# Patient Record
Sex: FEMALE | URBAN - METROPOLITAN AREA
[De-identification: names, ages, dates, MRNs, and addresses within clinical notes are randomized per-mention and may not be internally consistent; named-entity substitution may affect disease eponyms.]

---

## 2017-01-09 ENCOUNTER — HOSPITAL ENCOUNTER (OUTPATIENT)
Dept: LAB | Age: 57
Discharge: HOME | End: 2017-01-09
Payer: COMMERCIAL

## 2017-01-09 ENCOUNTER — OFFICE VISIT (OUTPATIENT)
Dept: ONCOLOGY | Age: 57
End: 2017-01-09

## 2017-01-09 VITALS
HEART RATE: 84 BPM | WEIGHT: 275 LBS | TEMPERATURE: 97.1 F | OXYGEN SATURATION: 92 % | SYSTOLIC BLOOD PRESSURE: 153 MMHG | RESPIRATION RATE: 18 BRPM | DIASTOLIC BLOOD PRESSURE: 91 MMHG | BODY MASS INDEX: 44.39 KG/M2

## 2017-01-09 DIAGNOSIS — Z85.42 PERSONAL HISTORY OF MALIGNANT NEOPLASM OF OTHER PARTS OF UTERUS: ICD-10-CM

## 2017-01-09 DIAGNOSIS — Z90.710 VAGINAL PAP SMEAR FOLLOWING HYSTERECTOMY FOR MALIGNANCY: ICD-10-CM

## 2017-01-09 DIAGNOSIS — Z90.710 ACQUIRED ABSENCE OF BOTH CERVIX AND UTERUS: ICD-10-CM

## 2017-01-09 DIAGNOSIS — C54.1 ENDOMETRIAL CANCER (HCC): ICD-10-CM

## 2017-01-09 DIAGNOSIS — F51.01 PRIMARY INSOMNIA: ICD-10-CM

## 2017-01-09 DIAGNOSIS — Z12.72 SPECIAL SCREENING FOR MALIGNANT NEOPLASMS, VAGINA: Primary | ICD-10-CM

## 2017-01-09 DIAGNOSIS — Z08 VAGINAL PAP SMEAR FOLLOWING HYSTERECTOMY FOR MALIGNANCY: ICD-10-CM

## 2017-01-09 PROCEDURE — 88175 CYTOPATH C/V AUTO FLUID REDO: CPT | Performed by: OBSTETRICS & GYNECOLOGY

## 2017-01-09 RX ORDER — ZOLPIDEM TARTRATE 10 MG/1
5 TABLET ORAL
Qty: 30 TAB | Refills: 0 | Status: SHIPPED | OUTPATIENT
Start: 2017-01-09

## 2017-01-09 NOTE — MR AVS SNAPSHOT
Visit Information Date & Time Provider Department Dept. Phone Encounter #  
 1/9/2017 11:45 AM [address], MD [facility] [phone] [id number] Your Appointments 1/10/2018  1:15 PM  
ANNUAL with [address], [place]  
[facility]) Appt Note: yearly One [address] Upcoming Health Maintenance Date Due Hepatitis C Screening 1960 Pneumococcal 19-64 Highest Risk (1 of 3 - PCV13) 12/21/1981 DTaP/Tdap/Td series (1 - Tdap) 12/21/1983 FOBT Q 1 YEAR AGE 50-75 12/21/2012 BREAST CANCER SCRN MAMMOGRAM 1/14/2015 INFLUENZA AGE 9 TO ADULT 8/1/2016 PAP AKA CERVICAL CYTOLOGY 7/6/2019 Allergies as of 1/9/2017  Review Complete On: 1/9/2017 By: [address], [place]  
  
 Severity Noted Reaction Type Reactions Augmentin [Amoxicillin-pot Clavulanate]  01/09/2017    Rash, Itching Current Immunizations  Never Reviewed No immunizations on file. Not reviewed this visit You Were Diagnosed With   
  
 Codes Comments Special screening for malignant neoplasms, vagina    -  Primary ICD-10-CM: Z12.72 
ICD-9-CM: V76.47 Vaginal Pap smear following hysterectomy for malignancy     ICD-10-CM: Z08, Z90.710 ICD-9-CM: V67.01 Personal history of malignant neoplasm of other parts of uterus     ICD-10-CM: Z85.42 
ICD-9-CM: V10.42 Endometrial cancer ([facility] [address]     ICD-10-CM: C54.1 ICD-9-CM: 182.0 Acquired absence of both cervix and uterus     ICD-10-CM: Z90.710 ICD-9-CM: V88.01 Primary insomnia     ICD-10-CM: F51.01 
ICD-9-CM: 307.42 Vitals BP Pulse Temp Resp Weight(growth percentile) SpO2  
 (!) 153/91 84 97.1 °F (36.2 °C) (Oral) 18 275 lb (124.7 kg) 92% BMI OB Status Smoking Status 44.39 kg/m2 Hysterectomy Never Smoker  Vitals History BMI and BSA Data Body Mass Index Body Surface Area 44.39 kg/m 2 2.41 m 2 Preferred Pharmacy Pharmacy Name Phone [id number], [address] [place] [address] [phone] Your Updated Medication List  
  
   
This list is accurate as of: 1/9/17 12:54 PM.  Always use your most recent med list.  
  
  
  
  
 fluticasone 50 mcg/actuation nasal spray Commonly known as:  Doug Peel Mist 1-2 spray(s) into each nostril once daily. raNITIdine 150 mg tablet Commonly known as:  ZANTAC  
150 mg.  
  
 zolpidem 10 mg tablet Commonly known as:  AMBIEN Take 0.5 Tabs by mouth nightly as needed for Sleep. Max Daily Amount: 5 mg. Prescriptions Printed Refills  
 zolpidem (AMBIEN) 10 mg tablet 0 Sig: Take 0.5 Tabs by mouth nightly as needed for Sleep. Max Daily Amount: 5 mg. Class: Print Route: Oral  
  
Introducing [facility] SERVICES! Dear Emilia Mcdermott: Thank you for requesting a [employer] account. Our records indicate that you already have an active [employer] account. You can access your account anytime at [email or web address] Did you know that you can access your hospital and ER discharge instructions at any time in [employer]? You can also review all of your test results from your hospital stay or ER visit. Additional Information If you have questions, please visit the Frequently Asked Questions section of the [employer] website at [email or web address] Remember, [employer] is NOT to be used for urgent needs. For medical emergencies, dial 911. Now available from your iPhone and Android! Please provide this summary of care documentation to your next provider. Your primary care clinician is listed as Joel Vargas. [place]. If you have any questions after today's visit, please call [phone].

## 2017-01-09 NOTE — PROGRESS NOTES
[facility]  [address] [place] [address], [address], [address] [place] [id number] [phone]  Jorgito Grajeda MD      Patient ID:  Name: Lorrie Fowler  MRN: [id number]  : 1960/56 y.o. Visit date: 2017    INTERVAL HISTORY: Lorrie Fowler is a 49 y.o. G1, P3  female with Stage IA, Grade 1 Endometrial Cancer status post TLH/BSO/ PLND 11. Last PAP 16 was satis and neg. She is being seen today for a 6 month followup. Today patient has no gyn complaints. Patient specifically denies vaginal bleeding, vaginal discharge, pelvic pain, abdominal pain, abdominal bloating, urinary symptoms, or changes in bowel movements. Pt does report her sister was recently diagnoses with stage 2 invasive poorly differentiated ductal carcinoma, genetic testing was neg. Patient complains of insomnia. She is tearful about this and says Ambien worked great for her in the past and doesn't know why she cannot continue on it. Last CT : none recently   Mammogram: 2013    COMPREHENSIVE ROS:  Constitutional: Sweats  ALL/IMM: seasonal allergies  Endocrine: hot flashes  Psych: Anxious, Insomnia    All other systems have been reviewed and are neg. PMH:  Past Medical History   Diagnosis Date    Basal cell carcinoma     Kidney stones     Polyp of ureter      PSH:  Past Surgical History   Procedure Laterality Date    Hysteroscopy diagnostic       D&C/POLYP REMOVAL    Pr endometrial ablation, thermal      Hx  section       SOC:  Social History     Social History    Marital status:      Spouse name: N/A    Number of children: N/A    Years of education: N/A     Occupational History    Not on file.      Social History Main Topics    Smoking status: Never Smoker    Smokeless tobacco: Never Used    Alcohol use Not on file    Drug use: Not on file    Sexual activity: Not on file     Other Topics Concern    Not on file     Social History Narrative     Family History  Family History   Problem Relation Age of Onset    Prostate Cancer Father      PROSTATE    Breast Cancer Sister 46     invasive poorly differentiated ductal carcinoma, Neg genetic testing.  Cancer Sister 62     melanoma stage 1     Medications:  Current Outpatient Prescriptions on File Prior to Visit   Medication Sig Dispense Refill    ranitidine (ZANTAC) 150 mg tablet 150 mg.      fluticasone (FLONASE) 50 mcg/actuation nasal spray Mist 1-2 spray(s) into each nostril once daily. No current facility-administered medications on file prior to visit. Allergies   Allergen Reactions    Augmentin [Amoxicillin-Pot Clavulanate] Rash and Itching         OBJECTIVE:  PHYSICAL EXAM  VITAL SIGNS: Visit Vitals    BP (!) 153/91    Pulse 84    Temp 97.1 °F (36.2 °C) (Oral)    Resp 18    Wt 124.7 kg (275 lb)    SpO2 92%    BMI 44.39 kg/m2      GENERAL KJ: in no apparent distress and well developed and well nourished   HEENT: NCAT,EOMI,PERRL   RESPIRATORY: lungs clear to auscultation, no wheezing, rhonchi, or crackles   CARDIOVASC: Regular rate and rhythm or without murmur or extra heart sounds   GASTROINT: soft, non-tender, non-distended, without masses or organomegaly, normal active bowel sounds   MUSCULOSKEL: no joint tenderness, deformity or swelling   INTEGUMENT:  warm and dry, no rashes or lesions   EXTREMITIES: extremities normal, atraumatic, no cyanosis or edema   PELVIC: External genitalia: normal general appearance  Urinary system: urethral meatus normal  Vaginal: normal mucosa without prolapse or lesions and normal rugae  Cervix: removed surgically  Adnexa: removed surgically  Uterus: removed surgically   RECTAL: deferred   LYLY SURVEY: Cervical, supraclavicular, axillary and inguinal nodes normal.   NEURO: Grossly normal       IMPRESSION AND PLAN:  Stage IA, G1 Endometrial Cancer, JANESSA   Menopausal symptoms.  Previously declined trial of Effexor or Paxil for treatment of hot flashes. Discussed option of low dose ERT and risks versus benefits of therapy. Patient declines. Insomnia. Patient reports excellent results with prior use of Ambien. I will give patient Ambien 5 mg 30 pills no refill today. If she has good results again I suggest she follow up with Dr. Yulia Casillas to continue therapy. Cytology and pelvic performed today. Follow up in 6 months for endometrial cancer surveillance. Advised patient to call with any questions or concerns prior to next appointment. All questions answered. Patient agrees with plan of care.       Abdirizak Claudio MD   Gynecologic Oncology  3/3/72279:24 PM

## 2018-01-16 ENCOUNTER — OFFICE VISIT (OUTPATIENT)
Dept: GYNECOLOGY | Age: 58
End: 2018-01-16

## 2018-01-16 ENCOUNTER — HOSPITAL ENCOUNTER (OUTPATIENT)
Dept: LAB | Age: 58
Discharge: HOME | End: 2018-01-16
Payer: COMMERCIAL

## 2018-01-16 VITALS
HEART RATE: 89 BPM | WEIGHT: 279 LBS | BODY MASS INDEX: 44.84 KG/M2 | HEIGHT: 66 IN | DIASTOLIC BLOOD PRESSURE: 110 MMHG | SYSTOLIC BLOOD PRESSURE: 170 MMHG

## 2018-01-16 DIAGNOSIS — C54.1 ENDOMETRIAL CANCER (HCC): Primary | ICD-10-CM

## 2018-01-16 PROBLEM — E66.01 OBESITY, MORBID (HCC): Status: ACTIVE | Noted: 2018-01-16

## 2018-01-16 PROCEDURE — 88175 CYTOPATH C/V AUTO FLUID REDO: CPT | Performed by: OBSTETRICS & GYNECOLOGY

## 2018-01-16 RX ORDER — HYDROGEN PEROXIDE 3 %
20 SOLUTION, NON-ORAL MISCELLANEOUS DAILY
COMMUNITY
End: 2020-05-22

## 2018-01-16 NOTE — PROGRESS NOTES
New patient referred for follow up care for endometrial cancer. Physician no longer accepts insurance. Last follow up was 1/2017. Pt tearful, her father passed away 2 days ago. Patient states no abnormal spotting or bleeding. Patient states no questions or concerns for today's visit.

## 2018-01-16 NOTE — PROGRESS NOTES
[address] [place] [address] [address], [address] [facility] [phone]  F [phone]    Office Note  Patient ID:  Name:  Michael Doshi  MRN:  [id number]  :  1960/57 y.o. Date:  2018      HISTORY OF PRESENT ILLNESS:  Michael Doshi is a 56 y.o.  postmenopausal female who is being seen for a history of stage IA, grade 1, endometrial carcinoma. She underwent TLH, BSO, PLND in 2011. Based upon her pathology she was not recommended any adjuvant therapy. Her surgery was performed by Dr. Trista Cole in [place]. She has transferred care to our office since Dr. Adelaide Madrid no longer accepts her insurance. She presents today for annual surveillance. She denies any vaginal bleeding or discharge, any pelvic or abdominal pain, or any changes in her bowel or bladder habits. She is up to date on her colonoscopy and mammogram.     Her father passed away a couple of days ago. ROS:   and GI review:  Negative  Cardiopulmonary review:  Negative   Musculoskeletal:  Negative    A comprehensive review of systems was negative except for that written in the History of Present Illness.  , 10 point ROS      Problem List:  Patient Active Problem List    Diagnosis Date Noted    Obesity, morbid ([address] 2018    Special screening for malignant neoplasms, vagina 2016    Vaginal Pap smear following hysterectomy for malignancy 2016    Personal history of malignant neoplasm of other parts of uterus 2016    Endometrial cancer ([facility] [address] 2013     PMH:  Past Medical History:   Diagnosis Date    Basal cell carcinoma     GERD (gastroesophageal reflux disease)     Kidney stones     Polyp of ureter       PSH:  Past Surgical History:   Procedure Laterality Date    ENDOMETRIAL ABLATION, THERMAL      HX  SECTION      HX COLONOSCOPY      eber     HYSTEROSCOPY DIAGNOSTIC  2011    D&C/POLYP REMOVAL      Social History:  Social History   Substance Use Topics    Smoking status: Never Smoker    Smokeless tobacco: Never Used    Alcohol use Not on file      Family History:  Family History   Problem Relation Age of Onset    Prostate Cancer Father      PROSTATE    Breast Cancer Sister 46     invasive poorly differentiated ductal carcinoma, Neg genetic testing.  Cancer Sister 62     melanoma stage 1      Medications: (reviewed)  Current Outpatient Prescriptions   Medication Sig    esomeprazole (NEXIUM) 20 mg capsule Take 20 mg by mouth daily. Indications: BID    ranitidine (ZANTAC) 150 mg tablet 150 mg.    zolpidem (AMBIEN) 10 mg tablet Take 0.5 Tabs by mouth nightly as needed for Sleep. Max Daily Amount: 5 mg.  fluticasone (FLONASE) 50 mcg/actuation nasal spray Mist 1-2 spray(s) into each nostril once daily. No current facility-administered medications for this visit. Allergies: (reviewed)  Allergies   Allergen Reactions    Augmentin [Amoxicillin-Pot Clavulanate] Rash and Itching          OBJECTIVE:    Physical Exam:  VITAL SIGNS: Vitals:    01/16/18 1442   BP: (!) 170/110   Pulse: 89   Weight: 279 lb (126.6 kg)   Height: 5' 5.98\" (1.676 m)     Body mass index is 45.05 kg/(m^2). GENERAL KJ: Conversant, alert, oriented. No acute distress. HEENT: HEENT. No thyroid enlargement. No JVD. Neck: Supple without restrictions. RESPIRATORY: Clear to auscultation and percussion to the bases. No CVAT. CARDIOVASC: RRR without murmur/rub.    GASTROINT: soft, non-tender, without masses or organomegaly   MUSCULOSKEL: no joint tenderness, deformity or swelling   EXTREMITIES: extremities normal, atraumatic, no cyanosis or edema   PELVIC: External genitalia: normal general appearance  Urinary system: urethral meatus normal  Vaginal: normal without tenderness, induration or masses  Cervix: absent  Adnexa: removed surgically  Uterus: absent   RECTAL: Deferred   LYLY SURVEY: No suspicious lymphadenopathy or edema noted.   NEURO: Grossly intact. No acute deficit. IMPRESSION/PLAN:  Sofi Miner is a 56 y.o. female with a diagnosis of stage IA, grade 1, endometrial cancer. She has no evidence of disease on today's exam.  We will see her back in one year for routine surveillance of disease.           Signed By: Valerie Reed MD     1/16/2018/2:56 PM

## 2019-01-22 ENCOUNTER — OFFICE VISIT (OUTPATIENT)
Dept: GYNECOLOGY | Age: 59
End: 2019-01-22

## 2019-01-22 VITALS
HEART RATE: 103 BPM | SYSTOLIC BLOOD PRESSURE: 198 MMHG | BODY MASS INDEX: 44.68 KG/M2 | HEIGHT: 66 IN | WEIGHT: 278 LBS | DIASTOLIC BLOOD PRESSURE: 105 MMHG

## 2019-01-22 DIAGNOSIS — C54.1 ENDOMETRIAL CANCER (HCC): Primary | ICD-10-CM

## 2019-01-22 RX ORDER — PANTOPRAZOLE SODIUM 40 MG/1
TABLET, DELAYED RELEASE ORAL
Refills: 4 | COMMUNITY
Start: 2018-11-09 | End: 2020-10-27

## 2019-01-22 NOTE — PROGRESS NOTES
[facility] [address], [address] [facility]  P [phone]  F [phone]    Office Note  Patient ID:  Name:  Arslan Romeo  MRN:  [id number]  :  1960/58 y.o. Date:  2019      HISTORY OF PRESENT ILLNESS:  Arslan Romeo is a 66 y.o.  postmenopausal female who is being seen for a history of stage IA, grade 1, endometrial carcinoma. She underwent TLH, BSO, PLND in 2011. Based upon her pathology she was not recommended any adjuvant therapy. Her surgery was performed by Dr. Nadege Staton in [place]. She has transferred care to our office since Dr. Milton Meza no longer accepts her insurance. She presents today for annual surveillance. She denies any vaginal bleeding or discharge, any pelvic or abdominal pain, or any changes in her bowel or bladder habits. She is up to date on her colonoscopy and mammogram.             ROS:   and GI review:  Negative  Cardiopulmonary review:  Negative   Musculoskeletal:  Negative    A comprehensive review of systems was negative except for that written in the History of Present Illness.  , 10 point ROS      Problem List:  Patient Active Problem List    Diagnosis Date Noted    Obesity, morbid ([address] 2018    Special screening for malignant neoplasms, vagina 2016    Vaginal Pap smear following hysterectomy for malignancy 2016    Personal history of malignant neoplasm of other parts of uterus 2016    Endometrial cancer ([facility] [address] 2013     PMH:  Past Medical History:   Diagnosis Date    Basal cell carcinoma     GERD (gastroesophageal reflux disease)     Kidney stones     Polyp of ureter       PSH:  Past Surgical History:   Procedure Laterality Date    ENDOMETRIAL ABLATION, THERMAL      HX  SECTION      HX COLONOSCOPY      Novemeber     HYSTEROSCOPY DIAGNOSTIC      D&C/POLYP REMOVAL      Social History:  Social History     Tobacco Use    Smoking status: Never Smoker    Smokeless tobacco: Never Used   Substance Use Topics    Alcohol use: Not on file      Family History:  Family History   Problem Relation Age of Onset    Prostate Cancer Father         PROSTATE    Breast Cancer Sister 46        invasive poorly differentiated ductal carcinoma, Neg genetic testing.  Cancer Sister 62        melanoma stage 1      Medications: (reviewed)  Current Outpatient Medications   Medication Sig    ranitidine (ZANTAC) 150 mg tablet 150 mg.  pantoprazole (PROTONIX) 40 mg tablet TAKE 1 TABLET BY MOUTH TWICE A DAY    esomeprazole (NEXIUM) 20 mg capsule Take 20 mg by mouth daily. Indications: BID    zolpidem (AMBIEN) 10 mg tablet Take 0.5 Tabs by mouth nightly as needed for Sleep. Max Daily Amount: 5 mg.  fluticasone (FLONASE) 50 mcg/actuation nasal spray Mist 1-2 spray(s) into each nostril once daily. No current facility-administered medications for this visit. Allergies: (reviewed)  Allergies   Allergen Reactions    Augmentin [Amoxicillin-Pot Clavulanate] Rash and Itching          OBJECTIVE:    Physical Exam:  VITAL SIGNS: Vitals:    01/22/19 1526   BP: (!) 198/105   Pulse: (!) 103   Weight: 278 lb (126.1 kg)   Height: 5' 5.98\" (1.676 m)     Body mass index is 44.89 kg/m². GENERAL KJ: Conversant, alert, oriented. No acute distress. HEENT: HEENT. No thyroid enlargement. No JVD. Neck: Supple without restrictions. RESPIRATORY: Clear to auscultation and percussion to the bases. No CVAT. CARDIOVASC: RRR without murmur/rub.    GASTROINT: soft, non-tender, without masses or organomegaly   MUSCULOSKEL: no joint tenderness, deformity or swelling   EXTREMITIES: extremities normal, atraumatic, no cyanosis or edema   PELVIC: External genitalia: normal general appearance  Urinary system: urethral meatus normal  Vaginal: normal without tenderness, induration or masses  Cervix: absent  Adnexa: removed surgically  Uterus: absent   RECTAL: Deferred   LYLY SURVEY: No suspicious lymphadenopathy or edema noted. NEURO: Grossly intact. No acute deficit. IMPRESSION/PLAN:  Fco Do is a 66 y.o. female with a diagnosis of stage IA, grade 1, endometrial cancer. She has no evidence of disease on today's exam.  We will see her back in one year for routine surveillance of disease.           Signed By: Dutch Escobar MD     1/22/2019/2:56 PM

## 2020-01-23 ENCOUNTER — OFFICE VISIT (OUTPATIENT)
Dept: GYNECOLOGY | Age: 60
End: 2020-01-23

## 2020-01-23 VITALS
HEART RATE: 83 BPM | WEIGHT: 270.8 LBS | HEIGHT: 66 IN | SYSTOLIC BLOOD PRESSURE: 164 MMHG | DIASTOLIC BLOOD PRESSURE: 103 MMHG | BODY MASS INDEX: 43.52 KG/M2

## 2020-01-23 DIAGNOSIS — C54.1 ENDOMETRIAL CANCER (HCC): Primary | ICD-10-CM

## 2020-01-23 NOTE — PROGRESS NOTES
Check up for history of endometrial cancer. Pt states no abnormal spotting, bleeding or pain. 1. Have you been to the ER, urgent care clinic since your last visit? Hospitalized since your last visit?no    2. Have you seen or consulted any other health care providers outside of the [address] [place] [address] since your last visit? Include any pap smears or colon screening.  no

## 2020-01-23 NOTE — PROGRESS NOTES
[address] [place] [address] [address], [address]  P [phone]  F [phone]    Office Note  Patient ID:  Name:  Kya Hanson  MRN:  [id number]  :  1960/59 y.o. Date:  2020      HISTORY OF PRESENT ILLNESS:  Kya Hanson is a 64 y.o.  postmenopausal female who is being seen for a history of stage IA, grade 1, endometrial carcinoma. She underwent TLH, BSO, PLND in 2011. Based upon her pathology she was not recommended any adjuvant therapy. Her surgery was performed by Dr. Jimbo Zarate in [facility]. She has transferred care to our office. She presents today for annual surveillance. She denies any vaginal bleeding or discharge, any pelvic or abdominal pain, or any changes in her bowel or bladder habits. She is up to date on her colonoscopy and mammogram.             ROS:   and GI review:  Negative  Cardiopulmonary review:  Negative   Musculoskeletal:  Negative    A comprehensive review of systems was negative except for that written in the History of Present Illness.  , 10 point ROS      Problem List:  Patient Active Problem List    Diagnosis Date Noted    Obesity, morbid ([facility] [address] 2018    Vaginal Pap smear following hysterectomy for malignancy 2016    Personal history of malignant neoplasm of other parts of uterus 2016    Endometrial cancer ([facility] [address] 2013     PMH:  Past Medical History:   Diagnosis Date    Basal cell carcinoma     GERD (gastroesophageal reflux disease)     Kidney stones     Polyp of ureter       PSH:  Past Surgical History:   Procedure Laterality Date    ENDOMETRIAL ABLATION, THERMAL      HX  SECTION      HX COLONOSCOPY      [id number]    HYSTEROSCOPY DIAGNOSTIC      D&C/POLYP REMOVAL      Social History:  Social History     Tobacco Use    Smoking status: Never Smoker    Smokeless tobacco: Never Used   Substance Use Topics    Alcohol use: Not on file      Family History:  Family History   Problem Relation Age of Onset    Prostate Cancer Father         PROSTATE    Breast Cancer Sister 46        invasive poorly differentiated ductal carcinoma, Neg genetic testing.  Cancer Sister 62        melanoma stage 1      Medications: (reviewed)  Current Outpatient Medications   Medication Sig    esomeprazole (NEXIUM) 20 mg capsule Take 20 mg by mouth daily. Indications: BID    ranitidine (ZANTAC) 150 mg tablet 150 mg.  pantoprazole (PROTONIX) 40 mg tablet TAKE 1 TABLET BY MOUTH TWICE A DAY    zolpidem (AMBIEN) 10 mg tablet Take 0.5 Tabs by mouth nightly as needed for Sleep. Max Daily Amount: 5 mg.  fluticasone (FLONASE) 50 mcg/actuation nasal spray Mist 1-2 spray(s) into each nostril once daily. No current facility-administered medications for this visit. Allergies: (reviewed)  Allergies   Allergen Reactions    Augmentin [Amoxicillin-Pot Clavulanate] Rash and Itching          OBJECTIVE:    Physical Exam:  VITAL SIGNS: Vitals:    01/23/20 1422 01/23/20 1437   BP: (!) 181/103 (!) 164/103   Pulse: 82 83   Weight: 270 lb 12.8 oz (122.8 kg)    Height: 5' 5.98\" (1.676 m)      Body mass index is 43.73 kg/m². GENERAL KJ: Conversant, alert, oriented. No acute distress. HEENT: HEENT. No thyroid enlargement. No JVD. Neck: Supple without restrictions. RESPIRATORY: Clear to auscultation and percussion to the bases. No CVAT. CARDIOVASC: RRR without murmur/rub. GASTROINT: soft, non-tender, without masses or organomegaly   MUSCULOSKEL: no joint tenderness, deformity or swelling   EXTREMITIES: extremities normal, atraumatic, no cyanosis or edema   PELVIC: External genitalia: normal general appearance  Urinary system: urethral meatus normal  Vaginal: normal without tenderness, induration or masses  Cervix: absent  Adnexa: removed surgically  Uterus: absent   RECTAL: Deferred   LYLY SURVEY: No suspicious lymphadenopathy or edema noted. NEURO: Grossly intact.  No acute deficit. IMPRESSION/PLAN:  Luci García is a 64 y.o. female with a diagnosis of stage IA, grade 1, endometrial cancer. She has no evidence of disease on today's exam.  We will see her back in one year for routine surveillance of disease.           Signed By: Marietta Silverio MD     1/23/2020/2:56 PM

## 2020-03-31 ENCOUNTER — APPOINTMENT (OUTPATIENT)
Dept: GENERAL RADIOLOGY | Age: 60
DRG: 870 | End: 2020-03-31
Attending: INTERNAL MEDICINE
Payer: COMMERCIAL

## 2020-03-31 ENCOUNTER — HOSPITAL ENCOUNTER (INPATIENT)
Age: 60
LOS: 52 days | Discharge: NURSING, REHAB OR LONG-TERM CARE | DRG: 870 | End: 2020-05-22
Attending: SURGERY | Admitting: SURGERY
Payer: COMMERCIAL

## 2020-03-31 ENCOUNTER — APPOINTMENT (OUTPATIENT)
Dept: NON INVASIVE DIAGNOSTICS | Age: 60
DRG: 870 | End: 2020-03-31
Attending: INTERNAL MEDICINE
Payer: COMMERCIAL

## 2020-03-31 DIAGNOSIS — R53.81 DEBILITY: ICD-10-CM

## 2020-03-31 DIAGNOSIS — Z71.89 GOALS OF CARE, COUNSELING/DISCUSSION: ICD-10-CM

## 2020-03-31 DIAGNOSIS — R06.02 SHORTNESS OF BREATH: ICD-10-CM

## 2020-03-31 DIAGNOSIS — N18.6 ESRD (END STAGE RENAL DISEASE) (HCC): ICD-10-CM

## 2020-03-31 PROBLEM — U07.1 COVID-19 VIRUS INFECTION: Status: ACTIVE | Noted: 2020-03-31

## 2020-03-31 LAB
ABO + RH BLD: NORMAL
ALBUMIN SERPL-MCNC: 2.5 G/DL (ref 3.5–5)
ALBUMIN SERPL-MCNC: 2.6 G/DL (ref 3.5–5)
ANION GAP SERPL CALC-SCNC: 12 MMOL/L (ref 5–15)
ANION GAP SERPL CALC-SCNC: 12 MMOL/L (ref 5–15)
ANION GAP SERPL CALC-SCNC: 7 MMOL/L (ref 5–15)
ARTERIAL PATENCY WRIST A: ABNORMAL
BASE DEFICIT BLD-SCNC: 16 MMOL/L
BDY SITE: ABNORMAL
BLOOD GROUP ANTIBODIES SERPL: NORMAL
BNP SERPL-MCNC: ABNORMAL PG/ML
BUN SERPL-MCNC: 14 MG/DL (ref 6–20)
BUN SERPL-MCNC: 15 MG/DL (ref 6–20)
BUN SERPL-MCNC: 16 MG/DL (ref 6–20)
BUN/CREAT SERPL: 3 (ref 12–20)
BUN/CREAT SERPL: 4 (ref 12–20)
BUN/CREAT SERPL: 4 (ref 12–20)
CA-I BLD-SCNC: 1.1 MMOL/L (ref 1.12–1.32)
CALCIUM SERPL-MCNC: 7.7 MG/DL (ref 8.5–10.1)
CALCIUM SERPL-MCNC: 7.8 MG/DL (ref 8.5–10.1)
CALCIUM SERPL-MCNC: 7.9 MG/DL (ref 8.5–10.1)
CHLORIDE SERPL-SCNC: 105 MMOL/L (ref 97–108)
CHLORIDE SERPL-SCNC: 106 MMOL/L (ref 97–108)
CHLORIDE SERPL-SCNC: 108 MMOL/L (ref 97–108)
CK SERPL-CCNC: 145 U/L (ref 26–192)
CO2 SERPL-SCNC: 16 MMOL/L (ref 21–32)
CO2 SERPL-SCNC: 20 MMOL/L (ref 21–32)
CO2 SERPL-SCNC: 23 MMOL/L (ref 21–32)
CREAT SERPL-MCNC: 3.78 MG/DL (ref 0.55–1.02)
CREAT SERPL-MCNC: 4.04 MG/DL (ref 0.55–1.02)
CREAT SERPL-MCNC: 4.26 MG/DL (ref 0.55–1.02)
CRP SERPL-MCNC: 28.3 MG/DL (ref 0–0.6)
D DIMER PPP FEU-MCNC: 4.37 MG/L FEU (ref 0–0.65)
ERYTHROCYTE [DISTWIDTH] IN BLOOD BY AUTOMATED COUNT: 13.5 % (ref 11.5–14.5)
ERYTHROCYTE [DISTWIDTH] IN BLOOD BY AUTOMATED COUNT: 13.6 % (ref 11.5–14.5)
EST. AVERAGE GLUCOSE BLD GHB EST-MCNC: 252 MG/DL
FERRITIN SERPL-MCNC: 1985 NG/ML (ref 26–388)
GAS FLOW.O2 O2 DELIVERY SYS: ABNORMAL L/MIN
GAS FLOW.O2 SETTING OXYMISER: 20 BPM
GLUCOSE BLD STRIP.AUTO-MCNC: 135 MG/DL (ref 65–100)
GLUCOSE BLD STRIP.AUTO-MCNC: 207 MG/DL (ref 65–100)
GLUCOSE SERPL-MCNC: 168 MG/DL (ref 65–100)
GLUCOSE SERPL-MCNC: 180 MG/DL (ref 65–100)
GLUCOSE SERPL-MCNC: 212 MG/DL (ref 65–100)
HBA1C MFR BLD: 10.4 % (ref 4–5.6)
HCO3 BLD-SCNC: 14.1 MMOL/L (ref 22–26)
HCT VFR BLD AUTO: 32.8 % (ref 35–47)
HCT VFR BLD AUTO: 33.7 % (ref 35–47)
HGB BLD-MCNC: 10.2 G/DL (ref 11.5–16)
HGB BLD-MCNC: 10.5 G/DL (ref 11.5–16)
INR PPP: 1.2 (ref 0.9–1.1)
LDH SERPL L TO P-CCNC: 458 U/L (ref 81–246)
MAGNESIUM SERPL-MCNC: 2.1 MG/DL (ref 1.6–2.4)
MAGNESIUM SERPL-MCNC: 2.1 MG/DL (ref 1.6–2.4)
MCH RBC QN AUTO: 27.3 PG (ref 26–34)
MCH RBC QN AUTO: 27.6 PG (ref 26–34)
MCHC RBC AUTO-ENTMCNC: 31.1 G/DL (ref 30–36.5)
MCHC RBC AUTO-ENTMCNC: 31.2 G/DL (ref 30–36.5)
MCV RBC AUTO: 87.9 FL (ref 80–99)
MCV RBC AUTO: 88.5 FL (ref 80–99)
NRBC # BLD: 0 K/UL (ref 0–0.01)
NRBC # BLD: 0 K/UL (ref 0–0.01)
NRBC BLD-RTO: 0 PER 100 WBC
NRBC BLD-RTO: 0 PER 100 WBC
O2/TOTAL GAS SETTING VFR VENT: 1 %
PCO2 BLD: 47.9 MMHG (ref 35–45)
PEEP RESPIRATORY: 20 CMH2O
PH BLD: 7.08 [PH] (ref 7.35–7.45)
PHOSPHATE SERPL-MCNC: 5 MG/DL (ref 2.6–4.7)
PHOSPHATE SERPL-MCNC: 5.5 MG/DL (ref 2.6–4.7)
PIP ISTAT,IPIP: 14
PLATELET # BLD AUTO: 334 K/UL (ref 150–400)
PLATELET # BLD AUTO: 369 K/UL (ref 150–400)
PMV BLD AUTO: 10.2 FL (ref 8.9–12.9)
PMV BLD AUTO: 10.2 FL (ref 8.9–12.9)
PO2 BLD: 186 MMHG (ref 80–100)
POTASSIUM SERPL-SCNC: 5.2 MMOL/L (ref 3.5–5.1)
POTASSIUM SERPL-SCNC: 5.2 MMOL/L (ref 3.5–5.1)
POTASSIUM SERPL-SCNC: 6 MMOL/L (ref 3.5–5.1)
PROCALCITONIN SERPL-MCNC: 13.44 NG/ML
PROTHROMBIN TIME: 12.3 SEC (ref 9–11.1)
RBC # BLD AUTO: 3.73 M/UL (ref 3.8–5.2)
RBC # BLD AUTO: 3.81 M/UL (ref 3.8–5.2)
SAO2 % BLD: 99 % (ref 92–97)
SERVICE CMNT-IMP: ABNORMAL
SERVICE CMNT-IMP: ABNORMAL
SODIUM SERPL-SCNC: 136 MMOL/L (ref 136–145)
SODIUM SERPL-SCNC: 136 MMOL/L (ref 136–145)
SODIUM SERPL-SCNC: 137 MMOL/L (ref 136–145)
SPECIMEN EXP DATE BLD: NORMAL
SPECIMEN TYPE: ABNORMAL
TROPONIN I SERPL-MCNC: 0.36 NG/ML
TROPONIN I SERPL-MCNC: 0.48 NG/ML
VANCOMYCIN SERPL-MCNC: 22.9 UG/ML
VENTILATION MODE VENT: ABNORMAL
WBC # BLD AUTO: 20.3 K/UL (ref 3.6–11)
WBC # BLD AUTO: 27.4 K/UL (ref 3.6–11)

## 2020-03-31 PROCEDURE — 85027 COMPLETE CBC AUTOMATED: CPT

## 2020-03-31 PROCEDURE — 65610000006 HC RM INTENSIVE CARE

## 2020-03-31 PROCEDURE — 02HV33Z INSERTION OF INFUSION DEVICE INTO SUPERIOR VENA CAVA, PERCUTANEOUS APPROACH: ICD-10-PCS | Performed by: INTERNAL MEDICINE

## 2020-03-31 PROCEDURE — 74011250636 HC RX REV CODE- 250/636: Performed by: INTERNAL MEDICINE

## 2020-03-31 PROCEDURE — 83735 ASSAY OF MAGNESIUM: CPT

## 2020-03-31 PROCEDURE — 74011636637 HC RX REV CODE- 636/637: Performed by: INTERNAL MEDICINE

## 2020-03-31 PROCEDURE — 71045 X-RAY EXAM CHEST 1 VIEW: CPT

## 2020-03-31 PROCEDURE — 74011000250 HC RX REV CODE- 250: Performed by: INTERNAL MEDICINE

## 2020-03-31 PROCEDURE — 82550 ASSAY OF CK (CPK): CPT

## 2020-03-31 PROCEDURE — 83880 ASSAY OF NATRIURETIC PEPTIDE: CPT

## 2020-03-31 PROCEDURE — 77030018798 HC PMP KT ENTRL FED COVD -A

## 2020-03-31 PROCEDURE — 74011250637 HC RX REV CODE- 250/637: Performed by: INTERNAL MEDICINE

## 2020-03-31 PROCEDURE — 5A1955Z RESPIRATORY VENTILATION, GREATER THAN 96 CONSECUTIVE HOURS: ICD-10-PCS | Performed by: HOSPITALIST

## 2020-03-31 PROCEDURE — 36592 COLLECT BLOOD FROM PICC: CPT

## 2020-03-31 PROCEDURE — 83615 LACTATE (LD) (LDH) ENZYME: CPT

## 2020-03-31 PROCEDURE — 86900 BLOOD TYPING SEROLOGIC ABO: CPT

## 2020-03-31 PROCEDURE — 87070 CULTURE OTHR SPECIMN AEROBIC: CPT

## 2020-03-31 PROCEDURE — 83036 HEMOGLOBIN GLYCOSYLATED A1C: CPT

## 2020-03-31 PROCEDURE — 87186 SC STD MICRODIL/AGAR DIL: CPT

## 2020-03-31 PROCEDURE — 84145 PROCALCITONIN (PCT): CPT

## 2020-03-31 PROCEDURE — 94002 VENT MGMT INPAT INIT DAY: CPT

## 2020-03-31 PROCEDURE — 85379 FIBRIN DEGRADATION QUANT: CPT

## 2020-03-31 PROCEDURE — 82803 BLOOD GASES ANY COMBINATION: CPT

## 2020-03-31 PROCEDURE — 82728 ASSAY OF FERRITIN: CPT

## 2020-03-31 PROCEDURE — 80048 BASIC METABOLIC PNL TOTAL CA: CPT

## 2020-03-31 PROCEDURE — 74011250637 HC RX REV CODE- 250/637: Performed by: NURSE PRACTITIONER

## 2020-03-31 PROCEDURE — 74011000258 HC RX REV CODE- 258: Performed by: INTERNAL MEDICINE

## 2020-03-31 PROCEDURE — 87077 CULTURE AEROBIC IDENTIFY: CPT

## 2020-03-31 PROCEDURE — 85610 PROTHROMBIN TIME: CPT

## 2020-03-31 PROCEDURE — 80202 ASSAY OF VANCOMYCIN: CPT

## 2020-03-31 PROCEDURE — 84484 ASSAY OF TROPONIN QUANT: CPT

## 2020-03-31 PROCEDURE — 90945 DIALYSIS ONE EVALUATION: CPT

## 2020-03-31 PROCEDURE — 74018 RADEX ABDOMEN 1 VIEW: CPT

## 2020-03-31 PROCEDURE — 82962 GLUCOSE BLOOD TEST: CPT

## 2020-03-31 PROCEDURE — 77030005513 HC CATH URETH FOL11 MDII -B

## 2020-03-31 PROCEDURE — 74011250636 HC RX REV CODE- 250/636: Performed by: NURSE PRACTITIONER

## 2020-03-31 PROCEDURE — C1751 CATH, INF, PER/CENT/MIDLINE: HCPCS

## 2020-03-31 PROCEDURE — 36600 WITHDRAWAL OF ARTERIAL BLOOD: CPT

## 2020-03-31 PROCEDURE — 87040 BLOOD CULTURE FOR BACTERIA: CPT

## 2020-03-31 PROCEDURE — 36415 COLL VENOUS BLD VENIPUNCTURE: CPT

## 2020-03-31 PROCEDURE — C1752 CATH,HEMODIALYSIS,SHORT-TERM: HCPCS

## 2020-03-31 PROCEDURE — 86140 C-REACTIVE PROTEIN: CPT

## 2020-03-31 PROCEDURE — 83520 IMMUNOASSAY QUANT NOS NONAB: CPT

## 2020-03-31 PROCEDURE — 36556 INSERT NON-TUNNEL CV CATH: CPT

## 2020-03-31 PROCEDURE — 80069 RENAL FUNCTION PANEL: CPT

## 2020-03-31 RX ORDER — SODIUM CHLORIDE 0.9 % (FLUSH) 0.9 %
5-40 SYRINGE (ML) INJECTION AS NEEDED
Status: DISCONTINUED | OUTPATIENT
Start: 2020-03-31 | End: 2020-05-22 | Stop reason: HOSPADM

## 2020-03-31 RX ORDER — ACETAMINOPHEN 325 MG/1
650 TABLET ORAL
Status: DISCONTINUED | OUTPATIENT
Start: 2020-03-31 | End: 2020-05-22 | Stop reason: HOSPADM

## 2020-03-31 RX ORDER — ACETAMINOPHEN 325 MG/1
650 TABLET ORAL
Status: DISCONTINUED | OUTPATIENT
Start: 2020-03-31 | End: 2020-03-31

## 2020-03-31 RX ORDER — SODIUM BICARBONATE 1 MEQ/ML
100 SYRINGE (ML) INTRAVENOUS ONCE
Status: DISCONTINUED | OUTPATIENT
Start: 2020-03-31 | End: 2020-03-31

## 2020-03-31 RX ORDER — DEXTROSE 50 % IN WATER (D50W) INTRAVENOUS SYRINGE
25 ONCE
Status: COMPLETED | OUTPATIENT
Start: 2020-03-31 | End: 2020-03-31

## 2020-03-31 RX ORDER — PROPOFOL 10 MG/ML
0-50 VIAL (ML) INTRAVENOUS
Status: DISCONTINUED | OUTPATIENT
Start: 2020-03-31 | End: 2020-04-06

## 2020-03-31 RX ORDER — HYDROCODONE BITARTRATE AND ACETAMINOPHEN 5; 325 MG/1; MG/1
1 TABLET ORAL
Status: DISCONTINUED | OUTPATIENT
Start: 2020-03-31 | End: 2020-04-30

## 2020-03-31 RX ORDER — MAGNESIUM SULFATE 100 %
4 CRYSTALS MISCELLANEOUS AS NEEDED
Status: DISCONTINUED | OUTPATIENT
Start: 2020-03-31 | End: 2020-05-22 | Stop reason: HOSPADM

## 2020-03-31 RX ORDER — ONDANSETRON 2 MG/ML
4 INJECTION INTRAMUSCULAR; INTRAVENOUS
Status: DISCONTINUED | OUTPATIENT
Start: 2020-03-31 | End: 2020-05-22 | Stop reason: HOSPADM

## 2020-03-31 RX ORDER — DOCUSATE SODIUM 100 MG/1
100 CAPSULE, LIQUID FILLED ORAL 2 TIMES DAILY
Status: DISCONTINUED | OUTPATIENT
Start: 2020-03-31 | End: 2020-03-31

## 2020-03-31 RX ORDER — CHLORHEXIDINE GLUCONATE 0.12 MG/ML
15 RINSE ORAL EVERY 12 HOURS
Status: DISCONTINUED | OUTPATIENT
Start: 2020-03-31 | End: 2020-04-14

## 2020-03-31 RX ORDER — INSULIN LISPRO 100 [IU]/ML
INJECTION, SOLUTION INTRAVENOUS; SUBCUTANEOUS EVERY 6 HOURS
Status: DISCONTINUED | OUTPATIENT
Start: 2020-03-31 | End: 2020-04-15

## 2020-03-31 RX ORDER — ACETAMINOPHEN 650 MG/1
650 SUPPOSITORY RECTAL
Status: DISCONTINUED | OUTPATIENT
Start: 2020-03-31 | End: 2020-05-22 | Stop reason: HOSPADM

## 2020-03-31 RX ORDER — BUMETANIDE 0.25 MG/ML
4 INJECTION INTRAMUSCULAR; INTRAVENOUS EVERY 12 HOURS
Status: DISCONTINUED | OUTPATIENT
Start: 2020-03-31 | End: 2020-04-01

## 2020-03-31 RX ORDER — DOCUSATE SODIUM 50 MG/5ML
100 LIQUID ORAL 2 TIMES DAILY
Status: DISCONTINUED | OUTPATIENT
Start: 2020-03-31 | End: 2020-04-10

## 2020-03-31 RX ORDER — HEPARIN SODIUM 10000 [USP'U]/100ML
1200 INJECTION, SOLUTION INTRAVENOUS CONTINUOUS
Status: DISCONTINUED | OUTPATIENT
Start: 2020-03-31 | End: 2020-04-03

## 2020-03-31 RX ORDER — SODIUM BICARBONATE 84 MG/ML
100 INJECTION, SOLUTION INTRAVENOUS
Status: COMPLETED | OUTPATIENT
Start: 2020-03-31 | End: 2020-03-31

## 2020-03-31 RX ORDER — INSULIN GLARGINE 100 [IU]/ML
30 INJECTION, SOLUTION SUBCUTANEOUS DAILY
Status: DISCONTINUED | OUTPATIENT
Start: 2020-04-01 | End: 2020-04-01

## 2020-03-31 RX ORDER — SODIUM CHLORIDE 0.9 % (FLUSH) 0.9 %
5-40 SYRINGE (ML) INJECTION EVERY 8 HOURS
Status: DISCONTINUED | OUTPATIENT
Start: 2020-03-31 | End: 2020-05-22 | Stop reason: HOSPADM

## 2020-03-31 RX ORDER — HYDROMORPHONE HYDROCHLORIDE 1 MG/ML
0.5 INJECTION, SOLUTION INTRAMUSCULAR; INTRAVENOUS; SUBCUTANEOUS
Status: DISCONTINUED | OUTPATIENT
Start: 2020-03-31 | End: 2020-04-06 | Stop reason: SDUPTHER

## 2020-03-31 RX ORDER — DEXTROSE MONOHYDRATE 100 MG/ML
0-250 INJECTION, SOLUTION INTRAVENOUS AS NEEDED
Status: DISCONTINUED | OUTPATIENT
Start: 2020-03-31 | End: 2020-05-22 | Stop reason: HOSPADM

## 2020-03-31 RX ORDER — CHLORHEXIDINE GLUCONATE 1.2 MG/ML
15 RINSE ORAL EVERY 12 HOURS
Status: DISCONTINUED | OUTPATIENT
Start: 2020-03-31 | End: 2020-03-31

## 2020-03-31 RX ORDER — SODIUM BICARBONATE 84 MG/ML
INJECTION, SOLUTION INTRAVENOUS
Status: DISPENSED
Start: 2020-03-31 | End: 2020-03-31

## 2020-03-31 RX ADMIN — FAMOTIDINE 20 MG: 10 INJECTION, SOLUTION INTRAVENOUS at 21:26

## 2020-03-31 RX ADMIN — SODIUM CHLORIDE 30 ML: 9 INJECTION INTRAMUSCULAR; INTRAVENOUS; SUBCUTANEOUS at 13:22

## 2020-03-31 RX ADMIN — VASOPRESSIN 0.04 UNITS/MIN: 20 INJECTION INTRAVENOUS at 10:04

## 2020-03-31 RX ADMIN — CHLOROTHIAZIDE SODIUM 500 MG: 500 INJECTION, POWDER, LYOPHILIZED, FOR SOLUTION INTRAVENOUS at 23:44

## 2020-03-31 RX ADMIN — SODIUM BICARBONATE 100 MEQ: 84 INJECTION, SOLUTION INTRAVENOUS at 11:45

## 2020-03-31 RX ADMIN — CHLOROTHIAZIDE SODIUM 500 MG: 500 INJECTION, POWDER, LYOPHILIZED, FOR SOLUTION INTRAVENOUS at 13:17

## 2020-03-31 RX ADMIN — AZITHROMYCIN MONOHYDRATE 500 MG: 500 INJECTION, POWDER, LYOPHILIZED, FOR SOLUTION INTRAVENOUS at 14:51

## 2020-03-31 RX ADMIN — SODIUM CHLORIDE 40 ML: 9 INJECTION INTRAMUSCULAR; INTRAVENOUS; SUBCUTANEOUS at 22:00

## 2020-03-31 RX ADMIN — HUMAN INSULIN 10 UNITS: 100 INJECTION, SOLUTION SUBCUTANEOUS at 13:10

## 2020-03-31 RX ADMIN — CALCIUM CHLORIDE, MAGNESIUM CHLORIDE, DEXTROSE MONOHYDRATE, LACTIC ACID, SODIUM CHLORIDE, SODIUM BICARBONATE AND POTASSIUM CHLORIDE 3500 ML/HR: 3.68; 3.05; 22; 5.4; 6.46; 3.09; .314 INJECTION INTRAVENOUS at 19:33

## 2020-03-31 RX ADMIN — PROPOFOL 40 MCG/KG/MIN: 10 INJECTION, EMULSION INTRAVENOUS at 15:57

## 2020-03-31 RX ADMIN — HEPARIN SODIUM AND DEXTROSE 1000 UNITS/HR: 10000; 5 INJECTION INTRAVENOUS at 14:01

## 2020-03-31 RX ADMIN — CHLORHEXIDINE GLUCONATE 15 ML: 0.12 RINSE ORAL at 20:40

## 2020-03-31 RX ADMIN — NOREPINEPHRINE BITARTRATE 14 MCG/MIN: 1 INJECTION, SOLUTION, CONCENTRATE INTRAVENOUS at 10:00

## 2020-03-31 RX ADMIN — SODIUM CHLORIDE 10 ML: 9 INJECTION INTRAMUSCULAR; INTRAVENOUS; SUBCUTANEOUS at 11:45

## 2020-03-31 RX ADMIN — SODIUM BICARBONATE: 84 INJECTION, SOLUTION INTRAVENOUS at 13:21

## 2020-03-31 RX ADMIN — CALCIUM CHLORIDE, MAGNESIUM CHLORIDE, DEXTROSE MONOHYDRATE, LACTIC ACID, SODIUM CHLORIDE, SODIUM BICARBONATE AND POTASSIUM CHLORIDE 3500 ML/HR: 3.68; 3.05; 22; 5.4; 6.46; 3.09; .314 INJECTION INTRAVENOUS at 19:31

## 2020-03-31 RX ADMIN — DEXTROSE MONOHYDRATE 25 G: 25 INJECTION, SOLUTION INTRAVENOUS at 13:10

## 2020-03-31 RX ADMIN — PROPOFOL 50 MCG/KG/MIN: 10 INJECTION, EMULSION INTRAVENOUS at 09:41

## 2020-03-31 RX ADMIN — CALCIUM CHLORIDE, MAGNESIUM CHLORIDE, DEXTROSE MONOHYDRATE, LACTIC ACID, SODIUM CHLORIDE, SODIUM BICARBONATE AND POTASSIUM CHLORIDE 3500 ML/HR: 3.68; 3.05; 22; 5.4; 6.46; 3.09; .314 INJECTION INTRAVENOUS at 13:57

## 2020-03-31 RX ADMIN — DOCUSATE SODIUM 100 MG: 50 LIQUID ORAL at 13:54

## 2020-03-31 RX ADMIN — INSULIN LISPRO 2 UNITS: 100 INJECTION, SOLUTION INTRAVENOUS; SUBCUTANEOUS at 13:52

## 2020-03-31 RX ADMIN — PROPOFOL 45 MCG/KG/MIN: 10 INJECTION, EMULSION INTRAVENOUS at 12:12

## 2020-03-31 RX ADMIN — HYDROXYCHLOROQUINE SULFATE 400 MG: 200 TABLET, FILM COATED ORAL at 13:54

## 2020-03-31 RX ADMIN — CALCIUM CHLORIDE, MAGNESIUM CHLORIDE, DEXTROSE MONOHYDRATE, LACTIC ACID, SODIUM CHLORIDE, SODIUM BICARBONATE AND POTASSIUM CHLORIDE 3500 ML/HR: 3.68; 3.05; 22; 5.4; 6.46; 3.09; .314 INJECTION INTRAVENOUS at 13:55

## 2020-03-31 RX ADMIN — CALCIUM CHLORIDE, MAGNESIUM CHLORIDE, DEXTROSE MONOHYDRATE, LACTIC ACID, SODIUM CHLORIDE, SODIUM BICARBONATE AND POTASSIUM CHLORIDE 3500 ML/HR: 3.68; 3.05; 22; 5.4; 6.46; 3.09; .314 INJECTION INTRAVENOUS at 13:56

## 2020-03-31 RX ADMIN — BUMETANIDE 4 MG: 0.25 INJECTION INTRAMUSCULAR; INTRAVENOUS at 12:02

## 2020-03-31 RX ADMIN — BUMETANIDE 4 MG: 0.25 INJECTION INTRAMUSCULAR; INTRAVENOUS at 20:34

## 2020-03-31 RX ADMIN — PROPOFOL 35 MCG/KG/MIN: 10 INJECTION, EMULSION INTRAVENOUS at 19:39

## 2020-03-31 RX ADMIN — NOREPINEPHRINE BITARTRATE 15 MCG/MIN: 1 INJECTION, SOLUTION, CONCENTRATE INTRAVENOUS at 18:03

## 2020-03-31 RX ADMIN — VASOPRESSIN 0.04 UNITS/MIN: 20 INJECTION INTRAVENOUS at 17:52

## 2020-03-31 RX ADMIN — SODIUM BICARBONATE: 84 INJECTION, SOLUTION INTRAVENOUS at 21:57

## 2020-03-31 RX ADMIN — CALCIUM CHLORIDE, MAGNESIUM CHLORIDE, DEXTROSE MONOHYDRATE, LACTIC ACID, SODIUM CHLORIDE, SODIUM BICARBONATE AND POTASSIUM CHLORIDE 3500 ML/HR: 3.68; 3.05; 22; 5.4; 6.46; 3.09; .314 INJECTION INTRAVENOUS at 17:35

## 2020-03-31 RX ADMIN — Medication 300 MCG/HR: at 09:33

## 2020-03-31 RX ADMIN — Medication 300 MCG/HR: at 14:08

## 2020-03-31 RX ADMIN — Medication 200 MCG/HR: at 22:00

## 2020-03-31 RX ADMIN — DOCUSATE SODIUM 100 MG: 50 LIQUID ORAL at 17:57

## 2020-03-31 NOTE — PROGRESS NOTES
NUTRITION COMPLETE ASSESSMENT    RECOMMENDATIONS:   Adjust tube feeding once requiring less Diprivan     Interventions/Plan:   Food/Nutrient Delivery:  Initiate enteral nutrition      Nepro @ 10 ml/hr with 2 packets Prosource tid and 50 ml water flush q 6 hr    Assessment:   Reason for Assessment: Provider Consult-Tube Feeding management    Diet: NPO  Nutritionally Significant Medications: [x] Reviewed & Includes: Bumex, Colace, Lantus, correction scale insulin, Fentanyl, sodium bicarb @ 125 ml/hr, Diuril, Diprivan, Levophed    Subjective:   Unable to contact . Objective:  Ms Tejal Brown transferred from [facility] d/t worsening renal function requiring CRRT. Noted: JUANCHO d/t ATN with hyperkalemia and metabolic acidosis; acute hypoxic respiratory failure d/t COVID-19, intubated 3/28. PMHx: DM 2, morbid obesity, Endometrial Cancer, Basal cell carcinoma, GERD. Per review of EHR pt weighed 270# 1/23 at MD office visit. Today's weight reflects 18# weight gain-suspect d/t difference in scales but also edema and renal failure. Potassium elevated-CRRT running now. Poor BG control PTA; A1c @ OSH was 11.0% 3/28. Diprivan @ current rate of 28.1 ml/hr will provide 742 lipid calories per day. Suggested tube feeding: Nepro @ 10 ml/hr with 2 packets Prosource tid and 50 ml water flush q 6 hr. This will provide 240 ml, 790 calories (1532 including Diprivan), 109 gm protein, 7.9 mEq potassium and 730 ml free water per day. Adjust tube feeding as Diprivan requirements lessen. Agree with adding Colace-unclear when pt last had a BM. Estimated Nutrition Needs:   Kcals/day: 1350 Kcals/day(1051-9966 (25-28 kcal/kg IBW)  Protein: 108 g(2g/kg IBW)  Fluid: (1 ml/kcal)  Based On: Kcal/kg - specify (Comment)  Weight Used: IBW(54 kg)    Pt expected to meet estimated nutrient needs:  []   Yes     []  No  [x] Unable to predict at this time  Nutrition Diagnosis:   1.  Inadequate oral intake related to acute respiratory failure d/t COVID-19 as evidenced by NPO d/t intubation. Goals: Tolerate tube feeding at goal in next 1-2 days. Monitoring & Evaluation:    - Enteral/parenteral nutrition intake   - Weight/weight change, Electrolyte and renal profile, CV-pulmonary, GI, Glucose profile    Previous Nutrition Goals Met: N/A  Previous Recommendations: N/A    Education & Discharge Needs:   [x] None Identified   [] Identified and addressed    [x] Participated in care plan, discharge planning, and/or interdisciplinary rounds        Cultural, Druze and ethnic food preferences identified:  None    Skin Integrity: [x]Intact  []Other  Edema: Present per RN  Last BM: Unclear  Food Allergies: [x]None []Other    Anthropometrics:    Weight Loss Metrics 3/31/2020 1/23/2020 1/22/2019 1/16/2018 1/9/2017 7/6/2016 12/14/2015   Today's Wt 288 lb 3.2 oz 270 lb 12.8 oz 278 lb 279 lb 275 lb 234 lb 213 lb 6.4 oz   BMI 49.47 kg/m2 43.73 kg/m2 44.89 kg/m2 45.05 kg/m2 44.39 kg/m2 37.79 kg/m2 34.46 kg/m2      Last 3 Recorded Weights in this Encounter    03/31/20 0915 03/31/20 1325 03/31/20 1349   Weight: 117 kg (257 lb 15 oz) 130.7 kg (288 lb 3.2 oz) 130.7 kg (288 lb 3.2 oz)      Weight Source: Bed  Height: 5' 4\" (162.6 cm)(per OSH record),    Body mass index is 49.47 kg/m².      IBW : 54.4 kg (120 lb), % IBW (Calculated): 240.17 %   ,      Labs:    Lab Results   Component Value Date/Time    Sodium 136 03/31/2020 09:47 AM    Potassium 6.0 (H) 03/31/2020 09:47 AM    Chloride 108 03/31/2020 09:47 AM    CO2 16 (L) 03/31/2020 09:47 AM    Glucose 212 (H) 03/31/2020 09:47 AM    BUN 15 03/31/2020 09:47 AM    Creatinine 4.26 (H) 03/31/2020 09:47 AM    Calcium 7.9 (L) 03/31/2020 09:47 AM     No results found for: HBA1C, HGBE8, CRL4ZQFW, JFW8CUVB  Lab Results   Component Value Date/Time    Glucose (POC) 207 (H) 03/31/2020 01:48 PM      No results found for: GPT, ALT, SGOT, GGT, GGTP, AP, APIT, APX, CBIL, TBIL, TBILI     Miladys Ace, RD CNSC

## 2020-03-31 NOTE — PROCEDURES
[facility]      Procedure Note - Central Venous Access:   Performed by Vianney Cook MD    Obtained emergent Consent. Immediately prior to the procedure, the patient was reevaluated and found suitable for the planned procedure and any planned medications. Immediately prior to the procedure a time out was called to verify the correct patient, procedure, equipment, staff, and marking as appropriate. The site was prepped with ChloraPrep. Using Seldinger technique a Beckie La was placed in the Right, Internal Jugular Vein via direct cannulation with 1 number of attempts for Dialysis. Ultrasound Guidance was utilized. There was good blood return. The following complications were encountered: None. A follow-up chest x-ray was ordered post procedure. The procedure was tolerated well.

## 2020-03-31 NOTE — DIALYSIS
[facility]       [phone]    Orders   Mode: CVVH   Factor: 0   UFR: 3500ML/HR   Blood Flow Rate: 200ML/MIN     Metrics   BP / HR: 141/63; 89   Blood Flow Rate: 180ML/MIN HARRIET ASCENCIO AWARE   AP:                         -169   RP: 144   TMP: 103   PD: 29   FP: 207   UFR: 3500ML/HR     Comments / Plan:      Pt orders, notes, labs, code status reviewed. Consents obtained by md.   CVVH initiated as per md order. Post new line placement and confirmation, [id number] filter primed and tested. [facility] CVC CDI with transparent dressing. Upon initial assessment of line, unable to pull back on red port/md at bedside to assess. Ultimately started, lines had to be reversed with continued high access and return pressures. Pt currently running; however, staff and Dr. Tara Fuller aware that line is not optimal. Will continue to monitor closely. Lines visible and secure with blood warmer attached to return line and set to 37*C.

## 2020-03-31 NOTE — PROGRESS NOTES
9287: TRANSFER - IN REPORT:    Verbal report received from Ahsan Espinoza (name) on Dotty Litter  being received from [place] (unit) for urgent transfer      Report consisted of patients Situation, Background, Assessment and   Recommendations(SBAR). Information from the following report(s) SBAR, Kardex, Intake/Output, MAR, Recent Results and Cardiac Rhythm NSR was reviewed with the receiving nurse. Opportunity for questions and clarification was provided. Assessment completed upon patients arrival to unit and care assumed. 7197: Admission assessment. Primary Nurse Laura Rivera RN and Alvin Rodriguez , RN performed a dual skin assessment on this patient No impairment noted  Aniket score is 11    1254: R Blanca IJ placed by Dr. Cristin Ortiz: Spoke with Dr. Mat Lee regarding most recent lab results, no new orders received. 1845: Bear hugger placed on pt for temp 94.5. Dr. Fabrizio Moraes made aware. Shift Summary:   Pt remains unresponsive on propofol at 35 mcg/kg/min and fentanyl 200mcg/hr. No eye opening to any stimulus, no movement of any extremities. Weak cough with suction, no facial droop. Pupils are equal and reactive 2-3mm, sluggish. NSR 70s-80s throughout shift, MAP goal >65, titrating to L radial art line BPs. Currently on 15mcg/min levophed and 0.04 vasopressin with a MAP of 82, BP 92/70. Pt has a 7.5 ETT 23 at lip now on 70% O2, A/c 26, peep 18, lung sounds coarse bilaterally. OGT marked at the lip w/ sharpie on tube feeds 10mL nepro per hour with Q6 50mL flushes and 2 packets of prosource TID. Bowden intact and patent, pt is anuric. Was on CRRT with a factor of 0 via R IJ blanca for 5 hrs, rinsed back for extremely negative access alarms despite manipulation/flushing and troubleshooting. Bowel sounds hypoactive, abdomen semi soft. +1 non pitting edema uppers, +2 pitting lowers. Bedside shift change report given to [address] [place] [address] (oncoming nurse) by Sánchez Grissom and Alvin Rodriguez RN (offgoing nurse). Report included the following information SBAR, Kardex, ED Summary, Intake/Output, MAR, Cardiac Rhythm NSR and Alarm Parameters .

## 2020-03-31 NOTE — H&P
[facility]    ICU Team Admission Note    Name: Ramana Gomez   : 1960   MRN: [id number]   Date: 3/31/2020      Subjective:   Progress Note: 3/31/2020      Reason for ICU Admission: [address] with renal failure     63 yo female with obesity and diabetes who presented to [facility] with worsening hypoxic respiratory failure in lieu of close exposure to COVID-19. She presented to the hospital 3/26 and intubated 3/28. She was started on broad spectrum antibiotics and plaquenil. Developed worsening renal failure and was transferred to us for CRRT. On arrival patient was sedated and on vasopressors. POD:* No surgery found *    S/P:     Active Problem List:     Problem List  Date Reviewed: 2020          Codes Class    COVID-19 virus infection ICD-10-CM: J22, B97.29         Obesity, morbid ([facility] [address] ICD-10-CM: E66.01  ICD-9-CM: 278.01         Vaginal Pap smear following hysterectomy for malignancy ICD-10-CM: Z08, Z90.710  ICD-9-CM: V67.01         Personal history of malignant neoplasm of other parts of uterus ICD-10-CM: Z85.42  ICD-9-CM: V10.42         Endometrial cancer ([facility] ICD-10-CM: C54.1  ICD-9-CM: 182.0               Past Medical History:      has a past medical history of Basal cell carcinoma, GERD (gastroesophageal reflux disease), Kidney stones, and Polyp of ureter. Past Surgical History:      has a past surgical history that includes hysteroscopy diagnostic (); pr endometrial ablation, thermal; hx  section (); and hx colonoscopy. Home Medications:     Prior to Admission medications    Medication Sig Start Date End Date Taking? Authorizing Provider   pantoprazole (PROTONIX) 40 mg tablet TAKE 1 TABLET BY MOUTH TWICE A DAY 18   Provider, Historical   esomeprazole (NEXIUM) 20 mg capsule Take 20 mg by mouth daily. Indications: BID    Provider, Historical   zolpidem (AMBIEN) 10 mg tablet Take 0.5 Tabs by mouth nightly as needed for Sleep.  Max Daily Amount: 5 mg. 17   Fidelia Mandel MD   fluticasone (FLONASE) 50 mcg/actuation nasal spray Mist 1-2 spray(s) into each nostril once daily. 4/3/15   Provider, Historical   ranitidine (ZANTAC) 150 mg tablet 150 mg.    Provider, Historical       Allergies/Social/Family History: Allergies   Allergen Reactions    Augmentin [Amoxicillin-Pot Clavulanate] Rash and Itching      Social History     Tobacco Use    Smoking status: Never Smoker    Smokeless tobacco: Never Used   Substance Use Topics    Alcohol use: Not on file      Family History   Problem Relation Age of Onset    Prostate Cancer Father         PROSTATE    Breast Cancer Sister 46        invasive poorly differentiated ductal carcinoma, Neg genetic testing.  Cancer Sister 62        melanoma stage 1       Review of Systems:     A comprehensive review of systems was negative except for that written in the HPI. Objective:   Vital Signs:  Visit Vitals  /69   Pulse 94   Temp 98.2 °F (36.8 °C)   Resp 26   Wt 117 kg (257 lb 15 oz)   SpO2 98%   BMI 41.65 kg/m²      O2 Device: Ventilator Temp (24hrs), Av.2 °F (36.8 °C), Min:98.2 °F (36.8 °C), Max:98.2 °F (36.8 °C)           Intake/Output:     Intake/Output Summary (Last 24 hours) at 3/31/2020 1240  Last data filed at 3/31/2020 1000  Gross per 24 hour   Intake --   Output 75 ml   Net -75 ml       Physical Exam:    General: Ill appearing  HENT: Atraumatic  Cardio: RRR  Respiratory: distant breath sounds BL  GI: Soft not tender abdomen  Extremities: +ve trace edema  Neuro: sedated      LABS AND  DATA: Personally reviewed  Recent Labs     20  0947   WBC 20.3*   HGB 10.5*   HCT 33.7*        Recent Labs     20  0947      K 6.0*      CO2 16*   BUN 15   CREA 4.26*   *   CA 7.9*     No results for input(s): SGOT, GPT, AP, TBIL, TP, ALB, GLOB, AML, LPSE in the last 72 hours.     No lab exists for component: AMYP  Recent Labs     20  0947   INR 1.2*   PTP 12.3*      Recent Labs     03/31/20  1052   PHI 7.079*   PCO2I 47.9*   PO2I 186*   FIO2I 1.0     Recent Labs     03/31/20  0947      TROIQ 0.48*       Hemodynamics:   PAP:   CO:     Wedge:   CI:     CVP:    SVR:       PVR:       Ventilator Settings:  Mode Rate Tidal Volume Pressure FiO2 PEEP   Assist control        100 % 20 cm H20     Peak airway pressure: 34 cm H2O    Minute ventilation: 10.2 l/min        MEDS: Reviewed    Chest X-Ray: personally reviewed and report checked      Assessment/Plan:     1. Acute Hypoxic Respiratory failure - Secondary to COVID-19 - no clear evidence of superimposed bacterial infection. Will send resp and blood cultures. Continue lung protective ventilation strategies. Wean as able. Elevated Ferritin, CRP, and troponin. Will get echo and potentially give anti IL6. Will consult ID  2. Shock - Likely worsened or precipitated by sedation. On vasopressin and norepinephrine. Previously on norepinephrine alone and stress dose steroids. Will hold off the later and monitor. 3. Diabetes - Will start ISS and Lantus (was on 50 units - will start 30 as I am stopping steroids)  4. Renal Failure - Likely ATN from sepsis and hypotension. Nephrology following.  Will start CRRT    Multidisciplinary Rounds Completed:  No    ABCDEF Bundle/Checklist  Pain Medications: Fentanyl  Target RASS: -2 - Light Sedation - Briefly awakens to voice (eyes open & contact <10 sec)  Sedation Medications: Propofol  CAM-ICU:  Negative  Mobility: Bedrest  PT/OT: TO be consulted when stable   Restraints: Soft wrist restraints  Discussed Plan of Care (goals of care): No  Addressed Code Status: Full Code    CARDIOVASCULAR  Cardiac Gtts: Norepinephrine and Vasopressin  SBP Goal of: > 90 mmHg  MAP Goal of: > 65 mmHg  Transfusion Trigger (Hgb): <7 g/dL    RESPIRATORY  Vent Goals:   Chlorhexidine   Optimize PEEP/Ventilation/Oxygenation  Goal Tidal Volume 6 cc/kg based on IBW  Aim for lung protective ventilation  Head of bed > 30 degrees  DVT Prophylaxis (if no, list reason): SCD's or Sequential Compression Device and Heparin   SPO2 Goal: > 92%  Pulmonary toilet: Duo-Nebs     GI/  Bowden Catheter Present: Yes  GI Prophylaxis: Pepcid (famotidine)   Nutrition: Pending   Bowel Movement: No  Bowel Regimen: Lactulose  Insulin: ISS and Lantus    ANTIBIOTICS  Antibiotics:  Plaquenil    T/L/D  Tubes: ETT and Orogastric Tube  Lines: Peripheral IV, Arterial Line, PICC Line and Ramon  Drains: Bowden Catheter    SPECIAL EQUIPMENT  CRRT    DISPOSITION  Stay in ICU    CRITICAL CARE CONSULTANT NOTE  I had a face to face encounter with the patient, reviewed and interpreted patient data including clinical events, labs, images, vital signs, I/O's, and examined patient. I have discussed the case and the plan and management of the patient's care with the consulting services, the bedside nurses and the respiratory therapist.      NOTE OF PERSONAL INVOLVEMENT IN CARE   This patient has a high probability of imminent, clinically significant deterioration, which requires the highest level of preparedness to intervene urgently. I participated in the decision-making and personally managed or directed the management of the following life and organ supporting interventions that required my frequent assessment to treat or prevent imminent deterioration. I personally spent 70 minutes of critical care time. This is time spent at this critically ill patient's bedside actively involved in patient care as well as the coordination of care and discussions with the patient's family. This does not include any procedural time which has been billed separately.     Maxime Berger MD  Staff [address] [facility]  3/31/2020

## 2020-03-31 NOTE — PROGRESS NOTES
Pharmacist Note - Vancomycin Dosing    Consult provided for this 64 y.o. female for indication of possible PNA, COVID-19 positive. Antibiotic regimen(s): Azithro + Vancomycin  Patient on vancomycin PTA? YES     Recent Labs     20  0947   WBC 20.3*   CREA 4.26*   BUN 15     Frequency of BMP: Every 6 hours  Height: 162.6 cm  Weight: 130.7 kg  Est CrCl: CVVH (starting today)  Temp (24hrs), Av.2 °F (36.8 °C), Min:98.2 °F (36.8 °C), Max:98.2 °F (36.8 °C)    Cultures:  COVID-19 (at OSH): positive  3/31 Sputum: pending (GPCs in pairs on gram stain)  3/31 Blood: pending    Goal trough = 15 - 20 mcg/mL    The patient received a loading dose of 2500 mg on 3/30 @ 2118 at [facility].  The patient will be transitioned to CVVH this afternoon and I have ordered a 24-hr level for 2100 this evening prior to re-dosing. Pharmacy to follow patient daily and order levels / make dose adjustments as appropriate.

## 2020-03-31 NOTE — CONSULTS
Infectious Disease Consult Note    Reason for Consult: COVID 19 /respiratory failure and renal failure  Date of Consultation: 2020  Date of Admission: 3/31/2020  Referring Physician: Dr Yanelis Cheung       HPI:    History obtained from chart review and discussion with the ICU team    Ms. Elfida Mcardle is a 68-year-old lady with a history of nephrolithiasis, GERD, basal cell carcinoma who was admitted from [facility] with respiratory symptoms concerning for COVID 19 and with exposure to her mother with COVID 19. Per notes she was started on Plaquenil and a gentamicin on admission and intubated on 3/28/2020. She is transferred from [facility] to [facility].    She has been seen by the nephrology service with plans for [facility] and RRT. She has had a line placed, R IJ on 3/31/20. She is currently on hydroxychloroquine. From chart review she is on norepinephrine and vasopressin. Labs show a leukocytosis of 20.3  BUN/creatinine are 4 and 4.26 respectively  proBNP is 18, 778  Ferritin is 1985  CRP is 28.30  LDH is 458  Procalcitonin is 13.44        Sputum cultures positive for gram-positive cocci in pairs  Blood culture pending          Past Medical History:  Past Medical History:   Diagnosis Date    Basal cell carcinoma     GERD (gastroesophageal reflux disease)     Kidney stones     Polyp of ureter          Surgical History:  Past Surgical History:   Procedure Laterality Date    ENDOMETRIAL ABLATION, THERMAL      HX  SECTION      HX COLONOSCOPY      2017    HYSTEROSCOPY DIAGNOSTIC      D&C/POLYP REMOVAL         Family History:   Family History   Problem Relation Age of Onset    Prostate Cancer Father         PROSTATE    Breast Cancer Sister 46        invasive poorly differentiated ductal carcinoma, Neg genetic testing.  Cancer Sister 62        melanoma stage 1         Social History:     Unable to obtain from the patient is critically ill at this time    Allergies:   Allergies Allergen Reactions    Augmentin [Amoxicillin-Pot Clavulanate] Rash and Itching         Review of Systems:    Unable to obtain from patient is critically ill at this time    Medications:  No current facility-administered medications on file prior to encounter. Current Outpatient Medications on File Prior to Encounter   Medication Sig Dispense Refill    pantoprazole (PROTONIX) 40 mg tablet TAKE 1 TABLET BY MOUTH TWICE A DAY  4    esomeprazole (NEXIUM) 20 mg capsule Take 20 mg by mouth daily. Indications: BID      zolpidem (AMBIEN) 10 mg tablet Take 0.5 Tabs by mouth nightly as needed for Sleep. Max Daily Amount: 5 mg. 30 Tab 0    fluticasone (FLONASE) 50 mcg/actuation nasal spray Mist 1-2 spray(s) into each nostril once daily.  ranitidine (ZANTAC) 150 mg tablet 150 mg.            Current Facility-Administered Medications:     sodium chloride (NS) flush 5-40 mL, 5-40 mL, IntraVENous, Q8H, Bill Madison MD, 30 mL at 03/31/20 1322    sodium chloride (NS) flush 5-40 mL, 5-40 mL, IntraVENous, PRN, Bill Madison MD    HYDROcodone-acetaminophen (NORCO) 5-325 mg per tablet 1 Tab, 1 Tab, Oral, Q4H PRN, Bill Madison MD    HYDROmorphone (PF) (DILAUDID) injection 0.5 mg, 0.5 mg, IntraVENous, Q4H PRN, Bill Madison MD    ondansetron [facility]) injection 4 mg, 4 mg, IntraVENous, Q4H PRN, Bill Madison MD    NOREPINephrine (LEVOPHED) 8,000 mcg in dextrose 5% 250 mL infusion, 2-30 mcg/min, IntraVENous, TITRATE, Bill Madison MD, Last Rate: 26.3 mL/hr at 03/31/20 1341, 14 mcg/min at 03/31/20 1341    vasopressin (VASOSTRICT) 20 Units in 0.9% sodium chloride 100 mL infusion, 0.04 Units/min, IntraVENous, CONTINUOUS, Bill Madison MD, Last Rate: 12 mL/hr at 03/31/20 1004, 0.04 Units/min at 03/31/20 1004    fentaNYL (PF) 1,500 mcg/30 mL (50 mcg/mL) infusion,  mcg/hr, IntraVENous, TITRATE, Bill Madison MD, Last Rate: 6 mL/hr at 03/31/20 1408, 300 mcg/hr at 03/31/20 1408    propofol (DIPRIVAN) 10 mg/mL infusion, 0-50 mcg/kg/min, IntraVENous, TITRATE, Ham Madison MD, Last Rate: 28.1 mL/hr at 03/31/20 1411, 40 mcg/kg/min at 03/31/20 1411    famotidine (PF) (PEPCID) 20 mg in 0.9% sodium chloride 10 mL injection, 20 mg, IntraVENous, QHS, Ham Madison MD    dexmedeTOMidine (PRECEDEX) 400 mcg in 0.9% sodium chloride 100 mL infusion, 0.2-1.4 mcg/kg/hr, IntraVENous, TITRATE, Ham Madison MD, Stopped at 03/31/20 1000    chlorhexidine (ORAL CARE KIT) 0.12 % mouthwash 15 mL, 15 mL, Oral, Q12H, Iliana Rosales NP    docusate (COLACE) 50 mg/5 mL oral liquid 100 mg, 100 mg, Per NG tube, BID, Ham Madison MD, 100 mg at 03/31/20 1354    acetaminophen (TYLENOL) tablet 650 mg, 650 mg, Oral, Q6H PRN **OR** acetaminophen (TYLENOL) suppository 650 mg, 650 mg, Rectal, Q6H PRN, Iliana Rosales NP    hydroxychloroquine (PLAQUENIL) 25 mg/ml oral suspension 400 mg, 400 mg, Per NG tube, Q12H, 400 mg at 03/31/20 1354 **FOLLOWED BY** [DISCONTINUED] hydroxychloroquine (PLAQUENIL) 25 mg/ml oral suspension 200 mg, 200 mg, Per NG tube, Q12H, Iliana Rosales, NP    bumetanide (BUMEX) injection 4 mg, 4 mg, IntraVENous, Q12H, Ham Madison MD, 4 mg at 03/31/20 1202    chlorothiazide (DIURIL) 500 mg in sterile water (preservative free) 18 mL injection, 500 mg, IntraVENous, Q12H, Ham Madison MD, 500 mg at 03/31/20 1317    sodium bicarbonate (8.4%) 150 mEq in sterile water 1,000 mL infusion, , IntraVENous, CONTINUOUS, Kit Hargrove MD, Last Rate: 125 mL/hr at 03/31/20 1321    heparin 25,000 units in D5W 250 ml infusion, 1,000 Units/hr, IntraVENous, CONTINUOUS, Kit Hargrove MD, Last Rate: 10 mL/hr at 03/31/20 1401, 1,000 Units/hr at 03/31/20 1401    bicarbonate dialysis (PRISMASOL) BG K 4/Ca 2.5 5000 ml solution, , Extracorporeal, DIALYSIS CONTINUOUS, Delvin, Justina Griffin MD, Last Rate: 3,500 mL/hr at 03/31/20 1357, 3,500 mL/hr at 03/31/20 1357    sodium bicarbonate (8.4%) 1 mEq/mL (8.4 %) injection, , , ,     glucose chewable tablet 16 g, 4 Tab, Oral, PRN, Jose Madison MD    glucagon (GLUCAGEN) injection 1 mg, 1 mg, IntraMUSCular, PRN, Jose Quintero MD    dextrose 10% infusion 0-250 mL, 0-250 mL, IntraVENous, PRN, Jose Quintero MD    [START ON 4/1/2020] insulin glargine (LANTUS) injection 30 Units, 30 Units, SubCUTAneous, DAILY, Jose Madison MD    insulin lispro (HUMALOG) injection, , SubCUTAneous, Q6H, Gen Araiza MD, 2 Units at 03/31/20 1352      Physical Exam:    Vitals:   Patient Vitals for the past 24 hrs:   Temp Pulse Resp BP SpO2   03/31/20 1210 -- 94 26 -- 98 %   03/31/20 1200 98.2 °F (36.8 °C) 99 23 -- 97 %   03/31/20 1100 -- 91 20 149/69 98 %   03/31/20 1000 -- 88 20 104/68 98 %   03/31/20 0930 -- 96 21 107/63 96 %   03/31/20 0926 98.2 °F (36.8 °C) 95 20 105/59 97 %   03/31/20 0922 -- -- 20 -- --   ·     In regards to PPE shortage, discussed her physical exam with the primary team.  Please refer to 's physical exam     Labs:   Recent Results (from the past 24 hour(s))   METABOLIC PANEL, BASIC    Collection Time: 03/31/20  9:47 AM   Result Value Ref Range    Sodium 136 136 - 145 mmol/L    Potassium 6.0 (H) 3.5 - 5.1 mmol/L    Chloride 108 97 - 108 mmol/L    CO2 16 (L) 21 - 32 mmol/L    Anion gap 12 5 - 15 mmol/L    Glucose 212 (H) 65 - 100 mg/dL    BUN 15 6 - 20 MG/DL    Creatinine 4.26 (H) 0.55 - 1.02 MG/DL    BUN/Creatinine ratio 4 (L) 12 - 20      GFR est AA 13 (L) >60 ml/min/1.73m2    GFR est non-AA 11 (L) >60 ml/min/1.73m2    Calcium 7.9 (L) 8.5 - 10.1 MG/DL   CBC W/O DIFF    Collection Time: 03/31/20  9:47 AM   Result Value Ref Range    WBC 20.3 (H) 3.6 - 11.0 K/uL    RBC 3.81 3.80 - 5.20 M/uL    HGB 10.5 (L) 11.5 - 16.0 g/dL    HCT 33.7 (L) 35.0 - 47.0 %    MCV 88.5 80.0 - 99.0 FL MCH 27.6 26.0 - 34.0 PG    MCHC 31.2 30.0 - 36.5 g/dL    RDW 13.5 11.5 - 14.5 %    PLATELET 049 120 - 100 K/uL    MPV 10.2 8.9 - 12.9 FL    NRBC 0.0 0  WBC    ABSOLUTE NRBC 0.00 0.00 - 0.01 K/uL   PROTHROMBIN TIME + INR    Collection Time: 03/31/20  9:47 AM   Result Value Ref Range    INR 1.2 (H) 0.9 - 1.1      Prothrombin time 12.3 (H) 9.0 - 11.1 sec   TYPE & SCREEN    Collection Time: 03/31/20  9:47 AM   Result Value Ref Range    Crossmatch Expiration 04/03/2020     ABO/Rh(D) A POSITIVE     Antibody screen NEG    CK    Collection Time: 03/31/20  9:47 AM   Result Value Ref Range     26 - 192 U/L   TROPONIN I    Collection Time: 03/31/20  9:47 AM   Result Value Ref Range    Troponin-I, Qt. 0.48 (H) <0.05 ng/mL   LD    Collection Time: 03/31/20  9:47 AM   Result Value Ref Range     (H) 81 - 246 U/L   D DIMER    Collection Time: 03/31/20  9:47 AM   Result Value Ref Range    D-dimer 4.37 (H) 0.00 - 0.65 mg/L FEU   NT-PRO BNP    Collection Time: 03/31/20  9:47 AM   Result Value Ref Range    NT pro-BNP 18,778 (H) <125 PG/ML   PROCALCITONIN    Collection Time: 03/31/20  9:47 AM   Result Value Ref Range    Procalcitonin 13.44 ng/mL   C REACTIVE PROTEIN, QT    Collection Time: 03/31/20  9:47 AM   Result Value Ref Range    C-Reactive protein 28.30 (H) 0.00 - 0.60 mg/dL   CULTURE, RESPIRATORY/SPUTUM/BRONCH W GRAM STAIN    Collection Time: 03/31/20 10:34 AM   Result Value Ref Range    Special Requests: NO SPECIAL REQUESTS      GRAM STAIN FEW WBCS SEEN      GRAM STAIN 2+ GRAM POSITIVE COCCI IN PAIRS      Culture result: PENDING    FERRITIN    Collection Time: 03/31/20 10:34 AM   Result Value Ref Range    Ferritin 1,985 (H) 26 - 388 NG/ML   POC EG7    Collection Time: 03/31/20 10:52 AM   Result Value Ref Range    Calcium, ionized (POC) 1.10 (L) 1.12 - 1.32 mmol/L    FIO2 (POC) 1.0 %    pH (POC) 7.079 (LL) 7.35 - 7.45      pCO2 (POC) 47.9 (H) 35.0 - 45.0 MMHG    pO2 (POC) 186 (H) 80 - 100 MMHG    HCO3 (POC) 14.1 (L) 22 - 26 MMOL/L    Base deficit (POC) 16 mmol/L    sO2 (POC) 99 (H) 92 - 97 %    Site DRAWN FROM ARTERIAL LINE      Device: VENT      Mode ASSIST CONTROL      Set Rate 20 bpm    PEEP/CPAP (POC) 20 cmH2O    PIP (POC) 14      Allens test (POC) N/A      Specimen type (POC) ARTERIAL     GLUCOSE, POC    Collection Time: 03/31/20  1:48 PM   Result Value Ref Range    Glucose (POC) 207 (H) 65 - 100 mg/dL    Performed by [facility]        Microbiology Data:       Blood: pending       sputum 3/31/20  Specimen Information: Sputum        Component Value Ref Range & Units Status   Special Requests: NO SPECIAL REQUESTS    Preliminary   GRAM STAIN FEW WBCS SEEN    Preliminary   GRAM STAIN    Preliminary   2+ GRAM POSITIVE COCCI IN PAIRS    Culture result: PENDING          Sputum 3/28/20  Component Name Value Ref Range   Gram Stain Result Few (1-5/OIF or 1-10/LPF) White Blood Cells  No epithelial cells  No organisms seen.     Preliminary Culture Report No growth  Culture in progress     Final Culture Report No growth     Specimen Collected on   Sputum - Sputum Exp 3/28/2020 7:10 PM         Imaging:    CXR  3/31/20  FINDINGS: Single AP portable view of the chest obtained at 12:03 PM demonstrates  no change in position of the endotracheal tube or nasogastric tube. New right  internal jugular temporary dialysis catheter has its tip at the level of the mid  SVC. The cardiomediastinal silhouette is stable. Diffuse bilateral interstitial  and alveolar opacities are not significantly changed. Sarahy Dye No pneumothorax is seen. There is no evidence of pleural effusion.     IMPRESSION  IMPRESSION: Right internal jugular temporary dialysis catheter tip overlies the  mid SVC. Unchanged bilateral interstitial and alveolar opacities. ABD xray 3/31/20     IMPRESSION  IMPRESSION: Orogastric tube appears to be in satisfactory position. Nonspecific  intestinal gas pattern.     3/31/20 X ray   FINDINGS: A portable AP radiograph of the chest was obtained at 0939 hours. The  endotracheal tube tip is at the thoracic inlet. The central line tip is in the  region of the superior vena cava. The nasogastric tube continues beyond the  film. The patient is on a cardiac monitor. There is airspace opacification  throughout the lungs bilaterally. The cardiac and mediastinal contours and  pulmonary vascularity are normal.  The bones and soft tissues are grossly within  normal limits.      IMPRESSION  IMPRESSION: Diffuse bilateral airspace opacification. Endotracheal tube,  nasogastric tube, and central line in place. Procedures:   RIJ line placed 3/31/20    Assessment / Plan:       Ms. Roz Grace is a 17-year-old lady with a history of nephrolithiasis, GERD, basal cell carcinoma who was admitted from [facility] with respiratory symptoms concerning for COVID 19 and with exposure to her mother with COVID 23. Per notes she was started on Plaquenil and a gentamicin on admission and intubated on 3/28/2020.   She is transferred from [facility] to [facility].        1) Sars-CoV2 pneumonia, respiratory failure, renal failure   Reviewed records from care everywhere  Sars-CoV-2 detected 3/26/20, other viral respiratory panel negative   Urine legionella and S pneumo ag negative 3/27/20  Procalcitonin 3/27 0.47, 3/28 0.44, 3/29 5.86, 3/31 13.44  , Ferritin 1985  IL 6 pending     Plan  Continue Plaquenil as well as closely monitoring for QT prolongation   Check G6PD if not done already   Notes indicate that she was on azithromycin at the other hospital.  Will need to confirm to see if she received a 5-day course already  She has elevated inflammatory markers including CRP,  Ferritin, LDH   Will trend to see given cytokine storm-like response seen in COVID infection   At risk for ARDS and deterioration  Gram-positive cocci in pairs and sputum and penicillin allergy, started on renally dose vancomycin as she is already on RRT  Pending cultures will adjust antibiotics  May need to consider IL-6 inhibitors based on levels and if no other contraindications. 2) ARF:   Plans per nephrology, RRT  R IJ    3) history of nephrolithiasis    4) history of GERD    5) basal cell carcinoma per chart    D/W with ICU team . I attempted to call the next of kin or emergency contact but none listed in the chart at this time. Thank for the opportunity to participate in the care of this patient. Please contact with questions or concerns.          Delilah Linn DO  2:34 PM

## 2020-03-31 NOTE — DIABETES MGMT
[facility]  CLINICAL NURSE SPECIALIST CONSULT  [facility]    Presentation   Veronica Grant is a 64 y.o. female admitted from OSH with SARS-COV2 with renal failure. She is currently sedated and has been intubated since 3/28/20. Current clinical course has been complicated by steroid induced hyperglycemia. Steroids are discontinued at this time. She requires CRRT for acute renal failure r/t her sepsis and hypotension. PHM: GERD, obesity, and anxiety. New diabetes diagnosis with A1C 11.0% (3/28/2020)    Consulted by Provider for advanced diabetes nursing assessment and care, specifically related to   [] Transitioning off Darolyn Riser   [x] Inpatient management strategy  [] Home management assessment  [] Survival skill education    Diabetes-related medical history  Acute complications  hyperglycemia  Neurological complications  NONE  Microvascular disease  NONE  Macrovascular disease  NONE  Other associated conditions     NONE    Diabetes medication history: NONE    Subjective   Per chart review, Ms. Lucia Gabriel is a very ill female who is on isolation forSARS-COV2 in ICU. I am unable to do a physical assessment of the patient at this time. Patient reports the following home diabetes self-care practices: deferred    Objective     Vital Signs   Visit Vitals  /69   Pulse 94   Temp 98.2 °F (36.8 °C)   Resp 26   Ht 5' 4\" (1.626 m)   Wt 130.7 kg (288 lb 3.2 oz)   SpO2 98%   BMI 49.47 kg/m²   .    Laboratory  No results found for: HBA1C, HGBE8, PNF4EEJC, ZWS5KUAB  No results found for: LDL, LDLC, DLDLP  Lab Results   Component Value Date/Time    Creatinine 4.26 (H) 03/31/2020 09:47 AM     Lab Results   Component Value Date/Time    Sodium 136 03/31/2020 09:47 AM    Potassium 6.0 (H) 03/31/2020 09:47 AM    Chloride 108 03/31/2020 09:47 AM    CO2 16 (L) 03/31/2020 09:47 AM    Anion gap 12 03/31/2020 09:47 AM    Glucose 212 (H) 03/31/2020 09:47 AM    BUN 15 03/31/2020 09:47 AM    Creatinine 4.26 (H) 03/31/2020 09:47 AM    BUN/Creatinine ratio 4 (L) 03/31/2020 09:47 AM    GFR est AA 13 (L) 03/31/2020 09:47 AM    GFR est non-AA 11 (L) 03/31/2020 09:47 AM    Calcium 7.9 (L) 03/31/2020 09:47 AM     No results found for: GPT, ALT    Evaluation   Ms Aman Rodriguez, with new onset Type 2 diabetes,with A1C 11%. Per chart review from OSH before she was transferred she had BG >200. She will require subcutaneous insulin to adequately manage her BG. Inpatient blood glucose management has been impacted by  [x] Kidney dysfunction  [] Erratic meal consumption  [] Glucocorticoid use  [x]  sepsis  Initiating the insulin subcutaneous orderset will be appropriate with the following recommendations.  Concur with hospitalist note in H&P to start with 30units of Lantus daily  Recommendations   Recommend:  Basal insulin   0.3 units/kg/D=approx 30units daily      Corrective insulin  [x] Insulin-resistant sensitivity (BMI >27)      Assessment and Plan   Nursing Diagnosis Risk for unstable blood glucose pattern   Nursing Intervention Domain 8464 Decision-making Support   Nursing Interventions Examined current inpatient diabetes control   Explored factors facilitating and impeding inpatient management  Identified self-management practices impeding diabetes control  Explored corrective strategies with patient and responsible inpatient provider   Informed patient of rational for insulin strategy while hospitalized     Referral   [] Behavioral health services  [] Inpatient nutrition services  [] Pharmacy services for medication management  [] Diabetes Self-Management Training through [facility] (Phone [phone] to schedule appointment)    Billing Code(s)     [x] 06181 IP subsequent hospital care - 45 minutes      FABI Cespedes  Access via [address] [address] [phone]

## 2020-03-31 NOTE — PROGRESS NOTES
Orders received, chart reviewed and patient is currently under investigation for COVID-19. In attempts to have only essential personnel enter the room and conserve PPE, we will confer with nursing and/or the referring provider to determine the most appropriate timing of our therapy intervention. Until this time, we will follow the patient peripherally to support nursing staff on an appropriate care plan. Thank you for your assistance. Per ABCDE protocol, will work with patient when PEEP is 10.0 or less, FIO2 60% or less, and patient is following basic commands.

## 2020-04-01 LAB
ALBUMIN SERPL-MCNC: 2 G/DL (ref 3.5–5)
ALBUMIN SERPL-MCNC: 2.1 G/DL (ref 3.5–5)
ALBUMIN SERPL-MCNC: 2.2 G/DL (ref 3.5–5)
ALBUMIN/GLOB SERPL: 0.6 {RATIO} (ref 1.1–2.2)
ALP SERPL-CCNC: 189 U/L (ref 45–117)
ALT SERPL-CCNC: 45 U/L (ref 12–78)
ANION GAP SERPL CALC-SCNC: 11 MMOL/L (ref 5–15)
ANION GAP SERPL CALC-SCNC: 7 MMOL/L (ref 5–15)
ANION GAP SERPL CALC-SCNC: 9 MMOL/L (ref 5–15)
APTT PPP: 34.7 SEC (ref 22.1–32)
ARTERIAL PATENCY WRIST A: NO
ARTERIAL PATENCY WRIST A: YES
AST SERPL-CCNC: 69 U/L (ref 15–37)
BASE DEFICIT BLD-SCNC: 1 MMOL/L
BASE DEFICIT BLD-SCNC: 3 MMOL/L
BASOPHILS # BLD: 0 K/UL (ref 0–0.1)
BASOPHILS NFR BLD: 0 % (ref 0–1)
BDY SITE: ABNORMAL
BDY SITE: ABNORMAL
BILIRUB DIRECT SERPL-MCNC: 1.4 MG/DL (ref 0–0.2)
BILIRUB SERPL-MCNC: 1.7 MG/DL (ref 0.2–1)
BUN SERPL-MCNC: 16 MG/DL (ref 6–20)
BUN SERPL-MCNC: 21 MG/DL (ref 6–20)
BUN SERPL-MCNC: 22 MG/DL (ref 6–20)
BUN/CREAT SERPL: 5 (ref 12–20)
BUN/CREAT SERPL: 7 (ref 12–20)
BUN/CREAT SERPL: 7 (ref 12–20)
CA-I BLD-SCNC: 0.98 MMOL/L (ref 1.12–1.32)
CA-I BLD-SCNC: 0.98 MMOL/L (ref 1.12–1.32)
CALCIUM SERPL-MCNC: 7.7 MG/DL (ref 8.5–10.1)
CALCIUM SERPL-MCNC: 7.8 MG/DL (ref 8.5–10.1)
CALCIUM SERPL-MCNC: 7.9 MG/DL (ref 8.5–10.1)
CHLORIDE SERPL-SCNC: 103 MMOL/L (ref 97–108)
CO2 SERPL-SCNC: 23 MMOL/L (ref 21–32)
CO2 SERPL-SCNC: 26 MMOL/L (ref 21–32)
CO2 SERPL-SCNC: 27 MMOL/L (ref 21–32)
CREAT SERPL-MCNC: 2.81 MG/DL (ref 0.55–1.02)
CREAT SERPL-MCNC: 3.2 MG/DL (ref 0.55–1.02)
CREAT SERPL-MCNC: 3.3 MG/DL (ref 0.55–1.02)
DIFFERENTIAL METHOD BLD: ABNORMAL
EOSINOPHIL # BLD: 0.2 K/UL (ref 0–0.4)
EOSINOPHIL NFR BLD: 1 % (ref 0–7)
ERYTHROCYTE [DISTWIDTH] IN BLOOD BY AUTOMATED COUNT: 13.8 % (ref 11.5–14.5)
ERYTHROCYTE [DISTWIDTH] IN BLOOD BY AUTOMATED COUNT: 13.9 % (ref 11.5–14.5)
GAS FLOW.O2 O2 DELIVERY SYS: ABNORMAL L/MIN
GAS FLOW.O2 O2 DELIVERY SYS: ABNORMAL L/MIN
GAS FLOW.O2 SETTING OXYMISER: 22 BPM
GAS FLOW.O2 SETTING OXYMISER: 26 BPM
GLOBULIN SER CALC-MCNC: 3.5 G/DL (ref 2–4)
GLUCOSE BLD STRIP.AUTO-MCNC: 133 MG/DL (ref 65–100)
GLUCOSE BLD STRIP.AUTO-MCNC: 145 MG/DL (ref 65–100)
GLUCOSE BLD STRIP.AUTO-MCNC: 161 MG/DL (ref 65–100)
GLUCOSE BLD STRIP.AUTO-MCNC: 200 MG/DL (ref 65–100)
GLUCOSE SERPL-MCNC: 143 MG/DL (ref 65–100)
GLUCOSE SERPL-MCNC: 143 MG/DL (ref 65–100)
GLUCOSE SERPL-MCNC: 158 MG/DL (ref 65–100)
HBV SURFACE AB SER QL: NONREACTIVE
HBV SURFACE AB SER-ACNC: <3.1 MIU/ML
HBV SURFACE AG SER QL: <0.1 INDEX
HBV SURFACE AG SER QL: NEGATIVE
HCO3 BLD-SCNC: 22.7 MMOL/L (ref 22–26)
HCO3 BLD-SCNC: 25.8 MMOL/L (ref 22–26)
HCT VFR BLD AUTO: 29 % (ref 35–47)
HCT VFR BLD AUTO: 29.8 % (ref 35–47)
HGB BLD-MCNC: 9.1 G/DL (ref 11.5–16)
HGB BLD-MCNC: 9.4 G/DL (ref 11.5–16)
IMM GRANULOCYTES # BLD AUTO: 0 K/UL
IMM GRANULOCYTES NFR BLD AUTO: 0 %
INR PPP: 1.2 (ref 0.9–1.1)
INSPIRATION.DURATION SETTING TIME VENT: 1.35 SEC
INSPIRATION.DURATION SETTING TIME VENT: 1.8 SEC
LYMPHOCYTES # BLD: 0.5 K/UL (ref 0.8–3.5)
LYMPHOCYTES NFR BLD: 3 % (ref 12–49)
MAGNESIUM SERPL-MCNC: 2.2 MG/DL (ref 1.6–2.4)
MAGNESIUM SERPL-MCNC: 2.2 MG/DL (ref 1.6–2.4)
MAGNESIUM SERPL-MCNC: 2.4 MG/DL (ref 1.6–2.4)
MCH RBC QN AUTO: 26.9 PG (ref 26–34)
MCH RBC QN AUTO: 27.1 PG (ref 26–34)
MCHC RBC AUTO-ENTMCNC: 31.4 G/DL (ref 30–36.5)
MCHC RBC AUTO-ENTMCNC: 31.5 G/DL (ref 30–36.5)
MCV RBC AUTO: 85.8 FL (ref 80–99)
MCV RBC AUTO: 85.9 FL (ref 80–99)
MONOCYTES # BLD: 0.9 K/UL (ref 0–1)
MONOCYTES NFR BLD: 6 % (ref 5–13)
NEUTS SEG # BLD: 13.8 K/UL (ref 1.8–8)
NEUTS SEG NFR BLD: 90 % (ref 32–75)
NRBC # BLD: 0 K/UL (ref 0–0.01)
NRBC # BLD: 0 K/UL (ref 0–0.01)
NRBC BLD-RTO: 0 PER 100 WBC
NRBC BLD-RTO: 0 PER 100 WBC
O2/TOTAL GAS SETTING VFR VENT: 50 %
O2/TOTAL GAS SETTING VFR VENT: 60 %
PCO2 BLD: 40.6 MMHG (ref 35–45)
PCO2 BLD: 57.3 MMHG (ref 35–45)
PEEP RESPIRATORY: 14 CMH2O
PEEP RESPIRATORY: 20 CMH2O
PH BLD: 7.26 [PH] (ref 7.35–7.45)
PH BLD: 7.36 [PH] (ref 7.35–7.45)
PHOSPHATE SERPL-MCNC: 3.4 MG/DL (ref 2.6–4.7)
PHOSPHATE SERPL-MCNC: 3.8 MG/DL (ref 2.6–4.7)
PHOSPHATE SERPL-MCNC: 4 MG/DL (ref 2.6–4.7)
PHOSPHATE SERPL-MCNC: 4.1 MG/DL (ref 2.6–4.7)
PIP ISTAT,IPIP: 14
PIP ISTAT,IPIP: 18
PLATELET # BLD AUTO: 245 K/UL (ref 150–400)
PLATELET # BLD AUTO: 252 K/UL (ref 150–400)
PMV BLD AUTO: 10.1 FL (ref 8.9–12.9)
PMV BLD AUTO: 9.8 FL (ref 8.9–12.9)
PO2 BLD: 167 MMHG (ref 80–100)
PO2 BLD: 86 MMHG (ref 80–100)
POTASSIUM SERPL-SCNC: 3.6 MMOL/L (ref 3.5–5.1)
POTASSIUM SERPL-SCNC: 3.7 MMOL/L (ref 3.5–5.1)
POTASSIUM SERPL-SCNC: 4.2 MMOL/L (ref 3.5–5.1)
PROT SERPL-MCNC: 5.7 G/DL (ref 6.4–8.2)
PROTHROMBIN TIME: 12.2 SEC (ref 9–11.1)
RBC # BLD AUTO: 3.38 M/UL (ref 3.8–5.2)
RBC # BLD AUTO: 3.47 M/UL (ref 3.8–5.2)
RBC MORPH BLD: ABNORMAL
SAO2 % BLD: 96 % (ref 92–97)
SAO2 % BLD: 99 % (ref 92–97)
SERVICE CMNT-IMP: ABNORMAL
SODIUM SERPL-SCNC: 137 MMOL/L (ref 136–145)
SODIUM SERPL-SCNC: 137 MMOL/L (ref 136–145)
SODIUM SERPL-SCNC: 138 MMOL/L (ref 136–145)
SPECIMEN TYPE: ABNORMAL
SPECIMEN TYPE: ABNORMAL
THERAPEUTIC RANGE,PTTT: ABNORMAL SECS (ref 58–77)
TRIGL SERPL-MCNC: 190 MG/DL (ref ?–150)
VANCOMYCIN SERPL-MCNC: 18.8 UG/ML
VENTILATION MODE VENT: ABNORMAL
VENTILATION MODE VENT: ABNORMAL
WBC # BLD AUTO: 13.6 K/UL (ref 3.6–11)
WBC # BLD AUTO: 15.4 K/UL (ref 3.6–11)

## 2020-04-01 PROCEDURE — 74011000250 HC RX REV CODE- 250: Performed by: INTERNAL MEDICINE

## 2020-04-01 PROCEDURE — 85025 COMPLETE CBC W/AUTO DIFF WBC: CPT

## 2020-04-01 PROCEDURE — 74011250636 HC RX REV CODE- 250/636: Performed by: INTERNAL MEDICINE

## 2020-04-01 PROCEDURE — 87340 HEPATITIS B SURFACE AG IA: CPT

## 2020-04-01 PROCEDURE — 74011250637 HC RX REV CODE- 250/637: Performed by: NURSE PRACTITIONER

## 2020-04-01 PROCEDURE — 84100 ASSAY OF PHOSPHORUS: CPT

## 2020-04-01 PROCEDURE — 85027 COMPLETE CBC AUTOMATED: CPT

## 2020-04-01 PROCEDURE — 83735 ASSAY OF MAGNESIUM: CPT

## 2020-04-01 PROCEDURE — 86706 HEP B SURFACE ANTIBODY: CPT

## 2020-04-01 PROCEDURE — 74011250637 HC RX REV CODE- 250/637: Performed by: INTERNAL MEDICINE

## 2020-04-01 PROCEDURE — 82962 GLUCOSE BLOOD TEST: CPT

## 2020-04-01 PROCEDURE — 94003 VENT MGMT INPAT SUBQ DAY: CPT

## 2020-04-01 PROCEDURE — 80048 BASIC METABOLIC PNL TOTAL CA: CPT

## 2020-04-01 PROCEDURE — 85610 PROTHROMBIN TIME: CPT

## 2020-04-01 PROCEDURE — 36415 COLL VENOUS BLD VENIPUNCTURE: CPT

## 2020-04-01 PROCEDURE — 80076 HEPATIC FUNCTION PANEL: CPT

## 2020-04-01 PROCEDURE — 85730 THROMBOPLASTIN TIME PARTIAL: CPT

## 2020-04-01 PROCEDURE — 90945 DIALYSIS ONE EVALUATION: CPT

## 2020-04-01 PROCEDURE — 02HV33Z INSERTION OF INFUSION DEVICE INTO SUPERIOR VENA CAVA, PERCUTANEOUS APPROACH: ICD-10-PCS | Performed by: HOSPITALIST

## 2020-04-01 PROCEDURE — 65610000006 HC RM INTENSIVE CARE

## 2020-04-01 PROCEDURE — 80202 ASSAY OF VANCOMYCIN: CPT

## 2020-04-01 PROCEDURE — 74011000250 HC RX REV CODE- 250: Performed by: NURSE PRACTITIONER

## 2020-04-01 PROCEDURE — 80069 RENAL FUNCTION PANEL: CPT

## 2020-04-01 PROCEDURE — 36600 WITHDRAWAL OF ARTERIAL BLOOD: CPT

## 2020-04-01 PROCEDURE — 82803 BLOOD GASES ANY COMBINATION: CPT

## 2020-04-01 PROCEDURE — 84478 ASSAY OF TRIGLYCERIDES: CPT

## 2020-04-01 PROCEDURE — 74011000258 HC RX REV CODE- 258: Performed by: INTERNAL MEDICINE

## 2020-04-01 PROCEDURE — 74011636637 HC RX REV CODE- 636/637: Performed by: INTERNAL MEDICINE

## 2020-04-01 RX ORDER — INSULIN GLARGINE 100 [IU]/ML
5 INJECTION, SOLUTION SUBCUTANEOUS ONCE
Status: COMPLETED | OUTPATIENT
Start: 2020-04-01 | End: 2020-04-01

## 2020-04-01 RX ORDER — BALSAM PERU/CASTOR OIL
OINTMENT (GRAM) TOPICAL 2 TIMES DAILY
Status: DISCONTINUED | OUTPATIENT
Start: 2020-04-01 | End: 2020-05-01

## 2020-04-01 RX ORDER — INSULIN GLARGINE 100 [IU]/ML
35 INJECTION, SOLUTION SUBCUTANEOUS DAILY
Status: DISCONTINUED | OUTPATIENT
Start: 2020-04-02 | End: 2020-04-10

## 2020-04-01 RX ORDER — VANCOMYCIN/0.9 % SOD CHLORIDE 1.5G/250ML
1500 PLASTIC BAG, INJECTION (ML) INTRAVENOUS ONCE
Status: COMPLETED | OUTPATIENT
Start: 2020-04-01 | End: 2020-04-02

## 2020-04-01 RX ORDER — BACITRACIN 500 UNIT/G
1 PACKET (EA) TOPICAL AS NEEDED
Status: DISCONTINUED | OUTPATIENT
Start: 2020-04-01 | End: 2020-05-22 | Stop reason: HOSPADM

## 2020-04-01 RX ADMIN — CALCIUM CHLORIDE, MAGNESIUM CHLORIDE, DEXTROSE MONOHYDRATE, LACTIC ACID, SODIUM CHLORIDE, SODIUM BICARBONATE AND POTASSIUM CHLORIDE 3500 ML/HR: 3.68; 3.05; 22; 5.4; 6.46; 3.09; .314 INJECTION INTRAVENOUS at 06:00

## 2020-04-01 RX ADMIN — Medication 150 MCG/HR: at 16:03

## 2020-04-01 RX ADMIN — CALCIUM CHLORIDE, MAGNESIUM CHLORIDE, DEXTROSE MONOHYDRATE, LACTIC ACID, SODIUM CHLORIDE, SODIUM BICARBONATE AND POTASSIUM CHLORIDE 3500 ML/HR: 3.68; 3.05; 22; 5.4; 6.46; 3.09; .314 INJECTION INTRAVENOUS at 04:11

## 2020-04-01 RX ADMIN — CALCIUM CHLORIDE, MAGNESIUM CHLORIDE, DEXTROSE MONOHYDRATE, LACTIC ACID, SODIUM CHLORIDE, SODIUM BICARBONATE AND POTASSIUM CHLORIDE 3500 ML/HR: 3.68; 3.05; 22; 5.4; 6.46; 3.09; .314 INJECTION INTRAVENOUS at 17:32

## 2020-04-01 RX ADMIN — Medication 150 MCG/HR: at 06:51

## 2020-04-01 RX ADMIN — INSULIN GLARGINE 30 UNITS: 100 INJECTION, SOLUTION SUBCUTANEOUS at 08:40

## 2020-04-01 RX ADMIN — SODIUM CHLORIDE 40 ML: 9 INJECTION INTRAMUSCULAR; INTRAVENOUS; SUBCUTANEOUS at 06:00

## 2020-04-01 RX ADMIN — CALCIUM CHLORIDE, MAGNESIUM CHLORIDE, DEXTROSE MONOHYDRATE, LACTIC ACID, SODIUM CHLORIDE, SODIUM BICARBONATE AND POTASSIUM CHLORIDE 3500 ML/HR: 3.68; 3.05; 22; 5.4; 6.46; 3.09; .314 INJECTION INTRAVENOUS at 04:13

## 2020-04-01 RX ADMIN — CALCIUM CHLORIDE, MAGNESIUM CHLORIDE, DEXTROSE MONOHYDRATE, LACTIC ACID, SODIUM CHLORIDE, SODIUM BICARBONATE AND POTASSIUM CHLORIDE 3500 ML/HR: 3.68; 3.05; 22; 5.4; 6.46; 3.09; .314 INJECTION INTRAVENOUS at 15:00

## 2020-04-01 RX ADMIN — CALCIUM CHLORIDE, MAGNESIUM CHLORIDE, DEXTROSE MONOHYDRATE, LACTIC ACID, SODIUM CHLORIDE, SODIUM BICARBONATE AND POTASSIUM CHLORIDE 3500 ML/HR: 3.68; 3.05; 22; 5.4; 6.46; 3.09; .314 INJECTION INTRAVENOUS at 18:00

## 2020-04-01 RX ADMIN — SODIUM BICARBONATE: 84 INJECTION, SOLUTION INTRAVENOUS at 22:58

## 2020-04-01 RX ADMIN — CALCIUM CHLORIDE, MAGNESIUM CHLORIDE, DEXTROSE MONOHYDRATE, LACTIC ACID, SODIUM CHLORIDE, SODIUM BICARBONATE AND POTASSIUM CHLORIDE 3500 ML/HR: 3.68; 3.05; 22; 5.4; 6.46; 3.09; .314 INJECTION INTRAVENOUS at 03:01

## 2020-04-01 RX ADMIN — INSULIN LISPRO 2 UNITS: 100 INJECTION, SOLUTION INTRAVENOUS; SUBCUTANEOUS at 11:33

## 2020-04-01 RX ADMIN — VASOPRESSIN 0.04 UNITS/MIN: 20 INJECTION INTRAVENOUS at 10:56

## 2020-04-01 RX ADMIN — NOREPINEPHRINE BITARTRATE 5 MCG/MIN: 1 INJECTION, SOLUTION, CONCENTRATE INTRAVENOUS at 06:40

## 2020-04-01 RX ADMIN — BACITRACIN 1 PACKET: 500 OINTMENT TOPICAL at 01:16

## 2020-04-01 RX ADMIN — SODIUM CHLORIDE 40 ML: 9 INJECTION INTRAMUSCULAR; INTRAVENOUS; SUBCUTANEOUS at 22:00

## 2020-04-01 RX ADMIN — VASOPRESSIN 0.04 UNITS/MIN: 20 INJECTION INTRAVENOUS at 02:13

## 2020-04-01 RX ADMIN — VASOPRESSIN 0.04 UNITS/MIN: 20 INJECTION INTRAVENOUS at 19:11

## 2020-04-01 RX ADMIN — CALCIUM CHLORIDE, MAGNESIUM CHLORIDE, DEXTROSE MONOHYDRATE, LACTIC ACID, SODIUM CHLORIDE, SODIUM BICARBONATE AND POTASSIUM CHLORIDE 3500 ML/HR: 3.68; 3.05; 22; 5.4; 6.46; 3.09; .314 INJECTION INTRAVENOUS at 00:04

## 2020-04-01 RX ADMIN — DOCUSATE SODIUM 100 MG: 50 LIQUID ORAL at 18:28

## 2020-04-01 RX ADMIN — FAMOTIDINE 20 MG: 10 INJECTION, SOLUTION INTRAVENOUS at 21:07

## 2020-04-01 RX ADMIN — CALCIUM CHLORIDE, MAGNESIUM CHLORIDE, DEXTROSE MONOHYDRATE, LACTIC ACID, SODIUM CHLORIDE, SODIUM BICARBONATE AND POTASSIUM CHLORIDE 3500 ML/HR: 3.68; 3.05; 22; 5.4; 6.46; 3.09; .314 INJECTION INTRAVENOUS at 22:12

## 2020-04-01 RX ADMIN — HYDROXYCHLOROQUINE SULFATE 400 MG: 200 TABLET, FILM COATED ORAL at 02:12

## 2020-04-01 RX ADMIN — INSULIN GLARGINE 5 UNITS: 100 INJECTION, SOLUTION SUBCUTANEOUS at 14:27

## 2020-04-01 RX ADMIN — CASTOR OIL AND BALSAM, PERU: 788; 87 OINTMENT TOPICAL at 18:24

## 2020-04-01 RX ADMIN — SODIUM BICARBONATE: 84 INJECTION, SOLUTION INTRAVENOUS at 06:39

## 2020-04-01 RX ADMIN — SODIUM BICARBONATE: 84 INJECTION, SOLUTION INTRAVENOUS at 14:28

## 2020-04-01 RX ADMIN — VANCOMYCIN HYDROCHLORIDE 1500 MG: 10 INJECTION, POWDER, LYOPHILIZED, FOR SOLUTION INTRAVENOUS at 21:05

## 2020-04-01 RX ADMIN — CHLORHEXIDINE GLUCONATE 15 ML: 0.12 RINSE ORAL at 09:02

## 2020-04-01 RX ADMIN — CALCIUM CHLORIDE, MAGNESIUM CHLORIDE, DEXTROSE MONOHYDRATE, LACTIC ACID, SODIUM CHLORIDE, SODIUM BICARBONATE AND POTASSIUM CHLORIDE 3500 ML/HR: 3.68; 3.05; 22; 5.4; 6.46; 3.09; .314 INJECTION INTRAVENOUS at 19:08

## 2020-04-01 RX ADMIN — CHLORHEXIDINE GLUCONATE 15 ML: 0.12 RINSE ORAL at 21:06

## 2020-04-01 RX ADMIN — SODIUM CHLORIDE 10 ML: 9 INJECTION INTRAMUSCULAR; INTRAVENOUS; SUBCUTANEOUS at 14:24

## 2020-04-01 RX ADMIN — NOREPINEPHRINE BITARTRATE 8 MCG/MIN: 1 INJECTION, SOLUTION, CONCENTRATE INTRAVENOUS at 22:59

## 2020-04-01 NOTE — PROGRESS NOTES
[facility]    ICU Team Note    Name: Ramana Gomez   : 1960   MRN: [id number]   Date: 2020      Subjective:   Progress Note: 2020      Reason for ICU Admission: SARS-COV2 with renal failure     65 yo female with obesity and diabetes who presented to [facility] with worsening hypoxic respiratory failure in lieu of close exposure to COVID-19. She presented to the hospital 3/26 and intubated 3/28. She was started on broad spectrum antibiotics and plaquenil. Developed worsening renal failure and was transferred to us for CRRT. Patient was started on CRRT but had issues with line. Overnight Events:     Patient's oxygen requirements are markedly improved. Note positive resp culture. Had new HD line inserted. POD:* No surgery found *    S/P:     Active Problem List:     Problem List  Date Reviewed: 2020          Codes Class    COVID-19 virus infection ICD-10-CM: U07.1         Obesity, morbid ([facility].) ICD-10-CM: E66.01  ICD-9-CM: 278.01         Vaginal Pap smear following hysterectomy for malignancy ICD-10-CM: Z08, Z90.710  ICD-9-CM: V67.01         Personal history of malignant neoplasm of other parts of uterus ICD-10-CM: Z85.42  ICD-9-CM: V10.42         Endometrial cancer ([facility].) ICD-10-CM: C54.1  ICD-9-CM: 182.0               Past Medical History:      has a past medical history of Basal cell carcinoma, GERD (gastroesophageal reflux disease), Kidney stones, and Polyp of ureter. Past Surgical History:      has a past surgical history that includes hysteroscopy diagnostic (); pr endometrial ablation, thermal; hx  section (); and hx colonoscopy. Home Medications:     Prior to Admission medications    Medication Sig Start Date End Date Taking? Authorizing Provider   pantoprazole (PROTONIX) 40 mg tablet TAKE 1 TABLET BY MOUTH TWICE A DAY 18   Provider, Historical   esomeprazole (NEXIUM) 20 mg capsule Take 20 mg by mouth daily.  Indications: BID Provider, Historical   zolpidem (AMBIEN) 10 mg tablet Take 0.5 Tabs by mouth nightly as needed for Sleep. Max Daily Amount: 5 mg. 17   Franklin Mandel MD   fluticasone (FLONASE) 50 mcg/actuation nasal spray Mist 1-2 spray(s) into each nostril once daily. 4/3/15   Provider, Historical   ranitidine (ZANTAC) 150 mg tablet 150 mg.    Provider, Historical       Allergies/Social/Family History: Allergies   Allergen Reactions    Augmentin [Amoxicillin-Pot Clavulanate] Rash and Itching      Social History     Tobacco Use    Smoking status: Never Smoker    Smokeless tobacco: Never Used   Substance Use Topics    Alcohol use: Not on file      Family History   Problem Relation Age of Onset    Prostate Cancer Father         PROSTATE    Breast Cancer Sister 46        invasive poorly differentiated ductal carcinoma, Neg genetic testing.  Cancer Sister 62        melanoma stage 1       Review of Systems:     A comprehensive review of systems was negative except for that written in the HPI.     Objective:   Vital Signs:  Visit Vitals  /64   Pulse 95   Temp 98.9 °F (37.2 °C)   Resp 22   Ht 5' 4\" (1.626 m) Comment: per OSH record   Wt 129.2 kg (284 lb 12.8 oz)   SpO2 95%   BMI 48.89 kg/m²      O2 Device: Endotracheal tube, Ventilator Temp (24hrs), Av.9 °F (35.5 °C), Min:93.7 °F (34.3 °C), Max:98.9 °F (37.2 °C)           Intake/Output:     Intake/Output Summary (Last 24 hours) at 2020 1337  Last data filed at 2020 1300  Gross per 24 hour   Intake 5302.08 ml   Output 2849 ml   Net 2453.08 ml       Physical Exam:    General: Ill appearing  HENT: Atraumatic  Cardio: RRR  Respiratory: distant breath sounds BL  GI: Soft not tender abdomen  Extremities: +ve trace edema  Neuro: sedated      LABS AND  DATA: Personally reviewed  Recent Labs     20  04120  1542   WBC 15.4* 27.4*   HGB 9.4* 10.2*   HCT 29.8* 32.8*    334     Recent Labs     20  0730 205 20  2130 NA  --  137 136   K  --  4.2 5.2*   CL  --  103 106   CO2  --  23 23   BUN  --  16 16   CREA  --  3.20* 3.78*   GLU  --  158* 180*   CA  --  7.9* 7.7*   MG  --  2.2 2.1   PHOS 3.8  --  5.5*     Recent Labs     04/01/20  0415 03/31/20  2130   SGOT 69*  --    *  --    TP 5.7*  --    ALB 2.2* 2.5*   GLOB 3.5  --      Recent Labs     04/01/20  0855 04/01/20  0415 03/31/20  0947   INR  --  1.2* 1.2*   PTP  --  12.2* 12.3*   APTT 34.7*  --   --       Recent Labs     04/01/20  1110 04/01/20  1033   PHI 7.355 7.261*   PCO2I 40.6 57.3*   PO2I 86 167*   FIO2I 60 50     Recent Labs     03/31/20  1542 03/31/20  0947   CPK  --  145   TROIQ 0.36* 0.48*       Hemodynamics:   PAP:   CO:     Wedge:   CI:     CVP:    SVR:       PVR:       Ventilator Settings:  Mode Rate Tidal Volume Pressure FiO2 PEEP   Assist control        60 % 14 cm H20     Peak airway pressure: 28 cm H2O    Minute ventilation: 11 l/min        MEDS: Reviewed    Chest X-Ray: personally reviewed and report checked      Assessment/Plan:     1. Acute Hypoxic Respiratory failure - Secondary to COVID-19 - Note sputum with GPC. Blood NTD. Continue lung protective ventilation strategies. Continue weaning settings as tolerated. Continue abx therapy for possible superimposed bacterial PNA. Completed Azithro and plaquenil course. ID following. Pending echo and potentially give anti IL6 pending levels. 2. Shock - Likely worsened or precipitated by sedation. On vasopressin and norepinephrine. Stable. 3. Diabetes - Continue ISS and Lantus - will increase to 35 units given uptrend in POC glucose. 4. Renal Failure - Likely ATN from sepsis and hypotension. Nephrology following.  Continue CRRT    Multidisciplinary Rounds Completed:  No    ABCDEF Bundle/Checklist  Pain Medications: Fentanyl  Target RASS: -2 - Light Sedation - Briefly awakens to voice (eyes open & contact <10 sec)  Sedation Medications: None  CAM-ICU:  Negative  Mobility: Bedrest  PT/OT: TO be consulted when stable   Restraints: Soft wrist restraints  Discussed Plan of Care (goals of care): No  Addressed Code Status: Full Code    CARDIOVASCULAR  Cardiac Gtts: Norepinephrine and Vasopressin  SBP Goal of: > 90 mmHg  MAP Goal of: > 65 mmHg  Transfusion Trigger (Hgb): <7 g/dL    RESPIRATORY  Vent Goals:   Chlorhexidine   Optimize PEEP/Ventilation/Oxygenation  Goal Tidal Volume 6 cc/kg based on IBW  Aim for lung protective ventilation  Head of bed > 30 degrees  DVT Prophylaxis (if no, list reason): SCD's or Sequential Compression Device and Heparin   SPO2 Goal: > 92%  Pulmonary toilet: Duo-Nebs     GI/  Bowden Catheter Present: Yes  GI Prophylaxis: Pepcid (famotidine)   Nutrition: Pending   Bowel Movement: No  Bowel Regimen: Lactulose  Insulin: ISS and Lantus    ANTIBIOTICS  Antibiotics:  Plaquenil    T/L/D  Tubes: ETT and Orogastric Tube  Lines: Peripheral IV, Arterial Line, PICC Line and Ramon  Drains: Bowden Catheter    SPECIAL EQUIPMENT  CRRT    DISPOSITION  Stay in ICU    CRITICAL CARE CONSULTANT NOTE  I had a face to face encounter with the patient, reviewed and interpreted patient data including clinical events, labs, images, vital signs, I/O's, and examined patient. I have discussed the case and the plan and management of the patient's care with the consulting services, the bedside nurses and the respiratory therapist.      NOTE OF PERSONAL INVOLVEMENT IN CARE   This patient has a high probability of imminent, clinically significant deterioration, which requires the highest level of preparedness to intervene urgently. I participated in the decision-making and personally managed or directed the management of the following life and organ supporting interventions that required my frequent assessment to treat or prevent imminent deterioration. I personally spent 35 minutes of critical care time.   This is time spent at this critically ill patient's bedside actively involved in patient care as well as the coordination of care and discussions with the patient's family. This does not include any procedural time which has been billed separately.     Chante Jensen MD  Staff [address]  4/1/2020

## 2020-04-01 NOTE — CDMP QUERY
Query 2 of 2 Patient admitted with respiratory failure and COVID-19 +. Noted documentation of shock in H&P. Please further specify type of Shock in the medical record: 
 
=> Septic Shock 
=> Shock, ruled out 
=> Hypotension 
=> Other Shock, Please specify 
=> Clinically unable to determine The medical record reflects the following: 
 
  Risk Factors: COVID-19+ Clinical Indicators:  
 
Ryan Isidro RN Note 3/31- \"Currently on 15mcg/min levophed and 0.04 vasopressin with a MAP of 82, BP 92/70\" H&P Gricelda 3/31- \"Shock- likely worsened or precipitated by sedation\"; Treatment: Levophed titrated gtt; Vasopressin titrated gtt; Thank you, Afshin Clifford, RN, BSN, [facility] Clinical  
[facility] 
[phone]

## 2020-04-01 NOTE — CDMP QUERY
Query 1 of 2 Patient admitted with respiratory failure and COVID-19 +. Noted documentation of sepsis in H&P. Please document in progress notes and discharge summary if you are evaluating or treating any of the following:  Sepsis, POA due to COVID-19  Sepsis not due to COVID-19  Sepsis, ruled out  Sepsis, POA due to other (please specify)  Other, please specify  Unable to determine The medical record reflects the following: 
 
  Risk Factors: COVID-19+ Clinical Indicators: WBC 20.3-> 27.4; Procal 13.44; CRP 28.30; HR > 90; RR > 20; T 94.9; sputum cx- mod possible staph aureus; 
 
H&P Gricelda 3/31- \"Renal Failure - Likely ATN from sepsis and hypotension\"; Treatment: Zithromax 500mg IV; Thank you, Carin Foy, RN, BSN, [facility] Clinical  
[facility] 
[phone]

## 2020-04-01 NOTE — PROGRESS NOTES
Transitions of care  TBD pending medical progress  Patient transferred from [facility] for CRRT  Patient is COVID-19 positive    Reason for Admission:   Transferred for [facility] for CRRT, patient admitted with COVID-19 respiratory failure with ARF                    RUR Score: 17% low                    Plan for utilizing home health: NA         PCP: First and Last name:     Name of Practice:    Are you a current patient: Yes/No:    Approximate date of last visit:                     Current Advanced Directive/Advance Care Plan: Not on file                          Per EMR notes, patient was transferred from [facility], patient is COVID + and was intubated prior to transfer on broad spectrum abx, vasopressin,propofol, fentanyl, and Levophed. She is unresponsive with a weak cough. Care management will follow.  Dave Cvoarrubias RN,CRM  Care Management Interventions  PCP Verified by CM: Yes(Dr Deyanira Barron)  MyChart Signup: No  Discharge Durable Medical Equipment: No  Physical Therapy Consult: Yes  Occupational Therapy Consult: No  Speech Therapy Consult: No  Current Support Network: Lives with Spouse( Floyd Riding)

## 2020-04-01 NOTE — PROGRESS NOTES
Pharmacist Note - Vancomycin Dosing  Therapy day #3  Indication: Possible PNA, COVID-19 +  Current regimen: 2.5 gm loading dose on 3/30 @ OSH    A Random Level resulted at 18.8 mcg/mL which was obtained ~36 hrs post-dose. Goal trough: 15 - 20 mcg/mL     Plan: Will continue to hold the vancomycin for now and schedule a one-time dose at the 48-hr sin - 1500 mg on 4/1 @ 2100. Pharmacy will continue to monitor this patient daily for changes in clinical status and renal function.

## 2020-04-01 NOTE — PROGRESS NOTES
0730: Bedside shift change report given to Salazar Rosas RN (oncoming nurse) by Shemar Dang RN (offgoing nurse). Report included the following information SBAR, Kardex, ED Summary, Procedure Summary, Intake/Output, MAR, Accordion and Recent Results. 0900: Dr. Mara Claudio at bedside. 4505: [facility] called and notified of patient GCS 3.   1030: Spoke with [facility], patient does not qualify  Due to Covid 19.  1345:CRRT restarted. 1500: Patients  updated over phone. 1700: Patient desating with turns, FiO2 increased to maintain sats.

## 2020-04-01 NOTE — PROGRESS NOTES
Bedside shift change report received from [facility], RN/JUDY Burrows. Report included the following information SBAR, Kardex, Procedure Summary, Intake/Output, MAR and Recent Results. 2000: CRRT stopped, blood rinsed back by nils KIM. Will speak to MD regarding negative access pressure alarms. 2115: NP to place new femoral melissa at bedside. 2240: Aquiles Tanner 1154 at bedside. 2300: CRRT restarted. 2335: CRRT lines switched.  0600: CRRT filter clotted, blood rinsed back. 3078Agorge Okeefe paged. 7097Delmy Okeefe RN informed of clotted filter/rinse back. Shift Summary: Hypothermic. Levophed, Vaso, Fentanyl, bicarb gtt. New femoral HD catheter. CRRT restarted.

## 2020-04-01 NOTE — PROGRESS NOTES
ID follow up note    Sputum with S aureus sensitivity pending   On vancomycin IV, WBC trending down . Await sensitivity to adjust antibiotics .  Has allergy listed to Augmentin   Completed Plaquenl and Azithromycin   IL 6 , repeat LDH, ferritin pending    Allison Padgett, DO   1:32 PM

## 2020-04-01 NOTE — DIALYSIS
[address] [facility]       [phone]    Orders   Mode: CVVH   Factor: 0   UFR: 3500ml/hr   Blood Flow Rate: 200ml/min     Metrics   BP / HR: 111/49; 92   Blood Flow Rate: 200ml/min   AP:                         -63   RP: 97   TMP: 80   PD: 35   FP: 160   UFR: 3,500ml/hr     Comments / Plan:      Pt orders, notes, labs, code status and consent reviewed. CVVH restarted as per md order. New [id number] filter primed and tested. Heparin infusing via crrt filter at 1,000units/hr. R Femoral CVC, CDI with transparent dressing, no s/sx infection. Limbs/hubs disinfected per hospital policy, +aspiration/flush x2. Lines visible and secure with blood warmer attached to return line and set to 37*C. Education pre/post with primary RN. Pt is covid+, all appropriate ppe utilized.    Restart time: 1728

## 2020-04-01 NOTE — PROGRESS NOTES
Pharmacist Note - Vancomycin Dosing  Therapy day 2  Indication: possible PNA, COVID-19 positive  Current regimen: s/p 2500 mg 3/30 @ OSH    A Random Level resulted at 22.9 mcg/mL which was obtained 24 hrs post-dose. Goal trough: 15 - 20 mcg/mL     Plan: Repeat random level in 12 hours. Pharmacy will continue to monitor this patient daily for changes in clinical status and renal function.

## 2020-04-01 NOTE — DIALYSIS
[address] [facility]          [phone]         Orders   Mode: CVVH Restarted:    Factor: 0   UFR: 3500ML/HR   Blood Flow Rate: 200ML/MIN          Metrics   BP / HR: 97/57   86   Blood Flow Rate: 180ML/MIN New femoral line   AP:                         -65   RP: 75   TMP: 88   PD: 43   FP: 135   UFR: 3500ML/HR      Comments / Plan:   Filter changed secondary to being rinsed back for access not working well. All possible blood (165 ml) returned by primary RN. Consents, patient, code status, labs and orders verified. Old set discarded in red bio-hazard bag. New right femoral temporary CVC placed at bedside dressing CDI with bio-patch dated. New [id number] filter set-up, primed, tested and running well at this time. No signs of redness, drainage, or infection visualized. Each catheter limb disinfected for 60 seconds per limb with alcohol swabs. Caps removed, dialysis CVC hub scrubbed with Prevantics for 5 seconds, followed by a 5 second dry time per Hospital P&P. +asp/+flush x 2 ports. Lines visible and connections secure with blood warmer to return line at 37*C. Education, pre and post to RN.

## 2020-04-01 NOTE — DIABETES MGMT
[facility]  CLINICAL NURSE SPECIALIST CONSULT  PROGRAM FOR DIABETES HEALTH    Presentation   Veronica Grant is a 64 y.o. female admitted from OSH to [facility] ICU with SARS-COV2 needing CRRT. She is currently sedated and has been intubated since 3/28/20. Current clinical course has been complicated by steroid induced hyperglycemia. Steroids are discontinued at this time. She requires CRRT for acute renal failure r/t her sepsis and hypotension. PHM: GERD, obesity, and anxiety. New diabetes diagnosis with A1C 11.0% (3/28/2020); updated A1C 3/31/20-10.4%    Consulted by Provider for advanced diabetes nursing assessment and care, specifically related to   [] Transitioning off Darolyn Riser   [x] Inpatient management strategy  [] Home management assessment  [] Survival skill education    Diabetes-related medical history  Acute complications  hyperglycemia  Neurological complications  NONE  Microvascular disease  NONE  Macrovascular disease  NONE  Other associated conditions     NONE    Diabetes medication history: NONE    Subjective   Per chart review, Ms. Lucia Gabriel remains very ill  and is on isolation forSARS-COV2 in ICU. I am unable to do a physical assessment of the patient at this time. Patient reports the following home diabetes self-care practices: deferred    Objective     Vital Signs   Visit Vitals  /64   Pulse 90   Temp 98.9 °F (37.2 °C)   Resp 22   Ht 5' 4\" (1.626 m)   Wt 129.2 kg (284 lb 12.8 oz)   SpO2 95%   BMI 48.89 kg/m²   .    Laboratory  Lab Results   Component Value Date/Time    Hemoglobin A1c 10.4 (H) 03/31/2020 03:42 PM     No results found for: LDL, LDLC, DLDLP  Lab Results   Component Value Date/Time    Creatinine 3.20 (H) 04/01/2020 04:15 AM     Lab Results   Component Value Date/Time    Sodium 137 04/01/2020 04:15 AM    Potassium 4.2 04/01/2020 04:15 AM    Chloride 103 04/01/2020 04:15 AM    CO2 23 04/01/2020 04:15 AM    Anion gap 11 04/01/2020 04:15 AM    Glucose 158 (H) 04/01/2020 04:15 AM    BUN 16 04/01/2020 04:15 AM    Creatinine 3.20 (H) 04/01/2020 04:15 AM    BUN/Creatinine ratio 5 (L) 04/01/2020 04:15 AM    GFR est AA 18 (L) 04/01/2020 04:15 AM    GFR est non-AA 15 (L) 04/01/2020 04:15 AM    Calcium 7.9 (L) 04/01/2020 04:15 AM    Bilirubin, total 1.7 (H) 04/01/2020 04:15 AM    AST (SGOT) 69 (H) 04/01/2020 04:15 AM    Alk. phosphatase 189 (H) 04/01/2020 04:15 AM    Protein, total 5.7 (L) 04/01/2020 04:15 AM    Albumin 2.2 (L) 04/01/2020 04:15 AM    Globulin 3.5 04/01/2020 04:15 AM    A-G Ratio 0.6 (L) 04/01/2020 04:15 AM    ALT (SGPT) 45 04/01/2020 04:15 AM     Lab Results   Component Value Date/Time    ALT (SGPT) 45 04/01/2020 04:15 AM           Evaluation   Ms Beba Hanson, with new onset Type 2 diabetes,with A1C 10.4%. Per chart review from OSH before she was transferred she had BG >200. She will require subcutaneous insulin to adequately manage her BG. BG ranges last 24hours 135-207mg/dl. BG remains elevated with 30units lantus. TF (Nepro) at 10cc/hr. She will need additional insulin to cover tube feedings. Increasing basal insulin is appropriate. The 35units of Lantus will cover her for TF and basal metabolic needs. Critical Care Intensivist adjusted her basal up to 35units daily starting tomorrow, 4/2/20. Inpatient blood glucose management has been impacted by  [x] Kidney dysfunction  [x] Erratic meal consumption/Tube feeds  [] Glucocorticoid use  [x]  sepsis   Recommendations   1. CONTINUE basal and correction insulin. 2. Will continue to follow.     Assessment and Plan   Nursing Diagnosis Risk for unstable blood glucose pattern   Nursing Intervention Domain 1874 Decision-making Support   Nursing Interventions Examined current inpatient diabetes control   Explored factors facilitating and impeding inpatient management  Identified self-management practices impeding diabetes control  Explored corrective strategies with patient and responsible inpatient provider   Informed patient of rational for insulin strategy while hospitalized     Referral   [] Behavioral health services  [] Inpatient nutrition services  [] Pharmacy services for medication management  [x] Diabetes Self-Management Training through [facility] (Phone [phone] to schedule appointment)    Billing Code(s)     [x] [address] [facility] hospital care - [address] [place] [address]  Access via  Joel Roca [phone]

## 2020-04-01 NOTE — CONSULTS
[facility]      Procedure Note - Central Venous Access:   Performed by Rusty Montgomery NP    Obtained informed Consent. Immediately prior to the procedure, the patient was reevaluated and found suitable for the planned procedure and any planned medications. Immediately prior to the procedure a time out was called to verify the correct patient, procedure, equipment, staff, and marking as appropriate. The site was prepped with ChloraPrep. Using Seldinger technique a Hemodialysis Catheter was placed in the Femoral Vein via direct cannulation with 1 number of attempts for Dialysis. Ultrasound Guidance was utilized. There was good blood return. The following complications were encountered: None. A follow-up chest x-ray was ordered post procedure. The procedure was tolerated well.

## 2020-04-02 PROBLEM — I95.9 HYPOTENSION: Status: ACTIVE | Noted: 2020-04-02

## 2020-04-02 PROBLEM — A41.01 SEPSIS DUE TO METHICILLIN SUSCEPTIBLE STAPHYLOCOCCUS AUREUS (MSSA) WITHOUT ACUTE ORGAN DYSFUNCTION (HCC): Status: ACTIVE | Noted: 2020-04-02

## 2020-04-02 LAB
ALBUMIN SERPL-MCNC: 1.8 G/DL (ref 3.5–5)
ALBUMIN SERPL-MCNC: 1.9 G/DL (ref 3.5–5)
ALBUMIN SERPL-MCNC: 2.1 G/DL (ref 3.5–5)
ANION GAP SERPL CALC-SCNC: 5 MMOL/L (ref 5–15)
ANION GAP SERPL CALC-SCNC: 7 MMOL/L (ref 5–15)
ANION GAP SERPL CALC-SCNC: 8 MMOL/L (ref 5–15)
APTT PPP: 29.9 SEC (ref 22.1–32)
APTT PPP: 36.1 SEC (ref 22.1–32)
ARTERIAL PATENCY WRIST A: NO
BACTERIA SPEC CULT: ABNORMAL
BACTERIA SPEC CULT: ABNORMAL
BASE EXCESS BLD CALC-SCNC: 4 MMOL/L
BASOPHILS # BLD: 0 K/UL (ref 0–0.1)
BASOPHILS # BLD: 0.1 K/UL (ref 0–0.1)
BASOPHILS NFR BLD: 0 % (ref 0–1)
BASOPHILS NFR BLD: 1 % (ref 0–1)
BDY SITE: ABNORMAL
BUN SERPL-MCNC: 23 MG/DL (ref 6–20)
BUN SERPL-MCNC: 27 MG/DL (ref 6–20)
BUN SERPL-MCNC: 29 MG/DL (ref 6–20)
BUN/CREAT SERPL: 10 (ref 12–20)
BUN/CREAT SERPL: 8 (ref 12–20)
BUN/CREAT SERPL: 9 (ref 12–20)
CA-I BLD-SCNC: 1.01 MMOL/L (ref 1.12–1.32)
CALCIUM SERPL-MCNC: 7.7 MG/DL (ref 8.5–10.1)
CALCIUM SERPL-MCNC: 8 MG/DL (ref 8.5–10.1)
CALCIUM SERPL-MCNC: 8.1 MG/DL (ref 8.5–10.1)
CHLORIDE SERPL-SCNC: 100 MMOL/L (ref 97–108)
CHLORIDE SERPL-SCNC: 102 MMOL/L (ref 97–108)
CHLORIDE SERPL-SCNC: 104 MMOL/L (ref 97–108)
CO2 SERPL-SCNC: 27 MMOL/L (ref 21–32)
CO2 SERPL-SCNC: 28 MMOL/L (ref 21–32)
CO2 SERPL-SCNC: 28 MMOL/L (ref 21–32)
CREAT SERPL-MCNC: 2.6 MG/DL (ref 0.55–1.02)
CREAT SERPL-MCNC: 2.91 MG/DL (ref 0.55–1.02)
CREAT SERPL-MCNC: 3.19 MG/DL (ref 0.55–1.02)
DIFFERENTIAL METHOD BLD: ABNORMAL
DIFFERENTIAL METHOD BLD: ABNORMAL
EOSINOPHIL # BLD: 0.2 K/UL (ref 0–0.4)
EOSINOPHIL # BLD: 0.2 K/UL (ref 0–0.4)
EOSINOPHIL NFR BLD: 2 % (ref 0–7)
EOSINOPHIL NFR BLD: 2 % (ref 0–7)
ERYTHROCYTE [DISTWIDTH] IN BLOOD BY AUTOMATED COUNT: 13.9 % (ref 11.5–14.5)
ERYTHROCYTE [DISTWIDTH] IN BLOOD BY AUTOMATED COUNT: 14.3 % (ref 11.5–14.5)
GAS FLOW.O2 O2 DELIVERY SYS: ABNORMAL L/MIN
GAS FLOW.O2 SETTING OXYMISER: 22 BPM
GLUCOSE BLD STRIP.AUTO-MCNC: 120 MG/DL (ref 65–100)
GLUCOSE BLD STRIP.AUTO-MCNC: 126 MG/DL (ref 65–100)
GLUCOSE BLD STRIP.AUTO-MCNC: 137 MG/DL (ref 65–100)
GLUCOSE BLD STRIP.AUTO-MCNC: 149 MG/DL (ref 65–100)
GLUCOSE BLD STRIP.AUTO-MCNC: 150 MG/DL (ref 65–100)
GLUCOSE SERPL-MCNC: 119 MG/DL (ref 65–100)
GLUCOSE SERPL-MCNC: 148 MG/DL (ref 65–100)
GLUCOSE SERPL-MCNC: 165 MG/DL (ref 65–100)
GRAM STN SPEC: ABNORMAL
GRAM STN SPEC: ABNORMAL
HCO3 BLD-SCNC: 29.5 MMOL/L (ref 22–26)
HCT VFR BLD AUTO: 28.5 % (ref 35–47)
HCT VFR BLD AUTO: 29.2 % (ref 35–47)
HGB BLD-MCNC: 9 G/DL (ref 11.5–16)
HGB BLD-MCNC: 9.3 G/DL (ref 11.5–16)
IL6 SERPL-MCNC: 402 PG/ML (ref 0–15.5)
IMM GRANULOCYTES # BLD AUTO: 0 K/UL
IMM GRANULOCYTES # BLD AUTO: 0.6 K/UL (ref 0–0.04)
IMM GRANULOCYTES NFR BLD AUTO: 0 %
IMM GRANULOCYTES NFR BLD AUTO: 6 % (ref 0–0.5)
INR PPP: 1.1 (ref 0.9–1.1)
INSPIRATION.DURATION SETTING TIME VENT: 1.35 SEC
LYMPHOCYTES # BLD: 0.7 K/UL (ref 0.8–3.5)
LYMPHOCYTES # BLD: 0.8 K/UL (ref 0.8–3.5)
LYMPHOCYTES NFR BLD: 7 % (ref 12–49)
LYMPHOCYTES NFR BLD: 8 % (ref 12–49)
MAGNESIUM SERPL-MCNC: 2.5 MG/DL (ref 1.6–2.4)
MAGNESIUM SERPL-MCNC: 2.6 MG/DL (ref 1.6–2.4)
MAGNESIUM SERPL-MCNC: 2.6 MG/DL (ref 1.6–2.4)
MCH RBC QN AUTO: 27 PG (ref 26–34)
MCH RBC QN AUTO: 27.4 PG (ref 26–34)
MCHC RBC AUTO-ENTMCNC: 31.6 G/DL (ref 30–36.5)
MCHC RBC AUTO-ENTMCNC: 31.8 G/DL (ref 30–36.5)
MCV RBC AUTO: 84.6 FL (ref 80–99)
MCV RBC AUTO: 86.6 FL (ref 80–99)
MONOCYTES # BLD: 0.4 K/UL (ref 0–1)
MONOCYTES # BLD: 0.6 K/UL (ref 0–1)
MONOCYTES NFR BLD: 4 % (ref 5–13)
MONOCYTES NFR BLD: 6 % (ref 5–13)
NEUTS BAND NFR BLD MANUAL: 1 % (ref 0–6)
NEUTS SEG # BLD: 7.8 K/UL (ref 1.8–8)
NEUTS SEG # BLD: 9.2 K/UL (ref 1.8–8)
NEUTS SEG NFR BLD: 77 % (ref 32–75)
NEUTS SEG NFR BLD: 86 % (ref 32–75)
NRBC # BLD: 0 K/UL (ref 0–0.01)
NRBC # BLD: 0 K/UL (ref 0–0.01)
NRBC BLD-RTO: 0 PER 100 WBC
NRBC BLD-RTO: 0 PER 100 WBC
O2/TOTAL GAS SETTING VFR VENT: 80 %
PCO2 BLD: 56.5 MMHG (ref 35–45)
PEEP RESPIRATORY: 14 CMH2O
PH BLD: 7.33 [PH] (ref 7.35–7.45)
PHOSPHATE SERPL-MCNC: 3.8 MG/DL (ref 2.6–4.7)
PHOSPHATE SERPL-MCNC: 3.8 MG/DL (ref 2.6–4.7)
PHOSPHATE SERPL-MCNC: 3.9 MG/DL (ref 2.6–4.7)
PHOSPHATE SERPL-MCNC: 3.9 MG/DL (ref 2.6–4.7)
PIP ISTAT,IPIP: 14
PLATELET # BLD AUTO: 193 K/UL (ref 150–400)
PLATELET # BLD AUTO: 242 K/UL (ref 150–400)
PMV BLD AUTO: 9.7 FL (ref 8.9–12.9)
PMV BLD AUTO: 9.8 FL (ref 8.9–12.9)
PO2 BLD: 78 MMHG (ref 80–100)
POTASSIUM SERPL-SCNC: 3.4 MMOL/L (ref 3.5–5.1)
POTASSIUM SERPL-SCNC: 3.7 MMOL/L (ref 3.5–5.1)
POTASSIUM SERPL-SCNC: 4 MMOL/L (ref 3.5–5.1)
PROTHROMBIN TIME: 11.7 SEC (ref 9–11.1)
RBC # BLD AUTO: 3.29 M/UL (ref 3.8–5.2)
RBC # BLD AUTO: 3.45 M/UL (ref 3.8–5.2)
RBC MORPH BLD: ABNORMAL
RBC MORPH BLD: ABNORMAL
SAO2 % BLD: 94 % (ref 92–97)
SERVICE CMNT-IMP: ABNORMAL
SODIUM SERPL-SCNC: 136 MMOL/L (ref 136–145)
SODIUM SERPL-SCNC: 136 MMOL/L (ref 136–145)
SODIUM SERPL-SCNC: 137 MMOL/L (ref 136–145)
SPECIMEN TYPE: ABNORMAL
THERAPEUTIC RANGE,PTTT: ABNORMAL SECS (ref 58–77)
THERAPEUTIC RANGE,PTTT: NORMAL SECS (ref 58–77)
VANCOMYCIN SERPL-MCNC: 21.5 UG/ML
VENTILATION MODE VENT: ABNORMAL
WBC # BLD AUTO: 10.1 K/UL (ref 3.6–11)
WBC # BLD AUTO: 10.5 K/UL (ref 3.6–11)

## 2020-04-02 PROCEDURE — 85025 COMPLETE CBC W/AUTO DIFF WBC: CPT

## 2020-04-02 PROCEDURE — 83735 ASSAY OF MAGNESIUM: CPT

## 2020-04-02 PROCEDURE — 80202 ASSAY OF VANCOMYCIN: CPT

## 2020-04-02 PROCEDURE — 85730 THROMBOPLASTIN TIME PARTIAL: CPT

## 2020-04-02 PROCEDURE — 94003 VENT MGMT INPAT SUBQ DAY: CPT

## 2020-04-02 PROCEDURE — 84100 ASSAY OF PHOSPHORUS: CPT

## 2020-04-02 PROCEDURE — 36600 WITHDRAWAL OF ARTERIAL BLOOD: CPT

## 2020-04-02 PROCEDURE — 80069 RENAL FUNCTION PANEL: CPT

## 2020-04-02 PROCEDURE — 82962 GLUCOSE BLOOD TEST: CPT

## 2020-04-02 PROCEDURE — 74011000250 HC RX REV CODE- 250: Performed by: INTERNAL MEDICINE

## 2020-04-02 PROCEDURE — 65610000006 HC RM INTENSIVE CARE

## 2020-04-02 PROCEDURE — 74011250637 HC RX REV CODE- 250/637: Performed by: INTERNAL MEDICINE

## 2020-04-02 PROCEDURE — 85610 PROTHROMBIN TIME: CPT

## 2020-04-02 PROCEDURE — 74011250636 HC RX REV CODE- 250/636: Performed by: NURSE PRACTITIONER

## 2020-04-02 PROCEDURE — 90945 DIALYSIS ONE EVALUATION: CPT

## 2020-04-02 PROCEDURE — 74011000258 HC RX REV CODE- 258: Performed by: INTERNAL MEDICINE

## 2020-04-02 PROCEDURE — 74011250636 HC RX REV CODE- 250/636: Performed by: INTERNAL MEDICINE

## 2020-04-02 PROCEDURE — 36415 COLL VENOUS BLD VENIPUNCTURE: CPT

## 2020-04-02 PROCEDURE — 77030018798 HC PMP KT ENTRL FED COVD -A

## 2020-04-02 PROCEDURE — 82803 BLOOD GASES ANY COMBINATION: CPT

## 2020-04-02 PROCEDURE — 74011636637 HC RX REV CODE- 636/637: Performed by: INTERNAL MEDICINE

## 2020-04-02 RX ORDER — MIDAZOLAM HYDROCHLORIDE 1 MG/ML
1-2 INJECTION, SOLUTION INTRAMUSCULAR; INTRAVENOUS
Status: DISCONTINUED | OUTPATIENT
Start: 2020-04-02 | End: 2020-04-22

## 2020-04-02 RX ADMIN — Medication 175 MCG/HR: at 10:34

## 2020-04-02 RX ADMIN — CALCIUM CHLORIDE, MAGNESIUM CHLORIDE, DEXTROSE MONOHYDRATE, LACTIC ACID, SODIUM CHLORIDE, SODIUM BICARBONATE AND POTASSIUM CHLORIDE 3500 ML/HR: 3.68; 3.05; 22; 5.4; 6.46; 3.09; .314 INJECTION INTRAVENOUS at 12:40

## 2020-04-02 RX ADMIN — SODIUM CHLORIDE 10 ML: 9 INJECTION INTRAMUSCULAR; INTRAVENOUS; SUBCUTANEOUS at 21:58

## 2020-04-02 RX ADMIN — CALCIUM CHLORIDE, MAGNESIUM CHLORIDE, DEXTROSE MONOHYDRATE, LACTIC ACID, SODIUM CHLORIDE, SODIUM BICARBONATE AND POTASSIUM CHLORIDE 3500 ML/HR: 3.68; 3.05; 22; 5.4; 6.46; 3.09; .314 INJECTION INTRAVENOUS at 18:58

## 2020-04-02 RX ADMIN — INSULIN GLARGINE 35 UNITS: 100 INJECTION, SOLUTION SUBCUTANEOUS at 08:18

## 2020-04-02 RX ADMIN — SODIUM CHLORIDE 40 ML: 9 INJECTION INTRAMUSCULAR; INTRAVENOUS; SUBCUTANEOUS at 06:00

## 2020-04-02 RX ADMIN — VASOPRESSIN 0.04 UNITS/MIN: 20 INJECTION INTRAVENOUS at 20:38

## 2020-04-02 RX ADMIN — CALCIUM CHLORIDE, MAGNESIUM CHLORIDE, DEXTROSE MONOHYDRATE, LACTIC ACID, SODIUM CHLORIDE, SODIUM BICARBONATE AND POTASSIUM CHLORIDE 3500 ML/HR: 3.68; 3.05; 22; 5.4; 6.46; 3.09; .314 INJECTION INTRAVENOUS at 14:08

## 2020-04-02 RX ADMIN — Medication 150 MCG/HR: at 01:00

## 2020-04-02 RX ADMIN — DOCUSATE SODIUM 100 MG: 50 LIQUID ORAL at 17:34

## 2020-04-02 RX ADMIN — CASTOR OIL AND BALSAM, PERU: 788; 87 OINTMENT TOPICAL at 08:19

## 2020-04-02 RX ADMIN — FAMOTIDINE 20 MG: 10 INJECTION, SOLUTION INTRAVENOUS at 22:51

## 2020-04-02 RX ADMIN — CALCIUM CHLORIDE, MAGNESIUM CHLORIDE, DEXTROSE MONOHYDRATE, LACTIC ACID, SODIUM CHLORIDE, SODIUM BICARBONATE AND POTASSIUM CHLORIDE 3500 ML/HR: 3.68; 3.05; 22; 5.4; 6.46; 3.09; .314 INJECTION INTRAVENOUS at 03:31

## 2020-04-02 RX ADMIN — Medication 175 MCG/HR: at 19:19

## 2020-04-02 RX ADMIN — CALCIUM CHLORIDE, MAGNESIUM CHLORIDE, DEXTROSE MONOHYDRATE, LACTIC ACID, SODIUM CHLORIDE, SODIUM BICARBONATE AND POTASSIUM CHLORIDE 3500 ML/HR: 3.68; 3.05; 22; 5.4; 6.46; 3.09; .314 INJECTION INTRAVENOUS at 23:45

## 2020-04-02 RX ADMIN — MIDAZOLAM 1 MG: 1 INJECTION INTRAMUSCULAR; INTRAVENOUS at 01:26

## 2020-04-02 RX ADMIN — CALCIUM CHLORIDE, MAGNESIUM CHLORIDE, DEXTROSE MONOHYDRATE, LACTIC ACID, SODIUM CHLORIDE, SODIUM BICARBONATE AND POTASSIUM CHLORIDE 3500 ML/HR: 3.68; 3.05; 22; 5.4; 6.46; 3.09; .314 INJECTION INTRAVENOUS at 11:25

## 2020-04-02 RX ADMIN — CASTOR OIL AND BALSAM, PERU: 788; 87 OINTMENT TOPICAL at 17:33

## 2020-04-02 RX ADMIN — MIDAZOLAM 2 MG: 1 INJECTION INTRAMUSCULAR; INTRAVENOUS at 04:43

## 2020-04-02 RX ADMIN — DOCUSATE SODIUM 100 MG: 50 LIQUID ORAL at 08:18

## 2020-04-02 RX ADMIN — VASOPRESSIN 0.04 UNITS/MIN: 20 INJECTION INTRAVENOUS at 12:25

## 2020-04-02 RX ADMIN — CALCIUM CHLORIDE, MAGNESIUM CHLORIDE, DEXTROSE MONOHYDRATE, LACTIC ACID, SODIUM CHLORIDE, SODIUM BICARBONATE AND POTASSIUM CHLORIDE 3500 ML/HR: 3.68; 3.05; 22; 5.4; 6.46; 3.09; .314 INJECTION INTRAVENOUS at 03:30

## 2020-04-02 RX ADMIN — VASOPRESSIN 0.04 UNITS/MIN: 20 INJECTION INTRAVENOUS at 03:58

## 2020-04-02 RX ADMIN — CHLORHEXIDINE GLUCONATE 15 ML: 0.12 RINSE ORAL at 20:29

## 2020-04-02 RX ADMIN — SODIUM CHLORIDE 10 ML: 9 INJECTION INTRAMUSCULAR; INTRAVENOUS; SUBCUTANEOUS at 14:09

## 2020-04-02 RX ADMIN — SODIUM BICARBONATE: 84 INJECTION, SOLUTION INTRAVENOUS at 07:29

## 2020-04-02 RX ADMIN — CALCIUM CHLORIDE, MAGNESIUM CHLORIDE, DEXTROSE MONOHYDRATE, LACTIC ACID, SODIUM CHLORIDE, SODIUM BICARBONATE AND POTASSIUM CHLORIDE 3500 ML/HR: 3.68; 3.05; 22; 5.4; 6.46; 3.09; .314 INJECTION INTRAVENOUS at 17:15

## 2020-04-02 RX ADMIN — CHLORHEXIDINE GLUCONATE 15 ML: 0.12 RINSE ORAL at 08:18

## 2020-04-02 RX ADMIN — CALCIUM CHLORIDE, MAGNESIUM CHLORIDE, DEXTROSE MONOHYDRATE, LACTIC ACID, SODIUM CHLORIDE, SODIUM BICARBONATE AND POTASSIUM CHLORIDE 3500 ML/HR: 3.68; 3.05; 22; 5.4; 6.46; 3.09; .314 INJECTION INTRAVENOUS at 15:33

## 2020-04-02 RX ADMIN — NOREPINEPHRINE BITARTRATE 6.5 MCG/MIN: 1 INJECTION, SOLUTION, CONCENTRATE INTRAVENOUS at 18:55

## 2020-04-02 NOTE — PROGRESS NOTES
[facility]    ICU Team Note    Name: Jh Tilley   : 1960   MRN: [id number]   Date: 2020      Subjective:   Progress Note: 2020      Reason for ICU Admission: SARS-COV2 with renal failure     65 yo female with obesity and diabetes who presented to [facility] with worsening hypoxic respiratory failure in lieu of close exposure to COVID-19. She presented to the hospital 3/26 and intubated 3/28. She was started on broad spectrum antibiotics and plaquenil. Developed worsening renal failure and was transferred to us for CRRT. Patient was started on CRRT but had issues with line. Overnight Events:     No significant events overnight. POD:* No surgery found *    S/P:     Active Problem List:     Problem List  Date Reviewed: 2020          Codes Class    Hypotension ICD-10-CM: I95.9  ICD-9-CM: 458.9 Acute        Sepsis due to methicillin susceptible Staphylococcus aureus (MSSA) without acute organ dysfunction (HCC) ICD-10-CM: A41.01  ICD-9-CM: 038.11, 995.91 Acute        COVID-19 virus infection ICD-10-CM: U07.1         Obesity, morbid ([facility] [address] ICD-10-CM: E66.01  ICD-9-CM: 278.01         Vaginal Pap smear following hysterectomy for malignancy ICD-10-CM: Z08, Z90.710  ICD-9-CM: V67.01         Personal history of malignant neoplasm of other parts of uterus ICD-10-CM: Z85.42  ICD-9-CM: V10.42         Endometrial cancer ([facility] ICD-10-CM: C54.1  ICD-9-CM: 182.0               Past Medical History:      has a past medical history of Basal cell carcinoma, GERD (gastroesophageal reflux disease), Kidney stones, and Polyp of ureter. Past Surgical History:      has a past surgical history that includes hysteroscopy diagnostic (); pr endometrial ablation, thermal; hx  section (); and hx colonoscopy. Home Medications:     Prior to Admission medications    Medication Sig Start Date End Date Taking?  Authorizing Provider   pantoprazole (PROTONIX) 40 mg tablet TAKE 1 TABLET BY MOUTH TWICE A DAY 18   Provider, Historical   esomeprazole (NEXIUM) 20 mg capsule Take 20 mg by mouth daily. Indications: BID    Provider, Historical   zolpidem (AMBIEN) 10 mg tablet Take 0.5 Tabs by mouth nightly as needed for Sleep. Max Daily Amount: 5 mg. 17   Claudio Mandel MD   fluticasone (FLONASE) 50 mcg/actuation nasal spray Mist 1-2 spray(s) into each nostril once daily. 4/3/15   Provider, Historical   ranitidine (ZANTAC) 150 mg tablet 150 mg.    Provider, Historical       Allergies/Social/Family History: Allergies   Allergen Reactions    Augmentin [Amoxicillin-Pot Clavulanate] Rash and Itching      Social History     Tobacco Use    Smoking status: Never Smoker    Smokeless tobacco: Never Used   Substance Use Topics    Alcohol use: Not on file      Family History   Problem Relation Age of Onset    Prostate Cancer Father         PROSTATE    Breast Cancer Sister 46        invasive poorly differentiated ductal carcinoma, Neg genetic testing.  Cancer Sister 62        melanoma stage 1       Review of Systems:     A comprehensive review of systems was negative except for that written in the HPI.     Objective:   Vital Signs:  Visit Vitals  /75   Pulse 87   Temp 97.2 °F (36.2 °C)   Resp 26   Ht 5' 4\" (1.626 m) Comment: per OSH record   Wt 136.1 kg (300 lb)   SpO2 92%   BMI 51.49 kg/m²      O2 Device: Endotracheal tube, Ventilator Temp (24hrs), Av.9 °F (36.6 °C), Min:97.2 °F (36.2 °C), Max:99 °F (37.2 °C)           Intake/Output:     Intake/Output Summary (Last 24 hours) at 2020 1238  Last data filed at 2020 1200  Gross per 24 hour   Intake 4360.78 ml   Output 2468 ml   Net 1892.78 ml       Physical Exam:    General: Ill appearing  HENT: Atraumatic  Cardio: RRR  Respiratory: distant breath sounds BL  GI: Soft not tender abdomen  Extremities: +ve edema  Neuro: sedated      LABS AND  DATA: Personally reviewed  Recent Labs     20  0410 20  1558   WBC 10.5 13.6*   HGB 9.3* 9.1*   HCT 29.2* 29.0*    245     Recent Labs     04/02/20  1034 04/02/20  0414 04/02/20  0410    137  --    K 3.4* 3.7  --     102  --    CO2 28 28  --    BUN 29* 23*  --    CREA 3.19* 2.91*  --    * 148*  --    CA 8.1* 7.7*  --    MG 2.5*  --  2.6*   PHOS 3.8 3.8  --      Recent Labs     04/02/20  1034 04/02/20  0414  04/01/20  0415   SGOT  --   --   --  69*   AP  --   --   --  189*   TP  --   --   --  5.7*   ALB 1.8* 2.1*   < > 2.2*   GLOB  --   --   --  3.5    < > = values in this interval not displayed. Recent Labs     04/02/20  0849 04/02/20  0410 04/01/20  0855 04/01/20  0415   INR  --  1.1  --  1.2*   PTP  --  11.7*  --  12.2*   APTT 29.9  --  34.7*  --       Recent Labs     04/02/20  0739 04/01/20  1110   PHI 7.326* 7.355   PCO2I 56.5* 40.6   PO2I 78* 86   FIO2I 80 60     Recent Labs     03/31/20  1542 03/31/20  0947   CPK  --  145   TROIQ 0.36* 0.48*       Hemodynamics:   PAP:   CO:     Wedge:   CI:     CVP:    SVR:       PVR:       Ventilator Settings:  Mode Rate Tidal Volume Pressure FiO2 PEEP   Assist control        75 % 14 cm H20     Peak airway pressure: 29 cm H2O    Minute ventilation: 10.5 l/min        MEDS: Reviewed    Chest X-Ray: personally reviewed and report checked      Assessment/Plan:     1. Acute Hypoxic Respiratory failure - Secondary to COVID-19 - Note sputum with staph aureus. Blood NTD. Continue lung protective ventilation strategies. Continue weaning settings as tolerated. Continue abx therapy for superimposed bacterial PNA. Completed Azithro and plaquenil course. ID following. Pending echo and potentially give anti IL6 pending levels. Continue diuresis with CRRT  2. Shock - Likely worsened or precipitated by sedation. On vasopressin and norepinephrine. Stable. 3. Diabetes - Continue ISS and Lantus - will increased to 35 units given uptrend in POC glucose - improved. 4. Renal Failure - Likely ATN from sepsis and hypotension. Nephrology following. Continue CRRT    Multidisciplinary Rounds Completed:  No    ABCDEF Bundle/Checklist  Pain Medications: Fentanyl  Target RASS: -2 - Light Sedation - Briefly awakens to voice (eyes open & contact <10 sec)  Sedation Medications: None  CAM-ICU:  Negative  Mobility: Bedrest  PT/OT: TO be consulted when stable   Restraints: Soft wrist restraints  Discussed Plan of Care (goals of care): No  Addressed Code Status: Full Code    CARDIOVASCULAR  Cardiac Gtts: Norepinephrine and Vasopressin  SBP Goal of: > 90 mmHg  MAP Goal of: > 65 mmHg  Transfusion Trigger (Hgb): <7 g/dL    RESPIRATORY  Vent Goals:   Chlorhexidine   Optimize PEEP/Ventilation/Oxygenation  Goal Tidal Volume 6 cc/kg based on IBW  Aim for lung protective ventilation  Head of bed > 30 degrees  DVT Prophylaxis (if no, list reason): SCD's or Sequential Compression Device and Heparin   SPO2 Goal: > 92%  Pulmonary toilet: Duo-Nebs     GI/  Bowden Catheter Present: Yes  GI Prophylaxis: Pepcid (famotidine)   Nutrition: Pending   Bowel Movement: No  Bowel Regimen: Lactulose  Insulin: ISS and Lantus    ANTIBIOTICS  Antibiotics:  Plaquenil    T/L/D  Tubes: ETT and Orogastric Tube  Lines: Peripheral IV, Arterial Line, PICC Line and Ramon  Drains: Bowden Catheter    SPECIAL EQUIPMENT  CRRT    DISPOSITION  Stay in ICU    CRITICAL CARE CONSULTANT NOTE  I had a face to face encounter with the patient, reviewed and interpreted patient data including clinical events, labs, images, vital signs, I/O's, and examined patient. I have discussed the case and the plan and management of the patient's care with the consulting services, the bedside nurses and the respiratory therapist.      NOTE OF PERSONAL INVOLVEMENT IN CARE   This patient has a high probability of imminent, clinically significant deterioration, which requires the highest level of preparedness to intervene urgently.  I participated in the decision-making and personally managed or directed the management of the following life and organ supporting interventions that required my frequent assessment to treat or prevent imminent deterioration. I personally spent 30 minutes of critical care time. This is time spent at this critically ill patient's bedside actively involved in patient care as well as the coordination of care and discussions with the patient's family. This does not include any procedural time which has been billed separately.     Román Hargrove MD  Staff [facility]  4/2/2020

## 2020-04-02 NOTE — PROGRESS NOTES
Bedside shift change report received from [facility]. Report included the following information SBAR, Kardex, Procedure Summary, Intake/Output, MAR and Recent Results. 2215: Filter clotting, blood rinsed back. 2235: Jermaine Youssef paged. 2245: Call back received. 0100: Fentanyl on hold for SAT. Attempted to reposition/turn. Positive cough, desat 69-70%. Recovered 3 minutes 100% FiO2. Fentanyl restarted, 1mg Versed 2nd agitation. 0215: CRRT running w/ fentanyl gtt.  0400: Filter clotting, blood rinsed back. Cassandra MANZANO informed. AM labwork sent. 0505: FiO2 increased 80%. 0530: Jermaine Youssef paged. Shift Summary: Approximately 5 hours CRRT this shift, filters clot. Desaturations w/ position changes.

## 2020-04-02 NOTE — DIALYSIS
Ester    [facility]       [phone]    Orders   Mode: CVVHD   Factor: 50   UFR: 3500ml/hr   Blood Flow Rate: 200ml/min     Metrics   BP / HR: 113/59     92   Blood Flow Rate: 200ml/min   AP:                         -65   RP: 76   TMP: 28   PD: 23   FP: 133   UFR: 3500ml/hr     Comments / Plan:      Pt orders, notes labs code status and consent reviewed. CVVHD started as per MD order. New [id number] set up, primed and tested. Heparin infusing via CRRT filter at 1200units/hr. Rt femoral CVC. Drsg CDI w/ transparent drsg. No s/sx infection. Limbs/ hubs disinfected per hosp policy. Aspir/ flushes x 2. Lines and connections visible and secure w/ bloor warmer on return line at 37*C. Education pre/post w/ primary RN. Pt is COVID +. All approp PPE utilized. Restart time is 1045.

## 2020-04-02 NOTE — PROGRESS NOTES
[facility]   [address] [address], [facility] [address] [facility], [facility]  Phone: [phone]   [phone]       Nephrology Progress Note  Jael Price     1960     [id number]  Date of Admission : 3/31/2020  04/02/20    CC: Follow up for JUANCHO      Assessment and Plan   JUANCHO :  - suspected ATN. oligoanuric requiring CRRT  - femoral Ramon catheter   - resume CVVHD w/ heparin through filter   - check PTT  - Increase factor per d/w Intensivist       Acid Base/ Lytes :  - improved      COVID-19 +ve   Acute Hypoxic Resp Failure   - On Vent   - completed Plaquenil      Type II DM   - Insulin per primary team      Hypothermia   Morbid Obesity      Care Plan discussed with:  ICU team      Interval History:  Seen and examined '  CRRT clotted and restarted   CRRT lasting 6-8 hrs on avg  PTT pending   Hemodynamic stable     PT NOT EXAMINED in line with ASN and RPA GUIDELINES ON MANAGING COVID-19 PTS WITH RENAL ISSUES. Examination findings discussed personally with the examining Physician team member      Review of Systems: Review of systems not obtained due to patient factors.     Current Medications:   Current Facility-Administered Medications   Medication Dose Route Frequency    midazolam (VERSED) injection 1-2 mg  1-2 mg IntraVENous Q2H PRN    bacitracin 500 unit/gram packet 1 Packet  1 Packet Topical PRN    insulin glargine (LANTUS) injection 35 Units  35 Units SubCUTAneous DAILY    balsam peru-castor oiL (VENELEX) ointment   Topical BID    sodium chloride (NS) flush 5-40 mL  5-40 mL IntraVENous Q8H    sodium chloride (NS) flush 5-40 mL  5-40 mL IntraVENous PRN    HYDROcodone-acetaminophen (NORCO) 5-325 mg per tablet 1 Tab  1 Tab Oral Q4H PRN    HYDROmorphone (PF) (DILAUDID) injection 0.5 mg  0.5 mg IntraVENous Q4H PRN    ondansetron (ZOFRAN) injection 4 mg  4 mg IntraVENous Q4H PRN    NOREPINephrine (LEVOPHED) 8,000 mcg in dextrose 5% 250 mL infusion  2-30 mcg/min IntraVENous TITRATE    vasopressin (VASOSTRICT) 20 Units in 0.9% sodium chloride 100 mL infusion  0.04 Units/min IntraVENous CONTINUOUS    fentaNYL (PF) 1,500 mcg/30 mL (50 mcg/mL) infusion   mcg/hr IntraVENous TITRATE    propofol (DIPRIVAN) 10 mg/mL infusion  0-50 mcg/kg/min IntraVENous TITRATE    famotidine (PF) (PEPCID) 20 mg in 0.9% sodium chloride 10 mL injection  20 mg IntraVENous QHS    chlorhexidine (ORAL CARE KIT) 0.12 % mouthwash 15 mL  15 mL Oral Q12H    docusate (COLACE) 50 mg/5 mL oral liquid 100 mg  100 mg Per NG tube BID    acetaminophen (TYLENOL) tablet 650 mg  650 mg Oral Q6H PRN    Or    acetaminophen (TYLENOL) suppository 650 mg  650 mg Rectal Q6H PRN    heparin 25,000 units in D5W 250 ml infusion  1,200 Units/hr IntraVENous CONTINUOUS    bicarbonate dialysis (PRISMASOL) BG K 4/Ca 2.5 5000 ml solution   Extracorporeal DIALYSIS CONTINUOUS    glucose chewable tablet 16 g  4 Tab Oral PRN    glucagon (GLUCAGEN) injection 1 mg  1 mg IntraMUSCular PRN    dextrose 10% infusion 0-250 mL  0-250 mL IntraVENous PRN    insulin lispro (HUMALOG) injection   SubCUTAneous Q6H    Vancomycin - Pharmacy to Dose   Other Rx Dosing/Monitoring      Allergies   Allergen Reactions    Augmentin [Amoxicillin-Pot Clavulanate] Rash and Itching       Objective:  Vitals:    Vitals:    04/02/20 1000 04/02/20 1020 04/02/20 1039 04/02/20 1100   BP: 149/69  113/59    Pulse: 93 91 92 88   Resp: 18 24  26   Temp:       SpO2: 90% 91%  93%   Weight:       Height:         Intake and Output:  04/02 0701 - 04/02 1900  In: 724.9 [I.V.:334.9]  Out: 110 [Urine:10; Drains:100]  03/31 1901 - 04/02 0700  In: 7028.8 [I.V.:6218.8]  Out: 6009 [Drains:1000]    Physical Examination: inspection only   Pt intubated    Yes  General: Sedated on vent, obese   Neck:  Lines   Resp:  On vent   CV:  RRR  GI:  Obese   Neurologic:  Sedated   Psych:              Unable to assess  Ext                   R femoral melissa     []    High complexity decision making was performed  []    Patient is at high-risk of decompensation with multiple organ involvement    Lab Data Personally Reviewed: I have reviewed all the pertinent labs, microbiology data and radiology studies during assessment. Recent Labs     04/02/20 0414 04/02/20 0410 04/01/20 2221 04/01/20 1558 04/01/20  0730 04/01/20 0415 03/31/20  2130  03/31/20  0947     --  137 138  --  137 136   < > 136   K 3.7  --  3.7 3.6  --  4.2 5.2*   < > 6.0*     --  103 103  --  103 106   < > 108   CO2 28  --  27 26  --  23 23   < > 16*   *  --  143* 143*  --  158* 180*   < > 212*   BUN 23*  --  21* 22*  --  16 16   < > 15   CREA 2.91*  --  2.81* 3.30*  --  3.20* 3.78*   < > 4.26*   CA 7.7*  --  7.8* 7.7*  --  7.9* 7.7*   < > 7.9*   MG  --  2.6* 2.4 2.2  --  2.2 2.1   < >  --    PHOS 3.8  --  3.4 4.1  4.0 3.8  --  5.5*   < >  --    ALB 2.1*  --  2.0* 2.1*  --  2.2* 2.5*   < >  --    SGOT  --   --   --   --   --  69*  --   --   --    ALT  --   --   --   --   --  45  --   --   --    INR  --  1.1  --   --   --  1.2*  --   --  1.2*    < > = values in this interval not displayed.      Recent Labs     04/02/20 0410 04/01/20 1558 04/01/20 0415 03/31/20  1542 03/31/20  0947   WBC 10.5 13.6* 15.4* 27.4* 20.3*   HGB 9.3* 9.1* 9.4* 10.2* 10.5*   HCT 29.2* 29.0* 29.8* 32.8* 33.7*    245 252 334 369     No results found for: SDES  Lab Results   Component Value Date/Time    Culture result: NO GROWTH 2 DAYS 03/31/2020 11:15 AM    Culture result: MODERATE STAPHYLOCOCCUS AUREUS (A) 03/31/2020 10:34 AM    Culture result: LIGHT NORMAL RESPIRATORY SOFIE 03/31/2020 10:34 AM     Recent Results (from the past 24 hour(s))   POC EG7    Collection Time: 04/01/20 11:10 AM   Result Value Ref Range    Calcium, ionized (POC) 0.98 (L) 1.12 - 1.32 mmol/L    FIO2 (POC) 60 %    pH (POC) 7.355 7.35 - 7.45      pCO2 (POC) 40.6 35.0 - 45.0 MMHG    pO2 (POC) 86 80 - 100 MMHG    HCO3 (POC) 22.7 22 - 26 MMOL/L    Base deficit (POC) 3 mmol/L    sO2 (POC) 96 92 - 97 %    Site DRAWN FROM ARTERIAL LINE      Device: VENT      Mode ASSIST CONTROL      Set Rate 22 bpm    PEEP/CPAP (POC) 14 cmH2O    PIP (POC) 14      Allens test (POC) YES      Inspiratory Time 1.35 sec    Specimen type (POC) ARTERIAL     GLUCOSE, POC    Collection Time: 04/01/20 11:27 AM   Result Value Ref Range    Glucose (POC) 200 (H) 65 - 100 mg/dL    Performed by Destiny Del Rosario    TRIGLYCERIDE    Collection Time: 04/01/20  3:58 PM   Result Value Ref Range    Triglyceride 190 (H) <150 MG/DL   CBC W/O DIFF    Collection Time: 04/01/20  3:58 PM   Result Value Ref Range    WBC 13.6 (H) 3.6 - 11.0 K/uL    RBC 3.38 (L) 3.80 - 5.20 M/uL    HGB 9.1 (L) 11.5 - 16.0 g/dL    HCT 29.0 (L) 35.0 - 47.0 %    MCV 85.8 80.0 - 99.0 FL    MCH 26.9 26.0 - 34.0 PG    MCHC 31.4 30.0 - 36.5 g/dL    RDW 13.9 11.5 - 14.5 %    PLATELET 442 257 - 916 K/uL    MPV 9.8 8.9 - 12.9 FL    NRBC 0.0 0  WBC    ABSOLUTE NRBC 0.00 0.00 - 0.01 K/uL   RENAL FUNCTION PANEL    Collection Time: 04/01/20  3:58 PM   Result Value Ref Range    Sodium 138 136 - 145 mmol/L    Potassium 3.6 3.5 - 5.1 mmol/L    Chloride 103 97 - 108 mmol/L    CO2 26 21 - 32 mmol/L    Anion gap 9 5 - 15 mmol/L    Glucose 143 (H) 65 - 100 mg/dL    BUN 22 (H) 6 - 20 MG/DL    Creatinine 3.30 (H) 0.55 - 1.02 MG/DL    BUN/Creatinine ratio 7 (L) 12 - 20      GFR est AA 17 (L) >60 ml/min/1.73m2    GFR est non-AA 14 (L) >60 ml/min/1.73m2    Calcium 7.7 (L) 8.5 - 10.1 MG/DL    Phosphorus 4.0 2.6 - 4.7 MG/DL    Albumin 2.1 (L) 3.5 - 5.0 g/dL   PHOSPHORUS    Collection Time: 04/01/20  3:58 PM   Result Value Ref Range    Phosphorus 4.1 2.6 - 4.7 MG/DL   MAGNESIUM    Collection Time: 04/01/20  3:58 PM   Result Value Ref Range    Magnesium 2.2 1.6 - 2.4 mg/dL   GLUCOSE, POC    Collection Time: 04/01/20  6:27 PM   Result Value Ref Range    Glucose (POC) 133 (H) 65 - 100 mg/dL    Performed by Destiny Del Rosario    RENAL FUNCTION PANEL    Collection Time: 04/01/20 10:21 PM   Result Value Ref Range    Sodium 137 136 - 145 mmol/L    Potassium 3.7 3.5 - 5.1 mmol/L    Chloride 103 97 - 108 mmol/L    CO2 27 21 - 32 mmol/L    Anion gap 7 5 - 15 mmol/L    Glucose 143 (H) 65 - 100 mg/dL    BUN 21 (H) 6 - 20 MG/DL    Creatinine 2.81 (H) 0.55 - 1.02 MG/DL    BUN/Creatinine ratio 7 (L) 12 - 20      GFR est AA 21 (L) >60 ml/min/1.73m2    GFR est non-AA 17 (L) >60 ml/min/1.73m2    Calcium 7.8 (L) 8.5 - 10.1 MG/DL    Phosphorus 3.4 2.6 - 4.7 MG/DL    Albumin 2.0 (L) 3.5 - 5.0 g/dL   MAGNESIUM    Collection Time: 04/01/20 10:21 PM   Result Value Ref Range    Magnesium 2.4 1.6 - 2.4 mg/dL   GLUCOSE, POC    Collection Time: 04/02/20 12:56 AM   Result Value Ref Range    Glucose (POC) 137 (H) 65 - 100 mg/dL    Performed by Jay Hernandez    MAGNESIUM    Collection Time: 04/02/20  4:10 AM   Result Value Ref Range    Magnesium 2.6 (H) 1.6 - 2.4 mg/dL   CBC WITH AUTOMATED DIFF    Collection Time: 04/02/20  4:10 AM   Result Value Ref Range    WBC 10.5 3.6 - 11.0 K/uL    RBC 3.45 (L) 3.80 - 5.20 M/uL    HGB 9.3 (L) 11.5 - 16.0 g/dL    HCT 29.2 (L) 35.0 - 47.0 %    MCV 84.6 80.0 - 99.0 FL    MCH 27.0 26.0 - 34.0 PG    MCHC 31.8 30.0 - 36.5 g/dL    RDW 13.9 11.5 - 14.5 %    PLATELET 330 847 - 073 K/uL    MPV 9.8 8.9 - 12.9 FL    NRBC 0.0 0  WBC    ABSOLUTE NRBC 0.00 0.00 - 0.01 K/uL    NEUTROPHILS 86 (H) 32 - 75 %    BAND NEUTROPHILS 1 0 - 6 %    LYMPHOCYTES 7 (L) 12 - 49 %    MONOCYTES 4 (L) 5 - 13 %    EOSINOPHILS 2 0 - 7 %    BASOPHILS 0 0 - 1 %    IMMATURE GRANULOCYTES 0 %    ABS. NEUTROPHILS 9.2 (H) 1.8 - 8.0 K/UL    ABS. LYMPHOCYTES 0.7 (L) 0.8 - 3.5 K/UL    ABS. MONOCYTES 0.4 0.0 - 1.0 K/UL    ABS. EOSINOPHILS 0.2 0.0 - 0.4 K/UL    ABS. BASOPHILS 0.0 0.0 - 0.1 K/UL    ABS. IMM.  GRANS. 0.0 K/UL    DF MANUAL      RBC COMMENTS OVALOCYTES  PRESENT       PROTHROMBIN TIME + INR    Collection Time: 04/02/20  4:10 AM   Result Value Ref Range    INR 1.1 0.9 - 1.1      Prothrombin time 11.7 (H) 9.0 - 11.1 sec   RENAL FUNCTION PANEL    Collection Time: 04/02/20  4:14 AM   Result Value Ref Range    Sodium 137 136 - 145 mmol/L    Potassium 3.7 3.5 - 5.1 mmol/L    Chloride 102 97 - 108 mmol/L    CO2 28 21 - 32 mmol/L    Anion gap 7 5 - 15 mmol/L    Glucose 148 (H) 65 - 100 mg/dL    BUN 23 (H) 6 - 20 MG/DL    Creatinine 2.91 (H) 0.55 - 1.02 MG/DL    BUN/Creatinine ratio 8 (L) 12 - 20      GFR est AA 20 (L) >60 ml/min/1.73m2    GFR est non-AA 17 (L) >60 ml/min/1.73m2    Calcium 7.7 (L) 8.5 - 10.1 MG/DL    Phosphorus 3.8 2.6 - 4.7 MG/DL    Albumin 2.1 (L) 3.5 - 5.0 g/dL   GLUCOSE, POC    Collection Time: 04/02/20  6:26 AM   Result Value Ref Range    Glucose (POC) 149 (H) 65 - 100 mg/dL    Performed by Alfred Monroy    POC EG7    Collection Time: 04/02/20  7:39 AM   Result Value Ref Range    Calcium, ionized (POC) 1.01 (L) 1.12 - 1.32 mmol/L    FIO2 (POC) 80 %    pH (POC) 7.326 (L) 7.35 - 7.45      pCO2 (POC) 56.5 (H) 35.0 - 45.0 MMHG    pO2 (POC) 78 (L) 80 - 100 MMHG    HCO3 (POC) 29.5 (H) 22 - 26 MMOL/L    Base excess (POC) 4 mmol/L    sO2 (POC) 94 92 - 97 %    Site DRAWN FROM ARTERIAL LINE      Device: VENT      Mode ASSIST CONTROL      Set Rate 22 bpm    PEEP/CPAP (POC) 14 cmH2O    PIP (POC) 14      Allens test (POC) NO      Inspiratory Time 1.35 sec    Specimen type (POC) ARTERIAL     PTT    Collection Time: 04/02/20  8:49 AM   Result Value Ref Range    aPTT 29.9 22.1 - 32.0 sec    aPTT, therapeutic range     58.0 - 77.0 SECS           Total time spent with patient:  xxx   min. Care Plan discussed with:  Patient     Family      RN      Consulting Physician [address] [place] [address],         I have reviewed the flowsheets. Chart and Pertinent Notes have been reviewed. No change in PMH ,family and social history from Consult note.       Parker Ventura MD

## 2020-04-02 NOTE — DIABETES MGMT
MARTA ROMERO  CLINICAL NURSE SPECIALIST CONSULT  [facility]    Presentation   Yanira Patel is a 64 y.o. female admitted from OSH to [facility] ICU with SARS-COV2 needing CRRT. She is currently sedated and has been intubated since 3/28/20. Current clinical course has been complicated by steroid induced hyperglycemia. Steroids are discontinued at this time. She requires CRRT for acute renal failure r/t her sepsis and hypotension. PHM: GERD, obesity, and anxiety. New diabetes diagnosis with A1C 11.0% (3/28/2020); updated A1C 3/31/20-10.4%    Consulted by Provider for advanced diabetes nursing assessment and care, specifically related to   [] Transitioning off Mark Easter   [x] Inpatient management strategy  [] Home management assessment  [] Survival skill education    Diabetes-related medical history  Acute complications  hyperglycemia  Neurological complications  NONE  Microvascular disease  NONE  Macrovascular disease  NONE  Other associated conditions     NONE    Diabetes medication history: NONE    Subjective   Per chart review, Ms. Aurora Eastman remains very ill  and is on isolation forSARS-COV2 in ICU. I am unable to do a physical assessment of the patient at this time. Patient reports the following home diabetes self-care practices: deferred    Objective     Vital Signs   Visit Vitals  /69   Pulse 93   Temp 98.2 °F (36.8 °C)   Resp 18   Ht 5' 4\" (1.626 m)   Wt 136.1 kg (300 lb)   SpO2 90%   BMI 51.49 kg/m²   .    Laboratory  Lab Results   Component Value Date/Time    Hemoglobin A1c 10.4 (H) 03/31/2020 03:42 PM     No results found for: LDL, LDLC, DLDLP  Lab Results   Component Value Date/Time    Creatinine 2.91 (H) 04/02/2020 04:14 AM     Lab Results   Component Value Date/Time    Sodium 137 04/02/2020 04:14 AM    Potassium 3.7 04/02/2020 04:14 AM    Chloride 102 04/02/2020 04:14 AM    CO2 28 04/02/2020 04:14 AM    Anion gap 7 04/02/2020 04:14 AM    Glucose 148 (H) 04/02/2020 04:14 AM BUN 23 (H) 04/02/2020 04:14 AM    Creatinine 2.91 (H) 04/02/2020 04:14 AM    BUN/Creatinine ratio 8 (L) 04/02/2020 04:14 AM    GFR est AA 20 (L) 04/02/2020 04:14 AM    GFR est non-AA 17 (L) 04/02/2020 04:14 AM    Calcium 7.7 (L) 04/02/2020 04:14 AM    Bilirubin, total 1.7 (H) 04/01/2020 04:15 AM    AST (SGOT) 69 (H) 04/01/2020 04:15 AM    Alk. phosphatase 189 (H) 04/01/2020 04:15 AM    Protein, total 5.7 (L) 04/01/2020 04:15 AM    Albumin 2.1 (L) 04/02/2020 04:14 AM    Globulin 3.5 04/01/2020 04:15 AM    A-G Ratio 0.6 (L) 04/01/2020 04:15 AM    ALT (SGPT) 45 04/01/2020 04:15 AM     Lab Results   Component Value Date/Time    ALT (SGPT) 45 04/01/2020 04:15 AM           Evaluation   Ms Camryn Linn, with new onset Type 2 diabetes,with A1C 10.4%. Per chart review from OSH before she was transferred she had BG >200. She will require subcutaneous insulin to adequately manage her BG. BG ranges last 24hours 140-150mg/dl. BG is stable with no fluctuations after increase in basal insulin to 35units daily. TF (Nepro) at 10cc/hr. Inpatient blood glucose management has been impacted by    Recommendations   1. CONTINUE basal and correction insulin. 2. Will continue to follow.     Assessment and Plan   Nursing Diagnosis Risk for unstable blood glucose pattern   Nursing Intervention Domain 2843 Decision-making Support   Nursing Interventions Examined current inpatient diabetes control   Explored factors facilitating and impeding inpatient management  Identified self-management practices impeding diabetes control  Explored corrective strategies with patient and responsible inpatient provider   Informed patient of rational for insulin strategy while hospitalized     Referral   [] Behavioral health services  [] Inpatient nutrition services  [] Pharmacy services for medication management  [x] Diabetes Self-Management Training through [facility] (Phone [phone] to schedule appointment)    Billing Code(s) [x] [id number] IP subsequent hospital care - [address], [place]  Access via R Joel Roca [phone]

## 2020-04-02 NOTE — ROUTINE PROCESS
Bedside and Verbal shift change report given to Danae GUERRERO (oncoming nurse) by Ameya Mcintosh (offgoing nurse). Report included the following information SBAR, Kardex, ED Summary, Intake/Output, MAR and Recent Results. Bedside and Verbal shift change report given to Molly CODY (oncoming nurse) by Ora Hutton (offgoing nurse). Report included the following information SBAR, Kardex, ED Summary, Intake/Output, MAR, Recent Results and Med Rec Status.

## 2020-04-02 NOTE — PROGRESS NOTES
ID follow up note    D/W ICU RN Ms Saint Mechanic today and chart reviewed    Patient clotted off crrt and resuming today per RN, on 70% FIO2    Completed Azithromcyin and Hydroxychloroquine    IL 6 pending    Added LDH and Ferritin to labs    On vancomycin for S aureus in sputum , awaiting sensitivities    Please call with questions.     Allison Padgett,   9:25 AM

## 2020-04-02 NOTE — PROGRESS NOTES
0800 Bedside and Verbal shift change report given to Harlan Jean-Baptiste RN (oncoming nurse) by Shakila Gaming RN (offgoing nurse). Report included the following information SBAR, Kardex, ED Summary, Procedure Summary, Intake/Output, MAR, Accordion, Recent Results, Med Rec Status, Cardiac Rhythm NSR and Alarm Parameters . Assumed care of pt. Assessment complete. Primary Nurse Rush Favre, RN and Dorota Goss RN performed a dual skin assessment on this patient No impairment noted  Aniket score is 12  1100 This RN calling Dr. James Sigala to verify Factor setting for CVVHD. Dr. James Sigala wants the Factor set to 50, if pt's VSS in a few hours to change pt's Factor to 100.   1600 Bedside and Verbal shift change report given to JUDY Fink (oncoming nurse) by Sangeeta Cui RN (offgoing nurse). Report included the following information SBAR, Kardex, ED Summary, Procedure Summary, Intake/Output, MAR, Accordion, Recent Results, Med Rec Status, Cardiac Rhythm NSR and Alarm Parameters      Shift summery: Vent settings adjusted throughout shift, pt desats and takes a long time to recover with turns/repositioning. Pt currently on 100% and PEEP 18. CVVHD restarted this morning, heparin 1200 units running. Pt on 175mcg Fentanyl; 7mcg of Levophed and 0.04 units/min of Vasopressin. Pt anuric/minimal urine output. Flexi has put out 300 mL of green/brown loose/watery stool. Pt has frequent labs; Q6 PTT, renal and mg; and Q12 CBC. Pt's  called earlier in shift and was giving update by this RN.

## 2020-04-02 NOTE — DIALYSIS
[address] [facility]          [phone]           Orders   Mode: CVVH Restarted: 0210   Factor: 0   UFR: 3500ML/HR   Blood Flow Rate: 200ML/MIN            Metrics   BP / HR: 121/54   90   Blood Flow Rate: 200ML/MIN Right femoral line   AP:                         -65             RP: 73   TMP: 76   PD: 44   FP: 137   UFR: 3500ML/HR      Comments / Plan:   Filter changed secondary to being rinsed back for access not working well. All possible blood (165 ml) returned by primary RN. Consents, patient, code status, labs and orders verified. Old set discarded in red bio-hazard bag. Right femoral temporary, dressing CDI with bio-patch dated. New [id number] filter set-up, primed, tested and running well at this time. No signs of redness, drainage, or infection visualized. Each catheter limb disinfected for 60 seconds per limb with alcohol swabs. Caps removed, dialysis CVC hub scrubbed with Prevantics for 5 seconds, followed by a 5 second dry time per Hospital P&P. +asp/+flush x 2 ports. Lines visible and connections secure with blood warmer to return line at 37*C. HEPARIN TO FILTER AT 1000 UNITS/HR. Education, pre and post to RN.

## 2020-04-02 NOTE — PROGRESS NOTES
[facility]   [address] [facility], [address] [place] [address], [facility]  Phone: [phone]   [phone]      DELAYED NOTE. PT SEEN ON 4/1/20   Nephrology Progress Note  Cody Dean     1960     [id number]  Date of Admission : 3/31/2020  04/02/20    CC: Follow up for JUANCHO      Assessment and Plan   JUANCHO :  - suspected ATN. oligoanuric requiring CRRT  - femoral Ramon catheter    - CRRT with frequent clotting despite heparin   - resume CVVHD w/ heparin through filter   - labs Q12 hr        COVID-19 +ve   Acute Hypoxic Resp Failure   - On Vent   - completed Plaquenil      Type II DM   - Insulin per primary team      Hypothermia   Morbid Obesity      Care Plan discussed with:  ICU team      Interval History:  Seen and examined '  Frequent CRRT clotting   Femoral line placed last night       PT NOT EXAMINED in line with ASN and RPA GUIDELINES ON MANAGING COVID-19 PTS WITH RENAL ISSUES. Examination findings discussed personally with the examining Physician team member      Review of Systems: Review of systems not obtained due to patient factors.     Current Medications:   Current Facility-Administered Medications   Medication Dose Route Frequency    midazolam (VERSED) injection 1-2 mg  1-2 mg IntraVENous Q2H PRN    bacitracin 500 unit/gram packet 1 Packet  1 Packet Topical PRN    insulin glargine (LANTUS) injection 35 Units  35 Units SubCUTAneous DAILY    balsam peru-castor oiL (VENELEX) ointment   Topical BID    sodium chloride (NS) flush 5-40 mL  5-40 mL IntraVENous Q8H    sodium chloride (NS) flush 5-40 mL  5-40 mL IntraVENous PRN    HYDROcodone-acetaminophen (NORCO) 5-325 mg per tablet 1 Tab  1 Tab Oral Q4H PRN    HYDROmorphone (PF) (DILAUDID) injection 0.5 mg  0.5 mg IntraVENous Q4H PRN    ondansetron (ZOFRAN) injection 4 mg  4 mg IntraVENous Q4H PRN    NOREPINephrine (LEVOPHED) 8,000 mcg in dextrose 5% 250 mL infusion  2-30 mcg/min IntraVENous TITRATE    vasopressin (VASOSTRICT) 20 Units in 0.9% sodium chloride 100 mL infusion  0.04 Units/min IntraVENous CONTINUOUS    fentaNYL (PF) 1,500 mcg/30 mL (50 mcg/mL) infusion   mcg/hr IntraVENous TITRATE    propofol (DIPRIVAN) 10 mg/mL infusion  0-50 mcg/kg/min IntraVENous TITRATE    famotidine (PF) (PEPCID) 20 mg in 0.9% sodium chloride 10 mL injection  20 mg IntraVENous QHS    chlorhexidine (ORAL CARE KIT) 0.12 % mouthwash 15 mL  15 mL Oral Q12H    docusate (COLACE) 50 mg/5 mL oral liquid 100 mg  100 mg Per NG tube BID    acetaminophen (TYLENOL) tablet 650 mg  650 mg Oral Q6H PRN    Or    acetaminophen (TYLENOL) suppository 650 mg  650 mg Rectal Q6H PRN    heparin 25,000 units in D5W 250 ml infusion  1,200 Units/hr IntraVENous CONTINUOUS    bicarbonate dialysis (PRISMASOL) BG K 4/Ca 2.5 5000 ml solution   Extracorporeal DIALYSIS CONTINUOUS    glucose chewable tablet 16 g  4 Tab Oral PRN    glucagon (GLUCAGEN) injection 1 mg  1 mg IntraMUSCular PRN    dextrose 10% infusion 0-250 mL  0-250 mL IntraVENous PRN    insulin lispro (HUMALOG) injection   SubCUTAneous Q6H    Vancomycin - Pharmacy to Dose   Other Rx Dosing/Monitoring      Allergies   Allergen Reactions    Augmentin [Amoxicillin-Pot Clavulanate] Rash and Itching       Objective:  Vitals:    Vitals:    04/02/20 1000 04/02/20 1020 04/02/20 1039 04/02/20 1100   BP: 149/69  113/59    Pulse: 93 91 92 88   Resp: 18 24  26   Temp:       SpO2: 90% 91%  93%   Weight:       Height:         Intake and Output:  04/02 0701 - 04/02 1900  In: 724.9 [I.V.:334.9]  Out: 154 [Urine:10; Drains:100]  03/31 1901 - 04/02 0700  In: 7028.8 [I.V.:6218.8]  Out: 7593 [Drains:1000]    Physical Examination: inspection only   Pt intubated    Yes  General: Sedated on vent, obese   Neck:  Lines   Resp:  On vent   CV:  RRR  GI:  Obese   Neurologic:  Sedated   Psych:              Unable to assess  Ext                   R femoral melissa     []    High complexity decision making was performed  []    Patient is at high-risk of decompensation with multiple organ involvement    Lab Data Personally Reviewed: I have reviewed all the pertinent labs, microbiology data and radiology studies during assessment. Recent Labs     04/02/20 0414 04/02/20 0410 04/01/20 2221 04/01/20 1558 04/01/20  0730 04/01/20 0415 03/31/20  2130  03/31/20  0947     --  137 138  --  137 136   < > 136   K 3.7  --  3.7 3.6  --  4.2 5.2*   < > 6.0*     --  103 103  --  103 106   < > 108   CO2 28  --  27 26  --  23 23   < > 16*   *  --  143* 143*  --  158* 180*   < > 212*   BUN 23*  --  21* 22*  --  16 16   < > 15   CREA 2.91*  --  2.81* 3.30*  --  3.20* 3.78*   < > 4.26*   CA 7.7*  --  7.8* 7.7*  --  7.9* 7.7*   < > 7.9*   MG  --  2.6* 2.4 2.2  --  2.2 2.1   < >  --    PHOS 3.8  --  3.4 4.1  4.0 3.8  --  5.5*   < >  --    ALB 2.1*  --  2.0* 2.1*  --  2.2* 2.5*   < >  --    SGOT  --   --   --   --   --  69*  --   --   --    ALT  --   --   --   --   --  45  --   --   --    INR  --  1.1  --   --   --  1.2*  --   --  1.2*    < > = values in this interval not displayed.      Recent Labs     04/02/20 0410 04/01/20 1558 04/01/20 0415 03/31/20  1542 03/31/20  0947   WBC 10.5 13.6* 15.4* 27.4* 20.3*   HGB 9.3* 9.1* 9.4* 10.2* 10.5*   HCT 29.2* 29.0* 29.8* 32.8* 33.7*    245 252 334 369     No results found for: SDES  Lab Results   Component Value Date/Time    Culture result: NO GROWTH 2 DAYS 03/31/2020 11:15 AM    Culture result: MODERATE STAPHYLOCOCCUS AUREUS (A) 03/31/2020 10:34 AM    Culture result: LIGHT NORMAL RESPIRATORY SOFIE 03/31/2020 10:34 AM     Recent Results (from the past 24 hour(s))   POC EG7    Collection Time: 04/01/20 11:10 AM   Result Value Ref Range    Calcium, ionized (POC) 0.98 (L) 1.12 - 1.32 mmol/L    FIO2 (POC) 60 %    pH (POC) 7.355 7.35 - 7.45      pCO2 (POC) 40.6 35.0 - 45.0 MMHG    pO2 (POC) 86 80 - 100 MMHG    HCO3 (POC) 22.7 22 - 26 MMOL/L    Base deficit (POC) 3 mmol/L    sO2 (POC) 96 92 - 97 % Site DRAWN FROM ARTERIAL LINE      Device: VENT      Mode ASSIST CONTROL      Set Rate 22 bpm    PEEP/CPAP (POC) 14 cmH2O    PIP (POC) 14      Allens test (POC) YES      Inspiratory Time 1.35 sec    Specimen type (POC) ARTERIAL     GLUCOSE, POC    Collection Time: 04/01/20 11:27 AM   Result Value Ref Range    Glucose (POC) 200 (H) 65 - 100 mg/dL    Performed by Jacklyn Cha    TRIGLYCERIDE    Collection Time: 04/01/20  3:58 PM   Result Value Ref Range    Triglyceride 190 (H) <150 MG/DL   CBC W/O DIFF    Collection Time: 04/01/20  3:58 PM   Result Value Ref Range    WBC 13.6 (H) 3.6 - 11.0 K/uL    RBC 3.38 (L) 3.80 - 5.20 M/uL    HGB 9.1 (L) 11.5 - 16.0 g/dL    HCT 29.0 (L) 35.0 - 47.0 %    MCV 85.8 80.0 - 99.0 FL    MCH 26.9 26.0 - 34.0 PG    MCHC 31.4 30.0 - 36.5 g/dL    RDW 13.9 11.5 - 14.5 %    PLATELET 221 751 - 245 K/uL    MPV 9.8 8.9 - 12.9 FL    NRBC 0.0 0  WBC    ABSOLUTE NRBC 0.00 0.00 - 0.01 K/uL   RENAL FUNCTION PANEL    Collection Time: 04/01/20  3:58 PM   Result Value Ref Range    Sodium 138 136 - 145 mmol/L    Potassium 3.6 3.5 - 5.1 mmol/L    Chloride 103 97 - 108 mmol/L    CO2 26 21 - 32 mmol/L    Anion gap 9 5 - 15 mmol/L    Glucose 143 (H) 65 - 100 mg/dL    BUN 22 (H) 6 - 20 MG/DL    Creatinine 3.30 (H) 0.55 - 1.02 MG/DL    BUN/Creatinine ratio 7 (L) 12 - 20      GFR est AA 17 (L) >60 ml/min/1.73m2    GFR est non-AA 14 (L) >60 ml/min/1.73m2    Calcium 7.7 (L) 8.5 - 10.1 MG/DL    Phosphorus 4.0 2.6 - 4.7 MG/DL    Albumin 2.1 (L) 3.5 - 5.0 g/dL   PHOSPHORUS    Collection Time: 04/01/20  3:58 PM   Result Value Ref Range    Phosphorus 4.1 2.6 - 4.7 MG/DL   MAGNESIUM    Collection Time: 04/01/20  3:58 PM   Result Value Ref Range    Magnesium 2.2 1.6 - 2.4 mg/dL   GLUCOSE, POC    Collection Time: 04/01/20  6:27 PM   Result Value Ref Range    Glucose (POC) 133 (H) 65 - 100 mg/dL    Performed by Jacklyn Cha    RENAL FUNCTION PANEL    Collection Time: 04/01/20 10:21 PM   Result Value Ref Range    Sodium 137 136 - 145 mmol/L    Potassium 3.7 3.5 - 5.1 mmol/L    Chloride 103 97 - 108 mmol/L    CO2 27 21 - 32 mmol/L    Anion gap 7 5 - 15 mmol/L    Glucose 143 (H) 65 - 100 mg/dL    BUN 21 (H) 6 - 20 MG/DL    Creatinine 2.81 (H) 0.55 - 1.02 MG/DL    BUN/Creatinine ratio 7 (L) 12 - 20      GFR est AA 21 (L) >60 ml/min/1.73m2    GFR est non-AA 17 (L) >60 ml/min/1.73m2    Calcium 7.8 (L) 8.5 - 10.1 MG/DL    Phosphorus 3.4 2.6 - 4.7 MG/DL    Albumin 2.0 (L) 3.5 - 5.0 g/dL   MAGNESIUM    Collection Time: 04/01/20 10:21 PM   Result Value Ref Range    Magnesium 2.4 1.6 - 2.4 mg/dL   GLUCOSE, POC    Collection Time: 04/02/20 12:56 AM   Result Value Ref Range    Glucose (POC) 137 (H) 65 - 100 mg/dL    Performed by iPng Adams    MAGNESIUM    Collection Time: 04/02/20  4:10 AM   Result Value Ref Range    Magnesium 2.6 (H) 1.6 - 2.4 mg/dL   CBC WITH AUTOMATED DIFF    Collection Time: 04/02/20  4:10 AM   Result Value Ref Range    WBC 10.5 3.6 - 11.0 K/uL    RBC 3.45 (L) 3.80 - 5.20 M/uL    HGB 9.3 (L) 11.5 - 16.0 g/dL    HCT 29.2 (L) 35.0 - 47.0 %    MCV 84.6 80.0 - 99.0 FL    MCH 27.0 26.0 - 34.0 PG    MCHC 31.8 30.0 - 36.5 g/dL    RDW 13.9 11.5 - 14.5 %    PLATELET 005 708 - 929 K/uL    MPV 9.8 8.9 - 12.9 FL    NRBC 0.0 0  WBC    ABSOLUTE NRBC 0.00 0.00 - 0.01 K/uL    NEUTROPHILS 86 (H) 32 - 75 %    BAND NEUTROPHILS 1 0 - 6 %    LYMPHOCYTES 7 (L) 12 - 49 %    MONOCYTES 4 (L) 5 - 13 %    EOSINOPHILS 2 0 - 7 %    BASOPHILS 0 0 - 1 %    IMMATURE GRANULOCYTES 0 %    ABS. NEUTROPHILS 9.2 (H) 1.8 - 8.0 K/UL    ABS. LYMPHOCYTES 0.7 (L) 0.8 - 3.5 K/UL    ABS. MONOCYTES 0.4 0.0 - 1.0 K/UL    ABS. EOSINOPHILS 0.2 0.0 - 0.4 K/UL    ABS. BASOPHILS 0.0 0.0 - 0.1 K/UL    ABS. IMM.  GRANS. 0.0 K/UL    DF MANUAL      RBC COMMENTS OVALOCYTES  PRESENT       PROTHROMBIN TIME + INR    Collection Time: 04/02/20  4:10 AM   Result Value Ref Range    INR 1.1 0.9 - 1.1      Prothrombin time 11.7 (H) 9.0 - 11.1 sec   RENAL FUNCTION PANEL    Collection Time: 04/02/20  4:14 AM   Result Value Ref Range    Sodium 137 136 - 145 mmol/L    Potassium 3.7 3.5 - 5.1 mmol/L    Chloride 102 97 - 108 mmol/L    CO2 28 21 - 32 mmol/L    Anion gap 7 5 - 15 mmol/L    Glucose 148 (H) 65 - 100 mg/dL    BUN 23 (H) 6 - 20 MG/DL    Creatinine 2.91 (H) 0.55 - 1.02 MG/DL    BUN/Creatinine ratio 8 (L) 12 - 20      GFR est AA 20 (L) >60 ml/min/1.73m2    GFR est non-AA 17 (L) >60 ml/min/1.73m2    Calcium 7.7 (L) 8.5 - 10.1 MG/DL    Phosphorus 3.8 2.6 - 4.7 MG/DL    Albumin 2.1 (L) 3.5 - 5.0 g/dL   GLUCOSE, POC    Collection Time: 04/02/20  6:26 AM   Result Value Ref Range    Glucose (POC) 149 (H) 65 - 100 mg/dL    Performed by Elizabeth Colin    POC EG7    Collection Time: 04/02/20  7:39 AM   Result Value Ref Range    Calcium, ionized (POC) 1.01 (L) 1.12 - 1.32 mmol/L    FIO2 (POC) 80 %    pH (POC) 7.326 (L) 7.35 - 7.45      pCO2 (POC) 56.5 (H) 35.0 - 45.0 MMHG    pO2 (POC) 78 (L) 80 - 100 MMHG    HCO3 (POC) 29.5 (H) 22 - 26 MMOL/L    Base excess (POC) 4 mmol/L    sO2 (POC) 94 92 - 97 %    Site DRAWN FROM ARTERIAL LINE      Device: VENT      Mode ASSIST CONTROL      Set Rate 22 bpm    PEEP/CPAP (POC) 14 cmH2O    PIP (POC) 14      Allens test (POC) NO      Inspiratory Time 1.35 sec    Specimen type (POC) ARTERIAL     PTT    Collection Time: 04/02/20  8:49 AM   Result Value Ref Range    aPTT 29.9 22.1 - 32.0 sec    aPTT, therapeutic range     58.0 - 77.0 SECS           Total time spent with patient:  xxx   min. Care Plan discussed with:  Patient     Family      RN      Consulting Physician [facility],         I have reviewed the flowsheets. Chart and Pertinent Notes have been reviewed. No change in PMH ,family and social history from Consult note.       Robinson Sher MD

## 2020-04-02 NOTE — PROGRESS NOTES
1630:  While in assessing pt, Pt's vent started alarming and sats started dropping quickly to 70's. Sx pt via inline suction and obtained a large amount of thick, tan secretions. After sx x2, pt's sats came back up fairly quickly to 98%. [facility] labs due at 6p per orders    [phone]:  Called labs to Dr. Catalina Kingston (Nephrologist on call)-changed orders for labs to be done q2hrs (Mg/Phos/Renal panel)-no changes to current dialysis soluntion. 1920:  Dialysis machine alarming positive pressure. Heparin gtt also alarming occlusion. Disconnect pt from machine. Flushed lines. 2015: After hand off report to Molly, called [facility] to let them know pt needed to be put back on CVVHD. Return called received.

## 2020-04-03 ENCOUNTER — APPOINTMENT (OUTPATIENT)
Dept: GENERAL RADIOLOGY | Age: 60
DRG: 870 | End: 2020-04-03
Attending: SURGERY
Payer: COMMERCIAL

## 2020-04-03 LAB
ALBUMIN SERPL-MCNC: 1.8 G/DL (ref 3.5–5)
ALBUMIN SERPL-MCNC: 1.8 G/DL (ref 3.5–5)
ANION GAP SERPL CALC-SCNC: 7 MMOL/L (ref 5–15)
ANION GAP SERPL CALC-SCNC: 8 MMOL/L (ref 5–15)
APTT PPP: 32.7 SEC (ref 22.1–32)
APTT PPP: 33.6 SEC (ref 22.1–32)
APTT PPP: 34.7 SEC (ref 22.1–32)
APTT PPP: 37.3 SEC (ref 22.1–32)
ARTERIAL PATENCY WRIST A: ABNORMAL
BASE EXCESS BLD CALC-SCNC: 0 MMOL/L
BASOPHILS # BLD: 0.1 K/UL (ref 0–0.1)
BASOPHILS # BLD: 0.1 K/UL (ref 0–0.1)
BASOPHILS NFR BLD: 1 % (ref 0–1)
BASOPHILS NFR BLD: 1 % (ref 0–1)
BDY SITE: ABNORMAL
BUN SERPL-MCNC: 31 MG/DL (ref 6–20)
BUN SERPL-MCNC: 35 MG/DL (ref 6–20)
BUN/CREAT SERPL: 11 (ref 12–20)
BUN/CREAT SERPL: 13 (ref 12–20)
CA-I BLD-SCNC: 1.12 MMOL/L (ref 1.12–1.32)
CALCIUM SERPL-MCNC: 8.6 MG/DL (ref 8.5–10.1)
CALCIUM SERPL-MCNC: 8.8 MG/DL (ref 8.5–10.1)
CHLORIDE SERPL-SCNC: 102 MMOL/L (ref 97–108)
CHLORIDE SERPL-SCNC: 102 MMOL/L (ref 97–108)
CK SERPL-CCNC: 42 U/L (ref 26–192)
CO2 SERPL-SCNC: 24 MMOL/L (ref 21–32)
CO2 SERPL-SCNC: 26 MMOL/L (ref 21–32)
CREAT SERPL-MCNC: 2.6 MG/DL (ref 0.55–1.02)
CREAT SERPL-MCNC: 2.78 MG/DL (ref 0.55–1.02)
D DIMER PPP FEU-MCNC: >35.2 MG/L FEU (ref 0–0.65)
DIFFERENTIAL METHOD BLD: ABNORMAL
DIFFERENTIAL METHOD BLD: ABNORMAL
EOSINOPHIL # BLD: 0 K/UL (ref 0–0.4)
EOSINOPHIL # BLD: 0.2 K/UL (ref 0–0.4)
EOSINOPHIL NFR BLD: 0 % (ref 0–7)
EOSINOPHIL NFR BLD: 2 % (ref 0–7)
ERYTHROCYTE [DISTWIDTH] IN BLOOD BY AUTOMATED COUNT: 14.2 % (ref 11.5–14.5)
ERYTHROCYTE [DISTWIDTH] IN BLOOD BY AUTOMATED COUNT: 14.3 % (ref 11.5–14.5)
FIBRINOGEN PPP-MCNC: 640 MG/DL (ref 200–475)
GAS FLOW.O2 O2 DELIVERY SYS: ABNORMAL L/MIN
GAS FLOW.O2 SETTING OXYMISER: 22 BPM
GLUCOSE BLD STRIP.AUTO-MCNC: 114 MG/DL (ref 65–100)
GLUCOSE BLD STRIP.AUTO-MCNC: 136 MG/DL (ref 65–100)
GLUCOSE BLD STRIP.AUTO-MCNC: 136 MG/DL (ref 65–100)
GLUCOSE SERPL-MCNC: 125 MG/DL (ref 65–100)
GLUCOSE SERPL-MCNC: 147 MG/DL (ref 65–100)
HCO3 BLD-SCNC: 26.2 MMOL/L (ref 22–26)
HCT VFR BLD AUTO: 28.2 % (ref 35–47)
HCT VFR BLD AUTO: 28.9 % (ref 35–47)
HGB BLD-MCNC: 8.8 G/DL (ref 11.5–16)
HGB BLD-MCNC: 9 G/DL (ref 11.5–16)
IMM GRANULOCYTES # BLD AUTO: 0 K/UL
IMM GRANULOCYTES # BLD AUTO: 0 K/UL
IMM GRANULOCYTES NFR BLD AUTO: 0 %
IMM GRANULOCYTES NFR BLD AUTO: 0 %
INR PPP: 1.1 (ref 0.9–1.1)
LDH SERPL L TO P-CCNC: 415 U/L (ref 81–246)
LYMPHOCYTES # BLD: 0.8 K/UL (ref 0.8–3.5)
LYMPHOCYTES # BLD: 0.8 K/UL (ref 0.8–3.5)
LYMPHOCYTES NFR BLD: 9 % (ref 12–49)
LYMPHOCYTES NFR BLD: 9 % (ref 12–49)
MAGNESIUM SERPL-MCNC: 2.8 MG/DL (ref 1.6–2.4)
MAGNESIUM SERPL-MCNC: 2.8 MG/DL (ref 1.6–2.4)
MCH RBC QN AUTO: 27.1 PG (ref 26–34)
MCH RBC QN AUTO: 27.2 PG (ref 26–34)
MCHC RBC AUTO-ENTMCNC: 31.1 G/DL (ref 30–36.5)
MCHC RBC AUTO-ENTMCNC: 31.2 G/DL (ref 30–36.5)
MCV RBC AUTO: 87 FL (ref 80–99)
MCV RBC AUTO: 87 FL (ref 80–99)
METAMYELOCYTES NFR BLD MANUAL: 1 %
MONOCYTES # BLD: 0.1 K/UL (ref 0–1)
MONOCYTES # BLD: 0.6 K/UL (ref 0–1)
MONOCYTES NFR BLD: 1 % (ref 5–13)
MONOCYTES NFR BLD: 6 % (ref 5–13)
MYELOCYTES NFR BLD MANUAL: 1 %
NEUTS BAND NFR BLD MANUAL: 2 % (ref 0–6)
NEUTS BAND NFR BLD MANUAL: 5 % (ref 0–6)
NEUTS SEG # BLD: 7.5 K/UL (ref 1.8–8)
NEUTS SEG # BLD: 8 K/UL (ref 1.8–8)
NEUTS SEG NFR BLD: 76 % (ref 32–75)
NEUTS SEG NFR BLD: 86 % (ref 32–75)
NRBC # BLD: 0 K/UL (ref 0–0.01)
NRBC # BLD: 0.02 K/UL (ref 0–0.01)
NRBC BLD-RTO: 0 PER 100 WBC
NRBC BLD-RTO: 0.2 PER 100 WBC
O2/TOTAL GAS SETTING VFR VENT: 100 %
PCO2 BLD: 48.8 MMHG (ref 35–45)
PEEP RESPIRATORY: 18 CMH2O
PH BLD: 7.34 [PH] (ref 7.35–7.45)
PHOSPHATE SERPL-MCNC: 3.4 MG/DL (ref 2.6–4.7)
PHOSPHATE SERPL-MCNC: 3.8 MG/DL (ref 2.6–4.7)
PIP ISTAT,IPIP: 14
PLATELET # BLD AUTO: 136 K/UL (ref 150–400)
PLATELET # BLD AUTO: 161 K/UL (ref 150–400)
PMV BLD AUTO: 10 FL (ref 8.9–12.9)
PMV BLD AUTO: 10.1 FL (ref 8.9–12.9)
PO2 BLD: 212 MMHG (ref 80–100)
POTASSIUM SERPL-SCNC: 3.8 MMOL/L (ref 3.5–5.1)
POTASSIUM SERPL-SCNC: 3.9 MMOL/L (ref 3.5–5.1)
PRESSURE CONTROL, IPC: YES
PROTHROMBIN TIME: 11.1 SEC (ref 9–11.1)
RBC # BLD AUTO: 3.24 M/UL (ref 3.8–5.2)
RBC # BLD AUTO: 3.32 M/UL (ref 3.8–5.2)
RBC MORPH BLD: ABNORMAL
SAO2 % BLD: 100 % (ref 92–97)
SERVICE CMNT-IMP: ABNORMAL
SODIUM SERPL-SCNC: 134 MMOL/L (ref 136–145)
SODIUM SERPL-SCNC: 135 MMOL/L (ref 136–145)
SPECIMEN TYPE: ABNORMAL
THERAPEUTIC RANGE,PTTT: ABNORMAL SECS (ref 58–77)
VENTILATION MODE VENT: ABNORMAL
WBC # BLD AUTO: 9.1 K/UL (ref 3.6–11)
WBC # BLD AUTO: 9.2 K/UL (ref 3.6–11)

## 2020-04-03 PROCEDURE — 80069 RENAL FUNCTION PANEL: CPT

## 2020-04-03 PROCEDURE — 83735 ASSAY OF MAGNESIUM: CPT

## 2020-04-03 PROCEDURE — 82550 ASSAY OF CK (CPK): CPT

## 2020-04-03 PROCEDURE — 85610 PROTHROMBIN TIME: CPT

## 2020-04-03 PROCEDURE — 74011000258 HC RX REV CODE- 258: Performed by: INTERNAL MEDICINE

## 2020-04-03 PROCEDURE — 36415 COLL VENOUS BLD VENIPUNCTURE: CPT

## 2020-04-03 PROCEDURE — 77030018798 HC PMP KT ENTRL FED COVD -A

## 2020-04-03 PROCEDURE — 85384 FIBRINOGEN ACTIVITY: CPT

## 2020-04-03 PROCEDURE — 74011000250 HC RX REV CODE- 250: Performed by: INTERNAL MEDICINE

## 2020-04-03 PROCEDURE — 90945 DIALYSIS ONE EVALUATION: CPT

## 2020-04-03 PROCEDURE — 71045 X-RAY EXAM CHEST 1 VIEW: CPT

## 2020-04-03 PROCEDURE — 85730 THROMBOPLASTIN TIME PARTIAL: CPT

## 2020-04-03 PROCEDURE — 82962 GLUCOSE BLOOD TEST: CPT

## 2020-04-03 PROCEDURE — 74011250636 HC RX REV CODE- 250/636: Performed by: INTERNAL MEDICINE

## 2020-04-03 PROCEDURE — 94003 VENT MGMT INPAT SUBQ DAY: CPT

## 2020-04-03 PROCEDURE — 85025 COMPLETE CBC W/AUTO DIFF WBC: CPT

## 2020-04-03 PROCEDURE — 82803 BLOOD GASES ANY COMBINATION: CPT

## 2020-04-03 PROCEDURE — 85379 FIBRIN DEGRADATION QUANT: CPT

## 2020-04-03 PROCEDURE — 65610000006 HC RM INTENSIVE CARE

## 2020-04-03 PROCEDURE — 74011636637 HC RX REV CODE- 636/637: Performed by: INTERNAL MEDICINE

## 2020-04-03 PROCEDURE — 83615 LACTATE (LD) (LDH) ENZYME: CPT

## 2020-04-03 PROCEDURE — 36593 DECLOT VASCULAR DEVICE: CPT

## 2020-04-03 PROCEDURE — 74011250637 HC RX REV CODE- 250/637: Performed by: INTERNAL MEDICINE

## 2020-04-03 PROCEDURE — 74011250637 HC RX REV CODE- 250/637: Performed by: NURSE PRACTITIONER

## 2020-04-03 PROCEDURE — 85362 FIBRIN DEGRADATION PRODUCTS: CPT

## 2020-04-03 PROCEDURE — 36592 COLLECT BLOOD FROM PICC: CPT

## 2020-04-03 RX ORDER — VANCOMYCIN/0.9 % SOD CHLORIDE 1.5G/250ML
1500 PLASTIC BAG, INJECTION (ML) INTRAVENOUS ONCE
Status: COMPLETED | OUTPATIENT
Start: 2020-04-03 | End: 2020-04-04

## 2020-04-03 RX ORDER — GUAIFENESIN 100 MG/5ML
100 SOLUTION ORAL EVERY 6 HOURS
Status: DISCONTINUED | OUTPATIENT
Start: 2020-04-03 | End: 2020-04-18

## 2020-04-03 RX ORDER — HEPARIN SODIUM 5000 [USP'U]/ML
4000 INJECTION, SOLUTION INTRAVENOUS; SUBCUTANEOUS ONCE
Status: COMPLETED | OUTPATIENT
Start: 2020-04-03 | End: 2020-04-03

## 2020-04-03 RX ORDER — ZINC SULFATE 50(220)MG
1 CAPSULE ORAL
Status: COMPLETED | OUTPATIENT
Start: 2020-04-03 | End: 2020-04-07

## 2020-04-03 RX ORDER — HEPARIN SODIUM 10000 [USP'U]/100ML
7-25 INJECTION, SOLUTION INTRAVENOUS
Status: DISCONTINUED | OUTPATIENT
Start: 2020-04-03 | End: 2020-04-10

## 2020-04-03 RX ORDER — ASCORBIC ACID 500 MG
500 TABLET ORAL EVERY 12 HOURS
Status: COMPLETED | OUTPATIENT
Start: 2020-04-03 | End: 2020-04-08

## 2020-04-03 RX ORDER — HEPARIN 100 UNIT/ML
300 SYRINGE INTRAVENOUS AS NEEDED
Status: DISCONTINUED | OUTPATIENT
Start: 2020-04-03 | End: 2020-04-30

## 2020-04-03 RX ORDER — ZINC SULFATE 50(220)MG
1 CAPSULE ORAL EVERY 12 HOURS
Status: DISCONTINUED | OUTPATIENT
Start: 2020-04-03 | End: 2020-04-03

## 2020-04-03 RX ADMIN — SODIUM CHLORIDE 10 ML: 9 INJECTION INTRAMUSCULAR; INTRAVENOUS; SUBCUTANEOUS at 13:49

## 2020-04-03 RX ADMIN — GUAIFENESIN 100 MG: 100 SOLUTION ORAL at 23:02

## 2020-04-03 RX ADMIN — VASOPRESSIN 0.04 UNITS/MIN: 20 INJECTION INTRAVENOUS at 13:31

## 2020-04-03 RX ADMIN — Medication 175 MCG/HR: at 13:30

## 2020-04-03 RX ADMIN — GUAIFENESIN 100 MG: 100 SOLUTION ORAL at 05:05

## 2020-04-03 RX ADMIN — HEPARIN SODIUM 4000 UNITS: 5000 INJECTION INTRAVENOUS; SUBCUTANEOUS at 15:50

## 2020-04-03 RX ADMIN — WATER 2 MG: 1 INJECTION INTRAMUSCULAR; INTRAVENOUS; SUBCUTANEOUS at 06:17

## 2020-04-03 RX ADMIN — CHLORHEXIDINE GLUCONATE 15 ML: 0.12 RINSE ORAL at 20:04

## 2020-04-03 RX ADMIN — CASTOR OIL AND BALSAM, PERU: 788; 87 OINTMENT TOPICAL at 18:13

## 2020-04-03 RX ADMIN — CASTOR OIL AND BALSAM, PERU: 788; 87 OINTMENT TOPICAL at 08:30

## 2020-04-03 RX ADMIN — Medication 175 MCG/HR: at 22:48

## 2020-04-03 RX ADMIN — HEPARIN SODIUM 4000 UNITS: 5000 INJECTION INTRAVENOUS; SUBCUTANEOUS at 21:39

## 2020-04-03 RX ADMIN — INSULIN GLARGINE 35 UNITS: 100 INJECTION, SOLUTION SUBCUTANEOUS at 08:30

## 2020-04-03 RX ADMIN — GUAIFENESIN 100 MG: 100 SOLUTION ORAL at 18:13

## 2020-04-03 RX ADMIN — CALCIUM CHLORIDE, MAGNESIUM CHLORIDE, DEXTROSE MONOHYDRATE, LACTIC ACID, SODIUM CHLORIDE, SODIUM BICARBONATE AND POTASSIUM CHLORIDE 3500 ML/HR: 3.68; 3.05; 22; 5.4; 6.46; 3.09; .314 INJECTION INTRAVENOUS at 11:01

## 2020-04-03 RX ADMIN — VASOPRESSIN 0.04 UNITS/MIN: 20 INJECTION INTRAVENOUS at 05:16

## 2020-04-03 RX ADMIN — WATER 2 MG: 1 INJECTION INTRAMUSCULAR; INTRAVENOUS; SUBCUTANEOUS at 06:14

## 2020-04-03 RX ADMIN — ZINC SULFATE 220 MG (50 MG) CAPSULE 1 CAPSULE: CAPSULE at 20:04

## 2020-04-03 RX ADMIN — GUAIFENESIN 100 MG: 100 SOLUTION ORAL at 12:16

## 2020-04-03 RX ADMIN — CALCIUM CHLORIDE, MAGNESIUM CHLORIDE, DEXTROSE MONOHYDRATE, LACTIC ACID, SODIUM CHLORIDE, SODIUM BICARBONATE AND POTASSIUM CHLORIDE 3500 ML/HR: 3.68; 3.05; 22; 5.4; 6.46; 3.09; .314 INJECTION INTRAVENOUS at 15:27

## 2020-04-03 RX ADMIN — DOCUSATE SODIUM 100 MG: 50 LIQUID ORAL at 18:13

## 2020-04-03 RX ADMIN — SODIUM CHLORIDE 10 ML: 9 INJECTION INTRAMUSCULAR; INTRAVENOUS; SUBCUTANEOUS at 21:39

## 2020-04-03 RX ADMIN — CHLORHEXIDINE GLUCONATE 15 ML: 0.12 RINSE ORAL at 08:30

## 2020-04-03 RX ADMIN — CALCIUM CHLORIDE, MAGNESIUM CHLORIDE, DEXTROSE MONOHYDRATE, LACTIC ACID, SODIUM CHLORIDE, SODIUM BICARBONATE AND POTASSIUM CHLORIDE 3500 ML/HR: 3.68; 3.05; 22; 5.4; 6.46; 3.09; .314 INJECTION INTRAVENOUS at 12:27

## 2020-04-03 RX ADMIN — VASOPRESSIN 0.04 UNITS/MIN: 20 INJECTION INTRAVENOUS at 21:44

## 2020-04-03 RX ADMIN — HEPARIN SODIUM 7 UNITS/KG/HR: 10000 INJECTION, SOLUTION INTRAVENOUS at 15:00

## 2020-04-03 RX ADMIN — Medication 175 MCG/HR: at 04:30

## 2020-04-03 RX ADMIN — DOCUSATE SODIUM 100 MG: 50 LIQUID ORAL at 08:30

## 2020-04-03 RX ADMIN — CALCIUM CHLORIDE, MAGNESIUM CHLORIDE, DEXTROSE MONOHYDRATE, LACTIC ACID, SODIUM CHLORIDE, SODIUM BICARBONATE AND POTASSIUM CHLORIDE 3500 ML/HR: 3.68; 3.05; 22; 5.4; 6.46; 3.09; .314 INJECTION INTRAVENOUS at 16:45

## 2020-04-03 RX ADMIN — TOCILIZUMAB 400 MG: 20 INJECTION, SOLUTION, CONCENTRATE INTRAVENOUS at 15:54

## 2020-04-03 RX ADMIN — CALCIUM CHLORIDE, MAGNESIUM CHLORIDE, DEXTROSE MONOHYDRATE, LACTIC ACID, SODIUM CHLORIDE, SODIUM BICARBONATE AND POTASSIUM CHLORIDE 3500 ML/HR: 3.68; 3.05; 22; 5.4; 6.46; 3.09; .314 INJECTION INTRAVENOUS at 21:29

## 2020-04-03 RX ADMIN — OXYCODONE HYDROCHLORIDE AND ACETAMINOPHEN 500 MG: 500 TABLET ORAL at 18:13

## 2020-04-03 RX ADMIN — CALCIUM CHLORIDE, MAGNESIUM CHLORIDE, DEXTROSE MONOHYDRATE, LACTIC ACID, SODIUM CHLORIDE, SODIUM BICARBONATE AND POTASSIUM CHLORIDE 3500 ML/HR: 3.68; 3.05; 22; 5.4; 6.46; 3.09; .314 INJECTION INTRAVENOUS at 20:04

## 2020-04-03 RX ADMIN — VANCOMYCIN HYDROCHLORIDE 1500 MG: 10 INJECTION, POWDER, LYOPHILIZED, FOR SOLUTION INTRAVENOUS at 18:18

## 2020-04-03 RX ADMIN — SODIUM CHLORIDE 10 ML: 9 INJECTION INTRAMUSCULAR; INTRAVENOUS; SUBCUTANEOUS at 05:05

## 2020-04-03 RX ADMIN — CALCIUM CHLORIDE, MAGNESIUM CHLORIDE, DEXTROSE MONOHYDRATE, LACTIC ACID, SODIUM CHLORIDE, SODIUM BICARBONATE AND POTASSIUM CHLORIDE 3500 ML/HR: 3.68; 3.05; 22; 5.4; 6.46; 3.09; .314 INJECTION INTRAVENOUS at 14:01

## 2020-04-03 RX ADMIN — CALCIUM CHLORIDE, MAGNESIUM CHLORIDE, DEXTROSE MONOHYDRATE, LACTIC ACID, SODIUM CHLORIDE, SODIUM BICARBONATE AND POTASSIUM CHLORIDE 3500 ML/HR: 3.68; 3.05; 22; 5.4; 6.46; 3.09; .314 INJECTION INTRAVENOUS at 22:46

## 2020-04-03 RX ADMIN — FAMOTIDINE 20 MG: 10 INJECTION, SOLUTION INTRAVENOUS at 21:31

## 2020-04-03 RX ADMIN — CALCIUM CHLORIDE, MAGNESIUM CHLORIDE, DEXTROSE MONOHYDRATE, LACTIC ACID, SODIUM CHLORIDE, SODIUM BICARBONATE AND POTASSIUM CHLORIDE 3500 ML/HR: 3.68; 3.05; 22; 5.4; 6.46; 3.09; .314 INJECTION INTRAVENOUS at 01:09

## 2020-04-03 RX ADMIN — CALCIUM CHLORIDE, MAGNESIUM CHLORIDE, DEXTROSE MONOHYDRATE, LACTIC ACID, SODIUM CHLORIDE, SODIUM BICARBONATE AND POTASSIUM CHLORIDE 3500 ML/HR: 3.68; 3.05; 22; 5.4; 6.46; 3.09; .314 INJECTION INTRAVENOUS at 02:38

## 2020-04-03 NOTE — PROGRESS NOTES
[facility]    ICU Team Note    Name: Lisa Briggs   : 1960   MRN: [id number]   Date: 4/3/2020      Subjective:   Progress Note: 4/3/2020      Reason for ICU Admission: SARS-COV2 with renal failure     65 yo female with obesity and diabetes who presented to [facility] with worsening hypoxic respiratory failure in lieu of close exposure to COVID-19. She presented to the hospital 3/26 and intubated 3/28. She was started on broad spectrum antibiotics and plaquenil. Developed worsening renal failure and was transferred to us for CRRT. Overnight Events:     No significant events overnight. Improved oxygenation. POD:* No surgery found *    S/P:     Active Problem List:     Problem List  Date Reviewed: 2020          Codes Class    Hypotension ICD-10-CM: I95.9  ICD-9-CM: 458.9 Acute        Sepsis due to methicillin susceptible Staphylococcus aureus (MSSA) without acute organ dysfunction (HCC) ICD-10-CM: A41.01  ICD-9-CM: 038.11, 995.91 Acute        COVID-19 virus infection ICD-10-CM: U07.1         Obesity, morbid ([facility] ICD-10-CM: E66.01  ICD-9-CM: 278.01         Vaginal Pap smear following hysterectomy for malignancy ICD-10-CM: Z08, Z90.710  ICD-9-CM: V67.01         Personal history of malignant neoplasm of other parts of uterus ICD-10-CM: Z85.42  ICD-9-CM: V10.42         Endometrial cancer ([facility].) ICD-10-CM: C54.1  ICD-9-CM: 182.0               Past Medical History:      has a past medical history of Basal cell carcinoma, GERD (gastroesophageal reflux disease), Kidney stones, and Polyp of ureter. Past Surgical History:      has a past surgical history that includes hysteroscopy diagnostic (); pr endometrial ablation, thermal; hx  section (); and hx colonoscopy. Home Medications:     Prior to Admission medications    Medication Sig Start Date End Date Taking?  Authorizing Provider   pantoprazole (PROTONIX) 40 mg tablet TAKE 1 TABLET BY MOUTH TWICE A DAY 18 Provider, Historical   esomeprazole (NEXIUM) 20 mg capsule Take 20 mg by mouth daily. Indications: BID    Provider, Historical   zolpidem (AMBIEN) 10 mg tablet Take 0.5 Tabs by mouth nightly as needed for Sleep. Max Daily Amount: 5 mg. 17   Dedmond, Julienne Dancer, MD   fluticasone (FLONASE) 50 mcg/actuation nasal spray Mist 1-2 spray(s) into each nostril once daily. 4/3/15   Provider, Historical   ranitidine (ZANTAC) 150 mg tablet 150 mg.    Provider, Historical       Allergies/Social/Family History: Allergies   Allergen Reactions    Augmentin [Amoxicillin-Pot Clavulanate] Rash and Itching      Social History     Tobacco Use    Smoking status: Never Smoker    Smokeless tobacco: Never Used   Substance Use Topics    Alcohol use: Not on file      Family History   Problem Relation Age of Onset    Prostate Cancer Father         PROSTATE    Breast Cancer Sister 46        invasive poorly differentiated ductal carcinoma, Neg genetic testing.  Cancer Sister 62        melanoma stage 1       Review of Systems:     A comprehensive review of systems was negative except for that written in the HPI.     Objective:   Vital Signs:  Visit Vitals  /74   Pulse 82   Temp 98.7 °F (37.1 °C)   Resp 22   Ht 5' 4\" (1.626 m) Comment: per OSH record   Wt 133.3 kg (293 lb 14.4 oz)   SpO2 97%   BMI 50.45 kg/m²      O2 Device: Endotracheal tube, Ventilator Temp (24hrs), Av.4 °F (36.9 °C), Min:97.2 °F (36.2 °C), Max:99.2 °F (37.3 °C)           Intake/Output:     Intake/Output Summary (Last 24 hours) at 4/3/2020 1111  Last data filed at 4/3/2020 1100  Gross per 24 hour   Intake 1578.84 ml   Output 1837 ml   Net -258.16 ml       Physical Exam:    General: Ill appearing  HENT: Atraumatic  Cardio: RRR  Respiratory: distant breath sounds BL  GI: Soft not tender abdomen  Extremities: +ve edema  Neuro: sedated      LABS AND  DATA: Personally reviewed  Recent Labs     20  0316 20  1639   WBC 9.2 10.1   HGB 9.0* 9.0* HCT 28.9* 28.5*    193     Recent Labs     04/03/20  0316 04/02/20  1639   * 136   K 3.8 4.0    104   CO2 26 27   BUN 31* 27*   CREA 2.78* 2.60*   * 119*   CA 8.6 8.0*   MG 2.8* 2.6*   PHOS 3.4 3.9     Recent Labs     04/03/20  0316 04/02/20  1639  04/01/20  0415   SGOT  --   --   --  69*   AP  --   --   --  189*   TP  --   --   --  5.7*   ALB 1.8* 1.9*   < > 2.2*   GLOB  --   --   --  3.5    < > = values in this interval not displayed. Recent Labs     04/03/20  0853 04/03/20  0316  04/02/20  0410   INR  --  1.1  --  1.1   PTP  --  11.1  --  11.7*   APTT 32.7* 33.6*   < >  --     < > = values in this interval not displayed. Recent Labs     04/03/20  0623 04/02/20  0739   PHI 7.338* 7.326*   PCO2I 48.8* 56.5*   PO2I 212* 78*   FIO2I 100 80     Recent Labs     03/31/20  1542   TROIQ 0.36*       Hemodynamics:   PAP:   CO:     Wedge:   CI:     CVP:    SVR:       PVR:       Ventilator Settings:  Mode Rate Tidal Volume Pressure FiO2 PEEP   Assist control, Pressure control        60 %(found on 60%; Dr. Timmons Crutch adjusted when in pt room) 18 cm H20     Peak airway pressure: 32 cm H2O    Minute ventilation: 8.67 l/min        MEDS: Reviewed    Chest X-Ray: personally reviewed and report checked      Assessment/Plan:     1. Acute Hypoxic Respiratory failure - Secondary to COVID-19 - Note sputum with staph aureus - susceptible to vancomycin. Blood NTD. Continue lung protective ventilation strategies. Continue weaning settings as tolerated. Continue abx therapy for superimposed bacterial PNA. Completed Azithro and plaquenil course. ID following. Pending echo. Elevated IL-6 - might benefit from actemra will check availability and other severity markers. Continue diuresis with CRRT  2. Shock - Likely worsened or precipitated by sedation. On vasopressin and norepinephrine - weaning doses. Stable. 3. Diabetes - Continue ISS and Lantus 35 units  4.  Renal Failure - Likely ATN from sepsis and hypotension. Nephrology following. Continue CRRT  5. Sepsis secondary to COVID-19 - POA. Management as detailed above     Multidisciplinary Rounds Completed:  No    ABCDEF Bundle/Checklist  Pain Medications: Fentanyl  Target RASS: -2 - Light Sedation - Briefly awakens to voice (eyes open & contact <10 sec)  Sedation Medications: None  CAM-ICU:  Negative  Mobility: Bedrest  PT/OT: TO be consulted when stable   Restraints: Soft wrist restraints  Discussed Plan of Care (goals of care): No  Addressed Code Status: Full Code    CARDIOVASCULAR  Cardiac Gtts: Norepinephrine and Vasopressin  SBP Goal of: > 90 mmHg  MAP Goal of: > 65 mmHg  Transfusion Trigger (Hgb): <7 g/dL    RESPIRATORY  Vent Goals:   Chlorhexidine   Optimize PEEP/Ventilation/Oxygenation  Goal Tidal Volume 6 cc/kg based on IBW  Aim for lung protective ventilation  Head of bed > 30 degrees  DVT Prophylaxis (if no, list reason): SCD's or Sequential Compression Device and Heparin   SPO2 Goal: > 92%  Pulmonary toilet: Duo-Nebs     GI/  Bowden Catheter Present: Yes  GI Prophylaxis: Pepcid (famotidine)   Nutrition: Yes   Bowel Movement: No  Bowel Regimen: Lactulose  Insulin: ISS and Lantus    ANTIBIOTICS  Antibiotics:  Plaquenil    T/L/D  Tubes: ETT and Orogastric Tube  Lines: Peripheral IV, Arterial Line, PICC Line and Ramon  Drains: Bowden Catheter    SPECIAL EQUIPMENT  CRRT    DISPOSITION  Stay in ICU    CRITICAL CARE CONSULTANT NOTE  I had a face to face encounter with the patient, reviewed and interpreted patient data including clinical events, labs, images, vital signs, I/O's, and examined patient. I have discussed the case and the plan and management of the patient's care with the consulting services, the bedside nurses and the respiratory therapist.      NOTE OF PERSONAL INVOLVEMENT IN CARE   This patient has a high probability of imminent, clinically significant deterioration, which requires the highest level of preparedness to intervene urgently.  I participated in the decision-making and personally managed or directed the management of the following life and organ supporting interventions that required my frequent assessment to treat or prevent imminent deterioration. I personally spent 45 minutes of critical care time. This is time spent at this critically ill patient's bedside actively involved in patient care as well as the coordination of care and discussions with the patient's family. This does not include any procedural time which has been billed separately.     Jeanmarie Duran MD  Staff [facility]  4/3/2020

## 2020-04-03 NOTE — PROGRESS NOTES
Transitions of care  TBD  Respiratory failure r/t COVID -19  Remains vented on CRRT  Patient is a full code  Alma Romero RN,CRM

## 2020-04-03 NOTE — PROGRESS NOTES
[facility]   [address] [address], [address], [facility] [place]  Phone: [phone]   [phone]       Nephrology Progress Note  Jh Tilley     1960     [id number]  Date of Admission : 3/31/2020  04/03/20    CC: Follow up for JUANCHO      Assessment and Plan   JUANCHO :  - 2/2 ATN  - femoral Ramon catheter   - resume CVVHD w/ heparin through filter   - factor as tolerated  - daily labs for now     Acid Base/ Lytes :  - improved      COVID-19 +ve   Acute Hypoxic Resp Failure   - On Vent   - completed Plaquenil      Type II DM   - Insulin per primary team      Hypothermia   Morbid Obesity      Care Plan discussed with:  ICU team      Interval History:  Case discussed with RN and intensivist.  CRRT filter clotted overnight, waiting to resume CVVHD. On pressors, anuric. PT NOT EXAMINED in line with ASN and RPA GUIDELINES ON MANAGING COVID-19 PTS WITH RENAL ISSUES. Examination findings discussed personally with the examining Physician team member      Review of Systems: Review of systems not obtained due to patient factors.     Current Medications:   Current Facility-Administered Medications   Medication Dose Route Frequency    heparin (porcine) pf 300 Units  300 Units InterCATHeter PRN    guaiFENesin (ROBITUSSIN) 100 mg/5 mL oral liquid 100 mg  100 mg Oral Q6H    alteplase (CATHFLO) 2 mg in sterile water (preservative free) 2 mL injection  2 mg InterCATHeter PRN    alteplase (CATHFLO) 2 mg in sterile water (preservative free) 2 mL injection  2 mg InterCATHeter PRN    midazolam (VERSED) injection 1-2 mg  1-2 mg IntraVENous Q2H PRN    bacitracin 500 unit/gram packet 1 Packet  1 Packet Topical PRN    insulin glargine (LANTUS) injection 35 Units  35 Units SubCUTAneous DAILY    balsam peru-castor oiL (VENELEX) ointment   Topical BID    sodium chloride (NS) flush 5-40 mL  5-40 mL IntraVENous Q8H    sodium chloride (NS) flush 5-40 mL  5-40 mL IntraVENous PRN    HYDROcodone-acetaminophen (NORCO) 5-325 mg per tablet 1 Tab  1 Tab Oral Q4H PRN    HYDROmorphone (PF) (DILAUDID) injection 0.5 mg  0.5 mg IntraVENous Q4H PRN    ondansetron (ZOFRAN) injection 4 mg  4 mg IntraVENous Q4H PRN    NOREPINephrine (LEVOPHED) 8,000 mcg in dextrose 5% 250 mL infusion  2-30 mcg/min IntraVENous TITRATE    vasopressin (VASOSTRICT) 20 Units in 0.9% sodium chloride 100 mL infusion  0.04 Units/min IntraVENous CONTINUOUS    fentaNYL (PF) 1,500 mcg/30 mL (50 mcg/mL) infusion   mcg/hr IntraVENous TITRATE    propofol (DIPRIVAN) 10 mg/mL infusion  0-50 mcg/kg/min IntraVENous TITRATE    famotidine (PF) (PEPCID) 20 mg in 0.9% sodium chloride 10 mL injection  20 mg IntraVENous QHS    chlorhexidine (ORAL CARE KIT) 0.12 % mouthwash 15 mL  15 mL Oral Q12H    docusate (COLACE) 50 mg/5 mL oral liquid 100 mg  100 mg Per NG tube BID    acetaminophen (TYLENOL) tablet 650 mg  650 mg Oral Q6H PRN    Or    acetaminophen (TYLENOL) suppository 650 mg  650 mg Rectal Q6H PRN    heparin 25,000 units in D5W 250 ml infusion  1,200 Units/hr IntraVENous CONTINUOUS    bicarbonate dialysis (PRISMASOL) BG K 4/Ca 2.5 5000 ml solution   Extracorporeal DIALYSIS CONTINUOUS    glucose chewable tablet 16 g  4 Tab Oral PRN    glucagon (GLUCAGEN) injection 1 mg  1 mg IntraMUSCular PRN    dextrose 10% infusion 0-250 mL  0-250 mL IntraVENous PRN    insulin lispro (HUMALOG) injection   SubCUTAneous Q6H    Vancomycin - Pharmacy to Dose   Other Rx Dosing/Monitoring      Allergies   Allergen Reactions    Augmentin [Amoxicillin-Pot Clavulanate] Rash and Itching       Objective:  Vitals:    Vitals:    04/03/20 0600 04/03/20 0700 04/03/20 0742 04/03/20 0800   BP: 117/70 132/79  134/74   Pulse: 85 82 83 85   Resp: 18 16 22 22   Temp:    98.7 °F (37.1 °C)   SpO2: 100% 98% 98% 94%   Weight:       Height:         Intake and Output:  04/03 0701 - 04/03 1900  In: 350   Out: 2 [Urine:2]  04/01 1901 - 04/03 0700  In: 4305.5 [I.V.:3130.5]  Out: 7944 [Urine:17; Drains:820]    Physical Examination: inspection only   Pt intubated    Yes  General: Sedated on vent, obese   Neck:  Lines   Resp:  On vent   CV:  RRR  GI:  Obese   Neurologic:  Sedated   Psych:              Unable to assess  Ext                   R femoral melissa     []    High complexity decision making was performed  []    Patient is at high-risk of decompensation with multiple organ involvement    Lab Data Personally Reviewed: I have reviewed all the pertinent labs, microbiology data and radiology studies during assessment. Recent Labs     04/03/20  0316 04/02/20  1639 04/02/20  1617 04/02/20  1034 04/02/20  0414 04/02/20  0410 04/01/20  2221  04/01/20  0415  03/31/20  0947   * 136  --  136 137  --  137   < > 137   < > 136   K 3.8 4.0  --  3.4* 3.7  --  3.7   < > 4.2   < > 6.0*    104  --  100 102  --  103   < > 103   < > 108   CO2 26 27  --  28 28  --  27   < > 23   < > 16*   * 119*  --  165* 148*  --  143*   < > 158*   < > 212*   BUN 31* 27*  --  29* 23*  --  21*   < > 16   < > 15   CREA 2.78* 2.60*  --  3.19* 2.91*  --  2.81*   < > 3.20*   < > 4.26*   CA 8.6 8.0*  --  8.1* 7.7*  --  7.8*   < > 7.9*   < > 7.9*   MG 2.8* 2.6*  --  2.5*  --  2.6* 2.4   < > 2.2   < >  --    PHOS 3.4 3.9 3.9 3.8 3.8  --  3.4   < >  --    < >  --    ALB 1.8* 1.9*  --  1.8* 2.1*  --  2.0*   < > 2.2*   < >  --    SGOT  --   --   --   --   --   --   --   --  69*  --   --    ALT  --   --   --   --   --   --   --   --  45  --   --    INR 1.1  --   --   --   --  1.1  --   --  1.2*  --  1.2*    < > = values in this interval not displayed.      Recent Labs     04/03/20  0316 04/02/20  1639 04/02/20  0410 04/01/20  1558 04/01/20  0415   WBC 9.2 10.1 10.5 13.6* 15.4*   HGB 9.0* 9.0* 9.3* 9.1* 9.4*   HCT 28.9* 28.5* 29.2* 29.0* 29.8*    193 242 245 252     No results found for: SDES  Lab Results   Component Value Date/Time    Culture result: NO GROWTH 3 DAYS 03/31/2020 11:15 AM    Culture result: MODERATE STAPHYLOCOCCUS AUREUS (A) 03/31/2020 10:34 AM    Culture result: LIGHT NORMAL RESPIRATORY SOFIE 03/31/2020 10:34 AM     Recent Results (from the past 24 hour(s))   PTT    Collection Time: 04/02/20  8:49 AM   Result Value Ref Range    aPTT 29.9 22.1 - 32.0 sec    aPTT, therapeutic range     58.0 - 77.0 SECS   RENAL FUNCTION PANEL    Collection Time: 04/02/20 10:34 AM   Result Value Ref Range    Sodium 136 136 - 145 mmol/L    Potassium 3.4 (L) 3.5 - 5.1 mmol/L    Chloride 100 97 - 108 mmol/L    CO2 28 21 - 32 mmol/L    Anion gap 8 5 - 15 mmol/L    Glucose 165 (H) 65 - 100 mg/dL    BUN 29 (H) 6 - 20 MG/DL    Creatinine 3.19 (H) 0.55 - 1.02 MG/DL    BUN/Creatinine ratio 9 (L) 12 - 20      GFR est AA 18 (L) >60 ml/min/1.73m2    GFR est non-AA 15 (L) >60 ml/min/1.73m2    Calcium 8.1 (L) 8.5 - 10.1 MG/DL    Phosphorus 3.8 2.6 - 4.7 MG/DL    Albumin 1.8 (L) 3.5 - 5.0 g/dL   MAGNESIUM    Collection Time: 04/02/20 10:34 AM   Result Value Ref Range    Magnesium 2.5 (H) 1.6 - 2.4 mg/dL   GLUCOSE, POC    Collection Time: 04/02/20 12:44 PM   Result Value Ref Range    Glucose (POC) 150 (H) 65 - 100 mg/dL    Performed by Inocencio Rubinstein    PTT    Collection Time: 04/02/20  2:07 PM   Result Value Ref Range    aPTT 36.1 (H) 22.1 - 32.0 sec    aPTT, therapeutic range     58.0 - 77.0 SECS   PHOSPHORUS    Collection Time: 04/02/20  4:17 PM   Result Value Ref Range    Phosphorus 3.9 2.6 - 4.7 MG/DL   RENAL FUNCTION PANEL    Collection Time: 04/02/20  4:39 PM   Result Value Ref Range    Sodium 136 136 - 145 mmol/L    Potassium 4.0 3.5 - 5.1 mmol/L    Chloride 104 97 - 108 mmol/L    CO2 27 21 - 32 mmol/L    Anion gap 5 5 - 15 mmol/L    Glucose 119 (H) 65 - 100 mg/dL    BUN 27 (H) 6 - 20 MG/DL    Creatinine 2.60 (H) 0.55 - 1.02 MG/DL    BUN/Creatinine ratio 10 (L) 12 - 20      GFR est AA 23 (L) >60 ml/min/1.73m2    GFR est non-AA 19 (L) >60 ml/min/1.73m2    Calcium 8.0 (L) 8.5 - 10.1 MG/DL    Phosphorus 3.9 2.6 - 4.7 MG/DL Albumin 1.9 (L) 3.5 - 5.0 g/dL   MAGNESIUM    Collection Time: 04/02/20  4:39 PM   Result Value Ref Range    Magnesium 2.6 (H) 1.6 - 2.4 mg/dL   CBC WITH AUTOMATED DIFF    Collection Time: 04/02/20  4:39 PM   Result Value Ref Range    WBC 10.1 3.6 - 11.0 K/uL    RBC 3.29 (L) 3.80 - 5.20 M/uL    HGB 9.0 (L) 11.5 - 16.0 g/dL    HCT 28.5 (L) 35.0 - 47.0 %    MCV 86.6 80.0 - 99.0 FL    MCH 27.4 26.0 - 34.0 PG    MCHC 31.6 30.0 - 36.5 g/dL    RDW 14.3 11.5 - 14.5 %    PLATELET 475 769 - 879 K/uL    MPV 9.7 8.9 - 12.9 FL    NRBC 0.0 0  WBC    ABSOLUTE NRBC 0.00 0.00 - 0.01 K/uL    NEUTROPHILS 77 (H) 32 - 75 %    LYMPHOCYTES 8 (L) 12 - 49 %    MONOCYTES 6 5 - 13 %    EOSINOPHILS 2 0 - 7 %    BASOPHILS 1 0 - 1 %    IMMATURE GRANULOCYTES 6 (H) 0.0 - 0.5 %    ABS. NEUTROPHILS 7.8 1.8 - 8.0 K/UL    ABS. LYMPHOCYTES 0.8 0.8 - 3.5 K/UL    ABS. MONOCYTES 0.6 0.0 - 1.0 K/UL    ABS. EOSINOPHILS 0.2 0.0 - 0.4 K/UL    ABS. BASOPHILS 0.1 0.0 - 0.1 K/UL    ABS. IMM.  GRANS. 0.6 (H) 0.00 - 0.04 K/UL    DF AUTOMATED      RBC COMMENTS NORMOCYTIC, NORMOCHROMIC     GLUCOSE, POC    Collection Time: 04/02/20  5:31 PM   Result Value Ref Range    Glucose (POC) 126 (H) 65 - 100 mg/dL    Performed by Martell Calloway, RANDOM    Collection Time: 04/02/20  8:36 PM   Result Value Ref Range    Vancomycin, random 21.5 UG/ML   GLUCOSE, POC    Collection Time: 04/02/20 10:55 PM   Result Value Ref Range    Glucose (POC) 120 (H) 65 - 100 mg/dL    Performed by Sue Jacinto    CBC WITH AUTOMATED DIFF    Collection Time: 04/03/20  3:16 AM   Result Value Ref Range    WBC 9.2 3.6 - 11.0 K/uL    RBC 3.32 (L) 3.80 - 5.20 M/uL    HGB 9.0 (L) 11.5 - 16.0 g/dL    HCT 28.9 (L) 35.0 - 47.0 %    MCV 87.0 80.0 - 99.0 FL    MCH 27.1 26.0 - 34.0 PG    MCHC 31.1 30.0 - 36.5 g/dL    RDW 14.2 11.5 - 14.5 %    PLATELET 560 075 - 151 K/uL    MPV 10.1 8.9 - 12.9 FL    NRBC 0.2 (H) 0  WBC    ABSOLUTE NRBC 0.02 (H) 0.00 - 0.01 K/uL    NEUTROPHILS 76 (H) 32 - 75 %    BAND NEUTROPHILS 5 0 - 6 %    LYMPHOCYTES 9 (L) 12 - 49 %    MONOCYTES 6 5 - 13 %    EOSINOPHILS 2 0 - 7 %    BASOPHILS 1 0 - 1 %    METAMYELOCYTES 1 (H) 0 %    IMMATURE GRANULOCYTES 0 %    ABS. NEUTROPHILS 7.5 1.8 - 8.0 K/UL    ABS. LYMPHOCYTES 0.8 0.8 - 3.5 K/UL    ABS. MONOCYTES 0.6 0.0 - 1.0 K/UL    ABS. EOSINOPHILS 0.2 0.0 - 0.4 K/UL    ABS. BASOPHILS 0.1 0.0 - 0.1 K/UL    ABS. IMM.  GRANS. 0.0 K/UL    DF MANUAL      RBC COMMENTS MICROCYTOSIS  1+        RBC COMMENTS OVALOCYTES  1+       PROTHROMBIN TIME + INR    Collection Time: 04/03/20  3:16 AM   Result Value Ref Range    INR 1.1 0.9 - 1.1      Prothrombin time 11.1 9.0 - 11.1 sec   MAGNESIUM    Collection Time: 04/03/20  3:16 AM   Result Value Ref Range    Magnesium 2.8 (H) 1.6 - 2.4 mg/dL   RENAL FUNCTION PANEL    Collection Time: 04/03/20  3:16 AM   Result Value Ref Range    Sodium 135 (L) 136 - 145 mmol/L    Potassium 3.8 3.5 - 5.1 mmol/L    Chloride 102 97 - 108 mmol/L    CO2 26 21 - 32 mmol/L    Anion gap 7 5 - 15 mmol/L    Glucose 125 (H) 65 - 100 mg/dL    BUN 31 (H) 6 - 20 MG/DL    Creatinine 2.78 (H) 0.55 - 1.02 MG/DL    BUN/Creatinine ratio 11 (L) 12 - 20      GFR est AA 21 (L) >60 ml/min/1.73m2    GFR est non-AA 18 (L) >60 ml/min/1.73m2    Calcium 8.6 8.5 - 10.1 MG/DL    Phosphorus 3.4 2.6 - 4.7 MG/DL    Albumin 1.8 (L) 3.5 - 5.0 g/dL   PTT    Collection Time: 04/03/20  3:16 AM   Result Value Ref Range    aPTT 33.6 (H) 22.1 - 32.0 sec    aPTT, therapeutic range     58.0 - 77.0 SECS   GLUCOSE, POC    Collection Time: 04/03/20  5:07 AM   Result Value Ref Range    Glucose (POC) 114 (H) 65 - 100 mg/dL    Performed by Ryan Florez    POC EG7    Collection Time: 04/03/20  6:23 AM   Result Value Ref Range    Calcium, ionized (POC) 1.12 1.12 - 1.32 mmol/L    FIO2 (POC) 100 %    pH (POC) 7.338 (L) 7.35 - 7.45      pCO2 (POC) 48.8 (H) 35.0 - 45.0 MMHG    pO2 (POC) 212 (H) 80 - 100 MMHG    HCO3 (POC) 26.2 (H) 22 - 26 MMOL/L    Base excess (POC) 0 mmol/L    sO2 (POC) 100 (H) 92 - 97 %    Site DRAWN FROM ARTERIAL LINE      Device: VENT      Mode ASSIST CONTROL      Set Rate 22 bpm    PEEP/CPAP (POC) 18 cmH2O    PIP (POC) 14      Allens test (POC) N/A      Specimen type (POC) ARTERIAL      Pressure control YES             Total time spent with patient:  xxx   min. Care Plan discussed with:  Patient     Family      RN      Consulting Physician [facility]        I have reviewed the flowsheets. Chart and Pertinent Notes have been reviewed. No change in PMH ,family and social history from Consult note.       Solo Sims MD

## 2020-04-03 NOTE — DIABETES MGMT
MARTA ROMERO  CLINICAL NURSE SPECIALIST CONSULT  [facility]    Presentation   Rachel Gutierrez is a 64 y.o. female admitted from OSH to [facility] ICU with SARS-COV2 needing CRRT. She is currently sedated and has been intubated since 3/28/20. Current clinical course has been complicated by steroid induced hyperglycemia. Steroids are discontinued at this time. She requires CRRT for acute renal failure r/t her sepsis and hypotension. PHM: GERD, obesity, and anxiety. New diabetes diagnosis with A1C 11.0% (3/28/2020); updated A1C 3/31/20-10.4%    Consulted by Provider for advanced diabetes nursing assessment and care, specifically related to   [] Transitioning off Claria Pechanga   [x] Inpatient management strategy  [] Home management assessment  [] Survival skill education    Diabetes-related medical history  Acute complications  hyperglycemia  Neurological complications  NONE  Microvascular disease  NONE  Macrovascular disease  NONE  Other associated conditions     NONE    Diabetes medication history: NONE    Subjective   Per chart review, Ms. Norma Phelan remains very ill  and is on isolation forSARS-COV2 in ICU. I am unable to do a physical assessment of the patient at this time. Patient reports the following home diabetes self-care practices: deferred    Objective     Vital Signs   Visit Vitals  /70   Pulse 82   Temp 98.7 °F (37.1 °C)   Resp 22   Ht 5' 4\" (1.626 m)   Wt 133.3 kg (293 lb 14.4 oz)   SpO2 97%   BMI 50.45 kg/m²   .    Laboratory  Lab Results   Component Value Date/Time    Hemoglobin A1c 10.4 (H) 03/31/2020 03:42 PM     No results found for: LDL, LDLC, DLDLP  Lab Results   Component Value Date/Time    Creatinine 2.78 (H) 04/03/2020 03:16 AM     Lab Results   Component Value Date/Time    Sodium 135 (L) 04/03/2020 03:16 AM    Potassium 3.8 04/03/2020 03:16 AM    Chloride 102 04/03/2020 03:16 AM    CO2 26 04/03/2020 03:16 AM    Anion gap 7 04/03/2020 03:16 AM    Glucose 125 (H) 04/03/2020 03:16 AM    BUN 31 (H) 04/03/2020 03:16 AM    Creatinine 2.78 (H) 04/03/2020 03:16 AM    BUN/Creatinine ratio 11 (L) 04/03/2020 03:16 AM    GFR est AA 21 (L) 04/03/2020 03:16 AM    GFR est non-AA 18 (L) 04/03/2020 03:16 AM    Calcium 8.6 04/03/2020 03:16 AM    Bilirubin, total 1.7 (H) 04/01/2020 04:15 AM    AST (SGOT) 69 (H) 04/01/2020 04:15 AM    Alk. phosphatase 189 (H) 04/01/2020 04:15 AM    Protein, total 5.7 (L) 04/01/2020 04:15 AM    Albumin 1.8 (L) 04/03/2020 03:16 AM    Globulin 3.5 04/01/2020 04:15 AM    A-G Ratio 0.6 (L) 04/01/2020 04:15 AM    ALT (SGPT) 45 04/01/2020 04:15 AM     Lab Results   Component Value Date/Time    ALT (SGPT) 45 04/01/2020 04:15 AM           Evaluation   Ms Leandro Pike, with new onset Type 2 diabetes,with A1C 10.4%. Per chart review from OSH before she was transferred she had BG >200. She will require subcutaneous insulin to adequately manage her BG. BG ranges last 24hours 120-140mg/dl. BG is stable with no fluctuations after increase in basal insulin to 35units daily. TF (Nepro) at 10cc/hr. Recommendations   1. CONTINUE basal and correction insulin. 2. Will continue to follow.     Assessment and Plan   Nursing Diagnosis Risk for unstable blood glucose pattern   Nursing Intervention Domain 5519 Decision-making Support   Nursing Interventions Examined current inpatient diabetes control   Explored factors facilitating and impeding inpatient management  Identified self-management practices impeding diabetes control  Explored corrective strategies with patient and responsible inpatient provider   Informed patient of rational for insulin strategy while hospitalized     Referral   [] Behavioral health services  [] Inpatient nutrition services  [] Pharmacy services for medication management  [x] Diabetes Self-Management Training through [facility] (Phone [phone] to schedule appointment)    Billing Code(s)     [x] 55859 IP subsequent hospital care - Brian Cox  Access via KESHIA Roca [phone]

## 2020-04-03 NOTE — PROGRESS NOTES
ID Follow note     Oxygenation needs was lower and then starts to go up per Dr Gonzales Cough .      D/W wt ICU RN and ICU team     Labs noted IL 6 elevated 402  LDH 3/31/20 was 458  Ferritin 1985 3/31/20    Will repeat LDH, ferritin, ESR CRP     ICU planning on starting IL 6 inhibitor as well      Please call with questions       Allison Padgett, DO  2:15 PM

## 2020-04-03 NOTE — PROGRESS NOTES
Day #4 of Vancomycin  Indication:  Possible PNA, COVID-19+  Current regimen:  Based on levels, last dose: 1.5 gm on  @ 2105  Abx regimen:  Monotherapy  ID Following ?: YES  Concomitant nephrotoxic drugs (requires more frequent monitoring): Vasopressors  Frequency of BMP?: Every 6 hours    Recent Labs     20  0316 20  1639 20  1034  20  0410   WBC 9.2 10.1  --   --  10.5   CREA 2.78* 2.60* 3.19*   < >  --    BUN 31* 27* 29*   < >  --     < > = values in this interval not displayed. Est CrCl: CVVHD  Temp (24hrs), Av.4 °F (36.9 °C), Min:97.3 °F (36.3 °C), Max:99.2 °F (37.3 °C)    Cultures:   COVID-19 + (from OSH)  3/31 Sputum: moderate MSSA, final  3/31 Blood: NGTD    Goal trough = 15 - 20 mcg/mL    Recent trough history (date/time/level/dose/action taken):  3/31 @ 2054 = 22.9 mcg/ml (drawn ~24 hrs post-dose), continue to hold   @ 0850 = 18.8 mcg/ml (drawn ~36 hrs post-dose), 1500 mg given at the 48-hr sin   @ 2036= 21.5 mcg/ml (~23.5h post-dose), no doses given    Plan: Will dose with vanc 1.5g @ ~48h since last dose. Based on drug clearance from 3/31-, ke = 0.016 and calculated drug clearance at 46h = 15 mcg/ml. Below estimated drug clearance on CVVHD. Will continue to monitor.      Fredy Arriaga, PHARMD

## 2020-04-03 NOTE — DIALYSIS
[address] [facility]       [phone]    Orders   Mode: CVVHD   Factor: 100 ml/hr   Dose: 3500 ml/hr   Blood Flow Rate: 200 ml/min     Metrics   BP / HR: 112/60  //  84   Blood Flow Rate: 200 ml/min   AP:                         -74   RP: 74   TMP: 30   PD: 25   FP: 125   DFR: 3500 ml/hr (PBP: 0 ml/hr)     Comments / Plan:      Filter changed secondary to filter clotting. All possible blood (165 ml) returned by primary RN. Consents, patient, code status, labs and orders verified. Time out done. COVID-19 positive - all policies and procedures followed with appropriate PPE; N95 mask, face shield, gown, shoe covers and hair bonnet worn. Old set discarded in red bio-hazard bag. New [id number] filter set-up, primed, tested and running well at this time. Right femoral temporary non-tunneled CVC, dressing CDI with bio-patch dated 4/1/20. No signs of redness, drainage, or infection visualized. Each catheter limb disinfected for 60 seconds per limb with alcohol swabs. Caps removed, dialysis CVC hub scrubbed with Prevantics for 5 seconds, followed by a 5 second dry time per Hospital P&P. +asp/+flush x 2 ports. Lines are extremely positional. Patient leg propped to improve access and return pressures. Lines visible and connections secure with blood warmer to return line at 37*C. Education, pre and post to primary RN. Heparin infusing through CRRT circuit at 1200 units/hr. Primary RN aware that it access and return alarms continue that line will need to be replaced as cathflo has been attempted.

## 2020-04-03 NOTE — DIALYSIS
[address] [facility]          [phone]         Orders   Mode: CVVHD restarted @ 2320   Factor: 50 ml/hr   DFR: 3,500 ml/hr   Blood Flow Rate: 200 ml/min          Metrics   BP / HR: 111/66 / 89   Blood Flow Rate: 200 ml/min   AP:                         -94   RP: 80   TMP: 29   PD: 26   FP: 132   DFR: 3,500 ml/hr  (PBP: 0 ml/hr)      Comments / Plan:     Consents, orders, code status, labs, notes & isolation precautions verified/reviewed; Time out done. COVID-19 Positive- all policies & procedures followed with PPE; N95 mask/face shield/gown/gloves worn. In at bedside to change [id number] filter due to filter clotting; primary RN unable to return blood  mls. Run time 10 hrs. R femoral temp. CVC: tegaderm dressing with bio-patch CDI & dated 4/1/20. No bleeding or s/sx of infection visualized. Prepped CVC per hospital P&P: each catheter limb disinfected for 60 seconds with alcohol swabs; hubs scrubbed with prevantics for 5 seconds followed by a 5 second dry time- +asp/+ flush x 2 ports with some resistance. Old set discarded in bio-hazard bag. New [id number] filter set-up, primed 1L NS, tested and running well at this time. Lines running reversed. All connections visible and secured with blood warmer on return line set at 37*C. Heparin infusing in filter @ 1,200 units/hr. Education & pre/post report with Issac Severin, primary RN.

## 2020-04-03 NOTE — PROGRESS NOTES
Pharmacist Note - Vancomycin Dosing  Therapy day 4  Indication: Possible PNA, COVID-19+  Current regimen: Based on levels, last dose: 1.5 gm on 4/1 @ 2105    A Random Level resulted at 21.5 mcg/mL which was obtained 23.5 hrs post-dose. Goal trough: 15 - 20 mcg/mL     Plan: Continue to hold. Pharmacy will continue to monitor this patient daily for changes in clinical status and renal function.

## 2020-04-03 NOTE — PROGRESS NOTES
1930: Report received from Gio Hicks, [facility]. CVVHD clotted off, Savanna Dow RN notified. 2330Phil Ochoa RN at bedside, CVVHD restarted. 0300: CVVH alarming, pressures extremely negative unresponsive to RN troubleshooting, blood rinsed back and dorian RN paged. 0515: Xray at bedside. Order received from NP to heparinize melissa lines. CHG bath given, linen changed. 1: Dorian KIM received orders from Nephrologist to cath flow melissa line instead. 9003: Cath flow instilled in Osito Osorio RN informed. 4730: Bedside shift change report given to Janette Colin (oncoming nurse) by Nuris Wallace (offgoing nurse). Report included the following information SBAR, Kardex, Intake/Output, MAR and Recent Results.

## 2020-04-03 NOTE — PROGRESS NOTES
NUTRITION       Chart reviewed. Patient admitted respiratory failure d/t COVID-19; remains intubated and on CRRT for JUANCHO. Ms Luis Carlos Pena has tolerated trophic tube feeding well. Diprivan weaned off therefore will slowly advance tube feeding to goal.     New goal: Nepro @ 30 ml/hr with 3 packets Prosource daily and 50 ml water flush q 6 hr. This will provide 720 ml, 1480 calories, 103 gm protein and 900 ml free water (tube feeding/flush) per day meeting estimated needs. RD to follow.     Estimated Nutrition Needs:   Kcals/day: 1350 Kcals/day(0598-8970 (25-28 kcal/kg IBW)  Protein: 108 g(2g/kg IBW)  Fluid: (1 ml/kcal)  Based On: Kcal/kg - specify (Comment)  Weight Used: IBW(54 kg)    Mana Leija RD [facility]

## 2020-04-03 NOTE — PROGRESS NOTES
0800 Bedside and Verbal shift change report given to Eloisa Jordan, RN (oncoming nurse) by Harshal Rosario, RN (offgoing nurse). Report included the following information SBAR, Kardex, ED Summary, Procedure Summary, Intake/Output, MAR, Accordion, Recent Results, Med Rec Status, Cardiac Rhythm NSR and Alarm Parameters . Assumed care of pt. Assessment completed. Dr. Marilee Pollock at bedside, adjusting pt's vent settings down to 60%. Dr. Marilee Pollock okay with pt's SPO2 at above 90%. Gönül.[address] Dr. Wilson Ply discussing pt with this RN. This RN informing that pt's [facility] catheter clotted off twice on night shift, once at 1900 and once at 0300. Night shift RN, Kristian Ruvalcaba, dwelled cath flow in [facility]. This RN followed cath flow procedures for removing cath flow and flushing line. Bernarda Brandon RN notified and here to restart CVVHD. 1100 Pt's CVVHD reading high negative pressure. Lines switched per Ester KIM. 1537 Heparin through CVVHD stopped per order. 1550 Heparin started through PICC line at 7 units/kg/hr, per order set. 4000 unit bolus given per order. 1600 FiO2 down to 50% per Dr. Marilee Pollock. 2000 Bedside and Verbal shift change report given to Harshal Rosario RN (oncoming nurse) by Eloisa Jordan RN (offgoing nurse). Report included the following information SBAR, Kardex, ED Summary, Procedure Summary, Intake/Output, MAR, Accordion, Recent Results, Med Rec Status, Cardiac Rhythm NSR and Alarm Parameters . Shift summery: Levophed titrated off at 1209. Pt restarted on Levophed at 1558. Factor increased to 175, pt tolerating well. Pt started on systemic heparin and heparin through CVVHD stopped.

## 2020-04-04 LAB
ALBUMIN SERPL-MCNC: 1.8 G/DL (ref 3.5–5)
ALBUMIN SERPL-MCNC: 1.8 G/DL (ref 3.5–5)
ALBUMIN/GLOB SERPL: 0.4 {RATIO} (ref 1.1–2.2)
ALP SERPL-CCNC: 281 U/L (ref 45–117)
ALT SERPL-CCNC: 25 U/L (ref 12–78)
ANION GAP SERPL CALC-SCNC: 8 MMOL/L (ref 5–15)
ANION GAP SERPL CALC-SCNC: 8 MMOL/L (ref 5–15)
APTT PPP: 33.9 SEC (ref 22.1–32)
APTT PPP: 42.5 SEC (ref 22.1–32)
APTT PPP: 53.6 SEC (ref 22.1–32)
APTT PPP: 56.1 SEC (ref 22.1–32)
ARTERIAL PATENCY WRIST A: ABNORMAL
AST SERPL-CCNC: 28 U/L (ref 15–37)
BASE DEFICIT BLD-SCNC: 2 MMOL/L
BASOPHILS # BLD: 0.1 K/UL (ref 0–0.1)
BASOPHILS NFR BLD: 1 % (ref 0–1)
BDY SITE: ABNORMAL
BILIRUB DIRECT SERPL-MCNC: 0.6 MG/DL (ref 0–0.2)
BILIRUB SERPL-MCNC: 0.8 MG/DL (ref 0.2–1)
BUN SERPL-MCNC: 29 MG/DL (ref 6–20)
BUN SERPL-MCNC: 33 MG/DL (ref 6–20)
BUN/CREAT SERPL: 15 (ref 12–20)
BUN/CREAT SERPL: 16 (ref 12–20)
CA-I BLD-SCNC: 1.2 MMOL/L (ref 1.12–1.32)
CALCIUM SERPL-MCNC: 8.7 MG/DL (ref 8.5–10.1)
CALCIUM SERPL-MCNC: 8.9 MG/DL (ref 8.5–10.1)
CHLORIDE SERPL-SCNC: 103 MMOL/L (ref 97–108)
CHLORIDE SERPL-SCNC: 103 MMOL/L (ref 97–108)
CO2 SERPL-SCNC: 23 MMOL/L (ref 21–32)
CO2 SERPL-SCNC: 24 MMOL/L (ref 21–32)
CREAT SERPL-MCNC: 1.87 MG/DL (ref 0.55–1.02)
CREAT SERPL-MCNC: 2.18 MG/DL (ref 0.55–1.02)
DIFFERENTIAL METHOD BLD: ABNORMAL
EOSINOPHIL # BLD: 0.2 K/UL (ref 0–0.4)
EOSINOPHIL NFR BLD: 2 % (ref 0–7)
ERYTHROCYTE [DISTWIDTH] IN BLOOD BY AUTOMATED COUNT: 14.3 % (ref 11.5–14.5)
GAS FLOW.O2 O2 DELIVERY SYS: ABNORMAL L/MIN
GAS FLOW.O2 SETTING OXYMISER: 22 BPM
GLOBULIN SER CALC-MCNC: 4.6 G/DL (ref 2–4)
GLUCOSE BLD STRIP.AUTO-MCNC: 103 MG/DL (ref 65–100)
GLUCOSE BLD STRIP.AUTO-MCNC: 110 MG/DL (ref 65–100)
GLUCOSE BLD STRIP.AUTO-MCNC: 121 MG/DL (ref 65–100)
GLUCOSE BLD STRIP.AUTO-MCNC: 139 MG/DL (ref 65–100)
GLUCOSE BLD STRIP.AUTO-MCNC: 139 MG/DL (ref 65–100)
GLUCOSE SERPL-MCNC: 127 MG/DL (ref 65–100)
GLUCOSE SERPL-MCNC: 144 MG/DL (ref 65–100)
HCO3 BLD-SCNC: 23.6 MMOL/L (ref 22–26)
HCT VFR BLD AUTO: 28 % (ref 35–47)
HGB BLD-MCNC: 8.7 G/DL (ref 11.5–16)
IMM GRANULOCYTES # BLD AUTO: 0.7 K/UL (ref 0–0.04)
IMM GRANULOCYTES NFR BLD AUTO: 9 % (ref 0–0.5)
INR PPP: 1 (ref 0.9–1.1)
INSPIRATION.DURATION SETTING TIME VENT: 1.64 SEC
LYMPHOCYTES # BLD: 1.2 K/UL (ref 0.8–3.5)
LYMPHOCYTES NFR BLD: 16 % (ref 12–49)
MAGNESIUM SERPL-MCNC: 2.6 MG/DL (ref 1.6–2.4)
MAGNESIUM SERPL-MCNC: 2.7 MG/DL (ref 1.6–2.4)
MCH RBC QN AUTO: 26.9 PG (ref 26–34)
MCHC RBC AUTO-ENTMCNC: 31.1 G/DL (ref 30–36.5)
MCV RBC AUTO: 86.7 FL (ref 80–99)
MONOCYTES # BLD: 0.4 K/UL (ref 0–1)
MONOCYTES NFR BLD: 5 % (ref 5–13)
NEUTS SEG # BLD: 5.1 K/UL (ref 1.8–8)
NEUTS SEG NFR BLD: 67 % (ref 32–75)
NRBC # BLD: 0 K/UL (ref 0–0.01)
NRBC BLD-RTO: 0 PER 100 WBC
O2/TOTAL GAS SETTING VFR VENT: 50 %
PCO2 BLD: 43.4 MMHG (ref 35–45)
PEEP RESPIRATORY: 18 CMH2O
PH BLD: 7.34 [PH] (ref 7.35–7.45)
PHOSPHATE SERPL-MCNC: 1.9 MG/DL (ref 2.6–4.7)
PHOSPHATE SERPL-MCNC: 2 MG/DL (ref 2.6–4.7)
PHOSPHATE SERPL-MCNC: 3 MG/DL (ref 2.6–4.7)
PIP ISTAT,IPIP: 14
PLATELET # BLD AUTO: 156 K/UL (ref 150–400)
PMV BLD AUTO: 9.9 FL (ref 8.9–12.9)
PO2 BLD: 106 MMHG (ref 80–100)
POTASSIUM SERPL-SCNC: 3.6 MMOL/L (ref 3.5–5.1)
POTASSIUM SERPL-SCNC: 3.6 MMOL/L (ref 3.5–5.1)
PROT SERPL-MCNC: 6.4 G/DL (ref 6.4–8.2)
PROTHROMBIN TIME: 10.8 SEC (ref 9–11.1)
RBC # BLD AUTO: 3.23 M/UL (ref 3.8–5.2)
RBC MORPH BLD: ABNORMAL
SAO2 % BLD: 98 % (ref 92–97)
SERVICE CMNT-IMP: ABNORMAL
SODIUM SERPL-SCNC: 134 MMOL/L (ref 136–145)
SODIUM SERPL-SCNC: 135 MMOL/L (ref 136–145)
SPECIMEN TYPE: ABNORMAL
THERAPEUTIC RANGE,PTTT: ABNORMAL SECS (ref 58–77)
TOTAL RESP. RATE, ITRR: 22
VANCOMYCIN SERPL-MCNC: 20.6 UG/ML
VENTILATION MODE VENT: ABNORMAL
WBC # BLD AUTO: 7.7 K/UL (ref 3.6–11)

## 2020-04-04 PROCEDURE — 65610000006 HC RM INTENSIVE CARE

## 2020-04-04 PROCEDURE — 80076 HEPATIC FUNCTION PANEL: CPT

## 2020-04-04 PROCEDURE — 90945 DIALYSIS ONE EVALUATION: CPT

## 2020-04-04 PROCEDURE — 84100 ASSAY OF PHOSPHORUS: CPT

## 2020-04-04 PROCEDURE — 74011250637 HC RX REV CODE- 250/637: Performed by: INTERNAL MEDICINE

## 2020-04-04 PROCEDURE — 80202 ASSAY OF VANCOMYCIN: CPT

## 2020-04-04 PROCEDURE — 36415 COLL VENOUS BLD VENIPUNCTURE: CPT

## 2020-04-04 PROCEDURE — 74011250637 HC RX REV CODE- 250/637: Performed by: NURSE PRACTITIONER

## 2020-04-04 PROCEDURE — 74011636637 HC RX REV CODE- 636/637: Performed by: INTERNAL MEDICINE

## 2020-04-04 PROCEDURE — 94003 VENT MGMT INPAT SUBQ DAY: CPT

## 2020-04-04 PROCEDURE — 77030040392 HC DRSG OPTIFOAM MDII -A

## 2020-04-04 PROCEDURE — 83735 ASSAY OF MAGNESIUM: CPT

## 2020-04-04 PROCEDURE — 82803 BLOOD GASES ANY COMBINATION: CPT

## 2020-04-04 PROCEDURE — 80069 RENAL FUNCTION PANEL: CPT

## 2020-04-04 PROCEDURE — 74011250636 HC RX REV CODE- 250/636: Performed by: INTERNAL MEDICINE

## 2020-04-04 PROCEDURE — 85025 COMPLETE CBC W/AUTO DIFF WBC: CPT

## 2020-04-04 PROCEDURE — 36600 WITHDRAWAL OF ARTERIAL BLOOD: CPT

## 2020-04-04 PROCEDURE — 74011000250 HC RX REV CODE- 250: Performed by: INTERNAL MEDICINE

## 2020-04-04 PROCEDURE — 85610 PROTHROMBIN TIME: CPT

## 2020-04-04 PROCEDURE — 85730 THROMBOPLASTIN TIME PARTIAL: CPT

## 2020-04-04 PROCEDURE — 82962 GLUCOSE BLOOD TEST: CPT

## 2020-04-04 PROCEDURE — 74011000258 HC RX REV CODE- 258: Performed by: INTERNAL MEDICINE

## 2020-04-04 PROCEDURE — 80048 BASIC METABOLIC PNL TOTAL CA: CPT

## 2020-04-04 RX ORDER — SODIUM,POTASSIUM PHOSPHATES 280-250MG
2 POWDER IN PACKET (EA) ORAL ONCE
Status: COMPLETED | OUTPATIENT
Start: 2020-04-04 | End: 2020-04-04

## 2020-04-04 RX ORDER — HEPARIN SODIUM 1000 [USP'U]/ML
2000 INJECTION, SOLUTION INTRAVENOUS; SUBCUTANEOUS ONCE
Status: COMPLETED | OUTPATIENT
Start: 2020-04-04 | End: 2020-04-04

## 2020-04-04 RX ORDER — HEPARIN SODIUM 1000 [USP'U]/ML
4000 INJECTION, SOLUTION INTRAVENOUS; SUBCUTANEOUS ONCE
Status: COMPLETED | OUTPATIENT
Start: 2020-04-04 | End: 2020-04-04

## 2020-04-04 RX ORDER — VANCOMYCIN/0.9 % SOD CHLORIDE 1.5G/250ML
1500 PLASTIC BAG, INJECTION (ML) INTRAVENOUS ONCE
Status: COMPLETED | OUTPATIENT
Start: 2020-04-05 | End: 2020-04-05

## 2020-04-04 RX ADMIN — Medication 175 MCG/HR: at 08:49

## 2020-04-04 RX ADMIN — CASTOR OIL AND BALSAM, PERU: 788; 87 OINTMENT TOPICAL at 21:46

## 2020-04-04 RX ADMIN — GUAIFENESIN 100 MG: 100 SOLUTION ORAL at 11:58

## 2020-04-04 RX ADMIN — OXYCODONE HYDROCHLORIDE AND ACETAMINOPHEN 500 MG: 500 TABLET ORAL at 20:00

## 2020-04-04 RX ADMIN — DOCUSATE SODIUM 100 MG: 50 LIQUID ORAL at 17:16

## 2020-04-04 RX ADMIN — CHLORHEXIDINE GLUCONATE 15 ML: 0.12 RINSE ORAL at 20:00

## 2020-04-04 RX ADMIN — CASTOR OIL AND BALSAM, PERU: 788; 87 OINTMENT TOPICAL at 08:13

## 2020-04-04 RX ADMIN — SODIUM CHLORIDE 10 ML: 9 INJECTION INTRAMUSCULAR; INTRAVENOUS; SUBCUTANEOUS at 05:12

## 2020-04-04 RX ADMIN — CALCIUM CHLORIDE, MAGNESIUM CHLORIDE, DEXTROSE MONOHYDRATE, LACTIC ACID, SODIUM CHLORIDE, SODIUM BICARBONATE AND POTASSIUM CHLORIDE 3500 ML/HR: 3.68; 3.05; 22; 5.4; 6.46; 3.09; .314 INJECTION INTRAVENOUS at 20:37

## 2020-04-04 RX ADMIN — CASTOR OIL AND BALSAM, PERU: 788; 87 OINTMENT TOPICAL at 17:23

## 2020-04-04 RX ADMIN — HEPARIN SODIUM 2000 UNITS: 1000 INJECTION INTRAVENOUS; SUBCUTANEOUS at 18:58

## 2020-04-04 RX ADMIN — CALCIUM CHLORIDE, MAGNESIUM CHLORIDE, DEXTROSE MONOHYDRATE, LACTIC ACID, SODIUM CHLORIDE, SODIUM BICARBONATE AND POTASSIUM CHLORIDE 3500 ML/HR: 3.68; 3.05; 22; 5.4; 6.46; 3.09; .314 INJECTION INTRAVENOUS at 23:38

## 2020-04-04 RX ADMIN — CALCIUM CHLORIDE, MAGNESIUM CHLORIDE, DEXTROSE MONOHYDRATE, LACTIC ACID, SODIUM CHLORIDE, SODIUM BICARBONATE AND POTASSIUM CHLORIDE 3500 ML/HR: 3.68; 3.05; 22; 5.4; 6.46; 3.09; .314 INJECTION INTRAVENOUS at 15:02

## 2020-04-04 RX ADMIN — CALCIUM CHLORIDE, MAGNESIUM CHLORIDE, DEXTROSE MONOHYDRATE, LACTIC ACID, SODIUM CHLORIDE, SODIUM BICARBONATE AND POTASSIUM CHLORIDE 3500 ML/HR: 3.68; 3.05; 22; 5.4; 6.46; 3.09; .314 INJECTION INTRAVENOUS at 06:38

## 2020-04-04 RX ADMIN — CALCIUM CHLORIDE, MAGNESIUM CHLORIDE, DEXTROSE MONOHYDRATE, LACTIC ACID, SODIUM CHLORIDE, SODIUM BICARBONATE AND POTASSIUM CHLORIDE 3500 ML/HR: 3.68; 3.05; 22; 5.4; 6.46; 3.09; .314 INJECTION INTRAVENOUS at 12:25

## 2020-04-04 RX ADMIN — CHLORHEXIDINE GLUCONATE 15 ML: 0.12 RINSE ORAL at 08:13

## 2020-04-04 RX ADMIN — CALCIUM CHLORIDE, MAGNESIUM CHLORIDE, DEXTROSE MONOHYDRATE, LACTIC ACID, SODIUM CHLORIDE, SODIUM BICARBONATE AND POTASSIUM CHLORIDE 3500 ML/HR: 3.68; 3.05; 22; 5.4; 6.46; 3.09; .314 INJECTION INTRAVENOUS at 11:00

## 2020-04-04 RX ADMIN — Medication 10 ML: at 21:46

## 2020-04-04 RX ADMIN — CALCIUM CHLORIDE, MAGNESIUM CHLORIDE, DEXTROSE MONOHYDRATE, LACTIC ACID, SODIUM CHLORIDE, SODIUM BICARBONATE AND POTASSIUM CHLORIDE 3500 ML/HR: 3.68; 3.05; 22; 5.4; 6.46; 3.09; .314 INJECTION INTRAVENOUS at 19:09

## 2020-04-04 RX ADMIN — HEPARIN SODIUM 4000 UNITS: 1000 INJECTION INTRAVENOUS; SUBCUTANEOUS at 04:47

## 2020-04-04 RX ADMIN — CALCIUM CHLORIDE, MAGNESIUM CHLORIDE, DEXTROSE MONOHYDRATE, LACTIC ACID, SODIUM CHLORIDE, SODIUM BICARBONATE AND POTASSIUM CHLORIDE 3500 ML/HR: 3.68; 3.05; 22; 5.4; 6.46; 3.09; .314 INJECTION INTRAVENOUS at 00:15

## 2020-04-04 RX ADMIN — GUAIFENESIN 100 MG: 100 SOLUTION ORAL at 17:16

## 2020-04-04 RX ADMIN — FAMOTIDINE 20 MG: 10 INJECTION, SOLUTION INTRAVENOUS at 22:05

## 2020-04-04 RX ADMIN — HEPARIN SODIUM 15 UNITS/KG/HR: 10000 INJECTION, SOLUTION INTRAVENOUS at 08:49

## 2020-04-04 RX ADMIN — HEPARIN SODIUM 18 UNITS/KG/HR: 10000 INJECTION, SOLUTION INTRAVENOUS at 20:00

## 2020-04-04 RX ADMIN — Medication 175 MCG/HR: at 17:33

## 2020-04-04 RX ADMIN — VASOPRESSIN 0.04 UNITS/MIN: 20 INJECTION INTRAVENOUS at 06:38

## 2020-04-04 RX ADMIN — GUAIFENESIN 100 MG: 100 SOLUTION ORAL at 05:12

## 2020-04-04 RX ADMIN — CALCIUM CHLORIDE, MAGNESIUM CHLORIDE, DEXTROSE MONOHYDRATE, LACTIC ACID, SODIUM CHLORIDE, SODIUM BICARBONATE AND POTASSIUM CHLORIDE 3500 ML/HR: 3.68; 3.05; 22; 5.4; 6.46; 3.09; .314 INJECTION INTRAVENOUS at 21:00

## 2020-04-04 RX ADMIN — POTASSIUM BICARBONATE 20 MEQ: 782 TABLET, EFFERVESCENT ORAL at 22:51

## 2020-04-04 RX ADMIN — SODIUM CHLORIDE 10 ML: 9 INJECTION INTRAMUSCULAR; INTRAVENOUS; SUBCUTANEOUS at 21:46

## 2020-04-04 RX ADMIN — INSULIN GLARGINE 35 UNITS: 100 INJECTION, SOLUTION SUBCUTANEOUS at 08:11

## 2020-04-04 RX ADMIN — CALCIUM CHLORIDE, MAGNESIUM CHLORIDE, DEXTROSE MONOHYDRATE, LACTIC ACID, SODIUM CHLORIDE, SODIUM BICARBONATE AND POTASSIUM CHLORIDE 3500 ML/HR: 3.68; 3.05; 22; 5.4; 6.46; 3.09; .314 INJECTION INTRAVENOUS at 13:25

## 2020-04-04 RX ADMIN — POTASSIUM & SODIUM PHOSPHATES POWDER PACK 280-160-250 MG 2 PACKET: 280-160-250 PACK at 22:51

## 2020-04-04 RX ADMIN — CALCIUM CHLORIDE, MAGNESIUM CHLORIDE, DEXTROSE MONOHYDRATE, LACTIC ACID, SODIUM CHLORIDE, SODIUM BICARBONATE AND POTASSIUM CHLORIDE 3500 ML/HR: 3.68; 3.05; 22; 5.4; 6.46; 3.09; .314 INJECTION INTRAVENOUS at 09:00

## 2020-04-04 RX ADMIN — SODIUM CHLORIDE 10 ML: 9 INJECTION INTRAMUSCULAR; INTRAVENOUS; SUBCUTANEOUS at 13:17

## 2020-04-04 RX ADMIN — OXYCODONE HYDROCHLORIDE AND ACETAMINOPHEN 500 MG: 500 TABLET ORAL at 08:12

## 2020-04-04 RX ADMIN — CALCIUM CHLORIDE, MAGNESIUM CHLORIDE, DEXTROSE MONOHYDRATE, LACTIC ACID, SODIUM CHLORIDE, SODIUM BICARBONATE AND POTASSIUM CHLORIDE 3500 ML/HR: 3.68; 3.05; 22; 5.4; 6.46; 3.09; .314 INJECTION INTRAVENOUS at 05:13

## 2020-04-04 RX ADMIN — ZINC SULFATE 220 MG (50 MG) CAPSULE 1 CAPSULE: CAPSULE at 20:00

## 2020-04-04 RX ADMIN — GUAIFENESIN 100 MG: 100 SOLUTION ORAL at 22:06

## 2020-04-04 RX ADMIN — CALCIUM CHLORIDE, MAGNESIUM CHLORIDE, DEXTROSE MONOHYDRATE, LACTIC ACID, SODIUM CHLORIDE, SODIUM BICARBONATE AND POTASSIUM CHLORIDE 3500 ML/HR: 3.68; 3.05; 22; 5.4; 6.46; 3.09; .314 INJECTION INTRAVENOUS at 01:41

## 2020-04-04 NOTE — DIALYSIS
[facility]       [phone]        Orders  Mode: CVVHD   Prismasol Bath: 4K/2.5Ca   Blood Flow Rate: 200ml/min   Prismasol Dose: 3500ml/hr   Factor: 100ml/hr     Metrics  BP/HR: 107/58 83   Access Pressure: -57   Filter Pressure: 124   Return Pressure: 80   TMP: 23   PD: 25   Dialysate Flow Rate: 3500ml/hr     Comments / Plan:   Patient, orders, line and consent verified. Labs, notes and code status reviewed. [id number] filter changed due to return pressure alarming/alarm unable to be resolved. New [id number] filter primed and tested. R Femoral CVC CDI with transparent dressing and biopatch. Hubs and limbs of CVC cleansed per policy. +aspiration/+flushx2.  Lines visible/secure with blood warmer attached to the return line and set to 37*C

## 2020-04-04 NOTE — PROGRESS NOTES
1930: Report received from [address]  0200: CVVHD clotted off, blood returned successfully. Shane Vance paged. 0330: Q12 labs drawn and sent. 200: Dorian RN at bedside to restart CVVHD  0730: Bedside shift change report given to     (oncoming nurse) by Kevin Phillips (offgoing nurse). Report included the following information SBAR, Kardex, Intake/Output and MAR.

## 2020-04-04 NOTE — DIALYSIS
[address]       [phone]    Orders   Mode: CVVHD   Factor: 100 ml/hr   Dose 3500 ml/hr   Blood Flow Rate: 200 ml/min     Metrics   BP / HR: 111/59 ( 77)  80   Blood Flow Rate: 200 ml/min   AP:                         -65   RP: 80   TMP: 28   PD: 25   FP: 138   Dose: 3500 ml/hr     Comments / Plan: At bedside to restart crrt due to clotted filer, all possible blood returned to pt per primary RN. Orders, consent, pt and code status confirmed. Right Femoral temporary CVC +aspiration/+flush x2, lines positional. Bio-patch C/D/I. New HF 1000 primed, tested and running  Well. Lines visible, secure, connections fastened well and blood warmer on return line set to 37°C.  Education and pre/post report given to Ledy Miller RN.

## 2020-04-04 NOTE — PROGRESS NOTES
[facility]    ICU Team Note    Name: Brenda Pierson   : 1960   MRN: [id number]   Date: 2020      Subjective:   Progress Note: 2020      Reason for ICU Admission: SARS-COV2 with renal failure     65 yo female with obesity and diabetes who presented to [facility] with worsening hypoxic respiratory failure in the setting of close exposure to COVID-19. She presented to the hospital 3/26 and was intubated 3/28. She was started on broad spectrum antibiotics and plaquenil. She developed worsening renal failure and was transferred to [facility] for CRRT. She has gram + cocci in her sputum and Infectious Disease is following. She completed her course of Azithromycin and Plaquenil. Overnight Events:  No significant events overnight. Active Problem List:     Problem List  Date Reviewed: 2020          Codes Class    Hypotension ICD-10-CM: I95.9  ICD-9-CM: 458.9 Acute        Sepsis due to methicillin susceptible Staphylococcus aureus (MSSA) without acute organ dysfunction (HCC) ICD-10-CM: A41.01  ICD-9-CM: 038.11, 995.91 Acute        COVID-19 virus infection ICD-10-CM: U07.1         Obesity, morbid ([facility] [address] ICD-10-CM: E66.01  ICD-9-CM: 278.01         Vaginal Pap smear following hysterectomy for malignancy ICD-10-CM: Z08, Z90.710  ICD-9-CM: V67.01         Personal history of malignant neoplasm of other parts of uterus ICD-10-CM: Z85.42  ICD-9-CM: V10.42         [facility]ometrial cancer ([facility] ICD-10-CM: C54.1  ICD-9-CM: 182.0               Past Medical History:      has a past medical history of Basal cell carcinoma, GERD (gastroesophageal reflux disease), Kidney stones, and Polyp of ureter. Past Surgical History:      has a past surgical history that includes hysteroscopy diagnostic (); pr endometrial ablation, thermal; hx  section (); and hx colonoscopy. Home Medications:     Prior to Admission medications    Medication Sig Start Date End Date Taking? Authorizing Provider   pantoprazole (PROTONIX) 40 mg tablet TAKE 1 TABLET BY MOUTH TWICE A DAY 18   Provider, Historical   esomeprazole (NEXIUM) 20 mg capsule Take 20 mg by mouth daily. Indications: BID    Provider, Historical   zolpidem (AMBIEN) 10 mg tablet Take 0.5 Tabs by mouth nightly as needed for Sleep. Max Daily Amount: 5 mg. 17   Nilda Mandel MD   fluticasone (FLONASE) 50 mcg/actuation nasal spray Mist 1-2 spray(s) into each nostril once daily. 4/3/15   Provider, Historical   ranitidine (ZANTAC) 150 mg tablet 150 mg.    Provider, Historical       Allergies/Social/Family History: Allergies   Allergen Reactions    Augmentin [Amoxicillin-Pot Clavulanate] Rash and Itching      Social History     Tobacco Use    Smoking status: Never Smoker    Smokeless tobacco: Never Used   Substance Use Topics    Alcohol use: Not on file      Family History   Problem Relation Age of Onset    Prostate Cancer Father         PROSTATE    Breast Cancer Sister 46        invasive poorly differentiated ductal carcinoma, Neg genetic testing.  Cancer Sister 62        melanoma stage 1       Review of Systems:     Unable to assess given critical illness. Objective:   Vital Signs:  Visit Vitals  /64   Pulse 81   Temp 96.4 °F (35.8 °C) Comment: bearhugger back on   Resp 22   Ht 5' 4\" (1.626 m) Comment: per OSH record   Wt 129.6 kg (285 lb 11.2 oz)   SpO2 98%   BMI 49.04 kg/m²      O2 Device: Endotracheal tube, Ventilator Temp (24hrs), Av.3 °F (36.3 °C), Min:96.4 °F (35.8 °C), Max:98.1 °F (36.7 °C)           Intake/Output:     Intake/Output Summary (Last 24 hours) at 2020 1047  Last data filed at 2020 1000  Gross per 24 hour   Intake 2393.34 ml   Output 5320 ml   Net -2926.66 ml     Physical Exam:    General: Ill appearing female appearing her stated age. E  HEENT: Atraumatic and normocephalic.   Cardio: RRR without ectopy  Respiratory: Distant breath sounds bilaterally  GI:   Soft abdomen with + bowel sounds  Extremities: +lower extremity edema  Neuro:  Sedated secondary to critical illness. LABS AND  DATA: Personally reviewed  Recent Labs     04/04/20  0333 04/03/20  1524   WBC 7.7 9.1   HGB 8.7* 8.8*   HCT 28.0* 28.2*    136*     Recent Labs     04/04/20  0333 04/03/20  1524   * 134*   K 3.6 3.9    102   CO2 23 24   BUN 33* 35*   CREA 2.18* 2.60*   * 147*   CA 8.9 8.8   MG 2.7* 2.8*   PHOS 3.0 3.8     Recent Labs     04/04/20  0333 04/03/20  1524   SGOT 28  --    *  --    TP 6.4  --    ALB 1.8* 1.8*   GLOB 4.6*  --      Recent Labs     04/04/20  0333 04/03/20  2027  04/03/20  0316   INR 1.0  --   --  1.1   PTP 10.8  --   --  11.1   APTT 33.9* 34.7*   < > 33.6*    < > = values in this interval not displayed. Recent Labs     04/04/20  0452 04/03/20  0623   PHI 7.344* 7.338*   PCO2I 43.4 48.8*   PO2I 106* 212*   FIO2I 50 100     Recent Labs     04/03/20  1239   CPK 42     Ventilator Settings:  Mode Rate Tidal Volume Pressure FiO2 PEEP   Assist control, Pressure control        50 % 16 cm H20     Peak airway pressure: 33 cm H2O    Minute ventilation: 10.4 l/min      MEDS: Reviewed    Chest X-Ray 4/3/20:  Extensive bilateral airspace disease. Assessment/Plan:     1. Acute Hypoxic Respiratory failure - Secondary to COVID-19 - Sputum with gram + cocci (staph aureus). a. Continue vancomycin (completed Azithromycin and Plaquenil). b. Continue lung protective ventilation strategies. c. SBTs as she improves, would not tolerate currently. d. Recd Tocilizumab yesterday. 2. Shock - Likely worsened or precipitated by sedation. a. White count normal.  b. Continue vasopressin and levophed for BP support. c. Blood cultures 3/31 without growth. d. CRP 28 on 3/31. 3. Diabetes  a. Glucoses well controlled with Lantus and Humalog. b. Home control poor with A1c of 10.4. Would benefit from diabetes education after she improves.       4. Renal Failure  a. Nephrology following.  b. Continue CRRT. Anemia  --HH 8.7 / 28, stable  --Check iron profile. Protein malnutrition  --Albumin 1.8.      Echo:  Pending. ABCDEF Bundle/Checklist  Pain Medications: Fentanyl  Target RASS: -2 - Light Sedation - Briefly awakens to voice (eyes open & contact <10 sec)  Sedation Medications: None  CAM-ICU:  Negative  Mobility: Bedrest  PT/OT: TO be consulted when stable   Restraints: Soft wrist restraints  Discussed Plan of Care (goals of care): No  Addressed Code Status: Full Code    CARDIOVASCULAR  Cardiac Gtts: Norepinephrine and Vasopressin  SBP Goal of: > 90 mmHg  MAP Goal of: > 65 mmHg  Transfusion Trigger (Hgb): <7 g/dL    RESPIRATORY  Vent Goals:   Chlorhexidine   Optimize PEEP/Ventilation/Oxygenation  Goal Tidal Volume 6 cc/kg based on IBW  Aim for lung protective ventilation  Head of bed > 30 degrees  DVT Prophylaxis (if no, list reason): SCD's or Sequential Compression Device and Heparin   SPO2 Goal: > 92%  Pulmonary toilet: Duo-Nebs     GI/  Bowden Catheter Present: Yes  GI Prophylaxis: Pepcid (famotidine)   Nutrition: Yes   Bowel Movement: Yes, FMS in use  Bowel Regimen:  None  Insulin: ISS and Lantus    ANTIBIOTICS  Antibiotics:  Vancomycin    T/L/D  Tubes: ETT and Orogastric Tube  Lines: Peripheral IV, Arterial Line, PICC Line and Ramon  Drains: Bowden Catheter and FMS    SPECIAL EQUIPMENT  CRRT    DISPOSITION  Stay in ICU    CRITICAL CARE CONSULTANT NOTE  I had a face to face encounter with the patient, reviewed and interpreted patient data including clinical events, labs, images, vital signs, I/O's, and examined patient.   I have discussed the case and the plan and management of the patient's care with the consulting services, the bedside nurses and the respiratory therapist.      NOTE OF PERSONAL INVOLVEMENT IN CARE   This patient has a high probability of imminent, clinically significant deterioration, which requires the highest level of preparedness to intervene urgently. I participated in the decision-making and personally managed or directed the management of the following life and organ supporting interventions that required my frequent assessment to treat or prevent imminent deterioration. I personally spent 40 minutes of critical care time. This is time spent at this critically ill patient's bedside actively involved in patient care as well as the coordination of care and discussions with the patient's family. This does not include any procedural time which has been billed separately.     STEWART Jc-BC, MSN  Staff Intensivist  [facility] Critical Care  4/4/2020

## 2020-04-04 NOTE — PROGRESS NOTES
ID follow up    S/P Actemra one dose 4/3  Will add on Ferritin, LDH, CRP to am labs to trend  Afebrile  Completed azithromycin and Plaquenil   MSSA on sputum, pharmacy dosing vancomycin based on levels due to Penicillin allergy     Allison Padgett,   8:42 AM

## 2020-04-04 NOTE — PROGRESS NOTES
Pharmacist Note - Vancomycin Dosing  Therapy day 5  Indication:  Possible PNA, COVID-19+  Current regimen: Based on levels, last dose: 1.5 gm on 4/3 @ 1818    A Random Level resulted at 20.6 mcg/mL which was obtained ~23 hrs post-dose. Goal trough: 15 - 20 mcg/mL     Plan: No dose needed now. Will order next 1.5 gm for tomorrow morning, ~48hrs since last dose. Pharmacy will continue to monitor this patient daily for changes in clinical status and renal function.

## 2020-04-04 NOTE — PROGRESS NOTES
[facility]   [address] [address], [facility] [address], [facility]  Phone: [phone]   [phone]       Nephrology Progress Note  Maria A Christianson     1960     [id number]  Date of Admission : 3/31/2020  04/04/20    CC: Follow up for JUANCHO      Assessment and Plan   JUANCHO :  - 2/2 ATN  - femoral Ramon catheter a bit dysfunctional on 4/3/20.  - cranking up factor to Rx volume component. - continue CVVHD w/ heparin through filter   - factor as tolerated  - daily labs for now  - KCl, KPO4 prn.     Acid Base/ Lytes :  - improved      COVID-19 +ve   Acute Hypoxic Resp Failure   - On Vent   - completed Plaquenil      Type II DM   - Insulin per primary team      Hypothermia   Morbid Obesity      Care Plan discussed with:  ICU team      Interval History:  Case discussed with intensivist.  CRRT filter clotted again overnight, back on CVVHD. Remaains anuric. PT NOT EXAMINED in line with ASN and RPA GUIDELINES ON MANAGING COVID-19 PTS WITH RENAL ISSUES. Examination findings discussed personally with the examining Physician team member      Review of Systems: Review of systems not obtained due to patient factors. Current Medications:   Current Facility-Administered Medications   Medication Dose Route Frequency    Vancomycin Random Level - Please draw level on 4/4 @ 1800 (~24 hour level), thanks!    Other ONCE    heparin (porcine) pf 300 Units  300 Units InterCATHeter PRN    guaiFENesin (ROBITUSSIN) 100 mg/5 mL oral liquid 100 mg  100 mg Oral Q6H    alteplase (CATHFLO) 2 mg in sterile water (preservative free) 2 mL injection  2 mg InterCATHeter PRN    alteplase (CATHFLO) 2 mg in sterile water (preservative free) 2 mL injection  2 mg InterCATHeter PRN    ascorbic acid (vitamin C) (VITAMIN C) tablet 500 mg  500 mg Per NG tube Q12H    heparin 25,000 units in D5W 250 ml infusion  7-25 Units/kg/hr IntraVENous TITRATE    zinc sulfate (ZINCATE) capsule 1 Cap  1 Cap Per NG tube QHS    midazolam (VERSED) injection 1-2 mg  1-2 mg IntraVENous Q2H PRN    bacitracin 500 unit/gram packet 1 Packet  1 Packet Topical PRN    insulin glargine (LANTUS) injection 35 Units  35 Units SubCUTAneous DAILY    balsam peru-castor oiL (VENELEX) ointment   Topical BID    sodium chloride (NS) flush 5-40 mL  5-40 mL IntraVENous Q8H    sodium chloride (NS) flush 5-40 mL  5-40 mL IntraVENous PRN    HYDROcodone-acetaminophen (NORCO) 5-325 mg per tablet 1 Tab  1 Tab Oral Q4H PRN    HYDROmorphone (PF) (DILAUDID) injection 0.5 mg  0.5 mg IntraVENous Q4H PRN    ondansetron (ZOFRAN) injection 4 mg  4 mg IntraVENous Q4H PRN    NOREPINephrine (LEVOPHED) 8,000 mcg in dextrose 5% 250 mL infusion  2-30 mcg/min IntraVENous TITRATE    vasopressin (VASOSTRICT) 20 Units in 0.9% sodium chloride 100 mL infusion  0.04 Units/min IntraVENous CONTINUOUS    fentaNYL (PF) 1,500 mcg/30 mL (50 mcg/mL) infusion   mcg/hr IntraVENous TITRATE    propofol (DIPRIVAN) 10 mg/mL infusion  0-50 mcg/kg/min IntraVENous TITRATE    famotidine (PF) (PEPCID) 20 mg in 0.9% sodium chloride 10 mL injection  20 mg IntraVENous QHS    chlorhexidine (ORAL CARE KIT) 0.12 % mouthwash 15 mL  15 mL Oral Q12H    docusate (COLACE) 50 mg/5 mL oral liquid 100 mg  100 mg Per NG tube BID    acetaminophen (TYLENOL) tablet 650 mg  650 mg Oral Q6H PRN    Or    acetaminophen (TYLENOL) suppository 650 mg  650 mg Rectal Q6H PRN    bicarbonate dialysis (PRISMASOL) BG K 4/Ca 2.5 5000 ml solution   Extracorporeal DIALYSIS CONTINUOUS    glucose chewable tablet 16 g  4 Tab Oral PRN    glucagon (GLUCAGEN) injection 1 mg  1 mg IntraMUSCular PRN    dextrose 10% infusion 0-250 mL  0-250 mL IntraVENous PRN    insulin lispro (HUMALOG) injection   SubCUTAneous Q6H    Vancomycin - Pharmacy to Dose   Other Rx Dosing/Monitoring      Allergies   Allergen Reactions    Augmentin [Amoxicillin-Pot Clavulanate] Rash and Itching       Objective:  Vitals:    Vitals:    04/04/20 0900 04/04/20 0926 04/04/20 1000 04/04/20 1100   BP: 117/65  112/64 128/69   Pulse: 78  81 86   Resp: 9 20 22 23   Temp:       SpO2: 100% 98% 98% 100%   Weight:       Height:         Intake and Output:  04/04 0701 - 04/04 1900  In: 171.8 [I.V.:101.8]  Out: 072   04/02 1901 - 04/04 0700  In: 3344.8 [I.V.:1664.8]  Out: 8216 [Urine:4; Drains:900]    Physical Examination: inspection only   Pt intubated    Yes  General: Sedated on vent, obese   Neck:  Lines   Resp:  On vent   CV:  RRR  GI:  Obese   Neurologic:  Sedated   Psych:              Unable to assess  Ext                   R femoral melissa     []    High complexity decision making was performed  []    Patient is at high-risk of decompensation with multiple organ involvement    Lab Data Personally Reviewed: I have reviewed all the pertinent labs, microbiology data and radiology studies during assessment. Recent Labs     04/04/20  0333 04/03/20  1524 04/03/20  0316 04/02/20  1639 04/02/20  1617 04/02/20  1034  04/02/20  0410   * 134* 135* 136  --  136   < >  --    K 3.6 3.9 3.8 4.0  --  3.4*   < >  --     102 102 104  --  100   < >  --    CO2 23 24 26 27  --  28   < >  --    * 147* 125* 119*  --  165*   < >  --    BUN 33* 35* 31* 27*  --  29*   < >  --    CREA 2.18* 2.60* 2.78* 2.60*  --  3.19*   < >  --    CA 8.9 8.8 8.6 8.0*  --  8.1*   < >  --    MG 2.7* 2.8* 2.8* 2.6*  --  2.5*  --  2.6*   PHOS 3.0 3.8 3.4 3.9 3.9 3.8   < >  --    ALB 1.8* 1.8* 1.8* 1.9*  --  1.8*   < >  --    SGOT 28  --   --   --   --   --   --   --    ALT 25  --   --   --   --   --   --   --    INR 1.0  --  1.1  --   --   --   --  1.1    < > = values in this interval not displayed.      Recent Labs     04/04/20  0333 04/03/20  1524 04/03/20  0316 04/02/20  1639 04/02/20  0410   WBC 7.7 9.1 9.2 10.1 10.5   HGB 8.7* 8.8* 9.0* 9.0* 9.3*   HCT 28.0* 28.2* 28.9* 28.5* 29.2*    136* 161 193 242     No results found for: SDES  Lab Results   Component Value Date/Time Culture result: NO GROWTH 4 DAYS 03/31/2020 11:15 AM    Culture result: MODERATE STAPHYLOCOCCUS AUREUS (A) 03/31/2020 10:34 AM    Culture result: LIGHT NORMAL RESPIRATORY SOFIE 03/31/2020 10:34 AM     Recent Results (from the past 24 hour(s))   GLUCOSE, POC    Collection Time: 04/03/20 12:15 PM   Result Value Ref Range    Glucose (POC) 136 (H) 65 - 100 mg/dL    Performed by Varghese Meza    Collection Time: 04/03/20 12:39 PM   Result Value Ref Range    Fibrinogen 640 (H) 200 - 475 mg/dL   D DIMER    Collection Time: 04/03/20 12:39 PM   Result Value Ref Range    D-dimer >35.20 (H) 0.00 - 0.65 mg/L FEU   CK    Collection Time: 04/03/20 12:39 PM   Result Value Ref Range    CK 42 26 - 192 U/L   LD    Collection Time: 04/03/20 12:39 PM   Result Value Ref Range     (H) 81 - 246 U/L   RENAL FUNCTION PANEL    Collection Time: 04/03/20  3:24 PM   Result Value Ref Range    Sodium 134 (L) 136 - 145 mmol/L    Potassium 3.9 3.5 - 5.1 mmol/L    Chloride 102 97 - 108 mmol/L    CO2 24 21 - 32 mmol/L    Anion gap 8 5 - 15 mmol/L    Glucose 147 (H) 65 - 100 mg/dL    BUN 35 (H) 6 - 20 MG/DL    Creatinine 2.60 (H) 0.55 - 1.02 MG/DL    BUN/Creatinine ratio 13 12 - 20      GFR est AA 23 (L) >60 ml/min/1.73m2    GFR est non-AA 19 (L) >60 ml/min/1.73m2    Calcium 8.8 8.5 - 10.1 MG/DL    Phosphorus 3.8 2.6 - 4.7 MG/DL    Albumin 1.8 (L) 3.5 - 5.0 g/dL   MAGNESIUM    Collection Time: 04/03/20  3:24 PM   Result Value Ref Range    Magnesium 2.8 (H) 1.6 - 2.4 mg/dL   PTT    Collection Time: 04/03/20  3:24 PM   Result Value Ref Range    aPTT 37.3 (H) 22.1 - 32.0 sec    aPTT, therapeutic range     58.0 - 77.0 SECS   CBC WITH AUTOMATED DIFF    Collection Time: 04/03/20  3:24 PM   Result Value Ref Range    WBC 9.1 3.6 - 11.0 K/uL    RBC 3.24 (L) 3.80 - 5.20 M/uL    HGB 8.8 (L) 11.5 - 16.0 g/dL    HCT 28.2 (L) 35.0 - 47.0 %    MCV 87.0 80.0 - 99.0 FL    MCH 27.2 26.0 - 34.0 PG    MCHC 31.2 30.0 - 36.5 g/dL    RDW 14.3 11.5 - 14.5 %    PLATELET 779 (L) 488 - 400 K/uL    MPV 10.0 8.9 - 12.9 FL    NRBC 0.0 0  WBC    ABSOLUTE NRBC 0.00 0.00 - 0.01 K/uL    NEUTROPHILS 86 (H) 32 - 75 %    BAND NEUTROPHILS 2 0 - 6 %    LYMPHOCYTES 9 (L) 12 - 49 %    MONOCYTES 1 (L) 5 - 13 %    EOSINOPHILS 0 0 - 7 %    BASOPHILS 1 0 - 1 %    MYELOCYTES 1 (H) 0 %    IMMATURE GRANULOCYTES 0 %    ABS. NEUTROPHILS 8.0 1.8 - 8.0 K/UL    ABS. LYMPHOCYTES 0.8 0.8 - 3.5 K/UL    ABS. MONOCYTES 0.1 0.0 - 1.0 K/UL    ABS. EOSINOPHILS 0.0 0.0 - 0.4 K/UL    ABS. BASOPHILS 0.1 0.0 - 0.1 K/UL    ABS. IMM. GRANS. 0.0 K/UL    DF MANUAL      RBC COMMENTS NORMOCYTIC, NORMOCHROMIC     GLUCOSE, POC    Collection Time: 04/03/20  6:12 PM   Result Value Ref Range    Glucose (POC) 136 (H) 65 - 100 mg/dL    Performed by Cole Eduardo    PTT    Collection Time: 04/03/20  8:27 PM   Result Value Ref Range    aPTT 34.7 (H) 22.1 - 32.0 sec    aPTT, therapeutic range     58.0 - 77.0 SECS   GLUCOSE, POC    Collection Time: 04/04/20 12:28 AM   Result Value Ref Range    Glucose (POC) 139 (H) 65 - 100 mg/dL    Performed by Gurmeet Linares    METABOLIC PANEL, BASIC    Collection Time: 04/04/20  3:33 AM   Result Value Ref Range    Sodium 134 (L) 136 - 145 mmol/L    Potassium 3.6 3.5 - 5.1 mmol/L    Chloride 103 97 - 108 mmol/L    CO2 23 21 - 32 mmol/L    Anion gap 8 5 - 15 mmol/L    Glucose 144 (H) 65 - 100 mg/dL    BUN 33 (H) 6 - 20 MG/DL    Creatinine 2.18 (H) 0.55 - 1.02 MG/DL    BUN/Creatinine ratio 15 12 - 20      GFR est AA 28 (L) >60 ml/min/1.73m2    GFR est non-AA 23 (L) >60 ml/min/1.73m2    Calcium 8.9 8.5 - 10.1 MG/DL   HEPATIC FUNCTION PANEL    Collection Time: 04/04/20  3:33 AM   Result Value Ref Range    Protein, total 6.4 6.4 - 8.2 g/dL    Albumin 1.8 (L) 3.5 - 5.0 g/dL    Globulin 4.6 (H) 2.0 - 4.0 g/dL    A-G Ratio 0.4 (L) 1.1 - 2.2      Bilirubin, total 0.8 0.2 - 1.0 MG/DL    Bilirubin, direct 0.6 (H) 0.0 - 0.2 MG/DL    Alk.  phosphatase 281 (H) 45 - 117 U/L AST (SGOT) 28 15 - 37 U/L    ALT (SGPT) 25 12 - 78 U/L   CBC WITH AUTOMATED DIFF    Collection Time: 04/04/20  3:33 AM   Result Value Ref Range    WBC 7.7 3.6 - 11.0 K/uL    RBC 3.23 (L) 3.80 - 5.20 M/uL    HGB 8.7 (L) 11.5 - 16.0 g/dL    HCT 28.0 (L) 35.0 - 47.0 %    MCV 86.7 80.0 - 99.0 FL    MCH 26.9 26.0 - 34.0 PG    MCHC 31.1 30.0 - 36.5 g/dL    RDW 14.3 11.5 - 14.5 %    PLATELET 493 158 - 625 K/uL    MPV 9.9 8.9 - 12.9 FL    NRBC 0.0 0  WBC    ABSOLUTE NRBC 0.00 0.00 - 0.01 K/uL    NEUTROPHILS 67 32 - 75 %    LYMPHOCYTES 16 12 - 49 %    MONOCYTES 5 5 - 13 %    EOSINOPHILS 2 0 - 7 %    BASOPHILS 1 0 - 1 %    IMMATURE GRANULOCYTES 9 (H) 0.0 - 0.5 %    ABS. NEUTROPHILS 5.1 1.8 - 8.0 K/UL    ABS. LYMPHOCYTES 1.2 0.8 - 3.5 K/UL    ABS. MONOCYTES 0.4 0.0 - 1.0 K/UL    ABS. EOSINOPHILS 0.2 0.0 - 0.4 K/UL    ABS. BASOPHILS 0.1 0.0 - 0.1 K/UL    ABS. IMM. GRANS. 0.7 (H) 0.00 - 0.04 K/UL    DF SMEAR SCANNED      RBC COMMENTS NORMOCYTIC, NORMOCHROMIC     PROTHROMBIN TIME + INR    Collection Time: 04/04/20  3:33 AM   Result Value Ref Range    INR 1.0 0.9 - 1.1      Prothrombin time 10.8 9.0 - 11.1 sec   PTT    Collection Time: 04/04/20  3:33 AM   Result Value Ref Range    aPTT 33.9 (H) 22.1 - 32.0 sec    aPTT, therapeutic range     58.0 - 77.0 SECS   MAGNESIUM    Collection Time: 04/04/20  3:33 AM   Result Value Ref Range    Magnesium 2.7 (H) 1.6 - 2.4 mg/dL   PHOSPHORUS    Collection Time: 04/04/20  3:33 AM   Result Value Ref Range    Phosphorus 3.0 2.6 - 4.7 MG/DL   POC EG7    Collection Time: 04/04/20  4:52 AM   Result Value Ref Range    Calcium, ionized (POC) 1.20 1. 12 - 1.32 mmol/L    FIO2 (POC) 50 %    pH (POC) 7.344 (L) 7.35 - 7.45      pCO2 (POC) 43.4 35.0 - 45.0 MMHG    pO2 (POC) 106 (H) 80 - 100 MMHG    HCO3 (POC) 23.6 22 - 26 MMOL/L    Base deficit (POC) 2 mmol/L    sO2 (POC) 98 (H) 92 - 97 %    Site DRAWN FROM ARTERIAL LINE      Device: VENT      Mode ASSIST CONTROL      Set Rate 22 bpm    PEEP/CPAP (POC) 18 cmH2O    PIP (POC) 14      Allens test (POC) N/A      Inspiratory Time 1.64 sec    Specimen type (POC) ARTERIAL      Total resp. rate 22     GLUCOSE, POC    Collection Time: 04/04/20  4:57 AM   Result Value Ref Range    Glucose (POC) 139 (H) 65 - 100 mg/dL    Performed by Ariel Felix    PTT    Collection Time: 04/04/20 10:52 AM   Result Value Ref Range    aPTT 53.6 (H) 22.1 - 32.0 sec    aPTT, therapeutic range     58.0 - 77.0 SECS           Total time spent with patient:  xxx   min. Care Plan discussed with:  Patient     Family      RN      Consulting Physician [facility],         I have reviewed the flowsheets. Chart and Pertinent Notes have been reviewed. No change in PMH ,family and social history from Consult note.       Lucas Martin MD

## 2020-04-04 NOTE — PROGRESS NOTES
Day #5 of Vancomycin  Indication:  Possible PNA, COVID-19+  Current regimen:  Based on levels, last dose: 1.5 gm on 4/3 @ 1818  Abx regimen:  Monotherapy  ID Following ?: YES  Concomitant nephrotoxic drugs (requires more frequent monitoring): Vasopressors  Frequency of BMP?: Every 12 hours    Recent Labs     20  0333 20  1524 20  0316   WBC 7.7 9.1 9.2   CREA 2.18* 2.60* 2.78*   BUN 33* 35* 31*     Est CrCl: CVVHD  Temp (24hrs), Av.3 °F (36.3 °C), Min:96.4 °F (35.8 °C), Max:98.1 °F (36.7 °C)    Cultures:   COVID-19 + (from OSH)  3/31 Sputum: moderate MSSA, final  3/31 Blood: NGTD    Goal trough = 15 - 20 mcg/mL    Recent trough history (date/time/level/dose/action taken):  3/31 @ 2054 = 22.9 mcg/ml (drawn ~24 hrs post-dose), continue to hold   @ 0850 = 18.8 mcg/ml (drawn ~36 hrs post-dose), 1500 mg given at the 48-hr sin   @ 2036= 21.5 mcg/ml (~23.5h post-dose), no doses given    Plan: Last dosed on 4/3 @ ~1800. Started at OSH on 3/30 and since dosed ~ every 48 hours. Will draw random level this evening @ 1800 (~24 hours post-dose) to ensure does not need dose prior to 48 hours. Please reference previous notes for additional information.      Jazzmine Meraz, PHARMD

## 2020-04-04 NOTE — PROGRESS NOTES
0730: Bedside and Verbal shift change report given to [facility] [place] (oncoming nurse) by Winter Mireles RN (offgoing nurse). Report included the following information SBAR, Kardex, ED Summary, Procedure Summary, Intake/Output, MAR, Accordion, Recent Results and Med Rec Status. 0800: Dr. Beatrix Halsted at bedside. 0845: CRRT ringing out \"extremely negative\" attempted to fix problem. Unable to. Savanna Dow paged and patient rinsed back. Dr. Beatrix Halsted in room to assess line. 1000: CRRT restarted. 1040: Spoke with patients , Silvia Vo, updated. Told Dr Keyon Siddiqui that we was requesting a update from a MD.     1930: Bedside shift change report given to DWAYNE Orozco (oncoming nurse) by Shante Brandt (offgoing nurse). Report included the following information SBAR, Kardex, ED Summary, Procedure Summary, Intake/Output, MAR, Accordion, Recent Results and Med Rec Status.

## 2020-04-05 ENCOUNTER — APPOINTMENT (OUTPATIENT)
Dept: GENERAL RADIOLOGY | Age: 60
DRG: 870 | End: 2020-04-05
Attending: SURGERY
Payer: COMMERCIAL

## 2020-04-05 LAB
ALBUMIN SERPL-MCNC: 1.9 G/DL (ref 3.5–5)
ALBUMIN SERPL-MCNC: 2 G/DL (ref 3.5–5)
ANION GAP SERPL CALC-SCNC: 4 MMOL/L (ref 5–15)
ANION GAP SERPL CALC-SCNC: 5 MMOL/L (ref 5–15)
APTT PPP: 52.9 SEC (ref 22.1–32)
APTT PPP: 56.6 SEC (ref 22.1–32)
APTT PPP: 60.4 SEC (ref 22.1–32)
ARTERIAL PATENCY WRIST A: ABNORMAL
BACTERIA SPEC CULT: NORMAL
BASE EXCESS BLD CALC-SCNC: 0 MMOL/L
BASOPHILS # BLD: 0 K/UL (ref 0–0.1)
BASOPHILS NFR BLD: 0 % (ref 0–1)
BDY SITE: ABNORMAL
BUN SERPL-MCNC: 24 MG/DL (ref 6–20)
BUN SERPL-MCNC: 28 MG/DL (ref 6–20)
BUN/CREAT SERPL: 17 (ref 12–20)
BUN/CREAT SERPL: 18 (ref 12–20)
CA-I BLD-SCNC: 1.19 MMOL/L (ref 1.12–1.32)
CALCIUM SERPL-MCNC: 8.2 MG/DL (ref 8.5–10.1)
CALCIUM SERPL-MCNC: 8.5 MG/DL (ref 8.5–10.1)
CHLORIDE SERPL-SCNC: 104 MMOL/L (ref 97–108)
CHLORIDE SERPL-SCNC: 104 MMOL/L (ref 97–108)
CO2 SERPL-SCNC: 26 MMOL/L (ref 21–32)
CO2 SERPL-SCNC: 27 MMOL/L (ref 21–32)
CREAT SERPL-MCNC: 1.42 MG/DL (ref 0.55–1.02)
CREAT SERPL-MCNC: 1.52 MG/DL (ref 0.55–1.02)
CRP SERPL-MCNC: 9.65 MG/DL (ref 0–0.6)
D DIMER PPP FEU-MCNC: 14.66 MG/L FEU (ref 0–0.65)
DIFFERENTIAL METHOD BLD: ABNORMAL
EOSINOPHIL # BLD: 0.2 K/UL (ref 0–0.4)
EOSINOPHIL NFR BLD: 2 % (ref 0–7)
ERYTHROCYTE [DISTWIDTH] IN BLOOD BY AUTOMATED COUNT: 14.4 % (ref 11.5–14.5)
FERRITIN SERPL-MCNC: 992 NG/ML (ref 8–252)
FIBRINOGEN PPP-MCNC: 575 MG/DL (ref 200–475)
FSP PPP LA-ACNC: 80 UG/ML
GAS FLOW.O2 O2 DELIVERY SYS: ABNORMAL L/MIN
GAS FLOW.O2 SETTING OXYMISER: 22 BPM
GLUCOSE BLD STRIP.AUTO-MCNC: 106 MG/DL (ref 65–100)
GLUCOSE BLD STRIP.AUTO-MCNC: 114 MG/DL (ref 65–100)
GLUCOSE SERPL-MCNC: 110 MG/DL (ref 65–100)
GLUCOSE SERPL-MCNC: 111 MG/DL (ref 65–100)
HCO3 BLD-SCNC: 24.9 MMOL/L (ref 22–26)
HCT VFR BLD AUTO: 30.9 % (ref 35–47)
HGB BLD-MCNC: 9.6 G/DL (ref 11.5–16)
IMM GRANULOCYTES # BLD AUTO: 0 K/UL
IMM GRANULOCYTES NFR BLD AUTO: 0 %
INSPIRATION.DURATION SETTING TIME VENT: 1.64 SEC
LDH SERPL L TO P-CCNC: 436 U/L (ref 81–246)
LYMPHOCYTES # BLD: 1.5 K/UL (ref 0.8–3.5)
LYMPHOCYTES NFR BLD: 13 % (ref 12–49)
MCH RBC QN AUTO: 26.7 PG (ref 26–34)
MCHC RBC AUTO-ENTMCNC: 31.1 G/DL (ref 30–36.5)
MCV RBC AUTO: 86.1 FL (ref 80–99)
METAMYELOCYTES NFR BLD MANUAL: 2 %
MONOCYTES # BLD: 0.8 K/UL (ref 0–1)
MONOCYTES NFR BLD: 7 % (ref 5–13)
MYELOCYTES NFR BLD MANUAL: 1 %
NEUTS BAND NFR BLD MANUAL: 2 % (ref 0–6)
NEUTS SEG # BLD: 8.8 K/UL (ref 1.8–8)
NEUTS SEG NFR BLD: 73 % (ref 32–75)
NRBC # BLD: 0 K/UL (ref 0–0.01)
NRBC BLD-RTO: 0 PER 100 WBC
O2/TOTAL GAS SETTING VFR VENT: 0.45 %
PCO2 BLD: 39 MMHG (ref 35–45)
PEEP RESPIRATORY: 8 CMH2O
PH BLD: 7.41 [PH] (ref 7.35–7.45)
PHOSPHATE SERPL-MCNC: 2.4 MG/DL (ref 2.6–4.7)
PHOSPHATE SERPL-MCNC: 2.6 MG/DL (ref 2.6–4.7)
PIP ISTAT,IPIP: 14
PLATELET # BLD AUTO: 162 K/UL (ref 150–400)
PLATELET COMMENTS,PCOM: ABNORMAL
PMV BLD AUTO: 10.3 FL (ref 8.9–12.9)
PO2 BLD: 67 MMHG (ref 80–100)
POTASSIUM SERPL-SCNC: 3.8 MMOL/L (ref 3.5–5.1)
POTASSIUM SERPL-SCNC: 4 MMOL/L (ref 3.5–5.1)
PRESSURE CONTROL, IPC: YES
RBC # BLD AUTO: 3.59 M/UL (ref 3.8–5.2)
RBC MORPH BLD: ABNORMAL
RBC MORPH BLD: ABNORMAL
SAO2 % BLD: 93 % (ref 92–97)
SERVICE CMNT-IMP: ABNORMAL
SERVICE CMNT-IMP: ABNORMAL
SERVICE CMNT-IMP: NORMAL
SODIUM SERPL-SCNC: 135 MMOL/L (ref 136–145)
SODIUM SERPL-SCNC: 135 MMOL/L (ref 136–145)
SPECIMEN TYPE: ABNORMAL
THERAPEUTIC RANGE,PTTT: ABNORMAL SECS (ref 58–77)
TOTAL RESP. RATE, ITRR: 22
VENTILATION MODE VENT: ABNORMAL
WBC # BLD AUTO: 11.7 K/UL (ref 3.6–11)

## 2020-04-05 PROCEDURE — 74011250636 HC RX REV CODE- 250/636: Performed by: INTERNAL MEDICINE

## 2020-04-05 PROCEDURE — 74011250637 HC RX REV CODE- 250/637: Performed by: NURSE PRACTITIONER

## 2020-04-05 PROCEDURE — 85730 THROMBOPLASTIN TIME PARTIAL: CPT

## 2020-04-05 PROCEDURE — 82728 ASSAY OF FERRITIN: CPT

## 2020-04-05 PROCEDURE — 65610000006 HC RM INTENSIVE CARE

## 2020-04-05 PROCEDURE — 85379 FIBRIN DEGRADATION QUANT: CPT

## 2020-04-05 PROCEDURE — 36600 WITHDRAWAL OF ARTERIAL BLOOD: CPT

## 2020-04-05 PROCEDURE — 82962 GLUCOSE BLOOD TEST: CPT

## 2020-04-05 PROCEDURE — 80069 RENAL FUNCTION PANEL: CPT

## 2020-04-05 PROCEDURE — 74011000250 HC RX REV CODE- 250: Performed by: INTERNAL MEDICINE

## 2020-04-05 PROCEDURE — 94003 VENT MGMT INPAT SUBQ DAY: CPT

## 2020-04-05 PROCEDURE — 83615 LACTATE (LD) (LDH) ENZYME: CPT

## 2020-04-05 PROCEDURE — 71045 X-RAY EXAM CHEST 1 VIEW: CPT

## 2020-04-05 PROCEDURE — 82803 BLOOD GASES ANY COMBINATION: CPT

## 2020-04-05 PROCEDURE — 90945 DIALYSIS ONE EVALUATION: CPT

## 2020-04-05 PROCEDURE — 74011636637 HC RX REV CODE- 636/637: Performed by: INTERNAL MEDICINE

## 2020-04-05 PROCEDURE — 85384 FIBRINOGEN ACTIVITY: CPT

## 2020-04-05 PROCEDURE — 86140 C-REACTIVE PROTEIN: CPT

## 2020-04-05 PROCEDURE — 74011250637 HC RX REV CODE- 250/637: Performed by: INTERNAL MEDICINE

## 2020-04-05 PROCEDURE — 85025 COMPLETE CBC W/AUTO DIFF WBC: CPT

## 2020-04-05 PROCEDURE — 36415 COLL VENOUS BLD VENIPUNCTURE: CPT

## 2020-04-05 RX ADMIN — CALCIUM CHLORIDE, MAGNESIUM CHLORIDE, DEXTROSE MONOHYDRATE, LACTIC ACID, SODIUM CHLORIDE, SODIUM BICARBONATE AND POTASSIUM CHLORIDE 3500 ML/HR: 3.68; 3.05; 22; 5.4; 6.46; 3.09; .314 INJECTION INTRAVENOUS at 11:18

## 2020-04-05 RX ADMIN — CALCIUM CHLORIDE, MAGNESIUM CHLORIDE, DEXTROSE MONOHYDRATE, LACTIC ACID, SODIUM CHLORIDE, SODIUM BICARBONATE AND POTASSIUM CHLORIDE 3500 ML/HR: 3.68; 3.05; 22; 5.4; 6.46; 3.09; .314 INJECTION INTRAVENOUS at 18:24

## 2020-04-05 RX ADMIN — CALCIUM CHLORIDE, MAGNESIUM CHLORIDE, DEXTROSE MONOHYDRATE, LACTIC ACID, SODIUM CHLORIDE, SODIUM BICARBONATE AND POTASSIUM CHLORIDE 3500 ML/HR: 3.68; 3.05; 22; 5.4; 6.46; 3.09; .314 INJECTION INTRAVENOUS at 13:56

## 2020-04-05 RX ADMIN — CHLORHEXIDINE GLUCONATE 15 ML: 0.12 RINSE ORAL at 08:17

## 2020-04-05 RX ADMIN — CHLORHEXIDINE GLUCONATE 15 ML: 0.12 RINSE ORAL at 20:00

## 2020-04-05 RX ADMIN — HEPARIN SODIUM 19 UNITS/KG/HR: 10000 INJECTION, SOLUTION INTRAVENOUS at 05:35

## 2020-04-05 RX ADMIN — GUAIFENESIN 100 MG: 100 SOLUTION ORAL at 17:52

## 2020-04-05 RX ADMIN — ZINC SULFATE 220 MG (50 MG) CAPSULE 1 CAPSULE: CAPSULE at 20:00

## 2020-04-05 RX ADMIN — SODIUM CHLORIDE 10 ML: 9 INJECTION INTRAMUSCULAR; INTRAVENOUS; SUBCUTANEOUS at 21:53

## 2020-04-05 RX ADMIN — Medication 175 MCG/HR: at 02:26

## 2020-04-05 RX ADMIN — CASTOR OIL AND BALSAM, PERU: 788; 87 OINTMENT TOPICAL at 21:52

## 2020-04-05 RX ADMIN — SODIUM CHLORIDE 10 ML: 9 INJECTION INTRAMUSCULAR; INTRAVENOUS; SUBCUTANEOUS at 05:37

## 2020-04-05 RX ADMIN — CALCIUM CHLORIDE, MAGNESIUM CHLORIDE, DEXTROSE MONOHYDRATE, LACTIC ACID, SODIUM CHLORIDE, SODIUM BICARBONATE AND POTASSIUM CHLORIDE 3500 ML/HR: 3.68; 3.05; 22; 5.4; 6.46; 3.09; .314 INJECTION INTRAVENOUS at 21:19

## 2020-04-05 RX ADMIN — CALCIUM CHLORIDE, MAGNESIUM CHLORIDE, DEXTROSE MONOHYDRATE, LACTIC ACID, SODIUM CHLORIDE, SODIUM BICARBONATE AND POTASSIUM CHLORIDE 3500 ML/HR: 3.68; 3.05; 22; 5.4; 6.46; 3.09; .314 INJECTION INTRAVENOUS at 01:08

## 2020-04-05 RX ADMIN — OXYCODONE HYDROCHLORIDE AND ACETAMINOPHEN 500 MG: 500 TABLET ORAL at 08:10

## 2020-04-05 RX ADMIN — CALCIUM CHLORIDE, MAGNESIUM CHLORIDE, DEXTROSE MONOHYDRATE, LACTIC ACID, SODIUM CHLORIDE, SODIUM BICARBONATE AND POTASSIUM CHLORIDE 3500 ML/HR: 3.68; 3.05; 22; 5.4; 6.46; 3.09; .314 INJECTION INTRAVENOUS at 05:34

## 2020-04-05 RX ADMIN — Medication 50 MCG/HR: at 13:14

## 2020-04-05 RX ADMIN — CALCIUM CHLORIDE, MAGNESIUM CHLORIDE, DEXTROSE MONOHYDRATE, LACTIC ACID, SODIUM CHLORIDE, SODIUM BICARBONATE AND POTASSIUM CHLORIDE 3500 ML/HR: 3.68; 3.05; 22; 5.4; 6.46; 3.09; .314 INJECTION INTRAVENOUS at 04:01

## 2020-04-05 RX ADMIN — SODIUM CHLORIDE 10 ML: 9 INJECTION INTRAMUSCULAR; INTRAVENOUS; SUBCUTANEOUS at 15:57

## 2020-04-05 RX ADMIN — OXYCODONE HYDROCHLORIDE AND ACETAMINOPHEN 500 MG: 500 TABLET ORAL at 20:00

## 2020-04-05 RX ADMIN — CALCIUM CHLORIDE, MAGNESIUM CHLORIDE, DEXTROSE MONOHYDRATE, LACTIC ACID, SODIUM CHLORIDE, SODIUM BICARBONATE AND POTASSIUM CHLORIDE 3500 ML/HR: 3.68; 3.05; 22; 5.4; 6.46; 3.09; .314 INJECTION INTRAVENOUS at 15:28

## 2020-04-05 RX ADMIN — VANCOMYCIN HYDROCHLORIDE 1500 MG: 10 INJECTION, POWDER, LYOPHILIZED, FOR SOLUTION INTRAVENOUS at 05:00

## 2020-04-05 RX ADMIN — CALCIUM CHLORIDE, MAGNESIUM CHLORIDE, DEXTROSE MONOHYDRATE, LACTIC ACID, SODIUM CHLORIDE, SODIUM BICARBONATE AND POTASSIUM CHLORIDE 3500 ML/HR: 3.68; 3.05; 22; 5.4; 6.46; 3.09; .314 INJECTION INTRAVENOUS at 10:00

## 2020-04-05 RX ADMIN — CASTOR OIL AND BALSAM, PERU: 788; 87 OINTMENT TOPICAL at 17:52

## 2020-04-05 RX ADMIN — CALCIUM CHLORIDE, MAGNESIUM CHLORIDE, DEXTROSE MONOHYDRATE, LACTIC ACID, SODIUM CHLORIDE, SODIUM BICARBONATE AND POTASSIUM CHLORIDE 3500 ML/HR: 3.68; 3.05; 22; 5.4; 6.46; 3.09; .314 INJECTION INTRAVENOUS at 02:36

## 2020-04-05 RX ADMIN — CALCIUM CHLORIDE, MAGNESIUM CHLORIDE, DEXTROSE MONOHYDRATE, LACTIC ACID, SODIUM CHLORIDE, SODIUM BICARBONATE AND POTASSIUM CHLORIDE 3500 ML/HR: 3.68; 3.05; 22; 5.4; 6.46; 3.09; .314 INJECTION INTRAVENOUS at 22:51

## 2020-04-05 RX ADMIN — CASTOR OIL AND BALSAM, PERU: 788; 87 OINTMENT TOPICAL at 08:22

## 2020-04-05 RX ADMIN — FAMOTIDINE 20 MG: 10 INJECTION, SOLUTION INTRAVENOUS at 21:52

## 2020-04-05 RX ADMIN — DOCUSATE SODIUM 100 MG: 50 LIQUID ORAL at 08:10

## 2020-04-05 RX ADMIN — INSULIN GLARGINE 35 UNITS: 100 INJECTION, SOLUTION SUBCUTANEOUS at 08:09

## 2020-04-05 RX ADMIN — CALCIUM CHLORIDE, MAGNESIUM CHLORIDE, DEXTROSE MONOHYDRATE, LACTIC ACID, SODIUM CHLORIDE, SODIUM BICARBONATE AND POTASSIUM CHLORIDE 3500 ML/HR: 3.68; 3.05; 22; 5.4; 6.46; 3.09; .314 INJECTION INTRAVENOUS at 08:24

## 2020-04-05 RX ADMIN — CALCIUM CHLORIDE, MAGNESIUM CHLORIDE, DEXTROSE MONOHYDRATE, LACTIC ACID, SODIUM CHLORIDE, SODIUM BICARBONATE AND POTASSIUM CHLORIDE 3500 ML/HR: 3.68; 3.05; 22; 5.4; 6.46; 3.09; .314 INJECTION INTRAVENOUS at 07:05

## 2020-04-05 RX ADMIN — CALCIUM CHLORIDE, MAGNESIUM CHLORIDE, DEXTROSE MONOHYDRATE, LACTIC ACID, SODIUM CHLORIDE, SODIUM BICARBONATE AND POTASSIUM CHLORIDE 3500 ML/HR: 3.68; 3.05; 22; 5.4; 6.46; 3.09; .314 INJECTION INTRAVENOUS at 19:53

## 2020-04-05 RX ADMIN — HEPARIN SODIUM 20 UNITS/KG/HR: 10000 INJECTION, SOLUTION INTRAVENOUS at 16:04

## 2020-04-05 RX ADMIN — SODIUM CHLORIDE: 900 INJECTION, SOLUTION INTRAVENOUS at 09:47

## 2020-04-05 RX ADMIN — CALCIUM CHLORIDE, MAGNESIUM CHLORIDE, DEXTROSE MONOHYDRATE, LACTIC ACID, SODIUM CHLORIDE, SODIUM BICARBONATE AND POTASSIUM CHLORIDE 3500 ML/HR: 3.68; 3.05; 22; 5.4; 6.46; 3.09; .314 INJECTION INTRAVENOUS at 12:21

## 2020-04-05 RX ADMIN — DOCUSATE SODIUM 100 MG: 50 LIQUID ORAL at 17:52

## 2020-04-05 RX ADMIN — GUAIFENESIN 100 MG: 100 SOLUTION ORAL at 05:00

## 2020-04-05 RX ADMIN — GUAIFENESIN 100 MG: 100 SOLUTION ORAL at 12:18

## 2020-04-05 NOTE — PROGRESS NOTES
0730: Bedside shift change report given to Micky Claire (oncoming nurse) by Shamir Giron (offgoing nurse). Report included the following information SBAR, Kardex, ED Summary, Procedure Summary, Intake/Output, MAR, Accordion, Recent Results and Med Rec Status. 0800: Dr. Alysia Rodríguez at bedside. Will attempt to wear Fentanyl gtt and vent today. VSS. Kasey FRAGA at bedside too. Shift Summary: Weaned fentanyl gtt to 25mcg, Neuro status remains the same. CRRT running well with factor 200. VSS, off pressors for whole shift. No UOP. 1930: Bedside shift change report given to DWAYNE Dobson RN (oncoming nurse) by Gracy Brandt RN (offgoing nurse). Report included the following information SBAR, Kardex, ED Summary, Procedure Summary, Intake/Output, MAR, Accordion, Recent Results and Med Rec Status. 4/10/2020 At 1915, I made a change to patients Hep gtt with another RN. Duel verification was incomplete. I am unable to go back to complete the documentation. The change was based on the PTT.

## 2020-04-05 NOTE — PROGRESS NOTES
[facility]    ICU Team Note    Name: Lisa Briggs   : 1960   MRN: [id number]   Date: 2020      Subjective:   Progress Note: 2020      Reason for ICU Admission: [address] with renal failure     65 yo female with obesity and diabetes who presented to [facility] with worsening hypoxic respiratory failure in lieu of close exposure to COVID-19. She presented to the hospital 3/26 and intubated 3/28. She was started on broad spectrum antibiotics and plaquenil. Developed worsening renal failure and was transferred to us for CRRT. Overnight Events:     No significant events overnight. Further improved oxygenation. POD:* No surgery found *    S/P:     Active Problem List:     Problem List  Date Reviewed: 2020          Codes Class    Hypotension ICD-10-CM: I95.9  ICD-9-CM: 458.9 Acute        Sepsis due to methicillin susceptible Staphylococcus aureus (MSSA) without acute organ dysfunction (HCC) ICD-10-CM: A41.01  ICD-9-CM: 038.11, 995.91 Acute        COVID-19 virus infection ICD-10-CM: U07.1         Obesity, morbid ([facility] [address] ICD-10-CM: E66.01  ICD-9-CM: 278.01         Vaginal Pap smear following hysterectomy for malignancy ICD-10-CM: Z08, Z90.710  ICD-9-CM: V67.01         Personal history of malignant neoplasm of other parts of uterus ICD-10-CM: Z85.42  ICD-9-CM: V10.42         Endometrial cancer ([facility] ICD-10-CM: C54.1  ICD-9-CM: 182.0               Past Medical History:      has a past medical history of Basal cell carcinoma, GERD (gastroesophageal reflux disease), Kidney stones, and Polyp of ureter. Past Surgical History:      has a past surgical history that includes hysteroscopy diagnostic (); pr endometrial ablation, thermal; hx  section (); and hx colonoscopy. Home Medications:     Prior to Admission medications    Medication Sig Start Date End Date Taking?  Authorizing Provider   pantoprazole (PROTONIX) 40 mg tablet TAKE 1 TABLET BY MOUTH TWICE A DAY 18   Provider, Historical   esomeprazole (NEXIUM) 20 mg capsule Take 20 mg by mouth daily. Indications: BID    Provider, Historical   zolpidem (AMBIEN) 10 mg tablet Take 0.5 Tabs by mouth nightly as needed for Sleep. Max Daily Amount: 5 mg. 17   Franklin Mandel MD   fluticasone (FLONASE) 50 mcg/actuation nasal spray Mist 1-2 spray(s) into each nostril once daily. 4/3/15   Provider, Historical   ranitidine (ZANTAC) 150 mg tablet 150 mg.    Provider, Historical       Allergies/Social/Family History: Allergies   Allergen Reactions    Augmentin [Amoxicillin-Pot Clavulanate] Rash and Itching      Social History     Tobacco Use    Smoking status: Never Smoker    Smokeless tobacco: Never Used   Substance Use Topics    Alcohol use: Not on file      Family History   Problem Relation Age of Onset    Prostate Cancer Father         PROSTATE    Breast Cancer Sister 46        invasive poorly differentiated ductal carcinoma, Neg genetic testing.  Cancer Sister 62        melanoma stage 1       Review of Systems:     A comprehensive review of systems was negative except for that written in the HPI.     Objective:   Vital Signs:  Visit Vitals  /65   Pulse 100   Temp 96.9 °F (36.1 °C)   Resp 20   Ht 5' 4\" (1.626 m) Comment: per OSH record   Wt 131 kg (288 lb 12.8 oz)   SpO2 100%   BMI 49.57 kg/m²      O2 Device: Endotracheal tube, Ventilator Temp (24hrs), Av.7 °F (36.5 °C), Min:96.9 °F (36.1 °C), Max:98.4 °F (36.9 °C)           Intake/Output:     Intake/Output Summary (Last 24 hours) at 2020 1036  Last data filed at 2020 1000  Gross per 24 hour   Intake 1892.33 ml   Output 7880 ml   Net -5987.67 ml       Physical Exam:    General: Ill appearing  HENT: Atraumatic  Cardio: RRR  Respiratory: distant breath sounds BL  GI: Soft not tender abdomen  Extremities: +ve edema  Neuro: sedated, grimaces to pain      LABS AND  DATA: Personally reviewed  Recent Labs     20  0311 20  0333   WBC 11.7* 7.7   HGB 9.6* 8.7*   HCT 30.9* 28.0*    156     Recent Labs     04/05/20 0311 04/04/20 1457 04/04/20  0333   * 135* 134*   K 3.8 3.6 3.6    103 103   CO2 27 24 23   BUN 28* 29* 33*   CREA 1.52* 1.87* 2.18*   * 127* 144*   CA 8.5 8.7 8.9   MG  --  2.6* 2.7*   PHOS 2.4* 1.9*  2.0* 3.0     Recent Labs     04/05/20 0311 04/04/20 1457 04/04/20 0333   SGOT  --   --  28   AP  --   --  281*   TP  --   --  6.4   ALB 2.0* 1.8* 1.8*   GLOB  --   --  4.6*     Recent Labs     04/05/20  0532 04/04/20  2310  04/04/20  0333  04/03/20  0316   INR  --   --   --  1.0  --  1.1   PTP  --   --   --  10.8  --  11.1   APTT 60.4* 56.1*   < > 33.9*   < > 33.6*    < > = values in this interval not displayed. Recent Labs     04/05/20 0334 04/04/20  0452   PHI 7.413 7.344*   PCO2I 39.0 43.4   PO2I 67* 106*   FIO2I 0.45 50     Recent Labs     04/03/20  1239   CPK 42       Hemodynamics:   PAP:   CO:     Wedge:   CI:     CVP:    SVR:       PVR:       Ventilator Settings:  Mode Rate Tidal Volume Pressure FiO2 PEEP   Assist control, Pressure control        45 % 8 cm H20     Peak airway pressure: 24 cm H2O    Minute ventilation: 14 l/min        MEDS: Reviewed    Chest X-Ray: personally reviewed and report checked      Assessment/Plan:     1. Acute Hypoxic Respiratory failure - Secondary to COVID-19 - Note sputum with staph aureus - susceptible to vancomycin. Blood NTD. Continue lung protective ventilation strategies. Continue weaning settings as tolerated - currently on FiO2 45% PEEP 8. Continue abx therapy for superimposed bacterial PNA. Completed Azithro and plaquenil course. ID following. Pending echo. Elevated IL-6 - received one dose of 400mg of actemra. Continue diuresis with CRRT  2. Shock - Likely worsened or precipitated by sedation. On vasopressin only. 3. Diabetes - Continue ISS and Lantus 35 units  4. Renal Failure - Likely ATN from sepsis and hypotension. Nephrology following.  Continue CRRT  5. Sepsis secondary to COVID-19 - POA. Management as detailed above     Multidisciplinary Rounds Completed:  No    ABCDEF Bundle/Checklist  Pain Medications: Fentanyl  Target RASS: -2 - Light Sedation - Briefly awakens to voice (eyes open & contact <10 sec)  Sedation Medications: None  CAM-ICU:  Negative  Mobility: Bedrest  PT/OT: TO be consulted when stable   Restraints: Soft wrist restraints  Discussed Plan of Care (goals of care): No  Addressed Code Status: Full Code    CARDIOVASCULAR  Cardiac Gtts: Norepinephrine and Vasopressin  SBP Goal of: > 90 mmHg  MAP Goal of: > 65 mmHg  Transfusion Trigger (Hgb): <7 g/dL    RESPIRATORY  Vent Goals:   Chlorhexidine   Optimize PEEP/Ventilation/Oxygenation  Goal Tidal Volume 6 cc/kg based on IBW  Aim for lung protective ventilation  Head of bed > 30 degrees  DVT Prophylaxis (if no, list reason): SCD's or Sequential Compression Device and Heparin   SPO2 Goal: > 92%  Pulmonary toilet: Duo-Nebs     GI/  Bowden Catheter Present: Yes  GI Prophylaxis: Pepcid (famotidine)   Nutrition: Yes   Bowel Movement: No  Bowel Regimen: Lactulose  Insulin: ISS and Lantus    ANTIBIOTICS  Antibiotics:  Plaquenil    T/L/D  Tubes: ETT and Orogastric Tube  Lines: Peripheral IV, Arterial Line, PICC Line and Ramon  Drains: Bowden Catheter    SPECIAL EQUIPMENT  CRRT    DISPOSITION  Stay in ICU    CRITICAL CARE CONSULTANT NOTE  I had a face to face encounter with the patient, reviewed and interpreted patient data including clinical events, labs, images, vital signs, I/O's, and examined patient. I have discussed the case and the plan and management of the patient's care with the consulting services, the bedside nurses and the respiratory therapist.      NOTE OF PERSONAL INVOLVEMENT IN CARE   This patient has a high probability of imminent, clinically significant deterioration, which requires the highest level of preparedness to intervene urgently.  I participated in the decision-making and personally managed or directed the management of the following life and organ supporting interventions that required my frequent assessment to treat or prevent imminent deterioration. I personally spent 30 minutes of critical care time. This is time spent at this critically ill patient's bedside actively involved in patient care as well as the coordination of care and discussions with the patient's family. This does not include any procedural time which has been billed separately.     Narendra Mendes MD  Staff [facility]  4/5/2020

## 2020-04-05 NOTE — DIALYSIS
[address] [facility]       [phone]    Orders   Mode: CVVHD   Factor: 200 ml/hr   DFR: 3500 ml/hr   Blood Flow Rate: 200 ml/min     Metrics   BP / HR: 105/50 // 94   Blood Flow Rate: 200 ml/min   AP:                         -74   RP: 73   TMP: 40   PD: 49   FP: 152   DFR: 3500     Comments / Plan:      [id number] filter running well with no indication for change at this time. Consents, patient, code status, labs and orders verified. Patient COVID-19 Positive; all policies and procedures followed. All appropriate PPE - N95 mask/face shield/gown/gloves/shoe and hair coverings - worn. Right femoral non-tunneled temporary CVC, dressing CDI with bio-patch dated. No signs of redness, drainage, or infection visualized. Lines reversed, visible and connections secure with blood warmer to return line at 37*C. Education, pre and post to primary RN.

## 2020-04-05 NOTE — PROGRESS NOTES
Day #6 of Vancomycin  Indication:  Possible PNA, COVID-19+  Current regimen:  Based on levels, last dose: 1.5 gm on  @ 0500  Abx regimen:  Monotherapy  ID Following ?: YES  Concomitant nephrotoxic drugs (requires more frequent monitoring): Vasopressors  Frequency of BMP?: Every 12 hours (through 4/10)    Recent Labs     20  0311 20  1457 20  0333 20  1524   WBC 11.7*  --  7.7 9.1   CREA 1.52* 1.87* 2.18* 2.60*   BUN 28* 29* 33* 35*     Est CrCl: CVVHD (per nephrology note on )  Temp (24hrs), Av.7 °F (36.5 °C), Min:96.9 °F (36.1 °C), Max:98.4 °F (36.9 °C)    Cultures:   COVID-19 + (from OSH)  3/31 Sputum: moderate MSSA, final  3/31 Blood: NG, final    Goal trough = 15 - 20 mcg/mL    Recent trough history (date/time/level/dose/action taken):  3/31 @ 2054 = 22.9 mcg/ml (drawn ~24 hrs post-dose), continue to hold   @ 0850 = 18.8 mcg/ml (drawn ~36 hrs post-dose), 1500 mg given at the 48-hr sin   @ 2036 = 21.5 mcg/ml (~23.5h post-dose), 1500mg given ~45 hr post dose   @ 1727 = 20.6 mcg/mL (~23 post-dose), 1500mg given ~35 hr post dose    Plan: Last dosed on  @ 0500 (~36 hr post dose). Will order follow-up level for tomorrow AM (~24 hr level).     Elvira Cantu, MORALESD

## 2020-04-05 NOTE — ROUTINE PROCESS
1930: Bedside shift change report given to DWAYNE Mendiola (oncoming nurse) by Ashley Brandt (offgoing nurse). Report included the following information SBAR, Kardex, ED Summary, Procedure Summary, Intake/Output, MAR, Accordion, Recent Results and Med Rec Status. 2036-vasopressin titrated to effect MAP 78 gtt dec to 0.03units/min  2147-vassopressin stopped/ melissa w/increased access pressures with any movement/melissa lines flipped.  DTI to lower back/sacrum within skin folds noted treatment in place and wound care consulted  2211-bladder temp 98.1 juan carlos hugger removed  2355-bladder temp 97.5-juan carlos hugger placed set on low setting 32 deg corazon  0000-ptt 52.1 heparin gtt increased 1unit/kg/hr per protocol new rate 25.3(19units/kg/hr)  0532-PTT 60.2  1st therapeutic  No change per protocol    0730-bedside shift report given to RN using sbar format

## 2020-04-05 NOTE — PROGRESS NOTES
[facility]   [address] [facility] [address], [address], [facility]  Phone: [phone]   [phone]       Nephrology Progress Note  Jael Price     1960     [id number]  Date of Admission : 3/31/2020  04/05/20    CC: Follow up for JUANCHO      Assessment and Plan   JUANCHO :  - 2/2 ATN  - femoral Ramon catheter doing better, not great. - minerva higher factor. Currently pulling 200 cc/h. - continue CVVHD w/ heparin through filter   - daily labs for now  - KPO4 ordered. - more stable hemodynamics.     COVID-19 +ve   Acute Hypoxic Resp Failure   - On Vent   - completed Plaquenil      Type II DM   - Insulin per primary team      Hypothermia   Morbid Obesity      Care Plan discussed with:  ICU team      Interval History:  Case discussed with intensivist; nurse in room. She is back on CVVHD. Remains anuric. PT NOT EXAMINED in line with ASN and RPA GUIDELINES ON MANAGING COVID-19 PTS WITH RENAL ISSUES. Examination findings discussed personally with the examining Physician team member      Review of Systems: Review of systems not obtained due to patient factors.     Current Medications:   Current Facility-Administered Medications   Medication Dose Route Frequency    heparin (porcine) pf 300 Units  300 Units InterCATHeter PRN    guaiFENesin (ROBITUSSIN) 100 mg/5 mL oral liquid 100 mg  100 mg Oral Q6H    alteplase (CATHFLO) 2 mg in sterile water (preservative free) 2 mL injection  2 mg InterCATHeter PRN    alteplase (CATHFLO) 2 mg in sterile water (preservative free) 2 mL injection  2 mg InterCATHeter PRN    ascorbic acid (vitamin C) (VITAMIN C) tablet 500 mg  500 mg Per NG tube Q12H    heparin 25,000 units in D5W 250 ml infusion  7-25 Units/kg/hr IntraVENous TITRATE    zinc sulfate (ZINCATE) capsule 1 Cap  1 Cap Per NG tube QHS    midazolam (VERSED) injection 1-2 mg  1-2 mg IntraVENous Q2H PRN    bacitracin 500 unit/gram packet 1 Packet  1 Packet Topical PRN    insulin glargine (LANTUS) injection 35 Units  35 Units SubCUTAneous DAILY    balsam peru-castor oiL (VENELEX) ointment   Topical BID    sodium chloride (NS) flush 5-40 mL  5-40 mL IntraVENous Q8H    sodium chloride (NS) flush 5-40 mL  5-40 mL IntraVENous PRN    HYDROcodone-acetaminophen (NORCO) 5-325 mg per tablet 1 Tab  1 Tab Oral Q4H PRN    HYDROmorphone (PF) (DILAUDID) injection 0.5 mg  0.5 mg IntraVENous Q4H PRN    ondansetron (ZOFRAN) injection 4 mg  4 mg IntraVENous Q4H PRN    NOREPINephrine (LEVOPHED) 8,000 mcg in dextrose 5% 250 mL infusion  2-30 mcg/min IntraVENous TITRATE    vasopressin (VASOSTRICT) 20 Units in 0.9% sodium chloride 100 mL infusion  0.04 Units/min IntraVENous CONTINUOUS    fentaNYL (PF) 1,500 mcg/30 mL (50 mcg/mL) infusion   mcg/hr IntraVENous TITRATE    propofol (DIPRIVAN) 10 mg/mL infusion  0-50 mcg/kg/min IntraVENous TITRATE    famotidine (PF) (PEPCID) 20 mg in 0.9% sodium chloride 10 mL injection  20 mg IntraVENous QHS    chlorhexidine (ORAL CARE KIT) 0.12 % mouthwash 15 mL  15 mL Oral Q12H    docusate (COLACE) 50 mg/5 mL oral liquid 100 mg  100 mg Per NG tube BID    acetaminophen (TYLENOL) tablet 650 mg  650 mg Oral Q6H PRN    Or    acetaminophen (TYLENOL) suppository 650 mg  650 mg Rectal Q6H PRN    bicarbonate dialysis (PRISMASOL) BG K 4/Ca 2.5 5000 ml solution   Extracorporeal DIALYSIS CONTINUOUS    glucose chewable tablet 16 g  4 Tab Oral PRN    glucagon (GLUCAGEN) injection 1 mg  1 mg IntraMUSCular PRN    dextrose 10% infusion 0-250 mL  0-250 mL IntraVENous PRN    insulin lispro (HUMALOG) injection   SubCUTAneous Q6H    Vancomycin - Pharmacy to Dose   Other Rx Dosing/Monitoring      Allergies   Allergen Reactions    Augmentin [Amoxicillin-Pot Clavulanate] Rash and Itching       Objective:  Vitals:    Vitals:    04/05/20 0630 04/05/20 0645 04/05/20 0700 04/05/20 0754   BP: 118/71  114/69    Pulse: 96 94 94 95   Resp: 23 19 13 17   Temp:       SpO2:  100%  100%   Weight: Height:         Intake and Output:  No intake/output data recorded. 04/03 1901 - 04/05 0700  In: 2702.4 [I.V.:1582.4]  Out: 7073 [Drains:350]    Physical Examination: inspection only   Pt intubated    Yes  General: Sedated on vent, obese   Neck:  Lines   Resp:  On vent   CV:  RRR  GI:  Obese   Neurologic:  Sedated   Psych:              Unable to assess  Ext                   R femoral melissa     []    High complexity decision making was performed  []    Patient is at high-risk of decompensation with multiple organ involvement    Lab Data Personally Reviewed: I have reviewed all the pertinent labs, microbiology data and radiology studies during assessment.     Recent Labs     04/05/20 0311 04/04/20 1457 04/04/20 0333 04/03/20 1524 04/03/20 0316 04/02/20  1639   * 135* 134* 134* 135* 136   K 3.8 3.6 3.6 3.9 3.8 4.0    103 103 102 102 104   CO2 27 24 23 24 26 27   * 127* 144* 147* 125* 119*   BUN 28* 29* 33* 35* 31* 27*   CREA 1.52* 1.87* 2.18* 2.60* 2.78* 2.60*   CA 8.5 8.7 8.9 8.8 8.6 8.0*   MG  --  2.6* 2.7* 2.8* 2.8* 2.6*   PHOS 2.4* 1.9*  2.0* 3.0 3.8 3.4 3.9   ALB 2.0* 1.8* 1.8* 1.8* 1.8* 1.9*   SGOT  --   --  28  --   --   --    ALT  --   --  25  --   --   --    INR  --   --  1.0  --  1.1  --      Recent Labs     04/05/20 0311 04/04/20 0333 04/03/20  1524 04/03/20 0316 04/02/20  1639   WBC 11.7* 7.7 9.1 9.2 10.1   HGB 9.6* 8.7* 8.8* 9.0* 9.0*   HCT 30.9* 28.0* 28.2* 28.9* 28.5*    156 136* 161 193     No results found for: SDES  Lab Results   Component Value Date/Time    Culture result: NO GROWTH 5 DAYS 03/31/2020 11:15 AM    Culture result: MODERATE STAPHYLOCOCCUS AUREUS (A) 03/31/2020 10:34 AM    Culture result: LIGHT NORMAL RESPIRATORY SOFIE 03/31/2020 10:34 AM     Recent Results (from the past 24 hour(s))   PTT    Collection Time: 04/04/20 10:52 AM   Result Value Ref Range    aPTT 53.6 (H) 22.1 - 32.0 sec    aPTT, therapeutic range     58.0 - 77.0 SECS   GLUCOSE, POC Collection Time: 04/04/20 11:54 AM   Result Value Ref Range    Glucose (POC) 110 (H) 65 - 100 mg/dL    Performed by Gurmeet Pate    MAGNESIUM    Collection Time: 04/04/20  2:57 PM   Result Value Ref Range    Magnesium 2.6 (H) 1.6 - 2.4 mg/dL   RENAL FUNCTION PANEL    Collection Time: 04/04/20  2:57 PM   Result Value Ref Range    Sodium 135 (L) 136 - 145 mmol/L    Potassium 3.6 3.5 - 5.1 mmol/L    Chloride 103 97 - 108 mmol/L    CO2 24 21 - 32 mmol/L    Anion gap 8 5 - 15 mmol/L    Glucose 127 (H) 65 - 100 mg/dL    BUN 29 (H) 6 - 20 MG/DL    Creatinine 1.87 (H) 0.55 - 1.02 MG/DL    BUN/Creatinine ratio 16 12 - 20      GFR est AA 34 (L) >60 ml/min/1.73m2    GFR est non-AA 28 (L) >60 ml/min/1.73m2    Calcium 8.7 8.5 - 10.1 MG/DL    Phosphorus 2.0 (L) 2.6 - 4.7 MG/DL    Albumin 1.8 (L) 3.5 - 5.0 g/dL   PHOSPHORUS    Collection Time: 04/04/20  2:57 PM   Result Value Ref Range    Phosphorus 1.9 (L) 2.6 - 4.7 MG/DL   GLUCOSE, POC    Collection Time: 04/04/20  5:25 PM   Result Value Ref Range    Glucose (POC) 103 (H) 65 - 100 mg/dL    Performed by Mirela Sin RANDOM    Collection Time: 04/04/20  5:27 PM   Result Value Ref Range    Vancomycin, random 20.6 UG/ML   PTT    Collection Time: 04/04/20  5:27 PM   Result Value Ref Range    aPTT 42.5 (H) 22.1 - 32.0 sec    aPTT, therapeutic range     58.0 - 77.0 SECS   PTT    Collection Time: 04/04/20 11:10 PM   Result Value Ref Range    aPTT 56.1 (H) 22.1 - 32.0 sec    aPTT, therapeutic range     58.0 - 77.0 SECS   GLUCOSE, POC    Collection Time: 04/04/20 11:13 PM   Result Value Ref Range    Glucose (POC) 121 (H) 65 - 100 mg/dL    Performed by [employer]    CBC WITH AUTOMATED DIFF    Collection Time: 04/05/20  3:11 AM   Result Value Ref Range    WBC 11.7 (H) 3.6 - 11.0 K/uL    RBC 3.59 (L) 3.80 - 5.20 M/uL    HGB 9.6 (L) 11.5 - 16.0 g/dL    HCT 30.9 (L) 35.0 - 47.0 %    MCV 86.1 80.0 - 99.0 FL    MCH 26.7 26.0 - 34.0 PG    MCHC 31.1 30.0 - 36.5 g/dL RDW 14.4 11.5 - 14.5 %    PLATELET 537 535 - 321 K/uL    MPV 10.3 8.9 - 12.9 FL    NRBC 0.0 0  WBC    ABSOLUTE NRBC 0.00 0.00 - 0.01 K/uL    NEUTROPHILS 73 32 - 75 %    BAND NEUTROPHILS 2 0 - 6 %    LYMPHOCYTES 13 12 - 49 %    MONOCYTES 7 5 - 13 %    EOSINOPHILS 2 0 - 7 %    BASOPHILS 0 0 - 1 %    METAMYELOCYTES 2 (H) 0 %    MYELOCYTES 1 (H) 0 %    IMMATURE GRANULOCYTES 0 %    ABS. NEUTROPHILS 8.8 (H) 1.8 - 8.0 K/UL    ABS. LYMPHOCYTES 1.5 0.8 - 3.5 K/UL    ABS. MONOCYTES 0.8 0.0 - 1.0 K/UL    ABS. EOSINOPHILS 0.2 0.0 - 0.4 K/UL    ABS. BASOPHILS 0.0 0.0 - 0.1 K/UL    ABS. IMM.  GRANS. 0.0 K/UL    DF MANUAL      PLATELET COMMENTS Large Platelets      RBC COMMENTS ANISOCYTOSIS  1+        RBC COMMENTS POLYCHROMASIA  1+       RENAL FUNCTION PANEL    Collection Time: 04/05/20  3:11 AM   Result Value Ref Range    Sodium 135 (L) 136 - 145 mmol/L    Potassium 3.8 3.5 - 5.1 mmol/L    Chloride 104 97 - 108 mmol/L    CO2 27 21 - 32 mmol/L    Anion gap 4 (L) 5 - 15 mmol/L    Glucose 110 (H) 65 - 100 mg/dL    BUN 28 (H) 6 - 20 MG/DL    Creatinine 1.52 (H) 0.55 - 1.02 MG/DL    BUN/Creatinine ratio 18 12 - 20      GFR est AA 43 (L) >60 ml/min/1.73m2    GFR est non-AA 35 (L) >60 ml/min/1.73m2    Calcium 8.5 8.5 - 10.1 MG/DL    Phosphorus 2.4 (L) 2.6 - 4.7 MG/DL    Albumin 2.0 (L) 3.5 - 5.0 g/dL   FERRITIN    Collection Time: 04/05/20  3:11 AM   Result Value Ref Range    Ferritin 992 (H) 8 - 252 NG/ML   C REACTIVE PROTEIN, QT    Collection Time: 04/05/20  3:11 AM   Result Value Ref Range    C-Reactive protein 9.65 (H) 0.00 - 0.60 mg/dL   LD    Collection Time: 04/05/20  3:11 AM   Result Value Ref Range     (H) 81 - 246 U/L   D DIMER    Collection Time: 04/05/20  3:17 AM   Result Value Ref Range    D-dimer 14.66 (H) 0.00 - 0.65 mg/L FEU   FIBRINOGEN    Collection Time: 04/05/20  3:17 AM   Result Value Ref Range    Fibrinogen 575 (H) 200 - 475 mg/dL   POC EG7    Collection Time: 04/05/20  3:34 AM   Result Value Ref Range    Calcium, ionized (POC) 1.19 1.12 - 1.32 mmol/L    FIO2 (POC) 0.45 %    pH (POC) 7.413 7.35 - 7.45      pCO2 (POC) 39.0 35.0 - 45.0 MMHG    pO2 (POC) 67 (L) 80 - 100 MMHG    HCO3 (POC) 24.9 22 - 26 MMOL/L    Base excess (POC) 0 mmol/L    sO2 (POC) 93 92 - 97 %    Site DRAWN FROM ARTERIAL LINE      Device: VENT      Mode ASSIST CONTROL      Set Rate 22 bpm    PEEP/CPAP (POC) 8 cmH2O    PIP (POC) 14      Allens test (POC) N/A      Inspiratory Time 1.64 sec    Specimen type (POC) ARTERIAL      Total resp. rate 22      Pressure control YES     PTT    Collection Time: 04/05/20  5:32 AM   Result Value Ref Range    aPTT 60.4 (H) 22.1 - 32.0 sec    aPTT, therapeutic range     58.0 - 77.0 SECS           Total time spent with patient:  xxx   min. Care Plan discussed with:  Patient     Family      RN      Consulting Physician [address] [place] [address]        I have reviewed the flowsheets. Chart and Pertinent Notes have been reviewed. No change in PMH ,family and social history from Consult note.       Maynor Stewart MD

## 2020-04-06 LAB
ALBUMIN SERPL-MCNC: 2.2 G/DL (ref 3.5–5)
ALBUMIN SERPL-MCNC: 2.3 G/DL (ref 3.5–5)
ALBUMIN/GLOB SERPL: 0.5 {RATIO} (ref 1.1–2.2)
ALP SERPL-CCNC: 298 U/L (ref 45–117)
ALT SERPL-CCNC: 33 U/L (ref 12–78)
ANION GAP SERPL CALC-SCNC: 8 MMOL/L (ref 5–15)
APTT PPP: 63.8 SEC (ref 22.1–32)
APTT PPP: 70 SEC (ref 22.1–32)
AST SERPL-CCNC: 49 U/L (ref 15–37)
BASOPHILS # BLD: 0.1 K/UL (ref 0–0.1)
BASOPHILS NFR BLD: 1 % (ref 0–1)
BILIRUB DIRECT SERPL-MCNC: 0.5 MG/DL (ref 0–0.2)
BILIRUB SERPL-MCNC: 0.8 MG/DL (ref 0.2–1)
BUN SERPL-MCNC: 25 MG/DL (ref 6–20)
BUN/CREAT SERPL: 18 (ref 12–20)
CALCIUM SERPL-MCNC: 8.6 MG/DL (ref 8.5–10.1)
CHLORIDE SERPL-SCNC: 102 MMOL/L (ref 97–108)
CO2 SERPL-SCNC: 26 MMOL/L (ref 21–32)
CREAT SERPL-MCNC: 1.37 MG/DL (ref 0.55–1.02)
DIFFERENTIAL METHOD BLD: ABNORMAL
EOSINOPHIL # BLD: 0.3 K/UL (ref 0–0.4)
EOSINOPHIL NFR BLD: 2 % (ref 0–7)
ERYTHROCYTE [DISTWIDTH] IN BLOOD BY AUTOMATED COUNT: 14.4 % (ref 11.5–14.5)
GLOBULIN SER CALC-MCNC: 4.5 G/DL (ref 2–4)
GLUCOSE BLD STRIP.AUTO-MCNC: 125 MG/DL (ref 65–100)
GLUCOSE BLD STRIP.AUTO-MCNC: 146 MG/DL (ref 65–100)
GLUCOSE BLD STRIP.AUTO-MCNC: 150 MG/DL (ref 65–100)
GLUCOSE SERPL-MCNC: 134 MG/DL (ref 65–100)
HCT VFR BLD AUTO: 32.9 % (ref 35–47)
HGB BLD-MCNC: 10.3 G/DL (ref 11.5–16)
IMM GRANULOCYTES # BLD AUTO: 0 K/UL
IMM GRANULOCYTES NFR BLD AUTO: 0 %
INR PPP: 1.1 (ref 0.9–1.1)
LYMPHOCYTES # BLD: 2.2 K/UL (ref 0.8–3.5)
LYMPHOCYTES NFR BLD: 17 % (ref 12–49)
MCH RBC QN AUTO: 26.8 PG (ref 26–34)
MCHC RBC AUTO-ENTMCNC: 31.3 G/DL (ref 30–36.5)
MCV RBC AUTO: 85.5 FL (ref 80–99)
METAMYELOCYTES NFR BLD MANUAL: 1 %
MONOCYTES # BLD: 0.8 K/UL (ref 0–1)
MONOCYTES NFR BLD: 6 % (ref 5–13)
MYELOCYTES NFR BLD MANUAL: 3 %
NEUTS BAND NFR BLD MANUAL: 8 % (ref 0–6)
NEUTS SEG # BLD: 9.2 K/UL (ref 1.8–8)
NEUTS SEG NFR BLD: 62 % (ref 32–75)
NRBC # BLD: 0.03 K/UL (ref 0–0.01)
NRBC BLD-RTO: 0.2 PER 100 WBC
PHOSPHATE SERPL-MCNC: 3 MG/DL (ref 2.6–4.7)
PLATELET # BLD AUTO: 183 K/UL (ref 150–400)
PLATELET COMMENTS,PCOM: ABNORMAL
PMV BLD AUTO: 9.9 FL (ref 8.9–12.9)
POTASSIUM SERPL-SCNC: 4.1 MMOL/L (ref 3.5–5.1)
PROT SERPL-MCNC: 6.8 G/DL (ref 6.4–8.2)
PROTHROMBIN TIME: 11.1 SEC (ref 9–11.1)
RBC # BLD AUTO: 3.85 M/UL (ref 3.8–5.2)
RBC MORPH BLD: ABNORMAL
RBC MORPH BLD: ABNORMAL
SERVICE CMNT-IMP: ABNORMAL
SODIUM SERPL-SCNC: 136 MMOL/L (ref 136–145)
THERAPEUTIC RANGE,PTTT: ABNORMAL SECS (ref 58–77)
THERAPEUTIC RANGE,PTTT: ABNORMAL SECS (ref 58–77)
VANCOMYCIN SERPL-MCNC: 15.9 UG/ML
WBC # BLD AUTO: 13.2 K/UL (ref 3.6–11)

## 2020-04-06 PROCEDURE — 80076 HEPATIC FUNCTION PANEL: CPT

## 2020-04-06 PROCEDURE — 74011000250 HC RX REV CODE- 250: Performed by: INTERNAL MEDICINE

## 2020-04-06 PROCEDURE — 85730 THROMBOPLASTIN TIME PARTIAL: CPT

## 2020-04-06 PROCEDURE — 74011000258 HC RX REV CODE- 258: Performed by: INTERNAL MEDICINE

## 2020-04-06 PROCEDURE — 85610 PROTHROMBIN TIME: CPT

## 2020-04-06 PROCEDURE — 74011250636 HC RX REV CODE- 250/636: Performed by: INTERNAL MEDICINE

## 2020-04-06 PROCEDURE — 36415 COLL VENOUS BLD VENIPUNCTURE: CPT

## 2020-04-06 PROCEDURE — 74011250636 HC RX REV CODE- 250/636: Performed by: NURSE PRACTITIONER

## 2020-04-06 PROCEDURE — 65610000006 HC RM INTENSIVE CARE

## 2020-04-06 PROCEDURE — 80202 ASSAY OF VANCOMYCIN: CPT

## 2020-04-06 PROCEDURE — 90945 DIALYSIS ONE EVALUATION: CPT

## 2020-04-06 PROCEDURE — 94003 VENT MGMT INPAT SUBQ DAY: CPT

## 2020-04-06 PROCEDURE — 85025 COMPLETE CBC W/AUTO DIFF WBC: CPT

## 2020-04-06 PROCEDURE — 74011636637 HC RX REV CODE- 636/637: Performed by: INTERNAL MEDICINE

## 2020-04-06 PROCEDURE — 77030018798 HC PMP KT ENTRL FED COVD -A

## 2020-04-06 PROCEDURE — 80069 RENAL FUNCTION PANEL: CPT

## 2020-04-06 PROCEDURE — 82962 GLUCOSE BLOOD TEST: CPT

## 2020-04-06 PROCEDURE — 74011250637 HC RX REV CODE- 250/637: Performed by: NURSE PRACTITIONER

## 2020-04-06 PROCEDURE — 74011250637 HC RX REV CODE- 250/637: Performed by: INTERNAL MEDICINE

## 2020-04-06 PROCEDURE — 36600 WITHDRAWAL OF ARTERIAL BLOOD: CPT

## 2020-04-06 RX ORDER — FAMOTIDINE 40 MG/5ML
20 POWDER, FOR SUSPENSION ORAL
Status: DISCONTINUED | OUTPATIENT
Start: 2020-04-06 | End: 2020-04-09

## 2020-04-06 RX ORDER — LABETALOL 100 MG/1
100 TABLET, FILM COATED ORAL EVERY 8 HOURS
Status: DISCONTINUED | OUTPATIENT
Start: 2020-04-06 | End: 2020-04-07

## 2020-04-06 RX ORDER — LABETALOL HYDROCHLORIDE 5 MG/ML
10 INJECTION, SOLUTION INTRAVENOUS
Status: DISCONTINUED | OUTPATIENT
Start: 2020-04-06 | End: 2020-04-21

## 2020-04-06 RX ORDER — FENTANYL CITRATE 50 UG/ML
50-100 INJECTION, SOLUTION INTRAMUSCULAR; INTRAVENOUS
Status: DISCONTINUED | OUTPATIENT
Start: 2020-04-06 | End: 2020-04-22

## 2020-04-06 RX ORDER — VANCOMYCIN/0.9 % SOD CHLORIDE 1.5G/250ML
1500 PLASTIC BAG, INJECTION (ML) INTRAVENOUS ONCE
Status: DISCONTINUED | OUTPATIENT
Start: 2020-04-06 | End: 2020-04-06

## 2020-04-06 RX ADMIN — CALCIUM CHLORIDE, MAGNESIUM CHLORIDE, DEXTROSE MONOHYDRATE, LACTIC ACID, SODIUM CHLORIDE, SODIUM BICARBONATE AND POTASSIUM CHLORIDE 3000 ML/HR: 3.68; 3.05; 22; 5.4; 6.46; 3.09; .314 INJECTION INTRAVENOUS at 22:41

## 2020-04-06 RX ADMIN — CASTOR OIL AND BALSAM, PERU: 788; 87 OINTMENT TOPICAL at 17:05

## 2020-04-06 RX ADMIN — CALCIUM CHLORIDE, MAGNESIUM CHLORIDE, DEXTROSE MONOHYDRATE, LACTIC ACID, SODIUM CHLORIDE, SODIUM BICARBONATE AND POTASSIUM CHLORIDE 3000 ML/HR: 3.68; 3.05; 22; 5.4; 6.46; 3.09; .314 INJECTION INTRAVENOUS at 08:36

## 2020-04-06 RX ADMIN — CALCIUM CHLORIDE, MAGNESIUM CHLORIDE, DEXTROSE MONOHYDRATE, LACTIC ACID, SODIUM CHLORIDE, SODIUM BICARBONATE AND POTASSIUM CHLORIDE 3500 ML/HR: 3.68; 3.05; 22; 5.4; 6.46; 3.09; .314 INJECTION INTRAVENOUS at 04:25

## 2020-04-06 RX ADMIN — CALCIUM CHLORIDE, MAGNESIUM CHLORIDE, DEXTROSE MONOHYDRATE, LACTIC ACID, SODIUM CHLORIDE, SODIUM BICARBONATE AND POTASSIUM CHLORIDE 3000 ML/HR: 3.68; 3.05; 22; 5.4; 6.46; 3.09; .314 INJECTION INTRAVENOUS at 10:22

## 2020-04-06 RX ADMIN — GUAIFENESIN 100 MG: 100 SOLUTION ORAL at 11:18

## 2020-04-06 RX ADMIN — CASTOR OIL AND BALSAM, PERU: 788; 87 OINTMENT TOPICAL at 08:55

## 2020-04-06 RX ADMIN — GUAIFENESIN 100 MG: 100 SOLUTION ORAL at 05:14

## 2020-04-06 RX ADMIN — MIDAZOLAM 2 MG: 1 INJECTION INTRAMUSCULAR; INTRAVENOUS at 05:00

## 2020-04-06 RX ADMIN — CALCIUM CHLORIDE, MAGNESIUM CHLORIDE, DEXTROSE MONOHYDRATE, LACTIC ACID, SODIUM CHLORIDE, SODIUM BICARBONATE AND POTASSIUM CHLORIDE 3500 ML/HR: 3.68; 3.05; 22; 5.4; 6.46; 3.09; .314 INJECTION INTRAVENOUS at 01:42

## 2020-04-06 RX ADMIN — CALCIUM CHLORIDE, MAGNESIUM CHLORIDE, DEXTROSE MONOHYDRATE, LACTIC ACID, SODIUM CHLORIDE, SODIUM BICARBONATE AND POTASSIUM CHLORIDE 3500 ML/HR: 3.68; 3.05; 22; 5.4; 6.46; 3.09; .314 INJECTION INTRAVENOUS at 05:51

## 2020-04-06 RX ADMIN — HEPARIN SODIUM 21 UNITS/KG/HR: 10000 INJECTION, SOLUTION INTRAVENOUS at 00:36

## 2020-04-06 RX ADMIN — ZINC SULFATE 220 MG (50 MG) CAPSULE 1 CAPSULE: CAPSULE at 20:20

## 2020-04-06 RX ADMIN — INSULIN GLARGINE 35 UNITS: 100 INJECTION, SOLUTION SUBCUTANEOUS at 08:45

## 2020-04-06 RX ADMIN — CALCIUM CHLORIDE, MAGNESIUM CHLORIDE, DEXTROSE MONOHYDRATE, LACTIC ACID, SODIUM CHLORIDE, SODIUM BICARBONATE AND POTASSIUM CHLORIDE 3500 ML/HR: 3.68; 3.05; 22; 5.4; 6.46; 3.09; .314 INJECTION INTRAVENOUS at 03:12

## 2020-04-06 RX ADMIN — CALCIUM CHLORIDE, MAGNESIUM CHLORIDE, DEXTROSE MONOHYDRATE, LACTIC ACID, SODIUM CHLORIDE, SODIUM BICARBONATE AND POTASSIUM CHLORIDE 3500 ML/HR: 3.68; 3.05; 22; 5.4; 6.46; 3.09; .314 INJECTION INTRAVENOUS at 07:02

## 2020-04-06 RX ADMIN — CALCIUM CHLORIDE, MAGNESIUM CHLORIDE, DEXTROSE MONOHYDRATE, LACTIC ACID, SODIUM CHLORIDE, SODIUM BICARBONATE AND POTASSIUM CHLORIDE 3000 ML/HR: 3.68; 3.05; 22; 5.4; 6.46; 3.09; .314 INJECTION INTRAVENOUS at 15:47

## 2020-04-06 RX ADMIN — SODIUM CHLORIDE 20 ML: 9 INJECTION INTRAMUSCULAR; INTRAVENOUS; SUBCUTANEOUS at 22:07

## 2020-04-06 RX ADMIN — CHLORHEXIDINE GLUCONATE 15 ML: 0.12 RINSE ORAL at 20:20

## 2020-04-06 RX ADMIN — Medication 50 MCG/HR: at 12:28

## 2020-04-06 RX ADMIN — CALCIUM CHLORIDE, MAGNESIUM CHLORIDE, DEXTROSE MONOHYDRATE, LACTIC ACID, SODIUM CHLORIDE, SODIUM BICARBONATE AND POTASSIUM CHLORIDE 3000 ML/HR: 3.68; 3.05; 22; 5.4; 6.46; 3.09; .314 INJECTION INTRAVENOUS at 20:59

## 2020-04-06 RX ADMIN — OXYCODONE HYDROCHLORIDE AND ACETAMINOPHEN 500 MG: 500 TABLET ORAL at 08:46

## 2020-04-06 RX ADMIN — GUAIFENESIN 100 MG: 100 SOLUTION ORAL at 00:06

## 2020-04-06 RX ADMIN — CALCIUM CHLORIDE, MAGNESIUM CHLORIDE, DEXTROSE MONOHYDRATE, LACTIC ACID, SODIUM CHLORIDE, SODIUM BICARBONATE AND POTASSIUM CHLORIDE 3500 ML/HR: 3.68; 3.05; 22; 5.4; 6.46; 3.09; .314 INJECTION INTRAVENOUS at 00:17

## 2020-04-06 RX ADMIN — SODIUM CHLORIDE 10 ML: 9 INJECTION INTRAMUSCULAR; INTRAVENOUS; SUBCUTANEOUS at 15:01

## 2020-04-06 RX ADMIN — CALCIUM CHLORIDE, MAGNESIUM CHLORIDE, DEXTROSE MONOHYDRATE, LACTIC ACID, SODIUM CHLORIDE, SODIUM BICARBONATE AND POTASSIUM CHLORIDE 3000 ML/HR: 3.68; 3.05; 22; 5.4; 6.46; 3.09; .314 INJECTION INTRAVENOUS at 12:15

## 2020-04-06 RX ADMIN — CALCIUM CHLORIDE, MAGNESIUM CHLORIDE, DEXTROSE MONOHYDRATE, LACTIC ACID, SODIUM CHLORIDE, SODIUM BICARBONATE AND POTASSIUM CHLORIDE 3000 ML/HR: 3.68; 3.05; 22; 5.4; 6.46; 3.09; .314 INJECTION INTRAVENOUS at 19:11

## 2020-04-06 RX ADMIN — MIDAZOLAM 2 MG: 1 INJECTION INTRAMUSCULAR; INTRAVENOUS at 19:40

## 2020-04-06 RX ADMIN — FAMOTIDINE 20 MG: 40 POWDER, FOR SUSPENSION ORAL at 20:20

## 2020-04-06 RX ADMIN — OXYCODONE HYDROCHLORIDE AND ACETAMINOPHEN 500 MG: 500 TABLET ORAL at 20:20

## 2020-04-06 RX ADMIN — MINERAL OIL AND WHITE PETROLATUM: 150; 830 OINTMENT OPHTHALMIC at 22:41

## 2020-04-06 RX ADMIN — HEPARIN SODIUM 21 UNITS/KG/HR: 10000 INJECTION, SOLUTION INTRAVENOUS at 08:42

## 2020-04-06 RX ADMIN — LABETALOL HYDROCHLORIDE 100 MG: 100 TABLET, FILM COATED ORAL at 08:46

## 2020-04-06 RX ADMIN — SODIUM CHLORIDE 10 ML: 9 INJECTION INTRAMUSCULAR; INTRAVENOUS; SUBCUTANEOUS at 05:14

## 2020-04-06 RX ADMIN — HEPARIN SODIUM 21 UNITS/KG/HR: 10000 INJECTION, SOLUTION INTRAVENOUS at 18:30

## 2020-04-06 RX ADMIN — CHLORHEXIDINE GLUCONATE 15 ML: 0.12 RINSE ORAL at 09:41

## 2020-04-06 RX ADMIN — LABETALOL HYDROCHLORIDE 100 MG: 100 TABLET, FILM COATED ORAL at 16:54

## 2020-04-06 RX ADMIN — CALCIUM CHLORIDE, MAGNESIUM CHLORIDE, DEXTROSE MONOHYDRATE, LACTIC ACID, SODIUM CHLORIDE, SODIUM BICARBONATE AND POTASSIUM CHLORIDE 3000 ML/HR: 3.68; 3.05; 22; 5.4; 6.46; 3.09; .314 INJECTION INTRAVENOUS at 14:02

## 2020-04-06 RX ADMIN — GUAIFENESIN 100 MG: 100 SOLUTION ORAL at 17:00

## 2020-04-06 RX ADMIN — DEXMEDETOMIDINE HYDROCHLORIDE 0.4 MCG/KG/HR: 100 INJECTION, SOLUTION, CONCENTRATE INTRAVENOUS at 18:16

## 2020-04-06 RX ADMIN — CALCIUM CHLORIDE, MAGNESIUM CHLORIDE, DEXTROSE MONOHYDRATE, LACTIC ACID, SODIUM CHLORIDE, SODIUM BICARBONATE AND POTASSIUM CHLORIDE 3000 ML/HR: 3.68; 3.05; 22; 5.4; 6.46; 3.09; .314 INJECTION INTRAVENOUS at 17:33

## 2020-04-06 RX ADMIN — MIDAZOLAM 2 MG: 1 INJECTION INTRAMUSCULAR; INTRAVENOUS at 01:35

## 2020-04-06 NOTE — PROGRESS NOTES
Transitions of care  TBD  Patient is a transfer from [facility]  Respiratory failure r/t COVID -19  Remains vented on a Fentanyl gtt and  CRRT  Patient is a full code  Trupti Arana RN,CRM

## 2020-04-06 NOTE — DIALYSIS
[address] [facility] Acutes       [phone]    Orders   Mode: CVVHD   Factor: 200 ml/hr   DFR: 3000 ml/hr   Blood Flow Rate: 200 ml/min     Metrics   BP / HR: 132/61  93   Blood Flow Rate: 200ml/min   AP:                         -29   RP: 75   TMP: 39   PD: 54   FP: 158   DFR: 3000 ml/hr     Comments / Plan:      Pt orders, notes, labs, code status and consents reviewed. [id number] filter running well with no indication for change at this time. Right femoral CVC, dressing CDI with transparent dressing. Lines visible and secured with blood warmer attached to return line and set at 37*C. Education pre/post with primary RN. COVID+.

## 2020-04-06 NOTE — PROGRESS NOTES
[facility]    ICU Team Note    Name: Terence Schaefer   : 1960   MRN: [id number]   Date: 2020      Subjective:   Progress Note: 2020      Reason for ICU Admission: SARS-COV2 with renal failure     63 yo female with obesity and diabetes who presented to [facility] with worsening hypoxic respiratory failure in lieu of close exposure to COVID-19. She presented to the hospital 3/26 and intubated 3/28. She was started on broad spectrum antibiotics and plaquenil. Developed worsening renal failure and was transferred to us for CRRT. Overnight Events:     No significant events overnight. Still sedated, on minimal vents settings. Did well on PS this AM when I saw her. POD:* No surgery found *    S/P:     Active Problem List:     Problem List  Date Reviewed: 2020          Codes Class    Hypotension ICD-10-CM: I95.9  ICD-9-CM: 458.9 Acute        Sepsis due to methicillin susceptible Staphylococcus aureus (MSSA) without acute organ dysfunction (HCC) ICD-10-CM: A41.01  ICD-9-CM: 038.11, 995.91 Acute        COVID-19 virus infection ICD-10-CM: U07.1         Obesity, morbid ([facility] ICD-10-CM: E66.01  ICD-9-CM: 278.01         Vaginal Pap smear following hysterectomy for malignancy ICD-10-CM: Z08, Z90.710  ICD-9-CM: V67.01         Personal history of malignant neoplasm of other parts of uterus ICD-10-CM: Z85.42  ICD-9-CM: V10.42         Endometrial cancer ([facility] ICD-10-CM: C54.1  ICD-9-CM: 182.0               Past Medical History:      has a past medical history of Basal cell carcinoma, GERD (gastroesophageal reflux disease), Kidney stones, and Polyp of ureter. Past Surgical History:      has a past surgical history that includes hysteroscopy diagnostic (); pr endometrial ablation, thermal; hx  section (); and hx colonoscopy. Home Medications:     Prior to Admission medications    Medication Sig Start Date End Date Taking?  Authorizing Provider   pantoprazole (PROTONIX) 40 mg tablet TAKE 1 TABLET BY MOUTH TWICE A DAY 18   Provider, Historical   esomeprazole (NEXIUM) 20 mg capsule Take 20 mg by mouth daily. Indications: BID    Provider, Historical   zolpidem (AMBIEN) 10 mg tablet Take 0.5 Tabs by mouth nightly as needed for Sleep. Max Daily Amount: 5 mg. 17   Frida Mandel MD   fluticasone (FLONASE) 50 mcg/actuation nasal spray Mist 1-2 spray(s) into each nostril once daily. 4/3/15   Provider, Historical   ranitidine (ZANTAC) 150 mg tablet 150 mg.    Provider, Historical       Allergies/Social/Family History: Allergies   Allergen Reactions    Augmentin [Amoxicillin-Pot Clavulanate] Rash and Itching      Social History     Tobacco Use    Smoking status: Never Smoker    Smokeless tobacco: Never Used   Substance Use Topics    Alcohol use: Not on file      Family History   Problem Relation Age of Onset    Prostate Cancer Father         PROSTATE    Breast Cancer Sister 46        invasive poorly differentiated ductal carcinoma, Neg genetic testing.  Cancer Sister 62        melanoma stage 1       Review of Systems:     A comprehensive review of systems was negative except for that written in the HPI.     Objective:   Vital Signs:  Visit Vitals  /70   Pulse 88   Temp 98 °F (36.7 °C)   Resp 15   Ht 5' 4\" (1.626 m) Comment: per OSH record   Wt 125.1 kg (275 lb 12.7 oz)   SpO2 99%   BMI 47.34 kg/m²      O2 Device: Endotracheal tube Temp (24hrs), Av.2 °F (36.8 °C), Min:96.8 °F (36 °C), Max:98.8 °F (37.1 °C)           Intake/Output:     Intake/Output Summary (Last 24 hours) at 2020 1140  Last data filed at 2020 1100  Gross per 24 hour   Intake 1587.15 ml   Output 5992 ml   Net -4404.85 ml       Physical Exam:    General: Ill appearing, NAD  HENT: Atraumatic  Cardio: RRR  Respiratory: distant breath sounds BL  GI: Soft not tender abdomen  Extremities: +ve edema  Neuro: sedated, grimaces to pain, opens eyes spontaneously      LABS AND  DATA: Personally reviewed  Recent Labs     04/06/20  0311 04/05/20  0311   WBC 13.2* 11.7*   HGB 10.3* 9.6*   HCT 32.9* 30.9*    162     Recent Labs     04/06/20  0306 04/05/20  1213  04/04/20  1457 04/04/20  0333    135*   < > 135* 134*   K 4.1 4.0   < > 3.6 3.6    104   < > 103 103   CO2 26 26   < > 24 23   BUN 25* 24*   < > 29* 33*   CREA 1.37* 1.42*   < > 1.87* 2.18*   * 111*   < > 127* 144*   CA 8.6 8.2*   < > 8.7 8.9   MG  --   --   --  2.6* 2.7*   PHOS 3.0 2.6   < > 1.9*  2.0* 3.0    < > = values in this interval not displayed. Recent Labs     04/06/20  0306 04/06/20  0300  04/04/20  0333   SGOT  --  49*  --  28   AP  --  298*  --  281*   TP  --  6.8  --  6.4   ALB 2.2* 2.3*   < > 1.8*   GLOB  --  4.5*  --  4.6*    < > = values in this interval not displayed. Recent Labs     04/06/20  0648 04/06/20  0009  04/04/20  0333   INR 1.1  --   --  1.0   PTP 11.1  --   --  10.8   APTT 70.0* 63.8*   < > 33.9*    < > = values in this interval not displayed. Recent Labs     04/05/20  0334 04/04/20  0452   PHI 7.413 7.344*   PCO2I 39.0 43.4   PO2I 67* 106*   FIO2I 0.45 50     Recent Labs     04/03/20  1239   CPK 42       Hemodynamics:   PAP:   CO:     Wedge:   CI:     CVP:    SVR:       PVR:       Ventilator Settings:  Mode Rate Tidal Volume Pressure FiO2 PEEP   Assist control, Pressure control        45 % 8 cm H20     Peak airway pressure: 25 cm H2O    Minute ventilation: 12.7 l/min        MEDS: Reviewed    Chest X-Ray: personally reviewed and report checked      Assessment/Plan:     1. Acute Hypoxic Respiratory failure - Secondary to COVID-19 - Note sputum with staph aureus - susceptible to vancomycin. Blood NTD. Continue lung protective ventilation strategies. Will sedation to allow for SBT and extubation. Continue abx therapy for superimposed bacterial PNA. Completed Azithro and plaquenil course. ID following. Pending echo. Elevated IL-6 - received one dose of 400mg of actemra. Continue diuresis with CRRT  2. Shock - Likely worsened or precipitated by sedation. Off pressors. 3. Diabetes - Continue ISS and Lantus 35 units  4. HTN - Now hypertensive coming off sedation. Start labetalol 100mg TID and PRN. 5. Renal Failure - Likely ATN from sepsis and hypotension. Nephrology following. Continue CRRT  6. Sepsis secondary to COVID-19 - POA. Management as detailed above     Multidisciplinary Rounds Completed:  No    ABCDEF Bundle/Checklist  Pain Medications: Fentanyl  Target RASS: -2 - Light Sedation - Briefly awakens to voice (eyes open & contact <10 sec)  Sedation Medications: Precedex  CAM-ICU:  Negative  Mobility: Bedrest  PT/OT: To be consulted when stable   Restraints: Soft wrist restraints  Discussed Plan of Care (goals of care): No  Addressed Code Status: Full Code    CARDIOVASCULAR  Cardiac Gtts: None  SBP Goal of: > 90 mmHg  MAP Goal of: > 65 mmHg  Transfusion Trigger (Hgb): <7 g/dL    RESPIRATORY  Vent Goals:   Chlorhexidine   Optimize PEEP/Ventilation/Oxygenation  Goal Tidal Volume 6 cc/kg based on IBW  Aim for lung protective ventilation  Head of bed > 30 degrees  DVT Prophylaxis (if no, list reason): SCD's or Sequential Compression Device and Heparin   SPO2 Goal: > 92%  Pulmonary toilet: Duo-Nebs     GI/  Bowden Catheter Present: Yes  GI Prophylaxis: Pepcid (famotidine)   Nutrition: Yes   Bowel Movement: No  Bowel Regimen: Lactulose  Insulin: ISS and Lantus    ANTIBIOTICS  Antibiotics:  Vancomycin    T/L/D  Tubes: ETT and Orogastric Tube  Lines: Peripheral IV, Arterial Line, PICC Line and Ramon  Drains: Bowden Catheter    SPECIAL EQUIPMENT  CRRT    DISPOSITION  Stay in ICU    CRITICAL CARE CONSULTANT NOTE  I had a face to face encounter with the patient, reviewed and interpreted patient data including clinical events, labs, images, vital signs, I/O's, and examined patient.   I have discussed the case and the plan and management of the patient's care with the consulting services, the bedside nurses and the respiratory therapist.      NOTE OF PERSONAL INVOLVEMENT IN CARE   This patient has a high probability of imminent, clinically significant deterioration, which requires the highest level of preparedness to intervene urgently. I participated in the decision-making and personally managed or directed the management of the following life and organ supporting interventions that required my frequent assessment to treat or prevent imminent deterioration. I personally spent 35 minutes of critical care time. This is time spent at this critically ill patient's bedside actively involved in patient care as well as the coordination of care and discussions with the patient's family. This does not include any procedural time which has been billed separately.     Sarah Santillan MD  Staff CAMMY/ [place] 64  4/6/2020

## 2020-04-06 NOTE — PROGRESS NOTES
Verbal shift change report given to [id number] [address] RN's (oncoming nurse) by [address] (offgoing nurse). Report included the following information SBAR, Kardex, Procedure Summary, Intake/Output, MAR and Cardiac Rhythm nsr to tach. 0800 - bedside assessment complete, pt ETT tube at 45%. On CRRT factor of 200. Pt non interactive and minimal withdrawal.    0900 - plan to slowly wean down on fentanyl for extubation preparation, precedex ordered for sedation if needed. Will reduce in increments of 25mcg every few hours as tolerated    1300 - wound care consult for 2 sacral suspected DTI's. Entered in doc flow sheet. 1500 - fentanyl stopped.  Will monitor pt for toleration and will start precedex as needed

## 2020-04-06 NOTE — PROGRESS NOTES
Clinical Pharmacy Note: IV to PO Automatic Conversion  Please note: Shameka Delgado medication(s) (famotidine) has/have been changed from IV to PO (to be given via NG tube) based on the following critiera:    - Patient is taking scheduled oral medications  - Patient is tolerating tube feeds at goal rate or a full liquid, soft or regular diet    This IV to PO conversion is based on the P&T approved automatic conversion policy for eligible patients. Please call with questions.

## 2020-04-06 NOTE — PROGRESS NOTES
[facility]   [address] [facility], [address] [address], [facility]  Phone: [phone]   [phone]       Nephrology Progress Note  Axel Whitaker     1960     [id number]  Date of Admission : 3/31/2020  04/06/20    CC: Follow up for JUANCHO      Assessment and Plan   JUANCHO :  - 2/2 ATN  - continue CVVHD. Reduced RFR to 3000 ml/hr   - Factor of 100-200 today : d/w Intensivist   - labs daily from renal stand point   - Systemic heparin for now and hopefully can be stopped as significant improvement in inflammatory markers after administering IL-6 inhibitor (Actemra)     COVID-19 +ve   Acute Hypoxic Resp Failure   - On Vent   - completed Plaquenil. On Vit C, Zinc   - s/p Tocilizumab      Type II DM   - Insulin per primary team      Hypothermia   Morbid Obesity      Care Plan discussed with:  ICU team      Interval History:  Case discussed with intensivist; nurse in room. CRRT better w/ systemic heparin  Remains anuric. PT NOT EXAMINED in line with ASN and RPA GUIDELINES ON MANAGING COVID-19 PTS WITH RENAL ISSUES. Examination findings discussed personally with the examining Physician team member      Review of Systems: Review of systems not obtained due to patient factors.     Current Medications:   Current Facility-Administered Medications   Medication Dose Route Frequency    heparin (porcine) pf 300 Units  300 Units InterCATHeter PRN    guaiFENesin (ROBITUSSIN) 100 mg/5 mL oral liquid 100 mg  100 mg Oral Q6H    alteplase (CATHFLO) 2 mg in sterile water (preservative free) 2 mL injection  2 mg InterCATHeter PRN    alteplase (CATHFLO) 2 mg in sterile water (preservative free) 2 mL injection  2 mg InterCATHeter PRN    ascorbic acid (vitamin C) (VITAMIN C) tablet 500 mg  500 mg Per NG tube Q12H    heparin 25,000 units in D5W 250 ml infusion  7-25 Units/kg/hr IntraVENous TITRATE    zinc sulfate (ZINCATE) capsule 1 Cap  1 Cap Per NG tube QHS    midazolam (VERSED) injection 1-2 mg  1-2 mg IntraVENous Q2H PRN    bacitracin 500 unit/gram packet 1 Packet  1 Packet Topical PRN    insulin glargine (LANTUS) injection 35 Units  35 Units SubCUTAneous DAILY    balsam peru-castor oiL (VENELEX) ointment   Topical BID    sodium chloride (NS) flush 5-40 mL  5-40 mL IntraVENous Q8H    sodium chloride (NS) flush 5-40 mL  5-40 mL IntraVENous PRN    HYDROcodone-acetaminophen (NORCO) 5-325 mg per tablet 1 Tab  1 Tab Oral Q4H PRN    HYDROmorphone (PF) (DILAUDID) injection 0.5 mg  0.5 mg IntraVENous Q4H PRN    ondansetron (ZOFRAN) injection 4 mg  4 mg IntraVENous Q4H PRN    NOREPINephrine (LEVOPHED) 8,000 mcg in dextrose 5% 250 mL infusion  2-30 mcg/min IntraVENous TITRATE    vasopressin (VASOSTRICT) 20 Units in 0.9% sodium chloride 100 mL infusion  0.04 Units/min IntraVENous CONTINUOUS    fentaNYL (PF) 1,500 mcg/30 mL (50 mcg/mL) infusion   mcg/hr IntraVENous TITRATE    propofol (DIPRIVAN) 10 mg/mL infusion  0-50 mcg/kg/min IntraVENous TITRATE    famotidine (PF) (PEPCID) 20 mg in 0.9% sodium chloride 10 mL injection  20 mg IntraVENous QHS    chlorhexidine (ORAL CARE KIT) 0.12 % mouthwash 15 mL  15 mL Oral Q12H    docusate (COLACE) 50 mg/5 mL oral liquid 100 mg  100 mg Per NG tube BID    acetaminophen (TYLENOL) tablet 650 mg  650 mg Oral Q6H PRN    Or    acetaminophen (TYLENOL) suppository 650 mg  650 mg Rectal Q6H PRN    bicarbonate dialysis (PRISMASOL) BG K 4/Ca 2.5 5000 ml solution   Extracorporeal DIALYSIS CONTINUOUS    glucose chewable tablet 16 g  4 Tab Oral PRN    glucagon (GLUCAGEN) injection 1 mg  1 mg IntraMUSCular PRN    dextrose 10% infusion 0-250 mL  0-250 mL IntraVENous PRN    insulin lispro (HUMALOG) injection   SubCUTAneous Q6H    Vancomycin - Pharmacy to Dose   Other Rx Dosing/Monitoring      Allergies   Allergen Reactions    Augmentin [Amoxicillin-Pot Clavulanate] Rash and Itching       Objective:  Vitals:    Vitals:    04/06/20 8952 04/06/20 5953 04/06/20 0656 04/06/20 0700   BP:    130/78   Pulse: (!) 104 (!) 104 (!) 104 (!) 101   Resp: 20 22 22 18   Temp:       SpO2: 100% 99% 100%    Weight:       Height:         Intake and Output:  No intake/output data recorded. 04/04 1901 - 04/06 0700  In: 3007 [I.V.:1927]  Out: 18641 [Drains:860]    Physical Examination: inspection only   Pt intubated    Yes  General: Sedated on vent, obese   Neck:  Lines   Resp:  On vent   CV:  RRR  GI:  Obese   Neurologic:  Sedated   Psych:              Unable to assess  Ext                   R femoral melissa     []    High complexity decision making was performed  []    Patient is at high-risk of decompensation with multiple organ involvement    Lab Data Personally Reviewed: I have reviewed all the pertinent labs, microbiology data and radiology studies during assessment.     Recent Labs     04/06/20  0648 04/06/20  0306 04/06/20  0300 04/05/20  1213 04/05/20  0311 04/04/20  1457 04/04/20  0333 04/03/20  1524   NA  --  136  --  135* 135* 135* 134* 134*   K  --  4.1  --  4.0 3.8 3.6 3.6 3.9   CL  --  102  --  104 104 103 103 102   CO2  --  26  --  26 27 24 23 24   GLU  --  134*  --  111* 110* 127* 144* 147*   BUN  --  25*  --  24* 28* 29* 33* 35*   CREA  --  1.37*  --  1.42* 1.52* 1.87* 2.18* 2.60*   CA  --  8.6  --  8.2* 8.5 8.7 8.9 8.8   MG  --   --   --   --   --  2.6* 2.7* 2.8*   PHOS  --  3.0  --  2.6 2.4* 1.9*  2.0* 3.0 3.8   ALB  --  2.2* 2.3* 1.9* 2.0* 1.8* 1.8* 1.8*   SGOT  --   --  49*  --   --   --  28  --    ALT  --   --  33  --   --   --  25  --    INR 1.1  --   --   --   --   --  1.0  --      Recent Labs     04/06/20 0311 04/05/20 0311 04/04/20  0333 04/03/20  1524   WBC 13.2* 11.7* 7.7 9.1   HGB 10.3* 9.6* 8.7* 8.8*   HCT 32.9* 30.9* 28.0* 28.2*    162 156 136*     No results found for: SDES  Lab Results   Component Value Date/Time    Culture result: NO GROWTH 5 DAYS 03/31/2020 11:15 AM    Culture result: MODERATE STAPHYLOCOCCUS AUREUS (A) 03/31/2020 10:34 AM    Culture result: LIGHT NORMAL RESPIRATORY SOFIE 03/31/2020 10:34 AM     Recent Results (from the past 24 hour(s))   PTT    Collection Time: 04/05/20 12:13 PM   Result Value Ref Range    aPTT 56.6 (H) 22.1 - 32.0 sec    aPTT, therapeutic range     58.0 - 77.0 SECS   RENAL FUNCTION PANEL    Collection Time: 04/05/20 12:13 PM   Result Value Ref Range    Sodium 135 (L) 136 - 145 mmol/L    Potassium 4.0 3.5 - 5.1 mmol/L    Chloride 104 97 - 108 mmol/L    CO2 26 21 - 32 mmol/L    Anion gap 5 5 - 15 mmol/L    Glucose 111 (H) 65 - 100 mg/dL    BUN 24 (H) 6 - 20 MG/DL    Creatinine 1.42 (H) 0.55 - 1.02 MG/DL    BUN/Creatinine ratio 17 12 - 20      GFR est AA 46 (L) >60 ml/min/1.73m2    GFR est non-AA 38 (L) >60 ml/min/1.73m2    Calcium 8.2 (L) 8.5 - 10.1 MG/DL    Phosphorus 2.6 2.6 - 4.7 MG/DL    Albumin 1.9 (L) 3.5 - 5.0 g/dL   GLUCOSE, POC    Collection Time: 04/05/20 12:17 PM   Result Value Ref Range    Glucose (POC) 106 (H) 65 - 100 mg/dL    Performed by [address] [place] [address], POC    Collection Time: 04/05/20  5:54 PM   Result Value Ref Range    Glucose (POC) 114 (H) 65 - 100 mg/dL    Performed by Sonia Harris    PTT    Collection Time: 04/05/20  6:23 PM   Result Value Ref Range    aPTT 52.9 (H) 22.1 - 32.0 sec    aPTT, therapeutic range     58.0 - 77.0 SECS   PTT    Collection Time: 04/06/20 12:09 AM   Result Value Ref Range    aPTT 63.8 (H) 22.1 - 32.0 sec    aPTT, therapeutic range     58.0 - 77.0 SECS   GLUCOSE, POC    Collection Time: 04/06/20 12:13 AM   Result Value Ref Range    Glucose (POC) 146 (H) 65 - 100 mg/dL    Performed by [address] [place] [address]    HEPATIC FUNCTION PANEL    Collection Time: 04/06/20  3:00 AM   Result Value Ref Range    Protein, total 6.8 6.4 - 8.2 g/dL    Albumin 2.3 (L) 3.5 - 5.0 g/dL    Globulin 4.5 (H) 2.0 - 4.0 g/dL    A-G Ratio 0.5 (L) 1.1 - 2.2      Bilirubin, total 0.8 0.2 - 1.0 MG/DL    Bilirubin, direct 0.5 (H) 0.0 - 0.2 MG/DL    Alk.  phosphatase 298 (H) 45 - 117 U/L    AST (SGOT) 49 (H) 15 - 37 U/L    ALT (SGPT) 33 12 - 78 U/L   RENAL FUNCTION PANEL    Collection Time: 04/06/20  3:06 AM   Result Value Ref Range    Sodium 136 136 - 145 mmol/L    Potassium 4.1 3.5 - 5.1 mmol/L    Chloride 102 97 - 108 mmol/L    CO2 26 21 - 32 mmol/L    Anion gap 8 5 - 15 mmol/L    Glucose 134 (H) 65 - 100 mg/dL    BUN 25 (H) 6 - 20 MG/DL    Creatinine 1.37 (H) 0.55 - 1.02 MG/DL    BUN/Creatinine ratio 18 12 - 20      GFR est AA 48 (L) >60 ml/min/1.73m2    GFR est non-AA 40 (L) >60 ml/min/1.73m2    Calcium 8.6 8.5 - 10.1 MG/DL    Phosphorus 3.0 2.6 - 4.7 MG/DL    Albumin 2.2 (L) 3.5 - 5.0 g/dL   VANCOMYCIN, RANDOM    Collection Time: 04/06/20  3:06 AM   Result Value Ref Range    Vancomycin, random 15.9 UG/ML   CBC WITH AUTOMATED DIFF    Collection Time: 04/06/20  3:11 AM   Result Value Ref Range    WBC 13.2 (H) 3.6 - 11.0 K/uL    RBC 3.85 3.80 - 5.20 M/uL    HGB 10.3 (L) 11.5 - 16.0 g/dL    HCT 32.9 (L) 35.0 - 47.0 %    MCV 85.5 80.0 - 99.0 FL    MCH 26.8 26.0 - 34.0 PG    MCHC 31.3 30.0 - 36.5 g/dL    RDW 14.4 11.5 - 14.5 %    PLATELET 051 404 - 170 K/uL    MPV 9.9 8.9 - 12.9 FL    NRBC 0.2 (H) 0  WBC    ABSOLUTE NRBC 0.03 (H) 0.00 - 0.01 K/uL    NEUTROPHILS 62 32 - 75 %    BAND NEUTROPHILS 8 (H) 0 - 6 %    LYMPHOCYTES 17 12 - 49 %    MONOCYTES 6 5 - 13 %    EOSINOPHILS 2 0 - 7 %    BASOPHILS 1 0 - 1 %    METAMYELOCYTES 1 (H) 0 %    MYELOCYTES 3 (H) 0 %    IMMATURE GRANULOCYTES 0 %    ABS. NEUTROPHILS 9.2 (H) 1.8 - 8.0 K/UL    ABS. LYMPHOCYTES 2.2 0.8 - 3.5 K/UL    ABS. MONOCYTES 0.8 0.0 - 1.0 K/UL    ABS. EOSINOPHILS 0.3 0.0 - 0.4 K/UL    ABS. BASOPHILS 0.1 0.0 - 0.1 K/UL    ABS. IMM.  GRANS. 0.0 K/UL    DF MANUAL      PLATELET COMMENTS Large Platelets      RBC COMMENTS MICROCYTOSIS  1+        RBC COMMENTS POLYCHROMASIA  1+       PROTHROMBIN TIME + INR    Collection Time: 04/06/20  6:48 AM   Result Value Ref Range    INR 1.1 0.9 - 1.1      Prothrombin time 11.1 9.0 - 11.1 sec   PTT    Collection Time: 04/06/20 6:48 AM   Result Value Ref Range    aPTT 70.0 (H) 22.1 - 32.0 sec    aPTT, therapeutic range     58.0 - 77.0 SECS           Total time spent with patient:  xxx   min. Care Plan discussed with:  Patient     Family      RN      Consulting Physician [facility]        I have reviewed the flowsheets. Chart and Pertinent Notes have been reviewed. No change in PMH ,family and social history from Consult note.       Lino Hardin MD

## 2020-04-06 NOTE — PROGRESS NOTES
1930: Bedside shift change report given to DWAYNE Mccracken RN (oncoming nurse) by Chi Brandt RN (offgoing nurse). Report included the following information SBAR, Kardex, ED Summary, Procedure Summary, Intake/Output, MAR, Accordion, Recent Results and Med Rec Status.    2323-inc WOB/abd breathing/elevated bp and hr on 25mcg joss-MD Ayers updated and oder received to inc sedation-joss inc to 100mcg  0120-PTT 63.8 1st therapeutic value no change in rate per protocol/  0135-still with episodes of abd breathing with sbp 140-170  Versed given per order  0200-sedated  0500- eyes opening slightly when turned/inc WOB/abd breathing/elevated bp and hr versed given per order  0543-sedated      0730-bedside report given to RN using sbar format

## 2020-04-06 NOTE — PROGRESS NOTES
Infectious Disease Progress Note       Subjective:   Ms Aurora Eastman seen from outside the room  D/W with ICU nursing team and Intensivist  Reviewed chart   S/P Actemra on 4/3/20 one dose         Objective:    Vitals:   Patient Vitals for the past 24 hrs:   Temp Pulse Resp BP SpO2   04/06/20 1000 -- 94 14 121/74 97 %   04/06/20 0900 -- (!) 107 16 151/87 --   04/06/20 0840 -- (!) 109 15 -- 100 %   04/06/20 0800 98 °F (36.7 °C) (!) 109 19 (!) 149/92 100 %   04/06/20 0700 -- (!) 101 18 130/78 --   04/06/20 0656 -- (!) 104 22 -- 100 %   04/06/20 0655 -- (!) 104 22 -- 99 %   04/06/20 0654 -- (!) 104 20 -- 100 %   04/06/20 0653 -- (!) 106 15 -- 99 %   04/06/20 0652 -- (!) 105 17 -- 99 %   04/06/20 0651 -- (!) 108 21 -- 98 %   04/06/20 0650 -- (!) 109 26 -- 99 %   04/06/20 0649 -- (!) 109 18 -- 98 %   04/06/20 0648 -- (!) 110 26 -- 99 %   04/06/20 0647 -- (!) 110 19 -- 99 %   04/06/20 0646 -- (!) 109 25 -- 100 %   04/06/20 0645 -- (!) 108 23 -- 100 %   04/06/20 0644 -- (!) 103 25 -- 100 %   04/06/20 0643 -- (!) 102 19 -- 100 %   04/06/20 0642 -- (!) 103 17 -- 99 %   04/06/20 0641 -- (!) 104 19 -- 99 %   04/06/20 0640 -- (!) 106 20 -- 100 %   04/06/20 0639 -- (!) 106 24 -- 100 %   04/06/20 0638 -- (!) 105 20 -- 100 %   04/06/20 0637 -- (!) 108 (!) 34 -- 99 %   04/06/20 0636 -- (!) 108 24 -- 99 %   04/06/20 0635 -- (!) 110 18 -- 100 %   04/06/20 0634 -- (!) 110 15 -- 100 %   04/06/20 0633 -- (!) 108 23 -- 100 %   04/06/20 0632 -- (!) 108 18 -- 100 %   04/06/20 0631 -- (!) 108 19 -- 100 %   04/06/20 0630 -- (!) 106 22 145/85 --   04/06/20 0629 -- (!) 103 22 -- 100 %   04/06/20 0628 -- 100 14 -- 100 %   04/06/20 0627 -- (!) 101 23 -- 100 %   04/06/20 0626 -- (!) 101 22 -- 100 %   04/06/20 0625 -- (!) 102 21 -- 100 %   04/06/20 0624 -- (!) 102 17 -- 100 %   04/06/20 0623 -- (!) 103 20 -- 100 %   04/06/20 0622 -- (!) 104 23 -- 100 %   04/06/20 0621 -- (!) 108 24 -- 100 %   04/06/20 0620 -- (!) 109 19 -- 100 %   04/06/20 0619 -- (!) 110 26 -- 100 %   04/06/20 0618 -- (!) 111 21 -- 100 %   04/06/20 0617 -- (!) 110 28 -- 100 %   04/06/20 0616 -- (!) 109 19 -- 100 %   04/06/20 0615 -- (!) 109 20 -- 100 %   04/06/20 0614 -- (!) 105 22 -- 100 %   04/06/20 0613 -- (!) 103 20 -- 100 %   04/06/20 0612 -- 99 21 -- 100 %   04/06/20 0611 -- 97 15 -- 100 %   04/06/20 0610 -- 97 14 -- 100 %   04/06/20 0609 -- 97 15 -- 100 %   04/06/20 0608 -- 97 17 -- 100 %   04/06/20 0607 -- 98 16 -- 100 %   04/06/20 0606 -- 97 17 -- 100 %   04/06/20 0605 -- 98 20 -- 100 %   04/06/20 0604 -- 98 18 -- 100 %   04/06/20 0603 -- 98 20 -- 100 %   04/06/20 0602 -- 98 15 -- 100 %   04/06/20 0601 -- 98 16 -- 100 %   04/06/20 0600 -- 99 16 128/75 --   04/06/20 0559 -- 99 14 -- 100 %   04/06/20 0558 -- 100 18 -- 100 %   04/06/20 0557 -- 100 19 -- 100 %   04/06/20 0556 -- 98 17 -- 100 %   04/06/20 0555 -- (!) 101 18 -- 100 %   04/06/20 0554 -- 100 16 -- 100 %   04/06/20 0553 -- 100 15 -- 100 %   04/06/20 0552 -- (!) 101 17 -- 100 %   04/06/20 0551 -- (!) 101 19 -- 100 %   04/06/20 0550 -- (!) 101 18 -- 100 %   04/06/20 0549 -- 100 20 -- 100 %   04/06/20 0548 -- (!) 101 19 -- 100 %   04/06/20 0547 -- (!) 103 20 -- 100 %   04/06/20 0546 -- (!) 101 19 -- 100 %   04/06/20 0545 -- (!) 102 19 -- 100 %   04/06/20 0544 -- (!) 102 16 -- 100 %   04/06/20 0543 -- (!) 102 21 -- 100 %   04/06/20 0542 -- (!) 103 20 -- 100 %   04/06/20 0541 -- (!) 106 29 -- 100 %   04/06/20 0540 -- (!) 104 22 -- 100 %   04/06/20 0539 -- (!) 103 18 -- 100 %   04/06/20 0538 -- (!) 103 23 -- 100 %   04/06/20 0537 -- (!) 103 24 -- 100 %   04/06/20 0536 -- (!) 103 21 -- 100 %   04/06/20 0535 -- (!) 102 19 -- 100 %   04/06/20 0534 -- (!) 104 16 -- 100 %   04/06/20 0533 -- (!) 104 15 -- 100 %   04/06/20 0532 -- (!) 105 17 -- 100 %   04/06/20 0531 -- (!) 106 20 -- 100 %   04/06/20 0530 -- (!) 104 22 126/81 --   04/06/20 0529 -- (!) 104 30 -- 100 %   04/06/20 0528 -- (!) 106 21 -- 100 %   04/06/20 0527 -- (!) 105 20 -- 100 %   04/06/20 0526 -- (!) 107 26 -- 100 %   04/06/20 0525 -- (!) 105 21 -- 100 %   04/06/20 0524 -- (!) 106 21 -- 100 %   04/06/20 0523 -- (!) 105 19 -- 100 %   04/06/20 0522 -- (!) 107 17 -- 100 %   04/06/20 0521 -- (!) 107 20 -- 100 %   04/06/20 0520 -- (!) 107 24 -- 100 %   04/06/20 0519 -- (!) 107 23 -- 100 %   04/06/20 0518 -- (!) 106 17 -- 100 %   04/06/20 0517 -- (!) 107 18 -- 100 %   04/06/20 0516 -- (!) 108 20 -- 100 %   04/06/20 0515 -- (!) 110 28 -- 100 %   04/06/20 0514 -- (!) 106 21 -- 99 %   04/06/20 0513 -- (!) 108 20 -- 100 %   04/06/20 0512 -- (!) 110 18 -- 99 %   04/06/20 0511 -- (!) 108 12 -- 100 %   04/06/20 0510 -- (!) 108 11 -- 99 %   04/06/20 0509 -- (!) 109 12 -- 99 %   04/06/20 0508 -- (!) 110 14 -- 99 %   04/06/20 0507 -- (!) 112 15 -- 99 %   04/06/20 0506 -- (!) 114 16 -- 98 %   04/06/20 0505 -- (!) 117 25 -- 98 %   04/06/20 0504 -- (!) 116 20 -- 99 %   04/06/20 0503 -- (!) 117 22 -- 97 %   04/06/20 0502 -- (!) 119 23 -- 96 %   04/06/20 0501 -- (!) 124 25 -- 100 %   04/06/20 0500 -- (!) 116 21 (!) 152/117 99 %   04/06/20 0445 -- (!) 106 22 -- 100 %   04/06/20 0430 -- (!) 108 28 139/90 --   04/06/20 0415 -- (!) 111 29 -- 100 %   04/06/20 0400 98.4 °F (36.9 °C) (!) 105 22 120/75 --   04/06/20 0330 -- (!) 102 20 129/79 100 %   04/06/20 0315 -- (!) 105 20 -- 98 %   04/06/20 0300 -- (!) 112 23 165/89 97 %   04/06/20 0245 -- (!) 107 20 -- 99 %   04/06/20 0230 -- (!) 103 14 131/68 --   04/06/20 0215 -- 96 18 -- 100 %   04/06/20 0200 -- 100 16 132/72 --   04/06/20 0145 -- (!) 104 17 -- 100 %   04/06/20 0130 -- (!) 110 26 (!) 177/139 100 %   04/06/20 0117 -- (!) 105 14 140/76 99 %   04/06/20 0115 -- (!) 108 19 -- 99 %   04/06/20 0100 -- (!) 113 26 (!) 167/108 --   04/06/20 0045 -- (!) 113 22 -- 99 %   04/06/20 0030 -- 99 16 113/70 --   04/06/20 0015 -- (!) 102 14 -- 100 %   04/06/20 0011 98.4 °F (36.9 °C) -- -- -- --   04/06/20 0010 -- (!) 107 18 -- 100 %   04/05/20 2345 -- (!) 102 14 -- 100 % 04/05/20 2330 -- (!) 103 14 125/72 --   04/05/20 2315 -- (!) 111 22 -- 99 %   04/05/20 2300 -- (!) 103 12 129/73 --   04/05/20 2245 -- (!) 103 17 -- 100 %   04/05/20 2230 -- (!) 110 19 137/90 --   04/05/20 2215 -- (!) 101 15 -- 100 %   04/05/20 2200 -- (!) 105 15 131/74 99 %   04/05/20 2145 -- (!) 111 18 -- 97 %   04/05/20 2130 -- (!) 110 25 (!) 155/132 --   04/05/20 2115 -- (!) 105 18 -- 100 %   04/05/20 2100 -- (!) 108 17 151/86 --   04/05/20 2054 -- (!) 109 21 -- 99 %   04/05/20 2045 -- (!) 108 19 -- 99 %   04/05/20 2030 -- (!) 107 20 140/86 --   04/05/20 2015 -- (!) 105 28 -- 99 %   04/05/20 2000 98.8 °F (37.1 °C) (!) 104 19 127/81 --   04/05/20 1945 -- (!) 106 17 -- 97 %   04/05/20 1930 -- (!) 104 24 120/73 96 %   04/05/20 1915 -- (!) 105 24 -- 97 %   04/05/20 1900 -- (!) 105 21 121/77 96 %   04/05/20 1800 -- (!) 101 16 129/76 98 %   04/05/20 1700 -- 100 15 104/58 97 %   04/05/20 1634 -- -- 15 -- 97 %   04/05/20 1600 98.7 °F (37.1 °C) (!) 101 15 100/58 97 %   04/05/20 1500 -- 100 12 97/55 96 %   04/05/20 1400 -- (!) 105 15 115/64 96 %   04/05/20 1300 -- (!) 102 19 103/69 96 %   04/05/20 1212 -- -- 18 -- 98 %   04/05/20 1200 96.8 °F (36 °C) 100 18 109/76 97 %       Physical Exam:  Please see ICU teams physical exam     Medications:    Current Facility-Administered Medications:     dexmedeTOMidine (PRECEDEX) 400 mcg in 0.9% sodium chloride 100 mL infusion, 0.2-1.4 mcg/kg/hr, IntraVENous, TITRATE, Michell Madison MD    labetaloL (NORMODYNE) tablet 100 mg, 100 mg, Oral, Q8H, Michell Madison MD, 100 mg at 04/06/20 0846    labetaloL (NORMODYNE;TRANDATE) injection 10 mg, 10 mg, IntraVENous, Q10MIN PRN, Michell Madison MD    heparin (porcine) pf 300 Units, 300 Units, InterCATHeter, PRN, Emlira SCHMITT NP-C    guaiFENesin (ROBITUSSIN) 100 mg/5 mL oral liquid 100 mg, 100 mg, Oral, Q6H, Cassandra Cox, NP-C, 100 mg at 04/06/20 0514    alteplase (CATHFLO) 2 mg in sterile water (preservative free) 2 mL injection, 2 mg, InterCATHeter, PRN, Belkys PÉREZ MD    alteplase (CATHFLO) 2 mg in sterile water (preservative free) 2 mL injection, 2 mg, InterCATHeter, PRN, Belkys PÉREZ MD, 2 mg at 04/03/20 0617    ascorbic acid (vitamin C) (VITAMIN C) tablet 500 mg, 500 mg, Per NG tube, Q12H, Marilu Madison MD, 500 mg at 04/06/20 0846    heparin 25,000 units in D5W 250 ml infusion, 7-25 Units/kg/hr, IntraVENous, TITRATE, Marilu Madison MD, Last Rate: 28 mL/hr at 04/06/20 0842, 21 Units/kg/hr at 04/06/20 0842    zinc sulfate (ZINCATE) capsule 1 Cap, 1 Cap, Per NG tube, QHS, Marilu Madison MD, 1 Cap at 04/05/20 2000    midazolam (VERSED) injection 1-2 mg, 1-2 mg, IntraVENous, Q2H PRN, Azeem SCHMITT NP-CAMMY, 2 mg at 04/06/20 0500    bacitracin 500 unit/gram packet 1 Packet, 1 Packet, Topical, PRN, Scott Velez NP, 1 Packet at 04/01/20 0116    insulin glargine (LANTUS) injection 35 Units, 35 Units, SubCUTAneous, DAILY, Marilu Madison MD, 35 Units at 04/06/20 0845    balsam peru-castor oiL (VENELEX) ointment, , Topical, BID, Marilu Madison MD    sodium chloride (NS) flush 5-40 mL, 5-40 mL, IntraVENous, Q8H, Marilu Madison MD, 10 mL at 04/06/20 0514    sodium chloride (NS) flush 5-40 mL, 5-40 mL, IntraVENous, PRN, Treva Wasserman MD, 10 mL at 04/04/20 2146    HYDROcodone-acetaminophen (NORCO) 5-325 mg per tablet 1 Tab, 1 Tab, Oral, Q4H PRN, Marilu Madison MD    HYDROmorphone (PF) (DILAUDID) injection 0.5 mg, 0.5 mg, IntraVENous, Q4H PRN, Marilu Madison MD    [facility]ansetron [facility]) injection 4 mg, 4 mg, IntraVENous, Q4H PRN, Marilu Madison MD    NOREPINephrine (LEVOPHED) 8,000 mcg in dextrose 5% 250 mL infusion, 2-30 mcg/min, IntraVENous, TITRATE, Marilu Madison MD, Stopped at 04/04/20 0007    fentaNYL (PF) 1,500 mcg/30 mL (50 mcg/mL) infusion,  mcg/hr, IntraVENous, TITRATE, Kylah Madison MD, Last Rate: 1.5 mL/hr at 04/06/20 0840, 75 mcg/hr at 04/06/20 0840    famotidine (PF) (PEPCID) 20 mg in 0.9% sodium chloride 10 mL injection, 20 mg, IntraVENous, QHS, Kylah Madison MD, 20 mg at 04/05/20 2152    chlorhexidine (ORAL CARE KIT) 0.12 % mouthwash 15 mL, 15 mL, Oral, Q12H, Iliana Rosales, NP, 15 mL at 04/06/20 0941    docusate (COLACE) 50 mg/5 mL oral liquid 100 mg, 100 mg, Per NG tube, BID, Kylah Roman MD, Stopped at 04/06/20 0900    acetaminophen (TYLENOL) tablet 650 mg, 650 mg, Oral, Q6H PRN **OR** acetaminophen (TYLENOL) suppository 650 mg, 650 mg, Rectal, Q6H PRN, Iliana Rosales, NP    bicarbonate dialysis (PRISMASOL) BG K 4/Ca 2.5 5000 ml solution, , Extracorporeal, DIALYSIS CONTINUOUS, Kit Hargrove MD, Last Rate: 3,000 mL/hr at 04/06/20 1022, 3,000 mL/hr at 04/06/20 1022    glucose chewable tablet 16 g, 4 Tab, Oral, PRN, Kylah Roman MD    glucagon (GLUCAGEN) injection 1 mg, 1 mg, IntraMUSCular, PRN, Kylah Roman MD    dextrose 10% infusion 0-250 mL, 0-250 mL, IntraVENous, PRN, Kylah Roman MD    insulin lispro (HUMALOG) injection, , SubCUTAneous, Q6H, Kylah Madison MD, Stopped at 04/01/20 1800    Vancomycin - Pharmacy to Dose, , Other, Rx Dosing/Monitoring, Allison Padgett DO      Labs:  Recent Results (from the past 24 hour(s))   PTT    Collection Time: 04/05/20 12:13 PM   Result Value Ref Range    aPTT 56.6 (H) 22.1 - 32.0 sec    aPTT, therapeutic range     58.0 - 77.0 SECS   RENAL FUNCTION PANEL    Collection Time: 04/05/20 12:13 PM   Result Value Ref Range    Sodium 135 (L) 136 - 145 mmol/L    Potassium 4.0 3.5 - 5.1 mmol/L    Chloride 104 97 - 108 mmol/L    CO2 26 21 - 32 mmol/L    Anion gap 5 5 - 15 mmol/L    Glucose 111 (H) 65 - 100 mg/dL    BUN 24 (H) 6 - 20 MG/DL    Creatinine 1.42 (H) 0.55 - 1.02 MG/DL    BUN/Creatinine ratio 17 12 - 20 GFR est AA 46 (L) >60 ml/min/1.73m2    GFR est non-AA 38 (L) >60 ml/min/1.73m2    Calcium 8.2 (L) 8.5 - 10.1 MG/DL    Phosphorus 2.6 2.6 - 4.7 MG/DL    Albumin 1.9 (L) 3.5 - 5.0 g/dL   GLUCOSE, POC    Collection Time: 04/05/20 12:17 PM   Result Value Ref Range    Glucose (POC) 106 (H) 65 - 100 mg/dL    Performed by [address] [place] [address], POC    Collection Time: 04/05/20  5:54 PM   Result Value Ref Range    Glucose (POC) 114 (H) 65 - 100 mg/dL    Performed by Johnny Huang    PTT    Collection Time: 04/05/20  6:23 PM   Result Value Ref Range    aPTT 52.9 (H) 22.1 - 32.0 sec    aPTT, therapeutic range     58.0 - 77.0 SECS   PTT    Collection Time: 04/06/20 12:09 AM   Result Value Ref Range    aPTT 63.8 (H) 22.1 - 32.0 sec    aPTT, therapeutic range     58.0 - 77.0 SECS   GLUCOSE, POC    Collection Time: 04/06/20 12:13 AM   Result Value Ref Range    Glucose (POC) 146 (H) 65 - 100 mg/dL    Performed by [address] [place] [address] FUNCTION PANEL    Collection Time: 04/06/20  3:00 AM   Result Value Ref Range    Protein, total 6.8 6.4 - 8.2 g/dL    Albumin 2.3 (L) 3.5 - 5.0 g/dL    Globulin 4.5 (H) 2.0 - 4.0 g/dL    A-G Ratio 0.5 (L) 1.1 - 2.2      Bilirubin, total 0.8 0.2 - 1.0 MG/DL    Bilirubin, direct 0.5 (H) 0.0 - 0.2 MG/DL    Alk.  phosphatase 298 (H) 45 - 117 U/L    AST (SGOT) 49 (H) 15 - 37 U/L    ALT (SGPT) 33 12 - 78 U/L   RENAL FUNCTION PANEL    Collection Time: 04/06/20  3:06 AM   Result Value Ref Range    Sodium 136 136 - 145 mmol/L    Potassium 4.1 3.5 - 5.1 mmol/L    Chloride 102 97 - 108 mmol/L    CO2 26 21 - 32 mmol/L    Anion gap 8 5 - 15 mmol/L    Glucose 134 (H) 65 - 100 mg/dL    BUN 25 (H) 6 - 20 MG/DL    Creatinine 1.37 (H) 0.55 - 1.02 MG/DL    BUN/Creatinine ratio 18 12 - 20      GFR est AA 48 (L) >60 ml/min/1.73m2    GFR est non-AA 40 (L) >60 ml/min/1.73m2    Calcium 8.6 8.5 - 10.1 MG/DL    Phosphorus 3.0 2.6 - 4.7 MG/DL    Albumin 2.2 (L) 3.5 - 5.0 g/dL   VANCOMYCIN, RANDOM    Collection Time: 04/06/20  3:06 AM   Result Value Ref Range    Vancomycin, random 15.9 UG/ML   CBC WITH AUTOMATED DIFF    Collection Time: 04/06/20  3:11 AM   Result Value Ref Range    WBC 13.2 (H) 3.6 - 11.0 K/uL    RBC 3.85 3.80 - 5.20 M/uL    HGB 10.3 (L) 11.5 - 16.0 g/dL    HCT 32.9 (L) 35.0 - 47.0 %    MCV 85.5 80.0 - 99.0 FL    MCH 26.8 26.0 - 34.0 PG    MCHC 31.3 30.0 - 36.5 g/dL    RDW 14.4 11.5 - 14.5 %    PLATELET 668 236 - 099 K/uL    MPV 9.9 8.9 - 12.9 FL    NRBC 0.2 (H) 0  WBC    ABSOLUTE NRBC 0.03 (H) 0.00 - 0.01 K/uL    NEUTROPHILS 62 32 - 75 %    BAND NEUTROPHILS 8 (H) 0 - 6 %    LYMPHOCYTES 17 12 - 49 %    MONOCYTES 6 5 - 13 %    EOSINOPHILS 2 0 - 7 %    BASOPHILS 1 0 - 1 %    METAMYELOCYTES 1 (H) 0 %    MYELOCYTES 3 (H) 0 %    IMMATURE GRANULOCYTES 0 %    ABS. NEUTROPHILS 9.2 (H) 1.8 - 8.0 K/UL    ABS. LYMPHOCYTES 2.2 0.8 - 3.5 K/UL    ABS. MONOCYTES 0.8 0.0 - 1.0 K/UL    ABS. EOSINOPHILS 0.3 0.0 - 0.4 K/UL    ABS. BASOPHILS 0.1 0.0 - 0.1 K/UL    ABS. IMM.  GRANS. 0.0 K/UL    DF MANUAL      PLATELET COMMENTS Large Platelets      RBC COMMENTS MICROCYTOSIS  1+        RBC COMMENTS POLYCHROMASIA  1+       PROTHROMBIN TIME + INR    Collection Time: 04/06/20  6:48 AM   Result Value Ref Range    INR 1.1 0.9 - 1.1      Prothrombin time 11.1 9.0 - 11.1 sec   PTT    Collection Time: 04/06/20  6:48 AM   Result Value Ref Range    aPTT 70.0 (H) 22.1 - 32.0 sec    aPTT, therapeutic range     58.0 - 77.0 SECS           Micro:     Blood: 3/31/20  Specimen Information: Blood        Component Value Ref Range & Units Status   Special Requests: NO SPECIAL REQUESTS    Final   Culture result: NO GROWTH 5 DAYS    Final   Result History              Sputum 3/31/20   Sputum        Component Value Ref Range & Units Status   Special Requests: NO SPECIAL REQUESTS    Final   GRAM STAIN FEW WBCS SEEN    Final   GRAM STAIN    Final   2+ GRAM POSITIVE COCCI IN PAIRS    Culture result: Abnormal     Final   MODERATE STAPHYLOCOCCUS AUREUS    Culture result:    Final   LIGHT NORMAL RESPIRATORY SOFIE    Susceptibility      Staphylococcus aureus     LU    Ciprofloxacin ($) <=0.5 ug/mL S    Clindamycin ($)  R    Doxycycline ($$) <=0.5 ug/mL S    Erythromycin ($$$$) >=8 ug/mL R    Gentamicin ($) <=0.5 ug/mL S    Levofloxacin ($) <=0.12 ug/mL S    Linezolid ($$$$$) 2 ug/mL S    Moxifloxacin ($$$$) <=0.25 ug/mL S    Oxacillin 0.5 ug/mL S    Rifampin ($$$$) <=0.5 ug/mL S1    Tetracycline <=1 ug/mL S    Trimeth/Sulfa <=10 ug/mL S    Vancomycin ($) 1 ug/mL S                         Imaging:  CXR 4/5/20  FINDINGS: AP radiograph of the chest was obtained.     There is been interval advancement of the endotracheal tube which now terminates  1.6 cm above the bhavik. Right upper extremity PICC and gastric decompression  tubes are again noted. There is no significant change in diffuse bilateral  heterogeneous opacities. Likely trace left pleural effusion. No pneumothorax. Stable cardiomediastinal silhouette.     IMPRESSION  IMPRESSION:   1. Interval advancement of endotracheal tube which now terminates 1.6 cm above  the bhavik. Consider slight retraction. 2. Unchanged diffuse bilateral heterogeneous opacities, consistent with  multifocal pneumonia. Likely trace left pleural effusion.           Assessment / Plan    Ms. Ginger Mayfield is a 82-year-old lady with a history of nephrolithiasis, GERD, basal cell carcinoma who was admitted from [facility] with respiratory symptoms concerning for COVID 19 and tested + on 3/26/20. Per team,  exposure to her mother with COVID 23. Per notes, intubated on 3/28/2020.   Transferred to [facility] for CRRT    1) Sars-CoV2 pneumonia, respiratory failure, renal failure   Reviewed records from care everywhere  Sars-CoV-2 detected 3/26/20, other viral respiratory panel negative   Urine legionella and S pneumo ag negative 3/27/20  Procalcitonin 3/27 0.47, 3/28 0.44, 3/29 5.86, 3/31 13.44  , Ferritin 1985 3/31/20    4/3/20   LDH 436, ferritin 992, CRP 9.65 4/5/20   IL 6  402 3/31/20  Completed azithromycin and Plaquenil   S/P Actemra one dose 4/3.  Currently on 40% FIO2    2)  MSSA on sputum, pharmacy dosing vancomycin based on levels due to Penicillin allergy    Completed a 7 day course   4/6/20 vanc level random is 15.9      3) ARF:   Plans per nephrology, CRRT  R IJ    3) history of nephrolithiasis    4) history of GERD    5) basal cell carcinoma per chart        D/W Dr Asa Siddiqui today     Please contact with questions     Allison Padgett DO  11:25 AM

## 2020-04-06 NOTE — DIABETES MGMT
MARTA [facility]ERO  CLINICAL NURSE SPECIALIST CONSULT  [facility]    Presentation   Tramaine Daugherty is a 64 y.o. female admitted from OSH to [facility] ICU with SARS-COV2 needing CRRT. She is currently sedated and has been intubated since 3/28/20. Current clinical course has been complicated by steroid induced hyperglycemia. Steroids are discontinued at this time. She requires CRRT for acute renal failure r/t her sepsis and hypotension. PHM: GERD, obesity, and anxiety. New diabetes diagnosis with A1C 11.0% (3/28/2020); updated A1C 3/31/20-10.4%     Recent events:   Remains on ventilator; no vasopressors, on precedex. Per MD note today patient opens eyes spontaneously. Consulted by Provider for advanced diabetes nursing assessment and care, specifically related to   [] Transitioning off Idania Dawson   [x] Inpatient management strategy  [] Home management assessment  [] Survival skill education    Diabetes-related medical history  Acute complications  hyperglycemia  Neurological complications  NONE  Microvascular disease  NONE  Macrovascular disease  NONE  Other associated conditions     NONE    Diabetes medication history: NONE    Subjective   Per chart review, Ms. Nupur Alva remains very ill  and is on isolation forSARS-COV2 in ICU. I am unable to do a physical assessment of the patient at this time. Patient reports the following home diabetes self-care practices: deferred    Objective     Vital Signs   Visit Vitals  /80 (BP 1 Location: Right arm, BP Patient Position: At rest;Lying right side)   Pulse 97   Temp 98.1 °F (36.7 °C)   Resp 18   Ht 5' 4\" (1.626 m)   Wt 125.1 kg (275 lb 12.7 oz)   SpO2 99%   BMI 47.34 kg/m²   .    Laboratory  Lab Results   Component Value Date/Time    Hemoglobin A1c 10.4 (H) 03/31/2020 03:42 PM     No results found for: LDL, LDLC, DLDLP  Lab Results   Component Value Date/Time    Creatinine 1.37 (H) 04/06/2020 03:06 AM     Lab Results   Component Value Date/Time Sodium 136 04/06/2020 03:06 AM    Potassium 4.1 04/06/2020 03:06 AM    Chloride 102 04/06/2020 03:06 AM    CO2 26 04/06/2020 03:06 AM    Anion gap 8 04/06/2020 03:06 AM    Glucose 134 (H) 04/06/2020 03:06 AM    BUN 25 (H) 04/06/2020 03:06 AM    Creatinine 1.37 (H) 04/06/2020 03:06 AM    BUN/Creatinine ratio 18 04/06/2020 03:06 AM    GFR est AA 48 (L) 04/06/2020 03:06 AM    GFR est non-AA 40 (L) 04/06/2020 03:06 AM    Calcium 8.6 04/06/2020 03:06 AM    Bilirubin, total 0.8 04/06/2020 03:00 AM    AST (SGOT) 49 (H) 04/06/2020 03:00 AM    Alk. phosphatase 298 (H) 04/06/2020 03:00 AM    Protein, total 6.8 04/06/2020 03:00 AM    Albumin 2.2 (L) 04/06/2020 03:06 AM    Globulin 4.5 (H) 04/06/2020 03:00 AM    A-G Ratio 0.5 (L) 04/06/2020 03:00 AM    ALT (SGPT) 33 04/06/2020 03:00 AM     Lab Results   Component Value Date/Time    ALT (SGPT) 33 04/06/2020 03:00 AM           Evaluation   Ms Vincent Butler, with new onset Type 2 diabetes,with A1C 10.4%. Per chart review from OSH before she was transferred she had BG >200. BG is stable (120s-140s) with no fluctuations after increase in basal insulin to 35units daily. TF (Nepro) at 10cc/hr. Recommendations   1. CONTINUE basal and correction insulin. 2. Will continue to follow.     Assessment and Plan   Nursing Diagnosis Risk for unstable blood glucose pattern   Nursing Intervention Domain 9417 Decision-making Support   Nursing Interventions Examined current inpatient diabetes control   Explored factors facilitating and impeding inpatient management  Identified self-management practices impeding diabetes control  Explored corrective strategies with patient and responsible inpatient provider   Informed patient of rational for insulin strategy while hospitalized     Referral   [] Behavioral health services  [] Inpatient nutrition services  [] Pharmacy services for medication management  [x] Diabetes Self-Management Training through [facility] (Phone [phone] to schedule appointment)    Billing Code(s)     [x] 29177 IP subsequent hospital care - [address], CNS  [phone]

## 2020-04-06 NOTE — PROGRESS NOTES
Verbal shift change report given to [id number] [place] [address] RN's (oncoming nurse) by Con Hicks (offgoing nurse). Report included the following information SBAR, Kardex, Procedure Summary, Intake/Output, MAR and Cardiac Rhythm nsr to tach. 0800 - bedside assessment complete, pt ETT tube at 45%. On CRRT factor of 200. Pt non interactive and minimal withdrawal.    0900 - plan to slowly wean down on fentanyl for extubation preparation, precedex ordered for sedation if needed. Will reduce in increments of 25mcg every few hours as tolerated    1300 - wound care consult for 2 sacral suspected DTI's. Entered in doc flow sheet. 1500 - fentanyl stopped. Will monitor pt for toleration and will start precedex as needed    1630 - will increase tube feed at 1700 according to orders (goal of 30ml/hr). 1800 - tolerating increase in tube feed well. 1830 - started precedex drip at 0.4mcg d/t pt biting and coughing on vent    1900 - pt biting at vent, yawning, and spitting up while waking up. Not following commands. Held tube feed and performed mouth care. Decreased precedex to 0.2 SBP low 100's    1930 - TRANSFER - IN REPORT:    Verbal report received from [address] [place] [address] RN(name) on Terence Pae  being received from Arpita Dozier RN(unit) for routine progression of care      Report consisted of patients Situation, Background, Assessment and   Recommendations(SBAR). Information from the following report(s) SBAR, Kardex, Procedure Summary, Intake/Output, MAR, Recent Results and Cardiac Rhythm nsr was reviewed with the receiving nurse. Opportunity for questions and clarification was provided. Assessment completed upon patients arrival to unit and care assumed.

## 2020-04-06 NOTE — WOUND CARE
Wound Consult:  New Patient Visit. Chart reviewed. Consulted for DTI to sacrum/lumbar area. Spoke with patients nurses and decision made to have [address] take photo to upload into chart of area versus wound nurse going into [address] room at this time. Patient is resting on a bariatric plus air support bed. She is intubated on vent; history of endometrial Ca, obesity, and new onset diabetes (A1C 11); CRRT for ARF. Assessment:  Top of sacrum - small ~ 1 cm area of non-blanching purple intact skin, no surrounding redness, DTI. From left of sacrum in skin fold - ~ 5 x 1 cm area of non-blanching purple to pale isadora discoloration of intact skin with possible blister formation per Ev ELIZABETH. Treatment:  Sacral foam dressing in use with Venelex. Wound Recommendations:  Continue Venelex and sacral foam dressing. Skin Care Recommendations:  1. Minimize friction/shear: minimize layers of linen/pads under patient. On air support system from bariatric patient. 2. Off load pressure/reposition: continue to turn and reposition approximately every 2 - 4  hours; float heels. 3. Manage Moisture - keep skin folds dry; incontinence skin care as needed; FMS in place per rectum, ma in place, on CVVHD. 4. Continue to monitor nutrition, pain, and skin risk scale, and skin assessment. Plan: We will continue to reassess routinely and as needed.   Pamela Morgan, [address] [facility] Office [phone]  Pager [phone]

## 2020-04-07 LAB
ALBUMIN SERPL-MCNC: 2.5 G/DL (ref 3.5–5)
ALBUMIN SERPL-MCNC: 2.6 G/DL (ref 3.5–5)
ALBUMIN/GLOB SERPL: 0.5 {RATIO} (ref 1.1–2.2)
ALP SERPL-CCNC: 287 U/L (ref 45–117)
ALT SERPL-CCNC: 41 U/L (ref 12–78)
ANION GAP SERPL CALC-SCNC: 9 MMOL/L (ref 5–15)
APTT PPP: 100.5 SEC (ref 22.1–32)
APTT PPP: 101.8 SEC (ref 22.1–32)
APTT PPP: 40.5 SEC (ref 22.1–32)
APTT PPP: 41.5 SEC (ref 22.1–32)
AST SERPL-CCNC: 45 U/L (ref 15–37)
BILIRUB DIRECT SERPL-MCNC: 0.3 MG/DL (ref 0–0.2)
BILIRUB SERPL-MCNC: 0.7 MG/DL (ref 0.2–1)
BUN SERPL-MCNC: 31 MG/DL (ref 6–20)
BUN/CREAT SERPL: 21 (ref 12–20)
CALCIUM SERPL-MCNC: 9.6 MG/DL (ref 8.5–10.1)
CHLORIDE SERPL-SCNC: 102 MMOL/L (ref 97–108)
CO2 SERPL-SCNC: 23 MMOL/L (ref 21–32)
COMMENT, HOLDF: NORMAL
CREAT SERPL-MCNC: 1.51 MG/DL (ref 0.55–1.02)
D DIMER PPP FEU-MCNC: 19.4 MG/L FEU (ref 0–0.65)
FIBRINOGEN PPP-MCNC: 432 MG/DL (ref 200–475)
GLOBULIN SER CALC-MCNC: 4.8 G/DL (ref 2–4)
GLUCOSE BLD STRIP.AUTO-MCNC: 144 MG/DL (ref 65–100)
GLUCOSE BLD STRIP.AUTO-MCNC: 150 MG/DL (ref 65–100)
GLUCOSE BLD STRIP.AUTO-MCNC: 174 MG/DL (ref 65–100)
GLUCOSE BLD STRIP.AUTO-MCNC: 180 MG/DL (ref 65–100)
GLUCOSE SERPL-MCNC: 187 MG/DL (ref 65–100)
INR PPP: 1.1 (ref 0.9–1.1)
MAGNESIUM SERPL-MCNC: 2.9 MG/DL (ref 1.6–2.4)
PHOSPHATE SERPL-MCNC: 3.5 MG/DL (ref 2.6–4.7)
PHOSPHATE SERPL-MCNC: 3.6 MG/DL (ref 2.6–4.7)
POTASSIUM SERPL-SCNC: 4.4 MMOL/L (ref 3.5–5.1)
PROT SERPL-MCNC: 7.3 G/DL (ref 6.4–8.2)
PROTHROMBIN TIME: 11.3 SEC (ref 9–11.1)
SAMPLES BEING HELD,HOLD: NORMAL
SERVICE CMNT-IMP: ABNORMAL
SODIUM SERPL-SCNC: 134 MMOL/L (ref 136–145)
THERAPEUTIC RANGE,PTTT: ABNORMAL SECS (ref 58–77)

## 2020-04-07 PROCEDURE — 85384 FIBRINOGEN ACTIVITY: CPT

## 2020-04-07 PROCEDURE — 82962 GLUCOSE BLOOD TEST: CPT

## 2020-04-07 PROCEDURE — 80069 RENAL FUNCTION PANEL: CPT

## 2020-04-07 PROCEDURE — 74011250636 HC RX REV CODE- 250/636

## 2020-04-07 PROCEDURE — 65610000006 HC RM INTENSIVE CARE

## 2020-04-07 PROCEDURE — 85610 PROTHROMBIN TIME: CPT

## 2020-04-07 PROCEDURE — 85730 THROMBOPLASTIN TIME PARTIAL: CPT

## 2020-04-07 PROCEDURE — 84100 ASSAY OF PHOSPHORUS: CPT

## 2020-04-07 PROCEDURE — 74011636637 HC RX REV CODE- 636/637: Performed by: INTERNAL MEDICINE

## 2020-04-07 PROCEDURE — 83735 ASSAY OF MAGNESIUM: CPT

## 2020-04-07 PROCEDURE — 74011000258 HC RX REV CODE- 258: Performed by: INTERNAL MEDICINE

## 2020-04-07 PROCEDURE — 74011250636 HC RX REV CODE- 250/636: Performed by: INTERNAL MEDICINE

## 2020-04-07 PROCEDURE — 94003 VENT MGMT INPAT SUBQ DAY: CPT

## 2020-04-07 PROCEDURE — P9045 ALBUMIN (HUMAN), 5%, 250 ML: HCPCS

## 2020-04-07 PROCEDURE — 80076 HEPATIC FUNCTION PANEL: CPT

## 2020-04-07 PROCEDURE — 74011250636 HC RX REV CODE- 250/636: Performed by: NURSE PRACTITIONER

## 2020-04-07 PROCEDURE — 77030013797 HC KT TRNSDUC PRSSR EDWD -A

## 2020-04-07 PROCEDURE — 36415 COLL VENOUS BLD VENIPUNCTURE: CPT

## 2020-04-07 PROCEDURE — 36600 WITHDRAWAL OF ARTERIAL BLOOD: CPT

## 2020-04-07 PROCEDURE — 85379 FIBRIN DEGRADATION QUANT: CPT

## 2020-04-07 PROCEDURE — 74011250637 HC RX REV CODE- 250/637: Performed by: NURSE PRACTITIONER

## 2020-04-07 PROCEDURE — 74011000250 HC RX REV CODE- 250: Performed by: INTERNAL MEDICINE

## 2020-04-07 PROCEDURE — 90945 DIALYSIS ONE EVALUATION: CPT

## 2020-04-07 PROCEDURE — 74011250637 HC RX REV CODE- 250/637: Performed by: INTERNAL MEDICINE

## 2020-04-07 RX ORDER — ALBUMIN HUMAN 50 G/1000ML
25 SOLUTION INTRAVENOUS ONCE
Status: COMPLETED | OUTPATIENT
Start: 2020-04-07 | End: 2020-04-07

## 2020-04-07 RX ORDER — HEPARIN SODIUM 5000 [USP'U]/ML
4000 INJECTION, SOLUTION INTRAVENOUS; SUBCUTANEOUS ONCE
Status: COMPLETED | OUTPATIENT
Start: 2020-04-07 | End: 2020-04-07

## 2020-04-07 RX ORDER — SODIUM CHLORIDE 9 MG/ML
5 INJECTION, SOLUTION INTRAVENOUS CONTINUOUS
Status: DISCONTINUED | OUTPATIENT
Start: 2020-04-07 | End: 2020-04-30

## 2020-04-07 RX ORDER — ALBUMIN HUMAN 50 G/1000ML
SOLUTION INTRAVENOUS
Status: COMPLETED
Start: 2020-04-07 | End: 2020-04-07

## 2020-04-07 RX ORDER — SODIUM CHLORIDE 9 MG/ML
3 INJECTION, SOLUTION INTRAVENOUS CONTINUOUS
Status: DISCONTINUED | OUTPATIENT
Start: 2020-04-07 | End: 2020-04-30

## 2020-04-07 RX ORDER — LABETALOL 100 MG/1
100 TABLET, FILM COATED ORAL EVERY 12 HOURS
Status: DISCONTINUED | OUTPATIENT
Start: 2020-04-07 | End: 2020-04-26

## 2020-04-07 RX ADMIN — DEXMEDETOMIDINE HYDROCHLORIDE 0.4 MCG/KG/HR: 100 INJECTION, SOLUTION, CONCENTRATE INTRAVENOUS at 00:20

## 2020-04-07 RX ADMIN — CALCIUM CHLORIDE, MAGNESIUM CHLORIDE, DEXTROSE MONOHYDRATE, LACTIC ACID, SODIUM CHLORIDE, SODIUM BICARBONATE AND POTASSIUM CHLORIDE 3000 ML/HR: 3.68; 3.05; 22; 5.4; 6.46; 3.09; .314 INJECTION INTRAVENOUS at 16:18

## 2020-04-07 RX ADMIN — CALCIUM CHLORIDE, MAGNESIUM CHLORIDE, DEXTROSE MONOHYDRATE, LACTIC ACID, SODIUM CHLORIDE, SODIUM BICARBONATE AND POTASSIUM CHLORIDE 3000 ML/HR: 3.68; 3.05; 22; 5.4; 6.46; 3.09; .314 INJECTION INTRAVENOUS at 00:24

## 2020-04-07 RX ADMIN — GUAIFENESIN 100 MG: 100 SOLUTION ORAL at 12:01

## 2020-04-07 RX ADMIN — SODIUM CHLORIDE, SODIUM LACTATE, POTASSIUM CHLORIDE, AND CALCIUM CHLORIDE 500 ML: 600; 310; 30; 20 INJECTION, SOLUTION INTRAVENOUS at 17:55

## 2020-04-07 RX ADMIN — ALBUMIN (HUMAN) 25 G: 12.5 INJECTION, SOLUTION INTRAVENOUS at 07:27

## 2020-04-07 RX ADMIN — CHLORHEXIDINE GLUCONATE 15 ML: 0.12 RINSE ORAL at 08:38

## 2020-04-07 RX ADMIN — SODIUM CHLORIDE 5 ML/HR: 900 INJECTION, SOLUTION INTRAVENOUS at 21:07

## 2020-04-07 RX ADMIN — GUAIFENESIN 100 MG: 100 SOLUTION ORAL at 00:00

## 2020-04-07 RX ADMIN — ALBUMIN HUMAN 25 G: 50 SOLUTION INTRAVENOUS at 07:27

## 2020-04-07 RX ADMIN — CALCIUM CHLORIDE, MAGNESIUM CHLORIDE, DEXTROSE MONOHYDRATE, LACTIC ACID, SODIUM CHLORIDE, SODIUM BICARBONATE AND POTASSIUM CHLORIDE 3000 ML/HR: 3.68; 3.05; 22; 5.4; 6.46; 3.09; .314 INJECTION INTRAVENOUS at 09:10

## 2020-04-07 RX ADMIN — CALCIUM CHLORIDE, MAGNESIUM CHLORIDE, DEXTROSE MONOHYDRATE, LACTIC ACID, SODIUM CHLORIDE, SODIUM BICARBONATE AND POTASSIUM CHLORIDE 3000 ML/HR: 3.68; 3.05; 22; 5.4; 6.46; 3.09; .314 INJECTION INTRAVENOUS at 10:54

## 2020-04-07 RX ADMIN — OXYCODONE HYDROCHLORIDE AND ACETAMINOPHEN 500 MG: 500 TABLET ORAL at 09:00

## 2020-04-07 RX ADMIN — CALCIUM CHLORIDE, MAGNESIUM CHLORIDE, DEXTROSE MONOHYDRATE, LACTIC ACID, SODIUM CHLORIDE, SODIUM BICARBONATE AND POTASSIUM CHLORIDE 3000 ML/HR: 3.68; 3.05; 22; 5.4; 6.46; 3.09; .314 INJECTION INTRAVENOUS at 05:10

## 2020-04-07 RX ADMIN — CALCIUM CHLORIDE, MAGNESIUM CHLORIDE, DEXTROSE MONOHYDRATE, LACTIC ACID, SODIUM CHLORIDE, SODIUM BICARBONATE AND POTASSIUM CHLORIDE 3000 ML/HR: 3.68; 3.05; 22; 5.4; 6.46; 3.09; .314 INJECTION INTRAVENOUS at 12:39

## 2020-04-07 RX ADMIN — HEPARIN SODIUM 22 UNITS/KG/HR: 10000 INJECTION, SOLUTION INTRAVENOUS at 16:00

## 2020-04-07 RX ADMIN — MINERAL OIL AND WHITE PETROLATUM: 150; 830 OINTMENT OPHTHALMIC at 20:26

## 2020-04-07 RX ADMIN — HEPARIN SODIUM 21 UNITS/KG/HR: 10000 INJECTION, SOLUTION INTRAVENOUS at 04:39

## 2020-04-07 RX ADMIN — MINERAL OIL AND WHITE PETROLATUM: 150; 830 OINTMENT OPHTHALMIC at 09:13

## 2020-04-07 RX ADMIN — FAMOTIDINE 20 MG: 40 POWDER, FOR SUSPENSION ORAL at 21:05

## 2020-04-07 RX ADMIN — SODIUM CHLORIDE 3 ML/HR: 900 INJECTION, SOLUTION INTRAVENOUS at 21:16

## 2020-04-07 RX ADMIN — CALCIUM CHLORIDE, MAGNESIUM CHLORIDE, DEXTROSE MONOHYDRATE, LACTIC ACID, SODIUM CHLORIDE, SODIUM BICARBONATE AND POTASSIUM CHLORIDE 3000 ML/HR: 3.68; 3.05; 22; 5.4; 6.46; 3.09; .314 INJECTION INTRAVENOUS at 14:35

## 2020-04-07 RX ADMIN — HEPARIN SODIUM 4000 UNITS: 5000 INJECTION INTRAVENOUS; SUBCUTANEOUS at 12:00

## 2020-04-07 RX ADMIN — CALCIUM CHLORIDE, MAGNESIUM CHLORIDE, DEXTROSE MONOHYDRATE, LACTIC ACID, SODIUM CHLORIDE, SODIUM BICARBONATE AND POTASSIUM CHLORIDE 3000 ML/HR: 3.68; 3.05; 22; 5.4; 6.46; 3.09; .314 INJECTION INTRAVENOUS at 23:18

## 2020-04-07 RX ADMIN — CALCIUM CHLORIDE, MAGNESIUM CHLORIDE, DEXTROSE MONOHYDRATE, LACTIC ACID, SODIUM CHLORIDE, SODIUM BICARBONATE AND POTASSIUM CHLORIDE 3000 ML/HR: 3.68; 3.05; 22; 5.4; 6.46; 3.09; .314 INJECTION INTRAVENOUS at 02:03

## 2020-04-07 RX ADMIN — FENTANYL CITRATE 100 MCG: 50 INJECTION INTRAMUSCULAR; INTRAVENOUS at 01:49

## 2020-04-07 RX ADMIN — CALCIUM CHLORIDE, MAGNESIUM CHLORIDE, DEXTROSE MONOHYDRATE, LACTIC ACID, SODIUM CHLORIDE, SODIUM BICARBONATE AND POTASSIUM CHLORIDE 3000 ML/HR: 3.68; 3.05; 22; 5.4; 6.46; 3.09; .314 INJECTION INTRAVENOUS at 03:45

## 2020-04-07 RX ADMIN — CALCIUM CHLORIDE, MAGNESIUM CHLORIDE, DEXTROSE MONOHYDRATE, LACTIC ACID, SODIUM CHLORIDE, SODIUM BICARBONATE AND POTASSIUM CHLORIDE 3000 ML/HR: 3.68; 3.05; 22; 5.4; 6.46; 3.09; .314 INJECTION INTRAVENOUS at 21:24

## 2020-04-07 RX ADMIN — DEXMEDETOMIDINE HYDROCHLORIDE 0.3 MCG/KG/HR: 100 INJECTION, SOLUTION, CONCENTRATE INTRAVENOUS at 19:52

## 2020-04-07 RX ADMIN — SODIUM CHLORIDE 10 ML: 9 INJECTION INTRAMUSCULAR; INTRAVENOUS; SUBCUTANEOUS at 21:06

## 2020-04-07 RX ADMIN — CASTOR OIL AND BALSAM, PERU: 788; 87 OINTMENT TOPICAL at 08:38

## 2020-04-07 RX ADMIN — ZINC SULFATE 220 MG (50 MG) CAPSULE 1 CAPSULE: CAPSULE at 21:05

## 2020-04-07 RX ADMIN — INSULIN GLARGINE 35 UNITS: 100 INJECTION, SOLUTION SUBCUTANEOUS at 08:40

## 2020-04-07 RX ADMIN — CALCIUM CHLORIDE, MAGNESIUM CHLORIDE, DEXTROSE MONOHYDRATE, LACTIC ACID, SODIUM CHLORIDE, SODIUM BICARBONATE AND POTASSIUM CHLORIDE 3000 ML/HR: 3.68; 3.05; 22; 5.4; 6.46; 3.09; .314 INJECTION INTRAVENOUS at 18:04

## 2020-04-07 RX ADMIN — CHLORHEXIDINE GLUCONATE 15 ML: 0.12 RINSE ORAL at 20:15

## 2020-04-07 RX ADMIN — CALCIUM CHLORIDE, MAGNESIUM CHLORIDE, DEXTROSE MONOHYDRATE, LACTIC ACID, SODIUM CHLORIDE, SODIUM BICARBONATE AND POTASSIUM CHLORIDE 3000 ML/HR: 3.68; 3.05; 22; 5.4; 6.46; 3.09; .314 INJECTION INTRAVENOUS at 06:52

## 2020-04-07 RX ADMIN — SODIUM CHLORIDE 20 ML: 9 INJECTION INTRAMUSCULAR; INTRAVENOUS; SUBCUTANEOUS at 05:18

## 2020-04-07 RX ADMIN — OXYCODONE HYDROCHLORIDE AND ACETAMINOPHEN 500 MG: 500 TABLET ORAL at 21:06

## 2020-04-07 RX ADMIN — CALCIUM CHLORIDE, MAGNESIUM CHLORIDE, DEXTROSE MONOHYDRATE, LACTIC ACID, SODIUM CHLORIDE, SODIUM BICARBONATE AND POTASSIUM CHLORIDE 3000 ML/HR: 3.68; 3.05; 22; 5.4; 6.46; 3.09; .314 INJECTION INTRAVENOUS at 19:50

## 2020-04-07 RX ADMIN — SODIUM CHLORIDE 10 ML: 9 INJECTION INTRAMUSCULAR; INTRAVENOUS; SUBCUTANEOUS at 13:00

## 2020-04-07 RX ADMIN — GUAIFENESIN 100 MG: 100 SOLUTION ORAL at 23:57

## 2020-04-07 RX ADMIN — GUAIFENESIN 100 MG: 100 SOLUTION ORAL at 05:53

## 2020-04-07 RX ADMIN — GUAIFENESIN 100 MG: 100 SOLUTION ORAL at 17:47

## 2020-04-07 RX ADMIN — CASTOR OIL AND BALSAM, PERU: 788; 87 OINTMENT TOPICAL at 17:18

## 2020-04-07 RX ADMIN — DEXMEDETOMIDINE HYDROCHLORIDE 0.4 MCG/KG/HR: 100 INJECTION, SOLUTION, CONCENTRATE INTRAVENOUS at 09:12

## 2020-04-07 NOTE — PROGRESS NOTES
Infectious Disease Progress Note       Subjective:     D/W with ICU team  Reviewed chart   S/P Actemra on 4/3/20 one dose         Objective:    Vitals:   Patient Vitals for the past 24 hrs:   Temp Pulse Resp BP SpO2   04/07/20 1110 -- 88 15 -- 99 %   04/07/20 1100 -- 91 14 128/74 98 %   04/07/20 1000 -- 87 13 98/55 99 %   04/07/20 0900 -- 89 15 124/61 99 %   04/07/20 0803 -- 87 17 -- 100 %   04/07/20 0800 97.8 °F (36.6 °C) 85 14 90/51 96 %   04/07/20 0740 -- 83 -- 109/60 --   04/07/20 0715 -- 82 14 -- 99 %   04/07/20 0700 -- 89 16 105/66 99 %   04/07/20 0655 -- 84 13 -- 99 %   04/07/20 0650 -- 87 15 -- 98 %   04/07/20 0645 -- 84 13 -- 99 %   04/07/20 0630 -- 87 14 90/58 98 %   04/07/20 0615 -- 90 16 -- 97 %   04/07/20 0600 -- 88 15 105/62 99 %   04/07/20 0545 -- 83 14 -- 99 %   04/07/20 0530 -- 85 13 101/61 98 %   04/07/20 0515 -- 93 21 -- 97 %   04/07/20 0500 -- 82 13 (!) 86/59 97 %   04/07/20 0445 -- 86 17 -- 99 %   04/07/20 0430 -- 87 13 99/62 96 %   04/07/20 0415 -- 92 16 -- 98 %   04/07/20 0406 98.1 °F (36.7 °C) 93 27 -- 100 %   04/07/20 0400 -- 98 19 123/71 96 %   04/07/20 0345 -- 100 19 -- 100 %   04/07/20 0330 -- 90 14 96/69 --   04/07/20 0315 -- 96 17 -- 100 %   04/07/20 0300 -- 91 14 100/69 100 %   04/07/20 0245 -- 89 13 -- 100 %   04/07/20 0230 -- 87 11 97/66 99 %   04/07/20 0215 -- 87 13 -- 100 %   04/07/20 0200 -- 91 13 (!) 85/65 --   04/07/20 0154 97.9 °F (36.6 °C) -- -- -- --   04/07/20 0145 -- 96 18 -- 99 %   04/07/20 0130 -- 92 14 110/80 100 %   04/07/20 0115 -- 91 14 -- 100 %   04/07/20 0100 -- 87 13 93/57 100 %   04/07/20 0045 -- 87 13 -- 100 %   04/07/20 0030 -- 89 16 98/65 --   04/07/20 0015 -- 91 15 -- 100 %   04/06/20 2356 97.8 °F (36.6 °C) -- -- -- --   04/06/20 2345 -- 92 15 -- 99 %   04/06/20 2344 -- 93 19 -- 99 %   04/06/20 2330 -- 91 16 118/69 100 %   04/06/20 2315 -- 90 23 -- 99 %   04/06/20 2300 -- 87 14 99/65 99 %   04/06/20 2245 -- 86 13 -- 99 %   04/06/20 2230 -- 90 20 111/74 99 %   04/06/20 2215 -- 90 14 -- 99 %   04/06/20 2200 -- 87 15 98/68 99 %   04/06/20 2145 -- 88 15 -- 100 %   04/06/20 2130 -- 92 17 110/68 99 %   04/06/20 2115 -- 91 15 -- 99 %   04/06/20 2100 -- 91 21 122/78 99 %   04/06/20 2050 -- 88 16 -- 100 %   04/06/20 2045 -- 90 14 -- 100 %   04/06/20 2030 -- 89 14 99/67 98 %   04/06/20 2015 -- 93 22 107/78 98 %   04/06/20 2000 97.7 °F (36.5 °C) 95 14 121/69 --   04/06/20 1945 -- 100 19 -- 96 %   04/06/20 1930 -- 94 14 111/80 99 %   04/06/20 1915 -- 92 13 -- 97 %   04/06/20 1900 -- 94 15 108/72 98 %   04/06/20 1800 -- 96 21 145/83 96 %   04/06/20 1700 -- (!) 105 21 (!) 149/97 97 %   04/06/20 1606 -- 99 16 -- 100 %   04/06/20 1600 98 °F (36.7 °C) 99 16 125/86 98 %   04/06/20 1500 -- 94 17 122/73 99 %   04/06/20 1400 -- 95 14 121/62 98 %   04/06/20 1300 -- 100 16 131/74 98 %   04/06/20 1200 98.1 °F (36.7 °C) 97 18 124/80 99 %   04/06/20 1153 -- 99 19 -- 99 %       Physical Exam:  Please see ICU teams physical exam     Medications:    Current Facility-Administered Medications:     dexmedeTOMidine (PRECEDEX) 400 mcg in 0.9% sodium chloride 100 mL infusion, 0.2-1.4 mcg/kg/hr, IntraVENous, TITRATE, Ham Madison MD, Last Rate: 12.5 mL/hr at 04/07/20 0912, 0.4 mcg/kg/hr at 04/07/20 0912    labetaloL (NORMODYNE) tablet 100 mg, 100 mg, Oral, Q8H, Ham Madison MD, Stopped at 04/07/20 0100    labetaloL (NORMODYNE;TRANDATE) injection 10 mg, 10 mg, IntraVENous, Q10MIN PRN, Ham Madison MD    famotidine (PEPCID) 40 mg/5 mL (8 mg/mL) oral suspension 20 mg, 20 mg, Oral, QHS, Ham Madison MD, 20 mg at 04/06/20 2020    fentaNYL citrate (PF) injection  mcg,  mcg, IntraVENous, Q1H PRN, Cole Damon MD, 100 mcg at 04/07/20 0149    white petrolatum-mineral oiL (AKWA TEARS) 83-15 % ophthalmic ointment, , Both Eyes, Q12H, Ham Madison MD    heparin (porcine) pf 300 Units, 300 Units, InterCATHeter, PRN, CELESTINA Maier    guaiFENesin (ROBITUSSIN) 100 mg/5 mL oral liquid 100 mg, 100 mg, Oral, Q6H, Cassandra Cox NP-C, 100 mg at 04/07/20 0553    alteplase (CATHFLO) 2 mg in sterile water (preservative free) 2 mL injection, 2 mg, InterCATHeter, PRN, Martina De Jesus MD    alteplase (CATHFLO) 2 mg in sterile water (preservative free) 2 mL injection, 2 mg, InterCATHeter, PRN, Autumn PÉREZ MD, 2 mg at 04/03/20 0617    ascorbic acid (vitamin C) (VITAMIN C) tablet 500 mg, 500 mg, Per NG tube, Q12H, Cally Madison MD, 500 mg at 04/07/20 0900    heparin 25,000 units in D5W 250 ml infusion, 7-25 Units/kg/hr, IntraVENous, TITRATE, Mc Caraballo MD, Last Rate: 24 mL/hr at 04/07/20 0925, 18 Units/kg/hr at 04/07/20 0925    zinc sulfate (ZINCATE) capsule 1 Cap, 1 Cap, Per NG tube, QHS, Cally Madison MD, 1 Cap at 04/06/20 2020    midazolam (VERSED) injection 1-2 mg, 1-2 mg, IntraVENous, Q2H PRN, CELESTINA Meyer, 2 mg at 04/06/20 1940    bacitracin 500 unit/gram packet 1 Packet, 1 Packet, Topical, PRN, Sandoval Ramos, NP, 1 Packet at 04/01/20 0116    insulin glargine (LANTUS) injection 35 Units, 35 Units, SubCUTAneous, DAILY, Cally Madison MD, 35 Units at 04/07/20 0840    balsam peru-castor oiL (VENELEX) ointment, , Topical, BID, Cally Madison MD    sodium chloride (NS) flush 5-40 mL, 5-40 mL, IntraVENous, Q8H, Cally Madison MD, 20 mL at 04/07/20 0518    sodium chloride (NS) flush 5-40 mL, 5-40 mL, IntraVENous, PRN, Mc Caraballo MD, 10 mL at 04/04/20 2147    HYDROcodone-acetaminophen (NORCO) 5-325 mg per tablet 1 Tab, 1 Tab, Oral, Q4H PRN, Cally Madison MD    ondansetron [facility]) injection 4 mg, 4 mg, IntraVENous, Q4H PRN, Cally Madison MD    NOREPINephrine (LEVOPHED) 8,000 mcg in dextrose 5% 250 mL infusion, 2-30 mcg/min, IntraVENous, TITRATE, Cally Madison MD, Stopped at 04/04/20 0007    chlorhexidine (ORAL CARE KIT) 0.12 % mouthwash 15 mL, 15 mL, Oral, Q12H, Iliana Rosales NP, 15 mL at 04/07/20 0838    docusate (COLACE) 50 mg/5 mL oral liquid 100 mg, 100 mg, Per NG tube, BID, Suzette Madison MD, Stopped at 04/06/20 0900    acetaminophen (TYLENOL) tablet 650 mg, 650 mg, Oral, Q6H PRN **OR** acetaminophen (TYLENOL) suppository 650 mg, 650 mg, Rectal, Q6H PRN, Iliana Rosales, NP    bicarbonate dialysis (PRISMASOL) BG K 4/Ca 2.5 5000 ml solution, , Extracorporeal, DIALYSIS CONTINUOUS, Kit Hargrove MD, Last Rate: 3,000 mL/hr at 04/07/20 1054, 3,000 mL/hr at 04/07/20 1054    glucose chewable tablet 16 g, 4 Tab, Oral, PRN, Suzette Puentes MD    glucagon (GLUCAGEN) injection 1 mg, 1 mg, IntraMUSCular, PRN, Suzette Puentes MD    dextrose 10% infusion 0-250 mL, 0-250 mL, IntraVENous, PRN, Suzette Puentes MD    insulin lispro (HUMALOG) injection, , SubCUTAneous, Q6H, Suzette Madison MD, Stopped at 04/01/20 1800      Labs:  Recent Results (from the past 24 hour(s))   GLUCOSE, POC    Collection Time: 04/06/20  5:07 PM   Result Value Ref Range    Glucose (POC) 125 (H) 65 - 100 mg/dL    Performed by [facility]    PTT    Collection Time: 04/07/20  3:39 AM   Result Value Ref Range    aPTT 101.8 (HH) 22.1 - 32.0 sec    aPTT, therapeutic range     58.0 - 77.0 SECS   RENAL FUNCTION PANEL    Collection Time: 04/07/20  3:39 AM   Result Value Ref Range    Sodium 134 (L) 136 - 145 mmol/L    Potassium 4.4 3.5 - 5.1 mmol/L    Chloride 102 97 - 108 mmol/L    CO2 23 21 - 32 mmol/L    Anion gap 9 5 - 15 mmol/L    Glucose 187 (H) 65 - 100 mg/dL    BUN 31 (H) 6 - 20 MG/DL    Creatinine 1.51 (H) 0.55 - 1.02 MG/DL    BUN/Creatinine ratio 21 (H) 12 - 20      GFR est AA 43 (L) >60 ml/min/1.73m2    GFR est non-AA 36 (L) >60 ml/min/1.73m2    Calcium 9.6 8.5 - 10.1 MG/DL    Phosphorus 3.6 2.6 - 4.7 MG/DL    Albumin 2.6 (L) 3.5 - 5.0 g/dL   SAMPLES BEING HELD    Collection Time: 04/07/20  3:39 AM   Result Value Ref Range    SAMPLES BEING HELD 1LAV     COMMENT        Add-on orders for these samples will be processed based on acceptable specimen integrity and analyte stability, which may vary by analyte. D DIMER    Collection Time: 04/07/20  3:39 AM   Result Value Ref Range    D-dimer 19.40 (H) 0.00 - 0.65 mg/L FEU   FIBRINOGEN    Collection Time: 04/07/20  3:39 AM   Result Value Ref Range    Fibrinogen 432 200 - 475 mg/dL   HEPATIC FUNCTION PANEL    Collection Time: 04/07/20  3:39 AM   Result Value Ref Range    Protein, total 7.3 6.4 - 8.2 g/dL    Albumin 2.5 (L) 3.5 - 5.0 g/dL    Globulin 4.8 (H) 2.0 - 4.0 g/dL    A-G Ratio 0.5 (L) 1.1 - 2.2      Bilirubin, total 0.7 0.2 - 1.0 MG/DL    Bilirubin, direct 0.3 (H) 0.0 - 0.2 MG/DL    Alk.  phosphatase 287 (H) 45 - 117 U/L    AST (SGOT) 45 (H) 15 - 37 U/L    ALT (SGPT) 41 12 - 78 U/L   PROTHROMBIN TIME + INR    Collection Time: 04/07/20  3:39 AM   Result Value Ref Range    INR 1.1 0.9 - 1.1      Prothrombin time 11.3 (H) 9.0 - 11.1 sec   MAGNESIUM    Collection Time: 04/07/20  3:39 AM   Result Value Ref Range    Magnesium 2.9 (H) 1.6 - 2.4 mg/dL   PHOSPHORUS    Collection Time: 04/07/20  3:39 AM   Result Value Ref Range    Phosphorus 3.5 2.6 - 4.7 MG/DL   GLUCOSE, POC    Collection Time: 04/07/20  5:52 AM   Result Value Ref Range    Glucose (POC) 144 (H) 65 - 100 mg/dL    Performed by [facility]    PTT    Collection Time: 04/07/20  6:45 AM   Result Value Ref Range    aPTT 41.5 (H) 22.1 - 32.0 sec    aPTT, therapeutic range     58.0 - 77.0 SECS           Micro:     Blood: 3/31/20  Specimen Information: Blood        Component Value Ref Range & Units Status   Special Requests: NO SPECIAL REQUESTS    Final   Culture result: NO GROWTH 5 DAYS    Final   Result History              Sputum 3/31/20   Sputum        Component Value Ref Range & Units Status   Special Requests: NO SPECIAL REQUESTS    Final   GRAM STAIN FEW WBCS SEEN    Final   GRAM STAIN    Final   2+ GRAM POSITIVE COCCI IN PAIRS    Culture result: Abnormal     Final   MODERATE STAPHYLOCOCCUS AUREUS    Culture result:    Final   LIGHT NORMAL RESPIRATORY SOFIE    Susceptibility      Staphylococcus aureus     LU    Ciprofloxacin ($) <=0.5 ug/mL S    Clindamycin ($)  R    Doxycycline ($$) <=0.5 ug/mL S    Erythromycin ($$$$) >=8 ug/mL R    Gentamicin ($) <=0.5 ug/mL S    Levofloxacin ($) <=0.12 ug/mL S    Linezolid ($$$$$) 2 ug/mL S    Moxifloxacin ($$$$) <=0.25 ug/mL S    Oxacillin 0.5 ug/mL S    Rifampin ($$$$) <=0.5 ug/mL S1    Tetracycline <=1 ug/mL S    Trimeth/Sulfa <=10 ug/mL S    Vancomycin ($) 1 ug/mL S                         Imaging:  CXR 4/5/20  FINDINGS: AP radiograph of the chest was obtained.     There is been interval advancement of the endotracheal tube which now terminates  1.6 cm above the bhavik. Right upper extremity PICC and gastric decompression  tubes are again noted. There is no significant change in diffuse bilateral  heterogeneous opacities. Likely trace left pleural effusion. No pneumothorax. Stable cardiomediastinal silhouette.     IMPRESSION  IMPRESSION:   1. Interval advancement of endotracheal tube which now terminates 1.6 cm above  the bhavik. Consider slight retraction. 2. Unchanged diffuse bilateral heterogeneous opacities, consistent with  multifocal pneumonia. Likely trace left pleural effusion.           Assessment / Plan    Ms. Luis Carlos Pena is a 68-year-old lady with a history of nephrolithiasis, GERD, basal cell carcinoma who was admitted from [facility] with respiratory symptoms concerning for COVID 19 and tested + on 3/26/20. Per team,  exposure to her mother with COVID 23. Per notes, intubated on 3/28/2020.   Transferred to [facility] for CRRT    1) Sars-CoV2 pneumonia, respiratory failure, renal failure   Reviewed records from care everywhere  Sars-CoV-2 detected 3/26/20, other viral respiratory panel negative   Urine legionella and S pneumo ag negative 3/27/20  Procalcitonin 3/27 0.47, 3/28 0.44, 3/29 5.86, 3/31 13.44  , Ferritin 1985 3/31/20    4/3/20   , ferritin 992, CRP 9.65 4/5/20   IL 6  402 3/31/20  Completed azithromycin and Plaquenil   S/P Actemra one dose 4/3.    On Heparin gtt     2)  MSSA on sputum, pharmacy dosing vancomycin based on levels due to Penicillin allergy    Completed a 7 day course   4/6/20 vanc level random is 15.9      3) ARF:   Plans per nephrology, CRRT  R IJ    3) history of nephrolithiasis    4) history of GERD    5) basal cell carcinoma per chart        D/W ICU team in person today     Please contact with questions     Allison Padgett DO  11:38 AM

## 2020-04-07 NOTE — PROGRESS NOTES
TRANSFER - IN REPORT:    Verbal report received from Tiffany KIM(name) on Rachel Gutierrez  being     Report consisted of patients Situation, Background, Assessment and   Recommendations(SBAR). Information from the following report(s) SBAR, Procedure Summary, Intake/Output, MAR and Cardiac Rhythm nsr was reviewed with the receiving nurse. Opportunity for questions and clarification was provided. Assessment completed upon patients arrival to unit and care assumed. 0800 - bedside assessment performed. Morning meds given (held colace). Factor remains removed from CRRT. Pt at 45% on pressure controled ventilator. Heparin restarted at 18units based on recent results. Pt non-interactive, w/d on RUE    1100 - respiratory at bedside for SBT test.  Plan to leave off for 2hrs, monitoring VS throughout. Called and updated     1200 - CRRT factor remains at 0, plan to increase slowly, after SBT, if SBP remain stable    1500 - incontinence care and wound care (zinc cream and venelex applied)    1600 -  Pt remained on SBT for 4 hours and maintained O2 saturation. Plan to re-try SBT tomorrow and assess for extubation. Called and updated     [phone] - pt remains on 0.3precedex. Pt periodically wakes up and bites and coughs. She can be calmed and redirected. She follows commands and will weakly move all extremities. 1700 - PTT drawn    1730 - spoke with NP regarding SBP and MAP remaining low. LR Bolus ordered and PRN levophed ordered.

## 2020-04-07 NOTE — PROGRESS NOTES
[facility]   [address] [facility], [facility] [place] [facility], [facility]  Phone: [phone]   [phone]       Nephrology Progress Note  Ovidio Valentin     1960     [id number]  Date of Admission : 3/31/2020  04/07/20    CC: Follow up for JUANCHO      Assessment and Plan   JUANCHO :  - 2/2 ATN  - continue CVVHD. Reduced RFR to 3000 ml/hr   - Factor as tolerated: d/w Intensivist   - labs daily   - Serial PTTs with systemic Heparin     COVID-19 +ve   Acute Hypoxic Resp Failure   - On Vent   - completed Plaquenil. On Vit C, Zinc   - s/p Tocilizumab      Type II DM   - Insulin per primary team      Hypothermia   Morbid Obesity      Care Plan discussed with:  ICU team      Interval History:  Case discussed with intensivist; nurse in room. CRRT better w/ systemic heparin. PTT high and heparin on hold this am.  Remains anuric. PT NOT EXAMINED in line with ASN and RPA GUIDELINES ON MANAGING COVID-19 PTS WITH RENAL ISSUES. Examination findings discussed personally with the examining Physician team member      Review of Systems: Review of systems not obtained due to patient factors.     Current Medications:   Current Facility-Administered Medications   Medication Dose Route Frequency    dexmedeTOMidine (PRECEDEX) 400 mcg in 0.9% sodium chloride 100 mL infusion  0.2-1.4 mcg/kg/hr IntraVENous TITRATE    labetaloL (NORMODYNE) tablet 100 mg  100 mg Oral Q8H    labetaloL (NORMODYNE;TRANDATE) injection 10 mg  10 mg IntraVENous Q10MIN PRN    famotidine (PEPCID) 40 mg/5 mL (8 mg/mL) oral suspension 20 mg  20 mg Oral QHS    fentaNYL citrate (PF) injection  mcg   mcg IntraVENous Q1H PRN    white petrolatum-mineral oiL (AKWA TEARS) 83-15 % ophthalmic ointment   Both Eyes Q12H    heparin (porcine) pf 300 Units  300 Units InterCATHeter PRN    guaiFENesin (ROBITUSSIN) 100 mg/5 mL oral liquid 100 mg  100 mg Oral Q6H    alteplase (CATHFLO) 2 mg in sterile water (preservative free) 2 mL injection  2 mg InterCATHeter PRN    alteplase (CATHFLO) 2 mg in sterile water (preservative free) 2 mL injection  2 mg InterCATHeter PRN    ascorbic acid (vitamin C) (VITAMIN C) tablet 500 mg  500 mg Per NG tube Q12H    heparin 25,000 units in D5W 250 ml infusion  7-25 Units/kg/hr IntraVENous TITRATE    zinc sulfate (ZINCATE) capsule 1 Cap  1 Cap Per NG tube QHS    midazolam (VERSED) injection 1-2 mg  1-2 mg IntraVENous Q2H PRN    bacitracin 500 unit/gram packet 1 Packet  1 Packet Topical PRN    insulin glargine (LANTUS) injection 35 Units  35 Units SubCUTAneous DAILY    balsam peru-castor oiL (VENELEX) ointment   Topical BID    sodium chloride (NS) flush 5-40 mL  5-40 mL IntraVENous Q8H    sodium chloride (NS) flush 5-40 mL  5-40 mL IntraVENous PRN    HYDROcodone-acetaminophen (NORCO) 5-325 mg per tablet 1 Tab  1 Tab Oral Q4H PRN    ondansetron (ZOFRAN) injection 4 mg  4 mg IntraVENous Q4H PRN    NOREPINephrine (LEVOPHED) 8,000 mcg in dextrose 5% 250 mL infusion  2-30 mcg/min IntraVENous TITRATE    chlorhexidine (ORAL CARE KIT) 0.12 % mouthwash 15 mL  15 mL Oral Q12H    docusate (COLACE) 50 mg/5 mL oral liquid 100 mg  100 mg Per NG tube BID    acetaminophen (TYLENOL) tablet 650 mg  650 mg Oral Q6H PRN    Or    acetaminophen (TYLENOL) suppository 650 mg  650 mg Rectal Q6H PRN    bicarbonate dialysis (PRISMASOL) BG K 4/Ca 2.5 5000 ml solution   Extracorporeal DIALYSIS CONTINUOUS    glucose chewable tablet 16 g  4 Tab Oral PRN    glucagon (GLUCAGEN) injection 1 mg  1 mg IntraMUSCular PRN    dextrose 10% infusion 0-250 mL  0-250 mL IntraVENous PRN    insulin lispro (HUMALOG) injection   SubCUTAneous Q6H      Allergies   Allergen Reactions    Augmentin [Amoxicillin-Pot Clavulanate] Rash and Itching       Objective:  Vitals:    Vitals:    04/07/20 0740 04/07/20 0800 04/07/20 0803 04/07/20 0900   BP: 109/60 90/51  124/61   Pulse: 83 85 87 89   Resp:  14 17 15   Temp:  97.8 °F (36.6 °C)     SpO2:  96% 100% 99% Weight:       Height:         Intake and Output:  04/07 0701 - 04/07 1900  In: 974 [I.V.:535]  Out: 116   04/05 1901 - 04/07 0700  In: 2408.8 [I.V.:1298.8]  Out: 9906 [Urine:5; Drains:800]    Physical Examination: inspection only   Pt intubated    Yes  General: Sedated on vent, obese   Neck:  Lines   Resp:  On vent   CV:  RRR  GI:  Obese   Neurologic:  Sedated   Psych:              Unable to assess  Ext                   R femoral melissa     []    High complexity decision making was performed  []    Patient is at high-risk of decompensation with multiple organ involvement    Lab Data Personally Reviewed: I have reviewed all the pertinent labs, microbiology data and radiology studies during assessment.     Recent Labs     04/07/20  0339 04/06/20  0648 04/06/20  0306 04/06/20  0300 04/05/20  1213 04/05/20  0311 04/04/20  1457   *  --  136  --  135* 135* 135*   K 4.4  --  4.1  --  4.0 3.8 3.6     --  102  --  104 104 103   CO2 23  --  26  --  26 27 24   *  --  134*  --  111* 110* 127*   BUN 31*  --  25*  --  24* 28* 29*   CREA 1.51*  --  1.37*  --  1.42* 1.52* 1.87*   CA 9.6  --  8.6  --  8.2* 8.5 8.7   MG 2.9*  --   --   --   --   --  2.6*   PHOS 3.5  3.6  --  3.0  --  2.6 2.4* 1.9*  2.0*   ALB 2.5*  2.6*  --  2.2* 2.3* 1.9* 2.0* 1.8*   SGOT 45*  --   --  49*  --   --   --    ALT 41  --   --  33  --   --   --    INR 1.1 1.1  --   --   --   --   --      Recent Labs     04/06/20 0311 04/05/20 0311   WBC 13.2* 11.7*   HGB 10.3* 9.6*   HCT 32.9* 30.9*    162     No results found for: SDES  Lab Results   Component Value Date/Time    Culture result: NO GROWTH 5 DAYS 03/31/2020 11:15 AM    Culture result: MODERATE STAPHYLOCOCCUS AUREUS (A) 03/31/2020 10:34 AM    Culture result: LIGHT NORMAL RESPIRATORY SOFIE 03/31/2020 10:34 AM     Recent Results (from the past 24 hour(s))   GLUCOSE, POC    Collection Time: 04/06/20 11:11 AM   Result Value Ref Range    Glucose (POC) 150 (H) 65 - 100 mg/dL Performed by Ambika Flores    GLUCOSE, POC    Collection Time: 04/06/20  5:07 PM   Result Value Ref Range    Glucose (POC) 125 (H) 65 - 100 mg/dL    Performed by Ambika Flores    PTT    Collection Time: 04/07/20  3:39 AM   Result Value Ref Range    aPTT 101.8 (HH) 22.1 - 32.0 sec    aPTT, therapeutic range     58.0 - 77.0 SECS   RENAL FUNCTION PANEL    Collection Time: 04/07/20  3:39 AM   Result Value Ref Range    Sodium 134 (L) 136 - 145 mmol/L    Potassium 4.4 3.5 - 5.1 mmol/L    Chloride 102 97 - 108 mmol/L    CO2 23 21 - 32 mmol/L    Anion gap 9 5 - 15 mmol/L    Glucose 187 (H) 65 - 100 mg/dL    BUN 31 (H) 6 - 20 MG/DL    Creatinine 1.51 (H) 0.55 - 1.02 MG/DL    BUN/Creatinine ratio 21 (H) 12 - 20      GFR est AA 43 (L) >60 ml/min/1.73m2    GFR est non-AA 36 (L) >60 ml/min/1.73m2    Calcium 9.6 8.5 - 10.1 MG/DL    Phosphorus 3.6 2.6 - 4.7 MG/DL    Albumin 2.6 (L) 3.5 - 5.0 g/dL   SAMPLES BEING HELD    Collection Time: 04/07/20  3:39 AM   Result Value Ref Range    SAMPLES BEING HELD 1LAV     COMMENT        Add-on orders for these samples will be processed based on acceptable specimen integrity and analyte stability, which may vary by analyte. D DIMER    Collection Time: 04/07/20  3:39 AM   Result Value Ref Range    D-dimer 19.40 (H) 0.00 - 0.65 mg/L FEU   FIBRINOGEN    Collection Time: 04/07/20  3:39 AM   Result Value Ref Range    Fibrinogen 432 200 - 475 mg/dL   HEPATIC FUNCTION PANEL    Collection Time: 04/07/20  3:39 AM   Result Value Ref Range    Protein, total 7.3 6.4 - 8.2 g/dL    Albumin 2.5 (L) 3.5 - 5.0 g/dL    Globulin 4.8 (H) 2.0 - 4.0 g/dL    A-G Ratio 0.5 (L) 1.1 - 2.2      Bilirubin, total 0.7 0.2 - 1.0 MG/DL    Bilirubin, direct 0.3 (H) 0.0 - 0.2 MG/DL    Alk.  phosphatase 287 (H) 45 - 117 U/L    AST (SGOT) 45 (H) 15 - 37 U/L    ALT (SGPT) 41 12 - 78 U/L   PROTHROMBIN TIME + INR    Collection Time: 04/07/20  3:39 AM   Result Value Ref Range    INR 1.1 0.9 - 1.1      Prothrombin time 11.3 (H) 9.0 - 11.1 sec   MAGNESIUM    Collection Time: 04/07/20  3:39 AM   Result Value Ref Range    Magnesium 2.9 (H) 1.6 - 2.4 mg/dL   PHOSPHORUS    Collection Time: 04/07/20  3:39 AM   Result Value Ref Range    Phosphorus 3.5 2.6 - 4.7 MG/DL   GLUCOSE, POC    Collection Time: 04/07/20  5:52 AM   Result Value Ref Range    Glucose (POC) 144 (H) 65 - 100 mg/dL    Performed by [employer]    PTT    Collection Time: 04/07/20  6:45 AM   Result Value Ref Range    aPTT 41.5 (H) 22.1 - 32.0 sec    aPTT, therapeutic range     58.0 - 77.0 SECS           Total time spent with patient:  xxx   min. Care Plan discussed with:  Patient     Family      RN      Consulting Physician [facility],         I have reviewed the flowsheets. Chart and Pertinent Notes have been reviewed. No change in PMH ,family and social history from Consult note.       Delano Kearns MD

## 2020-04-07 NOTE — DIALYSIS
[address] [facility]       [phone]    Orders   Mode: CVVHD   Factor: 0   DFR: 3000ml/hr   Blood Flow Rate: 200ml/min     Metrics   BP / HR: 109/60  83   Blood Flow Rate: 200ml/min   AP:                         -94   RP: 67   TMP: 18   PD: 36   FP: 147   DFR: 3000ml/hr     Comments / Plan:      Pt orders, notes, labs, code status, and consents reviewed. [id number] filter reaches max life today--circuit changed. HF 1000 filter set-up, primed and tested. Running well into Rt groin, which aspirates and flushes well. Drsg and biopatch changed after cleaning insertion site w/ alcohol and CHG. All lines and connections secure and visible with warmer on return line at 37*C. Pt is +COVID, all hosp P/P followed. Pre-post report and education w/ S.  Sherrie Serna

## 2020-04-07 NOTE — DIABETES MGMT
MARTA ROMERO  CLINICAL NURSE SPECIALIST CONSULT  [facility]    Presentation   Dotty Andrade is a 64 y.o. female admitted from OSH to [facility] ICU with SARS-COV2 needing CRRT. She is currently sedated and has been intubated since 3/28/20. Current clinical course has been complicated by steroid induced hyperglycemia. Steroids are discontinued at this time. She requires CRRT for acute renal failure r/t her sepsis and hypotension. PHM: GERD, obesity, and anxiety. New diabetes diagnosis with A1C 11.0% (3/28/2020); updated A1C 3/31/20-10.4%     Recent events:   Remains on ventilator; no vasopressors, on precedex. Per MD note today patient opens eyes spontaneously. Consulted by Provider for advanced diabetes nursing assessment and care, specifically related to   [] Transitioning off Patrecia Muck   [x] Inpatient management strategy  [] Home management assessment  [] Survival skill education    Diabetes-related medical history  Acute complications  hyperglycemia  Neurological complications  NONE  Microvascular disease  NONE  Macrovascular disease  NONE  Other associated conditions     NONE    Diabetes medication history: NONE    Subjective   Per chart review, Ms. Nimco Cabral remains very ill  and is on isolation forSARS-COV2 in ICU. I am unable to do a physical assessment of the patient at this time. Patient reports the following home diabetes self-care practices: deferred    Objective     Vital Signs   Visit Vitals  /62   Pulse 88   Temp 97.6 °F (36.4 °C)   Resp 13   Ht 5' 4\" (1.626 m)   Wt 115.4 kg (254 lb 6.6 oz)   SpO2 99%   BMI 43.67 kg/m²   .    Laboratory  Lab Results   Component Value Date/Time    Hemoglobin A1c 10.4 (H) 03/31/2020 03:42 PM     No results found for: LDL, LDLC, DLDLP  Lab Results   Component Value Date/Time    Creatinine 1.51 (H) 04/07/2020 03:39 AM     Lab Results   Component Value Date/Time    Sodium 134 (L) 04/07/2020 03:39 AM    Potassium 4.4 04/07/2020 03:39 AM Chloride 102 04/07/2020 03:39 AM    CO2 23 04/07/2020 03:39 AM    Anion gap 9 04/07/2020 03:39 AM    Glucose 187 (H) 04/07/2020 03:39 AM    BUN 31 (H) 04/07/2020 03:39 AM    Creatinine 1.51 (H) 04/07/2020 03:39 AM    BUN/Creatinine ratio 21 (H) 04/07/2020 03:39 AM    GFR est AA 43 (L) 04/07/2020 03:39 AM    GFR est non-AA 36 (L) 04/07/2020 03:39 AM    Calcium 9.6 04/07/2020 03:39 AM    Bilirubin, total 0.7 04/07/2020 03:39 AM    AST (SGOT) 45 (H) 04/07/2020 03:39 AM    Alk. phosphatase 287 (H) 04/07/2020 03:39 AM    Protein, total 7.3 04/07/2020 03:39 AM    Albumin 2.6 (L) 04/07/2020 03:39 AM    Albumin 2.5 (L) 04/07/2020 03:39 AM    Globulin 4.8 (H) 04/07/2020 03:39 AM    A-G Ratio 0.5 (L) 04/07/2020 03:39 AM    ALT (SGPT) 41 04/07/2020 03:39 AM     Lab Results   Component Value Date/Time    ALT (SGPT) 41 04/07/2020 03:39 AM           Evaluation   Ms Holley Bueno, with new onset Type 2 diabetes,with A1C 10.4%. Per chart review from OSH before she was transferred she had BG >200. BG is stable (120s-180s) with no fluctuations after increase in basal insulin to 35units daily. TF (Nepro) at 30cc/hr. Since BG of 180 is highest since she's been here, TF may be the cause of her increase. Recommendations   1. CONTINUE basal and correction insulin. IF BG continues to trend higher, >200mg/dl consider increasing Lantus. 2. Will continue to follow.     Assessment and Plan   Nursing Diagnosis Risk for unstable blood glucose pattern   Nursing Intervention Domain 3384 Decision-making Support   Nursing Interventions Examined current inpatient diabetes control   Explored factors facilitating and impeding inpatient management  Identified self-management practices impeding diabetes control  Explored corrective strategies with patient and responsible inpatient provider   Informed patient of rational for insulin strategy while hospitalized     Referral   [] Behavioral health services  [] Inpatient nutrition services  [] Pharmacy services for medication management  [x] Diabetes Self-Management Training through [facility] (Phone [phone] to schedule appointment)    Billing Code(s)     [x] 71368  subsequent hospital care - [address] [facility] [address], CNS  Access via KESHIA Roca 4 [phone]

## 2020-04-07 NOTE — PROGRESS NOTES
NUTRITION COMPLETE ASSESSMENT    RECOMMENDATIONS:   Continue current tube feeding     Interventions/Plan:   Food/Nutrient Delivery:  Enteral Nutrition support     Assessment:   Reason for Assessment: Reassessment    Tube Feeding: Nepro @ 30 ml/hr with 3 packets Prosource daily and 50 ml water flush q 6 hr  Diet: NPO  Nutritionally Significant Medications: [x] Reviewed & Includes: Precedex, Vit C, Colace, Lantus, correction scale insulin, zinc sulfate    Subjective:   Staff Interviewed    Objective:  Ms Luz Elena Paz transferred from [facility] d/t worsening renal function requiring CRRT. Noted: JUANCHO d/t ATN-CVVHD; acute hypoxic respiratory failure d/t COVID-19 and bacterial PNA, intubated 3/28, on minimal vent settings. .    Diprivan weaned off therefore tube feeding advanced to above goal. Patient tolerating well thus far. Tube feeding as ordered provides 720 ml, 1480 calories, 103 gm protein and 900 ml free water (tube feeding/flush) per day to meet estimated needs (hypocaloric d/t obesity/high protein). Flexiseal in place for loose stools. Patient remains edematous despite 15 kg weight loss in the past week d/t volume removal. Vit C and zinc supplementation ordered d/t COVID 19. Significant improvement in CRP and Ferritin. BG well controlled. Estimated Nutrition Needs:   Kcals/day: 1350 Kcals/day(7505-9883 (25-28 kcal/kg IBW)  Protein: 108 g(2g/kg IBW)  Fluid: (1 ml/kcal)  Based On: Kcal/kg - specify (Comment)  Weight Used: IBW(54 kg)    Pt expected to meet estimated nutrient needs:  []   Yes     []  No  [] Unable to predict at this time  Nutrition Diagnosis:   1. Inadequate oral intake related to acute respiratory failure d/t COVID-19 as evidenced by NPO d/t intubation. Goals:     Tube feeding to meet at least 90% estimated protein needs x 5-7 days.      Monitoring & Evaluation:    - Enteral/parenteral nutrition intake   - Electrolyte and renal profile, Glucose profile, GI, CV-pulmonary, Weight/weight change     Previous Nutrition Goals Met: Yes  Previous Recommendations: Yes    Education & Discharge Needs:   [x] None Identified   [] Identified and addressed    [x] Participated in care plan, discharge planning, and/or interdisciplinary rounds        Cultural, Judaism and ethnic food preferences identified:  None    Skin Integrity: [x]Intact  []Other  Edema: []None [x] 2+ pitting generalized, B/L upper and lower extremities, 2+ genital  Last BM: 4/7  Food Allergies: [x]None []Other    Anthropometrics:    Weight Loss Metrics 4/7/2020 1/23/2020 1/22/2019 1/16/2018 1/9/2017 7/6/2016 12/14/2015   Today's Wt 254 lb 6.6 oz 270 lb 12.8 oz 278 lb 279 lb 275 lb 234 lb 213 lb 6.4 oz   BMI 43.67 kg/m2 43.73 kg/m2 44.89 kg/m2 45.05 kg/m2 44.39 kg/m2 37.79 kg/m2 34.46 kg/m2      Last 3 Recorded Weights in this Encounter    04/06/20 0507 04/07/20 0000 04/07/20 1334   Weight: 125.1 kg (275 lb 12.7 oz) 115.4 kg (254 lb 6.6 oz) 115.4 kg (254 lb 6.6 oz)      Weight Source: Bed  Height: 5' 4\" (162.6 cm),    Body mass index is 43.67 kg/m².      IBW : 54.4 kg (120 lb), % IBW (Calculated): 212.01 %   ,      Labs:    Lab Results   Component Value Date/Time    Sodium 134 (L) 04/07/2020 03:39 AM    Potassium 4.4 04/07/2020 03:39 AM    Chloride 102 04/07/2020 03:39 AM    CO2 23 04/07/2020 03:39 AM    Glucose 187 (H) 04/07/2020 03:39 AM    BUN 31 (H) 04/07/2020 03:39 AM    Creatinine 1.51 (H) 04/07/2020 03:39 AM    Calcium 9.6 04/07/2020 03:39 AM    Magnesium 2.9 (H) 04/07/2020 03:39 AM    Phosphorus 3.6 04/07/2020 03:39 AM    Phosphorus 3.5 04/07/2020 03:39 AM    Albumin 2.6 (L) 04/07/2020 03:39 AM    Albumin 2.5 (L) 04/07/2020 03:39 AM     Lab Results   Component Value Date/Time    Hemoglobin A1c 10.4 (H) 03/31/2020 03:42 PM     Lab Results   Component Value Date/Time    Glucose (POC) 180 (H) 04/07/2020 12:01 PM      Lab Results   Component Value Date/Time    ALT (SGPT) 41 04/07/2020 03:39 AM    AST (SGOT) 45 (H) 04/07/2020 03:39 AM Alk. phosphatase 287 (H) 04/07/2020 03:39 AM    Bilirubin, direct 0.3 (H) 04/07/2020 03:39 AM    Bilirubin, total 0.7 04/07/2020 03:39 AM        Miladys Valle RD CNSC

## 2020-04-07 NOTE — PROGRESS NOTES
04/07/20 1548   Weaning Parameters   Spontaneous Breathing Trial Complete Yes   Resp Rate Observed 13   Ve 11.2      RSBI 14   Returned to previous vent settings at this time per NP

## 2020-04-07 NOTE — PROGRESS NOTES
Primary Nurse Dima Pollock and Madelin Cheatham RN performed a dual skin assessment on this patient Impairment noted- see wound doc flow sheet  Aniket score is 9

## 2020-04-07 NOTE — PROGRESS NOTES
[facility]    ICU Team Note    Name: Trev York   : 1960   MRN: [id number]   Date: 2020      Subjective:   Progress Note: 2020      Reason for ICU Admission: SARS-COV2 with renal failure     63 yo female with obesity and diabetes who presented to [facility] with worsening hypoxic respiratory failure in lieu of close exposure to COVID-19. She presented to the hospital 3/26 and intubated 3/28. She was started on broad spectrum antibiotics and plaquenil. Developed worsening renal failure and was transferred to us for CRRT. Overnight Events:   - No significant events overnight  - Episode of hypotension with morning- stopped pulling fluid in CRRT, gave albumin  - Patient passed SBT last night  - Plan for SBT this morning  - Patient off sedation but with poor mental status     POD:* No surgery found *    S/P:     Active Problem List:     Problem List  Date Reviewed: 2020          Codes Class    Hypotension ICD-10-CM: I95.9  ICD-9-CM: 458.9 Acute        Sepsis due to methicillin susceptible Staphylococcus aureus (MSSA) without acute organ dysfunction ([facility] ICD-10-CM: A41.01  ICD-9-CM: 038.11, 995.91 Acute        COVID-19 virus infection ICD-10-CM: U07.1         Obesity, morbid ([facility] ICD-10-CM: E66.01  ICD-9-CM: 278.01         Vaginal Pap smear following hysterectomy for malignancy ICD-10-CM: Z08, Z90.710  ICD-9-CM: V67.01         Personal history of malignant neoplasm of other parts of uterus ICD-10-CM: Z85.42  ICD-9-CM: V10.42         Endometrial cancer ([facility] ICD-10-CM: C54.1  ICD-9-CM: 182.0               Past Medical History:      has a past medical history of Basal cell carcinoma, GERD (gastroesophageal reflux disease), Kidney stones, and Polyp of ureter. Past Surgical History:      has a past surgical history that includes hysteroscopy diagnostic (); pr endometrial ablation, thermal; hx  section (); and hx colonoscopy.     Home Medications:     Prior to Admission medications    Medication Sig Start Date End Date Taking? Authorizing Provider   pantoprazole (PROTONIX) 40 mg tablet TAKE 1 TABLET BY MOUTH TWICE A DAY 18   Provider, Historical   esomeprazole (NEXIUM) 20 mg capsule Take 20 mg by mouth daily. Indications: BID    Provider, Historical   zolpidem (AMBIEN) 10 mg tablet Take 0.5 Tabs by mouth nightly as needed for Sleep. Max Daily Amount: 5 mg. 17   Annalee Mandel MD   fluticasone (FLONASE) 50 mcg/actuation nasal spray Mist 1-2 spray(s) into each nostril once daily. 4/3/15   Provider, Historical   ranitidine (ZANTAC) 150 mg tablet 150 mg.    Provider, Historical       Allergies/Social/Family History: Allergies   Allergen Reactions    Augmentin [Amoxicillin-Pot Clavulanate] Rash and Itching      Social History     Tobacco Use    Smoking status: Never Smoker    Smokeless tobacco: Never Used   Substance Use Topics    Alcohol use: Not on file      Family History   Problem Relation Age of Onset    Prostate Cancer Father         PROSTATE    Breast Cancer Sister 46        invasive poorly differentiated ductal carcinoma, Neg genetic testing.  Cancer Sister 62        melanoma stage 1       Review of Systems:     A comprehensive review of systems was negative.     Objective:   Vital Signs:  Visit Vitals  BP 99/60   Pulse 83   Temp 97.6 °F (36.4 °C)   Resp 12   Ht 5' 4\" (1.626 m)   Wt 115.4 kg (254 lb 6.6 oz)   SpO2 99%   BMI 43.67 kg/m²      O2 Device: Endotracheal tube, Ventilator(during SBT) Temp (24hrs), Av.8 °F (36.6 °C), Min:97.6 °F (36.4 °C), Max:98.1 °F (36.7 °C)           Intake/Output:     Intake/Output Summary (Last 24 hours) at 2020 1459  Last data filed at 2020 1400  Gross per 24 hour   Intake 2278.89 ml   Output 5223 ml   Net -2944.11 ml       Physical Exam:    General: Patient intubated and sedated, NAD, opens eyes spontaneously   Eyes: PERRL, scleral edema  HENT: Atraumatic, MMM, ETT in place, OG in place  Cardio: RRR, normal S1S2  Respiratory: Distant but clear breath sounds bilaterally  GI: Abdomen, soft, non tender, non distended  Extremities: 2+ edema  Neuro: Largely unresponsive, withdraws to noxious stimuli in LUE- otherwise no movement, opens eyes spontaneously. LABS AND  DATA: Personally reviewed  Recent Labs     04/06/20  0311 04/05/20  0311   WBC 13.2* 11.7*   HGB 10.3* 9.6*   HCT 32.9* 30.9*    162     Recent Labs     04/07/20  0339 04/06/20  0306   * 136   K 4.4 4.1    102   CO2 23 26   BUN 31* 25*   CREA 1.51* 1.37*   * 134*   CA 9.6 8.6   MG 2.9*  --    PHOS 3.5  3.6 3.0     Recent Labs     04/07/20  0339 04/06/20  0306 04/06/20  0300   SGOT 45*  --  49*   *  --  298*   TP 7.3  --  6.8   ALB 2.5*  2.6* 2.2* 2.3*   GLOB 4.8*  --  4.5*     Recent Labs     04/07/20  1100 04/07/20  0645 04/07/20  0339 04/06/20  0648   INR  --   --  1.1 1.1   PTP  --   --  11.3* 11.1   APTT 40.5* 41.5* 101.8* 70.0*      Recent Labs     04/05/20  0334   PHI 7.413   PCO2I 39.0   PO2I 67*   FIO2I 0.45     No results for input(s): CPK, CKMB, TROIQ, BNPP in the last 72 hours. Hemodynamics:   PAP:   CO:     Wedge:   CI:     CVP:    SVR:       PVR:       Ventilator Settings:  Mode Rate Tidal Volume Pressure FiO2 PEEP   Pressure support(per NP)      8 cm H2O 45 % 8 cm H20     Peak airway pressure: 24 cm H2O    Minute ventilation: 11.4 l/min        MEDS: Reviewed    Chest X-Ray: personally reviewed and report checked      Assessment/Plan:     1. Acute Hypoxic Respiratory failure - Secondary to COVID-19 -   a. Note sputum with staph aureus - susceptible to vancomycin. b. Continue lung protective ventilation strategies. c. Patient now off sedation. Passed SBT today. d. Continue abx therapy for superimposed bacterial PNA. e. Completed Azithro and plaquenil course. f. ID following.   g. Elevated IL-6 - received one dose of 400mg of actemra.   h. Continue diuresis with CRRT  i.  Will check CRP, ESR, ferritin, and LDH  2. Hypercoagulable state- Patient previously clotting off CRRT frequently, continue Heparin gtt  3. Shock - now resolved off vasopressors   4. Diabetes - Continue ISS and Lantus 35 units  5. HTN - Now hypertensive coming off sedation. Start labetalol 100mg TID and PRN. 6. Renal Failure - Likely ATN from sepsis and hypotension. Nephrology following. Continue CRRT  7. Sepsis secondary to COVID-19 - POA. Management as detailed above     Multidisciplinary Rounds Completed:  No    ABCDEF Bundle/Checklist  Pain Medications: None  Target RASS: -2 - Light Sedation - Briefly awakens to voice (eyes open & contact <10 sec)  Sedation Medications: Precedex  CAM-ICU:  Negative  Mobility: Bedrest  PT/OT: To be consulted when stable   Restraints: Soft wrist restraints  Discussed Plan of Care (goals of care): No  Addressed Code Status: Full Code    CARDIOVASCULAR  Cardiac Gtts: None  SBP Goal of: > 90 mmHg  MAP Goal of: > 65 mmHg  Transfusion Trigger (Hgb): <7 g/dL    RESPIRATORY  Vent Goals:   Chlorhexidine   Optimize PEEP/Ventilation/Oxygenation  Goal Tidal Volume 6 cc/kg based on IBW  Aim for lung protective ventilation  Head of bed > 30 degrees  DVT Prophylaxis (if no, list reason): SCD's or Sequential Compression Device and Heparin   SPO2 Goal: > 92%  Pulmonary toilet: Duo-Nebs     GI/  Bowden Catheter Present: Yes  GI Prophylaxis: Pepcid (famotidine)   Nutrition: Yes   Bowel Movement: No  Bowel Regimen: None needed at this time  Insulin: ISS and Lantus    ANTIBIOTICS  Antibiotics:  Vancomycin    T/L/D  Tubes: ETT and Orogastric Tube  Lines: Peripheral IV, Arterial Line, PICC Line and Ramon  Drains: Bowden Catheter    SPECIAL EQUIPMENT  CRRT    DISPOSITION  Stay in ICU    CRITICAL CARE CONSULTANT NOTE  I had a face to face encounter with the patient, reviewed and interpreted patient data including clinical events, labs, images, vital signs, I/O's, and examined patient.   I have discussed the case and the plan and management of the patient's care with the consulting services, the bedside nurses and the respiratory therapist.      NOTE OF PERSONAL INVOLVEMENT IN CARE   This patient has a high probability of imminent, clinically significant deterioration, which requires the highest level of preparedness to intervene urgently. I participated in the decision-making and personally managed or directed the management of the following life and organ supporting interventions that required my frequent assessment to treat or prevent imminent deterioration. I personally spent 45 minutes of critical care time. This is time spent at this critically ill patient's bedside actively involved in patient care as well as the coordination of care and discussions with the patient's family. This does not include any procedural time which has been billed separately.     DOMI Lewis  [facility]  4/7/2020

## 2020-04-07 NOTE — PROGRESS NOTES
1930- bedside shift report received from [facility] using sbar format  1089- pt biting ETT Sophia Helton with vent /HR elevated/abd breathing  precedex restarted and 2mg versed given to sedate  2000-precedex at . 4mcg  Pt now compliant with vent/opens eyes 1/2 way,grimaces and weakly w/d to pain in all ext  No commands/no eye contact/blank stare  0149-   fent given per order to keep comfortable  0502-.8 hep gtt stopped per protocol  0645-bp trending down  Factor removed from CVVHD calculations/PTT sent heparin gtt remains off  0700-Dorian RN at bedside to change CVVHD filter and change melissa dressing  80702-Aw updated/pt discussed /MD to place albumin orders/adjust factor for CVVHD  0727-albumin started per order      0730-bedside shift report given to RN using sbar format

## 2020-04-08 LAB
ALBUMIN SERPL-MCNC: 3 G/DL (ref 3.5–5)
ALBUMIN SERPL-MCNC: 3.2 G/DL (ref 3.5–5)
ALBUMIN SERPL-MCNC: 3.3 G/DL (ref 3.5–5)
ALBUMIN/GLOB SERPL: 0.9 {RATIO} (ref 1.1–2.2)
ALP SERPL-CCNC: 224 U/L (ref 45–117)
ALT SERPL-CCNC: 44 U/L (ref 12–78)
ANION GAP SERPL CALC-SCNC: 5 MMOL/L (ref 5–15)
ANION GAP SERPL CALC-SCNC: 7 MMOL/L (ref 5–15)
APTT PPP: 56.8 SEC (ref 22.1–32)
APTT PPP: 57.2 SEC (ref 22.1–32)
APTT PPP: 81.2 SEC (ref 22.1–32)
APTT PPP: 81.4 SEC (ref 22.1–32)
AST SERPL-CCNC: 40 U/L (ref 15–37)
BASOPHILS # BLD: 0 K/UL (ref 0–0.1)
BASOPHILS NFR BLD: 0 % (ref 0–1)
BILIRUB DIRECT SERPL-MCNC: 0.3 MG/DL (ref 0–0.2)
BILIRUB SERPL-MCNC: 0.7 MG/DL (ref 0.2–1)
BUN SERPL-MCNC: 24 MG/DL (ref 6–20)
BUN SERPL-MCNC: 29 MG/DL (ref 6–20)
BUN/CREAT SERPL: 17 (ref 12–20)
BUN/CREAT SERPL: 19 (ref 12–20)
CALCIUM SERPL-MCNC: 9.1 MG/DL (ref 8.5–10.1)
CALCIUM SERPL-MCNC: 9.4 MG/DL (ref 8.5–10.1)
CHLORIDE SERPL-SCNC: 102 MMOL/L (ref 97–108)
CHLORIDE SERPL-SCNC: 104 MMOL/L (ref 97–108)
CO2 SERPL-SCNC: 25 MMOL/L (ref 21–32)
CO2 SERPL-SCNC: 25 MMOL/L (ref 21–32)
CREAT SERPL-MCNC: 1.41 MG/DL (ref 0.55–1.02)
CREAT SERPL-MCNC: 1.49 MG/DL (ref 0.55–1.02)
CRP SERPL-MCNC: 0.84 MG/DL (ref 0–0.6)
DIFFERENTIAL METHOD BLD: ABNORMAL
EOSINOPHIL # BLD: 0.2 K/UL (ref 0–0.4)
EOSINOPHIL NFR BLD: 1 % (ref 0–7)
ERYTHROCYTE [DISTWIDTH] IN BLOOD BY AUTOMATED COUNT: 14 % (ref 11.5–14.5)
ERYTHROCYTE [SEDIMENTATION RATE] IN BLOOD: 60 MM/HR (ref 0–30)
FERRITIN SERPL-MCNC: 960 NG/ML (ref 8–252)
GLOBULIN SER CALC-MCNC: 3.4 G/DL (ref 2–4)
GLUCOSE BLD STRIP.AUTO-MCNC: 158 MG/DL (ref 65–100)
GLUCOSE BLD STRIP.AUTO-MCNC: 160 MG/DL (ref 65–100)
GLUCOSE BLD STRIP.AUTO-MCNC: 179 MG/DL (ref 65–100)
GLUCOSE BLD STRIP.AUTO-MCNC: 189 MG/DL (ref 65–100)
GLUCOSE SERPL-MCNC: 153 MG/DL (ref 65–100)
GLUCOSE SERPL-MCNC: 157 MG/DL (ref 65–100)
HCT VFR BLD AUTO: 31.7 % (ref 35–47)
HGB BLD-MCNC: 10.1 G/DL (ref 11.5–16)
IMM GRANULOCYTES # BLD AUTO: 0 K/UL
IMM GRANULOCYTES NFR BLD AUTO: 0 %
INR PPP: 1.1 (ref 0.9–1.1)
LDH SERPL L TO P-CCNC: 368 U/L (ref 81–246)
LYMPHOCYTES # BLD: 2.9 K/UL (ref 0.8–3.5)
LYMPHOCYTES NFR BLD: 17 % (ref 12–49)
MAGNESIUM SERPL-MCNC: 2.7 MG/DL (ref 1.6–2.4)
MCH RBC QN AUTO: 27.1 PG (ref 26–34)
MCHC RBC AUTO-ENTMCNC: 31.9 G/DL (ref 30–36.5)
MCV RBC AUTO: 85 FL (ref 80–99)
MONOCYTES # BLD: 0.8 K/UL (ref 0–1)
MONOCYTES NFR BLD: 5 % (ref 5–13)
NEUTS SEG # BLD: 12.9 K/UL (ref 1.8–8)
NEUTS SEG NFR BLD: 77 % (ref 32–75)
NRBC # BLD: 0 K/UL (ref 0–0.01)
NRBC BLD-RTO: 0 PER 100 WBC
PHOSPHATE SERPL-MCNC: 2 MG/DL (ref 2.6–4.7)
PHOSPHATE SERPL-MCNC: 2.8 MG/DL (ref 2.6–4.7)
PHOSPHATE SERPL-MCNC: 2.8 MG/DL (ref 2.6–4.7)
PLATELET # BLD AUTO: 233 K/UL (ref 150–400)
PLATELET COMMENTS,PCOM: ABNORMAL
PMV BLD AUTO: 10.8 FL (ref 8.9–12.9)
POTASSIUM SERPL-SCNC: 3.6 MMOL/L (ref 3.5–5.1)
POTASSIUM SERPL-SCNC: 4 MMOL/L (ref 3.5–5.1)
PROT SERPL-MCNC: 6.6 G/DL (ref 6.4–8.2)
PROTHROMBIN TIME: 11.5 SEC (ref 9–11.1)
RBC # BLD AUTO: 3.73 M/UL (ref 3.8–5.2)
RBC MORPH BLD: ABNORMAL
SERVICE CMNT-IMP: ABNORMAL
SODIUM SERPL-SCNC: 134 MMOL/L (ref 136–145)
SODIUM SERPL-SCNC: 134 MMOL/L (ref 136–145)
THERAPEUTIC RANGE,PTTT: ABNORMAL SECS (ref 58–77)
WBC # BLD AUTO: 16.8 K/UL (ref 3.6–11)

## 2020-04-08 PROCEDURE — 74011000258 HC RX REV CODE- 258: Performed by: INTERNAL MEDICINE

## 2020-04-08 PROCEDURE — 74011250637 HC RX REV CODE- 250/637: Performed by: NURSE PRACTITIONER

## 2020-04-08 PROCEDURE — 83615 LACTATE (LD) (LDH) ENZYME: CPT

## 2020-04-08 PROCEDURE — 94003 VENT MGMT INPAT SUBQ DAY: CPT

## 2020-04-08 PROCEDURE — 74011250637 HC RX REV CODE- 250/637: Performed by: INTERNAL MEDICINE

## 2020-04-08 PROCEDURE — 85652 RBC SED RATE AUTOMATED: CPT

## 2020-04-08 PROCEDURE — P9045 ALBUMIN (HUMAN), 5%, 250 ML: HCPCS | Performed by: NURSE PRACTITIONER

## 2020-04-08 PROCEDURE — 85610 PROTHROMBIN TIME: CPT

## 2020-04-08 PROCEDURE — 83735 ASSAY OF MAGNESIUM: CPT

## 2020-04-08 PROCEDURE — 90945 DIALYSIS ONE EVALUATION: CPT

## 2020-04-08 PROCEDURE — 85025 COMPLETE CBC W/AUTO DIFF WBC: CPT

## 2020-04-08 PROCEDURE — 74011636637 HC RX REV CODE- 636/637: Performed by: INTERNAL MEDICINE

## 2020-04-08 PROCEDURE — 74011000250 HC RX REV CODE- 250: Performed by: INTERNAL MEDICINE

## 2020-04-08 PROCEDURE — 74011250636 HC RX REV CODE- 250/636: Performed by: NURSE PRACTITIONER

## 2020-04-08 PROCEDURE — 74011000258 HC RX REV CODE- 258: Performed by: NURSE PRACTITIONER

## 2020-04-08 PROCEDURE — 74011000250 HC RX REV CODE- 250: Performed by: NURSE PRACTITIONER

## 2020-04-08 PROCEDURE — 80069 RENAL FUNCTION PANEL: CPT

## 2020-04-08 PROCEDURE — 85730 THROMBOPLASTIN TIME PARTIAL: CPT

## 2020-04-08 PROCEDURE — 36415 COLL VENOUS BLD VENIPUNCTURE: CPT

## 2020-04-08 PROCEDURE — 87040 BLOOD CULTURE FOR BACTERIA: CPT

## 2020-04-08 PROCEDURE — 77030018798 HC PMP KT ENTRL FED COVD -A

## 2020-04-08 PROCEDURE — 65610000006 HC RM INTENSIVE CARE

## 2020-04-08 PROCEDURE — 82728 ASSAY OF FERRITIN: CPT

## 2020-04-08 PROCEDURE — 74011250636 HC RX REV CODE- 250/636: Performed by: INTERNAL MEDICINE

## 2020-04-08 PROCEDURE — 82962 GLUCOSE BLOOD TEST: CPT

## 2020-04-08 PROCEDURE — 86140 C-REACTIVE PROTEIN: CPT

## 2020-04-08 PROCEDURE — 84100 ASSAY OF PHOSPHORUS: CPT

## 2020-04-08 PROCEDURE — 80076 HEPATIC FUNCTION PANEL: CPT

## 2020-04-08 RX ORDER — ALBUMIN HUMAN 50 G/1000ML
25 SOLUTION INTRAVENOUS ONCE
Status: COMPLETED | OUTPATIENT
Start: 2020-04-08 | End: 2020-04-09

## 2020-04-08 RX ORDER — ALBUMIN HUMAN 50 G/1000ML
25 SOLUTION INTRAVENOUS ONCE
Status: COMPLETED | OUTPATIENT
Start: 2020-04-08 | End: 2020-04-08

## 2020-04-08 RX ADMIN — HYDROCODONE BITARTRATE AND ACETAMINOPHEN 1 TABLET: 5; 325 TABLET ORAL at 05:28

## 2020-04-08 RX ADMIN — CALCIUM CHLORIDE, MAGNESIUM CHLORIDE, DEXTROSE MONOHYDRATE, LACTIC ACID, SODIUM CHLORIDE, SODIUM BICARBONATE AND POTASSIUM CHLORIDE 3000 ML/HR: 3.68; 3.05; 22; 5.4; 6.46; 3.09; .314 INJECTION INTRAVENOUS at 02:57

## 2020-04-08 RX ADMIN — GUAIFENESIN 100 MG: 100 SOLUTION ORAL at 05:28

## 2020-04-08 RX ADMIN — CALCIUM CHLORIDE, MAGNESIUM CHLORIDE, DEXTROSE MONOHYDRATE, LACTIC ACID, SODIUM CHLORIDE, SODIUM BICARBONATE AND POTASSIUM CHLORIDE 3000 ML/HR: 3.68; 3.05; 22; 5.4; 6.46; 3.09; .314 INJECTION INTRAVENOUS at 18:06

## 2020-04-08 RX ADMIN — GUAIFENESIN 100 MG: 100 SOLUTION ORAL at 17:57

## 2020-04-08 RX ADMIN — CALCIUM CHLORIDE, MAGNESIUM CHLORIDE, DEXTROSE MONOHYDRATE, LACTIC ACID, SODIUM CHLORIDE, SODIUM BICARBONATE AND POTASSIUM CHLORIDE 3000 ML/HR: 3.68; 3.05; 22; 5.4; 6.46; 3.09; .314 INJECTION INTRAVENOUS at 16:25

## 2020-04-08 RX ADMIN — HEPARIN SODIUM 15 UNITS/KG/HR: 10000 INJECTION, SOLUTION INTRAVENOUS at 12:00

## 2020-04-08 RX ADMIN — CALCIUM CHLORIDE, MAGNESIUM CHLORIDE, DEXTROSE MONOHYDRATE, LACTIC ACID, SODIUM CHLORIDE, SODIUM BICARBONATE AND POTASSIUM CHLORIDE 3000 ML/HR: 3.68; 3.05; 22; 5.4; 6.46; 3.09; .314 INJECTION INTRAVENOUS at 23:12

## 2020-04-08 RX ADMIN — INSULIN GLARGINE 35 UNITS: 100 INJECTION, SOLUTION SUBCUTANEOUS at 09:06

## 2020-04-08 RX ADMIN — ALBUMIN (HUMAN) 25 G: 12.5 INJECTION, SOLUTION INTRAVENOUS at 17:58

## 2020-04-08 RX ADMIN — SODIUM CHLORIDE 10 ML: 9 INJECTION INTRAMUSCULAR; INTRAVENOUS; SUBCUTANEOUS at 13:03

## 2020-04-08 RX ADMIN — CALCIUM CHLORIDE, MAGNESIUM CHLORIDE, DEXTROSE MONOHYDRATE, LACTIC ACID, SODIUM CHLORIDE, SODIUM BICARBONATE AND POTASSIUM CHLORIDE 3000 ML/HR: 3.68; 3.05; 22; 5.4; 6.46; 3.09; .314 INJECTION INTRAVENOUS at 21:41

## 2020-04-08 RX ADMIN — CASTOR OIL AND BALSAM, PERU: 788; 87 OINTMENT TOPICAL at 09:06

## 2020-04-08 RX ADMIN — DOCUSATE SODIUM 100 MG: 50 LIQUID ORAL at 17:57

## 2020-04-08 RX ADMIN — CALCIUM CHLORIDE, MAGNESIUM CHLORIDE, DEXTROSE MONOHYDRATE, LACTIC ACID, SODIUM CHLORIDE, SODIUM BICARBONATE AND POTASSIUM CHLORIDE 3000 ML/HR: 3.68; 3.05; 22; 5.4; 6.46; 3.09; .314 INJECTION INTRAVENOUS at 01:06

## 2020-04-08 RX ADMIN — CASTOR OIL AND BALSAM, PERU: 788; 87 OINTMENT TOPICAL at 17:58

## 2020-04-08 RX ADMIN — CALCIUM CHLORIDE, MAGNESIUM CHLORIDE, DEXTROSE MONOHYDRATE, LACTIC ACID, SODIUM CHLORIDE, SODIUM BICARBONATE AND POTASSIUM CHLORIDE 3000 ML/HR: 3.68; 3.05; 22; 5.4; 6.46; 3.09; .314 INJECTION INTRAVENOUS at 06:28

## 2020-04-08 RX ADMIN — HEPARIN SODIUM 17 UNITS/KG/HR: 10000 INJECTION, SOLUTION INTRAVENOUS at 02:27

## 2020-04-08 RX ADMIN — CALCIUM CHLORIDE, MAGNESIUM CHLORIDE, DEXTROSE MONOHYDRATE, LACTIC ACID, SODIUM CHLORIDE, SODIUM BICARBONATE AND POTASSIUM CHLORIDE 3000 ML/HR: 3.68; 3.05; 22; 5.4; 6.46; 3.09; .314 INJECTION INTRAVENOUS at 13:05

## 2020-04-08 RX ADMIN — MINERAL OIL AND WHITE PETROLATUM: 150; 830 OINTMENT OPHTHALMIC at 09:07

## 2020-04-08 RX ADMIN — CALCIUM CHLORIDE, MAGNESIUM CHLORIDE, DEXTROSE MONOHYDRATE, LACTIC ACID, SODIUM CHLORIDE, SODIUM BICARBONATE AND POTASSIUM CHLORIDE 3000 ML/HR: 3.68; 3.05; 22; 5.4; 6.46; 3.09; .314 INJECTION INTRAVENOUS at 14:43

## 2020-04-08 RX ADMIN — GUAIFENESIN 100 MG: 100 SOLUTION ORAL at 11:50

## 2020-04-08 RX ADMIN — SODIUM CHLORIDE 10 ML: 9 INJECTION INTRAMUSCULAR; INTRAVENOUS; SUBCUTANEOUS at 05:03

## 2020-04-08 RX ADMIN — CHLORHEXIDINE GLUCONATE 15 ML: 0.12 RINSE ORAL at 20:57

## 2020-04-08 RX ADMIN — CHLORHEXIDINE GLUCONATE 15 ML: 0.12 RINSE ORAL at 09:06

## 2020-04-08 RX ADMIN — SODIUM CHLORIDE 3 ML/HR: 900 INJECTION, SOLUTION INTRAVENOUS at 02:46

## 2020-04-08 RX ADMIN — CALCIUM CHLORIDE, MAGNESIUM CHLORIDE, DEXTROSE MONOHYDRATE, LACTIC ACID, SODIUM CHLORIDE, SODIUM BICARBONATE AND POTASSIUM CHLORIDE 3000 ML/HR: 3.68; 3.05; 22; 5.4; 6.46; 3.09; .314 INJECTION INTRAVENOUS at 08:15

## 2020-04-08 RX ADMIN — SODIUM CHLORIDE 10 ML: 9 INJECTION INTRAMUSCULAR; INTRAVENOUS; SUBCUTANEOUS at 22:00

## 2020-04-08 RX ADMIN — DEXMEDETOMIDINE HYDROCHLORIDE 0.3 MCG/KG/HR: 100 INJECTION, SOLUTION, CONCENTRATE INTRAVENOUS at 04:12

## 2020-04-08 RX ADMIN — MIDAZOLAM 2 MG: 1 INJECTION INTRAMUSCULAR; INTRAVENOUS at 22:58

## 2020-04-08 RX ADMIN — ALBUMIN (HUMAN) 25 G: 12.5 INJECTION, SOLUTION INTRAVENOUS at 01:55

## 2020-04-08 RX ADMIN — CALCIUM CHLORIDE, MAGNESIUM CHLORIDE, DEXTROSE MONOHYDRATE, LACTIC ACID, SODIUM CHLORIDE, SODIUM BICARBONATE AND POTASSIUM CHLORIDE 3000 ML/HR: 3.68; 3.05; 22; 5.4; 6.46; 3.09; .314 INJECTION INTRAVENOUS at 11:44

## 2020-04-08 RX ADMIN — DEXMEDETOMIDINE HYDROCHLORIDE 0.4 MCG/KG/HR: 100 INJECTION, SOLUTION, CONCENTRATE INTRAVENOUS at 19:00

## 2020-04-08 RX ADMIN — CALCIUM CHLORIDE, MAGNESIUM CHLORIDE, DEXTROSE MONOHYDRATE, LACTIC ACID, SODIUM CHLORIDE, SODIUM BICARBONATE AND POTASSIUM CHLORIDE 3000 ML/HR: 3.68; 3.05; 22; 5.4; 6.46; 3.09; .314 INJECTION INTRAVENOUS at 19:55

## 2020-04-08 RX ADMIN — OXYCODONE HYDROCHLORIDE AND ACETAMINOPHEN 500 MG: 500 TABLET ORAL at 09:06

## 2020-04-08 RX ADMIN — FAMOTIDINE 20 MG: 40 POWDER, FOR SUSPENSION ORAL at 20:57

## 2020-04-08 RX ADMIN — DEXMEDETOMIDINE HYDROCHLORIDE 0.4 MCG/KG/HR: 100 INJECTION, SOLUTION, CONCENTRATE INTRAVENOUS at 11:05

## 2020-04-08 RX ADMIN — CALCIUM CHLORIDE, MAGNESIUM CHLORIDE, DEXTROSE MONOHYDRATE, LACTIC ACID, SODIUM CHLORIDE, SODIUM BICARBONATE AND POTASSIUM CHLORIDE 3000 ML/HR: 3.68; 3.05; 22; 5.4; 6.46; 3.09; .314 INJECTION INTRAVENOUS at 09:56

## 2020-04-08 RX ADMIN — CALCIUM CHLORIDE, MAGNESIUM CHLORIDE, DEXTROSE MONOHYDRATE, LACTIC ACID, SODIUM CHLORIDE, SODIUM BICARBONATE AND POTASSIUM CHLORIDE 3000 ML/HR: 3.68; 3.05; 22; 5.4; 6.46; 3.09; .314 INJECTION INTRAVENOUS at 04:43

## 2020-04-08 RX ADMIN — HEPARIN SODIUM 16 UNITS/KG/HR: 10000 INJECTION, SOLUTION INTRAVENOUS at 22:27

## 2020-04-08 RX ADMIN — DEXMEDETOMIDINE HYDROCHLORIDE 0.8 MCG/KG/HR: 100 INJECTION, SOLUTION, CONCENTRATE INTRAVENOUS at 23:25

## 2020-04-08 RX ADMIN — MINERAL OIL AND WHITE PETROLATUM: 150; 830 OINTMENT OPHTHALMIC at 21:04

## 2020-04-08 RX ADMIN — MIDAZOLAM 2 MG: 1 INJECTION INTRAMUSCULAR; INTRAVENOUS at 03:09

## 2020-04-08 RX ADMIN — DOCUSATE SODIUM 100 MG: 50 LIQUID ORAL at 09:06

## 2020-04-08 RX ADMIN — NOREPINEPHRINE BITARTRATE 2 MCG/MIN: 1 INJECTION, SOLUTION, CONCENTRATE INTRAVENOUS at 05:39

## 2020-04-08 NOTE — DIALYSIS
[address] [facility]       [phone]    Orders   Mode: CVVHD   Factor: 0   DFR: 3000ml/hr   Blood Flow Rate: 200ml/min     Metrics   BP / HR: 116/57  77   Blood Flow Rate: 200ml/min   AP:                         -80   RP: 70   TMP: 27   PD: 41   FP: 142   DFR: 3000ml/hr     Comments / Plan:      Rounding Note  CRRT  [phone]    Pt orders, notes, labs, code status and consents reviewed. No indication for circuit change at this time. Circuit running well into rt groin. CHG drsg CDI, dated 4/7/20. Pt is COVID +, all hosp PPE followed. All lines and connections secure and visible. Warmer temp verified at 37*C.   Pre/post report and education w/ Zaire Kaplan RN

## 2020-04-08 NOTE — PROGRESS NOTES
0730 Bedside and Verbal shift change report given to JUDY Yost (oncoming nurse) by Aayush Ha (offgoing nurse). Report included the following information SBAR, Kardex, Intake/Output, MAR and Cardiac Rhythm NSR. Drips verified, heparin, levo, precedex. 0815 PTT drawn and sent to lab   0845 Patient temperature 95.8, juan carlos hugger applied. Will monitor   9404 Dr. Rikki Hansen at bedside. Orders received for blood cultures to be taken from HD catheter. Will also run factor of 0 today due to BP issues yesterday. 1035 single pair blood cultures obtained by Leslie Felipe RN( [facility] [address] 1155) from ports of HD catheter. Unable to print labels, blood cultures sent with printed order, labeled patient stickers. 1300 Patient placed on SBT  1530 Renal panel and PTT drawn and sent to lab  1715 Patient temp 98.3 juan carlos hugger turned off. Will monitor temp  1745 Iliana Rosales NP at bedside to assess patient  1930 Bedside and Verbal shift change report given to RN (oncoming nurse) by Willian Eaton (offgoing nurse).  Report included the following information SBAR, Kardex, Intake/Output, MAR and Cardiac Rhythm SR.

## 2020-04-08 NOTE — PROGRESS NOTES
Problem: Non-Violent Restraints  Goal: *Removal from restraints as soon as assessed to be safe  Outcome: Progressing Towards Goal  Goal: *No harm/injury to patient while restraints in use  Outcome: Progressing Towards Goal  Goal: *Patient's dignity will be maintained  Outcome: Progressing Towards Goal  Goal: *Patient Specific Goal (EDIT GOAL, INSERT TEXT)  Outcome: Progressing Towards Goal  Goal: Non-violent Restaints:Standard Interventions  Outcome: Progressing Towards Goal  Goal: Non-violent Restraints:Patient Interventions  Outcome: Progressing Towards Goal  Goal: Patient/Family Education  Outcome: Progressing Towards Goal     Problem: Ventilator Management  Goal: *Adequate oxygenation and ventilation  Outcome: Progressing Towards Goal  Goal: *Patient maintains clear airway/free of aspiration  Outcome: Progressing Towards Goal  Goal: *Absence of infection signs and symptoms  Outcome: Progressing Towards Goal  Goal: *Normal spontaneous ventilation  Outcome: Progressing Towards Goal     Problem: Nutrition Deficit  Goal: *Optimize nutritional status  Outcome: Progressing Towards Goal     Problem: Falls - Risk of  Goal: *Absence of Falls  Description: Document Stacey Fall Risk and appropriate interventions in the flowsheet. Outcome: Progressing Towards Goal  Note: Fall Risk Interventions:  Mobility Interventions: Communicate number of staff needed for ambulation/transfer, Strengthening exercises (ROM-active/passive)  Mentation Interventions: Adequate sleep, hydration, pain control, Door open when patient unattended, Evaluate medications/consider consulting pharmacy, More frequent rounding, Room close to nurse's station, Reorient patient, Toileting rounds, Update white board  Medication Interventions: Evaluate medications/consider consulting pharmacy  Elimination Interventions:  Toileting schedule/hourly rounds     Problem: Pressure Injury - Risk of  Goal: *Prevention of pressure injury  Description: Document Aniket Scale and appropriate interventions in the flowsheet. Outcome: Progressing Towards Goal  Note: Pressure Injury Interventions:  Sensory Interventions: Assess changes in LOC, Avoid rigorous massage over bony prominences, Check visual cues for pain, Float heels, Keep linens dry and wrinkle-free, Maintain/enhance activity level, Monitor skin under medical devices, Pressure redistribution bed/mattress (bed type), Turn and reposition approx. every two hours (pillows and wedges if needed)  Moisture Interventions: Absorbent underpads, Apply protective barrier, creams and emollients, Check for incontinence Q2 hours and as needed, Internal/External fecal devices, Internal/External urinary devices, Maintain skin hydration (lotion/cream), Minimize layers  Activity Interventions: Pressure redistribution bed/mattress(bed type)  Mobility Interventions: Float heels, HOB 30 degrees or less, Pressure redistribution bed/mattress (bed type), PT/OT evaluation, Turn and reposition approx. every two hours(pillow and wedges)  Nutrition Interventions: Document food/fluid/supplement intake, Offer support with meals,snacks and hydration  Friction and Shear Interventions: Lift sheet, Minimize layers       Problem: Diabetes Self-Management  Goal: *Disease process and treatment process  Description: Define diabetes and identify own type of diabetes; list 3 options for treating diabetes. Outcome: Progressing Towards Goal  Goal: *Incorporating nutritional management into lifestyle  Description: Describe effect of type, amount and timing of food on blood glucose; list 3 methods for planning meals. Outcome: Progressing Towards Goal  Goal: *Using medications safely  Description: State effect of diabetes medications on diabetes; name diabetes medication taking, action and side effects.   Outcome: Progressing Towards Goal  Goal: *Monitoring blood glucose, interpreting and using results  Description: Identify recommended blood glucose targets  and personal targets. Outcome: Progressing Towards Goal  Goal: *Prevention, detection, treatment of acute complications  Description: List symptoms of hyper- and hypoglycemia; describe how to treat low blood sugar and actions for lowering  high blood glucose level.   Outcome: Progressing Towards Goal

## 2020-04-08 NOTE — WOUND CARE
Follow-up visit for gluteal cleft and skin fold on back. She is in isolation for Covid-19 and I did not enter the room. Discussed with RN who reports no change in previous assessment to this area. Not any darker in color and not open. Venelex in use and she remains on a bariatric bed with an air mattress. NG tube secured to ET tube. Heels are offloaded with pillows. Wound care will continue to follow while admitted.    JAUN Clark

## 2020-04-08 NOTE — DIABETES MGMT
MARTA ROMERO  CLINICAL NURSE SPECIALIST CONSULT  [facility]    Presentation   Stefan Baker is a 64 y.o. female admitted from OSH to [facility] ICU with SARS-COV2 needing CRRT. She is currently sedated and has been intubated since 3/28/20. Current clinical course has been complicated by steroid induced hyperglycemia. Steroids are discontinued at this time. She requires CRRT for acute renal failure r/t her sepsis and hypotension. PHM: GERD, obesity, and anxiety. New diabetes diagnosis with A1C 11.0% (3/28/2020); updated A1C 3/31/20-10.4%     Recent events:   Remains on ventilator; back on levophed, continues on  precedex. Does follow some commands when prompted. Consulted by Provider for advanced diabetes nursing assessment and care, specifically related to   [] Transitioning off Deyanira [place]ia   [x] Inpatient management strategy  [] Home management assessment  [] Survival skill education    Diabetes-related medical history  Acute complications  hyperglycemia  Neurological complications  NONE  Microvascular disease  NONE  Macrovascular disease  NONE  Other associated conditions     NONE    Diabetes medication history: NONE    Subjective   Per chart review, Ms. Susan Barrett remains very ill  and is on isolation forSARS-COV2 in ICU. I am unable to do a physical assessment of the patient at this time. Patient reports the following home diabetes self-care practices: deferred    Objective     Vital Signs   Visit Vitals  /57   Pulse 82   Temp 95.8 °F (35.4 °C)   Resp 10   Ht 5' 4\" (1.626 m)   Wt 109.9 kg (242 lb 3.2 oz)   SpO2 100%   BMI 41.57 kg/m²   .    Laboratory  Lab Results   Component Value Date/Time    Hemoglobin A1c 10.4 (H) 03/31/2020 03:42 PM     No results found for: LDL, LDLC, DLDLP  Lab Results   Component Value Date/Time    Creatinine 1.49 (H) 04/08/2020 03:36 AM     Lab Results   Component Value Date/Time    Sodium 134 (L) 04/08/2020 03:36 AM    Potassium 4.0 04/08/2020 03:36 AM Chloride 104 04/08/2020 03:36 AM    CO2 25 04/08/2020 03:36 AM    Anion gap 5 04/08/2020 03:36 AM    Glucose 153 (H) 04/08/2020 03:36 AM    BUN 29 (H) 04/08/2020 03:36 AM    Creatinine 1.49 (H) 04/08/2020 03:36 AM    BUN/Creatinine ratio 19 04/08/2020 03:36 AM    GFR est AA 44 (L) 04/08/2020 03:36 AM    GFR est non-AA 36 (L) 04/08/2020 03:36 AM    Calcium 9.1 04/08/2020 03:36 AM    Bilirubin, total 0.7 04/08/2020 03:00 AM    AST (SGOT) 40 (H) 04/08/2020 03:00 AM    Alk. phosphatase 224 (H) 04/08/2020 03:00 AM    Protein, total 6.6 04/08/2020 03:00 AM    Albumin 3.0 (L) 04/08/2020 03:36 AM    Globulin 3.4 04/08/2020 03:00 AM    A-G Ratio 0.9 (L) 04/08/2020 03:00 AM    ALT (SGPT) 44 04/08/2020 03:00 AM     Lab Results   Component Value Date/Time    ALT (SGPT) 44 04/08/2020 03:00 AM             Evaluation   Ms Clement Estevez, with new onset Type 2 diabetes,with A1C 10.4%. Per chart review from OSH before she was transferred she had BG >200. BG is stable (150s-180s) with no fluctuations after increase in basal insulin to 35units daily. TF (Nepro) at 30cc/hr. Recommendations   1. CONTINUE basal and correction insulin. IF BG continues to trend higher, >200mg/dl consider increasing Lantus. 2. Will continue to follow.     Assessment and Plan   Nursing Diagnosis Risk for unstable blood glucose pattern   Nursing Intervention Domain 2501 Decision-making Support   Nursing Interventions Examined current inpatient diabetes control   Explored factors facilitating and impeding inpatient management  Identified self-management practices impeding diabetes control  Explored corrective strategies with patient and responsible inpatient provider   Informed patient of rational for insulin strategy while hospitalized     Referral   [] Behavioral health services  [] Inpatient nutrition services  [] Pharmacy services for medication management  [x] Diabetes Self-Management Training through [facility] (Phone [phone] to schedule appointment)    Billing Code(s)     [x] 42011 IP subsequent hospital care - [address], CNS   [facility]  Access via  Joel [address] [phone]

## 2020-04-08 NOTE — PROGRESS NOTES
[facility]    ICU Team Note    Name: Ramana Gomez   : 1960   MRN: [id number]   Date: 2020      Subjective:   Progress Note: 2020      Reason for ICU Admission: [address] with renal failure     65 yo female with obesity and diabetes who presented to [facility] with worsening hypoxic respiratory failure in lieu of close exposure to COVID-19. She presented to the hospital 3/26 and intubated 3/28. She was started on broad spectrum antibiotics and plaquenil. Developed worsening renal failure and was transferred to us for CRRT. Overnight Events:   - No significant events overnight  - Pt with episodic hypotension requiring volume resuscitation  - Patient with worsening leukocytosis today  - Past SBT and tolerated PSV yesterday for 4 hours  - Patient with improved mental status- now following commands    POD:* No surgery found *    S/P:     Active Problem List:     Problem List  Date Reviewed: 2020          Codes Class    Hypotension ICD-10-CM: I95.9  ICD-9-CM: 458.9 Acute        Sepsis due to methicillin susceptible Staphylococcus aureus (MSSA) without acute organ dysfunction (HCC) ICD-10-CM: A41.01  ICD-9-CM: 038.11, 995.91 Acute        COVID-19 virus infection ICD-10-CM: U07.1         Obesity, morbid ([facility] [address] ICD-10-CM: E66.01  ICD-9-CM: 278.01         Vaginal Pap smear following hysterectomy for malignancy ICD-10-CM: Z08, Z90.710  ICD-9-CM: V67.01         Personal history of malignant neoplasm of other parts of uterus ICD-10-CM: Z85.42  ICD-9-CM: V10.42         Endometrial cancer ([facility] ICD-10-CM: C54.1  ICD-9-CM: 182.0               Past Medical History:      has a past medical history of Basal cell carcinoma, GERD (gastroesophageal reflux disease), Kidney stones, and Polyp of ureter.     Past Surgical History:      has a past surgical history that includes hysteroscopy diagnostic (); pr endometrial ablation, thermal; hx  section (); and hx colonoscopy. Home Medications:     Prior to Admission medications    Medication Sig Start Date End Date Taking? Authorizing Provider   pantoprazole (PROTONIX) 40 mg tablet TAKE 1 TABLET BY MOUTH TWICE A DAY 18   Provider, Historical   esomeprazole (NEXIUM) 20 mg capsule Take 20 mg by mouth daily. Indications: BID    Provider, Historical   zolpidem (AMBIEN) 10 mg tablet Take 0.5 Tabs by mouth nightly as needed for Sleep. Max Daily Amount: 5 mg. 17   Alda Mandel MD   fluticasone (FLONASE) 50 mcg/actuation nasal spray Mist 1-2 spray(s) into each nostril once daily. 4/3/15   Provider, Historical   ranitidine (ZANTAC) 150 mg tablet 150 mg.    Provider, Historical       Allergies/Social/Family History: Allergies   Allergen Reactions    Augmentin [Amoxicillin-Pot Clavulanate] Rash and Itching      Social History     Tobacco Use    Smoking status: Never Smoker    Smokeless tobacco: Never Used   Substance Use Topics    Alcohol use: Not on file      Family History   Problem Relation Age of Onset    Prostate Cancer Father         PROSTATE    Breast Cancer Sister 46        invasive poorly differentiated ductal carcinoma, Neg genetic testing.  Cancer Sister 62        melanoma stage 1       Review of Systems:     A comprehensive review of systems was negative.     Objective:   Vital Signs:  Visit Vitals  /57   Pulse 84   Temp 98.2 °F (36.8 °C)   Resp 13   Ht 5' 4\" (1.626 m)   Wt 109.9 kg (242 lb 3.2 oz)   SpO2 100%   BMI 41.57 kg/m²      O2 Device: Endotracheal tube Temp (24hrs), Av.4 °F (36.3 °C), Min:95.8 °F (35.4 °C), Max:98.2 °F (36.8 °C)           Intake/Output:     Intake/Output Summary (Last 24 hours) at 2020 1719  Last data filed at 2020 1700  Gross per 24 hour   Intake 3346.08 ml   Output 2701 ml   Net 645.08 ml       Physical Exam:    General: Patient intubated, NAD, opens eyes spontaneously   Eyes: PERRL, scleral edema  HENT: Atraumatic, MMM, ETT in place, OG in place  Cardio: RRR, normal S1S2  Respiratory: Distant but clear breath sounds bilaterally  GI: Abdomen, soft, non tender, non distended  Extremities: 2+ edema  Neuro: Patient opens eyes, follow commands in all extremities weakly, nods and shakes head appropriately, no focal dedicits      LABS AND  DATA: Personally reviewed  Recent Labs     04/08/20 0336 04/06/20  0311   WBC 16.8* 13.2*   HGB 10.1* 10.3*   HCT 31.7* 32.9*    183     Recent Labs     04/08/20  1524 04/08/20  0337 04/08/20 0336 04/07/20 0339   *  --  134* 134*   K 3.6  --  4.0 4.4     --  104 102   CO2 25  --  25 23   BUN 24*  --  29* 31*   CREA 1.41*  --  1.49* 1.51*   *  --  153* 187*   CA 9.4  --  9.1 9.6   MG  --  2.7*  --  2.9*   PHOS 2.0* 2.8 2.8 3.5  3.6     Recent Labs     04/08/20  1524 04/08/20 0336 04/08/20  0300 04/07/20 0339   SGOT  --   --  40* 45*   AP  --   --  224* 287*   TP  --   --  6.6 7.3   ALB 3.3* 3.0* 3.2* 2.5*  2.6*   GLOB  --   --  3.4 4.8*     Recent Labs     04/08/20  1524 04/08/20 0815 04/08/20 0336 04/07/20 0339   INR  --   --  1.1  --  1.1   PTP  --   --  11.5*  --  11.3*   APTT 57.2* 81.2*  --    < > 101.8*    < > = values in this interval not displayed. No results for input(s): PHI, PCO2I, PO2I, FIO2I in the last 72 hours. No results for input(s): CPK, CKMB, TROIQ, BNPP in the last 72 hours. Hemodynamics:   PAP:   CO:     Wedge:   CI:     CVP:    SVR:       PVR:       Ventilator Settings:  Mode Rate Tidal Volume Pressure FiO2 PEEP   CPAP   14 ml  8 cm H2O 40 % 8 cm H20     Peak airway pressure: 16 cm H2O    Minute ventilation: 8.09 l/min        MEDS: Reviewed    Chest X-Ray: personally reviewed and report checked      Assessment/Plan:     1. Acute Hypoxic Respiratory failure - Secondary to COVID-19 -   a. Note sputum with staph aureus - patient completed course of vanomycin . b. Continue lung protective ventilation strategies. c. Patient now off sedation.  Passed SBT today. Remains on PSV currently  d. Completed Azithro and plaquenil course.   e. ID following.   f. Elevated IL-6 - received one dose of 400mg of actemra. g. Continue CRRT  h. Will check CRP, ESR, ferritin, and LDH  i. ABG and CXR in am  2. Hypercoagulable state- Patient previously clotting off CRRT frequently, continue Heparin gtt  3. Septic shock- blood cultures sent from HD fem line today. Patient on norepinephrine. Albumin 5% 25mg ordered  4. Diabetes - Continue ISS and Lantus 35 units  5. HTN - Holding labetalol  6. Renal Failure - Likely ATN from sepsis and hypotension. Nephrology following.  Continue CRRT    Multidisciplinary Rounds Completed:  No    ABCDEF Bundle/Checklist  Pain Medications: None  Target RASS: -2 - Light Sedation - Briefly awakens to voice (eyes open & contact <10 sec)  Sedation Medications: Precedex  CAM-ICU:  Negative  Mobility: Bedrest  PT/OT: To be consulted when stable   Restraints: Soft wrist restraints  Discussed Plan of Care (goals of care): No  Addressed Code Status: Full Code    CARDIOVASCULAR  Cardiac Gtts: None  SBP Goal of: > 90 mmHg  MAP Goal of: > 65 mmHg  Transfusion Trigger (Hgb): <7 g/dL    RESPIRATORY  Vent Goals:   Chlorhexidine   Optimize PEEP/Ventilation/Oxygenation  Goal Tidal Volume 6 cc/kg based on IBW  Aim for lung protective ventilation  Head of bed > 30 degrees  DVT Prophylaxis (if no, list reason): SCD's or Sequential Compression Device and Heparin   SPO2 Goal: > 92%  Pulmonary toilet: Duo-Nebs     GI/  Bowden Catheter Present: Yes  GI Prophylaxis: Pepcid (famotidine)   Nutrition: Yes   Bowel Movement: No  Bowel Regimen: None needed at this time  Insulin: ISS and Lantus    ANTIBIOTICS  Antibiotics:  None    T/L/D  Tubes: ETT and Orogastric Tube  Lines: Peripheral IV, Arterial Line, PICC Line and Ramon  Drains: Bowden Catheter    SPECIAL EQUIPMENT  CRRT    DISPOSITION  Stay in ICU    CRITICAL CARE CONSULTANT NOTE  I had a face to face encounter with the patient, reviewed and interpreted patient data including clinical events, labs, images, vital signs, I/O's, and examined patient. I have discussed the case and the plan and management of the patient's care with the consulting services, the bedside nurses and the respiratory therapist.      NOTE OF PERSONAL INVOLVEMENT IN CARE   This patient has a high probability of imminent, clinically significant deterioration, which requires the highest level of preparedness to intervene urgently. I participated in the decision-making and personally managed or directed the management of the following life and organ supporting interventions that required my frequent assessment to treat or prevent imminent deterioration. I personally spent 30 minutes of critical care time. This is time spent at this critically ill patient's bedside actively involved in patient care as well as the coordination of care and discussions with the patient's family. This does not include any procedural time which has been billed separately.     DOMI Chavez  [facility]  4/8/2020

## 2020-04-08 NOTE — PROGRESS NOTES
1945: Report received from [facility]. Dex and heparin drips verified. Pt in bed, intubated and sedated, arouses to stimulation, intermittent command following - wiggles toes and bilat hand grasp, nothing else otherwise, and on vent settings: A/C 15, P/C 10, PEEP 8, FiO2 40%, and peak P noted to be 19-21. Pt sating % and breathing over vent mid teens to low 20s. Plan to draw PTT at 0100.    2000: Ett suctioned and turned pt - pt becomes hypertensive and CRRT alarms extremely negative access pressure. Right fem Ramon catheter extremely positional.    2100: Pulling factor 50 on CRRT, MAP >70.    2200: Factor 0, MAP 68.    2113: Irrigated FMS, 50 mL. 2245: Pt noted to be lifting arms and moving legs, hand noted on Ett. Will inform MD, increased dex drip.    2300: MAP >80, pulling factor 100.    0013: CRRT alarming extremely negative return pressure. Flushed and reversed lines. Alarm resolved. 0100: Order received for 24h nonviolent restraints, bilat wrists applied. 0110: MAP 59-64, decreased factor to 0.    0112: MAP 59-61, decreased pulling volume to 0.    0145: Dr. Alvin Yu on unit, updated on pt's persistent hypotension. Plan to give 500 mL albumin, if no change in BP will start levo. 0227: PTT supratherapeutic, 81.4 - decreased heparin drip (17u/kg/hr). 9260-6613: Pt agitated on vent, RR 30s & peak P low mid 20s. Ett and orally suctioned. PRN versed given. 8040-9951: MAP 58, decreased pt fluid removal. Pulling 0 at this time. Decreased dex drip. Increased albumin drip rate. 0400: Pt more alert and following commands. Bilat hand grasp, bilat foot mvmt, and nods yes/no to questions. Mainly only asked if pt could hear me, pt nodded yes. 9213-3425: Pt coughing on vent, peak pressures and Vt demand alarming. Asked if pt in pain, pt nodded head yes. Increased dex gtt. Ett and orally suctioned. 0455: MAP <65, decreased dex drip.    0507:  MAP 61, decreased volume being pulled to 0 on CRRT.    0528: PRN norco given. 0539: MAP 50s to low 60s - started levo drip.    0551: MAP improved, weaning levo drip. 5572-2302:  post oral/ett suctioning. Levo drip paused for approx 2 mins then restarted when SBP hit 120.    0645: Daniel Gonzáles RN on unit - updated on pt's filter status, current drips, and factor. 0700: Pulling factor 50, MAP 70s. Levo drip infusing. 0745: Bedside and Verbal shift change report given to JUDY Vizcarra. Report included the following information SBAR, Intake/Output, MAR, Recent Results, Cardiac Rhythm NSR and Alarm Parameters .

## 2020-04-08 NOTE — PROGRESS NOTES
SBT     04/08/20 1300   Ventilator Measurements   Vt Exhaled (Machine Breath) (ml) 783 ml   Ve Observed (l/min) 11.4 l/min   PIP Observed (cm H2O) 17 cm H2O   MAP (cm H2O) 11   I:E Ratio Actual 1:1.4

## 2020-04-08 NOTE — PROGRESS NOTES
[facility]   [address] [facility], [facility] [address], [facility] [place]  Phone: [phone]   [phone]       Nephrology Progress Note  Titus Maria     1960     [id number]  Date of Admission : 3/31/2020  04/08/20    CC: Follow up for JUANCHO      Assessment and Plan   JUANCHO :  - 2/2 ATN  - continue CVVHD. Reduced RFR to 3000 ml/hr   - No Factor due to hypotension : d/w Intensivist   - Serial PTTs with systemic Heparin   - Blood Cx from femoral melissa due to rising WBC, Hypotension in last 24 hrs. Do not replace line yet. COVID-19 +ve   Acute Hypoxic Resp Failure   - On Vent   - completed Plaquenil. On Vit C, Zinc   - s/p Tocilizumab      Type II DM   - Insulin per primary team      Hypothermia   Morbid Obesity      Care Plan discussed with:  ICU team      Interval History:  Wbc up. Needed fluid boluses overnight   CRRT running well   PTT stable   No new events     PT NOT EXAMINED in line with [facility] and RPA GUIDELINES ON MANAGING COVID-19 PTS WITH RENAL ISSUES. Examination findings discussed personally with the examining Physician team member      Review of Systems: Review of systems not obtained due to patient factors.     Current Medications:   Current Facility-Administered Medications   Medication Dose Route Frequency    [Held by provider] labetaloL (NORMODYNE) tablet 100 mg  100 mg Oral Q12H    NOREPINephrine (LEVOPHED) 8,000 mcg in dextrose 5% 250 mL infusion  2-30 mcg/min IntraVENous TITRATE    0.9% sodium chloride infusion  3 mL/hr IntraVENous CONTINUOUS    0.9% sodium chloride infusion  5 mL/hr IntraVENous CONTINUOUS    dexmedeTOMidine (PRECEDEX) 400 mcg in 0.9% sodium chloride 100 mL infusion  0.2-1.4 mcg/kg/hr IntraVENous TITRATE    labetaloL (NORMODYNE;TRANDATE) injection 10 mg  10 mg IntraVENous Q10MIN PRN    famotidine (PEPCID) 40 mg/5 mL (8 mg/mL) oral suspension 20 mg  20 mg Oral QHS    fentaNYL citrate (PF) injection  mcg   mcg IntraVENous Q1H PRN    white petrolatum-mineral oiL (AKWA TEARS) 83-15 % ophthalmic ointment   Both Eyes Q12H    heparin (porcine) pf 300 Units  300 Units InterCATHeter PRN    guaiFENesin (ROBITUSSIN) 100 mg/5 mL oral liquid 100 mg  100 mg Oral Q6H    alteplase (CATHFLO) 2 mg in sterile water (preservative free) 2 mL injection  2 mg InterCATHeter PRN    alteplase (CATHFLO) 2 mg in sterile water (preservative free) 2 mL injection  2 mg InterCATHeter PRN    heparin 25,000 units in D5W 250 ml infusion  7-25 Units/kg/hr IntraVENous TITRATE    midazolam (VERSED) injection 1-2 mg  1-2 mg IntraVENous Q2H PRN    bacitracin 500 unit/gram packet 1 Packet  1 Packet Topical PRN    insulin glargine (LANTUS) injection 35 Units  35 Units SubCUTAneous DAILY    balsam peru-castor oiL (VENELEX) ointment   Topical BID    sodium chloride (NS) flush 5-40 mL  5-40 mL IntraVENous Q8H    sodium chloride (NS) flush 5-40 mL  5-40 mL IntraVENous PRN    HYDROcodone-acetaminophen (NORCO) 5-325 mg per tablet 1 Tab  1 Tab Oral Q4H PRN    ondansetron (ZOFRAN) injection 4 mg  4 mg IntraVENous Q4H PRN    chlorhexidine (ORAL CARE KIT) 0.12 % mouthwash 15 mL  15 mL Oral Q12H    docusate (COLACE) 50 mg/5 mL oral liquid 100 mg  100 mg Per NG tube BID    acetaminophen (TYLENOL) tablet 650 mg  650 mg Oral Q6H PRN    Or    acetaminophen (TYLENOL) suppository 650 mg  650 mg Rectal Q6H PRN    bicarbonate dialysis (PRISMASOL) BG K 4/Ca 2.5 5000 ml solution   Extracorporeal DIALYSIS CONTINUOUS    glucose chewable tablet 16 g  4 Tab Oral PRN    glucagon (GLUCAGEN) injection 1 mg  1 mg IntraMUSCular PRN    dextrose 10% infusion 0-250 mL  0-250 mL IntraVENous PRN    insulin lispro (HUMALOG) injection   SubCUTAneous Q6H      Allergies   Allergen Reactions    Augmentin [Amoxicillin-Pot Clavulanate] Rash and Itching       Objective:  Vitals:    Vitals:    04/08/20 0800 04/08/20 0900 04/08/20 1000 04/08/20 1100   BP:       Pulse: 75 75 84 82   Resp: 10 12 13 10   Temp: 95.8 °F (35.4 °C)      SpO2: 100% 100% 100% 100%   Weight:       Height:         Intake and Output:  04/08 0701 - 04/08 1900  In: 458.4 [I.V.:198.4]  Out: 206 [Urine:1]  04/06 1901 - 04/08 0700  In: 4432.3 [I.V.:2947.3]  Out: 1262 [Urine:36; Drains:550]    Physical Examination: inspection only   Pt intubated    Yes  General: Sedated on vent, obese   Neck:  Lines   Resp:  On vent   CV:  RRR  GI:  Obese   Neurologic:  Sedated   Psych:              Unable to assess  Ext                   R femoral melissa     []    High complexity decision making was performed  []    Patient is at high-risk of decompensation with multiple organ involvement    Lab Data Personally Reviewed: I have reviewed all the pertinent labs, microbiology data and radiology studies during assessment.     Recent Labs     04/08/20  0337 04/08/20  0336 04/08/20  0300 04/07/20  0339 04/06/20  0648 04/06/20  0306 04/06/20  0300 04/05/20  1213   NA  --  134*  --  134*  --  136  --  135*   K  --  4.0  --  4.4  --  4.1  --  4.0   CL  --  104  --  102  --  102  --  104   CO2  --  25  --  23  --  26  --  26   GLU  --  153*  --  187*  --  134*  --  111*   BUN  --  29*  --  31*  --  25*  --  24*   CREA  --  1.49*  --  1.51*  --  1.37*  --  1.42*   CA  --  9.1  --  9.6  --  8.6  --  8.2*   MG 2.7*  --   --  2.9*  --   --   --   --    PHOS 2.8 2.8  --  3.5  3.6  --  3.0  --  2.6   ALB  --  3.0* 3.2* 2.5*  2.6*  --  2.2* 2.3* 1.9*   SGOT  --   --  40* 45*  --   --  49*  --    ALT  --   --  44 41  --   --  33  --    INR  --  1.1  --  1.1 1.1  --   --   --      Recent Labs     04/08/20  0336 04/06/20  0311   WBC 16.8* 13.2*   HGB 10.1* 10.3*   HCT 31.7* 32.9*    183     No results found for: SDES  Lab Results   Component Value Date/Time    Culture result: NO GROWTH 5 DAYS 03/31/2020 11:15 AM    Culture result: MODERATE STAPHYLOCOCCUS AUREUS (A) 03/31/2020 10:34 AM    Culture result: LIGHT NORMAL RESPIRATORY SOFIE 03/31/2020 10:34 AM     Recent Results (from the past 24 hour(s))   PTT    Collection Time: 04/07/20  5:14 PM   Result Value Ref Range    aPTT 100.5 (HH) 22.1 - 32.0 sec    aPTT, therapeutic range     58.0 - 77.0 SECS   GLUCOSE, POC    Collection Time: 04/07/20  5:49 PM   Result Value Ref Range    Glucose (POC) 174 (H) 65 - 100 mg/dL    Performed by Aleksandr Hull    GLUCOSE, POC    Collection Time: 04/07/20 11:16 PM   Result Value Ref Range    Glucose (POC) 150 (H) 65 - 100 mg/dL    Performed by Geneva Lee    PTT    Collection Time: 04/08/20  1:10 AM   Result Value Ref Range    aPTT 81.4 (H) 22.1 - 32.0 sec    aPTT, therapeutic range     58.0 - 77.0 SECS   HEPATIC FUNCTION PANEL    Collection Time: 04/08/20  3:00 AM   Result Value Ref Range    Protein, total 6.6 6.4 - 8.2 g/dL    Albumin 3.2 (L) 3.5 - 5.0 g/dL    Globulin 3.4 2.0 - 4.0 g/dL    A-G Ratio 0.9 (L) 1.1 - 2.2      Bilirubin, total 0.7 0.2 - 1.0 MG/DL    Bilirubin, direct 0.3 (H) 0.0 - 0.2 MG/DL    Alk.  phosphatase 224 (H) 45 - 117 U/L    AST (SGOT) 40 (H) 15 - 37 U/L    ALT (SGPT) 44 12 - 78 U/L   LD    Collection Time: 04/08/20  3:36 AM   Result Value Ref Range     (H) 81 - 246 U/L   FERRITIN    Collection Time: 04/08/20  3:36 AM   Result Value Ref Range    Ferritin 960 (H) 8 - 252 NG/ML   C REACTIVE PROTEIN, QT    Collection Time: 04/08/20  3:36 AM   Result Value Ref Range    C-Reactive protein 0.84 (H) 0.00 - 0.60 mg/dL   SED RATE (ESR)    Collection Time: 04/08/20  3:36 AM   Result Value Ref Range    Sed rate, automated 60 (H) 0 - 30 mm/hr   PROTHROMBIN TIME + INR    Collection Time: 04/08/20  3:36 AM   Result Value Ref Range    INR 1.1 0.9 - 1.1      Prothrombin time 11.5 (H) 9.0 - 11.1 sec   RENAL FUNCTION PANEL    Collection Time: 04/08/20  3:36 AM   Result Value Ref Range    Sodium 134 (L) 136 - 145 mmol/L    Potassium 4.0 3.5 - 5.1 mmol/L    Chloride 104 97 - 108 mmol/L    CO2 25 21 - 32 mmol/L    Anion gap 5 5 - 15 mmol/L    Glucose 153 (H) 65 - 100 mg/dL    BUN 29 (H) 6 - 20 MG/DL    Creatinine 1.49 (H) 0.55 - 1.02 MG/DL    BUN/Creatinine ratio 19 12 - 20      GFR est AA 44 (L) >60 ml/min/1.73m2    GFR est non-AA 36 (L) >60 ml/min/1.73m2    Calcium 9.1 8.5 - 10.1 MG/DL    Phosphorus 2.8 2.6 - 4.7 MG/DL    Albumin 3.0 (L) 3.5 - 5.0 g/dL   CBC WITH AUTOMATED DIFF    Collection Time: 04/08/20  3:36 AM   Result Value Ref Range    WBC 16.8 (H) 3.6 - 11.0 K/uL    RBC 3.73 (L) 3.80 - 5.20 M/uL    HGB 10.1 (L) 11.5 - 16.0 g/dL    HCT 31.7 (L) 35.0 - 47.0 %    MCV 85.0 80.0 - 99.0 FL    MCH 27.1 26.0 - 34.0 PG    MCHC 31.9 30.0 - 36.5 g/dL    RDW 14.0 11.5 - 14.5 %    PLATELET 412 184 - 187 K/uL    MPV 10.8 8.9 - 12.9 FL    NRBC 0.0 0  WBC    ABSOLUTE NRBC 0.00 0.00 - 0.01 K/uL    NEUTROPHILS 77 (H) 32 - 75 %    LYMPHOCYTES 17 12 - 49 %    MONOCYTES 5 5 - 13 %    EOSINOPHILS 1 0 - 7 %    BASOPHILS 0 0 - 1 %    IMMATURE GRANULOCYTES 0 %    ABS. NEUTROPHILS 12.9 (H) 1.8 - 8.0 K/UL    ABS. LYMPHOCYTES 2.9 0.8 - 3.5 K/UL    ABS. MONOCYTES 0.8 0.0 - 1.0 K/UL    ABS. EOSINOPHILS 0.2 0.0 - 0.4 K/UL    ABS. BASOPHILS 0.0 0.0 - 0.1 K/UL    ABS. IMM. GRANS. 0.0 K/UL    DF MANUAL      PLATELET COMMENTS Large Platelets      RBC COMMENTS ANISOCYTOSIS  1+       MAGNESIUM    Collection Time: 04/08/20  3:37 AM   Result Value Ref Range    Magnesium 2.7 (H) 1.6 - 2.4 mg/dL   PHOSPHORUS    Collection Time: 04/08/20  3:37 AM   Result Value Ref Range    Phosphorus 2.8 2.6 - 4.7 MG/DL   GLUCOSE, POC    Collection Time: 04/08/20  5:44 AM   Result Value Ref Range    Glucose (POC) 158 (H) 65 - 100 mg/dL    Performed by Irineo Lane    PTT    Collection Time: 04/08/20  8:15 AM   Result Value Ref Range    aPTT 81.2 (H) 22.1 - 32.0 sec    aPTT, therapeutic range     58.0 - 77.0 SECS           Total time spent with patient:  xxx   min. Care Plan discussed with:  Patient     Family      RN      Consulting Physician [address] [facility],         I have reviewed the flowsheets.   Chart and Pertinent Notes have been reviewed. No change in PMH ,family and social history from Consult note.       Malachi Rivera MD

## 2020-04-08 NOTE — PROGRESS NOTES
IFTIKHAR:    -CM continuing to follow for transitions of care. -Patient is covid-19 positive.  -She is on CVVHD, passed her SBT yesterday and is off sedation.  -Patient is being followed by ID and will continue with ABX.  -Discharge plan unknown at this time.     Nelly SIEGELW, ACM

## 2020-04-08 NOTE — DIALYSIS
As per Dr. Erasmo Viveros order, single pair blood cultures obtained. Nathan Rubio with Infection Control notified of rationale, okay to proceed. All proper isolation precautions utilized as well as Hospital policy followed for blood culture draw from HD R Femoral Catheter. One bottle drawn from red and one from blue per MD instructions.

## 2020-04-08 NOTE — PROGRESS NOTES
Infectious Disease Progress Note       Subjective:     D/W with ICU team  Reviewed chart   S/P Actemra on 4/3/20 one dose  FIO2 decreasing          Objective:    Vitals:   Patient Vitals for the past 24 hrs:   Temp Pulse Resp BP SpO2   04/08/20 1300 -- 95 15 -- 100 %   04/08/20 1200 97 °F (36.1 °C) 95 15 -- 99 %   04/08/20 1158 -- -- 17 -- --   04/08/20 1100 -- 82 10 -- 100 %   04/08/20 1000 -- 84 13 -- 100 %   04/08/20 0900 -- 75 12 -- 100 %   04/08/20 0800 95.8 °F (35.4 °C) 75 10 -- 100 %   04/08/20 0730 -- -- 17 -- --   04/08/20 0700 96.5 °F (35.8 °C) 85 10 -- 100 %   04/08/20 0640 -- 77 -- 116/57 --   04/08/20 0600 97.3 °F (36.3 °C) 72 12 -- 100 %   04/08/20 0500 97.5 °F (36.4 °C) 100 14 -- 100 %   04/08/20 0447 -- 98 17 -- 100 %   04/08/20 0400 97.4 °F (36.3 °C) 97 16 -- 100 %   04/08/20 0300 97.5 °F (36.4 °C) (!) 101 27 -- 100 %   04/08/20 0200 97.6 °F (36.4 °C) 96 15 -- 100 %   04/08/20 0127 -- 93 14 -- 100 %   04/08/20 0100 97.6 °F (36.4 °C) 93 14 -- 99 %   04/08/20 0000 97.6 °F (36.4 °C) 96 18 -- 99 %   04/07/20 2300 97.8 °F (36.6 °C) (!) 105 16 -- 98 %   04/07/20 2200 97.8 °F (36.6 °C) 91 11 98/66 98 %   04/07/20 2139 -- 92 18 -- 99 %   04/07/20 2100 97.7 °F (36.5 °C) 93 17 105/69 98 %   04/07/20 2005 97.8 °F (36.6 °C) 98 16 -- 98 %   04/07/20 1900 97.4 °F (36.3 °C) 90 12 106/55 100 %   04/07/20 1700 -- 88 16 90/61 100 %   04/07/20 1600 97.2 °F (36.2 °C) 93 20 108/65 100 %   04/07/20 1548 -- 93 13 -- 98 %   04/07/20 1500 -- 90 17 112/61 99 %   04/07/20 1400 -- 83 12 99/60 99 %       Physical Exam:  Please see ICU teams physical exam     Medications:    Current Facility-Administered Medications:     [Held by provider] labetaloL (NORMODYNE) tablet 100 mg, 100 mg, Oral, Q12H, Iliana Rosales, NP    NOREPINephrine (LEVOPHED) 8,000 mcg in dextrose 5% 250 mL infusion, 2-30 mcg/min, IntraVENous, TITRATE, Iliana Rosales, NP, Last Rate: 5.6 mL/hr at 04/08/20 1257, 3 mcg/min at 04/08/20 1257    0.9% sodium chloride infusion, 3 mL/hr, IntraVENous, CONTINUOUS, Ousmane Madison MD, Last Rate: 3 mL/hr at 04/08/20 0246, 3 mL/hr at 04/08/20 0246    0.9% sodium chloride infusion, 5 mL/hr, IntraVENous, CONTINUOUS, Ousmane Madison MD, Last Rate: 5 mL/hr at 04/07/20 2107, 5 mL/hr at 04/07/20 2107    dexmedeTOMidine (PRECEDEX) 400 mcg in 0.9% sodium chloride 100 mL infusion, 0.2-1.4 mcg/kg/hr, IntraVENous, TITRATE, Ousmane Madison MD, Last Rate: 12.5 mL/hr at 04/08/20 1105, 0.4 mcg/kg/hr at 04/08/20 1105    labetaloL (NORMODYNE;TRANDATE) injection 10 mg, 10 mg, IntraVENous, Q10MIN PRN, Ousmane Cantu MD    famotidine (PEPCID) 40 mg/5 mL (8 mg/mL) oral suspension 20 mg, 20 mg, Oral, QHS, Ousmane Madison MD, 20 mg at 04/07/20 2105    fentaNYL citrate (PF) injection  mcg,  mcg, IntraVENous, Q1H PRN, Lilia Mendes MD, 100 mcg at 04/07/20 0149    white petrolatum-mineral oiL (AKWA TEARS) 83-15 % ophthalmic ointment, , Both Eyes, Q12H, Ousmane Madison MD    heparin (porcine) pf 300 Units, 300 Units, InterCATHeter, PRN, Stefanie Tienrey NP-CAMMY    guaiFENesin (ROBITUSSIN) 100 mg/5 mL oral liquid 100 mg, 100 mg, Oral, Q6H, Cassandra Cox NP-C, 100 mg at 04/08/20 1150    alteplase (CATHFLO) 2 mg in sterile water (preservative free) 2 mL injection, 2 mg, InterCATHeter, PRN, Arti PÉREZ MD    alteplase (CATHFLO) 2 mg in sterile water (preservative free) 2 mL injection, 2 mg, InterCATHeter, PRN, Arti PÉREZ MD, 2 mg at 04/03/20 0617    heparin 25,000 units in D5W 250 ml infusion, 7-25 Units/kg/hr, IntraVENous, TITRATE, Ousmane Madison MD, Last Rate: 20 mL/hr at 04/08/20 1200, 15 Units/kg/hr at 04/08/20 1200    midazolam (VERSED) injection 1-2 mg, 1-2 mg, IntraVENous, Q2H PRN, Racquel SCHMITT NP-C, 2 mg at 04/08/20 0309    bacitracin 500 unit/gram packet 1 Packet, 1 Packet, Topical, PRN, Leonie Gilman NP, 1 Packet at 04/01/20 0116    insulin glargine (LANTUS) injection 35 Units, 35 Units, SubCUTAneous, DAILY, Isacc Madison MD, 35 Units at 04/08/20 0906    balsam peru-castor oiL (VENELEX) ointment, , Topical, BID, Isacc Madison MD    sodium chloride (NS) flush 5-40 mL, 5-40 mL, IntraVENous, Q8H, Isacc Madison MD, 10 mL at 04/08/20 1303    sodium chloride (NS) flush 5-40 mL, 5-40 mL, IntraVENous, PRN, Isacc Manuel MD, 10 mL at 04/04/20 2146    HYDROcodone-acetaminophen (NORCO) 5-325 mg per tablet 1 Tab, 1 Tab, Oral, Q4H PRN, Michael Juarez MD, 1 Tab at 04/08/20 0528    ondansetron (ZOFRAN) injection 4 mg, 4 mg, IntraVENous, Q4H PRN, Isacc Madison MD    chlorhexidine (ORAL CARE KIT) 0.12 % mouthwash 15 mL, 15 mL, Oral, Q12H, Iliana Rosales NP, 15 mL at 04/08/20 0906    docusate (COLACE) 50 mg/5 mL oral liquid 100 mg, 100 mg, Per NG tube, BID, Isacc Madison MD, 100 mg at 04/08/20 0906    acetaminophen (TYLENOL) tablet 650 mg, 650 mg, Oral, Q6H PRN **OR** acetaminophen (TYLENOL) suppository 650 mg, 650 mg, Rectal, Q6H PRN, Iliana Rosales, NP    bicarbonate dialysis (PRISMASOL) BG K 4/Ca 2.5 5000 ml solution, , Extracorporeal, DIALYSIS CONTINUOUS, Kit Hargrove MD, Last Rate: 3,000 mL/hr at 04/08/20 1305, 3,000 mL/hr at 04/08/20 1305    glucose chewable tablet 16 g, 4 Tab, Oral, PRN, Isacc Madison MD    glucagon (GLUCAGEN) injection 1 mg, 1 mg, IntraMUSCular, PRN, Isacc Madison MD    dextrose 10% infusion 0-250 mL, 0-250 mL, IntraVENous, PRN, Isacc Madison MD    insulin lispro (HUMALOG) injection, , SubCUTAneous, Q6H, Isacc Madison MD, Stopped at 04/01/20 1800      Labs:  Recent Results (from the past 24 hour(s))   PTT    Collection Time: 04/07/20  5:14 PM   Result Value Ref Range    aPTT 100.5 (HH) 22.1 - 32.0 sec    aPTT, therapeutic range     58.0 - 77.0 SECS   GLUCOSE, POC Collection Time: 04/07/20  5:49 PM   Result Value Ref Range    Glucose (POC) 174 (H) 65 - 100 mg/dL    Performed by Matilde Epley    GLUCOSE, POC    Collection Time: 04/07/20 11:16 PM   Result Value Ref Range    Glucose (POC) 150 (H) 65 - 100 mg/dL    Performed by Blair Reddy    PTT    Collection Time: 04/08/20  1:10 AM   Result Value Ref Range    aPTT 81.4 (H) 22.1 - 32.0 sec    aPTT, therapeutic range     58.0 - 77.0 SECS   HEPATIC FUNCTION PANEL    Collection Time: 04/08/20  3:00 AM   Result Value Ref Range    Protein, total 6.6 6.4 - 8.2 g/dL    Albumin 3.2 (L) 3.5 - 5.0 g/dL    Globulin 3.4 2.0 - 4.0 g/dL    A-G Ratio 0.9 (L) 1.1 - 2.2      Bilirubin, total 0.7 0.2 - 1.0 MG/DL    Bilirubin, direct 0.3 (H) 0.0 - 0.2 MG/DL    Alk.  phosphatase 224 (H) 45 - 117 U/L    AST (SGOT) 40 (H) 15 - 37 U/L    ALT (SGPT) 44 12 - 78 U/L   LD    Collection Time: 04/08/20  3:36 AM   Result Value Ref Range     (H) 81 - 246 U/L   FERRITIN    Collection Time: 04/08/20  3:36 AM   Result Value Ref Range    Ferritin 960 (H) 8 - 252 NG/ML   C REACTIVE PROTEIN, QT    Collection Time: 04/08/20  3:36 AM   Result Value Ref Range    C-Reactive protein 0.84 (H) 0.00 - 0.60 mg/dL   SED RATE (ESR)    Collection Time: 04/08/20  3:36 AM   Result Value Ref Range    Sed rate, automated 60 (H) 0 - 30 mm/hr   PROTHROMBIN TIME + INR    Collection Time: 04/08/20  3:36 AM   Result Value Ref Range    INR 1.1 0.9 - 1.1      Prothrombin time 11.5 (H) 9.0 - 11.1 sec   RENAL FUNCTION PANEL    Collection Time: 04/08/20  3:36 AM   Result Value Ref Range    Sodium 134 (L) 136 - 145 mmol/L    Potassium 4.0 3.5 - 5.1 mmol/L    Chloride 104 97 - 108 mmol/L    CO2 25 21 - 32 mmol/L    Anion gap 5 5 - 15 mmol/L    Glucose 153 (H) 65 - 100 mg/dL    BUN 29 (H) 6 - 20 MG/DL    Creatinine 1.49 (H) 0.55 - 1.02 MG/DL    BUN/Creatinine ratio 19 12 - 20      GFR est AA 44 (L) >60 ml/min/1.73m2    GFR est non-AA 36 (L) >60 ml/min/1.73m2    Calcium 9.1 8.5 - 10.1 MG/DL    Phosphorus 2.8 2.6 - 4.7 MG/DL    Albumin 3.0 (L) 3.5 - 5.0 g/dL   CBC WITH AUTOMATED DIFF    Collection Time: 04/08/20  3:36 AM   Result Value Ref Range    WBC 16.8 (H) 3.6 - 11.0 K/uL    RBC 3.73 (L) 3.80 - 5.20 M/uL    HGB 10.1 (L) 11.5 - 16.0 g/dL    HCT 31.7 (L) 35.0 - 47.0 %    MCV 85.0 80.0 - 99.0 FL    MCH 27.1 26.0 - 34.0 PG    MCHC 31.9 30.0 - 36.5 g/dL    RDW 14.0 11.5 - 14.5 %    PLATELET 629 556 - 257 K/uL    MPV 10.8 8.9 - 12.9 FL    NRBC 0.0 0  WBC    ABSOLUTE NRBC 0.00 0.00 - 0.01 K/uL    NEUTROPHILS 77 (H) 32 - 75 %    LYMPHOCYTES 17 12 - 49 %    MONOCYTES 5 5 - 13 %    EOSINOPHILS 1 0 - 7 %    BASOPHILS 0 0 - 1 %    IMMATURE GRANULOCYTES 0 %    ABS. NEUTROPHILS 12.9 (H) 1.8 - 8.0 K/UL    ABS. LYMPHOCYTES 2.9 0.8 - 3.5 K/UL    ABS. MONOCYTES 0.8 0.0 - 1.0 K/UL    ABS. EOSINOPHILS 0.2 0.0 - 0.4 K/UL    ABS. BASOPHILS 0.0 0.0 - 0.1 K/UL    ABS. IMM.  GRANS. 0.0 K/UL    DF MANUAL      PLATELET COMMENTS Large Platelets      RBC COMMENTS ANISOCYTOSIS  1+       MAGNESIUM    Collection Time: 04/08/20  3:37 AM   Result Value Ref Range    Magnesium 2.7 (H) 1.6 - 2.4 mg/dL   PHOSPHORUS    Collection Time: 04/08/20  3:37 AM   Result Value Ref Range    Phosphorus 2.8 2.6 - 4.7 MG/DL   GLUCOSE, POC    Collection Time: 04/08/20  5:44 AM   Result Value Ref Range    Glucose (POC) 158 (H) 65 - 100 mg/dL    Performed by Claudia Short    PTT    Collection Time: 04/08/20  8:15 AM   Result Value Ref Range    aPTT 81.2 (H) 22.1 - 32.0 sec    aPTT, therapeutic range     58.0 - 77.0 SECS   GLUCOSE, POC    Collection Time: 04/08/20 11:58 AM   Result Value Ref Range    Glucose (POC) 160 (H) 65 - 100 mg/dL    Performed by Hernando Jacinto            Micro:     Blood: 3/31/20  Specimen Information: Blood        Component Value Ref Range & Units Status   Special Requests: NO SPECIAL REQUESTS    Final   Culture result: NO GROWTH 5 DAYS    Final   Result History              Sputum 3/31/20   Sputum     Component Value Ref Range & Units Status   Special Requests: NO SPECIAL REQUESTS    Final   GRAM STAIN FEW WBCS SEEN    Final   GRAM STAIN    Final   2+ GRAM POSITIVE COCCI IN PAIRS    Culture result: Abnormal     Final   MODERATE STAPHYLOCOCCUS AUREUS    Culture result:    Final   LIGHT NORMAL RESPIRATORY SOFIE    Susceptibility      Staphylococcus aureus     LU    Ciprofloxacin ($) <=0.5 ug/mL S    Clindamycin ($)  R    Doxycycline ($$) <=0.5 ug/mL S    Erythromycin ($$$$) >=8 ug/mL R    Gentamicin ($) <=0.5 ug/mL S    Levofloxacin ($) <=0.12 ug/mL S    Linezolid ($$$$$) 2 ug/mL S    Moxifloxacin ($$$$) <=0.25 ug/mL S    Oxacillin 0.5 ug/mL S    Rifampin ($$$$) <=0.5 ug/mL S1    Tetracycline <=1 ug/mL S    Trimeth/Sulfa <=10 ug/mL S    Vancomycin ($) 1 ug/mL S                         Imaging:  CXR 4/5/20  FINDINGS: AP radiograph of the chest was obtained.     There is been interval advancement of the endotracheal tube which now terminates  1.6 cm above the bhavik. Right upper extremity PICC and gastric decompression  tubes are again noted. There is no significant change in diffuse bilateral  heterogeneous opacities. Likely trace left pleural effusion. No pneumothorax. Stable cardiomediastinal silhouette.     IMPRESSION  IMPRESSION:   1. Interval advancement of endotracheal tube which now terminates 1.6 cm above  the bhavik. Consider slight retraction. 2. Unchanged diffuse bilateral heterogeneous opacities, consistent with  multifocal pneumonia. Likely trace left pleural effusion.           Assessment / Plan    Ms. Nupur Alva is a 82-year-old lady with a history of nephrolithiasis, GERD, basal cell carcinoma who was admitted from [facility] with respiratory symptoms concerning for COVID 19 and tested + on 3/26/20. Per team,  exposure to her mother with COVID 23. Per notes, intubated on 3/28/2020.   Transferred to [facility] for CRRT    1) Sars-CoV2 pneumonia, respiratory failure, renal failure   Sars-CoV-2 detected 3/26/20, other viral respiratory panel negative  (records from [facility] )   Urine legionella and S pneumo ag negative 3/27/20  Procalcitonin 3/27 0.47, 3/28 0.44, 3/29 5.86, 3/31 13.44  , Ferritin 1985 3/31/20    4/3/20   , ferritin 992, CRP 9.65 4/5/20   IL 6  402 3/31/20  Completed azithromycin and Plaquenil   S/P Actemra one dose 4/3.    On Heparin gtt     2)  MSSA on sputum, pharmacy dosing vancomycin based on levels due to Penicillin allergy    Completed a 7 day course   4/6/20 vanc level random is 15.9      3) ARF:   Plans per nephrology, CRRT  R IJ    3) history of nephrolithiasis    4) history of GERD    5) basal cell carcinoma per chart        D/W with Dr Tanesha Parks today     Please contact with questions     [address] [facility],   1:58 PM

## 2020-04-09 ENCOUNTER — APPOINTMENT (OUTPATIENT)
Dept: GENERAL RADIOLOGY | Age: 60
DRG: 870 | End: 2020-04-09
Attending: SURGERY
Payer: COMMERCIAL

## 2020-04-09 ENCOUNTER — APPOINTMENT (OUTPATIENT)
Dept: GENERAL RADIOLOGY | Age: 60
DRG: 870 | End: 2020-04-09
Attending: NURSE PRACTITIONER
Payer: COMMERCIAL

## 2020-04-09 LAB
ALBUMIN SERPL-MCNC: 3.2 G/DL (ref 3.5–5)
ALBUMIN SERPL-MCNC: 3.3 G/DL (ref 3.5–5)
ALBUMIN SERPL-MCNC: 3.5 G/DL (ref 3.5–5)
ALBUMIN/GLOB SERPL: 1.2 {RATIO} (ref 1.1–2.2)
ALP SERPL-CCNC: 161 U/L (ref 45–117)
ALT SERPL-CCNC: 42 U/L (ref 12–78)
ANION GAP SERPL CALC-SCNC: 11 MMOL/L (ref 5–15)
ANION GAP SERPL CALC-SCNC: 19 MMOL/L (ref 5–15)
ANION GAP SERPL CALC-SCNC: 7 MMOL/L (ref 5–15)
APTT PPP: 71.7 SEC (ref 22.1–32)
ARTERIAL PATENCY WRIST A: ABNORMAL
ARTERIAL PATENCY WRIST A: ABNORMAL
ARTERIAL PATENCY WRIST A: NO
AST SERPL-CCNC: 30 U/L (ref 15–37)
BASE DEFICIT BLD-SCNC: 1 MMOL/L
BASE DEFICIT BLD-SCNC: 10 MMOL/L
BASE DEFICIT BLD-SCNC: 11 MMOL/L
BASE DEFICIT BLD-SCNC: 15 MMOL/L
BASE DEFICIT BLD-SCNC: 7 MMOL/L
BASE EXCESS BLD CALC-SCNC: 1 MMOL/L
BASOPHILS # BLD: 0 K/UL (ref 0–0.1)
BASOPHILS # BLD: 0.2 K/UL (ref 0–0.1)
BASOPHILS # BLD: 0.6 K/UL (ref 0–0.1)
BASOPHILS NFR BLD: 0 % (ref 0–1)
BASOPHILS NFR BLD: 1 % (ref 0–1)
BASOPHILS NFR BLD: 1 % (ref 0–1)
BDY SITE: ABNORMAL
BILIRUB DIRECT SERPL-MCNC: 0.3 MG/DL (ref 0–0.2)
BILIRUB SERPL-MCNC: 0.7 MG/DL (ref 0.2–1)
BUN SERPL-MCNC: 25 MG/DL (ref 6–20)
BUN SERPL-MCNC: 30 MG/DL (ref 6–20)
BUN SERPL-MCNC: 36 MG/DL (ref 6–20)
BUN/CREAT SERPL: 14 (ref 12–20)
BUN/CREAT SERPL: 16 (ref 12–20)
BUN/CREAT SERPL: 18 (ref 12–20)
CA-I BLD-SCNC: 0.98 MMOL/L (ref 1.12–1.32)
CA-I BLD-SCNC: 0.98 MMOL/L (ref 1.12–1.32)
CA-I BLD-SCNC: 1 MMOL/L (ref 1.12–1.32)
CA-I BLD-SCNC: 1.01 MMOL/L (ref 1.12–1.32)
CA-I BLD-SCNC: 1.01 MMOL/L (ref 1.12–1.32)
CA-I BLD-SCNC: 1.27 MMOL/L (ref 1.12–1.32)
CALCIUM SERPL-MCNC: 7.7 MG/DL (ref 8.5–10.1)
CALCIUM SERPL-MCNC: 7.9 MG/DL (ref 8.5–10.1)
CALCIUM SERPL-MCNC: 8.8 MG/DL (ref 8.5–10.1)
CHLORIDE SERPL-SCNC: 102 MMOL/L (ref 97–108)
CHLORIDE SERPL-SCNC: 103 MMOL/L (ref 97–108)
CHLORIDE SERPL-SCNC: 97 MMOL/L (ref 97–108)
CO2 SERPL-SCNC: 21 MMOL/L (ref 21–32)
CO2 SERPL-SCNC: 24 MMOL/L (ref 21–32)
CO2 SERPL-SCNC: 24 MMOL/L (ref 21–32)
CREAT SERPL-MCNC: 1.52 MG/DL (ref 0.55–1.02)
CREAT SERPL-MCNC: 2.04 MG/DL (ref 0.55–1.02)
CREAT SERPL-MCNC: 2.12 MG/DL (ref 0.55–1.02)
CRP SERPL HS-MCNC: 5.2 MG/L
CRP SERPL-MCNC: 0.64 MG/DL (ref 0–0.6)
D DIMER PPP FEU-MCNC: 13.37 MG/L FEU (ref 0–0.65)
D DIMER PPP FEU-MCNC: 21.45 MG/L FEU (ref 0–0.65)
DIFFERENTIAL METHOD BLD: ABNORMAL
EOSINOPHIL # BLD: 0 K/UL (ref 0–0.4)
EOSINOPHIL # BLD: 0 K/UL (ref 0–0.4)
EOSINOPHIL # BLD: 0.2 K/UL (ref 0–0.4)
EOSINOPHIL NFR BLD: 0 % (ref 0–7)
EOSINOPHIL NFR BLD: 0 % (ref 0–7)
EOSINOPHIL NFR BLD: 1 % (ref 0–7)
ERYTHROCYTE [DISTWIDTH] IN BLOOD BY AUTOMATED COUNT: 14.4 % (ref 11.5–14.5)
ERYTHROCYTE [DISTWIDTH] IN BLOOD BY AUTOMATED COUNT: 14.5 % (ref 11.5–14.5)
ERYTHROCYTE [DISTWIDTH] IN BLOOD BY AUTOMATED COUNT: 14.5 % (ref 11.5–14.5)
ERYTHROCYTE [DISTWIDTH] IN BLOOD BY AUTOMATED COUNT: 14.7 % (ref 11.5–14.5)
ERYTHROCYTE [DISTWIDTH] IN BLOOD BY AUTOMATED COUNT: 16.5 % (ref 11.5–14.5)
ERYTHROCYTE [SEDIMENTATION RATE] IN BLOOD: 52 MM/HR (ref 0–30)
FERRITIN SERPL-MCNC: 795 NG/ML (ref 8–252)
FERRITIN SERPL-MCNC: ABNORMAL NG/ML (ref 8–252)
FIBRINOGEN PPP-MCNC: 283 MG/DL (ref 200–475)
GAS FLOW.O2 O2 DELIVERY SYS: ABNORMAL L/MIN
GAS FLOW.O2 SETTING OXYMISER: 10 BPM
GASTROCULT GAST QL: POSITIVE
GLOBULIN SER CALC-MCNC: 2.8 G/DL (ref 2–4)
GLUCOSE BLD STRIP.AUTO-MCNC: 276 MG/DL (ref 65–100)
GLUCOSE BLD STRIP.AUTO-MCNC: 278 MG/DL (ref 65–100)
GLUCOSE BLD STRIP.AUTO-MCNC: 370 MG/DL (ref 65–100)
GLUCOSE SERPL-MCNC: 239 MG/DL (ref 65–100)
GLUCOSE SERPL-MCNC: 293 MG/DL (ref 65–100)
GLUCOSE SERPL-MCNC: 372 MG/DL (ref 65–100)
HCO3 BLD-SCNC: 14.3 MMOL/L (ref 22–26)
HCO3 BLD-SCNC: 15.1 MMOL/L (ref 22–26)
HCO3 BLD-SCNC: 16.3 MMOL/L (ref 22–26)
HCO3 BLD-SCNC: 17.1 MMOL/L (ref 22–26)
HCO3 BLD-SCNC: 23.7 MMOL/L (ref 22–26)
HCO3 BLD-SCNC: 25.2 MMOL/L (ref 22–26)
HCT VFR BLD AUTO: 18.8 % (ref 35–47)
HCT VFR BLD AUTO: 19.1 % (ref 35–47)
HCT VFR BLD AUTO: 23.4 % (ref 35–47)
HCT VFR BLD AUTO: 26 % (ref 35–47)
HCT VFR BLD AUTO: 26.2 % (ref 35–47)
HGB BLD-MCNC: 5.8 G/DL (ref 11.5–16)
HGB BLD-MCNC: 6.1 G/DL (ref 11.5–16)
HGB BLD-MCNC: 7.8 G/DL (ref 11.5–16)
HGB BLD-MCNC: 8 G/DL (ref 11.5–16)
HGB BLD-MCNC: 8.2 G/DL (ref 11.5–16)
IMM GRANULOCYTES # BLD AUTO: 0 K/UL
IMM GRANULOCYTES # BLD AUTO: 0 K/UL
IMM GRANULOCYTES # BLD AUTO: 0.7 K/UL (ref 0–0.04)
IMM GRANULOCYTES NFR BLD AUTO: 0 %
IMM GRANULOCYTES NFR BLD AUTO: 0 %
IMM GRANULOCYTES NFR BLD AUTO: 3 % (ref 0–0.5)
INR PPP: 1.2 (ref 0.9–1.1)
INR PPP: 1.8 (ref 0.9–1.1)
INSPIRATION.DURATION SETTING TIME VENT: 0.12 SEC
INSPIRATION.DURATION SETTING TIME VENT: 0.66 SEC
INSPIRATION.DURATION SETTING TIME VENT: 1.26 SEC
INSPIRATION.DURATION SETTING TIME VENT: 1.26 SEC
INSPIRATION.DURATION SETTING TIME VENT: 2.4 SEC
LDH SERPL L TO P-CCNC: 3298 U/L (ref 81–246)
LYMPHOCYTES # BLD: 3.7 K/UL (ref 0.8–3.5)
LYMPHOCYTES # BLD: 3.8 K/UL (ref 0.8–3.5)
LYMPHOCYTES # BLD: 5.3 K/UL (ref 0.8–3.5)
LYMPHOCYTES NFR BLD: 16 % (ref 12–49)
LYMPHOCYTES NFR BLD: 6 % (ref 12–49)
LYMPHOCYTES NFR BLD: 8 % (ref 12–49)
MAGNESIUM SERPL-MCNC: 2.8 MG/DL (ref 1.6–2.4)
MAGNESIUM SERPL-MCNC: 2.8 MG/DL (ref 1.6–2.4)
MCH RBC QN AUTO: 27.2 PG (ref 26–34)
MCH RBC QN AUTO: 27.6 PG (ref 26–34)
MCH RBC QN AUTO: 27.6 PG (ref 26–34)
MCH RBC QN AUTO: 28.4 PG (ref 26–34)
MCH RBC QN AUTO: 28.9 PG (ref 26–34)
MCHC RBC AUTO-ENTMCNC: 30.4 G/DL (ref 30–36.5)
MCHC RBC AUTO-ENTMCNC: 30.8 G/DL (ref 30–36.5)
MCHC RBC AUTO-ENTMCNC: 31.3 G/DL (ref 30–36.5)
MCHC RBC AUTO-ENTMCNC: 32.4 G/DL (ref 30–36.5)
MCHC RBC AUTO-ENTMCNC: 33.3 G/DL (ref 30–36.5)
MCV RBC AUTO: 86.7 FL (ref 80–99)
MCV RBC AUTO: 86.8 FL (ref 80–99)
MCV RBC AUTO: 87.4 FL (ref 80–99)
MCV RBC AUTO: 89.7 FL (ref 80–99)
MCV RBC AUTO: 91 FL (ref 80–99)
METAMYELOCYTES NFR BLD MANUAL: 1 %
METAMYELOCYTES NFR BLD MANUAL: 1 %
MONOCYTES # BLD: 1.4 K/UL (ref 0–1)
MONOCYTES # BLD: 1.9 K/UL (ref 0–1)
MONOCYTES # BLD: 3.3 K/UL (ref 0–1)
MONOCYTES NFR BLD: 3 % (ref 5–13)
MONOCYTES NFR BLD: 5 % (ref 5–13)
MONOCYTES NFR BLD: 6 % (ref 5–13)
NEUTS BAND NFR BLD MANUAL: 2 %
NEUTS BAND NFR BLD MANUAL: 3 % (ref 0–6)
NEUTS SEG # BLD: 16.5 K/UL (ref 1.8–8)
NEUTS SEG # BLD: 56.1 K/UL (ref 1.8–8)
NEUTS SEG # BLD: 57.2 K/UL (ref 1.8–8)
NEUTS SEG NFR BLD: 70 % (ref 32–75)
NEUTS SEG NFR BLD: 86 % (ref 32–75)
NEUTS SEG NFR BLD: 87 % (ref 32–75)
NRBC # BLD: 0 K/UL (ref 0–0.01)
NRBC # BLD: 0.02 K/UL (ref 0–0.01)
NRBC # BLD: 0.02 K/UL (ref 0–0.01)
NRBC # BLD: 0.03 K/UL (ref 0–0.01)
NRBC # BLD: 0.03 K/UL (ref 0–0.01)
NRBC BLD-RTO: 0 PER 100 WBC
O2/TOTAL GAS SETTING VFR VENT: 100 %
O2/TOTAL GAS SETTING VFR VENT: 30 %
O2/TOTAL GAS SETTING VFR VENT: 80 %
O2/TOTAL GAS SETTING VFR VENT: 90 %
PATH REV BLD -IMP: ABNORMAL
PCO2 BLD: 26 MMHG (ref 35–45)
PCO2 BLD: 29.5 MMHG (ref 35–45)
PCO2 BLD: 32.4 MMHG (ref 35–45)
PCO2 BLD: 36.4 MMHG (ref 35–45)
PCO2 BLD: 37.9 MMHG (ref 35–45)
PCO2 BLD: 41.7 MMHG (ref 35–45)
PEEP RESPIRATORY: 10 CMH2O
PEEP RESPIRATORY: 8 CMH2O
PH BLD: 7.14 [PH] (ref 7.35–7.45)
PH BLD: 7.31 [PH] (ref 7.35–7.45)
PH BLD: 7.32 [PH] (ref 7.35–7.45)
PH BLD: 7.42 [PH] (ref 7.35–7.45)
PH BLD: 7.43 [PH] (ref 7.35–7.45)
PH BLD: 7.43 [PH] (ref 7.35–7.45)
PH GAST: ABNORMAL [PH] (ref 1.5–3.5)
PHOSPHATE SERPL-MCNC: 2.4 MG/DL (ref 2.6–4.7)
PHOSPHATE SERPL-MCNC: 4 MG/DL (ref 2.6–4.7)
PHOSPHATE SERPL-MCNC: 8.2 MG/DL (ref 2.6–4.7)
PIP ISTAT,IPIP: 10
PIP ISTAT,IPIP: 12
PIP ISTAT,IPIP: 15
PIP ISTAT,IPIP: 21
PLATELET # BLD AUTO: 198 K/UL (ref 150–400)
PLATELET # BLD AUTO: 279 K/UL (ref 150–400)
PLATELET # BLD AUTO: 318 K/UL (ref 150–400)
PLATELET # BLD AUTO: 326 K/UL (ref 150–400)
PLATELET # BLD AUTO: 395 K/UL (ref 150–400)
PLATELET COMMENTS,PCOM: ABNORMAL
PMV BLD AUTO: 11.1 FL (ref 8.9–12.9)
PMV BLD AUTO: 11.2 FL (ref 8.9–12.9)
PMV BLD AUTO: 11.3 FL (ref 8.9–12.9)
PMV BLD AUTO: 11.3 FL (ref 8.9–12.9)
PMV BLD AUTO: 11.8 FL (ref 8.9–12.9)
PO2 BLD: 112 MMHG (ref 80–100)
PO2 BLD: 220 MMHG (ref 80–100)
PO2 BLD: 228 MMHG (ref 80–100)
PO2 BLD: 24 MMHG (ref 80–100)
PO2 BLD: 314 MMHG (ref 80–100)
PO2 BLD: 381 MMHG (ref 80–100)
POTASSIUM SERPL-SCNC: 4.3 MMOL/L (ref 3.5–5.1)
POTASSIUM SERPL-SCNC: 4.5 MMOL/L (ref 3.5–5.1)
POTASSIUM SERPL-SCNC: 4.5 MMOL/L (ref 3.5–5.1)
PRESSURE CONTROL, IPC: YES
PROT SERPL-MCNC: 6.1 G/DL (ref 6.4–8.2)
PROTHROMBIN TIME: 11.9 SEC (ref 9–11.1)
PROTHROMBIN TIME: 18.1 SEC (ref 9–11.1)
RBC # BLD AUTO: 2.1 M/UL (ref 3.8–5.2)
RBC # BLD AUTO: 2.15 M/UL (ref 3.8–5.2)
RBC # BLD AUTO: 2.7 M/UL (ref 3.8–5.2)
RBC # BLD AUTO: 2.9 M/UL (ref 3.8–5.2)
RBC # BLD AUTO: 3.02 M/UL (ref 3.8–5.2)
RBC MORPH BLD: ABNORMAL
SAO2 % BLD: 100 % (ref 92–97)
SAO2 % BLD: 29 % (ref 92–97)
SAO2 % BLD: 99 % (ref 92–97)
SERVICE CMNT-IMP: ABNORMAL
SODIUM SERPL-SCNC: 134 MMOL/L (ref 136–145)
SODIUM SERPL-SCNC: 137 MMOL/L (ref 136–145)
SODIUM SERPL-SCNC: 137 MMOL/L (ref 136–145)
SPECIMEN TYPE: ABNORMAL
THERAPEUTIC RANGE,PTTT: ABNORMAL SECS (ref 58–77)
TOTAL RESP. RATE, ITRR: 16
TROPONIN I SERPL-MCNC: 0.26 NG/ML
TROPONIN I SERPL-MCNC: 0.77 NG/ML
VENTILATION MODE VENT: ABNORMAL
WBC # BLD AUTO: 22.9 K/UL (ref 3.6–11)
WBC # BLD AUTO: 41.2 K/UL (ref 3.6–11)
WBC # BLD AUTO: 63 K/UL (ref 3.6–11)
WBC # BLD AUTO: 63.9 K/UL (ref 3.6–11)
WBC # BLD AUTO: 65.8 K/UL (ref 3.6–11)
WBC MORPH BLD: ABNORMAL

## 2020-04-09 PROCEDURE — 74011000258 HC RX REV CODE- 258: Performed by: NURSE PRACTITIONER

## 2020-04-09 PROCEDURE — 74011250636 HC RX REV CODE- 250/636: Performed by: NURSE PRACTITIONER

## 2020-04-09 PROCEDURE — 80069 RENAL FUNCTION PANEL: CPT

## 2020-04-09 PROCEDURE — 77030019896 HC KT ARTERIAL LN TELE -B

## 2020-04-09 PROCEDURE — 84484 ASSAY OF TROPONIN QUANT: CPT

## 2020-04-09 PROCEDURE — 82728 ASSAY OF FERRITIN: CPT

## 2020-04-09 PROCEDURE — 85652 RBC SED RATE AUTOMATED: CPT

## 2020-04-09 PROCEDURE — 74011250636 HC RX REV CODE- 250/636

## 2020-04-09 PROCEDURE — 74011250636 HC RX REV CODE- 250/636: Performed by: INTERNAL MEDICINE

## 2020-04-09 PROCEDURE — 85027 COMPLETE CBC AUTOMATED: CPT

## 2020-04-09 PROCEDURE — C1752 CATH,HEMODIALYSIS,SHORT-TERM: HCPCS

## 2020-04-09 PROCEDURE — 74011000250 HC RX REV CODE- 250: Performed by: INTERNAL MEDICINE

## 2020-04-09 PROCEDURE — C1751 CATH, INF, PER/CENT/MIDLINE: HCPCS

## 2020-04-09 PROCEDURE — 83735 ASSAY OF MAGNESIUM: CPT

## 2020-04-09 PROCEDURE — 36556 INSERT NON-TUNNEL CV CATH: CPT

## 2020-04-09 PROCEDURE — 71045 X-RAY EXAM CHEST 1 VIEW: CPT

## 2020-04-09 PROCEDURE — 74011636637 HC RX REV CODE- 636/637: Performed by: INTERNAL MEDICINE

## 2020-04-09 PROCEDURE — P9045 ALBUMIN (HUMAN), 5%, 250 ML: HCPCS | Performed by: NURSE PRACTITIONER

## 2020-04-09 PROCEDURE — P9045 ALBUMIN (HUMAN), 5%, 250 ML: HCPCS

## 2020-04-09 PROCEDURE — 74011250637 HC RX REV CODE- 250/637: Performed by: INTERNAL MEDICINE

## 2020-04-09 PROCEDURE — 74011000258 HC RX REV CODE- 258: Performed by: INTERNAL MEDICINE

## 2020-04-09 PROCEDURE — 85379 FIBRIN DEGRADATION QUANT: CPT

## 2020-04-09 PROCEDURE — 82271 OCCULT BLOOD OTHER SOURCES: CPT

## 2020-04-09 PROCEDURE — 36620 INSERTION CATHETER ARTERY: CPT

## 2020-04-09 PROCEDURE — 74011000250 HC RX REV CODE- 250: Performed by: NURSE PRACTITIONER

## 2020-04-09 PROCEDURE — 82962 GLUCOSE BLOOD TEST: CPT

## 2020-04-09 PROCEDURE — 65610000006 HC RM INTENSIVE CARE

## 2020-04-09 PROCEDURE — 86140 C-REACTIVE PROTEIN: CPT

## 2020-04-09 PROCEDURE — 90945 DIALYSIS ONE EVALUATION: CPT

## 2020-04-09 PROCEDURE — 74011636637 HC RX REV CODE- 636/637: Performed by: NURSE PRACTITIONER

## 2020-04-09 PROCEDURE — 86900 BLOOD TYPING SEROLOGIC ABO: CPT

## 2020-04-09 PROCEDURE — 85025 COMPLETE CBC W/AUTO DIFF WBC: CPT

## 2020-04-09 PROCEDURE — 84100 ASSAY OF PHOSPHORUS: CPT

## 2020-04-09 PROCEDURE — 02HV33Z INSERTION OF INFUSION DEVICE INTO SUPERIOR VENA CAVA, PERCUTANEOUS APPROACH: ICD-10-PCS | Performed by: HOSPITALIST

## 2020-04-09 PROCEDURE — 86923 COMPATIBILITY TEST ELECTRIC: CPT

## 2020-04-09 PROCEDURE — 85730 THROMBOPLASTIN TIME PARTIAL: CPT

## 2020-04-09 PROCEDURE — 80048 BASIC METABOLIC PNL TOTAL CA: CPT

## 2020-04-09 PROCEDURE — 80076 HEPATIC FUNCTION PANEL: CPT

## 2020-04-09 PROCEDURE — C9113 INJ PANTOPRAZOLE SODIUM, VIA: HCPCS | Performed by: NURSE PRACTITIONER

## 2020-04-09 PROCEDURE — 94003 VENT MGMT INPAT SUBQ DAY: CPT

## 2020-04-09 PROCEDURE — 83615 LACTATE (LD) (LDH) ENZYME: CPT

## 2020-04-09 PROCEDURE — 36415 COLL VENOUS BLD VENIPUNCTURE: CPT

## 2020-04-09 PROCEDURE — 87040 BLOOD CULTURE FOR BACTERIA: CPT

## 2020-04-09 PROCEDURE — 86141 C-REACTIVE PROTEIN HS: CPT

## 2020-04-09 PROCEDURE — P9016 RBC LEUKOCYTES REDUCED: HCPCS

## 2020-04-09 PROCEDURE — P9045 ALBUMIN (HUMAN), 5%, 250 ML: HCPCS | Performed by: INTERNAL MEDICINE

## 2020-04-09 PROCEDURE — 85610 PROTHROMBIN TIME: CPT

## 2020-04-09 PROCEDURE — 85384 FIBRINOGEN ACTIVITY: CPT

## 2020-04-09 PROCEDURE — 82803 BLOOD GASES ANY COMBINATION: CPT

## 2020-04-09 PROCEDURE — 36430 TRANSFUSION BLD/BLD COMPNT: CPT

## 2020-04-09 PROCEDURE — 74011250637 HC RX REV CODE- 250/637: Performed by: NURSE PRACTITIONER

## 2020-04-09 RX ORDER — INSULIN LISPRO 100 [IU]/ML
10 INJECTION, SOLUTION INTRAVENOUS; SUBCUTANEOUS ONCE
Status: COMPLETED | OUTPATIENT
Start: 2020-04-09 | End: 2020-04-09

## 2020-04-09 RX ORDER — SODIUM BICARBONATE IN D5W 150/1000ML
PLASTIC BAG, INJECTION (ML) INTRAVENOUS CONTINUOUS
Status: DISCONTINUED | OUTPATIENT
Start: 2020-04-09 | End: 2020-04-10

## 2020-04-09 RX ORDER — ALBUMIN HUMAN 50 G/1000ML
12.5 SOLUTION INTRAVENOUS ONCE
Status: COMPLETED | OUTPATIENT
Start: 2020-04-09 | End: 2020-04-09

## 2020-04-09 RX ORDER — ALBUMIN HUMAN 50 G/1000ML
25 SOLUTION INTRAVENOUS ONCE
Status: COMPLETED | OUTPATIENT
Start: 2020-04-09 | End: 2020-04-09

## 2020-04-09 RX ORDER — VANCOMYCIN 2 GRAM/500 ML IN 0.9 % SODIUM CHLORIDE INTRAVENOUS
2000 ONCE
Status: COMPLETED | OUTPATIENT
Start: 2020-04-09 | End: 2020-04-09

## 2020-04-09 RX ORDER — ALBUMIN HUMAN 50 G/1000ML
SOLUTION INTRAVENOUS
Status: DISPENSED
Start: 2020-04-09 | End: 2020-04-10

## 2020-04-09 RX ORDER — SODIUM BICARBONATE 1 MEQ/ML
SYRINGE (ML) INTRAVENOUS
Status: COMPLETED | OUTPATIENT
Start: 2020-04-09 | End: 2020-04-09

## 2020-04-09 RX ORDER — SODIUM BICARBONATE 84 MG/ML
50 INJECTION, SOLUTION INTRAVENOUS
Status: COMPLETED | OUTPATIENT
Start: 2020-04-09 | End: 2020-04-09

## 2020-04-09 RX ORDER — SODIUM CHLORIDE 9 MG/ML
250 INJECTION, SOLUTION INTRAVENOUS AS NEEDED
Status: DISCONTINUED | OUTPATIENT
Start: 2020-04-09 | End: 2020-04-11

## 2020-04-09 RX ORDER — EPINEPHRINE 0.1 MG/ML
INJECTION INTRACARDIAC; INTRAVENOUS
Status: DISPENSED
Start: 2020-04-09 | End: 2020-04-09

## 2020-04-09 RX ORDER — EPINEPHRINE 0.1 MG/ML
INJECTION INTRACARDIAC; INTRAVENOUS
Status: COMPLETED | OUTPATIENT
Start: 2020-04-09 | End: 2020-04-09

## 2020-04-09 RX ORDER — ALBUMIN HUMAN 50 G/1000ML
25 SOLUTION INTRAVENOUS ONCE
Status: DISCONTINUED | OUTPATIENT
Start: 2020-04-09 | End: 2020-04-09

## 2020-04-09 RX ORDER — MIDAZOLAM IN 0.9 % SOD.CHLORID 1 MG/ML
0-10 PLASTIC BAG, INJECTION (ML) INTRAVENOUS
Status: DISCONTINUED | OUTPATIENT
Start: 2020-04-09 | End: 2020-04-09

## 2020-04-09 RX ORDER — SODIUM BICARBONATE 84 MG/ML
INJECTION, SOLUTION INTRAVENOUS
Status: DISPENSED
Start: 2020-04-09 | End: 2020-04-10

## 2020-04-09 RX ORDER — ALBUMIN HUMAN 50 G/1000ML
SOLUTION INTRAVENOUS
Status: COMPLETED
Start: 2020-04-09 | End: 2020-04-09

## 2020-04-09 RX ORDER — PROPOFOL 10 MG/ML
0-50 VIAL (ML) INTRAVENOUS
Status: DISCONTINUED | OUTPATIENT
Start: 2020-04-09 | End: 2020-04-09

## 2020-04-09 RX ORDER — PROPOFOL 10 MG/ML
INJECTION, EMULSION INTRAVENOUS
Status: DISPENSED
Start: 2020-04-09 | End: 2020-04-09

## 2020-04-09 RX ADMIN — FENTANYL CITRATE 50 MCG: 50 INJECTION INTRAMUSCULAR; INTRAVENOUS at 09:29

## 2020-04-09 RX ADMIN — KETAMINE HYDROCHLORIDE 0.05 MG/KG/HR: 50 INJECTION, SOLUTION INTRAMUSCULAR; INTRAVENOUS at 13:23

## 2020-04-09 RX ADMIN — CASTOR OIL AND BALSAM, PERU: 788; 87 OINTMENT TOPICAL at 17:38

## 2020-04-09 RX ADMIN — FENTANYL CITRATE 50 MCG: 50 INJECTION INTRAMUSCULAR; INTRAVENOUS at 09:21

## 2020-04-09 RX ADMIN — EPINEPHRINE 1 MG: 0.1 INJECTION INTRACARDIAC; INTRAVENOUS at 10:08

## 2020-04-09 RX ADMIN — MINERAL OIL AND WHITE PETROLATUM: 150; 830 OINTMENT OPHTHALMIC at 22:11

## 2020-04-09 RX ADMIN — PROPOFOL 10 MCG/KG/MIN: 10 INJECTION, EMULSION INTRAVENOUS at 12:00

## 2020-04-09 RX ADMIN — ALBUMIN (HUMAN) 12.5 G: 12.5 INJECTION, SOLUTION INTRAVENOUS at 17:30

## 2020-04-09 RX ADMIN — CALCIUM CHLORIDE, MAGNESIUM CHLORIDE, DEXTROSE MONOHYDRATE, LACTIC ACID, SODIUM CHLORIDE, SODIUM BICARBONATE AND POTASSIUM CHLORIDE 3000 ML/HR: 3.68; 3.05; 22; 5.4; 6.46; 3.09; .314 INJECTION INTRAVENOUS at 08:15

## 2020-04-09 RX ADMIN — NOREPINEPHRINE BITARTRATE 30 MCG/MIN: 1 INJECTION, SOLUTION, CONCENTRATE INTRAVENOUS at 12:31

## 2020-04-09 RX ADMIN — CALCIUM CHLORIDE, MAGNESIUM CHLORIDE, DEXTROSE MONOHYDRATE, LACTIC ACID, SODIUM CHLORIDE, SODIUM BICARBONATE AND POTASSIUM CHLORIDE 3000 ML/HR: 3.68; 3.05; 22; 5.4; 6.46; 3.09; .314 INJECTION INTRAVENOUS at 06:34

## 2020-04-09 RX ADMIN — GUAIFENESIN 100 MG: 100 SOLUTION ORAL at 06:37

## 2020-04-09 RX ADMIN — SODIUM BICARBONATE 150 MEQ/1,000 ML IN DEXTROSE 5 % INTRAVENOUS: SOLUTION at 19:32

## 2020-04-09 RX ADMIN — GUAIFENESIN 100 MG: 100 SOLUTION ORAL at 23:48

## 2020-04-09 RX ADMIN — ALBUMIN (HUMAN) 25 G: 12.5 INJECTION, SOLUTION INTRAVENOUS at 13:50

## 2020-04-09 RX ADMIN — MIDAZOLAM 1 MG: 1 INJECTION INTRAMUSCULAR; INTRAVENOUS at 12:14

## 2020-04-09 RX ADMIN — SODIUM CHLORIDE 10 ML: 9 INJECTION INTRAMUSCULAR; INTRAVENOUS; SUBCUTANEOUS at 22:11

## 2020-04-09 RX ADMIN — CALCIUM CHLORIDE, MAGNESIUM CHLORIDE, DEXTROSE MONOHYDRATE, LACTIC ACID, SODIUM CHLORIDE, SODIUM BICARBONATE AND POTASSIUM CHLORIDE 3000 ML/HR: 3.68; 3.05; 22; 5.4; 6.46; 3.09; .314 INJECTION INTRAVENOUS at 22:02

## 2020-04-09 RX ADMIN — DOCUSATE SODIUM 100 MG: 50 LIQUID ORAL at 08:23

## 2020-04-09 RX ADMIN — MIDAZOLAM 1 MG: 1 INJECTION INTRAMUSCULAR; INTRAVENOUS at 09:30

## 2020-04-09 RX ADMIN — SODIUM CHLORIDE 10 ML: 9 INJECTION INTRAMUSCULAR; INTRAVENOUS; SUBCUTANEOUS at 14:00

## 2020-04-09 RX ADMIN — SODIUM CHLORIDE 40 MG: 9 INJECTION INTRAMUSCULAR; INTRAVENOUS; SUBCUTANEOUS at 22:34

## 2020-04-09 RX ADMIN — ALBUMIN (HUMAN): 12.5 INJECTION, SOLUTION INTRAVENOUS at 10:00

## 2020-04-09 RX ADMIN — INSULIN LISPRO 10 UNITS: 100 INJECTION, SOLUTION INTRAVENOUS; SUBCUTANEOUS at 12:19

## 2020-04-09 RX ADMIN — CASTOR OIL AND BALSAM, PERU: 788; 87 OINTMENT TOPICAL at 08:25

## 2020-04-09 RX ADMIN — CALCIUM CHLORIDE, MAGNESIUM CHLORIDE, DEXTROSE MONOHYDRATE, LACTIC ACID, SODIUM CHLORIDE, SODIUM BICARBONATE AND POTASSIUM CHLORIDE 3000 ML/HR: 3.68; 3.05; 22; 5.4; 6.46; 3.09; .314 INJECTION INTRAVENOUS at 02:57

## 2020-04-09 RX ADMIN — DEXMEDETOMIDINE HYDROCHLORIDE 0.9 MCG/KG/HR: 100 INJECTION, SOLUTION, CONCENTRATE INTRAVENOUS at 08:28

## 2020-04-09 RX ADMIN — VASOPRESSIN 0.04 UNITS/MIN: 20 INJECTION INTRAVENOUS at 16:56

## 2020-04-09 RX ADMIN — SODIUM BICARBONATE 50 MEQ: 84 INJECTION, SOLUTION INTRAVENOUS at 15:43

## 2020-04-09 RX ADMIN — CALCIUM GLUCONATE 2 G: 98 INJECTION, SOLUTION INTRAVENOUS at 14:15

## 2020-04-09 RX ADMIN — PHENYLEPHRINE HYDROCHLORIDE 250 MCG/MIN: 10 INJECTION INTRAVENOUS at 22:53

## 2020-04-09 RX ADMIN — PHENYLEPHRINE HYDROCHLORIDE 280 MCG/MIN: 10 INJECTION INTRAVENOUS at 13:27

## 2020-04-09 RX ADMIN — NOREPINEPHRINE BITARTRATE 50 MCG/MIN: 1 INJECTION, SOLUTION, CONCENTRATE INTRAVENOUS at 09:50

## 2020-04-09 RX ADMIN — SODIUM CHLORIDE 10 ML: 9 INJECTION INTRAMUSCULAR; INTRAVENOUS; SUBCUTANEOUS at 05:32

## 2020-04-09 RX ADMIN — NOREPINEPHRINE BITARTRATE 10 MCG/MIN: 1 INJECTION, SOLUTION, CONCENTRATE INTRAVENOUS at 06:37

## 2020-04-09 RX ADMIN — CALCIUM CHLORIDE, MAGNESIUM CHLORIDE, DEXTROSE MONOHYDRATE, LACTIC ACID, SODIUM CHLORIDE, SODIUM BICARBONATE AND POTASSIUM CHLORIDE 3000 ML/HR: 3.68; 3.05; 22; 5.4; 6.46; 3.09; .314 INJECTION INTRAVENOUS at 20:17

## 2020-04-09 RX ADMIN — CALCIUM CHLORIDE, MAGNESIUM CHLORIDE, DEXTROSE MONOHYDRATE, LACTIC ACID, SODIUM CHLORIDE, SODIUM BICARBONATE AND POTASSIUM CHLORIDE 3000 ML/HR: 3.68; 3.05; 22; 5.4; 6.46; 3.09; .314 INJECTION INTRAVENOUS at 01:09

## 2020-04-09 RX ADMIN — SODIUM BICARBONATE 50 MEQ: 84 INJECTION, SOLUTION INTRAVENOUS at 10:12

## 2020-04-09 RX ADMIN — CALCIUM CHLORIDE, MAGNESIUM CHLORIDE, DEXTROSE MONOHYDRATE, LACTIC ACID, SODIUM CHLORIDE, SODIUM BICARBONATE AND POTASSIUM CHLORIDE 3000 ML/HR: 3.68; 3.05; 22; 5.4; 6.46; 3.09; .314 INJECTION INTRAVENOUS at 23:47

## 2020-04-09 RX ADMIN — PHENYLEPHRINE HYDROCHLORIDE 250 MCG/MIN: 10 INJECTION INTRAVENOUS at 14:44

## 2020-04-09 RX ADMIN — SODIUM BICARBONATE 50 MEQ: 84 INJECTION, SOLUTION INTRAVENOUS at 10:18

## 2020-04-09 RX ADMIN — PHENYLEPHRINE HYDROCHLORIDE 300 MCG/MIN: 10 INJECTION INTRAVENOUS at 12:50

## 2020-04-09 RX ADMIN — CHLORHEXIDINE GLUCONATE 15 ML: 0.12 RINSE ORAL at 08:25

## 2020-04-09 RX ADMIN — ALBUMIN (HUMAN) 25 G: 12.5 INJECTION, SOLUTION INTRAVENOUS at 22:34

## 2020-04-09 RX ADMIN — INSULIN LISPRO 3 UNITS: 100 INJECTION, SOLUTION INTRAVENOUS; SUBCUTANEOUS at 06:48

## 2020-04-09 RX ADMIN — INSULIN LISPRO 3 UNITS: 100 INJECTION, SOLUTION INTRAVENOUS; SUBCUTANEOUS at 17:56

## 2020-04-09 RX ADMIN — DEXMEDETOMIDINE HYDROCHLORIDE 0.8 MCG/KG/HR: 100 INJECTION, SOLUTION, CONCENTRATE INTRAVENOUS at 04:07

## 2020-04-09 RX ADMIN — MIDAZOLAM 1 MG: 1 INJECTION INTRAMUSCULAR; INTRAVENOUS at 09:46

## 2020-04-09 RX ADMIN — PHENYLEPHRINE HYDROCHLORIDE 100 MCG/MIN: 10 INJECTION INTRAVENOUS at 10:10

## 2020-04-09 RX ADMIN — CHLORHEXIDINE GLUCONATE 15 ML: 0.12 RINSE ORAL at 21:00

## 2020-04-09 RX ADMIN — VASOPRESSIN 0.04 UNITS/MIN: 20 INJECTION INTRAVENOUS at 10:08

## 2020-04-09 RX ADMIN — SODIUM BICARBONATE 150 MEQ/1,000 ML IN DEXTROSE 5 % INTRAVENOUS: SOLUTION at 11:00

## 2020-04-09 RX ADMIN — SODIUM BICARBONATE 50 MEQ: 84 INJECTION, SOLUTION INTRAVENOUS at 11:32

## 2020-04-09 RX ADMIN — Medication 50 MCG/HR: at 12:09

## 2020-04-09 RX ADMIN — CALCIUM CHLORIDE, MAGNESIUM CHLORIDE, DEXTROSE MONOHYDRATE, LACTIC ACID, SODIUM CHLORIDE, SODIUM BICARBONATE AND POTASSIUM CHLORIDE 3000 ML/HR: 3.68; 3.05; 22; 5.4; 6.46; 3.09; .314 INJECTION INTRAVENOUS at 18:15

## 2020-04-09 RX ADMIN — MINERAL OIL AND WHITE PETROLATUM: 150; 830 OINTMENT OPHTHALMIC at 08:26

## 2020-04-09 RX ADMIN — CALCIUM CHLORIDE, MAGNESIUM CHLORIDE, DEXTROSE MONOHYDRATE, LACTIC ACID, SODIUM CHLORIDE, SODIUM BICARBONATE AND POTASSIUM CHLORIDE 3000 ML/HR: 3.68; 3.05; 22; 5.4; 6.46; 3.09; .314 INJECTION INTRAVENOUS at 16:50

## 2020-04-09 RX ADMIN — ALBUMIN (HUMAN) 12.5 G: 12.5 INJECTION, SOLUTION INTRAVENOUS at 17:05

## 2020-04-09 RX ADMIN — GUAIFENESIN 100 MG: 100 SOLUTION ORAL at 01:00

## 2020-04-09 RX ADMIN — PHENYLEPHRINE HYDROCHLORIDE 250 MCG/MIN: 10 INJECTION INTRAVENOUS at 17:06

## 2020-04-09 RX ADMIN — EPINEPHRINE 3 MCG/MIN: 1 INJECTION INTRAMUSCULAR; INTRAVENOUS; SUBCUTANEOUS at 10:12

## 2020-04-09 RX ADMIN — NOREPINEPHRINE BITARTRATE 30 MCG/MIN: 1 INJECTION INTRAVENOUS at 17:03

## 2020-04-09 RX ADMIN — MIDAZOLAM 1 MG: 1 INJECTION INTRAMUSCULAR; INTRAVENOUS at 13:05

## 2020-04-09 RX ADMIN — INSULIN GLARGINE 35 UNITS: 100 INJECTION, SOLUTION SUBCUTANEOUS at 08:23

## 2020-04-09 RX ADMIN — VANCOMYCIN HYDROCHLORIDE 2000 MG: 10 INJECTION, POWDER, LYOPHILIZED, FOR SOLUTION INTRAVENOUS at 14:15

## 2020-04-09 RX ADMIN — EPINEPHRINE 1 MG: 0.1 INJECTION INTRACARDIAC; INTRAVENOUS at 10:12

## 2020-04-09 NOTE — PROGRESS NOTES
[facility]    ICU Team Note    Name: Terence Schaefer   : 1960   MRN: [id number]   Date: 2020      Subjective:   Progress Note: 2020      Reason for ICU Admission: SARS-COV2 with renal failure     65 yo female with obesity and diabetes who presented to [facility] with worsening hypoxic respiratory failure in lieu of close exposure to COVID-19. She presented to the hospital 3/26 and intubated 3/28. She was started on broad spectrum antibiotics and plaquenil. Developed worsening renal failure and was transferred to us for CRRT. Overnight Events: No acute events. 20  - Patient was placed on SBT this am, developed tachypnea on SBT high 40s and Tachycardic in the 140s -placed back on rate  - Patient very agitated and still with tachypnea in the 40s after placed back on rate. Was given fentanyl and versed to resedate  - Patient became severely hypotensive. Levophed increased and vaso-pressing and lj-synephrine ordered. - Hbg drop from 10 to 8.2 from yesterday to this am labs at 0400. Recheck CBC came back 5.8. Blood ordered. - Due to severe Hypotension CRRT blood was returned. - Patient was Hypotension and then became PEA arrest despite increase in vasopressors.  - Code to ROSC - 7 minutes. - Started on Bicarb gtt, changed sedation from propofol and Precedex to fentanyl and ketamine gtt. - Continue on Epi gtt, Vasopressin gtt, Lj-synephrine gtt and Norepinephrine gtt. - Patient given 2 PRBC's, CRRT restarted in the afternoon with drop in pressure, started 3rd unit of PRBC's and will recheck this afternoon.   - Lactate and ABG q 4 hrs. - Leukocytosis up to 63.9 - may be due to code, but restarted on Vancomycin - discussed with ID  - Resent Blood cultures. - Send gastric fluid for occult blood. Nursing stated that they suctioned coffee ground like fluid from oral cavitiy, started on Protonix BID 40 mg and stopped Pepcid.     - Arterial line placed in right radial due to previous A-line stop working/correlating and CVL placed in left femoral line for additional access. - Initially was non-responsive, but now will open eyes and follow simple commands. - Updated spouse and daughter on patients critical status. POD:* No surgery found *    S/P: N/A    Active Problem List:     Problem List  Date Reviewed: 2020          Codes Class    Hypotension ICD-10-CM: I95.9  ICD-9-CM: 458.9 Acute        Sepsis due to methicillin susceptible Staphylococcus aureus (MSSA) without acute organ dysfunction (HCC) ICD-10-CM: A41.01  ICD-9-CM: 038.11, 995.91 Acute        COVID-19 virus infection ICD-10-CM: U07.1         Obesity, morbid ([facility] ICD-10-CM: E66.01  ICD-9-CM: 278.01         Vaginal Pap smear following hysterectomy for malignancy ICD-10-CM: Z08, Z90.710  ICD-9-CM: V67.01         Personal history of malignant neoplasm of other parts of uterus ICD-10-CM: Z85.42  ICD-9-CM: V10.42         Endometrial cancer ([facility] ICD-10-CM: C54.1  ICD-9-CM: 182.0               Past Medical History:      has a past medical history of Basal cell carcinoma, GERD (gastroesophageal reflux disease), Kidney stones, and Polyp of ureter. Past Surgical History:      has a past surgical history that includes hysteroscopy diagnostic (); pr endometrial ablation, thermal; hx  section (); and hx colonoscopy. Home Medications:     Prior to Admission medications    Medication Sig Start Date End Date Taking? Authorizing Provider   pantoprazole (PROTONIX) 40 mg tablet TAKE 1 TABLET BY MOUTH TWICE A DAY 18   Provider, Historical   esomeprazole (NEXIUM) 20 mg capsule Take 20 mg by mouth daily. Indications: BID    Provider, Historical   zolpidem (AMBIEN) 10 mg tablet Take 0.5 Tabs by mouth nightly as needed for Sleep. Max Daily Amount: 5 mg. 17   Dale Mandel MD   fluticasone (FLONASE) 50 mcg/actuation nasal spray Mist 1-2 spray(s) into each nostril once daily.  4/3/15   Provider, Historical   ranitidine (ZANTAC) 150 mg tablet 150 mg.    Provider, Historical       Allergies/Social/Family History: Allergies   Allergen Reactions    Augmentin [Amoxicillin-Pot Clavulanate] Rash and Itching      Social History     Tobacco Use    Smoking status: Never Smoker    Smokeless tobacco: Never Used   Substance Use Topics    Alcohol use: Not on file      Family History   Problem Relation Age of Onset    Prostate Cancer Father         PROSTATE    Breast Cancer Sister 46        invasive poorly differentiated ductal carcinoma, Neg genetic testing.  Cancer Sister 62        melanoma stage 1       Review of Systems:     A comprehensive review of systems was negative. Objective:   Vital Signs:  Visit Vitals  /68   Pulse (!) 138   Temp 98.3 °F (36.8 °C)   Resp (!) 36   Ht 5' 4\" (1.626 m)   Wt 116.6 kg (257 lb 1.6 oz)   SpO2 99%   BMI 44.13 kg/m²      O2 Device: Endotracheal tube, Oxymizer, Ventilator Temp (24hrs), Av.8 °F (37.1 °C), Min:97.5 °F (36.4 °C), Max:99.9 °F (37.7 °C)           Intake/Output:     Intake/Output Summary (Last 24 hours) at 2020 192  Last data filed at 2020 1900  Gross per 24 hour   Intake 6944 ml   Output 1431 ml   Net 5513 ml     Physical Exam:  General: Patient intubated, NAD, opens eyes spontaneously   Eyes: PERRL, scleral edema  HENT: Atraumatic, MMM, ETT in place, OG in place  Cardio: RRR, normal, S1/S2  Respiratory: Distant but clear breath sounds bilaterally  GI: Abdomen, soft, non tender, non distended  Extremities: 2+ edema  Neuro: Patient opens eyes, follow commands in all extremities weakly, nods and shakes head appropriately, no focal deficits.     LABS AND  DATA: Personally reviewed  Recent Labs     20  1246 20  1142   WBC 63.9* 63.0*   HGB 6.1* 5.8*   HCT 18.8* 19.1*    279     Recent Labs     20  1142 20  0539    134*   K 4.5 4.5   CL 97 103   CO2 21 24   BUN 30* 25*   CREA 2.12* 1.52*   * 239*   CA 7. 9* 8.8   MG 2.8* 2.8*   PHOS 8.2* 2.4*     Recent Labs     04/09/20  0539 04/09/20  0400  04/08/20  0300   SGOT  --  30  --  40*   AP  --  161*  --  224*   TP  --  6.1*  --  6.6   ALB 3.2* 3.3*   < > 3.2*   GLOB  --  2.8  --  3.4    < > = values in this interval not displayed. Recent Labs     04/09/20  0539 04/09/20  0400 04/08/20  2239  04/08/20  0336   INR  --  1.2*  --   --  1.1   PTP  --  11.9*  --   --  11.5*   APTT 71.7*  --  56.8*   < >  --     < > = values in this interval not displayed. Recent Labs     04/09/20  1717 04/09/20  1428   PHI 7.311* 7.142*   PCO2I 32.4* 41.7   PO2I 381* 24*   FIO2I 100 100     Recent Labs     04/09/20  1142   TROIQ 0.26*       Hemodynamics:   PAP:   CO:     Wedge:   CI:     CVP:    SVR:       PVR:       Ventilator Settings:  Mode Rate Tidal Volume Pressure FiO2 PEEP   Pressure control   14 ml  8 cm H2O 100 % 8 cm H20     Peak airway pressure: 24 cm H2O    Minute ventilation: 16.8 l/min        MEDS: Reviewed    Chest X-Ray: personally reviewed and report checked  CXR Results  (Last 48 hours)               04/09/20 1531  XR CHEST PORT Final result    Impression:  IMPRESSION:       No change since earlier today. Narrative:  EXAM: XR CHEST PORT       INDICATION: CHF, fluid overload, respiratory distress, intubation, viral   pneumonia. COMPARISON: Portable chest earlier today at 4:06 AM.       TECHNIQUE: Semiupright portable chest AP view       FINDINGS: Endotracheal tube is unchanged and terminates proximal to the bhavik. Enteric tube extends into the abdomen but out of the field-of-view. Right PICC   line is unchanged and in good position. Cardiac monitoring wires overlie the   thorax. The cardiomediastinal and hilar contours are within normal limits. The   pulmonary vasculature is within normal limits. Patchy bilateral airspace opacities are not significantly changed given   difference in technique. No pneumothorax.  The visualized bones and upper abdomen   are age-appropriate. 04/09/20 0456  XR CHEST PORT Final result    Impression:  IMPRESSION: ET tube is 1 cm above the bhavik and can be retracted slightly. .   There is improved aeration bilateral pulmonary opacities. Narrative:  EXAM:  XR CHEST PORT       INDICATION:  ongoing hypoxia       COMPARISON:  4/5/2020       FINDINGS: A portable AP radiograph of the chest was obtained at 4011 hours. ET   tube is 1 cm above the bhavik and can be retracted. NG tube overlies the   stomach. Right IJ catheter is unchanged. .  There is improved aeration in   bilateral diffuse pulmonary opacities. Benedetta Ou Heart size is normal..  Bony structures   are unchanged. Assessment/Plan:     1. Acute Hypoxic Respiratory failure - Secondary to COVID-19 -   a. Continue lung protective ventilation strategies. b. Completed Azithro and plaquenil course.   c. ID following.   d. Elevated IL-6 - received one dose of 400mg of actemra.   e. Continue CRRT  f. Restarted on vancomycin for worsened leukocytosis  g. Will check CRP, ferritin, and LDH  h. ABG q 4 hrs and CXR in am  2. Hypercoagulable state - Patient previously clotting off CRRT frequently. Hbg now trending down - held heparin gtt. 3. 3 units PRBC's given for severe anemia. 4. Septic shock- blood cultures sent from HD fem line today. Patient on norepinephrine. Albumin 5% 25mg ordered  5. Diabetes - Continue ISS and Lantus 35 units  6. Renal Failure - Likely ATN from sepsis and hypotension. Nephrology following. Continue CRRT as tolerated. No UFR - increase by 10cc and hr as patient tolerates.     Multidisciplinary Rounds Completed:  No    ABCDEF Bundle/Checklist  Pain Medications: None  Target RASS: -1 - Drowsy - Not fully alert, but has sustained awakening to voice  Sedation Medications: Ketamine and Fentanyl  CAM-ICU:  Negative  Mobility: Bedrest  PT/OT: To be consulted when stable   Restraints: Soft wrist restraints  Discussed Plan of Care (goals of care): No  Addressed Code Status: Full Code    CARDIOVASCULAR  Cardiac Gtts: None  SBP Goal of: > 90 mmHg  MAP Goal of: > 65 mmHg  Transfusion Trigger (Hgb): <7 g/dL    RESPIRATORY  Vent Goals:   Chlorhexidine   Optimize PEEP/Ventilation/Oxygenation  Goal Tidal Volume 6 cc/kg based on IBW  Aim for lung protective ventilation  Head of bed > 30 degrees  DVT Prophylaxis (if no, list reason): SCD's or Sequential Compression Device and Heparin   SPO2 Goal: > 92%  Pulmonary toilet: Duo-Nebs     GI/  Bowden Catheter Present: Yes  GI Prophylaxis: Protonix (pantoprazole)   Nutrition: Yes   Bowel Movement: No  Bowel Regimen: None needed at this time  Insulin: ISS and Lantus    ANTIBIOTICS  Antibiotics:  None    T/L/D  Tubes: ETT and Orogastric Tube  Lines: Peripheral IV, Arterial Line, PICC Line and Ramon  Drains: Bowden Catheter    SPECIAL EQUIPMENT  CRRT    DISPOSITION  Stay in ICU    CRITICAL CARE CONSULTANT NOTE  I had a face to face encounter with the patient, reviewed and interpreted patient data including clinical events, labs, images, vital signs, I/O's, and examined patient. I have discussed the case and the plan and management of the patient's care with the consulting services, the bedside nurses and the respiratory therapist.      NOTE OF PERSONAL INVOLVEMENT IN CARE   This patient has a high probability of imminent, clinically significant deterioration, which requires the highest level of preparedness to intervene urgently. I participated in the decision-making and personally managed or directed the management of the following life and organ supporting interventions that required my frequent assessment to treat or prevent imminent deterioration. I personally spent 110 minutes of critical care time. This is time spent at this critically ill patient's bedside actively involved in patient care as well as the coordination of care and discussions with the patient's family.   This does not include any procedural time which has been billed separately.     Ephraim Boss [facility]     [address] [facility]

## 2020-04-09 NOTE — PROGRESS NOTES
0730: Bedside shift change report given to [address] [place] [address] and Maria Victoria Adams RN (oncoming nurse) by Taina Ivory (offgoing nurse). Report included the following information SBAR, Kardex, Procedure Summary, Intake/Output, MAR, Recent Results, Cardiac Rhythm NSR/ST and Alarm Parameters . 0800: Pt following commands, nods appropriately. On SBT. 0830: Pt becoming increasingly anxious, titrating precedex and levophed gtts. 0920: Patient still agitated and tachypneic. Nigel Yang NP at bedside and stopped SBT, placed back on rate. Pt given prn sedation for agitation. 0945: Patient is diaphoretic and increasingly tachypneic, tachycardic and agitated. O2 sats decreasing. [id number] Barker NP and RT at bedside. SBP dropping quickly, levophed increased to 50mcg/min per Haider Linn NP.     1000: Returned blood from CRRT due to low blood pressure. 1007: No pulse, code blue called. Compressions started. 1010: Compressions held, pulse palpable. 1011: Compressions resumed, no pulse. 1022: ROSC. Pt moving extremities spontaneously and following commands. Pt now on bicarb, vasopressin and lj gtts in addition to levophed. Heparin, precedex and tube feeds stopped during code. 1200: Pt started on propofol and fentanyl gtts. Awake and agitated. 1240: Heparin gtt stopped due to hgb 5.8. Redrew hgb and sent type and cross. Orders received for PRBCs. 1400: Propofol gtt stopped due to low BPs, on ketamine gtt for sedation. Titrating pressors. 1405: First of 2 units PRBCs transfusing. 1430: Left femoral quad central line placed by Nigel Yang NP.      1520: 2nd of 2 units PRBCs transfusing. 1700: CRRT restarted with a factor of zero and fluid removal of zero. BPs dropping and increased tachypnea. Orders received for albumin and 1 unit PRBCs. Titrating pressors as needed. 1800: Increased CRRT pt fluid removal to 10. Will increase fluid removal by 10 per hour as tolerated. /65, MAP 82.  Per Haider Linn NP, increase fluid removal factor until we reach net zero instead of weaning from lj gtt. 1930: Bedside shift change report given to CAMMY/ Noemi Begum (oncoming nurse) by Sánchez Grissom and Alvin Rodriguez RN (offgoing nurse). Report included the following information SBAR, Kardex, Intake/Output, MAR, Cardiac Rhythm Sinus tach, Alarm Parameters  and Dual Neuro Assessment.

## 2020-04-09 NOTE — PROGRESS NOTES
Infectious Disease Progress Note       Subjective:     D/W with ICU team  Ms Norma Phelan coded this am   Resuscitated , had a line   Reviewed chart and discussed with NP Ms Irene Morocho  Was on 30% FIO2 in am   Got acidotic and hypotensive as well  CXR wt improved aeration before code             Objective:    Vitals:   Patient Vitals for the past 24 hrs:   Temp Pulse Resp BP SpO2   04/09/20 1405 99.1 °F (37.3 °C) (!) 140 24 -- 91 %   04/09/20 1400 -- (!) 141 23 (!) 72/51 (!) 79 %   04/09/20 1300 -- (!) 129 29 90/64 99 %   04/09/20 1245 -- (!) 129 28 -- 97 %   04/09/20 1230 -- (!) 131 28 -- 100 %   04/09/20 1215 -- (!) 145 27 -- (!) 82 %   04/09/20 1202 -- -- 23 -- --   04/09/20 1200 99.1 °F (37.3 °C) (!) 122 28 (!) 82/59 100 %   04/09/20 1145 -- (!) 115 23 -- 96 %   04/09/20 1130 -- (!) 121 (!) 37 -- 97 %   04/09/20 1115 -- (!) 133 22 -- --   04/09/20 1100 -- (!) 118 21 -- 98 %   04/09/20 1045 -- (!) 111 16 -- 98 %   04/09/20 1030 -- (!) 108 19 -- 98 %   04/09/20 1000 -- (!) 127 29 (!) 72/35 (!) 87 %   04/09/20 0800 97.8 °F (36.6 °C) 85 23 92/74 100 %   04/09/20 0711 -- 88 19 (!) 89/66 --   04/09/20 0700 -- 84 14 -- 100 %   04/09/20 0630 -- 85 18 -- 100 %   04/09/20 0600 -- 82 18 114/72 100 %   04/09/20 0535 -- 88 17 -- 100 %   04/09/20 0530 -- 87 17 -- 100 %   04/09/20 0500 -- 82 16 102/82 100 %   04/09/20 0430 -- 85 16 -- 99 %   04/09/20 0400 97.5 °F (36.4 °C) 78 11 (!) 89/56 100 %   04/09/20 0330 -- 76 11 -- 100 %   04/09/20 0300 -- 86 15 117/72 100 %   04/09/20 0230 -- 82 13 -- 99 %   04/09/20 0200 -- 88 14 131/87 100 %   04/09/20 0136 -- 74 12 -- 100 %   04/09/20 0130 -- 82 14 -- 100 %   04/09/20 0100 -- 73 12 109/75 100 %   04/09/20 0030 -- 82 13 -- 100 %   04/09/20 0000 97.6 °F (36.4 °C) 77 12 118/82 100 %   04/08/20 2330 -- 80 13 -- 100 %   04/08/20 2300 -- (!) 103 23 143/86 99 %   04/08/20 2232 -- 97 19 -- 99 %   04/08/20 2230 -- 80 13 -- 100 %   04/08/20 2200 -- 95 17 146/83 100 %   04/08/20 2130 -- 92 17 -- 100 %   04/08/20 2100 -- 97 13 127/78 99 %   04/08/20 2030 -- 98 17 -- 99 %   04/08/20 2003 -- (!) 102 18 -- 100 %   04/08/20 2000 98.2 °F (36.8 °C) (!) 104 18 -- 99 %   04/08/20 1945 -- 85 13 -- 100 %   04/08/20 1900 -- 93 15 -- 100 %   04/08/20 1800 -- (!) 102 17 -- 99 %   04/08/20 1700 -- 84 13 -- 100 %   04/08/20 1618 -- -- 12 -- --   04/08/20 1600 98.2 °F (36.8 °C) 85 12 -- 99 %   04/08/20 1500 -- 100 17 -- 99 %       Physical Exam:  Please see ICU teams physical exam     Medications:    Current Facility-Administered Medications:     vasopressin (VASOSTRICT) 20 Units in 0.9% sodium chloride 100 mL infusion, 0-0.04 Units/min, IntraVENous, TITRATE, Reba Golden MD, Last Rate: 12 mL/hr at 04/09/20 1008, 0.04 Units/min at 04/09/20 1008    PHENYLephrine (MILLY-SYNEPHRINE) 30 mg in 0.9% sodium chloride 250 mL infusion,  mcg/min, IntraVENous, TITRATE, Reba Golden MD, Last Rate: 120 mL/hr at 04/09/20 1456, 240 mcg/min at 04/09/20 1456    EPINEPHrine (ADRENALIN) 5 mg in 0.9% sodium chloride 250 mL infusion, 0-10 mcg/min, IntraVENous, TITRATE, Reba Golden MD, Last Rate: 9 mL/hr at 04/09/20 1336, 3 mcg/min at 04/09/20 1336    EPINEPHrine (ADRENALIN) 0.1 mg/mL 0.1 mg/mL syringe, , , ,     sodium bicarbonate 150 mEq/1000 mL D5W (premix), , IntraVENous, CONTINUOUS, CELESTINA Chaney, Last Rate: 125 mL/hr at 04/09/20 1319    propofoL (DIPRIVAN) 10 mg/mL injection, , , ,     fentaNYL (PF) 1,500 mcg/30 mL (50 mcg/mL) infusion, 0-200 mcg/hr, IntraVENous, TITRATE, Reba Golden MD, Last Rate: 1 mL/hr at 04/09/20 1209, 50 mcg/hr at 04/09/20 1209    ketamine (KETALAR) 500 mg in 0.9% sodium chloride 500 mL infusion, 0.05-0.4 mg/kg/hr, IntraVENous, TITRATE, Reba Golden MD, Last Rate: 11.7 mL/hr at 04/09/20 1348, 0.1 mg/kg/hr at 04/09/20 1348    0.9% sodium chloride infusion 250 mL, 250 mL, IntraVENous, PRN, Racquel SCHMITT NP-C    vancomycin (VANCOCIN) 2000 mg in  ml infusion, 2,000 mg, IntraVENous, ONCE, Gilbert SCHMITT NP-C, Last Rate: 250 mL/hr at 04/09/20 1415, 2,000 mg at 04/09/20 1415    calcium gluconate 2 g in 0.9% sodium chloride 100 mL IVPB, 2 g, IntraVENous, ONCE, Gilbert SCHMITT NP-C, Last Rate: 120 mL/hr at 04/09/20 1415, 2 g at 04/09/20 1415    Vancomycin Pharmacy Dosing, , Other, PRN, Hilary Barrow MD    [START ON 4/10/2020] Vancomycin Random Level @ 1300 4/10 , , Other, ONCE, Hilary Barrow MD    albumin human 5% (BUMINATE) 5 % solution, , , ,     [Held by provider] labetaloL (NORMODYNE) tablet 100 mg, 100 mg, Oral, Q12H, Iliana Rosales NP    NOREPINephrine (LEVOPHED) 8,000 mcg in dextrose 5% 250 mL infusion, 2-30 mcg/min, IntraVENous, TITRATE, lIiana Rosales NP, Last Rate: 56.3 mL/hr at 04/09/20 1231, 30 mcg/min at 04/09/20 1231    0.9% sodium chloride infusion, 3 mL/hr, IntraVENous, CONTINUOUS, Chapincito Madison MD, Last Rate: 3 mL/hr at 04/08/20 1835, 3 mL/hr at 04/08/20 1835    0.9% sodium chloride infusion, 5 mL/hr, IntraVENous, CONTINUOUS, Chapincito Madison MD, Stopped at 04/08/20 1836    labetaloL (NORMODYNE;TRANDATE) injection 10 mg, 10 mg, IntraVENous, Q10MIN PRN, Chapincito Skinner MD    famotidine (PEPCID) 40 mg/5 mL (8 mg/mL) oral suspension 20 mg, 20 mg, Oral, QHS, Chapincito Madison MD, 20 mg at 04/08/20 2057    fentaNYL citrate (PF) injection  mcg,  mcg, IntraVENous, Q1H PRN, Teresita Heredia MD, 50 mcg at 04/09/20 0929    white petrolatum-mineral oiL (AKWA TEARS) 83-15 % ophthalmic ointment, , Both Eyes, Q12H, Chapincito Madison MD    heparin (porcine) pf 300 Units, 300 Units, InterCATHeter, PRN, Gilbert SCHMITT, NP-C    guaiFENesin (ROBITUSSIN) 100 mg/5 mL oral liquid 100 mg, 100 mg, Oral, Q6H, Cassandra Cox, NP-C, Stopped at 04/09/20 1200    alteplase (CATHFLO) 2 mg in sterile water (preservative free) 2 mL injection, 2 mg, InterCATHeter, PRN, Shaw Guerrero, Mariola Isidro MD    alteplase (CATHFLO) 2 mg in sterile water (preservative free) 2 mL injection, 2 mg, InterCATHeter, PRN, Leonard PÉREZ MD, 2 mg at 04/03/20 0617    [Held by provider] heparin 25,000 units in D5W 250 ml infusion, 7-25 Units/kg/hr, IntraVENous, TITRATE, Jose Madison MD, Stopped at 04/09/20 1240    midazolam (VERSED) injection 1-2 mg, 1-2 mg, IntraVENous, Q2H PRN, CELESTINA Patrick, 1 mg at 04/09/20 1305    bacitracin 500 unit/gram packet 1 Packet, 1 Packet, Topical, PRN, Don Pickens NP, 1 Packet at 04/01/20 0116    insulin glargine (LANTUS) injection 35 Units, 35 Units, SubCUTAneous, DAILY, Jose Madison MD, 35 Units at 04/09/20 0823    balsam peru-castor oiL (VENELEX) ointment, , Topical, BID, Jose Madison MD    sodium chloride (NS) flush 5-40 mL, 5-40 mL, IntraVENous, Q8H, Jose Madison MD, 10 mL at 04/09/20 1400    sodium chloride (NS) flush 5-40 mL, 5-40 mL, IntraVENous, PRN, Gen Araiza MD, 10 mL at 04/04/20 2146    HYDROcodone-acetaminophen (NORCO) 5-325 mg per tablet 1 Tab, 1 Tab, Oral, Q4H PRN, Gen Araiza MD, 1 Tab at 04/08/20 0528    ondansetron (ZOFRAN) injection 4 mg, 4 mg, IntraVENous, Q4H PRN, Jose Madison MD    chlorhexidine (ORAL CARE KIT) 0.12 % mouthwash 15 mL, 15 mL, Oral, Q12H, Iliana Rosales NP, 15 mL at 04/09/20 0825    docusate (COLACE) 50 mg/5 mL oral liquid 100 mg, 100 mg, Per NG tube, BID, Jose Madison MD, 100 mg at 04/09/20 3391    acetaminophen (TYLENOL) tablet 650 mg, 650 mg, Oral, Q6H PRN **OR** acetaminophen (TYLENOL) suppository 650 mg, 650 mg, Rectal, Q6H PRN, Iliana Rosales, NP    bicarbonate dialysis (PRISMASOL) BG K 4/Ca 2.5 5000 ml solution, , Extracorporeal, DIALYSIS CONTINUOUS, Kit Hargrove MD, Last Rate: 3,000 mL/hr at 04/09/20 0816, 3,000 mL/hr at 04/09/20 0816    glucose chewable tablet 16 g, 4 Tab, Oral, PRN, Daysi Calhoun MD    glucagon [facility]) injection 1 mg, 1 mg, IntraMUSCular, PRN, Melani Panchal, Mariella Olivas MD    dextrose 10% infusion 0-250 mL, 0-250 mL, IntraVENous, PRN, Mariella Burr MD    insulin lispro (HUMALOG) injection, , SubCUTAneous, Q6H, Mariella Madison MD, Stopped at 04/09/20 1200      Labs:  Recent Results (from the past 24 hour(s))   RENAL FUNCTION PANEL    Collection Time: 04/08/20  3:24 PM   Result Value Ref Range    Sodium 134 (L) 136 - 145 mmol/L    Potassium 3.6 3.5 - 5.1 mmol/L    Chloride 102 97 - 108 mmol/L    CO2 25 21 - 32 mmol/L    Anion gap 7 5 - 15 mmol/L    Glucose 157 (H) 65 - 100 mg/dL    BUN 24 (H) 6 - 20 MG/DL    Creatinine 1.41 (H) 0.55 - 1.02 MG/DL    BUN/Creatinine ratio 17 12 - 20      GFR est AA 47 (L) >60 ml/min/1.73m2    GFR est non-AA 38 (L) >60 ml/min/1.73m2    Calcium 9.4 8.5 - 10.1 MG/DL    Phosphorus 2.0 (L) 2.6 - 4.7 MG/DL    Albumin 3.3 (L) 3.5 - 5.0 g/dL   PTT    Collection Time: 04/08/20  3:24 PM   Result Value Ref Range    aPTT 57.2 (H) 22.1 - 32.0 sec    aPTT, therapeutic range     58.0 - 77.0 SECS   GLUCOSE, POC    Collection Time: 04/08/20  5:56 PM   Result Value Ref Range    Glucose (POC) 179 (H) 65 - 100 mg/dL    Performed by Yoselin Eaton    PTT    Collection Time: 04/08/20 10:39 PM   Result Value Ref Range    aPTT 56.8 (H) 22.1 - 32.0 sec    aPTT, therapeutic range     58.0 - 77.0 SECS   GLUCOSE, POC    Collection Time: 04/08/20 11:16 PM   Result Value Ref Range    Glucose (POC) 189 (H) 65 - 100 mg/dL    Performed by EULA BYRD    CBC WITH AUTOMATED DIFF    Collection Time: 04/09/20  4:00 AM   Result Value Ref Range    WBC 22.9 (H) 3.6 - 11.0 K/uL    RBC 3.02 (L) 3.80 - 5.20 M/uL    HGB 8.2 (L) 11.5 - 16.0 g/dL    HCT 26.2 (L) 35.0 - 47.0 %    MCV 86.8 80.0 - 99.0 FL    MCH 27.2 26.0 - 34.0 PG    MCHC 31.3 30.0 - 36.5 g/dL    RDW 14.5 11.5 - 14.5 %    PLATELET 450 107 - 416 K/uL    MPV 11.3 8.9 - 12.9 FL    NRBC 0.0 0 PER 100 WBC    ABSOLUTE NRBC 0.00 0.00 - 0.01 K/uL    NEUTROPHILS 70 32 - 75 %    BAND NEUTROPHILS 2 %    LYMPHOCYTES 16 12 - 49 %    MONOCYTES 6 5 - 13 %    EOSINOPHILS 1 0 - 7 %    BASOPHILS 1 0 - 1 %    METAMYELOCYTES 1 %    IMMATURE GRANULOCYTES 3 (H) 0.0 - 0.5 %    ABS. NEUTROPHILS 16.5 (H) 1.8 - 8.0 K/UL    ABS. LYMPHOCYTES 3.7 (H) 0.8 - 3.5 K/UL    ABS. MONOCYTES 1.4 (H) 0.0 - 1.0 K/UL    ABS. EOSINOPHILS 0.2 0.0 - 0.4 K/UL    ABS. BASOPHILS 0.2 (H) 0.0 - 0.1 K/UL    ABS. IMM. GRANS. 0.7 (H) 0.00 - 0.04 K/UL    DF MANUAL      PLATELET COMMENTS Large Platelets      RBC COMMENTS ANISOCYTOSIS  1+       D DIMER    Collection Time: 04/09/20  4:00 AM   Result Value Ref Range    D-dimer 13.37 (H) 0.00 - 0.65 mg/L FEU   FIBRINOGEN    Collection Time: 04/09/20  4:00 AM   Result Value Ref Range    Fibrinogen 283 200 - 475 mg/dL   HEPATIC FUNCTION PANEL    Collection Time: 04/09/20  4:00 AM   Result Value Ref Range    Protein, total 6.1 (L) 6.4 - 8.2 g/dL    Albumin 3.3 (L) 3.5 - 5.0 g/dL    Globulin 2.8 2.0 - 4.0 g/dL    A-G Ratio 1.2 1.1 - 2.2      Bilirubin, total 0.7 0.2 - 1.0 MG/DL    Bilirubin, direct 0.3 (H) 0.0 - 0.2 MG/DL    Alk.  phosphatase 161 (H) 45 - 117 U/L    AST (SGOT) 30 15 - 37 U/L    ALT (SGPT) 42 12 - 78 U/L   PROTHROMBIN TIME + INR    Collection Time: 04/09/20  4:00 AM   Result Value Ref Range    INR 1.2 (H) 0.9 - 1.1      Prothrombin time 11.9 (H) 9.0 - 11.1 sec   POC EG7    Collection Time: 04/09/20  4:19 AM   Result Value Ref Range    Calcium, ionized (POC) 1.27 1.12 - 1.32 mmol/L    FIO2 (POC) 30 %    pH (POC) 7.430 7.35 - 7.45      pCO2 (POC) 37.9 35.0 - 45.0 MMHG    pO2 (POC) 112 (H) 80 - 100 MMHG    HCO3 (POC) 25.2 22 - 26 MMOL/L    Base excess (POC) 1 mmol/L    sO2 (POC) 99 (H) 92 - 97 %    Site DRAWN FROM ARTERIAL LINE      Device: VENT      Mode ASSIST CONTROL      Set Rate 10 bpm    PEEP/CPAP (POC) 8 cmH2O    PIP (POC) 21      Allens test (POC) N/A      Inspiratory Time 2.4 sec    Specimen type (POC) ARTERIAL      Total resp.  rate 16     RENAL FUNCTION PANEL    Collection Time: 04/09/20  5:39 AM   Result Value Ref Range    Sodium 134 (L) 136 - 145 mmol/L    Potassium 4.5 3.5 - 5.1 mmol/L    Chloride 103 97 - 108 mmol/L    CO2 24 21 - 32 mmol/L    Anion gap 7 5 - 15 mmol/L    Glucose 239 (H) 65 - 100 mg/dL    BUN 25 (H) 6 - 20 MG/DL    Creatinine 1.52 (H) 0.55 - 1.02 MG/DL    BUN/Creatinine ratio 16 12 - 20      GFR est AA 43 (L) >60 ml/min/1.73m2    GFR est non-AA 35 (L) >60 ml/min/1.73m2    Calcium 8.8 8.5 - 10.1 MG/DL    Phosphorus 2.4 (L) 2.6 - 4.7 MG/DL    Albumin 3.2 (L) 3.5 - 5.0 g/dL   PTT    Collection Time: 04/09/20  5:39 AM   Result Value Ref Range    aPTT 71.7 (H) 22.1 - 32.0 sec    aPTT, therapeutic range     58.0 - 77.0 SECS   MAGNESIUM    Collection Time: 04/09/20  5:39 AM   Result Value Ref Range    Magnesium 2.8 (H) 1.6 - 2.4 mg/dL   C REACTIVE PROTEIN, QT    Collection Time: 04/09/20  5:39 AM   Result Value Ref Range    C-Reactive protein 0.64 (H) 0.00 - 0.60 mg/dL   SED RATE (ESR)    Collection Time: 04/09/20  5:39 AM   Result Value Ref Range    Sed rate, automated 52 (H) 0 - 30 mm/hr   FERRITIN    Collection Time: 04/09/20  5:39 AM   Result Value Ref Range    Ferritin 795 (H) 8 - 252 NG/ML   GLUCOSE, POC    Collection Time: 04/09/20  6:36 AM   Result Value Ref Range    Glucose (POC) 278 (H) 65 - 100 mg/dL    Performed by EULA BYRD    CBC W/O DIFF    Collection Time: 04/09/20  9:27 AM   Result Value Ref Range    WBC 41.2 (H) 3.6 - 11.0 K/uL    RBC 2.90 (L) 3.80 - 5.20 M/uL    HGB 8.0 (L) 11.5 - 16.0 g/dL    HCT 26.0 (L) 35.0 - 47.0 %    MCV 89.7 80.0 - 99.0 FL    MCH 27.6 26.0 - 34.0 PG    MCHC 30.8 30.0 - 36.5 g/dL    RDW 14.4 11.5 - 14.5 %    PLATELET 341 829 - 299 K/uL    MPV 11.2 8.9 - 12.9 FL    NRBC 0.0 0  WBC    ABSOLUTE NRBC 0.02 (H) 0.00 - 0.01 K/uL   GLUCOSE, POC    Collection Time: 04/09/20 11:20 AM   Result Value Ref Range    Glucose (POC) 370 (H) 65 - 100 mg/dL    Performed by Mikala Stockton    POC EG7    Collection Time: 04/09/20 11:21 AM   Result Value Ref Range    Calcium, ionized (POC) 1.00 (L) 1.12 - 1.32 mmol/L    FIO2 (POC) 100 %    pH (POC) 7.318 (L) 7.35 - 7.45      pCO2 (POC) 29.5 (L) 35.0 - 45.0 MMHG    pO2 (POC) 228 (H) 80 - 100 MMHG    HCO3 (POC) 15.1 (L) 22 - 26 MMOL/L    Base deficit (POC) 11 mmol/L    sO2 (POC) 100 (H) 92 - 97 %    Site DRAWN FROM ARTERIAL LINE      Device: VENT      Mode ASSIST CONTROL      Set Rate 10 bpm    PEEP/CPAP (POC) 10 cmH2O    PIP (POC) 12      Allens test (POC) NO      Inspiratory Time 0.12 sec    Specimen type (POC) ARTERIAL     METABOLIC PANEL, BASIC    Collection Time: 04/09/20 11:42 AM   Result Value Ref Range    Sodium 137 136 - 145 mmol/L    Potassium 4.5 3.5 - 5.1 mmol/L    Chloride 97 97 - 108 mmol/L    CO2 21 21 - 32 mmol/L    Anion gap 19 (H) 5 - 15 mmol/L    Glucose 372 (H) 65 - 100 mg/dL    BUN 30 (H) 6 - 20 MG/DL    Creatinine 2.12 (H) 0.55 - 1.02 MG/DL    BUN/Creatinine ratio 14 12 - 20      GFR est AA 29 (L) >60 ml/min/1.73m2    GFR est non-AA 24 (L) >60 ml/min/1.73m2    Calcium 7.9 (L) 8.5 - 10.1 MG/DL   MAGNESIUM    Collection Time: 04/09/20 11:42 AM   Result Value Ref Range    Magnesium 2.8 (H) 1.6 - 2.4 mg/dL   PHOSPHORUS    Collection Time: 04/09/20 11:42 AM   Result Value Ref Range    Phosphorus 8.2 (H) 2.6 - 4.7 MG/DL   CBC WITH AUTOMATED DIFF    Collection Time: 04/09/20 11:42 AM   Result Value Ref Range    WBC 63.0 (HH) 3.6 - 11.0 K/uL    RBC 2.10 (L) 3.80 - 5.20 M/uL    HGB 5.8 (LL) 11.5 - 16.0 g/dL    HCT 19.1 (L) 35.0 - 47.0 %    MCV 91.0 80.0 - 99.0 FL    MCH 27.6 26.0 - 34.0 PG    MCHC 30.4 30.0 - 36.5 g/dL    RDW 14.5 11.5 - 14.5 %    PLATELET 009 875 - 025 K/uL    MPV 11.1 8.9 - 12.9 FL    NRBC 0.0 0  WBC    ABSOLUTE NRBC 0.02 (H) 0.00 - 0.01 K/uL    NEUTROPHILS 86 (H) 32 - 75 %    BAND NEUTROPHILS 3 0 - 6 %    LYMPHOCYTES 6 (L) 12 - 49 %    MONOCYTES 3 (L) 5 - 13 %    EOSINOPHILS 0 0 - 7 %    BASOPHILS 1 0 - 1 %    METAMYELOCYTES 1 (H) 0 %    IMMATURE GRANULOCYTES 0 %    ABS. NEUTROPHILS 56.1 (H) 1.8 - 8.0 K/UL    ABS. LYMPHOCYTES 3.8 (H) 0.8 - 3.5 K/UL    ABS. MONOCYTES 1.9 (H) 0.0 - 1.0 K/UL    ABS. EOSINOPHILS 0.0 0.0 - 0.4 K/UL    ABS. BASOPHILS 0.6 (H) 0.0 - 0.1 K/UL    ABS. IMM.  GRANS. 0.0 K/UL    DF MANUAL      PLATELET COMMENTS Large Platelets      RBC COMMENTS NORMOCYTIC, NORMOCHROMIC      WBC COMMENTS Pathology Review Requested     TROPONIN I    Collection Time: 04/09/20 11:42 AM   Result Value Ref Range    Troponin-I, Qt. 0.26 (H) <0.05 ng/mL   TYPE + CROSSMATCH    Collection Time: 04/09/20 12:46 PM   Result Value Ref Range    Crossmatch Expiration 04/12/2020     ABO/Rh(D) A POSITIVE     Antibody screen NEG     Unit number [id number]     Blood component type RC LR     Unit division 00     Status of unit ISSUED     Crossmatch result Compatible    CBC W/O DIFF    Collection Time: 04/09/20 12:46 PM   Result Value Ref Range    WBC 63.9 (HH) 3.6 - 11.0 K/uL    RBC 2.15 (L) 3.80 - 5.20 M/uL    HGB 6.1 (L) 11.5 - 16.0 g/dL    HCT 18.8 (L) 35.0 - 47.0 %    MCV 87.4 80.0 - 99.0 FL    MCH 28.4 26.0 - 34.0 PG    MCHC 32.4 30.0 - 36.5 g/dL    RDW 14.7 (H) 11.5 - 14.5 %    PLATELET 778 611 - 408 K/uL    MPV 11.3 8.9 - 12.9 FL    NRBC 0.0 0  WBC    ABSOLUTE NRBC 0.03 (H) 0.00 - 0.01 K/uL   POC EG7    Collection Time: 04/09/20  1:21 PM   Result Value Ref Range    Calcium, ionized (POC) 0.98 (L) 1.12 - 1.32 mmol/L    FIO2 (POC) 90 %    pH (POC) 7.427 7.35 - 7.45      pCO2 (POC) 26.0 (L) 35.0 - 45.0 MMHG    pO2 (POC) 314 (H) 80 - 100 MMHG    HCO3 (POC) 17.1 (L) 22 - 26 MMOL/L    Base deficit (POC) 7 mmol/L    sO2 (POC) 100 (H) 92 - 97 %    Site DRAWN FROM ARTERIAL LINE      Device: VENT      Mode ASSIST CONTROL      Set Rate 10 bpm    PEEP/CPAP (POC) 8 cmH2O    PIP (POC) 15      Allens test (POC) NO      Inspiratory Time 0.66 sec    Specimen type (POC) ARTERIAL             Micro:     Blood: 3/31/20  Specimen Information: Blood        Component Value Ref Range & Units Status   Special Requests: NO SPECIAL REQUESTS    Final   Culture result: NO GROWTH 5 DAYS    Final   Result History              Sputum 3/31/20   Sputum        Component Value Ref Range & Units Status   Special Requests: NO SPECIAL REQUESTS    Final   GRAM STAIN FEW WBCS SEEN    Final   GRAM STAIN    Final   2+ GRAM POSITIVE COCCI IN PAIRS    Culture result: Abnormal     Final   MODERATE STAPHYLOCOCCUS AUREUS    Culture result:    Final   LIGHT NORMAL RESPIRATORY SOFIE    Susceptibility      Staphylococcus aureus     LU    Ciprofloxacin ($) <=0.5 ug/mL S    Clindamycin ($)  R    Doxycycline ($$) <=0.5 ug/mL S    Erythromycin ($$$$) >=8 ug/mL R    Gentamicin ($) <=0.5 ug/mL S    Levofloxacin ($) <=0.12 ug/mL S    Linezolid ($$$$$) 2 ug/mL S    Moxifloxacin ($$$$) <=0.25 ug/mL S    Oxacillin 0.5 ug/mL S    Rifampin ($$$$) <=0.5 ug/mL S1    Tetracycline <=1 ug/mL S    Trimeth/Sulfa <=10 ug/mL S    Vancomycin ($) 1 ug/mL S                         Imaging:  CXR 4/5/20  FINDINGS: AP radiograph of the chest was obtained.     There is been interval advancement of the endotracheal tube which now terminates  1.6 cm above the bhavik. Right upper extremity PICC and gastric decompression  tubes are again noted. There is no significant change in diffuse bilateral  heterogeneous opacities. Likely trace left pleural effusion. No pneumothorax. Stable cardiomediastinal silhouette.     IMPRESSION  IMPRESSION:   1. Interval advancement of endotracheal tube which now terminates 1.6 cm above  the bhavik. Consider slight retraction. 2. Unchanged diffuse bilateral heterogeneous opacities, consistent with  multifocal pneumonia.  Likely trace left pleural effusion.           Assessment / Plan    Ms. Roz Grace is a 68-year-old lady with a history of nephrolithiasis, GERD, basal cell carcinoma who was admitted from [facility] with respiratory symptoms concerning for COVID 19 and tested + on 3/26/20. Per team,  exposure to her mother with COVID 23. Per notes, intubated on 3/28/2020. Transferred to [facility] for CRRT    Had code blue 4/9/20   Resuscitated      Labs wt marked leukocytosis post code     Restarted on Vancomycin by primary team 4/9     Palliative team consulted       1) Sars-CoV2 pneumonia, respiratory failure, renal failure   Sars-CoV-2 detected 3/26/20, other viral respiratory panel negative  (records from [facility] everywhere )   Urine legionella and S pneumo ag negative 3/27/20  Procalcitonin 3/27 0.47, 3/28 0.44, 3/29 5.86, 3/31 13.44  , Ferritin 1985 3/31/20    4/3/20   , ferritin 992, CRP 9.65 4/5/20   IL 6  402 3/31/20  Completed azithromycin and Plaquenil   S/P Actemra one dose 4/3.    On Heparin gtt     2)  MSSA on sputum, pharmacy dosing vancomycin based on levels due to Penicillin allergy    Completed a 7 day course   4/6/20 vanc level random is 15.9  Restarted on Vancomycin 4/9      3) ARF:   Plans per nephrology, CRRT  R IJ    3) history of nephrolithiasis    4) history of GERD    5) basal cell carcinoma per chart        D/W with Dr Danish Don and Ms Carito Salazar today in person     Please contact with questions     Allison Padgett DO  3:02 PM

## 2020-04-09 NOTE — CONSULTS
Palliative Medicine    Consult received, CORNELIO Panda spoke with primary team. S/p code blue today, Dr Sakina Casiano has spoken to  Neo Berumen and John Miller to f/u. Given mult conversations today, will call  tmrw along w/ Cynthia Arzate to update and assess anticipatory grief status. Thank you for the consult for Ms Cyn Chan.

## 2020-04-09 NOTE — PROGRESS NOTES
Palliative Medicine  [facility] [place]: [phone]  [facility]: [phone]        Code Status: Full Code    Advance Care Planning: Not on file   LNOK:   Primary Decision Maker: Merry Parekh - [phone]     Rajan Kay is a 63 yo female with PMH of obesity, new onset DM2, basal cell carcinoma, endometrial cancer, GERD, Kidney stones, polyp of ureter, anxiety. Past surgical hx:  pr endometrial ablation, thermal;  section (). She initially presented to [facility] on 3/26 with worsening hypoxic respiratory failure w/ close exposure to COVID-19 (mother covid+). She has since tested Covid+. She was intubated 3/28 and started on broad spectrum antibiotics and plaquenil. She developed worsening renal failure and was transferred to [facility] on 3/31 for CRRT. Pt has had episodic hypotension requiring volume resuscitation  : Pt passed SBT and tolerated PSV for 4 hour  : improved mental status following commands   : code blue /resucitated / post code was awake and agitated - now sedated on pressors    Palliative medicine consult for care decisions. Social: Lives with Damion Cuevas in [place], son and daughter      Patient / Family Encounter Documentation    Participants (names): Spouse Katy Bergman [facility]     Narrative:     Place supportive phone call to Fay Roper. Introduced palliative service as extra support for patient and him. Fay Roper talked a little about being home alone and emotional strain of not being able to visit [facility]. His family is spread out and isolate (daughter lives with  in [facility] [place] [facility], brother and sister in law closer to [facility]). He is very in touch with ICU and is very on top of patient care. He was anxious to get off phone so he could call ICU for update. I let him know we would be following along and will touch base on Monday. He requested I text our contact info.       Goals of Care / Plan:    Touch base with patient  Monday            Thank you for the opportunity to be involved in the care of Ms. Nay Arguelles and her family.     Anderson Pugh, CORNELIO, Supervisee in Social Work  Palliative Medicine   [phone]

## 2020-04-09 NOTE — PROCEDURES
[facility]      Procedure Note - Arterial Access:   Performed by CELESTINA Claire . Immediately prior to the procedure, the patient was reevaluated and found suitable for the planned procedure and any planned medications. Immediately prior to the procedure a time out was called to verify the correct patient, procedure, equipment, staff, and marking as appropriate. Insertion Date: 04/09/20 Time: 1100  Procedure Location:  ICU. Condition: Emergency. Consent: NO. Placed emergently. Method: Seldinger technique. No U/S. Site Prep: ChloraPrep. Procedure: Arterial Catheter Insertion in Right, Radial Artery   Catheter inserted into a new site. Number of Attempts:  1 Indication: Monitoring. Complication None. Performed By:performed the above procedure myself. The procedure was tolerated well.     Anusha Amador [facility]-BC     [address] [facility]

## 2020-04-09 NOTE — PROGRESS NOTES
Pharmacist Note - Vancomycin Dosing    Consult provided for this 64 y.o. female for indication of sepsis. Antibiotic regimen(s): vancomycin  Patient on vancomycin PTA? NO     Recent Labs     20  1142 20  0927 20  0539 20  0400 20  1524   WBC 63.0* 41.2*  --  22.9*  --    CREA 2.12*  --  1.52*  --  1.41*   BUN 30*  --  25*  --  24*     Frequency of BMP: daily  Height: 162 cm  Weight: 116 kg  Est CrCl: :CVVH  Temp (24hrs), Av.9 °F (36.6 °C), Min:97.5 °F (36.4 °C), Max:98.2 °F (36.8 °C)    Cultures:   blood NGTD    Goal trough = 15 - 20 mcg/mL    Therapy will be initiated with a loading dose of 2000 mg IV x 1   Will order level prior to maintenance dose    Pharmacy to follow patient daily and order levels / make dose adjustments as appropriate.

## 2020-04-09 NOTE — PROGRESS NOTES
04/08/20 2232   Vent Settings   FIO2 (%) 40 %  (weaned to 30% per protocol)   CMV Rate Set 10  (adjusted d/t pt asynchronys with vent)   Back-Up Rate 10   PC Set 12  (decresaed d/t high volumes >700mls)   PEEP/VENT (cm H2O) 8 cm H20   I:E Ratio 1:1.5   Insp Time (sec) 2.4 sec     Pt placed back on vent to rest overnight. Pt tolerating PSV well. AC settings adjusted d/t asynchrony when pt was placed back on rate. PC weaned to +12mH2O decreased due to high volumes > 700mls. Pt tolerated weaning well. RN aware of changes.

## 2020-04-09 NOTE — DIABETES MGMT
MARTA ROMERO  CLINICAL NURSE SPECIALIST CONSULT  [facility]    Presentation   Melanie Mixon is a 64 y.o. female admitted from OSH to [facility] ICU with SARS-COV2 needing CRRT. She is currently sedated and has been intubated since 3/28/20. Current clinical course has been complicated by steroid induced hyperglycemia. Steroids are discontinued at this time. She requires CRRT for acute renal failure r/t her sepsis and hypotension. PHM: GERD, obesity, and anxiety. New diabetes diagnosis with A1C 11.0% (3/28/2020); updated A1C 3/31/20-10.4%     Recent events:   Patient became hypotensive/tachypneic and agitated when on SBT this morning. O2 saturations dropped and no pulse noted. Code called and ROSC achieved minutes later. Pressors started again along with sedation. Patient's hgb low today 5.8, PRBCs ordered for transfusion. New femoral central line placed, left. Consulted by Provider for advanced diabetes nursing assessment and care, specifically related to   [] Transitioning off Claudiamilagro Sorianoy   [x] Inpatient management strategy  [] Home management assessment  [] Survival skill education    Diabetes-related medical history  Acute complications  hyperglycemia  Neurological complications  NONE  Microvascular disease  NONE  Macrovascular disease  NONE  Other associated conditions     NONE    Diabetes medication history: NONE    Subjective   Per chart review, Ms. John Moran remains very ill  and is on isolation forSARS-COV2 in ICU. I am unable to do a physical assessment of the patient at this time. Patient reports the following home diabetes self-care practices: deferred    Objective     Vital Signs   Visit Vitals  /87   Pulse (!) 145   Temp 99 °F (37.2 °C)   Resp 23   Ht 5' 4\" (1.626 m)   Wt 116.6 kg (257 lb 1.6 oz)   SpO2 (!) 80%   BMI 44.13 kg/m²   .    Laboratory  Lab Results   Component Value Date/Time    Hemoglobin A1c 10.4 (H) 03/31/2020 03:42 PM     No results found for: LDL, LDLC, DLDLP  Lab Results   Component Value Date/Time    Creatinine 2.12 (H) 04/09/2020 11:42 AM     Lab Results   Component Value Date/Time    Sodium 137 04/09/2020 11:42 AM    Potassium 4.5 04/09/2020 11:42 AM    Chloride 97 04/09/2020 11:42 AM    CO2 21 04/09/2020 11:42 AM    Anion gap 19 (H) 04/09/2020 11:42 AM    Glucose 372 (H) 04/09/2020 11:42 AM    BUN 30 (H) 04/09/2020 11:42 AM    Creatinine 2.12 (H) 04/09/2020 11:42 AM    BUN/Creatinine ratio 14 04/09/2020 11:42 AM    GFR est AA 29 (L) 04/09/2020 11:42 AM    GFR est non-AA 24 (L) 04/09/2020 11:42 AM    Calcium 7.9 (L) 04/09/2020 11:42 AM    Bilirubin, total 0.7 04/09/2020 04:00 AM    AST (SGOT) 30 04/09/2020 04:00 AM    Alk. phosphatase 161 (H) 04/09/2020 04:00 AM    Protein, total 6.1 (L) 04/09/2020 04:00 AM    Albumin 3.2 (L) 04/09/2020 05:39 AM    Globulin 2.8 04/09/2020 04:00 AM    A-G Ratio 1.2 04/09/2020 04:00 AM    ALT (SGPT) 42 04/09/2020 04:00 AM     Lab Results   Component Value Date/Time    ALT (SGPT) 42 04/09/2020 04:00 AM           Evaluation   Ms Rebecca Kearney, with new onset Type 2 diabetes,with A1C 10.4%. BG has trended up today after code to 372mg/dl. But BG has been trending higher since Monday. Recent spike in BG likely due to stress response related to SBT this morning and stress of code.  TF (Nepro) at 30cc/hr. Correctional coverage given. Anticipate BG to trend back down into previous range with correctional insulin. However, she may still require an increase in her basal insulin coverage. It is imperative that we maintain her BG within target range 100-180mg/dl. Recommendations   1. CONTINUE basal and correction insulin. IF BG continues to trend higher, >200mg/dl consider increasing Lantus. 2. Will continue to follow.     Assessment and Plan   Nursing Diagnosis Risk for unstable blood glucose pattern   Nursing Intervention Domain 8171 Decision-making Support   Nursing Interventions Examined current inpatient diabetes control Explored factors facilitating and impeding inpatient management  Identified self-management practices impeding diabetes control  Explored corrective strategies with patient and responsible inpatient provider   Informed patient of rational for insulin strategy while hospitalized     Referral   [] Behavioral health services  [] Inpatient nutrition services  [] Pharmacy services for medication management  [x] Diabetes Self-Management Training through [facility] (Phone [phone] to schedule appointment)    Billing Code(s)     [x] [address] [facility] care - [address] [facility] [address], CNS   [facility]  Access via  Joel Chinwilliam  [phone]

## 2020-04-09 NOTE — PROGRESS NOTES
Critical Care Update - Advanced Care planning    Updated family, Spouse and Daughter on patients critical status and worsening state. Discussed all events and treatments done today. Answered all questions to their satisfaction. Discussed Code status. Family ask to continue full code at this time. 40 minutes total time over phone with spouse and daughter throughout day, see prior update and discussion notes.      Atiya Bernal [facility]-BC     [address] [facility]

## 2020-04-09 NOTE — PROGRESS NOTES
SBT FAILED PT RR TO 36 PUT BACK ON REGULAR SETTING     04/09/20 0711   Ventilator Measurements   Resp Rate Observed 19   Vt Exhaled (Machine Breath) (ml) 721 ml   Ve Observed (l/min) 11.2 l/min   PIP Observed (cm H2O) 17 cm H2O   MAP (cm H2O) 11   I:E Ratio Actual 1:2

## 2020-04-09 NOTE — PROGRESS NOTES
[facility]   [address] [facility], [facility] [address], [facility]  Phone: [phone]   [phone]       Nephrology Progress Note  Veronica Grant     1960     [id number]  Date of Admission : 3/31/2020  04/09/20    CC: Follow up for JUANCHO      Assessment and Plan   JUANCHO :  - 2/2 ATN  - continue CVVHD. Reduced RFR to 3000 ml/hr   - No Factor due to hypotension : d/w RN  - Serial PTTs with systemic Heparin   - Blood Cx from femoral melissa pending. WBC from today pending. If worse, need to remove line or add IV vanc back     COVID-19 +ve   Acute Hypoxic Resp Failure   - On Vent   - completed Plaquenil. On Vit C, Zinc   - s/p Tocilizumab      Type II DM   - Insulin per primary team      Hypothermia   Morbid Obesity      Care Plan discussed with:  ICU team      Interval History:  Remains on levophed at 12 mcg   No factor   CRRT going well   CBC pending      PT NOT EXAMINED in line with ASN and RPA GUIDELINES ON MANAGING COVID-19 PTS WITH RENAL ISSUES. Examination findings discussed personally with the examining Physician team member      Review of Systems: Review of systems not obtained due to patient factors.     Current Medications:   Current Facility-Administered Medications   Medication Dose Route Frequency    [Held by provider] labetaloL (NORMODYNE) tablet 100 mg  100 mg Oral Q12H    NOREPINephrine (LEVOPHED) 8,000 mcg in dextrose 5% 250 mL infusion  2-30 mcg/min IntraVENous TITRATE    0.9% sodium chloride infusion  3 mL/hr IntraVENous CONTINUOUS    0.9% sodium chloride infusion  5 mL/hr IntraVENous CONTINUOUS    dexmedeTOMidine (PRECEDEX) 400 mcg in 0.9% sodium chloride 100 mL infusion  0.2-1.4 mcg/kg/hr IntraVENous TITRATE    labetaloL (NORMODYNE;TRANDATE) injection 10 mg  10 mg IntraVENous Q10MIN PRN    famotidine (PEPCID) 40 mg/5 mL (8 mg/mL) oral suspension 20 mg  20 mg Oral QHS    fentaNYL citrate (PF) injection  mcg   mcg IntraVENous Q1H PRN    white petrolatum-mineral oiL (AKWA TEARS) 83-15 % ophthalmic ointment   Both Eyes Q12H    heparin (porcine) pf 300 Units  300 Units InterCATHeter PRN    guaiFENesin (ROBITUSSIN) 100 mg/5 mL oral liquid 100 mg  100 mg Oral Q6H    alteplase (CATHFLO) 2 mg in sterile water (preservative free) 2 mL injection  2 mg InterCATHeter PRN    alteplase (CATHFLO) 2 mg in sterile water (preservative free) 2 mL injection  2 mg InterCATHeter PRN    heparin 25,000 units in D5W 250 ml infusion  7-25 Units/kg/hr IntraVENous TITRATE    midazolam (VERSED) injection 1-2 mg  1-2 mg IntraVENous Q2H PRN    bacitracin 500 unit/gram packet 1 Packet  1 Packet Topical PRN    insulin glargine (LANTUS) injection 35 Units  35 Units SubCUTAneous DAILY    balsam peru-castor oiL (VENELEX) ointment   Topical BID    sodium chloride (NS) flush 5-40 mL  5-40 mL IntraVENous Q8H    sodium chloride (NS) flush 5-40 mL  5-40 mL IntraVENous PRN    HYDROcodone-acetaminophen (NORCO) 5-325 mg per tablet 1 Tab  1 Tab Oral Q4H PRN    ondansetron (ZOFRAN) injection 4 mg  4 mg IntraVENous Q4H PRN    chlorhexidine (ORAL CARE KIT) 0.12 % mouthwash 15 mL  15 mL Oral Q12H    docusate (COLACE) 50 mg/5 mL oral liquid 100 mg  100 mg Per NG tube BID    acetaminophen (TYLENOL) tablet 650 mg  650 mg Oral Q6H PRN    Or    acetaminophen (TYLENOL) suppository 650 mg  650 mg Rectal Q6H PRN    bicarbonate dialysis (PRISMASOL) BG K 4/Ca 2.5 5000 ml solution   Extracorporeal DIALYSIS CONTINUOUS    glucose chewable tablet 16 g  4 Tab Oral PRN    glucagon (GLUCAGEN) injection 1 mg  1 mg IntraMUSCular PRN    dextrose 10% infusion 0-250 mL  0-250 mL IntraVENous PRN    insulin lispro (HUMALOG) injection   SubCUTAneous Q6H      Allergies   Allergen Reactions    Augmentin [Amoxicillin-Pot Clavulanate] Rash and Itching       Objective:  Vitals:    Vitals:    04/09/20 0630 04/09/20 0700 04/09/20 0711 04/09/20 0800   BP:   (!) 89/66 92/74   Pulse: 85 84 88 85   Resp: 18 14 23 Temp:    97.8 °F (36.6 °C)   SpO2: 100% 100%  100%   Weight:       Height:         Intake and Output:  04/09 0701 - 04/09 1900  In: -   Out: 70   04/07 1901 - 04/09 0700  In: 3591.3 [I.V.:2001.3]  Out: 9018 [Urine:32; Drains:110]    Physical Examination: inspection only   Pt intubated    Yes  General: Sedated on vent, obese   Neck:  Lines   Resp:  On vent   CV:  RRR  GI:  Obese   Neurologic:  Sedated   Psych:              Unable to assess  Ext                   R femoral melissa     []    High complexity decision making was performed  []    Patient is at high-risk of decompensation with multiple organ involvement    Lab Data Personally Reviewed: I have reviewed all the pertinent labs, microbiology data and radiology studies during assessment.     Recent Labs     04/09/20  0539 04/09/20  0400 04/08/20  1524 04/08/20  0337 04/08/20  0336 04/08/20  0300 04/07/20  0339   *  --  134*  --  134*  --  134*   K 4.5  --  3.6  --  4.0  --  4.4     --  102  --  104  --  102   CO2 24  --  25  --  25  --  23   *  --  157*  --  153*  --  187*   BUN 25*  --  24*  --  29*  --  31*   CREA 1.52*  --  1.41*  --  1.49*  --  1.51*   CA 8.8  --  9.4  --  9.1  --  9.6   MG 2.8*  --   --  2.7*  --   --  2.9*   PHOS 2.4*  --  2.0* 2.8 2.8  --  3.5  3.6   ALB 3.2* 3.3* 3.3*  --  3.0* 3.2* 2.5*  2.6*   SGOT  --  30  --   --   --  40* 45*   ALT  --  42  --   --   --  44 41   INR  --   --   --   --  1.1  --  1.1     Recent Labs     04/08/20  0336   WBC 16.8*   HGB 10.1*   HCT 31.7*        No results found for: SDES  Lab Results   Component Value Date/Time    Culture result: NO GROWTH AFTER 17 HOURS 04/08/2020 10:36 AM    Culture result: NO GROWTH 5 DAYS 03/31/2020 11:15 AM    Culture result: MODERATE STAPHYLOCOCCUS AUREUS (A) 03/31/2020 10:34 AM    Culture result: LIGHT NORMAL RESPIRATORY SOFIE 03/31/2020 10:34 AM     Recent Results (from the past 24 hour(s))   CULTURE, BLOOD    Collection Time: 04/08/20 10:36 AM Result Value Ref Range    Special Requests: NO SPECIAL REQUESTS      Culture result: NO GROWTH AFTER 17 HOURS     GLUCOSE, POC    Collection Time: 04/08/20 11:58 AM   Result Value Ref Range    Glucose (POC) 160 (H) 65 - 100 mg/dL    Performed by Robb Hernández    RENAL FUNCTION PANEL    Collection Time: 04/08/20  3:24 PM   Result Value Ref Range    Sodium 134 (L) 136 - 145 mmol/L    Potassium 3.6 3.5 - 5.1 mmol/L    Chloride 102 97 - 108 mmol/L    CO2 25 21 - 32 mmol/L    Anion gap 7 5 - 15 mmol/L    Glucose 157 (H) 65 - 100 mg/dL    BUN 24 (H) 6 - 20 MG/DL    Creatinine 1.41 (H) 0.55 - 1.02 MG/DL    BUN/Creatinine ratio 17 12 - 20      GFR est AA 47 (L) >60 ml/min/1.73m2    GFR est non-AA 38 (L) >60 ml/min/1.73m2    Calcium 9.4 8.5 - 10.1 MG/DL    Phosphorus 2.0 (L) 2.6 - 4.7 MG/DL    Albumin 3.3 (L) 3.5 - 5.0 g/dL   PTT    Collection Time: 04/08/20  3:24 PM   Result Value Ref Range    aPTT 57.2 (H) 22.1 - 32.0 sec    aPTT, therapeutic range     58.0 - 77.0 SECS   GLUCOSE, POC    Collection Time: 04/08/20  5:56 PM   Result Value Ref Range    Glucose (POC) 179 (H) 65 - 100 mg/dL    Performed by Maria Teresa Portillo    PTT    Collection Time: 04/08/20 10:39 PM   Result Value Ref Range    aPTT 56.8 (H) 22.1 - 32.0 sec    aPTT, therapeutic range     58.0 - 77.0 SECS   GLUCOSE, POC    Collection Time: 04/08/20 11:16 PM   Result Value Ref Range    Glucose (POC) 189 (H) 65 - 100 mg/dL    Performed by EULA BYRD    HEPATIC FUNCTION PANEL    Collection Time: 04/09/20  4:00 AM   Result Value Ref Range    Protein, total 6.1 (L) 6.4 - 8.2 g/dL    Albumin 3.3 (L) 3.5 - 5.0 g/dL    Globulin 2.8 2.0 - 4.0 g/dL    A-G Ratio 1.2 1.1 - 2.2      Bilirubin, total 0.7 0.2 - 1.0 MG/DL    Bilirubin, direct 0.3 (H) 0.0 - 0.2 MG/DL    Alk.  phosphatase 161 (H) 45 - 117 U/L    AST (SGOT) 30 15 - 37 U/L    ALT (SGPT) 42 12 - 78 U/L   POC EG7    Collection Time: 04/09/20  4:19 AM   Result Value Ref Range    Calcium, ionized (POC) 1.27 1.12 - 1.32 mmol/L    FIO2 (POC) 30 %    pH (POC) 7.430 7.35 - 7.45      pCO2 (POC) 37.9 35.0 - 45.0 MMHG    pO2 (POC) 112 (H) 80 - 100 MMHG    HCO3 (POC) 25.2 22 - 26 MMOL/L    Base excess (POC) 1 mmol/L    sO2 (POC) 99 (H) 92 - 97 %    Site DRAWN FROM ARTERIAL LINE      Device: VENT      Mode ASSIST CONTROL      Set Rate 10 bpm    PEEP/CPAP (POC) 8 cmH2O    PIP (POC) 21      Allens test (POC) N/A      Inspiratory Time 2.4 sec    Specimen type (POC) ARTERIAL      Total resp. rate 16     RENAL FUNCTION PANEL    Collection Time: 04/09/20  5:39 AM   Result Value Ref Range    Sodium 134 (L) 136 - 145 mmol/L    Potassium 4.5 3.5 - 5.1 mmol/L    Chloride 103 97 - 108 mmol/L    CO2 24 21 - 32 mmol/L    Anion gap 7 5 - 15 mmol/L    Glucose 239 (H) 65 - 100 mg/dL    BUN 25 (H) 6 - 20 MG/DL    Creatinine 1.52 (H) 0.55 - 1.02 MG/DL    BUN/Creatinine ratio 16 12 - 20      GFR est AA 43 (L) >60 ml/min/1.73m2    GFR est non-AA 35 (L) >60 ml/min/1.73m2    Calcium 8.8 8.5 - 10.1 MG/DL    Phosphorus 2.4 (L) 2.6 - 4.7 MG/DL    Albumin 3.2 (L) 3.5 - 5.0 g/dL   PTT    Collection Time: 04/09/20  5:39 AM   Result Value Ref Range    aPTT 71.7 (H) 22.1 - 32.0 sec    aPTT, therapeutic range     58.0 - 77.0 SECS   MAGNESIUM    Collection Time: 04/09/20  5:39 AM   Result Value Ref Range    Magnesium 2.8 (H) 1.6 - 2.4 mg/dL   C REACTIVE PROTEIN, QT    Collection Time: 04/09/20  5:39 AM   Result Value Ref Range    C-Reactive protein 0.64 (H) 0.00 - 0.60 mg/dL   SED RATE (ESR)    Collection Time: 04/09/20  5:39 AM   Result Value Ref Range    Sed rate, automated 52 (H) 0 - 30 mm/hr   FERRITIN    Collection Time: 04/09/20  5:39 AM   Result Value Ref Range    Ferritin 795 (H) 8 - 252 NG/ML   GLUCOSE, POC    Collection Time: 04/09/20  6:36 AM   Result Value Ref Range    Glucose (POC) 278 (H) 65 - 100 mg/dL    Performed by EULA BYRD            Total time spent with patient:  xxx   min.                                Care Plan discussed with:  Patient     Family      RN      Consulting Physician [address] [facility]        I have reviewed the flowsheets. Chart and Pertinent Notes have been reviewed. No change in PMH ,family and social history from Consult note.       Nichol Donahue MD

## 2020-04-09 NOTE — DIALYSIS
[address]       [phone]    Orders   Mode: CVVHD   Factor: 0   DIALYSATE: 3000mL/HR   Blood Flow Rate: 200ML/MIN     Metrics   BP / HR: BP:89/66 HR:88   Blood Flow Rate: 200ML/MIN   AP:                         -73   RP: 65   TMP: 34   PD: 39   FP: 139   DIALYSATE: 3000ML/HR     Comments / Plan:      CVVHD REVIEWED THROUGH [facility] WINDOW OUTSIDE ROOM. CVVHD RUNNING WELL WITH NO INDICATION FOR FILTER CHANGE AT THIS TIME. PATIENT IS ON SYSTEMIC HEPARIN AND WILL REACH 72 HOUR FILTER MAXIMUM TOMORROW MORNING AT APPROXIMATELY 0800. ORDERS AND CONSENT VERIFIED. PRE/POST REPORT TO PRIMARY RN CARSON FORDE. LINE SECURITY AND ACCESS ASSESSMENT PERFORMED BY RN AT BEDSIDE.

## 2020-04-09 NOTE — PROGRESS NOTES
Spiritual Care Assessment/Progress Note  [facility]      NAME: Ramón Aguilar      MRN: [id number]  AGE: 64 y.o. SEX: female  [employer] Affiliation: Unknown   Language: English     4/9/2020     Total Time (in minutes): 20     Spiritual Assessment begun in [address] through conversation with:         []Patient        [x] Family    [] Friend(s)        Reason for Consult: Code Blue/Emelia     Spiritual beliefs: (Please include comment if needed)     [] Identifies with a enma tradition:         [] Supported by a enma community:            [x] Claims no spiritual orientation:           [] Seeking spiritual identity:                [] Adheres to an individual form of spirituality:           [] Not able to assess:                           Identified resources for coping:      [] Prayer                               [] Music                  [] Guided Imagery     [x] Family/friends                 [] Pet visits     [] Devotional reading                         [] Unknown     [] Other:                                           Interventions offered during this visit: (See comments for more details)          Family/Friend(s): Affirmation of emotions/emotional suffering, Affirmation of enma, Normalization of emotional/spiritual concerns     Plan of Care:     [] Support spiritual and/or cultural needs    [] Support AMD and/or advance care planning process      [] Support grieving process   [] Coordinate Rites and/or Rituals    [] Coordination with community clergy   [] No spiritual needs identified at this time   [] Detailed Plan of Care below (See Comments)  [] Make referral to Music Therapy  [] Make referral to Pet Therapy     [] Make referral to Addiction services  [] Make referral to [facility]  [] Make referral to Spiritual Care Partner  [] No future visits requested        [x] Follow up visits as needed     Responded to Code Blue for pt on ICU.  Consulted Doctor Dorinda Patricia who requested  reach out to pt's , Libby Jessica. Spoke with Libby Jessica over the phone and assured him of ongoing care for pt. Libbyleyla Jessica was clearly tearful as he continues to hope for the pt to recover. Chaplains will continue to offer support as needed.    Chaplain Haseeb, MDiv, MS, [facility]  [phone]

## 2020-04-09 NOTE — PROGRESS NOTES
Code Blue Note    Leader: Mayra Calhoun, NP-BC    Patient became severely hypotensive, CRRT blood returned prior to Code. Code Blue called started - 1759 2116 - PEA arrest  1008 - Epi 1 mg/ Started Vasopressin gtt  1010 - Huy gtt increased  1010 - Pulse check, positive. Sinus Tachycardia  1011 - CPR resumed, PEA arrest  1012 - Epi 1 mg/ Started on Epi gtt   1012 - Bicarb 1 amp  1013 - Pulse check   1014 - ROSC, Sinus Tachycardia. End Code    Down time 7 minutes.       Carlo Clemons [facility]-BC     [address] [facility]

## 2020-04-09 NOTE — PROCEDURES
[facility]      Procedure Note - Central Venous Access:   Performed by CELESTINA Jennings    Line placed emergently. No consent. Family was notified post procedure. Immediately prior to the procedure, the patient was reevaluated and found suitable for the planned procedure and any planned medications. Immediately prior to the procedure a time out was called to verify the correct patient, procedure, equipment, staff, and marking as appropriate. The site was prepped with ChloraPrep. Using Seldinger technique a Quad Lumen CVC was placed in the Left, Femoral Vein via direct cannulation with 1 number of attempts for Blood Drawing and IV Access. Ultrasound Guidance was utilized. There was good non pulsatile dark blood return. VBG done on blood from site for confirmation. The following complications were encountered: None. The procedure was tolerated well. Ok to use line for meds.        Carson Arce AGACNP-BC     [address] [facility]

## 2020-04-09 NOTE — CONSULTS
Palliative Medicine Consult  [place]: [phone] ([phone])    Patient Name: Melanie Mixon  YOB: 1960    Date of Initial Consult: 4/9/20  Reason for Consult: Care decisions   Requesting Provider: Zaynab Manuel   Primary Care Physician: eDstiny Villalobos MD     SUMMARY:   Melanie Mixon is a 64 y.o. with a past history of DM (new dx, A1C 3/28/20 11), endometrial cancer s/p hysterectomy and BSO, obesity  who was admitted on 3/31/2020 from  [facility] with respiratory failure where she was admitted on 3/26/20. On Plaquenil and Azithromycin, intubated on 3/28/20. Was also on CRRT upon admission. S/p 1 dose Actemra. With agitation on the vent. PEA arrest 4/9/20, 7 min ROSC.     4/10/20- More alert, 2 vasopressors. Remains critically ill. Sig anemia. Current medical issues leading to Palliative Medicine involvement include: care decisions. Social: Pt  to Guillermo Montiel. PALLIATIVE DIAGNOSES:   1. Shortness of breath,COVID19+  2. Multisystem organ dysfunction  3. Goals of care       PLAN:   1. Pt critically ill but decr vasopressor support today. Plans for CT A/P to assess cause for anemia. 2.  Note calls to family yesterday, decision for full code and full efforts. 3. As goals clear and pt making small gains, going to have MSW Gerda Alonso reach out to  for anticipatory grief assessment. Can also offer video chat. 4. Initial consult note routed to primary continuity provider and/or primary health care team members  5.  Communicated plan of care with: Palliative IDT, [facility] Health Care Team incl [address] [place] [address] / TREATMENT PREFERENCES:     GOALS OF CARE:  Patient/Health Care Proxy Stated Goals: Prolong life    TREATMENT PREFERENCES:   Code Status: Full Code    Advance Care Planning:  [x] [facility] Med Interdisciplinary Team has updated the ACP Navigator with Health Care Decision Maker and Patient Capacity      Primary Decision Maker: Abhishek Akers - Spouse - [phone]    Advance Care Planning 4/2/2020   Confirm Advance Directive Yes, on file       Medical Interventions: Full interventions       Other:    As far as possible, the palliative care team has discussed with patient / health care proxy about goals of care / treatment preferences for patient. HISTORY:     History obtained from: Chart, staff, family     CHIEF COMPLAINT: Cannot obtain due to patient factors    HPI/SUBJECTIVE:    The patient is:   [] Verbal and participatory  [x] Non-participatory due to: medical condition    On vent, eyes open. Clinical Pain Assessment (nonverbal scale for severity on nonverbal patients):   Clinical Pain Assessment  Severity: 0     Activity (Movement): Lying quietly, normal position    Duration: for how long has pt been experiencing pain (e.g., 2 days, 1 month, years)  Frequency: how often pain is an issue (e.g., several times per day, once every few days, constant)     FUNCTIONAL ASSESSMENT:     Palliative Performance Scale (PPS):  PPS: 20       PSYCHOSOCIAL/SPIRITUAL SCREENING:     Palliative IDT has assessed this patient for cultural preferences / practices and a referral made as appropriate to needs (Cultural Services, Patient Advocacy, Ethics, etc.)    Any spiritual / Anabaptist concerns:  [] Yes /  [x] No    Caregiver Burnout:  [] Yes /  [x] No /  [] No Caregiver Present      Anticipatory grief assessment:   [x] Normal  / [] Maladaptive       ESAS Anxiety:      ESAS Depression:     Cannot obtain due to patient factors       REVIEW OF SYSTEMS:     Positive and pertinent negative findings in ROS are noted above in HPI. The following systems were [x] reviewed / [] unable to be reviewed as noted in HPI  Other findings are noted below. Systems: constitutional, ears/nose/mouth/throat, respiratory, gastrointestinal, genitourinary, musculoskeletal, integumentary, neurologic, psychiatric, endocrine. Positive findings noted below.   Modified ESAS Completed by: provider Fatigue: 10 Drowsiness: 8     Pain: 0           Dyspnea: 0           Stool Occurrence(s): 1        PHYSICAL EXAM:     From RN flowsheet:  Wt Readings from Last 3 Encounters:   20 257 lb 1.6 oz (116.6 kg)   20 270 lb 12.8 oz (122.8 kg)   19 278 lb (126.1 kg)     Blood pressure 149/88, pulse (!) 125, temperature 96.7 °F (35.9 °C), resp. rate 29, height 5' 4\" (1.626 m), weight 257 lb 1.6 oz (116.6 kg), SpO2 100 %. Pain Scale 1: Adult Nonverbal Pain Scale  Pain Intensity 1: 0              Pain Intervention(s) 1: Medication (see MAR)  Last bowel movement, if known:     Constitutional: eyes open, sedated on vent   ENMT: no nasal discharge, ET tube  Neurologic:eyes open        HISTORY:     Active Problems:    Hypotension (2020)      Sepsis due to methicillin susceptible Staphylococcus aureus (MSSA) without acute organ dysfunction ([facility] [address] (2020)      COVID-19 virus infection (3/31/2020)      Past Medical History:   Diagnosis Date    Basal cell carcinoma     GERD (gastroesophageal reflux disease)     Kidney stones     Polyp of ureter       Past Surgical History:   Procedure Laterality Date    ENDOMETRIAL ABLATION, THERMAL      HX  SECTION      HX COLONOSCOPY      2017    HYSTEROSCOPY DIAGNOSTIC  2011    D&C/POLYP REMOVAL      Family History   Problem Relation Age of Onset    Prostate Cancer Father         PROSTATE    Breast Cancer Sister 46        invasive poorly differentiated ductal carcinoma, Neg genetic testing.  Cancer Sister 62        melanoma stage 1      History reviewed, no pertinent family history.   Social History     Tobacco Use    Smoking status: Never Smoker    Smokeless tobacco: Never Used   Substance Use Topics    Alcohol use: Not on file     Allergies   Allergen Reactions    Augmentin [Amoxicillin-Pot Clavulanate] Rash and Itching      Current Facility-Administered Medications   Medication Dose Route Frequency    0.9% sodium chloride infusion 250 mL  250 mL IntraVENous PRN    cefepime (MAXIPIME) 2 g in 0.9% sodium chloride (MBP/ADV) 100 mL  2 g IntraVENous Q12H    metroNIDAZOLE (FLAGYL) IVPB premix 500 mg  500 mg IntraVENous Q8H    sodium chloride 0.9 % bolus infusion 100 mL  100 mL IntraVENous RAD ONCE    iopamidoL (ISOVUE-370) 76 % injection 100 mL  100 mL IntraVENous RAD ONCE    sodium chloride (NS) flush 10 mL  10 mL IntraVENous RAD ONCE    vancomycin (FIRVANQ) 50 mg/mL oral solution 500 mg  500 mg Oral Q6H    0.9% sodium chloride infusion 250 mL  250 mL IntraVENous PRN    vasopressin (VASOSTRICT) 20 Units in 0.9% sodium chloride 100 mL infusion  0-0.04 Units/min IntraVENous TITRATE    EPINEPHrine (ADRENALIN) 5 mg in 0.9% sodium chloride 250 mL infusion  0-10 mcg/min IntraVENous TITRATE    sodium bicarbonate 150 mEq/1000 mL D5W (premix)   IntraVENous CONTINUOUS    fentaNYL (PF) 1,500 mcg/30 mL (50 mcg/mL) infusion  0-200 mcg/hr IntraVENous TITRATE    ketamine (KETALAR) 500 mg in 0.9% sodium chloride 500 mL infusion  0.05-0.4 mg/kg/hr IntraVENous TITRATE    0.9% sodium chloride infusion 250 mL  250 mL IntraVENous PRN    Vancomycin Pharmacy Dosing   Other PRN    Vancomycin Random Level @ 1300 4/10    Other ONCE    NOREPINephrine (LEVOPHED) 32,000 mcg in dextrose 5% 250 mL (128 mcg/mL) infusion  0-50 mcg/min IntraVENous TITRATE    PHENYLephrine (MILLY-SYNEPHRINE) 100 mg in 0.9% sodium chloride 250 mL infusion  0-300 mcg/min IntraVENous TITRATE    0.9% sodium chloride infusion 250 mL  250 mL IntraVENous PRN    pantoprazole (PROTONIX) 40 mg in 0.9% sodium chloride 10 mL injection  40 mg IntraVENous Q12H    0.9% sodium chloride infusion 250 mL  250 mL IntraVENous PRN    [Held by provider] labetaloL (NORMODYNE) tablet 100 mg  100 mg Oral Q12H    0.9% sodium chloride infusion  3 mL/hr IntraVENous CONTINUOUS    0.9% sodium chloride infusion  5 mL/hr IntraVENous CONTINUOUS    labetaloL (NORMODYNE;TRANDATE) injection 10 mg  10 mg IntraVENous Q10MIN PRN    fentaNYL citrate (PF) injection  mcg   mcg IntraVENous Q1H PRN    white petrolatum-mineral oiL (AKWA TEARS) 83-15 % ophthalmic ointment   Both Eyes Q12H    heparin (porcine) pf 300 Units  300 Units InterCATHeter PRN    guaiFENesin (ROBITUSSIN) 100 mg/5 mL oral liquid 100 mg  100 mg Oral Q6H    alteplase (CATHFLO) 2 mg in sterile water (preservative free) 2 mL injection  2 mg InterCATHeter PRN    alteplase (CATHFLO) 2 mg in sterile water (preservative free) 2 mL injection  2 mg InterCATHeter PRN    [Held by provider] heparin 25,000 units in D5W 250 ml infusion  7-25 Units/kg/hr IntraVENous TITRATE    midazolam (VERSED) injection 1-2 mg  1-2 mg IntraVENous Q2H PRN    bacitracin 500 unit/gram packet 1 Packet  1 Packet Topical PRN    insulin glargine (LANTUS) injection 35 Units  35 Units SubCUTAneous DAILY    balsam peru-castor oiL (VENELEX) ointment   Topical BID    sodium chloride (NS) flush 5-40 mL  5-40 mL IntraVENous Q8H    sodium chloride (NS) flush 5-40 mL  5-40 mL IntraVENous PRN    HYDROcodone-acetaminophen (NORCO) 5-325 mg per tablet 1 Tab  1 Tab Oral Q4H PRN    ondansetron (ZOFRAN) injection 4 mg  4 mg IntraVENous Q4H PRN    chlorhexidine (ORAL CARE KIT) 0.12 % mouthwash 15 mL  15 mL Oral Q12H    docusate (COLACE) 50 mg/5 mL oral liquid 100 mg  100 mg Per NG tube BID    acetaminophen (TYLENOL) tablet 650 mg  650 mg Oral Q6H PRN    Or    acetaminophen (TYLENOL) suppository 650 mg  650 mg Rectal Q6H PRN    bicarbonate dialysis (PRISMASOL) BG K 4/Ca 2.5 5000 ml solution   Extracorporeal DIALYSIS CONTINUOUS    glucose chewable tablet 16 g  4 Tab Oral PRN    glucagon (GLUCAGEN) injection 1 mg  1 mg IntraMUSCular PRN    dextrose 10% infusion 0-250 mL  0-250 mL IntraVENous PRN    insulin lispro (HUMALOG) injection   SubCUTAneous Q6H          LAB AND IMAGING FINDINGS:     Lab Results   Component Value Date/Time    WBC 38.8 (H) 04/10/2020 09:20 AM    HGB 6.2 (L) 04/10/2020 09:20 AM    PLATELET 041 (L) 21/10/7174 09:20 AM     Lab Results   Component Value Date/Time    Sodium 138 04/10/2020 09:20 AM    Potassium 5.0 04/10/2020 09:20 AM    Chloride 103 04/10/2020 09:20 AM    CO2 26 04/10/2020 09:20 AM    BUN 34 (H) 04/10/2020 09:20 AM    Creatinine 1.70 (H) 04/10/2020 09:20 AM    Calcium 7.9 (L) 04/10/2020 09:20 AM    Magnesium 2.8 (H) 04/09/2020 11:42 AM    Phosphorus 4.0 04/09/2020 08:47 PM      Lab Results   Component Value Date/Time    AST (SGOT) 30 04/09/2020 04:00 AM    Alk. phosphatase 161 (H) 04/09/2020 04:00 AM    Protein, total 6.1 (L) 04/09/2020 04:00 AM    Albumin 3.5 04/09/2020 08:47 PM    Globulin 2.8 04/09/2020 04:00 AM     Lab Results   Component Value Date/Time    INR 1.8 (H) 04/09/2020 08:47 PM    Prothrombin time 18.1 (H) 04/09/2020 08:47 PM    aPTT 31.9 04/10/2020 09:20 AM      Lab Results   Component Value Date/Time    Ferritin 19,977 (H) 04/09/2020 08:47 PM      No results found for: PH, PCO2, PO2  No components found for: [place]   Lab Results   Component Value Date/Time    CK 42 04/03/2020 12:39 PM                Total time: 50 min   Counseling / coordination time, spent as noted above: 35 min   > 50% counseling / coordination?: yes    Prolonged service was provided for  []30 min   []75 min in face to face time in the presence of the patient, spent as noted above. Time Start:   Time End:   Note: this can only be billed with 03582 (initial) or 23868 (follow up). If multiple start / stop times, list each separately.

## 2020-04-09 NOTE — DIALYSIS
CRRT Note Carla Pop [phone]    Paged to restart CRRT; Primary RN returned all of patients blood (165 ml) secondary to decompensation. Orders, consents, labs, notes and code status reviewed. Upon arrival, staff in room. Unable to enter room at this time. Per staff it will be awhile before patient is ready to be connected. Staff unable to give time frame due to patient condition and bedside procedures taking place. Primary RN to page Claudeen Jack CRRT RN again when patient is ready and stable enough to be connected. Education and pre/post report to primary RN.

## 2020-04-09 NOTE — DIALYSIS
[address]    Orders   Mode: CVVHD restarted @ 1644   Factor: 0 ml/hr   DFR: 3,000 ml/hr   Blood Flow Rate: 200 ml/min     Metrics   BP / HR: 98/58  //  145   Blood Flow Rate: 200 ml/min   AP:                         -52   RP: 76   TMP: 25   PD: 23   FP: 126   DFR: 3,000 ml/hr     Comments / Plan:      CRRT restarted sp patient condition improving. Consents, patient, code status, labs and orders verified. Time out completed. Patient COVID-19 Positive. All policies and procedures followed. All appropriate PPE - N95 mask, surgical mask, face shield, gown, gloves, shoe and hair coverings - worn. Old set discarded in red bio-hazard bag. New [id number] filter set-up, primed c 1L NS, tested and running well at this time. Right femoral non-tunneled temporary CVC, dressing CDI with bio-patch dated 04/07/20. No signs of redness, drainage, or infection visualized. Each catheter limb disinfected for 60 seconds per limb with alcohol swabs. Caps removed, dialysis CVC hub scrubbed with Prevantics for 5 seconds, followed by a 5 second dry time per Hospital P&P. +asp/+flush x 2 ports. Lines reversed, visible and connections secure with blood warmer to return line at 37*C. Education, pre and post to Primary RN. Patient and VSS at time of departure. 1700: Patient BP dropping. Not tolerating CRRT despite fluid removal being set to 0 ml/hr. Primary RN to notify CRRT nurse if BP does not improve after ordered medical interventions. Elana Barnes RN aware and will pass on in report to night shift.

## 2020-04-10 ENCOUNTER — APPOINTMENT (OUTPATIENT)
Dept: CT IMAGING | Age: 60
DRG: 870 | End: 2020-04-10
Attending: NURSE PRACTITIONER
Payer: COMMERCIAL

## 2020-04-10 LAB
ALBUMIN SERPL-MCNC: 3.7 G/DL (ref 3.5–5)
ALBUMIN/GLOB SERPL: 2.5 {RATIO} (ref 1.1–2.2)
ALP SERPL-CCNC: 128 U/L (ref 45–117)
ALT SERPL-CCNC: 1606 U/L (ref 12–78)
ANION GAP SERPL CALC-SCNC: 8 MMOL/L (ref 5–15)
ANION GAP SERPL CALC-SCNC: 9 MMOL/L (ref 5–15)
APTT PPP: 31.9 SEC (ref 22.1–32)
APTT PPP: 34.4 SEC (ref 22.1–32)
ARTERIAL PATENCY WRIST A: ABNORMAL
ARTERIAL PATENCY WRIST A: ABNORMAL
AST SERPL-CCNC: >2000 U/L (ref 15–37)
ATRIAL RATE: 124 BPM
BASE DEFICIT BLD-SCNC: 4 MMOL/L
BASE EXCESS BLD CALC-SCNC: 0 MMOL/L
BASOPHILS # BLD: 0 K/UL (ref 0–0.1)
BASOPHILS NFR BLD: 0 % (ref 0–1)
BDY SITE: ABNORMAL
BDY SITE: ABNORMAL
BILIRUB DIRECT SERPL-MCNC: 0.7 MG/DL (ref 0–0.2)
BILIRUB SERPL-MCNC: 1.6 MG/DL (ref 0.2–1)
BUN SERPL-MCNC: 33 MG/DL (ref 6–20)
BUN SERPL-MCNC: 34 MG/DL (ref 6–20)
BUN/CREAT SERPL: 20 (ref 12–20)
BUN/CREAT SERPL: 21 (ref 12–20)
CA-I BLD-SCNC: 1.05 MMOL/L (ref 1.12–1.32)
CA-I BLD-SCNC: 1.05 MMOL/L (ref 1.12–1.32)
CALCIUM SERPL-MCNC: 7.9 MG/DL (ref 8.5–10.1)
CALCIUM SERPL-MCNC: 8.1 MG/DL (ref 8.5–10.1)
CALCULATED P AXIS, ECG09: 57 DEGREES
CALCULATED R AXIS, ECG10: 44 DEGREES
CALCULATED T AXIS, ECG11: 4 DEGREES
CHLORIDE SERPL-SCNC: 102 MMOL/L (ref 97–108)
CHLORIDE SERPL-SCNC: 103 MMOL/L (ref 97–108)
CO2 SERPL-SCNC: 26 MMOL/L (ref 21–32)
CO2 SERPL-SCNC: 26 MMOL/L (ref 21–32)
CREAT SERPL-MCNC: 1.57 MG/DL (ref 0.55–1.02)
CREAT SERPL-MCNC: 1.7 MG/DL (ref 0.55–1.02)
CRP SERPL HS-MCNC: 8.7 MG/L
DIAGNOSIS, 93000: NORMAL
DIFFERENTIAL METHOD BLD: ABNORMAL
EOSINOPHIL # BLD: 0 K/UL (ref 0–0.4)
EOSINOPHIL NFR BLD: 0 % (ref 0–7)
ERYTHROCYTE [DISTWIDTH] IN BLOOD BY AUTOMATED COUNT: 15.6 % (ref 11.5–14.5)
ERYTHROCYTE [DISTWIDTH] IN BLOOD BY AUTOMATED COUNT: 15.6 % (ref 11.5–14.5)
ERYTHROCYTE [DISTWIDTH] IN BLOOD BY AUTOMATED COUNT: 16 % (ref 11.5–14.5)
ERYTHROCYTE [DISTWIDTH] IN BLOOD BY AUTOMATED COUNT: 16.3 % (ref 11.5–14.5)
FIBRINOGEN PPP-MCNC: 111 MG/DL (ref 200–475)
FIBRINOGEN PPP-MCNC: 85 MG/DL (ref 200–475)
GAS FLOW.O2 O2 DELIVERY SYS: ABNORMAL L/MIN
GAS FLOW.O2 O2 DELIVERY SYS: ABNORMAL L/MIN
GAS FLOW.O2 SETTING OXYMISER: 10 BPM
GAS FLOW.O2 SETTING OXYMISER: 10 BPM
GLOBULIN SER CALC-MCNC: 1.5 G/DL (ref 2–4)
GLUCOSE BLD STRIP.AUTO-MCNC: 236 MG/DL (ref 65–100)
GLUCOSE BLD STRIP.AUTO-MCNC: 262 MG/DL (ref 65–100)
GLUCOSE BLD STRIP.AUTO-MCNC: 274 MG/DL (ref 65–100)
GLUCOSE BLD STRIP.AUTO-MCNC: 288 MG/DL (ref 65–100)
GLUCOSE SERPL-MCNC: 265 MG/DL (ref 65–100)
GLUCOSE SERPL-MCNC: 287 MG/DL (ref 65–100)
HCO3 BLD-SCNC: 20.2 MMOL/L (ref 22–26)
HCO3 BLD-SCNC: 23.8 MMOL/L (ref 22–26)
HCT VFR BLD AUTO: 17.4 % (ref 35–47)
HCT VFR BLD AUTO: 18.3 % (ref 35–47)
HCT VFR BLD AUTO: 20.6 % (ref 35–47)
HCT VFR BLD AUTO: 25.5 % (ref 35–47)
HGB BLD-MCNC: 5.9 G/DL (ref 11.5–16)
HGB BLD-MCNC: 6.2 G/DL (ref 11.5–16)
HGB BLD-MCNC: 6.9 G/DL (ref 11.5–16)
HGB BLD-MCNC: 8.5 G/DL (ref 11.5–16)
IMM GRANULOCYTES # BLD AUTO: 0 K/UL
IMM GRANULOCYTES NFR BLD AUTO: 0 %
INR PPP: 2 (ref 0.9–1.1)
LACTATE SERPL-SCNC: 11.1 MMOL/L (ref 0.4–2)
LACTATE SERPL-SCNC: 2.2 MMOL/L (ref 0.4–2)
LACTATE SERPL-SCNC: 2.4 MMOL/L (ref 0.4–2)
LACTATE SERPL-SCNC: 2.5 MMOL/L (ref 0.4–2)
LACTATE SERPL-SCNC: 5.4 MMOL/L (ref 0.4–2)
LIPASE SERPL-CCNC: 1637 U/L (ref 73–393)
LYMPHOCYTES # BLD: 2.5 K/UL (ref 0.8–3.5)
LYMPHOCYTES NFR BLD: 4 % (ref 12–49)
MCH RBC QN AUTO: 29 PG (ref 26–34)
MCH RBC QN AUTO: 29.1 PG (ref 26–34)
MCH RBC QN AUTO: 29.2 PG (ref 26–34)
MCH RBC QN AUTO: 29.6 PG (ref 26–34)
MCHC RBC AUTO-ENTMCNC: 33.3 G/DL (ref 30–36.5)
MCHC RBC AUTO-ENTMCNC: 33.5 G/DL (ref 30–36.5)
MCHC RBC AUTO-ENTMCNC: 33.9 G/DL (ref 30–36.5)
MCHC RBC AUTO-ENTMCNC: 33.9 G/DL (ref 30–36.5)
MCV RBC AUTO: 86.3 FL (ref 80–99)
MCV RBC AUTO: 86.9 FL (ref 80–99)
MCV RBC AUTO: 87 FL (ref 80–99)
MCV RBC AUTO: 87.4 FL (ref 80–99)
MONOCYTES # BLD: 0.6 K/UL (ref 0–1)
MONOCYTES NFR BLD: 1 % (ref 5–13)
MYELOCYTES NFR BLD MANUAL: 1 %
NEUTS BAND NFR BLD MANUAL: 4 % (ref 0–6)
NEUTS SEG # BLD: 59.6 K/UL (ref 1.8–8)
NEUTS SEG NFR BLD: 90 % (ref 32–75)
NRBC # BLD: 0 K/UL (ref 0–0.01)
NRBC # BLD: 0 K/UL (ref 0–0.01)
NRBC # BLD: 0.02 K/UL (ref 0–0.01)
NRBC # BLD: 0.03 K/UL (ref 0–0.01)
NRBC BLD-RTO: 0 PER 100 WBC
NRBC BLD-RTO: 0.1 PER 100 WBC
O2/TOTAL GAS SETTING VFR VENT: 0.4 %
O2/TOTAL GAS SETTING VFR VENT: 40 %
P-R INTERVAL, ECG05: 118 MS
PCO2 BLD: 29.8 MMHG (ref 35–45)
PCO2 BLD: 34.2 MMHG (ref 35–45)
PEEP RESPIRATORY: 8 CMH2O
PEEP RESPIRATORY: 8 CMH2O
PH BLD: 7.44 [PH] (ref 7.35–7.45)
PH BLD: 7.45 [PH] (ref 7.35–7.45)
PIP ISTAT,IPIP: 12
PIP ISTAT,IPIP: 20
PLATELET # BLD AUTO: 121 K/UL (ref 150–400)
PLATELET # BLD AUTO: 122 K/UL (ref 150–400)
PLATELET # BLD AUTO: 143 K/UL (ref 150–400)
PLATELET # BLD AUTO: 154 K/UL (ref 150–400)
PLATELET COMMENTS,PCOM: ABNORMAL
PMV BLD AUTO: 11.6 FL (ref 8.9–12.9)
PMV BLD AUTO: 11.9 FL (ref 8.9–12.9)
PMV BLD AUTO: 11.9 FL (ref 8.9–12.9)
PMV BLD AUTO: 12.1 FL (ref 8.9–12.9)
PO2 BLD: 109 MMHG (ref 80–100)
PO2 BLD: 82 MMHG (ref 80–100)
POTASSIUM SERPL-SCNC: 5 MMOL/L (ref 3.5–5.1)
POTASSIUM SERPL-SCNC: 5.2 MMOL/L (ref 3.5–5.1)
PRESSURE CONTROL, IPC: YES
PROT SERPL-MCNC: 5.2 G/DL (ref 6.4–8.2)
PROTHROMBIN TIME: 19.8 SEC (ref 9–11.1)
Q-T INTERVAL, ECG07: 332 MS
QRS DURATION, ECG06: 88 MS
QTC CALCULATION (BEZET), ECG08: 476 MS
RBC # BLD AUTO: 1.99 M/UL (ref 3.8–5.2)
RBC # BLD AUTO: 2.12 M/UL (ref 3.8–5.2)
RBC # BLD AUTO: 2.37 M/UL (ref 3.8–5.2)
RBC # BLD AUTO: 2.93 M/UL (ref 3.8–5.2)
RBC MORPH BLD: ABNORMAL
RBC MORPH BLD: ABNORMAL
SAO2 % BLD: 97 % (ref 92–97)
SAO2 % BLD: 99 % (ref 92–97)
SERVICE CMNT-IMP: ABNORMAL
SODIUM SERPL-SCNC: 136 MMOL/L (ref 136–145)
SODIUM SERPL-SCNC: 138 MMOL/L (ref 136–145)
SPECIMEN TYPE: ABNORMAL
SPECIMEN TYPE: ABNORMAL
THERAPEUTIC RANGE,PTTT: ABNORMAL SECS (ref 58–77)
THERAPEUTIC RANGE,PTTT: NORMAL SECS (ref 58–77)
TOTAL RESP. RATE, ITRR: 17
TROPONIN I SERPL-MCNC: 0.98 NG/ML
VANCOMYCIN SERPL-MCNC: 16.5 UG/ML
VENTILATION MODE VENT: ABNORMAL
VENTILATION MODE VENT: ABNORMAL
VENTRICULAR RATE, ECG03: 124 BPM
WBC # BLD AUTO: 29.8 K/UL (ref 3.6–11)
WBC # BLD AUTO: 38.8 K/UL (ref 3.6–11)
WBC # BLD AUTO: 45.2 K/UL (ref 3.6–11)
WBC # BLD AUTO: 63.4 K/UL (ref 3.6–11)

## 2020-04-10 PROCEDURE — 74011250636 HC RX REV CODE- 250/636: Performed by: INTERNAL MEDICINE

## 2020-04-10 PROCEDURE — 85025 COMPLETE CBC W/AUTO DIFF WBC: CPT

## 2020-04-10 PROCEDURE — 65610000006 HC RM INTENSIVE CARE

## 2020-04-10 PROCEDURE — 74011636637 HC RX REV CODE- 636/637: Performed by: INTERNAL MEDICINE

## 2020-04-10 PROCEDURE — 84484 ASSAY OF TROPONIN QUANT: CPT

## 2020-04-10 PROCEDURE — 86141 C-REACTIVE PROTEIN HS: CPT

## 2020-04-10 PROCEDURE — 82803 BLOOD GASES ANY COMBINATION: CPT

## 2020-04-10 PROCEDURE — 94003 VENT MGMT INPAT SUBQ DAY: CPT

## 2020-04-10 PROCEDURE — 93005 ELECTROCARDIOGRAM TRACING: CPT

## 2020-04-10 PROCEDURE — 36415 COLL VENOUS BLD VENIPUNCTURE: CPT

## 2020-04-10 PROCEDURE — 74011000250 HC RX REV CODE- 250: Performed by: INTERNAL MEDICINE

## 2020-04-10 PROCEDURE — 83605 ASSAY OF LACTIC ACID: CPT

## 2020-04-10 PROCEDURE — P9012 CRYOPRECIPITATE EACH UNIT: HCPCS

## 2020-04-10 PROCEDURE — 36430 TRANSFUSION BLD/BLD COMPNT: CPT

## 2020-04-10 PROCEDURE — 80076 HEPATIC FUNCTION PANEL: CPT

## 2020-04-10 PROCEDURE — 85027 COMPLETE CBC AUTOMATED: CPT

## 2020-04-10 PROCEDURE — 80048 BASIC METABOLIC PNL TOTAL CA: CPT

## 2020-04-10 PROCEDURE — 85610 PROTHROMBIN TIME: CPT

## 2020-04-10 PROCEDURE — 74011636320 HC RX REV CODE- 636/320: Performed by: RADIOLOGY

## 2020-04-10 PROCEDURE — 74011250636 HC RX REV CODE- 250/636: Performed by: NURSE PRACTITIONER

## 2020-04-10 PROCEDURE — 71275 CT ANGIOGRAPHY CHEST: CPT

## 2020-04-10 PROCEDURE — 74011250636 HC RX REV CODE- 250/636

## 2020-04-10 PROCEDURE — 83690 ASSAY OF LIPASE: CPT

## 2020-04-10 PROCEDURE — 82962 GLUCOSE BLOOD TEST: CPT

## 2020-04-10 PROCEDURE — C9113 INJ PANTOPRAZOLE SODIUM, VIA: HCPCS | Performed by: NURSE PRACTITIONER

## 2020-04-10 PROCEDURE — 74011636637 HC RX REV CODE- 636/637: Performed by: NURSE PRACTITIONER

## 2020-04-10 PROCEDURE — 85384 FIBRINOGEN ACTIVITY: CPT

## 2020-04-10 PROCEDURE — 74011250637 HC RX REV CODE- 250/637: Performed by: NURSE PRACTITIONER

## 2020-04-10 PROCEDURE — 74011000258 HC RX REV CODE- 258: Performed by: NURSE PRACTITIONER

## 2020-04-10 PROCEDURE — 80202 ASSAY OF VANCOMYCIN: CPT

## 2020-04-10 PROCEDURE — P9035 PLATELET PHERES LEUKOREDUCED: HCPCS

## 2020-04-10 PROCEDURE — 74178 CT ABD&PLV WO CNTR FLWD CNTR: CPT

## 2020-04-10 PROCEDURE — 90945 DIALYSIS ONE EVALUATION: CPT

## 2020-04-10 PROCEDURE — P9016 RBC LEUKOCYTES REDUCED: HCPCS

## 2020-04-10 PROCEDURE — P9059 PLASMA, FRZ BETWEEN 8-24HOUR: HCPCS

## 2020-04-10 PROCEDURE — P9045 ALBUMIN (HUMAN), 5%, 250 ML: HCPCS | Performed by: NURSE PRACTITIONER

## 2020-04-10 PROCEDURE — 85730 THROMBOPLASTIN TIME PARTIAL: CPT

## 2020-04-10 PROCEDURE — 74011000250 HC RX REV CODE- 250: Performed by: NURSE PRACTITIONER

## 2020-04-10 PROCEDURE — 74011000258 HC RX REV CODE- 258: Performed by: RADIOLOGY

## 2020-04-10 PROCEDURE — 74011000258 HC RX REV CODE- 258: Performed by: INTERNAL MEDICINE

## 2020-04-10 RX ORDER — ALBUMIN HUMAN 50 G/1000ML
25 SOLUTION INTRAVENOUS ONCE
Status: COMPLETED | OUTPATIENT
Start: 2020-04-10 | End: 2020-04-10

## 2020-04-10 RX ORDER — SODIUM CHLORIDE 9 MG/ML
250 INJECTION, SOLUTION INTRAVENOUS AS NEEDED
Status: DISCONTINUED | OUTPATIENT
Start: 2020-04-10 | End: 2020-04-11

## 2020-04-10 RX ORDER — SODIUM CHLORIDE 0.9 % (FLUSH) 0.9 %
10 SYRINGE (ML) INJECTION
Status: COMPLETED | OUTPATIENT
Start: 2020-04-10 | End: 2020-04-10

## 2020-04-10 RX ORDER — VANCOMYCIN HYDROCHLORIDE 250 MG/5ML
500 POWDER, FOR SOLUTION ORAL EVERY 6 HOURS
Status: DISPENSED | OUTPATIENT
Start: 2020-04-10 | End: 2020-04-20

## 2020-04-10 RX ORDER — PROPOFOL 10 MG/ML
0-50 VIAL (ML) INTRAVENOUS
Status: DISCONTINUED | OUTPATIENT
Start: 2020-04-10 | End: 2020-04-11

## 2020-04-10 RX ORDER — INSULIN GLARGINE 100 [IU]/ML
10 INJECTION, SOLUTION SUBCUTANEOUS
Status: COMPLETED | OUTPATIENT
Start: 2020-04-10 | End: 2020-04-10

## 2020-04-10 RX ORDER — INSULIN GLARGINE 100 [IU]/ML
45 INJECTION, SOLUTION SUBCUTANEOUS DAILY
Status: DISCONTINUED | OUTPATIENT
Start: 2020-04-11 | End: 2020-04-16

## 2020-04-10 RX ORDER — VANCOMYCIN/0.9 % SOD CHLORIDE 1.5G/250ML
1500 PLASTIC BAG, INJECTION (ML) INTRAVENOUS EVERY 24 HOURS
Status: DISCONTINUED | OUTPATIENT
Start: 2020-04-10 | End: 2020-04-13

## 2020-04-10 RX ORDER — METRONIDAZOLE 500 MG/100ML
500 INJECTION, SOLUTION INTRAVENOUS EVERY 8 HOURS
Status: COMPLETED | OUTPATIENT
Start: 2020-04-10 | End: 2020-04-17

## 2020-04-10 RX ORDER — PROPOFOL 10 MG/ML
INJECTION, EMULSION INTRAVENOUS
Status: COMPLETED
Start: 2020-04-10 | End: 2020-04-10

## 2020-04-10 RX ADMIN — SODIUM CHLORIDE 40 MG: 9 INJECTION INTRAMUSCULAR; INTRAVENOUS; SUBCUTANEOUS at 20:37

## 2020-04-10 RX ADMIN — CALCIUM CHLORIDE, MAGNESIUM CHLORIDE, DEXTROSE MONOHYDRATE, LACTIC ACID, SODIUM CHLORIDE, SODIUM BICARBONATE AND POTASSIUM CHLORIDE 3000 ML/HR: 3.68; 3.05; 22; 5.4; 6.46; 3.09; .314 INJECTION INTRAVENOUS at 04:56

## 2020-04-10 RX ADMIN — VANCOMYCIN HYDROCHLORIDE 500 MG: KIT at 13:06

## 2020-04-10 RX ADMIN — VANCOMYCIN HYDROCHLORIDE 500 MG: KIT at 18:32

## 2020-04-10 RX ADMIN — VASOPRESSIN 0.03 UNITS/MIN: 20 INJECTION INTRAVENOUS at 02:05

## 2020-04-10 RX ADMIN — CALCIUM GLUCONATE 2 G: 98 INJECTION, SOLUTION INTRAVENOUS at 15:43

## 2020-04-10 RX ADMIN — VASOPRESSIN 0.04 UNITS/MIN: 20 INJECTION INTRAVENOUS at 23:49

## 2020-04-10 RX ADMIN — Medication 10 ML: at 11:40

## 2020-04-10 RX ADMIN — METRONIDAZOLE 500 MG: 500 INJECTION, SOLUTION INTRAVENOUS at 16:25

## 2020-04-10 RX ADMIN — METRONIDAZOLE 500 MG: 500 INJECTION, SOLUTION INTRAVENOUS at 08:44

## 2020-04-10 RX ADMIN — CALCIUM CHLORIDE, MAGNESIUM CHLORIDE, DEXTROSE MONOHYDRATE, LACTIC ACID, SODIUM CHLORIDE, SODIUM BICARBONATE AND POTASSIUM CHLORIDE 3000 ML/HR: 3.68; 3.05; 22; 5.4; 6.46; 3.09; .314 INJECTION INTRAVENOUS at 03:21

## 2020-04-10 RX ADMIN — CEFEPIME HYDROCHLORIDE 2 G: 2 INJECTION, POWDER, FOR SOLUTION INTRAVENOUS at 20:33

## 2020-04-10 RX ADMIN — INSULIN GLARGINE 35 UNITS: 100 INJECTION, SOLUTION SUBCUTANEOUS at 08:45

## 2020-04-10 RX ADMIN — CALCIUM CHLORIDE, MAGNESIUM CHLORIDE, DEXTROSE MONOHYDRATE, LACTIC ACID, SODIUM CHLORIDE, SODIUM BICARBONATE AND POTASSIUM CHLORIDE 3000 ML/HR: 3.68; 3.05; 22; 5.4; 6.46; 3.09; .314 INJECTION INTRAVENOUS at 14:27

## 2020-04-10 RX ADMIN — CALCIUM CHLORIDE, MAGNESIUM CHLORIDE, DEXTROSE MONOHYDRATE, LACTIC ACID, SODIUM CHLORIDE, SODIUM BICARBONATE AND POTASSIUM CHLORIDE 3000 ML/HR: 3.68; 3.05; 22; 5.4; 6.46; 3.09; .314 INJECTION INTRAVENOUS at 06:49

## 2020-04-10 RX ADMIN — CALCIUM CHLORIDE, MAGNESIUM CHLORIDE, DEXTROSE MONOHYDRATE, LACTIC ACID, SODIUM CHLORIDE, SODIUM BICARBONATE AND POTASSIUM CHLORIDE 3000 ML/HR: 3.68; 3.05; 22; 5.4; 6.46; 3.09; .314 INJECTION INTRAVENOUS at 15:49

## 2020-04-10 RX ADMIN — ALBUMIN (HUMAN) 25 G: 12.5 INJECTION, SOLUTION INTRAVENOUS at 05:52

## 2020-04-10 RX ADMIN — MINERAL OIL AND WHITE PETROLATUM: 150; 830 OINTMENT OPHTHALMIC at 08:50

## 2020-04-10 RX ADMIN — SODIUM CHLORIDE 40 MG: 9 INJECTION INTRAMUSCULAR; INTRAVENOUS; SUBCUTANEOUS at 08:45

## 2020-04-10 RX ADMIN — Medication 75 MCG/HR: at 12:05

## 2020-04-10 RX ADMIN — INSULIN LISPRO 2 UNITS: 100 INJECTION, SOLUTION INTRAVENOUS; SUBCUTANEOUS at 06:16

## 2020-04-10 RX ADMIN — INSULIN LISPRO 3 UNITS: 100 INJECTION, SOLUTION INTRAVENOUS; SUBCUTANEOUS at 18:41

## 2020-04-10 RX ADMIN — INSULIN GLARGINE 10 UNITS: 100 INJECTION, SOLUTION SUBCUTANEOUS at 15:43

## 2020-04-10 RX ADMIN — VASOPRESSIN 0.03 UNITS/MIN: 20 INJECTION INTRAVENOUS at 14:27

## 2020-04-10 RX ADMIN — CASTOR OIL AND BALSAM, PERU: 788; 87 OINTMENT TOPICAL at 08:48

## 2020-04-10 RX ADMIN — KETAMINE HYDROCHLORIDE 0.4 MG/KG/HR: 50 INJECTION, SOLUTION INTRAMUSCULAR; INTRAVENOUS at 06:07

## 2020-04-10 RX ADMIN — INSULIN LISPRO 3 UNITS: 100 INJECTION, SOLUTION INTRAVENOUS; SUBCUTANEOUS at 12:54

## 2020-04-10 RX ADMIN — NOREPINEPHRINE BITARTRATE 30 MCG/MIN: 1 INJECTION INTRAVENOUS at 08:56

## 2020-04-10 RX ADMIN — CALCIUM CHLORIDE, MAGNESIUM CHLORIDE, DEXTROSE MONOHYDRATE, LACTIC ACID, SODIUM CHLORIDE, SODIUM BICARBONATE AND POTASSIUM CHLORIDE 3000 ML/HR: 3.68; 3.05; 22; 5.4; 6.46; 3.09; .314 INJECTION INTRAVENOUS at 20:33

## 2020-04-10 RX ADMIN — CEFEPIME HYDROCHLORIDE 2 G: 2 INJECTION, POWDER, FOR SOLUTION INTRAVENOUS at 08:44

## 2020-04-10 RX ADMIN — GUAIFENESIN 100 MG: 100 SOLUTION ORAL at 12:33

## 2020-04-10 RX ADMIN — METRONIDAZOLE 500 MG: 500 INJECTION, SOLUTION INTRAVENOUS at 23:52

## 2020-04-10 RX ADMIN — IOPAMIDOL 100 ML: 755 INJECTION, SOLUTION INTRAVENOUS at 11:40

## 2020-04-10 RX ADMIN — CASTOR OIL AND BALSAM, PERU: 788; 87 OINTMENT TOPICAL at 18:32

## 2020-04-10 RX ADMIN — FENTANYL CITRATE 100 MCG: 50 INJECTION INTRAMUSCULAR; INTRAVENOUS at 12:33

## 2020-04-10 RX ADMIN — FENTANYL CITRATE 100 MCG: 50 INJECTION INTRAMUSCULAR; INTRAVENOUS at 11:55

## 2020-04-10 RX ADMIN — GUAIFENESIN 100 MG: 100 SOLUTION ORAL at 18:34

## 2020-04-10 RX ADMIN — INSULIN LISPRO 3 UNITS: 100 INJECTION, SOLUTION INTRAVENOUS; SUBCUTANEOUS at 00:53

## 2020-04-10 RX ADMIN — CALCIUM CHLORIDE, MAGNESIUM CHLORIDE, DEXTROSE MONOHYDRATE, LACTIC ACID, SODIUM CHLORIDE, SODIUM BICARBONATE AND POTASSIUM CHLORIDE 3000 ML/HR: 3.68; 3.05; 22; 5.4; 6.46; 3.09; .314 INJECTION INTRAVENOUS at 01:36

## 2020-04-10 RX ADMIN — PROPOFOL 10 MCG/KG/MIN: 10 INJECTION, EMULSION INTRAVENOUS at 12:55

## 2020-04-10 RX ADMIN — CHLORHEXIDINE GLUCONATE 15 ML: 0.12 RINSE ORAL at 08:49

## 2020-04-10 RX ADMIN — SODIUM CHLORIDE 10 ML: 9 INJECTION INTRAMUSCULAR; INTRAVENOUS; SUBCUTANEOUS at 06:00

## 2020-04-10 RX ADMIN — CALCIUM CHLORIDE, MAGNESIUM CHLORIDE, DEXTROSE MONOHYDRATE, LACTIC ACID, SODIUM CHLORIDE, SODIUM BICARBONATE AND POTASSIUM CHLORIDE 3000 ML/HR: 3.68; 3.05; 22; 5.4; 6.46; 3.09; .314 INJECTION INTRAVENOUS at 18:45

## 2020-04-10 RX ADMIN — MINERAL OIL AND WHITE PETROLATUM: 150; 830 OINTMENT OPHTHALMIC at 20:38

## 2020-04-10 RX ADMIN — PROPOFOL 10 MCG/KG/MIN: 10 INJECTION, EMULSION INTRAVENOUS at 22:33

## 2020-04-10 RX ADMIN — KETAMINE HYDROCHLORIDE 0.4 MG/KG/HR: 50 INJECTION, SOLUTION INTRAMUSCULAR; INTRAVENOUS at 16:08

## 2020-04-10 RX ADMIN — PHENYLEPHRINE HYDROCHLORIDE 30 MCG/MIN: 10 INJECTION INTRAVENOUS at 08:56

## 2020-04-10 RX ADMIN — CALCIUM CHLORIDE, MAGNESIUM CHLORIDE, DEXTROSE MONOHYDRATE, LACTIC ACID, SODIUM CHLORIDE, SODIUM BICARBONATE AND POTASSIUM CHLORIDE 3000 ML/HR: 3.68; 3.05; 22; 5.4; 6.46; 3.09; .314 INJECTION INTRAVENOUS at 22:16

## 2020-04-10 RX ADMIN — CALCIUM CHLORIDE, MAGNESIUM CHLORIDE, DEXTROSE MONOHYDRATE, LACTIC ACID, SODIUM CHLORIDE, SODIUM BICARBONATE AND POTASSIUM CHLORIDE 3000 ML/HR: 3.68; 3.05; 22; 5.4; 6.46; 3.09; .314 INJECTION INTRAVENOUS at 23:59

## 2020-04-10 RX ADMIN — CALCIUM GLUCONATE 2 G: 94 INJECTION, SOLUTION INTRAVENOUS at 04:42

## 2020-04-10 RX ADMIN — VANCOMYCIN HYDROCHLORIDE 1500 MG: 10 INJECTION, POWDER, LYOPHILIZED, FOR SOLUTION INTRAVENOUS at 14:41

## 2020-04-10 RX ADMIN — SODIUM BICARBONATE 150 MEQ/1,000 ML IN DEXTROSE 5 % INTRAVENOUS: SOLUTION at 12:37

## 2020-04-10 RX ADMIN — SODIUM CHLORIDE 100 ML: 9 INJECTION, SOLUTION INTRAVENOUS at 11:40

## 2020-04-10 RX ADMIN — GUAIFENESIN 100 MG: 100 SOLUTION ORAL at 23:52

## 2020-04-10 RX ADMIN — CALCIUM CHLORIDE, MAGNESIUM CHLORIDE, DEXTROSE MONOHYDRATE, LACTIC ACID, SODIUM CHLORIDE, SODIUM BICARBONATE AND POTASSIUM CHLORIDE 3000 ML/HR: 3.68; 3.05; 22; 5.4; 6.46; 3.09; .314 INJECTION INTRAVENOUS at 17:26

## 2020-04-10 RX ADMIN — CHLORHEXIDINE GLUCONATE 15 ML: 0.12 RINSE ORAL at 20:36

## 2020-04-10 RX ADMIN — SODIUM CHLORIDE 10 ML: 9 INJECTION INTRAMUSCULAR; INTRAVENOUS; SUBCUTANEOUS at 22:13

## 2020-04-10 RX ADMIN — SODIUM BICARBONATE 150 MEQ/1,000 ML IN DEXTROSE 5 % INTRAVENOUS: SOLUTION at 04:14

## 2020-04-10 NOTE — PROGRESS NOTES
[facility]    ICU Team Note    Name: Ramana Gomez   : 1960   MRN: [id number]   Date: 4/10/2020      Subjective:   Progress Note: 4/10/2020      Reason for ICU Admission: [address] with renal failure     63 yo female with obesity and diabetes who presented to [facility] with worsening hypoxic respiratory failure in lieu of close exposure to COVID-19. She presented to the hospital 3/26 and intubated 3/28. She was started on broad spectrum antibiotics and plaquenil. Developed worsening renal failure and was transferred to us for CRRT. Overnight Events: No acute events.     04/10/20   - Yesterday, patient failed SBT due to increasing tachypnea- patient appeared anxious  - Shortly after patient with worsening hypotension with subsequent PEA arrest  - ROSC after 7 minutes  - Possible GIB- occult gastric blood positive  - Lactate this morning 11.1, WBC 63 - now both improving  - Patient went for CTA C/A/P- large R side RP bleed  - Patient continues on levophed and vasopressin for hypotension  - Patient continues on CRRT  - Given dyssynchrony with ventilator- patient started on low dose propofol  - Pt with continued drops in Hgb- has received in total 7 units PRBCs, 1u platelets, and 1u FFP  - Discussed with  over several phone calls today    POD:* No surgery found *    S/P: N/A    Active Problem List:     Problem List  Date Reviewed: 2020          Codes Class    Hypotension ICD-10-CM: I95.9  ICD-9-CM: 458.9 Acute        Sepsis due to methicillin susceptible Staphylococcus aureus (MSSA) without acute organ dysfunction ([facility] [address] ICD-10-CM: A41.01  ICD-9-CM: 038.11, 995.91 Acute        COVID-19 virus infection ICD-10-CM: U07.1         Obesity, morbid ([facility] [address] ICD-10-CM: E66.01  ICD-9-CM: 278.01         Vaginal Pap smear following hysterectomy for malignancy ICD-10-CM: Z08, Z90.710  ICD-9-CM: V67.01         Personal history of malignant neoplasm of other parts of uterus ICD-10-CM: Z85.42  ICD-9-CM: V10.42         Endometrial cancer ([facility] [address] ICD-10-CM: C54.1  ICD-9-CM: 182.0               Past Medical History:      has a past medical history of Basal cell carcinoma, GERD (gastroesophageal reflux disease), Kidney stones, and Polyp of ureter. Past Surgical History:      has a past surgical history that includes hysteroscopy diagnostic (); pr endometrial ablation, thermal; hx  section (); and hx colonoscopy. Home Medications:     Prior to Admission medications    Medication Sig Start Date End Date Taking? Authorizing Provider   pantoprazole (PROTONIX) 40 mg tablet TAKE 1 TABLET BY MOUTH TWICE A DAY 18   Provider, Historical   esomeprazole (NEXIUM) 20 mg capsule Take 20 mg by mouth daily. Indications: BID    Provider, Historical   zolpidem (AMBIEN) 10 mg tablet Take 0.5 Tabs by mouth nightly as needed for Sleep. Max Daily Amount: 5 mg. 17   Margi Mandel MD   fluticasone (FLONASE) 50 mcg/actuation nasal spray Mist 1-2 spray(s) into each nostril once daily. 4/3/15   Provider, Historical   ranitidine (ZANTAC) 150 mg tablet 150 mg.    Provider, Historical       Allergies/Social/Family History: Allergies   Allergen Reactions    Augmentin [Amoxicillin-Pot Clavulanate] Rash and Itching      Social History     Tobacco Use    Smoking status: Never Smoker    Smokeless tobacco: Never Used   Substance Use Topics    Alcohol use: Not on file      Family History   Problem Relation Age of Onset    Prostate Cancer Father         PROSTATE    Breast Cancer Sister 46        invasive poorly differentiated ductal carcinoma, Neg genetic testing.  Cancer Sister 62        melanoma stage 1       Review of Systems:     A comprehensive review of systems was negative.     Objective:   Vital Signs:  Visit Vitals  /73   Pulse (!) 125   Temp 97.8 °F (36.6 °C)   Resp (!) 33   Ht 5' 4\" (1.626 m)   Wt 116.6 kg (257 lb 1.6 oz)   SpO2 99%   BMI 44.13 kg/m²      O2 Device: Endotracheal tube Temp (24hrs), Av.5 °F (36.9 °C), Min:97.6 °F (36.4 °C), Max:99.9 °F (37.7 °C)           Intake/Output:     Intake/Output Summary (Last 24 hours) at 4/10/2020 0743  Last data filed at 4/10/2020 0700  Gross per 24 hour   Intake 7670.81 ml   Output 436 ml   Net 7234.81 ml     Physical Exam:  General: Patient intubated, NAD, opens eyes spontaneously   Eyes: PERRL, scleral edema  HENT: Atraumatic, MMM, ETT in place, OG in place  Cardio: RRR, normal, S1/S2  Respiratory: Distant but clear breath sounds bilaterally  GI: Abdomen, soft, non tender, non distended  Extremities: 2+ edema  Neuro: Patient opens eyes, follow commands in all extremities weakly, nods and shakes head appropriately, no focal deficits. LABS AND  DATA: Personally reviewed  Recent Labs     04/10/20  0145 04/09/20  2047   WBC 63.4* 65.8*   HGB 6.9* 7.8*   HCT 20.6* 23.4*    198     Recent Labs     20  1142 20  0539    137 134*   K 4.3 4.5 4.5    97 103   CO2 24 21 24   BUN 36* 30* 25*   CREA 2.04* 2.12* 1.52*   * 372* 239*   CA 7.7* 7.9* 8.8   MG  --  2.8* 2.8*   PHOS 4.0 8.2* 2.4*     Recent Labs     20  0539 200  20  0300   SGOT  --   --  30  --  40*   AP  --   --  161*  --  224*   TP  --   --  6.1*  --  6.6   ALB 3.5 3.2* 3.3*   < > 3.2*   GLOB  --   --  2.8  --  3.4    < > = values in this interval not displayed.      Recent Labs     20  0539 20  0400 04/08/20  2239   INR 1.8*  --  1.2*  --    PTP 18.1*  --  11.9*  --    APTT  --  71.7*  --  56.8*      Recent Labs     04/10/20  0615 20  2200   PHI 7.439 7.422   PCO2I 29.8* 36.4   PO2I 109* 220*   FIO2I 40 80     Recent Labs     04/10/20  0145 20  2047   TROIQ 0.98* 0.77*       Hemodynamics:   PAP:   CO:     Wedge:   CI:     CVP:    SVR:       PVR:       Ventilator Settings:  Mode Rate Tidal Volume Pressure FiO2 PEEP   Pressure control   14 ml  8 cm H2O 40 % 8 cm H20     Peak airway pressure: 24 cm H2O    Minute ventilation: 13.3 l/min        MEDS: Reviewed    Chest X-Ray: personally reviewed and report checked  CXR Results  (Last 48 hours)               04/09/20 1531  XR CHEST PORT Final result    Impression:  IMPRESSION:       No change since earlier today. Narrative:  EXAM: XR CHEST PORT       INDICATION: CHF, fluid overload, respiratory distress, intubation, viral   pneumonia. COMPARISON: Portable chest earlier today at 4:06 AM.       TECHNIQUE: Semiupright portable chest AP view       FINDINGS: Endotracheal tube is unchanged and terminates proximal to the bhavik. Enteric tube extends into the abdomen but out of the field-of-view. Right PICC   line is unchanged and in good position. Cardiac monitoring wires overlie the   thorax. The cardiomediastinal and hilar contours are within normal limits. The   pulmonary vasculature is within normal limits. Patchy bilateral airspace opacities are not significantly changed given   difference in technique. No pneumothorax. The visualized bones and upper abdomen   are age-appropriate. 04/09/20 0456  XR CHEST PORT Final result    Impression:  IMPRESSION: ET tube is 1 cm above the bhavik and can be retracted slightly. .   There is improved aeration bilateral pulmonary opacities. Narrative:  EXAM:  XR CHEST PORT       INDICATION:  ongoing hypoxia       COMPARISON:  4/5/2020       FINDINGS: A portable AP radiograph of the chest was obtained at 4011 hours. ET   tube is 1 cm above the bhavik and can be retracted. NG tube overlies the   stomach. Right IJ catheter is unchanged. .  There is improved aeration in   bilateral diffuse pulmonary opacities. Pamalee Bucker Heart size is normal..  Bony structures   are unchanged. Assessment/Plan:     1. Hemorrhagic shock with acute blood loss anemia due to R sided retroperitoneal bleed  a. CTA A/P confirms RP bleed  b.  Per radiologist, no IR intervention at this time  c. Continue with aggressive volume repletion- today patient has received 4u PRBCs, 1 unit platelets, and 1 unit FFP  d. Trend CBC q6h  e. Trend lactic acid q4h until clearance  2. Acute Hypoxic Respiratory failure - Secondary to COVID-19 with non-occlusive pulmonary embolism  a. Continue lung protective ventilation strategies. b. Completed Azithro and plaquenil course.   c. ID following.   d. Elevated IL-6 - received one dose of 400mg of actemra.   e. Continue CRRT  f. Restarted on vancomycin for worsened leukocytosis  g. Will check CRP, ferritin, and LDH  3. Septic shock- unclear etiology- patient with tachycardia, hypotension, lactic acidosis, and leukocytosis to 63- now improving  a. Continue Vancomycin, cefepime, and flagyl  b. Continue PO vancomycin for possible C diff infection  c. Continue to follow cultures  d. Reculture if febrile  4. Hypercoagulable state - Patient previously clotting off CRRT frequently. Hbg now trending down - holding heparin gtt. Nonocclusive Pulmonary embolism on CT imaging  5. Diabetes - Continue ISS and Lantus 35 units  6. Renal Failure - Likely ATN from sepsis and hypotension. Nephrology following. Continue CRRT as tolerated. Currently running patient even.      Multidisciplinary Rounds Completed:  No    ABCDEF Bundle/Checklist  Pain Medications: Fentanyl  Target RASS: -1 - Drowsy - Not fully alert, but has sustained awakening to voice  Sedation Medications: Propofol, Ketamine and Fentanyl  CAM-ICU:  Negative  Mobility: Bedrest  PT/OT: To be consulted when stable   Restraints: Soft wrist restraints  Discussed Plan of Care (goals of care): No  Addressed Code Status: Full Code    CARDIOVASCULAR  Cardiac Gtts: Norepinephrine and Vasopressin  SBP Goal of: > 90 mmHg  MAP Goal of: > 65 mmHg  Transfusion Trigger (Hgb): <7 g/dL    RESPIRATORY  Vent Goals:   Chlorhexidine   Optimize PEEP/Ventilation/Oxygenation  Goal Tidal Volume 6 cc/kg based on IBW  Aim for lung protective ventilation  Head of bed > 30 degrees  DVT Prophylaxis (if no, list reason): SCD's or Sequential Compression Device and Heparin   SPO2 Goal: > 92%  Pulmonary toilet: Duo-Nebs     GI/  Bowden Catheter Present: Yes  GI Prophylaxis: Protonix (pantoprazole)   Nutrition: Yes   Bowel Movement: No  Bowel Regimen: None needed at this time  Insulin: ISS and Lantus    ANTIBIOTICS  Antibiotics:  Vancomycin  Cefepime  Flagyl  PO Vanc    T/L/D  Tubes: ETT and Orogastric Tube  Lines: Peripheral IV, Arterial Line, PICC Line and Ramon  Drains: Bowden Catheter    SPECIAL EQUIPMENT  CRRT    DISPOSITION  Stay in ICU    CRITICAL CARE CONSULTANT NOTE  I had a face to face encounter with the patient, reviewed and interpreted patient data including clinical events, labs, images, vital signs, I/O's, and examined patient. I have discussed the case and the plan and management of the patient's care with the consulting services, the bedside nurses and the respiratory therapist.      NOTE OF PERSONAL INVOLVEMENT IN CARE   This patient has a high probability of imminent, clinically significant deterioration, which requires the highest level of preparedness to intervene urgently. I participated in the decision-making and personally managed or directed the management of the following life and organ supporting interventions that required my frequent assessment to treat or prevent imminent deterioration. I personally spent 100 minutes of critical care time. This is time spent at this critically ill patient's bedside actively involved in patient care as well as the coordination of care and discussions with the patient's family. This does not include any procedural time which has been billed separately.     [address] [facility]

## 2020-04-10 NOTE — PROGRESS NOTES
[facility]   [address] [facility], [facility] [address], [facility]  Phone: [phone]   [phone]       Nephrology Progress Note  Richar Mustafa     1960     [id number]  Date of Admission : 3/31/2020  04/10/20    CC: Follow up for JUANCHO      Assessment and Plan   JUANCHO :  - 2/2 ATN  - continue CVVHD  - no factor  - Serial PTTs with systemic Heparin   - Blood Cx from femoral melissa neg so far  - serila labs    COVID-19 +ve   Acute Hypoxic Resp Failure   - On Vent   - completed Plaquenil. On Vit C, Zinc   - s/p Tocilizumab      Leukocytosis:  - cultures pending  - on cefepime    Severe Anemia:  - ? GI bleed  - receiving blood transfusions    Cardiac arrest 4/9:  - per ICU team    Type II DM   - Insulin per primary team      Hypothermia   Morbid Obesity      Care Plan discussed with:  ICU team and RN     Interval History:  Unstable on CRRT, no factor. Cardiac arrest overnight. On pressors and sedation. Multiple blood products overnight. hgb 6.9. Wbc remains > 60K. PT NOT EXAMINED in line with ASN and RPA GUIDELINES ON MANAGING COVID-19 PTS WITH RENAL ISSUES. Examination findings discussed personally with the examining Physician team member      Review of Systems: Review of systems not obtained due to patient factors.     Current Medications:   Current Facility-Administered Medications   Medication Dose Route Frequency    0.9% sodium chloride infusion 250 mL  250 mL IntraVENous PRN    cefepime (MAXIPIME) 2 g in 0.9% sodium chloride (MBP/ADV) 100 mL  2 g IntraVENous Q12H    metroNIDAZOLE (FLAGYL) IVPB premix 500 mg  500 mg IntraVENous Q8H    vasopressin (VASOSTRICT) 20 Units in 0.9% sodium chloride 100 mL infusion  0-0.04 Units/min IntraVENous TITRATE    EPINEPHrine (ADRENALIN) 5 mg in 0.9% sodium chloride 250 mL infusion  0-10 mcg/min IntraVENous TITRATE    sodium bicarbonate 150 mEq/1000 mL D5W (premix)   IntraVENous CONTINUOUS    fentaNYL (PF) 1,500 mcg/30 mL (50 mcg/mL) infusion  0-200 mcg/hr IntraVENous TITRATE    ketamine (KETALAR) 500 mg in 0.9% sodium chloride 500 mL infusion  0.05-0.4 mg/kg/hr IntraVENous TITRATE    0.9% sodium chloride infusion 250 mL  250 mL IntraVENous PRN    Vancomycin Pharmacy Dosing   Other PRN    Vancomycin Random Level @ 1300 4/10    Other ONCE    NOREPINephrine (LEVOPHED) 32,000 mcg in dextrose 5% 250 mL (128 mcg/mL) infusion  0-50 mcg/min IntraVENous TITRATE    PHENYLephrine (MILLY-SYNEPHRINE) 100 mg in 0.9% sodium chloride 250 mL infusion  0-300 mcg/min IntraVENous TITRATE    0.9% sodium chloride infusion 250 mL  250 mL IntraVENous PRN    pantoprazole (PROTONIX) 40 mg in 0.9% sodium chloride 10 mL injection  40 mg IntraVENous Q12H    0.9% sodium chloride infusion 250 mL  250 mL IntraVENous PRN    [Held by provider] labetaloL (NORMODYNE) tablet 100 mg  100 mg Oral Q12H    0.9% sodium chloride infusion  3 mL/hr IntraVENous CONTINUOUS    0.9% sodium chloride infusion  5 mL/hr IntraVENous CONTINUOUS    labetaloL (NORMODYNE;TRANDATE) injection 10 mg  10 mg IntraVENous Q10MIN PRN    fentaNYL citrate (PF) injection  mcg   mcg IntraVENous Q1H PRN    white petrolatum-mineral oiL (AKWA TEARS) 83-15 % ophthalmic ointment   Both Eyes Q12H    heparin (porcine) pf 300 Units  300 Units InterCATHeter PRN    guaiFENesin (ROBITUSSIN) 100 mg/5 mL oral liquid 100 mg  100 mg Oral Q6H    alteplase (CATHFLO) 2 mg in sterile water (preservative free) 2 mL injection  2 mg InterCATHeter PRN    alteplase (CATHFLO) 2 mg in sterile water (preservative free) 2 mL injection  2 mg InterCATHeter PRN    [Held by provider] heparin 25,000 units in D5W 250 ml infusion  7-25 Units/kg/hr IntraVENous TITRATE    midazolam (VERSED) injection 1-2 mg  1-2 mg IntraVENous Q2H PRN    bacitracin 500 unit/gram packet 1 Packet  1 Packet Topical PRN    insulin glargine (LANTUS) injection 35 Units  35 Units SubCUTAneous DAILY    balsam peru-castor oiL (VENELEX) ointment   Topical BID    sodium chloride (NS) flush 5-40 mL  5-40 mL IntraVENous Q8H    sodium chloride (NS) flush 5-40 mL  5-40 mL IntraVENous PRN    HYDROcodone-acetaminophen (NORCO) 5-325 mg per tablet 1 Tab  1 Tab Oral Q4H PRN    ondansetron (ZOFRAN) injection 4 mg  4 mg IntraVENous Q4H PRN    chlorhexidine (ORAL CARE KIT) 0.12 % mouthwash 15 mL  15 mL Oral Q12H    docusate (COLACE) 50 mg/5 mL oral liquid 100 mg  100 mg Per NG tube BID    acetaminophen (TYLENOL) tablet 650 mg  650 mg Oral Q6H PRN    Or    acetaminophen (TYLENOL) suppository 650 mg  650 mg Rectal Q6H PRN    bicarbonate dialysis (PRISMASOL) BG K 4/Ca 2.5 5000 ml solution   Extracorporeal DIALYSIS CONTINUOUS    glucose chewable tablet 16 g  4 Tab Oral PRN    glucagon (GLUCAGEN) injection 1 mg  1 mg IntraMUSCular PRN    dextrose 10% infusion 0-250 mL  0-250 mL IntraVENous PRN    insulin lispro (HUMALOG) injection   SubCUTAneous Q6H      Allergies   Allergen Reactions    Augmentin [Amoxicillin-Pot Clavulanate] Rash and Itching       Objective:  Vitals:    Vitals:    04/10/20 0630 04/10/20 0645 04/10/20 0700 04/10/20 0800   BP:   128/73 129/76   Pulse: (!) 126 (!) 126 (!) 125 (!) 126   Resp: (!) 33 (!) 34 (!) 33 (!) 37   Temp: 97.8 °F (36.6 °C)      SpO2:       Weight:       Height:         Intake and Output:  No intake/output data recorded.   04/08 1901 - 04/10 0700  In: 8682.7 [I.V.:6882.6]  Out: 4041 [Urine:2; Drains:110]    Physical Examination: inspection only   Pt intubated    Yes  General: Sedated on vent, obese   Neck:  Lines   Resp:  On vent   CV:  RRR  GI:  Obese   Neurologic:  Sedated   Psych:              Unable to assess  Ext                   R femoral melissa     []    High complexity decision making was performed  []    Patient is at high-risk of decompensation with multiple organ involvement    Lab Data Personally Reviewed: I have reviewed all the pertinent labs, microbiology data and radiology studies during assessment. Recent Labs     04/09/20 2047 04/09/20  1142 04/09/20  0539 04/09/20  0400 04/08/20  1524 04/08/20  0337 04/08/20  0336  04/08/20  0300    137 134*  --  134*  --  134*  --   --    K 4.3 4.5 4.5  --  3.6  --  4.0  --   --     97 103  --  102  --  104  --   --    CO2 24 21 24  --  25  --  25  --   --    * 372* 239*  --  157*  --  153*  --   --    BUN 36* 30* 25*  --  24*  --  29*  --   --    CREA 2.04* 2.12* 1.52*  --  1.41*  --  1.49*  --   --    CA 7.7* 7.9* 8.8  --  9.4  --  9.1  --   --    MG  --  2.8* 2.8*  --   --  2.7*  --   --   --    PHOS 4.0 8.2* 2.4*  --  2.0* 2.8 2.8   < >  --    ALB 3.5  --  3.2* 3.3* 3.3*  --  3.0*  --  3.2*   SGOT  --   --   --  30  --   --   --   --  40*   ALT  --   --   --  42  --   --   --   --  44   INR 1.8*  --   --  1.2*  --   --  1.1  --   --     < > = values in this interval not displayed.      Recent Labs     04/10/20  0145 04/09/20 2047 04/09/20  1246 04/09/20  1142 04/09/20  0927   WBC 63.4* 65.8* 63.9* 63.0* 41.2*   HGB 6.9* 7.8* 6.1* 5.8* 8.0*   HCT 20.6* 23.4* 18.8* 19.1* 26.0*    198 326 279 395     No results found for: SDES  Lab Results   Component Value Date/Time    Culture result: NO GROWTH AFTER 14 HOURS 04/09/2020 02:32 PM    Culture result: NO GROWTH 2 DAYS 04/08/2020 10:36 AM    Culture result: NO GROWTH 5 DAYS 03/31/2020 11:15 AM    Culture result: MODERATE STAPHYLOCOCCUS AUREUS (A) 03/31/2020 10:34 AM    Culture result: LIGHT NORMAL RESPIRATORY SOFIE 03/31/2020 10:34 AM     Recent Results (from the past 24 hour(s))   CBC W/O DIFF    Collection Time: 04/09/20  9:27 AM   Result Value Ref Range    WBC 41.2 (H) 3.6 - 11.0 K/uL    RBC 2.90 (L) 3.80 - 5.20 M/uL    HGB 8.0 (L) 11.5 - 16.0 g/dL    HCT 26.0 (L) 35.0 - 47.0 %    MCV 89.7 80.0 - 99.0 FL    MCH 27.6 26.0 - 34.0 PG    MCHC 30.8 30.0 - 36.5 g/dL    RDW 14.4 11.5 - 14.5 %    PLATELET 864 856 - 352 K/uL    MPV 11.2 8.9 - 12.9 FL    NRBC 0.0 0  WBC    ABSOLUTE NRBC 0.02 (H) 0.00 - 0.01 K/uL   GLUCOSE, POC    Collection Time: 04/09/20 11:20 AM   Result Value Ref Range    Glucose (POC) 370 (H) 65 - 100 mg/dL    Performed by Poncho Estrada    POC EG7    Collection Time: 04/09/20 11:21 AM   Result Value Ref Range    Calcium, ionized (POC) 1.00 (L) 1.12 - 1.32 mmol/L    FIO2 (POC) 100 %    pH (POC) 7.318 (L) 7.35 - 7.45      pCO2 (POC) 29.5 (L) 35.0 - 45.0 MMHG    pO2 (POC) 228 (H) 80 - 100 MMHG    HCO3 (POC) 15.1 (L) 22 - 26 MMOL/L    Base deficit (POC) 11 mmol/L    sO2 (POC) 100 (H) 92 - 97 %    Site DRAWN FROM ARTERIAL LINE      Device: VENT      Mode ASSIST CONTROL      Set Rate 10 bpm    PEEP/CPAP (POC) 10 cmH2O    PIP (POC) 12      Allens test (POC) NO      Inspiratory Time 0.12 sec    Specimen type (POC) ARTERIAL     METABOLIC PANEL, BASIC    Collection Time: 04/09/20 11:42 AM   Result Value Ref Range    Sodium 137 136 - 145 mmol/L    Potassium 4.5 3.5 - 5.1 mmol/L    Chloride 97 97 - 108 mmol/L    CO2 21 21 - 32 mmol/L    Anion gap 19 (H) 5 - 15 mmol/L    Glucose 372 (H) 65 - 100 mg/dL    BUN 30 (H) 6 - 20 MG/DL    Creatinine 2.12 (H) 0.55 - 1.02 MG/DL    BUN/Creatinine ratio 14 12 - 20      GFR est AA 29 (L) >60 ml/min/1.73m2    GFR est non-AA 24 (L) >60 ml/min/1.73m2    Calcium 7.9 (L) 8.5 - 10.1 MG/DL   MAGNESIUM    Collection Time: 04/09/20 11:42 AM   Result Value Ref Range    Magnesium 2.8 (H) 1.6 - 2.4 mg/dL   PHOSPHORUS    Collection Time: 04/09/20 11:42 AM   Result Value Ref Range    Phosphorus 8.2 (H) 2.6 - 4.7 MG/DL   CBC WITH AUTOMATED DIFF    Collection Time: 04/09/20 11:42 AM   Result Value Ref Range    WBC 63.0 (HH) 3.6 - 11.0 K/uL    RBC 2.10 (L) 3.80 - 5.20 M/uL    HGB 5.8 (LL) 11.5 - 16.0 g/dL    HCT 19.1 (L) 35.0 - 47.0 %    MCV 91.0 80.0 - 99.0 FL    MCH 27.6 26.0 - 34.0 PG    MCHC 30.4 30.0 - 36.5 g/dL    RDW 14.5 11.5 - 14.5 %    PLATELET 106 326 - 084 K/uL    MPV 11.1 8.9 - 12.9 FL    NRBC 0.0 0  WBC    ABSOLUTE NRBC 0.02 (H) 0.00 - 0.01 K/uL NEUTROPHILS 86 (H) 32 - 75 %    BAND NEUTROPHILS 3 0 - 6 %    LYMPHOCYTES 6 (L) 12 - 49 %    MONOCYTES 3 (L) 5 - 13 %    EOSINOPHILS 0 0 - 7 %    BASOPHILS 1 0 - 1 %    METAMYELOCYTES 1 (H) 0 %    IMMATURE GRANULOCYTES 0 %    ABS. NEUTROPHILS 56.1 (H) 1.8 - 8.0 K/UL    ABS. LYMPHOCYTES 3.8 (H) 0.8 - 3.5 K/UL    ABS. MONOCYTES 1.9 (H) 0.0 - 1.0 K/UL    ABS. EOSINOPHILS 0.0 0.0 - 0.4 K/UL    ABS. BASOPHILS 0.6 (H) 0.0 - 0.1 K/UL    ABS. IMM. GRANS. 0.0 K/UL    DF MANUAL      PLATELET COMMENTS Large Platelets      RBC COMMENTS NORMOCYTIC, NORMOCHROMIC      WBC COMMENTS Pathology Review Requested      Pathologist review        Pathologic examination results can be viewed in [facility] Chart Review under the Pathology tab.    TROPONIN I    Collection Time: 04/09/20 11:42 AM   Result Value Ref Range    Troponin-I, Qt. 0.26 (H) <0.05 ng/mL   TYPE + CROSSMATCH    Collection Time: 04/09/20 12:46 PM   Result Value Ref Range    Crossmatch Expiration 04/12/2020     ABO/Rh(D) A POSITIVE     Antibody screen NEG     Unit number [id number]     Blood component type  LR     Unit division 00     Status of unit TRANSFUSED     Crossmatch result Compatible     Unit number [id number]     Blood component type  LR,2     Unit division 00     Status of unit TRANSFUSED     Crossmatch result Compatible     Unit number [id number]     Blood component type  LR     Unit division 00     Status of unit TRANSFUSED     Crossmatch result Compatible     Unit number [id number]     Blood component type  LR     Unit division 00     Status of unit ISSUED     Crossmatch result Compatible    CBC W/O DIFF    Collection Time: 04/09/20 12:46 PM   Result Value Ref Range    WBC 63.9 (HH) 3.6 - 11.0 K/uL    RBC 2.15 (L) 3.80 - 5.20 M/uL    HGB 6.1 (L) 11.5 - 16.0 g/dL    HCT 18.8 (L) 35.0 - 47.0 %    MCV 87.4 80.0 - 99.0 FL    MCH 28.4 26.0 - 34.0 PG    MCHC 32.4 30.0 - 36.5 g/dL    RDW 14.7 (H) 11.5 - 14.5 %    PLATELET 793 605 - 807 K/uL MPV 11.3 8.9 - 12.9 FL    NRBC 0.0 0  WBC    ABSOLUTE NRBC 0.03 (H) 0.00 - 0.01 K/uL   POC EG7    Collection Time: 04/09/20  1:21 PM   Result Value Ref Range    Calcium, ionized (POC) 0.98 (L) 1.12 - 1.32 mmol/L    FIO2 (POC) 90 %    pH (POC) 7.427 7.35 - 7.45      pCO2 (POC) 26.0 (L) 35.0 - 45.0 MMHG    pO2 (POC) 314 (H) 80 - 100 MMHG    HCO3 (POC) 17.1 (L) 22 - 26 MMOL/L    Base deficit (POC) 7 mmol/L    sO2 (POC) 100 (H) 92 - 97 %    Site DRAWN FROM ARTERIAL LINE      Device: VENT      Mode ASSIST CONTROL      Set Rate 10 bpm    PEEP/CPAP (POC) 8 cmH2O    PIP (POC) 15      Allens test (POC) NO      Inspiratory Time 0.66 sec    Specimen type (POC) ARTERIAL     POC EG7    Collection Time: 04/09/20  2:28 PM   Result Value Ref Range    Calcium, ionized (POC) 1.01 (L) 1.12 - 1.32 mmol/L    FIO2 (POC) 100 %    pH (POC) 7.142 (LL) 7.35 - 7.45      pCO2 (POC) 41.7 35.0 - 45.0 MMHG    pO2 (POC) 24 (LL) 80 - 100 MMHG    HCO3 (POC) 14.3 (L) 22 - 26 MMOL/L    Base deficit (POC) 15 mmol/L    sO2 (POC) 29 (L) 92 - 97 %    Site OTHER      Device: VENT      Mode ASSIST CONTROL      Set Rate 10 bpm    PEEP/CPAP (POC) 8 cmH2O    PIP (POC) 12      Allens test (POC) NO      Inspiratory Time 1.26 sec    Specimen type (POC) VENOUS BLOOD     CULTURE, BLOOD, PAIRED    Collection Time: 04/09/20  2:32 PM   Result Value Ref Range    Special Requests: NO SPECIAL REQUESTS      Culture result: NO GROWTH AFTER 14 HOURS     POC EG7    Collection Time: 04/09/20  5:17 PM   Result Value Ref Range    Calcium, ionized (POC) 0.98 (L) 1.12 - 1.32 mmol/L    FIO2 (POC) 100 %    pH (POC) 7.311 (L) 7.35 - 7.45      pCO2 (POC) 32.4 (L) 35.0 - 45.0 MMHG    pO2 (POC) 381 (H) 80 - 100 MMHG    HCO3 (POC) 16.3 (L) 22 - 26 MMOL/L    Base deficit (POC) 10 mmol/L    sO2 (POC) 100 (H) 92 - 97 %    Site DRAWN FROM ARTERIAL LINE      Device: VENT      Mode ASSIST CONTROL      Set Rate 10 bpm    PEEP/CPAP (POC) 8 cmH2O    PIP (POC) 12      Allens test (POC) NO Inspiratory Time 1.26 sec    Specimen type (POC) ARTERIAL     GLUCOSE, POC    Collection Time: 04/09/20  5:42 PM   Result Value Ref Range    Glucose (POC) 276 (H) 65 - 100 mg/dL    Performed by Lesa Auguste BLOOD, GASTRIC    Collection Time: 04/09/20  6:49 PM   Result Value Ref Range    OCCULT BLOOD,GASTRIC Positive (A) NEG      pH,GASTRIC  1.5 - 3.5       Unable to determine pH.  pH was not read within 30 seconds of application of specimen. RENAL FUNCTION PANEL    Collection Time: 04/09/20  8:47 PM   Result Value Ref Range    Sodium 137 136 - 145 mmol/L    Potassium 4.3 3.5 - 5.1 mmol/L    Chloride 102 97 - 108 mmol/L    CO2 24 21 - 32 mmol/L    Anion gap 11 5 - 15 mmol/L    Glucose 293 (H) 65 - 100 mg/dL    BUN 36 (H) 6 - 20 MG/DL    Creatinine 2.04 (H) 0.55 - 1.02 MG/DL    BUN/Creatinine ratio 18 12 - 20      GFR est AA 30 (L) >60 ml/min/1.73m2    GFR est non-AA 25 (L) >60 ml/min/1.73m2    Calcium 7.7 (L) 8.5 - 10.1 MG/DL    Phosphorus 4.0 2.6 - 4.7 MG/DL    Albumin 3.5 3.5 - 5.0 g/dL   TROPONIN I    Collection Time: 04/09/20  8:47 PM   Result Value Ref Range    Troponin-I, Qt. 0.77 (H) <0.05 ng/mL   CBC WITH AUTOMATED DIFF    Collection Time: 04/09/20  8:47 PM   Result Value Ref Range    WBC 65.8 (HH) 3.6 - 11.0 K/uL    RBC 2.70 (L) 3.80 - 5.20 M/uL    HGB 7.8 (L) 11.5 - 16.0 g/dL    HCT 23.4 (L) 35.0 - 47.0 %    MCV 86.7 80.0 - 99.0 FL    MCH 28.9 26.0 - 34.0 PG    MCHC 33.3 30.0 - 36.5 g/dL    RDW 16.5 (H) 11.5 - 14.5 %    PLATELET 434 076 - 835 K/uL    MPV 11.8 8.9 - 12.9 FL    NRBC 0.0 0  WBC    ABSOLUTE NRBC 0.03 (H) 0.00 - 0.01 K/uL    NEUTROPHILS 87 (H) 32 - 75 %    LYMPHOCYTES 8 (L) 12 - 49 %    MONOCYTES 5 5 - 13 %    EOSINOPHILS 0 0 - 7 %    BASOPHILS 0 0 - 1 %    IMMATURE GRANULOCYTES 0 %    ABS. NEUTROPHILS 57.2 (H) 1.8 - 8.0 K/UL    ABS. LYMPHOCYTES 5.3 (H) 0.8 - 3.5 K/UL    ABS. MONOCYTES 3.3 (H) 0.0 - 1.0 K/UL    ABS. EOSINOPHILS 0.0 0.0 - 0.4 K/UL    ABS.  BASOPHILS 0.0 0.0 - 0.1 K/UL    ABS. IMM.  GRANS. 0.0 K/UL    DF MANUAL      PLATELET COMMENTS Large Platelets      RBC COMMENTS ANISOCYTOSIS  1+       LD    Collection Time: 04/09/20  8:47 PM   Result Value Ref Range    LD 3,298 (H) 81 - 246 U/L   PROTHROMBIN TIME + INR    Collection Time: 04/09/20  8:47 PM   Result Value Ref Range    INR 1.8 (H) 0.9 - 1.1      Prothrombin time 18.1 (H) 9.0 - 11.1 sec   D DIMER    Collection Time: 04/09/20  8:47 PM   Result Value Ref Range    D-dimer 21.45 (H) 0.00 - 0.65 mg/L FEU   FERRITIN    Collection Time: 04/09/20  8:47 PM   Result Value Ref Range    Ferritin 19,977 (H) 8 - 252 NG/ML   CRP, HIGH SENSITIVITY    Collection Time: 04/09/20  8:47 PM   Result Value Ref Range    CRP, High sensitivity 5.2 mg/L   POC EG7    Collection Time: 04/09/20 10:00 PM   Result Value Ref Range    Calcium, ionized (POC) 1.01 (L) 1.12 - 1.32 mmol/L    FIO2 (POC) 80 %    pH (POC) 7.422 7.35 - 7.45      pCO2 (POC) 36.4 35.0 - 45.0 MMHG    pO2 (POC) 220 (H) 80 - 100 MMHG    HCO3 (POC) 23.7 22 - 26 MMOL/L    Base deficit (POC) 1 mmol/L    sO2 (POC) 100 (H) 92 - 97 %    Site DRAWN FROM ARTERIAL LINE      Device: VENT      Mode ASSIST CONTROL      Set Rate 10 bpm    PEEP/CPAP (POC) 8 cmH2O    PIP (POC) 10      Allens test (POC) N/A      Specimen type (POC) ARTERIAL      Pressure control YES     GLUCOSE, POC    Collection Time: 04/10/20 12:43 AM   Result Value Ref Range    Glucose (POC) 262 (H) 65 - 100 mg/dL    Performed by EULA BYRD    CBC WITH AUTOMATED DIFF    Collection Time: 04/10/20  1:45 AM   Result Value Ref Range    WBC 63.4 (HH) 3.6 - 11.0 K/uL    RBC 2.37 (L) 3.80 - 5.20 M/uL    HGB 6.9 (L) 11.5 - 16.0 g/dL    HCT 20.6 (L) 35.0 - 47.0 %    MCV 86.9 80.0 - 99.0 FL    MCH 29.1 26.0 - 34.0 PG    MCHC 33.5 30.0 - 36.5 g/dL    RDW 16.3 (H) 11.5 - 14.5 %    PLATELET 525 483 - 672 K/uL    MPV 11.9 8.9 - 12.9 FL    NRBC 0.0 0  WBC    ABSOLUTE NRBC 0.03 (H) 0.00 - 0.01 K/uL    NEUTROPHILS 90 (H) 32 - 75 % BAND NEUTROPHILS 4 0 - 6 %    LYMPHOCYTES 4 (L) 12 - 49 %    MONOCYTES 1 (L) 5 - 13 %    EOSINOPHILS 0 0 - 7 %    BASOPHILS 0 0 - 1 %    MYELOCYTES 1 (H) 0 %    IMMATURE GRANULOCYTES 0 %    ABS. NEUTROPHILS 59.6 (H) 1.8 - 8.0 K/UL    ABS. LYMPHOCYTES 2.5 0.8 - 3.5 K/UL    ABS. MONOCYTES 0.6 0.0 - 1.0 K/UL    ABS. EOSINOPHILS 0.0 0.0 - 0.4 K/UL    ABS. BASOPHILS 0.0 0.0 - 0.1 K/UL    ABS. IMM.  GRANS. 0.0 K/UL    DF MANUAL      PLATELET COMMENTS Large Platelets      RBC COMMENTS ANISOCYTOSIS  1+        RBC COMMENTS HYPOCHROMIA  1+       TROPONIN I    Collection Time: 04/10/20  1:45 AM   Result Value Ref Range    Troponin-I, Qt. 0.98 (H) <0.05 ng/mL   LACTIC ACID    Collection Time: 04/10/20  1:45 AM   Result Value Ref Range    Lactic acid 11.1 (HH) 0.4 - 2.0 MMOL/L   EKG, 12 LEAD, INITIAL    Collection Time: 04/10/20  2:19 AM   Result Value Ref Range    Ventricular Rate 124 BPM    Atrial Rate 124 BPM    P-R Interval 118 ms    QRS Duration 88 ms    Q-T Interval 332 ms    QTC Calculation (Bezet) 476 ms    Calculated P Axis 57 degrees    Calculated R Axis 44 degrees    Calculated T Axis 4 degrees    Diagnosis Sinus tachycardia  No previous ECGs available      POC EG7    Collection Time: 04/10/20  6:15 AM   Result Value Ref Range    Calcium, ionized (POC) 1.05 (L) 1.12 - 1.32 mmol/L    FIO2 (POC) 40 %    pH (POC) 7.439 7.35 - 7.45      pCO2 (POC) 29.8 (L) 35.0 - 45.0 MMHG    pO2 (POC) 109 (H) 80 - 100 MMHG    HCO3 (POC) 20.2 (L) 22 - 26 MMOL/L    Base deficit (POC) 4 mmol/L    sO2 (POC) 99 (H) 92 - 97 %    Site DRAWN FROM ARTERIAL LINE      Device: VENT      Mode ASSIST CONTROL      Set Rate 10 bpm    PEEP/CPAP (POC) 8 cmH2O    PIP (POC) 12      Allens test (POC) N/A      Specimen type (POC) ARTERIAL      Pressure control YES     GLUCOSE, POC    Collection Time: 04/10/20  6:16 AM   Result Value Ref Range    Glucose (POC) 236 (H) 65 - 100 mg/dL    Performed by EULA BYRD            Total time spent with patient:  xxx min.                               Care Plan discussed with:  Patient     Family      RN      Consulting Physician [facility],         I have reviewed the flowsheets. Chart and Pertinent Notes have been reviewed. No change in PMH ,family and social history from Consult note.       Aaliyah Coley MD

## 2020-04-10 NOTE — PROGRESS NOTES
Transitions of Care  Remains Vented on CVVH  IV medications: Ketamine, Diprivan, Levophed, Vasopressin, Huy and Fentanyl. Per notes weak but follows some commands  Palliative following for goals of care, currently family wants full supportive measures. Care management is continuing to follow.   [facility] RN,CRM

## 2020-04-10 NOTE — DIALYSIS
[address]    Orders   Mode: CVVHD restarted @ 1235   Factor: 0 ml/hr   Prismasol Dose: 3,000 ml/hr   Blood Flow Rate: 200 ml/min     Metrics   BP / HR: 134/70  //  130   Blood Flow Rate: 200 ml/min   AP:                         -48   RP: 68   TMP: 25   PD: 16   FP: 108   DFR: 3,000 ml/hr     Comments / Plan:      Filter changed secondary to patient going to CT. All possible blood (165 ml) returned by primary RN. Consents, patient, code status, labs and orders verified. Time out completed. Patient COVID-19 Positive. All policies and procedures followed. All appropriate PPE - N95 mask, surgical mask, face shield, gown, double gloves, hair and shoe coverings - worn. Old set discarded in red bio-hazard bag. New [id number] filter set-up, primed c 1L NS, tested and running well at this time. Right femoral temporary CVC, dressing CDI with bio-patch dated 4/7/20. No signs of redness, drainage, or other s/sx of infection visualized. Each catheter limb disinfected for 60 seconds per limb with alcohol swabs. Caps removed, dialysis CVC hub scrubbed with Prevantics for 5 seconds, followed by a 5 second dry time per Hospital P&P. +asp/+flush x 2 ports. Lines visible and connections secure with blood warmer to return line at 37*C. Education, pre and post to primary RN.

## 2020-04-10 NOTE — PROGRESS NOTES
Pharmacist Note - Vancomycin Dosing  Therapy day 3   Indication: sepsis  Current regimen: 2000 mg on 4/9 @ 1415    A Random Level resulted at 16.5 mcg/mL which was obtained 22 hrs post-dose. Goal trough: 15 - 20 mcg/mL     Plan: Will start Vancomycin 1500 mg Q 24hr   Pharmacy will continue to monitor this patient daily for changes in clinical status and renal function.

## 2020-04-10 NOTE — PROGRESS NOTES
Infectious Disease Progress Note       Subjective:     D/W with ICU team  Had CT A/P/C today  Antibiotics broadened by icu team to vanc, flagyl, cefepime and po vanc               Objective:    Vitals:   Patient Vitals for the past 24 hrs:   Temp Pulse Resp BP SpO2   04/10/20 1315 (P) 97.9 °F (36.6 °C) (!) 120 21 -- 95 %   04/10/20 1300 -- (!) 121 15 123/84 96 %   04/10/20 1200 97 °F (36.1 °C) (!) 128 30 128/81 --   04/10/20 1035 96.8 °F (36 °C) (!) 124 26 148/74 100 %   04/10/20 1000 -- (!) 125 29 149/88 100 %   04/10/20 0900 -- (!) 120 21 142/83 100 %   04/10/20 0836 -- (!) 120 (!) 33 -- 100 %   04/10/20 0832 -- (!) 128 (!) 35 -- 100 %   04/10/20 0829 -- (!) 127 (!) 39 -- 100 %   04/10/20 0800 96.7 °F (35.9 °C) (!) 126 (!) 37 129/76 100 %   04/10/20 0700 -- (!) 125 (!) 33 128/73 --   04/10/20 0645 -- (!) 126 (!) 34 -- --   04/10/20 0630 97.8 °F (36.6 °C) (!) 126 (!) 33 -- --   04/10/20 0615 -- (!) 125 (!) 35 -- --   04/10/20 0600 97.6 °F (36.4 °C) (!) 125 (!) 32 -- --   04/10/20 0545 -- (!) 126 (!) 32 -- --   04/10/20 0530 97.7 °F (36.5 °C) (!) 126 (!) 35 -- --   04/10/20 0515 -- (!) 123 (!) 32 -- --   04/10/20 0500 97.7 °F (36.5 °C) (!) 123 (!) 31 (!) 202/80 --   04/10/20 0445 97.6 °F (36.4 °C) (!) 122 (!) 32 -- --   04/10/20 0430 97.8 °F (36.6 °C) (!) 121 (!) 31 -- --   04/10/20 0415 97.7 °F (36.5 °C) (!) 122 (!) 32 -- --   04/10/20 0400 97.7 °F (36.5 °C) (!) 122 (!) 32 (!) 193/129 --   04/10/20 0330 -- (!) 123 30 -- --   04/10/20 0300 -- (!) 119 30 (!) 213/132 99 %   04/10/20 0230 -- (!) 123 30 -- 99 %   04/10/20 0200 -- (!) 125 30 -- 99 %   04/10/20 0130 -- (!) 123 (!) 31 -- 99 %   04/10/20 0100 -- (!) 126 (!) 32 (!) 164/123 93 %   04/10/20 0040 -- (!) 127 (!) 32 -- 100 %   04/10/20 0030 -- (!) 128 (!) 31 -- --   04/10/20 0000 98 °F (36.7 °C) (!) 126 30 (!) 219/89 99 %   04/09/20 2345 -- (!) 128 30 -- --   04/09/20 2330 -- (!) 127 (!) 31 -- 100 %   04/09/20 2315 -- (!) 129 30 -- --   04/09/20 2300 -- (!) 130 29 (!) 174/135 99 %   04/09/20 2230 -- (!) 133 (!) 33 -- 99 %   04/09/20 2200 98 °F (36.7 °C) (!) 137 (!) 31 (!) 205/192 99 %   04/09/20 2130 -- (!) 139 30 -- 100 %   04/09/20 2100 -- (!) 140 (!) 33 -- 100 %   04/09/20 2035 -- (!) 139 (!) 32 -- 100 %   04/09/20 2030 -- (!) 143 (!) 32 -- 100 %   04/09/20 2000 98.1 °F (36.7 °C) (!) 142 (!) 33 (!) 117/92 99 %   04/09/20 1900 -- (!) 138 (!) 36 103/68 99 %   04/09/20 1845 98.3 °F (36.8 °C) (!) 140 (!) 35 -- 100 %   04/09/20 1830 98.5 °F (36.9 °C) (!) 138 (!) 35 -- 100 %   04/09/20 1815 98.7 °F (37.1 °C) (!) 131 (!) 35 -- --   04/09/20 1805 98.9 °F (37.2 °C) (!) 129 26 -- --   04/09/20 1800 -- (!) 129 26 99/81 100 %   04/09/20 1750 99.4 °F (37.4 °C) (!) 131 27 -- --   04/09/20 1740 -- (!) 136 27 -- (!) 73 %   04/09/20 1734 99.8 °F (37.7 °C) (!) 132 27 -- (!) 79 %   04/09/20 1730 -- (!) 131 28 -- (!) 78 %   04/09/20 1715 -- (!) 139 27 -- --   04/09/20 1700 -- (!) 134 29 107/88 --   04/09/20 1645 -- (!) 147 30 -- --   04/09/20 1630 -- (!) 149 (!) 31 -- --   04/09/20 1615 -- (!) 149 (!) 33 -- 100 %   04/09/20 1600 99.9 °F (37.7 °C) (!) 145 30 130/81 100 %   04/09/20 1545 99.8 °F (37.7 °C) (!) 144 28 -- (!) 83 %   04/09/20 1530 98.6 °F (37 °C) (!) 144 29 -- --   04/09/20 1523 -- -- 23 -- --   04/09/20 1520 99 °F (37.2 °C) (!) 145 28 -- (!) 80 %   04/09/20 1515 -- (!) 142 (!) 31 -- (!) 75 %   04/09/20 1500 -- (!) 133 29 111/87 (!) 83 %   04/09/20 1445 -- (!) 137 26 -- --   04/09/20 1440 -- (!) 138 25 -- --   04/09/20 1435 99 °F (37.2 °C) -- -- 93/45 --   04/09/20 1420 99.7 °F (37.6 °C) -- -- -- --   04/09/20 1419 -- (!) 138 24 -- (!) 79 %   04/09/20 1415 -- (!) 139 23 -- (!) 79 %   04/09/20 1405 99.1 °F (37.3 °C) (!) 140 24 -- 91 %   04/09/20 1400 -- (!) 141 23 (!) 72/51 (!) 79 %       Physical Exam:  Please see ICU teams physical exam     Medications:    Current Facility-Administered Medications:     0.9% sodium chloride infusion 250 mL, 250 mL, IntraVENous, PRN, Kirstie Soria, NP    cefepime (MAXIPIME) 2 g in 0.9% sodium chloride (MBP/ADV) 100 mL, 2 g, IntraVENous, Q12H, Iliana Rosales, NP, Last Rate: 200 mL/hr at 04/10/20 0844, 2 g at 04/10/20 0844    metroNIDAZOLE (FLAGYL) IVPB premix 500 mg, 500 mg, IntraVENous, Q8H, Iliana Rosales B, NP, Last Rate: 100 mL/hr at 04/10/20 0844, 500 mg at 04/10/20 0844    vancomycin (FIRVANQ) 50 mg/mL oral solution 500 mg, 500 mg, Oral, Q6H, Iliana Rosales, NP, 500 mg at 04/10/20 1306    0.9% sodium chloride infusion 250 mL, 250 mL, IntraVENous, PRN, Iliana Rosales, NP    propofol (DIPRIVAN) 10 mg/mL infusion, 0-50 mcg/kg/min, IntraVENous, TITRATE, Iliana Rosales, NP, Last Rate: 7 mL/hr at 04/10/20 1255, 10 mcg/kg/min at 04/10/20 1255    0.9% sodium chloride infusion 250 mL, 250 mL, IntraVENous, PRN, Iliana Rosales, NP    0.9% sodium chloride infusion 250 mL, 250 mL, IntraVENous, PRN, Iliana Rosales B, NP    vancomycin (VANCOCIN) 1500 mg in  ml infusion, 1,500 mg, IntraVENous, Q24H, Dorothy Kaminski MD    vasopressin (VASOSTRICT) 20 Units in 0.9% sodium chloride 100 mL infusion, 0-0.04 Units/min, IntraVENous, TITRATE, Dorothy Kaminski MD, Last Rate: 9 mL/hr at 04/10/20 0205, 0.03 Units/min at 04/10/20 0205    EPINEPHrine (ADRENALIN) 5 mg in 0.9% sodium chloride 250 mL infusion, 0-10 mcg/min, IntraVENous, TITRATE, Dorothy Kaminski MD, Stopped at 04/10/20 0034    sodium bicarbonate 150 mEq/1000 mL D5W (premix), , IntraVENous, CONTINUOUS, Valla Ramp R, NP-C, Last Rate: 125 mL/hr at 04/10/20 1237    fentaNYL (PF) 1,500 mcg/30 mL (50 mcg/mL) infusion, 0-200 mcg/hr, IntraVENous, TITRATE, Dorothy Kaminski MD, Last Rate: 1 mL/hr at 04/09/20 1209, 50 mcg/hr at 04/09/20 1209    ketamine (KETALAR) 500 mg in 0.9% sodium chloride 500 mL infusion, 0.05-0.4 mg/kg/hr, IntraVENous, TITRATE, Dorothy Kaminski MD, Last Rate: 46.6 mL/hr at 04/10/20 0607, 0.4 mg/kg/hr at 04/10/20 0607    0.9% sodium chloride infusion 250 mL, 250 mL, IntraVENous, PRN, Hermelindo Soria NP-C    Vancomycin Pharmacy Dosing, , Other, PRN, Talon Dubois MD    NOREPINephrine (LEVOPHED) 32,000 mcg in dextrose 5% 250 mL (128 mcg/mL) infusion, 0-50 mcg/min, IntraVENous, TITRATE, Margarita Kennedy R NP-C, Last Rate: 13.1 mL/hr at 04/10/20 1047, 28 mcg/min at 04/10/20 1047    PHENYLephrine (MILLY-SYNEPHRINE) 100 mg in 0.9% sodium chloride 250 mL infusion, 0-300 mcg/min, IntraVENous, TITRATE, Margarita Resides R, NP-C, Stopped at 04/10/20 1035    0.9% sodium chloride infusion 250 mL, 250 mL, IntraVENous, PRN, Margarita Resides R, NP-C    pantoprazole (PROTONIX) 40 mg in 0.9% sodium chloride 10 mL injection, 40 mg, IntraVENous, Q12H, Margarita Kennedy R, NP-C, 40 mg at 04/10/20 0845    0.9% sodium chloride infusion 250 mL, 250 mL, IntraVENous, PRN, Makenna Sotelo MD    [Held by provider] labetaloL (NORMODYNE) tablet 100 mg, 100 mg, Oral, Q12H, Iliana Rosales NP    0.9% sodium chloride infusion, 3 mL/hr, IntraVENous, CONTINUOUS, Lonny Madison MD, Last Rate: 3 mL/hr at 04/09/20 0800, 3 mL/hr at 04/09/20 0800    0.9% sodium chloride infusion, 5 mL/hr, IntraVENous, CONTINUOUS, Lonny Madison MD, Stopped at 04/08/20 1836    labetaloL (NORMODYNE;TRANDATE) injection 10 mg, 10 mg, IntraVENous, Q10MIN PRN, Maximus Aguilar MD    fentaNYL citrate (PF) injection  mcg,  mcg, IntraVENous, Q1H PRN, Maximus Aguilar MD, 100 mcg at 04/10/20 1233    white petrolatum-mineral oiL (AKWA TEARS) 83-15 % ophthalmic ointment, , Both Eyes, Q12H, Lonny Madison MD    heparin (porcine) pf 300 Units, 300 Units, InterCATHeter, PRN, CELESTINA Cr    guaiFENesin (ROBITUSSIN) 100 mg/5 mL oral liquid 100 mg, 100 mg, Oral, Q6H, Cassandra Cox, NPNoelleC, 100 mg at 04/10/20 1233    alteplase (CATHFLO) 2 mg in sterile water (preservative free) 2 mL injection, 2 mg, InterCATHeter, PRN, Rowland Rings, MD    alteplase (CATHFLO) 2 mg in sterile water (preservative free) 2 mL injection, 2 mg, InterCATHeter, PRN, Gautam PÉREZ MD, 2 mg at 04/03/20 0617    [Held by provider] heparin 25,000 units in D5W 250 ml infusion, 7-25 Units/kg/hr, IntraVENous, TITRATE, Giovanni Madison MD, Stopped at 04/09/20 1240    midazolam (VERSED) injection 1-2 mg, 1-2 mg, IntraVENous, Q2H PRN, Beverly SCHMITT NP-C, 1 mg at 04/09/20 1305    bacitracin 500 unit/gram packet 1 Packet, 1 Packet, Topical, PRN, Jeffrey Recinos NP, 1 Packet at 04/01/20 0116    insulin glargine (LANTUS) injection 35 Units, 35 Units, SubCUTAneous, DAILY, Giovanni Madison MD, 35 Units at 04/10/20 0845    balsam peru-castor oiL (VENELEX) ointment, , Topical, BID, Giovanni Madison MD    sodium chloride (NS) flush 5-40 mL, 5-40 mL, IntraVENous, Q8H, Giovanni Madison MD, 10 mL at 04/10/20 0600    sodium chloride (NS) flush 5-40 mL, 5-40 mL, IntraVENous, PRN, Sirena Chambers MD, 10 mL at 04/04/20 2146    HYDROcodone-acetaminophen (NORCO) 5-325 mg per tablet 1 Tab, 1 Tab, Oral, Q4H PRN, Sirena Chambers MD, 1 Tab at 04/08/20 0528    ondansetron (ZOFRAN) injection 4 mg, 4 mg, IntraVENous, Q4H PRN, Giovanni Madison MD    chlorhexidine (ORAL CARE KIT) 0.12 % mouthwash 15 mL, 15 mL, Oral, Q12H, Iliana Rosales NP, 15 mL at 04/10/20 0849    docusate (COLACE) 50 mg/5 mL oral liquid 100 mg, 100 mg, Per NG tube, BID, Giovanni Madison MD, Stopped at 04/09/20 1800    acetaminophen (TYLENOL) tablet 650 mg, 650 mg, Oral, Q6H PRN **OR** acetaminophen (TYLENOL) suppository 650 mg, 650 mg, Rectal, Q6H PRN, Iliana Rosales, NP    bicarbonate dialysis (PRISMASOL) BG K 4/Ca 2.5 5000 ml solution, , Extracorporeal, DIALYSIS CONTINUOUS, Kit Hargrove MD, Last Rate: 3,000 mL/hr at 04/10/20 0649, 3,000 mL/hr at 04/10/20 0649    glucose chewable tablet 16 g, 4 Tab, Oral, PRN, Gricelda, Komal Juarez MD    glucagon [facility]) injection 1 mg, 1 mg, IntraMUSCular, PRN, Elizabeth Manual, Komal Juarez MD    dextrose 10% infusion 0-250 mL, 0-250 mL, IntraVENous, PRN, Elizabeth Bennett, Komal Juarez MD    insulin lispro (HUMALOG) injection, , SubCUTAneous, Q6H, Komal Madison MD, 3 Units at 04/10/20 1254      Labs:  Recent Results (from the past 24 hour(s))   POC EG7    Collection Time: 04/09/20  2:28 PM   Result Value Ref Range    Calcium, ionized (POC) 1.01 (L) 1.12 - 1.32 mmol/L    FIO2 (POC) 100 %    pH (POC) 7.142 (LL) 7.35 - 7.45      pCO2 (POC) 41.7 35.0 - 45.0 MMHG    pO2 (POC) 24 (LL) 80 - 100 MMHG    HCO3 (POC) 14.3 (L) 22 - 26 MMOL/L    Base deficit (POC) 15 mmol/L    sO2 (POC) 29 (L) 92 - 97 %    Site OTHER      Device: VENT      Mode ASSIST CONTROL      Set Rate 10 bpm    PEEP/CPAP (POC) 8 cmH2O    PIP (POC) 12      Allens test (POC) NO      Inspiratory Time 1.26 sec    Specimen type (POC) VENOUS BLOOD     CULTURE, BLOOD, PAIRED    Collection Time: 04/09/20  2:32 PM   Result Value Ref Range    Special Requests: NO SPECIAL REQUESTS      Culture result: NO GROWTH AFTER 14 HOURS     POC EG7    Collection Time: 04/09/20  5:17 PM   Result Value Ref Range    Calcium, ionized (POC) 0.98 (L) 1.12 - 1.32 mmol/L    FIO2 (POC) 100 %    pH (POC) 7.311 (L) 7.35 - 7.45      pCO2 (POC) 32.4 (L) 35.0 - 45.0 MMHG    pO2 (POC) 381 (H) 80 - 100 MMHG    HCO3 (POC) 16.3 (L) 22 - 26 MMOL/L    Base deficit (POC) 10 mmol/L    sO2 (POC) 100 (H) 92 - 97 %    Site DRAWN FROM ARTERIAL LINE      Device: VENT      Mode ASSIST CONTROL      Set Rate 10 bpm    PEEP/CPAP (POC) 8 cmH2O    PIP (POC) 12      Allens test (POC) NO      Inspiratory Time 1.26 sec    Specimen type (POC) ARTERIAL     GLUCOSE, POC    Collection Time: 04/09/20  5:42 PM   Result Value Ref Range    Glucose (POC) 276 (H) 65 - 100 mg/dL    Performed by Taurus Pemberton BLOOD, GASTRIC    Collection Time: 04/09/20  6:49 PM   Result Value Ref Range OCCULT BLOOD,GASTRIC Positive (A) NEG      pH,GASTRIC  1.5 - 3.5       Unable to determine pH.  pH was not read within 30 seconds of application of specimen. RENAL FUNCTION PANEL    Collection Time: 04/09/20  8:47 PM   Result Value Ref Range    Sodium 137 136 - 145 mmol/L    Potassium 4.3 3.5 - 5.1 mmol/L    Chloride 102 97 - 108 mmol/L    CO2 24 21 - 32 mmol/L    Anion gap 11 5 - 15 mmol/L    Glucose 293 (H) 65 - 100 mg/dL    BUN 36 (H) 6 - 20 MG/DL    Creatinine 2.04 (H) 0.55 - 1.02 MG/DL    BUN/Creatinine ratio 18 12 - 20      GFR est AA 30 (L) >60 ml/min/1.73m2    GFR est non-AA 25 (L) >60 ml/min/1.73m2    Calcium 7.7 (L) 8.5 - 10.1 MG/DL    Phosphorus 4.0 2.6 - 4.7 MG/DL    Albumin 3.5 3.5 - 5.0 g/dL   TROPONIN I    Collection Time: 04/09/20  8:47 PM   Result Value Ref Range    Troponin-I, Qt. 0.77 (H) <0.05 ng/mL   CBC WITH AUTOMATED DIFF    Collection Time: 04/09/20  8:47 PM   Result Value Ref Range    WBC 65.8 (HH) 3.6 - 11.0 K/uL    RBC 2.70 (L) 3.80 - 5.20 M/uL    HGB 7.8 (L) 11.5 - 16.0 g/dL    HCT 23.4 (L) 35.0 - 47.0 %    MCV 86.7 80.0 - 99.0 FL    MCH 28.9 26.0 - 34.0 PG    MCHC 33.3 30.0 - 36.5 g/dL    RDW 16.5 (H) 11.5 - 14.5 %    PLATELET 549 605 - 543 K/uL    MPV 11.8 8.9 - 12.9 FL    NRBC 0.0 0  WBC    ABSOLUTE NRBC 0.03 (H) 0.00 - 0.01 K/uL    NEUTROPHILS 87 (H) 32 - 75 %    LYMPHOCYTES 8 (L) 12 - 49 %    MONOCYTES 5 5 - 13 %    EOSINOPHILS 0 0 - 7 %    BASOPHILS 0 0 - 1 %    IMMATURE GRANULOCYTES 0 %    ABS. NEUTROPHILS 57.2 (H) 1.8 - 8.0 K/UL    ABS. LYMPHOCYTES 5.3 (H) 0.8 - 3.5 K/UL    ABS. MONOCYTES 3.3 (H) 0.0 - 1.0 K/UL    ABS. EOSINOPHILS 0.0 0.0 - 0.4 K/UL    ABS. BASOPHILS 0.0 0.0 - 0.1 K/UL    ABS. IMM.  GRANS. 0.0 K/UL    DF MANUAL      PLATELET COMMENTS Large Platelets      RBC COMMENTS ANISOCYTOSIS  1+       LD    Collection Time: 04/09/20  8:47 PM   Result Value Ref Range    LD 3,298 (H) 81 - 246 U/L   PROTHROMBIN TIME + INR    Collection Time: 04/09/20  8:47 PM   Result Value Ref Range    INR 1.8 (H) 0.9 - 1.1      Prothrombin time 18.1 (H) 9.0 - 11.1 sec   D DIMER    Collection Time: 04/09/20  8:47 PM   Result Value Ref Range    D-dimer 21.45 (H) 0.00 - 0.65 mg/L FEU   FERRITIN    Collection Time: 04/09/20  8:47 PM   Result Value Ref Range    Ferritin 19,977 (H) 8 - 252 NG/ML   CRP, HIGH SENSITIVITY    Collection Time: 04/09/20  8:47 PM   Result Value Ref Range    CRP, High sensitivity 5.2 mg/L   POC EG7    Collection Time: 04/09/20 10:00 PM   Result Value Ref Range    Calcium, ionized (POC) 1.01 (L) 1.12 - 1.32 mmol/L    FIO2 (POC) 80 %    pH (POC) 7.422 7.35 - 7.45      pCO2 (POC) 36.4 35.0 - 45.0 MMHG    pO2 (POC) 220 (H) 80 - 100 MMHG    HCO3 (POC) 23.7 22 - 26 MMOL/L    Base deficit (POC) 1 mmol/L    sO2 (POC) 100 (H) 92 - 97 %    Site DRAWN FROM ARTERIAL LINE      Device: VENT      Mode ASSIST CONTROL      Set Rate 10 bpm    PEEP/CPAP (POC) 8 cmH2O    PIP (POC) 10      Allens test (POC) N/A      Specimen type (POC) ARTERIAL      Pressure control YES     GLUCOSE, POC    Collection Time: 04/10/20 12:43 AM   Result Value Ref Range    Glucose (POC) 262 (H) 65 - 100 mg/dL    Performed by EULA BYRD    CBC WITH AUTOMATED DIFF    Collection Time: 04/10/20  1:45 AM   Result Value Ref Range    WBC 63.4 (HH) 3.6 - 11.0 K/uL    RBC 2.37 (L) 3.80 - 5.20 M/uL    HGB 6.9 (L) 11.5 - 16.0 g/dL    HCT 20.6 (L) 35.0 - 47.0 %    MCV 86.9 80.0 - 99.0 FL    MCH 29.1 26.0 - 34.0 PG    MCHC 33.5 30.0 - 36.5 g/dL    RDW 16.3 (H) 11.5 - 14.5 %    PLATELET 246 012 - 083 K/uL    MPV 11.9 8.9 - 12.9 FL    NRBC 0.0 0  WBC    ABSOLUTE NRBC 0.03 (H) 0.00 - 0.01 K/uL    NEUTROPHILS 90 (H) 32 - 75 %    BAND NEUTROPHILS 4 0 - 6 %    LYMPHOCYTES 4 (L) 12 - 49 %    MONOCYTES 1 (L) 5 - 13 %    EOSINOPHILS 0 0 - 7 %    BASOPHILS 0 0 - 1 %    MYELOCYTES 1 (H) 0 %    IMMATURE GRANULOCYTES 0 %    ABS. NEUTROPHILS 59.6 (H) 1.8 - 8.0 K/UL    ABS. LYMPHOCYTES 2.5 0.8 - 3.5 K/UL    ABS.  MONOCYTES 0.6 0.0 - 1.0 K/UL    ABS. EOSINOPHILS 0.0 0.0 - 0.4 K/UL    ABS. BASOPHILS 0.0 0.0 - 0.1 K/UL    ABS. IMM. GRANS. 0.0 K/UL    DF MANUAL      PLATELET COMMENTS Large Platelets      RBC COMMENTS ANISOCYTOSIS  1+        RBC COMMENTS HYPOCHROMIA  1+       TROPONIN I    Collection Time: 04/10/20  1:45 AM   Result Value Ref Range    Troponin-I, Qt. 0.98 (H) <0.05 ng/mL   LACTIC ACID    Collection Time: 04/10/20  1:45 AM   Result Value Ref Range    Lactic acid 11.1 (HH) 0.4 - 2.0 MMOL/L   CRP, HIGH SENSITIVITY    Collection Time: 04/10/20  1:45 AM   Result Value Ref Range    CRP, High sensitivity 8.7 mg/L   HEPATIC FUNCTION PANEL    Collection Time: 04/10/20  1:45 AM   Result Value Ref Range    Protein, total 5.2 (L) 6.4 - 8.2 g/dL    Albumin 3.7 3.5 - 5.0 g/dL    Globulin 1.5 (L) 2.0 - 4.0 g/dL    A-G Ratio 2.5 (H) 1.1 - 2.2      Bilirubin, total 1.6 (H) 0.2 - 1.0 MG/DL    Bilirubin, direct 0.7 (H) 0.0 - 0.2 MG/DL    Alk.  phosphatase 128 (H) 45 - 117 U/L    AST (SGOT) >2,000 (H) 15 - 37 U/L    ALT (SGPT) 1,606 (H) 12 - 78 U/L   EKG, 12 LEAD, INITIAL    Collection Time: 04/10/20  2:19 AM   Result Value Ref Range    Ventricular Rate 124 BPM    Atrial Rate 124 BPM    P-R Interval 118 ms    QRS Duration 88 ms    Q-T Interval 332 ms    QTC Calculation (Bezet) 476 ms    Calculated P Axis 57 degrees    Calculated R Axis 44 degrees    Calculated T Axis 4 degrees    Diagnosis Sinus tachycardia  No previous ECGs available      POC EG7    Collection Time: 04/10/20  6:15 AM   Result Value Ref Range    Calcium, ionized (POC) 1.05 (L) 1.12 - 1.32 mmol/L    FIO2 (POC) 40 %    pH (POC) 7.439 7.35 - 7.45      pCO2 (POC) 29.8 (L) 35.0 - 45.0 MMHG    pO2 (POC) 109 (H) 80 - 100 MMHG    HCO3 (POC) 20.2 (L) 22 - 26 MMOL/L    Base deficit (POC) 4 mmol/L    sO2 (POC) 99 (H) 92 - 97 %    Site DRAWN FROM ARTERIAL LINE      Device: VENT      Mode ASSIST CONTROL      Set Rate 10 bpm    PEEP/CPAP (POC) 8 cmH2O    PIP (POC) 12      Allens test (POC) N/A Specimen type (POC) ARTERIAL      Pressure control YES     GLUCOSE, POC    Collection Time: 04/10/20  6:16 AM   Result Value Ref Range    Glucose (POC) 236 (H) 65 - 100 mg/dL    Performed by ELUA BYRD    PROTHROMBIN TIME + INR    Collection Time: 04/10/20  9:20 AM   Result Value Ref Range    INR 2.0 (H) 0.9 - 1.1      Prothrombin time 19.8 (H) 9.0 - 11.1 sec   LACTIC ACID    Collection Time: 04/10/20  9:20 AM   Result Value Ref Range    Lactic acid 2.5 (HH) 0.4 - 2.0 MMOL/L   PTT    Collection Time: 04/10/20  9:20 AM   Result Value Ref Range    aPTT 31.9 22.1 - 32.0 sec    aPTT, therapeutic range     58.0 - 77.0 SECS   CBC W/O DIFF    Collection Time: 04/10/20  9:20 AM   Result Value Ref Range    WBC 38.8 (H) 3.6 - 11.0 K/uL    RBC 2.12 (L) 3.80 - 5.20 M/uL    HGB 6.2 (L) 11.5 - 16.0 g/dL    HCT 18.3 (L) 35.0 - 47.0 %    MCV 86.3 80.0 - 99.0 FL    MCH 29.2 26.0 - 34.0 PG    MCHC 33.9 30.0 - 36.5 g/dL    RDW 16.0 (H) 11.5 - 14.5 %    PLATELET 390 (L) 878 - 400 K/uL    MPV 12.1 8.9 - 12.9 FL    NRBC 0.1 (H) 0  WBC    ABSOLUTE NRBC 0.02 (H) 0.00 - 7.13 K/uL   METABOLIC PANEL, BASIC    Collection Time: 04/10/20  9:20 AM   Result Value Ref Range    Sodium 138 136 - 145 mmol/L    Potassium 5.0 3.5 - 5.1 mmol/L    Chloride 103 97 - 108 mmol/L    CO2 26 21 - 32 mmol/L    Anion gap 9 5 - 15 mmol/L    Glucose 265 (H) 65 - 100 mg/dL    BUN 34 (H) 6 - 20 MG/DL    Creatinine 1.70 (H) 0.55 - 1.02 MG/DL    BUN/Creatinine ratio 20 12 - 20      GFR est AA 38 (L) >60 ml/min/1.73m2    GFR est non-AA 31 (L) >60 ml/min/1.73m2    Calcium 7.9 (L) 8.5 - 10.1 MG/DL   LACTIC ACID    Collection Time: 04/10/20 12:14 PM   Result Value Ref Range    Lactic acid 2.4 (HH) 0.4 - 2.0 MMOL/L   PTT    Collection Time: 04/10/20 12:14 PM   Result Value Ref Range    aPTT 34.4 (H) 22.1 - 32.0 sec    aPTT, therapeutic range     58.0 - 77.0 SECS   VANCOMYCIN, RANDOM    Collection Time: 04/10/20 12:14 PM   Result Value Ref Range    Vancomycin, random 16.5 UG/ML   CBC W/O DIFF    Collection Time: 04/10/20 12:14 PM   Result Value Ref Range    WBC 29.8 (H) 3.6 - 11.0 K/uL    RBC 1.99 (L) 3.80 - 5.20 M/uL    HGB 5.9 (LL) 11.5 - 16.0 g/dL    HCT 17.4 (LL) 35.0 - 47.0 %    MCV 87.4 80.0 - 99.0 FL    MCH 29.6 26.0 - 34.0 PG    MCHC 33.9 30.0 - 36.5 g/dL    RDW 15.6 (H) 11.5 - 14.5 %    PLATELET 289 (L) 814 - 400 K/uL    MPV 11.9 8.9 - 12.9 FL    NRBC 0.0 0  WBC    ABSOLUTE NRBC 0.00 0.00 - 0.01 K/uL   GLUCOSE, POC    Collection Time: 04/10/20 12:30 PM   Result Value Ref Range    Glucose (POC) 274 (H) 65 - 100 mg/dL    Performed by Jhonatan Dawn    PLATELETS, ALLOCATE    Collection Time: 04/10/20  1:00 PM   Result Value Ref Range    Unit number [id number]     Blood component type PLPH LR,PAS1     Unit division 00     Status of unit ALLOCATED    PLASMA, ALLOCATE    Collection Time: 04/10/20  1:00 PM   Result Value Ref Range    Unit number [id number]     Blood component type FP 24h,Thaw1     Unit division 00     Status of unit ALLOCATED            Micro:     Blood: 3/31/20  Specimen Information: Blood        Component Value Ref Range & Units Status   Special Requests: NO SPECIAL REQUESTS    Final   Culture result: NO GROWTH 5 DAYS    Final   Result History              Sputum 3/31/20   Sputum        Component Value Ref Range & Units Status   Special Requests: NO SPECIAL REQUESTS    Final   GRAM STAIN FEW WBCS SEEN    Final   GRAM STAIN    Final   2+ GRAM POSITIVE COCCI IN PAIRS    Culture result: Abnormal     Final   MODERATE STAPHYLOCOCCUS AUREUS    Culture result:    Final   LIGHT NORMAL RESPIRATORY SOFIE    Susceptibility      Staphylococcus aureus     LU    Ciprofloxacin ($) <=0.5 ug/mL S    Clindamycin ($)  R    Doxycycline ($$) <=0.5 ug/mL S    Erythromycin ($$$$) >=8 ug/mL R    Gentamicin ($) <=0.5 ug/mL S    Levofloxacin ($) <=0.12 ug/mL S    Linezolid ($$$$$) 2 ug/mL S    Moxifloxacin ($$$$) <=0.25 ug/mL S    Oxacillin 0.5 ug/mL S Rifampin ($$$$) <=0.5 ug/mL S1    Tetracycline <=1 ug/mL S    Trimeth/Sulfa <=10 ug/mL S    Vancomycin ($) 1 ug/mL S                         Imaging:  CXR 4/5/20  FINDINGS: AP radiograph of the chest was obtained.     There is been interval advancement of the endotracheal tube which now terminates  1.6 cm above the bhavik. Right upper extremity PICC and gastric decompression  tubes are again noted. There is no significant change in diffuse bilateral  heterogeneous opacities. Likely trace left pleural effusion. No pneumothorax. Stable cardiomediastinal silhouette.     IMPRESSION  IMPRESSION:   1. Interval advancement of endotracheal tube which now terminates 1.6 cm above  the bhavik. Consider slight retraction. 2. Unchanged diffuse bilateral heterogeneous opacities, consistent with  multifocal pneumonia. Likely trace left pleural effusion.           Assessment / Plan    Ms. Luis Carlos Pena is a 51-year-old lady with a history of nephrolithiasis, GERD, basal cell carcinoma who was admitted from [facility] with respiratory symptoms concerning for COVID 19 and tested + on 3/26/20. Per team,  exposure to her mother with COVID 23. Per notes, intubated on 3/28/2020. Transferred to [facility] for CRRT    Had code blue 4/9/20   Resuscitated      Labs wt marked leukocytosis post code that is Improving     Restarted on Vancomycin by primary team 4/9     Palliative team consulted       1) Sars-CoV2 pneumonia, respiratory failure, renal failure   Sars-CoV-2 detected 3/26/20, other viral respiratory panel negative  (records from [facility] )   Urine legionella and S pneumo ag negative 3/27/20  Procalcitonin 3/27 0.47, 3/28 0.44, 3/29 5.86, 3/31 13.44  , Ferritin 1985 3/31/20    4/3/20   , ferritin 992, CRP 9.65 4/5/20   IL 6  402 3/31/20  Completed azithromycin and Plaquenil   S/P Actemra one dose 4/3.    On Heparin gtt     2)  MSSA on sputum, pharmacy dosing vancomycin based on levels due to Penicillin allergy Completed a 7 day course   4/6/20 vanc level random is 15.9  Restarted on Vancomycin 4/9    3) marked leukocytosis  Suspect from coding event  Noted antibiotics broadened by primary team to Vanco IV, PO, flagyl and cefepime  Await all cultures and C diff if sent  WBC already improving   Taper antibiotics based on course and cultures        3) ARF:   Plans per nephrology, CRRT  R IJ    3) history of nephrolithiasis    4) history of GERD    5) basal cell carcinoma per chart        D/W with icu team     Please contact with questions     Allison Padgett DO  1:42 PM

## 2020-04-10 NOTE — PROGRESS NOTES
.  Bedside and Verbal shift change report given to Dwight RN (oncoming nurse) by Adelia Sher RN (offgoing nurse). Report included the following information SBAR.     0800: pt intubated on ventilator, following commands, POLANCO purposefully but very weak, CRRT running with no fluid being pulled off, levo/vaso/&lj gtt running to maintain map >65, plan for CT scan & close monitoring of labs. 1015: CRRT rinsed back in preporation for CT trip. 1035: 1 unit of PRBC hung    1045: PRBC rate change to 125    1115: Off floor to CT with RN, RT, NP    1200: Pt back in ICU 15    1205: Fentanyl new syringe- cleared 1193- rate change 75. Waste 2 ml    1225: Fentanyl rate change to 125 per Bobby due to pt belly breathing and desatting     1300: Order received to remove fluid from pt per Bobby & to 999ml/hr PRBCs.  updated by Bobby    1315: 2nd unit of PRBCs given at bedside    1430: 3rd unit of PRBCs given at bedside    1730: Repeat labs sent off     1836: BG 63 due to diluted blood draw- redrawed and - insulin given. Bedside and Verbal shift change report given to RN (oncoming nurse) by Anant Church RN (offgoing nurse). Report included the following information SBAR.

## 2020-04-10 NOTE — PROGRESS NOTES
Day #1 of Cefepime  Indication: intra-abdominal infection  Current regimen:  1 gram Q12hr  Abx regimen: cefepime+metronidazole+van  Recent Labs     04/10/20  0145 20  2047 20  1246 20  1142  20  0539   WBC 63.4* 65.8* 63.9* 63.0*   < >  --    CREA  --  2.04*  --  2.12*  --  1.52*   BUN  --  36*  --  30*  --  25*    < > = values in this interval not displayed.      Est CrCl:CVVH  Temp (24hrs), Av.5 °F (36.9 °C), Min:97.6 °F (36.4 °C), Max:99.9 °F (37.7 °C)    Cultures:  blood NGTD    Plan: Change to 2 grams Q12hr per renal dosing protocol

## 2020-04-10 NOTE — PROGRESS NOTES
Call by RN for tachycardia, continue to be on 4 pressors/inotropic agents, clear pulse-pressure variability on Arterial line tracing, will give additional 500cc Albumin bolus, suspect tachycardia and shock may be 2/2 hypovolemia, will volume resuscitate, trend H/H, then will consider transitioning sedation to precedex or propofol. Will avoid at this time due to shock state.      Critical care time 30 minutes

## 2020-04-10 NOTE — DIABETES MGMT
[facility]ARTA [facility]  CLINICAL NURSE SPECIALIST CONSULT  PROGRAM FOR DIABETES HEALTH  Follow up Note  Presentation   Margarita Coe is a 64 y.o. female admitted from OSH to [facility] ICU with SARS-COV2 needing CRRT. She is currently sedated and has been intubated since 3/28/20. Current clinical course has been complicated by steroid induced hyperglycemia. Steroids are discontinued at this time. She requires CRRT for acute renal failure r/t her sepsis and hypotension. PHM: GERD, obesity, and anxiety. New diabetes diagnosis with A1C 11.0% (3/28/2020); updated A1C 3/31/20-10.4%     Recent events:   Patient remains intubated and on ventilator, on CRRT. Continues on vasopressors  for pressure support. Per RN note, she follows commands, and POLANCO purposefully, but quite weak. PRBC transfused, going to CT scan. Consulted by Provider for advanced diabetes nursing assessment and care, specifically related to   [] Transitioning off Rayna Mitts   [x] Inpatient management strategy  [] Home management assessment  [] Survival skill education    Diabetes-related medical history  Acute complications  hyperglycemia  Neurological complications  NONE  Microvascular disease  NONE  Macrovascular disease  NONE  Other associated conditions     NONE    Diabetes medication history: NONE    Subjective   Per chart review, Ms. Nehemias Espinal remains very ill  and is on isolation forSARS-COV2 in ICU. I am unable to do a physical assessment of the patient at this time. Patient reports the following home diabetes self-care practices: deferred    Objective     Vital Signs   Visit Vitals  /74   Pulse (!) 124   Temp 96.8 °F (36 °C)   Resp 26   Ht 5' 4\" (1.626 m)   Wt 116.6 kg (257 lb 1.6 oz)   SpO2 100%   BMI 44.13 kg/m²   .    Laboratory  Lab Results   Component Value Date/Time    Hemoglobin A1c 10.4 (H) 03/31/2020 03:42 PM     No results found for: LDL, LDLC, DLDLP  Lab Results   Component Value Date/Time    Creatinine 1.70 (H) 04/10/2020 09:20 AM Lab Results   Component Value Date/Time    Sodium 138 04/10/2020 09:20 AM    Potassium 5.0 04/10/2020 09:20 AM    Chloride 103 04/10/2020 09:20 AM    CO2 26 04/10/2020 09:20 AM    Anion gap 9 04/10/2020 09:20 AM    Glucose 265 (H) 04/10/2020 09:20 AM    BUN 34 (H) 04/10/2020 09:20 AM    Creatinine 1.70 (H) 04/10/2020 09:20 AM    BUN/Creatinine ratio 20 04/10/2020 09:20 AM    GFR est AA 38 (L) 04/10/2020 09:20 AM    GFR est non-AA 31 (L) 04/10/2020 09:20 AM    Calcium 7.9 (L) 04/10/2020 09:20 AM    Bilirubin, total 0.7 04/09/2020 04:00 AM    AST (SGOT) 30 04/09/2020 04:00 AM    Alk. phosphatase 161 (H) 04/09/2020 04:00 AM    Protein, total 6.1 (L) 04/09/2020 04:00 AM    Albumin 3.5 04/09/2020 08:47 PM    Globulin 2.8 04/09/2020 04:00 AM    A-G Ratio 1.2 04/09/2020 04:00 AM    ALT (SGPT) 42 04/09/2020 04:00 AM     Lab Results   Component Value Date/Time    ALT (SGPT) 42 04/09/2020 04:00 AM         Evaluation   Ms Mery Barnett, with new onset Type 2 diabetes,with A1C 10.4%. BG has continued to be elevated >200mg/dl today, but has trended down since yesterday. It is imperative that we maintain her BG within target range 100-180mg/dl. Recommendations   1. CONTINUE basal and correction insulin. IF BG continues to trend higher, >200mg/dl consider increasing Lantus. 2. Will continue to follow.     Assessment and Plan   Nursing Diagnosis Risk for unstable blood glucose pattern   Nursing Intervention Domain 6439 Decision-making Support   Nursing Interventions Examined current inpatient diabetes control   Explored factors facilitating and impeding inpatient management  Identified self-management practices impeding diabetes control  Explored corrective strategies with patient and responsible inpatient provider   Informed patient of rational for insulin strategy while hospitalized         Billing Code(s)     [x] 35097  subsequent hospital care - [address], [place]   Program for Diabetes Health  Access via [employer] & [phone]

## 2020-04-11 LAB
ALBUMIN SERPL-MCNC: 3 G/DL (ref 3.5–5)
ALBUMIN SERPL-MCNC: 3.1 G/DL (ref 3.5–5)
ALBUMIN/GLOB SERPL: 1.6 {RATIO} (ref 1.1–2.2)
ALBUMIN/GLOB SERPL: 1.6 {RATIO} (ref 1.1–2.2)
ALP SERPL-CCNC: 105 U/L (ref 45–117)
ALP SERPL-CCNC: 106 U/L (ref 45–117)
ALT SERPL-CCNC: 1700 U/L (ref 12–78)
ALT SERPL-CCNC: 1701 U/L (ref 12–78)
ANION GAP SERPL CALC-SCNC: 14 MMOL/L (ref 5–15)
ANION GAP SERPL CALC-SCNC: 14 MMOL/L (ref 5–15)
ANION GAP SERPL CALC-SCNC: 6 MMOL/L (ref 5–15)
ANION GAP SERPL CALC-SCNC: 7 MMOL/L (ref 5–15)
APTT PPP: 35.4 SEC (ref 22.1–32)
APTT PPP: 36.5 SEC (ref 22.1–32)
ARTERIAL PATENCY WRIST A: ABNORMAL
AST SERPL-CCNC: 1692 U/L (ref 15–37)
AST SERPL-CCNC: 1733 U/L (ref 15–37)
BASE DEFICIT BLD-SCNC: 2 MMOL/L
BASE DEFICIT BLD-SCNC: 5 MMOL/L
BASE DEFICIT BLD-SCNC: 7 MMOL/L
BASE DEFICIT BLD-SCNC: 7 MMOL/L
BASOPHILS # BLD: 0.4 K/UL (ref 0–0.1)
BASOPHILS NFR BLD: 1 % (ref 0–1)
BDY SITE: ABNORMAL
BILIRUB DIRECT SERPL-MCNC: 1.1 MG/DL (ref 0–0.2)
BILIRUB SERPL-MCNC: 2.2 MG/DL (ref 0.2–1)
BILIRUB SERPL-MCNC: 2.3 MG/DL (ref 0.2–1)
BLD PROD TYP BPU: NORMAL
BPU ID: NORMAL
BUN SERPL-MCNC: 31 MG/DL (ref 6–20)
BUN SERPL-MCNC: 32 MG/DL (ref 6–20)
BUN SERPL-MCNC: 36 MG/DL (ref 6–20)
BUN SERPL-MCNC: 41 MG/DL (ref 6–20)
BUN/CREAT SERPL: 19 (ref 12–20)
BUN/CREAT SERPL: 20 (ref 12–20)
BUN/CREAT SERPL: 20 (ref 12–20)
BUN/CREAT SERPL: 21 (ref 12–20)
CA-I BLD-SCNC: 1.01 MMOL/L (ref 1.12–1.32)
CA-I BLD-SCNC: 1.02 MMOL/L (ref 1.12–1.32)
CA-I BLD-SCNC: 1.11 MMOL/L (ref 1.12–1.32)
CA-I BLD-SCNC: 1.12 MMOL/L (ref 1.12–1.32)
CALCIUM SERPL-MCNC: 7.1 MG/DL (ref 8.5–10.1)
CALCIUM SERPL-MCNC: 7.3 MG/DL (ref 8.5–10.1)
CALCIUM SERPL-MCNC: 7.6 MG/DL (ref 8.5–10.1)
CALCIUM SERPL-MCNC: 7.7 MG/DL (ref 8.5–10.1)
CHLORIDE SERPL-SCNC: 101 MMOL/L (ref 97–108)
CHLORIDE SERPL-SCNC: 102 MMOL/L (ref 97–108)
CHLORIDE SERPL-SCNC: 105 MMOL/L (ref 97–108)
CHLORIDE SERPL-SCNC: 106 MMOL/L (ref 97–108)
CO2 SERPL-SCNC: 24 MMOL/L (ref 21–32)
CO2 SERPL-SCNC: 26 MMOL/L (ref 21–32)
CREAT SERPL-MCNC: 1.6 MG/DL (ref 0.55–1.02)
CREAT SERPL-MCNC: 1.6 MG/DL (ref 0.55–1.02)
CREAT SERPL-MCNC: 1.77 MG/DL (ref 0.55–1.02)
CREAT SERPL-MCNC: 2 MG/DL (ref 0.55–1.02)
CRP SERPL HS-MCNC: >9.5 MG/L
D DIMER PPP FEU-MCNC: 11.16 MG/L FEU (ref 0–0.65)
DIFFERENTIAL METHOD BLD: ABNORMAL
EOSINOPHIL # BLD: 0.4 K/UL (ref 0–0.4)
EOSINOPHIL NFR BLD: 1 % (ref 0–7)
ERYTHROCYTE [DISTWIDTH] IN BLOOD BY AUTOMATED COUNT: 15.3 % (ref 11.5–14.5)
ERYTHROCYTE [DISTWIDTH] IN BLOOD BY AUTOMATED COUNT: 15.6 % (ref 11.5–14.5)
ERYTHROCYTE [DISTWIDTH] IN BLOOD BY AUTOMATED COUNT: 15.9 % (ref 11.5–14.5)
ERYTHROCYTE [DISTWIDTH] IN BLOOD BY AUTOMATED COUNT: 15.9 % (ref 11.5–14.5)
ERYTHROCYTE [DISTWIDTH] IN BLOOD BY AUTOMATED COUNT: 16.2 % (ref 11.5–14.5)
FIBRINOGEN PPP-MCNC: 157 MG/DL (ref 200–475)
FIBRINOGEN PPP-MCNC: 227 MG/DL (ref 200–475)
GAS FLOW.O2 O2 DELIVERY SYS: ABNORMAL L/MIN
GAS FLOW.O2 SETTING OXYMISER: 10 BPM
GAS FLOW.O2 SETTING OXYMISER: 12 BPM
GAS FLOW.O2 SETTING OXYMISER: 12 BPM
GAS FLOW.O2 SETTING OXYMISER: 22 BPM
GLOBULIN SER CALC-MCNC: 1.9 G/DL (ref 2–4)
GLOBULIN SER CALC-MCNC: 1.9 G/DL (ref 2–4)
GLUCOSE BLD STRIP.AUTO-MCNC: 205 MG/DL (ref 65–100)
GLUCOSE BLD STRIP.AUTO-MCNC: 210 MG/DL (ref 65–100)
GLUCOSE BLD STRIP.AUTO-MCNC: 212 MG/DL (ref 65–100)
GLUCOSE BLD STRIP.AUTO-MCNC: 220 MG/DL (ref 65–100)
GLUCOSE BLD STRIP.AUTO-MCNC: 304 MG/DL (ref 65–100)
GLUCOSE SERPL-MCNC: 205 MG/DL (ref 65–100)
GLUCOSE SERPL-MCNC: 219 MG/DL (ref 65–100)
GLUCOSE SERPL-MCNC: 234 MG/DL (ref 65–100)
GLUCOSE SERPL-MCNC: 321 MG/DL (ref 65–100)
HCO3 BLD-SCNC: 18.6 MMOL/L (ref 22–26)
HCO3 BLD-SCNC: 20.2 MMOL/L (ref 22–26)
HCO3 BLD-SCNC: 24.7 MMOL/L (ref 22–26)
HCO3 BLD-SCNC: 26 MMOL/L (ref 22–26)
HCT VFR BLD AUTO: 21.6 % (ref 35–47)
HCT VFR BLD AUTO: 21.8 % (ref 35–47)
HCT VFR BLD AUTO: 22.1 % (ref 35–47)
HCT VFR BLD AUTO: 22.1 % (ref 35–47)
HCT VFR BLD AUTO: 22.6 % (ref 35–47)
HGB BLD-MCNC: 7 G/DL (ref 11.5–16)
HGB BLD-MCNC: 7.2 G/DL (ref 11.5–16)
HGB BLD-MCNC: 7.4 G/DL (ref 11.5–16)
HGB BLD-MCNC: 7.6 G/DL (ref 11.5–16)
HGB BLD-MCNC: 8 G/DL (ref 11.5–16)
IMM GRANULOCYTES # BLD AUTO: 0 K/UL
IMM GRANULOCYTES NFR BLD AUTO: 0 %
INR PPP: 1.8 (ref 0.9–1.1)
INR PPP: 2.1 (ref 0.9–1.1)
INSPIRATION.DURATION SETTING TIME VENT: 1 SEC
INSPIRATION.DURATION SETTING TIME VENT: 1.72 SEC
INSPIRATION.DURATION SETTING TIME VENT: 2 SEC
INSPIRATION.DURATION SETTING TIME VENT: 2 SEC
LACTATE SERPL-SCNC: 1.7 MMOL/L (ref 0.4–2)
LACTATE SERPL-SCNC: 1.9 MMOL/L (ref 0.4–2)
LACTATE SERPL-SCNC: 2 MMOL/L (ref 0.4–2)
LACTATE SERPL-SCNC: 5.4 MMOL/L (ref 0.4–2)
LACTATE SERPL-SCNC: 7.8 MMOL/L (ref 0.4–2)
LYMPHOCYTES # BLD: 2.6 K/UL (ref 0.8–3.5)
LYMPHOCYTES NFR BLD: 7 % (ref 12–49)
MAGNESIUM SERPL-MCNC: 2.3 MG/DL (ref 1.6–2.4)
MCH RBC QN AUTO: 29.3 PG (ref 26–34)
MCH RBC QN AUTO: 29.4 PG (ref 26–34)
MCH RBC QN AUTO: 29.5 PG (ref 26–34)
MCH RBC QN AUTO: 29.8 PG (ref 26–34)
MCH RBC QN AUTO: 31.1 PG (ref 26–34)
MCHC RBC AUTO-ENTMCNC: 32.4 G/DL (ref 30–36.5)
MCHC RBC AUTO-ENTMCNC: 33 G/DL (ref 30–36.5)
MCHC RBC AUTO-ENTMCNC: 33.5 G/DL (ref 30–36.5)
MCHC RBC AUTO-ENTMCNC: 34.4 G/DL (ref 30–36.5)
MCHC RBC AUTO-ENTMCNC: 35.4 G/DL (ref 30–36.5)
MCV RBC AUTO: 85.7 FL (ref 80–99)
MCV RBC AUTO: 87.7 FL (ref 80–99)
MCV RBC AUTO: 87.9 FL (ref 80–99)
MCV RBC AUTO: 90.1 FL (ref 80–99)
MCV RBC AUTO: 90.4 FL (ref 80–99)
MONOCYTES # BLD: 2.6 K/UL (ref 0–1)
MONOCYTES NFR BLD: 7 % (ref 5–13)
NEUTS BAND NFR BLD MANUAL: 3 % (ref 0–6)
NEUTS SEG # BLD: 31 K/UL (ref 1.8–8)
NEUTS SEG NFR BLD: 81 % (ref 32–75)
NRBC # BLD: 0.02 K/UL (ref 0–0.01)
NRBC # BLD: 0.07 K/UL (ref 0–0.01)
NRBC BLD-RTO: 0 PER 100 WBC
NRBC BLD-RTO: 0.1 PER 100 WBC
NRBC BLD-RTO: 0.2 PER 100 WBC
O2/TOTAL GAS SETTING VFR VENT: 40 %
O2/TOTAL GAS SETTING VFR VENT: 40 %
O2/TOTAL GAS SETTING VFR VENT: 50 %
O2/TOTAL GAS SETTING VFR VENT: 50 %
PCO2 BLD: 34 MMHG (ref 35–45)
PCO2 BLD: 42.6 MMHG (ref 35–45)
PCO2 BLD: 63.4 MMHG (ref 35–45)
PCO2 BLD: 76.9 MMHG (ref 35–45)
PEEP RESPIRATORY: 8 CMH2O
PH BLD: 7.12 [PH] (ref 7.35–7.45)
PH BLD: 7.22 [PH] (ref 7.35–7.45)
PH BLD: 7.28 [PH] (ref 7.35–7.45)
PH BLD: 7.35 [PH] (ref 7.35–7.45)
PHOSPHATE SERPL-MCNC: 4.2 MG/DL (ref 2.6–4.7)
PIP ISTAT,IPIP: 15
PIP ISTAT,IPIP: 30
PIP ISTAT,IPIP: 36
PIP ISTAT,IPIP: 38
PLATELET # BLD AUTO: 132 K/UL (ref 150–400)
PLATELET # BLD AUTO: 138 K/UL (ref 150–400)
PLATELET # BLD AUTO: 141 K/UL (ref 150–400)
PLATELET # BLD AUTO: 141 K/UL (ref 150–400)
PLATELET # BLD AUTO: 167 K/UL (ref 150–400)
PMV BLD AUTO: 11 FL (ref 8.9–12.9)
PMV BLD AUTO: 11.1 FL (ref 8.9–12.9)
PMV BLD AUTO: 11.4 FL (ref 8.9–12.9)
PMV BLD AUTO: 11.5 FL (ref 8.9–12.9)
PMV BLD AUTO: 12.2 FL (ref 8.9–12.9)
PO2 BLD: 106 MMHG (ref 80–100)
PO2 BLD: 151 MMHG (ref 80–100)
PO2 BLD: 152 MMHG (ref 80–100)
PO2 BLD: 74 MMHG (ref 80–100)
POTASSIUM SERPL-SCNC: 5.3 MMOL/L (ref 3.5–5.1)
POTASSIUM SERPL-SCNC: 5.4 MMOL/L (ref 3.5–5.1)
POTASSIUM SERPL-SCNC: 5.4 MMOL/L (ref 3.5–5.1)
POTASSIUM SERPL-SCNC: 5.7 MMOL/L (ref 3.5–5.1)
PROT SERPL-MCNC: 4.9 G/DL (ref 6.4–8.2)
PROT SERPL-MCNC: 5 G/DL (ref 6.4–8.2)
PROTHROMBIN TIME: 18.2 SEC (ref 9–11.1)
PROTHROMBIN TIME: 20.8 SEC (ref 9–11.1)
RBC # BLD AUTO: 2.39 M/UL (ref 3.8–5.2)
RBC # BLD AUTO: 2.42 M/UL (ref 3.8–5.2)
RBC # BLD AUTO: 2.52 M/UL (ref 3.8–5.2)
RBC # BLD AUTO: 2.57 M/UL (ref 3.8–5.2)
RBC # BLD AUTO: 2.58 M/UL (ref 3.8–5.2)
RBC MORPH BLD: ABNORMAL
SAO2 % BLD: 93 % (ref 92–97)
SAO2 % BLD: 98 % (ref 92–97)
SAO2 % BLD: 98 % (ref 92–97)
SAO2 % BLD: 99 % (ref 92–97)
SERVICE CMNT-IMP: ABNORMAL
SODIUM SERPL-SCNC: 138 MMOL/L (ref 136–145)
SODIUM SERPL-SCNC: 138 MMOL/L (ref 136–145)
SODIUM SERPL-SCNC: 140 MMOL/L (ref 136–145)
SODIUM SERPL-SCNC: 141 MMOL/L (ref 136–145)
SPECIMEN TYPE: ABNORMAL
STATUS OF UNIT,%ST: NORMAL
THERAPEUTIC RANGE,PTTT: ABNORMAL SECS (ref 58–77)
THERAPEUTIC RANGE,PTTT: ABNORMAL SECS (ref 58–77)
TOTAL RESP. RATE, ITRR: 13
TOTAL RESP. RATE, ITRR: 22
TOTAL RESP. RATE, ITRR: 23
TOTAL RESP. RATE, ITRR: 30
UNIT DIVISION, %UDIV: 0
VENTILATION MODE VENT: ABNORMAL
WBC # BLD AUTO: 33.6 K/UL (ref 3.6–11)
WBC # BLD AUTO: 37 K/UL (ref 3.6–11)
WBC # BLD AUTO: 39 K/UL (ref 3.6–11)
WBC # BLD AUTO: 45.7 K/UL (ref 3.6–11)
WBC # BLD AUTO: 46 K/UL (ref 3.6–11)

## 2020-04-11 PROCEDURE — 85384 FIBRINOGEN ACTIVITY: CPT

## 2020-04-11 PROCEDURE — 65610000006 HC RM INTENSIVE CARE

## 2020-04-11 PROCEDURE — 83605 ASSAY OF LACTIC ACID: CPT

## 2020-04-11 PROCEDURE — 86141 C-REACTIVE PROTEIN HS: CPT

## 2020-04-11 PROCEDURE — 85379 FIBRIN DEGRADATION QUANT: CPT

## 2020-04-11 PROCEDURE — 80076 HEPATIC FUNCTION PANEL: CPT

## 2020-04-11 PROCEDURE — 74011636637 HC RX REV CODE- 636/637: Performed by: NURSE PRACTITIONER

## 2020-04-11 PROCEDURE — 36600 WITHDRAWAL OF ARTERIAL BLOOD: CPT

## 2020-04-11 PROCEDURE — 85025 COMPLETE CBC W/AUTO DIFF WBC: CPT

## 2020-04-11 PROCEDURE — 85730 THROMBOPLASTIN TIME PARTIAL: CPT

## 2020-04-11 PROCEDURE — 74011000258 HC RX REV CODE- 258: Performed by: NURSE PRACTITIONER

## 2020-04-11 PROCEDURE — 74011000250 HC RX REV CODE- 250

## 2020-04-11 PROCEDURE — 80053 COMPREHEN METABOLIC PANEL: CPT

## 2020-04-11 PROCEDURE — 74011000250 HC RX REV CODE- 250: Performed by: NURSE PRACTITIONER

## 2020-04-11 PROCEDURE — 85610 PROTHROMBIN TIME: CPT

## 2020-04-11 PROCEDURE — 74011250636 HC RX REV CODE- 250/636: Performed by: NURSE PRACTITIONER

## 2020-04-11 PROCEDURE — 74011250637 HC RX REV CODE- 250/637: Performed by: NURSE PRACTITIONER

## 2020-04-11 PROCEDURE — 83735 ASSAY OF MAGNESIUM: CPT

## 2020-04-11 PROCEDURE — 82803 BLOOD GASES ANY COMBINATION: CPT

## 2020-04-11 PROCEDURE — P9045 ALBUMIN (HUMAN), 5%, 250 ML: HCPCS | Performed by: INTERNAL MEDICINE

## 2020-04-11 PROCEDURE — 80048 BASIC METABOLIC PNL TOTAL CA: CPT

## 2020-04-11 PROCEDURE — 74011250636 HC RX REV CODE- 250/636: Performed by: INTERNAL MEDICINE

## 2020-04-11 PROCEDURE — 74011000250 HC RX REV CODE- 250: Performed by: INTERNAL MEDICINE

## 2020-04-11 PROCEDURE — C9113 INJ PANTOPRAZOLE SODIUM, VIA: HCPCS | Performed by: NURSE PRACTITIONER

## 2020-04-11 PROCEDURE — 36415 COLL VENOUS BLD VENIPUNCTURE: CPT

## 2020-04-11 PROCEDURE — P9016 RBC LEUKOCYTES REDUCED: HCPCS

## 2020-04-11 PROCEDURE — 90945 DIALYSIS ONE EVALUATION: CPT

## 2020-04-11 PROCEDURE — 74011636637 HC RX REV CODE- 636/637: Performed by: INTERNAL MEDICINE

## 2020-04-11 PROCEDURE — 74011000258 HC RX REV CODE- 258: Performed by: INTERNAL MEDICINE

## 2020-04-11 PROCEDURE — 82962 GLUCOSE BLOOD TEST: CPT

## 2020-04-11 PROCEDURE — 84100 ASSAY OF PHOSPHORUS: CPT

## 2020-04-11 PROCEDURE — 94003 VENT MGMT INPAT SUBQ DAY: CPT

## 2020-04-11 PROCEDURE — 85027 COMPLETE CBC AUTOMATED: CPT

## 2020-04-11 PROCEDURE — P9045 ALBUMIN (HUMAN), 5%, 250 ML: HCPCS | Performed by: NURSE PRACTITIONER

## 2020-04-11 PROCEDURE — 36430 TRANSFUSION BLD/BLD COMPNT: CPT

## 2020-04-11 RX ORDER — ALBUMIN HUMAN 50 G/1000ML
SOLUTION INTRAVENOUS
Status: DISPENSED
Start: 2020-04-11 | End: 2020-04-11

## 2020-04-11 RX ORDER — SODIUM CHLORIDE 9 MG/ML
250 INJECTION, SOLUTION INTRAVENOUS AS NEEDED
Status: DISCONTINUED | OUTPATIENT
Start: 2020-04-11 | End: 2020-04-11

## 2020-04-11 RX ORDER — SODIUM BICARBONATE 84 MG/ML
INJECTION, SOLUTION INTRAVENOUS
Status: COMPLETED
Start: 2020-04-11 | End: 2020-04-11

## 2020-04-11 RX ORDER — MIDAZOLAM HYDROCHLORIDE 1 MG/ML
2 INJECTION, SOLUTION INTRAMUSCULAR; INTRAVENOUS ONCE
Status: COMPLETED | OUTPATIENT
Start: 2020-04-11 | End: 2020-04-11

## 2020-04-11 RX ORDER — ROCURONIUM BROMIDE 10 MG/ML
0.6 INJECTION, SOLUTION INTRAVENOUS
Status: DISCONTINUED | OUTPATIENT
Start: 2020-04-11 | End: 2020-04-11

## 2020-04-11 RX ORDER — MAGNESIUM SULFATE HEPTAHYDRATE 40 MG/ML
2 INJECTION, SOLUTION INTRAVENOUS ONCE
Status: COMPLETED | OUTPATIENT
Start: 2020-04-11 | End: 2020-04-12

## 2020-04-11 RX ORDER — ROCURONIUM BROMIDE 10 MG/ML
50 INJECTION, SOLUTION INTRAVENOUS
Status: COMPLETED | OUTPATIENT
Start: 2020-04-11 | End: 2020-04-11

## 2020-04-11 RX ORDER — DEXTROSE 50 % IN WATER (D50W) INTRAVENOUS SYRINGE
25
Status: DISCONTINUED | OUTPATIENT
Start: 2020-04-11 | End: 2020-04-11 | Stop reason: CLARIF

## 2020-04-11 RX ORDER — SODIUM CHLORIDE 9 MG/ML
250 INJECTION, SOLUTION INTRAVENOUS AS NEEDED
Status: DISCONTINUED | OUTPATIENT
Start: 2020-04-11 | End: 2020-04-13

## 2020-04-11 RX ORDER — ALBUMIN HUMAN 50 G/1000ML
25 SOLUTION INTRAVENOUS ONCE
Status: COMPLETED | OUTPATIENT
Start: 2020-04-11 | End: 2020-04-11

## 2020-04-11 RX ORDER — SODIUM BICARBONATE IN D5W 150/1000ML
PLASTIC BAG, INJECTION (ML) INTRAVENOUS CONTINUOUS
Status: DISCONTINUED | OUTPATIENT
Start: 2020-04-11 | End: 2020-04-11

## 2020-04-11 RX ORDER — ROCURONIUM BROMIDE 10 MG/ML
100 INJECTION, SOLUTION INTRAVENOUS
Status: COMPLETED | OUTPATIENT
Start: 2020-04-11 | End: 2020-04-11

## 2020-04-11 RX ADMIN — INSULIN GLARGINE 45 UNITS: 100 INJECTION, SOLUTION SUBCUTANEOUS at 08:22

## 2020-04-11 RX ADMIN — ROCURONIUM BROMIDE 50 MG: 10 INJECTION INTRAVENOUS at 04:28

## 2020-04-11 RX ADMIN — VANCOMYCIN HYDROCHLORIDE 500 MG: KIT at 07:01

## 2020-04-11 RX ADMIN — ROCURONIUM BROMIDE 100 MG: 10 INJECTION INTRAVENOUS at 21:33

## 2020-04-11 RX ADMIN — KETAMINE HYDROCHLORIDE 0.4 MG/KG/HR: 50 INJECTION, SOLUTION INTRAMUSCULAR; INTRAVENOUS at 03:23

## 2020-04-11 RX ADMIN — CEFEPIME HYDROCHLORIDE 2 G: 2 INJECTION, POWDER, FOR SOLUTION INTRAVENOUS at 08:22

## 2020-04-11 RX ADMIN — GUAIFENESIN 100 MG: 100 SOLUTION ORAL at 17:02

## 2020-04-11 RX ADMIN — NOREPINEPHRINE BITARTRATE 44 MCG/MIN: 1 INJECTION INTRAVENOUS at 03:12

## 2020-04-11 RX ADMIN — SODIUM BICARBONATE 50 MEQ: 84 INJECTION, SOLUTION INTRAVENOUS at 01:00

## 2020-04-11 RX ADMIN — CALCIUM CHLORIDE, MAGNESIUM CHLORIDE, DEXTROSE MONOHYDRATE, LACTIC ACID, SODIUM CHLORIDE, SODIUM BICARBONATE AND POTASSIUM CHLORIDE 3000 ML/HR: 3.68; 3.05; 22; 5.4; 6.46; 3.09; .314 INJECTION INTRAVENOUS at 20:25

## 2020-04-11 RX ADMIN — VANCOMYCIN HYDROCHLORIDE 1500 MG: 10 INJECTION, POWDER, LYOPHILIZED, FOR SOLUTION INTRAVENOUS at 13:38

## 2020-04-11 RX ADMIN — SODIUM CHLORIDE, SODIUM LACTATE, POTASSIUM CHLORIDE, AND CALCIUM CHLORIDE 1000 ML: 600; 310; 30; 20 INJECTION, SOLUTION INTRAVENOUS at 05:00

## 2020-04-11 RX ADMIN — MIDAZOLAM 2 MG: 1 INJECTION INTRAMUSCULAR; INTRAVENOUS at 04:28

## 2020-04-11 RX ADMIN — DEXTROSE MONOHYDRATE 250 ML: 100 INJECTION, SOLUTION INTRAVENOUS at 03:25

## 2020-04-11 RX ADMIN — VANCOMYCIN HYDROCHLORIDE 500 MG: KIT at 17:02

## 2020-04-11 RX ADMIN — CHLORHEXIDINE GLUCONATE 15 ML: 0.12 RINSE ORAL at 20:26

## 2020-04-11 RX ADMIN — CALCIUM CHLORIDE, MAGNESIUM CHLORIDE, DEXTROSE MONOHYDRATE, LACTIC ACID, SODIUM CHLORIDE, SODIUM BICARBONATE AND POTASSIUM CHLORIDE 3000 ML/HR: 3.68; 3.05; 22; 5.4; 6.46; 3.09; .314 INJECTION INTRAVENOUS at 11:48

## 2020-04-11 RX ADMIN — METRONIDAZOLE 500 MG: 500 INJECTION, SOLUTION INTRAVENOUS at 08:22

## 2020-04-11 RX ADMIN — HUMAN INSULIN 10 UNITS: 100 INJECTION, SOLUTION SUBCUTANEOUS at 03:24

## 2020-04-11 RX ADMIN — SODIUM CHLORIDE 1000 ML: 900 INJECTION, SOLUTION INTRAVENOUS at 04:35

## 2020-04-11 RX ADMIN — INSULIN LISPRO 2 UNITS: 100 INJECTION, SOLUTION INTRAVENOUS; SUBCUTANEOUS at 23:09

## 2020-04-11 RX ADMIN — MAGNESIUM SULFATE 2 G: 2 INJECTION INTRAVENOUS at 11:56

## 2020-04-11 RX ADMIN — SODIUM CHLORIDE 3 ML/HR: 900 INJECTION, SOLUTION INTRAVENOUS at 20:00

## 2020-04-11 RX ADMIN — GUAIFENESIN 100 MG: 100 SOLUTION ORAL at 23:00

## 2020-04-11 RX ADMIN — VANCOMYCIN HYDROCHLORIDE 500 MG: KIT at 23:00

## 2020-04-11 RX ADMIN — SODIUM CHLORIDE 1000 ML: 900 INJECTION, SOLUTION INTRAVENOUS at 02:20

## 2020-04-11 RX ADMIN — EPINEPHRINE 2 MCG/MIN: 1 INJECTION PARENTERAL at 02:22

## 2020-04-11 RX ADMIN — CASTOR OIL AND BALSAM, PERU: 788; 87 OINTMENT TOPICAL at 17:02

## 2020-04-11 RX ADMIN — CASTOR OIL AND BALSAM, PERU: 788; 87 OINTMENT TOPICAL at 08:23

## 2020-04-11 RX ADMIN — KETAMINE HYDROCHLORIDE 0.4 MG/KG/HR: 50 INJECTION, SOLUTION INTRAMUSCULAR; INTRAVENOUS at 14:13

## 2020-04-11 RX ADMIN — CALCIUM CHLORIDE, MAGNESIUM CHLORIDE, DEXTROSE MONOHYDRATE, LACTIC ACID, SODIUM CHLORIDE, SODIUM BICARBONATE AND POTASSIUM CHLORIDE 3000 ML/HR: 3.68; 3.05; 22; 5.4; 6.46; 3.09; .314 INJECTION INTRAVENOUS at 16:59

## 2020-04-11 RX ADMIN — CALCIUM CHLORIDE, MAGNESIUM CHLORIDE, DEXTROSE MONOHYDRATE, LACTIC ACID, SODIUM CHLORIDE, SODIUM BICARBONATE AND POTASSIUM CHLORIDE 3000 ML/HR: 3.68; 3.05; 22; 5.4; 6.46; 3.09; .314 INJECTION INTRAVENOUS at 23:32

## 2020-04-11 RX ADMIN — ALBUMIN (HUMAN) 25 G: 12.5 INJECTION, SOLUTION INTRAVENOUS at 04:27

## 2020-04-11 RX ADMIN — MINERAL OIL AND WHITE PETROLATUM: 150; 830 OINTMENT OPHTHALMIC at 20:38

## 2020-04-11 RX ADMIN — SODIUM CHLORIDE 30 ML: 9 INJECTION INTRAMUSCULAR; INTRAVENOUS; SUBCUTANEOUS at 20:29

## 2020-04-11 RX ADMIN — CALCIUM CHLORIDE, MAGNESIUM CHLORIDE, DEXTROSE MONOHYDRATE, LACTIC ACID, SODIUM CHLORIDE, SODIUM BICARBONATE AND POTASSIUM CHLORIDE 3000 ML/HR: 3.68; 3.05; 22; 5.4; 6.46; 3.09; .314 INJECTION INTRAVENOUS at 09:59

## 2020-04-11 RX ADMIN — NOREPINEPHRINE BITARTRATE 37 MCG/MIN: 1 INJECTION INTRAVENOUS at 15:16

## 2020-04-11 RX ADMIN — CALCIUM CHLORIDE, MAGNESIUM CHLORIDE, DEXTROSE MONOHYDRATE, LACTIC ACID, SODIUM CHLORIDE, SODIUM BICARBONATE AND POTASSIUM CHLORIDE 3000 ML/HR: 3.68; 3.05; 22; 5.4; 6.46; 3.09; .314 INJECTION INTRAVENOUS at 18:41

## 2020-04-11 RX ADMIN — CALCIUM CHLORIDE, MAGNESIUM CHLORIDE, DEXTROSE MONOHYDRATE, LACTIC ACID, SODIUM CHLORIDE, SODIUM BICARBONATE AND POTASSIUM CHLORIDE 3000 ML/HR: 3.68; 3.05; 22; 5.4; 6.46; 3.09; .314 INJECTION INTRAVENOUS at 15:16

## 2020-04-11 RX ADMIN — INSULIN LISPRO 2 UNITS: 100 INJECTION, SOLUTION INTRAVENOUS; SUBCUTANEOUS at 11:58

## 2020-04-11 RX ADMIN — ALBUMIN (HUMAN) 25 G: 12.5 INJECTION, SOLUTION INTRAVENOUS at 00:50

## 2020-04-11 RX ADMIN — VANCOMYCIN HYDROCHLORIDE 500 MG: KIT at 11:57

## 2020-04-11 RX ADMIN — MIDAZOLAM 2 MG: 1 INJECTION INTRAMUSCULAR; INTRAVENOUS at 12:10

## 2020-04-11 RX ADMIN — Medication 10 MCG/HR: at 12:00

## 2020-04-11 RX ADMIN — INSULIN LISPRO 2 UNITS: 100 INJECTION, SOLUTION INTRAVENOUS; SUBCUTANEOUS at 17:32

## 2020-04-11 RX ADMIN — METRONIDAZOLE 500 MG: 500 INJECTION, SOLUTION INTRAVENOUS at 23:00

## 2020-04-11 RX ADMIN — VASOPRESSIN 0.04 UNITS/MIN: 20 INJECTION INTRAVENOUS at 16:01

## 2020-04-11 RX ADMIN — SODIUM CHLORIDE 40 MG: 9 INJECTION INTRAMUSCULAR; INTRAVENOUS; SUBCUTANEOUS at 20:27

## 2020-04-11 RX ADMIN — SODIUM BICARBONATE 150 MEQ/1,000 ML IN DEXTROSE 5 % INTRAVENOUS: SOLUTION at 02:28

## 2020-04-11 RX ADMIN — VASOPRESSIN 0.04 UNITS/MIN: 20 INJECTION INTRAVENOUS at 08:22

## 2020-04-11 RX ADMIN — VASOPRESSIN 0.04 UNITS/MIN: 20 INJECTION INTRAVENOUS at 23:31

## 2020-04-11 RX ADMIN — METRONIDAZOLE 500 MG: 500 INJECTION, SOLUTION INTRAVENOUS at 15:50

## 2020-04-11 RX ADMIN — MINERAL OIL AND WHITE PETROLATUM: 150; 830 OINTMENT OPHTHALMIC at 08:23

## 2020-04-11 RX ADMIN — VASOPRESSIN 0.04 UNITS/MIN: 20 INJECTION INTRAVENOUS at 20:17

## 2020-04-11 RX ADMIN — INSULIN LISPRO 4 UNITS: 100 INJECTION, SOLUTION INTRAVENOUS; SUBCUTANEOUS at 07:00

## 2020-04-11 RX ADMIN — CEFEPIME HYDROCHLORIDE 2 G: 2 INJECTION, POWDER, FOR SOLUTION INTRAVENOUS at 20:30

## 2020-04-11 RX ADMIN — CHLORHEXIDINE GLUCONATE 15 ML: 0.12 RINSE ORAL at 08:22

## 2020-04-11 RX ADMIN — CALCIUM CHLORIDE, MAGNESIUM CHLORIDE, DEXTROSE MONOHYDRATE, LACTIC ACID, SODIUM CHLORIDE, SODIUM BICARBONATE AND POTASSIUM CHLORIDE 3000 ML/HR: 3.68; 3.05; 22; 5.4; 6.46; 3.09; .314 INJECTION INTRAVENOUS at 13:29

## 2020-04-11 RX ADMIN — INSULIN LISPRO 2 UNITS: 100 INJECTION, SOLUTION INTRAVENOUS; SUBCUTANEOUS at 00:09

## 2020-04-11 RX ADMIN — SODIUM CHLORIDE 40 MG: 9 INJECTION INTRAMUSCULAR; INTRAVENOUS; SUBCUTANEOUS at 08:22

## 2020-04-11 RX ADMIN — FENTANYL CITRATE 100 MCG: 50 INJECTION INTRAMUSCULAR; INTRAVENOUS at 12:32

## 2020-04-11 RX ADMIN — GUAIFENESIN 100 MG: 100 SOLUTION ORAL at 11:57

## 2020-04-11 RX ADMIN — SODIUM CHLORIDE 40 ML: 9 INJECTION INTRAMUSCULAR; INTRAVENOUS; SUBCUTANEOUS at 20:28

## 2020-04-11 NOTE — PROGRESS NOTES
[facility]    ICU TEAM Progress Note    Name: Jarret Forrester   : 1960   MRN: [id number]   Date: 2020      Assessment/Plan:     ICU Problems:  2020    1. Acute hypoxic respiratory failure 2/2 COVID 19   1. Retroperitoneal Bleed  2. Hemorrhagic Shock 2/2 acute blood loss  a. CT Abd/ Pelvis done 04/10  b. Q6hr H/H   c. Keep Hgb > 7, transfuse as needed  d. Check coags PRN  e. Hold AC meds  3. Acute kidney Injury  a. Nephrology following  b. CRRT in progress  c. Goal NET EVEN  4. Non occlusive PE in LLL pulmonary arteries  [facility] 2/2 retroperitoneal hemorrhage  5. MRSA Pneumonia  a. Continue vancomycin  6. Acute anemia of critical illness, possible 2/2 renal dysfunction  a. Transfuse for Hgb < 7  7. Diabetes Mellitus, uncontrolled  a. SSI Q6hr coverage lispro  b. Lantus 45 units Daily  c. Minimize use of dextrose containing fluids  8. Hyperkalemia  a. Nephrology aware and managing  b. CRRT   9. Leukocytosis, possibly reactive 2/2 acute bleeding  a. Trend WBC, likely reactive  b. Continue cefepime, vanco and flagyl  c. Consider ID consult if continuing to worsen  10. Lactic acidosis resolving  a.  Trend lactate        Cardiac Gtts: Norepinephrine and Vasopressin  SBP Goal of: < 140 mmHg  MAP Goal of: > 65 mmHg  Transfusion Trigger (Hgb): <7 g/dL    Respiratory Goals: Chlorhexidine   Optimize PEEP/Ventilation/Oxygenation  Goal Tidal Volume 6 cc/kg based on IBW  Aim for lung protective ventilation  Aggressive bronchopulmonary hygiene  SPO2 Goal: > 92%  Pulmonary toilet: NA   DVT Prophylaxis (if no, list reason): SCD's or Sequential Compression Device     GI Prophylaxis: Protonix (pantoprazole)   Nutrition: No 2/2 instability  IVFs: NA  Bowel Movement: Yes  Bowel Regimen: None needed at this time    Bowden Catheter Present: Yes  Glycemic Control - Insulin: Yes  Antibiotics:Cefepime  Flagyl  Vancomycin    Pain Medications: Fentanyl  Target RASS: -3 - Moderate Sedation - Movement or eye opening to voice (no eye contact)  Sedation Medications: Propofol and Ketamine  CAM-ICU:  JUAREZ  Mobility: Poor and Bedrest  PT/OT: NA   Restraints: Soft wrist restraints  Discussed Plan of Care/Code Status: Full Code    T/L/D  Tubes: ETT and Orogastric Tube  Lines: Arterial Line and Central Line  Drains: Bowden Catheter    Subjective:   Progress Note: 4/11/2020      Reason for ICU Admission: 65 yo female with obesity and diabetes who presented to [facility] with worsening hypoxic respiratory failure in lieu of close exposure to COVID-19. She presented to the hospital 3/26 and intubated 3/28. She was started on broad spectrum antibiotics and plaquenil. Developed worsening renal failure and was transferred to us for CRRT.        Overnight Events:   -Acute hemodynamic instability requiring 4 vasopressors  -Acute drop in H/H requiring multiple blood products and albumin  -CRRT stopped and sodium bicarbonate gtt initiated during instability  -Remains on High FIo2 and High PEEP with poor oxygenation  -Patient's spouse notified of events, wants to keep wife FULL CODE  -Intermittent runs of SVT vs. Afib/ RVR 180s during day.  Magnesium given x1, resolved with Fentanyl gtt restarted    Active Problem List:     Problem List  Date Reviewed: 1/23/2020          Codes Class    Hypotension ICD-10-CM: I95.9  ICD-9-CM: 458.9 Acute        Sepsis due to methicillin susceptible Staphylococcus aureus (MSSA) without acute organ dysfunction (HCC) ICD-10-CM: A41.01  ICD-9-CM: 038.11, 995.91 Acute        COVID-19 virus infection ICD-10-CM: U07.1         Obesity, morbid ([facility] ICD-10-CM: E66.01  ICD-9-CM: 278.01         Vaginal Pap smear following hysterectomy for malignancy ICD-10-CM: Z08, Z90.710  ICD-9-CM: V67.01         Personal history of malignant neoplasm of other parts of uterus ICD-10-CM: Z85.42  ICD-9-CM: V10.42         Endometrial cancer ([facility] ICD-10-CM: C54.1  ICD-9-CM: 182.0               Past Medical History:      has a past medical history of Basal cell carcinoma, GERD (gastroesophageal reflux disease), Kidney stones, and Polyp of ureter. Past Surgical History:      has a past surgical history that includes hysteroscopy diagnostic (); pr endometrial ablation, thermal; hx  section (); and hx colonoscopy. Home Medications:     Prior to Admission medications    Medication Sig Start Date End Date Taking? Authorizing Provider   pantoprazole (PROTONIX) 40 mg tablet TAKE 1 TABLET BY MOUTH TWICE A DAY 18   Provider, Historical   esomeprazole (NEXIUM) 20 mg capsule Take 20 mg by mouth daily. Indications: BID    Provider, Historical   zolpidem (AMBIEN) 10 mg tablet Take 0.5 Tabs by mouth nightly as needed for Sleep. Max Daily Amount: 5 mg. 17   Letty Mandel MD   fluticasone (FLONASE) 50 mcg/actuation nasal spray Mist 1-2 spray(s) into each nostril once daily. 4/3/15   Provider, Historical   ranitidine (ZANTAC) 150 mg tablet 150 mg.    Provider, Historical       Allergies/Social/Family History: Allergies   Allergen Reactions    Augmentin [Amoxicillin-Pot Clavulanate] Rash and Itching      Social History     Tobacco Use    Smoking status: Never Smoker    Smokeless tobacco: Never Used   Substance Use Topics    Alcohol use: Not on file      Family History   Problem Relation Age of Onset    Prostate Cancer Father         PROSTATE    Breast Cancer Sister 46        invasive poorly differentiated ductal carcinoma, Neg genetic testing.     Cancer Sister 62        melanoma stage 1       Objective:   Vital Signs:  Visit Vitals  BP (!) 153/98   Pulse (!) 120   Temp 97.8 °F (36.6 °C)   Resp 25   Ht 5' 4\" (1.626 m)   Wt 116.6 kg (257 lb 1.6 oz)   SpO2 98%   BMI 44.13 kg/m²      O2 Device: Endotracheal tube, Ventilator Temp (24hrs), Av.9 °F (36.1 °C), Min:94.1 °F (34.5 °C), Max:100.2 °F (37.9 °C)           Intake/Output:     Intake/Output Summary (Last 24 hours) at 2020 6701  Last data filed at 4/11/2020 1700  Gross per 24 hour   Intake 3703.49 ml   Output 1321 ml   Net 2382.49 ml       Physical Exam:    General:  Sedated and on the ventilator. No acute distress. , morbidly obese   Eyes:  Sclera anicteric. Pupils equal, round, reactive to light. Eyes open, does not arouse   Mouth/Throat: Orotracheal tube in place. Neck: Supple. Lungs:   Clear/DIM to auscultation bilaterally, vent assisted respirations, no accessory muscle use observed. Cardiovascular:  Tachy, Regular rate and rhythm, no murmur, click, rub, or gallop, generalized edema + 3, pulses palp x 4 ext. .   Abdomen:   Soft, non-tender, bowel sounds hypoactive, distended. Extremities: No cyanosis or + edema   Skin: No acute rash or lesions. Lymph Nodes: Cervical and supraclavicular normal.   Musculoskeletal:  No swelling other than edema, no deformity. Lines/Devices:  Intact, no erythema, drainage, or tenderness. Psychiatric: Sedated and appears comfortable on ventilator. LABS AND  DATA: Personally reviewed  Recent Labs     04/11/20  1554 04/11/20  0841   WBC 39.0* 33.6*   HGB 7.4* 8.0*   HCT 22.1* 22.6*   * 167     Recent Labs     04/11/20  0841 04/11/20  0400  04/09/20 2047 04/09/20  1142    140   < > 137 137   K 5.3* 5.4*   < > 4.3 4.5    102   < > 102 97   CO2 26 24   < > 24 21   BUN 41* 32*   < > 36* 30*   CREA 2.00* 1.60*   < > 2.04* 2.12*   * 219*   < > 293* 372*   CA 7.1* 7.6*   < > 7.7* 7.9*   MG 2.3  --   --   --  2.8*   PHOS 4.2  --   --  4.0 8.2*    < > = values in this interval not displayed.      Recent Labs     04/11/20  0400 04/11/20  0041 04/10/20  0920   SGOT 1,733* 1,692*  --     105  --    TP 5.0* 4.9*  --    ALB 3.1* 3.0*  --    GLOB 1.9* 1.9*  --    LPSE  --   --  1,637*     Recent Labs     04/11/20  1554 04/11/20  0841 04/11/20  0449 04/11/20  0041   INR  --   --  2.1* 1.8*   PTP  --   --  20.8* 18.2*   APTT 36.5* 35.4*  --   --       Recent Labs     04/11/20  0206 04/11/20  0038   PHI 7.283* 7.346*   PCO2I 42.6 34.0*   PO2I 74* 106*   FIO2I 40 40     Recent Labs     04/10/20  0145 04/09/20 2047   TROIQ 0.98* 0.77*       Hemodynamics:   PAP:   CO:     Wedge:   CI:     CVP:    SVR:       PVR:       Ventilator Settings:  Mode Rate Tidal Volume Pressure FiO2 PEEP   Assist control, Pressure control   0 ml  0 cm H2O 40 % 8 cm H20     Peak airway pressure: 39 cm H2O    Minute ventilation: 13 l/min        MEDS: Reviewed    Chest X-Ray:  CXR Results  (Last 48 hours)    None        04/10/2020  CT chest w/ w/o:  CT Abd/ Pelvis:  IMPRESSION:  1.  Scattered pulmonary air space disease consistent with atypical viral  infection. 2.  Nonocclusive pulmonary emboli in the left lower lobe pulmonary arteries. 3.  Large right retroperitoneal hematoma with small foci of contrast within the  hematoma. 4.  Enteric tube and endotracheal tube in appropriate position. 5.  Bilateral femoral venous catheters in the common iliac veins. Multidisciplinary Rounds Completed:  No    ABCDEF Bundle/Checklist Completed:  Yes    SPECIAL EQUIPMENT  CRRT    DISPOSITION  Stay in ICU    CRITICAL CARE CONSULTANT NOTE  I had a face to face encounter with the patient, reviewed and interpreted patient data including clinical events, labs, images, vital signs, I/O's, and examined patient. I have discussed the case and the plan and management of the patient's care with the consulting services, the bedside nurses and the respiratory therapist.      NOTE OF PERSONAL INVOLVEMENT IN CARE   This patient has a high probability of imminent, clinically significant deterioration, which requires the highest level of preparedness to intervene urgently. I participated in the decision-making and personally managed or directed the management of the following life and organ supporting interventions that required my frequent assessment to treat or prevent imminent deterioration.     I have personally updated spouse Estephanie Daphne by phone to discuss changes and future POC. Questions answered as able to spouse's satisfaction. I personally spent 45 minutes of critical care time. This is time spent at this critically ill patient's bedside actively involved in patient care as well as the coordination of care and discussions with the patient's family. This does not include any procedural time which has been billed separately.     [facility]  4/11/2020

## 2020-04-11 NOTE — PROGRESS NOTES
Bedside and Verbal shift change report given to Dwight RN (oncoming nurse) by Torsten Jasso RN (offgoing nurse). Report included the following information SBAR.     0800: pt intubated and sedated on the ventilator, no movement to any stimulus, pupils round and sluggish, levo/epi/&vaso gtt to maintain map >65, ketamine gtt for sedation. 5236: CRRT restarted     1130: Dino Catherine made aware of pts HR in 180s intermittently- orders received    1200: Danny Kapoor made aware of pt appearing asynchronous with the vent- orders received. Pt not moving to any stimulus but eyes are open. 1250: Verbal orders received to restart fentanyl gtt at 10mcg/hr    1334: New fent syringe hung at 10mcg/hr    * updated on pt condition multiple times throughout shift     Bedside and Verbal shift change report given to Priti (oncoming nurse) by Catie Knott (offgoing nurse). Report included the following information SBAR.

## 2020-04-11 NOTE — DIALYSIS
[facility]       [phone]        Orders  Mode: CVVHD   Prismasol Bath: 4K/2.5Ca   Blood Flow Rate: 200ml/hr   Prismasol Dose: 3500ml/hr   Factor: 0     Metrics  BP/HR: 153/79  97   Access Pressure: -57   Filter Pressure: 104   Return Pressure: 64   TMP: 13   PD: 40   Dialysate Flow Rate: 3500ml/hr     Comments / Plan:   Patient, orders, line and consent verified. Labs, notes and code status reviewed. [id number] filter changed d/t increasing pressure drop/filter pressure. All possible in circuit returned to pt by primary RN (165ml). New set primed, tested. Right femoral CVC, dressing with biopatch clean/dry/intact, both lumens/ports cleaned with prevantics, +aspiration/NS flushes, both lines patent. All lines and connections secure and visible with warmer on return line at 37*C. Pre/post report and education w/ Georgi Whitten RN. Pt is COVID+, all appropriate PPE donned during filter change.

## 2020-04-11 NOTE — PROGRESS NOTES
Day # 3 of Vancomycin  Indication: sepsis, MSSA PNA with PCN allergy, leukocytosis of unknown source  Current regimen:  vanc 1.5g @ 1400  Abx regimen:  van+cefepime+metronidazole  ID Following ?: YES  Concomitant nephrotoxic drugs (requires more frequent monitoring): Vasopressors  Frequency of BMP?:daily    Recent Labs     20  0841 20  0449 20  0400 20  0041 20  0011   WBC 33.6* 37.0*  --   --  45.7*   CREA 2.00*  --  1.60* 1.60*  --    BUN 41*  --  32* 31*  --      Est CrCl: on CVVHD  Temp (24hrs), Av.9 °F (36.1 °C), Min:94.1 °F (34.5 °C), Max:100.2 °F (37.9 °C)    Cultures:    blood NG x3 days, prelim   blood NG x2 days, prelim    Goal trough = 15 - 20 mcg/mL    Recent trough history (date/time/level/dose/action taken):  4/10 =16.5 mcg/ml (~22h post-load)    Plan: Initiate vanc 1.5g q24h while on CVVHD. Will assess dosing vs clearance with a level prior to the 4th dose.     Maddie Tian, [address] [facility]

## 2020-04-11 NOTE — PROGRESS NOTES
[facility]   [address] [facility], [facility] [place] [facility], [facility]  Phone: [phone]   [phone]       Nephrology Progress Note  Terence Schaefer     1960     [id number]  Date of Admission : 3/31/2020  04/11/20    CC: Follow up for JUANCHO      Assessment and Plan   JUANCHO :  - 2/2 ATN  - continue CVVHD  - no factor  - off systemic heparin  - daily labs    COVID-19 +ve   Acute Hypoxic Resp Failure   - On Vent   - completed Plaquenil. On Vit C, Zinc   - s/p Tocilizumab      Leukocytosis:  - cultures pending  - on cefepime    RP hematoma:  - hgb stable  - transfuse PRN    Cardiac arrest 4/9:  - per ICU team    Type II DM   - Insulin per primary team      Hypothermia   Morbid Obesity      Care Plan discussed with:  ICU team and RN     Interval History:  Unstable. CRRT clotted, about to be restarted. Hypotension overnight, escalating doses of pressors. CT scan findings noted. PT NOT EXAMINED in line with ASN and RPA GUIDELINES ON MANAGING COVID-19 PTS WITH RENAL ISSUES. Examination findings discussed personally with the examining Physician team member      Review of Systems: Review of systems not obtained due to patient factors.     Current Medications:   Current Facility-Administered Medications   Medication Dose Route Frequency    0.9% sodium chloride infusion 250 mL  250 mL IntraVENous PRN    albumin human 5% (BUMINATE) 5 % solution        0.9% sodium chloride infusion 250 mL  250 mL IntraVENous PRN    EPINEPHrine (ADRENALIN) 5 mg in 0.9% sodium chloride 250 mL infusion  1-10 mcg/min IntraVENous TITRATE    sodium bicarbonate 150 mEq/1000 mL D5W (premix)   IntraVENous CONTINUOUS    0.9% sodium chloride infusion 250 mL  250 mL IntraVENous PRN    0.9% sodium chloride infusion 250 mL  250 mL IntraVENous PRN    cefepime (MAXIPIME) 2 g in 0.9% sodium chloride (MBP/ADV) 100 mL  2 g IntraVENous Q12H    metroNIDAZOLE (FLAGYL) IVPB premix 500 mg  500 mg IntraVENous Q8H    vancomycin [facility]) 50 mg/mL oral solution 500 mg  500 mg Oral Q6H    0.9% sodium chloride infusion 250 mL  250 mL IntraVENous PRN    0.9% sodium chloride infusion 250 mL  250 mL IntraVENous PRN    0.9% sodium chloride infusion 250 mL  250 mL IntraVENous PRN    vancomycin (VANCOCIN) 1500 mg in  ml infusion  1,500 mg IntraVENous Q24H    insulin glargine (LANTUS) injection 45 Units  45 Units SubCUTAneous DAILY    0.9% sodium chloride infusion 250 mL  250 mL IntraVENous PRN    vasopressin (VASOSTRICT) 20 Units in 0.9% sodium chloride 100 mL infusion  0-0.04 Units/min IntraVENous TITRATE    fentaNYL (PF) 1,500 mcg/30 mL (50 mcg/mL) infusion  0-200 mcg/hr IntraVENous TITRATE    ketamine (KETALAR) 500 mg in 0.9% sodium chloride 500 mL infusion  0.05-0.4 mg/kg/hr IntraVENous TITRATE    0.9% sodium chloride infusion 250 mL  250 mL IntraVENous PRN    Vancomycin Pharmacy Dosing   Other PRN    NOREPINephrine (LEVOPHED) 32,000 mcg in dextrose 5% 250 mL (128 mcg/mL) infusion  0-50 mcg/min IntraVENous TITRATE    0.9% sodium chloride infusion 250 mL  250 mL IntraVENous PRN    pantoprazole (PROTONIX) 40 mg in 0.9% sodium chloride 10 mL injection  40 mg IntraVENous Q12H    0.9% sodium chloride infusion 250 mL  250 mL IntraVENous PRN    [Held by provider] labetaloL (NORMODYNE) tablet 100 mg  100 mg Oral Q12H    0.9% sodium chloride infusion  3 mL/hr IntraVENous CONTINUOUS    0.9% sodium chloride infusion  5 mL/hr IntraVENous CONTINUOUS    labetaloL (NORMODYNE;TRANDATE) injection 10 mg  10 mg IntraVENous Q10MIN PRN    fentaNYL citrate (PF) injection  mcg   mcg IntraVENous Q1H PRN    white petrolatum-mineral oiL (AKWA TEARS) 83-15 % ophthalmic ointment   Both Eyes Q12H    heparin (porcine) pf 300 Units  300 Units InterCATHeter PRN    guaiFENesin (ROBITUSSIN) 100 mg/5 mL oral liquid 100 mg  100 mg Oral Q6H    alteplase (CATHFLO) 2 mg in sterile water (preservative free) 2 mL injection  2 mg InterCATHeter PRN    alteplase (CATHFLO) 2 mg in sterile water (preservative free) 2 mL injection  2 mg InterCATHeter PRN    midazolam (VERSED) injection 1-2 mg  1-2 mg IntraVENous Q2H PRN    bacitracin 500 unit/gram packet 1 Packet  1 Packet Topical PRN    balsam peru-castor oiL (VENELEX) ointment   Topical BID    sodium chloride (NS) flush 5-40 mL  5-40 mL IntraVENous Q8H    sodium chloride (NS) flush 5-40 mL  5-40 mL IntraVENous PRN    HYDROcodone-acetaminophen (NORCO) 5-325 mg per tablet 1 Tab  1 Tab Oral Q4H PRN    ondansetron (ZOFRAN) injection 4 mg  4 mg IntraVENous Q4H PRN    chlorhexidine (ORAL CARE KIT) 0.12 % mouthwash 15 mL  15 mL Oral Q12H    acetaminophen (TYLENOL) tablet 650 mg  650 mg Oral Q6H PRN    Or    acetaminophen (TYLENOL) suppository 650 mg  650 mg Rectal Q6H PRN    bicarbonate dialysis (PRISMASOL) BG K 4/Ca 2.5 5000 ml solution   Extracorporeal DIALYSIS CONTINUOUS    glucose chewable tablet 16 g  4 Tab Oral PRN    glucagon (GLUCAGEN) injection 1 mg  1 mg IntraMUSCular PRN    dextrose 10% infusion 0-250 mL  0-250 mL IntraVENous PRN    insulin lispro (HUMALOG) injection   SubCUTAneous Q6H      Allergies   Allergen Reactions    Augmentin [Amoxicillin-Pot Clavulanate] Rash and Itching       Objective:  Vitals:    Vitals:    04/11/20 0630 04/11/20 0645 04/11/20 0700 04/11/20 0800   BP:   110/70 137/82   Pulse: (!) 125 (!) 122 (!) 144 (!) 131   Resp: 16 18 16 13   Temp: 99.5 °F (37.5 °C)   100.2 °F (37.9 °C)   SpO2: 93% 92%  100%   Weight:       Height:         Intake and Output:  No intake/output data recorded.   04/09 1901 - 04/11 0700  In: 9476 [I.V.:7313.3]  Out: 2645     Physical Examination: inspection only   Pt intubated    Yes  General: Sedated on vent, obese   Neck:  Lines   Resp:  On vent   CV:  RRR  GI:  Obese   Neurologic:  Sedated   Psych:              Unable to assess  Ext                   R femoral melissa     []    High complexity decision making was performed  []    Patient is at high-risk of decompensation with multiple organ involvement    Lab Data Personally Reviewed: I have reviewed all the pertinent labs, microbiology data and radiology studies during assessment.     Recent Labs     04/11/20  0449 04/11/20  0041 04/10/20  1830 04/10/20  0920 04/10/20  0145 04/09/20  2047 04/09/20  1142 04/09/20  0539 04/09/20  0400 04/08/20  1524   NA  --  141 136 138  --  137 137 134*  --  134*   K  --  5.4* 5.2* 5.0  --  4.3 4.5 4.5  --  3.6   CL  --  101 102 103  --  102 97 103  --  102   CO2  --  26 26 26  --  24 21 24  --  25   GLU  --  234* 287* 265*  --  293* 372* 239*  --  157*   BUN  --  31* 33* 34*  --  36* 30* 25*  --  24*   CREA  --  1.60* 1.57* 1.70*  --  2.04* 2.12* 1.52*  --  1.41*   CA  --  7.7* 8.1* 7.9*  --  7.7* 7.9* 8.8  --  9.4   MG  --   --   --   --   --   --  2.8* 2.8*  --   --    PHOS  --   --   --   --   --  4.0 8.2* 2.4*  --  2.0*   ALB  --  3.0*  --   --  3.7 3.5  --  3.2* 3.3* 3.3*   SGOT  --  1,692*  --   --  >2,000*  --   --   --  30  --    ALT  --  1,701*  --   --  1,606*  --   --   --  42  --    INR 2.1* 1.8*  --  2.0*  --  1.8*  --   --  1.2*  --      Recent Labs     04/11/20 0449 04/11/20  0011 04/10/20  1830 04/10/20  1214 04/10/20  0920   WBC 37.0* 45.7* 45.2* 29.8* 38.8*   HGB 7.2* 7.6* 8.5* 5.9* 6.2*   HCT 21.8* 22.1* 25.5* 17.4* 18.3*   * 141* 143* 121* 122*     No results found for: SDES  Lab Results   Component Value Date/Time    Culture result: NO GROWTH 2 DAYS 04/09/2020 02:32 PM    Culture result: NO GROWTH 3 DAYS 04/08/2020 10:36 AM    Culture result: NO GROWTH 5 DAYS 03/31/2020 11:15 AM    Culture result: MODERATE STAPHYLOCOCCUS AUREUS (A) 03/31/2020 10:34 AM    Culture result: LIGHT NORMAL RESPIRATORY SOFIE 03/31/2020 10:34 AM     Recent Results (from the past 24 hour(s))   PROTHROMBIN TIME + INR    Collection Time: 04/10/20  9:20 AM   Result Value Ref Range    INR 2.0 (H) 0.9 - 1.1      Prothrombin time 19.8 (H) 9.0 - 11.1 sec   LACTIC ACID Collection Time: 04/10/20  9:20 AM   Result Value Ref Range    Lactic acid 2.5 (HH) 0.4 - 2.0 MMOL/L   PTT    Collection Time: 04/10/20  9:20 AM   Result Value Ref Range    aPTT 31.9 22.1 - 32.0 sec    aPTT, therapeutic range     58.0 - 77.0 SECS   LIPASE    Collection Time: 04/10/20  9:20 AM   Result Value Ref Range    Lipase 1,637 (H) 73 - 393 U/L   CBC W/O DIFF    Collection Time: 04/10/20  9:20 AM   Result Value Ref Range    WBC 38.8 (H) 3.6 - 11.0 K/uL    RBC 2.12 (L) 3.80 - 5.20 M/uL    HGB 6.2 (L) 11.5 - 16.0 g/dL    HCT 18.3 (L) 35.0 - 47.0 %    MCV 86.3 80.0 - 99.0 FL    MCH 29.2 26.0 - 34.0 PG    MCHC 33.9 30.0 - 36.5 g/dL    RDW 16.0 (H) 11.5 - 14.5 %    PLATELET 634 (L) 169 - 400 K/uL    MPV 12.1 8.9 - 12.9 FL    NRBC 0.1 (H) 0  WBC    ABSOLUTE NRBC 0.02 (H) 0.00 - 8.18 K/uL   METABOLIC PANEL, BASIC    Collection Time: 04/10/20  9:20 AM   Result Value Ref Range    Sodium 138 136 - 145 mmol/L    Potassium 5.0 3.5 - 5.1 mmol/L    Chloride 103 97 - 108 mmol/L    CO2 26 21 - 32 mmol/L    Anion gap 9 5 - 15 mmol/L    Glucose 265 (H) 65 - 100 mg/dL    BUN 34 (H) 6 - 20 MG/DL    Creatinine 1.70 (H) 0.55 - 1.02 MG/DL    BUN/Creatinine ratio 20 12 - 20      GFR est AA 38 (L) >60 ml/min/1.73m2    GFR est non-AA 31 (L) >60 ml/min/1.73m2    Calcium 7.9 (L) 8.5 - 10.1 MG/DL   LACTIC ACID    Collection Time: 04/10/20 12:14 PM   Result Value Ref Range    Lactic acid 2.4 (HH) 0.4 - 2.0 MMOL/L   PTT    Collection Time: 04/10/20 12:14 PM   Result Value Ref Range    aPTT 34.4 (H) 22.1 - 32.0 sec    aPTT, therapeutic range     58.0 - 77.0 SECS   VANCOMYCIN, RANDOM    Collection Time: 04/10/20 12:14 PM   Result Value Ref Range    Vancomycin, random 16.5 UG/ML   CBC W/O DIFF    Collection Time: 04/10/20 12:14 PM   Result Value Ref Range    WBC 29.8 (H) 3.6 - 11.0 K/uL    RBC 1.99 (L) 3.80 - 5.20 M/uL    HGB 5.9 (LL) 11.5 - 16.0 g/dL    HCT 17.4 (LL) 35.0 - 47.0 %    MCV 87.4 80.0 - 99.0 FL    MCH 29.6 26.0 - 34.0 PG MCHC 33.9 30.0 - 36.5 g/dL    RDW 15.6 (H) 11.5 - 14.5 %    PLATELET 268 (L) 345 - 400 K/uL    MPV 11.9 8.9 - 12.9 FL    NRBC 0.0 0  WBC    ABSOLUTE NRBC 0.00 0.00 - 0.01 K/uL   FIBRINOGEN    Collection Time: 04/10/20 12:14 PM   Result Value Ref Range    Fibrinogen 85 (L) 200 - 475 mg/dL   GLUCOSE, POC    Collection Time: 04/10/20 12:30 PM   Result Value Ref Range    Glucose (POC) 274 (H) 65 - 100 mg/dL    Performed by Travon Martines    PLATELETS, ALLOCATE    Collection Time: 04/10/20  1:00 PM   Result Value Ref Range    Unit number [id number]     Blood component type PLPH LR,PAS1     Unit division 00     Status of unit REL FROM [facility]     Unit number [id number]     Blood component type PLPH LR,PAS2     Unit division 00     Status of unit TRANSFUSED    PLASMA, ALLOCATE    Collection Time: 04/10/20  1:00 PM   Result Value Ref Range    Unit number [id number]     Blood component type FP 24h,Thaw1     Unit division 00     Status of unit TRANSFUSED    POC EG7    Collection Time: 04/10/20  4:41 PM   Result Value Ref Range    Calcium, ionized (POC) 1.05 (L) 1.12 - 1.32 mmol/L    FIO2 (POC) 0.40 %    pH (POC) 7.451 (H) 7.35 - 7.45      pCO2 (POC) 34.2 (L) 35.0 - 45.0 MMHG    pO2 (POC) 82 80 - 100 MMHG    HCO3 (POC) 23.8 22 - 26 MMOL/L    Base excess (POC) 0 mmol/L    sO2 (POC) 97 92 - 97 %    Site DRAWN FROM ARTERIAL LINE      Device: VENT      Mode ASSIST CONTROL      Set Rate 10 bpm    PEEP/CPAP (POC) 8 cmH2O    PIP (POC) 20      Allens test (POC) N/A      Specimen type (POC) ARTERIAL      Total resp.  rate 17     METABOLIC PANEL, BASIC    Collection Time: 04/10/20  6:30 PM   Result Value Ref Range    Sodium 136 136 - 145 mmol/L    Potassium 5.2 (H) 3.5 - 5.1 mmol/L    Chloride 102 97 - 108 mmol/L    CO2 26 21 - 32 mmol/L    Anion gap 8 5 - 15 mmol/L    Glucose 287 (H) 65 - 100 mg/dL    BUN 33 (H) 6 - 20 MG/DL    Creatinine 1.57 (H) 0.55 - 1.02 MG/DL    BUN/Creatinine ratio 21 (H) 12 - 20      GFR est AA 41 (L) >60 ml/min/1.73m2    GFR est non-AA 34 (L) >60 ml/min/1.73m2    Calcium 8.1 (L) 8.5 - 10.1 MG/DL   LACTIC ACID    Collection Time: 04/10/20  6:30 PM   Result Value Ref Range    Lactic acid 2.2 (HH) 0.4 - 2.0 MMOL/L   FIBRINOGEN    Collection Time: 04/10/20  6:30 PM   Result Value Ref Range    Fibrinogen 111 (L) 200 - 475 mg/dL   CBC W/O DIFF    Collection Time: 04/10/20  6:30 PM   Result Value Ref Range    WBC 45.2 (H) 3.6 - 11.0 K/uL    RBC 2.93 (L) 3.80 - 5.20 M/uL    HGB 8.5 (L) 11.5 - 16.0 g/dL    HCT 25.5 (L) 35.0 - 47.0 %    MCV 87.0 80.0 - 99.0 FL    MCH 29.0 26.0 - 34.0 PG    MCHC 33.3 30.0 - 36.5 g/dL    RDW 15.6 (H) 11.5 - 14.5 %    PLATELET 104 (L) 148 - 400 K/uL    MPV 11.6 8.9 - 12.9 FL    NRBC 0.0 0  WBC    ABSOLUTE NRBC 0.00 0.00 - 0.01 K/uL   GLUCOSE, POC    Collection Time: 04/10/20  6:36 PM   Result Value Ref Range    Glucose (POC) 63 (L) 65 - 100 mg/dL    Performed by [address] [place] [address] , POC    Collection Time: 04/10/20  6:37 PM   Result Value Ref Range    Glucose (POC) 288 (H) 65 - 100 mg/dL    Performed by Ora Araujo    CRYOPRECIPITATE, ALLOCATE    Collection Time: 04/10/20  8:00 PM   Result Value Ref Range    Unit number [id number]     Blood component type CRYO,5U Thaw     Unit division 00     Status of unit TRANSFUSED    LACTIC ACID    Collection Time: 04/10/20 10:48 PM   Result Value Ref Range    Lactic acid 5.4 (HH) 0.4 - 2.0 MMOL/L   GLUCOSE, POC    Collection Time: 04/11/20 12:08 AM   Result Value Ref Range    Glucose (POC) 205 (H) 65 - 100 mg/dL    Performed by EULA BYRD    CBC W/O DIFF    Collection Time: 04/11/20 12:11 AM   Result Value Ref Range    WBC 45.7 (H) 3.6 - 11.0 K/uL    RBC 2.58 (L) 3.80 - 5.20 M/uL    HGB 7.6 (L) 11.5 - 16.0 g/dL    HCT 22.1 (L) 35.0 - 47.0 %    MCV 85.7 80.0 - 99.0 FL    MCH 29.5 26.0 - 34.0 PG    MCHC 34.4 30.0 - 36.5 g/dL    RDW 15.9 (H) 11.5 - 14.5 %    PLATELET 567 (L) 548 - 400 K/uL    MPV 11.4 8.9 - 12.9 FL NRBC 0.0 0  WBC    ABSOLUTE NRBC 0.02 (H) 0.00 - 0.01 K/uL   POC EG7    Collection Time: 04/11/20 12:38 AM   Result Value Ref Range    Calcium, ionized (POC) 1.01 (L) 1.12 - 1.32 mmol/L    FIO2 (POC) 40 %    pH (POC) 7.346 (L) 7.35 - 7.45      pCO2 (POC) 34.0 (L) 35.0 - 45.0 MMHG    pO2 (POC) 106 (H) 80 - 100 MMHG    HCO3 (POC) 18.6 (L) 22 - 26 MMOL/L    Base deficit (POC) 7 mmol/L    sO2 (POC) 98 (H) 92 - 97 %    Site DRAWN FROM ARTERIAL LINE      Device: VENT      Mode ASSIST CONTROL      Set Rate 10 bpm    PEEP/CPAP (POC) 8 cmH2O    PIP (POC) 15      Allens test (POC) N/A      Inspiratory Time 1.72 sec    Specimen type (POC) ARTERIAL      Total resp. rate 13     LACTIC ACID    Collection Time: 04/11/20 12:41 AM   Result Value Ref Range    Lactic acid 7.8 (HH) 0.4 - 2.0 MMOL/L   METABOLIC PANEL, COMPREHENSIVE    Collection Time: 04/11/20 12:41 AM   Result Value Ref Range    Sodium 141 136 - 145 mmol/L    Potassium 5.4 (H) 3.5 - 5.1 mmol/L    Chloride 101 97 - 108 mmol/L    CO2 26 21 - 32 mmol/L    Anion gap 14 5 - 15 mmol/L    Glucose 234 (H) 65 - 100 mg/dL    BUN 31 (H) 6 - 20 MG/DL    Creatinine 1.60 (H) 0.55 - 1.02 MG/DL    BUN/Creatinine ratio 19 12 - 20      GFR est AA 40 (L) >60 ml/min/1.73m2    GFR est non-AA 33 (L) >60 ml/min/1.73m2    Calcium 7.7 (L) 8.5 - 10.1 MG/DL    Bilirubin, total 2.2 (H) 0.2 - 1.0 MG/DL    ALT (SGPT) 1,701 (H) 12 - 78 U/L    AST (SGOT) 1,692 (H) 15 - 37 U/L    Alk.  phosphatase 105 45 - 117 U/L    Protein, total 4.9 (L) 6.4 - 8.2 g/dL    Albumin 3.0 (L) 3.5 - 5.0 g/dL    Globulin 1.9 (L) 2.0 - 4.0 g/dL    A-G Ratio 1.6 1.1 - 2.2     PROTHROMBIN TIME + INR    Collection Time: 04/11/20 12:41 AM   Result Value Ref Range    INR 1.8 (H) 0.9 - 1.1      Prothrombin time 18.2 (H) 9.0 - 11.1 sec   FIBRINOGEN    Collection Time: 04/11/20 12:41 AM   Result Value Ref Range    Fibrinogen 157 (L) 200 - 475 mg/dL   POC EG7    Collection Time: 04/11/20  2:06 AM   Result Value Ref Range Calcium, ionized (POC) 1.02 (L) 1.12 - 1.32 mmol/L    FIO2 (POC) 40 %    pH (POC) 7.283 (L) 7.35 - 7.45      pCO2 (POC) 42.6 35.0 - 45.0 MMHG    pO2 (POC) 74 (L) 80 - 100 MMHG    HCO3 (POC) 20.2 (L) 22 - 26 MMOL/L    Base deficit (POC) 7 mmol/L    sO2 (POC) 93 92 - 97 %    Site DRAWN FROM ARTERIAL LINE      Device: VENT      Mode ASSIST CONTROL      Set Rate 12 bpm    PEEP/CPAP (POC) 8 cmH2O    PIP (POC) 30      Allens test (POC) N/A      Inspiratory Time 2.0 sec    Specimen type (POC) ARTERIAL      Total resp. rate 23     CBC WITH AUTOMATED DIFF    Collection Time: 04/11/20  4:49 AM   Result Value Ref Range    WBC 37.0 (H) 3.6 - 11.0 K/uL    RBC 2.42 (L) 3.80 - 5.20 M/uL    HGB 7.2 (L) 11.5 - 16.0 g/dL    HCT 21.8 (L) 35.0 - 47.0 %    MCV 90.1 80.0 - 99.0 FL    MCH 29.8 26.0 - 34.0 PG    MCHC 33.0 30.0 - 36.5 g/dL    RDW 16.2 (H) 11.5 - 14.5 %    PLATELET 453 (L) 655 - 400 K/uL    MPV 12.2 8.9 - 12.9 FL    NRBC 0.1 (H) 0  WBC    ABSOLUTE NRBC 0.02 (H) 0.00 - 0.01 K/uL    NEUTROPHILS 81 (H) 32 - 75 %    BAND NEUTROPHILS 3 0 - 6 %    LYMPHOCYTES 7 (L) 12 - 49 %    MONOCYTES 7 5 - 13 %    EOSINOPHILS 1 0 - 7 %    BASOPHILS 1 0 - 1 %    IMMATURE GRANULOCYTES 0 %    ABS. NEUTROPHILS 31.0 (H) 1.8 - 8.0 K/UL    ABS. LYMPHOCYTES 2.6 0.8 - 3.5 K/UL    ABS. MONOCYTES 2.6 (H) 0.0 - 1.0 K/UL    ABS. EOSINOPHILS 0.4 0.0 - 0.4 K/UL    ABS. BASOPHILS 0.4 (H) 0.0 - 0.1 K/UL    ABS. IMM.  GRANS. 0.0 K/UL    DF MANUAL      RBC COMMENTS ANISOCYTOSIS  1+        RBC COMMENTS MICROCYTOSIS  1+        RBC COMMENTS HYPOCHROMIA  1+        RBC COMMENTS OVALOCYTES  1+        RBC COMMENTS POLYCHROMASIA  1+       LACTIC ACID    Collection Time: 04/11/20  4:49 AM   Result Value Ref Range    Lactic acid 5.4 (HH) 0.4 - 2.0 MMOL/L   FIBRINOGEN    Collection Time: 04/11/20  4:49 AM   Result Value Ref Range    Fibrinogen 227 200 - 475 mg/dL   PROTHROMBIN TIME + INR    Collection Time: 04/11/20  4:49 AM   Result Value Ref Range    INR 2.1 (H) 0.9 - 1.1      Prothrombin time 20.8 (H) 9.0 - 11.1 sec   GLUCOSE, POC    Collection Time: 04/11/20  7:00 AM   Result Value Ref Range    Glucose (POC) 304 (H) 65 - 100 mg/dL    Performed by EULA BYRD            Total time spent with patient:  xxx   min. Care Plan discussed with:  Patient     Family      RN      Consulting Physician [facility],         I have reviewed the flowsheets. Chart and Pertinent Notes have been reviewed. No change in PMH ,family and social history from Consult note.       Susana Tong MD

## 2020-04-11 NOTE — PROGRESS NOTES
04/11/20 0040   Vent Settings   FIO2 (%) 40 %   CMV Rate Set 12   Back-Up Rate 12   PC Set 16   PEEP/VENT (cm H2O) 8 cm H20   I:E Ratio 1:1.5     RT called to room for desaturations. Pt has low bp & poor pleth, SpO2 not reliable. Vent changes made after ABG to match patients breathing pattern. NP & RN at bedside.

## 2020-04-12 LAB
ALBUMIN SERPL-MCNC: 3.6 G/DL (ref 3.5–5)
ALBUMIN/GLOB SERPL: 2 {RATIO} (ref 1.1–2.2)
ALP SERPL-CCNC: 139 U/L (ref 45–117)
ALT SERPL-CCNC: 1658 U/L (ref 12–78)
ANION GAP SERPL CALC-SCNC: 13 MMOL/L (ref 5–15)
ANION GAP SERPL CALC-SCNC: 8 MMOL/L (ref 5–15)
ANION GAP SERPL CALC-SCNC: 9 MMOL/L (ref 5–15)
ANION GAP SERPL CALC-SCNC: 9 MMOL/L (ref 5–15)
APTT PPP: 31.9 SEC (ref 22.1–32)
APTT PPP: 33.3 SEC (ref 22.1–32)
ARTERIAL PATENCY WRIST A: ABNORMAL
AST SERPL-CCNC: 1424 U/L (ref 15–37)
BASE DEFICIT BLD-SCNC: 4 MMOL/L
BASE DEFICIT BLD-SCNC: 5 MMOL/L
BDY SITE: ABNORMAL
BILIRUB DIRECT SERPL-MCNC: 1.3 MG/DL (ref 0–0.2)
BILIRUB SERPL-MCNC: 2.2 MG/DL (ref 0.2–1)
BUN SERPL-MCNC: 30 MG/DL (ref 6–20)
BUN SERPL-MCNC: 31 MG/DL (ref 6–20)
BUN SERPL-MCNC: 33 MG/DL (ref 6–20)
BUN SERPL-MCNC: 38 MG/DL (ref 6–20)
BUN/CREAT SERPL: 21 (ref 12–20)
BUN/CREAT SERPL: 22 (ref 12–20)
BUN/CREAT SERPL: 24 (ref 12–20)
BUN/CREAT SERPL: 24 (ref 12–20)
CA-I BLD-SCNC: 1.09 MMOL/L (ref 1.12–1.32)
CA-I BLD-SCNC: 1.15 MMOL/L (ref 1.12–1.32)
CA-I BLD-SCNC: 1.16 MMOL/L (ref 1.12–1.32)
CA-I BLD-SCNC: 1.22 MMOL/L (ref 1.12–1.32)
CA-I BLD-SCNC: 1.23 MMOL/L (ref 1.12–1.32)
CALCIUM SERPL-MCNC: 7.8 MG/DL (ref 8.5–10.1)
CALCIUM SERPL-MCNC: 7.8 MG/DL (ref 8.5–10.1)
CALCIUM SERPL-MCNC: 7.9 MG/DL (ref 8.5–10.1)
CALCIUM SERPL-MCNC: 8.1 MG/DL (ref 8.5–10.1)
CHLORIDE SERPL-SCNC: 105 MMOL/L (ref 97–108)
CHLORIDE SERPL-SCNC: 105 MMOL/L (ref 97–108)
CHLORIDE SERPL-SCNC: 107 MMOL/L (ref 97–108)
CHLORIDE SERPL-SCNC: 107 MMOL/L (ref 97–108)
CO2 SERPL-SCNC: 20 MMOL/L (ref 21–32)
CO2 SERPL-SCNC: 21 MMOL/L (ref 21–32)
CO2 SERPL-SCNC: 23 MMOL/L (ref 21–32)
CO2 SERPL-SCNC: 23 MMOL/L (ref 21–32)
CREAT SERPL-MCNC: 1.29 MG/DL (ref 0.55–1.02)
CREAT SERPL-MCNC: 1.37 MG/DL (ref 0.55–1.02)
CREAT SERPL-MCNC: 1.57 MG/DL (ref 0.55–1.02)
CREAT SERPL-MCNC: 1.58 MG/DL (ref 0.55–1.02)
ERYTHROCYTE [DISTWIDTH] IN BLOOD BY AUTOMATED COUNT: 16 % (ref 11.5–14.5)
ERYTHROCYTE [DISTWIDTH] IN BLOOD BY AUTOMATED COUNT: 16.2 % (ref 11.5–14.5)
ERYTHROCYTE [DISTWIDTH] IN BLOOD BY AUTOMATED COUNT: 16.6 % (ref 11.5–14.5)
ERYTHROCYTE [DISTWIDTH] IN BLOOD BY AUTOMATED COUNT: 16.8 % (ref 11.5–14.5)
FIBRINOGEN PPP-MCNC: 197 MG/DL (ref 200–475)
GAS FLOW.O2 O2 DELIVERY SYS: ABNORMAL L/MIN
GAS FLOW.O2 SETTING OXYMISER: 24 BPM
GAS FLOW.O2 SETTING OXYMISER: 28 BPM
GLOBULIN SER CALC-MCNC: 1.8 G/DL (ref 2–4)
GLUCOSE BLD STRIP.AUTO-MCNC: 189 MG/DL (ref 65–100)
GLUCOSE BLD STRIP.AUTO-MCNC: 193 MG/DL (ref 65–100)
GLUCOSE BLD STRIP.AUTO-MCNC: 216 MG/DL (ref 65–100)
GLUCOSE BLD STRIP.AUTO-MCNC: 216 MG/DL (ref 65–100)
GLUCOSE BLD STRIP.AUTO-MCNC: 65 MG/DL (ref 65–100)
GLUCOSE SERPL-MCNC: 194 MG/DL (ref 65–100)
GLUCOSE SERPL-MCNC: 209 MG/DL (ref 65–100)
GLUCOSE SERPL-MCNC: 216 MG/DL (ref 65–100)
GLUCOSE SERPL-MCNC: 220 MG/DL (ref 65–100)
HCO3 BLD-SCNC: 20.3 MMOL/L (ref 22–26)
HCO3 BLD-SCNC: 22.1 MMOL/L (ref 22–26)
HCO3 BLD-SCNC: 22.4 MMOL/L (ref 22–26)
HCO3 BLD-SCNC: 23.6 MMOL/L (ref 22–26)
HCT VFR BLD AUTO: 20.7 % (ref 35–47)
HCT VFR BLD AUTO: 21.2 % (ref 35–47)
HCT VFR BLD AUTO: 22.5 % (ref 35–47)
HCT VFR BLD AUTO: 24.9 % (ref 35–47)
HGB BLD-MCNC: 6.7 G/DL (ref 11.5–16)
HGB BLD-MCNC: 7.1 G/DL (ref 11.5–16)
HGB BLD-MCNC: 7.3 G/DL (ref 11.5–16)
HGB BLD-MCNC: 8.2 G/DL (ref 11.5–16)
INR PPP: 1.8 (ref 0.9–1.1)
INSPIRATION.DURATION SETTING TIME VENT: 1 SEC
LACTATE SERPL-SCNC: 1.2 MMOL/L (ref 0.4–2)
LACTATE SERPL-SCNC: 1.5 MMOL/L (ref 0.4–2)
LACTATE SERPL-SCNC: 1.6 MMOL/L (ref 0.4–2)
LACTATE SERPL-SCNC: 2.2 MMOL/L (ref 0.4–2)
MCH RBC QN AUTO: 29.5 PG (ref 26–34)
MCH RBC QN AUTO: 29.9 PG (ref 26–34)
MCH RBC QN AUTO: 29.9 PG (ref 26–34)
MCH RBC QN AUTO: 30.3 PG (ref 26–34)
MCHC RBC AUTO-ENTMCNC: 32.4 G/DL (ref 30–36.5)
MCHC RBC AUTO-ENTMCNC: 32.4 G/DL (ref 30–36.5)
MCHC RBC AUTO-ENTMCNC: 32.9 G/DL (ref 30–36.5)
MCHC RBC AUTO-ENTMCNC: 33.5 G/DL (ref 30–36.5)
MCV RBC AUTO: 90.6 FL (ref 80–99)
MCV RBC AUTO: 90.9 FL (ref 80–99)
MCV RBC AUTO: 91.2 FL (ref 80–99)
MCV RBC AUTO: 92.2 FL (ref 80–99)
NRBC # BLD: 0.2 K/UL (ref 0–0.01)
NRBC # BLD: 0.28 K/UL (ref 0–0.01)
NRBC # BLD: 0.36 K/UL (ref 0–0.01)
NRBC # BLD: 0.76 K/UL (ref 0–0.01)
NRBC BLD-RTO: 0.4 PER 100 WBC
NRBC BLD-RTO: 0.6 PER 100 WBC
NRBC BLD-RTO: 0.7 PER 100 WBC
NRBC BLD-RTO: 1.9 PER 100 WBC
O2/TOTAL GAS SETTING VFR VENT: 40 %
O2/TOTAL GAS SETTING VFR VENT: 50 %
PCO2 BLD: 36.7 MMHG (ref 35–45)
PCO2 BLD: 48.7 MMHG (ref 35–45)
PCO2 BLD: 48.9 MMHG (ref 35–45)
PCO2 BLD: 58.8 MMHG (ref 35–45)
PCO2 BLD: >90 MMHG (ref 35–45)
PEEP RESPIRATORY: 8 CMH2O
PH BLD: 7.06 [PH] (ref 7.35–7.45)
PH BLD: 7.21 [PH] (ref 7.35–7.45)
PH BLD: 7.26 [PH] (ref 7.35–7.45)
PH BLD: 7.27 [PH] (ref 7.35–7.45)
PH BLD: 7.35 [PH] (ref 7.35–7.45)
PIP ISTAT,IPIP: 30
PIP ISTAT,IPIP: 36
PIP ISTAT,IPIP: 48
PIP ISTAT,IPIP: 48
PLATELET # BLD AUTO: 121 K/UL (ref 150–400)
PLATELET # BLD AUTO: 128 K/UL (ref 150–400)
PLATELET # BLD AUTO: 142 K/UL (ref 150–400)
PLATELET # BLD AUTO: 149 K/UL (ref 150–400)
PMV BLD AUTO: 10.9 FL (ref 8.9–12.9)
PMV BLD AUTO: 11 FL (ref 8.9–12.9)
PMV BLD AUTO: 11.1 FL (ref 8.9–12.9)
PMV BLD AUTO: 11.2 FL (ref 8.9–12.9)
PO2 BLD: 126 MMHG (ref 80–100)
PO2 BLD: 129 MMHG (ref 80–100)
PO2 BLD: 132 MMHG (ref 80–100)
PO2 BLD: 141 MMHG (ref 80–100)
PO2 BLD: 98 MMHG (ref 80–100)
POTASSIUM SERPL-SCNC: 5 MMOL/L (ref 3.5–5.1)
POTASSIUM SERPL-SCNC: 5.2 MMOL/L (ref 3.5–5.1)
POTASSIUM SERPL-SCNC: 5.5 MMOL/L (ref 3.5–5.1)
POTASSIUM SERPL-SCNC: 5.8 MMOL/L (ref 3.5–5.1)
PROCALCITONIN SERPL-MCNC: 25.75 NG/ML
PROT SERPL-MCNC: 5.4 G/DL (ref 6.4–8.2)
PROTHROMBIN TIME: 17.5 SEC (ref 9–11.1)
RBC # BLD AUTO: 2.27 M/UL (ref 3.8–5.2)
RBC # BLD AUTO: 2.34 M/UL (ref 3.8–5.2)
RBC # BLD AUTO: 2.44 M/UL (ref 3.8–5.2)
RBC # BLD AUTO: 2.74 M/UL (ref 3.8–5.2)
SAO2 % BLD: 97 % (ref 92–97)
SAO2 % BLD: 98 % (ref 92–97)
SAO2 % BLD: 99 % (ref 92–97)
SAO2 % BLD: 99 % (ref 92–97)
SERVICE CMNT-IMP: ABNORMAL
SERVICE CMNT-IMP: NORMAL
SODIUM SERPL-SCNC: 137 MMOL/L (ref 136–145)
SODIUM SERPL-SCNC: 137 MMOL/L (ref 136–145)
SODIUM SERPL-SCNC: 138 MMOL/L (ref 136–145)
SODIUM SERPL-SCNC: 138 MMOL/L (ref 136–145)
SPECIMEN TYPE: ABNORMAL
THERAPEUTIC RANGE,PTTT: ABNORMAL SECS (ref 58–77)
THERAPEUTIC RANGE,PTTT: NORMAL SECS (ref 58–77)
TOTAL RESP. RATE, ITRR: 24
TOTAL RESP. RATE, ITRR: 28
TRIGL SERPL-MCNC: 159 MG/DL (ref ?–150)
VENTILATION MODE VENT: ABNORMAL
VOLUME CONTROL IVLC: YES
VT SETTING VENT: 386 ML
VT SETTING VENT: 400 ML
WBC # BLD AUTO: 39 K/UL (ref 3.6–11)
WBC # BLD AUTO: 49.2 K/UL (ref 3.6–11)
WBC # BLD AUTO: 50.1 K/UL (ref 3.6–11)
WBC # BLD AUTO: 50.3 K/UL (ref 3.6–11)

## 2020-04-12 PROCEDURE — 84145 PROCALCITONIN (PCT): CPT

## 2020-04-12 PROCEDURE — 80076 HEPATIC FUNCTION PANEL: CPT

## 2020-04-12 PROCEDURE — 74011000250 HC RX REV CODE- 250: Performed by: INTERNAL MEDICINE

## 2020-04-12 PROCEDURE — 85730 THROMBOPLASTIN TIME PARTIAL: CPT

## 2020-04-12 PROCEDURE — 86900 BLOOD TYPING SEROLOGIC ABO: CPT

## 2020-04-12 PROCEDURE — 74011000258 HC RX REV CODE- 258: Performed by: NURSE PRACTITIONER

## 2020-04-12 PROCEDURE — 85610 PROTHROMBIN TIME: CPT

## 2020-04-12 PROCEDURE — 84478 ASSAY OF TRIGLYCERIDES: CPT

## 2020-04-12 PROCEDURE — 74011000258 HC RX REV CODE- 258: Performed by: INTERNAL MEDICINE

## 2020-04-12 PROCEDURE — 74011250636 HC RX REV CODE- 250/636: Performed by: NURSE PRACTITIONER

## 2020-04-12 PROCEDURE — 74011250636 HC RX REV CODE- 250/636: Performed by: INTERNAL MEDICINE

## 2020-04-12 PROCEDURE — 90945 DIALYSIS ONE EVALUATION: CPT

## 2020-04-12 PROCEDURE — 80048 BASIC METABOLIC PNL TOTAL CA: CPT

## 2020-04-12 PROCEDURE — 74011000250 HC RX REV CODE- 250: Performed by: ANESTHESIOLOGY

## 2020-04-12 PROCEDURE — 36415 COLL VENOUS BLD VENIPUNCTURE: CPT

## 2020-04-12 PROCEDURE — 83605 ASSAY OF LACTIC ACID: CPT

## 2020-04-12 PROCEDURE — 74011250637 HC RX REV CODE- 250/637: Performed by: NURSE PRACTITIONER

## 2020-04-12 PROCEDURE — 86923 COMPATIBILITY TEST ELECTRIC: CPT

## 2020-04-12 PROCEDURE — 82962 GLUCOSE BLOOD TEST: CPT

## 2020-04-12 PROCEDURE — 36430 TRANSFUSION BLD/BLD COMPNT: CPT

## 2020-04-12 PROCEDURE — 74011636637 HC RX REV CODE- 636/637: Performed by: NURSE PRACTITIONER

## 2020-04-12 PROCEDURE — 77030020291 HC FLEXSEAL FMS BMS -C

## 2020-04-12 PROCEDURE — 77030013797 HC KT TRNSDUC PRSSR EDWD -A

## 2020-04-12 PROCEDURE — 65610000006 HC RM INTENSIVE CARE

## 2020-04-12 PROCEDURE — P9016 RBC LEUKOCYTES REDUCED: HCPCS

## 2020-04-12 PROCEDURE — 36600 WITHDRAWAL OF ARTERIAL BLOOD: CPT

## 2020-04-12 PROCEDURE — C9113 INJ PANTOPRAZOLE SODIUM, VIA: HCPCS | Performed by: NURSE PRACTITIONER

## 2020-04-12 PROCEDURE — 94003 VENT MGMT INPAT SUBQ DAY: CPT

## 2020-04-12 PROCEDURE — 74011000250 HC RX REV CODE- 250: Performed by: NURSE PRACTITIONER

## 2020-04-12 PROCEDURE — 85027 COMPLETE CBC AUTOMATED: CPT

## 2020-04-12 PROCEDURE — 74011636637 HC RX REV CODE- 636/637: Performed by: INTERNAL MEDICINE

## 2020-04-12 PROCEDURE — 82803 BLOOD GASES ANY COMBINATION: CPT

## 2020-04-12 PROCEDURE — 85384 FIBRINOGEN ACTIVITY: CPT

## 2020-04-12 PROCEDURE — 77030040392 HC DRSG OPTIFOAM MDII -A

## 2020-04-12 RX ORDER — INSULIN GLARGINE 100 [IU]/ML
10 INJECTION, SOLUTION SUBCUTANEOUS
Status: DISCONTINUED | OUTPATIENT
Start: 2020-04-12 | End: 2020-04-12

## 2020-04-12 RX ORDER — SODIUM CHLORIDE 9 MG/ML
250 INJECTION, SOLUTION INTRAVENOUS AS NEEDED
Status: DISCONTINUED | OUTPATIENT
Start: 2020-04-12 | End: 2020-04-13

## 2020-04-12 RX ORDER — INSULIN GLARGINE 100 [IU]/ML
7 INJECTION, SOLUTION SUBCUTANEOUS
Status: DISCONTINUED | OUTPATIENT
Start: 2020-04-12 | End: 2020-04-14

## 2020-04-12 RX ORDER — HYDROCORTISONE SODIUM SUCCINATE 100 MG/2ML
50 INJECTION, POWDER, FOR SOLUTION INTRAMUSCULAR; INTRAVENOUS EVERY 6 HOURS
Status: DISCONTINUED | OUTPATIENT
Start: 2020-04-13 | End: 2020-04-18

## 2020-04-12 RX ADMIN — GUAIFENESIN 100 MG: 100 SOLUTION ORAL at 05:28

## 2020-04-12 RX ADMIN — SODIUM CHLORIDE 40 MG: 9 INJECTION INTRAMUSCULAR; INTRAVENOUS; SUBCUTANEOUS at 20:24

## 2020-04-12 RX ADMIN — CHLORHEXIDINE GLUCONATE 15 ML: 0.12 RINSE ORAL at 08:15

## 2020-04-12 RX ADMIN — CALCIUM CHLORIDE, MAGNESIUM CHLORIDE, DEXTROSE MONOHYDRATE, LACTIC ACID, SODIUM CHLORIDE, SODIUM BICARBONATE AND POTASSIUM CHLORIDE 2000 ML/HR: 5.15; 2.03; 22; 5.4; 6.46; 3.09; .157 INJECTION INTRAVENOUS at 16:03

## 2020-04-12 RX ADMIN — CALCIUM GLUCONATE 1 G: 94 INJECTION, SOLUTION INTRAVENOUS at 04:10

## 2020-04-12 RX ADMIN — GUAIFENESIN 100 MG: 100 SOLUTION ORAL at 11:30

## 2020-04-12 RX ADMIN — CALCIUM CHLORIDE, MAGNESIUM CHLORIDE, DEXTROSE MONOHYDRATE, LACTIC ACID, SODIUM CHLORIDE, SODIUM BICARBONATE AND POTASSIUM CHLORIDE 3000 ML/HR: 3.68; 3.05; 22; 5.4; 6.46; 3.09; .314 INJECTION INTRAVENOUS at 01:16

## 2020-04-12 RX ADMIN — CASTOR OIL AND BALSAM, PERU: 788; 87 OINTMENT TOPICAL at 08:15

## 2020-04-12 RX ADMIN — CALCIUM CHLORIDE, MAGNESIUM CHLORIDE, DEXTROSE MONOHYDRATE, LACTIC ACID, SODIUM CHLORIDE, SODIUM BICARBONATE AND POTASSIUM CHLORIDE 3000 ML/HR: 3.68; 3.05; 22; 5.4; 6.46; 3.09; .314 INJECTION INTRAVENOUS at 09:49

## 2020-04-12 RX ADMIN — ALTEPLASE 1 MG: 2.2 INJECTION, POWDER, LYOPHILIZED, FOR SOLUTION INTRAVENOUS at 18:21

## 2020-04-12 RX ADMIN — INSULIN LISPRO 2 UNITS: 100 INJECTION, SOLUTION INTRAVENOUS; SUBCUTANEOUS at 18:50

## 2020-04-12 RX ADMIN — CALCIUM CHLORIDE, MAGNESIUM CHLORIDE, DEXTROSE MONOHYDRATE, LACTIC ACID, SODIUM CHLORIDE, SODIUM BICARBONATE AND POTASSIUM CHLORIDE 3000 ML/HR: 3.68; 3.05; 22; 5.4; 6.46; 3.09; .314 INJECTION INTRAVENOUS at 04:42

## 2020-04-12 RX ADMIN — CALCIUM CHLORIDE, MAGNESIUM CHLORIDE, DEXTROSE MONOHYDRATE, LACTIC ACID, SODIUM CHLORIDE, SODIUM BICARBONATE AND POTASSIUM CHLORIDE 2000 ML/HR: 5.15; 2.03; 22; 5.4; 6.46; 3.09; .157 INJECTION INTRAVENOUS at 14:26

## 2020-04-12 RX ADMIN — MINERAL OIL AND WHITE PETROLATUM: 150; 830 OINTMENT OPHTHALMIC at 20:25

## 2020-04-12 RX ADMIN — KETAMINE HYDROCHLORIDE 0.4 MG/KG/HR: 50 INJECTION, SOLUTION INTRAMUSCULAR; INTRAVENOUS at 01:51

## 2020-04-12 RX ADMIN — CISATRACURIUM BESYLATE 5 MCG/KG/MIN: 2 INJECTION INTRAVENOUS at 11:25

## 2020-04-12 RX ADMIN — CISATRACURIUM BESYLATE 0.5 MCG/KG/MIN: 2 INJECTION INTRAVENOUS at 00:46

## 2020-04-12 RX ADMIN — METRONIDAZOLE 500 MG: 500 INJECTION, SOLUTION INTRAVENOUS at 23:08

## 2020-04-12 RX ADMIN — VANCOMYCIN HYDROCHLORIDE 500 MG: KIT at 12:00

## 2020-04-12 RX ADMIN — NOREPINEPHRINE BITARTRATE 45 MCG/MIN: 1 INJECTION INTRAVENOUS at 14:26

## 2020-04-12 RX ADMIN — CISATRACURIUM BESYLATE 5 MCG/KG/MIN: 2 INJECTION INTRAVENOUS at 23:06

## 2020-04-12 RX ADMIN — VASOPRESSIN 0.04 UNITS/MIN: 20 INJECTION INTRAVENOUS at 08:29

## 2020-04-12 RX ADMIN — VASOPRESSIN 0.04 UNITS/MIN: 20 INJECTION INTRAVENOUS at 16:33

## 2020-04-12 RX ADMIN — INSULIN GLARGINE 45 UNITS: 100 INJECTION, SOLUTION SUBCUTANEOUS at 08:15

## 2020-04-12 RX ADMIN — METRONIDAZOLE 500 MG: 500 INJECTION, SOLUTION INTRAVENOUS at 08:15

## 2020-04-12 RX ADMIN — SODIUM CHLORIDE 40 MG: 9 INJECTION INTRAMUSCULAR; INTRAVENOUS; SUBCUTANEOUS at 08:14

## 2020-04-12 RX ADMIN — HYDROCORTISONE SODIUM SUCCINATE 50 MG: 100 INJECTION, POWDER, FOR SOLUTION INTRAMUSCULAR; INTRAVENOUS at 23:54

## 2020-04-12 RX ADMIN — INSULIN GLARGINE 7 UNITS: 100 INJECTION, SOLUTION SUBCUTANEOUS at 21:14

## 2020-04-12 RX ADMIN — GUAIFENESIN 100 MG: 100 SOLUTION ORAL at 18:20

## 2020-04-12 RX ADMIN — MINERAL OIL AND WHITE PETROLATUM: 150; 830 OINTMENT OPHTHALMIC at 08:15

## 2020-04-12 RX ADMIN — VANCOMYCIN HYDROCHLORIDE 500 MG: KIT at 05:29

## 2020-04-12 RX ADMIN — NOREPINEPHRINE BITARTRATE 50 MCG/MIN: 1 INJECTION INTRAVENOUS at 03:25

## 2020-04-12 RX ADMIN — CEFEPIME HYDROCHLORIDE 2 G: 2 INJECTION, POWDER, FOR SOLUTION INTRAVENOUS at 20:22

## 2020-04-12 RX ADMIN — CALCIUM CHLORIDE, MAGNESIUM CHLORIDE, DEXTROSE MONOHYDRATE, LACTIC ACID, SODIUM CHLORIDE, SODIUM BICARBONATE AND POTASSIUM CHLORIDE 3000 ML/HR: 3.68; 3.05; 22; 5.4; 6.46; 3.09; .314 INJECTION INTRAVENOUS at 06:23

## 2020-04-12 RX ADMIN — VANCOMYCIN HYDROCHLORIDE 1500 MG: 10 INJECTION, POWDER, LYOPHILIZED, FOR SOLUTION INTRAVENOUS at 13:15

## 2020-04-12 RX ADMIN — METRONIDAZOLE 500 MG: 500 INJECTION, SOLUTION INTRAVENOUS at 15:57

## 2020-04-12 RX ADMIN — CEFEPIME HYDROCHLORIDE 2 G: 2 INJECTION, POWDER, FOR SOLUTION INTRAVENOUS at 08:14

## 2020-04-12 RX ADMIN — VASOPRESSIN 0.04 UNITS/MIN: 20 INJECTION INTRAVENOUS at 23:12

## 2020-04-12 RX ADMIN — VANCOMYCIN HYDROCHLORIDE 500 MG: KIT at 18:20

## 2020-04-12 RX ADMIN — CALCIUM CHLORIDE, MAGNESIUM CHLORIDE, DEXTROSE MONOHYDRATE, LACTIC ACID, SODIUM CHLORIDE, SODIUM BICARBONATE AND POTASSIUM CHLORIDE 3000 ML/HR: 3.68; 3.05; 22; 5.4; 6.46; 3.09; .314 INJECTION INTRAVENOUS at 08:02

## 2020-04-12 RX ADMIN — CASTOR OIL AND BALSAM, PERU: 788; 87 OINTMENT TOPICAL at 21:15

## 2020-04-12 RX ADMIN — SODIUM CHLORIDE 30 ML: 9 INJECTION INTRAMUSCULAR; INTRAVENOUS; SUBCUTANEOUS at 05:34

## 2020-04-12 RX ADMIN — CALCIUM CHLORIDE, MAGNESIUM CHLORIDE, DEXTROSE MONOHYDRATE, LACTIC ACID, SODIUM CHLORIDE, SODIUM BICARBONATE AND POTASSIUM CHLORIDE 2000 ML/HR: 5.15; 2.03; 22; 5.4; 6.46; 3.09; .157 INJECTION INTRAVENOUS at 11:22

## 2020-04-12 RX ADMIN — CHLORHEXIDINE GLUCONATE 15 ML: 0.12 RINSE ORAL at 20:24

## 2020-04-12 RX ADMIN — CASTOR OIL AND BALSAM, PERU: 788; 87 OINTMENT TOPICAL at 18:20

## 2020-04-12 RX ADMIN — KETAMINE HYDROCHLORIDE 0.4 MG/KG/HR: 50 INJECTION, SOLUTION INTRAMUSCULAR; INTRAVENOUS at 11:22

## 2020-04-12 RX ADMIN — GUAIFENESIN 100 MG: 100 SOLUTION ORAL at 23:13

## 2020-04-12 RX ADMIN — VANCOMYCIN HYDROCHLORIDE 500 MG: KIT at 23:10

## 2020-04-12 RX ADMIN — KETAMINE HYDROCHLORIDE 0.4 MG/KG/HR: 50 INJECTION, SOLUTION INTRAMUSCULAR; INTRAVENOUS at 23:06

## 2020-04-12 RX ADMIN — Medication 10 ML: at 00:30

## 2020-04-12 RX ADMIN — CALCIUM CHLORIDE, MAGNESIUM CHLORIDE, DEXTROSE MONOHYDRATE, LACTIC ACID, SODIUM CHLORIDE, SODIUM BICARBONATE AND POTASSIUM CHLORIDE 3000 ML/HR: 3.68; 3.05; 22; 5.4; 6.46; 3.09; .314 INJECTION INTRAVENOUS at 03:02

## 2020-04-12 RX ADMIN — SODIUM CHLORIDE 40 ML: 9 INJECTION INTRAMUSCULAR; INTRAVENOUS; SUBCUTANEOUS at 21:22

## 2020-04-12 NOTE — PROGRESS NOTES
[facility]    ICU TEAM Progress Note    Name: Ramana Gomez   : 1960   MRN: [id number]   Date: 2020      Assessment/Plan:     ICU Problems:  2020  1. Acute hypoxic respiratory failure 2/2 COVID 19    A. Completed Plaquenil and Zinc coarse  1. Retroperitoneal Bleed  2. Hemorrhagic Shock 2/2 acute blood loss  a. CT Abd/ Pelvis done 04/10  b. Q6hr H/H   c. Keep Hgb > 7, transfuse as needed  d. Check coags PRN  e. Hold AC meds  3. Acute kidney Injury  a. Nephrology following  b. CRRT in progress  c. Goal NET EVEN  4. Non occlusive PE in L pulmonary arteries  a. Unable to [facility] 2/2 retroperitoneal hemorrhage  5. MRSA Pneumonia  a. Continue vancomycin  6. Acute anemia of critical illness, possible 2/2 renal dysfunction  a. Transfuse for Hgb < 7  7. Diabetes Mellitus, uncontrolled  a. SSI Q6hr coverage lispro  b. Lantus 45 units Daily  c. Lantus 7 units subcut. QHS added  for ongoing glucose 194-220  d. Minimize use of dextrose containing fluids  8. Hyperkalemia  a. Nephrology aware and managing  b. CRRT   9. Leukocytosis, possibly reactive 2/2 acute bleeding  a. Trend WBC, likely reactive  b. Continue cefepime, vanco and flagyl  c. Consider ID consult if continuing to worsen  10. Lactic acidosis resolving  a.  Trend lactate           Cardiac Gtts: Norepinephrine and Vasopressin  SBP Goal of: < 140 mmHg  MAP Goal of: > 65 mmHg  Transfusion Trigger (Hgb): <7 g/dL     Respiratory Goals: Chlorhexidine   Optimize PEEP/Ventilation/Oxygenation  Goal Tidal Volume 6 cc/kg based on IBW  Aim for lung protective ventilation  Aggressive bronchopulmonary hygiene  SPO2 Goal: > 92%  Pulmonary toilet: NA   DVT Prophylaxis (if no, list reason): SCD's or Sequential Compression Device      GI Prophylaxis: Protonix (pantoprazole)   Nutrition: No 2/2 instability  IVFs: NA  Bowel Movement: Yes  Bowel Regimen: None needed at this time     Bowden Catheter Present: Yes  Glycemic Control - Insulin: Yes  Antibiotics:Cefepime  Flagyl  Vancomycin     Pain Medications: Fentanyl  Target RASS: -3 - Moderate Sedation - Movement or eye opening to voice (no eye contact)  Sedation Medications: Propofol and Ketamine  CAM-ICU:  JUAREZ  Mobility: Poor and Bedrest  PT/OT: NA   Restraints: Soft wrist restraints  Discussed Plan of Care/Code Status: Full Code     T/L/D  Tubes: ETT and Orogastric Tube  Lines: Arterial Line and Central Line  Drains: Bowden Catheter    Subjective:   Progress Note: 4/12/2020      Reason for ICU Admission:  65 yo female with hx of obesity, endometrial cancer and diabetes who presented to [facility] with worsening hypoxic respiratory failure in lieu of close exposure to COVID-19 to family including mother ( passed away) sister and brother and tested resulting + here. She presented to the hospital 3/26 and intubated 3/28. She was started on broad spectrum antibiotics and plaquenil. Developed worsening renal failure and was transferred to us for CRRT.  Her hospital coarse has been further notable for development of a retroperitoneal bleed, hemorrhagic shock, Sepsis,  And ongoing acute hypoxic respiratory failure with increasing PEEP and FIo2 requirements.         Overnight Events:   -Hypoxic event requiring vent vhanges to AC/VC with Peep increase( now 8) and Fio2 increase now (45%)  -Started on Nimbex gtt for improved oxygenation with good result  -remains on Levophed @ 45 mcg/min and vasopressin 0.04 for BP support  -Remains on Fentanyl, Ketamine for sedation, analgesia  -Received 1 unit of PRBC for H/H trending down       Active Problem List:     Problem List  Date Reviewed: 1/23/2020          Codes Class    Hypotension ICD-10-CM: I95.9  ICD-9-CM: 458.9 Acute        Sepsis due to methicillin susceptible Staphylococcus aureus (MSSA) without acute organ dysfunction (HCC) ICD-10-CM: A41.01  ICD-9-CM: 038.11, 995.91 Acute        COVID-19 virus infection ICD-10-CM: U07.1         Obesity, morbid ([facility] [address] ICD-10-CM: E66.01  ICD-9-CM: 278.01         Vaginal Pap smear following hysterectomy for malignancy ICD-10-CM: Z08, Z90.710  ICD-9-CM: V67.01         Personal history of malignant neoplasm of other parts of uterus ICD-10-CM: Z85.42  ICD-9-CM: V10.42         Endometrial cancer ([facility] [address] ICD-10-CM: C54.1  ICD-9-CM: 182.0               Past Medical History:      has a past medical history of Basal cell carcinoma, GERD (gastroesophageal reflux disease), Kidney stones, and Polyp of ureter. Past Surgical History:      has a past surgical history that includes hysteroscopy diagnostic (); pr endometrial ablation, thermal; hx  section (); and hx colonoscopy. Home Medications:     Prior to Admission medications    Medication Sig Start Date End Date Taking? Authorizing Provider   pantoprazole (PROTONIX) 40 mg tablet TAKE 1 TABLET BY MOUTH TWICE A DAY 18   Provider, Historical   esomeprazole (NEXIUM) 20 mg capsule Take 20 mg by mouth daily. Indications: BID    Provider, Historical   zolpidem (AMBIEN) 10 mg tablet Take 0.5 Tabs by mouth nightly as needed for Sleep. Max Daily Amount: 5 mg. 17   Delisa Mandel MD   fluticasone (FLONASE) 50 mcg/actuation nasal spray Mist 1-2 spray(s) into each nostril once daily. 4/3/15   Provider, Historical   ranitidine (ZANTAC) 150 mg tablet 150 mg.    Provider, Historical       Allergies/Social/Family History: Allergies   Allergen Reactions    Augmentin [Amoxicillin-Pot Clavulanate] Rash and Itching      Social History     Tobacco Use    Smoking status: Never Smoker    Smokeless tobacco: Never Used   Substance Use Topics    Alcohol use: Not on file      Family History   Problem Relation Age of Onset    Prostate Cancer Father         PROSTATE    Breast Cancer Sister 46        invasive poorly differentiated ductal carcinoma, Neg genetic testing.     Cancer Sister 62        melanoma stage 1       Objective:   Vital Signs:  Visit Vitals  /82   Pulse (!) 106   Temp 97.9 °F (36.6 °C)   Resp 24   Ht 5' 4\" (1.626 m)   Wt 135.4 kg (298 lb 8.1 oz)   SpO2 100%   BMI 51.24 kg/m²      O2 Device: Endotracheal tube, Ventilator Temp (24hrs), Av.9 °F (36.6 °C), Min:97.6 °F (36.4 °C), Max:98.8 °F (37.1 °C)           Intake/Output:     Intake/Output Summary (Last 24 hours) at 2020 1003  Last data filed at 2020 1000  Gross per 24 hour   Intake 2944.78 ml   Output 1871 ml   Net 1073.78 ml       Physical Exam:    General:  Sedated and on the ventilator. No acute distress. , morbidly obese   Eyes:  Sclera anicteric. Pupils equal, round, reactive to light. Eyes open, does not arouse   Mouth/Throat: Orotracheal tube in place. Neck: Supple. Lungs:   Clear/DIM to auscultation bilaterally, vent assisted respirations, no accessory muscle use observed. Cardiovascular:  Tachy, Regular rate and rhythm, no murmur, click, rub, or gallop, generalized edema + 3, pulses palp x 4 ext. .   Abdomen:   Soft, non-tender, bowel sounds hypoactive, distended. Extremities: No cyanosis or + edema   Skin: No acute rash or lesions. Lymph Nodes: Cervical and supraclavicular normal.   Musculoskeletal:  No swelling other than edema, no deformity. Lines/Devices:  Intact, no erythema, drainage, or tenderness.    Psychiatric: Sedated/paralyzed and appears comfortable on ventilator.           LABS AND  DATA: Personally reviewed  Recent Labs     20  0820   WBC 50.3* 50.1*   HGB 7.3* 8.2*   HCT 22.5* 24.9*   * 149*     Recent Labs     20  0822 20  0320  20  0841  20  1142    137   < > 138   < > 137 137   K 5.5* 5.8*   < > 5.3*   < > 4.3 4.5    105   < > 105   < > 102 97   CO2 20* 23   < > 26   < > 24 21   BUN 30* 33*   < > 41*   < > 36* 30*   CREA 1.37* 1.58*   < > 2.00*   < > 2.04* 2.12*   * 220*   < > 321*   < > 293* 372*   CA 7.8* 7.8*   < > 7.1*   < > 7.7* 7.9*   MG  --   --   --  2.3  --   --  2.8* PHOS  --   --   --  4.2  --  4.0 8.2*    < > = values in this interval not displayed. Recent Labs     04/11/20  0400 04/11/20  0041 04/10/20  0920   SGOT 1,733* 1,692*  --     105  --    TP 5.0* 4.9*  --    ALB 3.1* 3.0*  --    GLOB 1.9* 1.9*  --    LPSE  --   --  1,637*     Recent Labs     04/12/20  0822 04/11/20  1554  04/11/20  0449 04/11/20  0041   INR  --   --   --  2.1* 1.8*   PTP  --   --   --  20.8* 18.2*   APTT 31.9 36.5*   < >  --   --     < > = values in this interval not displayed. Recent Labs     04/12/20  0641 04/12/20  0238   PHI 7.270* 7.212*   PCO2I 48.7* 58.8*   PO2I 132* 141*   FIO2I 50 50     Recent Labs     04/10/20  0145 04/09/20  2047   TROIQ 0.98* 0.77*     Ventilator Settings:  Mode Rate Tidal Volume Pressure FiO2 PEEP   Assist control, Volume control   400 ml  0 cm H2O 45 %(weaned) 8 cm H20     Peak airway pressure: 24 cm H2O    Minute ventilation: 9.78 l/min        MEDS: Reviewed    Chest X-Ray:  CXR Results  (Last 48 hours)    None        04/10/2020  CT chest w/ w/o:  CT Abd/ Pelvis:  IMPRESSION:  1.  Scattered pulmonary air space disease consistent with atypical viral  infection. 2.  Nonocclusive pulmonary emboli in the left lower lobe pulmonary arteries. 3.  Large right retroperitoneal hematoma with small foci of contrast within the  hematoma. 4.  Enteric tube and endotracheal tube in appropriate position. 5.  Bilateral femoral venous catheters in the common iliac veins.       Multidisciplinary Rounds Completed:  No    ABCDEF Bundle/Checklist Completed:  Yes    SPECIAL EQUIPMENT  CRRT    DISPOSITION  Stay in ICU    CRITICAL CARE CONSULTANT NOTE  I had a face to face encounter with the patient, reviewed and interpreted patient data including clinical events, labs, images, vital signs, I/O's, and examined patient.   I have discussed the case and the plan and management of the patient's care with the consulting services, the bedside nurses and the respiratory therapist. I updated the spouse Hebert Torres via phone and answered questions, giving updates and expected progression. Encouraged him to take it day by day that she will have setbacks along the way. He is very anxious and worried about his wife and verbalizes feeling greatful for the care we are providing but struggling with not being able to be here to see his wife and interact with medical staff. NOTE OF PERSONAL INVOLVEMENT IN CARE   This patient has a high probability of imminent, clinically significant deterioration, which requires the highest level of preparedness to intervene urgently. I participated in the decision-making and personally managed or directed the management of the following life and organ supporting interventions that required my frequent assessment to treat or prevent imminent deterioration. I personally spent 45 minutes of critical care time. This is time spent at this critically ill patient's bedside actively involved in patient care as well as the coordination of care and discussions with the patient's family. This does not include any procedural time which has been billed separately.     Spencer Farmer [facility]  4/12/2020

## 2020-04-12 NOTE — DIALYSIS
[address]       [phone]    Orders   Mode: CVVHD   Factor: 0 ml/hr   DFR: 3000 ml/hr   Blood Flow Rate: 200 ml/min     Metrics   BP / HR: 102/50  114   Blood Flow Rate: 200 ml/min   AP:                         -57   RP: 69   TMP: 26   PD: 18   FP: 115   DFR: 3000 ml/hr     Comments / Plan: At bedside to restart crrt due to extremely high access pressures, all possible blood returned to pt per primary RN. Orders, consent, pt and code status confirmed. Right Femoral temporary CVC +aspiration/+flush x2, lines positional. Bio-patch C/D/I. New HF 1000 primed, tested and running  Well. Lines visible, secure, connections fastened well and blood warmer on return line set to 37°C.  Education and pre/post report given to DWAYNE Spivey RN.

## 2020-04-12 NOTE — PROGRESS NOTES
Bedside and Verbal shift change report given to Dwight RN (oncoming nurse) by Lexie Lockhart (offgoing nurse). Report included the following information SBAR.      0800: pt intubated and sedated on the ventilator, no movement to any stimulus, pupils round and sluggish, levo&vaso gtt to maintain map >65, ketamine & fentanyl gtt for sedation, nimbex gtt for paralyzation with TOF goal 1-2.     2765: rate change nimbex to 5-- 4/4 TWITCHES AT BASELINE OF 60. GOAL 1-2    1000:  updated on pt condition     1125: New bag of nimbex hung     1200: Pt placed on bear hugger due to low temps 95.     1440: Yahir Guzman made aware of hgb 6.7- orders received     1550: 1 unit PRBCs given at bedside    1600: Pt tempts continue to be low at 95- bear hugger changed     1735: CRRT reading out extreme positive alarms, blood would not return & one lumen (blue) would not draw back blood, [facility] hotline was called and the [facility] nurse was paged- orders received to attempt to crank back the blood but this RN was insuccessful- orders received to [address] per js RN. 1800: Sales at bedside- pt BP high w/ systolics in the 604R, orders received ok to titrate by 2-5mcg. 8550: FTKNVAE initiated     [address]: Temp now 96.3    Bedside and Verbal shift change report given to Lexie Lockhart (oncoming nurse) by Renny Milton (offgoing nurse). Report included the following information SBAR.

## 2020-04-12 NOTE — DIALYSIS
[address]       [phone]    Orders   Mode: CVVHD   Factor: 0   DIALYSATE: 3000ML/HR   Blood Flow Rate: 200ML/MIN     Metrics   BP / HR: BP:131/75 HR:109   Blood Flow Rate: 200ML/MIN   AP:                         -70   RP: 61   TMP: 32   PD: 53   FP: [facility]: 3000ML/HR     Comments / Plan:      CVVHD running well at this time with no indication for filter change. Orders and consent verified. Pre/Post report to Stephy Saunders RN. Pressures reviewed outside of room with primary RN due to Covid-19 precautions. Access assessment and security performed by primary RN hourly.

## 2020-04-12 NOTE — PROGRESS NOTES
Day # 4 of Vancomycin  Indication: sepsis, MSSA PNA with PCN allergy, leukocytosis of unknown source  Current regimen:  vanc 1.5g q24  Abx regimen:  van+cefepime+metronidazole  ID Following ?: YES  Concomitant nephrotoxic drugs (requires more frequent monitoring): Vasopressors  Frequency of BMP?:daily    Recent Labs     20  0822 20  0320 20  2042   WBC 50.3* 50.1* 46.0*   CREA 1.37* 1.58* 1.77*   BUN 30* 33* 36*     Est CrCl: on CVVHD  Temp (24hrs), Av.8 °F (36.6 °C), Min:97.6 °F (36.4 °C), Max:98.7 °F (37.1 °C)    Cultures:   8 blood NG x4 days, prelim  49 blood NG x3 days, prelim    Goal trough = 15 - 20 mcg/mL    Recent trough history (date/time/level/dose/action taken):  4/10 =16.5 mcg/ml (~22h post-load)    Plan: Continue current regimen. Will assess dosing vs clearance with a level prior to the 4th dose or sooner if clinically indicated.     Kiera Whipple, [address] [facility]

## 2020-04-12 NOTE — ROUTINE PROCESS
1930- bedside report received from [facility] using sbar format  2030-ax temp 97.6 juan carlos hernandezer placed and set on med setting/inc WOB/asynch with vent with tidal volumes in the 200's  NP Tiffany Mosley updated ABG pending/RT attempted to adjust /change vent settings w/o success pt did not tolerate 2042-labs sent  2126-high access pressures/ramon lines flushed with 20cc NS easily/ both have bld return-lines flipped  And rt leg repositoned in an attempt to dec pressure-unable to dec access pressures-all bld rinsed back to pt/monikaita paged . Ramon ports capped/clamped off  2130-Davita returned page/pt discussed  Will come and change out filter set  asynch with vent RR 33/volumes low 100mg Miguelito given per order  2140-levo titrated to effect Hgb 7.0   More compliant with vent  2150-PRBC started  2246-Dorian RN at bedside to change filter set  2300-CVVHD resumed  2315-Dr Burciaga at bedside to adjust/chnage vent settings/pt remains supine d/t  Critical condition/labile bp and O2 sats/rt groin ramon  With high access pressures/occludes with min movement of rt leg-MD unable to \"tweek\" vent/ MD to place order for paralytic    0024-nibex started per order at . 5mcg/kg/min per ideal weight  Electrodes placed at left temple/forehead  0008-juan carlos vázquez placed on stand by  Ax temp 98.1  0030-PRBC completed  0100- 4/4 twitches noted  nibex inc to 1mcg  0115-4/4 twiches noted nimbex inc to 3mcg  0145-4/4 twitches nimbex inc to 3.5mcg  0159-4/4 twitches nimbex inc to 5mcg  0220-0/4 twitches nimbex dec to 4.5mcg now obtaining 390-401 volumes on vent  0400-0/4 twitches tidal volumes of 400-410  0600-levo dec to 45 mcg   MAP 90  0730-bedside shift report given to RN using sbar format

## 2020-04-12 NOTE — PROGRESS NOTES
[facility]   [address] [facility], [facility], [facility]  Phone: [phone]   [phone]       Nephrology Progress Note  Lizabeth Gary     1960     [id number]  Date of Admission : 3/31/2020  04/12/20    CC: Follow up for JUANCHO      Assessment and Plan   JUANCHO :  - 2/2 ATN  - continue CVVHD  - no factor  - off systemic heparin  - daily labs    Hyperkalemia:  - change to 2k dialysate    COVID-19 +ve   Acute Hypoxic Resp Failure   - On Vent   - completed Plaquenil. On Vit C, Zinc   - s/p Tocilizumab      Leukocytosis:  - cultures pending  - on cefepime    RP hematoma:  - hgb stable  - transfuse PRN    Cardiac arrest 4/9:  - per ICU team    Type II DM   - Insulin per primary team      Hypothermia   Morbid Obesity      Care Plan discussed with:  ICU team and RN     Interval History:  Unstable. CRRT remains on pressors. Received blood overnight.  hgb holding stable      PT NOT EXAMINED in line with ASN and RPA GUIDELINES ON MANAGING COVID-19 PTS WITH RENAL ISSUES. Examination findings discussed personally with the examining Physician team member      Review of Systems: Review of systems not obtained due to patient factors.     Current Medications:   Current Facility-Administered Medications   Medication Dose Route Frequency    cisatracurium (NIMBEX) 2 mg/mL IV infusion  0.5-10 mcg/kg/min (Ideal) IntraVENous TITRATE    0.9% sodium chloride infusion 250 mL  250 mL IntraVENous PRN    cefepime (MAXIPIME) 2 g in 0.9% sodium chloride (MBP/ADV) 100 mL  2 g IntraVENous Q12H    metroNIDAZOLE (FLAGYL) IVPB premix 500 mg  500 mg IntraVENous Q8H    vancomycin (FIRVANQ) 50 mg/mL oral solution 500 mg  500 mg Oral Q6H    vancomycin (VANCOCIN) 1500 mg in  ml infusion  1,500 mg IntraVENous Q24H    insulin glargine (LANTUS) injection 45 Units  45 Units SubCUTAneous DAILY    vasopressin (VASOSTRICT) 20 Units in 0.9% sodium chloride 100 mL infusion  0-0.04 Units/min IntraVENous TITRATE    fentaNYL (PF) 1,500 mcg/30 mL (50 mcg/mL) infusion  0-200 mcg/hr IntraVENous TITRATE    ketamine (KETALAR) 500 mg in 0.9% sodium chloride 500 mL infusion  0.05-0.4 mg/kg/hr IntraVENous TITRATE    Vancomycin Pharmacy Dosing   Other PRN    NOREPINephrine (LEVOPHED) 32,000 mcg in dextrose 5% 250 mL (128 mcg/mL) infusion  0-50 mcg/min IntraVENous TITRATE    pantoprazole (PROTONIX) 40 mg in 0.9% sodium chloride 10 mL injection  40 mg IntraVENous Q12H    [Held by provider] labetaloL (NORMODYNE) tablet 100 mg  100 mg Oral Q12H    0.9% sodium chloride infusion  3 mL/hr IntraVENous CONTINUOUS    0.9% sodium chloride infusion  5 mL/hr IntraVENous CONTINUOUS    labetaloL (NORMODYNE;TRANDATE) injection 10 mg  10 mg IntraVENous Q10MIN PRN    fentaNYL citrate (PF) injection  mcg   mcg IntraVENous Q1H PRN    white petrolatum-mineral oiL (AKWA TEARS) 83-15 % ophthalmic ointment   Both Eyes Q12H    heparin (porcine) pf 300 Units  300 Units InterCATHeter PRN    guaiFENesin (ROBITUSSIN) 100 mg/5 mL oral liquid 100 mg  100 mg Oral Q6H    alteplase (CATHFLO) 2 mg in sterile water (preservative free) 2 mL injection  2 mg InterCATHeter PRN    alteplase (CATHFLO) 2 mg in sterile water (preservative free) 2 mL injection  2 mg InterCATHeter PRN    midazolam (VERSED) injection 1-2 mg  1-2 mg IntraVENous Q2H PRN    bacitracin 500 unit/gram packet 1 Packet  1 Packet Topical PRN    balsam peru-castor oiL (VENELEX) ointment   Topical BID    sodium chloride (NS) flush 5-40 mL  5-40 mL IntraVENous Q8H    sodium chloride (NS) flush 5-40 mL  5-40 mL IntraVENous PRN    HYDROcodone-acetaminophen (NORCO) 5-325 mg per tablet 1 Tab  1 Tab Oral Q4H PRN    ondansetron (ZOFRAN) injection 4 mg  4 mg IntraVENous Q4H PRN    chlorhexidine (ORAL CARE KIT) 0.12 % mouthwash 15 mL  15 mL Oral Q12H    acetaminophen (TYLENOL) tablet 650 mg  650 mg Oral Q6H PRN    Or    acetaminophen (TYLENOL) suppository 650 mg  650 mg Rectal Q6H PRN    bicarbonate dialysis (PRISMASOL) BG K 4/Ca 2.5 5000 ml solution   Extracorporeal DIALYSIS CONTINUOUS    glucose chewable tablet 16 g  4 Tab Oral PRN    glucagon (GLUCAGEN) injection 1 mg  1 mg IntraMUSCular PRN    dextrose 10% infusion 0-250 mL  0-250 mL IntraVENous PRN    insulin lispro (HUMALOG) injection   SubCUTAneous Q6H      Allergies   Allergen Reactions    Augmentin [Amoxicillin-Pot Clavulanate] Rash and Itching       Objective:  Vitals:    Vitals:    04/12/20 0715 04/12/20 0800 04/12/20 0816 04/12/20 0900   BP:  120/40  102/86   Pulse: (!) 108 (!) 108 (!) 107 (!) 105   Resp: 24 24 24 24   Temp:  97.9 °F (36.6 °C)     SpO2: 100% 100% 98% 99%   Weight:       Height:         Intake and Output:  04/12 0701 - 04/12 1900  In: 174.2 [I.V.:174.2]  Out: 98   04/10 1901 - 04/12 0700  In: 5946.8 [I.V.:4626.8]  Out: 2514 [Drains:34]    Physical Examination: inspection only   Pt intubated    Yes  General: Sedated on vent, obese   Neck:  Lines   Resp:  On vent   CV:  RRR  GI:  Obese   Neurologic:  Sedated   Psych:              Unable to assess  Ext                   R femoral melissa     []    High complexity decision making was performed  []    Patient is at high-risk of decompensation with multiple organ involvement    Lab Data Personally Reviewed: I have reviewed all the pertinent labs, microbiology data and radiology studies during assessment.     Recent Labs     04/12/20  0822 04/12/20  0320 04/11/20  2042 04/11/20  0841 04/11/20  0449 04/11/20  0400 04/11/20  0041  04/10/20  0920 04/10/20  0145 04/09/20  2047 04/09/20  1142    137 138 138  --  140 141   < > 138  --  137 137   K 5.5* 5.8* 5.7* 5.3*  --  5.4* 5.4*   < > 5.0  --  4.3 4.5    105 106 105  --  102 101   < > 103  --  102 97   CO2 20* 23 26 26  --  24 26   < > 26  --  24 21   * 220* 205* 321*  --  219* 234*   < > 265*  --  293* 372*   BUN 30* 33* 36* 41*  --  32* 31*   < > 34*  --  36* 30*   CREA 1.37* 1.58* 1.77* 2.00*  -- 1.60* 1.60*   < > 1.70*  --  2.04* 2.12*   CA 7.8* 7.8* 7.3* 7.1*  --  7.6* 7.7*   < > 7.9*  --  7.7* 7.9*   MG  --   --   --  2.3  --   --   --   --   --   --   --  2.8*   PHOS  --   --   --  4.2  --   --   --   --   --   --  4.0 8.2*   ALB  --   --   --   --   --  3.1* 3.0*  --   --  3.7 3.5  --    SGOT  --   --   --   --   --  1,733* 1,692*  --   --  >2,000*  --   --    ALT  --   --   --   --   --  1,700* 1,701*  --   --  1,606*  --   --    INR  --   --   --   --  2.1*  --  1.8*  --  2.0*  --  1.8*  --     < > = values in this interval not displayed.      Recent Labs     04/12/20  0822 04/12/20  0320 04/11/20  2042 04/11/20  1554 04/11/20  0841   WBC 50.3* 50.1* 46.0* 39.0* 33.6*   HGB 7.3* 8.2* 7.0* 7.4* 8.0*   HCT 22.5* 24.9* 21.6* 22.1* 22.6*   * 149* 141* 132* 167     No results found for: SDES  Lab Results   Component Value Date/Time    Culture result: NO GROWTH 3 DAYS 04/09/2020 02:32 PM    Culture result: NO GROWTH 4 DAYS 04/08/2020 10:36 AM    Culture result: NO GROWTH 5 DAYS 03/31/2020 11:15 AM    Culture result: MODERATE STAPHYLOCOCCUS AUREUS (A) 03/31/2020 10:34 AM    Culture result: LIGHT NORMAL RESPIRATORY SOFIE 03/31/2020 10:34 AM     Recent Results (from the past 24 hour(s))   GLUCOSE, POC    Collection Time: 04/11/20 11:49 AM   Result Value Ref Range    Glucose (POC) 220 (H) 65 - 100 mg/dL    Performed by [employer]    PTT    Collection Time: 04/11/20  3:54 PM   Result Value Ref Range    aPTT 36.5 (H) 22.1 - 32.0 sec    aPTT, therapeutic range     58.0 - 77.0 SECS   CBC W/O DIFF    Collection Time: 04/11/20  3:54 PM   Result Value Ref Range    WBC 39.0 (H) 3.6 - 11.0 K/uL    RBC 2.52 (L) 3.80 - 5.20 M/uL    HGB 7.4 (L) 11.5 - 16.0 g/dL    HCT 22.1 (L) 35.0 - 47.0 %    MCV 87.7 80.0 - 99.0 FL    MCH 29.4 26.0 - 34.0 PG    MCHC 33.5 30.0 - 36.5 g/dL    RDW 15.6 (H) 11.5 - 14.5 %    PLATELET 224 (L) 889 - 400 K/uL    MPV 11.0 8.9 - 12.9 FL    NRBC 0.1 (H) 0  WBC    ABSOLUTE NRBC 0.02 (H) 0.00 - 0.01 K/uL   LACTIC ACID    Collection Time: 04/11/20  3:54 PM   Result Value Ref Range    Lactic acid 1.7 0.4 - 2.0 MMOL/L   GLUCOSE, POC    Collection Time: 04/11/20  5:05 PM   Result Value Ref Range    Glucose (POC) 210 (H) 65 - 100 mg/dL    Performed by Manuel Kemp    LACTIC ACID    Collection Time: 04/11/20  8:42 PM   Result Value Ref Range    Lactic acid 1.9 0.4 - 2.0 MMOL/L   CBC W/O DIFF    Collection Time: 04/11/20  8:42 PM   Result Value Ref Range    WBC 46.0 (H) 3.6 - 11.0 K/uL    RBC 2.39 (L) 3.80 - 5.20 M/uL    HGB 7.0 (L) 11.5 - 16.0 g/dL    HCT 21.6 (L) 35.0 - 47.0 %    MCV 90.4 80.0 - 99.0 FL    MCH 29.3 26.0 - 34.0 PG    MCHC 32.4 30.0 - 36.5 g/dL    RDW 15.9 (H) 11.5 - 14.5 %    PLATELET 188 (L) 429 - 400 K/uL    MPV 11.1 8.9 - 12.9 FL    NRBC 0.2 (H) 0  WBC    ABSOLUTE NRBC 0.07 (H) 0.00 - 0.73 K/uL   METABOLIC PANEL, BASIC    Collection Time: 04/11/20  8:42 PM   Result Value Ref Range    Sodium 138 136 - 145 mmol/L    Potassium 5.7 (H) 3.5 - 5.1 mmol/L    Chloride 106 97 - 108 mmol/L    CO2 26 21 - 32 mmol/L    Anion gap 6 5 - 15 mmol/L    Glucose 205 (H) 65 - 100 mg/dL    BUN 36 (H) 6 - 20 MG/DL    Creatinine 1.77 (H) 0.55 - 1.02 MG/DL    BUN/Creatinine ratio 20 12 - 20      GFR est AA 36 (L) >60 ml/min/1.73m2    GFR est non-AA 30 (L) >60 ml/min/1.73m2    Calcium 7.3 (L) 8.5 - 10.1 MG/DL   POC EG7    Collection Time: 04/11/20  8:53 PM   Result Value Ref Range    Calcium, ionized (POC) 1.11 (L) 1.12 - 1.32 mmol/L    FIO2 (POC) 50 %    pH (POC) 7.220 (LL) 7.35 - 7.45      pCO2 (POC) 63.4 (H) 35.0 - 45.0 MMHG    pO2 (POC) 152 (H) 80 - 100 MMHG    HCO3 (POC) 26.0 22 - 26 MMOL/L    Base deficit (POC) 2 mmol/L    sO2 (POC) 99 (H) 92 - 97 %    Site DRAWN FROM ARTERIAL LINE      Device: VENT      Mode ASSIST CONTROL      Set Rate 12 bpm    PEEP/CPAP (POC) 8 cmH2O    PIP (POC) 38      Allens test (POC) N/A      Inspiratory Time 1 sec    Specimen type (POC) ARTERIAL      Total resp. rate 30     POC EG7    Collection Time: 04/11/20 10:47 PM   Result Value Ref Range    Calcium, ionized (POC) 1.12 1.12 - 1.32 mmol/L    FIO2 (POC) 50 %    pH (POC) 7.115 (LL) 7.35 - 7.45      pCO2 (POC) 76.9 (H) 35.0 - 45.0 MMHG    pO2 (POC) 151 (H) 80 - 100 MMHG    HCO3 (POC) 24.7 22 - 26 MMOL/L    Base deficit (POC) 5 mmol/L    sO2 (POC) 98 (H) 92 - 97 %    Site DRAWN FROM ARTERIAL LINE      Device: VENT      Mode ASSIST CONTROL      Set Rate 22 bpm    PEEP/CPAP (POC) 8 cmH2O    PIP (POC) 36      Allens test (POC) N/A      Inspiratory Time 2.0 sec    Specimen type (POC) ARTERIAL      Total resp. rate 22     GLUCOSE, POC    Collection Time: 04/11/20 11:09 PM   Result Value Ref Range    Glucose (POC) 212 (H) 65 - 100 mg/dL    Performed by [employer]    POC EG7    Collection Time: 04/12/20  1:40 AM   Result Value Ref Range    Calcium, ionized (POC) 1.23 1.12 - 1.32 mmol/L    FIO2 (POC) 50 %    pH (POC) 7.058 (LL) 7.35 - 7.45      pCO2 (POC) >90.0 (H) 35.0 - 45.0 MMHG    pO2 (POC) 129 (H) 80 - 100 MMHG    Site DRAWN FROM ARTERIAL LINE      Device: VENT      Mode ASSIST CONTROL      Set Rate 24 bpm    PEEP/CPAP (POC) 8 cmH2O    PIP (POC) 30      Allens test (POC) N/A      Inspiratory Time 1.0 sec    Specimen type (POC) ARTERIAL      Total resp. rate 24     POC EG7    Collection Time: 04/12/20  2:38 AM   Result Value Ref Range    Calcium, ionized (POC) 1.09 (L) 1.12 - 1.32 mmol/L    FIO2 (POC) 50 %    pH (POC) 7.212 (LL) 7.35 - 7.45      pCO2 (POC) 58.8 (H) 35.0 - 45.0 MMHG    pO2 (POC) 141 (H) 80 - 100 MMHG    HCO3 (POC) 23.6 22 - 26 MMOL/L    Base deficit (POC) 4 mmol/L    sO2 (POC) 99 (H) 92 - 97 %    Site DRAWN FROM ARTERIAL LINE      Device: VENT      Mode ASSIST CONTROL      Tidal volume 400 ml    Set Rate 24 bpm    PEEP/CPAP (POC) 8 cmH2O    PIP (POC) 48      Allens test (POC) N/A      Specimen type (POC) ARTERIAL      Total resp.  rate 24     CBC W/O DIFF    Collection Time: 04/12/20  3:20 AM Result Value Ref Range    WBC 50.1 (HH) 3.6 - 11.0 K/uL    RBC 2.74 (L) 3.80 - 5.20 M/uL    HGB 8.2 (L) 11.5 - 16.0 g/dL    HCT 24.9 (L) 35.0 - 47.0 %    MCV 90.9 80.0 - 99.0 FL    MCH 29.9 26.0 - 34.0 PG    MCHC 32.9 30.0 - 36.5 g/dL    RDW 16.2 (H) 11.5 - 14.5 %    PLATELET 287 (L) 963 - 400 K/uL    MPV 10.9 8.9 - 12.9 FL    NRBC 0.4 (H) 0  WBC    ABSOLUTE NRBC 0.20 (H) 0.00 - 8.40 K/uL   METABOLIC PANEL, BASIC    Collection Time: 04/12/20  3:20 AM   Result Value Ref Range    Sodium 137 136 - 145 mmol/L    Potassium 5.8 (H) 3.5 - 5.1 mmol/L    Chloride 105 97 - 108 mmol/L    CO2 23 21 - 32 mmol/L    Anion gap 9 5 - 15 mmol/L    Glucose 220 (H) 65 - 100 mg/dL    BUN 33 (H) 6 - 20 MG/DL    Creatinine 1.58 (H) 0.55 - 1.02 MG/DL    BUN/Creatinine ratio 21 (H) 12 - 20      GFR est AA 41 (L) >60 ml/min/1.73m2    GFR est non-AA 34 (L) >60 ml/min/1.73m2    Calcium 7.8 (L) 8.5 - 10.1 MG/DL   LACTIC ACID    Collection Time: 04/12/20  3:35 AM   Result Value Ref Range    Lactic acid 1.5 0.4 - 2.0 MMOL/L   GLUCOSE, POC    Collection Time: 04/12/20  5:25 AM   Result Value Ref Range    Glucose (POC) 65 65 - 100 mg/dL    Performed by MENA GALLEGOS    GLUCOSE, POC    Collection Time: 04/12/20  5:27 AM   Result Value Ref Range    Glucose (POC) 189 (H) 65 - 100 mg/dL    Performed by [facility]    POC EG7    Collection Time: 04/12/20  6:41 AM   Result Value Ref Range    Calcium, ionized (POC) 1.15 1.12 - 1.32 mmol/L    FIO2 (POC) 50 %    pH (POC) 7.270 (L) 7.35 - 7.45      pCO2 (POC) 48.7 (H) 35.0 - 45.0 MMHG    pO2 (POC) 132 (H) 80 - 100 MMHG    HCO3 (POC) 22.4 22 - 26 MMOL/L    Base deficit (POC) 5 mmol/L    sO2 (POC) 99 (H) 92 - 97 %    Site DRAWN FROM ARTERIAL LINE      Device: VENT      Mode ASSIST CONTROL      Tidal volume 400 ml    Set Rate 24 bpm    PEEP/CPAP (POC) 8 cmH2O    PIP (POC) 48      Allens test (POC) N/A      Specimen type (POC) ARTERIAL      Total resp.  rate 24     LACTIC ACID    Collection Time: 04/12/20  8:22 AM   Result Value Ref Range    Lactic acid 1.6 0.4 - 2.0 MMOL/L   METABOLIC PANEL, BASIC    Collection Time: 04/12/20  8:22 AM   Result Value Ref Range    Sodium 138 136 - 145 mmol/L    Potassium 5.5 (H) 3.5 - 5.1 mmol/L    Chloride 105 97 - 108 mmol/L    CO2 20 (L) 21 - 32 mmol/L    Anion gap 13 5 - 15 mmol/L    Glucose 209 (H) 65 - 100 mg/dL    BUN 30 (H) 6 - 20 MG/DL    Creatinine 1.37 (H) 0.55 - 1.02 MG/DL    BUN/Creatinine ratio 22 (H) 12 - 20      GFR est AA 48 (L) >60 ml/min/1.73m2    GFR est non-AA 40 (L) >60 ml/min/1.73m2    Calcium 7.8 (L) 8.5 - 10.1 MG/DL   PTT    Collection Time: 04/12/20  8:22 AM   Result Value Ref Range    aPTT 31.9 22.1 - 32.0 sec    aPTT, therapeutic range     58.0 - 77.0 SECS   CBC W/O DIFF    Collection Time: 04/12/20  8:22 AM   Result Value Ref Range    WBC 50.3 (HH) 3.6 - 11.0 K/uL    RBC 2.44 (L) 3.80 - 5.20 M/uL    HGB 7.3 (L) 11.5 - 16.0 g/dL    HCT 22.5 (L) 35.0 - 47.0 %    MCV 92.2 80.0 - 99.0 FL    MCH 29.9 26.0 - 34.0 PG    MCHC 32.4 30.0 - 36.5 g/dL    RDW 16.6 (H) 11.5 - 14.5 %    PLATELET 363 (L) 553 - 400 K/uL    MPV 11.1 8.9 - 12.9 FL    NRBC 0.6 (H) 0  WBC    ABSOLUTE NRBC 0.28 (H) 0.00 - 0.01 K/uL           Total time spent with patient:  xxx   min. Care Plan discussed with:  Patient     Family      RN      Consulting Physician [facility],         I have reviewed the flowsheets. Chart and Pertinent Notes have been reviewed. No change in PMH ,family and social history from Consult note.       Leanna Whitmore MD

## 2020-04-13 LAB
ABO + RH BLD: NORMAL
ALBUMIN SERPL-MCNC: 2.9 G/DL (ref 3.5–5)
ALBUMIN SERPL-MCNC: 3 G/DL (ref 3.5–5)
ALBUMIN/GLOB SERPL: 1.4 {RATIO} (ref 1.1–2.2)
ALP SERPL-CCNC: 183 U/L (ref 45–117)
ALT SERPL-CCNC: 990 U/L (ref 12–78)
ANION GAP SERPL CALC-SCNC: 11 MMOL/L (ref 5–15)
ANION GAP SERPL CALC-SCNC: 11 MMOL/L (ref 5–15)
ANION GAP SERPL CALC-SCNC: 8 MMOL/L (ref 5–15)
ANION GAP SERPL CALC-SCNC: 8 MMOL/L (ref 5–15)
ARTERIAL PATENCY WRIST A: ABNORMAL
AST SERPL-CCNC: 516 U/L (ref 15–37)
BACTERIA SPEC CULT: NORMAL
BASE DEFICIT BLD-SCNC: 6 MMOL/L
BDY SITE: ABNORMAL
BILIRUB DIRECT SERPL-MCNC: 1.1 MG/DL (ref 0–0.2)
BILIRUB SERPL-MCNC: 1.8 MG/DL (ref 0.2–1)
BLD PROD TYP BPU: NORMAL
BLOOD GROUP ANTIBODIES SERPL: NORMAL
BPU ID: NORMAL
BUN SERPL-MCNC: 45 MG/DL (ref 6–20)
BUN SERPL-MCNC: 50 MG/DL (ref 6–20)
BUN SERPL-MCNC: 52 MG/DL (ref 6–20)
BUN SERPL-MCNC: 59 MG/DL (ref 6–20)
BUN/CREAT SERPL: 23 (ref 12–20)
BUN/CREAT SERPL: 23 (ref 12–20)
BUN/CREAT SERPL: 24 (ref 12–20)
BUN/CREAT SERPL: 25 (ref 12–20)
CA-I BLD-SCNC: 1.22 MMOL/L (ref 1.12–1.32)
CALCIUM SERPL-MCNC: 8.1 MG/DL (ref 8.5–10.1)
CALCIUM SERPL-MCNC: 8.1 MG/DL (ref 8.5–10.1)
CALCIUM SERPL-MCNC: 8.2 MG/DL (ref 8.5–10.1)
CALCIUM SERPL-MCNC: 8.8 MG/DL (ref 8.5–10.1)
CHLORIDE SERPL-SCNC: 104 MMOL/L (ref 97–108)
CHLORIDE SERPL-SCNC: 105 MMOL/L (ref 97–108)
CHLORIDE SERPL-SCNC: 107 MMOL/L (ref 97–108)
CHLORIDE SERPL-SCNC: 107 MMOL/L (ref 97–108)
CO2 SERPL-SCNC: 20 MMOL/L (ref 21–32)
CO2 SERPL-SCNC: 20 MMOL/L (ref 21–32)
CO2 SERPL-SCNC: 21 MMOL/L (ref 21–32)
CO2 SERPL-SCNC: 21 MMOL/L (ref 21–32)
CREAT SERPL-MCNC: 1.93 MG/DL (ref 0.55–1.02)
CREAT SERPL-MCNC: 2.01 MG/DL (ref 0.55–1.02)
CREAT SERPL-MCNC: 2.27 MG/DL (ref 0.55–1.02)
CREAT SERPL-MCNC: 2.5 MG/DL (ref 0.55–1.02)
CROSSMATCH RESULT,%XM: NORMAL
CRP SERPL HS-MCNC: >9.5 MG/L
DATE LAST DOSE: ABNORMAL
ERYTHROCYTE [DISTWIDTH] IN BLOOD BY AUTOMATED COUNT: 16.1 % (ref 11.5–14.5)
ERYTHROCYTE [DISTWIDTH] IN BLOOD BY AUTOMATED COUNT: 16.7 % (ref 11.5–14.5)
ERYTHROCYTE [DISTWIDTH] IN BLOOD BY AUTOMATED COUNT: 17.2 % (ref 11.5–14.5)
ERYTHROCYTE [DISTWIDTH] IN BLOOD BY AUTOMATED COUNT: 17.7 % (ref 11.5–14.5)
GAS FLOW.O2 O2 DELIVERY SYS: ABNORMAL L/MIN
GAS FLOW.O2 SETTING OXYMISER: 28 BPM
GLOBULIN SER CALC-MCNC: 2.1 G/DL (ref 2–4)
GLUCOSE BLD STRIP.AUTO-MCNC: 120 MG/DL (ref 65–100)
GLUCOSE BLD STRIP.AUTO-MCNC: 153 MG/DL (ref 65–100)
GLUCOSE BLD STRIP.AUTO-MCNC: 200 MG/DL (ref 65–100)
GLUCOSE BLD STRIP.AUTO-MCNC: 211 MG/DL (ref 65–100)
GLUCOSE BLD STRIP.AUTO-MCNC: 256 MG/DL (ref 65–100)
GLUCOSE BLD STRIP.AUTO-MCNC: NORMAL MG/DL (ref 65–100)
GLUCOSE SERPL-MCNC: 215 MG/DL (ref 65–100)
GLUCOSE SERPL-MCNC: 234 MG/DL (ref 65–100)
GLUCOSE SERPL-MCNC: 244 MG/DL (ref 65–100)
GLUCOSE SERPL-MCNC: 264 MG/DL (ref 65–100)
HCO3 BLD-SCNC: 20.4 MMOL/L (ref 22–26)
HCT VFR BLD AUTO: 22.6 % (ref 35–47)
HCT VFR BLD AUTO: 22.7 % (ref 35–47)
HCT VFR BLD AUTO: 23.3 % (ref 35–47)
HCT VFR BLD AUTO: 24.4 % (ref 35–47)
HGB BLD-MCNC: 7.4 G/DL (ref 11.5–16)
HGB BLD-MCNC: 7.5 G/DL (ref 11.5–16)
HGB BLD-MCNC: 7.9 G/DL (ref 11.5–16)
HGB BLD-MCNC: 8.1 G/DL (ref 11.5–16)
INR PPP: 1.6 (ref 0.9–1.1)
LACTATE SERPL-SCNC: 1.1 MMOL/L (ref 0.4–2)
MCH RBC QN AUTO: 29.7 PG (ref 26–34)
MCH RBC QN AUTO: 30.3 PG (ref 26–34)
MCH RBC QN AUTO: 30.5 PG (ref 26–34)
MCH RBC QN AUTO: 30.6 PG (ref 26–34)
MCHC RBC AUTO-ENTMCNC: 32.7 G/DL (ref 30–36.5)
MCHC RBC AUTO-ENTMCNC: 33 G/DL (ref 30–36.5)
MCHC RBC AUTO-ENTMCNC: 33.2 G/DL (ref 30–36.5)
MCHC RBC AUTO-ENTMCNC: 33.9 G/DL (ref 30–36.5)
MCV RBC AUTO: 89.4 FL (ref 80–99)
MCV RBC AUTO: 90.3 FL (ref 80–99)
MCV RBC AUTO: 92.3 FL (ref 80–99)
MCV RBC AUTO: 92.6 FL (ref 80–99)
NRBC # BLD: 0.94 K/UL (ref 0–0.01)
NRBC # BLD: 1.46 K/UL (ref 0–0.01)
NRBC # BLD: 1.65 K/UL (ref 0–0.01)
NRBC # BLD: 1.73 K/UL (ref 0–0.01)
NRBC BLD-RTO: 2 PER 100 WBC
NRBC BLD-RTO: 3.1 PER 100 WBC
NRBC BLD-RTO: 3.9 PER 100 WBC
NRBC BLD-RTO: 3.9 PER 100 WBC
O2/TOTAL GAS SETTING VFR VENT: 40 %
PCO2 BLD: 39.6 MMHG (ref 35–45)
PEEP RESPIRATORY: 8 CMH2O
PH BLD: 7.32 [PH] (ref 7.35–7.45)
PHOSPHATE SERPL-MCNC: 4 MG/DL (ref 2.6–4.7)
PLATELET # BLD AUTO: 113 K/UL (ref 150–400)
PLATELET # BLD AUTO: 129 K/UL (ref 150–400)
PLATELET # BLD AUTO: 157 K/UL (ref 150–400)
PLATELET # BLD AUTO: 162 K/UL (ref 150–400)
PMV BLD AUTO: 10.8 FL (ref 8.9–12.9)
PMV BLD AUTO: 11 FL (ref 8.9–12.9)
PO2 BLD: 68 MMHG (ref 80–100)
POTASSIUM SERPL-SCNC: 4.7 MMOL/L (ref 3.5–5.1)
POTASSIUM SERPL-SCNC: 5.3 MMOL/L (ref 3.5–5.1)
POTASSIUM SERPL-SCNC: 5.6 MMOL/L (ref 3.5–5.1)
POTASSIUM SERPL-SCNC: 5.9 MMOL/L (ref 3.5–5.1)
PROT SERPL-MCNC: 5.1 G/DL (ref 6.4–8.2)
PROTHROMBIN TIME: 15.9 SEC (ref 9–11.1)
RBC # BLD AUTO: 2.44 M/UL (ref 3.8–5.2)
RBC # BLD AUTO: 2.46 M/UL (ref 3.8–5.2)
RBC # BLD AUTO: 2.58 M/UL (ref 3.8–5.2)
RBC # BLD AUTO: 2.73 M/UL (ref 3.8–5.2)
REPORTED DOSE,DOSE: ABNORMAL UNITS
REPORTED DOSE/TIME,TMG: ABNORMAL
SAO2 % BLD: 92 % (ref 92–97)
SERVICE CMNT-IMP: ABNORMAL
SERVICE CMNT-IMP: NORMAL
SERVICE CMNT-IMP: NORMAL
SODIUM SERPL-SCNC: 135 MMOL/L (ref 136–145)
SODIUM SERPL-SCNC: 135 MMOL/L (ref 136–145)
SODIUM SERPL-SCNC: 136 MMOL/L (ref 136–145)
SODIUM SERPL-SCNC: 137 MMOL/L (ref 136–145)
SPECIMEN EXP DATE BLD: NORMAL
SPECIMEN TYPE: ABNORMAL
STATUS OF UNIT,%ST: NORMAL
TOTAL RESP. RATE, ITRR: 28
UNIT DIVISION, %UDIV: 0
VANCOMYCIN TROUGH SERPL-MCNC: 25.5 UG/ML (ref 5–10)
VENTILATION MODE VENT: ABNORMAL
VT SETTING VENT: 400 ML
WBC # BLD AUTO: 42 K/UL (ref 3.6–11)
WBC # BLD AUTO: 44 K/UL (ref 3.6–11)
WBC # BLD AUTO: 46.8 K/UL (ref 3.6–11)
WBC # BLD AUTO: 47.8 K/UL (ref 3.6–11)

## 2020-04-13 PROCEDURE — 90945 DIALYSIS ONE EVALUATION: CPT

## 2020-04-13 PROCEDURE — 74011250636 HC RX REV CODE- 250/636: Performed by: NURSE PRACTITIONER

## 2020-04-13 PROCEDURE — 85027 COMPLETE CBC AUTOMATED: CPT

## 2020-04-13 PROCEDURE — 74011636637 HC RX REV CODE- 636/637: Performed by: NURSE PRACTITIONER

## 2020-04-13 PROCEDURE — 74011250636 HC RX REV CODE- 250/636: Performed by: INTERNAL MEDICINE

## 2020-04-13 PROCEDURE — 74011000258 HC RX REV CODE- 258: Performed by: NURSE PRACTITIONER

## 2020-04-13 PROCEDURE — 86141 C-REACTIVE PROTEIN HS: CPT

## 2020-04-13 PROCEDURE — 74011250637 HC RX REV CODE- 250/637: Performed by: NURSE PRACTITIONER

## 2020-04-13 PROCEDURE — 65610000006 HC RM INTENSIVE CARE

## 2020-04-13 PROCEDURE — 80048 BASIC METABOLIC PNL TOTAL CA: CPT

## 2020-04-13 PROCEDURE — 74011000250 HC RX REV CODE- 250

## 2020-04-13 PROCEDURE — C1752 CATH,HEMODIALYSIS,SHORT-TERM: HCPCS

## 2020-04-13 PROCEDURE — 74011000250 HC RX REV CODE- 250: Performed by: INTERNAL MEDICINE

## 2020-04-13 PROCEDURE — 85610 PROTHROMBIN TIME: CPT

## 2020-04-13 PROCEDURE — 82962 GLUCOSE BLOOD TEST: CPT

## 2020-04-13 PROCEDURE — 74011000250 HC RX REV CODE- 250: Performed by: ANESTHESIOLOGY

## 2020-04-13 PROCEDURE — 80069 RENAL FUNCTION PANEL: CPT

## 2020-04-13 PROCEDURE — C9113 INJ PANTOPRAZOLE SODIUM, VIA: HCPCS | Performed by: NURSE PRACTITIONER

## 2020-04-13 PROCEDURE — 80202 ASSAY OF VANCOMYCIN: CPT

## 2020-04-13 PROCEDURE — 36556 INSERT NON-TUNNEL CV CATH: CPT

## 2020-04-13 PROCEDURE — 83605 ASSAY OF LACTIC ACID: CPT

## 2020-04-13 PROCEDURE — 80076 HEPATIC FUNCTION PANEL: CPT

## 2020-04-13 PROCEDURE — 36600 WITHDRAWAL OF ARTERIAL BLOOD: CPT

## 2020-04-13 PROCEDURE — 06HY33Z INSERTION OF INFUSION DEVICE INTO LOWER VEIN, PERCUTANEOUS APPROACH: ICD-10-PCS | Performed by: SURGERY

## 2020-04-13 PROCEDURE — 74011000250 HC RX REV CODE- 250: Performed by: NURSE PRACTITIONER

## 2020-04-13 PROCEDURE — 82803 BLOOD GASES ANY COMBINATION: CPT

## 2020-04-13 PROCEDURE — 36415 COLL VENOUS BLD VENIPUNCTURE: CPT

## 2020-04-13 PROCEDURE — 94003 VENT MGMT INPAT SUBQ DAY: CPT

## 2020-04-13 PROCEDURE — 74011000258 HC RX REV CODE- 258: Performed by: INTERNAL MEDICINE

## 2020-04-13 PROCEDURE — 74011636637 HC RX REV CODE- 636/637: Performed by: INTERNAL MEDICINE

## 2020-04-13 RX ADMIN — VASOPRESSIN 0.04 UNITS/MIN: 20 INJECTION INTRAVENOUS at 05:40

## 2020-04-13 RX ADMIN — METRONIDAZOLE 500 MG: 500 INJECTION, SOLUTION INTRAVENOUS at 23:00

## 2020-04-13 RX ADMIN — INSULIN GLARGINE 45 UNITS: 100 INJECTION, SOLUTION SUBCUTANEOUS at 08:02

## 2020-04-13 RX ADMIN — CALCIUM CHLORIDE, MAGNESIUM CHLORIDE, DEXTROSE MONOHYDRATE, LACTIC ACID, SODIUM CHLORIDE, SODIUM BICARBONATE AND POTASSIUM CHLORIDE 2000 ML/HR: 5.15; 2.03; 22; 5.4; 6.46; 3.09; .157 INJECTION INTRAVENOUS at 21:43

## 2020-04-13 RX ADMIN — CALCIUM CHLORIDE, MAGNESIUM CHLORIDE, DEXTROSE MONOHYDRATE, LACTIC ACID, SODIUM CHLORIDE, SODIUM BICARBONATE AND POTASSIUM CHLORIDE 2000 ML/HR: 5.15; 2.03; 22; 5.4; 6.46; 3.09; .157 INJECTION INTRAVENOUS at 18:51

## 2020-04-13 RX ADMIN — GUAIFENESIN 100 MG: 100 SOLUTION ORAL at 05:30

## 2020-04-13 RX ADMIN — VANCOMYCIN HYDROCHLORIDE 500 MG: KIT at 11:36

## 2020-04-13 RX ADMIN — CALCIUM CHLORIDE, MAGNESIUM CHLORIDE, DEXTROSE MONOHYDRATE, LACTIC ACID, SODIUM CHLORIDE, SODIUM BICARBONATE AND POTASSIUM CHLORIDE 2000 ML/HR: 5.15; 2.03; 22; 5.4; 6.46; 3.09; .157 INJECTION INTRAVENOUS at 15:11

## 2020-04-13 RX ADMIN — SODIUM CHLORIDE 30 ML: 9 INJECTION INTRAMUSCULAR; INTRAVENOUS; SUBCUTANEOUS at 05:34

## 2020-04-13 RX ADMIN — NOREPINEPHRINE BITARTRATE 30 MCG/MIN: 1 INJECTION INTRAVENOUS at 05:40

## 2020-04-13 RX ADMIN — VASOPRESSIN 0.04 UNITS/MIN: 20 INJECTION INTRAVENOUS at 21:12

## 2020-04-13 RX ADMIN — CEFEPIME HYDROCHLORIDE 2 G: 2 INJECTION, POWDER, FOR SOLUTION INTRAVENOUS at 08:00

## 2020-04-13 RX ADMIN — HYDROCORTISONE SODIUM SUCCINATE 50 MG: 100 INJECTION, POWDER, FOR SOLUTION INTRAMUSCULAR; INTRAVENOUS at 05:27

## 2020-04-13 RX ADMIN — CHLORHEXIDINE GLUCONATE 15 ML: 0.12 RINSE ORAL at 19:57

## 2020-04-13 RX ADMIN — Medication 200 MCG/HR: at 14:41

## 2020-04-13 RX ADMIN — HYDROCORTISONE SODIUM SUCCINATE 50 MG: 100 INJECTION, POWDER, FOR SOLUTION INTRAMUSCULAR; INTRAVENOUS at 23:17

## 2020-04-13 RX ADMIN — GUAIFENESIN 100 MG: 100 SOLUTION ORAL at 17:53

## 2020-04-13 RX ADMIN — SODIUM CHLORIDE 40 MG: 9 INJECTION INTRAMUSCULAR; INTRAVENOUS; SUBCUTANEOUS at 20:03

## 2020-04-13 RX ADMIN — INSULIN LISPRO 3 UNITS: 100 INJECTION, SOLUTION INTRAVENOUS; SUBCUTANEOUS at 11:33

## 2020-04-13 RX ADMIN — CEFEPIME HYDROCHLORIDE 2 G: 2 INJECTION, POWDER, FOR SOLUTION INTRAVENOUS at 20:00

## 2020-04-13 RX ADMIN — MINERAL OIL AND WHITE PETROLATUM: 150; 830 OINTMENT OPHTHALMIC at 08:16

## 2020-04-13 RX ADMIN — CHLORHEXIDINE GLUCONATE 15 ML: 0.12 RINSE ORAL at 08:00

## 2020-04-13 RX ADMIN — Medication 10 ML: at 01:41

## 2020-04-13 RX ADMIN — GUAIFENESIN 100 MG: 100 SOLUTION ORAL at 23:07

## 2020-04-13 RX ADMIN — INSULIN LISPRO 2 UNITS: 100 INJECTION, SOLUTION INTRAVENOUS; SUBCUTANEOUS at 00:00

## 2020-04-13 RX ADMIN — KETAMINE HYDROCHLORIDE 0.4 MG/KG/HR: 50 INJECTION, SOLUTION INTRAMUSCULAR; INTRAVENOUS at 11:00

## 2020-04-13 RX ADMIN — KETAMINE HYDROCHLORIDE 0.4 MG/KG/HR: 50 INJECTION, SOLUTION INTRAMUSCULAR; INTRAVENOUS at 22:19

## 2020-04-13 RX ADMIN — VASOPRESSIN 0.04 UNITS/MIN: 20 INJECTION INTRAVENOUS at 14:57

## 2020-04-13 RX ADMIN — CASTOR OIL AND BALSAM, PERU: 788; 87 OINTMENT TOPICAL at 18:00

## 2020-04-13 RX ADMIN — SODIUM CHLORIDE 40 MG: 9 INJECTION INTRAMUSCULAR; INTRAVENOUS; SUBCUTANEOUS at 08:02

## 2020-04-13 RX ADMIN — CALCIUM CHLORIDE, MAGNESIUM CHLORIDE, DEXTROSE MONOHYDRATE, LACTIC ACID, SODIUM CHLORIDE, SODIUM BICARBONATE AND POTASSIUM CHLORIDE 2000 ML/HR: 5.15; 2.03; 22; 5.4; 6.46; 3.09; .157 INJECTION INTRAVENOUS at 23:11

## 2020-04-13 RX ADMIN — MINERAL OIL AND WHITE PETROLATUM: 150; 830 OINTMENT OPHTHALMIC at 20:07

## 2020-04-13 RX ADMIN — GUAIFENESIN 100 MG: 100 SOLUTION ORAL at 11:33

## 2020-04-13 RX ADMIN — VANCOMYCIN HYDROCHLORIDE 500 MG: KIT at 23:07

## 2020-04-13 RX ADMIN — CALCIUM CHLORIDE, MAGNESIUM CHLORIDE, DEXTROSE MONOHYDRATE, LACTIC ACID, SODIUM CHLORIDE, SODIUM BICARBONATE AND POTASSIUM CHLORIDE 2000 ML/HR: 5.15; 2.03; 22; 5.4; 6.46; 3.09; .157 INJECTION INTRAVENOUS at 20:12

## 2020-04-13 RX ADMIN — METRONIDAZOLE 500 MG: 500 INJECTION, SOLUTION INTRAVENOUS at 08:17

## 2020-04-13 RX ADMIN — CISATRACURIUM BESYLATE 5.5 MCG/KG/MIN: 2 INJECTION INTRAVENOUS at 11:23

## 2020-04-13 RX ADMIN — CALCIUM CHLORIDE, MAGNESIUM CHLORIDE, DEXTROSE MONOHYDRATE, LACTIC ACID, SODIUM CHLORIDE, SODIUM BICARBONATE AND POTASSIUM CHLORIDE 2000 ML/HR: 5.15; 2.03; 22; 5.4; 6.46; 3.09; .157 INJECTION INTRAVENOUS at 17:02

## 2020-04-13 RX ADMIN — NOREPINEPHRINE BITARTRATE 27 MCG/MIN: 1 INJECTION INTRAVENOUS at 21:11

## 2020-04-13 RX ADMIN — HYDROCORTISONE SODIUM SUCCINATE 50 MG: 100 INJECTION, POWDER, FOR SOLUTION INTRAMUSCULAR; INTRAVENOUS at 11:32

## 2020-04-13 RX ADMIN — INSULIN LISPRO 2 UNITS: 100 INJECTION, SOLUTION INTRAVENOUS; SUBCUTANEOUS at 18:00

## 2020-04-13 RX ADMIN — Medication 30 ML: at 20:05

## 2020-04-13 RX ADMIN — VANCOMYCIN HYDROCHLORIDE 500 MG: KIT at 05:27

## 2020-04-13 RX ADMIN — SODIUM CHLORIDE 10 ML: 9 INJECTION INTRAMUSCULAR; INTRAVENOUS; SUBCUTANEOUS at 13:30

## 2020-04-13 RX ADMIN — SODIUM CHLORIDE 3 ML/HR: 900 INJECTION, SOLUTION INTRAVENOUS at 23:16

## 2020-04-13 RX ADMIN — HYDROCORTISONE SODIUM SUCCINATE 50 MG: 100 INJECTION, POWDER, FOR SOLUTION INTRAMUSCULAR; INTRAVENOUS at 17:58

## 2020-04-13 RX ADMIN — CASTOR OIL AND BALSAM, PERU: 788; 87 OINTMENT TOPICAL at 08:04

## 2020-04-13 RX ADMIN — INSULIN GLARGINE 7 UNITS: 100 INJECTION, SOLUTION SUBCUTANEOUS at 23:19

## 2020-04-13 RX ADMIN — CISATRACURIUM BESYLATE 5.5 MCG/KG/MIN: 2 INJECTION INTRAVENOUS at 21:00

## 2020-04-13 RX ADMIN — METRONIDAZOLE 500 MG: 500 INJECTION, SOLUTION INTRAVENOUS at 15:56

## 2020-04-13 RX ADMIN — VANCOMYCIN HYDROCHLORIDE 500 MG: KIT at 17:51

## 2020-04-13 RX ADMIN — Medication 200 MCG/HR: at 22:45

## 2020-04-13 NOTE — PROGRESS NOTES
NUTRITION       Chart reviewed for follow-up; discussed with NP and RN. Tube feeding has been on hold d/t hemodynamic instability since pt had PEA arrest 4/9. Plan to resume trophic tube feeding today and monitor tolerance. Suggest Nepro @ 10 ml/hr with 50 ml water flush q 6 hr. If patient does not tolerate trophic feeding and/or unable to increase rate in the next 24 hr suggest TPN to meet nutrient needs. RD to follow.     Estimated Nutrition Needs:   Kcals/day: 1350 Kcals/day(4300-8615 (25-28 kcal/kg IBW)  Protein: 108 g(2g/kg IBW)  Fluid: (1 ml/kcal)  Based On: Kcal/kg - specify (Comment)  Weight Used: IBW(54 kg)        Maisha Avitia RD [facility]

## 2020-04-13 NOTE — PROCEDURES
[facility]      Procedure Note - Central Venous Access:   Performed by Chris Lima MD   Assistant JEOVANY Connor    Unable to obtain consent as the patient's family was not available. Immediately prior to the procedure, the patient was reevaluated and found suitable for the planned procedure and any planned medications. Immediately prior to the procedure a time out was called to verify the correct patient, procedure, equipment, staff, and marking as appropriate. The site was prepped with chlorhexidine using Seldinger technique a Ramon catheter was placed in the right femoral vein via direct cannulation with 1 number of attempts for Ramon dialysis catheter insertion to continue the patient on dialysis for acute kidney failure. Ultrasound Guidance was utilized. The cannulation with 18-gauge needle was performed directly under ultrasound guidance. Then the guidewire was confirmed to be in the vein under ultrasound guidance. There was good blood return. The following complications were encountered: None  A follow-up chest x-ray was not needed . The procedure was tolerated well.

## 2020-04-13 NOTE — PROGRESS NOTES
Infectious Disease Progress Note       Subjective:     D/W with ICU team  Patient cannot given history, critically ill             Objective:    Vitals:   Patient Vitals for the past 24 hrs:   Temp Pulse Resp BP SpO2   04/13/20 1202 -- (!) 113 28 -- 98 %   04/13/20 1200 98.2 °F (36.8 °C) (!) 114 28 96/63 98 %   04/13/20 1100 -- (!) 113 28 (!) 88/60 98 %   04/13/20 1000 -- (!) 116 28 91/59 98 %   04/13/20 0900 -- (!) 113 28 96/63 97 %   04/13/20 0814 -- (!) 113 28 -- 98 %   04/13/20 0800 99.3 °F (37.4 °C) (!) 113 28 115/84 99 %   04/13/20 0700 -- (!) 115 28 -- 99 %   04/13/20 0645 -- (!) 117 28 -- 99 %   04/13/20 0630 -- (!) 117 28 -- 99 %   04/13/20 0615 -- (!) 116 28 -- 98 %   04/13/20 0600 -- (!) 116 28 (!) 87/66 99 %   04/13/20 0545 -- (!) 117 28 -- 99 %   04/13/20 0530 -- (!) 114 28 -- 99 %   04/13/20 0515 99.1 °F (37.3 °C) (!) 117 28 -- 99 %   04/13/20 0500 -- (!) 117 28 96/66 99 %   04/13/20 0445 -- (!) 118 28 -- 99 %   04/13/20 0430 -- (!) 118 28 -- 99 %   04/13/20 0415 -- (!) 119 28 -- 99 %   04/13/20 0400 -- (!) 119 28 98/65 99 %   04/13/20 0345 -- (!) 120 28 -- 99 %   04/13/20 0331 -- (!) 120 28 -- 94 %   04/13/20 0330 -- (!) 117 28 -- 94 %   04/13/20 0315 -- (!) 118 28 -- 93 %   04/13/20 0300 -- (!) 122 28 103/80 95 %   04/13/20 0245 -- (!) 119 (!) 0 -- 95 %   04/13/20 0230 -- (!) 119 (!) 0 -- 95 %   04/13/20 0215 -- (!) 120 (!) 0 -- 95 %   04/13/20 0200 -- (!) 119 (!) 0 (!) 89/63 95 %   04/13/20 0145 -- (!) 120 (!) 0 -- 95 %   04/13/20 0142 99.1 °F (37.3 °C) -- -- -- --   04/13/20 0130 -- (!) 122 (!) 0 -- 95 %   04/13/20 0115 -- (!) 122 (!) 0 -- 95 %   04/13/20 0106 99.2 °F (37.3 °C) -- -- -- --   04/13/20 0100 -- (!) 123 (!) 0 (!) 83/57 95 %   04/13/20 0045 -- (!) 123 (!) 0 -- 96 %   04/13/20 0030 99.2 °F (37.3 °C) (!) 124 (!) 0 -- 96 %   04/13/20 0015 99.1 °F (37.3 °C) (!) 124 (!) 0 -- 96 %   04/13/20 0000 99.1 °F (37.3 °C) -- -- (!) 87/62 --   04/12/20 2345 -- (!) 122 28 -- 96 %   04/12/20 2337 -- (!) 122 28 -- 95 %   04/12/20 2330 -- (!) 122 (!) 0 -- 95 %   04/12/20 2317 99.3 °F (37.4 °C) -- -- -- --   04/12/20 2315 -- (!) 122 -- -- 95 %   04/12/20 2300 -- (!) 121 28 (!) 81/57 95 %   04/12/20 2245 -- (!) 121 28 -- 95 %   04/12/20 2230 -- (!) 121 28 -- 96 %   04/12/20 2215 -- (!) 120 28 -- 95 %   04/12/20 2200 -- (!) 120 28 (!) 83/46 95 %   04/12/20 2145 -- (!) 120 28 -- 95 %   04/12/20 2130 -- (!) 119 28 -- 94 %   04/12/20 2115 -- (!) 117 25 -- (!) 70 %   04/12/20 2100 -- (!) 119 19 (!) 89/75 93 %   04/12/20 2045 -- (!) 118 28 -- 96 %   04/12/20 2031 -- (!) 116 28 -- 98 %   04/12/20 2030 -- -- 25 -- 98 %   04/12/20 2015 -- (!) 116 28 -- 97 %   04/12/20 2000 -- (!) 115 28 99/85 97 %   04/12/20 1956 97.1 °F (36.2 °C) -- -- -- --   04/12/20 1945 -- (!) 116 28 -- 96 %   04/12/20 1900 -- (!) 114 28 -- 97 %   04/12/20 1800 -- (!) 112 28 126/67 98 %   04/12/20 1700 -- (!) 105 28 -- 100 %   04/12/20 1600 95 °F (35 °C) (!) 107 28 128/73 98 %   04/12/20 1550 95 °F (35 °C) (!) 107 28 -- 98 %   04/12/20 1512 -- (!) 106 28 -- 99 %   04/12/20 1500 -- (!) 106 28 117/68 99 %       Physical Exam:  Please see ICU teams physical exam     Medications:    Current Facility-Administered Medications:     cisatracurium (NIMBEX) 2 mg/mL IV infusion, 0.5-10 mcg/kg/min (Ideal), IntraVENous, TITRATE, Long Mullins DO, Last Rate: 9 mL/hr at 04/13/20 1123, 5.5 mcg/kg/min at 04/13/20 1123    bicarbonate dialysis (PRISMASOL) BG K 2/Ca 3.5 5000 ml solution, , Extracorporeal, DIALYSIS CONTINUOUS, Jose C Hutton MD, Last Rate: 2,000 mL/hr at 04/12/20 1603, 2,000 mL/hr at 04/12/20 1603    insulin glargine (LANTUS) injection 7 Units, 7 Units, SubCUTAneous, QHS, Ella Trinidad, ACNP, 7 Units at 04/12/20 2114    hydrocortisone Sod Succ (PF) (SOLU-CORTEF) injection 50 mg, 50 mg, IntraVENous, Q6H, Cassandra Cox, NP-C, 50 mg at 04/13/20 1132    cefepime (MAXIPIME) 2 g in 0.9% sodium chloride (MBP/ADV) 100 mL, 2 g, IntraVENous, Q12H, Iliana Rosales, NP, Last Rate: 200 mL/hr at 04/13/20 0800, 2 g at 04/13/20 0800    metroNIDAZOLE (FLAGYL) IVPB premix 500 mg, 500 mg, IntraVENous, Q8H, Iliana Rosales, NP, Last Rate: 100 mL/hr at 04/13/20 0817, 500 mg at 04/13/20 0817    vancomycin (FIRVANQ) 50 mg/mL oral solution 500 mg, 500 mg, Oral, Q6H, Iliana Rosales, NP, 500 mg at 04/13/20 1136    insulin glargine (LANTUS) injection 45 Units, 45 Units, SubCUTAneous, DAILY, Iliana Rosales, NP, 45 Units at 04/13/20 0802    vasopressin (VASOSTRICT) 20 Units in 0.9% sodium chloride 100 mL infusion, 0-0.04 Units/min, IntraVENous, TITRATE, Gilbert Yeh MD, Last Rate: 12 mL/hr at 04/13/20 0540, 0.04 Units/min at 04/13/20 0540    fentaNYL (PF) 1,500 mcg/30 mL (50 mcg/mL) infusion, 0-200 mcg/hr, IntraVENous, TITRATE, Gilbert Yeh MD, Last Rate: 4 mL/hr at 04/13/20 0918, 200 mcg/hr at 04/13/20 0918    ketamine (KETALAR) 500 mg in 0.9% sodium chloride 500 mL infusion, 0.05-0.4 mg/kg/hr, IntraVENous, TITRATE, Gilbert Yeh MD, Last Rate: 46.6 mL/hr at 04/13/20 1100, 0.4 mg/kg/hr at 04/13/20 1100    Vancomycin Pharmacy Dosing, , Other, PRN, Gilbert Yeh MD    NOREPINephrine (LEVOPHED) 32,000 mcg in dextrose 5% 250 mL (128 mcg/mL) infusion, 0-50 mcg/min, IntraVENous, TITRATE, Danny Smoker R, NP-C, Last Rate: 14.1 mL/hr at 04/13/20 1350, 30 mcg/min at 04/13/20 1350    pantoprazole (PROTONIX) 40 mg in 0.9% sodium chloride 10 mL injection, 40 mg, IntraVENous, Q12H, Cassandra Cox NP-C, 40 mg at 04/13/20 0802    [Held by provider] labetaloL (NORMODYNE) tablet 100 mg, 100 mg, Oral, Q12H, Iliana Rosales NP    0.9% sodium chloride infusion, 3 mL/hr, IntraVENous, CONTINUOUS, Mary Kate Madison MD, Last Rate: 3 mL/hr at 04/12/20 2000, 3 mL/hr at 04/12/20 2000    0.9% sodium chloride infusion, 5 mL/hr, IntraVENous, CONTINUOUS, Mary Kate Madison MD, Last Rate: 5 mL/hr at 04/12/20 2000, 5 mL/hr at 04/12/20 2000    labetaloL (NORMODYNE;TRANDATE) injection 10 mg, 10 mg, IntraVENous, Q10MIN PRN, Lonny Mendez MD    fentaNYL citrate (PF) injection  mcg,  mcg, IntraVENous, Q1H PRN, Maximus Aguilar MD, 100 mcg at 04/11/20 1232    white petrolatum-mineral oiL (AKWA TEARS) 83-15 % ophthalmic ointment, , Both Eyes, Q12H, Lonny Madison MD    heparin (porcine) pf 300 Units, 300 Units, InterCATHeter, PRN, CELESTINA Cr    guaiFENesin (ROBITUSSIN) 100 mg/5 mL oral liquid 100 mg, 100 mg, Oral, Q6H, Cassandra Cox NP-C, 100 mg at 04/13/20 1133    alteplase (CATHFLO) 2 mg in sterile water (preservative free) 2 mL injection, 2 mg, InterCATHeter, PRN, Iman PÉREZ MD    alteplase (CATHFLO) 2 mg in sterile water (preservative free) 2 mL injection, 2 mg, InterCATHeter, PRN, Iman PÉREZ MD, 2 mg at 04/03/20 0617    midazolam (VERSED) injection 1-2 mg, 1-2 mg, IntraVENous, Q2H PRN, CELESTINA Villasenor, 1 mg at 04/09/20 1305    bacitracin 500 unit/gram packet 1 Packet, 1 Packet, Topical, PRN, Olen Skiff, NP, 1 Packet at 04/01/20 0116    balsam peru-castor oiL (VENELEX) ointment, , Topical, BID, Lonny aMdison MD    sodium chloride (NS) flush 5-40 mL, 5-40 mL, IntraVENous, Q8H, Lonny Madison MD, 10 mL at 04/13/20 1330    sodium chloride (NS) flush 5-40 mL, 5-40 mL, IntraVENous, PRN, Maximus Aguilar MD, 10 mL at 04/13/20 0141    HYDROcodone-acetaminophen (NORCO) 5-325 mg per tablet 1 Tab, 1 Tab, Oral, Q4H PRN, Maximus Aguilar MD, 1 Tab at 04/08/20 0528    ondansetron (ZOFRAN) injection 4 mg, 4 mg, IntraVENous, Q4H PRN, Lonny Madison MD    chlorhexidine (ORAL CARE KIT) 0.12 % mouthwash 15 mL, 15 mL, Oral, Q12H, Iliana Rosales, JOSE JUAN, 15 mL at 04/13/20 0800    acetaminophen (TYLENOL) tablet 650 mg, 650 mg, Oral, Q6H PRN **OR** acetaminophen (TYLENOL) suppository 650 mg, 650 mg, Rectal, Q6H PRN, Iliana Rosales, NP    glucose chewable tablet 16 g, 4 Tab, Oral, PRN, Kylah Madison MD    glucagon (GLUCAGEN) injection 1 mg, 1 mg, IntraMUSCular, PRN, Kylah Roman MD    dextrose 10% infusion 0-250 mL, 0-250 mL, IntraVENous, PRN, Kylah Roman MD    insulin lispro (HUMALOG) injection, , SubCUTAneous, Q6H, Kylah Madison MD, 3 Units at 04/13/20 1133      Labs:  Recent Results (from the past 24 hour(s))   PTT    Collection Time: 04/12/20  2:32 PM   Result Value Ref Range    aPTT 33.3 (H) 22.1 - 32.0 sec    aPTT, therapeutic range     58.0 - 77.0 SECS   CBC W/O DIFF    Collection Time: 04/12/20  2:32 PM   Result Value Ref Range    WBC 49.2 (H) 3.6 - 11.0 K/uL    RBC 2.27 (L) 3.80 - 5.20 M/uL    HGB 6.7 (L) 11.5 - 16.0 g/dL    HCT 20.7 (L) 35.0 - 47.0 %    MCV 91.2 80.0 - 99.0 FL    MCH 29.5 26.0 - 34.0 PG    MCHC 32.4 30.0 - 36.5 g/dL    RDW 16.8 (H) 11.5 - 14.5 %    PLATELET 960 (L) 883 - 400 K/uL    MPV 11.0 8.9 - 12.9 FL    NRBC 0.7 (H) 0  WBC    ABSOLUTE NRBC 0.36 (H) 0.00 - 0.01 K/uL   LACTIC ACID    Collection Time: 04/12/20  2:32 PM   Result Value Ref Range    Lactic acid 2.2 (HH) 0.4 - 2.0 MMOL/L   METABOLIC PANEL, BASIC    Collection Time: 04/12/20  2:32 PM   Result Value Ref Range    Sodium 138 136 - 145 mmol/L    Potassium 5.2 (H) 3.5 - 5.1 mmol/L    Chloride 107 97 - 108 mmol/L    CO2 23 21 - 32 mmol/L    Anion gap 8 5 - 15 mmol/L    Glucose 194 (H) 65 - 100 mg/dL    BUN 31 (H) 6 - 20 MG/DL    Creatinine 1.29 (H) 0.55 - 1.02 MG/DL    BUN/Creatinine ratio 24 (H) 12 - 20      GFR est AA 52 (L) >60 ml/min/1.73m2    GFR est non-AA 43 (L) >60 ml/min/1.73m2    Calcium 8.1 (L) 8.5 - 10.1 MG/DL   FIBRINOGEN    Collection Time: 04/12/20  3:30 PM   Result Value Ref Range    Fibrinogen 197 (L) 200 - 475 mg/dL   PROTHROMBIN TIME + INR    Collection Time: 04/12/20  3:30 PM   Result Value Ref Range    INR 1.8 (H) 0.9 - 1.1      Prothrombin time 17.5 (H) 9.0 - 11.1 sec   GLUCOSE, POC    Collection Time: 04/12/20  6:46 PM   Result Value Ref Range    Glucose (POC) 216 (H) 65 - 100 mg/dL    Performed by Monica Wagner    LACTIC ACID    Collection Time: 04/12/20  8:34 PM   Result Value Ref Range    Lactic acid 1.2 0.4 - 2.0 MMOL/L   METABOLIC PANEL, BASIC    Collection Time: 04/12/20  8:34 PM   Result Value Ref Range    Sodium 137 136 - 145 mmol/L    Potassium 5.0 3.5 - 5.1 mmol/L    Chloride 107 97 - 108 mmol/L    CO2 21 21 - 32 mmol/L    Anion gap 9 5 - 15 mmol/L    Glucose 216 (H) 65 - 100 mg/dL    BUN 38 (H) 6 - 20 MG/DL    Creatinine 1.57 (H) 0.55 - 1.02 MG/DL    BUN/Creatinine ratio 24 (H) 12 - 20      GFR est AA 41 (L) >60 ml/min/1.73m2    GFR est non-AA 34 (L) >60 ml/min/1.73m2    Calcium 7.9 (L) 8.5 - 10.1 MG/DL   PROCALCITONIN    Collection Time: 04/12/20  8:34 PM   Result Value Ref Range    Procalcitonin 25.75 ng/mL   TRIGLYCERIDE    Collection Time: 04/12/20  8:34 PM   Result Value Ref Range    Triglyceride 159 (H) <150 MG/DL   CBC W/O DIFF    Collection Time: 04/12/20  8:34 PM   Result Value Ref Range    WBC 39.0 (H) 3.6 - 11.0 K/uL    RBC 2.34 (L) 3.80 - 5.20 M/uL    HGB 7.1 (L) 11.5 - 16.0 g/dL    HCT 21.2 (L) 35.0 - 47.0 %    MCV 90.6 80.0 - 99.0 FL    MCH 30.3 26.0 - 34.0 PG    MCHC 33.5 30.0 - 36.5 g/dL    RDW 16.0 (H) 11.5 - 14.5 %    PLATELET 350 (L) 160 - 400 K/uL    MPV 11.2 8.9 - 12.9 FL    NRBC 1.9 (H) 0  WBC    ABSOLUTE NRBC 0.76 (H) 0.00 - 0.01 K/uL   TYPE & SCREEN    Collection Time: 04/12/20  8:44 PM   Result Value Ref Range    Crossmatch Expiration 04/15/2020     ABO/Rh(D) A POSITIVE     Antibody screen NEG     Unit number [id number]     Blood component type  LR     Unit division 00     Status of unit TRANSFUSED     Crossmatch result Compatible    POC EG7    Collection Time: 04/12/20  8:45 PM   Result Value Ref Range    Calcium, ionized (POC) 1.22 1.12 - 1.32 mmol/L    FIO2 (POC) 40 %    pH (POC) 7.351 7.35 - 7.45      pCO2 (POC) 36.7 35.0 - 45.0 MMHG    pO2 (POC) 98 80 - 100 MMHG    HCO3 (POC) 20.3 (L) 22 - 26 MMOL/L    Base deficit (POC) 5 mmol/L    sO2 (POC) 97 92 - 97 %    Site DRAWN FROM ARTERIAL LINE      Device: VENT      Mode ASSIST CONTROL      Tidal volume 400 ml    Set Rate 28 bpm    PEEP/CPAP (POC) 8 cmH2O    Allens test (POC) N/A      Specimen type (POC) ARTERIAL      Total resp.  rate 28     GLUCOSE, POC    Collection Time: 04/12/20  9:13 PM   Result Value Ref Range    Glucose (POC) 216 (H) 65 - 100 mg/dL    Performed by [address] [place] [address], POC    Collection Time: 04/12/20 11:56 PM   Result Value Ref Range    Glucose (POC) 200 (H) 65 - 100 mg/dL    [facility]    CBC W/O DIFF    Collection Time: 04/13/20  2:45 AM   Result Value Ref Range    WBC 42.0 (H) 3.6 - 11.0 K/uL    RBC 2.73 (L) 3.80 - 5.20 M/uL    HGB 8.1 (L) 11.5 - 16.0 g/dL    HCT 24.4 (L) 35.0 - 47.0 %    MCV 89.4 80.0 - 99.0 FL    MCH 29.7 26.0 - 34.0 PG    MCHC 33.2 30.0 - 36.5 g/dL    RDW 16.1 (H) 11.5 - 14.5 %    PLATELET 583 (L) 799 - 400 K/uL    MPV 11.0 8.9 - 12.9 FL    NRBC 3.9 (H) 0  WBC    ABSOLUTE NRBC 1.65 (H) 0.00 - 8.93 K/uL   METABOLIC PANEL, BASIC    Collection Time: 04/13/20  2:45 AM   Result Value Ref Range    Sodium 137 136 - 145 mmol/L    Potassium 5.3 (H) 3.5 - 5.1 mmol/L    Chloride 105 97 - 108 mmol/L    CO2 21 21 - 32 mmol/L    Anion gap 11 5 - 15 mmol/L    Glucose 215 (H) 65 - 100 mg/dL    BUN 45 (H) 6 - 20 MG/DL    Creatinine 1.93 (H) 0.55 - 1.02 MG/DL    BUN/Creatinine ratio 23 (H) 12 - 20      GFR est AA 32 (L) >60 ml/min/1.73m2    GFR est non-AA 27 (L) >60 ml/min/1.73m2    Calcium 8.1 (L) 8.5 - 10.1 MG/DL   CRP, HIGH SENSITIVITY    Collection Time: 04/13/20  2:45 AM   Result Value Ref Range    CRP, High sensitivity >9.5 mg/L   PROTHROMBIN TIME + INR    Collection Time: 04/13/20  2:54 AM   Result Value Ref Range    INR 1.6 (H) 0.9 - 1.1      Prothrombin time 15.9 (H) 9.0 - 11.1 sec   POC EG7 Collection Time: 04/13/20  3:33 AM   Result Value Ref Range    Calcium, ionized (POC) 1.22 1.12 - 1.32 mmol/L    FIO2 (POC) 40 %    pH (POC) 7.320 (L) 7.35 - 7.45      pCO2 (POC) 39.6 35.0 - 45.0 MMHG    pO2 (POC) 68 (L) 80 - 100 MMHG    HCO3 (POC) 20.4 (L) 22 - 26 MMOL/L    Base deficit (POC) 6 mmol/L    sO2 (POC) 92 92 - 97 %    Site DRAWN FROM ARTERIAL LINE      Device: VENT      Mode ASSIST CONTROL      Tidal volume 400 ml    Set Rate 28 bpm    PEEP/CPAP (POC) 8 cmH2O    Allens test (POC) N/A      Specimen type (POC) ARTERIAL      Total resp.  rate 28     GLUCOSE, POC    Collection Time: 04/13/20  5:25 AM   Result Value Ref Range    Glucose (POC) 120 (H) 65 - 100 mg/dL    Performed by [employer]    RENAL FUNCTION PANEL    Collection Time: 04/13/20  8:19 AM   Result Value Ref Range    Sodium 136 136 - 145 mmol/L    Potassium 5.6 (H) 3.5 - 5.1 mmol/L    Chloride 107 97 - 108 mmol/L    CO2 21 21 - 32 mmol/L    Anion gap 8 5 - 15 mmol/L    Glucose 234 (H) 65 - 100 mg/dL    BUN 52 (H) 6 - 20 MG/DL    Creatinine 2.27 (H) 0.55 - 1.02 MG/DL    BUN/Creatinine ratio 23 (H) 12 - 20      GFR est AA 27 (L) >60 ml/min/1.73m2    GFR est non-AA 22 (L) >60 ml/min/1.73m2    Calcium 8.2 (L) 8.5 - 10.1 MG/DL    Phosphorus 4.0 2.6 - 4.7 MG/DL    Albumin 2.9 (L) 3.5 - 5.0 g/dL   CBC W/O DIFF    Collection Time: 04/13/20  8:19 AM   Result Value Ref Range    WBC 44.0 (H) 3.6 - 11.0 K/uL    RBC 2.58 (L) 3.80 - 5.20 M/uL    HGB 7.9 (L) 11.5 - 16.0 g/dL    HCT 23.3 (L) 35.0 - 47.0 %    MCV 90.3 80.0 - 99.0 FL    MCH 30.6 26.0 - 34.0 PG    MCHC 33.9 30.0 - 36.5 g/dL    RDW 16.7 (H) 11.5 - 14.5 %    PLATELET 431 430 - 507 K/uL    MPV 10.8 8.9 - 12.9 FL    NRBC 3.9 (H) 0  WBC    ABSOLUTE NRBC 1.73 (H) 0.00 - 0.01 K/uL   GLUCOSE, POC    Collection Time: 04/13/20 11:10 AM   Result Value Ref Range    Glucose (POC) 256 (H) 65 - 100 mg/dL    Performed by Siddharth Vazquez    CBC W/O DIFF    Collection Time: 04/13/20  1:29 PM   Result Value Ref Range    WBC 46.8 (H) 3.6 - 11.0 K/uL    RBC 2.44 (L) 3.80 - 5.20 M/uL    HGB 7.4 (L) 11.5 - 16.0 g/dL    HCT 22.6 (L) 35.0 - 47.0 %    MCV 92.6 80.0 - 99.0 FL    MCH 30.3 26.0 - 34.0 PG    MCHC 32.7 30.0 - 36.5 g/dL    RDW 17.2 (H) 11.5 - 14.5 %    PLATELET 054 972 - 114 K/uL    MPV 10.8 8.9 - 12.9 FL    NRBC 3.1 (H) 0  WBC    ABSOLUTE NRBC 1.46 (H) 0.00 - 0.01 K/uL           Micro:   Blood 4/9/20       Component Value Ref Range & Units Status   Special Requests: NO SPECIAL REQUESTS    Preliminary   Culture result: NO GROWTH 4 DAYS    Preliminary   Result History       Blood 4/8/20  Component Value Ref Range & Units Status   Special Requests: NO SPECIAL REQUESTS    Final   Culture result: NO GROWTH 5 DAYS    Final   Result History               Blood: 3/31/20  Specimen Information: Blood        Component Value Ref Range & Units Status   Special Requests: NO SPECIAL REQUESTS    Final   Culture result: NO GROWTH 5 DAYS    Final   Result History              Sputum 3/31/20   Sputum        Component Value Ref Range & Units Status   Special Requests: NO SPECIAL REQUESTS    Final   GRAM STAIN FEW WBCS SEEN    Final   GRAM STAIN    Final   2+ GRAM POSITIVE COCCI IN PAIRS    Culture result: Abnormal     Final   MODERATE STAPHYLOCOCCUS AUREUS    Culture result:    Final   LIGHT NORMAL RESPIRATORY SOFIE    Susceptibility      Staphylococcus aureus     LU    Ciprofloxacin ($) <=0.5 ug/mL S    Clindamycin ($)  R    Doxycycline ($$) <=0.5 ug/mL S    Erythromycin ($$$$) >=8 ug/mL R    Gentamicin ($) <=0.5 ug/mL S    Levofloxacin ($) <=0.12 ug/mL S    Linezolid ($$$$$) 2 ug/mL S    Moxifloxacin ($$$$) <=0.25 ug/mL S    Oxacillin 0.5 ug/mL S    Rifampin ($$$$) <=0.5 ug/mL S1    Tetracycline <=1 ug/mL S    Trimeth/Sulfa <=10 ug/mL S    Vancomycin ($) 1 ug/mL S                         Imaging:  CXR 4/5/20  FINDINGS: AP radiograph of the chest was obtained.     There is been interval advancement of the endotracheal tube which now terminates  1.6 cm above the bhavik. Right upper extremity PICC and gastric decompression  tubes are again noted. There is no significant change in diffuse bilateral  heterogeneous opacities. Likely trace left pleural effusion. No pneumothorax. Stable cardiomediastinal silhouette.     IMPRESSION  IMPRESSION:   1. Interval advancement of endotracheal tube which now terminates 1.6 cm above  the bhavik. Consider slight retraction. 2. Unchanged diffuse bilateral heterogeneous opacities, consistent with  multifocal pneumonia. Likely trace left pleural effusion.           Assessment / Plan    Ms. Nupur Alva is a 68-year-old lady with a history of nephrolithiasis, GERD, basal cell carcinoma who was admitted from [facility] with respiratory symptoms concerning for COVID 19 and tested + on 3/26/20. Per team,  exposure to her mother with COVID 23. Per notes, intubated on 3/28/2020. Transferred to [facility] for CRRT    Had code blue 4/9/20   Resuscitated            1) Sars-CoV2 pneumonia, respiratory failure, renal failure   Sars-CoV-2 detected 3/26/20, other viral respiratory panel negative  (records from Care everywhere )   Urine legionella and S pneumo ag negative 3/27/20  Procalcitonin 3/27 0.47, 3/28 0.44, 3/29 5.86, 3/31 13.44  , Ferritin 1985 3/31/20    4/3/20   , ferritin 992, CRP 9.65 4/5/20   IL 6  402 3/31/20  Completed azithromycin and Plaquenil   S/P Actemra one dose 4/3.    Was on On Heparin gtt , now off per ICU team   On IV Steroids per ICU team     2)  MSSA on sputum, pharmacy dosing vancomycin based on levels due to Penicillin allergy    Completed a 7 day course       3) marked leukocytosis  WBC was 11-16 before code blue on 4/9/20   Suspect from coding event/reactory   Started by primary team PO Vancomycin for empiric CDI treatment  Started by primary team on Vancomycin IV Cefepime and flagyl empirically given marked WBC elevation   Blood cx negative   Will DC Vancomycin IV   CT A/P/C 4/10 with large hematoma R retroperitoneal   If febrile or worse, restart IV Vancomycin   Will taper antibiotics one at a time to avoid further confusion if worsening       4) LLE PE on CT 4/10/20  Currently off anticoagulation   Had retroperitoneal bleed      5) Retroperitoneal hematoma on imaging 4/10/20    5) ARF:   Plans per nephrology, CRRT  R IJ    6) history of nephrolithiasis    7) history of GERD    8) basal cell carcinoma per chart        D/W with icu team today     Please contact with questions     Allison Padgett DO  2:14 PM

## 2020-04-13 NOTE — PROGRESS NOTES
[facility]   [address] [facility], [facility] [address], [facility]  Phone: [phone]   [phone]       Nephrology Progress Note  Atlaf      1960     [id number]  Date of Admission : 3/31/2020  04/13/20    CC: Follow up for JUANCHO      Assessment and Plan   JUANCHO :  - 2/2 ATN  - resume CVVHD after line change  - factor as tolerated  - daily labs    Hyperkalemia:  - 2K dialysate    COVID-19 +ve   Acute Hypoxic Resp Failure   - On Vent   - completed Plaquenil. On Vit C, Zinc   - s/p Tocilizumab      Leukocytosis:  - cultures pending  - on cefepime    RP hematoma:  - hgb stable now  - serial H&H and transfuse PRN    Cardiac arrest 4/9:  - per ICU team    Type II DM   - Insulin per primary team      Hypothermia   Morbid Obesity      Care Plan discussed with:  ICU team and RN     Interval History:  Per RN, having issues with melissa clotting. No improvement w/ cathflo. For melissa change today. On 2 pressors, now on paralytics. PT NOT EXAMINED in line with ASN and RPA GUIDELINES ON MANAGING COVID-19 PTS WITH RENAL ISSUES. Examination findings discussed personally with the examining Physician team member      Review of Systems: Review of systems not obtained due to patient factors.     Current Medications:   Current Facility-Administered Medications   Medication Dose Route Frequency    cisatracurium (NIMBEX) 2 mg/mL IV infusion  0.5-10 mcg/kg/min (Ideal) IntraVENous TITRATE    bicarbonate dialysis (PRISMASOL) BG K 2/Ca 3.5 5000 ml solution   Extracorporeal DIALYSIS CONTINUOUS    insulin glargine (LANTUS) injection 7 Units  7 Units SubCUTAneous QHS    hydrocortisone Sod Succ (PF) (SOLU-CORTEF) injection 50 mg  50 mg IntraVENous Q6H    cefepime (MAXIPIME) 2 g in 0.9% sodium chloride (MBP/ADV) 100 mL  2 g IntraVENous Q12H    metroNIDAZOLE (FLAGYL) IVPB premix 500 mg  500 mg IntraVENous Q8H    vancomycin (FIRVANQ) 50 mg/mL oral solution 500 mg  500 mg Oral Q6H    vancomycin (VANCOCIN) 1500 mg in  ml infusion  1,500 mg IntraVENous Q24H    insulin glargine (LANTUS) injection 45 Units  45 Units SubCUTAneous DAILY    vasopressin (VASOSTRICT) 20 Units in 0.9% sodium chloride 100 mL infusion  0-0.04 Units/min IntraVENous TITRATE    fentaNYL (PF) 1,500 mcg/30 mL (50 mcg/mL) infusion  0-200 mcg/hr IntraVENous TITRATE    ketamine (KETALAR) 500 mg in 0.9% sodium chloride 500 mL infusion  0.05-0.4 mg/kg/hr IntraVENous TITRATE    Vancomycin Pharmacy Dosing   Other PRN    NOREPINephrine (LEVOPHED) 32,000 mcg in dextrose 5% 250 mL (128 mcg/mL) infusion  0-50 mcg/min IntraVENous TITRATE    pantoprazole (PROTONIX) 40 mg in 0.9% sodium chloride 10 mL injection  40 mg IntraVENous Q12H    [Held by provider] labetaloL (NORMODYNE) tablet 100 mg  100 mg Oral Q12H    0.9% sodium chloride infusion  3 mL/hr IntraVENous CONTINUOUS    0.9% sodium chloride infusion  5 mL/hr IntraVENous CONTINUOUS    labetaloL (NORMODYNE;TRANDATE) injection 10 mg  10 mg IntraVENous Q10MIN PRN    fentaNYL citrate (PF) injection  mcg   mcg IntraVENous Q1H PRN    white petrolatum-mineral oiL (AKWA TEARS) 83-15 % ophthalmic ointment   Both Eyes Q12H    heparin (porcine) pf 300 Units  300 Units InterCATHeter PRN    guaiFENesin (ROBITUSSIN) 100 mg/5 mL oral liquid 100 mg  100 mg Oral Q6H    alteplase (CATHFLO) 2 mg in sterile water (preservative free) 2 mL injection  2 mg InterCATHeter PRN    alteplase (CATHFLO) 2 mg in sterile water (preservative free) 2 mL injection  2 mg InterCATHeter PRN    midazolam (VERSED) injection 1-2 mg  1-2 mg IntraVENous Q2H PRN    bacitracin 500 unit/gram packet 1 Packet  1 Packet Topical PRN    balsam peru-castor oiL (VENELEX) ointment   Topical BID    sodium chloride (NS) flush 5-40 mL  5-40 mL IntraVENous Q8H    sodium chloride (NS) flush 5-40 mL  5-40 mL IntraVENous PRN    HYDROcodone-acetaminophen (NORCO) 5-325 mg per tablet 1 Tab  1 Tab Oral Q4H PRN    ondansetron (ZOFRAN) injection 4 mg  4 mg IntraVENous Q4H PRN    chlorhexidine (ORAL CARE KIT) 0.12 % mouthwash 15 mL  15 mL Oral Q12H    acetaminophen (TYLENOL) tablet 650 mg  650 mg Oral Q6H PRN    Or    acetaminophen (TYLENOL) suppository 650 mg  650 mg Rectal Q6H PRN    glucose chewable tablet 16 g  4 Tab Oral PRN    glucagon (GLUCAGEN) injection 1 mg  1 mg IntraMUSCular PRN    dextrose 10% infusion 0-250 mL  0-250 mL IntraVENous PRN    insulin lispro (HUMALOG) injection   SubCUTAneous Q6H      Allergies   Allergen Reactions    Augmentin [Amoxicillin-Pot Clavulanate] Rash and Itching       Objective:  Vitals:    Vitals:    04/13/20 0630 04/13/20 0645 04/13/20 0700 04/13/20 0800   BP:    115/84   Pulse: (!) 117 (!) 117 (!) 115 (!) 113   Resp: 28 28 28 28   Temp:    99.3 °F (37.4 °C)   SpO2: 99% 99% 99% 99%   Weight:       Height:         Intake and Output:  No intake/output data recorded. 04/11 1901 - 04/13 0700  In: 4697.7 [I.V.:3827.7]  Out: 2142 [Drains:225]    Physical Examination:   Pt intubated    Yes  General: Paralyzed on the vent  Resp:  On vent   CV:  RRR  GI:  Obese   Neurologic:  Sedated   Ext                   R femoral melissa     []    High complexity decision making was performed  []    Patient is at high-risk of decompensation with multiple organ involvement    Lab Data Personally Reviewed: I have reviewed all the pertinent labs, microbiology data and radiology studies during assessment.     Recent Labs     04/13/20  0254 04/13/20  0245 04/12/20  2034 04/12/20  1530 04/12/20  1432 04/12/20  0822 04/12/20  0320  04/11/20  0841 04/11/20  0449 04/11/20  0400 04/11/20  0041  04/10/20  0920   NA  --  137 137  --  138 138 137   < > 138  --  140 141   < > 138   K  --  5.3* 5.0  --  5.2* 5.5* 5.8*   < > 5.3*  --  5.4* 5.4*   < > 5.0   CL  --  105 107  --  107 105 105   < > 105  --  102 101   < > 103   CO2  --  21 21  --  23 20* 23   < > 26  --  24 26   < > 26   GLU  --  215* 216*  --  194* 209* 220*   < > 321*  --  219* 234*   < > 265*   BUN  --  45* 38*  --  31* 30* 33*   < > 41*  --  32* 31*   < > 34*   CREA  --  1.93* 1.57*  --  1.29* 1.37* 1.58*   < > 2.00*  --  1.60* 1.60*   < > 1.70*   CA  --  8.1* 7.9*  --  8.1* 7.8* 7.8*   < > 7.1*  --  7.6* 7.7*   < > 7.9*   MG  --   --   --   --   --   --   --   --  2.3  --   --   --   --   --    PHOS  --   --   --   --   --   --   --   --  4.2  --   --   --   --   --    ALB  --   --   --   --   --   --  3.6  --   --   --  3.1* 3.0*  --   --    SGOT  --   --   --   --   --   --  1,424*  --   --   --  1,733* 1,692*  --   --    ALT  --   --   --   --   --   --  1,658*  --   --   --  1,700* 1,701*  --   --    INR 1.6*  --   --  1.8*  --   --   --   --   --  2.1*  --  1.8*  --  2.0*    < > = values in this interval not displayed.      Recent Labs     04/13/20  0245 04/12/20  2034 04/12/20  1432 04/12/20  0822 04/12/20  0320   WBC 42.0* 39.0* 49.2* 50.3* 50.1*   HGB 8.1* 7.1* 6.7* 7.3* 8.2*   HCT 24.4* 21.2* 20.7* 22.5* 24.9*   * 121* 128* 142* 149*     No results found for: [facility]  Lab Results   Component Value Date/Time    Culture result: NO GROWTH 4 DAYS 04/09/2020 02:32 PM    Culture result: NO GROWTH 5 DAYS 04/08/2020 10:36 AM    Culture result: NO GROWTH 5 DAYS 03/31/2020 11:15 AM    Culture result: MODERATE STAPHYLOCOCCUS AUREUS (A) 03/31/2020 10:34 AM    Culture result: LIGHT NORMAL RESPIRATORY SOFIE 03/31/2020 10:34 AM     Recent Results (from the past 24 hour(s))   POC EG7    Collection Time: 04/12/20 11:20 AM   Result Value Ref Range    Calcium, ionized (POC) 1.16 1.12 - 1.32 mmol/L    pH (POC) 7.263 (L) 7.35 - 7.45      pCO2 (POC) 48.9 (H) 35.0 - 45.0 MMHG    pO2 (POC) 126 (H) 80 - 100 MMHG    HCO3 (POC) 22.1 22 - 26 MMOL/L    Base deficit (POC) 5 mmol/L    sO2 (POC) 98 (H) 92 - 97 %    Site DRAWN FROM ARTERIAL LINE      Device: VENT      Mode ASSIST CONTROL      Tidal volume 386 ml    Set Rate 24 bpm    PEEP/CPAP (POC) 8 cmH2O    PIP (POC) 36 Allens test (POC) N/A      Specimen type (POC) ARTERIAL      Total resp.  rate 24      Volume control YES     GLUCOSE, POC    Collection Time: 04/12/20 11:33 AM   Result Value Ref Range    Glucose (POC) 193 (H) 65 - 100 mg/dL    Performed by Kathe Arnett    PTT    Collection Time: 04/12/20  2:32 PM   Result Value Ref Range    aPTT 33.3 (H) 22.1 - 32.0 sec    aPTT, therapeutic range     58.0 - 77.0 SECS   CBC W/O DIFF    Collection Time: 04/12/20  2:32 PM   Result Value Ref Range    WBC 49.2 (H) 3.6 - 11.0 K/uL    RBC 2.27 (L) 3.80 - 5.20 M/uL    HGB 6.7 (L) 11.5 - 16.0 g/dL    HCT 20.7 (L) 35.0 - 47.0 %    MCV 91.2 80.0 - 99.0 FL    MCH 29.5 26.0 - 34.0 PG    MCHC 32.4 30.0 - 36.5 g/dL    RDW 16.8 (H) 11.5 - 14.5 %    PLATELET 238 (L) 391 - 400 K/uL    MPV 11.0 8.9 - 12.9 FL    NRBC 0.7 (H) 0  WBC    ABSOLUTE NRBC 0.36 (H) 0.00 - 0.01 K/uL   LACTIC ACID    Collection Time: 04/12/20  2:32 PM   Result Value Ref Range    Lactic acid 2.2 (HH) 0.4 - 2.0 MMOL/L   METABOLIC PANEL, BASIC    Collection Time: 04/12/20  2:32 PM   Result Value Ref Range    Sodium 138 136 - 145 mmol/L    Potassium 5.2 (H) 3.5 - 5.1 mmol/L    Chloride 107 97 - 108 mmol/L    CO2 23 21 - 32 mmol/L    Anion gap 8 5 - 15 mmol/L    Glucose 194 (H) 65 - 100 mg/dL    BUN 31 (H) 6 - 20 MG/DL    Creatinine 1.29 (H) 0.55 - 1.02 MG/DL    BUN/Creatinine ratio 24 (H) 12 - 20      GFR est AA 52 (L) >60 ml/min/1.73m2    GFR est non-AA 43 (L) >60 ml/min/1.73m2    Calcium 8.1 (L) 8.5 - 10.1 MG/DL   FIBRINOGEN    Collection Time: 04/12/20  3:30 PM   Result Value Ref Range    Fibrinogen 197 (L) 200 - 475 mg/dL   PROTHROMBIN TIME + INR    Collection Time: 04/12/20  3:30 PM   Result Value Ref Range    INR 1.8 (H) 0.9 - 1.1      Prothrombin time 17.5 (H) 9.0 - 11.1 sec   GLUCOSE, POC    Collection Time: 04/12/20  6:46 PM   Result Value Ref Range    Glucose (POC) 216 (H) 65 - 100 mg/dL    Performed by Kathe Peak    LACTIC ACID    Collection Time: 04/12/20  8:34 PM   Result Value Ref Range    Lactic acid 1.2 0.4 - 2.0 MMOL/L   METABOLIC PANEL, BASIC    Collection Time: 04/12/20  8:34 PM   Result Value Ref Range    Sodium 137 136 - 145 mmol/L    Potassium 5.0 3.5 - 5.1 mmol/L    Chloride 107 97 - 108 mmol/L    CO2 21 21 - 32 mmol/L    Anion gap 9 5 - 15 mmol/L    Glucose 216 (H) 65 - 100 mg/dL    BUN 38 (H) 6 - 20 MG/DL    Creatinine 1.57 (H) 0.55 - 1.02 MG/DL    BUN/Creatinine ratio 24 (H) 12 - 20      GFR est AA 41 (L) >60 ml/min/1.73m2    GFR est non-AA 34 (L) >60 ml/min/1.73m2    Calcium 7.9 (L) 8.5 - 10.1 MG/DL   PROCALCITONIN    Collection Time: 04/12/20  8:34 PM   Result Value Ref Range    Procalcitonin 25.75 ng/mL   TRIGLYCERIDE    Collection Time: 04/12/20  8:34 PM   Result Value Ref Range    Triglyceride 159 (H) <150 MG/DL   CBC W/O DIFF    Collection Time: 04/12/20  8:34 PM   Result Value Ref Range    WBC 39.0 (H) 3.6 - 11.0 K/uL    RBC 2.34 (L) 3.80 - 5.20 M/uL    HGB 7.1 (L) 11.5 - 16.0 g/dL    HCT 21.2 (L) 35.0 - 47.0 %    MCV 90.6 80.0 - 99.0 FL    MCH 30.3 26.0 - 34.0 PG    MCHC 33.5 30.0 - 36.5 g/dL    RDW 16.0 (H) 11.5 - 14.5 %    PLATELET 468 (L) 965 - 400 K/uL    MPV 11.2 8.9 - 12.9 FL    NRBC 1.9 (H) 0  WBC    ABSOLUTE NRBC 0.76 (H) 0.00 - 0.01 K/uL   TYPE & SCREEN    Collection Time: 04/12/20  8:44 PM   Result Value Ref Range    Crossmatch Expiration 04/15/2020     ABO/Rh(D) A POSITIVE     Antibody screen NEG     Unit number [id number]     Blood component type RC LR     Unit division 00     Status of unit TRANSFUSED     Crossmatch result Compatible    POC EG7    Collection Time: 04/12/20  8:45 PM   Result Value Ref Range    Calcium, ionized (POC) 1.22 1.12 - 1.32 mmol/L    FIO2 (POC) 40 %    pH (POC) 7.351 7.35 - 7.45      pCO2 (POC) 36.7 35.0 - 45.0 MMHG    pO2 (POC) 98 80 - 100 MMHG    HCO3 (POC) 20.3 (L) 22 - 26 MMOL/L    Base deficit (POC) 5 mmol/L    sO2 (POC) 97 92 - 97 %    Site DRAWN FROM ARTERIAL LINE      Device: VENT Mode ASSIST CONTROL      Tidal volume 400 ml    Set Rate 28 bpm    PEEP/CPAP (POC) 8 cmH2O    Allens test (POC) N/A      Specimen type (POC) ARTERIAL      Total resp.  rate 28     GLUCOSE, POC    Collection Time: 04/12/20  9:13 PM   Result Value Ref Range    Glucose (POC) 216 (H) 65 - 100 mg/dL    Performed by [facility], POC    Collection Time: 04/12/20 11:56 PM   Result Value Ref Range    Glucose (POC) 200 (H) 65 - 100 mg/dL    Performed by [facility]    CBC W/O DIFF    Collection Time: 04/13/20  2:45 AM   Result Value Ref Range    WBC 42.0 (H) 3.6 - 11.0 K/uL    RBC 2.73 (L) 3.80 - 5.20 M/uL    HGB 8.1 (L) 11.5 - 16.0 g/dL    HCT 24.4 (L) 35.0 - 47.0 %    MCV 89.4 80.0 - 99.0 FL    MCH 29.7 26.0 - 34.0 PG    MCHC 33.2 30.0 - 36.5 g/dL    RDW 16.1 (H) 11.5 - 14.5 %    PLATELET 949 (L) 897 - 400 K/uL    MPV 11.0 8.9 - 12.9 FL    NRBC 3.9 (H) 0  WBC    ABSOLUTE NRBC 1.65 (H) 0.00 - 0.77 K/uL   METABOLIC PANEL, BASIC    Collection Time: 04/13/20  2:45 AM   Result Value Ref Range    Sodium 137 136 - 145 mmol/L    Potassium 5.3 (H) 3.5 - 5.1 mmol/L    Chloride 105 97 - 108 mmol/L    CO2 21 21 - 32 mmol/L    Anion gap 11 5 - 15 mmol/L    Glucose 215 (H) 65 - 100 mg/dL    BUN 45 (H) 6 - 20 MG/DL    Creatinine 1.93 (H) 0.55 - 1.02 MG/DL    BUN/Creatinine ratio 23 (H) 12 - 20      GFR est AA 32 (L) >60 ml/min/1.73m2    GFR est non-AA 27 (L) >60 ml/min/1.73m2    Calcium 8.1 (L) 8.5 - 10.1 MG/DL   CRP, HIGH SENSITIVITY    Collection Time: 04/13/20  2:45 AM   Result Value Ref Range    CRP, High sensitivity >9.5 mg/L   PROTHROMBIN TIME + INR    Collection Time: 04/13/20  2:54 AM   Result Value Ref Range    INR 1.6 (H) 0.9 - 1.1      Prothrombin time 15.9 (H) 9.0 - 11.1 sec   POC EG7    Collection Time: 04/13/20  3:33 AM   Result Value Ref Range    Calcium, ionized (POC) 1.22 1.12 - 1.32 mmol/L    FIO2 (POC) 40 %    pH (POC) 7.320 (L) 7.35 - 7.45      pCO2 (POC) 39.6 35.0 - 45.0 MMHG    pO2 (POC) 68 (L) 80 - 100 MMHG    HCO3 (POC) 20.4 (L) 22 - 26 MMOL/L    Base deficit (POC) 6 mmol/L    sO2 (POC) 92 92 - 97 %    Site DRAWN FROM ARTERIAL LINE      Device: VENT      Mode ASSIST CONTROL      Tidal volume 400 ml    Set Rate 28 bpm    PEEP/CPAP (POC) 8 cmH2O    Allens test (POC) N/A      Specimen type (POC) ARTERIAL      Total resp. rate 28     GLUCOSE, POC    Collection Time: 04/13/20  5:25 AM   Result Value Ref Range    Glucose (POC) 120 (H) 65 - 100 mg/dL    Performed by [employer]            Total time spent with patient:  xxx   min. Care Plan discussed with:  Patient     Family      RN      Consulting Physician [facility],         I have reviewed the flowsheets. Chart and Pertinent Notes have been reviewed. No change in PMH ,family and social history from Consult note.       Leanna Whitmore MD

## 2020-04-13 NOTE — PROGRESS NOTES
[facility]    ICU TEAM Progress Note    Name: Juan Rodriguez   : 1960   MRN: [id number]   Date: 2020      Assessment/Plan:     ICU Problems:  2020  1. Acute hypoxic respiratory failure 2/2 COVID 19    A. Completed Plaquenil and Zinc coarse  1. Retroperitoneal Bleed  2. Hemorrhagic Shock 2/2 acute blood loss  a. CT Abd/ Pelvis done 04/10  b. Q6hr H/H   c. Keep Hgb > 7, transfuse as needed  d. Check coags PRN  e. Hold AC meds  3. Acute kidney Injury  a. Nephrology following  b. CRRT on hold, line clotted, will replace today and resume  c. Trend lytes, K is increasing with not CRRT  d. Goal NET EVEN  4. Non occlusive PE in LLL pulmonary arteries  a. Unable to [facility] 2/2 retroperitoneal hemorrhage  5. MRSA Pneumonia  a. Continue vancomycin  6. Acute anemia of critical illness, possible 2/2 renal dysfunction  a. Transfuse for Hgb < 7  7. Diabetes Mellitus, uncontrolled  a. SSI Q6hr coverage lispro  b. Lantus 45 units Daily  c. Lantus 7 units subcut. QHS added  for ongoing glucose 194-220  d. Minimize use of dextrose containing fluids  8. Hyperkalemia  a. Nephrology aware and managing  b. CRRT   9. Leukocytosis, possibly reactive 2/2 acute bleeding  a. Trend WBC, likely reactive  b. Continue cefepime, vanco and flagyl  c. Consider ID consult if continuing to worsen  10. Lactic acidosis resolving  a.  Trend lactate           Cardiac Gtts: Norepinephrine and Vasopressin  SBP Goal of: < 140 mmHg  MAP Goal of: > 65 mmHg  Transfusion Trigger (Hgb): <7 g/dL     Respiratory Goals: Chlorhexidine   Optimize PEEP/Ventilation/Oxygenation  Goal Tidal Volume 6 cc/kg based on IBW  Aim for lung protective ventilation  Aggressive bronchopulmonary hygiene  SPO2 Goal: > 92%  Pulmonary toilet: NA   DVT Prophylaxis (if no, list reason): SCD's or Sequential Compression Device      GI Prophylaxis: Protonix (pantoprazole)   Nutrition: No 2/2 instability  IVFs: NA  Bowel Movement: Yes  Bowel Regimen: None needed at this time     Bowden Catheter Present: Yes  Glycemic Control - Insulin: Yes  Antibiotics:Cefepime  Flagyl  Vancomycin     Pain Medications: Fentanyl  Target RASS: -3 - Moderate Sedation - Movement or eye opening to voice (no eye contact)  Sedation Medications: Propofol and Ketamine  CAM-ICU:  JUAREZ  Mobility: Poor and Bedrest  PT/OT: NA   Restraints: Soft wrist restraints  Discussed Plan of Care/Code Status: Full Code     T/L/D  Tubes: ETT and Orogastric Tube  Lines: Arterial Line and Central Line  Drains: Bowden Catheter    Subjective:   Progress Note: 4/13/2020      Reason for ICU Admission:  63 yo female with hx of obesity, endometrial cancer and diabetes who presented to [facility] with worsening hypoxic respiratory failure in lieu of close exposure to COVID-19 to family including mother ( passed away) sister and brother and tested resulting + here. She presented to the hospital 3/26 and intubated 3/28. She was started on broad spectrum antibiotics and plaquenil. Developed worsening renal failure and was transferred to us for CRRT. Her hospital coarse has been further notable for development of a retroperitoneal bleed, hemorrhagic shock, Sepsis,  And ongoing acute hypoxic respiratory failure with increasing PEEP and FIo2 requirements. Now  on Nimbex infusion.        Active Problem List:     Problem List  Date Reviewed: 1/23/2020          Codes Class    Hypotension ICD-10-CM: I95.9  ICD-9-CM: 458.9 Acute        Sepsis due to methicillin susceptible Staphylococcus aureus (MSSA) without acute organ dysfunction (HCC) ICD-10-CM: A41.01  ICD-9-CM: 038.11, 995.91 Acute        COVID-19 virus infection ICD-10-CM: U07.1         Obesity, morbid ([facility] [address] ICD-10-CM: E66.01  ICD-9-CM: 278.01         Vaginal Pap smear following hysterectomy for malignancy ICD-10-CM: Z08, Z90.710  ICD-9-CM: V67.01         Personal history of malignant neoplasm of other parts of uterus ICD-10-CM: Z85.42  ICD-9-CM: V10.42         [facility] ([facility] ICD-10-CM: C54.1  ICD-9-CM: 182.0               Past Medical History:      has a past medical history of Basal cell carcinoma, GERD (gastroesophageal reflux disease), Kidney stones, and Polyp of ureter. Past Surgical History:      has a past surgical history that includes hysteroscopy diagnostic (); pr endometrial ablation, thermal; hx  section (); and hx colonoscopy. Home Medications:     Prior to Admission medications    Medication Sig Start Date End Date Taking? Authorizing Provider   pantoprazole (PROTONIX) 40 mg tablet TAKE 1 TABLET BY MOUTH TWICE A DAY 18   Provider, Historical   esomeprazole (NEXIUM) 20 mg capsule Take 20 mg by mouth daily. Indications: BID    Provider, Historical   zolpidem (AMBIEN) 10 mg tablet Take 0.5 Tabs by mouth nightly as needed for Sleep. Max Daily Amount: 5 mg. 17   Adrienne Mandel MD   fluticasone (FLONASE) 50 mcg/actuation nasal spray Mist 1-2 spray(s) into each nostril once daily. 4/3/15   Provider, Historical   ranitidine (ZANTAC) 150 mg tablet 150 mg.    Provider, Historical       Allergies/Social/Family History: Allergies   Allergen Reactions    Augmentin [Amoxicillin-Pot Clavulanate] Rash and Itching      Social History     Tobacco Use    Smoking status: Never Smoker    Smokeless tobacco: Never Used   Substance Use Topics    Alcohol use: Not on file      Family History   Problem Relation Age of Onset    Prostate Cancer Father         PROSTATE    Breast Cancer Sister 46        invasive poorly differentiated ductal carcinoma, Neg genetic testing.     Cancer Sister 62        melanoma stage 1       Objective:   Vital Signs:  Visit Vitals  BP (!) 88/60   Pulse (!) 113   Temp 99.3 °F (37.4 °C)   Resp 28   Ht 5' 4\" (1.626 m)   Wt 136.8 kg (301 lb 9.4 oz)   SpO2 98%   BMI 51.77 kg/m²      O2 Device: Endotracheal tube, Ventilator Temp (24hrs), Av °F (36.7 °C), Min:95 °F (35 °C), Max:99.3 °F (37.4 °C) Intake/Output:     Intake/Output Summary (Last 24 hours) at 4/13/2020 1110  Last data filed at 4/13/2020 1100  Gross per 24 hour   Intake 2684.73 ml   Output 748 ml   Net 1936.73 ml       Physical Exam:    General:  Sedated, on paralytic agent and on the ventilator. No acute distress. , morbidly obese   Eyes: Sclera anicteric. Pupils equal, round, reactive to light. Eyes taped, does not arouse   Mouth/Throat: Orotracheal tube in place. Neck: Supple. Lungs:   Clear/DIM to auscultation bilaterally, vent assisted respirations, no accessory muscle use observed. Cardiovascular:  Tachy, Regular rate and rhythm, no murmur, click, rub, or gallop, generalized edema + 3, pulses palp x 4 ext. .   Abdomen:   Soft, non-tender, bowel sounds hypoactive, distended. Extremities: No cyanosis or + edema   Skin: No acute rash or lesions. Lymph Nodes: Cervical and supraclavicular normal.   Musculoskeletal:  No swelling other than edema, no deformity. Lines/Devices:  Intact, no erythema, drainage, or tenderness. Psychiatric: Sedated/paralyzed and appears comfortable on ventilator.           LABS AND  DATA: Personally reviewed  Recent Labs     04/13/20  0819 04/13/20  0245   WBC 44.0* 42.0*   HGB 7.9* 8.1*   HCT 23.3* 24.4*    129*     Recent Labs     04/13/20  0819 04/13/20  0245  04/11/20  0841    137   < > 138   K 5.6* 5.3*   < > 5.3*    105   < > 105   CO2 21 21   < > 26   BUN 52* 45*   < > 41*   CREA 2.27* 1.93*   < > 2.00*   * 215*   < > 321*   CA 8.2* 8.1*   < > 7.1*   MG  --   --   --  2.3   PHOS 4.0  --   --  4.2    < > = values in this interval not displayed.      Recent Labs     04/13/20  0819 04/12/20  0320 04/11/20  0400   SGOT  --  1,424* 1,733*   AP  --  139* 106   TP  --  5.4* 5.0*   ALB 2.9* 3.6 3.1*   GLOB  --  1.8* 1.9*     Recent Labs     04/13/20  0254 04/12/20  1530 04/12/20  1432 04/12/20  0822   INR 1.6* 1.8*  --   --    PTP 15.9* 17.5*  --   --    APTT  --   --  33.3* 31.9 Recent Labs     04/13/20  0333 04/12/20 2045   PHI 7.320* 7.351   PCO2I 39.6 36.7   PO2I 68* 98   FIO2I 40 40     No results for input(s): CPK, CKMB, TROIQ, BNPP in the last 72 hours. Ventilator Settings:  Mode Rate Tidal Volume Pressure FiO2 PEEP   Assist control   400 ml  0 cm H2O 60 % 8 cm H20     Peak airway pressure: 32 cm H2O    Minute ventilation: 11.5 l/min        MEDS: Reviewed    Chest X-Ray:  CXR Results  (Last 48 hours)    None        04/10/2020  CT chest w/ w/o:  CT Abd/ Pelvis:  IMPRESSION:  1.  Scattered pulmonary air space disease consistent with atypical viral  infection. 2.  Nonocclusive pulmonary emboli in the left lower lobe pulmonary arteries. 3.  Large right retroperitoneal hematoma with small foci of contrast within the  hematoma. 4.  Enteric tube and endotracheal tube in appropriate position. 5.  Bilateral femoral venous catheters in the common iliac veins.       Multidisciplinary Rounds Completed:  No    ABCDEF Bundle/Checklist Completed:  Yes    SPECIAL EQUIPMENT  CRRT    DISPOSITION  Stay in ICU    CRITICAL CARE CONSULTANT NOTE  I had a face to face encounter with the patient, reviewed and interpreted patient data including clinical events, labs, images, vital signs, I/O's, and examined patient. I have discussed the case and the plan and management of the patient's care with the consulting services, the bedside nurses and the respiratory therapist.        NOTE OF PERSONAL INVOLVEMENT IN CARE   This patient has a high probability of imminent, clinically significant deterioration, which requires the highest level of preparedness to intervene urgently. I participated in the decision-making and personally managed or directed the management of the following life and organ supporting interventions that required my frequent assessment to treat or prevent imminent deterioration. I personally spent 38 minutes of critical care time.   This is time spent at this critically ill patient's bedside actively involved in patient care as well as the coordination of care and discussions with the patient's family. This does not include any procedural time which has been billed separately.     Maria A Otto [facility]  4/13/2020

## 2020-04-13 NOTE — DIALYSIS
[address]       [phone]    Orders   Mode: CVVHD restarted @ 1430   Factor: 50 ml/hr   Dialysate: 3500 ml/hr   Blood Flow Rate: 200 ml/min     Metrics   BP / HR: 137/54, 112   Blood Flow Rate: 200 ml/min   AP:                         -89   RP: 80   TMP: 24   PD: 21   FP: 120   Dialysate: 3500 ml/hr     Comments / Plan:      Filter changed secondary to access issues. New R femoral CVC placed today. All possible blood (165 ml) returned by primary RN. Consents, patient, code status, labs and orders verified. +COVID-19: N95 mask, face shield, surgical cap, boot covers, gown & gloves worn. Pt sedated & intubated. Old set discarded in red bio-hazard bag. New [id number] filter set-up, primed, tested and running well at this time. R femoral CVC, dressing CDI with bio-patch dated 4/13/20. No signs of redness, drainage, or infection visualized. Each catheter limb disinfected for 60 seconds per limb with alcohol swabs. Caps removed, dialysis CVC hub scrubbed with Prevantics for 5 seconds, followed by a 5 second dry time per Hospital P&P. +asp/+flush x 2 ports with no resistance. Lines visible and connections secure with blood warmer to return line at 37*C. Education, pre and post to Janay Barajas, primary RN.

## 2020-04-13 NOTE — DIABETES MGMT
[facility]  CLINICAL NURSE SPECIALIST CONSULT  PROGRAM FOR DIABETES HEALTH  Follow up Note  Presentation   Larry Cruz is a 64 y.o. female admitted from OSH to [facility] ICU with SARS-COV2 needing CRRT. She is currently sedated and has been intubated since 3/28/20. Current clinical course has been complicated by steroid induced hyperglycemia. Steroids are discontinued at this time. She requires CRRT for acute renal failure r/t her sepsis and hypotension. PHM: GERD, obesity, and anxiety. New diabetes diagnosis with A1C 11.0% (3/28/2020); updated A1C 3/31/20-10.4%     Recent events:   Patient remains intubated and on ventilator, on CRRT. Has retroperitoneal bleed. Continues on vasopressors  for pressure support. TF on hold. Consulted by Provider for advanced diabetes nursing assessment and care, specifically related to   [] Transitioning off [place] Single   [x] Inpatient management strategy  [] Home management assessment  [] Survival skill education    Diabetes-related medical history  Acute complications  hyperglycemia  Neurological complications  NONE  Microvascular disease  NONE  Macrovascular disease  NONE  Other associated conditions     NONE    Diabetes medication history: NONE    Subjective   Per chart review, Ms. Tana Giraldo remains very ill  and is on isolation forSARS-COV2 in ICU. I am unable to do a physical assessment of the patient at this time. Patient reports the following home diabetes self-care practices: deferred    Objective     Vital Signs   Visit Vitals  /54   Pulse (!) 112   Temp 98.2 °F (36.8 °C)   Resp 28   Ht 5' 4\" (1.626 m)   Wt 136.8 kg (301 lb 9.4 oz)   SpO2 98%   BMI 51.77 kg/m²   .    Laboratory  Lab Results   Component Value Date/Time    Hemoglobin A1c 10.4 (H) 03/31/2020 03:42 PM     No results found for: LDL, LDLC, DLDLP  Lab Results   Component Value Date/Time    Creatinine 2.50 (H) 04/13/2020 01:29 PM     Lab Results   Component Value Date/Time    Sodium 135 (L) 04/13/2020 01:29 PM    Potassium 5.9 (H) 04/13/2020 01:29 PM    Chloride 107 04/13/2020 01:29 PM    CO2 20 (L) 04/13/2020 01:29 PM    Anion gap 8 04/13/2020 01:29 PM    Glucose 264 (H) 04/13/2020 01:29 PM    BUN 59 (H) 04/13/2020 01:29 PM    Creatinine 2.50 (H) 04/13/2020 01:29 PM    BUN/Creatinine ratio 24 (H) 04/13/2020 01:29 PM    GFR est AA 24 (L) 04/13/2020 01:29 PM    GFR est non-AA 20 (L) 04/13/2020 01:29 PM    Calcium 8.1 (L) 04/13/2020 01:29 PM    Bilirubin, total 2.2 (H) 04/12/2020 03:20 AM    AST (SGOT) 1,424 (H) 04/12/2020 03:20 AM    Alk. phosphatase 139 (H) 04/12/2020 03:20 AM    Protein, total 5.4 (L) 04/12/2020 03:20 AM    Albumin 2.9 (L) 04/13/2020 08:19 AM    Globulin 1.8 (L) 04/12/2020 03:20 AM    A-G Ratio 2.0 04/12/2020 03:20 AM    ALT (SGPT) 1,658 (H) 04/12/2020 03:20 AM     Lab Results   Component Value Date/Time    ALT (SGPT) 1,658 (H) 04/12/2020 03:20 AM         Evaluation   Ms Nimco Cabral, with new onset Type 2 diabetes,with A1C 10.4%. Started on solu-cortef  yesterday (150mg/day), which explains her now persistent hyperglycemia. BG has trended at 200s since increasing daily insulin to 45 units daily. Her hyperglycemia began after she coded last week. Low BG noted yesterday 65mg/dl. Her fasting BG today 120mg/dl. To maintain her basal metabolic needs she requires 27units daily. In addition she also needs 40 units of basal insulin to cover for her steroid. So total insulin needs are: 67units daily. To make it easier, split lantus into 2 doses of 30 units BID. It is imperative that we maintain her BG within target range 100-180mg/dl. Recommendations   1. INCREASE daily Lantus to cover for metabolic and steroid needs 30units twice daily (every 12 hours).     Follow the dosing schedule when tapering steroid:     20 mg Hydrocortisone: add 0.05 units/kg Lantus to total daily insulin dose  40 mg Hydrocortisone: add 0.1 units/kg Lantus to total daily insulin dose  50 mg Hydrocortisone: add 0.15 units/kg Lantus to total daily insulin dose  100 mg Hydrocortisone: add 0.3 units/kg Lantus to total daily insulin dose    2. Will continue to follow.     Assessment and Plan   Nursing Diagnosis Risk for unstable blood glucose pattern   Nursing Intervention Domain 8978 Decision-making Support   Nursing Interventions Examined current inpatient diabetes control   Explored factors facilitating and impeding inpatient management  Identified self-management practices impeding diabetes control  Explored corrective strategies with patient and responsible inpatient provider   Informed patient of rational for insulin strategy while hospitalized         Billing Code(s)     [x] [address] [facility] hospital care - [address] [place] [address]   Program for Diabetes Health  Access via KESHIA [address] [phone]

## 2020-04-13 NOTE — PROGRESS NOTES
Critical Care Update    Called and updated patient's  on patient status and plan for care tonight including continuing paralytic and respiratory support on ventilator, CRRT, blood transfusions, and vasoactive medications. Answered all questions to satisfaction at this time.     Atiya Bernal [facility]     [address] [facility]

## 2020-04-13 NOTE — PROGRESS NOTES
3:57 PM  Chart reviewed:     IFTIKHAR Plan:    1. COVID Positive    2. IFTIKHAR Plan TBD/subject to change pending recommendations   -not medically stable for discharge at this time   - patient intubated on vent    3. Palliative following for goals of care   -family wanting full measures at this time   - tentative phone meeting with patient's spouse Estephanie Serna and patient's daughter, Ivy Goncalves at 2:00 pm on Wednesday. CM will continue to follow and assist with IFTIKHAR needs as they arise.     FLORY Tariq/CRM  Care Management

## 2020-04-13 NOTE — PROGRESS NOTES
0800: Bedside shift change report given to Eduardo Gardner, RN by Kathya Miller, JUDY . Report included the following information SBAR, Kardex, Intake/Output, MAR, Accordion, Recent Results, Cardiac Rhythm Sinus Tach 110s and Alarm Parameters . 0900: NP aware K 5.6. Dr Almond Siemens aware of planned exchange for Ramon catheter. 3336: Fentanyl increased from 10mcg/hr to 200mcg/hr due to paralytic infusing. 1123: TOF 4/4 at baseline 60mA, nimbex gtt increased to 5.5mcg/kg/min. 1250: Dr Almond Siemens at bedside to [facility] catheter. 1505: CRRT ringing extremely negative pressure alarm. Attempted to reposition patient to ease CRRT access pressure without good effect. Ports switched, access pressure better.  Restarted on factor of 50, goal to slowly increase to 200 per pt tolerance according to d/w Dr Aaron Hudson this AM.

## 2020-04-14 LAB
ALBUMIN SERPL-MCNC: 2.8 G/DL (ref 3.5–5)
ALBUMIN/GLOB SERPL: 1.3 {RATIO} (ref 1.1–2.2)
ALP SERPL-CCNC: 176 U/L (ref 45–117)
ALT SERPL-CCNC: 814 U/L (ref 12–78)
ANION GAP SERPL CALC-SCNC: 10 MMOL/L (ref 5–15)
ANION GAP SERPL CALC-SCNC: 10 MMOL/L (ref 5–15)
APTT PPP: 27.2 SEC (ref 22.1–32)
APTT PPP: 27.6 SEC (ref 22.1–32)
ARTERIAL PATENCY WRIST A: ABNORMAL
AST SERPL-CCNC: 326 U/L (ref 15–37)
BACTERIA SPEC CULT: NORMAL
BASE DEFICIT BLD-SCNC: 6 MMOL/L
BDY SITE: ABNORMAL
BILIRUB DIRECT SERPL-MCNC: 1.1 MG/DL (ref 0–0.2)
BILIRUB SERPL-MCNC: 1.7 MG/DL (ref 0.2–1)
BUN SERPL-MCNC: 52 MG/DL (ref 6–20)
BUN SERPL-MCNC: 61 MG/DL (ref 6–20)
BUN/CREAT SERPL: 24 (ref 12–20)
BUN/CREAT SERPL: 26 (ref 12–20)
CA-I BLD-SCNC: 1.3 MMOL/L (ref 1.12–1.32)
CALCIUM SERPL-MCNC: 8.8 MG/DL (ref 8.5–10.1)
CALCIUM SERPL-MCNC: 9.2 MG/DL (ref 8.5–10.1)
CHLORIDE SERPL-SCNC: 104 MMOL/L (ref 97–108)
CHLORIDE SERPL-SCNC: 104 MMOL/L (ref 97–108)
CO2 SERPL-SCNC: 20 MMOL/L (ref 21–32)
CO2 SERPL-SCNC: 21 MMOL/L (ref 21–32)
CREAT SERPL-MCNC: 2.18 MG/DL (ref 0.55–1.02)
CREAT SERPL-MCNC: 2.32 MG/DL (ref 0.55–1.02)
CRP SERPL HS-MCNC: >9.5 MG/L
ERYTHROCYTE [DISTWIDTH] IN BLOOD BY AUTOMATED COUNT: 17.3 % (ref 11.5–14.5)
ERYTHROCYTE [DISTWIDTH] IN BLOOD BY AUTOMATED COUNT: 17.7 % (ref 11.5–14.5)
ERYTHROCYTE [DISTWIDTH] IN BLOOD BY AUTOMATED COUNT: 17.7 % (ref 11.5–14.5)
ERYTHROCYTE [DISTWIDTH] IN BLOOD BY AUTOMATED COUNT: 17.9 % (ref 11.5–14.5)
GAS FLOW.O2 O2 DELIVERY SYS: ABNORMAL L/MIN
GAS FLOW.O2 SETTING OXYMISER: 28 BPM
GLOBULIN SER CALC-MCNC: 2.2 G/DL (ref 2–4)
GLUCOSE BLD STRIP.AUTO-MCNC: 213 MG/DL (ref 65–100)
GLUCOSE BLD STRIP.AUTO-MCNC: 239 MG/DL (ref 65–100)
GLUCOSE BLD STRIP.AUTO-MCNC: 250 MG/DL (ref 65–100)
GLUCOSE BLD STRIP.AUTO-MCNC: 284 MG/DL (ref 65–100)
GLUCOSE SERPL-MCNC: 261 MG/DL (ref 65–100)
GLUCOSE SERPL-MCNC: 265 MG/DL (ref 65–100)
HCO3 BLD-SCNC: 21 MMOL/L (ref 22–26)
HCT VFR BLD AUTO: 21.6 % (ref 35–47)
HCT VFR BLD AUTO: 22.8 % (ref 35–47)
HCT VFR BLD AUTO: 23.7 % (ref 35–47)
HCT VFR BLD AUTO: 24.3 % (ref 35–47)
HGB BLD-MCNC: 6.9 G/DL (ref 11.5–16)
HGB BLD-MCNC: 7.3 G/DL (ref 11.5–16)
HGB BLD-MCNC: 7.7 G/DL (ref 11.5–16)
HGB BLD-MCNC: 7.9 G/DL (ref 11.5–16)
LACTATE SERPL-SCNC: 1.1 MMOL/L (ref 0.4–2)
MCH RBC QN AUTO: 29.4 PG (ref 26–34)
MCH RBC QN AUTO: 29.6 PG (ref 26–34)
MCH RBC QN AUTO: 29.8 PG (ref 26–34)
MCH RBC QN AUTO: 30.2 PG (ref 26–34)
MCHC RBC AUTO-ENTMCNC: 31.9 G/DL (ref 30–36.5)
MCHC RBC AUTO-ENTMCNC: 32 G/DL (ref 30–36.5)
MCHC RBC AUTO-ENTMCNC: 32.5 G/DL (ref 30–36.5)
MCHC RBC AUTO-ENTMCNC: 32.5 G/DL (ref 30–36.5)
MCV RBC AUTO: 91.9 FL (ref 80–99)
MCV RBC AUTO: 91.9 FL (ref 80–99)
MCV RBC AUTO: 92.7 FL (ref 80–99)
MCV RBC AUTO: 92.7 FL (ref 80–99)
NRBC # BLD: 0.25 K/UL (ref 0–0.01)
NRBC # BLD: 0.51 K/UL (ref 0–0.01)
NRBC # BLD: 0.68 K/UL (ref 0–0.01)
NRBC # BLD: 0.9 K/UL (ref 0–0.01)
NRBC BLD-RTO: 1.1 PER 100 WBC
NRBC BLD-RTO: 1.5 PER 100 WBC
NRBC BLD-RTO: 1.9 PER 100 WBC
NRBC BLD-RTO: 2.1 PER 100 WBC
O2/TOTAL GAS SETTING VFR VENT: 60 %
PCO2 BLD: 45 MMHG (ref 35–45)
PEEP RESPIRATORY: 8 CMH2O
PH BLD: 7.28 [PH] (ref 7.35–7.45)
PLATELET # BLD AUTO: 104 K/UL (ref 150–400)
PLATELET # BLD AUTO: 107 K/UL (ref 150–400)
PLATELET # BLD AUTO: 109 K/UL (ref 150–400)
PLATELET # BLD AUTO: 86 K/UL (ref 150–400)
PMV BLD AUTO: 10.1 FL (ref 8.9–12.9)
PMV BLD AUTO: 10.2 FL (ref 8.9–12.9)
PMV BLD AUTO: 10.5 FL (ref 8.9–12.9)
PMV BLD AUTO: 10.7 FL (ref 8.9–12.9)
PO2 BLD: 117 MMHG (ref 80–100)
POTASSIUM SERPL-SCNC: 4.6 MMOL/L (ref 3.5–5.1)
POTASSIUM SERPL-SCNC: 4.8 MMOL/L (ref 3.5–5.1)
PROT SERPL-MCNC: 5 G/DL (ref 6.4–8.2)
RBC # BLD AUTO: 2.33 M/UL (ref 3.8–5.2)
RBC # BLD AUTO: 2.48 M/UL (ref 3.8–5.2)
RBC # BLD AUTO: 2.58 M/UL (ref 3.8–5.2)
RBC # BLD AUTO: 2.62 M/UL (ref 3.8–5.2)
SAO2 % BLD: 98 % (ref 92–97)
SERVICE CMNT-IMP: ABNORMAL
SERVICE CMNT-IMP: NORMAL
SODIUM SERPL-SCNC: 134 MMOL/L (ref 136–145)
SODIUM SERPL-SCNC: 135 MMOL/L (ref 136–145)
SPECIMEN TYPE: ABNORMAL
THERAPEUTIC RANGE,PTTT: NORMAL SECS (ref 58–77)
THERAPEUTIC RANGE,PTTT: NORMAL SECS (ref 58–77)
TOTAL RESP. RATE, ITRR: 28
VENTILATION MODE VENT: ABNORMAL
VT SETTING VENT: 400 ML
WBC # BLD AUTO: 22.6 K/UL (ref 3.6–11)
WBC # BLD AUTO: 33.6 K/UL (ref 3.6–11)
WBC # BLD AUTO: 35.3 K/UL (ref 3.6–11)
WBC # BLD AUTO: 42.3 K/UL (ref 3.6–11)

## 2020-04-14 PROCEDURE — 65610000006 HC RM INTENSIVE CARE

## 2020-04-14 PROCEDURE — 36415 COLL VENOUS BLD VENIPUNCTURE: CPT

## 2020-04-14 PROCEDURE — 36430 TRANSFUSION BLD/BLD COMPNT: CPT

## 2020-04-14 PROCEDURE — 94003 VENT MGMT INPAT SUBQ DAY: CPT

## 2020-04-14 PROCEDURE — 74011250636 HC RX REV CODE- 250/636: Performed by: INTERNAL MEDICINE

## 2020-04-14 PROCEDURE — 85730 THROMBOPLASTIN TIME PARTIAL: CPT

## 2020-04-14 PROCEDURE — 80076 HEPATIC FUNCTION PANEL: CPT

## 2020-04-14 PROCEDURE — 74011000250 HC RX REV CODE- 250: Performed by: NURSE PRACTITIONER

## 2020-04-14 PROCEDURE — 74011000250 HC RX REV CODE- 250: Performed by: INTERNAL MEDICINE

## 2020-04-14 PROCEDURE — 80048 BASIC METABOLIC PNL TOTAL CA: CPT

## 2020-04-14 PROCEDURE — C1752 CATH,HEMODIALYSIS,SHORT-TERM: HCPCS

## 2020-04-14 PROCEDURE — 86141 C-REACTIVE PROTEIN HS: CPT

## 2020-04-14 PROCEDURE — P9016 RBC LEUKOCYTES REDUCED: HCPCS

## 2020-04-14 PROCEDURE — 77030014008 HC SPNG HEMSTAT J&J -C

## 2020-04-14 PROCEDURE — 02HV33Z INSERTION OF INFUSION DEVICE INTO SUPERIOR VENA CAVA, PERCUTANEOUS APPROACH: ICD-10-PCS | Performed by: SURGERY

## 2020-04-14 PROCEDURE — 74011250637 HC RX REV CODE- 250/637: Performed by: NURSE PRACTITIONER

## 2020-04-14 PROCEDURE — 74011636637 HC RX REV CODE- 636/637: Performed by: NURSE PRACTITIONER

## 2020-04-14 PROCEDURE — C9113 INJ PANTOPRAZOLE SODIUM, VIA: HCPCS | Performed by: NURSE PRACTITIONER

## 2020-04-14 PROCEDURE — 82962 GLUCOSE BLOOD TEST: CPT

## 2020-04-14 PROCEDURE — 74011000258 HC RX REV CODE- 258: Performed by: NURSE PRACTITIONER

## 2020-04-14 PROCEDURE — 74011000258 HC RX REV CODE- 258: Performed by: INTERNAL MEDICINE

## 2020-04-14 PROCEDURE — 74011636637 HC RX REV CODE- 636/637: Performed by: INTERNAL MEDICINE

## 2020-04-14 PROCEDURE — 90945 DIALYSIS ONE EVALUATION: CPT

## 2020-04-14 PROCEDURE — 74011250636 HC RX REV CODE- 250/636: Performed by: NURSE PRACTITIONER

## 2020-04-14 PROCEDURE — 74011000250 HC RX REV CODE- 250: Performed by: ANESTHESIOLOGY

## 2020-04-14 PROCEDURE — 83605 ASSAY OF LACTIC ACID: CPT

## 2020-04-14 PROCEDURE — 36600 WITHDRAWAL OF ARTERIAL BLOOD: CPT

## 2020-04-14 PROCEDURE — 82803 BLOOD GASES ANY COMBINATION: CPT

## 2020-04-14 PROCEDURE — 85027 COMPLETE CBC AUTOMATED: CPT

## 2020-04-14 PROCEDURE — 36593 DECLOT VASCULAR DEVICE: CPT

## 2020-04-14 RX ORDER — INSULIN GLARGINE 100 [IU]/ML
12 INJECTION, SOLUTION SUBCUTANEOUS
Status: DISCONTINUED | OUTPATIENT
Start: 2020-04-14 | End: 2020-04-15

## 2020-04-14 RX ORDER — CHLORHEXIDINE GLUCONATE 1.2 MG/ML
15 RINSE ORAL EVERY 12 HOURS
Status: DISCONTINUED | OUTPATIENT
Start: 2020-04-14 | End: 2020-04-30

## 2020-04-14 RX ORDER — SODIUM CHLORIDE 9 MG/ML
250 INJECTION, SOLUTION INTRAVENOUS AS NEEDED
Status: DISCONTINUED | OUTPATIENT
Start: 2020-04-14 | End: 2020-04-30

## 2020-04-14 RX ADMIN — INSULIN GLARGINE 12 UNITS: 100 INJECTION, SOLUTION SUBCUTANEOUS at 23:00

## 2020-04-14 RX ADMIN — INSULIN LISPRO 3 UNITS: 100 INJECTION, SOLUTION INTRAVENOUS; SUBCUTANEOUS at 18:11

## 2020-04-14 RX ADMIN — GUAIFENESIN 100 MG: 100 SOLUTION ORAL at 23:06

## 2020-04-14 RX ADMIN — CALCIUM CHLORIDE, MAGNESIUM CHLORIDE, DEXTROSE MONOHYDRATE, LACTIC ACID, SODIUM CHLORIDE, SODIUM BICARBONATE AND POTASSIUM CHLORIDE 2000 ML/HR: 5.15; 2.03; 22; 5.4; 6.46; 3.09; .157 INJECTION INTRAVENOUS at 19:13

## 2020-04-14 RX ADMIN — Medication 200 MCG/HR: at 20:11

## 2020-04-14 RX ADMIN — CASTOR OIL AND BALSAM, PERU: 788; 87 OINTMENT TOPICAL at 18:07

## 2020-04-14 RX ADMIN — CALCIUM CHLORIDE, MAGNESIUM CHLORIDE, DEXTROSE MONOHYDRATE, LACTIC ACID, SODIUM CHLORIDE, SODIUM BICARBONATE AND POTASSIUM CHLORIDE 2000 ML/HR: 5.15; 2.03; 22; 5.4; 6.46; 3.09; .157 INJECTION INTRAVENOUS at 23:00

## 2020-04-14 RX ADMIN — SODIUM CHLORIDE 20 ML: 9 INJECTION INTRAMUSCULAR; INTRAVENOUS; SUBCUTANEOUS at 05:19

## 2020-04-14 RX ADMIN — VANCOMYCIN HYDROCHLORIDE 500 MG: KIT at 05:15

## 2020-04-14 RX ADMIN — SODIUM CHLORIDE 10 ML: 9 INJECTION INTRAMUSCULAR; INTRAVENOUS; SUBCUTANEOUS at 13:53

## 2020-04-14 RX ADMIN — METRONIDAZOLE 500 MG: 500 INJECTION, SOLUTION INTRAVENOUS at 15:57

## 2020-04-14 RX ADMIN — VASOPRESSIN 0.02 UNITS/MIN: 20 INJECTION INTRAVENOUS at 12:29

## 2020-04-14 RX ADMIN — INSULIN LISPRO 2 UNITS: 100 INJECTION, SOLUTION INTRAVENOUS; SUBCUTANEOUS at 05:26

## 2020-04-14 RX ADMIN — CISATRACURIUM BESYLATE 3.5 MCG/KG/MIN: 2 INJECTION INTRAVENOUS at 22:14

## 2020-04-14 RX ADMIN — INSULIN LISPRO 3 UNITS: 100 INJECTION, SOLUTION INTRAVENOUS; SUBCUTANEOUS at 11:37

## 2020-04-14 RX ADMIN — Medication 200 MCG/HR: at 12:57

## 2020-04-14 RX ADMIN — GUAIFENESIN 100 MG: 100 SOLUTION ORAL at 18:06

## 2020-04-14 RX ADMIN — VASOPRESSIN 0.01 UNITS/MIN: 20 INJECTION INTRAVENOUS at 13:50

## 2020-04-14 RX ADMIN — GUAIFENESIN 100 MG: 100 SOLUTION ORAL at 05:10

## 2020-04-14 RX ADMIN — WATER 2 MG: 1 INJECTION INTRAMUSCULAR; INTRAVENOUS; SUBCUTANEOUS at 02:32

## 2020-04-14 RX ADMIN — CALCIUM CHLORIDE, MAGNESIUM CHLORIDE, DEXTROSE MONOHYDRATE, LACTIC ACID, SODIUM CHLORIDE, SODIUM BICARBONATE AND POTASSIUM CHLORIDE 2000 ML/HR: 5.15; 2.03; 22; 5.4; 6.46; 3.09; .157 INJECTION INTRAVENOUS at 13:49

## 2020-04-14 RX ADMIN — CALCIUM CHLORIDE, MAGNESIUM CHLORIDE, DEXTROSE MONOHYDRATE, LACTIC ACID, SODIUM CHLORIDE, SODIUM BICARBONATE AND POTASSIUM CHLORIDE 2000 ML/HR: 5.15; 2.03; 22; 5.4; 6.46; 3.09; .157 INJECTION INTRAVENOUS at 20:30

## 2020-04-14 RX ADMIN — VASOPRESSIN 0.04 UNITS/MIN: 20 INJECTION INTRAVENOUS at 04:24

## 2020-04-14 RX ADMIN — CALCIUM CHLORIDE, MAGNESIUM CHLORIDE, DEXTROSE MONOHYDRATE, LACTIC ACID, SODIUM CHLORIDE, SODIUM BICARBONATE AND POTASSIUM CHLORIDE 2000 ML/HR: 5.15; 2.03; 22; 5.4; 6.46; 3.09; .157 INJECTION INTRAVENOUS at 16:34

## 2020-04-14 RX ADMIN — SODIUM CHLORIDE 10 ML: 9 INJECTION INTRAMUSCULAR; INTRAVENOUS; SUBCUTANEOUS at 21:58

## 2020-04-14 RX ADMIN — VANCOMYCIN HYDROCHLORIDE 500 MG: KIT at 11:45

## 2020-04-14 RX ADMIN — CHLORHEXIDINE GLUCONATE 15 ML: 0.12 RINSE ORAL at 08:27

## 2020-04-14 RX ADMIN — SODIUM CHLORIDE 40 MG: 9 INJECTION INTRAMUSCULAR; INTRAVENOUS; SUBCUTANEOUS at 08:26

## 2020-04-14 RX ADMIN — METRONIDAZOLE 500 MG: 500 INJECTION, SOLUTION INTRAVENOUS at 08:26

## 2020-04-14 RX ADMIN — HYDROCORTISONE SODIUM SUCCINATE 50 MG: 100 INJECTION, POWDER, FOR SOLUTION INTRAMUSCULAR; INTRAVENOUS at 11:43

## 2020-04-14 RX ADMIN — MINERAL OIL AND WHITE PETROLATUM: 150; 830 OINTMENT OPHTHALMIC at 21:57

## 2020-04-14 RX ADMIN — VANCOMYCIN HYDROCHLORIDE 500 MG: KIT at 19:11

## 2020-04-14 RX ADMIN — CALCIUM CHLORIDE, MAGNESIUM CHLORIDE, DEXTROSE MONOHYDRATE, LACTIC ACID, SODIUM CHLORIDE, SODIUM BICARBONATE AND POTASSIUM CHLORIDE 2000 ML/HR: 5.15; 2.03; 22; 5.4; 6.46; 3.09; .157 INJECTION INTRAVENOUS at 12:32

## 2020-04-14 RX ADMIN — INSULIN GLARGINE 45 UNITS: 100 INJECTION, SOLUTION SUBCUTANEOUS at 08:27

## 2020-04-14 RX ADMIN — CHLORHEXIDINE GLUCONATE 15 ML: 1.2 RINSE ORAL at 21:58

## 2020-04-14 RX ADMIN — METRONIDAZOLE 500 MG: 500 INJECTION, SOLUTION INTRAVENOUS at 23:06

## 2020-04-14 RX ADMIN — CASTOR OIL AND BALSAM, PERU: 788; 87 OINTMENT TOPICAL at 08:26

## 2020-04-14 RX ADMIN — WATER 2 MG: 1 INJECTION INTRAMUSCULAR; INTRAVENOUS; SUBCUTANEOUS at 02:33

## 2020-04-14 RX ADMIN — MINERAL OIL AND WHITE PETROLATUM: 150; 830 OINTMENT OPHTHALMIC at 08:26

## 2020-04-14 RX ADMIN — HYDROCORTISONE SODIUM SUCCINATE 50 MG: 100 INJECTION, POWDER, FOR SOLUTION INTRAMUSCULAR; INTRAVENOUS at 23:15

## 2020-04-14 RX ADMIN — KETAMINE HYDROCHLORIDE 0.4 MG/KG/HR: 50 INJECTION, SOLUTION INTRAMUSCULAR; INTRAVENOUS at 10:54

## 2020-04-14 RX ADMIN — CEFEPIME HYDROCHLORIDE 2 G: 2 INJECTION, POWDER, FOR SOLUTION INTRAVENOUS at 20:13

## 2020-04-14 RX ADMIN — CALCIUM CHLORIDE, MAGNESIUM CHLORIDE, DEXTROSE MONOHYDRATE, LACTIC ACID, SODIUM CHLORIDE, SODIUM BICARBONATE AND POTASSIUM CHLORIDE 2000 ML/HR: 5.15; 2.03; 22; 5.4; 6.46; 3.09; .157 INJECTION INTRAVENOUS at 21:54

## 2020-04-14 RX ADMIN — SODIUM CHLORIDE 5 ML/HR: 900 INJECTION, SOLUTION INTRAVENOUS at 01:27

## 2020-04-14 RX ADMIN — CEFEPIME HYDROCHLORIDE 2 G: 2 INJECTION, POWDER, FOR SOLUTION INTRAVENOUS at 08:26

## 2020-04-14 RX ADMIN — CISATRACURIUM BESYLATE 5.5 MCG/KG/MIN: 2 INJECTION INTRAVENOUS at 08:43

## 2020-04-14 RX ADMIN — CALCIUM CHLORIDE, MAGNESIUM CHLORIDE, DEXTROSE MONOHYDRATE, LACTIC ACID, SODIUM CHLORIDE, SODIUM BICARBONATE AND POTASSIUM CHLORIDE 2000 ML/HR: 5.15; 2.03; 22; 5.4; 6.46; 3.09; .157 INJECTION INTRAVENOUS at 15:06

## 2020-04-14 RX ADMIN — GUAIFENESIN 100 MG: 100 SOLUTION ORAL at 11:43

## 2020-04-14 RX ADMIN — HYDROCORTISONE SODIUM SUCCINATE 50 MG: 100 INJECTION, POWDER, FOR SOLUTION INTRAMUSCULAR; INTRAVENOUS at 05:11

## 2020-04-14 RX ADMIN — HYDROCORTISONE SODIUM SUCCINATE 50 MG: 100 INJECTION, POWDER, FOR SOLUTION INTRAMUSCULAR; INTRAVENOUS at 18:06

## 2020-04-14 RX ADMIN — Medication 200 MCG/HR: at 05:29

## 2020-04-14 RX ADMIN — KETAMINE HYDROCHLORIDE 0.4 MG/KG/HR: 50 INJECTION, SOLUTION INTRAMUSCULAR; INTRAVENOUS at 22:18

## 2020-04-14 RX ADMIN — CALCIUM CHLORIDE, MAGNESIUM CHLORIDE, DEXTROSE MONOHYDRATE, LACTIC ACID, SODIUM CHLORIDE, SODIUM BICARBONATE AND POTASSIUM CHLORIDE 2000 ML/HR: 5.15; 2.03; 22; 5.4; 6.46; 3.09; .157 INJECTION INTRAVENOUS at 18:10

## 2020-04-14 NOTE — PROGRESS NOTES
[facility]    ICU TEAM Progress Note    Name: Axel Whitaker   : 1960   MRN: [id number]   Date: 2020      Assessment/Plan:     ICU Problems:  2020  1. Acute hypoxic respiratory failure 2/2 COVID 19    A. Completed Plaquenil and Zinc coarse  1. Retroperitoneal Bleed  2. Hemorrhagic Shock 2/2 acute blood loss  a. CT Abd/ Pelvis done 04/10  b. Q6hr H/H   c. Keep Hgb > 7, transfuse as needed  d. Check coags PRN  e. Hold AC meds  3. Acute kidney Injury  a. Nephrology following  b. CRRT on hold, line clotted, will replace today and resume  c. Trend lytes, K is increasing with not CRRT  d. Goal NET EVEN, 30cm femoral melissa placed over wire to right femoral vein 2020  4. Non occlusive PE in LLL pulmonary arteries  a. Unable to [facility] 2/2 retroperitoneal hemorrhage  5. MRSA Pneumonia  a. Continue vancomycin  6. Acute anemia of critical illness, possible 2/2 renal dysfunction  a. Transfuse for Hgb < 7  7. Diabetes Mellitus, uncontrolled  a. SSI Q6hr coverage lispro  b. Lantus 45 units Daily  c. Lantus 12 units subcut. QHS added  for ongoing glucose >200  d. Minimize use of dextrose containing fluids  8. Hyperkalemia  a. Nephrology aware and managing  b. CRRT   9. Leukocytosis, possibly reactive 2/2 acute bleeding  a. Trend WBC, likely reactive  b. Continue cefepime, vanco and flagyl  c. Consider ID consult if continuing to worsen  10. Lactic acidosis resolving  a.  Trend lactate           Cardiac Gtts: Norepinephrine and Vasopressin  SBP Goal of: < 140 mmHg  MAP Goal of: > 65 mmHg  Transfusion Trigger (Hgb): <7 g/dL     Respiratory Goals: Chlorhexidine   Optimize PEEP/Ventilation/Oxygenation  Goal Tidal Volume 6 cc/kg based on IBW  Aim for lung protective ventilation  Aggressive bronchopulmonary hygiene  SPO2 Goal: > 92%  Pulmonary toilet: NA   DVT Prophylaxis (if no, list reason): SCD's or Sequential Compression Device      GI Prophylaxis: Protonix (pantoprazole)   Nutrition: No 2/2 instability  IVFs: NA  Bowel Movement: Yes  Bowel Regimen: None needed at this time     Bowden Catheter Present: Yes  Glycemic Control - Insulin: Yes  Antibiotics:Cefepime  Flagyl  Vancomycin     Pain Medications: Fentanyl  Target RASS: -3 - Moderate Sedation - Movement or eye opening to voice (no eye contact)  Sedation Medications: Propofol and Ketamine  CAM-ICU:  JUAREZ  Mobility: Poor and Bedrest  PT/OT: NA   Restraints: Soft wrist restraints  Discussed Plan of Care/Code Status: Full Code     T/L/D  Tubes: ETT and Orogastric Tube  Lines: Arterial Line and Central Line  Drains: Bowden Catheter    Subjective:   Progress Note: 4/14/2020      Reason for ICU Admission:  63 yo female with hx of obesity, endometrial cancer and diabetes who presented to [facility] with worsening hypoxic respiratory failure in lieu of close exposure to COVID-19 to family including mother ( passed away) sister and brother and tested resulting + here. She presented to the hospital 3/26 and intubated 3/28. She was started on broad spectrum antibiotics and plaquenil. Developed worsening renal failure and was transferred to us for CRRT. Her hospital coarse has been further notable for development of a retroperitoneal bleed, hemorrhagic shock, Sepsis,  And ongoing acute hypoxic respiratory failure with increasing PEEP and FIo2 requirements. Now  on Nimbex infusion.        Active Problem List:     Problem List  Date Reviewed: 1/23/2020          Codes Class    Hypotension ICD-10-CM: I95.9  ICD-9-CM: 458.9 Acute        Sepsis due to methicillin susceptible Staphylococcus aureus (MSSA) without acute organ dysfunction (HCC) ICD-10-CM: A41.01  ICD-9-CM: 038.11, 995.91 Acute        COVID-19 virus infection ICD-10-CM: U07.1         Obesity, morbid ([facility] [address] [facility] ICD-10-CM: E66.01  ICD-9-CM: 278.01         Vaginal Pap smear following hysterectomy for malignancy ICD-10-CM: Z08, Z90.710  ICD-9-CM: V67.01         Personal history of malignant neoplasm of other parts of uterus ICD-10-CM: Z85.42  ICD-9-CM: V10.42         [facility] ([facility] ICD-10-CM: C54.1  ICD-9-CM: 182.0               Past Medical History:      has a past medical history of Basal cell carcinoma, GERD (gastroesophageal reflux disease), Kidney stones, and Polyp of ureter. Past Surgical History:      has a past surgical history that includes hysteroscopy diagnostic (); pr endometrial ablation, thermal; hx  section (); and hx colonoscopy. Home Medications:     Prior to Admission medications    Medication Sig Start Date End Date Taking? Authorizing Provider   pantoprazole (PROTONIX) 40 mg tablet TAKE 1 TABLET BY MOUTH TWICE A DAY 18   Provider, Historical   esomeprazole (NEXIUM) 20 mg capsule Take 20 mg by mouth daily. Indications: BID    Provider, Historical   zolpidem (AMBIEN) 10 mg tablet Take 0.5 Tabs by mouth nightly as needed for Sleep. Max Daily Amount: 5 mg. 17   Alda Mandel MD   fluticasone (FLONASE) 50 mcg/actuation nasal spray Mist 1-2 spray(s) into each nostril once daily. 4/3/15   Provider, Historical   ranitidine (ZANTAC) 150 mg tablet 150 mg.    Provider, Historical       Allergies/Social/Family History: Allergies   Allergen Reactions    Augmentin [Amoxicillin-Pot Clavulanate] Rash and Itching      Social History     Tobacco Use    Smoking status: Never Smoker    Smokeless tobacco: Never Used   Substance Use Topics    Alcohol use: Not on file      Family History   Problem Relation Age of Onset    Prostate Cancer Father         PROSTATE    Breast Cancer Sister 46        invasive poorly differentiated ductal carcinoma, Neg genetic testing.     Cancer Sister 62        melanoma stage 1       Objective:   Vital Signs:  Visit Vitals  /87   Pulse 76   Temp 98.5 °F (36.9 °C)   Resp 28   Ht 5' 4\" (1.626 m)   Wt 133.6 kg (294 lb 8.6 oz)   SpO2 98%   BMI 50.56 kg/m²      O2 Device: Endotracheal tube, Ventilator Temp (24hrs), Av.7 °F (36.5 °C), Min:95.5 °F (35.3 °C), Max:98.8 °F (37.1 °C)           Intake/Output:     Intake/Output Summary (Last 24 hours) at 4/14/2020 1559  Last data filed at 4/14/2020 1500  Gross per 24 hour   Intake 2792.72 ml   Output 2871 ml   Net -78.28 ml       Physical Exam:    General:  Sedated, on paralytic agent and on the ventilator. No acute distress. , morbidly obese   Eyes: Sclera anicteric. Pupils equal, round, reactive to light. Eyes taped, does not arouse   Mouth/Throat: Orotracheal tube in place. Neck: Supple. Lungs:   Clear/DIM to auscultation bilaterally, vent assisted respirations, no accessory muscle use observed. Cardiovascular:  Tachy, Regular rate and rhythm, no murmur, click, rub, or gallop, generalized edema + 3, pulses palp x 4 ext. .   Abdomen:   Soft, non-tender, bowel sounds hypoactive, distended. Extremities: No cyanosis or + edema   Skin: No acute rash or lesions. Lymph Nodes: Cervical and supraclavicular normal.   Musculoskeletal:  No swelling other than edema, no deformity. Lines/Devices:  Intact, no erythema, drainage, or tenderness. Psychiatric: Sedated/paralyzed and appears comfortable on ventilator.           LABS AND  DATA: Personally reviewed  Recent Labs     04/14/20  1236 04/14/20  0342   WBC 35.3* 42.3*   HGB 7.9* 6.9*   HCT 24.3* 21.6*   * 107*     Recent Labs     04/14/20  1236 04/14/20  0342  04/13/20  0819   * 134*   < > 136   K 4.6 4.8   < > 5.6*    104   < > 107   CO2 21 20*   < > 21   BUN 61* 52*   < > 52*   CREA 2.32* 2.18*   < > 2.27*   * 261*   < > 234*   CA 9.2 8.8   < > 8.2*   PHOS  --   --   --  4.0    < > = values in this interval not displayed.      Recent Labs     04/14/20  0400 04/13/20  0819   SGOT 326* 516*   * 183*   TP 5.0* 5.1*   ALB 2.8* 3.0*  2.9*   GLOB 2.2 2.1     Recent Labs     04/14/20  1236 04/13/20  0254 04/12/20  1530 04/12/20  1432   INR  --  1.6* 1.8*  --    PTP  --  15.9* 17.5*  --    APTT 27.2  --   --  33.3* Recent Labs     04/14/20  0348 04/13/20  0333   PHI 7.277* 7.320*   PCO2I 45.0 39.6   PO2I 117* 68*   FIO2I 60 40     No results for input(s): CPK, CKMB, TROIQ, BNPP in the last 72 hours. Ventilator Settings:  Mode Rate Tidal Volume Pressure FiO2 PEEP   Assist control   430 ml  0 cm H2O 60 % 8 cm H20     Peak airway pressure: 35 cm H2O    Minute ventilation: 12.4 l/min        MEDS: Reviewed    Chest X-Ray:  CXR Results  (Last 48 hours)    None        04/10/2020  CT chest w/ w/o:  CT Abd/ Pelvis:  IMPRESSION:  1.  Scattered pulmonary air space disease consistent with atypical viral  infection. 2.  Nonocclusive pulmonary emboli in the left lower lobe pulmonary arteries. 3.  Large right retroperitoneal hematoma with small foci of contrast within the  hematoma. 4.  Enteric tube and endotracheal tube in appropriate position. 5.  Bilateral femoral venous catheters in the common iliac veins.       Multidisciplinary Rounds Completed:  No    ABCDEF Bundle/Checklist Completed:  Yes    SPECIAL EQUIPMENT  CRRT    DISPOSITION  Stay in ICU    CRITICAL CARE CONSULTANT NOTE  I had a face to face encounter with the patient, reviewed and interpreted patient data including clinical events, labs, images, vital signs, I/O's, and examined patient. I have discussed the case and the plan and management of the patient's care with the consulting services, the bedside nurses and the respiratory therapist.        NOTE OF PERSONAL INVOLVEMENT IN CARE   This patient has a high probability of imminent, clinically significant deterioration, which requires the highest level of preparedness to intervene urgently. I participated in the decision-making and personally managed or directed the management of the following life and organ supporting interventions that required my frequent assessment to treat or prevent imminent deterioration. I personally spent 45 minutes of critical care time.   This is time spent at this critically ill patient's bedside actively involved in patient care as well as the coordination of care and discussions with the patient's family. This does not include any procedural time which has been billed separately.     [facility]  4/14/2020

## 2020-04-14 NOTE — PROGRESS NOTES
0730: Bedside shift change report given to Saira Sinclair RN (oncoming nurse) by Yo Underwood (offgoing nurse). Report included the following information SBAR, Kardex, Intake/Output, MAR, Accordion, Recent Results, Med Rec Status and Cardiac Rhythm SR. Drips: Nimbex 5.5mcg/kg/min, Fentanyl 200mcg/hr, Ketamine 0.4mg/kg/hr, Levo 25mcg/min, vasopressin 0.04units/min  Vent: , AC 28, Peep 8, FiO2 60%     0800: Assumed care of patient. CVVHD currently off due to access issues. MD and Giselle Galvez aware. 0915:  Dr. Avery Rose at bedside. MD updated on pt status. Dr. Shelton Lab to request new melissa placement by intensivist.    0930:  Casimer Hallmark placed in R femoral.    1130:  Giselle Galvez RN at bedside. CVVHD restarted. 1200:  R Art line does not draw back blood and is not correlating with the blood pressure cuff. Respiratory at bedside. John Lewis NP notified    [phone]:  John Lewis NP at bedside. New L art line placed. Per Np, no BP and blood draws on L arm due to long arterial line catheter. 1930: Bedside shift change report given to Maria A KIM (oncoming nurse) by Jayce Vides and Ad[facility] (offgoing nurse). Report included the following information SBAR, Kardex, Intake/Output, MAR, Accordion, Recent Results and Cardiac Rhythm SR. Shift summary:  Vasopressin and Levophed titrated off. Pt maintaining MAP goal >65. Nimbex titrated to 3.5mcg/kg/min, 1/4 twitch on 60mHz. Fentanyl at 200mcg/hr. New R femoral melissa and new L radial art line. Patient tolerating CVVHD, current factor 200. Skin assessment: Primary Nurse Joe Singh RN and Julia Leslie RN performed a dual skin assessment on this patient Impairment noted- see wound doc flow sheet, Aniket score is 10. Patient's  updated on patient status via telephone.

## 2020-04-14 NOTE — DIALYSIS
[address] [place]          [phone]         Orders   Mode: CVVHD restarted @ 0100   Factor: 50 ml/hr   Dialysate: 3500 ml/hr   Blood Flow Rate: 200 ml/min          Metrics   BP / HR: 103/68   94   Blood Flow Rate: 160 ml/min   AP:                         -125   RP: 71   TMP: 28   PD: 10   FP: 114   Dialysate: 3500 ml/hr      Comments / Plan:      Filter changed secondary to access issues. All possible blood (165 ml) returned by primary RN. Consents, patient, code status, labs and orders verified. +COVID-19: N95 mask, face shield, surgical cap, boot covers, gown & gloves worn. Pt sedated & intubated. Old set discarded in red bio-hazard bag. New [id number] filter set-up, primed, tested and running well at this time. R femoral CVC, dressing CDI with bio-patch dated 4/13/20. No signs of redness, drainage, or infection visualized. Each catheter limb disinfected for 60 seconds per limb with alcohol swabs. Caps removed, dialysis CVC hub scrubbed with Prevantics for 5 seconds, followed by a 5 second dry time per Hospital P&P. +asp/+flush x 2 ports with no resistance. Lines visible and connections secure with blood warmer to return line at 37*C. Education, pre and post to primary RN.

## 2020-04-14 NOTE — PROCEDURES
[facility]      Procedure Note - Central Venous Access:   Performed by MD Johanne Rivera      Immediately prior to the procedure, the patient was reevaluated and found suitable for the planned procedure and any planned medications. Immediately prior to the procedure a time out was called to verify the correct patient, procedure, equipment, staff, and marking as appropriate. The site was prepped with chlorhexidine along with the Ramon catheter which was in place. The sutures holding the catheter in place were removed using scalpel. The patient was prepped and draped in usual sterile manner. The Ramon catheter was backed out of while held with sterile gauze. Once the sterile portion of the catheter from underneath the skin was exposed, I changed my gloves. The external portion of the Ramon catheter was then cut using sterile scissors. Using Seldinger technique a Ramon catheter 14 [place] 30 cm long was placed in the right femoral vein. This was done via direct cannulation with single  attempt for acute kidney failure. Ultrasound Guidance was not utilized. There was good blood return. The following complications were encountered: None  A follow-up chest x-ray was not ordered post procedure. The procedure was tolerated continues to be critically ill in the intensive care unit.

## 2020-04-14 NOTE — PROGRESS NOTES
[facility]      Procedure Note - Arterial Access:   Performed by Gianna Veliz NP , Dr. Sofia Emerson    Immediately prior to the procedure, the patient was reevaluated and found suitable for the planned procedure and any planned medications. Immediately prior to the procedure a time out was called to verify the correct patient, procedure, equipment, staff, and marking as appropriate. Insertion Date: 04/14/20Time:1532  Procedure Location:  ICU. Condition: Emergency. Consent:  NO.     Method: Seldinger technique. Site Prep: ChloraPrep and Sterile draping. Procedure: Arterial Catheter Insertion in Left, Radial Artery   Catheter inserted into a new site. Number of Attempts:  1 Indication: Monitoring and Blood Drawing. Complication None. Performed By:was directly supervised by Dr. Sofia Emerson. The procedure was tolerated well.     Yeyo Youssef [facility]     Critical Care Medicine  [facility]

## 2020-04-14 NOTE — DIALYSIS
[address]       [phone]    Orders   Mode: CVVHD   Factor: AS TOLERATED. INITIATING AT 50ML/HR   DIALYSATE: 3500ML/HR   Blood Flow Rate: 200ML/MIN     Metrics   BP / HR: BP:122/69 HR:92   Blood Flow Rate: 200ML/MIN   AP:                         -104   RP: 78   TMP: 36   PD: 18   FP: 127   DIALYSATE: 3500ML/HR     Comments / Plan:      FILTER CHANGED AFTER NEW DIALYSIS CVC PLACEMENT. RIGHT FEMORAL CVC, DRESSING AND BIOPATCH CLEAN, DRY, AND INTACT DATED 04/14, +ASPIRATION +FLUSH X BOTH LIMBS, LINES SECURE, BLOOD WARMER TO RETURN LINE AT 37 DEGREES C. ORDERS AND CONSENT VERIFIED. PRE/POST REPORT TO TARI DODSON RN. NEW FILTER PRIMED, TESTED, AND RUNNING WELL.

## 2020-04-14 NOTE — PROGRESS NOTES
[facility]   [address] [facility], [facility] [address], [facility]  Phone: [phone]   [phone]       Nephrology Progress Note  Mery Dominguez     1960     [id number]  Date of Admission : 3/31/2020  04/14/20    CC: Follow up for JUANCHO      Assessment and Plan   JUANCHO :  - 2/2 ATN  - resume CVVHD after line change  - factor as tolerated  - daily labs    Hyperkalemia:  - 2K dialysate    COVID-19 +ve   Acute Hypoxic Resp Failure   - On Vent   - completed Plaquenil. On Vit C, Zinc   - s/p Tocilizumab      Leukocytosis:  - cultures pending  - on cefepime    RP hematoma:  - hgb 6.9  - getting PRBCs today    Cardiac arrest 4/9:  - per ICU team    Type II DM   - Insulin per primary team      Hypothermia   Morbid Obesity      Care Plan discussed with:  ICU team and RN     Interval History:  Elijah Vargas changed yesterday, R femoral site. Nonfunctional.  CC attending to change today to longer catheter. Remains on pressors. Getting PRBCs now. PT NOT EXAMINED in line with ASN and RPA GUIDELINES ON MANAGING COVID-19 PTS WITH RENAL ISSUES. Examination findings discussed personally with the examining Physician team member      Review of Systems: Review of systems not obtained due to patient factors.     Current Medications:   Current Facility-Administered Medications   Medication Dose Route Frequency    0.9% sodium chloride infusion 250 mL  250 mL IntraVENous PRN    cisatracurium (NIMBEX) 2 mg/mL IV infusion  0.5-10 mcg/kg/min (Ideal) IntraVENous TITRATE    bicarbonate dialysis (PRISMASOL) BG K 2/Ca 3.5 5000 ml solution   Extracorporeal DIALYSIS CONTINUOUS    insulin glargine (LANTUS) injection 7 Units  7 Units SubCUTAneous QHS    hydrocortisone Sod Succ (PF) (SOLU-CORTEF) injection 50 mg  50 mg IntraVENous Q6H    cefepime (MAXIPIME) 2 g in 0.9% sodium chloride (MBP/ADV) 100 mL  2 g IntraVENous Q12H    metroNIDAZOLE (FLAGYL) IVPB premix 500 mg  500 mg IntraVENous Q8H    vancomycin (FIRVANQ) 50 mg/mL oral solution 500 mg  500 mg Oral Q6H    insulin glargine (LANTUS) injection 45 Units  45 Units SubCUTAneous DAILY    vasopressin (VASOSTRICT) 20 Units in 0.9% sodium chloride 100 mL infusion  0-0.04 Units/min IntraVENous TITRATE    fentaNYL (PF) 1,500 mcg/30 mL (50 mcg/mL) infusion  0-200 mcg/hr IntraVENous TITRATE    ketamine (KETALAR) 500 mg in 0.9% sodium chloride 500 mL infusion  0.05-0.4 mg/kg/hr IntraVENous TITRATE    NOREPINephrine (LEVOPHED) 32,000 mcg in dextrose 5% 250 mL (128 mcg/mL) infusion  0-50 mcg/min IntraVENous TITRATE    pantoprazole (PROTONIX) 40 mg in 0.9% sodium chloride 10 mL injection  40 mg IntraVENous Q12H    [Held by provider] labetaloL (NORMODYNE) tablet 100 mg  100 mg Oral Q12H    0.9% sodium chloride infusion  3 mL/hr IntraVENous CONTINUOUS    0.9% sodium chloride infusion  5 mL/hr IntraVENous CONTINUOUS    labetaloL (NORMODYNE;TRANDATE) injection 10 mg  10 mg IntraVENous Q10MIN PRN    fentaNYL citrate (PF) injection  mcg   mcg IntraVENous Q1H PRN    white petrolatum-mineral oiL (AKWA TEARS) 83-15 % ophthalmic ointment   Both Eyes Q12H    heparin (porcine) pf 300 Units  300 Units InterCATHeter PRN    guaiFENesin (ROBITUSSIN) 100 mg/5 mL oral liquid 100 mg  100 mg Oral Q6H    alteplase (CATHFLO) 2 mg in sterile water (preservative free) 2 mL injection  2 mg InterCATHeter PRN    alteplase (CATHFLO) 2 mg in sterile water (preservative free) 2 mL injection  2 mg InterCATHeter PRN    midazolam (VERSED) injection 1-2 mg  1-2 mg IntraVENous Q2H PRN    bacitracin 500 unit/gram packet 1 Packet  1 Packet Topical PRN    balsam peru-castor oiL (VENELEX) ointment   Topical BID    sodium chloride (NS) flush 5-40 mL  5-40 mL IntraVENous Q8H    sodium chloride (NS) flush 5-40 mL  5-40 mL IntraVENous PRN    HYDROcodone-acetaminophen (NORCO) 5-325 mg per tablet 1 Tab  1 Tab Oral Q4H PRN    ondansetron (ZOFRAN) injection 4 mg  4 mg IntraVENous Q4H PRN    chlorhexidine (ORAL CARE KIT) 0.12 % mouthwash 15 mL  15 mL Oral Q12H    acetaminophen (TYLENOL) tablet 650 mg  650 mg Oral Q6H PRN    Or    acetaminophen (TYLENOL) suppository 650 mg  650 mg Rectal Q6H PRN    glucose chewable tablet 16 g  4 Tab Oral PRN    glucagon (GLUCAGEN) injection 1 mg  1 mg IntraMUSCular PRN    dextrose 10% infusion 0-250 mL  0-250 mL IntraVENous PRN    insulin lispro (HUMALOG) injection   SubCUTAneous Q6H      Allergies   Allergen Reactions    Augmentin [Amoxicillin-Pot Clavulanate] Rash and Itching       Objective:  Vitals:    Vitals:    04/14/20 0830 04/14/20 0845 04/14/20 0900 04/14/20 0915   BP:   113/79    Pulse: 95 98 96 98   Resp: 28 28 28 28   Temp:       SpO2: 99% 99% 99% 100%   Weight:       Height:         Intake and Output:  No intake/output data recorded. 04/12 1901 - 04/14 0700  In: 4256.2 [I.V.:3556.2]  Out: 2245 [Drains:200]    Physical Examination:   Pt intubated    Yes  General: Paralyzed on the vent  Resp:  On vent   CV:  RRR  GI:  Obese   Neurologic:  Sedated   Ext                   R femoral melissa     []    High complexity decision making was performed  []    Patient is at high-risk of decompensation with multiple organ involvement    Lab Data Personally Reviewed: I have reviewed all the pertinent labs, microbiology data and radiology studies during assessment.     Recent Labs     04/14/20  0400 04/14/20  0342 04/13/20  2207 04/13/20  1329 04/13/20  0819 04/13/20  0254 04/13/20  0245  04/12/20  1530  04/12/20  0320   NA  --  134* 135* 135* 136  --  137   < >  --    < > 137   K  --  4.8 4.7 5.9* 5.6*  --  5.3*   < >  --    < > 5.8*   CL  --  104 104 107 107  --  105   < >  --    < > 105   CO2  --  20* 20* 20* 21  --  21   < >  --    < > 23   GLU  --  261* 244* 264* 234*  --  215*   < >  --    < > 220*   BUN  --  52* 50* 59* 52*  --  45*   < >  --    < > 33*   CREA  --  2.18* 2.01* 2.50* 2.27*  --  1.93*   < >  --    < > 1.58*   CA  --  8.8 8.8 8.1* 8.2*  --  8.1* < >  --    < > 7.8*   PHOS  --   --   --   --  4.0  --   --   --   --   --   --    ALB 2.8*  --   --   --  3.0*  2.9*  --   --   --   --   --  3.6   SGOT 326*  --   --   --  516*  --   --   --   --   --  1,424*   *  --   --   --  990*  --   --   --   --   --  1,658*   INR  --   --   --   --   --  1.6*  --   --  1.8*  --   --     < > = values in this interval not displayed.      Recent Labs     04/14/20  0342 04/13/20  2207 04/13/20  1329 04/13/20  0819 04/13/20  0245   WBC 42.3* 47.8* 46.8* 44.0* 42.0*   HGB 6.9* 7.5* 7.4* 7.9* 8.1*   HCT 21.6* 22.7* 22.6* 23.3* 24.4*   * 113* 162 157 129*     No results found for: SDES  Lab Results   Component Value Date/Time    Culture result: NO GROWTH 5 DAYS 04/09/2020 02:32 PM    Culture result: NO GROWTH 5 DAYS 04/08/2020 10:36 AM    Culture result: NO GROWTH 5 DAYS 03/31/2020 11:15 AM    Culture result: MODERATE STAPHYLOCOCCUS AUREUS (A) 03/31/2020 10:34 AM    Culture result: LIGHT NORMAL RESPIRATORY SOFIE 03/31/2020 10:34 AM     Recent Results (from the past 24 hour(s))   GLUCOSE, POC    Collection Time: 04/13/20 11:10 AM   Result Value Ref Range    Glucose (POC) 256 (H) 65 - 100 mg/dL    Performed by Delilah Conde    CBC W/O DIFF    Collection Time: 04/13/20  1:29 PM   Result Value Ref Range    WBC 46.8 (H) 3.6 - 11.0 K/uL    RBC 2.44 (L) 3.80 - 5.20 M/uL    HGB 7.4 (L) 11.5 - 16.0 g/dL    HCT 22.6 (L) 35.0 - 47.0 %    MCV 92.6 80.0 - 99.0 FL    MCH 30.3 26.0 - 34.0 PG    MCHC 32.7 30.0 - 36.5 g/dL    RDW 17.2 (H) 11.5 - 14.5 %    PLATELET 732 227 - 344 K/uL    MPV 10.8 8.9 - 12.9 FL    NRBC 3.1 (H) 0  WBC    ABSOLUTE NRBC 1.46 (H) 0.00 - 1.07 K/uL   METABOLIC PANEL, BASIC    Collection Time: 04/13/20  1:29 PM   Result Value Ref Range    Sodium 135 (L) 136 - 145 mmol/L    Potassium 5.9 (H) 3.5 - 5.1 mmol/L    Chloride 107 97 - 108 mmol/L    CO2 20 (L) 21 - 32 mmol/L    Anion gap 8 5 - 15 mmol/L    Glucose 264 (H) 65 - 100 mg/dL    BUN 59 (H) 6 - 20 MG/DL    Creatinine 2.50 (H) 0.55 - 1.02 MG/DL    BUN/Creatinine ratio 24 (H) 12 - 20      GFR est AA 24 (L) >60 ml/min/1.73m2    GFR est non-AA 20 (L) >60 ml/min/1.73m2    Calcium 8.1 (L) 8.5 - 10.1 MG/DL   VANCOMYCIN, TROUGH    Collection Time: 04/13/20  1:29 PM   Result Value Ref Range    Vancomycin,trough 25.5 (HH) 5.0 - 10.0 ug/mL    Reported dose date: NOT PROVIDED      Reported dose time: NOT PROVIDED      Reported dose: NOT PROVIDED UNITS   GLUCOSE, POC    Collection Time: 04/13/20  5:57 PM   Result Value Ref Range    Glucose (POC) 211 (H) 65 - 100 mg/dL    Performed by Delilah Conde    METABOLIC PANEL, BASIC    Collection Time: 04/13/20 10:07 PM   Result Value Ref Range    Sodium 135 (L) 136 - 145 mmol/L    Potassium 4.7 3.5 - 5.1 mmol/L    Chloride 104 97 - 108 mmol/L    CO2 20 (L) 21 - 32 mmol/L    Anion gap 11 5 - 15 mmol/L    Glucose 244 (H) 65 - 100 mg/dL    BUN 50 (H) 6 - 20 MG/DL    Creatinine 2.01 (H) 0.55 - 1.02 MG/DL    BUN/Creatinine ratio 25 (H) 12 - 20      GFR est AA 31 (L) >60 ml/min/1.73m2    GFR est non-AA 26 (L) >60 ml/min/1.73m2    Calcium 8.8 8.5 - 10.1 MG/DL   CBC W/O DIFF    Collection Time: 04/13/20 10:07 PM   Result Value Ref Range    WBC 47.8 (H) 3.6 - 11.0 K/uL    RBC 2.46 (L) 3.80 - 5.20 M/uL    HGB 7.5 (L) 11.5 - 16.0 g/dL    HCT 22.7 (L) 35.0 - 47.0 %    MCV 92.3 80.0 - 99.0 FL    MCH 30.5 26.0 - 34.0 PG    MCHC 33.0 30.0 - 36.5 g/dL    RDW 17.7 (H) 11.5 - 14.5 %    PLATELET 593 (L) 344 - 400 K/uL    MPV 10.8 8.9 - 12.9 FL    NRBC 2.0 (H) 0  WBC    ABSOLUTE NRBC 0.94 (H) 0.00 - 0.01 K/uL   LACTIC ACID    Collection Time: 04/13/20 10:20 PM   Result Value Ref Range    Lactic acid 1.1 0.4 - 2.0 MMOL/L   GLUCOSE, POC    Collection Time: 04/13/20 11:18 PM   Result Value Ref Range    Glucose (POC) 153 (H) 65 - 100 mg/dL    Performed by Hayes GALLEGOS    CBC W/O DIFF    Collection Time: 04/14/20  3:42 AM   Result Value Ref Range    WBC 42.3 (H) 3.6 - 11.0 K/uL    RBC 2.33 (L) 3.80 - 5.20 M/uL    HGB 6.9 (L) 11.5 - 16.0 g/dL    HCT 21.6 (L) 35.0 - 47.0 %    MCV 92.7 80.0 - 99.0 FL    MCH 29.6 26.0 - 34.0 PG    MCHC 31.9 30.0 - 36.5 g/dL    RDW 17.7 (H) 11.5 - 14.5 %    PLATELET 157 (L) 876 - 400 K/uL    MPV 10.7 8.9 - 12.9 FL    NRBC 2.1 (H) 0  WBC    ABSOLUTE NRBC 0.90 (H) 0.00 - 8.74 K/uL   METABOLIC PANEL, BASIC    Collection Time: 04/14/20  3:42 AM   Result Value Ref Range    Sodium 134 (L) 136 - 145 mmol/L    Potassium 4.8 3.5 - 5.1 mmol/L    Chloride 104 97 - 108 mmol/L    CO2 20 (L) 21 - 32 mmol/L    Anion gap 10 5 - 15 mmol/L    Glucose 261 (H) 65 - 100 mg/dL    BUN 52 (H) 6 - 20 MG/DL    Creatinine 2.18 (H) 0.55 - 1.02 MG/DL    BUN/Creatinine ratio 24 (H) 12 - 20      GFR est AA 28 (L) >60 ml/min/1.73m2    GFR est non-AA 23 (L) >60 ml/min/1.73m2    Calcium 8.8 8.5 - 10.1 MG/DL   CRP, HIGH SENSITIVITY    Collection Time: 04/14/20  3:42 AM   Result Value Ref Range    CRP, High sensitivity >9.5 mg/L   POC EG7    Collection Time: 04/14/20  3:48 AM   Result Value Ref Range    Calcium, ionized (POC) 1.30 1.12 - 1.32 mmol/L    FIO2 (POC) 60 %    pH (POC) 7.277 (L) 7.35 - 7.45      pCO2 (POC) 45.0 35.0 - 45.0 MMHG    pO2 (POC) 117 (H) 80 - 100 MMHG    HCO3 (POC) 21.0 (L) 22 - 26 MMOL/L    Base deficit (POC) 6 mmol/L    sO2 (POC) 98 (H) 92 - 97 %    Site DRAWN FROM ARTERIAL LINE      Device: VENT      Mode ASSIST CONTROL      Tidal volume 400 ml    Set Rate 28 bpm    PEEP/CPAP (POC) 8 cmH2O    Allens test (POC) N/A      Specimen type (POC) ARTERIAL      Total resp. rate 28     HEPATIC FUNCTION PANEL    Collection Time: 04/14/20  4:00 AM   Result Value Ref Range    Protein, total 5.0 (L) 6.4 - 8.2 g/dL    Albumin 2.8 (L) 3.5 - 5.0 g/dL    Globulin 2.2 2.0 - 4.0 g/dL    A-G Ratio 1.3 1.1 - 2.2      Bilirubin, total 1.7 (H) 0.2 - 1.0 MG/DL    Bilirubin, direct 1.1 (H) 0.0 - 0.2 MG/DL    Alk.  phosphatase 176 (H) 45 - 117 U/L    AST (SGOT) 326 (H) 15 - 37 U/L    ALT (SGPT) 814 (H) 12 - 78 U/L   GLUCOSE, POC    Collection Time: 04/14/20  5:26 AM   Result Value Ref Range    Glucose (POC) 213 (H) 65 - 100 mg/dL    Performed by [employer]            Total time spent with patient:  xxx   min. Care Plan discussed with:  Patient     Family      RN      Consulting Physician [address] [facility],         I have reviewed the flowsheets. Chart and Pertinent Notes have been reviewed. No change in PMH ,family and social history from Consult note.       Solo Sims MD

## 2020-04-14 NOTE — PROGRESS NOTES
Palliative Medicine Social Work      Confirmed family meeting for Wednesday at 2 pm with patient , daughter/JOSÉ MANUEL. Thank you for the opportunity to be involved in the care of Ms. Cyn Chan and her family.     Liliya Panda LMSW, Supervisee in Social Work  Palliative Medicine   [phone]

## 2020-04-14 NOTE — PROGRESS NOTES
NUTRITION COMPLETE ASSESSMENT    RECOMMENDATIONS:   Add bowel regimen prn     Interventions/Plan:   Food/Nutrient Delivery:  Modify rate, concentration, composition, and schedule        Nepro @ 30 ml/hr with 3 packets Prosource daily and 50 ml water flush q 6 hr    Assessment:   Reason for Assessment: Reassessment    Tube Feeding: Nepro @ 10 ml/hr with 50 ml water flush q 6 hr  Diet: NPO  Nutritionally Significant Medications: [x] Reviewed & Includes: hydrocortisone, lantus, correction scale insulin, Protonix, Levophed, Vasopressin, ketamine, Fentanyl    Subjective: Staff Interviewed    Objective:  Ms Rebecca Kearney transferred from [facility] d/t worsening renal function requiring CRRT. Noted: JUANCHO d/t ATN-CVVHD; acute hypoxic respiratory failure d/t COVID-19 and bacterial PNA, intubated 3/28; PEA arrest 4/9, large retroperitoneal bleed with hemorrhagic shock-requiring less pressor support. Tube feeding discontinued following PEA arrest; discussed with NP past 2 days. Trophic feeding resumed yesterday and tolerated thus far. Will slowly increase tube feeding to goal-recommend Nepro @ 30 ml/hr with 3 packets Prosource daily and 50 ml water flush q 6 hr. This will provide 720 ml, 1455 calories, 102 gm protein and 900 ml free water (tube feeding/flush) per day. Significant pitting edema-has worsened over the past week. BG elevated >200 since starting on steroid. Lantus increased today. Patient has flexiseal with little output since 4/9. Monitor for need to add bowel regimen after feeds resumed. Estimated Nutrition Needs:   Kcals/day: 1350 Kcals/day(2747-6696 (25-28 kcal/kg IBW)  Protein: 108 g(2g/kg IBW)  Fluid: (1 ml/kcal)  Based On: Kcal/kg - specify (Comment)  Weight Used: IBW(54 kg)    Pt expected to meet estimated nutrient needs:  []   Yes     []  No  [x] Unable to predict at this time  Nutrition Diagnosis:   1.  Inadequate protein-energy intake related to hemodynamic instability as evidenced by temporary cessation/reduciton in enteral feeding. Goals: Tolerate tube feeding at goal in next 1-2 days. Monitoring & Evaluation:    - Enteral/parenteral nutrition intake   - Electrolyte and renal profile, CV-pulmonary, Weight/weight change, Glucose profile, GI     Previous Nutrition Goals Met: No(tube feeding held for several days)  Previous Recommendations: N/A    Education & Discharge Needs:   [x] None Identified   [] Identified and addressed    [x] Participated in care plan, discharge planning, and/or interdisciplinary rounds        Cultural, Sikh and ethnic food preferences identified:  None    Skin Integrity: [x]Intact  []Other  Edema: []None [x] 2-3+ pitting generalized and BLE's, 1+ pitting BUE's, 3+ NP genital   Last BM: 4/13  Food Allergies: [x]None []Other    Anthropometrics:    Weight Loss Metrics 4/14/2020 1/23/2020 1/22/2019 1/16/2018 1/9/2017 7/6/2016 12/14/2015   Today's Wt 294 lb 8.6 oz 270 lb 12.8 oz 278 lb 279 lb 275 lb 234 lb 213 lb 6.4 oz   BMI 50.56 kg/m2 43.73 kg/m2 44.89 kg/m2 45.05 kg/m2 44.39 kg/m2 37.79 kg/m2 34.46 kg/m2      Last 3 Recorded Weights in this Encounter    04/13/20 0254 04/14/20 0426 04/14/20 1140   Weight: 136.8 kg (301 lb 9.4 oz) 133.6 kg (294 lb 8.6 oz) 133.6 kg (294 lb 8.6 oz)      Weight Source: Bed  Height: 5' 4\" (162.6 cm),    Body mass index is 50.56 kg/m².      IBW : 54.4 kg (120 lb), % IBW (Calculated): 245.45 %   ,      Labs:    Lab Results   Component Value Date/Time    Sodium 134 (L) 04/14/2020 03:42 AM    Potassium 4.8 04/14/2020 03:42 AM    Chloride 104 04/14/2020 03:42 AM    CO2 20 (L) 04/14/2020 03:42 AM    Glucose 261 (H) 04/14/2020 03:42 AM    BUN 52 (H) 04/14/2020 03:42 AM    Creatinine 2.18 (H) 04/14/2020 03:42 AM    Calcium 8.8 04/14/2020 03:42 AM    Magnesium 2.3 04/11/2020 08:41 AM    Phosphorus 4.0 04/13/2020 08:19 AM    Albumin 2.8 (L) 04/14/2020 04:00 AM     Lab Results   Component Value Date/Time    Hemoglobin A1c 10.4 (H) 03/31/2020 03:42 PM     Lab Results   Component Value Date/Time    Glucose (POC) 284 (H) 04/14/2020 11:22 AM      Lab Results   Component Value Date/Time    ALT (SGPT) 814 (H) 04/14/2020 04:00 AM    AST (SGOT) 326 (H) 04/14/2020 04:00 AM    Alk.  phosphatase 176 (H) 04/14/2020 04:00 AM    Bilirubin, direct 1.1 (H) 04/14/2020 04:00 AM    Bilirubin, total 1.7 (H) 04/14/2020 04:00 AM   d

## 2020-04-14 NOTE — PROGRESS NOTES
Infectious Disease Progress Note       Subjective:     D/W with ICU team  Patient cannot given history, critically ill   Line clotted off and had another line placed   Palliative care seeing patient as well with plans for family mtg wed          Objective:    Vitals:   Patient Vitals for the past 24 hrs:   Temp Pulse Resp BP SpO2   04/14/20 1215 -- 84 28 -- 98 %   04/14/20 1200 98.5 °F (36.9 °C) 86 28 139/80 98 %   04/14/20 1145 -- 89 28 128/79 98 %   04/14/20 1134 -- 92 -- 122/69 --   04/14/20 1130 -- 94 28 -- 97 %   04/14/20 1115 -- 88 20 -- 95 %   04/14/20 1100 -- 95 28 122/69 98 %   04/14/20 1045 -- 96 28 -- 98 %   04/14/20 1030 -- 96 28 -- 98 %   04/14/20 1015 -- 96 28 -- 98 %   04/14/20 1000 -- 96 28 111/71 98 %   04/14/20 0945 -- 97 28 -- 98 %   04/14/20 0930 -- 98 28 -- 97 %   04/14/20 0915 -- 98 28 -- 100 %   04/14/20 0908 -- 97 17 -- 98 %   04/14/20 0900 -- 96 28 113/79 99 %   04/14/20 0845 -- 98 28 -- 99 %   04/14/20 0830 -- 95 28 -- 99 %   04/14/20 0815 -- 96 28 -- 99 %   04/14/20 0800 98.8 °F (37.1 °C) 97 28 116/70 99 %   04/14/20 0745 -- 97 28 -- 99 %   04/14/20 0730 -- 97 28 -- 99 %   04/14/20 0700 -- 98 28 117/69 99 %   04/14/20 0645 -- 97 28 -- 99 %   04/14/20 0630 -- 98 28 -- 99 %   04/14/20 0615 -- 98 28 -- 99 %   04/14/20 0600 -- 98 28 104/67 99 %   04/14/20 0545 -- 99 28 -- 99 %   04/14/20 0530 98 °F (36.7 °C) (!) 101 28 -- 99 %   04/14/20 0515 98 °F (36.7 °C) 100 28 -- 99 %   04/14/20 0500 98 °F (36.7 °C) 100 28 105/68 99 %   04/14/20 0445 98 °F (36.7 °C) (!) 102 28 -- 99 %   04/14/20 0430 -- (!) 103 28 -- 99 %   04/14/20 0415 -- (!) 105 28 -- 98 %   04/14/20 0400 -- (!) 106 28 90/65 99 %   04/14/20 0350 -- (!) 108 28 -- 100 %   04/14/20 0345 -- (!) 106 28 -- 99 %   04/14/20 0335 97.7 °F (36.5 °C) -- -- -- --   04/14/20 0330 -- (!) 105 28 -- 98 %   04/14/20 0315 -- (!) 103 28 -- 98 %   04/14/20 0300 -- (!) 101 28 95/68 98 %   04/14/20 0245 -- 100 28 -- 98 %   04/14/20 0230 -- 98 28 -- 99 % 04/14/20 0215 -- 96 28 -- 99 %   04/14/20 0200 -- 93 28 94/68 98 %   04/14/20 0142 -- 92 28 98/72 98 %   04/14/20 0136 95.5 °F (35.3 °C) -- -- -- --   04/14/20 0130 -- 93 28 -- 98 %   04/14/20 0115 -- 93 28 -- 99 %   04/14/20 0100 -- 93 28 107/70 98 %   04/14/20 0045 -- 95 28 -- 98 %   04/14/20 0030 -- 93 28 -- 98 %   04/14/20 0029 -- 93 28 -- 98 %   04/14/20 0028 96.7 °F (35.9 °C) 93 28 -- 98 %   04/14/20 0015 -- 88 28 -- 99 %   04/14/20 0000 -- -- -- 104/76 --   04/13/20 2345 -- 85 28 -- 99 %   04/13/20 2330 -- 87 28 -- 99 %   04/13/20 2300 -- 86 28 114/73 99 %   04/13/20 2245 -- 88 28 -- 99 %   04/13/20 2230 -- 88 28 -- 99 %   04/13/20 2215 -- 87 28 -- 99 %   04/13/20 2200 -- 91 28 110/71 99 %   04/13/20 2145 -- 91 28 -- 99 %   04/13/20 2130 -- 90 28 -- 99 %   04/13/20 2115 -- 90 28 -- 99 %   04/13/20 2100 -- 88 28 126/85 98 %   04/13/20 2028 -- 88 28 -- 99 %   04/13/20 2015 -- 89 28 -- 99 %   04/13/20 2000 -- 88 28 124/68 99 %   04/13/20 1945 -- 89 28 -- 100 %   04/13/20 1930 96.7 °F (35.9 °C) 89 28 -- 99 %   04/13/20 1900 -- 92 28 139/76 100 %   04/13/20 1800 -- 94 28 128/75 99 %   04/13/20 1700 -- 98 28 124/71 98 %   04/13/20 1626 -- 98 28 -- 99 %   04/13/20 1600 98.5 °F (36.9 °C) (!) 102 28 111/73 99 %   04/13/20 1500 -- 100 16 93/66 98 %   04/13/20 1419 -- (!) 112 -- 137/54 --   04/13/20 1400 -- (!) 111 28 101/69 98 %   04/13/20 1300 -- (!) 112 28 (!) 84/59 96 %       Physical Exam:  Please see ICU teams physical exam     Medications:    Current Facility-Administered Medications:     0.9% sodium chloride infusion 250 mL, 250 mL, IntraVENous, PRN, Cassandra Draper NP-C    insulin glargine (LANTUS) injection 12 Units, 12 Units, SubCUTAneous, QHS, Blanchie Orts, NP  [address] [place] [address]  [START ON 4/15/2020] famotidine (PF) (PEPCID) 20 mg in 0.9% sodium chloride 10 mL injection, 20 mg, IntraVENous, Q24H, William Orgabriela, NP    chlorhexidine (PERIDEX) 0.12 % mouthwash 15 mL, 15 mL, Oral, Q12H, Blanchie Orts, NP  [address] [place] [address] cisatracurium (NIMBEX) 2 mg/mL IV infusion, 0.5-10 mcg/kg/min (Ideal), IntraVENous, TITRATE, Long Mullins DO, Last Rate: 9 mL/hr at 04/14/20 0843, 5.5 mcg/kg/min at 04/14/20 0843    bicarbonate dialysis (PRISMASOL) BG K 2/Ca 3.5 5000 ml solution, , Extracorporeal, DIALYSIS CONTINUOUS, Apurva Pabon MD, Last Rate: 2,000 mL/hr at 04/13/20 2311, 2,000 mL/hr at 04/13/20 2311    hydrocortisone Sod Succ (PF) (SOLU-CORTEF) injection 50 mg, 50 mg, IntraVENous, Q6H, Cassandra Cox NP-C, 50 mg at 04/14/20 1143    cefepime (MAXIPIME) 2 g in 0.9% sodium chloride (MBP/ADV) 100 mL, 2 g, IntraVENous, Q12H, Iliana Rosales NP, Last Rate: 200 mL/hr at 04/14/20 0826, 2 g at 04/14/20 0826    metroNIDAZOLE (FLAGYL) IVPB premix 500 mg, 500 mg, IntraVENous, Q8H, Iliana Rosales NP, Last Rate: 100 mL/hr at 04/14/20 0826, 500 mg at 04/14/20 0826    vancomycin (FIRVANQ) 50 mg/mL oral solution 500 mg, 500 mg, Oral, Q6H, Iliana Rosales, NP, 500 mg at 04/14/20 1145    insulin glargine (LANTUS) injection 45 Units, 45 Units, SubCUTAneous, DAILY, Iliana Rosales, NP, 45 Units at 04/14/20 0827    vasopressin (VASOSTRICT) 20 Units in 0.9% sodium chloride 100 mL infusion, 0-0.04 Units/min, IntraVENous, TITRATE, Jackie Young MD, Last Rate: 9 mL/hr at 04/14/20 1204, 0.03 Units/min at 04/14/20 1204    fentaNYL (PF) 1,500 mcg/30 mL (50 mcg/mL) infusion, 0-200 mcg/hr, IntraVENous, TITRATE, Jackie Young MD, Last Rate: 4 mL/hr at 04/14/20 0529, 200 mcg/hr at 04/14/20 0529    ketamine (KETALAR) 500 mg in 0.9% sodium chloride 500 mL infusion, 0.05-0.4 mg/kg/hr, IntraVENous, TITRATE, Jackie Young MD, Last Rate: 46.6 mL/hr at 04/14/20 1054, 0.4 mg/kg/hr at 04/14/20 1054    NOREPINephrine (LEVOPHED) 32,000 mcg in dextrose 5% 250 mL (128 mcg/mL) infusion, 0-50 mcg/min, IntraVENous, TITRATE, Reece SCHMITT NPNoelleC, Last Rate: 11.7 mL/hr at 04/14/20 0842, 25 mcg/min at 04/14/20 0842    [Held by provider] labetaloL (NORMODYNE) tablet 100 mg, 100 mg, Oral, Q12H, Iliana Rosales NP    0.9% sodium chloride infusion, 3 mL/hr, IntraVENous, CONTINUOUS, Michell Madison MD, Last Rate: 3 mL/hr at 04/13/20 2316, 3 mL/hr at 04/13/20 2316    0.9% sodium chloride infusion, 5 mL/hr, IntraVENous, CONTINUOUS, Michell Madison MD, Last Rate: 5 mL/hr at 04/14/20 0127, 5 mL/hr at 04/14/20 0127    labetaloL (NORMODYNE;TRANDATE) injection 10 mg, 10 mg, IntraVENous, Q10MIN PRN, Michell Browning MD    fentaNYL citrate (PF) injection  mcg,  mcg, IntraVENous, Q1H PRN, Alex Sharpe MD, 100 mcg at 04/11/20 1232    white petrolatum-mineral oiL (AKWA TEARS) 83-15 % ophthalmic ointment, , Both Eyes, Q12H, Michell Madison MD    heparin (porcine) pf 300 Units, 300 Units, InterCATHeter, PRN, CELESTINA Shi    guaiFENesin (ROBITUSSIN) 100 mg/5 mL oral liquid 100 mg, 100 mg, Oral, Q6H, Cassandra Cox NP-C, 100 mg at 04/14/20 1143    alteplase (CATHFLO) 2 mg in sterile water (preservative free) 2 mL injection, 2 mg, InterCATHeter, PRN, Darius PÉREZ MD    alteplase (CATHFLO) 2 mg in sterile water (preservative free) 2 mL injection, 2 mg, InterCATHeter, PRN, Darius PRÉEZ MD, 2 mg at 04/14/20 2102    midazolam (VERSED) injection 1-2 mg, 1-2 mg, IntraVENous, Q2H PRN, CELESTINA Moreno, 1 mg at 04/09/20 1305    bacitracin 500 unit/gram packet 1 Packet, 1 Packet, Topical, PRN, Jake Florez NP, 1 Packet at 04/01/20 0116    balsam peru-castor oiL (VENELEX) ointment, , Topical, BID, Michell Madison MD    sodium chloride (NS) flush 5-40 mL, 5-40 mL, IntraVENous, Q8H, Michell Madison MD, 20 mL at 04/14/20 0519    sodium chloride (NS) flush 5-40 mL, 5-40 mL, IntraVENous, PRN, Alex Sharpe MD, 30 mL at 04/13/20 2005    HYDROcodone-acetaminophen (NORCO) 5-325 mg per tablet 1 Tab, 1 Tab, Oral, Q4H PRN, Gricelda, Ac Greer MD, 1 Tab at 04/08/20 0528    ondansetron [facility]) injection 4 mg, 4 mg, IntraVENous, Q4H PRN, Ac Brody MD    acetaminophen (TYLENOL) tablet 650 mg, 650 mg, Oral, Q6H PRN **OR** acetaminophen (TYLENOL) suppository 650 mg, 650 mg, Rectal, Q6H PRN, Iliana Rosales, NP    glucose chewable tablet 16 g, 4 Tab, Oral, PRN, Ac Brody MD    glucagon (GLUCAGEN) injection 1 mg, 1 mg, IntraMUSCular, PRN, Ac Brody MD    dextrose 10% infusion 0-250 mL, 0-250 mL, IntraVENous, PRN, Ac Brody MD    insulin lispro (HUMALOG) injection, , SubCUTAneous, Q6H, Ac Madison MD, 3 Units at 04/14/20 1137      Labs:  Recent Results (from the past 24 hour(s))   CBC W/O DIFF    Collection Time: 04/13/20  1:29 PM   Result Value Ref Range    WBC 46.8 (H) 3.6 - 11.0 K/uL    RBC 2.44 (L) 3.80 - 5.20 M/uL    HGB 7.4 (L) 11.5 - 16.0 g/dL    HCT 22.6 (L) 35.0 - 47.0 %    MCV 92.6 80.0 - 99.0 FL    MCH 30.3 26.0 - 34.0 PG    MCHC 32.7 30.0 - 36.5 g/dL    RDW 17.2 (H) 11.5 - 14.5 %    PLATELET 534 955 - 979 K/uL    MPV 10.8 8.9 - 12.9 FL    NRBC 3.1 (H) 0  WBC    ABSOLUTE NRBC 1.46 (H) 0.00 - 6.26 K/uL   METABOLIC PANEL, BASIC    Collection Time: 04/13/20  1:29 PM   Result Value Ref Range    Sodium 135 (L) 136 - 145 mmol/L    Potassium 5.9 (H) 3.5 - 5.1 mmol/L    Chloride 107 97 - 108 mmol/L    CO2 20 (L) 21 - 32 mmol/L    Anion gap 8 5 - 15 mmol/L    Glucose 264 (H) 65 - 100 mg/dL    BUN 59 (H) 6 - 20 MG/DL    Creatinine 2.50 (H) 0.55 - 1.02 MG/DL    BUN/Creatinine ratio 24 (H) 12 - 20      GFR est AA 24 (L) >60 ml/min/1.73m2    GFR est non-AA 20 (L) >60 ml/min/1.73m2    Calcium 8.1 (L) 8.5 - 10.1 MG/DL   VANCOMYCIN, TROUGH    Collection Time: 04/13/20  1:29 PM   Result Value Ref Range    Vancomycin,trough 25.5 (HH) 5.0 - 10.0 ug/mL    Reported dose date: NOT PROVIDED      Reported dose time: NOT PROVIDED      Reported dose: NOT PROVIDED UNITS   GLUCOSE, POC    Collection Time: 04/13/20  5:57 PM   Result Value Ref Range    Glucose (POC) 211 (H) 65 - 100 mg/dL    Performed by Emy Scott    METABOLIC PANEL, BASIC    Collection Time: 04/13/20 10:07 PM   Result Value Ref Range    Sodium 135 (L) 136 - 145 mmol/L    Potassium 4.7 3.5 - 5.1 mmol/L    Chloride 104 97 - 108 mmol/L    CO2 20 (L) 21 - 32 mmol/L    Anion gap 11 5 - 15 mmol/L    Glucose 244 (H) 65 - 100 mg/dL    BUN 50 (H) 6 - 20 MG/DL    Creatinine 2.01 (H) 0.55 - 1.02 MG/DL    BUN/Creatinine ratio 25 (H) 12 - 20      GFR est AA 31 (L) >60 ml/min/1.73m2    GFR est non-AA 26 (L) >60 ml/min/1.73m2    Calcium 8.8 8.5 - 10.1 MG/DL   CBC W/O DIFF    Collection Time: 04/13/20 10:07 PM   Result Value Ref Range    WBC 47.8 (H) 3.6 - 11.0 K/uL    RBC 2.46 (L) 3.80 - 5.20 M/uL    HGB 7.5 (L) 11.5 - 16.0 g/dL    HCT 22.7 (L) 35.0 - 47.0 %    MCV 92.3 80.0 - 99.0 FL    MCH 30.5 26.0 - 34.0 PG    MCHC 33.0 30.0 - 36.5 g/dL    RDW 17.7 (H) 11.5 - 14.5 %    PLATELET 993 (L) 100 - 400 K/uL    MPV 10.8 8.9 - 12.9 FL    NRBC 2.0 (H) 0  WBC    ABSOLUTE NRBC 0.94 (H) 0.00 - 0.01 K/uL   LACTIC ACID    Collection Time: 04/13/20 10:20 PM   Result Value Ref Range    Lactic acid 1.1 0.4 - 2.0 MMOL/L   GLUCOSE, POC    Collection Time: 04/13/20 11:18 PM   Result Value Ref Range    Glucose (POC) 153 (H) 65 - 100 mg/dL    Performed by Hayes GALLEGOS    CBC W/O DIFF    Collection Time: 04/14/20  3:42 AM   Result Value Ref Range    WBC 42.3 (H) 3.6 - 11.0 K/uL    RBC 2.33 (L) 3.80 - 5.20 M/uL    HGB 6.9 (L) 11.5 - 16.0 g/dL    HCT 21.6 (L) 35.0 - 47.0 %    MCV 92.7 80.0 - 99.0 FL    MCH 29.6 26.0 - 34.0 PG    MCHC 31.9 30.0 - 36.5 g/dL    RDW 17.7 (H) 11.5 - 14.5 %    PLATELET 305 (L) 903 - 400 K/uL    MPV 10.7 8.9 - 12.9 FL    NRBC 2.1 (H) 0  WBC    ABSOLUTE NRBC 0.90 (H) 0.00 - 7.78 K/uL   METABOLIC PANEL, BASIC    Collection Time: 04/14/20  3:42 AM   Result Value Ref Range    Sodium 134 (L) 136 - 145 mmol/L Potassium 4.8 3.5 - 5.1 mmol/L    Chloride 104 97 - 108 mmol/L    CO2 20 (L) 21 - 32 mmol/L    Anion gap 10 5 - 15 mmol/L    Glucose 261 (H) 65 - 100 mg/dL    BUN 52 (H) 6 - 20 MG/DL    Creatinine 2.18 (H) 0.55 - 1.02 MG/DL    BUN/Creatinine ratio 24 (H) 12 - 20      GFR est AA 28 (L) >60 ml/min/1.73m2    GFR est non-AA 23 (L) >60 ml/min/1.73m2    Calcium 8.8 8.5 - 10.1 MG/DL   CRP, HIGH SENSITIVITY    Collection Time: 04/14/20  3:42 AM   Result Value Ref Range    CRP, High sensitivity >9.5 mg/L   POC EG7    Collection Time: 04/14/20  3:48 AM   Result Value Ref Range    Calcium, ionized (POC) 1.30 1.12 - 1.32 mmol/L    FIO2 (POC) 60 %    pH (POC) 7.277 (L) 7.35 - 7.45      pCO2 (POC) 45.0 35.0 - 45.0 MMHG    pO2 (POC) 117 (H) 80 - 100 MMHG    HCO3 (POC) 21.0 (L) 22 - 26 MMOL/L    Base deficit (POC) 6 mmol/L    sO2 (POC) 98 (H) 92 - 97 %    Site DRAWN FROM ARTERIAL LINE      Device: VENT      Mode ASSIST CONTROL      Tidal volume 400 ml    Set Rate 28 bpm    PEEP/CPAP (POC) 8 cmH2O    Allens test (POC) N/A      Specimen type (POC) ARTERIAL      Total resp. rate 28     HEPATIC FUNCTION PANEL    Collection Time: 04/14/20  4:00 AM   Result Value Ref Range    Protein, total 5.0 (L) 6.4 - 8.2 g/dL    Albumin 2.8 (L) 3.5 - 5.0 g/dL    Globulin 2.2 2.0 - 4.0 g/dL    A-G Ratio 1.3 1.1 - 2.2      Bilirubin, total 1.7 (H) 0.2 - 1.0 MG/DL    Bilirubin, direct 1.1 (H) 0.0 - 0.2 MG/DL    Alk.  phosphatase 176 (H) 45 - 117 U/L    AST (SGOT) 326 (H) 15 - 37 U/L    ALT (SGPT) 814 (H) 12 - 78 U/L   GLUCOSE, POC    Collection Time: 04/14/20  5:26 AM   Result Value Ref Range    Glucose (POC) 213 (H) 65 - 100 mg/dL    Performed by [address] [place] [address], POC    Collection Time: 04/14/20 11:22 AM   Result Value Ref Range    Glucose (POC) 284 (H) 65 - 100 mg/dL    Performed by Shira Acuna            Micro:   Blood 4/9/20       Component Value Ref Range & Units Status   Special Requests: NO SPECIAL REQUESTS    Preliminary Culture result: NO GROWTH 4 DAYS    Preliminary   Result History       Blood 4/8/20  Component Value Ref Range & Units Status   Special Requests: NO SPECIAL REQUESTS    Final   Culture result: NO GROWTH 5 DAYS    Final   Result History               Blood: 3/31/20  Specimen Information: Blood        Component Value Ref Range & Units Status   Special Requests: NO SPECIAL REQUESTS    Final   Culture result: NO GROWTH 5 DAYS    Final   Result History              Sputum 3/31/20   Sputum        Component Value Ref Range & Units Status   Special Requests: NO SPECIAL REQUESTS    Final   GRAM STAIN FEW WBCS SEEN    Final   GRAM STAIN    Final   2+ GRAM POSITIVE COCCI IN PAIRS    Culture result: Abnormal     Final   MODERATE STAPHYLOCOCCUS AUREUS    Culture result:    Final   LIGHT NORMAL RESPIRATORY SOFIE    Susceptibility      Staphylococcus aureus     LU    Ciprofloxacin ($) <=0.5 ug/mL S    Clindamycin ($)  R    Doxycycline ($$) <=0.5 ug/mL S    Erythromycin ($$$$) >=8 ug/mL R    Gentamicin ($) <=0.5 ug/mL S    Levofloxacin ($) <=0.12 ug/mL S    Linezolid ($$$$$) 2 ug/mL S    Moxifloxacin ($$$$) <=0.25 ug/mL S    Oxacillin 0.5 ug/mL S    Rifampin ($$$$) <=0.5 ug/mL S1    Tetracycline <=1 ug/mL S    Trimeth/Sulfa <=10 ug/mL S    Vancomycin ($) 1 ug/mL S                         Imaging:  CXR 4/5/20  FINDINGS: AP radiograph of the chest was obtained.     There is been interval advancement of the endotracheal tube which now terminates  1.6 cm above the bhavik. Right upper extremity PICC and gastric decompression  tubes are again noted. There is no significant change in diffuse bilateral  heterogeneous opacities. Likely trace left pleural effusion. No pneumothorax. Stable cardiomediastinal silhouette.     IMPRESSION  IMPRESSION:   1. Interval advancement of endotracheal tube which now terminates 1.6 cm above  the bhavik. Consider slight retraction.   2. Unchanged diffuse bilateral heterogeneous opacities, consistent with  multifocal pneumonia. Likely trace left pleural effusion.           Assessment / Plan    Ms. Morris Hopkins is a 59-year-old lady with a history of nephrolithiasis, GERD, basal cell carcinoma who was admitted from [facility] with respiratory symptoms concerning for COVID 19 and tested + on 3/26/20. Per team,  exposure to her mother with COVID 23. Per notes, intubated on 3/28/2020. Transferred to [facility] for CR[facility]    Had code blue 4/9/20   Resuscitated            1) Sars-CoV2 pneumonia, respiratory failure, renal failure   Sars-CoV-2 detected 3/26/20, other viral respiratory panel negative  (records from Care everywhere )   Urine legionella and S pneumo ag negative 3/27/20  Procalcitonin 3/27 0.47, 3/28 0.44, 3/29 5.86, 3/31 13.44  , Ferritin 1985 3/31/20    4/3/20   , ferritin 992, CRP 9.65 4/5/20   IL 6  402 3/31/20  Completed azithromycin and Plaquenil   S/P Actemra one dose 4/3.    Was on On Heparin gtt , now off per ICU team   On IV Steroids per ICU team     2)  MSSA on sputum, pharmacy dosing vancomycin based on levels due to Penicillin allergy    Completed a 7 day course of antibiotics       3) marked leukocytosis  WBC was 11-16 before code blue on 4/9/20   Suspect from coding event/reactory   Started by primary team PO Vancomycin for empiric CDI treatment  Started by primary team on Vancomycin IV Cefepime and flagyl empirically given marked WBC elevation   Blood cx negative   Stopped Vancomycin IV on 4/13   CT A/P/C 4/10 with large hematoma R retroperitoneal   If febrile or worse, restart IV Vancomycin           4) LLE PE on CT 4/10/20  Currently off anticoagulation   Had retroperitoneal bleed      5) Retroperitoneal hematoma on imaging 4/10/20    5) ARF:   Plans per nephrology, CRRT  R IJ    6) history of nephrolithiasis    7) history of GERD    8) basal cell carcinoma per chart        D/W with icu team today     Please contact with questions     Mat Reyna DO  12:29 PM

## 2020-04-14 NOTE — ROUTINE PROCESS
1930-bedside report received from [facility] using sbar format  2000-ax temp 96.7 juan carlos hugger placed on pt and set on low setting /levo titrated to effect/art line positional/dampens easily  2130-TF stopped d/t diff Positioning pt / Pt placed flat in slight reversed trend d/t high access/return pressures-both melissa ports with blood return and flush easily/pt's pannus pulled up and taped to relieve pressure on access. Train of four electrodes rotated and now on pt's rt temple. JOSE JUAN Mendez updated  2207-labs sent  [phone]- access pressures remain an issue/filter clotting/all blood rinsed back to pt / each melissa port flushed with 10cc NS/clamped off and syringes left in place.  Aquiles Tanner [phone] paged  Sabina RNRebeca at bedside to change filter set/resume therapy  0100-CVVHD resumed  0136-ax temp 95.5  juan carlos hugger placed on high at 43 deg Michelle  0152-access pressures -211/ports flipped  With pressures -130 to-140  0200-high access pressure alarms again-all blood flushed back to pt melissa ports flushed with 10cc NS each port/clamped/capped off-Mireille KIM paged and updated Nephrologist on call with access issues-to hold CVVHD until Dr. Cruz Pay rounds in the am-to instill cath oli into each melissa port to dwell/ prevent clotting-  0222-hob elevated 30 degrees and trickle TF resumed  JOSE JUAN Mendez updated  0233-2mg cath oli instilled into each melissa port to dwell/ports capped off and clamped/lines labeled  0335-ax  Temp 97.8  juan carlos hugger placed on stand by  0500-PRBC started    0730-bedside shift report given to RN using sbar format

## 2020-04-14 NOTE — WOUND CARE
Follow-up visit for gluteal cleft and skin fold on back. She is in isolation for Covid-19 and I did not enter the room. Discussed with RN who reports no change in previous assessment to this area. Not any darker in color and not open. Venelex in use and she remains on a bariatric bed with an air mattress. Heels are offloaded with pillows. Wound care will continue to follow while admitted.    JAUN Richmond

## 2020-04-14 NOTE — PROGRESS NOTES
Problem: Non-Violent Restraints  Goal: *Patient's dignity will be maintained  Outcome: Progressing Towards Goal  Goal: Patient/Family Education  Outcome: Progressing Towards Goal     Problem: Ventilator Management  Goal: *Adequate oxygenation and ventilation  Outcome: Progressing Towards Goal  Goal: *Patient maintains clear airway/free of aspiration  Outcome: Progressing Towards Goal  Goal: *Absence of infection signs and symptoms  Outcome: Progressing Towards Goal     Problem: Nutrition Deficit  Goal: *Optimize nutritional status  Outcome: Progressing Towards Goal     Problem: Falls - Risk of  Goal: *Absence of Falls  Description: Document Stacey Fall Risk and appropriate interventions in the flowsheet. Outcome: Progressing Towards Goal  Note: Fall Risk Interventions:  Mobility Interventions: Communicate number of staff needed for ambulation/transfer    Mentation Interventions: Adequate sleep, hydration, pain control, Door open when patient unattended, Evaluate medications/consider consulting pharmacy, More frequent rounding, Reorient patient, Update white board    Medication Interventions: Evaluate medications/consider consulting pharmacy    Elimination Interventions: Toileting schedule/hourly rounds              Problem: Patient Education: Go to Patient Education Activity  Goal: Patient/Family Education  Outcome: Progressing Towards Goal     Problem: Pressure Injury - Risk of  Goal: *Prevention of pressure injury  Description: Document Aniket Scale and appropriate interventions in the flowsheet. Outcome: Progressing Towards Goal  Note: Pressure Injury Interventions:  Sensory Interventions: Assess need for specialty bed, Avoid rigorous massage over bony prominences, Check visual cues for pain, Float heels, Keep linens dry and wrinkle-free, Minimize linen layers, Monitor skin under medical devices, Turn and reposition approx.  every two hours (pillows and wedges if needed)    Moisture Interventions: Absorbent underpads, Apply protective barrier, creams and emollients, Internal/External fecal devices, Internal/External urinary devices, Minimize layers    Activity Interventions: Pressure redistribution bed/mattress(bed type)    Mobility Interventions: Pressure redistribution bed/mattress (bed type), Turn and reposition approx. every two hours(pillow and wedges)    Nutrition Interventions: Document food/fluid/supplement intake, Discuss nutritional consult with provider    Friction and Shear Interventions: Apply protective barrier, creams and emollients, Lift sheet, Minimize layers                Problem: Patient Education: Go to Patient Education Activity  Goal: Patient/Family Education  Outcome: Progressing Towards Goal     Problem: Diabetes Self-Management  Goal: *Disease process and treatment process  Description: Define diabetes and identify own type of diabetes; list 3 options for treating diabetes. Outcome: Progressing Towards Goal  Goal: *Using medications safely  Description: State effect of diabetes medications on diabetes; name diabetes medication taking, action and side effects. Outcome: Progressing Towards Goal  Goal: *Monitoring blood glucose, interpreting and using results  Description: Identify recommended blood glucose targets  and personal targets. Outcome: Progressing Towards Goal     Problem: Impaired Skin Integrity/Pressure Injury Treatment  Goal: *Improvement of Existing Pressure Injury  Outcome: Progressing Towards Goal  Goal: *Prevention of pressure injury  Description: Document Aniket Scale and appropriate interventions in the flowsheet. Outcome: Progressing Towards Goal  Note: Pressure Injury Interventions:  Sensory Interventions: Assess need for specialty bed, Avoid rigorous massage over bony prominences, Check visual cues for pain, Float heels, Keep linens dry and wrinkle-free, Minimize linen layers, Monitor skin under medical devices, Turn and reposition approx.  every two hours (pillows and wedges if needed)    Moisture Interventions: Absorbent underpads, Apply protective barrier, creams and emollients, Internal/External fecal devices, Internal/External urinary devices, Minimize layers    Activity Interventions: Pressure redistribution bed/mattress(bed type)    Mobility Interventions: Pressure redistribution bed/mattress (bed type), Turn and reposition approx. every two hours(pillow and wedges)    Nutrition Interventions: Document food/fluid/supplement intake, Discuss nutritional consult with provider    Friction and Shear Interventions: Apply protective barrier, creams and emollients, Lift sheet, Minimize layers                Problem: Risk for Spread of Infection  Goal: Prevent transmission of infectious organism to others  Description: Prevent the transmission of infectious organisms to other patients, staff members, and visitors.   Outcome: Progressing Towards Goal

## 2020-04-15 LAB
ABO + RH BLD: NORMAL
ALBUMIN SERPL-MCNC: 2.6 G/DL (ref 3.5–5)
ALBUMIN/GLOB SERPL: 1 {RATIO} (ref 1.1–2.2)
ALP SERPL-CCNC: 198 U/L (ref 45–117)
ALT SERPL-CCNC: 601 U/L (ref 12–78)
ANION GAP SERPL CALC-SCNC: 10 MMOL/L (ref 5–15)
ANION GAP SERPL CALC-SCNC: 5 MMOL/L (ref 5–15)
ANION GAP SERPL CALC-SCNC: 7 MMOL/L (ref 5–15)
ANION GAP SERPL CALC-SCNC: 7 MMOL/L (ref 5–15)
ANION GAP SERPL CALC-SCNC: 8 MMOL/L (ref 5–15)
APTT PPP: 26.5 SEC (ref 22.1–32)
APTT PPP: 27.6 SEC (ref 22.1–32)
APTT PPP: 27.8 SEC (ref 22.1–32)
AST SERPL-CCNC: 197 U/L (ref 15–37)
BASOPHILS # BLD: 0 K/UL (ref 0–0.1)
BASOPHILS NFR BLD: 0 % (ref 0–1)
BILIRUB DIRECT SERPL-MCNC: 1.1 MG/DL (ref 0–0.2)
BILIRUB SERPL-MCNC: 1.8 MG/DL (ref 0.2–1)
BLD PROD TYP BPU: NORMAL
BLD PROD TYP BPU: NORMAL
BLOOD GROUP ANTIBODIES SERPL: NORMAL
BPU ID: NORMAL
BPU ID: NORMAL
BUN SERPL-MCNC: 40 MG/DL (ref 6–20)
BUN SERPL-MCNC: 43 MG/DL (ref 6–20)
BUN SERPL-MCNC: 46 MG/DL (ref 6–20)
BUN SERPL-MCNC: 46 MG/DL (ref 6–20)
BUN SERPL-MCNC: 48 MG/DL (ref 6–20)
BUN/CREAT SERPL: 29 (ref 12–20)
BUN/CREAT SERPL: 29 (ref 12–20)
BUN/CREAT SERPL: 30 (ref 12–20)
BUN/CREAT SERPL: 32 (ref 12–20)
BUN/CREAT SERPL: 34 (ref 12–20)
CALCIUM SERPL-MCNC: 8 MG/DL (ref 8.5–10.1)
CALCIUM SERPL-MCNC: 8.6 MG/DL (ref 8.5–10.1)
CALCIUM SERPL-MCNC: 9 MG/DL (ref 8.5–10.1)
CALCIUM SERPL-MCNC: 9.1 MG/DL (ref 8.5–10.1)
CALCIUM SERPL-MCNC: 9.2 MG/DL (ref 8.5–10.1)
CHLORIDE SERPL-SCNC: 104 MMOL/L (ref 97–108)
CHLORIDE SERPL-SCNC: 104 MMOL/L (ref 97–108)
CHLORIDE SERPL-SCNC: 105 MMOL/L (ref 97–108)
CHLORIDE SERPL-SCNC: 106 MMOL/L (ref 97–108)
CHLORIDE SERPL-SCNC: 107 MMOL/L (ref 97–108)
CO2 SERPL-SCNC: 21 MMOL/L (ref 21–32)
CO2 SERPL-SCNC: 24 MMOL/L (ref 21–32)
CO2 SERPL-SCNC: 26 MMOL/L (ref 21–32)
CREAT SERPL-MCNC: 1.19 MG/DL (ref 0.55–1.02)
CREAT SERPL-MCNC: 1.34 MG/DL (ref 0.55–1.02)
CREAT SERPL-MCNC: 1.55 MG/DL (ref 0.55–1.02)
CREAT SERPL-MCNC: 1.56 MG/DL (ref 0.55–1.02)
CREAT SERPL-MCNC: 1.66 MG/DL (ref 0.55–1.02)
CROSSMATCH RESULT,%XM: NORMAL
CROSSMATCH RESULT,%XM: NORMAL
CRP SERPL HS-MCNC: >9.5 MG/L
D DIMER PPP FEU-MCNC: 9.19 MG/L FEU (ref 0–0.65)
DIFFERENTIAL METHOD BLD: ABNORMAL
EOSINOPHIL # BLD: 0 K/UL (ref 0–0.4)
EOSINOPHIL NFR BLD: 0 % (ref 0–7)
ERYTHROCYTE [DISTWIDTH] IN BLOOD BY AUTOMATED COUNT: 17.7 % (ref 11.5–14.5)
ERYTHROCYTE [DISTWIDTH] IN BLOOD BY AUTOMATED COUNT: 19.3 % (ref 11.5–14.5)
ERYTHROCYTE [DISTWIDTH] IN BLOOD BY AUTOMATED COUNT: 19.9 % (ref 11.5–14.5)
ERYTHROCYTE [DISTWIDTH] IN BLOOD BY AUTOMATED COUNT: 20.1 % (ref 11.5–14.5)
FIBRINOGEN PPP-MCNC: 265 MG/DL (ref 200–475)
GLOBULIN SER CALC-MCNC: 2.7 G/DL (ref 2–4)
GLUCOSE BLD STRIP.AUTO-MCNC: 157 MG/DL (ref 65–100)
GLUCOSE BLD STRIP.AUTO-MCNC: 197 MG/DL (ref 65–100)
GLUCOSE BLD STRIP.AUTO-MCNC: 240 MG/DL (ref 65–100)
GLUCOSE SERPL-MCNC: 182 MG/DL (ref 65–100)
GLUCOSE SERPL-MCNC: 186 MG/DL (ref 65–100)
GLUCOSE SERPL-MCNC: 194 MG/DL (ref 65–100)
GLUCOSE SERPL-MCNC: 222 MG/DL (ref 65–100)
GLUCOSE SERPL-MCNC: 222 MG/DL (ref 65–100)
HCT VFR BLD AUTO: 21.5 % (ref 35–47)
HCT VFR BLD AUTO: 22.3 % (ref 35–47)
HCT VFR BLD AUTO: 22.6 % (ref 35–47)
HCT VFR BLD AUTO: 22.9 % (ref 35–47)
HGB BLD-MCNC: 7.1 G/DL (ref 11.5–16)
HGB BLD-MCNC: 7.1 G/DL (ref 11.5–16)
HGB BLD-MCNC: 7.3 G/DL (ref 11.5–16)
HGB BLD-MCNC: 7.4 G/DL (ref 11.5–16)
IMM GRANULOCYTES # BLD AUTO: 0.5 K/UL (ref 0–0.04)
IMM GRANULOCYTES NFR BLD AUTO: 2 % (ref 0–0.5)
INR PPP: 1.3 (ref 0.9–1.1)
LACTATE SERPL-SCNC: 1.2 MMOL/L (ref 0.4–2)
LYMPHOCYTES # BLD: 2.1 K/UL (ref 0.8–3.5)
LYMPHOCYTES NFR BLD: 9 % (ref 12–49)
MAGNESIUM SERPL-MCNC: 2.1 MG/DL (ref 1.6–2.4)
MCH RBC QN AUTO: 30 PG (ref 26–34)
MCH RBC QN AUTO: 30.1 PG (ref 26–34)
MCH RBC QN AUTO: 30.3 PG (ref 26–34)
MCH RBC QN AUTO: 30.5 PG (ref 26–34)
MCHC RBC AUTO-ENTMCNC: 31.8 G/DL (ref 30–36.5)
MCHC RBC AUTO-ENTMCNC: 32.3 G/DL (ref 30–36.5)
MCHC RBC AUTO-ENTMCNC: 32.3 G/DL (ref 30–36.5)
MCHC RBC AUTO-ENTMCNC: 33 G/DL (ref 30–36.5)
MCV RBC AUTO: 91.1 FL (ref 80–99)
MCV RBC AUTO: 93.8 FL (ref 80–99)
MCV RBC AUTO: 94.1 FL (ref 80–99)
MCV RBC AUTO: 94.2 FL (ref 80–99)
MONOCYTES # BLD: 1.4 K/UL (ref 0–1)
MONOCYTES NFR BLD: 6 % (ref 5–13)
NEUTS SEG # BLD: 19.5 K/UL (ref 1.8–8)
NEUTS SEG NFR BLD: 83 % (ref 32–75)
NRBC # BLD: 0.25 K/UL (ref 0–0.01)
NRBC # BLD: 0.32 K/UL (ref 0–0.01)
NRBC # BLD: 0.34 K/UL (ref 0–0.01)
NRBC # BLD: 0.49 K/UL (ref 0–0.01)
NRBC BLD-RTO: 1.1 PER 100 WBC
NRBC BLD-RTO: 1.5 PER 100 WBC
NRBC BLD-RTO: 1.5 PER 100 WBC
NRBC BLD-RTO: 2.1 PER 100 WBC
PLATELET # BLD AUTO: 80 K/UL (ref 150–400)
PLATELET # BLD AUTO: 86 K/UL (ref 150–400)
PLATELET # BLD AUTO: 86 K/UL (ref 150–400)
PLATELET # BLD AUTO: 94 K/UL (ref 150–400)
PMV BLD AUTO: 10.3 FL (ref 8.9–12.9)
PMV BLD AUTO: 10.5 FL (ref 8.9–12.9)
PMV BLD AUTO: 10.7 FL (ref 8.9–12.9)
PMV BLD AUTO: 11 FL (ref 8.9–12.9)
POTASSIUM SERPL-SCNC: 3.4 MMOL/L (ref 3.5–5.1)
POTASSIUM SERPL-SCNC: 3.6 MMOL/L (ref 3.5–5.1)
POTASSIUM SERPL-SCNC: 3.6 MMOL/L (ref 3.5–5.1)
POTASSIUM SERPL-SCNC: 4 MMOL/L (ref 3.5–5.1)
POTASSIUM SERPL-SCNC: 4.1 MMOL/L (ref 3.5–5.1)
PROT SERPL-MCNC: 5.3 G/DL (ref 6.4–8.2)
PROTHROMBIN TIME: 13.3 SEC (ref 9–11.1)
RBC # BLD AUTO: 2.36 M/UL (ref 3.8–5.2)
RBC # BLD AUTO: 2.37 M/UL (ref 3.8–5.2)
RBC # BLD AUTO: 2.41 M/UL (ref 3.8–5.2)
RBC # BLD AUTO: 2.43 M/UL (ref 3.8–5.2)
RBC MORPH BLD: ABNORMAL
SERVICE CMNT-IMP: ABNORMAL
SODIUM SERPL-SCNC: 135 MMOL/L (ref 136–145)
SODIUM SERPL-SCNC: 136 MMOL/L (ref 136–145)
SODIUM SERPL-SCNC: 136 MMOL/L (ref 136–145)
SODIUM SERPL-SCNC: 137 MMOL/L (ref 136–145)
SODIUM SERPL-SCNC: 138 MMOL/L (ref 136–145)
SPECIMEN EXP DATE BLD: NORMAL
STATUS OF UNIT,%ST: NORMAL
STATUS OF UNIT,%ST: NORMAL
THERAPEUTIC RANGE,PTTT: NORMAL SECS (ref 58–77)
UNIT DIVISION, %UDIV: 0
UNIT DIVISION, %UDIV: 0
WBC # BLD AUTO: 21.9 K/UL (ref 3.6–11)
WBC # BLD AUTO: 22.7 K/UL (ref 3.6–11)
WBC # BLD AUTO: 22.9 K/UL (ref 3.6–11)
WBC # BLD AUTO: 23.5 K/UL (ref 3.6–11)

## 2020-04-15 PROCEDURE — 85730 THROMBOPLASTIN TIME PARTIAL: CPT

## 2020-04-15 PROCEDURE — 85025 COMPLETE CBC W/AUTO DIFF WBC: CPT

## 2020-04-15 PROCEDURE — 90945 DIALYSIS ONE EVALUATION: CPT

## 2020-04-15 PROCEDURE — 74011000258 HC RX REV CODE- 258: Performed by: NURSE PRACTITIONER

## 2020-04-15 PROCEDURE — 85379 FIBRIN DEGRADATION QUANT: CPT

## 2020-04-15 PROCEDURE — 82962 GLUCOSE BLOOD TEST: CPT

## 2020-04-15 PROCEDURE — 83605 ASSAY OF LACTIC ACID: CPT

## 2020-04-15 PROCEDURE — 74011636637 HC RX REV CODE- 636/637: Performed by: INTERNAL MEDICINE

## 2020-04-15 PROCEDURE — 74011250636 HC RX REV CODE- 250/636: Performed by: NURSE PRACTITIONER

## 2020-04-15 PROCEDURE — 94003 VENT MGMT INPAT SUBQ DAY: CPT

## 2020-04-15 PROCEDURE — 80076 HEPATIC FUNCTION PANEL: CPT

## 2020-04-15 PROCEDURE — 80048 BASIC METABOLIC PNL TOTAL CA: CPT

## 2020-04-15 PROCEDURE — 74011250637 HC RX REV CODE- 250/637: Performed by: NURSE PRACTITIONER

## 2020-04-15 PROCEDURE — 74011000250 HC RX REV CODE- 250: Performed by: NURSE PRACTITIONER

## 2020-04-15 PROCEDURE — 74011000250 HC RX REV CODE- 250: Performed by: INTERNAL MEDICINE

## 2020-04-15 PROCEDURE — 86141 C-REACTIVE PROTEIN HS: CPT

## 2020-04-15 PROCEDURE — 85610 PROTHROMBIN TIME: CPT

## 2020-04-15 PROCEDURE — 85027 COMPLETE CBC AUTOMATED: CPT

## 2020-04-15 PROCEDURE — 74011250636 HC RX REV CODE- 250/636: Performed by: INTERNAL MEDICINE

## 2020-04-15 PROCEDURE — 36415 COLL VENOUS BLD VENIPUNCTURE: CPT

## 2020-04-15 PROCEDURE — 74011250637 HC RX REV CODE- 250/637: Performed by: INTERNAL MEDICINE

## 2020-04-15 PROCEDURE — 74011636637 HC RX REV CODE- 636/637: Performed by: NURSE PRACTITIONER

## 2020-04-15 PROCEDURE — 83735 ASSAY OF MAGNESIUM: CPT

## 2020-04-15 PROCEDURE — 65610000006 HC RM INTENSIVE CARE

## 2020-04-15 PROCEDURE — 85384 FIBRINOGEN ACTIVITY: CPT

## 2020-04-15 RX ORDER — INSULIN GLARGINE 100 [IU]/ML
20 INJECTION, SOLUTION SUBCUTANEOUS
Status: COMPLETED | OUTPATIENT
Start: 2020-04-15 | End: 2020-04-17

## 2020-04-15 RX ORDER — INSULIN LISPRO 100 [IU]/ML
INJECTION, SOLUTION INTRAVENOUS; SUBCUTANEOUS EVERY 6 HOURS
Status: DISCONTINUED | OUTPATIENT
Start: 2020-04-15 | End: 2020-05-22 | Stop reason: HOSPADM

## 2020-04-15 RX ADMIN — INSULIN GLARGINE 20 UNITS: 100 INJECTION, SOLUTION SUBCUTANEOUS at 22:00

## 2020-04-15 RX ADMIN — CALCIUM CHLORIDE, MAGNESIUM CHLORIDE, DEXTROSE MONOHYDRATE, LACTIC ACID, SODIUM CHLORIDE, SODIUM BICARBONATE AND POTASSIUM CHLORIDE 2000 ML/HR: 5.15; 2.03; 22; 5.4; 6.46; 3.09; .157 INJECTION INTRAVENOUS at 03:29

## 2020-04-15 RX ADMIN — HYDROCORTISONE SODIUM SUCCINATE 50 MG: 100 INJECTION, POWDER, FOR SOLUTION INTRAMUSCULAR; INTRAVENOUS at 06:44

## 2020-04-15 RX ADMIN — CEFEPIME HYDROCHLORIDE 2 G: 2 INJECTION, POWDER, FOR SOLUTION INTRAVENOUS at 20:37

## 2020-04-15 RX ADMIN — MINERAL OIL AND WHITE PETROLATUM: 150; 830 OINTMENT OPHTHALMIC at 08:23

## 2020-04-15 RX ADMIN — Medication 200 MCG/HR: at 03:36

## 2020-04-15 RX ADMIN — SODIUM CHLORIDE 10 ML: 9 INJECTION INTRAMUSCULAR; INTRAVENOUS; SUBCUTANEOUS at 22:00

## 2020-04-15 RX ADMIN — FAMOTIDINE 20 MG: 10 INJECTION INTRAVENOUS at 08:23

## 2020-04-15 RX ADMIN — CALCIUM CHLORIDE, MAGNESIUM CHLORIDE, DEXTROSE MONOHYDRATE, LACTIC ACID, SODIUM CHLORIDE, SODIUM BICARBONATE AND POTASSIUM CHLORIDE 2000 ML/HR: 5.15; 2.03; 22; 5.4; 6.46; 3.09; .157 INJECTION INTRAVENOUS at 02:00

## 2020-04-15 RX ADMIN — CALCIUM CHLORIDE, MAGNESIUM CHLORIDE, DEXTROSE MONOHYDRATE, LACTIC ACID, SODIUM CHLORIDE, SODIUM BICARBONATE AND POTASSIUM CHLORIDE 2000 ML/HR: 3.68; 3.05; 22; 5.4; 6.46; 3.09; .314 INJECTION INTRAVENOUS at 16:20

## 2020-04-15 RX ADMIN — INSULIN LISPRO 2 UNITS: 100 INJECTION, SOLUTION INTRAVENOUS; SUBCUTANEOUS at 00:00

## 2020-04-15 RX ADMIN — KETAMINE HYDROCHLORIDE 0.4 MG/KG/HR: 50 INJECTION, SOLUTION INTRAMUSCULAR; INTRAVENOUS at 20:55

## 2020-04-15 RX ADMIN — HYDROCORTISONE SODIUM SUCCINATE 50 MG: 100 INJECTION, POWDER, FOR SOLUTION INTRAMUSCULAR; INTRAVENOUS at 17:57

## 2020-04-15 RX ADMIN — ALTEPLASE 2 MG: 2.2 INJECTION, POWDER, LYOPHILIZED, FOR SOLUTION INTRAVENOUS at 07:25

## 2020-04-15 RX ADMIN — Medication 200 MCG/HR: at 23:15

## 2020-04-15 RX ADMIN — METRONIDAZOLE 500 MG: 500 INJECTION, SOLUTION INTRAVENOUS at 16:00

## 2020-04-15 RX ADMIN — CEFEPIME HYDROCHLORIDE 2 G: 2 INJECTION, POWDER, FOR SOLUTION INTRAVENOUS at 08:11

## 2020-04-15 RX ADMIN — HYDROCORTISONE SODIUM SUCCINATE 50 MG: 100 INJECTION, POWDER, FOR SOLUTION INTRAMUSCULAR; INTRAVENOUS at 23:48

## 2020-04-15 RX ADMIN — CASTOR OIL AND BALSAM, PERU: 788; 87 OINTMENT TOPICAL at 18:35

## 2020-04-15 RX ADMIN — NOREPINEPHRINE BITARTRATE 8 MCG/MIN: 1 INJECTION INTRAVENOUS at 13:30

## 2020-04-15 RX ADMIN — CALCIUM CHLORIDE, MAGNESIUM CHLORIDE, DEXTROSE MONOHYDRATE, LACTIC ACID, SODIUM CHLORIDE, SODIUM BICARBONATE AND POTASSIUM CHLORIDE 2000 ML/HR: 3.68; 3.05; 22; 5.4; 6.46; 3.09; .314 INJECTION INTRAVENOUS at 15:06

## 2020-04-15 RX ADMIN — NOREPINEPHRINE BITARTRATE 14 MCG/MIN: 1 INJECTION INTRAVENOUS at 10:30

## 2020-04-15 RX ADMIN — CALCIUM CHLORIDE, MAGNESIUM CHLORIDE, DEXTROSE MONOHYDRATE, LACTIC ACID, SODIUM CHLORIDE, SODIUM BICARBONATE AND POTASSIUM CHLORIDE 2000 ML/HR: 3.68; 3.05; 22; 5.4; 6.46; 3.09; .314 INJECTION INTRAVENOUS at 10:34

## 2020-04-15 RX ADMIN — GUAIFENESIN 100 MG: 100 SOLUTION ORAL at 11:17

## 2020-04-15 RX ADMIN — CHLORHEXIDINE GLUCONATE 15 ML: 1.2 RINSE ORAL at 08:24

## 2020-04-15 RX ADMIN — SODIUM CHLORIDE 10 ML: 9 INJECTION INTRAMUSCULAR; INTRAVENOUS; SUBCUTANEOUS at 06:43

## 2020-04-15 RX ADMIN — CALCIUM CHLORIDE, MAGNESIUM CHLORIDE, DEXTROSE MONOHYDRATE, LACTIC ACID, SODIUM CHLORIDE, SODIUM BICARBONATE AND POTASSIUM CHLORIDE 2000 ML/HR: 3.68; 3.05; 22; 5.4; 6.46; 3.09; .314 INJECTION INTRAVENOUS at 20:58

## 2020-04-15 RX ADMIN — GUAIFENESIN 100 MG: 100 SOLUTION ORAL at 23:48

## 2020-04-15 RX ADMIN — VANCOMYCIN HYDROCHLORIDE 500 MG: KIT at 11:31

## 2020-04-15 RX ADMIN — CALCIUM CHLORIDE, MAGNESIUM CHLORIDE, DEXTROSE MONOHYDRATE, LACTIC ACID, SODIUM CHLORIDE, SODIUM BICARBONATE AND POTASSIUM CHLORIDE 2000 ML/HR: 3.68; 3.05; 22; 5.4; 6.46; 3.09; .314 INJECTION INTRAVENOUS at 13:27

## 2020-04-15 RX ADMIN — HYDROCORTISONE SODIUM SUCCINATE 50 MG: 100 INJECTION, POWDER, FOR SOLUTION INTRAMUSCULAR; INTRAVENOUS at 11:17

## 2020-04-15 RX ADMIN — CHLORHEXIDINE GLUCONATE 15 ML: 1.2 RINSE ORAL at 21:00

## 2020-04-15 RX ADMIN — INSULIN GLARGINE 45 UNITS: 100 INJECTION, SOLUTION SUBCUTANEOUS at 08:23

## 2020-04-15 RX ADMIN — GUAIFENESIN 100 MG: 100 SOLUTION ORAL at 17:57

## 2020-04-15 RX ADMIN — CALCIUM CHLORIDE, MAGNESIUM CHLORIDE, DEXTROSE MONOHYDRATE, LACTIC ACID, SODIUM CHLORIDE, SODIUM BICARBONATE AND POTASSIUM CHLORIDE 2000 ML/HR: 3.68; 3.05; 22; 5.4; 6.46; 3.09; .314 INJECTION INTRAVENOUS at 11:57

## 2020-04-15 RX ADMIN — METRONIDAZOLE 500 MG: 500 INJECTION, SOLUTION INTRAVENOUS at 08:12

## 2020-04-15 RX ADMIN — CALCIUM CHLORIDE, MAGNESIUM CHLORIDE, DEXTROSE MONOHYDRATE, LACTIC ACID, SODIUM CHLORIDE, SODIUM BICARBONATE AND POTASSIUM CHLORIDE 2000 ML/HR: 3.68; 3.05; 22; 5.4; 6.46; 3.09; .314 INJECTION INTRAVENOUS at 18:09

## 2020-04-15 RX ADMIN — Medication 200 MCG/HR: at 18:24

## 2020-04-15 RX ADMIN — KETAMINE HYDROCHLORIDE 0.4 MG/KG/HR: 50 INJECTION, SOLUTION INTRAMUSCULAR; INTRAVENOUS at 10:18

## 2020-04-15 RX ADMIN — VANCOMYCIN HYDROCHLORIDE 500 MG: KIT at 00:31

## 2020-04-15 RX ADMIN — CALCIUM CHLORIDE, MAGNESIUM CHLORIDE, DEXTROSE MONOHYDRATE, LACTIC ACID, SODIUM CHLORIDE, SODIUM BICARBONATE AND POTASSIUM CHLORIDE 2000 ML/HR: 3.68; 3.05; 22; 5.4; 6.46; 3.09; .314 INJECTION INTRAVENOUS at 22:29

## 2020-04-15 RX ADMIN — CALCIUM CHLORIDE, MAGNESIUM CHLORIDE, DEXTROSE MONOHYDRATE, LACTIC ACID, SODIUM CHLORIDE, SODIUM BICARBONATE AND POTASSIUM CHLORIDE 2000 ML/HR: 3.68; 3.05; 22; 5.4; 6.46; 3.09; .314 INJECTION INTRAVENOUS at 09:02

## 2020-04-15 RX ADMIN — CALCIUM CHLORIDE, MAGNESIUM CHLORIDE, DEXTROSE MONOHYDRATE, LACTIC ACID, SODIUM CHLORIDE, SODIUM BICARBONATE AND POTASSIUM CHLORIDE 2000 ML/HR: 3.68; 3.05; 22; 5.4; 6.46; 3.09; .314 INJECTION INTRAVENOUS at 19:27

## 2020-04-15 RX ADMIN — VANCOMYCIN HYDROCHLORIDE 500 MG: KIT at 17:57

## 2020-04-15 RX ADMIN — VANCOMYCIN HYDROCHLORIDE 500 MG: KIT at 06:10

## 2020-04-15 RX ADMIN — SODIUM CHLORIDE 10 ML: 9 INJECTION INTRAMUSCULAR; INTRAVENOUS; SUBCUTANEOUS at 13:21

## 2020-04-15 RX ADMIN — INSULIN LISPRO 2 UNITS: 100 INJECTION, SOLUTION INTRAVENOUS; SUBCUTANEOUS at 06:55

## 2020-04-15 RX ADMIN — MINERAL OIL AND WHITE PETROLATUM: 150; 830 OINTMENT OPHTHALMIC at 21:00

## 2020-04-15 RX ADMIN — CASTOR OIL AND BALSAM, PERU: 788; 87 OINTMENT TOPICAL at 08:22

## 2020-04-15 RX ADMIN — METRONIDAZOLE 500 MG: 500 INJECTION, SOLUTION INTRAVENOUS at 23:39

## 2020-04-15 RX ADMIN — ALTEPLASE 2 MG: 2.2 INJECTION, POWDER, LYOPHILIZED, FOR SOLUTION INTRAVENOUS at 07:56

## 2020-04-15 RX ADMIN — Medication 200 MCG/HR: at 10:59

## 2020-04-15 RX ADMIN — GUAIFENESIN 100 MG: 100 SOLUTION ORAL at 06:44

## 2020-04-15 RX ADMIN — CALCIUM CHLORIDE, MAGNESIUM CHLORIDE, DEXTROSE MONOHYDRATE, LACTIC ACID, SODIUM CHLORIDE, SODIUM BICARBONATE AND POTASSIUM CHLORIDE 2000 ML/HR: 5.15; 2.03; 22; 5.4; 6.46; 3.09; .157 INJECTION INTRAVENOUS at 00:30

## 2020-04-15 RX ADMIN — NOREPINEPHRINE BITARTRATE 10 MCG/MIN: 1 INJECTION INTRAVENOUS at 13:20

## 2020-04-15 RX ADMIN — INSULIN LISPRO 2 UNITS: 100 INJECTION, SOLUTION INTRAVENOUS; SUBCUTANEOUS at 18:10

## 2020-04-15 RX ADMIN — INSULIN LISPRO 2 UNITS: 100 INJECTION, SOLUTION INTRAVENOUS; SUBCUTANEOUS at 11:52

## 2020-04-15 NOTE — PROGRESS NOTES
Infectious Disease Progress Note       Subjective:     D/W with ICU team  Patient cannot given history, critically ill   Line clotted off and had another line placed   Palliative care seeing patient as well with plans for family mtg today          Objective:    Vitals:   Patient Vitals for the past 24 hrs:   Temp Pulse Resp BP SpO2   04/15/20 1152 98.5 °F (36.9 °C) -- -- -- --   04/15/20 1145 -- 89 29 -- 97 %   04/15/20 1130 -- 87 28 -- 97 %   04/15/20 1115 -- 86 28 -- 100 %   04/15/20 1100 -- 87 28 158/73 99 %   04/15/20 1045 -- 86 28 -- 99 %   04/15/20 1030 -- 85 28 -- 100 %   04/15/20 1015 -- 84 28 -- --   04/15/20 1000 -- -- -- -- 95 %   04/15/20 0945 -- 90 28 -- 97 %   04/15/20 0930 -- 92 29 -- 96 %   04/15/20 0921 -- 94 -- -- --   04/15/20 0915 -- 97 28 -- 98 %   04/15/20 0900 -- 96 28 -- 97 %   04/15/20 0845 -- 96 28 -- 97 %   04/15/20 0830 -- 95 28 -- 96 %   04/15/20 0815 -- 93 28 -- 96 %   04/15/20 0800 98.3 °F (36.8 °C) 91 28 132/65 95 %   04/15/20 0745 -- 90 28 -- 96 %   04/15/20 0738 -- 89 28 -- 95 %   04/15/20 0730 -- 88 28 -- 96 %   04/15/20 0715 -- 89 28 -- 97 %   04/15/20 0700 -- 89 28 121/54 97 %   04/15/20 0645 -- 84 28 -- 98 %   04/15/20 0630 -- 84 28 -- 100 %   04/15/20 0615 -- 84 28 -- --   04/15/20 0600 -- 83 28 110/58 --   04/15/20 0545 -- 82 28 -- 100 %   04/15/20 0530 -- 80 28 -- 100 %   04/15/20 0515 -- 81 28 -- 100 %   04/15/20 0500 -- 82 28 121/60 100 %   04/15/20 0445 -- 80 28 -- 100 %   04/15/20 0430 -- 80 28 -- 100 %   04/15/20 0416 -- 78 28 -- 100 %   04/15/20 0415 -- 81 28 -- 100 %   04/15/20 0400 94.9 °F (34.9 °C) 77 28 115/60 100 %   04/15/20 0345 -- 78 28 -- 100 %   04/15/20 0330 -- 77 28 -- 100 %   04/15/20 0315 -- 74 28 -- --   04/15/20 0300 -- 73 28 106/58 --   04/15/20 0245 -- 71 28 -- --   04/15/20 0230 -- 70 28 -- --   04/15/20 0215 -- 72 28 -- --   04/15/20 0200 -- 73 28 109/58 --   04/15/20 0145 -- 69 28 -- --   04/15/20 0130 -- 71 28 -- --   04/15/20 0115 -- 72 28 -- --   04/15/20 0100 -- 71 28 107/56 --   04/15/20 0051 -- 70 28 -- 99 %   04/15/20 0045 -- 71 28 -- --   04/15/20 0030 -- 71 28 -- --   04/15/20 0015 -- 70 28 -- --   04/15/20 0000 (!) 93.9 °F (34.4 °C) 69 28 107/57 --   04/14/20 2345 -- 69 28 -- --   04/14/20 2330 -- 70 28 -- 100 %   04/14/20 2315 -- 70 28 -- --   04/14/20 2300 -- 71 28 105/63 --   04/14/20 2245 -- 68 28 -- --   04/14/20 2230 -- 69 28 -- --   04/14/20 2215 -- 69 28 -- --   04/14/20 2200 -- 69 28 111/62 --   04/14/20 2145 -- 70 28 -- --   04/14/20 2130 -- 70 28 -- --   04/14/20 2115 -- 67 28 -- --   04/14/20 2100 -- 70 28 116/63 --   04/14/20 2045 -- 70 28 -- --   04/14/20 2030 -- 67 28 -- --   04/14/20 2021 -- 69 28 -- 100 %   04/14/20 2015 -- 71 28 -- 100 %   04/14/20 2000 (!) 93.3 °F (34.1 °C) 69 28 116/67 --   04/14/20 1945 -- 71 28 -- --   04/14/20 1930 -- 70 28 -- --   04/14/20 1915 -- 71 28 -- --   04/14/20 1900 -- 73 28 120/62 100 %   04/14/20 1845 -- 72 28 -- 100 %   04/14/20 1830 -- 78 28 -- 100 %   04/14/20 1815 -- 79 28 -- 100 %   04/14/20 1800 -- 74 28 182/83 98 %   04/14/20 1745 -- 75 28 -- 98 %   04/14/20 1730 -- 90 28 -- 100 %   04/14/20 1722 -- 83 28 -- 99 %   04/14/20 1715 -- 81 28 -- 99 %   04/14/20 1700 -- 74 28 177/82 98 %   04/14/20 1645 -- 74 28 -- 99 %   04/14/20 1630 -- 75 28 -- 99 %   04/14/20 1615 -- 76 28 -- 99 %   04/14/20 1600 97.6 °F (36.4 °C) 76 28 187/81 99 %   04/14/20 1545 -- 76 28 -- 98 %   04/14/20 1530 -- 74 28 -- 99 %   04/14/20 1515 -- 78 28 -- 99 %   04/14/20 1500 -- 74 28 -- 99 %   04/14/20 1445 -- 77 28 -- 98 %   04/14/20 1430 -- 77 28 -- 99 %   04/14/20 1415 -- 71 28 -- 98 %   04/14/20 1400 -- 82 28 152/87 99 %   04/14/20 1345 -- 73 28 -- 99 %   04/14/20 1330 -- 74 28 -- 99 %   04/14/20 1315 -- 76 28 -- 99 %       Physical Exam:  Please see ICU teams physical exam     Medications:    Current Facility-Administered Medications:     cisatracurium (NIMBEX) 2 mg/mL IV infusion, 0.5-10 mcg/kg/min (Ideal), IntraVENous, TITRATE, Clearance Kvng SCHMITT NP-C, Last Rate: 1.6 mL/hr at 04/15/20 1130, 1 mcg/kg/min at 04/15/20 1130    bicarbonate dialysis (PRISMASOL) BG K 4/Ca 2.5 5000 ml solution, , Extracorporeal, DIALYSIS CONTINUOUS, Lucía Abreu MD, Last Rate: 2,000 mL/hr at 04/15/20 1157, 2,000 mL/hr at 04/15/20 1157    alteplase (CATHFLO) 2 mg in sterile water (preservative free) 2 mL injection, 2 mg, InterCATHeter, DIALYSIS PRN, Colette Quesada MD    alteplase (CATHFLO) 2 mg in sterile water (preservative free) 2 mL injection, 2 mg, InterCATHeter, DIALYSIS PRN, Colette Quesada MD, 2 mg at 04/15/20 0756    insulin glargine (LANTUS) injection 20 Units, 20 Units, SubCUTAneous, QHS, Kirk Gordon NP    insulin lispro (HUMALOG) injection, , SubCUTAneous, Q6H, Kirk Gordon NP, 2 Units at 04/15/20 1152    0.9% sodium chloride infusion 250 mL, 250 mL, IntraVENous, PRN, Clearance Kvng SCHMITT NP-C    famotidine (PF) (PEPCID) 20 mg in 0.9% sodium chloride 10 mL injection, 20 mg, IntraVENous, Q24H, Kirk Gordon NP, 20 mg at 04/15/20 0823    chlorhexidine (PERIDEX) 0.12 % mouthwash 15 mL, 15 mL, Oral, Q12H, Kirk Gordon NP, 15 mL at 04/15/20 0824    hydrocortisone Sod Succ (PF) (SOLU-CORTEF) injection 50 mg, 50 mg, IntraVENous, Q6H, Cassandra Cox NP-C, 50 mg at 04/15/20 1117    cefepime (MAXIPIME) 2 g in 0.9% sodium chloride (MBP/ADV) 100 mL, 2 g, IntraVENous, Q12H, Iliana Rosales NP, Last Rate: 200 mL/hr at 04/15/20 0811, 2 g at 04/15/20 0811    metroNIDAZOLE (FLAGYL) IVPB premix 500 mg, 500 mg, IntraVENous, Q8H, [place], Iliana B, NP, Last Rate: 100 mL/hr at 04/15/20 0812, 500 mg at 04/15/20 0812    vancomycin (FIRVANQ) 50 mg/mL oral solution 500 mg, 500 mg, Oral, Q6H, [place], Iliana B, NP, 500 mg at 04/15/20 1131    insulin glargine (LANTUS) injection 45 Units, 45 Units, SubCUTAneous, DAILY, Bobby, Iliana B, NP, 45 Units at 04/15/20 0823    vasopressin (VASOSTRICT) 20 Units in 0.9% sodium chloride 100 mL infusion, 0-0.04 Units/min, IntraVENous, TITRATE, Nestor Cordoba MD, Stopped at 04/15/20 1129    fentaNYL (PF) 1,500 mcg/30 mL (50 mcg/mL) infusion, 0-200 mcg/hr, IntraVENous, TITRATE, Nestor Cordoba MD, Last Rate: 4 mL/hr at 04/15/20 1059, 200 mcg/hr at 04/15/20 1059    ketamine (KETALAR) 500 mg in 0.9% sodium chloride 500 mL infusion, 0.05-0.4 mg/kg/hr, IntraVENous, TITRATE, Nestor Codroba MD, Last Rate: 46.6 mL/hr at 04/15/20 1018, 0.4 mg/kg/hr at 04/15/20 1018    NOREPINephrine (LEVOPHED) 32,000 mcg in dextrose 5% 250 mL (128 mcg/mL) infusion, 0-50 mcg/min, IntraVENous, TITRATE, MANDA MorenoC, Last Rate: 6.6 mL/hr at 04/15/20 1030, 14 mcg/min at 04/15/20 1030    [Held by provider] labetaloL (NORMODYNE) tablet 100 mg, 100 mg, Oral, Q12H, Iliana Rosales NP    0.9% sodium chloride infusion, 3 mL/hr, IntraVENous, CONTINUOUS, Michell Madison MD, Last Rate: 3 mL/hr at 04/13/20 2316, 3 mL/hr at 04/13/20 2316    0.9% sodium chloride infusion, 5 mL/hr, IntraVENous, CONTINUOUS, Michell Madison MD, Last Rate: 5 mL/hr at 04/14/20 0127, 5 mL/hr at 04/14/20 0127    labetaloL (NORMODYNE;TRANDATE) injection 10 mg, 10 mg, IntraVENous, Q10MIN PRN, Alex Sharpe MD    fentaNYL citrate (PF) injection  mcg,  mcg, IntraVENous, Q1H PRN, Alex Sharpe MD, 100 mcg at 04/11/20 1232    white petrolatum-mineral oiL (AKWA TEARS) 83-15 % ophthalmic ointment, , Both Eyes, Q12H, Michell Madison MD    heparin (porcine) pf 300 Units, 300 Units, InterCATHeter, PRN, CELESTINA Moreno    guaiFENesin (ROBITUSSIN) 100 mg/5 mL oral liquid 100 mg, 100 mg, Oral, Q6H, Cassandra Cox NP-C, 100 mg at 04/15/20 1117    midazolam (VERSED) injection 1-2 mg, 1-2 mg, IntraVENous, Q2H PRN, CELESTINA Moreno, 1 mg at 04/09/20 1305    bacitracin 500 unit/gram packet 1 Packet, 1 Packet, Topical, PRN, Jake Florez NP, 1 Packet at 04/01/20 0116    balsam peru-castor oiL (VENELEX) ointment, , Topical, BID, Chapincito Madison MD    sodium chloride (NS) flush 5-40 mL, 5-40 mL, IntraVENous, Q8H, Chapincito Madison MD, 10 mL at 04/15/20 2270    sodium chloride (NS) flush 5-40 mL, 5-40 mL, IntraVENous, PRN, Chapincito Skinner MD, 30 mL at 04/13/20 2005    HYDROcodone-acetaminophen (NORCO) 5-325 mg per tablet 1 Tab, 1 Tab, Oral, Q4H PRN, Teresita Heredia MD, 1 Tab at 04/08/20 0528    ondansetron (ZOFRAN) injection 4 mg, 4 mg, IntraVENous, Q4H PRN, Chapincito Skinner MD    acetaminophen (TYLENOL) tablet 650 mg, 650 mg, Oral, Q6H PRN **OR** acetaminophen (TYLENOL) suppository 650 mg, 650 mg, Rectal, Q6H PRN, Iliana Rosales NP    glucose chewable tablet 16 g, 4 Tab, Oral, PRN, Chapincito Skinner MD    glucagon (GLUCAGEN) injection 1 mg, 1 mg, IntraMUSCular, PRN, Chapincito Skinner MD    dextrose 10% infusion 0-250 mL, 0-250 mL, IntraVENous, PRN, Chapincito Skinner MD      Labs:  Recent Results (from the past 24 hour(s))   CBC W/O DIFF    Collection Time: 04/14/20  4:37 PM   Result Value Ref Range    WBC 33.6 (H) 3.6 - 11.0 K/uL    RBC 2.58 (L) 3.80 - 5.20 M/uL    HGB 7.7 (L) 11.5 - 16.0 g/dL    HCT 23.7 (L) 35.0 - 47.0 %    MCV 91.9 80.0 - 99.0 FL    MCH 29.8 26.0 - 34.0 PG    MCHC 32.5 30.0 - 36.5 g/dL    RDW 17.9 (H) 11.5 - 14.5 %    PLATELET 350 (L) 026 - 400 K/uL    MPV 10.2 8.9 - 12.9 FL    NRBC 1.5 (H) 0  WBC    ABSOLUTE NRBC 0.51 (H) 0.00 - 0.01 K/uL   LACTIC ACID    Collection Time: 04/14/20  4:37 PM   Result Value Ref Range    Lactic acid 1.1 0.4 - 2.0 MMOL/L   GLUCOSE, POC    Collection Time: 04/14/20  6:05 PM   Result Value Ref Range    Glucose (POC) 250 (H) 65 - 100 mg/dL    Performed by [facility]    CBC W/O DIFF    Collection Time: 04/14/20 10:00 PM   Result Value Ref Range    WBC 22.6 (H) 3.6 - 11.0 K/uL    RBC 2.48 (L) 3.80 - 5.20 M/uL    HGB 7.3 (L) 11.5 - 16.0 g/dL    HCT 22.8 (L) 35.0 - 47.0 %    MCV 91.9 80.0 - 99.0 FL    MCH 29.4 26.0 - 34.0 PG    MCHC 32.0 30.0 - 36.5 g/dL    RDW 17.7 (H) 11.5 - 14.5 %    PLATELET 86 (L) 832 - 400 K/uL    MPV 10.5 8.9 - 12.9 FL    NRBC 1.1 (H) 0  WBC    ABSOLUTE NRBC 0.25 (H) 0.00 - 0.01 K/uL   PTT    Collection Time: 04/14/20 10:11 PM   Result Value Ref Range    aPTT 27.6 22.1 - 32.0 sec    aPTT, therapeutic range     58.0 - 59.6 SECS   METABOLIC PANEL, BASIC    Collection Time: 04/14/20 10:11 PM   Result Value Ref Range    Sodium 135 (L) 136 - 145 mmol/L    Potassium 3.6 3.5 - 5.1 mmol/L    Chloride 104 97 - 108 mmol/L    CO2 21 21 - 32 mmol/L    Anion gap 10 5 - 15 mmol/L    Glucose 222 (H) 65 - 100 mg/dL    BUN 48 (H) 6 - 20 MG/DL    Creatinine 1.66 (H) 0.55 - 1.02 MG/DL    BUN/Creatinine ratio 29 (H) 12 - 20      GFR est AA 39 (L) >60 ml/min/1.73m2    GFR est non-AA 32 (L) >60 ml/min/1.73m2    Calcium 9.1 8.5 - 10.1 MG/DL   GLUCOSE, POC    Collection Time: 04/14/20 11:04 PM   Result Value Ref Range    Glucose (POC) 239 (H) 65 - 100 mg/dL    Performed by [facility]    HEPATIC FUNCTION PANEL    Collection Time: 04/15/20  4:00 AM   Result Value Ref Range    Protein, total 5.3 (L) 6.4 - 8.2 g/dL    Albumin 2.6 (L) 3.5 - 5.0 g/dL    Globulin 2.7 2.0 - 4.0 g/dL    A-G Ratio 1.0 (L) 1.1 - 2.2      Bilirubin, total 1.8 (H) 0.2 - 1.0 MG/DL    Bilirubin, direct 1.1 (H) 0.0 - 0.2 MG/DL    Alk.  phosphatase 198 (H) 45 - 117 U/L    AST (SGOT) 197 (H) 15 - 37 U/L    ALT (SGPT) 601 (H) 12 - 78 U/L   METABOLIC PANEL, BASIC    Collection Time: 04/15/20  4:53 AM   Result Value Ref Range    Sodium 137 136 - 145 mmol/L    Potassium 3.4 (L) 3.5 - 5.1 mmol/L    Chloride 106 97 - 108 mmol/L    CO2 24 21 - 32 mmol/L    Anion gap 7 5 - 15 mmol/L    Glucose 222 (H) 65 - 100 mg/dL    BUN 46 (H) 6 - 20 MG/DL    Creatinine 1.56 (H) 0.55 - 1.02 MG/DL    BUN/Creatinine ratio 29 (H) 12 - 20      GFR est AA 41 (L) >60 ml/min/1.73m2    GFR est non-AA 34 (L) >60 ml/min/1.73m2    Calcium 9.2 8.5 - 10.1 MG/DL   MAGNESIUM    Collection Time: 04/15/20  4:53 AM   Result Value Ref Range    Magnesium 2.1 1.6 - 2.4 mg/dL   FIBRINOGEN    Collection Time: 04/15/20  4:54 AM   Result Value Ref Range    Fibrinogen 265 200 - 475 mg/dL   D DIMER    Collection Time: 04/15/20  4:54 AM   Result Value Ref Range    D-dimer 9.19 (H) 0.00 - 0.65 mg/L FEU   PTT    Collection Time: 04/15/20  4:54 AM   Result Value Ref Range    aPTT 27.8 22.1 - 32.0 sec    aPTT, therapeutic range     58.0 - 77.0 SECS   CBC W/O DIFF    Collection Time: 04/15/20  4:54 AM   Result Value Ref Range    WBC 21.9 (H) 3.6 - 11.0 K/uL    RBC 2.36 (L) 3.80 - 5.20 M/uL    HGB 7.1 (L) 11.5 - 16.0 g/dL    HCT 21.5 (L) 35.0 - 47.0 %    MCV 91.1 80.0 - 99.0 FL    MCH 30.1 26.0 - 34.0 PG    MCHC 33.0 30.0 - 36.5 g/dL    RDW 17.7 (H) 11.5 - 14.5 %    PLATELET 80 (L) 842 - 400 K/uL    MPV 10.3 8.9 - 12.9 FL    NRBC 1.5 (H) 0  WBC    ABSOLUTE NRBC 0.32 (H) 0.00 - 0.01 K/uL   CRP, HIGH SENSITIVITY    Collection Time: 04/15/20  4:54 AM   Result Value Ref Range    CRP, High sensitivity >9.5 mg/L   PROTHROMBIN TIME + INR    Collection Time: 04/15/20  4:54 AM   Result Value Ref Range    INR 1.3 (H) 0.9 - 1.1      Prothrombin time 13.3 (H) 9.0 - 11.1 sec   GLUCOSE, POC    Collection Time: 04/15/20  6:48 AM   Result Value Ref Range    Glucose (POC) 240 (H) 65 - 100 mg/dL    Performed by [facility]    CBC WITH AUTOMATED DIFF    Collection Time: 04/15/20 11:33 AM   Result Value Ref Range    WBC 23.5 (H) 3.6 - 11.0 K/uL    RBC 2.41 (L) 3.80 - 5.20 M/uL    HGB 7.3 (L) 11.5 - 16.0 g/dL    HCT 22.6 (L) 35.0 - 47.0 %    MCV 93.8 80.0 - 99.0 FL    MCH 30.3 26.0 - 34.0 PG    MCHC 32.3 30.0 - 36.5 g/dL    RDW 19.3 (H) 11.5 - 14.5 %    PLATELET 94 (L) 334 - 400 K/uL    MPV 10.5 8.9 - 12.9 FL    NRBC 2.1 (H) 0  WBC    ABSOLUTE NRBC 0.49 (H) 0.00 - 0.01 K/uL    NEUTROPHILS 83 (H) 32 - 75 %    LYMPHOCYTES 9 (L) 12 - 49 % MONOCYTES 6 5 - 13 %    EOSINOPHILS 0 0 - 7 %    BASOPHILS 0 0 - 1 %    IMMATURE GRANULOCYTES 2 (H) 0.0 - 0.5 %    ABS. NEUTROPHILS 19.5 (H) 1.8 - 8.0 K/UL    ABS. LYMPHOCYTES 2.1 0.8 - 3.5 K/UL    ABS. MONOCYTES 1.4 (H) 0.0 - 1.0 K/UL    ABS. EOSINOPHILS 0.0 0.0 - 0.4 K/UL    ABS. BASOPHILS 0.0 0.0 - 0.1 K/UL    ABS. IMM.  GRANS. 0.5 (H) 0.00 - 0.04 K/UL    DF SMEAR SCANNED      RBC COMMENTS ANISOCYTOSIS  3+        RBC COMMENTS POLYCHROMASIA  PRESENT        RBC COMMENTS HYPOCHROMIA  1+       METABOLIC PANEL, BASIC    Collection Time: 04/15/20 11:33 AM   Result Value Ref Range    Sodium 136 136 - 145 mmol/L    Potassium 3.6 3.5 - 5.1 mmol/L    Chloride 105 97 - 108 mmol/L    CO2 24 21 - 32 mmol/L    Anion gap 7 5 - 15 mmol/L    Glucose 194 (H) 65 - 100 mg/dL    BUN 46 (H) 6 - 20 MG/DL    Creatinine 1.55 (H) 0.55 - 1.02 MG/DL    BUN/Creatinine ratio 30 (H) 12 - 20      GFR est AA 42 (L) >60 ml/min/1.73m2    GFR est non-AA 34 (L) >60 ml/min/1.73m2    Calcium 9.0 8.5 - 10.1 MG/DL   PTT    Collection Time: 04/15/20 11:33 AM   Result Value Ref Range    aPTT 27.6 22.1 - 32.0 sec    aPTT, therapeutic range     58.0 - 77.0 SECS   GLUCOSE, POC    Collection Time: 04/15/20 11:39 AM   Result Value Ref Range    Glucose (POC) 197 (H) 65 - 100 mg/dL    Performed by Fadi Lopez    LACTIC ACID    Collection Time: 04/15/20 11:49 AM   Result Value Ref Range    Lactic acid 1.2 0.4 - 2.0 MMOL/L           Micro:   Blood 4/9/20       Component Value Ref Range & Units Status   Special Requests: NO SPECIAL REQUESTS    Preliminary   Culture result: NO GROWTH 4 DAYS    Preliminary   Result History       Blood 4/8/20  Component Value Ref Range & Units Status   Special Requests: NO SPECIAL REQUESTS    Final   Culture result: NO GROWTH 5 DAYS    Final   Result History               Blood: 3/31/20  Specimen Information: Blood        Component Value Ref Range & Units Status   Special Requests: NO SPECIAL REQUESTS    Final   Culture result: NO GROWTH 5 DAYS    Final   Result History              Sputum 3/31/20   Sputum        Component Value Ref Range & Units Status   Special Requests: NO SPECIAL REQUESTS    Final   GRAM STAIN FEW WBCS SEEN    Final   GRAM STAIN    Final   2+ GRAM POSITIVE COCCI IN PAIRS    Culture result: Abnormal     Final   MODERATE STAPHYLOCOCCUS AUREUS    Culture result:    Final   LIGHT NORMAL RESPIRATORY SOFIE    Susceptibility      Staphylococcus aureus     LU    Ciprofloxacin ($) <=0.5 ug/mL S    Clindamycin ($)  R    Doxycycline ($$) <=0.5 ug/mL S    Erythromycin ($$$$) >=8 ug/mL R    Gentamicin ($) <=0.5 ug/mL S    Levofloxacin ($) <=0.12 ug/mL S    Linezolid ($$$$$) 2 ug/mL S    Moxifloxacin ($$$$) <=0.25 ug/mL S    Oxacillin 0.5 ug/mL S    Rifampin ($$$$) <=0.5 ug/mL S1    Tetracycline <=1 ug/mL S    Trimeth/Sulfa <=10 ug/mL S    Vancomycin ($) 1 ug/mL S                         Imaging:  CXR 4/5/20  FINDINGS: AP radiograph of the chest was obtained.     There is been interval advancement of the endotracheal tube which now terminates  1.6 cm above the bhavik. Right upper extremity PICC and gastric decompression  tubes are again noted. There is no significant change in diffuse bilateral  heterogeneous opacities. Likely trace left pleural effusion. No pneumothorax. Stable cardiomediastinal silhouette.     IMPRESSION  IMPRESSION:   1. Interval advancement of endotracheal tube which now terminates 1.6 cm above  the bhavik. Consider slight retraction. 2. Unchanged diffuse bilateral heterogeneous opacities, consistent with  multifocal pneumonia. Likely trace left pleural effusion.           Assessment / Plan    Ms. Elfida Mcardle is a 57-year-old lady with a history of nephrolithiasis, GERD, basal cell carcinoma who was admitted from [facility] with respiratory symptoms concerning for COVID 19 and tested + on 3/26/20. Per team,  exposure to her mother with COVID 23. Per notes, intubated on 3/28/2020.   Transferred to [facility] for CRRT    Had code blue 4/9/20   Resuscitated            1) Sars-CoV2 pneumonia, respiratory failure, renal failure   Sars-CoV-2 detected 3/26/20, other viral respiratory panel negative  (records from Care everywhere )   Urine legionella and S pneumo ag negative 3/27/20  Procalcitonin 3/27 0.47, 3/28 0.44, 3/29 5.86, 3/31 13.44  , Ferritin 1985 3/31/20    4/3/20   , ferritin 992, CRP 9.65 4/5/20   IL 6  402 3/31/20  Completed azithromycin and Plaquenil   S/P Actemra one dose 4/3.    Was on On Heparin gtt , now off per ICU team   On IV Steroids per ICU team     2)  MSSA on sputum, pharmacy dosing vancomycin based on levels due to Penicillin allergy    Completed a 7 day course of antibiotics       3) marked leukocytosis  WBC was 11-16 before code blue on 4/9/20   Suspect from coding event/reactory   Started by primary team PO Vancomycin for empiric CDI treatment  Started by primary team on Vancomycin IV Cefepime and flagyl empirically given marked WBC elevation   Blood cx negative   Stopped Vancomycin IV on 4/13   CT A/P/C 4/10 with large hematoma R retroperitoneal   Plan to Stop  Cefepime and Flagyl on 4/17/20            4) LLE PE on CT 4/10/20  Currently off anticoagulation   Had retroperitoneal bleed      5) Retroperitoneal hematoma on imaging 4/10/20    5) ARF:   Plans per nephrology, CRRT  R IJ    6) history of nephrolithiasis    7) history of GERD    8) basal cell carcinoma per chart        D/W with icu team today     Please contact with questions     Allison Padgett DO  1:01 PM

## 2020-04-15 NOTE — PROGRESS NOTES
[facility]   [address] [address] [facility] [address], [facility] [address], [facility]  Phone: [phone]   [phone]       Nephrology Progress Note  Terence Schaefer     1960     [id number]  Date of Admission : 3/31/2020  04/15/20    CC: Follow up for JUANCHO      Assessment and Plan   JUANCHO :  - 2/2 ATN  - on CVVHD now  - factor at 200cc/hr  - daily labs    Hypokalemia:  - change to 4K bath today    COVID-19 +ve   Acute Hypoxic Resp Failure   - On Vent   - completed Plaquenil. On Vit C, Zinc   - s/p Tocilizumab      Leukocytosis:  - cultures pending  - on cefepime    RP hematoma:  - hgb stable  - transfuse PRN    Cardiac arrest 4/9:  - per ICU team    Type II DM   - Insulin per primary team      Hypothermia   Morbid Obesity      Care Plan discussed with:  ICU team and RN     Interval History:  Narciso Gillespie changed yesterday,  No issues overnight per RN. Remains on CVVHD, factor of 200/hr. On levophed this AM.      PT NOT EXAMINED in line with ASN and RPA GUIDELINES ON MANAGING COVID-19 PTS WITH RENAL ISSUES. Examination findings discussed personally with the examining Physician team member      Review of Systems: Review of systems not obtained due to patient factors.     Current Medications:   Current Facility-Administered Medications   Medication Dose Route Frequency    cisatracurium (NIMBEX) 2 mg/mL IV infusion  0.5-10 mcg/kg/min (Ideal) IntraVENous TITRATE    0.9% sodium chloride infusion 250 mL  250 mL IntraVENous PRN    insulin glargine (LANTUS) injection 12 Units  12 Units SubCUTAneous QHS    famotidine (PF) (PEPCID) 20 mg in 0.9% sodium chloride 10 mL injection  20 mg IntraVENous Q24H    chlorhexidine (PERIDEX) 0.12 % mouthwash 15 mL  15 mL Oral Q12H    bicarbonate dialysis (PRISMASOL) BG K 2/Ca 3.5 5000 ml solution   Extracorporeal DIALYSIS CONTINUOUS    hydrocortisone Sod Succ (PF) (SOLU-CORTEF) injection 50 mg  50 mg IntraVENous Q6H    cefepime (MAXIPIME) 2 g in 0.9% sodium chloride (MBP/ADV) 100 mL 2 g IntraVENous Q12H    metroNIDAZOLE (FLAGYL) IVPB premix 500 mg  500 mg IntraVENous Q8H    vancomycin (FIRVANQ) 50 mg/mL oral solution 500 mg  500 mg Oral Q6H    insulin glargine (LANTUS) injection 45 Units  45 Units SubCUTAneous DAILY    vasopressin (VASOSTRICT) 20 Units in 0.9% sodium chloride 100 mL infusion  0-0.04 Units/min IntraVENous TITRATE    fentaNYL (PF) 1,500 mcg/30 mL (50 mcg/mL) infusion  0-200 mcg/hr IntraVENous TITRATE    ketamine (KETALAR) 500 mg in 0.9% sodium chloride 500 mL infusion  0.05-0.4 mg/kg/hr IntraVENous TITRATE    NOREPINephrine (LEVOPHED) 32,000 mcg in dextrose 5% 250 mL (128 mcg/mL) infusion  0-50 mcg/min IntraVENous TITRATE    [Held by provider] labetaloL (NORMODYNE) tablet 100 mg  100 mg Oral Q12H    0.9% sodium chloride infusion  3 mL/hr IntraVENous CONTINUOUS    0.9% sodium chloride infusion  5 mL/hr IntraVENous CONTINUOUS    labetaloL (NORMODYNE;TRANDATE) injection 10 mg  10 mg IntraVENous Q10MIN PRN    fentaNYL citrate (PF) injection  mcg   mcg IntraVENous Q1H PRN    white petrolatum-mineral oiL (AKWA TEARS) 83-15 % ophthalmic ointment   Both Eyes Q12H    heparin (porcine) pf 300 Units  300 Units InterCATHeter PRN    guaiFENesin (ROBITUSSIN) 100 mg/5 mL oral liquid 100 mg  100 mg Oral Q6H    alteplase (CATHFLO) 2 mg in sterile water (preservative free) 2 mL injection  2 mg InterCATHeter PRN    alteplase (CATHFLO) 2 mg in sterile water (preservative free) 2 mL injection  2 mg InterCATHeter PRN    midazolam (VERSED) injection 1-2 mg  1-2 mg IntraVENous Q2H PRN    bacitracin 500 unit/gram packet 1 Packet  1 Packet Topical PRN    balsam peru-castor oiL (VENELEX) ointment   Topical BID    sodium chloride (NS) flush 5-40 mL  5-40 mL IntraVENous Q8H    sodium chloride (NS) flush 5-40 mL  5-40 mL IntraVENous PRN    HYDROcodone-acetaminophen (NORCO) 5-325 mg per tablet 1 Tab  1 Tab Oral Q4H PRN    ondansetron (ZOFRAN) injection 4 mg  4 mg IntraVENous Q4H PRN    acetaminophen (TYLENOL) tablet 650 mg  650 mg Oral Q6H PRN    Or    acetaminophen (TYLENOL) suppository 650 mg  650 mg Rectal Q6H PRN    glucose chewable tablet 16 g  4 Tab Oral PRN    glucagon (GLUCAGEN) injection 1 mg  1 mg IntraMUSCular PRN    dextrose 10% infusion 0-250 mL  0-250 mL IntraVENous PRN    insulin lispro (HUMALOG) injection   SubCUTAneous Q6H      Allergies   Allergen Reactions    Augmentin [Amoxicillin-Pot Clavulanate] Rash and Itching       Objective:  Vitals:    Vitals:    04/15/20 0430 04/15/20 0445 04/15/20 0500 04/15/20 0515   BP:   121/60    Pulse: 80 80 82 81   Resp: 28 28 28 28   Temp:       SpO2: 100% 100% 100% 100%   Weight:       Height:         Intake and Output:  04/14 1901 - 04/15 0700  In: 290.8 [I.V.:220.8]  Out: 3122   04/13 0701 - 04/14 1900  In: 3796.6 [I.V.:3201.6]  Out: 3889 [Drains:15]    Physical Examination:   Pt intubated    Yes  General: Paralyzed on the vent  Resp:  On vent   CV:  RRR  GI:  Obese   Neurologic:  Sedated   Access:           R femoral melissa     []    High complexity decision making was performed  []    Patient is at high-risk of decompensation with multiple organ involvement    Lab Data Personally Reviewed: I have reviewed all the pertinent labs, microbiology data and radiology studies during assessment.     Recent Labs     04/14/20  2211 04/14/20  1236 04/14/20  0400 04/14/20  0342 04/13/20  2207 04/13/20  1329 04/13/20  0819 04/13/20  0254  04/12/20  1530   * 135*  --  134* 135* 135* 136  --    < >  --    K 3.6 4.6  --  4.8 4.7 5.9* 5.6*  --    < >  --     104  --  104 104 107 107  --    < >  --    CO2 21 21  --  20* 20* 20* 21  --    < >  --    * 265*  --  261* 244* 264* 234*  --    < >  --    BUN 48* 61*  --  52* 50* 59* 52*  --    < >  --    CREA 1.66* 2.32*  --  2.18* 2.01* 2.50* 2.27*  --    < >  --    CA 9.1 9.2  --  8.8 8.8 8.1* 8.2*  --    < >  --    PHOS  --   --   --   --   --   --  4.0  --   --   --    ALB --   --  2.8*  --   --   --  3.0*  2.9*  --   --   --    SGOT  --   --  326*  --   --   --  516*  --   --   --    ALT  --   --  814*  --   --   --  990*  --   --   --    INR  --   --   --   --   --   --   --  1.6*  --  1.8*    < > = values in this interval not displayed.      Recent Labs     04/15/20  0454 04/14/20  2200 04/14/20  1637 04/14/20  1236 04/14/20  0342   WBC 21.9* 22.6* 33.6* 35.3* 42.3*   HGB 7.1* 7.3* 7.7* 7.9* 6.9*   HCT 21.5* 22.8* 23.7* 24.3* 21.6*   PLT 80* 86* 109* 104* 107*     No results found for: [facility]  Lab Results   Component Value Date/Time    Culture result: NO GROWTH 5 DAYS 04/09/2020 02:32 PM    Culture result: NO GROWTH 5 DAYS 04/08/2020 10:36 AM    Culture result: NO GROWTH 5 DAYS 03/31/2020 11:15 AM    Culture result: MODERATE STAPHYLOCOCCUS AUREUS (A) 03/31/2020 10:34 AM    Culture result: LIGHT NORMAL RESPIRATORY SOFIE 03/31/2020 10:34 AM     Recent Results (from the past 24 hour(s))   GLUCOSE, POC    Collection Time: 04/14/20 11:22 AM   Result Value Ref Range    Glucose (POC) 284 (H) 65 - 100 mg/dL    Performed by Sylvia Diez    METABOLIC PANEL, BASIC    Collection Time: 04/14/20 12:36 PM   Result Value Ref Range    Sodium 135 (L) 136 - 145 mmol/L    Potassium 4.6 3.5 - 5.1 mmol/L    Chloride 104 97 - 108 mmol/L    CO2 21 21 - 32 mmol/L    Anion gap 10 5 - 15 mmol/L    Glucose 265 (H) 65 - 100 mg/dL    BUN 61 (H) 6 - 20 MG/DL    Creatinine 2.32 (H) 0.55 - 1.02 MG/DL    BUN/Creatinine ratio 26 (H) 12 - 20      GFR est AA 26 (L) >60 ml/min/1.73m2    GFR est non-AA 22 (L) >60 ml/min/1.73m2    Calcium 9.2 8.5 - 10.1 MG/DL   PTT    Collection Time: 04/14/20 12:36 PM   Result Value Ref Range    aPTT 27.2 22.1 - 32.0 sec    aPTT, therapeutic range     58.0 - 77.0 SECS   CBC W/O DIFF    Collection Time: 04/14/20 12:36 PM   Result Value Ref Range    WBC 35.3 (H) 3.6 - 11.0 K/uL    RBC 2.62 (L) 3.80 - 5.20 M/uL    HGB 7.9 (L) 11.5 - 16.0 g/dL    HCT 24.3 (L) 35.0 - 47.0 %    MCV 92.7 80.0 - 99.0 FL    MCH 30.2 26.0 - 34.0 PG    MCHC 32.5 30.0 - 36.5 g/dL    RDW 17.3 (H) 11.5 - 14.5 %    PLATELET 217 (L) 299 - 400 K/uL    MPV 10.1 8.9 - 12.9 FL    NRBC 1.9 (H) 0  WBC    ABSOLUTE NRBC 0.68 (H) 0.00 - 0.01 K/uL   CBC W/O DIFF    Collection Time: 04/14/20  4:37 PM   Result Value Ref Range    WBC 33.6 (H) 3.6 - 11.0 K/uL    RBC 2.58 (L) 3.80 - 5.20 M/uL    HGB 7.7 (L) 11.5 - 16.0 g/dL    HCT 23.7 (L) 35.0 - 47.0 %    MCV 91.9 80.0 - 99.0 FL    MCH 29.8 26.0 - 34.0 PG    MCHC 32.5 30.0 - 36.5 g/dL    RDW 17.9 (H) 11.5 - 14.5 %    PLATELET 381 (L) 667 - 400 K/uL    MPV 10.2 8.9 - 12.9 FL    NRBC 1.5 (H) 0  WBC    ABSOLUTE NRBC 0.51 (H) 0.00 - 0.01 K/uL   LACTIC ACID    Collection Time: 04/14/20  4:37 PM   Result Value Ref Range    Lactic acid 1.1 0.4 - 2.0 MMOL/L   GLUCOSE, POC    Collection Time: 04/14/20  6:05 PM   Result Value Ref Range    Glucose (POC) 250 (H) 65 - 100 mg/dL    Performed by Alea Velez    CBC W/O DIFF    Collection Time: 04/14/20 10:00 PM   Result Value Ref Range    WBC 22.6 (H) 3.6 - 11.0 K/uL    RBC 2.48 (L) 3.80 - 5.20 M/uL    HGB 7.3 (L) 11.5 - 16.0 g/dL    HCT 22.8 (L) 35.0 - 47.0 %    MCV 91.9 80.0 - 99.0 FL    MCH 29.4 26.0 - 34.0 PG    MCHC 32.0 30.0 - 36.5 g/dL    RDW 17.7 (H) 11.5 - 14.5 %    PLATELET 86 (L) 027 - 400 K/uL    MPV 10.5 8.9 - 12.9 FL    NRBC 1.1 (H) 0  WBC    ABSOLUTE NRBC 0.25 (H) 0.00 - 0.01 K/uL   PTT    Collection Time: 04/14/20 10:11 PM   Result Value Ref Range    aPTT 27.6 22.1 - 32.0 sec    aPTT, therapeutic range     58.0 - 41.8 SECS   METABOLIC PANEL, BASIC    Collection Time: 04/14/20 10:11 PM   Result Value Ref Range    Sodium 135 (L) 136 - 145 mmol/L    Potassium 3.6 3.5 - 5.1 mmol/L    Chloride 104 97 - 108 mmol/L    CO2 21 21 - 32 mmol/L    Anion gap 10 5 - 15 mmol/L    Glucose 222 (H) 65 - 100 mg/dL    BUN 48 (H) 6 - 20 MG/DL    Creatinine 1.66 (H) 0.55 - 1.02 MG/DL    BUN/Creatinine ratio 29 (H) 12 - 20      GFR est AA 39 (L) >60 ml/min/1.73m2    GFR est non-AA 32 (L) >60 ml/min/1.73m2    Calcium 9.1 8.5 - 10.1 MG/DL   GLUCOSE, POC    Collection Time: 04/14/20 11:04 PM   Result Value Ref Range    Glucose (POC) 239 (H) 65 - 100 mg/dL    Performed by Vola Koyanagi    CBC W/O DIFF    Collection Time: 04/15/20  4:54 AM   Result Value Ref Range    WBC 21.9 (H) 3.6 - 11.0 K/uL    RBC 2.36 (L) 3.80 - 5.20 M/uL    HGB 7.1 (L) 11.5 - 16.0 g/dL    HCT 21.5 (L) 35.0 - 47.0 %    MCV 91.1 80.0 - 99.0 FL    MCH 30.1 26.0 - 34.0 PG    MCHC 33.0 30.0 - 36.5 g/dL    RDW 17.7 (H) 11.5 - 14.5 %    PLATELET 80 (L) 879 - 400 K/uL    MPV 10.3 8.9 - 12.9 FL    NRBC 1.5 (H) 0  WBC    ABSOLUTE NRBC 0.32 (H) 0.00 - 0.01 K/uL           Total time spent with patient:  xxx   min. Care Plan discussed with:  Patient     Family      RN      Consulting Physician [facility],         I have reviewed the flowsheets. Chart and Pertinent Notes have been reviewed. No change in PMH ,family and social history from Consult note.       Nav Hernández MD

## 2020-04-15 NOTE — PROGRESS NOTES
0730: Bedside shift change report given to [address]ump and Rosemary Kelly RN (oncoming nurse) by Anabel Olivares RN (offgoing nurse). Report included the following information SBAR, Kardex, Intake/Output, MAR, Accordion, Recent Results, Med Rec Status and Cardiac Rhythm SR/ST. Drips: Fentanyl 200mcgs/hr, Vasopressin 0.02units/min, Levo 14mcg/min, Ketamine 0.4mcg/kg/hr, Nimbex 2mcgs/kg/hr  Vent settings: , AC 28, FiO2 60%, Peep 8. Patient unresponsive and clotted off of CVVHD at 0615.      0800: Assumed care of patient. Cath flow given. 0900: Alexandra Hyde RN at bedside. CVVHD restarted. 1400: Family mtg call with Palliative. See Amol Lindsey palliative note. 1730:  called and spoke to for 30 mins. He was very upset about phone call with palliative this afternoon and expressed concerns. He was updated on pt condition during shift and overall status. 1930: Bedside shift change report given to C/ Noemi 29 (oncoming nurse) by [address] Crump and Rosemary Kelly RN (offgoing nurse). Report included the following information SBAR, Kardex, Intake/Output, MAR, Accordion, Recent Results, Med Rec Status and Cardiac Rhythm SR. Shift summary: Vasopressin titrated off and Levo titrated to 4mcg; pt maintainging MAP >65. Nimbex titrated to 1mcg/kg/hr; pt train of four with 4/4 twitches and synchronous with vent. Patient having some bile-like sputum; Shila Alarcon NP confirmed that it is reflux and says possible switch of GI prophylaxis might help.  and daughter on family mtg with palliative care today; He was very angry with the way the phone call went and it was decided that full restorative measures are to be taken. CVVHD was restarted during shift and has been running smoothly with no issues. Drips: Fentanyl 200mcgs, Levo 4mcg, Ketamine 0.4mcg, Nimbex 1.

## 2020-04-15 NOTE — PROGRESS NOTES
[facility]    ICU TEAM Progress Note    Name: Yanira Patel   : 1960   MRN: [id number]   Date: 4/15/2020      Assessment/Plan:     ICU Problems:  2020  1. Acute hypoxic respiratory failure 2/2 COVID 19    A. Completed Plaquenil and Zinc coarse  1. Retroperitoneal Bleed  2. Hemorrhagic Shock 2/2 acute blood loss  a. CT Abd/ Pelvis done 04/10  b. Q6hr H/H   c. Keep Hgb > 7, transfuse as needed  d. Check coags PRN  e. Hold AC meds  3. Acute kidney Injury  a. Nephrology following  b. CRRT: monitor line, clotting frequently, CathFlo used today with success  c. Trend lytes  d. Goal NET EVEN, 30cm femoral melissa placed over wire to right femoral vein 2020  4. Non occlusive PE in LLL pulmonary arteries  a. Unable to [facility] 2/2 retroperitoneal hemorrhage  5. MRSA Pneumonia  a. Continue vancomycin  6. Acute anemia of critical illness, possible 2/2 renal dysfunction  a. Transfuse for Hgb < 7  7. Diabetes Mellitus, uncontrolled  a. SSI Q6hr coverage lispro  b. Lantus 45 units Daily  c. Lantus 20 units subcut. QHS added 04/15 for ongoing glucose >200  d. Minimize use of dextrose containing fluids  8. Hyperkalemia  a. Nephrology aware and managing  b. CRRT   9. Leukocytosis, possibly reactive 2/2 acute bleeding  a. Trend WBC, likely reactive  b. Continue cefepime, vanco and flagyl  c. Consider ID consult if continuing to worsen  10. Lactic acidosis resolving  a.  Trend lactate-1.1 today            Cardiac Gtts: Norepinephrine and Vasopressin  SBP Goal of: < 140 mmHg  MAP Goal of: > 65 mmHg  Transfusion Trigger (Hgb): <7 g/dL     Respiratory Goals: Chlorhexidine   Optimize PEEP/Ventilation/Oxygenation  Goal Tidal Volume 6 cc/kg based on IBW  Aim for lung protective ventilation  Aggressive bronchopulmonary hygiene  SPO2 Goal: > 92%  Pulmonary toilet: NA   DVT Prophylaxis (if no, list reason): SCD's or Sequential Compression Device      GI Prophylaxis: Protonix (pantoprazole)   Nutrition: No 2/2 instability  IVFs: NA  Bowel Movement: Yes  Bowel Regimen: None needed at this time     Bowden Catheter Present: Yes  Glycemic Control - Insulin: Yes  Antibiotics:Cefepime  Flagyl  Vancomycin     Pain Medications: Fentanyl  Target RASS: -3 - Moderate Sedation - Movement or eye opening to voice (no eye contact)  Sedation Medications: Propofol and Ketamine  CAM-ICU:  JUAREZ  Mobility: Poor and Bedrest  PT/OT: NA   Restraints: Soft wrist restraints  Discussed Plan of Care/Code Status: Full Code     T/L/D  Tubes: ETT and Orogastric Tube  Lines: Arterial Line and Central Line  Drains: Bowden Catheter    Subjective:   Progress Note: 4/15/2020      Reason for ICU Admission:  65 yo female with hx of obesity, endometrial cancer and diabetes who presented to [facility] with worsening hypoxic respiratory failure in lieu of close exposure to COVID-19 to family including mother ( passed away) sister and brother and tested resulting + here. She presented to the hospital 3/26 and intubated 3/28. She was started on broad spectrum antibiotics and plaquenil. Developed worsening renal failure and was transferred to us for CRRT. Her hospital coarse has been further notable for development of a retroperitoneal bleed, hemorrhagic shock, Sepsis,  And ongoing acute hypoxic respiratory failure with increasing PEEP and FIo2 requirements. Goal is to wean down Nimbex today. Hopefully able to discontinue. Pastoral care to call  today.         Active Problem List:     Problem List  Date Reviewed: 1/23/2020          Codes Class    Hypotension ICD-10-CM: I95.9  ICD-9-CM: 458.9 Acute        Sepsis due to methicillin susceptible Staphylococcus aureus (MSSA) without acute organ dysfunction (HCC) ICD-10-CM: A41.01  ICD-9-CM: 038.11, 995.91 Acute        COVID-19 virus infection ICD-10-CM: U07.1         Obesity, morbid ([address] ICD-10-CM: E66.01  ICD-9-CM: 278.01         Vaginal Pap smear following hysterectomy for malignancy ICD-10-CM: Z08, Z90.710  ICD-9-CM: V67.01         Personal history of malignant neoplasm of other parts of uterus ICD-10-CM: Z85.42  ICD-9-CM: V10.42         Endometrial cancer ([facility] ICD-10-CM: C54.1  ICD-9-CM: 182.0               Past Medical History:      has a past medical history of Basal cell carcinoma, GERD (gastroesophageal reflux disease), Kidney stones, and Polyp of ureter. Past Surgical History:      has a past surgical history that includes hysteroscopy diagnostic (); pr endometrial ablation, thermal; hx  section (); and hx colonoscopy. Home Medications:     Prior to Admission medications    Medication Sig Start Date End Date Taking? Authorizing Provider   pantoprazole (PROTONIX) 40 mg tablet TAKE 1 TABLET BY MOUTH TWICE A DAY 18   Provider, Historical   esomeprazole (NEXIUM) 20 mg capsule Take 20 mg by mouth daily. Indications: BID    Provider, Historical   zolpidem (AMBIEN) 10 mg tablet Take 0.5 Tabs by mouth nightly as needed for Sleep. Max Daily Amount: 5 mg. 17   Antonia Mandel MD   fluticasone (FLONASE) 50 mcg/actuation nasal spray Mist 1-2 spray(s) into each nostril once daily. 4/3/15   Provider, Historical   ranitidine (ZANTAC) 150 mg tablet 150 mg.    Provider, Historical       Allergies/Social/Family History: Allergies   Allergen Reactions    Augmentin [Amoxicillin-Pot Clavulanate] Rash and Itching      Social History     Tobacco Use    Smoking status: Never Smoker    Smokeless tobacco: Never Used   Substance Use Topics    Alcohol use: Not on file      Family History   Problem Relation Age of Onset    Prostate Cancer Father         PROSTATE    Breast Cancer Sister 46        invasive poorly differentiated ductal carcinoma, Neg genetic testing.     Cancer Sister 62        melanoma stage 1       Objective:   Vital Signs:  Visit Vitals  /73   Pulse 89   Temp 98.5 °F (36.9 °C)   Resp 29   Ht 5' 4\" (1.626 m)   Wt 138.3 kg (304 lb 14.4 oz)   SpO2 97%   BMI 52.34 kg/m²      O2 Device: Ventilator, Endotracheal tube Temp (24hrs), Av.1 °F (35.6 °C), Min:93.3 °F (34.1 °C), Max:98.5 °F (36.9 °C)           Intake/Output:     Intake/Output Summary (Last 24 hours) at 4/15/2020 1223  Last data filed at 4/15/2020 1100  Gross per 24 hour   Intake 1774.24 ml   Output 5197 ml   Net -3422.76 ml       Physical Exam:    General:  Sedated, on paralytic agent and on the ventilator. No acute distress. , morbidly obese   Eyes: Sclera anicteric. Pupils equal, round, reactive to light. Eyes taped, does not arouse   Mouth/Throat: Orotracheal tube in place. Neck: Supple. Lungs:   Clear/DIM to auscultation bilaterally, vent assisted respirations, no accessory muscle use observed. Cardiovascular:  Tachy, Regular rate and rhythm, no murmur, click, rub, or gallop, generalized edema + 3, pulses palp x 4 ext. .   Abdomen:   Soft, non-tender, bowel sounds hypoactive, distended. Extremities: No cyanosis or + edema   Skin: No acute rash or lesions. Lymph Nodes: Cervical and supraclavicular normal.   Musculoskeletal:  No swelling other than edema, no deformity. Lines/Devices:  Intact, no erythema, drainage, or tenderness. Psychiatric: Sedated/paralyzed and appears comfortable on ventilator.           LABS AND  DATA: Personally reviewed  Recent Labs     04/15/20  1133 04/15/20  0454   WBC 23.5* 21.9*   HGB 7.3* 7.1*   HCT 22.6* 21.5*   PLT 94* 80*     Recent Labs     04/15/20  0453 20  2211  20  0819    135*   < > 136   K 3.4* 3.6   < > 5.6*    104   < > 107   CO2 24 21   < > 21   BUN 46* 48*   < > 52*   CREA 1.56* 1.66*   < > 2.27*   * 222*   < > 234*   CA 9.2 9.1   < > 8.2*   MG 2.1  --   --   --    PHOS  --   --   --  4.0    < > = values in this interval not displayed.      Recent Labs     04/15/20  0400 20  0400   SGOT 197* 326*   * 176*   TP 5.3* 5.0*   ALB 2.6* 2.8*   GLOB 2.7 2.2     Recent Labs     04/15/20  0454 20  2211  20  0254 INR 1.3*  --   --  1.6*   PTP 13.3*  --   --  15.9*   APTT 27.8 27.6   < >  --     < > = values in this interval not displayed. Recent Labs     04/14/20  0348 04/13/20  0333   PHI 7.277* 7.320*   PCO2I 45.0 39.6   PO2I 117* 68*   FIO2I 60 40     No results for input(s): CPK, CKMB, TROIQ, BNPP in the last 72 hours. Ventilator Settings:  Mode Rate Tidal Volume Pressure FiO2 PEEP   Assist control   430 ml  0 cm H2O 60 % 8 cm H20     Peak airway pressure: 28 cm H2O    Minute ventilation: 12.4 l/min        MEDS: Reviewed    Chest X-Ray:  CXR Results  (Last 48 hours)    None        04/10/2020  CT chest w/ w/o:  CT Abd/ Pelvis:  IMPRESSION:  1.  Scattered pulmonary air space disease consistent with atypical viral  infection. 2.  Nonocclusive pulmonary emboli in the left lower lobe pulmonary arteries. 3.  Large right retroperitoneal hematoma with small foci of contrast within the  hematoma. 4.  Enteric tube and endotracheal tube in appropriate position. 5.  Bilateral femoral venous catheters in the common iliac veins.       Multidisciplinary Rounds Completed:  No    ABCDEF Bundle/Checklist Completed:  Yes    SPECIAL EQUIPMENT  CRRT    DISPOSITION  Stay in ICU    CRITICAL CARE CONSULTANT NOTE  I had a face to face encounter with the patient, reviewed and interpreted patient data including clinical events, labs, images, vital signs, I/O's, and examined patient. I have discussed the case and the plan and management of the patient's care with the consulting services, the bedside nurses and the respiratory therapist.        NOTE OF PERSONAL INVOLVEMENT IN CARE   This patient has a high probability of imminent, clinically significant deterioration, which requires the highest level of preparedness to intervene urgently.  I participated in the decision-making and personally managed or directed the management of the following life and organ supporting interventions that required my frequent assessment to treat or prevent imminent deterioration. I personally spent 48 minutes of critical care time. This is time spent at this critically ill patient's bedside actively involved in patient care as well as the coordination of care and discussions with the patient's family. This does not include any procedural time which has been billed separately.     [facility] Critical Care  4/15/2020

## 2020-04-15 NOTE — DIABETES MGMT
MARTA ROMERO  CLINICAL NURSE SPECIALIST CONSULT  PROGRAM FOR DIABETES HEALTH  Follow up Note  Presentation   Ramón Aguilar is a 64 y.o. female admitted from OSH to [facility] ICU with SARS-COV2 needing CRRT. She is currently sedated and has been intubated since 3/28/20. Current clinical course has been complicated by steroid induced hyperglycemia. Steroids are discontinued at this time. She requires CRRT for acute renal failure r/t her sepsis and hypotension. PHM: GERD, obesity, and anxiety. New diabetes diagnosis with A1C 11.0% (3/28/2020); updated A1C 3/31/20-10.4%     Recent events:   Patient remains intubated and on ventilator, on CRRT. Has retroperitoneal bleed that required blood transfusions. Per Intensivist NP, her BP has been very labile recently. TF resumed. Per NP, palliative to discuss goals of care with family this afternoon. Consulted by Provider for advanced diabetes nursing assessment and care, specifically related to   [] Transitioning off Toño Jennifer   [x] Inpatient management strategy  [] Home management assessment  [] Survival skill education    Diabetes-related medical history  Acute complications  hyperglycemia  Neurological complications  NONE  Microvascular disease  NONE  Macrovascular disease  NONE  Other associated conditions     NONE    Diabetes medication history: NONE    Subjective   Per chart review, Ms. Roz Grace remains very ill  and is on isolation forSARS-COV2 in ICU. I am unable to do a physical assessment of the patient at this time. Patient reports the following home diabetes self-care practices: deferred    Objective     Vital Signs   Visit Vitals  /73   Pulse 86   Temp 98.3 °F (36.8 °C)   Resp 28   Ht 5' 4\" (1.626 m)   Wt 138.3 kg (304 lb 14.4 oz)   SpO2 100%   BMI 52.34 kg/m²   .    Laboratory  Lab Results   Component Value Date/Time    Hemoglobin A1c 10.4 (H) 03/31/2020 03:42 PM     No results found for: LDL, LDLC, DLDLP  Lab Results   Component Value Date/Time    Creatinine 1.56 (H) 04/15/2020 04:53 AM     Lab Results   Component Value Date/Time    Sodium 137 04/15/2020 04:53 AM    Potassium 3.4 (L) 04/15/2020 04:53 AM    Chloride 106 04/15/2020 04:53 AM    CO2 24 04/15/2020 04:53 AM    Anion gap 7 04/15/2020 04:53 AM    Glucose 222 (H) 04/15/2020 04:53 AM    BUN 46 (H) 04/15/2020 04:53 AM    Creatinine 1.56 (H) 04/15/2020 04:53 AM    BUN/Creatinine ratio 29 (H) 04/15/2020 04:53 AM    GFR est AA 41 (L) 04/15/2020 04:53 AM    GFR est non-AA 34 (L) 04/15/2020 04:53 AM    Calcium 9.2 04/15/2020 04:53 AM    Bilirubin, total 1.8 (H) 04/15/2020 04:00 AM    AST (SGOT) 197 (H) 04/15/2020 04:00 AM    Alk. phosphatase 198 (H) 04/15/2020 04:00 AM    Protein, total 5.3 (L) 04/15/2020 04:00 AM    Albumin 2.6 (L) 04/15/2020 04:00 AM    Globulin 2.7 04/15/2020 04:00 AM    A-G Ratio 1.0 (L) 04/15/2020 04:00 AM    ALT (SGPT) 601 (H) 04/15/2020 04:00 AM     Lab Results   Component Value Date/Time    ALT (SGPT) 601 (H) 04/15/2020 04:00 AM           Evaluation   Ms Nay Arguelles, with new onset Type 2 diabetes,with A1C 10.4%. Continues on 150mg daily hydrocortisone. Basal insulin increased to 57units daily. To maintain her basal metabolic needs she requires 27units daily. In addition she also needs 40 units of basal insulin to cover for her steroid AND 7units to cover for her TF (nepro @ 20cc/hr). So total insulin needs are: 74units daily. To make it easier, split lantus into 2 doses of 35 units BID. It is imperative that we maintain her BG within target range 100-180mg/dl. Recommendations   1. INCREASE daily Lantus to cover for metabolic, steroid and TF needs 35units twice daily (every 12 hours).     Follow the dosing schedule when tapering steroid:     20 mg Hydrocortisone: add 0.05 units/kg Lantus to total daily insulin dose  40 mg Hydrocortisone: add 0.1 units/kg Lantus to total daily insulin dose  50 mg Hydrocortisone: add 0.15 units/kg Lantus to total daily insulin dose  100 mg Hydrocortisone: add 0.3 units/kg Lantus to total daily insulin dose    2. Will continue to follow.     Assessment and Plan   Nursing Diagnosis Risk for unstable blood glucose pattern   Nursing Intervention Domain 3390 Decision-making Support   Nursing Interventions Examined current inpatient diabetes control   Explored factors facilitating and impeding inpatient management  Identified self-management practices impeding diabetes control  Explored corrective strategies with patient and responsible inpatient provider   Informed patient of rational for insulin strategy while hospitalized         Billing Code(s)     [x] [phone]  subsequent hospital care - [address] [address], [facility]   Program for Diabetes Health  [facility] via [facility] [phone]

## 2020-04-15 NOTE — PROGRESS NOTES
Palliative Medicine Consult  Phil: [phone] ([phone]    Patient Name: Hesham Escobar  YOB: 1960    Date of Initial Consult: 4/9/20  Reason for Consult: Care decisions   Requesting Provider: Mike Huitron   Primary Care Physician: Bony Rangel MD     SUMMARY:   Hesham Escobar is a 64 y.o. with a past history of DM (new dx, A1C 3/28/20 11), endometrial cancer s/p hysterectomy and BSO, obesity  who was admitted on 3/31/2020 from  [facility] with respiratory failure where she was admitted on 3/26/20. On Plaquenil and Azithromycin, intubated on 3/28/20. Was also on CRRT upon admission. S/p 1 dose Actemra. With agitation on the vent. PEA arrest 4/9/20, 7 min ROSC. 4/10/20 CT showing large R retroperitoneal hematoma and nonocclusive LLL PE.     4/10/20- More alert, 2 vasopressors. Remains critically ill. Sig anemia. 4/15/20- Ramon changed yest, clotted this morning - during that time had to go back on vasopressin and levophed . Current medical issues leading to Palliative Medicine involvement include: care decisions. Social: Pt  to Noah. Has adult dtr Betty Pringle. 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 Took care of her family- was always the one who dealt w/ crises. PALLIATIVE DIAGNOSES:   1. Shortness of breath,COVID19+, MSSA PNA  2. Multisystem organ dysfunction on CVVHD  3. Anemia w/ large RP hematoma s/p 13 units PRBC  4. Goals of care       PLAN:   1. While pt making some gains (eg WBC trending down and was off pressors until ramon clotted off again this morning) overall concern remains about overall ability to make good recovery. 2.  has received updates from ICU team and  Clifton Bryant today who notes that  very anxious about our phone meeting today. 3. Along w/ Leotis Poor SW speak to  Noah and their dtr Shazia Najera (and her ).  Learn that pt was very active before admission- working full time at [employer] for many years, took care of her family incl her elderly mother who  from [place]  25 when pt at [facility] (pt never learned that her mother ) and her siblings. 2 siblings are recovering from [address]. She loved to joke, and family always went to her in a crisis- which is why  and dtr having an extra hard time during this time. 4. They have been getting good regular updates from ICU staff but do feel that people share the information differently and feel that they get different pictures from different people. They are very aware of daily events. 5. I talk about the vent support pt needs due to COVID 19 and MSSA PNA w/ sedation required, her dependence on CVVHD (had to go back on pressors when dialysis line clotted off this morning), hematoma w/ anemia and 13 units PRBC thus far,  her cardiac arrest x 7 min before ROSC and the fact that she could have end organ damage from the code including possible brain injury although pt interactive some afterwards and that she received immediate CPR.  was upset that no one told him that ROSC x 7 min and that I bring up possible brain injury- clear that she may have none, tere since in hospital CPR better than out of the hospital and that we can't do good neuro exam due to sedation required. 6. I also want to talk about some of the \"what ifs\"- that if she makes it through this hospital stay, it is going to be a very long recovery and may require long term care hospital and/or rehab. Her kidneys may never recover and may require long term HD- and while we are no where near that right now, it's something to think about. 7. Discuss that pt's w/ COVID19 are requiring more time on the ventilator that we typically see- and discuss SBTs, etc when pt stable that can give more information about when/if we can extubate. Do talk about trach, once again , down the road.    6.  especially grieving and is angry about situation. Dtr seems to be coping a bit better and able to even ask if they will be allowed in the hospital if we are looking at an end of life situation. As of now, families are not allowed- will ask ICU team about re-testing if it would help family be able to come in.  9. Goals clear for full restorative measures, do not even talk about code status today as was brought up this weekend. 10. Family seems to be understanding how critically ill patient is. 11. She has an AMD and we encourage family to read it together,  will not tell us what it says today. 15Dallas Maldonado to call daily M-F at 12pm to give family updates, coordinating with ICU nursing and intensivist staff. May also do calls w/ ICU team to ensure all on same page. 13. Communicated plan of care with: Palliative IDTLorrie 192 Team incl Arcenio Smith RN      GOALS OF CARE / TREATMENT PREFERENCES:     GOALS OF CARE:  Patient/Health Care Proxy Stated Goals: Prolong life    TREATMENT PREFERENCES:   Code Status: Full Code    Advance Care Planning:  [x] The [facility] Interdisciplinary Team has updated the ACP Navigator with Health Care Decision Maker and Patient Capacity      Primary Decision Maker: Elana Steve - Spouse - [phone]    Advance Care Planning 4/2/2020   Confirm Advance Directive Yes, on file       Medical Interventions: Full interventions       Other:    As far as possible, the palliative care team has discussed with patient / health care proxy about goals of care / treatment preferences for patient. HISTORY:     History obtained from: Chart, staff, family     CHIEF COMPLAINT: Cannot obtain due to patient factors    HPI/SUBJECTIVE:    The patient is:   [] Verbal and participatory  [x] Non-participatory due to: medical condition    On vent, sedated.      Clinical Pain Assessment (nonverbal scale for severity on nonverbal patients):   Clinical Pain Assessment  Severity: 0     Activity (Movement): Lying quietly, normal position    Duration: for how long has pt been experiencing pain (e.g., 2 days, 1 month, years)  Frequency: how often pain is an issue (e.g., several times per day, once every few days, constant)     FUNCTIONAL ASSESSMENT:     Palliative Performance Scale (PPS):  PPS: 20       PSYCHOSOCIAL/SPIRITUAL SCREENING:     Palliative IDT has assessed this patient for cultural preferences / practices and a referral made as appropriate to needs (Cultural Services, Patient Advocacy, Ethics, etc.)    Any spiritual / Oriental orthodox concerns:  [] Yes /  [x] No    Caregiver Burnout:  [] Yes /  [x] No /  [] No Caregiver Present      Anticipatory grief assessment:   [x] Normal  / [] Maladaptive       ESAS Anxiety:      ESAS Depression:     Cannot obtain due to patient factors       REVIEW OF SYSTEMS:     Positive and pertinent negative findings in ROS are noted above in HPI. The following systems were [x] reviewed / [] unable to be reviewed as noted in HPI  Other findings are noted below. Systems: constitutional, ears/nose/mouth/throat, respiratory, gastrointestinal, genitourinary, musculoskeletal, integumentary, neurologic, psychiatric, endocrine. Positive findings noted below. Modified ESAS Completed by: provider   Fatigue: 10 Drowsiness: 8     Pain: 0           Dyspnea: 0           Stool Occurrence(s): 1        PHYSICAL EXAM:     From RN flowsheet:  Wt Readings from Last 3 Encounters:   04/15/20 304 lb 14.4 oz (138.3 kg)   01/23/20 270 lb 12.8 oz (122.8 kg)   01/22/19 278 lb (126.1 kg)     Blood pressure 158/73, pulse 89, temperature 98.5 °F (36.9 °C), resp. rate 29, height 5' 4\" (1.626 m), weight 304 lb 14.4 oz (138.3 kg), SpO2 97 %.     Pain Scale 1: Adult Nonverbal Pain Scale  Pain Intensity 1: 0              Pain Intervention(s) 1: Medication (see MAR)  Last bowel movement, if known:     Constitutional: eyes open, sedated on vent   ENMT: no nasal discharge, ET tube  Neurologic:eyes open        HISTORY:     Active Problems:    Hypotension (2020)      Sepsis due to methicillin susceptible Staphylococcus aureus (MSSA) without acute organ dysfunction ([facility] [address] (2020)      COVID-19 virus infection (3/31/2020)      Past Medical History:   Diagnosis Date    Basal cell carcinoma     GERD (gastroesophageal reflux disease)     Kidney stones     Polyp of ureter       Past Surgical History:   Procedure Laterality Date    ENDOMETRIAL ABLATION, THERMAL      HX  SECTION      HX COLONOSCOPY      2017    HYSTEROSCOPY DIAGNOSTIC      D&C/POLYP REMOVAL      Family History   Problem Relation Age of Onset    Prostate Cancer Father         PROSTATE    Breast Cancer Sister 46        invasive poorly differentiated ductal carcinoma, Neg genetic testing.  Cancer Sister 62        melanoma stage 1      History reviewed, no pertinent family history.   Social History     Tobacco Use    Smoking status: Never Smoker    Smokeless tobacco: Never Used   Substance Use Topics    Alcohol use: Not on file     Allergies   Allergen Reactions    Augmentin [Amoxicillin-Pot Clavulanate] Rash and Itching      Current Facility-Administered Medications   Medication Dose Route Frequency    cisatracurium (NIMBEX) 2 mg/mL IV infusion  0.5-10 mcg/kg/min (Ideal) IntraVENous TITRATE    bicarbonate dialysis (PRISMASOL) BG K 4/Ca 2.5 5000 ml solution   Extracorporeal DIALYSIS CONTINUOUS    alteplase (CATHFLO) 2 mg in sterile water (preservative free) 2 mL injection  2 mg InterCATHeter DIALYSIS PRN    alteplase (CATHFLO) 2 mg in sterile water (preservative free) 2 mL injection  2 mg InterCATHeter DIALYSIS PRN    insulin glargine (LANTUS) injection 20 Units  20 Units SubCUTAneous QHS    insulin lispro (HUMALOG) injection   SubCUTAneous Q6H    0.9% sodium chloride infusion 250 mL  250 mL IntraVENous PRN    famotidine (PF) (PEPCID) 20 mg in 0.9% sodium chloride 10 mL injection  20 mg IntraVENous Q24H    chlorhexidine (PERIDEX) 0.12 % mouthwash 15 mL  15 mL Oral Q12H    hydrocortisone Sod Succ (PF) (SOLU-CORTEF) injection 50 mg  50 mg IntraVENous Q6H    cefepime (MAXIPIME) 2 g in 0.9% sodium chloride (MBP/ADV) 100 mL  2 g IntraVENous Q12H    metroNIDAZOLE (FLAGYL) IVPB premix 500 mg  500 mg IntraVENous Q8H    vancomycin (FIRVANQ) 50 mg/mL oral solution 500 mg  500 mg Oral Q6H    insulin glargine (LANTUS) injection 45 Units  45 Units SubCUTAneous DAILY    vasopressin (VASOSTRICT) 20 Units in 0.9% sodium chloride 100 mL infusion  0-0.04 Units/min IntraVENous TITRATE    fentaNYL (PF) 1,500 mcg/30 mL (50 mcg/mL) infusion  0-200 mcg/hr IntraVENous TITRATE    ketamine (KETALAR) 500 mg in 0.9% sodium chloride 500 mL infusion  0.05-0.4 mg/kg/hr IntraVENous TITRATE    NOREPINephrine (LEVOPHED) 32,000 mcg in dextrose 5% 250 mL (128 mcg/mL) infusion  0-50 mcg/min IntraVENous TITRATE    [Held by provider] labetaloL (NORMODYNE) tablet 100 mg  100 mg Oral Q12H    0.9% sodium chloride infusion  3 mL/hr IntraVENous CONTINUOUS    0.9% sodium chloride infusion  5 mL/hr IntraVENous CONTINUOUS    labetaloL (NORMODYNE;TRANDATE) injection 10 mg  10 mg IntraVENous Q10MIN PRN    fentaNYL citrate (PF) injection  mcg   mcg IntraVENous Q1H PRN    white petrolatum-mineral oiL (AKWA TEARS) 83-15 % ophthalmic ointment   Both Eyes Q12H    heparin (porcine) pf 300 Units  300 Units InterCATHeter PRN    guaiFENesin (ROBITUSSIN) 100 mg/5 mL oral liquid 100 mg  100 mg Oral Q6H    midazolam (VERSED) injection 1-2 mg  1-2 mg IntraVENous Q2H PRN    bacitracin 500 unit/gram packet 1 Packet  1 Packet Topical PRN    balsam peru-castor oiL (VENELEX) ointment   Topical BID    sodium chloride (NS) flush 5-40 mL  5-40 mL IntraVENous Q8H    sodium chloride (NS) flush 5-40 mL  5-40 mL IntraVENous PRN    HYDROcodone-acetaminophen (NORCO) 5-325 mg per tablet 1 Tab  1 Tab Oral Q4H PRN    ondansetron (ZOFRAN) injection 4 mg  4 mg IntraVENous Q4H PRN    acetaminophen (TYLENOL) tablet 650 mg  650 mg Oral Q6H PRN    Or    acetaminophen (TYLENOL) suppository 650 mg  650 mg Rectal Q6H PRN    glucose chewable tablet 16 g  4 Tab Oral PRN    glucagon (GLUCAGEN) injection 1 mg  1 mg IntraMUSCular PRN    dextrose 10% infusion 0-250 mL  0-250 mL IntraVENous PRN          LAB AND IMAGING FINDINGS:     Lab Results   Component Value Date/Time    WBC 21.9 (H) 04/15/2020 04:54 AM    HGB 7.1 (L) 04/15/2020 04:54 AM    PLATELET 80 (L) 76/95/9214 04:54 AM     Lab Results   Component Value Date/Time    Sodium 137 04/15/2020 04:53 AM    Potassium 3.4 (L) 04/15/2020 04:53 AM    Chloride 106 04/15/2020 04:53 AM    CO2 24 04/15/2020 04:53 AM    BUN 46 (H) 04/15/2020 04:53 AM    Creatinine 1.56 (H) 04/15/2020 04:53 AM    Calcium 9.2 04/15/2020 04:53 AM    Magnesium 2.1 04/15/2020 04:53 AM    Phosphorus 4.0 04/13/2020 08:19 AM      Lab Results   Component Value Date/Time    AST (SGOT) 197 (H) 04/15/2020 04:00 AM    Alk. phosphatase 198 (H) 04/15/2020 04:00 AM    Protein, total 5.3 (L) 04/15/2020 04:00 AM    Albumin 2.6 (L) 04/15/2020 04:00 AM    Globulin 2.7 04/15/2020 04:00 AM     Lab Results   Component Value Date/Time    INR 1.3 (H) 04/15/2020 04:54 AM    Prothrombin time 13.3 (H) 04/15/2020 04:54 AM    aPTT 27.8 04/15/2020 04:54 AM      Lab Results   Component Value Date/Time    Ferritin 19,977 (H) 04/09/2020 08:47 PM      No results found for: PH, PCO2, PO2  No components found for: [place]   Lab Results   Component Value Date/Time    CK 42 04/03/2020 12:39 PM                Total time: 65min   Counseling / coordination time, spent as noted above: 50  min   > 50% counseling / coordination?: yes    Prolonged service was provided for  [x]30 min   []75 min in face to face time in the presence of the patient, spent as noted above. Time Start: 2pm  Time End: 250pm  Note: this can only be billed with 12714 (initial) or 21  (follow up).   If multiple start / stop times, list each separately.

## 2020-04-15 NOTE — DIALYSIS
[facility]       [phone]    Orders   Mode: CVVHD   Factor: As tolerated   UFR: 3500mL/hr   dialsyate: 200mL/min     Metrics   BP / HR: ABP:117/64 HR:96   Blood Flow Rate: 200ml/min   AP:                         -94   RP: 100   TMP: 30   PD: 22   FP: 147   dialysate: 3500mL/hr     Comments / Plan:      Cath oli dwell instilled by primary RN due to occluded blue limb. After 1 hr dwell time I was able to succesfully aspirate from both limbs. New filter primed, tested, and running well. Orders and consent verified. Pre/Post report to David Jacinto RN.  Right femoral CVC, dressing and biopatch clean, dry, and intact dated 04/14, +aspiration +flush x both limbs, lines secure, blood warmer to return line at 37 degrees C.

## 2020-04-15 NOTE — PROGRESS NOTES
Spoke with NP JUDY Kerr in patient room caring for patient; spoke with RN Tanesha Grimaldo. Called patient's  Phuong Zafar. He shared that he has a phone call scheduled with the palliative care team for 2:00 p.m. today. Phuong Zafar asked me what he could expect from conversation - I shared I had no knowledge of patient's medical condition and that the palliative care team would have an honest conversation with him. Judah pressed on whether he could expect good or bad news and I reiterated that he could expect to have an honest conversation with the care team.  Offered words of support and care. Family does not identify as Lutheran or spiritual in any way - Phuong Zafar shared that Jacinta Hamlin was raised Judaism and that is probably why \"she wants nothing to do with Congregation\". I offered presence and Phuong Zafar began to cry a little and shared this is the longest they have ever been apart. He is going to be with daughter Radha Ahn today during phone call - daughter Radha Ahn is 24years old. Spiritual care is available to provide emotional support as needed. Spirituality is not an identified coping mechanism for family. Please page as needed/desired. [phone]. Visit by: Tiffany Simpson.  Meli Alarcon D.Min, MA, [facility]    Lead  Profession Development & Advancement

## 2020-04-15 NOTE — PROGRESS NOTES
Palliative Medicine  [place]: [phone] ([phone])  Formerly [facility]: [phone])        Code Status: Full Code    Advance Care Planning:  Advance Care Planning 4/15/2020   Patient's Healthcare Decision Maker is: Named in scanned ACP document   Confirm Advance Directive Yes, not on file     Primary Decision Maker: Lisa Javi - [phone]      Patient / Family Encounter Documentation    Participants (names): Patient /MPOA Gen Brooks of 28 years, Daughter Michel Aranda; Palliative Medicine Lisa Valentin [facility], Dr. Anusha Guardado    Narrative:     Dr. Anusha Guardado and I spoke with patient family on phone. Patient in ICU, paralyzed on vent. 1. Re-introduced self and palliative service as team had not yet spoken with daughter/JOSÉ MANUEL. 2. 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 Rosa Breen is highly organize and intelligent - daughter described her as \"private and particular\". Family is very important to her ans she is the one that family leans on in crisis situations. Rosa Breen comes from a big family - she has one brother and four sisters (one dx). Sadly, patient's mom just  from [address] Rosa Breen was actually caring for her mom, and family believes that is how she contracted virus. (Patient not aware of mom's death). Patient's brother and one sister are also recovering from Covid. Gen Brooks just completed a 14-day quarantine and is back to work as a . Family has had many losses since 2018:  Jenni's father  2018  Youngest sister  May 2018     3. Family has good information regarding day to day progress/challenges for patient and the multiple organ issues patient is dealing with.  Medical update was provided as well as Cynthia picture\" including long difficult recovery if patient survives, inability to know how cardiac arrest affected patient, long term organ damage. Family, especially Libby Jessica, feels blind-sided by some of the difficult information about patient prognosis. He was very angry and feels he has heard different things from different providers/the way information has been provided. Libby Jessica became quiet after expressing his anger about this and daughter was a level-headed spokesperson for family, asking insightful questions. She inquired about visitation if this becomes an EOL situation since patient is beyond 2-week quarantine window. Provided education that currently no visitors for covid patients. Possibly re-test patient so family grief process can be facilitated. 4.  confirmed that patient has AMD but is not ready to share her wishes on living will. Libby Jessica is named as MPOA. 5. We discussed plan for communication going forward to best support family. This  will touch base with family daily at 12pm after getting report from ICU. Appreciate ICU help in this. Psychosocial challenges/Resilience factors:    Patient has very supportive family with good insight. Patient's family has had many loses since 2018 including patient's mother who just  from [place] (patient unaware of this)    Patient  is stressed/angry in setting of anticipatory grief/pandemic. Daughter rational and levelheaded, coping style is problem focused and she is actively educating herself about her mom's condition, asking good questions. Goals of Care / Plan:     Full restorative measures/full code    Daily check ins with patient family 12PM            Thank you for the opportunity to be involved in the care of Ms. Nay Arguelles and her family.     Anderson Pugh, [facility], Supervisee in Social Work  Palliative Medicine   [phone]

## 2020-04-15 NOTE — PROGRESS NOTES
2000: Pt unresponsive. Pupils fixed at 2's. TOF delivers 4 twitches. Nimbex rate increased to 4mcg/kg/hr. Pt temp axillary 93.3 F. Alvina hugger turned on.      Virgin Pattee: CVVHD clotted off d/t high positive pressure. Blue port had ~ 3in long clot inside. Dialysis RN paged. Will be obtaining order for cath flow.       0706: Nimbex weaned to 2mcgs/kg/hr;  Vaso @ 0.02 units/min; Levo @ 3mcg/min; Fentanyl @ 200mcg/hr; Ketamine @ 0.4mcg/kg/hr. Pt now has an extremely weak cough with suction.

## 2020-04-16 LAB
ALBUMIN SERPL-MCNC: 2.7 G/DL (ref 3.5–5)
ALBUMIN SERPL-MCNC: 2.8 G/DL (ref 3.5–5)
ALBUMIN/GLOB SERPL: 0.9 {RATIO} (ref 1.1–2.2)
ALP SERPL-CCNC: 251 U/L (ref 45–117)
ALT SERPL-CCNC: 502 U/L (ref 12–78)
ANION GAP SERPL CALC-SCNC: 4 MMOL/L (ref 5–15)
ANION GAP SERPL CALC-SCNC: 5 MMOL/L (ref 5–15)
ANION GAP SERPL CALC-SCNC: 6 MMOL/L (ref 5–15)
ANION GAP SERPL CALC-SCNC: 6 MMOL/L (ref 5–15)
APTT PPP: 25.1 SEC (ref 22.1–32)
ARTERIAL PATENCY WRIST A: ABNORMAL
ARTERIAL PATENCY WRIST A: ABNORMAL
AST SERPL-CCNC: 174 U/L (ref 15–37)
BASE EXCESS BLD CALC-SCNC: 0 MMOL/L
BASE EXCESS BLD CALC-SCNC: 1 MMOL/L
BDY SITE: ABNORMAL
BDY SITE: ABNORMAL
BILIRUB DIRECT SERPL-MCNC: 1 MG/DL (ref 0–0.2)
BILIRUB SERPL-MCNC: 1.8 MG/DL (ref 0.2–1)
BUN SERPL-MCNC: 34 MG/DL (ref 6–20)
BUN SERPL-MCNC: 35 MG/DL (ref 6–20)
BUN SERPL-MCNC: 39 MG/DL (ref 6–20)
BUN SERPL-MCNC: 40 MG/DL (ref 6–20)
BUN/CREAT SERPL: 32 (ref 12–20)
BUN/CREAT SERPL: 32 (ref 12–20)
BUN/CREAT SERPL: 35 (ref 12–20)
BUN/CREAT SERPL: 38 (ref 12–20)
CA-I BLD-SCNC: 1.2 MMOL/L (ref 1.12–1.32)
CA-I BLD-SCNC: 1.2 MMOL/L (ref 1.12–1.32)
CALCIUM SERPL-MCNC: 8.3 MG/DL (ref 8.5–10.1)
CALCIUM SERPL-MCNC: 8.5 MG/DL (ref 8.5–10.1)
CALCIUM SERPL-MCNC: 8.6 MG/DL (ref 8.5–10.1)
CALCIUM SERPL-MCNC: 8.7 MG/DL (ref 8.5–10.1)
CHLORIDE SERPL-SCNC: 104 MMOL/L (ref 97–108)
CHLORIDE SERPL-SCNC: 105 MMOL/L (ref 97–108)
CO2 SERPL-SCNC: 25 MMOL/L (ref 21–32)
CO2 SERPL-SCNC: 26 MMOL/L (ref 21–32)
CO2 SERPL-SCNC: 26 MMOL/L (ref 21–32)
CO2 SERPL-SCNC: 27 MMOL/L (ref 21–32)
COMMENT, HOLDF: NORMAL
CREAT SERPL-MCNC: 1.05 MG/DL (ref 0.55–1.02)
CREAT SERPL-MCNC: 1.06 MG/DL (ref 0.55–1.02)
CREAT SERPL-MCNC: 1.1 MG/DL (ref 0.55–1.02)
CREAT SERPL-MCNC: 1.11 MG/DL (ref 0.55–1.02)
CRP SERPL HS-MCNC: >9.5 MG/L
ERYTHROCYTE [DISTWIDTH] IN BLOOD BY AUTOMATED COUNT: 21.1 % (ref 11.5–14.5)
ERYTHROCYTE [DISTWIDTH] IN BLOOD BY AUTOMATED COUNT: 21.5 % (ref 11.5–14.5)
ERYTHROCYTE [DISTWIDTH] IN BLOOD BY AUTOMATED COUNT: 22 % (ref 11.5–14.5)
ERYTHROCYTE [DISTWIDTH] IN BLOOD BY AUTOMATED COUNT: 22.1 % (ref 11.5–14.5)
GAS FLOW.O2 O2 DELIVERY SYS: ABNORMAL L/MIN
GAS FLOW.O2 O2 DELIVERY SYS: ABNORMAL L/MIN
GAS FLOW.O2 SETTING OXYMISER: 28 BPM
GAS FLOW.O2 SETTING OXYMISER: 28 BPM
GLOBULIN SER CALC-MCNC: 3 G/DL (ref 2–4)
GLUCOSE BLD STRIP.AUTO-MCNC: 107 MG/DL (ref 65–100)
GLUCOSE BLD STRIP.AUTO-MCNC: 155 MG/DL (ref 65–100)
GLUCOSE BLD STRIP.AUTO-MCNC: 158 MG/DL (ref 65–100)
GLUCOSE BLD STRIP.AUTO-MCNC: 197 MG/DL (ref 65–100)
GLUCOSE SERPL-MCNC: 152 MG/DL (ref 65–100)
GLUCOSE SERPL-MCNC: 158 MG/DL (ref 65–100)
GLUCOSE SERPL-MCNC: 209 MG/DL (ref 65–100)
GLUCOSE SERPL-MCNC: 220 MG/DL (ref 65–100)
HCO3 BLD-SCNC: 25 MMOL/L (ref 22–26)
HCO3 BLD-SCNC: 26.2 MMOL/L (ref 22–26)
HCT VFR BLD AUTO: 23 % (ref 35–47)
HCT VFR BLD AUTO: 23.4 % (ref 35–47)
HCT VFR BLD AUTO: 23.7 % (ref 35–47)
HCT VFR BLD AUTO: 24.8 % (ref 35–47)
HGB BLD-MCNC: 7.2 G/DL (ref 11.5–16)
HGB BLD-MCNC: 7.3 G/DL (ref 11.5–16)
HGB BLD-MCNC: 7.5 G/DL (ref 11.5–16)
HGB BLD-MCNC: 7.7 G/DL (ref 11.5–16)
INR PPP: 1.2 (ref 0.9–1.1)
LDH SERPL L TO P-CCNC: 802 U/L (ref 81–246)
MAGNESIUM SERPL-MCNC: 2.5 MG/DL (ref 1.6–2.4)
MCH RBC QN AUTO: 30 PG (ref 26–34)
MCH RBC QN AUTO: 30.2 PG (ref 26–34)
MCH RBC QN AUTO: 30.6 PG (ref 26–34)
MCH RBC QN AUTO: 30.6 PG (ref 26–34)
MCHC RBC AUTO-ENTMCNC: 31 G/DL (ref 30–36.5)
MCHC RBC AUTO-ENTMCNC: 31.2 G/DL (ref 30–36.5)
MCHC RBC AUTO-ENTMCNC: 31.3 G/DL (ref 30–36.5)
MCHC RBC AUTO-ENTMCNC: 31.6 G/DL (ref 30–36.5)
MCV RBC AUTO: 96.3 FL (ref 80–99)
MCV RBC AUTO: 96.7 FL (ref 80–99)
MCV RBC AUTO: 97.3 FL (ref 80–99)
MCV RBC AUTO: 97.9 FL (ref 80–99)
NRBC # BLD: 0.19 K/UL (ref 0–0.01)
NRBC # BLD: 0.19 K/UL (ref 0–0.01)
NRBC # BLD: 0.21 K/UL (ref 0–0.01)
NRBC # BLD: 0.21 K/UL (ref 0–0.01)
NRBC BLD-RTO: 0.9 PER 100 WBC
NRBC BLD-RTO: 0.9 PER 100 WBC
NRBC BLD-RTO: 1.1 PER 100 WBC
NRBC BLD-RTO: 1.1 PER 100 WBC
O2/TOTAL GAS SETTING VFR VENT: 50 %
O2/TOTAL GAS SETTING VFR VENT: 60 %
PCO2 BLD: 39.3 MMHG (ref 35–45)
PCO2 BLD: 42.2 MMHG (ref 35–45)
PEEP RESPIRATORY: 5 CMH2O
PEEP RESPIRATORY: 8 CMH2O
PH BLD: 7.4 [PH] (ref 7.35–7.45)
PH BLD: 7.41 [PH] (ref 7.35–7.45)
PHOSPHATE SERPL-MCNC: 2 MG/DL (ref 2.6–4.7)
PHOSPHATE SERPL-MCNC: 2 MG/DL (ref 2.6–4.7)
PLATELET # BLD AUTO: 78 K/UL (ref 150–400)
PLATELET # BLD AUTO: 79 K/UL (ref 150–400)
PLATELET # BLD AUTO: 81 K/UL (ref 150–400)
PLATELET # BLD AUTO: 83 K/UL (ref 150–400)
PMV BLD AUTO: 11 FL (ref 8.9–12.9)
PMV BLD AUTO: 11.4 FL (ref 8.9–12.9)
PMV BLD AUTO: 11.6 FL (ref 8.9–12.9)
PMV BLD AUTO: 11.8 FL (ref 8.9–12.9)
PO2 BLD: 157 MMHG (ref 80–100)
PO2 BLD: 216 MMHG (ref 80–100)
POTASSIUM SERPL-SCNC: 4.1 MMOL/L (ref 3.5–5.1)
POTASSIUM SERPL-SCNC: 4.3 MMOL/L (ref 3.5–5.1)
PROCALCITONIN SERPL-MCNC: 13.66 NG/ML
PROT SERPL-MCNC: 5.8 G/DL (ref 6.4–8.2)
PROTHROMBIN TIME: 11.9 SEC (ref 9–11.1)
RBC # BLD AUTO: 2.35 M/UL (ref 3.8–5.2)
RBC # BLD AUTO: 2.43 M/UL (ref 3.8–5.2)
RBC # BLD AUTO: 2.45 M/UL (ref 3.8–5.2)
RBC # BLD AUTO: 2.55 M/UL (ref 3.8–5.2)
SAMPLES BEING HELD,HOLD: NORMAL
SAO2 % BLD: 100 % (ref 92–97)
SAO2 % BLD: 99 % (ref 92–97)
SERVICE CMNT-IMP: ABNORMAL
SODIUM SERPL-SCNC: 134 MMOL/L (ref 136–145)
SODIUM SERPL-SCNC: 136 MMOL/L (ref 136–145)
SODIUM SERPL-SCNC: 137 MMOL/L (ref 136–145)
SODIUM SERPL-SCNC: 137 MMOL/L (ref 136–145)
SPECIMEN TYPE: ABNORMAL
SPECIMEN TYPE: ABNORMAL
THERAPEUTIC RANGE,PTTT: NORMAL SECS (ref 58–77)
TOTAL RESP. RATE, ITRR: 28
TOTAL RESP. RATE, ITRR: 28
VENTILATION MODE VENT: ABNORMAL
VENTILATION MODE VENT: ABNORMAL
VOLUME CONTROL IVLC: YES
VOLUME CONTROL IVLC: YES
VT SETTING VENT: 430 ML
VT SETTING VENT: 430 ML
WBC # BLD AUTO: 18.8 K/UL (ref 3.6–11)
WBC # BLD AUTO: 18.9 K/UL (ref 3.6–11)
WBC # BLD AUTO: 21.5 K/UL (ref 3.6–11)
WBC # BLD AUTO: 21.8 K/UL (ref 3.6–11)

## 2020-04-16 PROCEDURE — 85730 THROMBOPLASTIN TIME PARTIAL: CPT

## 2020-04-16 PROCEDURE — 83735 ASSAY OF MAGNESIUM: CPT

## 2020-04-16 PROCEDURE — 74011250637 HC RX REV CODE- 250/637: Performed by: NURSE PRACTITIONER

## 2020-04-16 PROCEDURE — 74011000250 HC RX REV CODE- 250: Performed by: INTERNAL MEDICINE

## 2020-04-16 PROCEDURE — 74011250636 HC RX REV CODE- 250/636: Performed by: INTERNAL MEDICINE

## 2020-04-16 PROCEDURE — 85027 COMPLETE CBC AUTOMATED: CPT

## 2020-04-16 PROCEDURE — 84100 ASSAY OF PHOSPHORUS: CPT

## 2020-04-16 PROCEDURE — 74011636637 HC RX REV CODE- 636/637: Performed by: NURSE PRACTITIONER

## 2020-04-16 PROCEDURE — 94003 VENT MGMT INPAT SUBQ DAY: CPT

## 2020-04-16 PROCEDURE — 74011000250 HC RX REV CODE- 250: Performed by: NURSE PRACTITIONER

## 2020-04-16 PROCEDURE — 80069 RENAL FUNCTION PANEL: CPT

## 2020-04-16 PROCEDURE — 85610 PROTHROMBIN TIME: CPT

## 2020-04-16 PROCEDURE — 84145 PROCALCITONIN (PCT): CPT

## 2020-04-16 PROCEDURE — 86141 C-REACTIVE PROTEIN HS: CPT

## 2020-04-16 PROCEDURE — 74011250636 HC RX REV CODE- 250/636: Performed by: NURSE PRACTITIONER

## 2020-04-16 PROCEDURE — 74011000258 HC RX REV CODE- 258: Performed by: NURSE PRACTITIONER

## 2020-04-16 PROCEDURE — 36415 COLL VENOUS BLD VENIPUNCTURE: CPT

## 2020-04-16 PROCEDURE — 80076 HEPATIC FUNCTION PANEL: CPT

## 2020-04-16 PROCEDURE — 82962 GLUCOSE BLOOD TEST: CPT

## 2020-04-16 PROCEDURE — 65610000006 HC RM INTENSIVE CARE

## 2020-04-16 PROCEDURE — 82803 BLOOD GASES ANY COMBINATION: CPT

## 2020-04-16 PROCEDURE — 80048 BASIC METABOLIC PNL TOTAL CA: CPT

## 2020-04-16 PROCEDURE — 83615 LACTATE (LD) (LDH) ENZYME: CPT

## 2020-04-16 RX ORDER — INSULIN GLARGINE 100 [IU]/ML
47 INJECTION, SOLUTION SUBCUTANEOUS DAILY
Status: DISCONTINUED | OUTPATIENT
Start: 2020-04-17 | End: 2020-04-17

## 2020-04-16 RX ORDER — FAMOTIDINE 40 MG/5ML
20 POWDER, FOR SUSPENSION ORAL DAILY
Status: DISCONTINUED | OUTPATIENT
Start: 2020-04-17 | End: 2020-05-12

## 2020-04-16 RX ADMIN — INSULIN LISPRO 2 UNITS: 100 INJECTION, SOLUTION INTRAVENOUS; SUBCUTANEOUS at 01:54

## 2020-04-16 RX ADMIN — GUAIFENESIN 100 MG: 100 SOLUTION ORAL at 18:45

## 2020-04-16 RX ADMIN — INSULIN GLARGINE 20 UNITS: 100 INJECTION, SOLUTION SUBCUTANEOUS at 21:46

## 2020-04-16 RX ADMIN — CALCIUM CHLORIDE, MAGNESIUM CHLORIDE, DEXTROSE MONOHYDRATE, LACTIC ACID, SODIUM CHLORIDE, SODIUM BICARBONATE AND POTASSIUM CHLORIDE 2000 ML/HR: 3.68; 3.05; 22; 5.4; 6.46; 3.09; .314 INJECTION INTRAVENOUS at 08:43

## 2020-04-16 RX ADMIN — GUAIFENESIN 100 MG: 100 SOLUTION ORAL at 12:04

## 2020-04-16 RX ADMIN — CALCIUM CHLORIDE, MAGNESIUM CHLORIDE, DEXTROSE MONOHYDRATE, LACTIC ACID, SODIUM CHLORIDE, SODIUM BICARBONATE AND POTASSIUM CHLORIDE 2000 ML/HR: 3.68; 3.05; 22; 5.4; 6.46; 3.09; .314 INJECTION INTRAVENOUS at 21:45

## 2020-04-16 RX ADMIN — Medication 200 MCG/HR: at 09:34

## 2020-04-16 RX ADMIN — INSULIN GLARGINE 45 UNITS: 100 INJECTION, SOLUTION SUBCUTANEOUS at 09:00

## 2020-04-16 RX ADMIN — GUAIFENESIN 100 MG: 100 SOLUTION ORAL at 06:52

## 2020-04-16 RX ADMIN — CHLORHEXIDINE GLUCONATE 15 ML: 1.2 RINSE ORAL at 08:30

## 2020-04-16 RX ADMIN — MINERAL OIL AND WHITE PETROLATUM: 150; 830 OINTMENT OPHTHALMIC at 20:22

## 2020-04-16 RX ADMIN — KETAMINE HYDROCHLORIDE 0.4 MG/KG/HR: 50 INJECTION, SOLUTION INTRAMUSCULAR; INTRAVENOUS at 20:18

## 2020-04-16 RX ADMIN — SODIUM CHLORIDE 10 ML: 9 INJECTION INTRAMUSCULAR; INTRAVENOUS; SUBCUTANEOUS at 21:46

## 2020-04-16 RX ADMIN — HYDROCORTISONE SODIUM SUCCINATE 50 MG: 100 INJECTION, POWDER, FOR SOLUTION INTRAMUSCULAR; INTRAVENOUS at 18:44

## 2020-04-16 RX ADMIN — METRONIDAZOLE 500 MG: 500 INJECTION, SOLUTION INTRAVENOUS at 16:35

## 2020-04-16 RX ADMIN — CASTOR OIL AND BALSAM, PERU: 788; 87 OINTMENT TOPICAL at 18:37

## 2020-04-16 RX ADMIN — CALCIUM CHLORIDE, MAGNESIUM CHLORIDE, DEXTROSE MONOHYDRATE, LACTIC ACID, SODIUM CHLORIDE, SODIUM BICARBONATE AND POTASSIUM CHLORIDE 2000 ML/HR: 3.68; 3.05; 22; 5.4; 6.46; 3.09; .314 INJECTION INTRAVENOUS at 04:33

## 2020-04-16 RX ADMIN — FAMOTIDINE 20 MG: 10 INJECTION INTRAVENOUS at 08:27

## 2020-04-16 RX ADMIN — INSULIN LISPRO 2 UNITS: 100 INJECTION, SOLUTION INTRAVENOUS; SUBCUTANEOUS at 06:52

## 2020-04-16 RX ADMIN — CALCIUM CHLORIDE, MAGNESIUM CHLORIDE, DEXTROSE MONOHYDRATE, LACTIC ACID, SODIUM CHLORIDE, SODIUM BICARBONATE AND POTASSIUM CHLORIDE 2000 ML/HR: 3.68; 3.05; 22; 5.4; 6.46; 3.09; .314 INJECTION INTRAVENOUS at 11:58

## 2020-04-16 RX ADMIN — CASTOR OIL AND BALSAM, PERU: 788; 87 OINTMENT TOPICAL at 08:34

## 2020-04-16 RX ADMIN — CALCIUM CHLORIDE, MAGNESIUM CHLORIDE, DEXTROSE MONOHYDRATE, LACTIC ACID, SODIUM CHLORIDE, SODIUM BICARBONATE AND POTASSIUM CHLORIDE 2000 ML/HR: 3.68; 3.05; 22; 5.4; 6.46; 3.09; .314 INJECTION INTRAVENOUS at 03:02

## 2020-04-16 RX ADMIN — KETAMINE HYDROCHLORIDE 0.4 MG/KG/HR: 50 INJECTION, SOLUTION INTRAMUSCULAR; INTRAVENOUS at 09:15

## 2020-04-16 RX ADMIN — MIDAZOLAM 1 MG: 1 INJECTION INTRAMUSCULAR; INTRAVENOUS at 14:51

## 2020-04-16 RX ADMIN — INSULIN LISPRO 2 UNITS: 100 INJECTION, SOLUTION INTRAVENOUS; SUBCUTANEOUS at 18:49

## 2020-04-16 RX ADMIN — SODIUM CHLORIDE 10 ML: 9 INJECTION INTRAMUSCULAR; INTRAVENOUS; SUBCUTANEOUS at 06:53

## 2020-04-16 RX ADMIN — HYDROCORTISONE SODIUM SUCCINATE 50 MG: 100 INJECTION, POWDER, FOR SOLUTION INTRAMUSCULAR; INTRAVENOUS at 06:53

## 2020-04-16 RX ADMIN — CALCIUM CHLORIDE, MAGNESIUM CHLORIDE, DEXTROSE MONOHYDRATE, LACTIC ACID, SODIUM CHLORIDE, SODIUM BICARBONATE AND POTASSIUM CHLORIDE 2000 ML/HR: 3.68; 3.05; 22; 5.4; 6.46; 3.09; .314 INJECTION INTRAVENOUS at 18:41

## 2020-04-16 RX ADMIN — CEFEPIME HYDROCHLORIDE 2 G: 2 INJECTION, POWDER, FOR SOLUTION INTRAVENOUS at 20:20

## 2020-04-16 RX ADMIN — VANCOMYCIN HYDROCHLORIDE 500 MG: KIT at 06:52

## 2020-04-16 RX ADMIN — CALCIUM CHLORIDE, MAGNESIUM CHLORIDE, DEXTROSE MONOHYDRATE, LACTIC ACID, SODIUM CHLORIDE, SODIUM BICARBONATE AND POTASSIUM CHLORIDE 2000 ML/HR: 3.68; 3.05; 22; 5.4; 6.46; 3.09; .314 INJECTION INTRAVENOUS at 15:00

## 2020-04-16 RX ADMIN — VANCOMYCIN HYDROCHLORIDE 500 MG: KIT at 18:26

## 2020-04-16 RX ADMIN — SODIUM CHLORIDE 10 ML: 9 INJECTION INTRAMUSCULAR; INTRAVENOUS; SUBCUTANEOUS at 14:11

## 2020-04-16 RX ADMIN — CEFEPIME HYDROCHLORIDE 2 G: 2 INJECTION, POWDER, FOR SOLUTION INTRAVENOUS at 08:24

## 2020-04-16 RX ADMIN — MIDAZOLAM 1 MG: 1 INJECTION INTRAMUSCULAR; INTRAVENOUS at 15:01

## 2020-04-16 RX ADMIN — CALCIUM CHLORIDE, MAGNESIUM CHLORIDE, DEXTROSE MONOHYDRATE, LACTIC ACID, SODIUM CHLORIDE, SODIUM BICARBONATE AND POTASSIUM CHLORIDE 2000 ML/HR: 3.68; 3.05; 22; 5.4; 6.46; 3.09; .314 INJECTION INTRAVENOUS at 06:01

## 2020-04-16 RX ADMIN — CALCIUM CHLORIDE, MAGNESIUM CHLORIDE, DEXTROSE MONOHYDRATE, LACTIC ACID, SODIUM CHLORIDE, SODIUM BICARBONATE AND POTASSIUM CHLORIDE 2000 ML/HR: 3.68; 3.05; 22; 5.4; 6.46; 3.09; .314 INJECTION INTRAVENOUS at 01:29

## 2020-04-16 RX ADMIN — CHLORHEXIDINE GLUCONATE 15 ML: 1.2 RINSE ORAL at 20:22

## 2020-04-16 RX ADMIN — HYDROCORTISONE SODIUM SUCCINATE 50 MG: 100 INJECTION, POWDER, FOR SOLUTION INTRAMUSCULAR; INTRAVENOUS at 12:04

## 2020-04-16 RX ADMIN — CALCIUM CHLORIDE, MAGNESIUM CHLORIDE, DEXTROSE MONOHYDRATE, LACTIC ACID, SODIUM CHLORIDE, SODIUM BICARBONATE AND POTASSIUM CHLORIDE 2000 ML/HR: 3.68; 3.05; 22; 5.4; 6.46; 3.09; .314 INJECTION INTRAVENOUS at 13:38

## 2020-04-16 RX ADMIN — CALCIUM CHLORIDE, MAGNESIUM CHLORIDE, DEXTROSE MONOHYDRATE, LACTIC ACID, SODIUM CHLORIDE, SODIUM BICARBONATE AND POTASSIUM CHLORIDE 2000 ML/HR: 3.68; 3.05; 22; 5.4; 6.46; 3.09; .314 INJECTION INTRAVENOUS at 10:00

## 2020-04-16 RX ADMIN — CALCIUM CHLORIDE, MAGNESIUM CHLORIDE, DEXTROSE MONOHYDRATE, LACTIC ACID, SODIUM CHLORIDE, SODIUM BICARBONATE AND POTASSIUM CHLORIDE 2000 ML/HR: 3.68; 3.05; 22; 5.4; 6.46; 3.09; .314 INJECTION INTRAVENOUS at 23:07

## 2020-04-16 RX ADMIN — METRONIDAZOLE 500 MG: 500 INJECTION, SOLUTION INTRAVENOUS at 08:27

## 2020-04-16 RX ADMIN — CALCIUM CHLORIDE, MAGNESIUM CHLORIDE, DEXTROSE MONOHYDRATE, LACTIC ACID, SODIUM CHLORIDE, SODIUM BICARBONATE AND POTASSIUM CHLORIDE 2000 ML/HR: 3.68; 3.05; 22; 5.4; 6.46; 3.09; .314 INJECTION INTRAVENOUS at 00:11

## 2020-04-16 RX ADMIN — VANCOMYCIN HYDROCHLORIDE 500 MG: KIT at 01:46

## 2020-04-16 RX ADMIN — CALCIUM CHLORIDE, MAGNESIUM CHLORIDE, DEXTROSE MONOHYDRATE, LACTIC ACID, SODIUM CHLORIDE, SODIUM BICARBONATE AND POTASSIUM CHLORIDE 2000 ML/HR: 3.68; 3.05; 22; 5.4; 6.46; 3.09; .314 INJECTION INTRAVENOUS at 20:16

## 2020-04-16 RX ADMIN — VANCOMYCIN HYDROCHLORIDE 500 MG: KIT at 13:39

## 2020-04-16 NOTE — PROGRESS NOTES
Transitions of care  TBD pending medical progress  Remains vented, CVVH on  Precedex,  levophed and ketamine.   Palliative team following patient  Patient remains a full code  COVID positive  Care management is continuing to follow  Umm Blair RN,CRM

## 2020-04-16 NOTE — PROGRESS NOTES
Palliative Medicine  [place]: [phone])  [facility]: [phone] ([phone])        Code Status: Full Code    Advance Care Planning:  Advance Care Planning 4/15/2020   Patient's Healthcare Decision Maker is: Named in scanned ACP document   Confirm Advance Directive Yes, not on file     Primary Decision Maker: Wisam Sutton - [phone]      Patient / Family Encounter Documentation    Participants (names): Patient /MPOA Marisol Ace, Daughter Minor Hurst; Palliative Medicine Perez Millington, ICU NP and RN   Narrative:     Along with ICU team, I spoke with patient daughter and  on phone for daily update as planned. RN and NP provided medical update and plan of care for today. Appreciate their flexibility and support. Daughter asked good questions.  still sounding frustrated, no questions until after meeting with ICU team ended then upset that no one brought up WBC that he had heard about earlier in the morning from overnight nurse. Hilda Poon NP able to provide answers and clarity for him. He is still focused on day to day details and team was very clear that, big picture, patient is critically ill. After meeting with medical team, daughter continued to ask questions about whether this update was good or bad and also questions about if kidney function would be the only problem if patient survives. She was searching for hope and good news, certainty. I gently reiterated that patient is critically ill on life support, that medical team is pursuing full restorative measures and this path includes the plan as noted by RN. IF patient could be weaned from sedation, could begin talking about SBT, but that is not where patient is right now. Daughter was in acknowledgement of this. I did bring up patient's AMD/living will and clarified that the AMD is a guide for the family and we could review together as way to support them in making decisions when they are ready.  Marisol Ace understands and said he will be ready to share it with us soon. Did not broach code status as they have been asked multiple times about DNR and  has voiced frustration/anger about this. He is not ready. Hoping when he shares AMD that this will be an opportunity for code status discussion. Psychosocial challenges/Resilience factors:    Patient has very supportive family with good insight. Patient's family has had many loses since 2018 including patient's mother who just  from [address] (patient unaware of this)    Patient  is stressed/angry in setting of anticipatory grief/pandemic. Daughter rational and levelheaded, coping style is problem focused and she is actively educating herself about her mom's condition, asking good questions. Goals of Care / Plan:     Full restorative measures/full code    Daily check ins with patient family 12PM      MD note emailed to patient employer            Thank you for the opportunity to be involved in the care of Ms. Tana Giraldo and her family.     Catie Mariscal, FLAKITA, Supervisee in Social Work  Palliative Medicine   [phone]

## 2020-04-16 NOTE — PROGRESS NOTES
[facility]    ICU TEAM Progress Note    Name: Veronica Grant   : 1960   MRN: [id number]   Date: 2020      Assessment/Plan:     Current ICU Problems:    1. Acute hypoxic respiratory failure 2/2 COVID 19   Completed Plaquenil and Zinc.   Remains on the ventilator; check ABG. Off Nimbex. Attempt to wean precedex. White count remains elevated at 21.5 (on Solucortef). Recheck procalcitonin. 2.  Retroperitoneal bleed with hemorrhagic shock   HH stable 7.   No anticoagulants at this time. Recheck liver profile. 3.  Acute kidney injury   Nephrology following. On CRRT with factor of 200.       4.  Nonocclusive PE in LLL pulmonary arteries    5. MRSA pneumonia   On Vancomycin, cefepime and flagyl. 6.  Diabetes mellitus   On Lantus and sliding scale. Fasting glucoses remain a bit elevated, increase Lantus to 47 units. 7.  Thrombocytopenia   Platelets 81 and stable. Cardiac Gtts: Norepinephrine  SBP Goal of:   >90 mmHg  MAP Goal of: > 65 mmHg  Transfusion Trigger (Hgb):  <7 g/dL     Respiratory Goals: Chlorhexidine   Optimize PEEP/Ventilation/Oxygenation  Goal Tidal Volume 6 cc/kg based on IBW  Aim for lung protective ventilation  Aggressive bronchopulmonary hygiene  SPO2 Goal: > 92%  Pulmonary toilet: NA   DVT Prophylaxis (if no, list reason): SCD's or Sequential Compression Device      GI Prophylaxis: Protonix (pantoprazole)   Nutrition:  Start trickle feeds tomorrow since she's off paralytic.     IVFs: NA  Bowel Movement: Yes  Bowel Regimen: None needed at this time     Bowden Catheter Present: Yes  Glycemic Control - Insulin: Yes  Antibiotics:  Cefepime, Flagyl, Vancomycin     Pain Medications: Fentanyl  Target RASS: -3 - Moderate Sedation - Movement or eye opening to voice (no eye contact)  Sedation Medications:  Ketamine  CAM-ICU:  JUAREZ  Mobility: Poor and Bedrest  PT/OT: NA   Restraints: Soft wrist restraints  Discussed Plan of Care/Code Status: Full Code     T/L/D  Tubes: ETT and Orogastric Tube  Lines: Central Line  Drains: Bowden Catheter    Subjective:   Progress Note: 2020      Reason for ICU Admission:  65 yo female with hx of obesity, endometrial cancer and diabetes who [facility] with worsening hypoxic respiratory failure in lieu of close exposure to COVID-19 to family including mother ( passed away) sister and brother and tested resulting + here. She presented to the hospital 3/26 and intubated 3/28. She was started on broad spectrum antibiotics and plaquenil. Developed worsening renal failure and was transferred to ICU for CRRT. Her hospital course has been further notable for development of a retroperitoneal bleed, hemorrhagic shock and sepsis and ongoing acute hypoxic respiratory failure with increasing PEEP and FIo2 requirements. Currently she is on Ketamine; Nimbex has been weaned off. She does not follow commands. Active Problem List:     Problem List  Date Reviewed: 2020          Codes Class    Hypotension ICD-10-CM: I95.9  ICD-9-CM: 458.9 Acute        Sepsis due to methicillin susceptible Staphylococcus aureus (MSSA) without acute organ dysfunction (HCC) ICD-10-CM: A41.01  ICD-9-CM: 038.11, 995.91 Acute        COVID-19 virus infection ICD-10-CM: U07.1         Obesity, morbid ([facility] ICD-10-CM: E66.01  ICD-9-CM: 278.01         Vaginal Pap smear following hysterectomy for malignancy ICD-10-CM: Z08, Z90.710  ICD-9-CM: V67.01         Personal history of malignant neoplasm of other parts of uterus ICD-10-CM: Z85.42  ICD-9-CM: V10.42         Endometrial cancer ([facility] ICD-10-CM: C54.1  ICD-9-CM: 182.0               Past Medical History:      has a past medical history of Basal cell carcinoma, GERD (gastroesophageal reflux disease), Kidney stones, and Polyp of ureter.     Past Surgical History:      has a past surgical history that includes hysteroscopy diagnostic (); pr endometrial ablation, thermal; hx  section (); and hx colonoscopy. Home Medications:     Prior to Admission medications    Medication Sig Start Date End Date Taking? Authorizing Provider   pantoprazole (PROTONIX) 40 mg tablet TAKE 1 TABLET BY MOUTH TWICE A DAY 18   Provider, Historical   esomeprazole (NEXIUM) 20 mg capsule Take 20 mg by mouth daily. Indications: BID    Provider, Historical   zolpidem (AMBIEN) 10 mg tablet Take 0.5 Tabs by mouth nightly as needed for Sleep. Max Daily Amount: 5 mg. 17   Dedmond, Julienne Dancer, MD   fluticasone (FLONASE) 50 mcg/actuation nasal spray Mist 1-2 spray(s) into each nostril once daily. 4/3/15   Provider, Historical   ranitidine (ZANTAC) 150 mg tablet 150 mg.    Provider, Historical       Allergies/Social/Family History: Allergies   Allergen Reactions    Augmentin [Amoxicillin-Pot Clavulanate] Rash and Itching      Social History     Tobacco Use    Smoking status: Never Smoker    Smokeless tobacco: Never Used   Substance Use Topics    Alcohol use: Not on file      Family History   Problem Relation Age of Onset    Prostate Cancer Father         PROSTATE    Breast Cancer Sister 46        invasive poorly differentiated ductal carcinoma, Neg genetic testing.  Cancer Sister 62        melanoma stage 1       Objective:   Vital Signs:  Visit Vitals  /69   Pulse 76   Temp 97.6 °F (36.4 °C)   Resp 28   Ht 5' 4\" (1.626 m)   Wt 133.2 kg (293 lb 9.6 oz)   SpO2 100%   BMI 50.40 kg/m²      O2 Device: Endotracheal tube Temp (24hrs), Av.5 °F (36.4 °C), Min:96.3 °F (35.7 °C), Max:98.1 °F (36.7 °C)           Intake/Output:     Intake/Output Summary (Last 24 hours) at 2020 1207  Last data filed at 2020 1100  Gross per 24 hour   Intake 1511.48 ml   Output 6474 ml   Net -4962.52 ml       Physical Exam:    General:  Sedated, on Ketamine and on the ventilator. No acute distress, morbidly obese   Eyes: Sclera anicteric. Pupils equal, round, reactive to light.   Does not arouse Mouth/Throat: Orotracheal tube in place. Neck: Supple. Lungs:   Clear/DIM to auscultation bilaterally, vent assisted respirations, no accessory muscle use observed. Cardiovascular:  Tachycardic, regular rate and rhythm, no murmur, click, rub, or gallop, anasarca, pulses palp x 4 ext. Abdomen:   Soft, bowel sounds hypoactive, distended. Has FMS in place with liquid stool. Extremities: No cyanosis, + edema   Skin: No rash or lesions. Musculoskeletal:  No swelling other than edema, no deformity. Lines/Devices:  Intact, no erythema, drainage, or tenderness. Psychiatric: Sedated and appears comfortable on ventilator.       LABS AND  DATA: Personally reviewed  Recent Labs     04/16/20  1046 04/16/20 0438   WBC 21.8* 21.5*   HGB 7.7* 7.5*   HCT 24.8* 23.7*   PLT 83* 81*     Recent Labs     04/16/20  0438 04/15/20  2228  04/15/20  0453    138   < > 137   K 4.1 4.1   < > 3.4*    107   < > 106   CO2 26 26   < > 24   BUN 35* 40*   < > 46*   CREA 1.11* 1.19*   < > 1.56*   * 186*   < > 222*   CA 8.7 8.0*   < > 9.2   MG 2.5*  --   --  2.1   PHOS 2.0*  --   --   --     < > = values in this interval not displayed. Recent Labs     04/16/20  0438 04/16/20  0400 04/15/20  0400   SGOT  --  174* 197*   AP  --  251* 198*   TP  --  5.8* 5.3*   ALB 2.7* 2.8* 2.6*   GLOB  --  3.0 2.7     Recent Labs     04/15/20  2228 04/15/20  1133 04/15/20  0454   INR  --   --  1.3*   PTP  --   --  13.3*   APTT 26.5 27.6 27.8      Recent Labs     04/14/20  0348   PHI 7.277*   PCO2I 45.0   PO2I 117*   FIO2I 60       Mode Rate Tidal Volume Pressure FiO2 PEEP   Assist control   430 ml  0 cm H2O 60 % 8 cm H20     Peak airway pressure: 31 cm H2O    Minute ventilation: 12.4 l/min      MEDS: Reviewed    04/10/2020, CT Abd/ Pelvis:  IMPRESSION:  1.  Scattered pulmonary air space disease consistent with atypical viral infection. 2.  Nonocclusive pulmonary emboli in the left lower lobe pulmonary arteries.   3. Jd Whiteside right retroperitoneal hematoma with small foci of contrast within the hematoma. 4.  Enteric tube and endotracheal tube in appropriate position. 5.  Bilateral femoral venous catheters in the common iliac veins.     ABCDEF Bundle/Checklist Completed:  Yes    SPECIAL EQUIPMENT  CRRT    DISPOSITION  Stay in ICU    CRITICAL CARE CONSULTANT NOTE  I had a face to face encounter with the patient, reviewed and interpreted patient data including clinical events, labs, images, vital signs, I/O's, and examined patient. I have discussed the case and the plan and management of the patient's care with the consulting services, the bedside nurses and the respiratory therapist.      NOTE OF PERSONAL INVOLVEMENT IN CARE   This patient has a high probability of imminent, clinically significant deterioration, which requires the highest level of preparedness to intervene urgently. I participated in the decision-making and personally managed or directed the management of the following life and organ supporting interventions that required my frequent assessment to treat or prevent imminent deterioration. I personally spent 40 minutes of critical care time. This is time spent at this critically ill patient's bedside actively involved in patient care as well as the coordination of care and discussions with the patient's family. This does not include any procedural time which has been billed separately.     Tad Haas, STEWART-BC, MSN  [facility]  4/16/2020

## 2020-04-16 NOTE — PROGRESS NOTES
1930- Bedside shift change report given to NESHA Golden RN (oncoming nurse) by Shana Polo RN and Elton Streeter RN (offgoing nurse). Report included the following information SBAR, Kardex, Intake/Output, MAR, Accordion and Recent Results. Assumed care of patient. 2000- Assessment completed, patient on Nimbex unresponsive, 4/4 twitches noted on setting 3. On Levo with arterial line maps above 65. On bear hugger low temp setting axillary temp. 97.8. No issues with CVVHD  2030- Nimbex reduced to 0.5mcg  2230- Labs drawn. Nimbex turned off. Remains on Ketamine and Fentanyl. 2300- Labs unremarkable Hgb. Improved to 7.4  0000- Reassessment, patient had very large watery BM leaking around flexi-seal. Patient bathed. Arterial line dislodged, NP notified. Blood sugar 158, 2 units insulin given. No issues with CVVHD.  0100- NP unable to reestablish Arterial line. Previous cuff pressure consistent with arterial pressure, will continue to monitor. 0400- Reassessment. Patient remains unresponsive, eyes open spontaneously. No signs of distress noted at this time. No issues with CVVHD  0430- Labs drawn. Patient weighed  0500- Labs unremarkable Hgb. Improved to 7.5  0600- Blood sugar 155, 2 units insulin given.

## 2020-04-16 NOTE — PROGRESS NOTES
0730: Bedside and Verbal shift change report given to Pawan Sanchez RN (oncoming nurse) by Haydee Lopez RN (offgoing nurse). Report included the following information SBAR, Kardex, Intake/Output, MAR, Recent Results and Cardiac Rhythm NSR.       1430: MD at bedside placing an arterial line. Placed with out complications and patient tolerated procedure well. Site Cleaned with sterile technique, Arterial line dressing applied. Good wave from and MAP correlation with [place] pressure. [place] BP cuff removed due to location of Arterial Line. Will titrate med's using arterial pressure moving forward. 1500: Primary Nurse Kira Ortiz and Freddy Krueger RN performed a dual skin assessment on this patient Impairment noted- see wound doc flow sheet  Aniket score is 9    1600: Pt tempeture very slow to register. Pt feels cool, Temp 96.1, Bear Hugger applied on low setting. 1800: Pt tempeture 97.4    1930: Bedside and Verbal shift change report given to Haydee Lopez RN (oncoming nurse) by Pawan Sanchez RN (offgoing nurse). Report included the following information SBAR, Kardex, Intake/Output, MAR, Recent Results and Cardiac Rhythm NSR.

## 2020-04-16 NOTE — PROCEDURES
[facility]      Procedure Note - Arterial Access:   Performed by Jayne Mcmahan MD .    Immediately prior to the procedure, the patient was reevaluated and found suitable for the planned procedure and any planned medications. Immediately prior to the procedure a time out was called to verify the correct patient, procedure, equipment, staff, and marking as appropriate. Procedure Location: Intensive care unit  Condition: Critical  Consent: Family not available, procedure performed emergently no consent available. Ultrasound guidance; yes  Method: The patient was supine in bed, left arm was abducted at 90 degrees. The patient was prepped and draped using chlorhexidine. Using a 20-gauge needle under ultrasound guidance the left axillary artery was cannulated at first attempt. The guidewire was inserted. Once the guidewire was in it got caught on the needle. The needle and the guidewire were taken out together and examined for completeness. The tip of the guidewire and the needle were intact. Another attempt was then made under ultrasound guidance and the axillary artery was cannulated. Good pulsatile flow was obtained. Guidewire was then inserted and visualized using ultrasound. A small incision was made in the skin and 18-gauge catheter was inserted without any resistance over the guidewire. The guidewire was removed the catheter was connected to a transducer with good pulsatile flow. The catheter was flushed. And sutured using 3-0 silk suture. .   Procedure: Arterial Catheter left axillary artery  Catheter 18-gauge. Number of Attempts: 2 indication: Dynamic instability  Complication none  Performed By: Jennifer Espinoza MD  The procedure was tolerated the patient continues to be critically ill in the ICU.

## 2020-04-16 NOTE — PROGRESS NOTES
[facility]   [address] [facility], [facility] [address], [facility]  Phone: [phone]   [phone]       Nephrology Progress Note  Melanie Mixon     1960     [id number]  Date of Admission : 3/31/2020  04/16/20    CC: Follow up for JUANCHO      Assessment and Plan   JUANCHO :  - 2/2 ATN  - on CVVHD now, no heparin  - factor at 200cc/hr  - daily labs    Lytes  - K, mag, phos stable    COVID-19 +ve   Acute Hypoxic Resp Failure   - On Vent   - completed Plaquenil. On Vit C, Zinc   - s/p Tocilizumab      Leukocytosis:  - cultures pending  - on cefepime    RP hematoma:  - hgb stable  - transfuse PRN    Cardiac arrest 4/9:  - per ICU team    Type II DM   - Insulin per primary team      Hypothermia   Morbid Obesity      Care Plan discussed with:  ICU team and RN     Interval History:  Pt remains on CRRT, factor of 200/hr. Paralyzed on the vent. PT NOT EXAMINED in line with ASN and RPA GUIDELINES ON MANAGING COVID-19 PTS WITH RENAL ISSUES. Examination findings discussed personally with the examining Physician team member      Review of Systems: Review of systems not obtained due to patient factors.     Current Medications:   Current Facility-Administered Medications   Medication Dose Route Frequency    cisatracurium (NIMBEX) 2 mg/mL IV infusion  0.5-10 mcg/kg/min (Ideal) IntraVENous TITRATE    bicarbonate dialysis (PRISMASOL) BG K 4/Ca 2.5 5000 ml solution   Extracorporeal DIALYSIS CONTINUOUS    alteplase (CATHFLO) 2 mg in sterile water (preservative free) 2 mL injection  2 mg InterCATHeter DIALYSIS PRN    alteplase (CATHFLO) 2 mg in sterile water (preservative free) 2 mL injection  2 mg InterCATHeter DIALYSIS PRN    insulin glargine (LANTUS) injection 20 Units  20 Units SubCUTAneous QHS    insulin lispro (HUMALOG) injection   SubCUTAneous Q6H    0.9% sodium chloride infusion 250 mL  250 mL IntraVENous PRN    famotidine (PF) (PEPCID) 20 mg in 0.9% sodium chloride 10 mL injection  20 mg IntraVENous Q24H    chlorhexidine (PERIDEX) 0.12 % mouthwash 15 mL  15 mL Oral Q12H    hydrocortisone Sod Succ (PF) (SOLU-CORTEF) injection 50 mg  50 mg IntraVENous Q6H    cefepime (MAXIPIME) 2 g in 0.9% sodium chloride (MBP/ADV) 100 mL  2 g IntraVENous Q12H    metroNIDAZOLE (FLAGYL) IVPB premix 500 mg  500 mg IntraVENous Q8H    vancomycin (FIRVANQ) 50 mg/mL oral solution 500 mg  500 mg Oral Q6H    insulin glargine (LANTUS) injection 45 Units  45 Units SubCUTAneous DAILY    vasopressin (VASOSTRICT) 20 Units in 0.9% sodium chloride 100 mL infusion  0-0.04 Units/min IntraVENous TITRATE    fentaNYL (PF) 1,500 mcg/30 mL (50 mcg/mL) infusion  0-200 mcg/hr IntraVENous TITRATE    ketamine (KETALAR) 500 mg in 0.9% sodium chloride 500 mL infusion  0.05-0.4 mg/kg/hr IntraVENous TITRATE    NOREPINephrine (LEVOPHED) 32,000 mcg in dextrose 5% 250 mL (128 mcg/mL) infusion  0-50 mcg/min IntraVENous TITRATE    [Held by provider] labetaloL (NORMODYNE) tablet 100 mg  100 mg Oral Q12H    0.9% sodium chloride infusion  3 mL/hr IntraVENous CONTINUOUS    0.9% sodium chloride infusion  5 mL/hr IntraVENous CONTINUOUS    labetaloL (NORMODYNE;TRANDATE) injection 10 mg  10 mg IntraVENous Q10MIN PRN    fentaNYL citrate (PF) injection  mcg   mcg IntraVENous Q1H PRN    white petrolatum-mineral oiL (AKWA TEARS) 83-15 % ophthalmic ointment   Both Eyes Q12H    heparin (porcine) pf 300 Units  300 Units InterCATHeter PRN    guaiFENesin (ROBITUSSIN) 100 mg/5 mL oral liquid 100 mg  100 mg Oral Q6H    midazolam (VERSED) injection 1-2 mg  1-2 mg IntraVENous Q2H PRN    bacitracin 500 unit/gram packet 1 Packet  1 Packet Topical PRN    balsam peru-castor oiL (VENELEX) ointment   Topical BID    sodium chloride (NS) flush 5-40 mL  5-40 mL IntraVENous Q8H    sodium chloride (NS) flush 5-40 mL  5-40 mL IntraVENous PRN    HYDROcodone-acetaminophen (NORCO) 5-325 mg per tablet 1 Tab  1 Tab Oral Q4H PRN    ondansetron (ZOFRAN) injection 4 mg  4 mg IntraVENous Q4H PRN    acetaminophen (TYLENOL) tablet 650 mg  650 mg Oral Q6H PRN    Or    acetaminophen (TYLENOL) suppository 650 mg  650 mg Rectal Q6H PRN    glucose chewable tablet 16 g  4 Tab Oral PRN    glucagon (GLUCAGEN) injection 1 mg  1 mg IntraMUSCular PRN    dextrose 10% infusion 0-250 mL  0-250 mL IntraVENous PRN      Allergies   Allergen Reactions    Augmentin [Amoxicillin-Pot Clavulanate] Rash and Itching       Objective:  Vitals:    Vitals:    04/16/20 0630 04/16/20 0645 04/16/20 0700 04/16/20 0755   BP: 96/63 96/61 94/67    Pulse: 76 75 78 76   Resp: 28 28 20 28   Temp:       SpO2: 100% 100% 100% 100%   Weight:       Height:         Intake and Output:  No intake/output data recorded. 04/14 1901 - 04/16 0700  In: 2460.5 [I.V.:1500.5]  Out: 8654 [Drains:225]    Physical Examination:   Pt intubated    Yes  General: Paralyzed on the vent  Resp:  On vent   CV:  RRR  GI:  Obese   Neurologic:  Sedated   Access:           R femoral melissa     []    High complexity decision making was performed  []    Patient is at high-risk of decompensation with multiple organ involvement    Lab Data Personally Reviewed: I have reviewed all the pertinent labs, microbiology data and radiology studies during assessment.     Recent Labs     04/16/20  0438 04/16/20  0400 04/15/20  2228 04/15/20  1624 04/15/20  1133 04/15/20  0454 04/15/20  0453 04/15/20  0400  04/14/20  0400     --  138 136 136  --  137  --    < >  --    K 4.1  --  4.1 4.0 3.6  --  3.4*  --    < >  --      --  107 104 105  --  106  --    < >  --    CO2 26  --  26 24 24  --  24  --    < >  --    *  --  186* 182* 194*  --  222*  --    < >  --    BUN 35*  --  40* 43* 46*  --  46*  --    < >  --    CREA 1.11*  --  1.19* 1.34* 1.55*  --  1.56*  --    < >  --    CA 8.7  --  8.0* 8.6 9.0  --  9.2  --    < >  --    MG 2.5*  --   --   --   --   --  2.1  --   --   --    PHOS 2.0*  --   --   --   --   --   --   --   --   -- ALB 2.7* 2.8*  --   --   --   --   --  2.6*  --  2.8*   SGOT  --  174*  --   --   --   --   --  197*  --  326*   ALT  --  502*  --   --   --   --   --  601*  --  814*   INR  --   --   --   --   --  1.3*  --   --   --   --     < > = values in this interval not displayed. Recent Labs     04/16/20  0438 04/15/20  2228 04/15/20  1624 04/15/20  1133 04/15/20  0454   WBC 21.5* 22.9* 22.7* 23.5* 21.9*   HGB 7.5* 7.4* 7.1* 7.3* 7.1*   HCT 23.7* 22.9* 22.3* 22.6* 21.5*   PLT 81* 86* 86* 94* 80*     No results found for: SDES  Lab Results   Component Value Date/Time    Culture result: NO GROWTH 5 DAYS 04/09/2020 02:32 PM    Culture result: NO GROWTH 5 DAYS 04/08/2020 10:36 AM    Culture result: NO GROWTH 5 DAYS 03/31/2020 11:15 AM    Culture result: MODERATE STAPHYLOCOCCUS AUREUS (A) 03/31/2020 10:34 AM    Culture result: LIGHT NORMAL RESPIRATORY SOFIE 03/31/2020 10:34 AM     Recent Results (from the past 24 hour(s))   CBC WITH AUTOMATED DIFF    Collection Time: 04/15/20 11:33 AM   Result Value Ref Range    WBC 23.5 (H) 3.6 - 11.0 K/uL    RBC 2.41 (L) 3.80 - 5.20 M/uL    HGB 7.3 (L) 11.5 - 16.0 g/dL    HCT 22.6 (L) 35.0 - 47.0 %    MCV 93.8 80.0 - 99.0 FL    MCH 30.3 26.0 - 34.0 PG    MCHC 32.3 30.0 - 36.5 g/dL    RDW 19.3 (H) 11.5 - 14.5 %    PLATELET 94 (L) 824 - 400 K/uL    MPV 10.5 8.9 - 12.9 FL    NRBC 2.1 (H) 0  WBC    ABSOLUTE NRBC 0.49 (H) 0.00 - 0.01 K/uL    NEUTROPHILS 83 (H) 32 - 75 %    LYMPHOCYTES 9 (L) 12 - 49 %    MONOCYTES 6 5 - 13 %    EOSINOPHILS 0 0 - 7 %    BASOPHILS 0 0 - 1 %    IMMATURE GRANULOCYTES 2 (H) 0.0 - 0.5 %    ABS. NEUTROPHILS 19.5 (H) 1.8 - 8.0 K/UL    ABS. LYMPHOCYTES 2.1 0.8 - 3.5 K/UL    ABS. MONOCYTES 1.4 (H) 0.0 - 1.0 K/UL    ABS. EOSINOPHILS 0.0 0.0 - 0.4 K/UL    ABS. BASOPHILS 0.0 0.0 - 0.1 K/UL    ABS. IMM.  GRANS. 0.5 (H) 0.00 - 0.04 K/UL    DF SMEAR SCANNED      RBC COMMENTS ANISOCYTOSIS  3+        RBC COMMENTS POLYCHROMASIA  PRESENT        RBC COMMENTS HYPOCHROMIA  1+ METABOLIC PANEL, BASIC    Collection Time: 04/15/20 11:33 AM   Result Value Ref Range    Sodium 136 136 - 145 mmol/L    Potassium 3.6 3.5 - 5.1 mmol/L    Chloride 105 97 - 108 mmol/L    CO2 24 21 - 32 mmol/L    Anion gap 7 5 - 15 mmol/L    Glucose 194 (H) 65 - 100 mg/dL    BUN 46 (H) 6 - 20 MG/DL    Creatinine 1.55 (H) 0.55 - 1.02 MG/DL    BUN/Creatinine ratio 30 (H) 12 - 20      GFR est AA 42 (L) >60 ml/min/1.73m2    GFR est non-AA 34 (L) >60 ml/min/1.73m2    Calcium 9.0 8.5 - 10.1 MG/DL   PTT    Collection Time: 04/15/20 11:33 AM   Result Value Ref Range    aPTT 27.6 22.1 - 32.0 sec    aPTT, therapeutic range     58.0 - 77.0 SECS   GLUCOSE, POC    Collection Time: 04/15/20 11:39 AM   Result Value Ref Range    Glucose (POC) 197 (H) 65 - 100 mg/dL    Performed by Merlinda Mouton    LACTIC ACID    Collection Time: 04/15/20 11:49 AM   Result Value Ref Range    Lactic acid 1.2 0.4 - 2.0 MMOL/L   CBC W/O DIFF    Collection Time: 04/15/20  4:24 PM   Result Value Ref Range    WBC 22.7 (H) 3.6 - 11.0 K/uL    RBC 2.37 (L) 3.80 - 5.20 M/uL    HGB 7.1 (L) 11.5 - 16.0 g/dL    HCT 22.3 (L) 35.0 - 47.0 %    MCV 94.1 80.0 - 99.0 FL    MCH 30.0 26.0 - 34.0 PG    MCHC 31.8 30.0 - 36.5 g/dL    RDW 19.9 (H) 11.5 - 14.5 %    PLATELET 86 (L) 631 - 400 K/uL    MPV 10.7 8.9 - 12.9 FL    NRBC 1.5 (H) 0  WBC    ABSOLUTE NRBC 0.34 (H) 0.00 - 0.49 K/uL   METABOLIC PANEL, BASIC    Collection Time: 04/15/20  4:24 PM   Result Value Ref Range    Sodium 136 136 - 145 mmol/L    Potassium 4.0 3.5 - 5.1 mmol/L    Chloride 104 97 - 108 mmol/L    CO2 24 21 - 32 mmol/L    Anion gap 8 5 - 15 mmol/L    Glucose 182 (H) 65 - 100 mg/dL    BUN 43 (H) 6 - 20 MG/DL    Creatinine 1.34 (H) 0.55 - 1.02 MG/DL    BUN/Creatinine ratio 32 (H) 12 - 20      GFR est AA 49 (L) >60 ml/min/1.73m2    GFR est non-AA 41 (L) >60 ml/min/1.73m2    Calcium 8.6 8.5 - 10.1 MG/DL   GLUCOSE, POC    Collection Time: 04/15/20  6:02 PM   Result Value Ref Range    Glucose (POC) 157 (H) 65 - 100 mg/dL    Performed by Jose Daniel Jacinto BASIC    Collection Time: 04/15/20 10:28 PM   Result Value Ref Range    Sodium 138 136 - 145 mmol/L    Potassium 4.1 3.5 - 5.1 mmol/L    Chloride 107 97 - 108 mmol/L    CO2 26 21 - 32 mmol/L    Anion gap 5 5 - 15 mmol/L    Glucose 186 (H) 65 - 100 mg/dL    BUN 40 (H) 6 - 20 MG/DL    Creatinine 1.19 (H) 0.55 - 1.02 MG/DL    BUN/Creatinine ratio 34 (H) 12 - 20      GFR est AA 57 (L) >60 ml/min/1.73m2    GFR est non-AA 47 (L) >60 ml/min/1.73m2    Calcium 8.0 (L) 8.5 - 10.1 MG/DL   PTT    Collection Time: 04/15/20 10:28 PM   Result Value Ref Range    aPTT 26.5 22.1 - 32.0 sec    aPTT, therapeutic range     58.0 - 77.0 SECS   CBC W/O DIFF    Collection Time: 04/15/20 10:28 PM   Result Value Ref Range    WBC 22.9 (H) 3.6 - 11.0 K/uL    RBC 2.43 (L) 3.80 - 5.20 M/uL    HGB 7.4 (L) 11.5 - 16.0 g/dL    HCT 22.9 (L) 35.0 - 47.0 %    MCV 94.2 80.0 - 99.0 FL    MCH 30.5 26.0 - 34.0 PG    MCHC 32.3 30.0 - 36.5 g/dL    RDW 20.1 (H) 11.5 - 14.5 %    PLATELET 86 (L) 211 - 400 K/uL    MPV 11.0 8.9 - 12.9 FL    NRBC 1.1 (H) 0  WBC    ABSOLUTE NRBC 0.25 (H) 0.00 - 0.01 K/uL   GLUCOSE, POC    Collection Time: 04/16/20  1:48 AM   Result Value Ref Range    Glucose (POC) 158 (H) 65 - 100 mg/dL    Performed by EULA BYRD    HEPATIC FUNCTION PANEL    Collection Time: 04/16/20  4:00 AM   Result Value Ref Range    Protein, total 5.8 (L) 6.4 - 8.2 g/dL    Albumin 2.8 (L) 3.5 - 5.0 g/dL    Globulin 3.0 2.0 - 4.0 g/dL    A-G Ratio 0.9 (L) 1.1 - 2.2      Bilirubin, total 1.8 (H) 0.2 - 1.0 MG/DL    Bilirubin, direct 1.0 (H) 0.0 - 0.2 MG/DL    Alk.  phosphatase 251 (H) 45 - 117 U/L    AST (SGOT) 174 (H) 15 - 37 U/L    ALT (SGPT) 502 (H) 12 - 78 U/L   CBC W/O DIFF    Collection Time: 04/16/20  4:38 AM   Result Value Ref Range    WBC 21.5 (H) 3.6 - 11.0 K/uL    RBC 2.45 (L) 3.80 - 5.20 M/uL    HGB 7.5 (L) 11.5 - 16.0 g/dL    HCT 23.7 (L) 35.0 - 47.0 %    MCV 96.7 80.0 - 99.0 FL    MCH 30.6 26.0 - 34.0 PG    MCHC 31.6 30.0 - 36.5 g/dL    RDW 21.1 (H) 11.5 - 14.5 %    PLATELET 81 (L) 283 - 400 K/uL    MPV 11.0 8.9 - 12.9 FL    NRBC 0.9 (H) 0  WBC    ABSOLUTE NRBC 0.19 (H) 0.00 - 0.01 K/uL   RENAL FUNCTION PANEL    Collection Time: 04/16/20  4:38 AM   Result Value Ref Range    Sodium 137 136 - 145 mmol/L    Potassium 4.1 3.5 - 5.1 mmol/L    Chloride 105 97 - 108 mmol/L    CO2 26 21 - 32 mmol/L    Anion gap 6 5 - 15 mmol/L    Glucose 158 (H) 65 - 100 mg/dL    BUN 35 (H) 6 - 20 MG/DL    Creatinine 1.11 (H) 0.55 - 1.02 MG/DL    BUN/Creatinine ratio 32 (H) 12 - 20      GFR est AA >60 >60 ml/min/1.73m2    GFR est non-AA 51 (L) >60 ml/min/1.73m2    Calcium 8.7 8.5 - 10.1 MG/DL    Phosphorus 2.0 (L) 2.6 - 4.7 MG/DL    Albumin 2.7 (L) 3.5 - 5.0 g/dL   MAGNESIUM    Collection Time: 04/16/20  4:38 AM   Result Value Ref Range    Magnesium 2.5 (H) 1.6 - 2.4 mg/dL   CRP, HIGH SENSITIVITY    Collection Time: 04/16/20  4:39 AM   Result Value Ref Range    CRP, High sensitivity >9.5 mg/L   GLUCOSE, POC    Collection Time: 04/16/20  5:57 AM   Result Value Ref Range    Glucose (POC) 155 (H) 65 - 100 mg/dL    Performed by EULA BYRD            Total time spent with patient:  xxx   min. Care Plan discussed with:  Patient     Family      RN      Consulting Physician [facility],         I have reviewed the flowsheets. Chart and Pertinent Notes have been reviewed. No change in PMH ,family and social history from Consult note.       Enid Beard MD

## 2020-04-16 NOTE — PROGRESS NOTES
Infectious Disease Progress Note       Subjective:     D/W with ICU team  Patient cannot given history, critically ill   Palliative care seeing patient as well           Objective:    Vitals:   Patient Vitals for the past 24 hrs:   Temp Pulse Resp BP SpO2   04/16/20 1623 -- 78 28 -- 100 %   04/16/20 1315 -- 79 28 106/64 --   04/16/20 1300 -- 78 25 108/64 --   04/16/20 1245 -- 87 27 113/67 --   04/16/20 1230 -- 78 28 112/64 --   04/16/20 1220 -- 78 28 -- 100 %   04/16/20 1215 -- 77 29 104/63 --   04/16/20 1200 96.6 °F (35.9 °C) 76 28 112/69 --   04/16/20 1145 -- 78 29 111/67 --   04/16/20 1130 -- 79 29 107/70 100 %   04/16/20 1115 -- 80 22 109/67 --   04/16/20 1100 -- 78 28 108/65 100 %   04/16/20 1045 -- 77 28 108/64 --   04/16/20 1030 -- 79 28 112/72 100 %   04/16/20 1015 -- 79 26 108/68 100 %   04/16/20 1000 -- 80 26 100/64 --   04/16/20 0945 -- 78 25 103/63 100 %   04/16/20 0930 -- 80 26 102/63 100 %   04/16/20 0915 -- 82 26 99/65 100 %   04/16/20 0900 -- 81 25 115/72 100 %   04/16/20 0845 -- 78 28 112/63 100 %   04/16/20 0830 -- 79 23 -- 100 %   04/16/20 0815 -- 80 28 -- 100 %   04/16/20 0800 97.6 °F (36.4 °C) 79 24 95/63 100 %   04/16/20 0755 -- 76 28 -- 100 %   04/16/20 0745 -- 77 28 100/65 --   04/16/20 0730 -- 76 28 102/62 --   04/16/20 0715 -- 77 28 104/67 100 %   04/16/20 0700 -- 78 20 94/67 100 %   04/16/20 0645 -- 75 28 96/61 100 %   04/16/20 0630 -- 76 28 96/63 100 %   04/16/20 0615 -- 74 28 96/62 100 %   04/16/20 0600 -- 75 28 102/64 100 %   04/16/20 0545 -- 78 23 105/67 100 %   04/16/20 0530 -- 76 22 109/67 100 %   04/16/20 0515 -- 76 28 105/66 100 %   04/16/20 0500 -- 76 23 105/66 100 %   04/16/20 0445 -- 76 28 103/67 100 %   04/16/20 0430 -- 73 28 101/65 --   04/16/20 0420 -- 75 28 -- 100 %   04/16/20 0415 -- 76 28 102/69 100 %   04/16/20 0400 96.3 °F (35.7 °C) 77 28 108/69 100 %   04/16/20 0345 -- 77 28 115/74 100 %   04/16/20 0330 -- 77 23 113/69 100 %   04/16/20 0315 -- 75 28 102/63 100 % 04/16/20 0300 -- 78 22 111/73 100 %   04/16/20 0245 -- 79 25 105/68 100 %   04/16/20 0230 -- 93 26 107/64 100 %   04/16/20 0215 -- 81 23 110/72 100 %   04/16/20 0200 -- 84 21 115/70 100 %   04/16/20 0145 -- 79 25 111/68 100 %   04/16/20 0139 -- 82 23 113/71 100 %   04/16/20 0131 -- 80 29 96/67 (!) 85 %   04/16/20 0130 -- 80 25 -- (!) 83 %   04/16/20 0115 -- 86 24 -- (!) 83 %   04/16/20 0104 -- 81 23 107/65 92 %   04/16/20 0100 -- 82 25 -- 99 %   04/16/20 0045 -- 86 30 -- 100 %   04/16/20 0030 -- 85 24 -- 100 %   04/16/20 0026 -- 89 16 -- (!) 78 %   04/16/20 0015 -- 85 18 -- --   04/16/20 0000 97.9 °F (36.6 °C) 88 22 125/78 100 %   04/15/20 2345 -- 82 26 -- 100 %   04/15/20 2339 -- 80 28 -- 99 %   04/15/20 2330 -- 83 28 -- 98 %   04/15/20 2315 -- 79 (!) 32 -- 99 %   04/15/20 2300 -- 82 28 119/68 99 %   04/15/20 2245 -- 82 28 -- 99 %   04/15/20 2230 -- 85 26 -- 96 %   04/15/20 2215 -- 82 25 -- 100 %   04/15/20 2200 -- 82 19 120/71 100 %   04/15/20 2145 -- 81 19 -- --   04/15/20 2130 -- 83 21 -- 98 %   04/15/20 2115 -- 80 23 -- --   04/15/20 2100 -- 82 22 110/64 --   04/15/20 2045 -- 80 28 -- --   04/15/20 2030 -- 81 22 -- --   04/15/20 2015 -- 81 28 -- --   04/15/20 2000 97.8 °F (36.6 °C) 81 28 118/64 100 %   04/15/20 1945 -- 81 29 -- 100 %   04/15/20 1943 -- 82 28 -- 100 %   04/15/20 1915 -- 82 28 -- 100 %   04/15/20 1900 -- 83 25 129/70 100 %   04/15/20 1845 -- 85 28 -- 100 %   04/15/20 1830 -- 86 22 -- 100 %   04/15/20 1815 -- 83 28 -- 92 %   04/15/20 1800 -- 82 28 124/60 94 %   04/15/20 1745 -- 83 28 -- 98 %   04/15/20 1733 -- 82 28 -- 94 %   04/15/20 1730 -- 82 28 -- 95 %   04/15/20 1715 -- 82 28 -- 92 %   04/15/20 1700 -- 81 28 123/65 96 %       Physical Exam:  Please see ICU teams physical exam     Medications:    Current Facility-Administered Medications:     [START ON 4/17/2020] famotidine (PEPCID) 8 mg/mL oral suspension 20 mg, 20 mg, Per NG tube, DAILY, Willam White, NP    [START ON 4/17/2020] insulin glargine (LANTUS) injection 47 Units, 47 Units, SubCUTAneous, DAILY, Masood White NP    bicarbonate dialysis (PRISMASOL) BG K 4/Ca 2.5 5000 ml solution, , Extracorporeal, DIALYSIS CONTINUOUS, Wilberto Abreu MD, Last Rate: 2,000 mL/hr at 04/16/20 1500, 2,000 mL/hr at 04/16/20 1500    alteplase (CATHFLO) 2 mg in sterile water (preservative free) 2 mL injection, 2 mg, InterCATHeter, DIALYSIS PRN, Macey Adorno MD    alteplase (CATHFLO) 2 mg in sterile water (preservative free) 2 mL injection, 2 mg, InterCATHeter, DIALYSIS PRN, Macey Adorno MD, 2 mg at 04/15/20 0756    insulin glargine (LANTUS) injection 20 Units, 20 Units, SubCUTAneous, QHS, Nba Mathews NP, 20 Units at 04/15/20 2200    insulin lispro (HUMALOG) injection, , SubCUTAneous, Q6H, Nba Mathews NP, Stopped at 04/16/20 1200    0.9% sodium chloride infusion 250 mL, 250 mL, IntraVENous, PRN, Lillian SCHMITT NP-C    chlorhexidine (PERIDEX) 0.12 % mouthwash 15 mL, 15 mL, Oral, Q12H, Nba Mathews NP, 15 mL at 04/16/20 0830    hydrocortisone Sod Succ (PF) (SOLU-CORTEF) injection 50 mg, 50 mg, IntraVENous, Q6H, Cassandra Cox NP-C, 50 mg at 04/16/20 1204    cefepime (MAXIPIME) 2 g in 0.9% sodium chloride (MBP/ADV) 100 mL, 2 g, IntraVENous, Q12H, Iliana Rosales NP, Last Rate: 200 mL/hr at 04/16/20 0824, 2 g at 04/16/20 0824    metroNIDAZOLE (FLAGYL) IVPB premix 500 mg, 500 mg, IntraVENous, Q8H, Iliana Rosales NP, Last Rate: 100 mL/hr at 04/16/20 1635, 500 mg at 04/16/20 1635    vancomycin (FIRVANQ) 50 mg/mL oral solution 500 mg, 500 mg, Oral, Q6H, Iliana Rosales, NP, 500 mg at 04/16/20 1339    fentaNYL (PF) 1,500 mcg/30 mL (50 mcg/mL) infusion, 0-200 mcg/hr, IntraVENous, TITRATE, Janet Dorman MD, Last Rate: 4 mL/hr at 04/16/20 0934, 200 mcg/hr at 04/16/20 0934    ketamine (KETALAR) 500 mg in 0.9% sodium chloride 500 mL infusion, 0.05-0.4 mg/kg/hr, IntraVENous, TITRATE, Janet Dorman MD, Last Rate: 46.6 mL/hr at 04/16/20 1503, 0.4 mg/kg/hr at 04/16/20 1503    NOREPINephrine (LEVOPHED) 32,000 mcg in dextrose 5% 250 mL (128 mcg/mL) infusion, 0-50 mcg/min, IntraVENous, TITRATE, Lanette SCHMITT NP-C, Last Rate: 0.9 mL/hr at 04/16/20 0015, 2 mcg/min at 04/16/20 0015    [Held by provider] labetaloL (NORMODYNE) tablet 100 mg, 100 mg, Oral, Q12H, Iliana Rosales NP    0.9% sodium chloride infusion, 3 mL/hr, IntraVENous, CONTINUOUS, Anusha Madison MD, Last Rate: 3 mL/hr at 04/13/20 2316, 3 mL/hr at 04/13/20 2316    0.9% sodium chloride infusion, 5 mL/hr, IntraVENous, CONTINUOUS, Anusha Madison MD, Last Rate: 5 mL/hr at 04/14/20 0127, 5 mL/hr at 04/14/20 0127    labetaloL (NORMODYNE;TRANDATE) injection 10 mg, 10 mg, IntraVENous, Q10MIN PRN, Pedro Humphrey MD    fentaNYL citrate (PF) injection  mcg,  mcg, IntraVENous, Q1H PRN, Pedro Humphrey MD, 100 mcg at 04/11/20 1232    white petrolatum-mineral oiL (AKWA TEARS) 83-15 % ophthalmic ointment, , Both Eyes, Q12H, Anusha Madison MD, Stopped at 04/16/20 0900    heparin (porcine) pf 300 Units, 300 Units, InterCATHeter, PRN, Lanette Rdz R, NP-C    guaiFENesin (ROBITUSSIN) 100 mg/5 mL oral liquid 100 mg, 100 mg, Oral, Q6H, Cassandra Cox NP-C, 100 mg at 04/16/20 1204    midazolam (VERSED) injection 1-2 mg, 1-2 mg, IntraVENous, Q2H PRN, Lanette SCHMITT, NP-C, 1 mg at 04/16/20 1501    bacitracin 500 unit/gram packet 1 Packet, 1 Packet, Topical, PRN, Anthony Grant, NP, 1 Packet at 04/01/20 0116    balsam peru-castor oiL (VENELEX) ointment, , Topical, BID, Anusha Madison MD    sodium chloride (NS) flush 5-40 mL, 5-40 mL, IntraVENous, Q8H, Anusha Madison MD, 10 mL at 04/16/20 1411    sodium chloride (NS) flush 5-40 mL, 5-40 mL, IntraVENous, PRN, Pedro Humphrey MD, 30 mL at 04/13/20 2005    HYDROcodone-acetaminophen (NORCO) 5-325 mg per tablet 1 Tab, 1 Tab, Oral, Q4H PRN, Wood Henson MD, 1 Tab at 04/08/20 0528    [facility]) injection 4 mg, 4 mg, IntraVENous, Q4H PRN, Janay Gongora MD    acetaminophen (TYLENOL) tablet 650 mg, 650 mg, Oral, Q6H PRN **OR** acetaminophen (TYLENOL) suppository 650 mg, 650 mg, Rectal, Q6H PRN, Iliana Rosales NP    glucose chewable tablet 16 g, 4 Tab, Oral, PRN, Janay Gongora MD    glucagon (GLUCAGEN) injection 1 mg, 1 mg, IntraMUSCular, PRN, Janay Gongora MD    dextrose 10% infusion 0-250 mL, 0-250 mL, IntraVENous, PRN, Janay Gongora MD      Labs:  Recent Results (from the past 24 hour(s))   GLUCOSE, POC    Collection Time: 04/15/20  6:02 PM   Result Value Ref Range    Glucose (POC) 157 (H) 65 - 100 mg/dL    Performed by Jose Daniel Jacinto, BASIC    Collection Time: 04/15/20 10:28 PM   Result Value Ref Range    Sodium 138 136 - 145 mmol/L    Potassium 4.1 3.5 - 5.1 mmol/L    Chloride 107 97 - 108 mmol/L    CO2 26 21 - 32 mmol/L    Anion gap 5 5 - 15 mmol/L    Glucose 186 (H) 65 - 100 mg/dL    BUN 40 (H) 6 - 20 MG/DL    Creatinine 1.19 (H) 0.55 - 1.02 MG/DL    BUN/Creatinine ratio 34 (H) 12 - 20      GFR est AA 57 (L) >60 ml/min/1.73m2    GFR est non-AA 47 (L) >60 ml/min/1.73m2    Calcium 8.0 (L) 8.5 - 10.1 MG/DL   PTT    Collection Time: 04/15/20 10:28 PM   Result Value Ref Range    aPTT 26.5 22.1 - 32.0 sec    aPTT, therapeutic range     58.0 - 77.0 SECS   CBC W/O DIFF    Collection Time: 04/15/20 10:28 PM   Result Value Ref Range    WBC 22.9 (H) 3.6 - 11.0 K/uL    RBC 2.43 (L) 3.80 - 5.20 M/uL    HGB 7.4 (L) 11.5 - 16.0 g/dL    HCT 22.9 (L) 35.0 - 47.0 %    MCV 94.2 80.0 - 99.0 FL    MCH 30.5 26.0 - 34.0 PG    MCHC 32.3 30.0 - 36.5 g/dL    RDW 20.1 (H) 11.5 - 14.5 %    PLATELET 86 (L) 528 - 400 K/uL    MPV 11.0 8.9 - 12.9 FL    NRBC 1.1 (H) 0  WBC    ABSOLUTE NRBC 0.25 (H) 0.00 - 0.01 K/uL   GLUCOSE, POC    Collection Time: 04/16/20  1:48 AM   Result Value Ref Range    Glucose (POC) 158 (H) 65 - 100 mg/dL    Performed by EULA BYRD    HEPATIC FUNCTION PANEL    Collection Time: 04/16/20  4:00 AM   Result Value Ref Range    Protein, total 5.8 (L) 6.4 - 8.2 g/dL    Albumin 2.8 (L) 3.5 - 5.0 g/dL    Globulin 3.0 2.0 - 4.0 g/dL    A-G Ratio 0.9 (L) 1.1 - 2.2      Bilirubin, total 1.8 (H) 0.2 - 1.0 MG/DL    Bilirubin, direct 1.0 (H) 0.0 - 0.2 MG/DL    Alk.  phosphatase 251 (H) 45 - 117 U/L    AST (SGOT) 174 (H) 15 - 37 U/L    ALT (SGPT) 502 (H) 12 - 78 U/L   CBC W/O DIFF    Collection Time: 04/16/20  4:38 AM   Result Value Ref Range    WBC 21.5 (H) 3.6 - 11.0 K/uL    RBC 2.45 (L) 3.80 - 5.20 M/uL    HGB 7.5 (L) 11.5 - 16.0 g/dL    HCT 23.7 (L) 35.0 - 47.0 %    MCV 96.7 80.0 - 99.0 FL    MCH 30.6 26.0 - 34.0 PG    MCHC 31.6 30.0 - 36.5 g/dL    RDW 21.1 (H) 11.5 - 14.5 %    PLATELET 81 (L) 455 - 400 K/uL    MPV 11.0 8.9 - 12.9 FL    NRBC 0.9 (H) 0  WBC    ABSOLUTE NRBC 0.19 (H) 0.00 - 0.01 K/uL   RENAL FUNCTION PANEL    Collection Time: 04/16/20  4:38 AM   Result Value Ref Range    Sodium 137 136 - 145 mmol/L    Potassium 4.1 3.5 - 5.1 mmol/L    Chloride 105 97 - 108 mmol/L    CO2 26 21 - 32 mmol/L    Anion gap 6 5 - 15 mmol/L    Glucose 158 (H) 65 - 100 mg/dL    BUN 35 (H) 6 - 20 MG/DL    Creatinine 1.11 (H) 0.55 - 1.02 MG/DL    BUN/Creatinine ratio 32 (H) 12 - 20      GFR est AA >60 >60 ml/min/1.73m2    GFR est non-AA 51 (L) >60 ml/min/1.73m2    Calcium 8.7 8.5 - 10.1 MG/DL    Phosphorus 2.0 (L) 2.6 - 4.7 MG/DL    Albumin 2.7 (L) 3.5 - 5.0 g/dL   MAGNESIUM    Collection Time: 04/16/20  4:38 AM   Result Value Ref Range    Magnesium 2.5 (H) 1.6 - 2.4 mg/dL   CRP, HIGH SENSITIVITY    Collection Time: 04/16/20  4:39 AM   Result Value Ref Range    CRP, High sensitivity >9.5 mg/L   LD    Collection Time: 04/16/20  4:39 AM   Result Value Ref Range     (H) 81 - 246 U/L   GLUCOSE, POC    Collection Time: 04/16/20  5:57 AM   Result Value Ref Range    Glucose (POC) 155 (H) 65 - 100 mg/dL    Performed by EULA BYRD    PROTHROMBIN TIME + INR    Collection Time: 04/16/20 10:46 AM   Result Value Ref Range    INR 1.2 (H) 0.9 - 1.1      Prothrombin time 11.9 (H) 9.0 - 11.1 sec   PHOSPHORUS    Collection Time: 04/16/20 10:46 AM   Result Value Ref Range    Phosphorus 2.0 (L) 2.6 - 4.7 MG/DL   METABOLIC PANEL, BASIC    Collection Time: 04/16/20 10:46 AM   Result Value Ref Range    Sodium 137 136 - 145 mmol/L    Potassium 4.3 3.5 - 5.1 mmol/L    Chloride 105 97 - 108 mmol/L    CO2 27 21 - 32 mmol/L    Anion gap 5 5 - 15 mmol/L    Glucose 152 (H) 65 - 100 mg/dL    BUN 34 (H) 6 - 20 MG/DL    Creatinine 1.05 (H) 0.55 - 1.02 MG/DL    BUN/Creatinine ratio 32 (H) 12 - 20      GFR est AA >60 >60 ml/min/1.73m2    GFR est non-AA 54 (L) >60 ml/min/1.73m2    Calcium 8.5 8.5 - 10.1 MG/DL   PTT    Collection Time: 04/16/20 10:46 AM   Result Value Ref Range    aPTT 25.1 22.1 - 32.0 sec    aPTT, therapeutic range     58.0 - 77.0 SECS   CBC W/O DIFF    Collection Time: 04/16/20 10:46 AM   Result Value Ref Range    WBC 21.8 (H) 3.6 - 11.0 K/uL    RBC 2.55 (L) 3.80 - 5.20 M/uL    HGB 7.7 (L) 11.5 - 16.0 g/dL    HCT 24.8 (L) 35.0 - 47.0 %    MCV 97.3 80.0 - 99.0 FL    MCH 30.2 26.0 - 34.0 PG    MCHC 31.0 30.0 - 36.5 g/dL    RDW 21.5 (H) 11.5 - 14.5 %    PLATELET 83 (L) 300 - 400 K/uL    MPV 11.6 8.9 - 12.9 FL    NRBC 0.9 (H) 0  WBC    ABSOLUTE NRBC 0.19 (H) 0.00 - 0.01 K/uL   GLUCOSE, POC    Collection Time: 04/16/20 12:03 PM   Result Value Ref Range    Glucose (POC) 107 (H) 65 - 100 mg/dL    Performed by Darcy    Collection Time: 04/16/20  3:40 PM   Result Value Ref Range    SAMPLES BEING HELD 1RED     COMMENT        Add-on orders for these samples will be processed based on acceptable specimen integrity and analyte stability, which may vary by analyte.            Micro:   Blood 4/9/20       Component Value Ref Range & Units Status   Special Requests: NO SPECIAL REQUESTS    Preliminary   Culture result: NO GROWTH 4 DAYS    Preliminary   Result History       Blood 4/8/20  Component Value Ref Range & Units Status   Special Requests: NO SPECIAL REQUESTS    Final   Culture result: NO GROWTH 5 DAYS    Final   Result History               Blood: 3/31/20  Specimen Information: Blood        Component Value Ref Range & Units Status   Special Requests: NO SPECIAL REQUESTS    Final   Culture result: NO GROWTH 5 DAYS    Final   Result History              Sputum 3/31/20   Sputum        Component Value Ref Range & Units Status   Special Requests: NO SPECIAL REQUESTS    Final   GRAM STAIN FEW WBCS SEEN    Final   GRAM STAIN    Final   2+ GRAM POSITIVE COCCI IN PAIRS    Culture result: Abnormal     Final   MODERATE STAPHYLOCOCCUS AUREUS    Culture result:    Final   LIGHT NORMAL RESPIRATORY SOFIE    Susceptibility      Staphylococcus aureus     LU    Ciprofloxacin ($) <=0.5 ug/mL S    Clindamycin ($)  R    Doxycycline ($$) <=0.5 ug/mL S    Erythromycin ($$$$) >=8 ug/mL R    Gentamicin ($) <=0.5 ug/mL S    Levofloxacin ($) <=0.12 ug/mL S    Linezolid ($$$$$) 2 ug/mL S    Moxifloxacin ($$$$) <=0.25 ug/mL S    Oxacillin 0.5 ug/mL S    Rifampin ($$$$) <=0.5 ug/mL S1    Tetracycline <=1 ug/mL S    Trimeth/Sulfa <=10 ug/mL S    Vancomycin ($) 1 ug/mL S                         Imaging:  CXR 4/5/20  FINDINGS: AP radiograph of the chest was obtained.     There is been interval advancement of the endotracheal tube which now terminates  1.6 cm above the bhavik. Right upper extremity PICC and gastric decompression  tubes are again noted. There is no significant change in diffuse bilateral  heterogeneous opacities. Likely trace left pleural effusion. No pneumothorax. Stable cardiomediastinal silhouette.     IMPRESSION  IMPRESSION:   1. Interval advancement of endotracheal tube which now terminates 1.6 cm above  the bhavik. Consider slight retraction.   2. Unchanged diffuse bilateral heterogeneous opacities, consistent with  multifocal pneumonia. Likely trace left pleural effusion.           Assessment / Plan    Ms. Luis Carlos Pena is a 61-year-old lady with a history of nephrolithiasis, GERD, basal cell carcinoma who was admitted from [address] with respiratory symptoms concerning for COVID 19 and tested + on 3/26/20. Per team,  exposure to her mother with COVID 23. Per notes, intubated on 3/28/2020. Transferred to [facility] for CRRT    Had code blue 4/9/20   Resuscitated            1) Sars-CoV2 pneumonia, respiratory failure, renal failure   Sars-CoV-2 detected 3/26/20, other viral respiratory panel negative  (records from Care everywhere )   Urine legionella and S pneumo ag negative 3/27/20  Procalcitonin 3/27 0.47, 3/28 0.44, 3/29 5.86, 3/31 13.44  , Ferritin 1985 3/31/20    4/3/20   , ferritin 992, CRP 9.65 4/5/20   IL 6  402 3/31/20  Completed azithromycin and Plaquenil   S/P Actemra one dose 4/3. Was on On Heparin gtt , now off per ICU team   On IV Steroids per ICU team     2)  MSSA on sputum, pharmacy dosing vancomycin based on levels due to Penicillin allergy    Completed a 7 day course of antibiotics       3) marked leukocytosis  WBC was 11-16 before code blue on 4/9/20   Suspect from coding event/reactory   Started by primary team PO Vancomycin for empiric CDI treatment.  Plan for 10 days total   Started by primary team on Vancomycin IV Cefepime and flagyl empirically given marked WBC elevation   Blood cx negative   Stopped Vancomycin IV on 4/13   CT A/P/C 4/10 with large hematoma R retroperitoneal   Plan to Stop  Cefepime and Flagyl on 4/17/20            4) LLE PE on CT 4/10/20  Currently off anticoagulation   Had retroperitoneal bleed      5) Retroperitoneal hematoma on imaging 4/10/20    5) ARF:   Plans per nephrology, CRRT  R IJ    6) history of nephrolithiasis    7) history of GERD    8) basal cell carcinoma per chart        D/W with icu team today     Please contact with questions Dudley Hummel DO  4:52 PM

## 2020-04-16 NOTE — DIALYSIS
[address] [facility] [address]       [phone]    Orders   Mode: CVVHD   Factor: As tolerated   UFR: 3500ml/hr   Blood Flow Rate: 200ml/min     Metrics   BP / HR: 95/63  89   Blood Flow Rate: 200ml/min   AP:                         -120   RP: 84   TMP: 38   PD: 73   FP: 164   Dialysate: 3500ml/hr     Comments / Plan:      Orders, labs consents orders and labs reviewed. Rt groin drsg/ biopatch changed, after cleaning insertion site w/ alcohol and CHG. No redness or active bleeding noted. System running well into rt groin. All connections and lines secure and visible, with warmer at [facility] on return line.   Pre-post report and education done w/ Jasson Garza RN

## 2020-04-17 LAB
ALBUMIN SERPL-MCNC: 2.6 G/DL (ref 3.5–5)
ALBUMIN SERPL-MCNC: 2.6 G/DL (ref 3.5–5)
ALBUMIN/GLOB SERPL: 0.8 {RATIO} (ref 1.1–2.2)
ALP SERPL-CCNC: 440 U/L (ref 45–117)
ALT SERPL-CCNC: 393 U/L (ref 12–78)
ANION GAP SERPL CALC-SCNC: 8 MMOL/L (ref 5–15)
AST SERPL-CCNC: 172 U/L (ref 15–37)
BILIRUB DIRECT SERPL-MCNC: 1.4 MG/DL (ref 0–0.2)
BILIRUB SERPL-MCNC: 2.5 MG/DL (ref 0.2–1)
BUN SERPL-MCNC: 46 MG/DL (ref 6–20)
BUN/CREAT SERPL: 38 (ref 12–20)
CALCIUM SERPL-MCNC: 8.3 MG/DL (ref 8.5–10.1)
CHLORIDE SERPL-SCNC: 105 MMOL/L (ref 97–108)
CO2 SERPL-SCNC: 23 MMOL/L (ref 21–32)
CREAT SERPL-MCNC: 1.21 MG/DL (ref 0.55–1.02)
CRP SERPL HS-MCNC: >9.5 MG/L
ERYTHROCYTE [DISTWIDTH] IN BLOOD BY AUTOMATED COUNT: 22.4 % (ref 11.5–14.5)
ERYTHROCYTE [DISTWIDTH] IN BLOOD BY AUTOMATED COUNT: 23.2 % (ref 11.5–14.5)
ERYTHROCYTE [DISTWIDTH] IN BLOOD BY AUTOMATED COUNT: 23.6 % (ref 11.5–14.5)
GLOBULIN SER CALC-MCNC: 3.3 G/DL (ref 2–4)
GLUCOSE BLD STRIP.AUTO-MCNC: 196 MG/DL (ref 65–100)
GLUCOSE BLD STRIP.AUTO-MCNC: 201 MG/DL (ref 65–100)
GLUCOSE BLD STRIP.AUTO-MCNC: 205 MG/DL (ref 65–100)
GLUCOSE BLD STRIP.AUTO-MCNC: 242 MG/DL (ref 65–100)
GLUCOSE SERPL-MCNC: 238 MG/DL (ref 65–100)
HCT VFR BLD AUTO: 23 % (ref 35–47)
HCT VFR BLD AUTO: 23.2 % (ref 35–47)
HCT VFR BLD AUTO: 23.3 % (ref 35–47)
HGB BLD-MCNC: 7.2 G/DL (ref 11.5–16)
HGB BLD-MCNC: 7.2 G/DL (ref 11.5–16)
HGB BLD-MCNC: 7.3 G/DL (ref 11.5–16)
INR PPP: 1.1 (ref 0.9–1.1)
MAGNESIUM SERPL-MCNC: 2.6 MG/DL (ref 1.6–2.4)
MCH RBC QN AUTO: 30.1 PG (ref 26–34)
MCH RBC QN AUTO: 30.3 PG (ref 26–34)
MCH RBC QN AUTO: 30.4 PG (ref 26–34)
MCHC RBC AUTO-ENTMCNC: 31 G/DL (ref 30–36.5)
MCHC RBC AUTO-ENTMCNC: 31.3 G/DL (ref 30–36.5)
MCHC RBC AUTO-ENTMCNC: 31.3 G/DL (ref 30–36.5)
MCV RBC AUTO: 96.6 FL (ref 80–99)
MCV RBC AUTO: 97.1 FL (ref 80–99)
MCV RBC AUTO: 97.1 FL (ref 80–99)
NRBC # BLD: 0.13 K/UL (ref 0–0.01)
NRBC # BLD: 0.2 K/UL (ref 0–0.01)
NRBC # BLD: 0.24 K/UL (ref 0–0.01)
NRBC BLD-RTO: 0.8 PER 100 WBC
NRBC BLD-RTO: 1.3 PER 100 WBC
NRBC BLD-RTO: 1.5 PER 100 WBC
PHOSPHATE SERPL-MCNC: 2.3 MG/DL (ref 2.6–4.7)
PLATELET # BLD AUTO: 78 K/UL (ref 150–400)
PLATELET # BLD AUTO: 82 K/UL (ref 150–400)
PLATELET # BLD AUTO: 85 K/UL (ref 150–400)
PMV BLD AUTO: 11.8 FL (ref 8.9–12.9)
PMV BLD AUTO: 12.2 FL (ref 8.9–12.9)
PMV BLD AUTO: 12.2 FL (ref 8.9–12.9)
POTASSIUM SERPL-SCNC: 4.4 MMOL/L (ref 3.5–5.1)
PROT SERPL-MCNC: 5.9 G/DL (ref 6.4–8.2)
PROTHROMBIN TIME: 11.6 SEC (ref 9–11.1)
RBC # BLD AUTO: 2.38 M/UL (ref 3.8–5.2)
RBC # BLD AUTO: 2.39 M/UL (ref 3.8–5.2)
RBC # BLD AUTO: 2.4 M/UL (ref 3.8–5.2)
SERVICE CMNT-IMP: ABNORMAL
SODIUM SERPL-SCNC: 136 MMOL/L (ref 136–145)
WBC # BLD AUTO: 15.3 K/UL (ref 3.6–11)
WBC # BLD AUTO: 15.7 K/UL (ref 3.6–11)
WBC # BLD AUTO: 16.2 K/UL (ref 3.6–11)

## 2020-04-17 PROCEDURE — 74011000250 HC RX REV CODE- 250: Performed by: INTERNAL MEDICINE

## 2020-04-17 PROCEDURE — 94003 VENT MGMT INPAT SUBQ DAY: CPT

## 2020-04-17 PROCEDURE — 65610000006 HC RM INTENSIVE CARE

## 2020-04-17 PROCEDURE — 85610 PROTHROMBIN TIME: CPT

## 2020-04-17 PROCEDURE — 74011250636 HC RX REV CODE- 250/636: Performed by: NURSE PRACTITIONER

## 2020-04-17 PROCEDURE — 82962 GLUCOSE BLOOD TEST: CPT

## 2020-04-17 PROCEDURE — 86141 C-REACTIVE PROTEIN HS: CPT

## 2020-04-17 PROCEDURE — 90945 DIALYSIS ONE EVALUATION: CPT

## 2020-04-17 PROCEDURE — 80069 RENAL FUNCTION PANEL: CPT

## 2020-04-17 PROCEDURE — 74011636637 HC RX REV CODE- 636/637: Performed by: NURSE PRACTITIONER

## 2020-04-17 PROCEDURE — P9047 ALBUMIN (HUMAN), 25%, 50ML: HCPCS | Performed by: NURSE PRACTITIONER

## 2020-04-17 PROCEDURE — 36415 COLL VENOUS BLD VENIPUNCTURE: CPT

## 2020-04-17 PROCEDURE — 74011250637 HC RX REV CODE- 250/637: Performed by: NURSE PRACTITIONER

## 2020-04-17 PROCEDURE — 85027 COMPLETE CBC AUTOMATED: CPT

## 2020-04-17 PROCEDURE — 83735 ASSAY OF MAGNESIUM: CPT

## 2020-04-17 PROCEDURE — 80076 HEPATIC FUNCTION PANEL: CPT

## 2020-04-17 PROCEDURE — 74011000258 HC RX REV CODE- 258: Performed by: NURSE PRACTITIONER

## 2020-04-17 PROCEDURE — 74011250636 HC RX REV CODE- 250/636: Performed by: INTERNAL MEDICINE

## 2020-04-17 RX ORDER — ALBUMIN HUMAN 250 G/1000ML
25 SOLUTION INTRAVENOUS ONCE
Status: COMPLETED | OUTPATIENT
Start: 2020-04-17 | End: 2020-04-17

## 2020-04-17 RX ORDER — INSULIN GLARGINE 100 [IU]/ML
35 INJECTION, SOLUTION SUBCUTANEOUS EVERY 12 HOURS
Status: DISCONTINUED | OUTPATIENT
Start: 2020-04-18 | End: 2020-04-20

## 2020-04-17 RX ADMIN — VANCOMYCIN HYDROCHLORIDE 500 MG: KIT at 12:39

## 2020-04-17 RX ADMIN — MIDAZOLAM 2 MG: 1 INJECTION INTRAMUSCULAR; INTRAVENOUS at 23:06

## 2020-04-17 RX ADMIN — HYDROCORTISONE SODIUM SUCCINATE 50 MG: 100 INJECTION, POWDER, FOR SOLUTION INTRAMUSCULAR; INTRAVENOUS at 12:49

## 2020-04-17 RX ADMIN — CALCIUM CHLORIDE, MAGNESIUM CHLORIDE, DEXTROSE MONOHYDRATE, LACTIC ACID, SODIUM CHLORIDE, SODIUM BICARBONATE AND POTASSIUM CHLORIDE 2000 ML/HR: 3.68; 3.05; 22; 5.4; 6.46; 3.09; .314 INJECTION INTRAVENOUS at 12:33

## 2020-04-17 RX ADMIN — CALCIUM CHLORIDE, MAGNESIUM CHLORIDE, DEXTROSE MONOHYDRATE, LACTIC ACID, SODIUM CHLORIDE, SODIUM BICARBONATE AND POTASSIUM CHLORIDE 2000 ML/HR: 3.68; 3.05; 22; 5.4; 6.46; 3.09; .314 INJECTION INTRAVENOUS at 00:43

## 2020-04-17 RX ADMIN — CEFEPIME HYDROCHLORIDE 2 G: 2 INJECTION, POWDER, FOR SOLUTION INTRAVENOUS at 08:11

## 2020-04-17 RX ADMIN — CALCIUM CHLORIDE, MAGNESIUM CHLORIDE, DEXTROSE MONOHYDRATE, LACTIC ACID, SODIUM CHLORIDE, SODIUM BICARBONATE AND POTASSIUM CHLORIDE 2000 ML/HR: 3.68; 3.05; 22; 5.4; 6.46; 3.09; .314 INJECTION INTRAVENOUS at 15:20

## 2020-04-17 RX ADMIN — FAMOTIDINE 20 MG: 40 POWDER, FOR SUSPENSION ORAL at 08:11

## 2020-04-17 RX ADMIN — Medication 100 MCG/HR: at 01:02

## 2020-04-17 RX ADMIN — VANCOMYCIN HYDROCHLORIDE 500 MG: KIT at 00:28

## 2020-04-17 RX ADMIN — INSULIN LISPRO 3 UNITS: 100 INJECTION, SOLUTION INTRAVENOUS; SUBCUTANEOUS at 12:59

## 2020-04-17 RX ADMIN — CALCIUM CHLORIDE, MAGNESIUM CHLORIDE, DEXTROSE MONOHYDRATE, LACTIC ACID, SODIUM CHLORIDE, SODIUM BICARBONATE AND POTASSIUM CHLORIDE 2000 ML/HR: 3.68; 3.05; 22; 5.4; 6.46; 3.09; .314 INJECTION INTRAVENOUS at 23:41

## 2020-04-17 RX ADMIN — METRONIDAZOLE 500 MG: 500 INJECTION, SOLUTION INTRAVENOUS at 00:44

## 2020-04-17 RX ADMIN — CALCIUM CHLORIDE, MAGNESIUM CHLORIDE, DEXTROSE MONOHYDRATE, LACTIC ACID, SODIUM CHLORIDE, SODIUM BICARBONATE AND POTASSIUM CHLORIDE 2000 ML/HR: 3.68; 3.05; 22; 5.4; 6.46; 3.09; .314 INJECTION INTRAVENOUS at 03:28

## 2020-04-17 RX ADMIN — Medication 100 MCG/HR: at 15:59

## 2020-04-17 RX ADMIN — CASTOR OIL AND BALSAM, PERU: 788; 87 OINTMENT TOPICAL at 08:11

## 2020-04-17 RX ADMIN — VANCOMYCIN HYDROCHLORIDE 500 MG: KIT at 06:43

## 2020-04-17 RX ADMIN — CALCIUM CHLORIDE, MAGNESIUM CHLORIDE, DEXTROSE MONOHYDRATE, LACTIC ACID, SODIUM CHLORIDE, SODIUM BICARBONATE AND POTASSIUM CHLORIDE 2000 ML/HR: 3.68; 3.05; 22; 5.4; 6.46; 3.09; .314 INJECTION INTRAVENOUS at 15:45

## 2020-04-17 RX ADMIN — MIDAZOLAM 2 MG: 1 INJECTION INTRAMUSCULAR; INTRAVENOUS at 10:55

## 2020-04-17 RX ADMIN — CALCIUM CHLORIDE, MAGNESIUM CHLORIDE, DEXTROSE MONOHYDRATE, LACTIC ACID, SODIUM CHLORIDE, SODIUM BICARBONATE AND POTASSIUM CHLORIDE 2000 ML/HR: 3.68; 3.05; 22; 5.4; 6.46; 3.09; .314 INJECTION INTRAVENOUS at 09:44

## 2020-04-17 RX ADMIN — GUAIFENESIN 100 MG: 100 SOLUTION ORAL at 12:49

## 2020-04-17 RX ADMIN — CALCIUM CHLORIDE, MAGNESIUM CHLORIDE, DEXTROSE MONOHYDRATE, LACTIC ACID, SODIUM CHLORIDE, SODIUM BICARBONATE AND POTASSIUM CHLORIDE 2000 ML/HR: 3.68; 3.05; 22; 5.4; 6.46; 3.09; .314 INJECTION INTRAVENOUS at 20:52

## 2020-04-17 RX ADMIN — INSULIN LISPRO 3 UNITS: 100 INJECTION, SOLUTION INTRAVENOUS; SUBCUTANEOUS at 06:48

## 2020-04-17 RX ADMIN — KETAMINE HYDROCHLORIDE 0.3 MG/KG/HR: 50 INJECTION, SOLUTION INTRAMUSCULAR; INTRAVENOUS at 21:00

## 2020-04-17 RX ADMIN — CALCIUM CHLORIDE, MAGNESIUM CHLORIDE, DEXTROSE MONOHYDRATE, LACTIC ACID, SODIUM CHLORIDE, SODIUM BICARBONATE AND POTASSIUM CHLORIDE 2000 ML/HR: 3.68; 3.05; 22; 5.4; 6.46; 3.09; .314 INJECTION INTRAVENOUS at 13:51

## 2020-04-17 RX ADMIN — GUAIFENESIN 100 MG: 100 SOLUTION ORAL at 00:28

## 2020-04-17 RX ADMIN — GUAIFENESIN 100 MG: 100 SOLUTION ORAL at 06:44

## 2020-04-17 RX ADMIN — SODIUM CHLORIDE 10 ML: 9 INJECTION INTRAMUSCULAR; INTRAVENOUS; SUBCUTANEOUS at 22:09

## 2020-04-17 RX ADMIN — CALCIUM CHLORIDE, MAGNESIUM CHLORIDE, DEXTROSE MONOHYDRATE, LACTIC ACID, SODIUM CHLORIDE, SODIUM BICARBONATE AND POTASSIUM CHLORIDE 2000 ML/HR: 3.68; 3.05; 22; 5.4; 6.46; 3.09; .314 INJECTION INTRAVENOUS at 22:13

## 2020-04-17 RX ADMIN — VANCOMYCIN HYDROCHLORIDE 500 MG: KIT at 17:51

## 2020-04-17 RX ADMIN — SODIUM CHLORIDE 10 ML: 9 INJECTION INTRAMUSCULAR; INTRAVENOUS; SUBCUTANEOUS at 06:44

## 2020-04-17 RX ADMIN — HYDROCORTISONE SODIUM SUCCINATE 50 MG: 100 INJECTION, POWDER, FOR SOLUTION INTRAMUSCULAR; INTRAVENOUS at 06:44

## 2020-04-17 RX ADMIN — SODIUM CHLORIDE 10 ML: 9 INJECTION INTRAMUSCULAR; INTRAVENOUS; SUBCUTANEOUS at 14:04

## 2020-04-17 RX ADMIN — ALBUMIN (HUMAN) 25 G: 0.25 INJECTION, SOLUTION INTRAVENOUS at 20:43

## 2020-04-17 RX ADMIN — MINERAL OIL AND WHITE PETROLATUM: 150; 830 OINTMENT OPHTHALMIC at 20:45

## 2020-04-17 RX ADMIN — CALCIUM CHLORIDE, MAGNESIUM CHLORIDE, DEXTROSE MONOHYDRATE, LACTIC ACID, SODIUM CHLORIDE, SODIUM BICARBONATE AND POTASSIUM CHLORIDE 2000 ML/HR: 3.68; 3.05; 22; 5.4; 6.46; 3.09; .314 INJECTION INTRAVENOUS at 08:13

## 2020-04-17 RX ADMIN — HYDROCORTISONE SODIUM SUCCINATE 50 MG: 100 INJECTION, POWDER, FOR SOLUTION INTRAMUSCULAR; INTRAVENOUS at 23:37

## 2020-04-17 RX ADMIN — INSULIN GLARGINE 47 UNITS: 100 INJECTION, SOLUTION SUBCUTANEOUS at 08:31

## 2020-04-17 RX ADMIN — INSULIN LISPRO 2 UNITS: 100 INJECTION, SOLUTION INTRAVENOUS; SUBCUTANEOUS at 17:45

## 2020-04-17 RX ADMIN — HYDROCORTISONE SODIUM SUCCINATE 50 MG: 100 INJECTION, POWDER, FOR SOLUTION INTRAMUSCULAR; INTRAVENOUS at 17:51

## 2020-04-17 RX ADMIN — CALCIUM CHLORIDE, MAGNESIUM CHLORIDE, DEXTROSE MONOHYDRATE, LACTIC ACID, SODIUM CHLORIDE, SODIUM BICARBONATE AND POTASSIUM CHLORIDE 2000 ML/HR: 3.68; 3.05; 22; 5.4; 6.46; 3.09; .314 INJECTION INTRAVENOUS at 01:57

## 2020-04-17 RX ADMIN — CALCIUM CHLORIDE, MAGNESIUM CHLORIDE, DEXTROSE MONOHYDRATE, LACTIC ACID, SODIUM CHLORIDE, SODIUM BICARBONATE AND POTASSIUM CHLORIDE 2000 ML/HR: 3.68; 3.05; 22; 5.4; 6.46; 3.09; .314 INJECTION INTRAVENOUS at 11:06

## 2020-04-17 RX ADMIN — GUAIFENESIN 100 MG: 100 SOLUTION ORAL at 17:50

## 2020-04-17 RX ADMIN — CHLORHEXIDINE GLUCONATE 15 ML: 1.2 RINSE ORAL at 20:45

## 2020-04-17 RX ADMIN — CHLORHEXIDINE GLUCONATE 15 ML: 1.2 RINSE ORAL at 08:32

## 2020-04-17 RX ADMIN — MINERAL OIL AND WHITE PETROLATUM: 150; 830 OINTMENT OPHTHALMIC at 09:00

## 2020-04-17 RX ADMIN — CASTOR OIL AND BALSAM, PERU: 788; 87 OINTMENT TOPICAL at 19:38

## 2020-04-17 RX ADMIN — INSULIN LISPRO 3 UNITS: 100 INJECTION, SOLUTION INTRAVENOUS; SUBCUTANEOUS at 00:43

## 2020-04-17 RX ADMIN — INSULIN GLARGINE 20 UNITS: 100 INJECTION, SOLUTION SUBCUTANEOUS at 23:05

## 2020-04-17 RX ADMIN — GUAIFENESIN 100 MG: 100 SOLUTION ORAL at 23:37

## 2020-04-17 RX ADMIN — HYDROCORTISONE SODIUM SUCCINATE 50 MG: 100 INJECTION, POWDER, FOR SOLUTION INTRAMUSCULAR; INTRAVENOUS at 00:28

## 2020-04-17 RX ADMIN — KETAMINE HYDROCHLORIDE 0.4 MG/KG/HR: 50 INJECTION, SOLUTION INTRAMUSCULAR; INTRAVENOUS at 08:16

## 2020-04-17 RX ADMIN — VANCOMYCIN HYDROCHLORIDE 500 MG: KIT at 23:38

## 2020-04-17 RX ADMIN — CALCIUM CHLORIDE, MAGNESIUM CHLORIDE, DEXTROSE MONOHYDRATE, LACTIC ACID, SODIUM CHLORIDE, SODIUM BICARBONATE AND POTASSIUM CHLORIDE 2000 ML/HR: 3.68; 3.05; 22; 5.4; 6.46; 3.09; .314 INJECTION INTRAVENOUS at 17:58

## 2020-04-17 NOTE — DIALYSIS
[address]       [phone]    Orders   Mode: CVVHD restarted @ 0745   Factor: 200 ml/hr   Prismasol Dose: 3,500 ml/hr   Blood Flow Rate: 200 ml/min     Metrics   BP / HR: 90/52  //  85   Blood Flow Rate: 200 ml/min   AP:                         -72   RP: 136   TMP: 34   PD: 17   FP: 180   DFR: 3,500 ml/hr     Comments / Plan:      Filter changed secondary to clotted circuit. All possible blood (165 ml) returned by primary RN. Consents, patient, code status, labs and orders verified. Time out completed. Patient COVID-19 Positive. All policies and procedures followed. All appropriate PPE - surgical mask, hair covering, PAPR, double gown and double gloves - worn. Old set discarded in red bio-hazard bag. New [id number] filter set-up, primed c 1L NS, tested and running well at this time. Right femoral non-tunneled temporary CVC, dressing CDI with bio-patch dated 4/16/20. No signs of redness, drainage, or infection visualized. Each catheter limb disinfected for 60 seconds per limb with alcohol swabs. Caps removed, dialysis CVC hub scrubbed with Prevantics for 5 seconds, followed by a 5 second dry time per Hospital P&P. +asp/+flush x 2 ports. Lines reversed, visible and connections secure with blood warmer to return line at 37*C. Education, pre and post to primary RN.

## 2020-04-17 NOTE — WOUND CARE
Wound Consult: Follow Up Visit. Chart reviewed. Consulted for DTI areas on lower back/left upper buttock; also nursing noted linear breakdown in pannus and has linear denuded areas in distal gluteal cleft. Spoke with patients nurse,  Jamison Templeton and in with Tian Damian RN to assess and provide care. Patient direct admit to [facility] from [facility] for severe acute respiratory failure secondary to [address]. Patient is resting on a total care bariatric plus bed. She is sedated, intubated on vent, on CRRT. No real response from patient while we were with her turning her and providing car; FMS in place. Assessment:  Lower lumbar fold midline - ~ 0.5 x 2.5 x 0.1 cm, unroofing dusky violet intact skin with moist red/pale pink exposed base where open; no surrounding discoloration, intertrigo injury. Left upper buttock - linear dusky purple/violet intact skin with small area beginning to unroof with red/pink base;non-blanching; no surrounding discoloration, area ~ 1 x 5 x 0.1 cm. Distal gluteal cleft - linear skin areas of denuded skin; pink/red, no surrounding redness, MASD/intertrigo. No rash or secondary candidiasis noted. Midline abdominal pannus - linear red/pink denuded skin ~ 0.3 x 3 x 0.1 cm, no surrounding redness or rash, intertrigo/MASD. Heels/scattered areas on feet - blanching mottled isadora pink / violet intact skin. No rash in groin. Treatment:  Xeroform gauze placed to lumbar and left buttock injuries and re-secured sacral dressing. Dauphin to pannus. Patient was turned and repositioned, heels floated with Tian Damian. Wound Recommendations:  Xeroform gauze placed to lumbar and left buttock injuries, secure with sacral dressing every three days. Z guard to gluteal cleft to keep lightly coated. Dauphin to pannus today and should protect for many days. Skin Care Recommendations:  1. Minimize friction/shear: minimize layers of linen/pads under patient. Current support surface appropriate.   2. Off load pressure/reposition: continue to turn and reposition approximately every 2 - 3 hours; float heels. 3. Manage Moisture - keep skin folds dry; incontinence skin care; FMS in use with some leakage. 4. Continue to monitor nutrition, pain, and skin risk scale, and skin assessment. Plan: We will continue to reassess routinely and as needed.   Paty Hannah, [address] [facility] Office [phone]  Pager [phone]

## 2020-04-17 NOTE — PROGRESS NOTES
1930- Bedside shift change report given to NESHA Rosas RN (oncoming nurse) by Gin Miller. Chiquita RN (offgoing nurse). Report included the following information SBAR, Kardex, Procedure Summary, Intake/Output, MAR, Recent Results and Cardiac Rhythm NSR. Assumed care of patient. 2000- Assessment completed, no signs of distress at this time. 2150- Spoke with Dr. Chloe Rajput concerning patients pain medication and sedation. Will decrease Fentanyl over the course of the shift. Reduce to 150mcg at this time. 2310- Levo turned off, blood pressure stable per arterial line and cuff pressure at this time. 0000- Reassessed, no changes at this time. 0100- Fentanyl reduced to 100mcg at this time. 0400- Reassessed, Patient spontaneously moving both feet at this time. No command following continues. 0445- CVVHD clotted off, blood returned, Unk Sadiq notified. 0600- Patient bathed. Bruce Dumont in to change CVVHD filter and restart. 0730- Bedside shift change report given to Purnima Strange RN and Carmen Sheppard RN (oncoming nurse) by NESHA Rosas RN (offgoing nurse). Report included the following information SBAR, Kardex, Intake/Output, MAR, Recent Results and Cardiac Rhythm NSR. Released care to day shift RN's.

## 2020-04-17 NOTE — PROGRESS NOTES
0730: Bedside shift change report given to [address] Dr[address] Ayush Crump and Kylah Sinclair RN (oncoming nurse) by Chetan KIM (offgoing nurse). Report included the following information SBAR, Kardex, Intake/Output, MAR, Accordion, Recent Results and Cardiac Rhythm SR. Patient still not following commands but has able to wiggle toes spontaneously sometimes per night shift nurse. [facility] RN at bedside. Drips: Fentanyl 100mcg/hr, Ketamine 0.4mg/kg/hr  Vent settings: VC, AC 28, FiO2 40%, Peep 5.  0800: Assumed care of patient. Talked with Dr. Avery Rose about patient and overnight activities. Brady FRAGA to convert pt to hemodialysis if pt clots off CVVHD again. 0900: John Lewis NP at bedside. Plan is to continue weaning Fentanyl. Fentanyl titrated to 75mcg/hr. 0945: Pt asynchronous with vent; stacking breaths, abdominal breathing, and breathing at a rate of 38. Respiratory and John Lewis NP notified. 1000: Respiratory and John Lewis NP at bedside. Fentanyl titrated to 100mcg/hr. 1100: Patient still asynchronous with vent. Versed 2mg given. 1215: Palliative care call with family ( Isabella Mckenzie and daughter Arlette Dean). John Lewis NP, Luna Penaloza, and myself were present to answer questions from family and provide update. See SW note. Wound care RN at bedside with [facility] for [facility] . See wound care note. 1300: Ketamine titrated to 0.35mg/kg/hr. Pt synchronous with vent. 1400: Ketamine titrated to 0.3mg/kg/hr. Pt synchronous with vent. 1819: Fentanyl titrated to 75mcgs. Pt synchronous with vent. 1900:  Pt MAP 60-65. John Lewis NP notified. Vasopressors not started per John Lewis NP. Factor for CVVHD titrated to 175.  1715:   updated on care. 1926: Fentanyl titrated to 50mcgs. Pt synchronous with vent. 1930: Bedside shift change report given to C/ Noemi 29 (oncoming nurse) by [address] W Dr. Ayush Crump and Kylah Sinclair RN (offgoing nurse). Report included the following information SBAR, Kardex, Intake/Output, MAR, Accordion, Recent Results, Med Rec Status and Cardiac Rhythm SR.      Shift summary: Pt titrated to Fentanyl 50mcg/hr and Ketamine 0.3mg/kg/hr. Patient still does not follow comnmands, grimaces and closes eyes with mouthcare. Blood pressures soft, MAPs 59-75 during shift. Vasopressors not started per Xochitl Austin NP.  CVVHD factor to 175. Family mtg with Maya Sanchez NP and myself at noon. No changes to plan of care from family standpoint. Updated  twice during shift. Patient was asynchronous with vent in the morning when starting to titrate Fentanyl off. Night shift RN aware.

## 2020-04-17 NOTE — PROGRESS NOTES
[facility]   [address] [address], [facility]  Phone: [phone]   [phone]       Nephrology Progress Note  Mery Dominguez     1960     [id number]  Date of Admission : 3/31/2020  04/17/20    CC: Follow up for JUANCHO      Assessment and Plan   JUANCHO :  - 2/2 ATN  -cont CVVHD w/ factor of 200/hr  - can switch over to IHD once her filter clots  - daily labs for now    Lytes  - K, mag, phos stable    COVID-19 +ve   Acute Hypoxic Resp Failure   - On Vent   - completed Plaquenil. On Vit C, Zinc   - s/p Tocilizumab      Leukocytosis:  - improving, blood cx neg  - on cefepime, vanco, flagyl    RP hematoma:  - hgb stable  - transfuse PRN    Cardiac arrest 4/9    Type II DM   - Insulin per primary team      Morbid Obesity        Interval History:  Pt remains on CRRT, factor of 200/hr. Paralyzed on the vent. Off all pressors. No issues overnight per RN of ICU team.      PT NOT EXAMINED in line with ASN and RPA GUIDELINES ON MANAGING COVID-19 PTS WITH RENAL ISSUES. Examination findings discussed personally with the examining Physician team member      Review of Systems: Review of systems not obtained due to patient factors.     Current Medications:   Current Facility-Administered Medications   Medication Dose Route Frequency    famotidine (PEPCID) 8 mg/mL oral suspension 20 mg  20 mg Per NG tube DAILY    insulin glargine (LANTUS) injection 47 Units  47 Units SubCUTAneous DAILY    bicarbonate dialysis (PRISMASOL) BG K 4/Ca 2.5 5000 ml solution   Extracorporeal DIALYSIS CONTINUOUS    alteplase (CATHFLO) 2 mg in sterile water (preservative free) 2 mL injection  2 mg InterCATHeter DIALYSIS PRN    alteplase (CATHFLO) 2 mg in sterile water (preservative free) 2 mL injection  2 mg InterCATHeter DIALYSIS PRN    insulin glargine (LANTUS) injection 20 Units  20 Units SubCUTAneous QHS    insulin lispro (HUMALOG) injection   SubCUTAneous Q6H    0.9% sodium chloride infusion 250 mL  250 mL IntraVENous PRN    chlorhexidine (PERIDEX) 0.12 % mouthwash 15 mL  15 mL Oral Q12H    hydrocortisone Sod Succ (PF) (SOLU-CORTEF) injection 50 mg  50 mg IntraVENous Q6H    cefepime (MAXIPIME) 2 g in 0.9% sodium chloride (MBP/ADV) 100 mL  2 g IntraVENous Q12H    vancomycin (FIRVANQ) 50 mg/mL oral solution 500 mg  500 mg Oral Q6H    fentaNYL (PF) 1,500 mcg/30 mL (50 mcg/mL) infusion  0-200 mcg/hr IntraVENous TITRATE    ketamine (KETALAR) 500 mg in 0.9% sodium chloride 500 mL infusion  0.05-0.4 mg/kg/hr IntraVENous TITRATE    NOREPINephrine (LEVOPHED) 32,000 mcg in dextrose 5% 250 mL (128 mcg/mL) infusion  0-50 mcg/min IntraVENous TITRATE    [Held by provider] labetaloL (NORMODYNE) tablet 100 mg  100 mg Oral Q12H    0.9% sodium chloride infusion  3 mL/hr IntraVENous CONTINUOUS    0.9% sodium chloride infusion  5 mL/hr IntraVENous CONTINUOUS    labetaloL (NORMODYNE;TRANDATE) injection 10 mg  10 mg IntraVENous Q10MIN PRN    fentaNYL citrate (PF) injection  mcg   mcg IntraVENous Q1H PRN    white petrolatum-mineral oiL (AKWA TEARS) 83-15 % ophthalmic ointment   Both Eyes Q12H    heparin (porcine) pf 300 Units  300 Units InterCATHeter PRN    guaiFENesin (ROBITUSSIN) 100 mg/5 mL oral liquid 100 mg  100 mg Oral Q6H    midazolam (VERSED) injection 1-2 mg  1-2 mg IntraVENous Q2H PRN    bacitracin 500 unit/gram packet 1 Packet  1 Packet Topical PRN    balsam peru-castor oiL (VENELEX) ointment   Topical BID    sodium chloride (NS) flush 5-40 mL  5-40 mL IntraVENous Q8H    sodium chloride (NS) flush 5-40 mL  5-40 mL IntraVENous PRN    HYDROcodone-acetaminophen (NORCO) 5-325 mg per tablet 1 Tab  1 Tab Oral Q4H PRN    ondansetron (ZOFRAN) injection 4 mg  4 mg IntraVENous Q4H PRN    acetaminophen (TYLENOL) tablet 650 mg  650 mg Oral Q6H PRN    Or    acetaminophen (TYLENOL) suppository 650 mg  650 mg Rectal Q6H PRN    glucose chewable tablet 16 g  4 Tab Oral PRN    glucagon (GLUCAGEN) injection 1 mg  1 mg IntraMUSCular PRN    dextrose 10% infusion 0-250 mL  0-250 mL IntraVENous PRN      Allergies   Allergen Reactions    Augmentin [Amoxicillin-Pot Clavulanate] Rash and Itching       Objective:  Vitals:    Vitals:    04/17/20 0615 04/17/20 0630 04/17/20 0645 04/17/20 0700   BP:    95/59   Pulse: 88 81 86 86   Resp: 26 29 26 26   Temp:       SpO2: 99% 99% 99% 99%   Weight:       Height:         Intake and Output:  No intake/output data recorded. 04/15 1901 - 04/17 0700  In: 2918.9 [I.V.:1808.9]  Out: 9117 [Drains:550]    Physical Examination:   Pt intubated    Yes  General: Paralyzed on the vent  Resp:  On vent   CV:  RRR  GI:  Obese   Neurologic:  Sedated   Access:           R femoral melissa     []    High complexity decision making was performed  []    Patient is at high-risk of decompensation with multiple organ involvement    Lab Data Personally Reviewed: I have reviewed all the pertinent labs, microbiology data and radiology studies during assessment. Recent Labs     04/17/20  0510 04/16/20  2149 04/16/20  1919 04/16/20  1046 04/16/20  0438 04/16/20  0400  04/15/20  0454 04/15/20  0453 04/15/20  0400    134* 136 137 137  --    < >  --  137  --    K 4.4 4.3 4.3 4.3 4.1  --    < >  --  3.4*  --     105 104 105 105  --    < >  --  106  --    CO2 23 25 26 27 26  --    < >  --  24  --    * 209* 220* 152* 158*  --    < >  --  222*  --    BUN 46* 40* 39* 34* 35*  --    < >  --  46*  --    CREA 1.21* 1.06* 1.10* 1.05* 1.11*  --    < >  --  1.56*  --    CA 8.3* 8.3* 8.6 8.5 8.7  --    < >  --  9.2  --    MG 2.6*  --   --   --  2.5*  --   --   --  2.1  --    PHOS 2.3*  --   --  2.0* 2.0*  --   --   --   --   --    ALB 2.6*  --   --   --  2.7* 2.8*  --   --   --  2.6*   SGOT  --   --   --   --   --  174*  --   --   --  197*   ALT  --   --   --   --   --  502*  --   --   --  601*   INR  --   --   --  1.2*  --   --   --  1.3*  --   --     < > = values in this interval not displayed. Recent Labs     04/17/20  0510 04/16/20  2149 04/16/20  1919 04/16/20  1046 04/16/20  0438   WBC 16.2* 18.9* 18.8* 21.8* 21.5*   HGB 7.2* 7.3* 7.2* 7.7* 7.5*   HCT 23.2* 23.4* 23.0* 24.8* 23.7*   PLT 78* 79* 78* 83* 81*     No results found for: SDES  Lab Results   Component Value Date/Time    Culture result: NO GROWTH 5 DAYS 04/09/2020 02:32 PM    Culture result: NO GROWTH 5 DAYS 04/08/2020 10:36 AM    Culture result: NO GROWTH 5 DAYS 03/31/2020 11:15 AM    Culture result: MODERATE STAPHYLOCOCCUS AUREUS (A) 03/31/2020 10:34 AM    Culture result: LIGHT NORMAL RESPIRATORY SOFIE 03/31/2020 10:34 AM     Recent Results (from the past 24 hour(s))   PROTHROMBIN TIME + INR    Collection Time: 04/16/20 10:46 AM   Result Value Ref Range    INR 1.2 (H) 0.9 - 1.1      Prothrombin time 11.9 (H) 9.0 - 11.1 sec   PHOSPHORUS    Collection Time: 04/16/20 10:46 AM   Result Value Ref Range    Phosphorus 2.0 (L) 2.6 - 4.7 MG/DL   METABOLIC PANEL, BASIC    Collection Time: 04/16/20 10:46 AM   Result Value Ref Range    Sodium 137 136 - 145 mmol/L    Potassium 4.3 3.5 - 5.1 mmol/L    Chloride 105 97 - 108 mmol/L    CO2 27 21 - 32 mmol/L    Anion gap 5 5 - 15 mmol/L    Glucose 152 (H) 65 - 100 mg/dL    BUN 34 (H) 6 - 20 MG/DL    Creatinine 1.05 (H) 0.55 - 1.02 MG/DL    BUN/Creatinine ratio 32 (H) 12 - 20      GFR est AA >60 >60 ml/min/1.73m2    GFR est non-AA 54 (L) >60 ml/min/1.73m2    Calcium 8.5 8.5 - 10.1 MG/DL   PTT    Collection Time: 04/16/20 10:46 AM   Result Value Ref Range    aPTT 25.1 22.1 - 32.0 sec    aPTT, therapeutic range     58.0 - 77.0 SECS   CBC W/O DIFF    Collection Time: 04/16/20 10:46 AM   Result Value Ref Range    WBC 21.8 (H) 3.6 - 11.0 K/uL    RBC 2.55 (L) 3.80 - 5.20 M/uL    HGB 7.7 (L) 11.5 - 16.0 g/dL    HCT 24.8 (L) 35.0 - 47.0 %    MCV 97.3 80.0 - 99.0 FL    MCH 30.2 26.0 - 34.0 PG    MCHC 31.0 30.0 - 36.5 g/dL    RDW 21.5 (H) 11.5 - 14.5 %    PLATELET 83 (L) 476 - 400 K/uL    MPV 11.6 8.9 - 12.9 FL NRBC 0.9 (H) 0  WBC    ABSOLUTE NRBC 0.19 (H) 0.00 - 0.01 K/uL   GLUCOSE, POC    Collection Time: 04/16/20 12:03 PM   Result Value Ref Range    Glucose (POC) 107 (H) 65 - 100 mg/dL    Performed by Mario Grimes    PROCALCITONIN    Collection Time: 04/16/20  3:40 PM   Result Value Ref Range    Procalcitonin 13.66 ng/mL   SAMPLES BEING HELD    Collection Time: 04/16/20  3:40 PM   Result Value Ref Range    SAMPLES BEING HELD 1RED     COMMENT        Add-on orders for these samples will be processed based on acceptable specimen integrity and analyte stability, which may vary by analyte. POC EG7    Collection Time: 04/16/20  5:03 PM   Result Value Ref Range    Calcium, ionized (POC) 1.20 1. 12 - 1.32 mmol/L    FIO2 (POC) 60 %    pH (POC) 7.400 7.35 - 7.45      pCO2 (POC) 42.2 35.0 - 45.0 MMHG    pO2 (POC) 216 (H) 80 - 100 MMHG    HCO3 (POC) 26.2 (H) 22 - 26 MMOL/L    Base excess (POC) 1 mmol/L    sO2 (POC) 100 (H) 92 - 97 %    Site DRAWN FROM ARTERIAL LINE      Device: VENT      Mode ASSIST CONTROL      Tidal volume 430 ml    Set Rate 28 bpm    PEEP/CPAP (POC) 8 cmH2O    Allens test (POC) N/A      Specimen type (POC) ARTERIAL      Total resp. rate 28      Volume control YES     POC EG7    Collection Time: 04/16/20  6:30 PM   Result Value Ref Range    Calcium, ionized (POC) 1.20 1. 12 - 1.32 mmol/L    FIO2 (POC) 50 %    pH (POC) 7.412 7.35 - 7.45      pCO2 (POC) 39.3 35.0 - 45.0 MMHG    pO2 (POC) 157 (H) 80 - 100 MMHG    HCO3 (POC) 25.0 22 - 26 MMOL/L    Base excess (POC) 0 mmol/L    sO2 (POC) 99 (H) 92 - 97 %    Site DRAWN FROM ARTERIAL LINE      Device: VENT      Mode ASSIST CONTROL      Tidal volume 430 ml    Set Rate 28 bpm    PEEP/CPAP (POC) 5 cmH2O    Allens test (POC) N/A      Specimen type (POC) ARTERIAL      Total resp.  rate 28      Volume control YES     GLUCOSE, POC    Collection Time: 04/16/20  6:43 PM   Result Value Ref Range    Glucose (POC) 197 (H) 65 - 100 mg/dL    Performed by Eduardo FORTE    CBC W/O DIFF    Collection Time: 04/16/20  7:19 PM   Result Value Ref Range    WBC 18.8 (H) 3.6 - 11.0 K/uL    RBC 2.35 (L) 3.80 - 5.20 M/uL    HGB 7.2 (L) 11.5 - 16.0 g/dL    HCT 23.0 (L) 35.0 - 47.0 %    MCV 97.9 80.0 - 99.0 FL    MCH 30.6 26.0 - 34.0 PG    MCHC 31.3 30.0 - 36.5 g/dL    RDW 22.0 (H) 11.5 - 14.5 %    PLATELET 78 (L) 360 - 400 K/uL    MPV 11.4 8.9 - 12.9 FL    NRBC 1.1 (H) 0  WBC    ABSOLUTE NRBC 0.21 (H) 0.00 - 2.90 K/uL   METABOLIC PANEL, BASIC    Collection Time: 04/16/20  7:19 PM   Result Value Ref Range    Sodium 136 136 - 145 mmol/L    Potassium 4.3 3.5 - 5.1 mmol/L    Chloride 104 97 - 108 mmol/L    CO2 26 21 - 32 mmol/L    Anion gap 6 5 - 15 mmol/L    Glucose 220 (H) 65 - 100 mg/dL    BUN 39 (H) 6 - 20 MG/DL    Creatinine 1.10 (H) 0.55 - 1.02 MG/DL    BUN/Creatinine ratio 35 (H) 12 - 20      GFR est AA >60 >60 ml/min/1.73m2    GFR est non-AA 51 (L) >60 ml/min/1.73m2    Calcium 8.6 8.5 - 82.7 MG/DL   METABOLIC PANEL, BASIC    Collection Time: 04/16/20  9:49 PM   Result Value Ref Range    Sodium 134 (L) 136 - 145 mmol/L    Potassium 4.3 3.5 - 5.1 mmol/L    Chloride 105 97 - 108 mmol/L    CO2 25 21 - 32 mmol/L    Anion gap 4 (L) 5 - 15 mmol/L    Glucose 209 (H) 65 - 100 mg/dL    BUN 40 (H) 6 - 20 MG/DL    Creatinine 1.06 (H) 0.55 - 1.02 MG/DL    BUN/Creatinine ratio 38 (H) 12 - 20      GFR est AA >60 >60 ml/min/1.73m2    GFR est non-AA 53 (L) >60 ml/min/1.73m2    Calcium 8.3 (L) 8.5 - 10.1 MG/DL   CBC W/O DIFF    Collection Time: 04/16/20  9:49 PM   Result Value Ref Range    WBC 18.9 (H) 3.6 - 11.0 K/uL    RBC 2.43 (L) 3.80 - 5.20 M/uL    HGB 7.3 (L) 11.5 - 16.0 g/dL    HCT 23.4 (L) 35.0 - 47.0 %    MCV 96.3 80.0 - 99.0 FL    MCH 30.0 26.0 - 34.0 PG    MCHC 31.2 30.0 - 36.5 g/dL    RDW 22.1 (H) 11.5 - 14.5 %    PLATELET 79 (L) 600 - 400 K/uL    MPV 11.8 8.9 - 12.9 FL    NRBC 1.1 (H) 0  WBC    ABSOLUTE NRBC 0.21 (H) 0.00 - 0.01 K/uL   GLUCOSE, POC    Collection Time: 04/17/20 12:23 AM Result Value Ref Range    Glucose (POC) 201 (H) 65 - 100 mg/dL    Performed by EULA BYRD    CBC W/O DIFF    Collection Time: 04/17/20  5:10 AM   Result Value Ref Range    WBC 16.2 (H) 3.6 - 11.0 K/uL    RBC 2.39 (L) 3.80 - 5.20 M/uL    HGB 7.2 (L) 11.5 - 16.0 g/dL    HCT 23.2 (L) 35.0 - 47.0 %    MCV 97.1 80.0 - 99.0 FL    MCH 30.1 26.0 - 34.0 PG    MCHC 31.0 30.0 - 36.5 g/dL    RDW 22.4 (H) 11.5 - 14.5 %    PLATELET 78 (L) 120 - 400 K/uL    MPV 12.2 8.9 - 12.9 FL    NRBC 1.5 (H) 0  WBC    ABSOLUTE NRBC 0.24 (H) 0.00 - 0.01 K/uL   CRP, HIGH SENSITIVITY    Collection Time: 04/17/20  5:10 AM   Result Value Ref Range    CRP, High sensitivity >9.5 mg/L   RENAL FUNCTION PANEL    Collection Time: 04/17/20  5:10 AM   Result Value Ref Range    Sodium 136 136 - 145 mmol/L    Potassium 4.4 3.5 - 5.1 mmol/L    Chloride 105 97 - 108 mmol/L    CO2 23 21 - 32 mmol/L    Anion gap 8 5 - 15 mmol/L    Glucose 238 (H) 65 - 100 mg/dL    BUN 46 (H) 6 - 20 MG/DL    Creatinine 1.21 (H) 0.55 - 1.02 MG/DL    BUN/Creatinine ratio 38 (H) 12 - 20      GFR est AA 56 (L) >60 ml/min/1.73m2    GFR est non-AA 46 (L) >60 ml/min/1.73m2    Calcium 8.3 (L) 8.5 - 10.1 MG/DL    Phosphorus 2.3 (L) 2.6 - 4.7 MG/DL    Albumin 2.6 (L) 3.5 - 5.0 g/dL   MAGNESIUM    Collection Time: 04/17/20  5:10 AM   Result Value Ref Range    Magnesium 2.6 (H) 1.6 - 2.4 mg/dL   GLUCOSE, POC    Collection Time: 04/17/20  6:48 AM   Result Value Ref Range    Glucose (POC) 242 (H) 65 - 100 mg/dL    Performed by EULA BYRD            Total time spent with patient:  xxx   min. Care Plan discussed with:  Patient     Family      RN      Consulting Physician [address] [facility],         I have reviewed the flowsheets. Chart and Pertinent Notes have been reviewed. No change in PMH ,family and social history from Consult note.       Spencer Russ MD

## 2020-04-17 NOTE — PROGRESS NOTES
Infectious Disease Progress Note       Subjective:     D/W with ICU team today  Patient cannot given history, critically ill   Palliative care seeing patient as well           Objective:    Vitals:   Patient Vitals for the past 24 hrs:   Temp Pulse Resp BP SpO2   04/17/20 1500 -- 74 25 100/57 99 %   04/17/20 1445 -- 78 28 -- 100 %   04/17/20 1430 -- 80 25 -- 100 %   04/17/20 1415 -- 82 21 -- 100 %   04/17/20 1400 -- 86 21 98/68 100 %   04/17/20 1345 -- 81 29 -- 100 %   04/17/20 1330 -- 84 24 -- 99 %   04/17/20 1315 -- 86 23 -- 100 %   04/17/20 1300 -- 87 19 109/71 100 %   04/17/20 1245 -- 89 26 -- 100 %   04/17/20 1230 -- 76 28 -- (!) 82 %   04/17/20 1215 -- 84 17 -- 99 %   04/17/20 1200 97 °F (36.1 °C) 81 18 100/63 99 %   04/17/20 1151 -- 85 29 -- 99 %   04/17/20 1145 -- 81 25 -- 98 %   04/17/20 1130 -- 82 19 -- 98 %   04/17/20 1115 -- 85 23 -- 98 %   04/17/20 1100 -- 92 28 96/65 100 %   04/17/20 1045 -- 87 24 -- 100 %   04/17/20 1030 -- 89 21 -- 99 %   04/17/20 1015 -- 87 26 -- 99 %   04/17/20 1000 98 °F (36.7 °C) 88 29 104/69 99 %   04/17/20 0945 -- 88 23 -- 99 %   04/17/20 0930 -- 90 24 -- 99 %   04/17/20 0915 -- 91 24 -- 99 %   04/17/20 0912 -- 94 30 -- 99 %   04/17/20 0900 -- 95 22 101/70 99 %   04/17/20 0845 -- 94 20 -- 99 %   04/17/20 0830 -- 97 16 -- 100 %   04/17/20 0815 -- 90 17 -- 100 %   04/17/20 0800 99.9 °F (37.7 °C) 95 21 (!) 85/74 100 %   04/17/20 0745 -- 88 23 -- 99 %   04/17/20 0730 -- 87 29 -- 100 %   04/17/20 0715 -- 80 27 -- 99 %   04/17/20 0700 -- 86 26 95/59 99 %   04/17/20 0645 -- 86 26 -- 99 %   04/17/20 0630 -- 81 29 -- 99 %   04/17/20 0615 -- 88 26 -- 99 %   04/17/20 0600 -- 85 21 104/61 99 %   04/17/20 0545 -- 95 (!) 32 -- 99 %   04/17/20 0530 -- 92 23 -- 99 %   04/17/20 0515 -- 95 22 -- 99 %   04/17/20 0500 -- 78 17 (!) 169/104 100 %   04/17/20 0456 -- 93 (!) 32 -- 98 %   04/17/20 0445 -- 97 23 -- 98 %   04/17/20 0430 -- 89 24 -- --   04/17/20 0415 -- 87 24 -- 99 %   04/17/20 0400 97.7 °F (36.5 °C) 84 23 101/73 99 %   04/17/20 0345 -- 88 22 -- 100 %   04/17/20 0330 -- 91 22 -- 100 %   04/17/20 0315 -- 92 21 -- 100 %   04/17/20 0300 -- 89 18 109/66 99 %   04/17/20 0245 -- 90 23 -- 100 %   04/17/20 0230 -- 87 23 -- 100 %   04/17/20 0215 -- 84 24 -- 99 %   04/17/20 0200 -- 97 22 104/70 99 %   04/17/20 0145 -- 93 20 -- 100 %   04/17/20 0130 -- 90 22 -- 99 %   04/17/20 0115 -- 91 20 -- 98 %   04/17/20 0100 -- 90 22 113/66 99 %   04/17/20 0045 -- 91 21 -- 99 %   04/17/20 0038 -- 87 30 -- 100 %   04/17/20 0030 -- 83 24 -- 99 %   04/17/20 0015 -- 85 23 -- 99 %   04/17/20 0000 97.7 °F (36.5 °C) 87 27 103/63 100 %   04/16/20 2345 -- 90 24 -- 100 %   04/16/20 2330 -- 81 25 -- 100 %   04/16/20 2315 -- 84 25 -- 99 %   04/16/20 2300 -- 86 24 106/74 99 %   04/16/20 2245 -- 81 23 -- 100 %   04/16/20 2230 -- 81 26 -- 100 %   04/16/20 2215 -- 79 25 -- 100 %   04/16/20 2200 -- 80 23 113/76 100 %   04/16/20 2145 -- 80 27 -- 100 %   04/16/20 2130 -- 84 24 -- 100 %   04/16/20 2119 -- -- -- -- 100 %   04/16/20 2115 -- 88 26 -- 100 %   04/16/20 2100 -- 89 24 -- 100 %   04/16/20 2052 -- 85 29 -- 100 %   04/16/20 2045 -- 84 25 -- 100 %   04/16/20 2030 -- 83 29 -- 100 %   04/16/20 2015 -- 82 28 -- 100 %   04/16/20 2000 97.8 °F (36.6 °C) 80 28 102/68 100 %   04/16/20 1945 -- 80 28 -- 100 %   04/16/20 1930 -- 82 29 -- 100 %   04/16/20 1915 -- 80 28 -- 100 %   04/16/20 1900 -- 82 25 -- 100 %   04/16/20 1845 -- 80 28 -- 100 %   04/16/20 1830 -- 80 29 -- 100 %   04/16/20 1815 -- 80 28 -- 100 %   04/16/20 1800 -- 77 28 -- 100 %   04/16/20 1745 -- 79 29 -- 100 %   04/16/20 1730 -- 81 25 -- 100 %   04/16/20 1715 -- 80 28 -- 100 %   04/16/20 1701 -- 83 28 -- 100 %   04/16/20 1700 -- 82 28 -- 100 %   04/16/20 1645 -- 81 23 -- --   04/16/20 1630 -- 80 28 -- --   04/16/20 1623 -- 78 28 -- 100 %   04/16/20 1615 -- 77 25 -- --   04/16/20 1600 97.1 °F (36.2 °C) 81 28 -- --   04/16/20 1545 -- 82 28 -- --   04/16/20 1530 -- 85 28 -- -- 04/16/20 1515 -- 89 28 -- --       Physical Exam:  Please see ICU teams physical exam     Medications:    Current Facility-Administered Medications:     [START ON 4/18/2020] insulin glargine (LANTUS) injection 35 Units, 35 Units, SubCUTAneous, Q12H, Elana Sorensen NP    famotidine (PEPCID) 8 mg/mL oral suspension 20 mg, 20 mg, Per NG tube, DAILY, David White NP, 20 mg at 04/17/20 0811    bicarbonate dialysis (PRISMASOL) BG K 4/Ca 2.5 5000 ml solution, , Extracorporeal, DIALYSIS CONTINUOUS, Morales Apple MD, Last Rate: 2,000 mL/hr at 04/17/20 1351, 2,000 mL/hr at 04/17/20 1351    alteplase (CATHFLO) 2 mg in sterile water (preservative free) 2 mL injection, 2 mg, InterCATHeter, DIALYSIS PRN, Morales Apple MD    alteplase (CATHFLO) 2 mg in sterile water (preservative free) 2 mL injection, 2 mg, InterCATHeter, DIALYSIS PRN, Morales Apple MD, 2 mg at 04/15/20 0756    insulin glargine (LANTUS) injection 20 Units, 20 Units, SubCUTAneous, QHS, Elana Sorensen NP, 20 Units at 04/16/20 2146    insulin lispro (HUMALOG) injection, , SubCUTAneous, Q6H, Elana Sorensen NP, 3 Units at 04/17/20 1259    0.9% sodium chloride infusion 250 mL, 250 mL, IntraVENous, PRN, Azeem SCHMITT, NP-C    chlorhexidine (PERIDEX) 0.12 % mouthwash 15 mL, 15 mL, Oral, Q12H, Elana Sorensen NP, 15 mL at 04/17/20 3895    hydrocortisone Sod Succ (PF) (SOLU-CORTEF) injection 50 mg, 50 mg, IntraVENous, Q6H, Cassandra Cox, NP-C, 50 mg at 04/17/20 1249    vancomycin (FIRVANQ) 50 mg/mL oral solution 500 mg, 500 mg, Oral, Q6H, Iliana Rosales NP, 500 mg at 04/17/20 1239    fentaNYL (PF) 1,500 mcg/30 mL (50 mcg/mL) infusion, 0-200 mcg/hr, IntraVENous, TITRATE, Carolyn Rosenbaum MD, Last Rate: 2 mL/hr at 04/17/20 1003, 100 mcg/hr at 04/17/20 1003    ketamine (KETALAR) 500 mg in 0.9% sodium chloride 500 mL infusion, 0.05-0.4 mg/kg/hr, IntraVENous, TITRATE, Carolyn Rosenbaum MD, Last Rate: 35 mL/hr at 04/17/20 1403, 0.3 mg/kg/hr at 04/17/20 1403    NOREPINephrine (LEVOPHED) 32,000 mcg in dextrose 5% 250 mL (128 mcg/mL) infusion, 0-50 mcg/min, IntraVENous, TITRATE, Clearance CELESTINA Skinner, Stopped at 04/16/20 2310    [Held by provider] labetaloL (NORMODYNE) tablet 100 mg, 100 mg, Oral, Q12H, Iliana Rosales NP    0.9% sodium chloride infusion, 3 mL/hr, IntraVENous, CONTINUOUS, Sheri Madison MD, Last Rate: 3 mL/hr at 04/13/20 2316, 3 mL/hr at 04/13/20 2316    0.9% sodium chloride infusion, 5 mL/hr, IntraVENous, CONTINUOUS, Sheri Madison MD, Last Rate: 5 mL/hr at 04/14/20 0127, 5 mL/hr at 04/14/20 0127    labetaloL (NORMODYNE;TRANDATE) injection 10 mg, 10 mg, IntraVENous, Q10MIN PRN, Sheri Jin MD    fentaNYL citrate (PF) injection  mcg,  mcg, IntraVENous, Q1H PRN, Kylah Triana MD, 100 mcg at 04/11/20 1232    white petrolatum-mineral oiL (AKWA TEARS) 83-15 % ophthalmic ointment, , Both Eyes, Q12H, Sheri Madison MD    heparin (porcine) pf 300 Units, 300 Units, InterCATHeter, PRN, Clearance CELESTINA Skinner    guaiFENesin (ROBITUSSIN) 100 mg/5 mL oral liquid 100 mg, 100 mg, Oral, Q6H, Cassandra Cox NP-C, 100 mg at 04/17/20 1249    midazolam (VERSED) injection 1-2 mg, 1-2 mg, IntraVENous, Q2H PRN, Clearance CELESTINA Skinner, 2 mg at 04/17/20 1055    bacitracin 500 unit/gram packet 1 Packet, 1 Packet, Topical, PRN, Carlos Pierson NP, 1 Packet at 04/01/20 0116    balsam peru-castor oiL (VENELEX) ointment, , Topical, BID, Kirk Gordon, JOSE JUAN    sodium chloride (NS) flush 5-40 mL, 5-40 mL, IntraVENous, Q8H, Sheri Madison MD, 10 mL at 04/17/20 1404    sodium chloride (NS) flush 5-40 mL, 5-40 mL, IntraVENous, PRN, Kylah Triana MD, 30 mL at 04/13/20 2005    HYDROcodone-acetaminophen (NORCO) 5-325 mg per tablet 1 Tab, 1 Tab, Oral, Q4H PRN, Kylah Triana MD, 1 Tab at 04/08/20 0528    ondansetron (ZOFRAN) injection 4 mg, 4 mg, IntraVENous, Q4H PRN, Lonny Mendez MD    acetaminophen (TYLENOL) tablet 650 mg, 650 mg, Oral, Q6H PRN **OR** acetaminophen (TYLENOL) suppository 650 mg, 650 mg, Rectal, Q6H PRN, Iliana Rosales NP    glucose chewable tablet 16 g, 4 Tab, Oral, PRN, Lonny Madison MD    glucagon (GLUCAGEN) injection 1 mg, 1 mg, IntraMUSCular, PRN, Lonny Madison MD    dextrose 10% infusion 0-250 mL, 0-250 mL, IntraVENous, PRN, Lonny Madison MD      Labs:  Recent Results (from the past 24 hour(s))   PROCALCITONIN    Collection Time: 04/16/20  3:40 PM   Result Value Ref Range    Procalcitonin 13.66 ng/mL   SAMPLES BEING HELD    Collection Time: 04/16/20  3:40 PM   Result Value Ref Range    SAMPLES BEING HELD 1RED     COMMENT        Add-on orders for these samples will be processed based on acceptable specimen integrity and analyte stability, which may vary by analyte. POC EG7    Collection Time: 04/16/20  5:03 PM   Result Value Ref Range    Calcium, ionized (POC) 1.20 1. 12 - 1.32 mmol/L    FIO2 (POC) 60 %    pH (POC) 7.400 7.35 - 7.45      pCO2 (POC) 42.2 35.0 - 45.0 MMHG    pO2 (POC) 216 (H) 80 - 100 MMHG    HCO3 (POC) 26.2 (H) 22 - 26 MMOL/L    Base excess (POC) 1 mmol/L    sO2 (POC) 100 (H) 92 - 97 %    Site DRAWN FROM ARTERIAL LINE      Device: VENT      Mode ASSIST CONTROL      Tidal volume 430 ml    Set Rate 28 bpm    PEEP/CPAP (POC) 8 cmH2O    Allens test (POC) N/A      Specimen type (POC) ARTERIAL      Total resp. rate 28      Volume control YES     POC EG7    Collection Time: 04/16/20  6:30 PM   Result Value Ref Range    Calcium, ionized (POC) 1.20 1. 12 - 1.32 mmol/L    FIO2 (POC) 50 %    pH (POC) 7.412 7.35 - 7.45      pCO2 (POC) 39.3 35.0 - 45.0 MMHG    pO2 (POC) 157 (H) 80 - 100 MMHG    HCO3 (POC) 25.0 22 - 26 MMOL/L    Base excess (POC) 0 mmol/L    sO2 (POC) 99 (H) 92 - 97 %    Site DRAWN FROM ARTERIAL LINE      Device: VENT      Mode ASSIST CONTROL      Tidal volume 430 ml Set Rate 28 bpm    PEEP/CPAP (POC) 5 cmH2O    Allens test (POC) N/A      Specimen type (POC) ARTERIAL      Total resp.  rate 28      Volume control YES     GLUCOSE, POC    Collection Time: 04/16/20  6:43 PM   Result Value Ref Range    Glucose (POC) 197 (H) 65 - 100 mg/dL    Performed by Eden FORTE    CBC W/O DIFF    Collection Time: 04/16/20  7:19 PM   Result Value Ref Range    WBC 18.8 (H) 3.6 - 11.0 K/uL    RBC 2.35 (L) 3.80 - 5.20 M/uL    HGB 7.2 (L) 11.5 - 16.0 g/dL    HCT 23.0 (L) 35.0 - 47.0 %    MCV 97.9 80.0 - 99.0 FL    MCH 30.6 26.0 - 34.0 PG    MCHC 31.3 30.0 - 36.5 g/dL    RDW 22.0 (H) 11.5 - 14.5 %    PLATELET 78 (L) 220 - 400 K/uL    MPV 11.4 8.9 - 12.9 FL    NRBC 1.1 (H) 0  WBC    ABSOLUTE NRBC 0.21 (H) 0.00 - 2.80 K/uL   METABOLIC PANEL, BASIC    Collection Time: 04/16/20  7:19 PM   Result Value Ref Range    Sodium 136 136 - 145 mmol/L    Potassium 4.3 3.5 - 5.1 mmol/L    Chloride 104 97 - 108 mmol/L    CO2 26 21 - 32 mmol/L    Anion gap 6 5 - 15 mmol/L    Glucose 220 (H) 65 - 100 mg/dL    BUN 39 (H) 6 - 20 MG/DL    Creatinine 1.10 (H) 0.55 - 1.02 MG/DL    BUN/Creatinine ratio 35 (H) 12 - 20      GFR est AA >60 >60 ml/min/1.73m2    GFR est non-AA 51 (L) >60 ml/min/1.73m2    Calcium 8.6 8.5 - 66.8 MG/DL   METABOLIC PANEL, BASIC    Collection Time: 04/16/20  9:49 PM   Result Value Ref Range    Sodium 134 (L) 136 - 145 mmol/L    Potassium 4.3 3.5 - 5.1 mmol/L    Chloride 105 97 - 108 mmol/L    CO2 25 21 - 32 mmol/L    Anion gap 4 (L) 5 - 15 mmol/L    Glucose 209 (H) 65 - 100 mg/dL    BUN 40 (H) 6 - 20 MG/DL    Creatinine 1.06 (H) 0.55 - 1.02 MG/DL    BUN/Creatinine ratio 38 (H) 12 - 20      GFR est AA >60 >60 ml/min/1.73m2    GFR est non-AA 53 (L) >60 ml/min/1.73m2    Calcium 8.3 (L) 8.5 - 10.1 MG/DL   CBC W/O DIFF    Collection Time: 04/16/20  9:49 PM   Result Value Ref Range    WBC 18.9 (H) 3.6 - 11.0 K/uL    RBC 2.43 (L) 3.80 - 5.20 M/uL    HGB 7.3 (L) 11.5 - 16.0 g/dL    HCT 23.4 (L) 35.0 - 47.0 %    MCV 96.3 80.0 - 99.0 FL    MCH 30.0 26.0 - 34.0 PG    MCHC 31.2 30.0 - 36.5 g/dL    RDW 22.1 (H) 11.5 - 14.5 %    PLATELET 79 (L) 338 - 400 K/uL    MPV 11.8 8.9 - 12.9 FL    NRBC 1.1 (H) 0  WBC    ABSOLUTE NRBC 0.21 (H) 0.00 - 0.01 K/uL   GLUCOSE, POC    Collection Time: 04/17/20 12:23 AM   Result Value Ref Range    Glucose (POC) 201 (H) 65 - 100 mg/dL    Performed by EULA CARSON    HEPATIC FUNCTION PANEL    Collection Time: 04/17/20  4:00 AM   Result Value Ref Range    Protein, total 5.9 (L) 6.4 - 8.2 g/dL    Albumin 2.6 (L) 3.5 - 5.0 g/dL    Globulin 3.3 2.0 - 4.0 g/dL    A-G Ratio 0.8 (L) 1.1 - 2.2      Bilirubin, total 2.5 (H) 0.2 - 1.0 MG/DL    Bilirubin, direct 1.4 (H) 0.0 - 0.2 MG/DL    Alk.  phosphatase 440 (H) 45 - 117 U/L    AST (SGOT) 172 (H) 15 - 37 U/L    ALT (SGPT) 393 (H) 12 - 78 U/L   CBC W/O DIFF    Collection Time: 04/17/20  5:10 AM   Result Value Ref Range    WBC 16.2 (H) 3.6 - 11.0 K/uL    RBC 2.39 (L) 3.80 - 5.20 M/uL    HGB 7.2 (L) 11.5 - 16.0 g/dL    HCT 23.2 (L) 35.0 - 47.0 %    MCV 97.1 80.0 - 99.0 FL    MCH 30.1 26.0 - 34.0 PG    MCHC 31.0 30.0 - 36.5 g/dL    RDW 22.4 (H) 11.5 - 14.5 %    PLATELET 78 (L) 962 - 400 K/uL    MPV 12.2 8.9 - 12.9 FL    NRBC 1.5 (H) 0  WBC    ABSOLUTE NRBC 0.24 (H) 0.00 - 0.01 K/uL   CRP, HIGH SENSITIVITY    Collection Time: 04/17/20  5:10 AM   Result Value Ref Range    CRP, High sensitivity >9.5 mg/L   RENAL FUNCTION PANEL    Collection Time: 04/17/20  5:10 AM   Result Value Ref Range    Sodium 136 136 - 145 mmol/L    Potassium 4.4 3.5 - 5.1 mmol/L    Chloride 105 97 - 108 mmol/L    CO2 23 21 - 32 mmol/L    Anion gap 8 5 - 15 mmol/L    Glucose 238 (H) 65 - 100 mg/dL    BUN 46 (H) 6 - 20 MG/DL    Creatinine 1.21 (H) 0.55 - 1.02 MG/DL    BUN/Creatinine ratio 38 (H) 12 - 20      GFR est AA 56 (L) >60 ml/min/1.73m2    GFR est non-AA 46 (L) >60 ml/min/1.73m2    Calcium 8.3 (L) 8.5 - 10.1 MG/DL    Phosphorus 2.3 (L) 2.6 - 4.7 MG/DL    Albumin 2.6 (L) 3.5 - 5.0 g/dL   MAGNESIUM    Collection Time: 04/17/20  5:10 AM   Result Value Ref Range    Magnesium 2.6 (H) 1.6 - 2.4 mg/dL   GLUCOSE, POC    Collection Time: 04/17/20  6:48 AM   Result Value Ref Range    Glucose (POC) 242 (H) 65 - 100 mg/dL    Performed by EULA BYRD    PROTHROMBIN TIME + INR    Collection Time: 04/17/20 11:13 AM   Result Value Ref Range    INR 1.1 0.9 - 1.1      Prothrombin time 11.6 (H) 9.0 - 11.1 sec   CBC W/O DIFF    Collection Time: 04/17/20 11:13 AM   Result Value Ref Range    WBC 15.3 (H) 3.6 - 11.0 K/uL    RBC 2.38 (L) 3.80 - 5.20 M/uL    HGB 7.2 (L) 11.5 - 16.0 g/dL    HCT 23.0 (L) 35.0 - 47.0 %    MCV 96.6 80.0 - 99.0 FL    MCH 30.3 26.0 - 34.0 PG    MCHC 31.3 30.0 - 36.5 g/dL    RDW 23.2 (H) 11.5 - 14.5 %    PLATELET 82 (L) 789 - 400 K/uL    MPV 11.8 8.9 - 12.9 FL    NRBC 1.3 (H) 0  WBC    ABSOLUTE NRBC 0.20 (H) 0.00 - 0.01 K/uL   GLUCOSE, POC    Collection Time: 04/17/20 12:54 PM   Result Value Ref Range    Glucose (POC) 205 (H) 65 - 100 mg/dL    Performed by Sylvia Diez            Micro:   Blood 4/9/20       Component Value Ref Range & Units Status   Special Requests: NO SPECIAL REQUESTS    Preliminary   Culture result: NO GROWTH 4 DAYS    Preliminary   Result History       Blood 4/8/20  Component Value Ref Range & Units Status   Special Requests: NO SPECIAL REQUESTS    Final   Culture result: NO GROWTH 5 DAYS    Final   Result History               Blood: 3/31/20  Specimen Information: Blood        Component Value Ref Range & Units Status   Special Requests: NO SPECIAL REQUESTS    Final   Culture result: NO GROWTH 5 DAYS    Final   Result History              Sputum 3/31/20   Sputum        Component Value Ref Range & Units Status   Special Requests: NO SPECIAL REQUESTS    Final   GRAM STAIN FEW WBCS SEEN    Final   GRAM STAIN    Final   2+ GRAM POSITIVE COCCI IN PAIRS    Culture result: Abnormal     Final   MODERATE STAPHYLOCOCCUS AUREUS    Culture result:   Final   LIGHT NORMAL RESPIRATORY SOFIE    Susceptibility      Staphylococcus aureus     LU    Ciprofloxacin ($) <=0.5 ug/mL S    Clindamycin ($)  R    Doxycycline ($$) <=0.5 ug/mL S    Erythromycin ($$$$) >=8 ug/mL R    Gentamicin ($) <=0.5 ug/mL S    Levofloxacin ($) <=0.12 ug/mL S    Linezolid ($$$$$) 2 ug/mL S    Moxifloxacin ($$$$) <=0.25 ug/mL S    Oxacillin 0.5 ug/mL S    Rifampin ($$$$) <=0.5 ug/mL S1    Tetracycline <=1 ug/mL S    Trimeth/Sulfa <=10 ug/mL S    Vancomycin ($) 1 ug/mL S                         Imaging:  CXR 4/5/20  FINDINGS: AP radiograph of the chest was obtained.     There is been interval advancement of the endotracheal tube which now terminates  1.6 cm above the bhavik. Right upper extremity PICC and gastric decompression  tubes are again noted. There is no significant change in diffuse bilateral  heterogeneous opacities. Likely trace left pleural effusion. No pneumothorax. Stable cardiomediastinal silhouette.     IMPRESSION  IMPRESSION:   1. Interval advancement of endotracheal tube which now terminates 1.6 cm above  the bhavik. Consider slight retraction. 2. Unchanged diffuse bilateral heterogeneous opacities, consistent with  multifocal pneumonia. Likely trace left pleural effusion.           Assessment / Plan    Ms. Matt Garcia is a 61-year-old lady with a history of nephrolithiasis, GERD, basal cell carcinoma who was admitted from [facility] with respiratory symptoms concerning for COVID 19 and tested + on 3/26/20. Per team,  exposure to her mother with COVID 23. Per notes, intubated on 3/28/2020.   Transferred to [facility] for [facility]    Had code blue 4/9/20   Resuscitated            1) Sars-CoV2 pneumonia, respiratory failure, renal failure   Sars-CoV-2 detected 3/26/20, other viral respiratory panel negative  (records from Care everywhere )   Urine legionella and S pneumo ag negative 3/27/20  Procalcitonin 3/27 0.47, 3/28 0.44, 3/29 5.86, 3/31 13.44  , Ferritin 1985 3/31/20    4/3/20   , ferritin 992, CRP 9.65 4/5/20   IL 6  402 3/31/20  Completed azithromycin and Plaquenil   S/P Actemra one dose 4/3. Was on On Heparin gtt , now off per ICU team give retroperitoneal hematoma   On IV Steroids per ICU team     2)  MSSA on sputum, pharmacy dosing vancomycin based on levels due to Penicillin allergy    Completed a 7 day course of antibiotics       3) marked leukocytosis  WBC was 11-16 before code blue on 4/9/20   Suspect from coding event/reactory   Started by primary team PO Vancomycin for empiric CDI treatment.  Plan for 10 days total   Started by primary team on Vancomycin IV Cefepime and flagyl empirically given marked WBC elevation   Blood cx negative   Stopped Vancomycin IV on 4/13   CT A/P/C 4/10 with large hematoma R retroperitoneal    Stopped  Cefepime and Flagyl on 4/17/20            4) LLE PE on CT 4/10/20  Currently off anticoagulation   Had retroperitoneal bleed      5) Retroperitoneal hematoma on imaging 4/10/20    5) ARF:   Plans per nephrology, CRRT  R IJ    6) history of nephrolithiasis    7) history of GERD    8) basal cell carcinoma per chart        D/W with icu team today     Please contact with questions     Allison Padgett DO  3:05 PM

## 2020-04-17 NOTE — PROGRESS NOTES
NUTRITION       Chart reviewed for brief follow-up. Ms Luis Carlos Pena is tolerating enteral feeding well (Nepro @ 30 ml/hr with 3 packets Prosource daily and 50 ml water flush q 6 hr). Flexiseal placed for loose stool; 225-325 ml out the past couple of days. Lantus being adjusted for tighter BG control since running in the 200's. Nutrient needs being met by enteral feeding. RD to follow.         Estimated Nutrition Needs:   Kcals/day: 1350 Kcals/day(8231-2047 (25-28 kcal/kg IBW)  Protein: 108 g(2g/kg IBW)  Fluid: (1 ml/kcal)  Based On: Kcal/kg - specify (Comment)  Weight Used: IBW(54 kg)       Mana Leija RD [facility]

## 2020-04-17 NOTE — DIABETES MGMT
MARTA ROMERO  CLINICAL NURSE SPECIALIST CONSULT  PROGRAM FOR DIABETES HEALTH  Follow up Note  Presentation   Larry Cruz is a 64 y.o. female admitted from OSH to [facility] ICU with SARS-COV2 needing CRRT. She is currently sedated and has been intubated since 3/28/20. Current clinical course has been complicated by steroid induced hyperglycemia. Steroids are discontinued at this time. She requires CRRT for acute renal failure r/t her sepsis and hypotension. PHM: GERD, obesity, and anxiety. New diabetes diagnosis with A1C 11.0% (3/28/2020); updated A1C 3/31/20-10.4%     Recent events:   Patient remains intubated and on ventilator, on CRRT. Has retroperitoneal bleed that required blood transfusions. Continues on ketamine drip and fentanyl drip. Off pressors. Consulted by Provider for advanced diabetes nursing assessment and care, specifically related to   [] Transitioning off [place] Single   [x] Inpatient management strategy  [] Home management assessment  [] Survival skill education    Diabetes-related medical history  Acute complications  hyperglycemia  Neurological complications  NONE  Microvascular disease  NONE  Macrovascular disease  NONE  Other associated conditions     NONE    Diabetes medication history: NONE    Subjective   Per chart review, Ms. Tana Giraldo remains very ill  and is on isolation forSARS-COV2 in ICU. I am unable to do a physical assessment of the patient at this time. Patient reports the following home diabetes self-care practices: deferred    Objective     Vital Signs   Visit Vitals  /63 (BP 1 Location: Left arm, BP Patient Position: At rest)   Pulse 81   Temp 97 °F (36.1 °C)   Resp 18   Ht 5' 4\" (1.626 m)   Wt 130.3 kg (287 lb 3.2 oz)   SpO2 99%   BMI 49.30 kg/m²   .    Laboratory  Lab Results   Component Value Date/Time    Hemoglobin A1c 10.4 (H) 03/31/2020 03:42 PM     No results found for: LDL, LDLC, DLDLP  Lab Results   Component Value Date/Time    Creatinine 1.21 (H) 04/17/2020 05:10 AM     Lab Results   Component Value Date/Time    Sodium 136 04/17/2020 05:10 AM    Potassium 4.4 04/17/2020 05:10 AM    Chloride 105 04/17/2020 05:10 AM    CO2 23 04/17/2020 05:10 AM    Anion gap 8 04/17/2020 05:10 AM    Glucose 238 (H) 04/17/2020 05:10 AM    BUN 46 (H) 04/17/2020 05:10 AM    Creatinine 1.21 (H) 04/17/2020 05:10 AM    BUN/Creatinine ratio 38 (H) 04/17/2020 05:10 AM    GFR est AA 56 (L) 04/17/2020 05:10 AM    GFR est non-AA 46 (L) 04/17/2020 05:10 AM    Calcium 8.3 (L) 04/17/2020 05:10 AM    Bilirubin, total 2.5 (H) 04/17/2020 04:00 AM    AST (SGOT) 172 (H) 04/17/2020 04:00 AM    Alk. phosphatase 440 (H) 04/17/2020 04:00 AM    Protein, total 5.9 (L) 04/17/2020 04:00 AM    Albumin 2.6 (L) 04/17/2020 05:10 AM    Globulin 3.3 04/17/2020 04:00 AM    A-G Ratio 0.8 (L) 04/17/2020 04:00 AM    ALT (SGPT) 393 (H) 04/17/2020 04:00 AM     Lab Results   Component Value Date/Time    ALT (SGPT) 393 (H) 04/17/2020 04:00 AM           Evaluation   Ms Camryn Linn, with new onset Type 2 diabetes,with A1C 10.4%. Continues on 150mg daily hydrocortisone. Basal insulin increased to 67 units eduardo (47units AM and 20 units PM. Fasting BG today 242mg/dl. BG trending around 200mg/dl consistently. Requiring about 10units of Humalog correctional coverage. Remains on TF Nepro @ 30cc/hr      To maintain her basal metabolic needs she requires 27units daily. In addition she also needs 40 units of basal insulin to cover for her steroid AND 7units to cover for her TF (nepro @ 30cc/hr). So total insulin needs are: 74units daily. To make it easier, split lantus into 2 doses of 35 units BID. It is imperative that we maintain her BG within target range 100-180mg/dl. Recommendations   1. INCREASE daily Lantus to cover for metabolic, steroid and TF needs 35units twice daily (every 12 hours).     Follow the dosing schedule when tapering steroid:     20 mg Hydrocortisone: add 0.05 units/kg Lantus to total daily insulin dose  40 mg Hydrocortisone: add 0.1 units/kg Lantus to total daily insulin dose  50 mg Hydrocortisone: add 0.15 units/kg Lantus to total daily insulin dose  100 mg Hydrocortisone: add 0.3 units/kg Lantus to total daily insulin dose    2. Will continue to follow.     Assessment and Plan   Nursing Diagnosis Risk for unstable blood glucose pattern   Nursing Intervention Domain 0961 Decision-making Support   Nursing Interventions Examined current inpatient diabetes control   Explored factors facilitating and impeding inpatient management  Identified self-management practices impeding diabetes control  Explored corrective strategies with patient and responsible inpatient provider   Informed patient of rational for insulin strategy while hospitalized         Billing Code(s)     [x] [phone] IP subsequent hospital care - [address] [address], [facility]   Program for Diabetes Health  Access via KESHIA [facility] [phone]

## 2020-04-17 NOTE — PROGRESS NOTES
[facility]    ICU TEAM Progress Note    Name: Jarret Forrester   : 1960   MRN: [id number]   Date: 2020      Assessment/Plan:     Current ICU Problems:    1. Acute hypoxic respiratory failure 2/2 COVID 19   Completed Plaquenil and Zinc.   Remains on the ventilator, trend ABGs   Off Nimbex. Wean fentanyl, still on ketamine. White count downtrending. On solu-cortef. 2.  Retroperitoneal bleed with hemorrhagic shock   HH stable 7.   No anticoagulants at this time. 3.  Acute kidney injury   Nephrology following. On CRRT with factor of 200.       4.  Nonocclusive PE in LLL pulmonary arteries    5. MRSA pneumonia   On Vancomycin, cefepime and flagyl. 6.  Diabetes mellitus   On Lantus and sliding scale. Fasting glucoses remain a bit elevated, increase Lantus to 47 units. 7.  Thrombocytopenia   Platelets 81 and stable. Cardiac Gtts: Norepinephrine  SBP Goal of:   >90 mmHg  MAP Goal of: > 65 mmHg  Transfusion Trigger (Hgb):  <7 g/dL     Respiratory Goals: Chlorhexidine   Optimize PEEP/Ventilation/Oxygenation  Goal Tidal Volume 6 cc/kg based on IBW  Aim for lung protective ventilation  Aggressive bronchopulmonary hygiene  SPO2 Goal: > 92%  Pulmonary toilet: NA   DVT Prophylaxis (if no, list reason): SCD's or Sequential Compression Device      GI Prophylaxis: Protonix (pantoprazole)   Nutrition:  Start trickle feeds tomorrow since she's off paralytic.     IVFs: NA  Bowel Movement: Yes  Bowel Regimen: None needed at this time     Bowden Catheter Present: Yes  Glycemic Control - Insulin: Yes  Antibiotics:  Cefepime, Flagyl, Vancomycin     Pain Medications: Fentanyl  Target RASS: -3 - Moderate Sedation - Movement or eye opening to voice (no eye contact)  Sedation Medications:  Ketamine  CAM-ICU:  JUAREZ  Mobility: Poor and Bedrest  PT/OT: NA   Restraints: Soft wrist restraints  Discussed Plan of Care/Code Status: Full Code     T/L/D  Tubes: ETT and Orogastric Tube  Lines: Central Line  Drains: Bowden Catheter    Subjective:   Progress Note: 4/17/2020      Reason for ICU Admission:  63 yo female with hx of obesity, endometrial cancer and diabetes who presented [facility] with worsening hypoxic respiratory failure in lieu of close exposure to COVID-19 to family including mother ( passed away) sister and brother and tested resulting + here. She presented to the hospital 3/26 and intubated 3/28. She was started on broad spectrum antibiotics and plaquenil. Developed worsening renal failure and was transferred to ICU for CRRT. Her hospital course has been further notable for development of a retroperitoneal bleed, hemorrhagic shock and sepsis and ongoing acute hypoxic respiratory failure with increasing PEEP and FIo2 requirements. Currently she is on Ketamine and fentanyl; Nimbex has been weaned off. She does not follow commands. Had conversation with family with palliative MSW and RN to update on her condition. No changes made to current care course. Active Problem List:     Problem List  Date Reviewed: 1/23/2020          Codes Class    Hypotension ICD-10-CM: I95.9  ICD-9-CM: 458.9 Acute        Sepsis due to methicillin susceptible Staphylococcus aureus (MSSA) without acute organ dysfunction (HCC) ICD-10-CM: A41.01  ICD-9-CM: 038.11, 995.91 Acute        COVID-19 virus infection ICD-10-CM: U07.1         Obesity, morbid ([facility] [address] ICD-10-CM: E66.01  ICD-9-CM: 278.01         Vaginal Pap smear following hysterectomy for malignancy ICD-10-CM: Z08, Z90.710  ICD-9-CM: V67.01         Personal history of malignant neoplasm of other parts of uterus ICD-10-CM: Z85.42  ICD-9-CM: V10.42         Endometrial cancer ([facility] [address] ICD-10-CM: C54.1  ICD-9-CM: 182.0               Past Medical History:      has a past medical history of Basal cell carcinoma, GERD (gastroesophageal reflux disease), Kidney stones, and Polyp of ureter.     Past Surgical History:      has a past surgical history that includes hysteroscopy diagnostic (); pr endometrial ablation, thermal; hx  section (); hx colonoscopy; and insert arterial line (2020). Home Medications:     Prior to Admission medications    Medication Sig Start Date End Date Taking? Authorizing Provider   pantoprazole (PROTONIX) 40 mg tablet TAKE 1 TABLET BY MOUTH TWICE A DAY 18   Provider, Historical   esomeprazole (NEXIUM) 20 mg capsule Take 20 mg by mouth daily. Indications: BID    Provider, Historical   zolpidem (AMBIEN) 10 mg tablet Take 0.5 Tabs by mouth nightly as needed for Sleep. Max Daily Amount: 5 mg. 17   Antonia Mandel MD   fluticasone (FLONASE) 50 mcg/actuation nasal spray Mist 1-2 spray(s) into each nostril once daily. 4/3/15   Provider, Historical   ranitidine (ZANTAC) 150 mg tablet 150 mg.    Provider, Historical       Allergies/Social/Family History: Allergies   Allergen Reactions    Augmentin [Amoxicillin-Pot Clavulanate] Rash and Itching      Social History     Tobacco Use    Smoking status: Never Smoker    Smokeless tobacco: Never Used   Substance Use Topics    Alcohol use: Not on file      Family History   Problem Relation Age of Onset    Prostate Cancer Father         PROSTATE    Breast Cancer Sister 46        invasive poorly differentiated ductal carcinoma, Neg genetic testing.     Cancer Sister 62        melanoma stage 1       Objective:   Vital Signs:  Visit Vitals  BP 96/81 (BP 1 Location: Left arm, BP Patient Position: At rest)   Pulse 80   Temp (!) 94.8 °F (34.9 °C) Comment: bear hugger on   Resp 19   Ht 5' 4\" (1.626 m)   Wt 130.3 kg (287 lb 3.2 oz)   SpO2 100%   BMI 49.30 kg/m²      O2 Device: Endotracheal tube, Ventilator Temp (24hrs), Av.6 °F (36.4 °C), Min:94.8 °F (34.9 °C), Max:99.9 °F (37.7 °C)           Intake/Output:     Intake/Output Summary (Last 24 hours) at 2020 171  Last data filed at 2020 1500  Gross per 24 hour   Intake 1949.89 ml   Output 4857 ml   Net -2907.11 ml       Physical Exam:    General:  Sedated, on Ketamine and on the ventilator. No acute distress, morbidly obese   Eyes: Sclera anicteric. Pupils equal, round, reactive to light. Does not arouse   Mouth/Throat: Orotracheal tube in place. Neck: Supple. Lungs:   Clear/DIM to auscultation bilaterally, vent assisted respirations, no accessory muscle use observed. Cardiovascular:  Tachycardic, regular rate and rhythm, no murmur, click, rub, or gallop, anasarca, pulses palp x 4 ext. Abdomen:   Soft, bowel sounds hypoactive, distended. Has FMS in place with liquid stool. Extremities: No cyanosis, + edema   Skin: No rash or lesions. Musculoskeletal:  No swelling other than edema, no deformity. Lines/Devices:  Intact, no erythema, drainage, or tenderness. Psychiatric: Sedated and appears comfortable on ventilator.       LABS AND  DATA: Personally reviewed  Recent Labs     04/17/20  1113 04/17/20  0510   WBC 15.3* 16.2*   HGB 7.2* 7.2*   HCT 23.0* 23.2*   PLT 82* 78*     Recent Labs     04/17/20  0510 04/16/20  2149  04/16/20  1046 04/16/20  0438    134*   < > 137 137   K 4.4 4.3   < > 4.3 4.1    105   < > 105 105   CO2 23 25   < > 27 26   BUN 46* 40*   < > 34* 35*   CREA 1.21* 1.06*   < > 1.05* 1.11*   * 209*   < > 152* 158*   CA 8.3* 8.3*   < > 8.5 8.7   MG 2.6*  --   --   --  2.5*   PHOS 2.3*  --   --  2.0* 2.0*    < > = values in this interval not displayed. Recent Labs     04/17/20  0510 04/17/20  0400  04/16/20  0400   SGOT  --  172*  --  174*   AP  --  440*  --  251*   TP  --  5.9*  --  5.8*   ALB 2.6* 2.6*   < > 2.8*   GLOB  --  3.3  --  3.0    < > = values in this interval not displayed.      Recent Labs     04/17/20  1113 04/16/20  1046 04/15/20  2228   INR 1.1 1.2*  --    PTP 11.6* 11.9*  --    APTT  --  25.1 26.5      Recent Labs     04/16/20  1830 04/16/20  1703   PHI 7.412 7.400   PCO2I 39.3 42.2   PO2I 157* 216*   FIO2I 50 60       Mode Rate Tidal Volume Pressure FiO2 PEEP   Assist control   430 ml  0 cm H2O 40 % 5 cm H20     Peak airway pressure: 31 cm H2O    Minute ventilation: 12.2 l/min      MEDS: Reviewed    04/10/2020, CT Abd/ Pelvis:  IMPRESSION:  1.  Scattered pulmonary air space disease consistent with atypical viral infection. 2.  Nonocclusive pulmonary emboli in the left lower lobe pulmonary arteries. 3.  Large right retroperitoneal hematoma with small foci of contrast within the hematoma. 4.  Enteric tube and endotracheal tube in appropriate position. 5.  Bilateral femoral venous catheters in the common iliac veins.     ABCDEF Bundle/Checklist Completed:  Yes    SPECIAL EQUIPMENT  CRRT    DISPOSITION  Stay in ICU    CRITICAL CARE CONSULTANT NOTE  I had a face to face encounter with the patient, reviewed and interpreted patient data including clinical events, labs, images, vital signs, I/O's, and examined patient. I have discussed the case and the plan and management of the patient's care with the consulting services, the bedside nurses and the respiratory therapist.      NOTE OF PERSONAL INVOLVEMENT IN CARE   This patient has a high probability of imminent, clinically significant deterioration, which requires the highest level of preparedness to intervene urgently. I participated in the decision-making and personally managed or directed the management of the following life and organ supporting interventions that required my frequent assessment to treat or prevent imminent deterioration. I personally spent 50 minutes of critical care time. This is time spent at this critically ill patient's bedside actively involved in patient care as well as the coordination of care and discussions with the patient's family. This does not include any procedural time which has been billed separately.     Behzad Amaro [facility]  4/17/2020

## 2020-04-17 NOTE — PROGRESS NOTES
Palliative Medicine Social Work    Had daily phone check in with patient  Jose Navarro, ICU team Lex Herbert NP, [facility] RN) and Palliative team - [facility]. Phuong Zafar talked to RN earlier this morning. Appreciate her support in talking him through updates, encouraging him to continue with check ins. Radha Jimy asked many good questions. She is focused on particulars of HGB, WBC, as well as lungs/O2. NP provided medical update. Patient currently off pressors. She has not been able to tolerate getting off sedation to do SBT. Team is working on changing approach to sedation. If patient can't get off vent, discussion of possible trach (if comes to this will be important to review AMD with family - so far  has not wanted to share living will). Discussed CVVR cath clotting - solution to switch to permanent cath/regular HD if this happens again - if patient survives, HD would be lifelong; plan for CXR this weekend. Radha Ahn asked about patient responsiveness: discussed some spontaneous movement, no command following. Family aware that until sedation can be weaned, many uncertainties about neurological status. Discussed that even if patient survives she may not be the same person she was when she came into hospital/possibility of physical and neurological deficits. Family appreciative of daily updates with ICU team and palliative for consistent information about day to day fluctuations, plan going forward and big picture. I think this helps build their trust as does conversations  has had with RN. Will continue to follow and support family with daily check ins (M-F). Family aware they can call ICU over weekend for updates as well. GOC remain full restorative measures/full code. They are not ready for DNR or discussion of comfort though if patient has a drastic decline, it would be appropriate. Again appreciate ICU team support in this process.             Thank you for the opportunity to be involved in the care of Ms. LuzE lena Paz and her family.     Lucia Mejias LMSW, Supervisee in Social Work  Palliative Medicine   [phone]

## 2020-04-18 ENCOUNTER — APPOINTMENT (OUTPATIENT)
Dept: GENERAL RADIOLOGY | Age: 60
DRG: 870 | End: 2020-04-18
Attending: SURGERY
Payer: COMMERCIAL

## 2020-04-18 LAB
ALBUMIN SERPL-MCNC: 2.8 G/DL (ref 3.5–5)
ALBUMIN SERPL-MCNC: 2.8 G/DL (ref 3.5–5)
ALBUMIN/GLOB SERPL: 0.9 {RATIO} (ref 1.1–2.2)
ALP SERPL-CCNC: 474 U/L (ref 45–117)
ALT SERPL-CCNC: 281 U/L (ref 12–78)
ANION GAP SERPL CALC-SCNC: 7 MMOL/L (ref 5–15)
AST SERPL-CCNC: 159 U/L (ref 15–37)
BILIRUB DIRECT SERPL-MCNC: 1 MG/DL (ref 0–0.2)
BILIRUB SERPL-MCNC: 2.1 MG/DL (ref 0.2–1)
BUN SERPL-MCNC: 43 MG/DL (ref 6–20)
BUN/CREAT SERPL: 43 (ref 12–20)
CALCIUM SERPL-MCNC: 8.2 MG/DL (ref 8.5–10.1)
CHLORIDE SERPL-SCNC: 106 MMOL/L (ref 97–108)
CO2 SERPL-SCNC: 25 MMOL/L (ref 21–32)
CREAT SERPL-MCNC: 1.01 MG/DL (ref 0.55–1.02)
CRP SERPL HS-MCNC: >9.5 MG/L
ERYTHROCYTE [DISTWIDTH] IN BLOOD BY AUTOMATED COUNT: 22.5 % (ref 11.5–14.5)
ERYTHROCYTE [DISTWIDTH] IN BLOOD BY AUTOMATED COUNT: 22.8 % (ref 11.5–14.5)
ERYTHROCYTE [DISTWIDTH] IN BLOOD BY AUTOMATED COUNT: 23.6 % (ref 11.5–14.5)
ERYTHROCYTE [DISTWIDTH] IN BLOOD BY AUTOMATED COUNT: 23.9 % (ref 11.5–14.5)
GLOBULIN SER CALC-MCNC: 3.1 G/DL (ref 2–4)
GLUCOSE BLD STRIP.AUTO-MCNC: 138 MG/DL (ref 65–100)
GLUCOSE BLD STRIP.AUTO-MCNC: 183 MG/DL (ref 65–100)
GLUCOSE BLD STRIP.AUTO-MCNC: 190 MG/DL (ref 65–100)
GLUCOSE BLD STRIP.AUTO-MCNC: 203 MG/DL (ref 65–100)
GLUCOSE SERPL-MCNC: 206 MG/DL (ref 65–100)
HCT VFR BLD AUTO: 22 % (ref 35–47)
HCT VFR BLD AUTO: 24.9 % (ref 35–47)
HCT VFR BLD AUTO: 25.8 % (ref 35–47)
HCT VFR BLD AUTO: 26.3 % (ref 35–47)
HGB BLD-MCNC: 6.8 G/DL (ref 11.5–16)
HGB BLD-MCNC: 7.8 G/DL (ref 11.5–16)
HGB BLD-MCNC: 8 G/DL (ref 11.5–16)
HGB BLD-MCNC: 8.2 G/DL (ref 11.5–16)
MAGNESIUM SERPL-MCNC: 2.6 MG/DL (ref 1.6–2.4)
MCH RBC QN AUTO: 30.2 PG (ref 26–34)
MCH RBC QN AUTO: 30.4 PG (ref 26–34)
MCH RBC QN AUTO: 30.5 PG (ref 26–34)
MCH RBC QN AUTO: 30.7 PG (ref 26–34)
MCHC RBC AUTO-ENTMCNC: 30.9 G/DL (ref 30–36.5)
MCHC RBC AUTO-ENTMCNC: 31 G/DL (ref 30–36.5)
MCHC RBC AUTO-ENTMCNC: 31.2 G/DL (ref 30–36.5)
MCHC RBC AUTO-ENTMCNC: 31.3 G/DL (ref 30–36.5)
MCV RBC AUTO: 97.8 FL (ref 80–99)
MCV RBC AUTO: 97.8 FL (ref 80–99)
MCV RBC AUTO: 98 FL (ref 80–99)
MCV RBC AUTO: 98.1 FL (ref 80–99)
NRBC # BLD: 0.07 K/UL (ref 0–0.01)
NRBC # BLD: 0.12 K/UL (ref 0–0.01)
NRBC # BLD: 0.16 K/UL (ref 0–0.01)
NRBC # BLD: 0.16 K/UL (ref 0–0.01)
NRBC BLD-RTO: 0.6 PER 100 WBC
NRBC BLD-RTO: 1.1 PER 100 WBC
NRBC BLD-RTO: 1.2 PER 100 WBC
NRBC BLD-RTO: 1.3 PER 100 WBC
PHOSPHATE SERPL-MCNC: 2.3 MG/DL (ref 2.6–4.7)
PLATELET # BLD AUTO: 102 K/UL (ref 150–400)
PLATELET # BLD AUTO: 79 K/UL (ref 150–400)
PLATELET # BLD AUTO: 86 K/UL (ref 150–400)
PLATELET # BLD AUTO: 98 K/UL (ref 150–400)
PMV BLD AUTO: 11.9 FL (ref 8.9–12.9)
PMV BLD AUTO: 12.2 FL (ref 8.9–12.9)
PMV BLD AUTO: 12.5 FL (ref 8.9–12.9)
PMV BLD AUTO: 12.6 FL (ref 8.9–12.9)
POTASSIUM SERPL-SCNC: 4.4 MMOL/L (ref 3.5–5.1)
PROT SERPL-MCNC: 5.9 G/DL (ref 6.4–8.2)
RBC # BLD AUTO: 2.25 M/UL (ref 3.8–5.2)
RBC # BLD AUTO: 2.54 M/UL (ref 3.8–5.2)
RBC # BLD AUTO: 2.63 M/UL (ref 3.8–5.2)
RBC # BLD AUTO: 2.69 M/UL (ref 3.8–5.2)
SERVICE CMNT-IMP: ABNORMAL
SODIUM SERPL-SCNC: 138 MMOL/L (ref 136–145)
WBC # BLD AUTO: 11.4 K/UL (ref 3.6–11)
WBC # BLD AUTO: 12.1 K/UL (ref 3.6–11)
WBC # BLD AUTO: 12.5 K/UL (ref 3.6–11)
WBC # BLD AUTO: 13.5 K/UL (ref 3.6–11)

## 2020-04-18 PROCEDURE — 80069 RENAL FUNCTION PANEL: CPT

## 2020-04-18 PROCEDURE — 74011250636 HC RX REV CODE- 250/636: Performed by: INTERNAL MEDICINE

## 2020-04-18 PROCEDURE — 86900 BLOOD TYPING SEROLOGIC ABO: CPT

## 2020-04-18 PROCEDURE — 74011000250 HC RX REV CODE- 250: Performed by: SURGERY

## 2020-04-18 PROCEDURE — 65610000006 HC RM INTENSIVE CARE

## 2020-04-18 PROCEDURE — 94640 AIRWAY INHALATION TREATMENT: CPT

## 2020-04-18 PROCEDURE — 74011250636 HC RX REV CODE- 250/636: Performed by: NURSE PRACTITIONER

## 2020-04-18 PROCEDURE — P9016 RBC LEUKOCYTES REDUCED: HCPCS

## 2020-04-18 PROCEDURE — 80076 HEPATIC FUNCTION PANEL: CPT

## 2020-04-18 PROCEDURE — 74011636637 HC RX REV CODE- 636/637: Performed by: NURSE PRACTITIONER

## 2020-04-18 PROCEDURE — 94003 VENT MGMT INPAT SUBQ DAY: CPT

## 2020-04-18 PROCEDURE — 36430 TRANSFUSION BLD/BLD COMPNT: CPT

## 2020-04-18 PROCEDURE — 86923 COMPATIBILITY TEST ELECTRIC: CPT

## 2020-04-18 PROCEDURE — 74011250637 HC RX REV CODE- 250/637: Performed by: NURSE PRACTITIONER

## 2020-04-18 PROCEDURE — 71045 X-RAY EXAM CHEST 1 VIEW: CPT

## 2020-04-18 PROCEDURE — 86141 C-REACTIVE PROTEIN HS: CPT

## 2020-04-18 PROCEDURE — 74011000250 HC RX REV CODE- 250: Performed by: NURSE PRACTITIONER

## 2020-04-18 PROCEDURE — 83735 ASSAY OF MAGNESIUM: CPT

## 2020-04-18 PROCEDURE — 5A1D70Z PERFORMANCE OF URINARY FILTRATION, INTERMITTENT, LESS THAN 6 HOURS PER DAY: ICD-10-PCS | Performed by: HOSPITALIST

## 2020-04-18 PROCEDURE — 74011000250 HC RX REV CODE- 250: Performed by: INTERNAL MEDICINE

## 2020-04-18 PROCEDURE — 90935 HEMODIALYSIS ONE EVALUATION: CPT

## 2020-04-18 RX ORDER — GUAIFENESIN 100 MG/5ML
200 SOLUTION ORAL EVERY 6 HOURS
Status: DISCONTINUED | OUTPATIENT
Start: 2020-04-18 | End: 2020-04-30

## 2020-04-18 RX ORDER — HYDROCORTISONE SODIUM SUCCINATE 100 MG/2ML
50 INJECTION, POWDER, FOR SOLUTION INTRAMUSCULAR; INTRAVENOUS EVERY 8 HOURS
Status: DISCONTINUED | OUTPATIENT
Start: 2020-04-18 | End: 2020-04-21

## 2020-04-18 RX ORDER — HEPARIN SODIUM 1000 [USP'U]/ML
3200 INJECTION, SOLUTION INTRAVENOUS; SUBCUTANEOUS
Status: DISCONTINUED | OUTPATIENT
Start: 2020-04-18 | End: 2020-04-23

## 2020-04-18 RX ORDER — ACETYLCYSTEINE 200 MG/ML
200 SOLUTION ORAL; RESPIRATORY (INHALATION) 2 TIMES DAILY
Status: DISCONTINUED | OUTPATIENT
Start: 2020-04-18 | End: 2020-04-18

## 2020-04-18 RX ORDER — ACETYLCYSTEINE 200 MG/ML
200 SOLUTION ORAL; RESPIRATORY (INHALATION)
Status: DISCONTINUED | OUTPATIENT
Start: 2020-04-18 | End: 2020-04-26

## 2020-04-18 RX ORDER — ALBUTEROL SULFATE 0.83 MG/ML
2.5 SOLUTION RESPIRATORY (INHALATION)
Status: DISCONTINUED | OUTPATIENT
Start: 2020-04-18 | End: 2020-05-22 | Stop reason: HOSPADM

## 2020-04-18 RX ORDER — SODIUM CHLORIDE 9 MG/ML
250 INJECTION, SOLUTION INTRAVENOUS AS NEEDED
Status: DISCONTINUED | OUTPATIENT
Start: 2020-04-18 | End: 2020-04-30

## 2020-04-18 RX ADMIN — CASTOR OIL AND BALSAM, PERU: 788; 87 OINTMENT TOPICAL at 08:06

## 2020-04-18 RX ADMIN — GUAIFENESIN 200 MG: 100 SOLUTION ORAL at 18:19

## 2020-04-18 RX ADMIN — MINERAL OIL AND WHITE PETROLATUM: 150; 830 OINTMENT OPHTHALMIC at 08:07

## 2020-04-18 RX ADMIN — GUAIFENESIN 200 MG: 100 SOLUTION ORAL at 11:42

## 2020-04-18 RX ADMIN — SODIUM CHLORIDE 250 ML: 9 INJECTION, SOLUTION INTRAVENOUS at 03:46

## 2020-04-18 RX ADMIN — INSULIN LISPRO 2 UNITS: 100 INJECTION, SOLUTION INTRAVENOUS; SUBCUTANEOUS at 06:46

## 2020-04-18 RX ADMIN — Medication 50 MCG/HR: at 14:32

## 2020-04-18 RX ADMIN — CASTOR OIL AND BALSAM, PERU: 788; 87 OINTMENT TOPICAL at 18:07

## 2020-04-18 RX ADMIN — CALCIUM CHLORIDE, MAGNESIUM CHLORIDE, DEXTROSE MONOHYDRATE, LACTIC ACID, SODIUM CHLORIDE, SODIUM BICARBONATE AND POTASSIUM CHLORIDE 2000 ML/HR: 3.68; 3.05; 22; 5.4; 6.46; 3.09; .314 INJECTION INTRAVENOUS at 04:16

## 2020-04-18 RX ADMIN — SODIUM CHLORIDE 10 ML: 9 INJECTION INTRAMUSCULAR; INTRAVENOUS; SUBCUTANEOUS at 22:32

## 2020-04-18 RX ADMIN — INSULIN GLARGINE 35 UNITS: 100 INJECTION, SOLUTION SUBCUTANEOUS at 21:00

## 2020-04-18 RX ADMIN — CALCIUM CHLORIDE, MAGNESIUM CHLORIDE, DEXTROSE MONOHYDRATE, LACTIC ACID, SODIUM CHLORIDE, SODIUM BICARBONATE AND POTASSIUM CHLORIDE 2000 ML/HR: 3.68; 3.05; 22; 5.4; 6.46; 3.09; .314 INJECTION INTRAVENOUS at 01:13

## 2020-04-18 RX ADMIN — ALBUTEROL SULFATE 2.5 MG: 2.5 SOLUTION RESPIRATORY (INHALATION) at 11:50

## 2020-04-18 RX ADMIN — CHLORHEXIDINE GLUCONATE 15 ML: 1.2 RINSE ORAL at 08:06

## 2020-04-18 RX ADMIN — INSULIN GLARGINE 35 UNITS: 100 INJECTION, SOLUTION SUBCUTANEOUS at 08:12

## 2020-04-18 RX ADMIN — HYDROCORTISONE SODIUM SUCCINATE 50 MG: 100 INJECTION, POWDER, FOR SOLUTION INTRAMUSCULAR; INTRAVENOUS at 14:19

## 2020-04-18 RX ADMIN — MINERAL OIL AND WHITE PETROLATUM: 150; 830 OINTMENT OPHTHALMIC at 21:00

## 2020-04-18 RX ADMIN — KETAMINE HYDROCHLORIDE 0.15 MG/KG/HR: 50 INJECTION, SOLUTION INTRAMUSCULAR; INTRAVENOUS at 14:24

## 2020-04-18 RX ADMIN — VANCOMYCIN HYDROCHLORIDE 500 MG: KIT at 06:19

## 2020-04-18 RX ADMIN — SODIUM CHLORIDE 10 ML: 9 INJECTION INTRAMUSCULAR; INTRAVENOUS; SUBCUTANEOUS at 06:20

## 2020-04-18 RX ADMIN — ALBUTEROL SULFATE 2.5 MG: 2.5 SOLUTION RESPIRATORY (INHALATION) at 20:15

## 2020-04-18 RX ADMIN — GUAIFENESIN 100 MG: 100 SOLUTION ORAL at 06:19

## 2020-04-18 RX ADMIN — HYDROCORTISONE SODIUM SUCCINATE 50 MG: 100 INJECTION, POWDER, FOR SOLUTION INTRAMUSCULAR; INTRAVENOUS at 06:19

## 2020-04-18 RX ADMIN — HYDROCORTISONE SODIUM SUCCINATE 50 MG: 100 INJECTION, POWDER, FOR SOLUTION INTRAMUSCULAR; INTRAVENOUS at 22:32

## 2020-04-18 RX ADMIN — HEPARIN SODIUM 3200 UNITS: 1000 INJECTION INTRAVENOUS; SUBCUTANEOUS at 19:18

## 2020-04-18 RX ADMIN — CALCIUM CHLORIDE, MAGNESIUM CHLORIDE, DEXTROSE MONOHYDRATE, LACTIC ACID, SODIUM CHLORIDE, SODIUM BICARBONATE AND POTASSIUM CHLORIDE 2000 ML/HR: 3.68; 3.05; 22; 5.4; 6.46; 3.09; .314 INJECTION INTRAVENOUS at 02:47

## 2020-04-18 RX ADMIN — INSULIN LISPRO 2 UNITS: 100 INJECTION, SOLUTION INTRAVENOUS; SUBCUTANEOUS at 12:00

## 2020-04-18 RX ADMIN — VANCOMYCIN HYDROCHLORIDE 500 MG: KIT at 12:01

## 2020-04-18 RX ADMIN — INSULIN LISPRO 3 UNITS: 100 INJECTION, SOLUTION INTRAVENOUS; SUBCUTANEOUS at 00:00

## 2020-04-18 RX ADMIN — FAMOTIDINE 20 MG: 40 POWDER, FOR SUSPENSION ORAL at 08:14

## 2020-04-18 RX ADMIN — CHLORHEXIDINE GLUCONATE 15 ML: 1.2 RINSE ORAL at 21:00

## 2020-04-18 RX ADMIN — ACETYLCYSTEINE 200 MG: 200 SOLUTION ORAL; RESPIRATORY (INHALATION) at 20:15

## 2020-04-18 RX ADMIN — ACETYLCYSTEINE 200 MG: 200 SOLUTION ORAL; RESPIRATORY (INHALATION) at 11:50

## 2020-04-18 RX ADMIN — VANCOMYCIN HYDROCHLORIDE 500 MG: KIT at 18:23

## 2020-04-18 RX ADMIN — SODIUM CHLORIDE 10 ML: 9 INJECTION INTRAMUSCULAR; INTRAVENOUS; SUBCUTANEOUS at 14:19

## 2020-04-18 NOTE — PROGRESS NOTES
0730: Bedside and Verbal shift change report given to Hilton Knapp RN (oncoming nurse) by Karthik Burnette RN (offgoing nurse). Report included the following information SBAR, Kardex, Intake/Output, MAR, Recent Results and Cardiac Rhythm NSR.     1100: Primary Nurse Sahara Garrido and JUDY Orellana performed a dual skin assessment on this patient Impairment noted- see wound doc flow sheet  Aniket score is 11.    1930: Bedside and Verbal shift change report given to Karthik Burnette RN (oncoming nurse) by Hilton Knapp RN (offgoing nurse). Report included the following information SBAR, Kardex, Intake/Output, MAR, Recent Results and Cardiac Rhythm NSR.

## 2020-04-18 NOTE — PROGRESS NOTES
[facility]   [address] [facility], [facility] [address], [facility]  Phone: [phone]   [phone]       Nephrology Progress Note  Radha Manuel     1960     [id number]  Date of Admission : 3/31/2020  04/18/20    CC: Follow up for JUANCHO      Assessment and Plan   JUANCHO :  - 2/2 ATN  - stop CRRT, start IHD this afternoon   - may need albumin/ pressor support for HD    Lytes  - K, mag, phos stable    COVID-19 +ve   Acute Hypoxic Resp Failure   - On Vent   - completed Plaquenil. On Vit C, Zinc   - s/p Tocilizumab      Leukocytosis:  - improving, blood cx neg  - on cefepime, vanco, flagyl    RP hematoma:  - needing PRN transfusions    Cardiac arrest 4/9    Type II DM   - Insulin per primary team      Morbid Obesity        Interval History:  CRRT clotted   Transfused a unit of blood overnight   Off all pressors   Labs pending from this am     PT NOT EXAMINED in line with ASN and RPA GUIDELINES ON MANAGING COVID-19 PTS WITH RENAL ISSUES. Examination findings discussed personally with the examining Physician team member      Review of Systems: Review of systems not obtained due to patient factors.     Current Medications:   Current Facility-Administered Medications   Medication Dose Route Frequency    0.9% sodium chloride infusion 250 mL  250 mL IntraVENous PRN    insulin glargine (LANTUS) injection 35 Units  35 Units SubCUTAneous Q12H    famotidine (PEPCID) 8 mg/mL oral suspension 20 mg  20 mg Per NG tube DAILY    alteplase (CATHFLO) 2 mg in sterile water (preservative free) 2 mL injection  2 mg InterCATHeter DIALYSIS PRN    alteplase (CATHFLO) 2 mg in sterile water (preservative free) 2 mL injection  2 mg InterCATHeter DIALYSIS PRN    insulin lispro (HUMALOG) injection   SubCUTAneous Q6H    0.9% sodium chloride infusion 250 mL  250 mL IntraVENous PRN    chlorhexidine (PERIDEX) 0.12 % mouthwash 15 mL  15 mL Oral Q12H    hydrocortisone Sod Succ (PF) (SOLU-CORTEF) injection 50 mg  50 mg IntraVENous Q6H    vancomycin (FIRVANQ) 50 mg/mL oral solution 500 mg  500 mg Oral Q6H    fentaNYL (PF) 1,500 mcg/30 mL (50 mcg/mL) infusion  0-200 mcg/hr IntraVENous TITRATE    ketamine (KETALAR) 500 mg in 0.9% sodium chloride 500 mL infusion  0.05-0.4 mg/kg/hr IntraVENous TITRATE    NOREPINephrine (LEVOPHED) 32,000 mcg in dextrose 5% 250 mL (128 mcg/mL) infusion  0-50 mcg/min IntraVENous TITRATE    [Held by provider] labetaloL (NORMODYNE) tablet 100 mg  100 mg Oral Q12H    0.9% sodium chloride infusion  3 mL/hr IntraVENous CONTINUOUS    0.9% sodium chloride infusion  5 mL/hr IntraVENous CONTINUOUS    labetaloL (NORMODYNE;TRANDATE) injection 10 mg  10 mg IntraVENous Q10MIN PRN    fentaNYL citrate (PF) injection  mcg   mcg IntraVENous Q1H PRN    white petrolatum-mineral oiL (AKWA TEARS) 83-15 % ophthalmic ointment   Both Eyes Q12H    heparin (porcine) pf 300 Units  300 Units InterCATHeter PRN    guaiFENesin (ROBITUSSIN) 100 mg/5 mL oral liquid 100 mg  100 mg Oral Q6H    midazolam (VERSED) injection 1-2 mg  1-2 mg IntraVENous Q2H PRN    bacitracin 500 unit/gram packet 1 Packet  1 Packet Topical PRN    balsam peru-castor oiL (VENELEX) ointment   Topical BID    sodium chloride (NS) flush 5-40 mL  5-40 mL IntraVENous Q8H    sodium chloride (NS) flush 5-40 mL  5-40 mL IntraVENous PRN    HYDROcodone-acetaminophen (NORCO) 5-325 mg per tablet 1 Tab  1 Tab Oral Q4H PRN    ondansetron (ZOFRAN) injection 4 mg  4 mg IntraVENous Q4H PRN    acetaminophen (TYLENOL) tablet 650 mg  650 mg Oral Q6H PRN    Or    acetaminophen (TYLENOL) suppository 650 mg  650 mg Rectal Q6H PRN    glucose chewable tablet 16 g  4 Tab Oral PRN    glucagon (GLUCAGEN) injection 1 mg  1 mg IntraMUSCular PRN    dextrose 10% infusion 0-250 mL  0-250 mL IntraVENous PRN      Allergies   Allergen Reactions    Augmentin [Amoxicillin-Pot Clavulanate] Rash and Itching       Objective:  Vitals:    Vitals:    04/18/20 0600 04/18/20 0615 04/18/20 0630 04/18/20 0729   BP: 93/63      Pulse: 95 92 85 90   Resp: 28 28 28 28   Temp:       SpO2: 93% 99% 98% 100%   Weight:       Height:         Intake and Output:  No intake/output data recorded. 04/16 1901 - 04/18 0700  In: 3215.1 [I.V.:1542.6]  Out: 7210 [Drains:575]    Physical Examination:   Pt intubated    Yes  General: Paralyzed on the vent  Resp:  On vent   CV:  RRR  GI:  Obese   Neurologic:  Sedated   Access:           R femoral melissa     []    High complexity decision making was performed  []    Patient is at high-risk of decompensation with multiple organ involvement    Lab Data Personally Reviewed: I have reviewed all the pertinent labs, microbiology data and radiology studies during assessment.     Recent Labs     04/17/20  1113 04/17/20  0510 04/17/20  0400 04/16/20  2149 04/16/20  1919 04/16/20  1046 04/16/20  0438 04/16/20  0400   NA  --  136  --  134* 136 137 137  --    K  --  4.4  --  4.3 4.3 4.3 4.1  --    CL  --  105  --  105 104 105 105  --    CO2  --  23  --  25 26 27 26  --    GLU  --  238*  --  209* 220* 152* 158*  --    BUN  --  46*  --  40* 39* 34* 35*  --    CREA  --  1.21*  --  1.06* 1.10* 1.05* 1.11*  --    CA  --  8.3*  --  8.3* 8.6 8.5 8.7  --    MG  --  2.6*  --   --   --   --  2.5*  --    PHOS  --  2.3*  --   --   --  2.0* 2.0*  --    ALB  --  2.6* 2.6*  --   --   --  2.7* 2.8*   SGOT  --   --  172*  --   --   --   --  174*   ALT  --   --  393*  --   --   --   --  502*   INR 1.1  --   --   --   --  1.2*  --   --      Recent Labs     04/17/20  2351 04/17/20  1825 04/17/20  1113 04/17/20  0510 04/16/20  2149   WBC 13.5* 15.7* 15.3* 16.2* 18.9*   HGB 6.8* 7.3* 7.2* 7.2* 7.3*   HCT 22.0* 23.3* 23.0* 23.2* 23.4*   PLT 86* 85* 82* 78* 79*     No results found for: SDES  Lab Results   Component Value Date/Time    Culture result: NO GROWTH 5 DAYS 04/09/2020 02:32 PM    Culture result: NO GROWTH 5 DAYS 04/08/2020 10:36 AM    Culture result: NO GROWTH 5 DAYS 03/31/2020 11:15 AM    Culture result: MODERATE STAPHYLOCOCCUS AUREUS (A) 03/31/2020 10:34 AM    Culture result: LIGHT NORMAL RESPIRATORY SOFIE 03/31/2020 10:34 AM     Recent Results (from the past 24 hour(s))   PROTHROMBIN TIME + INR    Collection Time: 04/17/20 11:13 AM   Result Value Ref Range    INR 1.1 0.9 - 1.1      Prothrombin time 11.6 (H) 9.0 - 11.1 sec   CBC W/O DIFF    Collection Time: 04/17/20 11:13 AM   Result Value Ref Range    WBC 15.3 (H) 3.6 - 11.0 K/uL    RBC 2.38 (L) 3.80 - 5.20 M/uL    HGB 7.2 (L) 11.5 - 16.0 g/dL    HCT 23.0 (L) 35.0 - 47.0 %    MCV 96.6 80.0 - 99.0 FL    MCH 30.3 26.0 - 34.0 PG    MCHC 31.3 30.0 - 36.5 g/dL    RDW 23.2 (H) 11.5 - 14.5 %    PLATELET 82 (L) 998 - 400 K/uL    MPV 11.8 8.9 - 12.9 FL    NRBC 1.3 (H) 0  WBC    ABSOLUTE NRBC 0.20 (H) 0.00 - 0.01 K/uL   GLUCOSE, POC    Collection Time: 04/17/20 12:54 PM   Result Value Ref Range    Glucose (POC) 205 (H) 65 - 100 mg/dL    Performed by Fatuma Su    GLUCOSE, POC    Collection Time: 04/17/20  5:37 PM   Result Value Ref Range    Glucose (POC) 196 (H) 65 - 100 mg/dL    Performed by Fatuma Su    CBC W/O DIFF    Collection Time: 04/17/20  6:25 PM   Result Value Ref Range    WBC 15.7 (H) 3.6 - 11.0 K/uL    RBC 2.40 (L) 3.80 - 5.20 M/uL    HGB 7.3 (L) 11.5 - 16.0 g/dL    HCT 23.3 (L) 35.0 - 47.0 %    MCV 97.1 80.0 - 99.0 FL    MCH 30.4 26.0 - 34.0 PG    MCHC 31.3 30.0 - 36.5 g/dL    RDW 23.6 (H) 11.5 - 14.5 %    PLATELET 85 (L) 286 - 400 K/uL    MPV 12.2 8.9 - 12.9 FL    NRBC 0.8 (H) 0  WBC    ABSOLUTE NRBC 0.13 (H) 0.00 - 0.01 K/uL   CBC W/O DIFF    Collection Time: 04/17/20 11:51 PM   Result Value Ref Range    WBC 13.5 (H) 3.6 - 11.0 K/uL    RBC 2.25 (L) 3.80 - 5.20 M/uL    HGB 6.8 (L) 11.5 - 16.0 g/dL    HCT 22.0 (L) 35.0 - 47.0 %    MCV 97.8 80.0 - 99.0 FL    MCH 30.2 26.0 - 34.0 PG    MCHC 30.9 30.0 - 36.5 g/dL    RDW 23.9 (H) 11.5 - 14.5 %    PLATELET 86 (L) 753 - 400 K/uL    MPV 12.6 8.9 - 12.9 FL    NRBC 1.2 (H) 0  WBC    ABSOLUTE NRBC 0.16 (H) 0.00 - 0.01 K/uL   GLUCOSE, POC    Collection Time: 04/17/20 11:56 PM   Result Value Ref Range    Glucose (POC) 203 (H) 65 - 100 mg/dL    Performed by EULA CARSON    TYPE + CROSSMATCH    Collection Time: 04/18/20  1:24 AM   Result Value Ref Range    Crossmatch Expiration 04/21/2020     ABO/Rh(D) A POSITIVE     Antibody screen NEG     Unit number [id number]     Blood component type [facility]     Unit division 00     Status of unit ISSUED     Crossmatch result Compatible    GLUCOSE, POC    Collection Time: 04/18/20  6:41 AM   Result Value Ref Range    Glucose (POC) 183 (H) 65 - 100 mg/dL    Performed by EULA CARSON            Total time spent with patient:  xxx   min. Care Plan discussed with:  Patient     Family      RN      Consulting Physician [id number],         I have reviewed the flowsheets. Chart and Pertinent Notes have been reviewed. No change in PMH ,family and social history from Consult note.       Liang Duenas MD

## 2020-04-18 NOTE — PROGRESS NOTES
Infectious Disease Progress Note       Subjective:     D/W with ICU team and RN today   Patient cannot given history, critically ill   Has flexiseal with diarrhea             Objective:    Vitals:   Patient Vitals for the past 24 hrs:   Temp Pulse Resp BP SpO2   04/18/20 1510 -- (!) 108 30 -- 98 %   04/18/20 1230 -- (!) 105 26 -- 98 %   04/18/20 1215 -- (!) 107 25 -- 98 %   04/18/20 1200 99.5 °F (37.5 °C) 97 24 102/68 98 %   04/18/20 1152 -- -- 25 -- 98 %   04/18/20 1150 -- 100 -- 107/66 97 %   04/18/20 1145 -- (!) 106 21 -- 98 %   04/18/20 1130 -- (!) 102 24 -- 98 %   04/18/20 1115 -- (!) 106 26 -- 98 %   04/18/20 1100 -- 98 29 107/66 99 %   04/18/20 1045 -- 97 28 -- 98 %   04/18/20 1030 -- 96 25 -- 98 %   04/18/20 1015 -- 98 25 -- 99 %   04/18/20 1000 -- 97 26 103/71 98 %   04/18/20 0945 -- 95 26 -- 99 %   04/18/20 0930 -- 97 24 -- 99 %   04/18/20 0915 -- 95 24 -- 99 %   04/18/20 0900 -- 97 24 106/71 99 %   04/18/20 0845 -- 90 23 -- 99 %   04/18/20 0830 -- 95 26 -- 98 %   04/18/20 0815 -- 91 28 -- 99 %   04/18/20 0800 97.7 °F (36.5 °C) 96 28 91/64 (!) 81 %   04/18/20 0745 -- 96 28 -- (!) 89 %   04/18/20 0730 -- 89 28 -- 99 %   04/18/20 0729 -- 90 28 -- 100 %   04/18/20 0715 -- 88 28 -- 99 %   04/18/20 0700 -- 93 28 93/64 (!) 88 %   04/18/20 0630 -- 85 28 -- 98 %   04/18/20 0615 -- 92 28 -- 99 %   04/18/20 0600 -- 95 28 93/63 93 %   04/18/20 0545 -- 89 25 -- 99 %   04/18/20 0530 -- 82 26 -- 99 %   04/18/20 0515 -- 88 24 -- 99 %   04/18/20 0500 97.6 °F (36.4 °C) 85 19 117/80 99 %   04/18/20 0445 -- 88 23 -- 99 %   04/18/20 0430 97.7 °F (36.5 °C) 92 24 -- 99 %   04/18/20 0415 97.6 °F (36.4 °C) 85 26 -- 100 %   04/18/20 0400 97.7 °F (36.5 °C) 85 28 118/70 100 %   04/18/20 0346 97.7 °F (36.5 °C) 85 28 115/71 100 %   04/18/20 0345 -- 86 26 -- 100 %   04/18/20 0330 -- 90 24 -- 100 %   04/18/20 0315 -- 85 27 -- 100 %   04/18/20 0300 -- 88 26 112/72 99 %   04/18/20 0245 -- 87 23 -- 100 %   04/18/20 0230 -- 84 23 -- 100 %   04/18/20 0215 -- 85 24 -- 100 %   04/18/20 0200 -- 75 28 117/76 100 %   04/18/20 0145 -- 79 25 -- 100 %   04/18/20 0130 -- 82 28 -- 100 %   04/18/20 0115 -- 81 21 -- 100 %   04/18/20 0100 -- 84 25 104/71 100 %   04/18/20 0045 -- 85 24 -- 100 %   04/18/20 0030 -- 85 25 -- 100 %   04/18/20 0015 -- 86 25 -- 100 %   04/18/20 0000 97.7 °F (36.5 °C) 86 25 107/66 93 %   04/17/20 2345 -- 88 28 -- 97 %   04/17/20 2330 -- 93 28 -- 95 %   04/17/20 2315 -- (!) 102 23 -- 98 %   04/17/20 2305 -- (!) 111 29 -- 98 %   04/17/20 2300 -- (!) 106 20 115/78 100 %   04/17/20 2255 -- 99 18 -- 98 %   04/17/20 2245 -- (!) 102 16 -- 99 %   04/17/20 2230 -- 84 22 -- 99 %   04/17/20 2215 -- 85 23 -- 98 %   04/17/20 2200 -- 81 21 100/67 99 %   04/17/20 2145 -- 89 20 -- 99 %   04/17/20 2130 -- 83 25 -- 98 %   04/17/20 2115 -- 84 22 -- 98 %   04/17/20 2100 -- 85 26 103/64 99 %   04/17/20 2045 -- 87 27 -- 99 %   04/17/20 2030 -- 90 23 -- 98 %   04/17/20 2021 -- 87 29 -- 99 %   04/17/20 2015 -- 82 28 -- 97 %   04/17/20 2000 97.7 °F (36.5 °C) 82 28 94/63 97 %   04/17/20 1945 -- 70 28 -- 97 %   04/17/20 1930 -- 74 25 -- 98 %   04/17/20 1915 -- 74 28 -- 98 %   04/17/20 1900 -- 74 28 (!) 89/61 97 %   04/17/20 1845 -- 82 21 -- 97 %   04/17/20 1830 -- 84 19 -- 98 %   04/17/20 1815 -- 82 19 -- 98 %   04/17/20 1800 -- 85 23 (!) 87/60 98 %   04/17/20 1745 -- 88 24 -- 99 %   04/17/20 1730 -- 78 22 -- 99 %   04/17/20 1715 -- 82 18 -- 99 %   04/17/20 1700 -- 83 23 -- 100 %   04/17/20 1656 -- 83 30 -- 99 %   04/17/20 1645 -- 78 24 -- 100 %   04/17/20 1630 -- 79 20 -- 99 %   04/17/20 1615 -- 81 26 -- 99 %   04/17/20 1600 (!) 94.8 °F (34.9 °C) 80 19 96/81 100 %       Physical Exam:  Please see ICU teams physical exam     Medications:    Current Facility-Administered Medications:     0.9% sodium chloride infusion 250 mL, 250 mL, IntraVENous, PRN, DOMI Noguera, Stopped at 04/18/20 0615    acetylcysteine (MUCOMYST) 200 mg/mL (20 %) solution 200 mg, 200 mg, Nebulization, BID, Carmela SCHMITT NP-C, 200 mg at 04/18/20 1150    guaiFENesin (ROBITUSSIN) 100 mg/5 mL oral liquid 200 mg, 200 mg, Oral, Q6H, Cassandra Cox NP-C, 200 mg at 04/18/20 1142    albuterol (PROVENTIL VENTOLIN) nebulizer solution 2.5 mg, 2.5 mg, Nebulization, Q4H PRN, Carmela SCHMITT NP-C, 2.5 mg at 04/18/20 1150    hydrocortisone Sod Succ (PF) (SOLU-CORTEF) injection 50 mg, 50 mg, IntraVENous, Q8H, Cassandra Cox NP-C, 50 mg at 04/18/20 1419    insulin glargine (LANTUS) injection 35 Units, 35 Units, SubCUTAneous, Q12H, Charity Lutz NP, 35 Units at 04/18/20 4387    famotidine (PEPCID) 8 mg/mL oral suspension 20 mg, 20 mg, Per NG tube, DAILY, Ivy White NP, 20 mg at 04/18/20 0814    alteplase (CATHFLO) 2 mg in sterile water (preservative free) 2 mL injection, 2 mg, InterCATHeter, DIALYSIS PRN, Ankur Herrera MD    alteplase (CATHFLO) 2 mg in sterile water (preservative free) 2 mL injection, 2 mg, InterCATHeter, DIALYSIS PRN, Ankur Herrera MD, 2 mg at 04/15/20 0756    insulin lispro (HUMALOG) injection, , SubCUTAneous, Q6H, Charity Lutz NP, 2 Units at 04/18/20 1200    0.9% sodium chloride infusion 250 mL, 250 mL, IntraVENous, PRN, Carmela SCHMITT NP-C    chlorhexidine (PERIDEX) 0.12 % mouthwash 15 mL, 15 mL, Oral, Q12H, Charity Lutz NP, 15 mL at 04/18/20 0806    vancomycin (FIRVANQ) 50 mg/mL oral solution 500 mg, 500 mg, Oral, Q6H, Iliana Rosales NP, 500 mg at 04/18/20 1201    fentaNYL (PF) 1,500 mcg/30 mL (50 mcg/mL) infusion, 0-200 mcg/hr, IntraVENous, TITRATE, Familia Mcelroy MD, Last Rate: 1 mL/hr at 04/18/20 1432, 50 mcg/hr at 04/18/20 1432    ketamine (KETALAR) 500 mg in 0.9% sodium chloride 500 mL infusion, 0.05-0.4 mg/kg/hr, IntraVENous, TITRATE, Familia Mcelroy MD, Last Rate: 17.5 mL/hr at 04/18/20 1424, 0.15 mg/kg/hr at 04/18/20 1424    NOREPINephrine (LEVOPHED) 32,000 mcg in dextrose 5% 250 mL (128 mcg/mL) infusion, 0-50 mcg/min, IntraVENous, TITRATE, CELESTINA Espinal, Stopped at 04/16/20 2310    [Held by provider] labetaloL (NORMODYNE) tablet 100 mg, 100 mg, Oral, Q12H, Iliana Rosales NP    0.9% sodium chloride infusion, 3 mL/hr, IntraVENous, CONTINUOUS, Mona Madison MD, Last Rate: 3 mL/hr at 04/13/20 2316, 3 mL/hr at 04/13/20 2316    0.9% sodium chloride infusion, 5 mL/hr, IntraVENous, CONTINUOUS, Mona Madison MD, Last Rate: 5 mL/hr at 04/14/20 0127, 5 mL/hr at 04/14/20 0127    labetaloL (NORMODYNE;TRANDATE) injection 10 mg, 10 mg, IntraVENous, Q10MIN PRN, Rainer Ordonez MD    fentaNYL citrate (PF) injection  mcg,  mcg, IntraVENous, Q1H PRN, Rainer Ordonez MD, 100 mcg at 04/11/20 1232    white petrolatum-mineral oiL (AKWA TEARS) 83-15 % ophthalmic ointment, , Both Eyes, Q12H, Mona Madison MD    heparin (porcine) pf 300 Units, 300 Units, InterCATHeter, PRN, Amilcar Lindsay R, NP-C    midazolam (VERSED) injection 1-2 mg, 1-2 mg, IntraVENous, Q2H PRN, Amilcar Lindsay R, NP-C, 2 mg at 04/17/20 2306    bacitracin 500 unit/gram packet 1 Packet, 1 Packet, Topical, PRN, Scott Epps NP, 1 Packet at 04/01/20 0116    balsam peru-castor oiL (VENELEX) ointment, , Topical, BID, Denise Vo NP    sodium chloride (NS) flush 5-40 mL, 5-40 mL, IntraVENous, Q8H, Mona Madison MD, 10 mL at 04/18/20 1419    sodium chloride (NS) flush 5-40 mL, 5-40 mL, IntraVENous, PRN, Rainer Ordonez MD, 30 mL at 04/13/20 2005    HYDROcodone-acetaminophen (NORCO) 5-325 mg per tablet 1 Tab, 1 Tab, Oral, Q4H PRN, Rainer Ordonez MD, 1 Tab at 04/08/20 0528    ondansetron (ZOFRAN) injection 4 mg, 4 mg, IntraVENous, Q4H PRN, Mona Madison MD    acetaminophen (TYLENOL) tablet 650 mg, 650 mg, Oral, Q6H PRN **OR** acetaminophen (TYLENOL) suppository 650 mg, 650 mg, Rectal, Q6H PRN, Iliana Rosales, NP    glucose chewable tablet 16 g, 4 Tab, Oral, PAT, Suzette Puentes MD    glucagon [facility]) injection 1 mg, 1 mg, IntraMUSCular, Kenyon STEWART Marquita Bohr, MD    dextrose 10% infusion 0-250 mL, 0-250 mL, IntraVENous, Keynon STEWART Marquita Bohr, MD      Labs:  Recent Results (from the past 24 hour(s))   GLUCOSE, POC    Collection Time: 04/17/20  5:37 PM   Result Value Ref Range    Glucose (POC) 196 (H) 65 - 100 mg/dL    Performed by Coral Pate    CBC W/O DIFF    Collection Time: 04/17/20  6:25 PM   Result Value Ref Range    WBC 15.7 (H) 3.6 - 11.0 K/uL    RBC 2.40 (L) 3.80 - 5.20 M/uL    HGB 7.3 (L) 11.5 - 16.0 g/dL    HCT 23.3 (L) 35.0 - 47.0 %    MCV 97.1 80.0 - 99.0 FL    MCH 30.4 26.0 - 34.0 PG    MCHC 31.3 30.0 - 36.5 g/dL    RDW 23.6 (H) 11.5 - 14.5 %    PLATELET 85 (L) 594 - 400 K/uL    MPV 12.2 8.9 - 12.9 FL    NRBC 0.8 (H) 0  WBC    ABSOLUTE NRBC 0.13 (H) 0.00 - 0.01 K/uL   CBC W/O DIFF    Collection Time: 04/17/20 11:51 PM   Result Value Ref Range    WBC 13.5 (H) 3.6 - 11.0 K/uL    RBC 2.25 (L) 3.80 - 5.20 M/uL    HGB 6.8 (L) 11.5 - 16.0 g/dL    HCT 22.0 (L) 35.0 - 47.0 %    MCV 97.8 80.0 - 99.0 FL    MCH 30.2 26.0 - 34.0 PG    MCHC 30.9 30.0 - 36.5 g/dL    RDW 23.9 (H) 11.5 - 14.5 %    PLATELET 86 (L) 828 - 400 K/uL    MPV 12.6 8.9 - 12.9 FL    NRBC 1.2 (H) 0  WBC    ABSOLUTE NRBC 0.16 (H) 0.00 - 0.01 K/uL   GLUCOSE, POC    Collection Time: 04/17/20 11:56 PM   Result Value Ref Range    Glucose (POC) 203 (H) 65 - 100 mg/dL    Performed by EULA BYRD    TYPE + CROSSMATCH    Collection Time: 04/18/20  1:24 AM   Result Value Ref Range    Crossmatch Expiration 04/21/2020     ABO/Rh(D) A POSITIVE     Antibody screen NEG     Unit number [id number]     Blood component type [facility]     Unit division 00     Status of unit ISSUED     Crossmatch result Compatible    GLUCOSE, POC    Collection Time: 04/18/20  6:41 AM   Result Value Ref Range    Glucose (POC) 183 (H) 65 - 100 mg/dL    Performed by Zaire Schneider HEPATIC FUNCTION PANEL    Collection Time: 04/18/20  7:20 AM   Result Value Ref Range    Protein, total 5.9 (L) 6.4 - 8.2 g/dL    Albumin 2.8 (L) 3.5 - 5.0 g/dL    Globulin 3.1 2.0 - 4.0 g/dL    A-G Ratio 0.9 (L) 1.1 - 2.2      Bilirubin, total 2.1 (H) 0.2 - 1.0 MG/DL    Bilirubin, direct 1.0 (H) 0.0 - 0.2 MG/DL    Alk.  phosphatase 474 (H) 45 - 117 U/L    AST (SGOT) 159 (H) 15 - 37 U/L    ALT (SGPT) 281 (H) 12 - 78 U/L   CBC W/O DIFF    Collection Time: 04/18/20  7:20 AM   Result Value Ref Range    WBC 11.4 (H) 3.6 - 11.0 K/uL    RBC 2.54 (L) 3.80 - 5.20 M/uL    HGB 7.8 (L) 11.5 - 16.0 g/dL    HCT 24.9 (L) 35.0 - 47.0 %    MCV 98.0 80.0 - 99.0 FL    MCH 30.7 26.0 - 34.0 PG    MCHC 31.3 30.0 - 36.5 g/dL    RDW 22.5 (H) 11.5 - 14.5 %    PLATELET 79 (L) 308 - 400 K/uL    MPV 12.5 8.9 - 12.9 FL    NRBC 1.1 (H) 0  WBC    ABSOLUTE NRBC 0.12 (H) 0.00 - 0.01 K/uL   CRP, HIGH SENSITIVITY    Collection Time: 04/18/20  7:20 AM   Result Value Ref Range    CRP, High sensitivity >9.5 mg/L   RENAL FUNCTION PANEL    Collection Time: 04/18/20  7:20 AM   Result Value Ref Range    Sodium 138 136 - 145 mmol/L    Potassium 4.4 3.5 - 5.1 mmol/L    Chloride 106 97 - 108 mmol/L    CO2 25 21 - 32 mmol/L    Anion gap 7 5 - 15 mmol/L    Glucose 206 (H) 65 - 100 mg/dL    BUN 43 (H) 6 - 20 MG/DL    Creatinine 1.01 0.55 - 1.02 MG/DL    BUN/Creatinine ratio 43 (H) 12 - 20      GFR est AA >60 >60 ml/min/1.73m2    GFR est non-AA 56 (L) >60 ml/min/1.73m2    Calcium 8.2 (L) 8.5 - 10.1 MG/DL    Phosphorus 2.3 (L) 2.6 - 4.7 MG/DL    Albumin 2.8 (L) 3.5 - 5.0 g/dL   MAGNESIUM    Collection Time: 04/18/20  7:20 AM   Result Value Ref Range    Magnesium 2.6 (H) 1.6 - 2.4 mg/dL   GLUCOSE, POC    Collection Time: 04/18/20 11:46 AM   Result Value Ref Range    Glucose (POC) 190 (H) 65 - 100 mg/dL    Performed by Colette FORTE    CBC W/O DIFF    Collection Time: 04/18/20 12:00 PM   Result Value Ref Range    WBC 12.1 (H) 3.6 - 11.0 K/uL    RBC 2.63 (L) 3.80 - 5.20 M/uL    HGB 8.0 (L) 11.5 - 16.0 g/dL    HCT 25.8 (L) 35.0 - 47.0 %    MCV 98.1 80.0 - 99.0 FL    MCH 30.4 26.0 - 34.0 PG    MCHC 31.0 30.0 - 36.5 g/dL    RDW 22.8 (H) 11.5 - 14.5 %    PLATELET 98 (L) 999 - 400 K/uL    MPV 12.2 8.9 - 12.9 FL    NRBC 1.3 (H) 0  WBC    ABSOLUTE NRBC 0.16 (H) 0.00 - 0.01 K/uL           Micro:   Blood 4/9/20       Component Value Ref Range & Units Status   Special Requests: NO SPECIAL REQUESTS    Preliminary   Culture result: NO GROWTH 4 DAYS    Preliminary   Result History       Blood 4/8/20  Component Value Ref Range & Units Status   Special Requests: NO SPECIAL REQUESTS    Final   Culture result: NO GROWTH 5 DAYS    Final   Result History               Blood: 3/31/20  Specimen Information: Blood        Component Value Ref Range & Units Status   Special Requests: NO SPECIAL REQUESTS    Final   Culture result: NO GROWTH 5 DAYS    Final   Result History              Sputum 3/31/20   Sputum        Component Value Ref Range & Units Status   Special Requests: NO SPECIAL REQUESTS    Final   GRAM STAIN FEW WBCS SEEN    Final   GRAM STAIN    Final   2+ GRAM POSITIVE COCCI IN PAIRS    Culture result: Abnormal     Final   MODERATE STAPHYLOCOCCUS AUREUS    Culture result:    Final   LIGHT NORMAL RESPIRATORY SOFIE    Susceptibility      Staphylococcus aureus     LU    Ciprofloxacin ($) <=0.5 ug/mL S    Clindamycin ($)  R    Doxycycline ($$) <=0.5 ug/mL S    Erythromycin ($$$$) >=8 ug/mL R    Gentamicin ($) <=0.5 ug/mL S    Levofloxacin ($) <=0.12 ug/mL S    Linezolid ($$$$$) 2 ug/mL S    Moxifloxacin ($$$$) <=0.25 ug/mL S    Oxacillin 0.5 ug/mL S    Rifampin ($$$$) <=0.5 ug/mL S1    Tetracycline <=1 ug/mL S    Trimeth/Sulfa <=10 ug/mL S    Vancomycin ($) 1 ug/mL S                         Imaging:  CXR 4/5/20  FINDINGS: AP radiograph of the chest was obtained.     There is been interval advancement of the endotracheal tube which now terminates  1.6 cm above the bhavik.  Right upper extremity PICC and gastric decompression  tubes are again noted. There is no significant change in diffuse bilateral  heterogeneous opacities. Likely trace left pleural effusion. No pneumothorax. Stable cardiomediastinal silhouette.     IMPRESSION  IMPRESSION:   1. Interval advancement of endotracheal tube which now terminates 1.6 cm above  the bhavik. Consider slight retraction. 2. Unchanged diffuse bilateral heterogeneous opacities, consistent with  multifocal pneumonia. Likely trace left pleural effusion.           Assessment / Plan    Ms. Luz Elena Paz is a 61-year-old lady with a history of nephrolithiasis, GERD, basal cell carcinoma who was admitted from [facility] with respiratory symptoms concerning for COVID 19 and tested + on 3/26/20. Per team,  exposure to her mother with COVID 23. Per notes, intubated on 3/28/2020. Transferred to [address] [place] [address] for CRRT    Had code blue 4/9/20   Resuscitated            1) Sars-CoV2 pneumonia, respiratory failure, renal failure   Sars-CoV-2 detected 3/26/20, other viral respiratory panel negative  (records from Care everywhere )   Urine legionella and S pneumo ag negative 3/27/20  Procalcitonin 3/27 0.47, 3/28 0.44, 3/29 5.86, 3/31 13.44  , Ferritin 1985 3/31/20    4/3/20   , ferritin 992, CRP 9.65 4/5/20   IL 6  402 3/31/20  Completed azithromycin and Plaquenil   S/P Actemra one dose 4/3. Was on On Heparin gtt , now off per ICU team give retroperitoneal hematoma   On IV Steroids per ICU team     2)  MSSA on sputum, pharmacy dosing vancomycin based on levels due to Penicillin allergy    Completed a 7 day course of antibiotics       3) marked leukocytosis  WBC was 11-16 before code blue on 4/9/20   Suspect from coding event/reactory   Started by primary team PO Vancomycin for empiric CDI treatment.  Plan for 10 days total   Started by primary team on Vancomycin IV Cefepime and flagyl empirically given marked WBC elevation   Blood cx negative   Stopped Vancomycin IV on 4/13   CT A/P/C 4/10 with large hematoma R retroperitoneal    Stopped  Cefepime and Flagyl on 4/17/20            4) LLE PE on CT 4/10/20  Currently off anticoagulation   Had retroperitoneal bleed      5) Retroperitoneal hematoma on imaging 4/10/20    5) ARF:   Plans per nephrology, CRRT  R IJ    6) history of nephrolithiasis    7) history of GERD    8) basal cell carcinoma per chart        D/W with icu team today     Please contact with questions     Cedrick Aguilar DO  3:57 PM

## 2020-04-18 NOTE — PROGRESS NOTES
[facility]    ICU TEAM Progress Note    Name: Billy Marin   : 1960   MRN: [id number]   Date: 2020      Assessment/Plan:     Current ICU Problems:  1. Acute hypoxic respiratory failure 2/2 COVID 19   Completed Azithromycin, Vancomycin, Plaquenil, and Zinc.   Remains on the ventilator, trend ABGs   Off Nimbex. Wean fentanyl and ketamine. White count downtrending. On solu-cortef, weaned to 50mg  q 8hrs              Added guaifenesin and mucomyst for mucous plugging. Chest xray today. 2.  Retroperitoneal bleed with hemorrhagic shock   HH stable 7.   No anticoagulants at this time  3. Acute kidney injury   Nephrology following. CRRT clotted last night. Access site still patent and flushing will per RN. Attempt Intermittent HD today              Anuric  4. Nonocclusive PE in LLL pulmonary arteries              No anticoagulation given recent right retroperitoneal bleed  5. MRSA pneumonia   S/P IV Vancomycin, cefepime and flagyl. ID following  6. Diabetes mellitus   On Lantus and sliding scale. Continue Lantus 35 units BID. 7.  Thrombocytopenia   Platelets 79 and stable.     8.  Diarrhea                  On PO vanc to cover for C-Diff, end on 20    Cardiac Gtts: None, Levophed PRN during dialysis  SBP Goal of:   >90 mmHg  MAP Goal of: > 65 mmHg  Transfusion Trigger (Hgb):  <7 g/dL     Respiratory Goals: Chlorhexidine   Optimize PEEP/Ventilation/Oxygenation  Goal Tidal Volume 6 cc/kg based on IBW  Aim for lung protective ventilation  Aggressive bronchopulmonary hygiene  SPO2 Goal: > 92%  Pulmonary toilet: NA   DVT Prophylaxis (if no, list reason): SCD's or Sequential Compression Device      GI Prophylaxis: Protonix (pantoprazole)   Nutrition: TF     IVFs: NA  Bowel Movement: Yes  Bowel Regimen: None needed at this time     Bowdne Catheter Present: Yes  Glycemic Control - Insulin: Yes SSI and Lantus  Antibiotics: PO Vancomycin     Pain Medications: Fentanyl   Target RASS: -3 - Moderate Sedation - Movement or eye opening to voice (no eye contact)  Sedation Medications:  Ketamine  CAM-ICU:  JUAREZ  Mobility: Poor and Bedrest  PT/OT: NA   Restraints: Soft wrist restraints  Discussed Plan of Care/Code Status: Full Code     T/L/D  Tubes: ETT and Orogastric Tube  Lines: Central Line, Ramon   Drains: FMS    Subjective:   Progress Note: 4/18/2020      Reason for ICU Admission:  65 yo female with hx of obesity, endometrial cancer and diabetes who presented to [facility] with worsening hypoxic respiratory failure in lieu of close exposure to COVID-19 to family including mother ( passed away) sister and brother and tested resulting + here. She presented to the hospital 3/26 and intubated 3/28. She was started on broad spectrum antibiotics and plaquenil. Developed worsening renal failure and was transferred to ICU for CRRT. Her hospital course has been further notable for development of a retroperitoneal bleed, hemorrhagic shock and sepsis and ongoing acute hypoxic respiratory failure with increasing PEEP and FIo2 requirements. Currently she is on Ketamine and fentanyl; Nimbex has been weaned off. Eyes open but she does not track nor follow commands. Continue to wean ketamine and fentanyl to off to assess neuro status as long as she is synchronous with vent. Palliative care following.      Active Problem List:     Problem List  Date Reviewed: 1/23/2020          Codes Class    Hypotension ICD-10-CM: I95.9  ICD-9-CM: 458.9 Acute        Sepsis due to methicillin susceptible Staphylococcus aureus (MSSA) without acute organ dysfunction (HCC) ICD-10-CM: A41.01  ICD-9-CM: 038.11, 995.91 Acute        COVID-19 virus infection ICD-10-CM: U07.1         Obesity, morbid ([facility] [address] ICD-10-CM: E66.01  ICD-9-CM: 278.01         Vaginal Pap smear following hysterectomy for malignancy ICD-10-CM: Z08, Z90.710  ICD-9-CM: V67.01         Personal history of malignant neoplasm of other parts of uterus ICD-10-CM: Z85.42  ICD-9-CM: V10.42         Endometrial cancer ([facility] [address] ICD-10-CM: C54.1  ICD-9-CM: 182.0               Past Medical History:      has a past medical history of Basal cell carcinoma, GERD (gastroesophageal reflux disease), Kidney stones, and Polyp of ureter. Past Surgical History:      has a past surgical history that includes hysteroscopy diagnostic (); pr endometrial ablation, thermal; hx  section (); hx colonoscopy; and insert arterial line (2020). Home Medications:     Prior to Admission medications    Medication Sig Start Date End Date Taking? Authorizing Provider   pantoprazole (PROTONIX) 40 mg tablet TAKE 1 TABLET BY MOUTH TWICE A DAY 18   Provider, Historical   esomeprazole (NEXIUM) 20 mg capsule Take 20 mg by mouth daily. Indications: BID    Provider, Historical   zolpidem (AMBIEN) 10 mg tablet Take 0.5 Tabs by mouth nightly as needed for Sleep. Max Daily Amount: 5 mg. 17   Franklin Mandel MD   fluticasone (FLONASE) 50 mcg/actuation nasal spray Mist 1-2 spray(s) into each nostril once daily. 4/3/15   Provider, Historical   ranitidine (ZANTAC) 150 mg tablet 150 mg.    Provider, Historical       Allergies/Social/Family History: Allergies   Allergen Reactions    Augmentin [Amoxicillin-Pot Clavulanate] Rash and Itching      Social History     Tobacco Use    Smoking status: Never Smoker    Smokeless tobacco: Never Used   Substance Use Topics    Alcohol use: Not on file      Family History   Problem Relation Age of Onset    Prostate Cancer Father         PROSTATE    Breast Cancer Sister 46        invasive poorly differentiated ductal carcinoma, Neg genetic testing.     Cancer Sister 62        melanoma stage 1       Objective:   Vital Signs:  Visit Vitals  BP 91/64 (BP 1 Location: Left arm, BP Patient Position: At rest)   Pulse 91   Temp 97.7 °F (36.5 °C)   Resp 28   Ht 5' 4\" (1.626 m)   Wt 130.3 kg (287 lb 3.2 oz)   SpO2 99%   BMI 49.30 kg/m²      O2 Device: Endotracheal tube, Ventilator Temp (24hrs), Av.3 °F (36.3 °C), Min:94.8 °F (34.9 °C), Max:97.7 °F (36.5 °C)           Intake/Output:     Intake/Output Summary (Last 24 hours) at 2020 1014  Last data filed at 2020 0615  Gross per 24 hour   Intake 1610.84 ml   Output 4147 ml   Net -2536.16 ml       Physical Exam:  General:  Sedated, on Ketamine and on the ventilator. No acute distress, morbidly obese   Eyes: Sclera anicteric. Pupils equal, round, reactive to light. Does not track   Mouth/Throat: Orotracheal tube in place. Neck: Supple. Lungs:   Deferred, vent assisted respirations, no accessory muscle use observed. Cardiovascular:  regular rate and rhythm, no murmur, click, rub, or gallop, anasarca, pulses palpable   Abdomen:   Soft, bowel sounds hypoactive, distended. Has FMS in place with liquid stool. Extremities: No cyanosis, + edema   Skin: No rash or lesions. Musculoskeletal:  No swelling other than edema, no deformity. Lines/Devices:  Intact, no erythema, drainage, or tenderness. Psych/neuro: Limited, Sedated on ventilator.  Eyes opened, does not track and does not follow commands.       LABS AND  DATA: Personally reviewed  Recent Labs     20  0720  2351   WBC 11.4* 13.5*   HGB 7.8* 6.8*   HCT 24.9* 22.0*   PLT 79* 86*     Recent Labs     20  0720 20  0510    136   K 4.4 4.4    105   CO2 25 23   BUN 43* 46*   CREA 1.01 1.21*   * 238*   CA 8.2* 8.3*   MG 2.6* 2.6*   PHOS 2.3* 2.3*     Recent Labs     20  0720 20  0510 20  0400   SGOT 159*  --  172*   *  --  440*   TP 5.9*  --  5.9*   ALB 2.8*  2.8* 2.6* 2.6*   GLOB 3.1  --  3.3     Recent Labs     20  1113 20  1046 04/15/20  2228   INR 1.1 1.2*  --    PTP 11.6* 11.9*  --    APTT  --  25.1 26.5      Recent Labs     20  1830 20  1703   PHI 7.412 7.400   PCO2I 39.3 42.2   PO2I 157* 216*   FIO2I 50 60       Mode Rate Tidal Volume Pressure FiO2 PEEP   Assist control, Volume control   430 ml  0 cm H2O 40 % 5 cm H20     Peak airway pressure: 26 cm H2O    Minute ventilation: 12.5 l/min      MEDS: Reviewed    04/10/2020, CT Abd/ Pelvis:  IMPRESSION:  1.  Scattered pulmonary air space disease consistent with atypical viral infection. 2.  Nonocclusive pulmonary emboli in the left lower lobe pulmonary arteries. 3.  Large right retroperitoneal hematoma with small foci of contrast within the hematoma. 4.  Enteric tube and endotracheal tube in appropriate position. 5.  Bilateral femoral venous catheters in the common iliac veins.     ABCDEF Bundle/Checklist Completed:  Yes    SPECIAL EQUIPMENT  IHD    DISPOSITION  Stay in ICU    CRITICAL CARE CONSULTANT NOTE  I had a face to face encounter with the patient, reviewed and interpreted patient data including clinical events, labs, images, vital signs, I/O's, and examined patient. I have discussed the case and the plan and management of the patient's care with the consulting services, the bedside nurses and the respiratory therapist.      NOTE OF PERSONAL INVOLVEMENT IN CARE   This patient has a high probability of imminent, clinically significant deterioration, which requires the highest level of preparedness to intervene urgently. I participated in the decision-making and personally managed or directed the management of the following life and organ supporting interventions that required my frequent assessment to treat or prevent imminent deterioration. I personally spent 45 minutes of critical care time. This is time spent at this critically ill patient's bedside actively involved in patient care as well as the coordination of care and discussions with the patient's family. This does not include any procedural time which has been billed separately.       Danish Zhao [facility]     [address] [facility]

## 2020-04-18 NOTE — PROCEDURES
[facility]  [phone]    Vitals   Pre   Post   Assessment   Pre   Post     Temp  Temp: 98.7 °F (37.1 °C) (04/18/20 1617) 96.9 axillary LOC  Lethargic, eyes open, unable to respond verbally. Patient is intubated and on ventilator Lethargic, sedated, nonverbal   HR   Pulse (Heart Rate): (!) 114 (04/18/20 1617) 107 Lungs   Intubated, mechanical ventilation with O2 at 50%, Lungs diminished Intubated, mechanical ventilation with O2 at 50%, Lungs diminished   B/P   BP: 102/73 (04/18/20 1617) 107/71 Cardiac   Bedside telemetry, NSR per primary RN. HRR S1 S2 present HRR S1 S2 present    Resp   Resp Rate: 28 (04/18/20 1617) 22 Skin   Warm and dry, excoriation in folds and wound on buttocks per primary RN, patient is on air mattress with frequent turning by staff Warm and dry, excoriation in folds and wound on buttocks per primary RN, patient is on air mattress with frequent turning by staff   Pain level  Pain Intensity 1: 0 (04/18/20 1600) 0 Edema  generalized     generalized   Orders:    Duration:   Start:   16:17 End:   19:18 Total:   3 hrs   Dialyzer:   Dialyzer/Set Up Inspection: Maylin Short (04/18/20 1617)   K Bath:   Dialysate K (mEq/L): 3 (04/18/20 1617)   Ca Bath:   Dialysate CA (mEq/L): 2.5 (04/18/20 1617)   Na/Bicarb:   Dialysate NA (mEq/L): 140 (04/18/20 1617)   Target Fluid Removal:   Goal/Amount of Fluid to Remove (mL): 1000 mL (04/18/20 1617)   Access     Type & Location:   R femoral CVC: Dressing CDI placed 4/14/2020. No s/s of infection. Both lumens aspirate & flush well. Running well at .    Labs     Obtained/Reviewed   Critical Results Called   Date when labs were drawn-  Hgb-    HGB   Date Value Ref Range Status   04/18/2020 8.0 (L) 11.5 - 16.0 g/dL Final     K-    Potassium   Date Value Ref Range Status   04/18/2020 4.4 3.5 - 5.1 mmol/L Final     Ca-   Calcium   Date Value Ref Range Status   04/18/2020 8.2 (L) 8.5 - 10.1 MG/DL Final     Bun-   BUN   Date Value Ref Range Status   04/18/2020 43 (H) 6 - 20 MG/DL Final     Creat-   Creatinine   Date Value Ref Range Status   04/18/2020 1.01 0.55 - 1.02 MG/DL Final        Medications/ Blood Products Given     Name   Dose   Route and Time     Heparin 1000 units/1 ml concentration 1.4 ml/1400 units  1.8 ml/ 1800 units  To dwell in arterial lumen of HD CVC at 19:18    To dwell in venous lumen of HD CVC at 19:18             Blood Volume Processed (BVP):   65.6 L Net Fluid   Removed:  1000 ml   Comments   Time Out Done: 16:12  Primary Nurse Rpt Pre: Birdie Arts RN  Primary Nurse Rpt Post: Elizabeth Rios RN  Pt Education: procedural  Care Plan: ongoing  Tx Summary:  SBAR received from Primary RN Jessica Barnes. Arrived to patient room, set up and tested machine and water per policy. Patient eyes open but poorly responsive, not oriented, nonverbal, and intubated. Does not follow commands. Consent signed & on file. 16:17- Each catheter limb of Right femoral CVC disinfected for 60 seconds per limb with alcohol swabs. Caps removed, dialysis CVC hub scrubbed with Prevantics for 5 seconds, followed by a 5 second dry time per Hospital P&P. Lenin Salter Each lumen aspirated for blood return and flushed with Normal Saline per policy. VSS. Dialysis Tx initiated. PPE on patient vent tubing and HD staff with full PPE, mask, gown, PAPR, and double gloves. 16:30- Patient resting quietly, VSS. Dialysis access visualized and lines intact. PPE maintained at this time by patient and staff. 17:00- Flushed patient lines with 100 ml of NS to prevent clotting. Patient resting quietly, VSS. Dialysis access visualized and lines intact. PPE maintained at this time by patient and staff. 17:30- Flushed patient lines with 100 ml of NS to prevent clotting. Patient resting quietly, VSS. Dialysis access visualized and lines intact. PPE maintained at this time by patient and staff. 18:00- Flushed patient lines with 100 ml of NS to prevent clotting.  Patient resting quietly, VSS. Dialysis access visualized and lines intact. PPE maintained at this time by patient and staff. 18:30- Flushed patient lines with 100 ml of NS to prevent clotting. Patient resting quietly, VSS. Dialysis access visualized and lines intact. PPE maintained at this time by patient and staff. 19:00-  Patient resting quietly, VSS. Dialysis access visualized and lines intact. PPE maintained at this time by patient and staff. 19:18- Tx ended. VSS. All possible blood returned to patient. Central line catheter flushed with normal saline per policy. Each catheter limb disinfected for 60 seconds per limb with alcohol swabs. Dialysis CVC hubs scrubbed with Prevantics for 5 seconds, followed by a 5 second dry time per Hospital P&P, red and blue dialysis caps applied to ports. Heparin dwells instilled, and lines capped using aseptic technique. Bed locked and in the lowest position, call bell and belongings in reach. SBAR given to Primary, RN Ankita Marie. Patient is stable at time of my departure. All Dialysis related medications have been reviewed. Admiting Diagnosis: [id number]  Pt's previous clinic- [facility]  Consent signed - Informed Consent Verified: Yes (04/18/20 1617)  Bre[place] Consent - on file  Hepatitis Status- HbAg negative 4/1/2020, HbAB susceptible  Machine #- Machine Number: [id number] (04/18/20 1617)  Telemetry status- Bedside, NSR per primary RN  Pre-dialysis wt. - Pre-Dialysis Weight: 136 kg (299 lb 13.2 oz) (04/02/20 1039)

## 2020-04-19 PROBLEM — A41.9 SEPSIS WITH MULTI-ORGAN DYSFUNCTION (HCC): Status: ACTIVE | Noted: 2020-04-19

## 2020-04-19 PROBLEM — R19.7 DIARRHEA: Status: ACTIVE | Noted: 2020-04-19

## 2020-04-19 PROBLEM — R58 RETROPERITONEAL HEMORRHAGE: Status: ACTIVE | Noted: 2020-04-19

## 2020-04-19 PROBLEM — N17.9 ACUTE RENAL FAILURE (ARF) (HCC): Status: ACTIVE | Noted: 2020-04-19

## 2020-04-19 PROBLEM — G93.40 ENCEPHALOPATHY: Status: ACTIVE | Noted: 2020-04-19

## 2020-04-19 PROBLEM — A41.9 SEPSIS WITHOUT ACUTE ORGAN DYSFUNCTION (HCC): Status: ACTIVE | Noted: 2020-04-02

## 2020-04-19 PROBLEM — D69.6 THROMBOCYTOPENIA (HCC): Status: ACTIVE | Noted: 2020-04-19

## 2020-04-19 PROBLEM — R65.20 SEPSIS WITH MULTI-ORGAN DYSFUNCTION (HCC): Status: ACTIVE | Noted: 2020-04-19

## 2020-04-19 PROBLEM — J15.211 PNEUMONIA DUE TO METHICILLIN SUSCEPTIBLE STAPHYLOCOCCUS AUREUS (MSSA) (HCC): Status: ACTIVE | Noted: 2020-04-19

## 2020-04-19 LAB
ABO + RH BLD: NORMAL
ALBUMIN SERPL-MCNC: 2.5 G/DL (ref 3.5–5)
ALBUMIN SERPL-MCNC: 2.5 G/DL (ref 3.5–5)
ALBUMIN/GLOB SERPL: 0.8 {RATIO} (ref 1.1–2.2)
ALP SERPL-CCNC: 527 U/L (ref 45–117)
ALT SERPL-CCNC: 234 U/L (ref 12–78)
ANION GAP SERPL CALC-SCNC: 11 MMOL/L (ref 5–15)
AST SERPL-CCNC: 157 U/L (ref 15–37)
BILIRUB DIRECT SERPL-MCNC: 0.9 MG/DL (ref 0–0.2)
BILIRUB SERPL-MCNC: 1.8 MG/DL (ref 0.2–1)
BLD PROD TYP BPU: NORMAL
BLOOD GROUP ANTIBODIES SERPL: NORMAL
BPU ID: NORMAL
BUN SERPL-MCNC: 59 MG/DL (ref 6–20)
BUN/CREAT SERPL: 40 (ref 12–20)
CALCIUM SERPL-MCNC: 8.5 MG/DL (ref 8.5–10.1)
CHLORIDE SERPL-SCNC: 102 MMOL/L (ref 97–108)
CO2 SERPL-SCNC: 23 MMOL/L (ref 21–32)
CREAT SERPL-MCNC: 1.46 MG/DL (ref 0.55–1.02)
CROSSMATCH RESULT,%XM: NORMAL
CRP SERPL HS-MCNC: >9.5 MG/L
ERYTHROCYTE [DISTWIDTH] IN BLOOD BY AUTOMATED COUNT: 22.8 % (ref 11.5–14.5)
ERYTHROCYTE [DISTWIDTH] IN BLOOD BY AUTOMATED COUNT: 23.1 % (ref 11.5–14.5)
ERYTHROCYTE [DISTWIDTH] IN BLOOD BY AUTOMATED COUNT: 23.2 % (ref 11.5–14.5)
GLOBULIN SER CALC-MCNC: 3.3 G/DL (ref 2–4)
GLUCOSE BLD STRIP.AUTO-MCNC: 194 MG/DL (ref 65–100)
GLUCOSE BLD STRIP.AUTO-MCNC: 240 MG/DL (ref 65–100)
GLUCOSE BLD STRIP.AUTO-MCNC: 248 MG/DL (ref 65–100)
GLUCOSE BLD STRIP.AUTO-MCNC: 255 MG/DL (ref 65–100)
GLUCOSE BLD STRIP.AUTO-MCNC: 302 MG/DL (ref 65–100)
GLUCOSE SERPL-MCNC: 240 MG/DL (ref 65–100)
HCT VFR BLD AUTO: 25.9 % (ref 35–47)
HCT VFR BLD AUTO: 26.5 % (ref 35–47)
HCT VFR BLD AUTO: 27 % (ref 35–47)
HGB BLD-MCNC: 8.2 G/DL (ref 11.5–16)
HGB BLD-MCNC: 8.3 G/DL (ref 11.5–16)
HGB BLD-MCNC: 8.3 G/DL (ref 11.5–16)
MAGNESIUM SERPL-MCNC: 2.3 MG/DL (ref 1.6–2.4)
MCH RBC QN AUTO: 30.5 PG (ref 26–34)
MCH RBC QN AUTO: 30.7 PG (ref 26–34)
MCH RBC QN AUTO: 31 PG (ref 26–34)
MCHC RBC AUTO-ENTMCNC: 30.7 G/DL (ref 30–36.5)
MCHC RBC AUTO-ENTMCNC: 31.3 G/DL (ref 30–36.5)
MCHC RBC AUTO-ENTMCNC: 31.7 G/DL (ref 30–36.5)
MCV RBC AUTO: 97 FL (ref 80–99)
MCV RBC AUTO: 98.9 FL (ref 80–99)
MCV RBC AUTO: 99.3 FL (ref 80–99)
NRBC # BLD: 0.02 K/UL (ref 0–0.01)
NRBC # BLD: 0.03 K/UL (ref 0–0.01)
NRBC # BLD: 0.03 K/UL (ref 0–0.01)
NRBC BLD-RTO: 0.1 PER 100 WBC
NRBC BLD-RTO: 0.2 PER 100 WBC
NRBC BLD-RTO: 0.2 PER 100 WBC
PHOSPHATE SERPL-MCNC: 3.6 MG/DL (ref 2.6–4.7)
PLATELET # BLD AUTO: 110 K/UL (ref 150–400)
PLATELET # BLD AUTO: 110 K/UL (ref 150–400)
PLATELET # BLD AUTO: 119 K/UL (ref 150–400)
PMV BLD AUTO: 11.6 FL (ref 8.9–12.9)
PMV BLD AUTO: 11.7 FL (ref 8.9–12.9)
PMV BLD AUTO: 11.9 FL (ref 8.9–12.9)
POTASSIUM SERPL-SCNC: 4.6 MMOL/L (ref 3.5–5.1)
PROT SERPL-MCNC: 5.8 G/DL (ref 6.4–8.2)
RBC # BLD AUTO: 2.67 M/UL (ref 3.8–5.2)
RBC # BLD AUTO: 2.68 M/UL (ref 3.8–5.2)
RBC # BLD AUTO: 2.72 M/UL (ref 3.8–5.2)
SERVICE CMNT-IMP: ABNORMAL
SODIUM SERPL-SCNC: 136 MMOL/L (ref 136–145)
SPECIMEN EXP DATE BLD: NORMAL
STATUS OF UNIT,%ST: NORMAL
UNIT DIVISION, %UDIV: 0
WBC # BLD AUTO: 12.7 K/UL (ref 3.6–11)
WBC # BLD AUTO: 14.2 K/UL (ref 3.6–11)
WBC # BLD AUTO: 15.3 K/UL (ref 3.6–11)

## 2020-04-19 PROCEDURE — 94664 DEMO&/EVAL PT USE INHALER: CPT

## 2020-04-19 PROCEDURE — 85027 COMPLETE CBC AUTOMATED: CPT

## 2020-04-19 PROCEDURE — 80069 RENAL FUNCTION PANEL: CPT

## 2020-04-19 PROCEDURE — 82962 GLUCOSE BLOOD TEST: CPT

## 2020-04-19 PROCEDURE — 74011000250 HC RX REV CODE- 250: Performed by: INTERNAL MEDICINE

## 2020-04-19 PROCEDURE — 74011250637 HC RX REV CODE- 250/637: Performed by: NURSE PRACTITIONER

## 2020-04-19 PROCEDURE — 74011000258 HC RX REV CODE- 258: Performed by: NURSE PRACTITIONER

## 2020-04-19 PROCEDURE — 86141 C-REACTIVE PROTEIN HS: CPT

## 2020-04-19 PROCEDURE — 36415 COLL VENOUS BLD VENIPUNCTURE: CPT

## 2020-04-19 PROCEDURE — 83735 ASSAY OF MAGNESIUM: CPT

## 2020-04-19 PROCEDURE — 74011000250 HC RX REV CODE- 250: Performed by: NURSE PRACTITIONER

## 2020-04-19 PROCEDURE — 94003 VENT MGMT INPAT SUBQ DAY: CPT

## 2020-04-19 PROCEDURE — 74011636637 HC RX REV CODE- 636/637: Performed by: NURSE PRACTITIONER

## 2020-04-19 PROCEDURE — 80076 HEPATIC FUNCTION PANEL: CPT

## 2020-04-19 PROCEDURE — 74011000258 HC RX REV CODE- 258: Performed by: INTERNAL MEDICINE

## 2020-04-19 PROCEDURE — 74011000250 HC RX REV CODE- 250: Performed by: SURGERY

## 2020-04-19 PROCEDURE — 77030011256 HC DRSG MEPILEX <16IN NO BORD MOLN -A

## 2020-04-19 PROCEDURE — 65610000006 HC RM INTENSIVE CARE

## 2020-04-19 PROCEDURE — 74011250636 HC RX REV CODE- 250/636: Performed by: NURSE PRACTITIONER

## 2020-04-19 PROCEDURE — 94640 AIRWAY INHALATION TREATMENT: CPT

## 2020-04-19 RX ADMIN — INSULIN LISPRO 3 UNITS: 100 INJECTION, SOLUTION INTRAVENOUS; SUBCUTANEOUS at 23:51

## 2020-04-19 RX ADMIN — HYDROCORTISONE SODIUM SUCCINATE 50 MG: 100 INJECTION, POWDER, FOR SOLUTION INTRAMUSCULAR; INTRAVENOUS at 21:00

## 2020-04-19 RX ADMIN — CASTOR OIL AND BALSAM, PERU: 788; 87 OINTMENT TOPICAL at 08:23

## 2020-04-19 RX ADMIN — ALTEPLASE 4 MG: 2.2 INJECTION, POWDER, LYOPHILIZED, FOR SOLUTION INTRAVENOUS at 17:57

## 2020-04-19 RX ADMIN — VANCOMYCIN HYDROCHLORIDE 500 MG: KIT at 00:00

## 2020-04-19 RX ADMIN — ALBUTEROL SULFATE 2.5 MG: 2.5 SOLUTION RESPIRATORY (INHALATION) at 19:12

## 2020-04-19 RX ADMIN — SODIUM CHLORIDE 40 ML: 9 INJECTION INTRAMUSCULAR; INTRAVENOUS; SUBCUTANEOUS at 21:00

## 2020-04-19 RX ADMIN — NOREPINEPHRINE BITARTRATE 30 MCG/MIN: 1 INJECTION INTRAVENOUS at 08:45

## 2020-04-19 RX ADMIN — ALBUTEROL SULFATE 2.5 MG: 2.5 SOLUTION RESPIRATORY (INHALATION) at 07:50

## 2020-04-19 RX ADMIN — ACETYLCYSTEINE 200 MG: 200 SOLUTION ORAL; RESPIRATORY (INHALATION) at 19:13

## 2020-04-19 RX ADMIN — MIDAZOLAM 2 MG: 1 INJECTION INTRAMUSCULAR; INTRAVENOUS at 18:33

## 2020-04-19 RX ADMIN — MINERAL OIL AND WHITE PETROLATUM: 150; 830 OINTMENT OPHTHALMIC at 08:27

## 2020-04-19 RX ADMIN — FAMOTIDINE 20 MG: 40 POWDER, FOR SUSPENSION ORAL at 08:25

## 2020-04-19 RX ADMIN — HYDROCORTISONE SODIUM SUCCINATE 50 MG: 100 INJECTION, POWDER, FOR SOLUTION INTRAMUSCULAR; INTRAVENOUS at 05:55

## 2020-04-19 RX ADMIN — ACETYLCYSTEINE 200 MG: 200 SOLUTION ORAL; RESPIRATORY (INHALATION) at 07:51

## 2020-04-19 RX ADMIN — INSULIN LISPRO 3 UNITS: 100 INJECTION, SOLUTION INTRAVENOUS; SUBCUTANEOUS at 05:57

## 2020-04-19 RX ADMIN — VANCOMYCIN HYDROCHLORIDE 500 MG: KIT at 17:43

## 2020-04-19 RX ADMIN — INSULIN LISPRO 7 UNITS: 100 INJECTION, SOLUTION INTRAVENOUS; SUBCUTANEOUS at 12:42

## 2020-04-19 RX ADMIN — GUAIFENESIN 200 MG: 100 SOLUTION ORAL at 12:44

## 2020-04-19 RX ADMIN — GUAIFENESIN 200 MG: 100 SOLUTION ORAL at 23:49

## 2020-04-19 RX ADMIN — HYDROCORTISONE SODIUM SUCCINATE 50 MG: 100 INJECTION, POWDER, FOR SOLUTION INTRAMUSCULAR; INTRAVENOUS at 13:18

## 2020-04-19 RX ADMIN — INSULIN LISPRO 2 UNITS: 100 INJECTION, SOLUTION INTRAVENOUS; SUBCUTANEOUS at 01:40

## 2020-04-19 RX ADMIN — INSULIN LISPRO 5 UNITS: 100 INJECTION, SOLUTION INTRAVENOUS; SUBCUTANEOUS at 17:40

## 2020-04-19 RX ADMIN — DEXMEDETOMIDINE HYDROCHLORIDE 0.4 MCG/KG/HR: 100 INJECTION, SOLUTION, CONCENTRATE INTRAVENOUS at 05:48

## 2020-04-19 RX ADMIN — GUAIFENESIN 200 MG: 100 SOLUTION ORAL at 17:44

## 2020-04-19 RX ADMIN — INSULIN GLARGINE 35 UNITS: 100 INJECTION, SOLUTION SUBCUTANEOUS at 20:30

## 2020-04-19 RX ADMIN — CASTOR OIL AND BALSAM, PERU: 788; 87 OINTMENT TOPICAL at 17:56

## 2020-04-19 RX ADMIN — DEXMEDETOMIDINE HYDROCHLORIDE 0.4 MCG/KG/HR: 100 INJECTION, SOLUTION, CONCENTRATE INTRAVENOUS at 13:14

## 2020-04-19 RX ADMIN — VANCOMYCIN HYDROCHLORIDE 500 MG: KIT at 12:48

## 2020-04-19 RX ADMIN — CHLORHEXIDINE GLUCONATE 15 ML: 1.2 RINSE ORAL at 20:30

## 2020-04-19 RX ADMIN — GUAIFENESIN 200 MG: 100 SOLUTION ORAL at 06:02

## 2020-04-19 RX ADMIN — CHLORHEXIDINE GLUCONATE 15 ML: 1.2 RINSE ORAL at 08:23

## 2020-04-19 RX ADMIN — SODIUM CHLORIDE 10 ML: 9 INJECTION INTRAMUSCULAR; INTRAVENOUS; SUBCUTANEOUS at 13:22

## 2020-04-19 RX ADMIN — VANCOMYCIN HYDROCHLORIDE 500 MG: KIT at 23:49

## 2020-04-19 RX ADMIN — VANCOMYCIN HYDROCHLORIDE 500 MG: KIT at 05:54

## 2020-04-19 RX ADMIN — INSULIN GLARGINE 35 UNITS: 100 INJECTION, SOLUTION SUBCUTANEOUS at 08:25

## 2020-04-19 RX ADMIN — SODIUM CHLORIDE 10 ML: 9 INJECTION INTRAMUSCULAR; INTRAVENOUS; SUBCUTANEOUS at 05:55

## 2020-04-19 RX ADMIN — MINERAL OIL AND WHITE PETROLATUM: 150; 830 OINTMENT OPHTHALMIC at 20:34

## 2020-04-19 RX ADMIN — GUAIFENESIN 200 MG: 100 SOLUTION ORAL at 00:00

## 2020-04-19 NOTE — PROGRESS NOTES
0730: Bedside and Verbal shift change report given to DWAYNE Porras RN (oncoming nurse) by NESHA Kumar RN (offgoing nurse). Report included the following information SBAR, Kardex, Intake/Output, MAR, Recent Results and Cardiac Rhythm NSR. Assumed care of Patient. 0800: Pt's MAP 54. Sedation held, A-line flushed,zeroed and leveled. No improvement, Nigel Yang NP notified and Pressors requested from pharmacy. 8285: Pt's pressure improving, Pressors not started. 1700: Pt off all sedation, Does not withdrawal to pain, not following commands. With deep pain pt will have slight movement to eyelids. 1840: Pt peak pressuring vent. Fentanyl restarted. See MAR.     1930: Bedside and Verbal shift change report given to E. Marlo Koyanagi, RN (oncoming nurse) by Merlinda Capri. Deyo, RN (offgoing nurse). Report included the following information SBAR, Kardex, Intake/Output, MAR, Recent Results and Cardiac Rhythm NSR. Pt Care Transferred to Night RN.

## 2020-04-19 NOTE — PROGRESS NOTES
[facility]   [address] [address], [facility] [address], [facility]  Phone: [phone]   [phone]       Nephrology Progress Note  Rachel Gutierrez     1960     [id number]  Date of Admission : 3/31/2020  04/19/20    CC: Follow up for JUANCHO      Assessment and Plan   JUANCHO :  - 2/2 ATN  - CRRT--> IHD 4/18  - Next HD tomorrow     Lytes  -  stable    COVID-19 +ve   Acute Hypoxic Resp Failure   - On Vent   - completed Plaquenil. On Vit C, Zinc   - s/p Tocilizumab      Leukocytosis:  - improving, blood cx neg  - on cefepime, vanco, flagyl    RP hematoma:  - needing PRN transfusions    Cardiac arrest 4/9    Type II DM   - Insulin per primary team      Morbid Obesity        Interval History: Tolerated HD  Not on opressors   BP improved overnight     PT NOT EXAMINED in line with ASN and RPA GUIDELINES ON MANAGING COVID-19 PTS WITH RENAL ISSUES. Examination findings discussed personally with the examining Physician team member      Review of Systems: Review of systems not obtained due to patient factors.     Current Medications:   Current Facility-Administered Medications   Medication Dose Route Frequency    dexmedeTOMidine (PRECEDEX) 400 mcg in 0.9% sodium chloride 100 mL infusion  0.2-1 mcg/kg/hr IntraVENous TITRATE    0.9% sodium chloride infusion 250 mL  250 mL IntraVENous PRN    guaiFENesin (ROBITUSSIN) 100 mg/5 mL oral liquid 200 mg  200 mg Oral Q6H    albuterol (PROVENTIL VENTOLIN) nebulizer solution 2.5 mg  2.5 mg Nebulization Q4H PRN    hydrocortisone Sod Succ (PF) (SOLU-CORTEF) injection 50 mg  50 mg IntraVENous Q8H    acetylcysteine (MUCOMYST) 200 mg/mL (20 %) solution 200 mg  200 mg Nebulization BID RT    heparin (porcine) 1,000 unit/mL injection 3,200 Units  3,200 Units IntraVENous DIALYSIS PRN    insulin glargine (LANTUS) injection 35 Units  35 Units SubCUTAneous Q12H    famotidine (PEPCID) 8 mg/mL oral suspension 20 mg  20 mg Per NG tube DAILY    alteplase (CATHFLO) 2 mg in sterile water (preservative free) 2 mL injection  2 mg InterCATHeter DIALYSIS PRN    alteplase (CATHFLO) 2 mg in sterile water (preservative free) 2 mL injection  2 mg InterCATHeter DIALYSIS PRN    insulin lispro (HUMALOG) injection   SubCUTAneous Q6H    0.9% sodium chloride infusion 250 mL  250 mL IntraVENous PRN    chlorhexidine (PERIDEX) 0.12 % mouthwash 15 mL  15 mL Oral Q12H    vancomycin (FIRVANQ) 50 mg/mL oral solution 500 mg  500 mg Oral Q6H    fentaNYL (PF) 1,500 mcg/30 mL (50 mcg/mL) infusion  0-200 mcg/hr IntraVENous TITRATE    [Held by provider] ketamine (KETALAR) 500 mg in 0.9% sodium chloride 500 mL infusion  0.05-0.4 mg/kg/hr IntraVENous TITRATE    NOREPINephrine (LEVOPHED) 32,000 mcg in dextrose 5% 250 mL (128 mcg/mL) infusion  0-50 mcg/min IntraVENous TITRATE    [Held by provider] labetaloL (NORMODYNE) tablet 100 mg  100 mg Oral Q12H    0.9% sodium chloride infusion  3 mL/hr IntraVENous CONTINUOUS    0.9% sodium chloride infusion  5 mL/hr IntraVENous CONTINUOUS    labetaloL (NORMODYNE;TRANDATE) injection 10 mg  10 mg IntraVENous Q10MIN PRN    fentaNYL citrate (PF) injection  mcg   mcg IntraVENous Q1H PRN    white petrolatum-mineral oiL (AKWA TEARS) 83-15 % ophthalmic ointment   Both Eyes Q12H    heparin (porcine) pf 300 Units  300 Units InterCATHeter PRN    midazolam (VERSED) injection 1-2 mg  1-2 mg IntraVENous Q2H PRN    bacitracin 500 unit/gram packet 1 Packet  1 Packet Topical PRN    balsam peru-castor oiL (VENELEX) ointment   Topical BID    sodium chloride (NS) flush 5-40 mL  5-40 mL IntraVENous Q8H    sodium chloride (NS) flush 5-40 mL  5-40 mL IntraVENous PRN    HYDROcodone-acetaminophen (NORCO) 5-325 mg per tablet 1 Tab  1 Tab Oral Q4H PRN    ondansetron (ZOFRAN) injection 4 mg  4 mg IntraVENous Q4H PRN    acetaminophen (TYLENOL) tablet 650 mg  650 mg Oral Q6H PRN    Or    acetaminophen (TYLENOL) suppository 650 mg  650 mg Rectal Q6H PRN    glucose chewable tablet 16 g  4 Tab Oral PRN    glucagon (GLUCAGEN) injection 1 mg  1 mg IntraMUSCular PRN    dextrose 10% infusion 0-250 mL  0-250 mL IntraVENous PRN      Allergies   Allergen Reactions    Augmentin [Amoxicillin-Pot Clavulanate] Rash and Itching       Objective:  Vitals:    Vitals:    04/19/20 0715 04/19/20 0730 04/19/20 0745 04/19/20 0751   BP: 104/71 96/64 99/66    Pulse: (!) 105 98 (!) 105    Resp: 28 29 23    Temp:       TempSrc:       SpO2: 98% 99% 98% 99%   Weight:       Height:         Intake and Output:  No intake/output data recorded. 04/17 1901 - 04/19 0700  In: 2564.1 [I.V.:921.6]  Out: 3085     Physical Examination:   Pt intubated    Yes  General: Paralyzed on the vent  Resp:  On vent   CV:  RRR  GI:  Obese   Neurologic:  Sedated   Access:           R femoral melissa     []    High complexity decision making was performed  []    Patient is at high-risk of decompensation with multiple organ involvement    Lab Data Personally Reviewed: I have reviewed all the pertinent labs, microbiology data and radiology studies during assessment.     Recent Labs     04/19/20  0447 04/18/20  0720 04/17/20  1113 04/17/20  0510 04/17/20  0400 04/16/20  2149 04/16/20  1919 04/16/20  1046    138  --  136  --  134* 136 137   K 4.6 4.4  --  4.4  --  4.3 4.3 4.3    106  --  105  --  105 104 105   CO2 23 25  --  23  --  25 26 27   * 206*  --  238*  --  209* 220* 152*   BUN 59* 43*  --  46*  --  40* 39* 34*   CREA 1.46* 1.01  --  1.21*  --  1.06* 1.10* 1.05*   CA 8.5 8.2*  --  8.3*  --  8.3* 8.6 8.5   MG 2.3 2.6*  --  2.6*  --   --   --   --    PHOS 3.6 2.3*  --  2.3*  --   --   --  2.0*   ALB 2.5* 2.8*  2.8*  --  2.6* 2.6*  --   --   --    SGOT  --  159*  --   --  172*  --   --   --    ALT  --  281*  --   --  393*  --   --   --    INR  --   --  1.1  --   --   --   --  1.2*     Recent Labs     04/19/20  0447 04/18/20  2300 04/18/20  1200 04/18/20  0720 04/17/20  2351   WBC 12.7* 12.5* 12.1* 11.4* 13.5*   HGB 8.2* 8.2* 8.0* 7.8* 6.8*   HCT 25.9* 26.3* 25.8* 24.9* 22.0*   * 102* 98* 79* 86*     No results found for: SDES  Lab Results   Component Value Date/Time    Culture result: NO GROWTH 5 DAYS 04/09/2020 02:32 PM    Culture result: NO GROWTH 5 DAYS 04/08/2020 10:36 AM    Culture result: NO GROWTH 5 DAYS 03/31/2020 11:15 AM    Culture result: MODERATE STAPHYLOCOCCUS AUREUS (A) 03/31/2020 10:34 AM    Culture result: LIGHT NORMAL RESPIRATORY SOFIE 03/31/2020 10:34 AM     Recent Results (from the past 24 hour(s))   GLUCOSE, POC    Collection Time: 04/18/20 11:46 AM   Result Value Ref Range    Glucose (POC) 190 (H) 65 - 100 mg/dL    Performed by Aleyda FORTE    CBC W/O DIFF    Collection Time: 04/18/20 12:00 PM   Result Value Ref Range    WBC 12.1 (H) 3.6 - 11.0 K/uL    RBC 2.63 (L) 3.80 - 5.20 M/uL    HGB 8.0 (L) 11.5 - 16.0 g/dL    HCT 25.8 (L) 35.0 - 47.0 %    MCV 98.1 80.0 - 99.0 FL    MCH 30.4 26.0 - 34.0 PG    MCHC 31.0 30.0 - 36.5 g/dL    RDW 22.8 (H) 11.5 - 14.5 %    PLATELET 98 (L) 417 - 400 K/uL    MPV 12.2 8.9 - 12.9 FL    NRBC 1.3 (H) 0  WBC    ABSOLUTE NRBC 0.16 (H) 0.00 - 0.01 K/uL   GLUCOSE, POC    Collection Time: 04/18/20  6:02 PM   Result Value Ref Range    Glucose (POC) 138 (H) 65 - 100 mg/dL    Performed by Aleyda FORTE    CBC W/O DIFF    Collection Time: 04/18/20 11:00 PM   Result Value Ref Range    WBC 12.5 (H) 3.6 - 11.0 K/uL    RBC 2.69 (L) 3.80 - 5.20 M/uL    HGB 8.2 (L) 11.5 - 16.0 g/dL    HCT 26.3 (L) 35.0 - 47.0 %    MCV 97.8 80.0 - 99.0 FL    MCH 30.5 26.0 - 34.0 PG    MCHC 31.2 30.0 - 36.5 g/dL    RDW 23.6 (H) 11.5 - 14.5 %    PLATELET 301 (L) 098 - 400 K/uL    MPV 11.9 8.9 - 12.9 FL    NRBC 0.6 (H) 0  WBC    ABSOLUTE NRBC 0.07 (H) 0.00 - 0.01 K/uL   GLUCOSE, POC    Collection Time: 04/19/20  1:17 AM   Result Value Ref Range    Glucose (POC) 194 (H) 65 - 100 mg/dL    Performed by EULA BYRD    CBC W/O DIFF    Collection Time: 04/19/20  4:47 AM   Result Value Ref Range    WBC 12.7 (H) 3.6 - 11.0 K/uL    RBC 2.67 (L) 3.80 - 5.20 M/uL    HGB 8.2 (L) 11.5 - 16.0 g/dL    HCT 25.9 (L) 35.0 - 47.0 %    MCV 97.0 80.0 - 99.0 FL    MCH 30.7 26.0 - 34.0 PG    MCHC 31.7 30.0 - 36.5 g/dL    RDW 23.1 (H) 11.5 - 14.5 %    PLATELET 348 (L) 319 - 400 K/uL    MPV 11.6 8.9 - 12.9 FL    NRBC 0.2 (H) 0  WBC    ABSOLUTE NRBC 0.03 (H) 0.00 - 0.01 K/uL   CRP, HIGH SENSITIVITY    Collection Time: 04/19/20  4:47 AM   Result Value Ref Range    CRP, High sensitivity >9.5 mg/L   RENAL FUNCTION PANEL    Collection Time: 04/19/20  4:47 AM   Result Value Ref Range    Sodium 136 136 - 145 mmol/L    Potassium 4.6 3.5 - 5.1 mmol/L    Chloride 102 97 - 108 mmol/L    CO2 23 21 - 32 mmol/L    Anion gap 11 5 - 15 mmol/L    Glucose 240 (H) 65 - 100 mg/dL    BUN 59 (H) 6 - 20 MG/DL    Creatinine 1.46 (H) 0.55 - 1.02 MG/DL    BUN/Creatinine ratio 40 (H) 12 - 20      GFR est AA 45 (L) >60 ml/min/1.73m2    GFR est non-AA 37 (L) >60 ml/min/1.73m2    Calcium 8.5 8.5 - 10.1 MG/DL    Phosphorus 3.6 2.6 - 4.7 MG/DL    Albumin 2.5 (L) 3.5 - 5.0 g/dL   MAGNESIUM    Collection Time: 04/19/20  4:47 AM   Result Value Ref Range    Magnesium 2.3 1.6 - 2.4 mg/dL   GLUCOSE, POC    Collection Time: 04/19/20  5:57 AM   Result Value Ref Range    Glucose (POC) 240 (H) 65 - 100 mg/dL    Performed by EULA BYRD            Total time spent with patient:  xxx   min. Care Plan discussed with:  Patient     Family      RN      Consulting Physician [facility]        I have reviewed the flowsheets. Chart and Pertinent Notes have been reviewed. No change in PMH ,family and social history from Consult note.       Mariah Goodwin MD

## 2020-04-19 NOTE — PROGRESS NOTES
[facility]    ICU TEAM Progress Note    Name: Ramana Gomez   : 1960   MRN: [id number]   Date: 2020      Assessment/Plan:     Current ICU Problems:  Acute hypoxic respiratory failure 2/2 COVID 19  · Retroperitoneal bleed with hemorrhagic shock  · Acute kidney injury  · Nonocclusive PE in LLL pulmonary artery -No anticoagulation given recent right retroperitoneal bleed  · MSSA pneumonia  · Diabetes mellitus  · Thrombocytopenia  · Diarrhea             Plan for today:.  1. Remains on the ventilator, trend ABGs  2. Stop ketamine today. May increase Precedex if needed for vent synchrony. On Fentanyl, wean both to assess for neuro status. Patient taking a while to wake up, has been on sedation for long period plus kidney injury may be factor on residual sedatives. If mentation does not improve off sedation then may need to go for head CT. 3. White count downtrending. On solu-cortef 50mg q 8 hrs taper with clinical improvement   4. Cont guaifenesin and mucomyst for mucous plugging. HH stable 7., continue to trend on nno anticoagulants at this time  5. Nephrology following. Off CRRT, Started IHD on . No plan for dialysis today per nephrology, will dialyzed tomorrow in am. Anuric  6. On Lantus and sliding scale. Lantus 35 units BID.     7. On PO vanc to cover for C-Diff, end on 20  8. ID following  9. Levophed PRN for hypotension. 10.  updated on patient status.       Cardiac Gtts: None, Levophed PRN during dialysis  SBP Goal of:   >90 mmHg  MAP Goal of: > 65 mmHg  Transfusion Trigger (Hgb):  <7 g/dL     Respiratory Goals: Chlorhexidine   Optimize PEEP/Ventilation/Oxygenation  Goal Tidal Volume 6 cc/kg based on IBW  Aim for lung protective ventilation  Aggressive bronchopulmonary hygiene  SPO2 Goal: > 92%  Pulmonary toilet: NA   DVT Prophylaxis (if no, list reason): SCD's or Sequential Compression Device      GI Prophylaxis: Protonix (pantoprazole)   Nutrition: TF IVFs: NA  Bowel Movement: Yes  Bowel Regimen: None needed at this time     Bowden Catheter Present: Yes  Glycemic Control - Insulin: Yes SSI and Lantus  Antibiotics: PO Vancomycin     Pain Medications: Fentanyl   Target RASS: 0 to -1 RASS  Sedation Medications: Precedex  CAM-ICU:  JUAREZ  Mobility: Poor and Bedrest  PT/OT: NA   Restraints: Soft wrist restraints  Discussed Plan of Care/Code Status: Full Code     T/L/D  Tubes: ETT and Orogastric Tube  Lines: Evaa , Ramon   Drains: FMS    Subjective:   Progress Note: 4/19/2020      Reason for ICU Admission:  63 yo female with hx of obesity, endometrial cancer and diabetes who presented to [facility] with worsening hypoxic respiratory failure in lieu of close exposure to COVID-19 to family including mother ( passed away) sister and brother and tested resulting + here. She presented to the hospital 3/26 and intubated 3/28. She was started on broad spectrum antibiotics and plaquenil. Developed worsening renal failure and was transferred to ICU for CRRT. Her hospital course has been further notable for development of a retroperitoneal bleed, hemorrhagic shock and sepsis and ongoing acute hypoxic respiratory failure with increasing PEEP and FIo2 requirements. Turn off Ketamine, weaned other sedation. Eyes open but she does not track nor follow commands. IHD - non planned for today, will dialyzed tomorrow per Nephrology. Palliative care following.      Active Problem List:     Problem List  Date Reviewed: 1/23/2020          Codes Class    Hypotension ICD-10-CM: I95.9  ICD-9-CM: 458.9 Acute        Sepsis due to methicillin susceptible Staphylococcus aureus (MSSA) without acute organ dysfunction (HCC) ICD-10-CM: A41.01  ICD-9-CM: 038.11, 995.91 Acute        COVID-19 virus infection ICD-10-CM: U07.1         Obesity, morbid ([facility] [address] ICD-10-CM: E66.01  ICD-9-CM: 278.01         Vaginal Pap smear following hysterectomy for malignancy ICD-10-CM: Z08, Z90.710  ICD-9-CM: V67.01         Personal history of malignant neoplasm of other parts of uterus ICD-10-CM: Z85.42  ICD-9-CM: V10.42         [facility] ([facility] ICD-10-CM: C54.1  ICD-9-CM: 182.0               Past Medical History:      has a past medical history of Basal cell carcinoma, GERD (gastroesophageal reflux disease), Kidney stones, and Polyp of ureter. Past Surgical History:      has a past surgical history that includes hysteroscopy diagnostic (); pr endometrial ablation, thermal; hx  section (); hx colonoscopy; and insert arterial line (2020). Home Medications:     Prior to Admission medications    Medication Sig Start Date End Date Taking? Authorizing Provider   pantoprazole (PROTONIX) 40 mg tablet TAKE 1 TABLET BY MOUTH TWICE A DAY 18   Provider, Historical   esomeprazole (NEXIUM) 20 mg capsule Take 20 mg by mouth daily. Indications: BID    Provider, Historical   zolpidem (AMBIEN) 10 mg tablet Take 0.5 Tabs by mouth nightly as needed for Sleep. Max Daily Amount: 5 mg. 17   Sebas Mandel MD   fluticasone (FLONASE) 50 mcg/actuation nasal spray Mist 1-2 spray(s) into each nostril once daily. 4/3/15   Provider, Historical   ranitidine (ZANTAC) 150 mg tablet 150 mg.    Provider, Historical       Allergies/Social/Family History: Allergies   Allergen Reactions    Augmentin [Amoxicillin-Pot Clavulanate] Rash and Itching      Social History     Tobacco Use    Smoking status: Never Smoker    Smokeless tobacco: Never Used   Substance Use Topics    Alcohol use: Not on file      Family History   Problem Relation Age of Onset    Prostate Cancer Father         PROSTATE    Breast Cancer Sister 46        invasive poorly differentiated ductal carcinoma, Neg genetic testing.     Cancer Sister 62        melanoma stage 1       Objective:   Vital Signs:  Visit Vitals  BP (!) 89/51   Pulse (!) 101   Temp 98.9 °F (37.2 °C)   Resp 25   Ht 5' 4\" (1.626 m)   Wt 127.1 kg (280 lb 3.2 oz)   SpO2 98%   BMI 48.10 kg/m²      O2 Device: Ventilator, Endotracheal tube Temp (24hrs), Av.9 °F (37.2 °C), Min:96.9 °F (36.1 °C), Max:99.6 °F (37.6 °C)           Intake/Output:     Intake/Output Summary (Last 24 hours) at 2020 0857  Last data filed at 2020 0700  Gross per 24 hour   Intake 1205.92 ml   Output 1000 ml   Net 205.92 ml     Physical Exam:  General:  Sedated, on Ketamine and on the ventilator. No acute distress, morbidly obese   Eyes: Sclera anicteric. Pupils equal, round, reactive to light. Eyes open but does not track   Mouth/Throat: Orotracheal tube in place. Neck: Supple. Lungs:   Deferred, vent assisted respirations, no accessory muscle use observed. Cardiovascular:  regular rate and rhythm, no murmur, click, rub, or gallop, anasarca, pulses palpable   Abdomen:   Soft, bowel sounds hypoactive, distended. Has FMS in place with liquid stool. Extremities: No cyanosis, + edema   Skin: No rash or lesions. Musculoskeletal:  No swelling other than edema, no deformity. Lines/Devices:  Intact, no erythema, drainage, or tenderness. Psych/neuro: Limited, Sedated on ventilator.  Eyes opened, does not track and does not follow commands.       LABS AND  DATA: Personally reviewed  Recent Labs     20  2300   WBC 12.7* 12.5*   HGB 8.2* 8.2*   HCT 25.9* 26.3*   * 102*     Recent Labs     20  0720    138   K 4.6 4.4    106   CO2 23 25   BUN 59* 43*   CREA 1.46* 1.01   * 206*   CA 8.5 8.2*   MG 2.3 2.6*   PHOS 3.6 2.3*     Recent Labs     20  0720   SGOT 157* 159*   * 474*   TP 5.8* 5.9*   ALB 2.5*  2.5* 2.8*  2.8*   GLOB 3.3 3.1     Recent Labs     20  1113 20  1046   INR 1.1 1.2*   PTP 11.6* 11.9*   APTT  --  25.1      Recent Labs     20  1830 20  1703   PHI 7.412 7.400   PCO2I 39.3 42.2   PO2I 157* 216*   FIO2I 50 60       Mode Rate Tidal Volume Pressure FiO2 PEEP   Assist control, Volume control   430 ml  0 cm H2O 40 % 5 cm H20     Peak airway pressure: 21 cm H2O    Minute ventilation: 14.4 l/min      MEDS: Reviewed    04/10/2020, CT Abd/ Pelvis:  IMPRESSION:  1.  Scattered pulmonary air space disease consistent with atypical viral infection. 2.  Nonocclusive pulmonary emboli in the left lower lobe pulmonary arteries. 3.  Large right retroperitoneal hematoma with small foci of contrast within the hematoma. 4.  Enteric tube and endotracheal tube in appropriate position. 5.  Bilateral femoral venous catheters in the common iliac veins. CXR Results  (Last 48 hours)               04/18/20 1034  XR CHEST PORT Final result    Impression:  IMPRESSION: Tubes and lines in expected positions as above. No significant   change in bilateral patchy airspace infiltrates and interstitial prominence. Narrative:  EXAM: XR CHEST PORT       INDICATION: Hypoxia, respiratory failure       COMPARISON: Chest x-ray 4/9/2020. FINDINGS: A portable AP radiograph of the chest was obtained at 10:32 hours. The   patient is on a cardiac monitor. The endotracheal tube projects over the   tracheal lucency with the tip approximately 2 cm above the bhavki. Enteric tube   traverses expected course to below the diaphragm into the left upper quadrant. A   right PICC line traverses in expected course to terminate with the tip   projecting at the atriocaval junction. The lungs show no significant change in   bilateral patchy airspace opacities and increased interstitial markings with no   pneumothorax or pleural effusion.  The cardiac and mediastinal contours and   pulmonary vascularity are normal.  The bones and soft tissues are grossly within   normal limits.                     ABCDEF Bundle/Checklist Completed:  Yes    SPECIAL EQUIPMENT  IHD    DISPOSITION  Stay in ICU    CRITICAL CARE CONSULTANT NOTE  I had a face to face encounter with the patient, reviewed and interpreted patient data including clinical events, labs, images, vital signs, I/O's, and examined patient. I have discussed the case and the plan and management of the patient's care with the consulting services, the bedside nurses and the respiratory therapist.      NOTE OF PERSONAL INVOLVEMENT IN CARE   This patient has a high probability of imminent, clinically significant deterioration, which requires the highest level of preparedness to intervene urgently. I participated in the decision-making and personally managed or directed the management of the following life and organ supporting interventions that required my frequent assessment to treat or prevent imminent deterioration. I personally spent 45 minutes of critical care time. This is time spent at this critically ill patient's bedside actively involved in patient care as well as the coordination of care and discussions with the patient's family. This does not include any procedural time which has been billed separately.       Minnie Amos AGAFox[facility] State Hospital-BC     [address] [facility]

## 2020-04-20 LAB
ALBUMIN SERPL-MCNC: 2.4 G/DL (ref 3.5–5)
ALBUMIN SERPL-MCNC: 2.5 G/DL (ref 3.5–5)
ALBUMIN/GLOB SERPL: 0.7 {RATIO} (ref 1.1–2.2)
ALP SERPL-CCNC: 490 U/L (ref 45–117)
ALT SERPL-CCNC: 206 U/L (ref 12–78)
ANION GAP SERPL CALC-SCNC: 13 MMOL/L (ref 5–15)
ARTERIAL PATENCY WRIST A: ABNORMAL
ARTERIAL PATENCY WRIST A: ABNORMAL
AST SERPL-CCNC: 169 U/L (ref 15–37)
BASE DEFICIT BLD-SCNC: 3 MMOL/L
BDY SITE: ABNORMAL
BDY SITE: ABNORMAL
BILIRUB DIRECT SERPL-MCNC: 0.7 MG/DL (ref 0–0.2)
BILIRUB SERPL-MCNC: 1.6 MG/DL (ref 0.2–1)
BUN SERPL-MCNC: 113 MG/DL (ref 6–20)
BUN/CREAT SERPL: 46 (ref 12–20)
CA-I BLD-SCNC: 1.12 MMOL/L (ref 1.12–1.32)
CA-I BLD-SCNC: 1.13 MMOL/L (ref 1.12–1.32)
CALCIUM SERPL-MCNC: 8.7 MG/DL (ref 8.5–10.1)
CHLORIDE SERPL-SCNC: 102 MMOL/L (ref 97–108)
CO2 SERPL-SCNC: 22 MMOL/L (ref 21–32)
CREAT SERPL-MCNC: 2.44 MG/DL (ref 0.55–1.02)
CRP SERPL HS-MCNC: >9.5 MG/L
ERYTHROCYTE [DISTWIDTH] IN BLOOD BY AUTOMATED COUNT: 21.5 % (ref 11.5–14.5)
ERYTHROCYTE [DISTWIDTH] IN BLOOD BY AUTOMATED COUNT: 21.8 % (ref 11.5–14.5)
ERYTHROCYTE [DISTWIDTH] IN BLOOD BY AUTOMATED COUNT: 22.4 % (ref 11.5–14.5)
ERYTHROCYTE [DISTWIDTH] IN BLOOD BY AUTOMATED COUNT: 22.8 % (ref 11.5–14.5)
GAS FLOW.O2 O2 DELIVERY SYS: ABNORMAL L/MIN
GAS FLOW.O2 O2 DELIVERY SYS: ABNORMAL L/MIN
GAS FLOW.O2 SETTING OXYMISER: 28 BPM
GAS FLOW.O2 SETTING OXYMISER: 28 BPM
GLOBULIN SER CALC-MCNC: 3.5 G/DL (ref 2–4)
GLUCOSE BLD STRIP.AUTO-MCNC: 178 MG/DL (ref 65–100)
GLUCOSE BLD STRIP.AUTO-MCNC: 248 MG/DL (ref 65–100)
GLUCOSE BLD STRIP.AUTO-MCNC: 252 MG/DL (ref 65–100)
GLUCOSE BLD STRIP.AUTO-MCNC: 262 MG/DL (ref 65–100)
GLUCOSE SERPL-MCNC: 234 MG/DL (ref 65–100)
HCO3 BLD-SCNC: 21 MMOL/L (ref 22–26)
HCT VFR BLD AUTO: 25.5 % (ref 35–47)
HCT VFR BLD AUTO: 26.1 % (ref 35–47)
HCT VFR BLD AUTO: 27.2 % (ref 35–47)
HCT VFR BLD AUTO: 27.6 % (ref 35–47)
HGB BLD-MCNC: 8 G/DL (ref 11.5–16)
HGB BLD-MCNC: 8.1 G/DL (ref 11.5–16)
HGB BLD-MCNC: 8.4 G/DL (ref 11.5–16)
HGB BLD-MCNC: 8.7 G/DL (ref 11.5–16)
MAGNESIUM SERPL-MCNC: 2.6 MG/DL (ref 1.6–2.4)
MCH RBC QN AUTO: 30.4 PG (ref 26–34)
MCH RBC QN AUTO: 30.7 PG (ref 26–34)
MCH RBC QN AUTO: 30.8 PG (ref 26–34)
MCH RBC QN AUTO: 30.9 PG (ref 26–34)
MCHC RBC AUTO-ENTMCNC: 30.7 G/DL (ref 30–36.5)
MCHC RBC AUTO-ENTMCNC: 30.9 G/DL (ref 30–36.5)
MCHC RBC AUTO-ENTMCNC: 31.5 G/DL (ref 30–36.5)
MCHC RBC AUTO-ENTMCNC: 31.8 G/DL (ref 30–36.5)
MCV RBC AUTO: 97 FL (ref 80–99)
MCV RBC AUTO: 97.9 FL (ref 80–99)
MCV RBC AUTO: 99.2 FL (ref 80–99)
MCV RBC AUTO: 99.3 FL (ref 80–99)
NRBC # BLD: 0 K/UL (ref 0–0.01)
NRBC BLD-RTO: 0 PER 100 WBC
O2/TOTAL GAS SETTING VFR VENT: 0.4 %
O2/TOTAL GAS SETTING VFR VENT: 0.4 %
PCO2 BLD: 31.7 MMHG (ref 35–45)
PEEP RESPIRATORY: 5 CMH2O
PEEP RESPIRATORY: 5 CMH2O
PH BLD: 7.43 [PH] (ref 7.35–7.45)
PHOSPHATE SERPL-MCNC: 5.8 MG/DL (ref 2.6–4.7)
PLATELET # BLD AUTO: 119 K/UL (ref 150–400)
PLATELET # BLD AUTO: 121 K/UL (ref 150–400)
PLATELET # BLD AUTO: 128 K/UL (ref 150–400)
PLATELET # BLD AUTO: 128 K/UL (ref 150–400)
PMV BLD AUTO: 11.5 FL (ref 8.9–12.9)
PMV BLD AUTO: 11.8 FL (ref 8.9–12.9)
PMV BLD AUTO: 11.9 FL (ref 8.9–12.9)
PMV BLD AUTO: 11.9 FL (ref 8.9–12.9)
PO2 BLD: 141 MMHG (ref 80–100)
PO2 BLD: 152 MMHG (ref 80–100)
POTASSIUM SERPL-SCNC: 5.1 MMOL/L (ref 3.5–5.1)
PROT SERPL-MCNC: 5.9 G/DL (ref 6.4–8.2)
RBC # BLD AUTO: 2.63 M/UL (ref 3.8–5.2)
RBC # BLD AUTO: 2.63 M/UL (ref 3.8–5.2)
RBC # BLD AUTO: 2.74 M/UL (ref 3.8–5.2)
RBC # BLD AUTO: 2.82 M/UL (ref 3.8–5.2)
SAO2 % BLD: 99 % (ref 92–97)
SERVICE CMNT-IMP: ABNORMAL
SODIUM SERPL-SCNC: 137 MMOL/L (ref 136–145)
SPECIMEN TYPE: ABNORMAL
SPECIMEN TYPE: ABNORMAL
TOTAL RESP. RATE, ITRR: 28
TOTAL RESP. RATE, ITRR: 28
VENTILATION MODE VENT: ABNORMAL
VENTILATION MODE VENT: ABNORMAL
VT SETTING VENT: 430 ML
VT SETTING VENT: 430 ML
WBC # BLD AUTO: 12.4 K/UL (ref 3.6–11)
WBC # BLD AUTO: 13.8 K/UL (ref 3.6–11)
WBC # BLD AUTO: 14.6 K/UL (ref 3.6–11)
WBC # BLD AUTO: 15.1 K/UL (ref 3.6–11)

## 2020-04-20 PROCEDURE — 90935 HEMODIALYSIS ONE EVALUATION: CPT

## 2020-04-20 PROCEDURE — 74011000250 HC RX REV CODE- 250: Performed by: SURGERY

## 2020-04-20 PROCEDURE — 74011636637 HC RX REV CODE- 636/637: Performed by: NURSE PRACTITIONER

## 2020-04-20 PROCEDURE — 74011000250 HC RX REV CODE- 250: Performed by: INTERNAL MEDICINE

## 2020-04-20 PROCEDURE — 74011250637 HC RX REV CODE- 250/637: Performed by: NURSE PRACTITIONER

## 2020-04-20 PROCEDURE — 74011250636 HC RX REV CODE- 250/636: Performed by: NURSE PRACTITIONER

## 2020-04-20 PROCEDURE — 74011000250 HC RX REV CODE- 250: Performed by: NURSE PRACTITIONER

## 2020-04-20 PROCEDURE — 80076 HEPATIC FUNCTION PANEL: CPT

## 2020-04-20 PROCEDURE — 74011250637 HC RX REV CODE- 250/637: Performed by: INTERNAL MEDICINE

## 2020-04-20 PROCEDURE — 94003 VENT MGMT INPAT SUBQ DAY: CPT

## 2020-04-20 PROCEDURE — 74011250636 HC RX REV CODE- 250/636: Performed by: INTERNAL MEDICINE

## 2020-04-20 PROCEDURE — 82803 BLOOD GASES ANY COMBINATION: CPT

## 2020-04-20 PROCEDURE — 85027 COMPLETE CBC AUTOMATED: CPT

## 2020-04-20 PROCEDURE — 77030018798 HC PMP KT ENTRL FED COVD -A

## 2020-04-20 PROCEDURE — 77030040392 HC DRSG OPTIFOAM MDII -A

## 2020-04-20 PROCEDURE — 82962 GLUCOSE BLOOD TEST: CPT

## 2020-04-20 PROCEDURE — 83735 ASSAY OF MAGNESIUM: CPT

## 2020-04-20 PROCEDURE — 65610000006 HC RM INTENSIVE CARE

## 2020-04-20 PROCEDURE — 94640 AIRWAY INHALATION TREATMENT: CPT

## 2020-04-20 PROCEDURE — 87635 SARS-COV-2 COVID-19 AMP PRB: CPT

## 2020-04-20 PROCEDURE — 80069 RENAL FUNCTION PANEL: CPT

## 2020-04-20 PROCEDURE — P9047 ALBUMIN (HUMAN), 25%, 50ML: HCPCS | Performed by: INTERNAL MEDICINE

## 2020-04-20 PROCEDURE — 86141 C-REACTIVE PROTEIN HS: CPT

## 2020-04-20 PROCEDURE — 36415 COLL VENOUS BLD VENIPUNCTURE: CPT

## 2020-04-20 RX ORDER — INSULIN GLARGINE 100 [IU]/ML
5 INJECTION, SOLUTION SUBCUTANEOUS ONCE
Status: COMPLETED | OUTPATIENT
Start: 2020-04-20 | End: 2020-04-20

## 2020-04-20 RX ORDER — OXYCODONE HYDROCHLORIDE 5 MG/1
5 TABLET ORAL EVERY 6 HOURS
Status: DISCONTINUED | OUTPATIENT
Start: 2020-04-20 | End: 2020-04-22

## 2020-04-20 RX ORDER — INSULIN GLARGINE 100 [IU]/ML
40 INJECTION, SOLUTION SUBCUTANEOUS EVERY 12 HOURS
Status: DISCONTINUED | OUTPATIENT
Start: 2020-04-20 | End: 2020-04-23

## 2020-04-20 RX ORDER — HYDROCODONE BITARTRATE AND ACETAMINOPHEN 5; 325 MG/1; MG/1
1 TABLET ORAL EVERY 6 HOURS
Status: DISCONTINUED | OUTPATIENT
Start: 2020-04-20 | End: 2020-04-27

## 2020-04-20 RX ORDER — ALBUMIN HUMAN 250 G/1000ML
12.5 SOLUTION INTRAVENOUS
Status: DISCONTINUED | OUTPATIENT
Start: 2020-04-20 | End: 2020-05-22 | Stop reason: HOSPADM

## 2020-04-20 RX ORDER — ALBUMIN HUMAN 250 G/1000ML
SOLUTION INTRAVENOUS
Status: DISPENSED
Start: 2020-04-20 | End: 2020-04-21

## 2020-04-20 RX ADMIN — ALBUTEROL SULFATE 2.5 MG: 2.5 SOLUTION RESPIRATORY (INHALATION) at 20:26

## 2020-04-20 RX ADMIN — SODIUM CHLORIDE 10 ML: 9 INJECTION INTRAMUSCULAR; INTRAVENOUS; SUBCUTANEOUS at 21:00

## 2020-04-20 RX ADMIN — WATER 1 MG: 1 INJECTION INTRAMUSCULAR; INTRAVENOUS; SUBCUTANEOUS at 18:36

## 2020-04-20 RX ADMIN — CASTOR OIL AND BALSAM, PERU: 788; 87 OINTMENT TOPICAL at 08:27

## 2020-04-20 RX ADMIN — INSULIN LISPRO 3 UNITS: 100 INJECTION, SOLUTION INTRAVENOUS; SUBCUTANEOUS at 13:06

## 2020-04-20 RX ADMIN — SODIUM CHLORIDE 40 ML: 9 INJECTION INTRAMUSCULAR; INTRAVENOUS; SUBCUTANEOUS at 05:05

## 2020-04-20 RX ADMIN — HYDROCORTISONE SODIUM SUCCINATE 50 MG: 100 INJECTION, POWDER, FOR SOLUTION INTRAMUSCULAR; INTRAVENOUS at 13:06

## 2020-04-20 RX ADMIN — HYDROCODONE BITARTRATE AND ACETAMINOPHEN 1 TABLET: 5; 325 TABLET ORAL at 20:55

## 2020-04-20 RX ADMIN — INSULIN LISPRO 5 UNITS: 100 INJECTION, SOLUTION INTRAVENOUS; SUBCUTANEOUS at 23:40

## 2020-04-20 RX ADMIN — HYDROCORTISONE SODIUM SUCCINATE 50 MG: 100 INJECTION, POWDER, FOR SOLUTION INTRAMUSCULAR; INTRAVENOUS at 21:00

## 2020-04-20 RX ADMIN — ALTEPLASE 2 MG: 2.2 INJECTION, POWDER, LYOPHILIZED, FOR SOLUTION INTRAVENOUS at 06:58

## 2020-04-20 RX ADMIN — INSULIN GLARGINE 35 UNITS: 100 INJECTION, SOLUTION SUBCUTANEOUS at 08:25

## 2020-04-20 RX ADMIN — HYDROCORTISONE SODIUM SUCCINATE 50 MG: 100 INJECTION, POWDER, FOR SOLUTION INTRAMUSCULAR; INTRAVENOUS at 05:05

## 2020-04-20 RX ADMIN — INSULIN LISPRO 5 UNITS: 100 INJECTION, SOLUTION INTRAVENOUS; SUBCUTANEOUS at 07:08

## 2020-04-20 RX ADMIN — GUAIFENESIN 200 MG: 100 SOLUTION ORAL at 18:21

## 2020-04-20 RX ADMIN — VANCOMYCIN HYDROCHLORIDE 500 MG: KIT at 06:58

## 2020-04-20 RX ADMIN — MINERAL OIL AND WHITE PETROLATUM: 150; 830 OINTMENT OPHTHALMIC at 20:56

## 2020-04-20 RX ADMIN — INSULIN GLARGINE 40 UNITS: 100 INJECTION, SOLUTION SUBCUTANEOUS at 20:56

## 2020-04-20 RX ADMIN — CHLORHEXIDINE GLUCONATE 15 ML: 1.2 RINSE ORAL at 08:25

## 2020-04-20 RX ADMIN — MINERAL OIL AND WHITE PETROLATUM: 150; 830 OINTMENT OPHTHALMIC at 08:26

## 2020-04-20 RX ADMIN — INSULIN GLARGINE 5 UNITS: 100 INJECTION, SOLUTION SUBCUTANEOUS at 14:18

## 2020-04-20 RX ADMIN — FENTANYL CITRATE 100 MCG: 50 INJECTION INTRAMUSCULAR; INTRAVENOUS at 14:17

## 2020-04-20 RX ADMIN — ALBUTEROL SULFATE 2.5 MG: 2.5 SOLUTION RESPIRATORY (INHALATION) at 08:02

## 2020-04-20 RX ADMIN — HEPARIN SODIUM 3200 UNITS: 1000 INJECTION INTRAVENOUS; SUBCUTANEOUS at 19:56

## 2020-04-20 RX ADMIN — ACETYLCYSTEINE 200 MG: 200 SOLUTION ORAL; RESPIRATORY (INHALATION) at 20:26

## 2020-04-20 RX ADMIN — ALBUMIN (HUMAN) 12.5 G: 0.25 INJECTION, SOLUTION INTRAVENOUS at 18:57

## 2020-04-20 RX ADMIN — Medication 40 ML: at 13:07

## 2020-04-20 RX ADMIN — OXYCODONE 5 MG: 5 TABLET ORAL at 14:17

## 2020-04-20 RX ADMIN — FAMOTIDINE 20 MG: 40 POWDER, FOR SUSPENSION ORAL at 08:25

## 2020-04-20 RX ADMIN — GUAIFENESIN 200 MG: 100 SOLUTION ORAL at 23:32

## 2020-04-20 RX ADMIN — INSULIN LISPRO 2 UNITS: 100 INJECTION, SOLUTION INTRAVENOUS; SUBCUTANEOUS at 18:23

## 2020-04-20 RX ADMIN — OXYCODONE 5 MG: 5 TABLET ORAL at 18:17

## 2020-04-20 RX ADMIN — OXYCODONE 5 MG: 5 TABLET ORAL at 23:32

## 2020-04-20 RX ADMIN — HYDROCODONE BITARTRATE AND ACETAMINOPHEN 1 TABLET: 5; 325 TABLET ORAL at 13:06

## 2020-04-20 RX ADMIN — ACETYLCYSTEINE 200 MG: 200 SOLUTION ORAL; RESPIRATORY (INHALATION) at 08:02

## 2020-04-20 RX ADMIN — Medication 50 MCG/HR: at 01:09

## 2020-04-20 RX ADMIN — CHLORHEXIDINE GLUCONATE 15 ML: 1.2 RINSE ORAL at 20:57

## 2020-04-20 RX ADMIN — GUAIFENESIN 200 MG: 100 SOLUTION ORAL at 05:05

## 2020-04-20 RX ADMIN — SODIUM CHLORIDE 40 ML: 9 INJECTION INTRAMUSCULAR; INTRAVENOUS; SUBCUTANEOUS at 13:07

## 2020-04-20 RX ADMIN — CASTOR OIL AND BALSAM, PERU: 788; 87 OINTMENT TOPICAL at 18:24

## 2020-04-20 RX ADMIN — GUAIFENESIN 200 MG: 100 SOLUTION ORAL at 12:01

## 2020-04-20 NOTE — PROGRESS NOTES
[facility]   [address] [address], [facility] [address], [facility]  Phone: [phone]   [phone]       Nephrology Progress Note  Ramana Files     1960     [id number]  Date of Admission : 3/31/2020  04/20/20    CC: Follow up for JUANCOH      Assessment and Plan   JUANCHO :  - 2/2 ATN  - CRRT--> IHD 4/18  - HD today w/ 1.5 Kg UF goal      Lytes  -  stable    COVID-19 +ve   Acute Hypoxic Resp Failure   - On Vent   - completed Plaquenil. On Vit C, Zinc   - s/p Tocilizumab      RP hematoma:  - needing PRN transfusions    Cardiac arrest 4/9    Type II DM   - Insulin per primary team      Morbid Obesity        Interval History:  Labs noted   Remains off pressors   Remains on vent     PT NOT EXAMINED in line with ASN and RPA GUIDELINES ON MANAGING COVID-19 PTS WITH RENAL ISSUES. Examination findings discussed personally with the examining Physician team member      Review of Systems: Review of systems not obtained due to patient factors.     Current Medications:   Current Facility-Administered Medications   Medication Dose Route Frequency    dexmedeTOMidine (PRECEDEX) 400 mcg in 0.9% sodium chloride 100 mL infusion  0.2-1 mcg/kg/hr IntraVENous TITRATE    0.9% sodium chloride infusion 250 mL  250 mL IntraVENous PRN    guaiFENesin (ROBITUSSIN) 100 mg/5 mL oral liquid 200 mg  200 mg Oral Q6H    albuterol (PROVENTIL VENTOLIN) nebulizer solution 2.5 mg  2.5 mg Nebulization Q4H PRN    hydrocortisone Sod Succ (PF) (SOLU-CORTEF) injection 50 mg  50 mg IntraVENous Q8H    acetylcysteine (MUCOMYST) 200 mg/mL (20 %) solution 200 mg  200 mg Nebulization BID RT    heparin (porcine) 1,000 unit/mL injection 3,200 Units  3,200 Units IntraVENous DIALYSIS PRN    insulin glargine (LANTUS) injection 35 Units  35 Units SubCUTAneous Q12H    famotidine (PEPCID) 8 mg/mL oral suspension 20 mg  20 mg Per NG tube DAILY    alteplase (CATHFLO) 2 mg in sterile water (preservative free) 2 mL injection  2 mg InterCATHeter DIALYSIS PRN    alteplase (CATHFLO) 2 mg in sterile water (preservative free) 2 mL injection  2 mg InterCATHeter DIALYSIS PRN    insulin lispro (HUMALOG) injection   SubCUTAneous Q6H    0.9% sodium chloride infusion 250 mL  250 mL IntraVENous PRN    chlorhexidine (PERIDEX) 0.12 % mouthwash 15 mL  15 mL Oral Q12H    vancomycin (FIRVANQ) 50 mg/mL oral solution 500 mg  500 mg Oral Q6H    fentaNYL (PF) 1,500 mcg/30 mL (50 mcg/mL) infusion  0-200 mcg/hr IntraVENous TITRATE    NOREPINephrine (LEVOPHED) 32,000 mcg in dextrose 5% 250 mL (128 mcg/mL) infusion  0-50 mcg/min IntraVENous TITRATE    [Held by provider] labetaloL (NORMODYNE) tablet 100 mg  100 mg Oral Q12H    0.9% sodium chloride infusion  3 mL/hr IntraVENous CONTINUOUS    0.9% sodium chloride infusion  5 mL/hr IntraVENous CONTINUOUS    labetaloL (NORMODYNE;TRANDATE) injection 10 mg  10 mg IntraVENous Q10MIN PRN    fentaNYL citrate (PF) injection  mcg   mcg IntraVENous Q1H PRN    white petrolatum-mineral oiL (AKWA TEARS) 83-15 % ophthalmic ointment   Both Eyes Q12H    heparin (porcine) pf 300 Units  300 Units InterCATHeter PRN    midazolam (VERSED) injection 1-2 mg  1-2 mg IntraVENous Q2H PRN    bacitracin 500 unit/gram packet 1 Packet  1 Packet Topical PRN    balsam peru-castor oiL (VENELEX) ointment   Topical BID    sodium chloride (NS) flush 5-40 mL  5-40 mL IntraVENous Q8H    sodium chloride (NS) flush 5-40 mL  5-40 mL IntraVENous PRN    HYDROcodone-acetaminophen (NORCO) 5-325 mg per tablet 1 Tab  1 Tab Oral Q4H PRN    ondansetron (ZOFRAN) injection 4 mg  4 mg IntraVENous Q4H PRN    acetaminophen (TYLENOL) tablet 650 mg  650 mg Oral Q6H PRN    Or    acetaminophen (TYLENOL) suppository 650 mg  650 mg Rectal Q6H PRN    glucose chewable tablet 16 g  4 Tab Oral PRN    glucagon (GLUCAGEN) injection 1 mg  1 mg IntraMUSCular PRN    dextrose 10% infusion 0-250 mL  0-250 mL IntraVENous PRN      Allergies   Allergen Reactions    Augmentin [Amoxicillin-Pot Clavulanate] Rash and Itching       Objective:  Vitals:    Vitals:    04/20/20 0400 04/20/20 0500 04/20/20 0600 04/20/20 0700   BP: 147/85 160/74 144/88    Pulse: 96 97 (!) 107 (!) 104   Resp: 28 28 28 28   Temp: 98.8 °F (37.1 °C)      TempSrc:       SpO2: 99% 99% 98% 99%   Weight:       Height:         Intake and Output:  No intake/output data recorded. 04/18 1901 - 04/20 0700  In: 2307.6 [I.V.:617.6]  Out: 7969 [Drains:675]    Physical Examination:   Pt intubated    Yes  General: Paralyzed on the vent  Resp:  On vent   CV:  RRR  GI:  Obese   Neurologic:  Sedated   Access:           R femoral melissa     []    High complexity decision making was performed  []    Patient is at high-risk of decompensation with multiple organ involvement    Lab Data Personally Reviewed: I have reviewed all the pertinent labs, microbiology data and radiology studies during assessment.     Recent Labs     04/20/20  0529 04/19/20 0447 04/18/20  0720 04/17/20  1113    136 138  --    K 5.1 4.6 4.4  --     102 106  --    CO2 22 23 25  --    * 240* 206*  --    * 59* 43*  --    CREA 2.44* 1.46* 1.01  --    CA 8.7 8.5 8.2*  --    MG 2.6* 2.3 2.6*  --    PHOS 5.8* 3.6 2.3*  --    ALB 2.5* 2.5*  2.5* 2.8*  2.8*  --    SGOT  --  157* 159*  --    ALT  --  234* 281*  --    INR  --   --   --  1.1     Recent Labs     04/20/20  0529 04/19/20 2029 04/19/20  1315 04/19/20  0447 04/18/20  2300   WBC 13.8* 14.2* 15.3* 12.7* 12.5*   HGB 8.4* 8.3* 8.3* 8.2* 8.2*   HCT 27.2* 26.5* 27.0* 25.9* 26.3*   * 110* 119* 110* 102*     No results found for: SDES  Lab Results   Component Value Date/Time    Culture result: NO GROWTH 5 DAYS 04/09/2020 02:32 PM    Culture result: NO GROWTH 5 DAYS 04/08/2020 10:36 AM    Culture result: NO GROWTH 5 DAYS 03/31/2020 11:15 AM    Culture result: MODERATE STAPHYLOCOCCUS AUREUS (A) 03/31/2020 10:34 AM    Culture result: LIGHT NORMAL RESPIRATORY SOFIE 03/31/2020 10:34 AM     Recent Results (from the past 24 hour(s))   GLUCOSE, POC    Collection Time: 04/19/20 12:41 PM   Result Value Ref Range    Glucose (POC) 302 (H) 65 - 100 mg/dL    Performed by Fidel FORTE    CBC W/O DIFF    Collection Time: 04/19/20  1:15 PM   Result Value Ref Range    WBC 15.3 (H) 3.6 - 11.0 K/uL    RBC 2.72 (L) 3.80 - 5.20 M/uL    HGB 8.3 (L) 11.5 - 16.0 g/dL    HCT 27.0 (L) 35.0 - 47.0 %    MCV 99.3 (H) 80.0 - 99.0 FL    MCH 30.5 26.0 - 34.0 PG    MCHC 30.7 30.0 - 36.5 g/dL    RDW 23.2 (H) 11.5 - 14.5 %    PLATELET 783 (L) 748 - 400 K/uL    MPV 11.7 8.9 - 12.9 FL    NRBC 0.2 (H) 0  WBC    ABSOLUTE NRBC 0.03 (H) 0.00 - 0.01 K/uL   GLUCOSE, POC    Collection Time: 04/19/20  5:40 PM   Result Value Ref Range    Glucose (POC) 255 (H) 65 - 100 mg/dL    Performed by Fidel FORTE    CBC W/O DIFF    Collection Time: 04/19/20  8:29 PM   Result Value Ref Range    WBC 14.2 (H) 3.6 - 11.0 K/uL    RBC 2.68 (L) 3.80 - 5.20 M/uL    HGB 8.3 (L) 11.5 - 16.0 g/dL    HCT 26.5 (L) 35.0 - 47.0 %    MCV 98.9 80.0 - 99.0 FL    MCH 31.0 26.0 - 34.0 PG    MCHC 31.3 30.0 - 36.5 g/dL    RDW 22.8 (H) 11.5 - 14.5 %    PLATELET 697 (L) 651 - 400 K/uL    MPV 11.9 8.9 - 12.9 FL    NRBC 0.1 (H) 0  WBC    ABSOLUTE NRBC 0.02 (H) 0.00 - 0.01 K/uL   GLUCOSE, POC    Collection Time: 04/19/20 11:51 PM   Result Value Ref Range    Glucose (POC) 248 (H) 65 - 100 mg/dL    Performed by Kate Moore    CBC W/O DIFF    Collection Time: 04/20/20  5:29 AM   Result Value Ref Range    WBC 13.8 (H) 3.6 - 11.0 K/uL    RBC 2.74 (L) 3.80 - 5.20 M/uL    HGB 8.4 (L) 11.5 - 16.0 g/dL    HCT 27.2 (L) 35.0 - 47.0 %    MCV 99.3 (H) 80.0 - 99.0 FL    MCH 30.7 26.0 - 34.0 PG    MCHC 30.9 30.0 - 36.5 g/dL    RDW 22.8 (H) 11.5 - 14.5 %    PLATELET 722 (L) 374 - 400 K/uL    MPV 11.9 8.9 - 12.9 FL    NRBC 0.0 0  WBC    ABSOLUTE NRBC 0.00 0.00 - 0.01 K/uL   CRP, HIGH SENSITIVITY    Collection Time: 04/20/20  5:29 AM   Result Value Ref Range    CRP, High sensitivity >9.5 mg/L   RENAL FUNCTION PANEL    Collection Time: 04/20/20  5:29 AM   Result Value Ref Range    Sodium 137 136 - 145 mmol/L    Potassium 5.1 3.5 - 5.1 mmol/L    Chloride 102 97 - 108 mmol/L    CO2 22 21 - 32 mmol/L    Anion gap 13 5 - 15 mmol/L    Glucose 234 (H) 65 - 100 mg/dL     (H) 6 - 20 MG/DL    Creatinine 2.44 (H) 0.55 - 1.02 MG/DL    BUN/Creatinine ratio 46 (H) 12 - 20      GFR est AA 25 (L) >60 ml/min/1.73m2    GFR est non-AA 20 (L) >60 ml/min/1.73m2    Calcium 8.7 8.5 - 10.1 MG/DL    Phosphorus 5.8 (H) 2.6 - 4.7 MG/DL    Albumin 2.5 (L) 3.5 - 5.0 g/dL   MAGNESIUM    Collection Time: 04/20/20  5:29 AM   Result Value Ref Range    Magnesium 2.6 (H) 1.6 - 2.4 mg/dL   GLUCOSE, POC    Collection Time: 04/20/20  7:08 AM   Result Value Ref Range    Glucose (POC) 262 (H) 65 - 100 mg/dL    Performed by Tia Lomas            Total time spent with patient:  xxx   min. Care Plan discussed with:  Patient     Family      RN      Consulting Physician [facility],         I have reviewed the flowsheets. Chart and Pertinent Notes have been reviewed. No change in PMH ,family and social history from Consult note.       Liang Duenas MD

## 2020-04-20 NOTE — PROCEDURES
[facility] Team [facility]  [phone]    Vitals   Pre   Post   Assessment   Pre   Post     Temp  Temp: 98.2 °F (36.8 °C) (04/20/20 1623) 98.0 axillary LOC  Patient is sedated, not alert or oriented Patient is sedated, not alert or oriented   HR   Pulse (Heart Rate): (!) 103 (04/20/20 1623) 101 Lungs   Patient is intubated and ventilated on 50% O2 continuously. Lung sounds are coarse Patient is intubated and ventilated on 50% O2 continuously. Lung sounds are coarse   B/P   BP: 118/42 (04/20/20 1623) 111/74 Cardiac   Bedside telemetry, sinus tachycardia per primary RN, S1 S2 present Sinus tach, HRR S1 S2 present   Resp   Resp Rate: 23 (04/20/20 1623) 18 Skin   Warm and dry, excoriation in folds Warm and dry, excoriation in folds   Pain level  Pain Intensity 1: 3 (04/20/20 1600) 0 Edema  Generalized, 3+ BLE     Generalized, 3+ BLE   Orders:    Duration:   Start:   16:23 End:   19:56 Total:   3.5 hrs   Dialyzer:   Dialyzer/Set Up Inspection: Damian Ch (04/20/20 1623)   K Bath:   Dialysate K (mEq/L): 2 (04/20/20 1623)   Ca Bath:   Dialysate CA (mEq/L): 2.5 (04/20/20 1623)   Na/Bicarb:   Dialysate NA (mEq/L): 140 (04/20/20 1623)   Target Fluid Removal:   Goal/Amount of Fluid to Remove (mL): 1500 mL (04/20/20 1623)   Access     Type & Location:   R femoral CVC: Dressing CDI last change dated 04/16/2020. No s/s of infection. Venous lumen aspirates & flushes well, arterial with sluggish aspiration, flushes well. Running at  in reverse. Sterile dressing change performed today.    Labs     Obtained/Reviewed   Critical Results Called   Date when labs were drawn-  Hgb-    HGB   Date Value Ref Range Status   04/20/2020 8.0 (L) 11.5 - 16.0 g/dL Final     K-    Potassium   Date Value Ref Range Status   04/20/2020 5.1 3.5 - 5.1 mmol/L Final     Ca-   Calcium   Date Value Ref Range Status   04/20/2020 8.7 8.5 - 10.1 MG/DL Final     Bun-   BUN   Date Value Ref Range Status   04/20/2020 113 (H) 6 - 20 MG/DL Final     Comment:     INVESTIGATED PER DELTA CHECK PROTOCOL     Creat-   Creatinine   Date Value Ref Range Status   04/20/2020 2.44 (H) 0.55 - 1.02 MG/DL Final     Comment:     INVESTIGATED PER DELTA CHECK PROTOCOL        Medications/ Blood Products Given     Name   Dose   Route and Time     Albumin 25% 12.5 G  Given IV with HD at 18:57             Blood Volume Processed (BVP):   53.3 L Net Fluid   Removed:  1500 ml   Comments   Time Out Done: 16:15  Primary Nurse Rpt Pre: Vinnie Pratt RN  Primary Nurse Rpt Post: Jose Connor RN  Pt Education: procedural  Care Plan: ongoing  Tx Summary:  SBAR received from Primary RN Vinnie Pratt. Arrived to patient room, set up and tested machine and water per policy. Patient is nonverbal, not alert or oriented with intubation on ventilator. Consent signed & on file. 16:23-Each catheter limb disinfected for 60 seconds per limb with alcohol swabs. Caps removed, dialysis CVC hub scrubbed with Prevantics for 5 seconds, followed by a 5 second dry time per Hospital P&P  Each lumen aspirated for blood return and flushed with Normal Saline per policy. VSS. Dialysis Tx initiated. PPE is donned by patient and HD RN per Em's. Dialysis access visualized and lines intact. 16:25- Flushed lines with 100 ml of NS to prevent clotting and lowered BFR to 300 d/t low arterial pressures. Lines reversed to prevent arterial pressures from fluctuating below -260.  17:00- Patient resting quietly, VSS. Dialysis access visualized and lines intact. Flushed patient lines with 100 ml of NS to prevent clotting. 17:30- Patient resting quietly, VSS. Dialysis access visualized and lines intact. Flushed patient lines with 100 ml of NS to prevent clotting. 18:00- Patient resting quietly, VSS. Dialysis access visualized and lines intact. Flushed patient lines with 100 ml of NS to prevent clotting. 18:30- Patient resting quietly, VSS. Dialysis access visualized and lines intact.  Flushed patient lines with 100 ml of NS to prevent clotting. 18:57- Gave 25% Albumin 12.5 G in 50 ml IV with HD for BP 91/60.  19:00- Patient resting quietly, VSS. Dialysis access visualized and lines intact. Flushed patient lines with 150 ml of NS to prevent clotting. 19:30- Patient resting quietly, VSS. Dialysis access visualized and lines intact. Flushed patient lines with 100 ml of NS to prevent clotting. 19:56- Tx ended. VSS. All possible blood returned to patient. Central line catheter flushed with normal saline per policy. Ports disinfected with Prevantics per policy, lines disconnected, Heparin dwells instilled, and lines capped using aseptic technique. Bed locked and in the lowest position, call bell and belongings in reach. SBAR given to Primary, RN [facility] [employer]. Patient is stable at time of my departure. All Dialysis related medications have been reviewed. Admiting Diagnosis: [id number]  Pt's previous clinic- [facility]  Consent signed - Informed Consent Verified: Yes (04/20/20 1623)  [facility] Consent - on file  Hepatitis Status- HbAg negative 4/1/2020, HbAB susceptible  Machine #- Machine Number: [id number] (04/20/20 1623)  Telemetry status- Bedside telemetry, Sinus tachycardia per primary RN  Pre-dialysis wt. - Pre-Dialysis Weight: 136 kg (299 lb 13.2 oz) (04/02/20 1039)

## 2020-04-20 NOTE — PROGRESS NOTES
[facility]    ICU TEAM Progress Note    Name: Lex Dumont   : 1960   MRN: [id number]   Date: 2020      Assessment/Plan:     Current ICU Problems:  Acute hypoxic respiratory failure 2/2 COVID 19  · Retroperitoneal bleed with hemorrhagic shock  · Acute kidney injury  · Nonocclusive PE in LLL pulmonary artery -No anticoagulation given recent right retroperitoneal bleed  · MSSA pneumonia  · Diabetes mellitus  · Thrombocytopenia  · Diarrhea             Plan for today:.  1. Remains on the ventilator, trend ABGs  2. May increase Precedex if needed for vent synchrony. On Fentanyl, wean both to assess for neuro status. Patient taking a while to wake up, has been on sedation for long period plus kidney injury may be factor on residual sedatives. If mentation does not improve off sedation then may need to go for head CT. Started on scheduled hydrocodone/APAP to wean fentanyl. 3. White count downtrending. On solu-cortef 50mg q 8 hrs taper with clinical improvement   4. Cont guaifenesin and mucomyst for mucous plugging. HH stable 8.7/27.6 , continue to trend on no anticoagulants at this time  5. Nephrology following. Off CRRT, Started IHD on . No plan for dialysis today per nephrology, will dialyzed today, continues to be anuric  6. On Lantus and sliding scale. Lantus 40 units BID.     7. On PO vanc to cover for C-Diff, end on 20  8. ID following  9. Levophed PRN for hypotension. 8.  updated on patient status along with daughter.        Cardiac Gtts: None, Levophed PRN during dialysis  SBP Goal of:   >90 mmHg  MAP Goal of: > 65 mmHg  Transfusion Trigger (Hgb):  <7 g/dL     Respiratory Goals: Chlorhexidine   Optimize PEEP/Ventilation/Oxygenation  Goal Tidal Volume 6 cc/kg based on IBW  Aim for lung protective ventilation  Aggressive bronchopulmonary hygiene  SPO2 Goal: > 92%  Pulmonary toilet: NA   DVT Prophylaxis (if no, list reason): SCD's or Sequential Compression Device    GI Prophylaxis: Protonix (pantoprazole)   Nutrition: TF     IVFs: NA  Bowel Movement: Yes  Bowel Regimen: None needed at this time     Bowden Catheter Present: Yes  Glycemic Control - Insulin: Yes SSI and Lantus  Antibiotics: PO Vancomycin     Pain Medications: Fentanyl   Target RASS: 0 to -1 RASS  Sedation Medications: Precedex  CAM-ICU:  JUAREZ  Mobility: Poor and Bedrest  PT/OT: NA   Restraints: Soft wrist restraints  Discussed Plan of Care/Code Status: Full Code     T/L/D  Tubes: ETT and Orogastric Tube  Lines: Ruel Liu, Ramon   Drains: FMS    Subjective:   Progress Note: 4/20/2020      Reason for ICU Admission:  63 yo female with hx of obesity, endometrial cancer and diabetes who [facility] with worsening hypoxic respiratory failure in lieu of close exposure to COVID-19 to family including mother ( passed away) sister and brother and tested resulting + here. She presented to the hospital 3/26 and intubated 3/28. She was started on broad spectrum antibiotics and plaquenil. Developed worsening renal failure and was transferred to ICU for CRRT. Her hospital course has been further notable for development of a retroperitoneal bleed, hemorrhagic shock and sepsis and ongoing acute hypoxic respiratory failure with increasing PEEP and FIo2 requirements. Not following command, continue to wean IV sedation and pain medication. Start scheduled norco. Re-test for COVID to prepare for possible trach. If results negative will consult thoracic surgery. Palliative care following.      Active Problem List:     Problem List  Date Reviewed: 4/19/2020          Codes Class    Hypotension ICD-10-CM: I95.9  ICD-9-CM: 458.9 Acute        Retroperitoneal hemorrhage ICD-10-CM: R58  ICD-9-CM: 459.0         Acute renal failure (ARF) ([facility] [address] ICD-10-CM: N17.9  ICD-9-CM: 584.9         Encephalopathy ICD-10-CM: G93.40  ICD-9-CM: 348.30         Sepsis with multi-organ dysfunction ([facility] [address] ICD-10-CM: A41.9, R65.20  ICD-9-CM: 038.9, 995.92         Pneumonia due to methicillin susceptible Staphylococcus aureus (MSSA) ([place] [id number].) ICD-10-CM: J15.211  ICD-9-CM: 482.41         Thrombocytopenia ([place] [address].) ICD-10-CM: D69.6  ICD-9-CM: 287.5         Diarrhea ICD-10-CM: R19.7  ICD-9-CM: 787.91         [id number] virus infection ICD-10-CM: U07.1         Obesity, morbid ([place] [address].) ICD-10-CM: E66.01  ICD-9-CM: 278.01         Vaginal Pap smear following hysterectomy for malignancy ICD-10-CM: Z08, Z90.710  ICD-9-CM: V67.01         Personal history of malignant neoplasm of other parts of uterus ICD-10-CM: Z85.42  ICD-9-CM: V10.42         Endometrial cancer ([place] [address].) ICD-10-CM: C54.1  ICD-9-CM: 182.0               Past Medical History:      has a past medical history of Basal cell carcinoma, GERD (gastroesophageal reflux disease), Kidney stones, and Polyp of ureter. Past Surgical History:      has a past surgical history that includes hysteroscopy diagnostic (); pr endometrial ablation, thermal; hx  section (); hx colonoscopy; and insert arterial line (2020). Home Medications:     Prior to Admission medications    Medication Sig Start Date End Date Taking? Authorizing Provider   pantoprazole (PROTONIX) 40 mg tablet TAKE 1 TABLET BY MOUTH TWICE A DAY 18   Provider, Historical   esomeprazole (NEXIUM) 20 mg capsule Take 20 mg by mouth daily. Indications: BID    Provider, Historical   zolpidem (AMBIEN) 10 mg tablet Take 0.5 Tabs by mouth nightly as needed for Sleep. Max Daily Amount: 5 mg. 17   Rob Mandel MD   fluticasone (FLONASE) 50 mcg/actuation nasal spray Mist 1-2 spray(s) into each nostril once daily. 4/3/15   Provider, Historical   ranitidine (ZANTAC) 150 mg tablet 150 mg.    Provider, Historical       Allergies/Social/Family History:      Allergies   Allergen Reactions    Augmentin [Amoxicillin-Pot Clavulanate] Rash and Itching      Social History     Tobacco Use    Smoking status: Never Smoker    Smokeless tobacco: Never Used   Substance Use Topics    Alcohol use: Not on file      Family History   Problem Relation Age of Onset    Prostate Cancer Father         PROSTATE    Breast Cancer Sister 46        invasive poorly differentiated ductal carcinoma, Neg genetic testing.  Cancer Sister 62        melanoma stage 1       Objective:   Vital Signs:  Visit Vitals  /80   Pulse (!) 120   Temp 98.9 °F (37.2 °C)   Resp 25   Ht 5' 4\" (1.626 m)   Wt 125.1 kg (275 lb 12.8 oz)   SpO2 98%   BMI 47.34 kg/m²      O2 Device: Endotracheal tube Temp (24hrs), Av.2 °F (37.3 °C), Min:98.5 °F (36.9 °C), Max:100.5 °F (38.1 °C)           Intake/Output:     Intake/Output Summary (Last 24 hours) at 2020 1417  Last data filed at 2020 1200  Gross per 24 hour   Intake 1278 ml   Output 475 ml   Net 803 ml     Physical Exam:  General:  Sedated, on Ketamine and on the ventilator. No acute distress, morbidly obese   Eyes: Sclera anicteric. Pupils equal, round, reactive to light. Eyes open but does not track   Mouth/Throat: Orotracheal tube in place. Neck: Supple. Lungs:   Deferred, vent assisted respirations, no accessory muscle use observed. Cardiovascular:  regular rate and rhythm, no murmur, click, rub, or gallop, anasarca, pulses palpable   Abdomen:   Soft, bowel sounds hypoactive, distended. Has FMS in place with liquid stool. Extremities: No cyanosis, + edema   Skin: No rash or lesions. Musculoskeletal:  No swelling other than edema, no deformity. Lines/Devices:  Intact, no erythema, drainage, or tenderness. Psych/neuro: Limited, Sedated on ventilator.  Eyes opened, does not track and does not follow commands.       LABS AND  DATA: Personally reviewed  Recent Labs     20  1026 20  0529   WBC 15.1* 13.8*   HGB 8.7* 8.4*   HCT 27.6* 27.2*   * 119*     Recent Labs     20  0529 20  0447    136   K 5.1 4.6    102   CO2 22 23   * 59*   CREA 2.44* 1.46*   GLU 234* 240*   CA 8.7 8.5   MG 2.6* 2.3   PHOS 5.8* 3.6     Recent Labs     04/20/20  0529 04/19/20  0447   SGOT 169* 157*   * 527*   TP 5.9* 5.8*   ALB 2.4*  2.5* 2.5*  2.5*   GLOB 3.5 3.3     No results for input(s): INR, PTP, APTT, INREXT, INREXT in the last 72 hours. Recent Labs     04/20/20  1047 04/20/20  1041   PHI 7.429  --    PCO2I 31.7*  --    PO2I 141* 152*   FIO2I 0.40 0.40       Mode Rate Tidal Volume Pressure FiO2 PEEP   Assist control   430 ml  0 cm H2O 40 % 5 cm H20     Peak airway pressure: 26 cm H2O    Minute ventilation: 14 l/min      MEDS: Reviewed    04/10/2020, CT Abd/ Pelvis:  IMPRESSION:  1.  Scattered pulmonary air space disease consistent with atypical viral infection. 2.  Nonocclusive pulmonary emboli in the left lower lobe pulmonary arteries. 3.  Large right retroperitoneal hematoma with small foci of contrast within the hematoma. 4.  Enteric tube and endotracheal tube in appropriate position. 5.  Bilateral femoral venous catheters in the common iliac veins. CXR Results  (Last 48 hours)    None             ABCDEF Bundle/Checklist Completed:  Yes    SPECIAL EQUIPMENT  IHD    DISPOSITION  Stay in ICU    CRITICAL CARE CONSULTANT NOTE  I had a face to face encounter with the patient, reviewed and interpreted patient data including clinical events, labs, images, vital signs, I/O's, and examined patient. I have discussed the case and the plan and management of the patient's care with the consulting services, the bedside nurses and the respiratory therapist.      NOTE OF PERSONAL INVOLVEMENT IN CARE   This patient has a high probability of imminent, clinically significant deterioration, which requires the highest level of preparedness to intervene urgently.  I participated in the decision-making and personally managed or directed the management of the following life and organ supporting interventions that required my frequent assessment to treat or prevent imminent deterioration. I personally spent 55 minutes of critical care time. This is time spent at this critically ill patient's bedside actively involved in patient care as well as the coordination of care and discussions with the patient's family. This does not include any procedural time which has been billed separately.       Luis Haas [facility]     Critical Care Medicine  [facility]

## 2020-04-20 NOTE — PROGRESS NOTES
Infectious Disease Progress Note       Subjective:     D/W with ICU team and RN today   Had fever 100.5 4/19/20   Patient cannot given history, critically ill         Objective:    Vitals:   Patient Vitals for the past 24 hrs:   Temp Pulse Resp BP SpO2   04/20/20 1300 -- (!) 116 24 137/79 98 %   04/20/20 1200 98.9 °F (37.2 °C) (!) 113 26 130/81 98 %   04/20/20 1100 -- (!) 116 24 130/78 98 %   04/20/20 1055 -- (!) 114 30 -- 98 %   04/20/20 1000 -- (!) 113 25 142/87 98 %   04/20/20 0900 -- (!) 105 28 (!) 138/93 99 %   04/20/20 0802 -- (!) 108 28 -- 99 %   04/20/20 0800 98.5 °F (36.9 °C) 93 28 (!) 139/93 99 %   04/20/20 0700 -- (!) 104 28 -- 99 %   04/20/20 0600 -- (!) 107 28 144/88 98 %   04/20/20 0500 -- 97 28 160/74 99 %   04/20/20 0400 98.8 °F (37.1 °C) 96 28 147/85 99 %   04/20/20 0326 -- 97 28 -- 99 %   04/20/20 0300 -- (!) 113 28 97/74 99 %   04/20/20 0200 -- (!) 107 28 (!) 163/93 99 %   04/20/20 0100 -- (!) 113 28 (!) 146/128 98 %   04/20/20 0000 99.6 °F (37.6 °C) 100 28 162/78 99 %   04/19/20 2333 -- (!) 107 28 -- 99 %   04/19/20 2300 -- (!) 107 28 148/87 99 %   04/19/20 2200 -- (!) 102 28 132/75 98 %   04/19/20 2100 -- (!) 109 28 125/88 98 %   04/19/20 2000 (!) 100.5 °F (38.1 °C) (!) 102 26 140/82 98 %   04/19/20 1913 -- (!) 115 (!) 31 -- 99 %   04/19/20 1900 -- (!) 116 27 (!) 113/99 99 %   04/19/20 1845 -- (!) 113 28 139/79 98 %   04/19/20 1830 -- (!) 117 26 (!) 157/99 99 %   04/19/20 1815 -- (!) 113 22 (!) 136/113 98 %   04/19/20 1800 -- (!) 111 20 (!) 123/108 98 %   04/19/20 1745 -- (!) 104 21 (!) 119/97 99 %   04/19/20 1730 -- (!) 106 23 126/81 99 %   04/19/20 1715 -- (!) 105 21 (!) 121/94 99 %   04/19/20 1700 -- (!) 105 20 144/87 99 %   04/19/20 1645 -- (!) 108 25 (!) 128/99 99 %   04/19/20 1630 -- (!) 109 23 (!) 128/102 99 %   04/19/20 1615 -- 78 14 -- 99 %   04/19/20 1600 98.6 °F (37 °C) 95 28 (!) 150/95 99 %   04/19/20 1545 -- 94 28 -- 99 %   04/19/20 1541 -- -- 28 -- 99 %   04/19/20 1530 -- 92 29 (!) 126/106 99 %   04/19/20 1515 -- 92 23 139/84 99 %   04/19/20 1500 -- 98 23 116/82 99 %   04/19/20 1445 -- (!) 104 23 (!) 118/100 99 %   04/19/20 1430 -- 95 25 100/72 99 %   04/19/20 1415 -- 98 27 (!) 129/93 99 %   04/19/20 1400 -- 96 24 123/89 99 %   04/19/20 1345 -- 97 25 (!) 119/97 99 %   04/19/20 1330 -- 91 30 (!) 130/99 100 %       Physical Exam:  Please see ICU teams physical exam     Medications:    Current Facility-Administered Medications:     HYDROcodone-acetaminophen (NORCO) 5-325 mg per tablet 1 Tab, 1 Tab, Oral, Q6H, Lequita Oz, NP    dexmedeTOMidine (PRECEDEX) 400 mcg in 0.9% sodium chloride 100 mL infusion, 0.2-1 mcg/kg/hr, IntraVENous, TITRATE, Krystin SCHMITT NP-C, Stopped at 04/19/20 1602    0.9% sodium chloride infusion 250 mL, 250 mL, IntraVENous, PRN, MELIDA LouP, Stopped at 04/18/20 0615    guaiFENesin (ROBITUSSIN) 100 mg/5 mL oral liquid 200 mg, 200 mg, Oral, Q6H, Cassandra Cox NP-C, 200 mg at 04/20/20 1201    albuterol (PROVENTIL VENTOLIN) nebulizer solution 2.5 mg, 2.5 mg, Nebulization, Q4H PRN, Krystin SCHMITT NP-C, 2.5 mg at 04/20/20 0802    hydrocortisone Sod Succ (PF) (SOLU-CORTEF) injection 50 mg, 50 mg, IntraVENous, Q8H, Cassandra Cox NP-C, 50 mg at 04/20/20 1306    acetylcysteine (MUCOMYST) 200 mg/mL (20 %) solution 200 mg, 200 mg, Nebulization, BID RT, Marii Dempsey MD, 200 mg at 04/20/20 0802    heparin (porcine) 1,000 unit/mL injection 3,200 Units, 3,200 Units, IntraVENous, DIALYSIS PRN, Kit Hargrove MD, 3,200 Units at 04/18/20 1918    insulin glargine (LANTUS) injection 35 Units, 35 Units, SubCUTAneous, Q12H, Liborio Cristina NP, 35 Units at 04/20/20 0825    famotidine (PEPCID) 8 mg/mL oral suspension 20 mg, 20 mg, Per NG tube, DAILY, Henok White, JOSE JUAN, 20 mg at 04/20/20 0825    alteplase (CATHFLO) 2 mg in sterile water (preservative free) 2 mL injection, 2 mg, InterCATHeter, DIALYSIS PRN, Ulysses Veras MD    alteplase (CATHFLO) 2 mg in sterile water (preservative free) 2 mL injection, 2 mg, InterCATHeter, DIALYSIS PRN, Apurva Pabon MD, 2 mg at 04/20/20 1312    insulin lispro (HUMALOG) injection, , SubCUTAneous, Q6H, Claudell Donath, NP, 3 Units at 04/20/20 1306    0.9% sodium chloride infusion 250 mL, 250 mL, IntraVENous, PRN, Reece SCHMITT, NP-C    chlorhexidine (PERIDEX) 0.12 % mouthwash 15 mL, 15 mL, Oral, Q12H, Claudell Donath, NP, 15 mL at 04/20/20 0825    fentaNYL (PF) 1,500 mcg/30 mL (50 mcg/mL) infusion, 0-200 mcg/hr, IntraVENous, TITRATE, Jackie Young MD, Last Rate: 1.5 mL/hr at 04/20/20 0730, 75 mcg/hr at 04/20/20 0730    NOREPINephrine (LEVOPHED) 32,000 mcg in dextrose 5% 250 mL (128 mcg/mL) infusion, 0-50 mcg/min, IntraVENous, TITRATE, Reece SCHMITT, NP-C, Stopped at 04/19/20 1601    [Held by provider] labetaloL (NORMODYNE) tablet 100 mg, 100 mg, Oral, Q12H, Iliana Rosales NP    0.9% sodium chloride infusion, 3 mL/hr, IntraVENous, CONTINUOUS, Kylah Madison MD, Last Rate: 3 mL/hr at 04/13/20 2316, 3 mL/hr at 04/13/20 2316    0.9% sodium chloride infusion, 5 mL/hr, IntraVENous, CONTINUOUS, Kylah Madison MD, Last Rate: 5 mL/hr at 04/14/20 0127, 5 mL/hr at 04/14/20 0127    labetaloL (NORMODYNE;TRANDATE) injection 10 mg, 10 mg, IntraVENous, Q10MIN PRN, Laura Baker MD    fentaNYL citrate (PF) injection  mcg,  mcg, IntraVENous, Q1H PRN, Laura Baker MD, 100 mcg at 04/11/20 1232    white petrolatum-mineral oiL (AKWA TEARS) 83-15 % ophthalmic ointment, , Both Eyes, Q12H, Kylah Madison MD    heparin (porcine) pf 300 Units, 300 Units, InterCATHeter, PRN, Reece Hopson R, NP-C    midazolam (VERSED) injection 1-2 mg, 1-2 mg, IntraVENous, Q2H PRN, Reece Hopson R, NP-C, 2 mg at 04/19/20 0683    bacitracin 500 unit/gram packet 1 Packet, 1 Packet, Topical, PRN, Laureano Awad NP, 1 Packet at 04/01/20 0116    balsam peru-castor oiL (VENELEX) ointment, , Topical, BID, Hugh Veras, NP    sodium chloride (NS) flush 5-40 mL, 5-40 mL, IntraVENous, Q8H, Analia Madison MD, 40 mL at 04/20/20 1307    sodium chloride (NS) flush 5-40 mL, 5-40 mL, IntraVENous, PRN, Analia Bunn MD, 40 mL at 04/20/20 1307    HYDROcodone-acetaminophen (NORCO) 5-325 mg per tablet 1 Tab, 1 Tab, Oral, Q4H PRN, Patt Hobson MD, 1 Tab at 04/20/20 1306    ondansetron (ZOFRAN) injection 4 mg, 4 mg, IntraVENous, Q4H PRN, Analia Bunn MD    acetaminophen (TYLENOL) tablet 650 mg, 650 mg, Oral, Q6H PRN **OR** acetaminophen (TYLENOL) suppository 650 mg, 650 mg, Rectal, Q6H PRN, Iliana Rosales NP    glucose chewable tablet 16 g, 4 Tab, Oral, PRN, Analia Bunn MD    glucagon (GLUCAGEN) injection 1 mg, 1 mg, IntraMUSCular, PRN, Analia Bunn MD    dextrose 10% infusion 0-250 mL, 0-250 mL, IntraVENous, PRN, Analia Bunn MD      Labs:  Recent Results (from the past 24 hour(s))   GLUCOSE, POC    Collection Time: 04/19/20  5:40 PM   Result Value Ref Range    Glucose (POC) 255 (H) 65 - 100 mg/dL    Performed by Honorio FORTE    CBC W/O DIFF    Collection Time: 04/19/20  8:29 PM   Result Value Ref Range    WBC 14.2 (H) 3.6 - 11.0 K/uL    RBC 2.68 (L) 3.80 - 5.20 M/uL    HGB 8.3 (L) 11.5 - 16.0 g/dL    HCT 26.5 (L) 35.0 - 47.0 %    MCV 98.9 80.0 - 99.0 FL    MCH 31.0 26.0 - 34.0 PG    MCHC 31.3 30.0 - 36.5 g/dL    RDW 22.8 (H) 11.5 - 14.5 %    PLATELET 713 (L) 252 - 400 K/uL    MPV 11.9 8.9 - 12.9 FL    NRBC 0.1 (H) 0  WBC    ABSOLUTE NRBC 0.02 (H) 0.00 - 0.01 K/uL   GLUCOSE, POC    Collection Time: 04/19/20 11:51 PM   Result Value Ref Range    Glucose (POC) 248 (H) 65 - 100 mg/dL    Performed by Maikel Woodward Dr PANEL    Collection Time: 04/20/20  5:29 AM   Result Value Ref Range    Protein, total 5.9 (L) 6.4 - 8.2 g/dL    Albumin 2.4 (L) 3.5 - 5.0 g/dL    Globulin 3.5 2.0 - 4.0 g/dL    A-G Ratio 0.7 (L) 1.1 - 2.2      Bilirubin, total 1.6 (H) 0.2 - 1.0 MG/DL    Bilirubin, direct 0.7 (H) 0.0 - 0.2 MG/DL    Alk.  phosphatase 490 (H) 45 - 117 U/L    AST (SGOT) 169 (H) 15 - 37 U/L    ALT (SGPT) 206 (H) 12 - 78 U/L   CBC W/O DIFF    Collection Time: 04/20/20  5:29 AM   Result Value Ref Range    WBC 13.8 (H) 3.6 - 11.0 K/uL    RBC 2.74 (L) 3.80 - 5.20 M/uL    HGB 8.4 (L) 11.5 - 16.0 g/dL    HCT 27.2 (L) 35.0 - 47.0 %    MCV 99.3 (H) 80.0 - 99.0 FL    MCH 30.7 26.0 - 34.0 PG    MCHC 30.9 30.0 - 36.5 g/dL    RDW 22.8 (H) 11.5 - 14.5 %    PLATELET 729 (L) 820 - 400 K/uL    MPV 11.9 8.9 - 12.9 FL    NRBC 0.0 0  WBC    ABSOLUTE NRBC 0.00 0.00 - 0.01 K/uL   CRP, HIGH SENSITIVITY    Collection Time: 04/20/20  5:29 AM   Result Value Ref Range    CRP, High sensitivity >9.5 mg/L   RENAL FUNCTION PANEL    Collection Time: 04/20/20  5:29 AM   Result Value Ref Range    Sodium 137 136 - 145 mmol/L    Potassium 5.1 3.5 - 5.1 mmol/L    Chloride 102 97 - 108 mmol/L    CO2 22 21 - 32 mmol/L    Anion gap 13 5 - 15 mmol/L    Glucose 234 (H) 65 - 100 mg/dL     (H) 6 - 20 MG/DL    Creatinine 2.44 (H) 0.55 - 1.02 MG/DL    BUN/Creatinine ratio 46 (H) 12 - 20      GFR est AA 25 (L) >60 ml/min/1.73m2    GFR est non-AA 20 (L) >60 ml/min/1.73m2    Calcium 8.7 8.5 - 10.1 MG/DL    Phosphorus 5.8 (H) 2.6 - 4.7 MG/DL    Albumin 2.5 (L) 3.5 - 5.0 g/dL   MAGNESIUM    Collection Time: 04/20/20  5:29 AM   Result Value Ref Range    Magnesium 2.6 (H) 1.6 - 2.4 mg/dL   GLUCOSE, POC    Collection Time: 04/20/20  7:08 AM   Result Value Ref Range    Glucose (POC) 262 (H) 65 - 100 mg/dL    Performed by Luisana Veras    CBC W/O DIFF    Collection Time: 04/20/20 10:26 AM   Result Value Ref Range    WBC 15.1 (H) 3.6 - 11.0 K/uL    RBC 2.82 (L) 3.80 - 5.20 M/uL    HGB 8.7 (L) 11.5 - 16.0 g/dL    HCT 27.6 (L) 35.0 - 47.0 %    MCV 97.9 80.0 - 99.0 FL    MCH 30.9 26.0 - 34.0 PG    MCHC 31.5 30.0 - 36.5 g/dL    RDW 22.4 (H) 11.5 - 14.5 %    PLATELET 167 (L) 834 - 400 K/uL    MPV 11.5 8.9 - 12.9 FL    NRBC 0.0 0  WBC    ABSOLUTE NRBC 0.00 0.00 - 0.01 K/uL   SARS-COV-2    Collection Time: 04/20/20 10:33 AM   Result Value Ref Range    Specimen source Nasopharyngeal      SARS-CoV-2 PENDING     SARS-CoV-2 PENDING     SARS-CoV-2 PENDING     COVID-19 PENDING NEG   POC EG7    Collection Time: 04/20/20 10:41 AM   Result Value Ref Range    Calcium, ionized (POC) 1.13 1.12 - 1.32 mmol/L    FIO2 (POC) 0.40 %    pO2 (POC) 152 (H) 80 - 100 MMHG    Site DRAWN FROM ARTERIAL LINE      Device: VENT      Mode ASSIST CONTROL      Tidal volume 430 ml    Set Rate 28 bpm    PEEP/CPAP (POC) 5 cmH2O    Allens test (POC) N/A      Specimen type (POC) ARTERIAL      Total resp. rate 28     POC EG7    Collection Time: 04/20/20 10:47 AM   Result Value Ref Range    Calcium, ionized (POC) 1.12 1.12 - 1.32 mmol/L    FIO2 (POC) 0.40 %    pH (POC) 7.429 7.35 - 7.45      pCO2 (POC) 31.7 (L) 35.0 - 45.0 MMHG    pO2 (POC) 141 (H) 80 - 100 MMHG    HCO3 (POC) 21.0 (L) 22 - 26 MMOL/L    Base deficit (POC) 3 mmol/L    sO2 (POC) 99 (H) 92 - 97 %    Site DRAWN FROM ARTERIAL LINE      Device: VENT      Mode ASSIST CONTROL      Tidal volume 430 ml    Set Rate 28 bpm    PEEP/CPAP (POC) 5 cmH2O    Allens test (POC) N/A      Specimen type (POC) ARTERIAL      Total resp.  rate 28     GLUCOSE, POC    Collection Time: 04/20/20 12:00 PM   Result Value Ref Range    Glucose (POC) 248 (H) 65 - 100 mg/dL    Performed by Huyen Spring            Micro:   Blood 4/9/20       Component Value Ref Range & Units Status   Special Requests: NO SPECIAL REQUESTS    Preliminary   Culture result: NO GROWTH 4 DAYS    Preliminary   Result History       Blood 4/8/20  Component Value Ref Range & Units Status   Special Requests: NO SPECIAL REQUESTS    Final   Culture result: NO GROWTH 5 DAYS    Final   Result History               Blood: 3/31/20  Specimen Information: Blood        Component Value Ref Range & Units Status   Special Requests: NO SPECIAL REQUESTS    Final   Culture result: NO GROWTH 5 DAYS    Final   Result History              Sputum 3/31/20   Sputum        Component Value Ref Range & Units Status   Special Requests: NO SPECIAL REQUESTS    Final   GRAM STAIN FEW WBCS SEEN    Final   GRAM STAIN    Final   2+ GRAM POSITIVE COCCI IN PAIRS    Culture result: Abnormal     Final   MODERATE STAPHYLOCOCCUS AUREUS    Culture result:    Final   LIGHT NORMAL RESPIRATORY SOFIE    Susceptibility      Staphylococcus aureus     LU    Ciprofloxacin ($) <=0.5 ug/mL S    Clindamycin ($)  R    Doxycycline ($$) <=0.5 ug/mL S    Erythromycin ($$$$) >=8 ug/mL R    Gentamicin ($) <=0.5 ug/mL S    Levofloxacin ($) <=0.12 ug/mL S    Linezolid ($$$$$) 2 ug/mL S    Moxifloxacin ($$$$) <=0.25 ug/mL S    Oxacillin 0.5 ug/mL S    Rifampin ($$$$) <=0.5 ug/mL S1    Tetracycline <=1 ug/mL S    Trimeth/Sulfa <=10 ug/mL S    Vancomycin ($) 1 ug/mL S                         Imaging:  CXR 4/5/20  FINDINGS: AP radiograph of the chest was obtained.     There is been interval advancement of the endotracheal tube which now terminates  1.6 cm above the bhavik. Right upper extremity PICC and gastric decompression  tubes are again noted. There is no significant change in diffuse bilateral  heterogeneous opacities. Likely trace left pleural effusion. No pneumothorax. Stable cardiomediastinal silhouette.     IMPRESSION  IMPRESSION:   1. Interval advancement of endotracheal tube which now terminates 1.6 cm above  the bhavik. Consider slight retraction. 2. Unchanged diffuse bilateral heterogeneous opacities, consistent with  multifocal pneumonia. Likely trace left pleural effusion.           Assessment / Plan    Ms. Chavez Méndez is a 32-year-old lady with a history of nephrolithiasis, GERD, basal cell carcinoma who was admitted from [facility] with respiratory symptoms concerning for COVID 19 and tested + on 3/26/20.  Per team,  exposure to her mother with COVID 23. Per notes, intubated on 3/28/2020.   Transferred to [facility] for CRRT    Had code blue 4/9/20   Resuscitated            1) Sars-CoV2 pneumonia, respiratory failure, renal failure   Sars-CoV-2 detected 3/26/20, other viral respiratory panel negative  (records from Care everywhere )   Urine legionella and S pneumo ag negative 3/27/20  Procalcitonin 3/27 0.47, 3/28 0.44, 3/29 5.86, 3/31 13.44  , Ferritin 1985 3/31/20    4/3/20   , ferritin 992, CRP 9.65 4/5/20   IL 6  402 3/31/20  Completed azithromycin and Plaquenil ( investigational therapy without proven efficacy at this time )   S/P Actemra one dose 4/3 given in  ICU ( ( investigational therapy without proven efficacy at this time )    Was on On Heparin gtt , now off per ICU team give retroperitoneal hematoma   On IV Steroids per ICU team     2)  MSSA on sputum, pharmacy dosing vancomycin based on levels due to Penicillin allergy    Completed a 7 day course of antibiotics       3) fever/eukocytosis  Please repeat blood cx and consider changing lines out if recurrent fever and will need to re assess for antibiotic resumption   WBC was 11-16 before code blue on 4/9/20   Suspect from coding event/reactory   S/P  10 days PO Vancomycin   Started by primary team on Vancomycin IV Cefepime and flagyl empirically given marked WBC elevation and then discontinued after a course   Blood cx negative so far   Stopped Vancomycin IV on 4/13   CT A/P/C 4/10 with large hematoma R retroperitoneal    Stopped  Cefepime and Flagyl on 4/17/20            4) LLE PE on CT 4/10/20  Currently off anticoagulation   Had retroperitoneal bleed      5) Retroperitoneal hematoma on imaging 4/10/20    5) ARF:   Plans per nephrology, CRRT  R IJ    6) history of nephrolithiasis    7) history of GERD    8) basal cell carcinoma per chart        D/W with icu team today in person     Please contact with questions     Allison Padgett DO  1:32 PM

## 2020-04-20 NOTE — PROGRESS NOTES
Problem: Falls - Risk of  Goal: *Absence of Falls  Description: Document Isael Avila Fall Risk and appropriate interventions in the flowsheet. Outcome: Progressing Towards Goal  Note: Fall Risk Interventions:  Mobility Interventions: Communicate number of staff needed for ambulation/transfer, Patient to call before getting OOB, Bed/chair exit alarm    Mentation Interventions: Bed/chair exit alarm, Adequate sleep, hydration, pain control, More frequent rounding    Medication Interventions: Evaluate medications/consider consulting pharmacy, Bed/chair exit alarm    Elimination Interventions: Toileting schedule/hourly rounds              Problem: Pressure Injury - Risk of  Goal: *Prevention of pressure injury  Description: Document Aniket Scale and appropriate interventions in the flowsheet. Outcome: Progressing Towards Goal  Note: Pressure Injury Interventions:  Sensory Interventions: Assess changes in LOC, Assess need for specialty bed, Float heels, Keep linens dry and wrinkle-free, Minimize linen layers, Pad between skin to skin, Pressure redistribution bed/mattress (bed type), Turn and reposition approx. every two hours (pillows and wedges if needed)    Moisture Interventions: Check for incontinence Q2 hours and as needed, Internal/External urinary devices, Internal/External fecal devices, Offer toileting Q_hr    Activity Interventions: Pressure redistribution bed/mattress(bed type)    Mobility Interventions: HOB 30 degrees or less, Float heels, Pressure redistribution bed/mattress (bed type), Turn and reposition approx.  every two hours(pillow and wedges)    Nutrition Interventions: Document food/fluid/supplement intake    Friction and Shear Interventions: Lift sheet, Lift team/patient mobility team, HOB 30 degrees or less

## 2020-04-20 NOTE — DIABETES MGMT
[facility]  CLINICAL NURSE SPECIALIST CONSULT  PROGRAM FOR DIABETES HEALTH  Follow up Note  Presentation   Rachel Gutierrez is a 64 y.o. female admitted from OSH to [facility] ICU with SARS-COV2 needing CRRT. She is currently sedated and has been intubated since 3/28/20. Current clinical course has been complicated by steroid induced hyperglycemia. Steroids are discontinued at this time. She requires CRRT for acute renal failure r/t her sepsis and hypotension. PHM: GERD, obesity, and anxiety. New diabetes diagnosis with A1C 11.0% (3/28/2020); updated A1C 3/31/20-10.4%     Recent events:   Patient remains intubated and on ventilator, on CRRT. Continues on fentanyl drip. Off pressors. Consulted by Provider for advanced diabetes nursing assessment and care, specifically related to   [] Transitioning off Claria [place]   [x] Inpatient management strategy  [] Home management assessment  [] Survival skill education    Diabetes-related medical history  Acute complications  hyperglycemia  Neurological complications  NONE  Microvascular disease  NONE  Macrovascular disease  NONE  Other associated conditions     NONE    Diabetes medication history: NONE    Subjective   Per chart review, Ms. Norma Phelan remains very ill  and is on isolation forSARS-COV2 in ICU. I am unable to do a physical assessment of the patient at this time. Patient reports the following home diabetes self-care practices: deferred    Objective     Vital Signs   Visit Vitals  /79   Pulse (!) 116   Temp 98.9 °F (37.2 °C)   Resp 24   Ht 5' 4\" (1.626 m)   Wt 125.1 kg (275 lb 12.8 oz)   SpO2 98%   BMI 47.34 kg/m²   .    Laboratory  Lab Results   Component Value Date/Time    Hemoglobin A1c 10.4 (H) 03/31/2020 03:42 PM     No results found for: LDL, LDLC, DLDLP  Lab Results   Component Value Date/Time    Creatinine 2.44 (H) 04/20/2020 05:29 AM     Lab Results   Component Value Date/Time    Sodium 137 04/20/2020 05:29 AM    Potassium 5.1 04/20/2020 05:29 AM    Chloride 102 04/20/2020 05:29 AM    CO2 22 04/20/2020 05:29 AM    Anion gap 13 04/20/2020 05:29 AM    Glucose 234 (H) 04/20/2020 05:29 AM     (H) 04/20/2020 05:29 AM    Creatinine 2.44 (H) 04/20/2020 05:29 AM    BUN/Creatinine ratio 46 (H) 04/20/2020 05:29 AM    GFR est AA 25 (L) 04/20/2020 05:29 AM    GFR est non-AA 20 (L) 04/20/2020 05:29 AM    Calcium 8.7 04/20/2020 05:29 AM    Bilirubin, total 1.6 (H) 04/20/2020 05:29 AM    AST (SGOT) 169 (H) 04/20/2020 05:29 AM    Alk. phosphatase 490 (H) 04/20/2020 05:29 AM    Protein, total 5.9 (L) 04/20/2020 05:29 AM    Albumin 2.4 (L) 04/20/2020 05:29 AM    Albumin 2.5 (L) 04/20/2020 05:29 AM    Globulin 3.5 04/20/2020 05:29 AM    A-G Ratio 0.7 (L) 04/20/2020 05:29 AM    ALT (SGPT) 206 (H) 04/20/2020 05:29 AM     Lab Results   Component Value Date/Time    ALT (SGPT) 206 (H) 04/20/2020 05:29 AM             Evaluation   Ms Amaury Sosa, with new onset Type 2 diabetes,with A1C 10.4%. Continues on 150mg daily hydrocortisone. Basal insulin increased to 67 units eduardo (47units AM and 20 units PM. Fasting BG today 262mg/dl. Highest BG 302mg/dl over the weekend. BG trending >250mg/dl consistently. Requiring about 18units of Humalog correctional coverage. Based off how much correctional insulin she is requiring, it is appropriate to continue to increase her basal insulin. Remains on TF Nepro @ 30cc/hr      To maintain her basal metabolic needs she requires 27units daily. In addition she also needs 40 units of basal insulin to cover for her steroid AND 7units to cover for her TF (nepro @ 30cc/hr). So total insulin needs are: 74units daily. It is imperative that we maintain her BG within target range 100-180mg/dl. Recommendations   1. INCREASE daily Lantus to cover for metabolic, steroid and TF needs 40units twice daily (every 12 hours).       Follow the dosing schedule when tapering steroid:     20 mg Hydrocortisone: add 0.05 units/kg Lantus to total daily insulin dose  40 mg Hydrocortisone: add 0.1 units/kg Lantus to total daily insulin dose  50 mg Hydrocortisone: add 0.15 units/kg Lantus to total daily insulin dose  100 mg Hydrocortisone: add 0.3 units/kg Lantus to total daily insulin dose    2. Will continue to follow.     Assessment and Plan   Nursing Diagnosis Risk for unstable blood glucose pattern   Nursing Intervention Domain 4472 Decision-making Support   Nursing Interventions Examined current inpatient diabetes control   Explored factors facilitating and impeding inpatient management  Identified self-management practices impeding diabetes control  Explored corrective strategies with patient and responsible inpatient provider   Informed patient of rational for insulin strategy while hospitalized         Billing Code(s)     [x] [id number] IP subsequent hospital care - [address] [address], [facility]   Program for Diabetes Health  Access via [address] [phone]

## 2020-04-20 NOTE — PROGRESS NOTES
Transitions of Care  Will need rehab possibly LTAC at discharge  Will re test for COVID - 19 in preparation for trach placement  Palliative care team following    Patient remains in the ICU vented in precedex, fentanyl gtts. Off CRRT, now on HD. Neuro: opens eyes, does not track, not following commands. Care management is continuing to follow for transitions of care.  Maximus Larsen RN,CRM

## 2020-04-20 NOTE — PROGRESS NOTES
[address] ICU NP and spoke with patient son and dtr on phone. Medical update was provided. Daughter reviewed her list of questions re: WBC, HGB lungs etc. Also discussed sedation weaning plans and the unknowns about neuro status. Patient has had reflexive movement but nothing purposeful. When family hears of any movement, they ask if this is good. They continue to look for good news in this tough situation with many unknowns    Family confirmed their plan is getting patient home. We discussed some what ifs around getting trache and what that would look like short and long term, including PEG placement/risks and benefits, tx to [address].  asking about visitation at [facility] - tell them my understanding is there are visitor restrictions everywhere at this time. Per Guillermo Montiel \"That's not going to work. \"  This is hard to family to hear - they really want her home/want her to be in her familiar space and even asked if there would be a way to care for her at home instead of LTAC. We discussed the high LOC she will continue to need. We also talked about the many unknowns re: patient neurological status and what is known including patient has been in bed for 3+ weeks and very debilitated. Recovery in best cast scenario will be long and difficult. Guillermo Montiel did tell me that IF patient can get trach and IF she can go to LTAC and IF she does not make any gains there, he and Dimas Tilley would consider quality of life/discuss whether to continue with care, though he does not have a timeframe in mind. I checked in on their coping. Guillermo Montiel acknowledges he is not managing well particularly with shelter in place and being alone much of the time. He goes to work but had to leave early two days in a row because he is so stressed with the day to day uncertainty of if he will get his wife back.  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 Marci Frias tells me daily noon meetings are hard on him and wants to reduce to every other day (he is getting morning updates from RN). Family requested next check in Wed 12 pm. Continue full restorative measures and family anxiously awaiting retest for Covid to see if trach is even a possibility. Appreciate ICU support. Thank you for the opportunity to be involved in the care of Ms. Aurora Eastman and her family.     Cinthya Abraham, FLAKITA, Supervisee in Social Work  Palliative Medicine   [phone]

## 2020-04-21 LAB
ALBUMIN SERPL-MCNC: 2.4 G/DL (ref 3.5–5)
ANION GAP SERPL CALC-SCNC: 13 MMOL/L (ref 5–15)
BUN SERPL-MCNC: 96 MG/DL (ref 6–20)
BUN/CREAT SERPL: 45 (ref 12–20)
CALCIUM SERPL-MCNC: 8.5 MG/DL (ref 8.5–10.1)
CHLORIDE SERPL-SCNC: 101 MMOL/L (ref 97–108)
CO2 SERPL-SCNC: 22 MMOL/L (ref 21–32)
CREAT SERPL-MCNC: 2.15 MG/DL (ref 0.55–1.02)
CRP SERPL HS-MCNC: >9.5 MG/L
D DIMER PPP FEU-MCNC: 21.26 MG/L FEU (ref 0–0.65)
ERYTHROCYTE [DISTWIDTH] IN BLOOD BY AUTOMATED COUNT: 21 % (ref 11.5–14.5)
ERYTHROCYTE [DISTWIDTH] IN BLOOD BY AUTOMATED COUNT: 21.4 % (ref 11.5–14.5)
ERYTHROCYTE [DISTWIDTH] IN BLOOD BY AUTOMATED COUNT: 21.5 % (ref 11.5–14.5)
FIBRINOGEN PPP-MCNC: 585 MG/DL (ref 200–475)
GLUCOSE BLD STRIP.AUTO-MCNC: 143 MG/DL (ref 65–100)
GLUCOSE BLD STRIP.AUTO-MCNC: 184 MG/DL (ref 65–100)
GLUCOSE BLD STRIP.AUTO-MCNC: 223 MG/DL (ref 65–100)
GLUCOSE BLD STRIP.AUTO-MCNC: 239 MG/DL (ref 65–100)
GLUCOSE SERPL-MCNC: 223 MG/DL (ref 65–100)
HCT VFR BLD AUTO: 25 % (ref 35–47)
HCT VFR BLD AUTO: 26 % (ref 35–47)
HCT VFR BLD AUTO: 27 % (ref 35–47)
HGB BLD-MCNC: 8 G/DL (ref 11.5–16)
HGB BLD-MCNC: 8.1 G/DL (ref 11.5–16)
HGB BLD-MCNC: 8.4 G/DL (ref 11.5–16)
MAGNESIUM SERPL-MCNC: 2.3 MG/DL (ref 1.6–2.4)
MCH RBC QN AUTO: 30.5 PG (ref 26–34)
MCH RBC QN AUTO: 30.6 PG (ref 26–34)
MCH RBC QN AUTO: 31.3 PG (ref 26–34)
MCHC RBC AUTO-ENTMCNC: 31.1 G/DL (ref 30–36.5)
MCHC RBC AUTO-ENTMCNC: 31.2 G/DL (ref 30–36.5)
MCHC RBC AUTO-ENTMCNC: 32 G/DL (ref 30–36.5)
MCV RBC AUTO: 97.7 FL (ref 80–99)
MCV RBC AUTO: 98.1 FL (ref 80–99)
MCV RBC AUTO: 98.2 FL (ref 80–99)
NRBC # BLD: 0 K/UL (ref 0–0.01)
NRBC BLD-RTO: 0 PER 100 WBC
PHOSPHATE SERPL-MCNC: 5.8 MG/DL (ref 2.6–4.7)
PLATELET # BLD AUTO: 135 K/UL (ref 150–400)
PLATELET # BLD AUTO: 139 K/UL (ref 150–400)
PLATELET # BLD AUTO: 151 K/UL (ref 150–400)
PMV BLD AUTO: 11.8 FL (ref 8.9–12.9)
PMV BLD AUTO: 12 FL (ref 8.9–12.9)
PMV BLD AUTO: 12 FL (ref 8.9–12.9)
POTASSIUM SERPL-SCNC: 4.3 MMOL/L (ref 3.5–5.1)
RBC # BLD AUTO: 2.56 M/UL (ref 3.8–5.2)
RBC # BLD AUTO: 2.65 M/UL (ref 3.8–5.2)
RBC # BLD AUTO: 2.75 M/UL (ref 3.8–5.2)
SERVICE CMNT-IMP: ABNORMAL
SODIUM SERPL-SCNC: 136 MMOL/L (ref 136–145)
WBC # BLD AUTO: 11.8 K/UL (ref 3.6–11)
WBC # BLD AUTO: 13.2 K/UL (ref 3.6–11)
WBC # BLD AUTO: 14.1 K/UL (ref 3.6–11)

## 2020-04-21 PROCEDURE — 74011636637 HC RX REV CODE- 636/637: Performed by: NURSE PRACTITIONER

## 2020-04-21 PROCEDURE — 74011250637 HC RX REV CODE- 250/637: Performed by: NURSE PRACTITIONER

## 2020-04-21 PROCEDURE — 94003 VENT MGMT INPAT SUBQ DAY: CPT

## 2020-04-21 PROCEDURE — 74011000250 HC RX REV CODE- 250: Performed by: SURGERY

## 2020-04-21 PROCEDURE — 74011250636 HC RX REV CODE- 250/636: Performed by: INTERNAL MEDICINE

## 2020-04-21 PROCEDURE — 36415 COLL VENOUS BLD VENIPUNCTURE: CPT

## 2020-04-21 PROCEDURE — 85379 FIBRIN DEGRADATION QUANT: CPT

## 2020-04-21 PROCEDURE — 74011250637 HC RX REV CODE- 250/637: Performed by: INTERNAL MEDICINE

## 2020-04-21 PROCEDURE — 74011250636 HC RX REV CODE- 250/636: Performed by: NURSE PRACTITIONER

## 2020-04-21 PROCEDURE — 74011000250 HC RX REV CODE- 250: Performed by: NURSE PRACTITIONER

## 2020-04-21 PROCEDURE — 94640 AIRWAY INHALATION TREATMENT: CPT

## 2020-04-21 PROCEDURE — 86141 C-REACTIVE PROTEIN HS: CPT

## 2020-04-21 PROCEDURE — 82962 GLUCOSE BLOOD TEST: CPT

## 2020-04-21 PROCEDURE — 65610000006 HC RM INTENSIVE CARE

## 2020-04-21 PROCEDURE — 80069 RENAL FUNCTION PANEL: CPT

## 2020-04-21 PROCEDURE — 83735 ASSAY OF MAGNESIUM: CPT

## 2020-04-21 PROCEDURE — 90935 HEMODIALYSIS ONE EVALUATION: CPT

## 2020-04-21 PROCEDURE — 85027 COMPLETE CBC AUTOMATED: CPT

## 2020-04-21 PROCEDURE — 85384 FIBRINOGEN ACTIVITY: CPT

## 2020-04-21 RX ORDER — HYDROCORTISONE SODIUM SUCCINATE 100 MG/2ML
50 INJECTION, POWDER, FOR SOLUTION INTRAMUSCULAR; INTRAVENOUS EVERY 12 HOURS
Status: DISCONTINUED | OUTPATIENT
Start: 2020-04-21 | End: 2020-04-23

## 2020-04-21 RX ORDER — SEVELAMER CARBONATE FOR ORAL SUSPENSION 800 MG/1
1.6 POWDER, FOR SUSPENSION ORAL
Status: DISCONTINUED | OUTPATIENT
Start: 2020-04-21 | End: 2020-04-24

## 2020-04-21 RX ORDER — LABETALOL HYDROCHLORIDE 5 MG/ML
20 INJECTION, SOLUTION INTRAVENOUS
Status: DISCONTINUED | OUTPATIENT
Start: 2020-04-21 | End: 2020-05-22 | Stop reason: HOSPADM

## 2020-04-21 RX ADMIN — HEPARIN SODIUM 3200 UNITS: 1000 INJECTION INTRAVENOUS; SUBCUTANEOUS at 11:41

## 2020-04-21 RX ADMIN — OXYCODONE 5 MG: 5 TABLET ORAL at 05:15

## 2020-04-21 RX ADMIN — INSULIN LISPRO 2 UNITS: 100 INJECTION, SOLUTION INTRAVENOUS; SUBCUTANEOUS at 12:46

## 2020-04-21 RX ADMIN — HYDROCODONE BITARTRATE AND ACETAMINOPHEN 1 TABLET: 5; 325 TABLET ORAL at 01:10

## 2020-04-21 RX ADMIN — GUAIFENESIN 200 MG: 100 SOLUTION ORAL at 23:56

## 2020-04-21 RX ADMIN — ALBUTEROL SULFATE 2.5 MG: 2.5 SOLUTION RESPIRATORY (INHALATION) at 07:58

## 2020-04-21 RX ADMIN — ACETYLCYSTEINE 200 MG: 200 SOLUTION ORAL; RESPIRATORY (INHALATION) at 20:07

## 2020-04-21 RX ADMIN — MINERAL OIL AND WHITE PETROLATUM: 150; 830 OINTMENT OPHTHALMIC at 08:15

## 2020-04-21 RX ADMIN — Medication 40 ML: at 13:04

## 2020-04-21 RX ADMIN — HYDROCORTISONE SODIUM SUCCINATE 50 MG: 100 INJECTION, POWDER, FOR SOLUTION INTRAMUSCULAR; INTRAVENOUS at 05:15

## 2020-04-21 RX ADMIN — HYDROCODONE BITARTRATE AND ACETAMINOPHEN 1 TABLET: 5; 325 TABLET ORAL at 03:08

## 2020-04-21 RX ADMIN — OXYCODONE 5 MG: 5 TABLET ORAL at 12:46

## 2020-04-21 RX ADMIN — MINERAL OIL AND WHITE PETROLATUM: 150; 830 OINTMENT OPHTHALMIC at 21:00

## 2020-04-21 RX ADMIN — SODIUM CHLORIDE 10 ML: 9 INJECTION INTRAMUSCULAR; INTRAVENOUS; SUBCUTANEOUS at 05:15

## 2020-04-21 RX ADMIN — HYDROCODONE BITARTRATE AND ACETAMINOPHEN 1 TABLET: 5; 325 TABLET ORAL at 13:18

## 2020-04-21 RX ADMIN — GUAIFENESIN 200 MG: 100 SOLUTION ORAL at 17:11

## 2020-04-21 RX ADMIN — GUAIFENESIN 200 MG: 100 SOLUTION ORAL at 11:44

## 2020-04-21 RX ADMIN — CASTOR OIL AND BALSAM, PERU: 788; 87 OINTMENT TOPICAL at 17:28

## 2020-04-21 RX ADMIN — HYDROCORTISONE SODIUM SUCCINATE 50 MG: 100 INJECTION, POWDER, FOR SOLUTION INTRAMUSCULAR; INTRAVENOUS at 13:03

## 2020-04-21 RX ADMIN — FAMOTIDINE 20 MG: 40 POWDER, FOR SUSPENSION ORAL at 08:02

## 2020-04-21 RX ADMIN — ALBUTEROL SULFATE 2.5 MG: 2.5 SOLUTION RESPIRATORY (INHALATION) at 20:07

## 2020-04-21 RX ADMIN — BACITRACIN 1 PACKET: 500 OINTMENT TOPICAL at 06:57

## 2020-04-21 RX ADMIN — INSULIN GLARGINE 40 UNITS: 100 INJECTION, SOLUTION SUBCUTANEOUS at 20:20

## 2020-04-21 RX ADMIN — INSULIN LISPRO 3 UNITS: 100 INJECTION, SOLUTION INTRAVENOUS; SUBCUTANEOUS at 17:48

## 2020-04-21 RX ADMIN — CHLORHEXIDINE GLUCONATE 15 ML: 1.2 RINSE ORAL at 08:03

## 2020-04-21 RX ADMIN — SEVELAMER CARBONATE 1.6 G: 800 POWDER, FOR SUSPENSION ORAL at 11:44

## 2020-04-21 RX ADMIN — HYDROCODONE BITARTRATE AND ACETAMINOPHEN 1 TABLET: 5; 325 TABLET ORAL at 20:20

## 2020-04-21 RX ADMIN — OXYCODONE 5 MG: 5 TABLET ORAL at 17:28

## 2020-04-21 RX ADMIN — GUAIFENESIN 200 MG: 100 SOLUTION ORAL at 05:15

## 2020-04-21 RX ADMIN — OXYCODONE 5 MG: 5 TABLET ORAL at 23:56

## 2020-04-21 RX ADMIN — Medication 25 MCG/HR: at 05:34

## 2020-04-21 RX ADMIN — INSULIN LISPRO 3 UNITS: 100 INJECTION, SOLUTION INTRAVENOUS; SUBCUTANEOUS at 05:32

## 2020-04-21 RX ADMIN — CASTOR OIL AND BALSAM, PERU: 788; 87 OINTMENT TOPICAL at 08:02

## 2020-04-21 RX ADMIN — SODIUM CHLORIDE 40 ML: 9 INJECTION INTRAMUSCULAR; INTRAVENOUS; SUBCUTANEOUS at 13:04

## 2020-04-21 RX ADMIN — ACETYLCYSTEINE 200 MG: 200 SOLUTION ORAL; RESPIRATORY (INHALATION) at 07:58

## 2020-04-21 RX ADMIN — SODIUM CHLORIDE 3 ML/HR: 900 INJECTION, SOLUTION INTRAVENOUS at 01:14

## 2020-04-21 RX ADMIN — SEVELAMER CARBONATE 1.6 G: 800 POWDER, FOR SUSPENSION ORAL at 09:42

## 2020-04-21 RX ADMIN — HYDROCODONE BITARTRATE AND ACETAMINOPHEN 1 TABLET: 5; 325 TABLET ORAL at 09:41

## 2020-04-21 RX ADMIN — SEVELAMER CARBONATE 1.6 G: 800 POWDER, FOR SUSPENSION ORAL at 17:11

## 2020-04-21 RX ADMIN — INSULIN GLARGINE 40 UNITS: 100 INJECTION, SOLUTION SUBCUTANEOUS at 08:03

## 2020-04-21 RX ADMIN — CHLORHEXIDINE GLUCONATE 15 ML: 1.2 RINSE ORAL at 20:20

## 2020-04-21 RX ADMIN — HYDROCORTISONE SODIUM SUCCINATE 50 MG: 100 INJECTION, POWDER, FOR SOLUTION INTRAMUSCULAR; INTRAVENOUS at 20:20

## 2020-04-21 NOTE — PROCEDURES
[facility]  [phone]    Vitals   Pre   Post   Assessment   Pre   Post     Temp  Temp: 98.7 °F (37.1 °C) (04/21/20 0833)  98.9 LOC  Alert but not oriented same   HR   Pulse (Heart Rate): 99 (04/21/20 0845) 110 Lungs   Diminished on BIPAP. COVID-19 infection Same   B/P   BP: 137/76 (04/21/20 0845) 141/76 Cardiac   Regular Tarchycardic   Resp   Resp Rate: 22 (04/21/20 0845) 26 Skin   Warm and dry same   Pain level  Pain Intensity 1: 3 (04/20/20 1600) 0 Edema  Generalized Same   Orders:    Duration:   Start:   8362  Procedure Start Time: 5218 End:    1398 Procedure End Time: 1200 Total:   3.5hrs   Dialyzer:   Dialyzer/Set Up Inspection: Gabriel Mireles (04/21/20 0833)   K Bath:   Dialysate K (mEq/L): 2 (04/21/20 0833)   Ca Bath:   Dialysate CA (mEq/L): 2.5 (04/21/20 0833)   Na/Bicarb:   Dialysate NA (mEq/L): 140 (04/21/20 0833)   Target Fluid Removal:   Goal/Amount of Fluid to Remove (mL): 1500 mL (04/21/20 0833)   Access     Type & Location:   Right Femoral catheter. Sluggish on aspiration and flushing. No S/S of infection noted. Each catheter limb disinfected for 60 seconds per limb with alcohol swabs. Caps removed, dialysis CVC hub scrubbed with Prevantics for 5 seconds, followed by a 5 second dry time per Hospital P&P.    Labs     Obtained/Reviewed   Critical Results Called   Date when labs were drawn-  Hgb-    HGB   Date Value Ref Range Status   04/21/2020 8.1 (L) 11.5 - 16.0 g/dL Final     K-    Potassium   Date Value Ref Range Status   04/21/2020 4.3 3.5 - 5.1 mmol/L Final     Ca-   Calcium   Date Value Ref Range Status   04/21/2020 8.5 8.5 - 10.1 MG/DL Final     Bun-   BUN   Date Value Ref Range Status   04/21/2020 96 (H) 6 - 20 MG/DL Final     Creat-   Creatinine   Date Value Ref Range Status   04/21/2020 2.15 (H) 0.55 - 1.02 MG/DL Final        Medications/ Blood Products Given     Name   Dose   Route and Time     Heparin 3200 Units  Art and Darvin ports             Blood Volume Processed (BVP):   70.6L Net Fluid   Removed:  1000ml   Comments   Time Out Done: yes at 0815  Primary Nurse Rpt Pre: Tomas Haynes RN  Primary Nurse Rpt Post: Garry KIM  Pt Education: NA. Pt unresponsive  Care Plan: Per Dr's orders. Tx Summary: Arrived Pt's room. Machine setup and tested. All safety checks completed  9187 Tx. initiated with Pt sedated  0900 No changes  0915 Elevated Art pressure  0930 Access issues. Art pressure cuts on and off  1000 Access issues. BFR reduced to 350. Line connections intact. Access visible  1100. Access issues. BFR reduced to 300 Line connections intact. Access visible. 1205 Pt tolerated tx fair with access issues and asymptomatic tarchycardia. Tx ended with all possible blood returned from circuit. Lines locked with heparin as ordered. Each catheter limb disinfected for 60 seconds per limb with alcohol swabs. Dialysis CVC hubs scrubbed with Prevantics for 5 seconds, followed by a 5 second dry time per Hospital P&P, red and blue dialysis caps applied to ports. Admiting Diagnosis: COVID-19 infection  Pt's previous clinic- NA  Consent signed - Informed Consent Verified: Yes (04/21/20 8902)  Breannaita Consent - Yes on file  Hepatitis Status- Hep B Neg 04/01/20  Machine #- Machine Number: [id number] (04/21/20 9287)  Telemetry status- Continuous bedside monitor  Pre-dialysis wt. - Pre-Dialysis Weight: 118.9 kg (262 lb 2 oz) (04/21/20 8500)

## 2020-04-21 NOTE — DIABETES MGMT
[facility]  CLINICAL NURSE SPECIALIST CONSULT  PROGRAM FOR DIABETES HEALTH  Follow up Note  Presentation   Altaf  is a 64 y.o. female admitted from OSH to [facility] ICU with SARS-COV2 needing CRRT. She is currently sedated and has been intubated since 3/28/20. Current clinical course has been complicated by steroid induced hyperglycemia. Steroids are discontinued at this time. She requires CRRT for acute renal failure r/t her sepsis and hypotension. PHM: GERD, obesity, and anxiety. New diabetes diagnosis with A1C 11.0% (3/28/2020); updated A1C 3/31/20-10.4%     Recent events:   Patient remains intubated and on ventilator, on CRRT. Continues on fentanyl drip. Off pressors. Palliative team following and keeping family informed -there was discussion surrounding families thoughts re: tracheostomy. Consulted by Provider for advanced diabetes nursing assessment and care, specifically related to   [] Transitioning off Sonido Deter   [x] Inpatient management strategy  [] Home management assessment  [] Survival skill education    Diabetes-related medical history  Acute complications  hyperglycemia  Neurological complications  NONE  Microvascular disease  NONE  Macrovascular disease  NONE  Other associated conditions     NONE    Diabetes medication history: NONE    Subjective   Per chart review, Ms. Leeann Frances remains very ill  and is on isolation forSARS-COV2 in ICU. I am unable to do a physical assessment of the patient at this time. Patient reports the following home diabetes self-care practices: deferred    Objective     Vital Signs   Visit Vitals  /78   Pulse (!) 115   Temp 98.7 °F (37.1 °C)   Resp 27   Ht 5' 4\" (1.626 m)   Wt 118.9 kg (262 lb 1.6 oz)   SpO2 98%   BMI 44.99 kg/m²   .    Laboratory  Lab Results   Component Value Date/Time    Hemoglobin A1c 10.4 (H) 03/31/2020 03:42 PM     No results found for: LDL, LDLC, DLDLP  Lab Results   Component Value Date/Time    Creatinine 2.15 (H) 04/21/2020 04:37 AM     Lab Results   Component Value Date/Time    Sodium 136 04/21/2020 04:37 AM    Potassium 4.3 04/21/2020 04:37 AM    Chloride 101 04/21/2020 04:37 AM    CO2 22 04/21/2020 04:37 AM    Anion gap 13 04/21/2020 04:37 AM    Glucose 223 (H) 04/21/2020 04:37 AM    BUN 96 (H) 04/21/2020 04:37 AM    Creatinine 2.15 (H) 04/21/2020 04:37 AM    BUN/Creatinine ratio 45 (H) 04/21/2020 04:37 AM    GFR est AA 29 (L) 04/21/2020 04:37 AM    GFR est non-AA 24 (L) 04/21/2020 04:37 AM    Calcium 8.5 04/21/2020 04:37 AM    Bilirubin, total 1.6 (H) 04/20/2020 05:29 AM    AST (SGOT) 169 (H) 04/20/2020 05:29 AM    Alk. phosphatase 490 (H) 04/20/2020 05:29 AM    Protein, total 5.9 (L) 04/20/2020 05:29 AM    Albumin 2.4 (L) 04/21/2020 04:37 AM    Globulin 3.5 04/20/2020 05:29 AM    A-G Ratio 0.7 (L) 04/20/2020 05:29 AM    ALT (SGPT) 206 (H) 04/20/2020 05:29 AM     Lab Results   Component Value Date/Time    ALT (SGPT) 206 (H) 04/20/2020 05:29 AM       Evaluation   Ms Latanya Peterson, with new onset Type 2 diabetes,with A1C 10.4%. Continues on 150mg daily hydrocortisone. Basal insulin increased to 80 units daily starting yesterday evening. Fasting BG today 223mg/dl. Highest BG yesterday 262mg/dl . BG trending >200mg/dl consistently. Requiring about 15units of Humalog correctional coverage yesterday. Based off how much correctional insulin she is requiring, it is appropriate to continue to increase her basal insulin. Remains on TF Nepro @ 30cc/hr      To maintain her basal metabolic needs she requires 36units daily (renal dosing). In addition she also needs 40 units of basal insulin to cover for her steroid AND 7units to cover for her TF (nepro @ 30cc/hr). So total insulin needs are: 83units daily. It is imperative that we maintain her BG within target range 100-180mg/dl. Recommendations   1. INCREASE daily Lantus to cover for metabolic, steroid and TF needs 40units twice daily (every 12 hours).       Follow the dosing schedule when tapering steroid:     20 mg Hydrocortisone: add 0.05 units/kg Lantus to total daily insulin dose  40 mg Hydrocortisone: add 0.1 units/kg Lantus to total daily insulin dose  50 mg Hydrocortisone: add 0.15 units/kg Lantus to total daily insulin dose  100 mg Hydrocortisone: add 0.3 units/kg Lantus to total daily insulin dose    2. Will continue to follow.     Assessment and Plan   Nursing Diagnosis Risk for unstable blood glucose pattern   Nursing Intervention Domain 8330 Decision-making Support   Nursing Interventions Examined current inpatient diabetes control   Explored factors facilitating and impeding inpatient management  Identified self-management practices impeding diabetes control  Explored corrective strategies with patient and responsible inpatient provider   Informed patient of rational for insulin strategy while hospitalized         Billing Code(s)     [x] [phone] IP subsequent hospital care - [address]   Program for Diabetes Health  Access via KESHIA [address]oca [phone]

## 2020-04-21 NOTE — PROGRESS NOTES
[facility]    ICU TEAM Progress Note    Name: Rachel Gutierrez   : 1960   MRN: [id number]   Date: 2020      Assessment/Plan:     Current ICU Problems:  Acute hypoxic respiratory failure 2/2 COVID 19  · Retroperitoneal bleed with hemorrhagic shock  · Acute kidney injury  · Nonocclusive PE in LLL pulmonary artery -No anticoagulation given recent right retroperitoneal bleed  · MSSA pneumonia  · Diabetes mellitus  · Thrombocytopenia  · Diarrhea             Plan for today:.  1. Remains on the ventilator, trend ABGs  2. Off Precedex , Off Fentanyl, tracking with eyes, not following commands. Patient taking a while to wake up, has been on sedation for long period plus kidney injury may be factor on residual sedatives. If mentation does not improve off sedation then may need to go for head CT. Started on scheduled hydrocodone/APAP to wean fentanyl. 3. White count downtrending. On solu-cortef 50mg q 12 hrs tapered today. 4. Cont guaifenesin and mucomyst for mucous plugging. HH stable, continue to trend on no anticoagulants at this time  5. Nephrology following. Off CRRT, Started IHD on . Anuric. Will change melissa in AM to IJ per nephrology request. On Lantus and sliding scale. Lantus 40 units BID. Sugars remain high, will reduce steroids before increasing Lantus. 6. Completed PO vanc   7. ID following  8. Levophed PRN for hypotension. 5.  updated on patient status along with daughter.        Cardiac Gtts: None, Levophed PRN during dialysis  SBP Goal of:   >90 mmHg  MAP Goal of: > 65 mmHg  Transfusion Trigger (Hgb):  <7 g/dL     Respiratory Goals: Chlorhexidine   Optimize PEEP/Ventilation/Oxygenation  Goal Tidal Volume 6 cc/kg based on IBW  Aim for lung protective ventilation  Aggressive bronchopulmonary hygiene  SPO2 Goal: > 92%  Pulmonary toilet: NA   DVT Prophylaxis (if no, list reason): SCD's or Sequential Compression Device      GI Prophylaxis: Protonix (pantoprazole) Nutrition: TF     IVFs: NA  Bowel Movement: Yes  Bowel Regimen: None needed at this time     Bowden Catheter Present: Yes  Glycemic Control - Insulin: Yes SSI and Lantus  Antibiotics: PO Vancomycin     Pain Medications: Fentanyl   Target RASS: 0 to -1 RASS  Sedation Medications: Precedex  CAM-ICU:  JUAREZ  Mobility: Poor and Bedrest  PT/OT: NA   Restraints: Soft wrist restraints  Discussed Plan of Care/Code Status: Full Code     T/L/D  Tubes: ETT and Orogastric Tube  Lines: Ruel Liu, Ramon   Drains: FMS    Subjective:   Progress Note: 4/21/2020      Reason for ICU Admission:  65 yo female with hx of obesity, endometrial cancer and diabetes who presented to [facility] with worsening hypoxic respiratory failure in lieu of close exposure to COVID-19 to family including mother ( passed away) sister and brother and tested resulting + here. She presented to the hospital 3/26 and intubated 3/28. She was started on broad spectrum antibiotics and plaquenil. Developed worsening renal failure and was transferred to ICU for CRRT. Her hospital course has been further notable for development of a retroperitoneal bleed, hemorrhagic shock and sepsis and ongoing acute hypoxic respiratory failure with increasing PEEP and FIo2 requirements. Not following command, continue to wean IV sedation and pain medication. Start scheduled norco. Re-test for COVID to prepare for possible trach. If results negative will consult thoracic surgery. Palliative care following.      Active Problem List:     Problem List  Date Reviewed: 4/19/2020          Codes Class    Hypotension ICD-10-CM: I95.9  ICD-9-CM: 458.9 Acute        Retroperitoneal hemorrhage ICD-10-CM: R58  ICD-9-CM: 459.0         Acute renal failure (ARF) ([facility] ICD-10-CM: N17.9  ICD-9-CM: 584.9         Encephalopathy ICD-10-CM: G93.40  ICD-9-CM: 348.30         Sepsis with multi-organ dysfunction ([facility] ICD-10-CM: A41.9, R65.20  ICD-9-CM: 038.9, 995.92         Pneumonia due to methicillin susceptible Staphylococcus aureus (MSSA) ([place] [address].) ICD-10-CM: J15.211  ICD-9-CM: 482.41         Thrombocytopenia ([facility] ICD-10-CM: D69.6  ICD-9-CM: 287.5         Diarrhea ICD-10-CM: R19.7  ICD-9-CM: 787.91         TCYTV-53 virus infection ICD-10-CM: U07.1         Obesity, morbid ([place] [address].) ICD-10-CM: E66.01  ICD-9-CM: 278.01         Vaginal Pap smear following hysterectomy for malignancy ICD-10-CM: Z08, Z90.710  ICD-9-CM: V67.01         Personal history of malignant neoplasm of other parts of uterus ICD-10-CM: Z85.42  ICD-9-CM: V10.42         Endometrial cancer ([place] [address].) ICD-10-CM: C54.1  ICD-9-CM: 182.0               Past Medical History:      has a past medical history of Basal cell carcinoma, GERD (gastroesophageal reflux disease), Kidney stones, and Polyp of ureter. Past Surgical History:      has a past surgical history that includes hysteroscopy diagnostic (); pr endometrial ablation, thermal; hx  section (); hx colonoscopy; and insert arterial line (2020). Home Medications:     Prior to Admission medications    Medication Sig Start Date End Date Taking? Authorizing Provider   pantoprazole (PROTONIX) 40 mg tablet TAKE 1 TABLET BY MOUTH TWICE A DAY 18   Provider, Historical   esomeprazole (NEXIUM) 20 mg capsule Take 20 mg by mouth daily. Indications: BID    Provider, Historical   zolpidem (AMBIEN) 10 mg tablet Take 0.5 Tabs by mouth nightly as needed for Sleep. Max Daily Amount: 5 mg. 17   Elmer Mandel MD   fluticasone (FLONASE) 50 mcg/actuation nasal spray Mist 1-2 spray(s) into each nostril once daily. 4/3/15   Provider, Historical   ranitidine (ZANTAC) 150 mg tablet 150 mg.    Provider, Historical       Allergies/Social/Family History:      Allergies   Allergen Reactions    Augmentin [Amoxicillin-Pot Clavulanate] Rash and Itching      Social History     Tobacco Use    Smoking status: Never Smoker    Smokeless tobacco: Never Used   Substance Use Topics    Alcohol use: Not on file      Family History   Problem Relation Age of Onset    Prostate Cancer Father         PROSTATE    Breast Cancer Sister 46        invasive poorly differentiated ductal carcinoma, Neg genetic testing.  Cancer Sister 62        melanoma stage 1       Objective:   Vital Signs:  Visit Vitals  /76   Pulse (!) 106   Temp 99 °F (37.2 °C)   Resp 25   Ht 5' 4\" (1.626 m)   Wt 118.9 kg (262 lb 1.6 oz)   SpO2 99%   BMI 44.99 kg/m²      O2 Device: Endotracheal tube Temp (24hrs), Av.4 °F (36.9 °C), Min:98 °F (36.7 °C), Max:99 °F (37.2 °C)           Intake/Output:     Intake/Output Summary (Last 24 hours) at 2020 1730  Last data filed at 2020 1600  Gross per 24 hour   Intake 884.03 ml   Output 2875 ml   Net -1990.97 ml     Physical Exam:  General:  Sedated, on Ketamine and on the ventilator. No acute distress, morbidly obese   Eyes: Sclera anicteric. Pupils equal, round, reactive to light. Eyes open but does not track   Mouth/Throat: Orotracheal tube in place. Neck: Supple. Lungs:   Deferred, vent assisted respirations, no accessory muscle use observed. Cardiovascular:  regular rate and rhythm, no murmur, click, rub, or gallop, anasarca, pulses palpable   Abdomen:   Soft, bowel sounds hypoactive, distended. Has FMS in place with liquid stool. Extremities: No cyanosis, + edema   Skin: No rash or lesions. Musculoskeletal:  No swelling other than edema, no deformity. Lines/Devices:  Intact, no erythema, drainage, or tenderness. Psych/neuro: Limited, Sedated on ventilator.  Eyes opened, does not track and does not follow commands.       LABS AND  DATA: Personally reviewed  Recent Labs     20  1712 20  0818   WBC 14.1* 13.2*   HGB 8.4* 8.1*   HCT 27.0* 26.0*    135*     Recent Labs     20  0437 20  0529    137   K 4.3 5.1    102   CO2 22 22   BUN 96* 113*   CREA 2.15* 2.44*   * 234*   CA 8.5 8.7   MG 2.3 2.6*   PHOS 5.8* 5.8*     Recent Labs     04/21/20  0437 04/20/20  0529 04/19/20  0447   SGOT  --  169* 157*   AP  --  490* 527*   TP  --  5.9* 5.8*   ALB 2.4* 2.4*  2.5* 2.5*  2.5*   GLOB  --  3.5 3.3     No results for input(s): INR, PTP, APTT, INREXT, INREXT in the last 72 hours. Recent Labs     04/20/20  1047 04/20/20  1041   PHI 7.429  --    PCO2I 31.7*  --    PO2I 141* 152*   FIO2I 0.40 0.40       Mode Rate Tidal Volume Pressure FiO2 PEEP   Assist control   430 ml  0 cm H2O 40 % 5 cm H20     Peak airway pressure: 28 cm H2O    Minute ventilation: 17.4 l/min      MEDS: Reviewed    04/10/2020, CT Abd/ Pelvis:  IMPRESSION:  1.  Scattered pulmonary air space disease consistent with atypical viral infection. 2.  Nonocclusive pulmonary emboli in the left lower lobe pulmonary arteries. 3.  Large right retroperitoneal hematoma with small foci of contrast within the hematoma. 4.  Enteric tube and endotracheal tube in appropriate position. 5.  Bilateral femoral venous catheters in the common iliac veins. CXR Results  (Last 48 hours)    None             ABCDEF Bundle/Checklist Completed:  Yes    SPECIAL EQUIPMENT  IHD    DISPOSITION  Stay in ICU    CRITICAL CARE CONSULTANT NOTE  I had a face to face encounter with the patient, reviewed and interpreted patient data including clinical events, labs, images, vital signs, I/O's, and examined patient. I have discussed the case and the plan and management of the patient's care with the consulting services, the bedside nurses and the respiratory therapist.      NOTE OF PERSONAL INVOLVEMENT IN CARE   This patient has a high probability of imminent, clinically significant deterioration, which requires the highest level of preparedness to intervene urgently. I participated in the decision-making and personally managed or directed the management of the following life and organ supporting interventions that required my frequent assessment to treat or prevent imminent deterioration.     I personally spent 45 minutes of critical care time. This is time spent at this critically ill patient's bedside actively involved in patient care as well as the coordination of care and discussions with the patient's family. This does not include any procedural time which has been billed separately.       Krystian Munguia [facility]-BC     Critical Care Medicine  [facility]

## 2020-04-21 NOTE — PROGRESS NOTES
NUTRITION COMPLETE ASSESSMENT    RECOMMENDATIONS:   Benefiber     Interventions/Plan:   Food/Nutrient Delivery:   Modify rate, concentration, composition, and schedule    Assessment:   Reason for Assessment: Reassessment    Tube Feeding: Nepro @ 30 ml/hr with 3 packets Prosource daily and 50 ml water flush q 6 hr  Diet: NPO  Nutritionally Significant Medications: [x] Reviewed & Includes: Fentanyl, hydrocortisone, Lantus, correction scale insulin, Renvela    Subjective: Staff Interviewed    Objective:  Ms Leandro Pike transferred from [facility] d/t worsening renal function requiring CRRT. Noted: JUANCHO d/t ATN-transitioned off [facility] to [facility] 4/18; acute hypoxic respiratory failure d/t COVID-19 and bacterial PNA, intubated 3/28, trach placement once COVID negative; PEA arrest 4/9, large retroperitoneal bleed with hemorrhagic shock-off pressors; hyperglycemia. Good to see patient tolerating enteral feeding well. Flexiseal in place-0-675 ml output per day; empiric treatment for CDI completed. May wish to consider adding Benefiber to bulk stool. Tube feeding as ordered is providing 720 ml, 1455 calories, 102 gm protein and 900 ml free water (tube feeding/flush) per day to meet estimated needs. Phosphorus elevated even though on renal formula-Renvela added today. Other lytes WNL. BG >200 mg/dl-Lantus adjusted to bid; hopefully will see improvement soon. Estimated Nutrition Needs:   Kcals/day: 1350 Kcals/day([id number] (25-28 kcal/kg IBW)  Protein: 108 g(2g/kg IBW)  Fluid: (1 ml/kcal)  Based On: Kcal/kg - specify (Comment)  Weight Used: IBW(54 kg)    Pt expected to meet estimated nutrient needs:  [x]   Yes     []  No  [] Unable to predict at this time  Nutrition Diagnosis:   1. Inadequate protein-energy intake related to hemodynamic instability as evidenced by temporary cessation/reduction in enteral feeding.-resolved. Goals:     Tube feeding to meet 90% estimated protein needs x 5-7 days.      Monitoring & Evaluation:    - Enteral/parenteral nutrition intake   - Electrolyte and renal profile, CV-pulmonary, Weight/weight change, Glucose profile, GI    Previous Nutrition Goals Met: Yes  Previous Recommendations: N/A    Education & Discharge Needs:   [x] None Identified   [] Identified and addressed    [x] Participated in care plan, discharge planning, and/or interdisciplinary rounds        Cultural, [employer]ian and ethnic food preferences identified:  None    Skin Integrity: [x]Intact  []Other  Edema: []None [x] 2+ generalized, genital; 2+ pitting B/L upper and lower extremities  Last BM: 4/21  Food Allergies: [x]None []Other    Anthropometrics:    Weight Loss Metrics 4/21/2020 1/23/2020 1/22/2019 1/16/2018 1/9/2017 7/6/2016 12/14/2015   Today's Wt 262 lb 1.6 oz 270 lb 12.8 oz 278 lb 279 lb 275 lb 234 lb 213 lb 6.4 oz   BMI 44.99 kg/m2 43.73 kg/m2 44.89 kg/m2 45.05 kg/m2 44.39 kg/m2 37.79 kg/m2 34.46 kg/m2      Last 3 Recorded Weights in this Encounter    04/20/20 1300 04/21/20 0300 04/21/20 1237   Weight: 125.1 kg (275 lb 12.8 oz) 118.9 kg (262 lb 1.6 oz) 118.9 kg (262 lb 1.6 oz)      Weight Source: Bed  Height: 5' 4\" (162.6 cm),    Body mass index is 44.99 kg/m².      IBW : 54.4 kg (120 lb), % IBW (Calculated): 218.42 %   ,      Labs:    Lab Results   Component Value Date/Time    Sodium 136 04/21/2020 04:37 AM    Potassium 4.3 04/21/2020 04:37 AM    Chloride 101 04/21/2020 04:37 AM    CO2 22 04/21/2020 04:37 AM    Glucose 223 (H) 04/21/2020 04:37 AM    BUN 96 (H) 04/21/2020 04:37 AM    Creatinine 2.15 (H) 04/21/2020 04:37 AM    Calcium 8.5 04/21/2020 04:37 AM    Magnesium 2.3 04/21/2020 04:37 AM    Phosphorus 5.8 (H) 04/21/2020 04:37 AM    Albumin 2.4 (L) 04/21/2020 04:37 AM     Lab Results   Component Value Date/Time    Hemoglobin A1c 10.4 (H) 03/31/2020 03:42 PM     Lab Results   Component Value Date/Time    Glucose (POC) 184 (H) 04/21/2020 11:42 AM      Lab Results   Component Value Date/Time    ALT (SGPT) 206 (H) 04/20/2020 05:29 AM    AST (SGOT) 169 (H) 04/20/2020 05:29 AM    Alk.  phosphatase 490 (H) 04/20/2020 05:29 AM    Bilirubin, direct 0.7 (H) 04/20/2020 05:29 AM    Bilirubin, total 1.6 (H) 04/20/2020 05:29 AM        Melani Eaton RD [facility]

## 2020-04-21 NOTE — PROGRESS NOTES
Infectious Disease Progress Note       Subjective:     D/W with ICU team and RN today   No fevers that are high recently ( last 100.5 on 4/19/20 )  Patient cannot given history, critically ill         Objective:    Vitals:   Patient Vitals for the past 24 hrs:   Temp Pulse Resp BP SpO2   04/21/20 1300 -- (!) 115 23 152/89 98 %   04/21/20 1209 -- (!) 115 29 141/75 98 %   04/21/20 1200 98.5 °F (36.9 °C) (!) 114 28 141/76 98 %   04/21/20 1150 -- (!) 118 27 135/72 98 %   04/21/20 1130 -- (!) 115 26 141/82 98 %   04/21/20 1115 -- (!) 115 29 133/72 99 %   04/21/20 1100 -- (!) 115 27 135/78 98 %   04/21/20 1045 -- (!) 115 27 138/76 98 %   04/21/20 1030 -- (!) 114 26 131/73 98 %   04/21/20 1015 -- (!) 112 27 138/76 98 %   04/21/20 1000 -- (!) 112 26 137/76 99 %   04/21/20 0945 -- 98 27 129/79 99 %   04/21/20 0930 -- 98 25 129/76 99 %   04/21/20 0915 -- 98 23 121/79 98 %   04/21/20 0900 -- 98 23 135/77 98 %   04/21/20 0845 -- 99 22 137/76 98 %   04/21/20 0833 98.7 °F (37.1 °C) 99 20 138/72 99 %   04/21/20 0800 98.3 °F (36.8 °C) 92 20 131/73 99 %   04/21/20 0758 -- 91 30 -- 99 %   04/21/20 0700 -- 93 22 131/72 99 %   04/21/20 0600 -- 92 25 135/72 97 %   04/21/20 0500 -- 91 18 125/78 99 %   04/21/20 0400 -- 92 20 117/76 99 %   04/21/20 0335 -- 99 24 -- 95 %   04/21/20 0300 -- 95 22 -- 100 %   04/21/20 0200 -- 92 24 118/74 99 %   04/21/20 0100 -- 97 24 126/70 98 %   04/21/20 0000 98.6 °F (37 °C) 93 24 121/73 98 %   04/20/20 2334 -- 99 27 -- 98 %   04/20/20 2300 -- 95 24 115/67 98 %   04/20/20 2200 -- 97 24 111/75 99 %   04/20/20 2100 -- (!) 101 24 105/67 98 %   04/20/20 2026 -- (!) 102 27 -- 98 %   04/20/20 2000 98 °F (36.7 °C) (!) 105 18 111/74 98 %   04/20/20 1956 98 °F (36.7 °C) (!) 101 18 111/74 98 %   04/20/20 1945 -- (!) 110 21 108/86 98 %   04/20/20 1930 -- (!) 106 21 97/74 98 %   04/20/20 1915 -- (!) 106 18 92/76 98 %   04/20/20 1900 -- (!) 107 20 90/76 98 %   04/20/20 1845 -- (!) 111 16 96/70 98 %   04/20/20 1830 -- (!) 116 25 (!) 119/93 98 %   04/20/20 1815 -- (!) 119 23 150/85 98 %   04/20/20 1800 -- (!) 115 25 (!) 145/94 98 %   04/20/20 1745 -- (!) 115 24 (!) 148/98 98 %   04/20/20 1730 -- (!) 118 22 136/89 98 %   04/20/20 1715 -- (!) 113 25 (!) 140/94 98 %   04/20/20 1700 -- (!) 113 24 (!) 133/93 98 %   04/20/20 1645 -- (!) 115 24 149/84 99 %   04/20/20 1630 -- (!) 107 21 142/79 99 %   04/20/20 1623 98.2 °F (36.8 °C) (!) 103 23 118/42 99 %   04/20/20 1600 98.5 °F (36.9 °C) (!) 102 21 105/72 99 %   04/20/20 1552 -- (!) 103 29 -- 98 %   04/20/20 1500 -- (!) 106 20 99/64 98 %   04/20/20 1400 -- (!) 120 25 129/80 98 %       Physical Exam:  Please see ICU teams physical exam     Medications:    Current Facility-Administered Medications:     sevelamer carbonate (RENVELA) oral powder 1.6 g, 1.6 g, Oral, TID WITH MEALS, Kit Hargrove MD, 1.6 g at 04/21/20 1144    HYDROcodone-acetaminophen (NORCO) 5-325 mg per tablet 1 Tab, 1 Tab, Oral, Q6H, Michael Maloney NP, 1 Tab at 04/21/20 0941    oxyCODONE IR (ROXICODONE) tablet 5 mg, 5 mg, Per NG tube, Q6H, Michael Maloney NP, 5 mg at 04/21/20 1246    insulin glargine (LANTUS) injection 40 Units, 40 Units, SubCUTAneous, Q12H, Michael Maloney NP, 40 Units at 04/21/20 0803    alteplase (CATHFLO) 1 mg in sterile water (preservative free) 1 mL injection, 1 mg, InterCATHeter, PRN, Michael Maloney, JOSE JUAN, 1 mg at 04/20/20 1836    albumin human 25% (BUMINATE) solution 12.5 g, 12.5 g, IntraVENous, DIALYSIS PRN, Kit Hargrove MD, 12.5 g at 04/20/20 1857    dexmedeTOMidine (PRECEDEX) 400 mcg in 0.9% sodium chloride 100 mL infusion, 0.2-1 mcg/kg/hr, IntraVENous, TITRATE, Anita SCHMITT NP-C, Stopped at 04/19/20 1602    0.9% sodium chloride infusion 250 mL, 250 mL, IntraVENous, PRN, DOMI Robertson, Stopped at 04/18/20 0615    guaiFENesin (ROBITUSSIN) 100 mg/5 mL oral liquid 200 mg, 200 mg, Oral, Q6H, Cassandra Cox, NP-C, 200 mg at 04/21/20 1144    albuterol (PROVENTIL VENTOLIN) nebulizer solution 2.5 mg, 2.5 mg, Nebulization, Q4H PRN, Krystin Grissom R NP-C, 2.5 mg at 04/21/20 0758    hydrocortisone Sod Succ (PF) (SOLU-CORTEF) injection 50 mg, 50 mg, IntraVENous, Q8H, Cassandra Cox NP-C, 50 mg at 04/21/20 1303    acetylcysteine (MUCOMYST) 200 mg/mL (20 %) solution 200 mg, 200 mg, Nebulization, BID RT, Marii Dempsey MD, 200 mg at 04/21/20 0758    heparin (porcine) 1,000 unit/mL injection 3,200 Units, 3,200 Units, IntraVENous, DIALYSIS PRN, Kit Hargrove MD, 3,200 Units at 04/21/20 1141    famotidine (PEPCID) 8 mg/mL oral suspension 20 mg, 20 mg, Per NG tube, DAILY, Ivette White Fam, NP, 20 mg at 04/21/20 0802    alteplase (CATHFLO) 2 mg in sterile water (preservative free) 2 mL injection, 2 mg, InterCATHeter, DIALYSIS PRN, Ulysses Veras MD    alteplase (CATHFLO) 2 mg in sterile water (preservative free) 2 mL injection, 2 mg, InterCATHeter, DIALYSIS PRN, Ulysses Veras MD, 2 mg at 04/15/20 0756    insulin lispro (HUMALOG) injection, , SubCUTAneous, Q6H, Lequita Oz, NP, 2 Units at 04/21/20 1246    0.9% sodium chloride infusion 250 mL, 250 mL, IntraVENous, PRN, Krystin Jaciel SCHMITT, NP-C    chlorhexidine (PERIDEX) 0.12 % mouthwash 15 mL, 15 mL, Oral, Q12H, Lequita Oz, NP, 15 mL at 04/21/20 0803    fentaNYL (PF) 1,500 mcg/30 mL (50 mcg/mL) infusion, 0-200 mcg/hr, IntraVENous, TITRATE, DOMI Martin, Last Rate: 0.5 mL/hr at 04/21/20 0534, 25 mcg/hr at 04/21/20 0534    NOREPINephrine (LEVOPHED) 32,000 mcg in dextrose 5% 250 mL (128 mcg/mL) infusion, 0-50 mcg/min, IntraVENous, TITRATE, MANDA NewsomeC, Stopped at 04/19/20 1601    [Held by provider] labetaloL (NORMODYNE) tablet 100 mg, 100 mg, Oral, Q12H, Iliana Rosales NP    0.9% sodium chloride infusion, 3 mL/hr, IntraVENous, CONTINUOUS, Isacc Madison MD, Last Rate: 3 mL/hr at 04/21/20 0114, 3 mL/hr at 04/21/20 0114    0.9% sodium chloride infusion, 5 mL/hr, IntraVENous, CONTINUOUS, Sheri Madison MD, Last Rate: 5 mL/hr at 04/14/20 0127, 5 mL/hr at 04/14/20 0127    labetaloL (NORMODYNE;TRANDATE) injection 10 mg, 10 mg, IntraVENous, Q10MIN PRN, Sheri Jin MD    fentaNYL citrate (PF) injection  mcg,  mcg, IntraVENous, Q1H PRN, Kylah Triana MD, 100 mcg at 04/20/20 1417    white petrolatum-mineral oiL (AKWA TEARS) 83-15 % ophthalmic ointment, , Both Eyes, Q12H, Sheri Madison MD    heparin (porcine) pf 300 Units, 300 Units, InterCATHeter, PRN, Clearance Kvng SCHMITT NP-C    midazolam (VERSED) injection 1-2 mg, 1-2 mg, IntraVENous, Q2H PRN, Clearance Kvng SCHMITT NP-C, 2 mg at 04/19/20 1833    bacitracin 500 unit/gram packet 1 Packet, 1 Packet, Topical, PRN, Carlos Pierson NP, 1 Packet at 04/21/20 8491    balsam peru-castor oiL (VENELEX) ointment, , Topical, BID, Kirk Gordon NP    sodium chloride (NS) flush 5-40 mL, 5-40 mL, IntraVENous, Q8H, Sheri Madison MD, 40 mL at 04/21/20 1304    sodium chloride (NS) flush 5-40 mL, 5-40 mL, IntraVENous, PRN, Kylah Triana MD, 40 mL at 04/21/20 1304    HYDROcodone-acetaminophen (NORCO) 5-325 mg per tablet 1 Tab, 1 Tab, Oral, Q4H PRN, Kylah Triana MD, 1 Tab at 04/20/20 1306    ondansetron (ZOFRAN) injection 4 mg, 4 mg, IntraVENous, Q4H PRN, Sheri Madison MD    acetaminophen (TYLENOL) tablet 650 mg, 650 mg, Oral, Q6H PRN **OR** acetaminophen (TYLENOL) suppository 650 mg, 650 mg, Rectal, Q6H PRN, Iliana Rosales, NP    glucose chewable tablet 16 g, 4 Tab, Oral, PRN, Sheri Madison MD    glucagon (GLUCAGEN) injection 1 mg, 1 mg, IntraMUSCular, PRN, Sheri Madison MD    dextrose 10% infusion 0-250 mL, 0-250 mL, IntraVENous, PRN, Sheri Madison MD      Labs:  Recent Results (from the past 24 hour(s))   CBC W/O DIFF    Collection Time: 04/20/20  3:46 PM   Result Value Ref Range    WBC 14.6 (H) 3.6 - 11.0 K/uL    RBC 2.63 (L) 3.80 - 5.20 M/uL    HGB 8.0 (L) 11.5 - 16.0 g/dL    HCT 26.1 (L) 35.0 - 47.0 %    MCV 99.2 (H) 80.0 - 99.0 FL    MCH 30.4 26.0 - 34.0 PG    MCHC 30.7 30.0 - 36.5 g/dL    RDW 21.8 (H) 11.5 - 14.5 %    PLATELET 567 (L) 528 - 400 K/uL    MPV 11.9 8.9 - 12.9 FL    NRBC 0.0 0  WBC    ABSOLUTE NRBC 0.00 0.00 - 0.01 K/uL   GLUCOSE, POC    Collection Time: 04/20/20  6:22 PM   Result Value Ref Range    Glucose (POC) 178 (H) 65 - 100 mg/dL    Performed by Lisa Choi    CBC W/O DIFF    Collection Time: 04/20/20  9:22 PM   Result Value Ref Range    WBC 12.4 (H) 3.6 - 11.0 K/uL    RBC 2.63 (L) 3.80 - 5.20 M/uL    HGB 8.1 (L) 11.5 - 16.0 g/dL    HCT 25.5 (L) 35.0 - 47.0 %    MCV 97.0 80.0 - 99.0 FL    MCH 30.8 26.0 - 34.0 PG    MCHC 31.8 30.0 - 36.5 g/dL    RDW 21.5 (H) 11.5 - 14.5 %    PLATELET 344 (L) 511 - 400 K/uL    MPV 11.8 8.9 - 12.9 FL    NRBC 0.0 0  WBC    ABSOLUTE NRBC 0.00 0.00 - 0.01 K/uL   GLUCOSE, POC    Collection Time: 04/20/20 11:30 PM   Result Value Ref Range    Glucose (POC) 252 (H) 65 - 100 mg/dL    Performed by Christiana Pederson    CBC W/O DIFF    Collection Time: 04/21/20  4:37 AM   Result Value Ref Range    WBC 11.8 (H) 3.6 - 11.0 K/uL    RBC 2.56 (L) 3.80 - 5.20 M/uL    HGB 8.0 (L) 11.5 - 16.0 g/dL    HCT 25.0 (L) 35.0 - 47.0 %    MCV 97.7 80.0 - 99.0 FL    MCH 31.3 26.0 - 34.0 PG    MCHC 32.0 30.0 - 36.5 g/dL    RDW 21.5 (H) 11.5 - 14.5 %    PLATELET 924 (L) 929 - 400 K/uL    MPV 12.0 8.9 - 12.9 FL    NRBC 0.0 0  WBC    ABSOLUTE NRBC 0.00 0.00 - 0.01 K/uL   CRP, HIGH SENSITIVITY    Collection Time: 04/21/20  4:37 AM   Result Value Ref Range    CRP, High sensitivity >9.5 mg/L   RENAL FUNCTION PANEL    Collection Time: 04/21/20  4:37 AM   Result Value Ref Range    Sodium 136 136 - 145 mmol/L    Potassium 4.3 3.5 - 5.1 mmol/L    Chloride 101 97 - 108 mmol/L    CO2 22 21 - 32 mmol/L    Anion gap 13 5 - 15 mmol/L    Glucose 223 (H) 65 - 100 mg/dL BUN 96 (H) 6 - 20 MG/DL    Creatinine 2.15 (H) 0.55 - 1.02 MG/DL    BUN/Creatinine ratio 45 (H) 12 - 20      GFR est AA 29 (L) >60 ml/min/1.73m2    GFR est non-AA 24 (L) >60 ml/min/1.73m2    Calcium 8.5 8.5 - 10.1 MG/DL    Phosphorus 5.8 (H) 2.6 - 4.7 MG/DL    Albumin 2.4 (L) 3.5 - 5.0 g/dL   MAGNESIUM    Collection Time: 04/21/20  4:37 AM   Result Value Ref Range    Magnesium 2.3 1.6 - 2.4 mg/dL   GLUCOSE, POC    Collection Time: 04/21/20  5:25 AM   Result Value Ref Range    Glucose (POC) 223 (H) 65 - 100 mg/dL    Performed by Lashay Chery    CBC W/O DIFF    Collection Time: 04/21/20  8:18 AM   Result Value Ref Range    WBC 13.2 (H) 3.6 - 11.0 K/uL    RBC 2.65 (L) 3.80 - 5.20 M/uL    HGB 8.1 (L) 11.5 - 16.0 g/dL    HCT 26.0 (L) 35.0 - 47.0 %    MCV 98.1 80.0 - 99.0 FL    MCH 30.6 26.0 - 34.0 PG    MCHC 31.2 30.0 - 36.5 g/dL    RDW 21.4 (H) 11.5 - 14.5 %    PLATELET 267 (L) 307 - 400 K/uL    MPV 12.0 8.9 - 12.9 FL    NRBC 0.0 0  WBC    ABSOLUTE NRBC 0.00 0.00 - 0.01 K/uL   D DIMER    Collection Time: 04/21/20  8:18 AM   Result Value Ref Range    D-dimer 21.26 (H) 0.00 - 0.65 mg/L FEU   FIBRINOGEN    Collection Time: 04/21/20  8:18 AM   Result Value Ref Range    Fibrinogen 585 (H) 200 - 475 mg/dL   GLUCOSE, POC    Collection Time: 04/21/20 11:42 AM   Result Value Ref Range    Glucose (POC) 184 (H) 65 - 100 mg/dL    Performed by Gianna Ackerman            Micro:   Blood 4/9/20       Component Value Ref Range & Units Status   Special Requests: NO SPECIAL REQUESTS    Preliminary   Culture result: NO GROWTH 4 DAYS    Preliminary   Result History       Blood 4/8/20  Component Value Ref Range & Units Status   Special Requests: NO SPECIAL REQUESTS    Final   Culture result: NO GROWTH 5 DAYS    Final   Result History               Blood: 3/31/20  Specimen Information: Blood        Component Value Ref Range & Units Status   Special Requests: NO SPECIAL REQUESTS    Final   Culture result: NO GROWTH 5 DAYS    Final Result History              Sputum 3/31/20   Sputum        Component Value Ref Range & Units Status   Special Requests: NO SPECIAL REQUESTS    Final   GRAM STAIN FEW WBCS SEEN    Final   GRAM STAIN    Final   2+ GRAM POSITIVE COCCI IN PAIRS    Culture result: Abnormal     Final   MODERATE STAPHYLOCOCCUS AUREUS    Culture result:    Final   LIGHT NORMAL RESPIRATORY SOFIE    Susceptibility      Staphylococcus aureus     LU    Ciprofloxacin ($) <=0.5 ug/mL S    Clindamycin ($)  R    Doxycycline ($$) <=0.5 ug/mL S    Erythromycin ($$$$) >=8 ug/mL R    Gentamicin ($) <=0.5 ug/mL S    Levofloxacin ($) <=0.12 ug/mL S    Linezolid ($$$$$) 2 ug/mL S    Moxifloxacin ($$$$) <=0.25 ug/mL S    Oxacillin 0.5 ug/mL S    Rifampin ($$$$) <=0.5 ug/mL S1    Tetracycline <=1 ug/mL S    Trimeth/Sulfa <=10 ug/mL S    Vancomycin ($) 1 ug/mL S                         Imaging:  CXR 4/5/20  FINDINGS: AP radiograph of the chest was obtained.     There is been interval advancement of the endotracheal tube which now terminates  1.6 cm above the bhavik. Right upper extremity PICC and gastric decompression  tubes are again noted. There is no significant change in diffuse bilateral  heterogeneous opacities. Likely trace left pleural effusion. No pneumothorax. Stable cardiomediastinal silhouette.     IMPRESSION  IMPRESSION:   1. Interval advancement of endotracheal tube which now terminates 1.6 cm above  the bhavik. Consider slight retraction. 2. Unchanged diffuse bilateral heterogeneous opacities, consistent with  multifocal pneumonia. Likely trace left pleural effusion.           Assessment / Plan    Ms. Xavi Perez is a 61-year-old lady with a history of nephrolithiasis, GERD, basal cell carcinoma who was admitted from [facility] with respiratory symptoms concerning for COVID 19 and tested + on 3/26/20. Per team,  exposure to her mother with COVID 23. Per notes, intubated on 3/28/2020.   Transferred to [facility] for CRRT    Had code blue 4/9/20 Resuscitated            1) Sars-CoV2 pneumonia, respiratory failure, renal failure   Sars-CoV-2 detected 3/26/20, other viral respiratory panel negative  (records from Care [facility] )   Urine legionella and S pneumo ag negative 3/27/20  Procalcitonin 3/27 0.47, 3/28 0.44, 3/29 5.86, 3/31 13.44  , Ferritin 1985 3/31/20    4/3/20   , ferritin 992, CRP 9.65 4/5/20   IL 6  402 3/31/20  Completed azithromycin and Plaquenil ( investigational therapy without proven efficacy at this time )   S/P Actemra one dose 4/3 given in  ICU ( ( investigational therapy without proven efficacy at this time )    Was on On Heparin gtt , discontinued  per ICU team give retroperitoneal hematoma later   On IV Steroids per ICU team     2)  MSSA on sputum, pharmacy dosing vancomycin based on levels due to Penicillin allergy    Completed a 7 day course of antibiotics       3) fever/Leukocytosis: both improved   Please repeat blood cx and consider changing lines out if recurrent fever and will need to re assess for antibiotic resumption   WBC was 11-16 before code blue on 4/9/20   Suspect from coding event/reactory   S/P  10 days PO Vancomycin for empiric CDI  Started by primary team on Vancomycin IV Cefepime and flagyl empirically given marked WBC elevation and then discontinued after a course as well   Now off all antibiotics   Blood cx negative so far   Stopped Vancomycin IV on 4/13   CT A/P/C 4/10 with large hematoma R retroperitoneal    Stopped  Cefepime and Flagyl on 4/17/20            4) LLE PE on CT 4/10/20  Currently off anticoagulation   Had retroperitoneal bleed      5) Retroperitoneal hematoma on imaging 4/10/20    5) ARF:   Plans per nephrology, CRRT  R IJ    6) history of nephrolithiasis    7) history of GERD    8) basal cell carcinoma per chart        D/W with icu team today in person . I will sign off at this time.  Please contact with questions     Ephraim Haynes,   1:18 PM

## 2020-04-21 NOTE — PROGRESS NOTES
[facility]   [address] [facility], [facility] [address], [facility]  Phone: [phone]   [phone]       Nephrology Progress Note  Hesham Escobar     1960     [id number]  Date of Admission : 3/31/2020  04/21/20    CC: Follow up for JUANCHO      Assessment and Plan   JUANCHO :  - 2/2 ATN  - CRRT switched to daily IHD on 4/18  - HD today w/ 1 Kg UF goal    - need a new line : will d/w Intensivist. Perhaps can try IJ line     Lytes  -  HyperPhos : start Phos binders w/ TF    COVID-19 +ve   Acute Hypoxic Resp Failure   - On Vent   - completed Plaquenil. On Vit C, Zinc   - s/p Tocilizumab      RP hematoma:  - needing PRN transfusions  - Hb stable now     Cardiac arrest 4/9    Type II DM   - Insulin per primary team      Morbid Obesity        Interval History:  BG high   Phos High   BP stable after 1.5L removal w/ HD yesterday   Had significant line issues w/ HD for last 2 Rx  Per nursing staff --> MS slightly better     PT NOT EXAMINED in line with ASN and RPA GUIDELINES ON MANAGING COVID-19 PTS WITH RENAL ISSUES. Examination findings discussed personally with the examining Physician team member      Review of Systems: Review of systems not obtained due to patient factors.     Current Medications:   Current Facility-Administered Medications   Medication Dose Route Frequency    sevelamer carbonate (RENVELA) oral powder 1.6 g  1.6 g Oral TID WITH MEALS    HYDROcodone-acetaminophen (NORCO) 5-325 mg per tablet 1 Tab  1 Tab Oral Q6H    oxyCODONE IR (ROXICODONE) tablet 5 mg  5 mg Per NG tube Q6H    insulin glargine (LANTUS) injection 40 Units  40 Units SubCUTAneous Q12H    alteplase (CATHFLO) 1 mg in sterile water (preservative free) 1 mL injection  1 mg InterCATHeter PRN    albumin human 25% (BUMINATE) solution 12.5 g  12.5 g IntraVENous DIALYSIS PRN    dexmedeTOMidine (PRECEDEX) 400 mcg in 0.9% sodium chloride 100 mL infusion  0.2-1 mcg/kg/hr IntraVENous TITRATE    0.9% sodium chloride infusion 250 mL  250 mL IntraVENous PRN    guaiFENesin (ROBITUSSIN) 100 mg/5 mL oral liquid 200 mg  200 mg Oral Q6H    albuterol (PROVENTIL VENTOLIN) nebulizer solution 2.5 mg  2.5 mg Nebulization Q4H PRN    hydrocortisone Sod Succ (PF) (SOLU-CORTEF) injection 50 mg  50 mg IntraVENous Q8H    acetylcysteine (MUCOMYST) 200 mg/mL (20 %) solution 200 mg  200 mg Nebulization BID RT    heparin (porcine) 1,000 unit/mL injection 3,200 Units  3,200 Units IntraVENous DIALYSIS PRN    famotidine (PEPCID) 8 mg/mL oral suspension 20 mg  20 mg Per NG tube DAILY    alteplase (CATHFLO) 2 mg in sterile water (preservative free) 2 mL injection  2 mg InterCATHeter DIALYSIS PRN    alteplase (CATHFLO) 2 mg in sterile water (preservative free) 2 mL injection  2 mg InterCATHeter DIALYSIS PRN    insulin lispro (HUMALOG) injection   SubCUTAneous Q6H    0.9% sodium chloride infusion 250 mL  250 mL IntraVENous PRN    chlorhexidine (PERIDEX) 0.12 % mouthwash 15 mL  15 mL Oral Q12H    fentaNYL (PF) 1,500 mcg/30 mL (50 mcg/mL) infusion  0-200 mcg/hr IntraVENous TITRATE    NOREPINephrine (LEVOPHED) 32,000 mcg in dextrose 5% 250 mL (128 mcg/mL) infusion  0-50 mcg/min IntraVENous TITRATE    [Held by provider] labetaloL (NORMODYNE) tablet 100 mg  100 mg Oral Q12H    0.9% sodium chloride infusion  3 mL/hr IntraVENous CONTINUOUS    0.9% sodium chloride infusion  5 mL/hr IntraVENous CONTINUOUS    labetaloL (NORMODYNE;TRANDATE) injection 10 mg  10 mg IntraVENous Q10MIN PRN    fentaNYL citrate (PF) injection  mcg   mcg IntraVENous Q1H PRN    white petrolatum-mineral oiL (AKWA TEARS) 83-15 % ophthalmic ointment   Both Eyes Q12H    heparin (porcine) pf 300 Units  300 Units InterCATHeter PRN    midazolam (VERSED) injection 1-2 mg  1-2 mg IntraVENous Q2H PRN    bacitracin 500 unit/gram packet 1 Packet  1 Packet Topical PRN    balsam peru-castor oiL (VENELEX) ointment   Topical BID    sodium chloride (NS) flush 5-40 mL  5-40 mL IntraVENous Q8H    sodium chloride (NS) flush 5-40 mL  5-40 mL IntraVENous PRN    HYDROcodone-acetaminophen (NORCO) 5-325 mg per tablet 1 Tab  1 Tab Oral Q4H PRN    ondansetron (ZOFRAN) injection 4 mg  4 mg IntraVENous Q4H PRN    acetaminophen (TYLENOL) tablet 650 mg  650 mg Oral Q6H PRN    Or    acetaminophen (TYLENOL) suppository 650 mg  650 mg Rectal Q6H PRN    glucose chewable tablet 16 g  4 Tab Oral PRN    glucagon (GLUCAGEN) injection 1 mg  1 mg IntraMUSCular PRN    dextrose 10% infusion 0-250 mL  0-250 mL IntraVENous PRN      Allergies   Allergen Reactions    Augmentin [Amoxicillin-Pot Clavulanate] Rash and Itching       Objective:  Vitals:    Vitals:    04/21/20 0600 04/21/20 0700 04/21/20 0758 04/21/20 0800   BP: 135/72 131/72  131/73   Pulse: 92 93 91 92   Resp: 25 22 30 20   Temp:    98.3 °F (36.8 °C)   TempSrc:       SpO2: 97% 99% 99% 99%   Weight:       Height:         Intake and Output:  No intake/output data recorded. 04/19 1901 - 04/21 0700  In: 1772 [I.V.:42]  Out: 2400 [Drains:900]    Physical Examination:   Pt intubated    Yes  General: Paralyzed on the vent  Resp:  On vent   CV:  RRR  GI:  Obese   Neurologic:  Sedated   Access:           R femoral melissa     []    High complexity decision making was performed  []    Patient is at high-risk of decompensation with multiple organ involvement    Lab Data Personally Reviewed: I have reviewed all the pertinent labs, microbiology data and radiology studies during assessment.     Recent Labs     04/21/20  0437 04/20/20  0529 04/19/20  0447    137 136   K 4.3 5.1 4.6    102 102   CO2 22 22 23   * 234* 240*   BUN 96* 113* 59*   CREA 2.15* 2.44* 1.46*   CA 8.5 8.7 8.5   MG 2.3 2.6* 2.3   PHOS 5.8* 5.8* 3.6   ALB 2.4* 2.4*  2.5* 2.5*  2.5*   SGOT  --  169* 157*   ALT  --  206* 234*     Recent Labs     04/21/20  0437 04/20/20  2122 04/20/20  1546 04/20/20  1026 04/20/20  0529   WBC 11.8* 12.4* 14.6* 15.1* 13.8*   HGB 8.0* 8.1* 8. 0* 8.7* 8.4*   HCT 25.0* 25.5* 26.1* 27.6* 27.2*   * 128* 121* 128* 119*     No results found for: SDES  Lab Results   Component Value Date/Time    Culture result: NO GROWTH 5 DAYS 04/09/2020 02:32 PM    Culture result: NO GROWTH 5 DAYS 04/08/2020 10:36 AM    Culture result: NO GROWTH 5 DAYS 03/31/2020 11:15 AM    Culture result: MODERATE STAPHYLOCOCCUS AUREUS (A) 03/31/2020 10:34 AM    Culture result: LIGHT NORMAL RESPIRATORY SOFIE 03/31/2020 10:34 AM     Recent Results (from the past 24 hour(s))   CBC W/O DIFF    Collection Time: 04/20/20 10:26 AM   Result Value Ref Range    WBC 15.1 (H) 3.6 - 11.0 K/uL    RBC 2.82 (L) 3.80 - 5.20 M/uL    HGB 8.7 (L) 11.5 - 16.0 g/dL    HCT 27.6 (L) 35.0 - 47.0 %    MCV 97.9 80.0 - 99.0 FL    MCH 30.9 26.0 - 34.0 PG    MCHC 31.5 30.0 - 36.5 g/dL    RDW 22.4 (H) 11.5 - 14.5 %    PLATELET 825 (L) 212 - 400 K/uL    MPV 11.5 8.9 - 12.9 FL    NRBC 0.0 0  WBC    ABSOLUTE NRBC 0.00 0.00 - 0.01 K/uL   SARS-COV-2    Collection Time: 04/20/20 10:33 AM   Result Value Ref Range    Specimen source Nasopharyngeal      SARS-CoV-2 PENDING    POC EG7    Collection Time: 04/20/20 10:41 AM   Result Value Ref Range    Calcium, ionized (POC) 1.13 1.12 - 1.32 mmol/L    FIO2 (POC) 0.40 %    pO2 (POC) 152 (H) 80 - 100 MMHG    Site DRAWN FROM ARTERIAL LINE      Device: VENT      Mode ASSIST CONTROL      Tidal volume 430 ml    Set Rate 28 bpm    PEEP/CPAP (POC) 5 cmH2O    Allens test (POC) N/A      Specimen type (POC) ARTERIAL      Total resp.  rate 28     POC EG7    Collection Time: 04/20/20 10:47 AM   Result Value Ref Range    Calcium, ionized (POC) 1.12 1.12 - 1.32 mmol/L    FIO2 (POC) 0.40 %    pH (POC) 7.429 7.35 - 7.45      pCO2 (POC) 31.7 (L) 35.0 - 45.0 MMHG    pO2 (POC) 141 (H) 80 - 100 MMHG    HCO3 (POC) 21.0 (L) 22 - 26 MMOL/L    Base deficit (POC) 3 mmol/L    sO2 (POC) 99 (H) 92 - 97 %    Site DRAWN FROM ARTERIAL LINE      Device: VENT      Mode ASSIST CONTROL      Tidal volume 430 ml    Set Rate 28 bpm    PEEP/CPAP (POC) 5 cmH2O    Allens test (POC) N/A      Specimen type (POC) ARTERIAL      Total resp.  rate 28     GLUCOSE, POC    Collection Time: 04/20/20 12:00 PM   Result Value Ref Range    Glucose (POC) 248 (H) 65 - 100 mg/dL    Performed by Gianna Ackerman    CBC W/O DIFF    Collection Time: 04/20/20  3:46 PM   Result Value Ref Range    WBC 14.6 (H) 3.6 - 11.0 K/uL    RBC 2.63 (L) 3.80 - 5.20 M/uL    HGB 8.0 (L) 11.5 - 16.0 g/dL    HCT 26.1 (L) 35.0 - 47.0 %    MCV 99.2 (H) 80.0 - 99.0 FL    MCH 30.4 26.0 - 34.0 PG    MCHC 30.7 30.0 - 36.5 g/dL    RDW 21.8 (H) 11.5 - 14.5 %    PLATELET 413 (L) 527 - 400 K/uL    MPV 11.9 8.9 - 12.9 FL    NRBC 0.0 0  WBC    ABSOLUTE NRBC 0.00 0.00 - 0.01 K/uL   GLUCOSE, POC    Collection Time: 04/20/20  6:22 PM   Result Value Ref Range    Glucose (POC) 178 (H) 65 - 100 mg/dL    Performed by Gianna Ackerman    CBC W/O DIFF    Collection Time: 04/20/20  9:22 PM   Result Value Ref Range    WBC 12.4 (H) 3.6 - 11.0 K/uL    RBC 2.63 (L) 3.80 - 5.20 M/uL    HGB 8.1 (L) 11.5 - 16.0 g/dL    HCT 25.5 (L) 35.0 - 47.0 %    MCV 97.0 80.0 - 99.0 FL    MCH 30.8 26.0 - 34.0 PG    MCHC 31.8 30.0 - 36.5 g/dL    RDW 21.5 (H) 11.5 - 14.5 %    PLATELET 058 (L) 926 - 400 K/uL    MPV 11.8 8.9 - 12.9 FL    NRBC 0.0 0  WBC    ABSOLUTE NRBC 0.00 0.00 - 0.01 K/uL   GLUCOSE, POC    Collection Time: 04/20/20 11:30 PM   Result Value Ref Range    Glucose (POC) 252 (H) 65 - 100 mg/dL    Performed by Lashay Chery    CBC W/O DIFF    Collection Time: 04/21/20  4:37 AM   Result Value Ref Range    WBC 11.8 (H) 3.6 - 11.0 K/uL    RBC 2.56 (L) 3.80 - 5.20 M/uL    HGB 8.0 (L) 11.5 - 16.0 g/dL    HCT 25.0 (L) 35.0 - 47.0 %    MCV 97.7 80.0 - 99.0 FL    MCH 31.3 26.0 - 34.0 PG    MCHC 32.0 30.0 - 36.5 g/dL    RDW 21.5 (H) 11.5 - 14.5 %    PLATELET 760 (L) 305 - 400 K/uL    MPV 12.0 8.9 - 12.9 FL    NRBC 0.0 0  WBC    ABSOLUTE NRBC 0.00 0.00 - 0.01 K/uL   CRP, HIGH SENSITIVITY    Collection Time: 04/21/20  4:37 AM   Result Value Ref Range    CRP, High sensitivity >9.5 mg/L   RENAL FUNCTION PANEL    Collection Time: 04/21/20  4:37 AM   Result Value Ref Range    Sodium 136 136 - 145 mmol/L    Potassium 4.3 3.5 - 5.1 mmol/L    Chloride 101 97 - 108 mmol/L    CO2 22 21 - 32 mmol/L    Anion gap 13 5 - 15 mmol/L    Glucose 223 (H) 65 - 100 mg/dL    BUN 96 (H) 6 - 20 MG/DL    Creatinine 2.15 (H) 0.55 - 1.02 MG/DL    BUN/Creatinine ratio 45 (H) 12 - 20      GFR est AA 29 (L) >60 ml/min/1.73m2    GFR est non-AA 24 (L) >60 ml/min/1.73m2    Calcium 8.5 8.5 - 10.1 MG/DL    Phosphorus 5.8 (H) 2.6 - 4.7 MG/DL    Albumin 2.4 (L) 3.5 - 5.0 g/dL   MAGNESIUM    Collection Time: 04/21/20  4:37 AM   Result Value Ref Range    Magnesium 2.3 1.6 - 2.4 mg/dL   GLUCOSE, POC    Collection Time: 04/21/20  5:25 AM   Result Value Ref Range    Glucose (POC) 223 (H) 65 - 100 mg/dL    Performed by Troy Kearney            Total time spent with patient:  xxx   min. Care Plan discussed with:  Patient     Family      RN      Consulting Physician [address] [facility],         I have reviewed the flowsheets. Chart and Pertinent Notes have been reviewed. No change in PMH ,family and social history from Consult note.       Robinson Sher MD

## 2020-04-21 NOTE — PROGRESS NOTES
1945:Bedside shift change report given to KANE, RN (oncoming nurse) by Viviana Chao RN (offgoing nurse). Report included the following information SBAR, Kardex, Procedure Summary, Intake/Output, MAR, Accordion, Recent Results, Med Rec Status, and Cardiac Rhythm NSR to ST .    2000: Assumed care of patient intubated in bed. Pt slightly withdraws to painful stimuli (BUE, no BLE), pupils sluggish/reactive/equal, VSS and WDL, and palpable pulses. Vent settings AC RR 24, Tvol 430, FiO2 40%, PEEP 5  Gtt: fentanyl 50 mcg/hr  2122: H&H sent- Hgb 8.1. Will continue to monitor. 0000: No change to previous assessment. 0400: No change to previous assessment. ]  0515: L femoral quad lumen removed. Hand held pressure for 5 minutes d/t bleeding. Will continue to monitor. 0534: Fentanyl decreased to 25 mcg/hr per order. Will continue to monitor. 0700: Uneventful shift. 0730: Bedside shift change report given to Viviana Chao RN (oncoming nurse) by JUDY MIDDLETON (offgoing nurse). Report included the following information SBAR, Kardex, Procedure Summary, Intake/Output, MAR, Accordion, Recent Results, Med Rec Status and Cardiac Rhythm NSR to ST.          Problem: Non-Violent Restraints  Goal: *Patient's dignity will be maintained  Outcome: Progressing Towards Goal  Goal: Non-violent Restaints:Standard Interventions  Outcome: Progressing Towards Goal  Goal: Non-violent Restraints:Patient Interventions  Outcome: Progressing Towards Goal     Problem: Ventilator Management  Goal: *Adequate oxygenation and ventilation  Outcome: Progressing Towards Goal  Goal: *Patient maintains clear airway/free of aspiration  Outcome: Progressing Towards Goal  Goal: *Absence of infection signs and symptoms  Outcome: Progressing Towards Goal  Goal: *Normal spontaneous ventilation  Outcome: Progressing Towards Goal     Problem: Falls - Risk of  Goal: *Absence of Falls  Description: Document Stacey Fall Risk and appropriate interventions in the flowsheet. Outcome: Progressing Towards Goal  Note: Fall Risk Interventions:  Mobility Interventions: Communicate number of staff needed for ambulation/transfer    Mentation Interventions: Adequate sleep, hydration, pain control, Increase mobility, Room close to nurse's station, Update white board    Medication Interventions: Evaluate medications/consider consulting pharmacy    Elimination Interventions: Toileting schedule/hourly rounds, Toilet paper/wipes in reach              Problem: Pressure Injury - Risk of  Goal: *Prevention of pressure injury  Description: Document Aniket Scale and appropriate interventions in the flowsheet. Outcome: Progressing Towards Goal  Note: Pressure Injury Interventions:  Sensory Interventions: Assess changes in LOC, Assess need for specialty bed, Check visual cues for pain, Keep linens dry and wrinkle-free, Float heels, Maintain/enhance activity level, Minimize linen layers, Monitor skin under medical devices, Pad between skin to skin, Pressure redistribution bed/mattress (bed type), Sit a 90-degree angle/use footstool if needed, Turn and reposition approx. every two hours (pillows and wedges if needed)    Moisture Interventions: Absorbent underpads, Apply protective barrier, creams and emollients, Internal/External fecal devices    Activity Interventions: Pressure redistribution bed/mattress(bed type)    Mobility Interventions: Float heels, Pressure redistribution bed/mattress (bed type), Turn and reposition approx. every two hours(pillow and wedges)    Nutrition Interventions: Document food/fluid/supplement intake, Discuss nutritional consult with provider    Friction and Shear Interventions: Lift sheet, Apply protective barrier, creams and emollients                Problem: Diabetes Self-Management  Goal: *Disease process and treatment process  Description: Define diabetes and identify own type of diabetes; list 3 options for treating diabetes.   Outcome: Progressing Towards Goal  Goal: *Using medications safely  Description: State effect of diabetes medications on diabetes; name diabetes medication taking, action and side effects. Outcome: Progressing Towards Goal  Goal: *Monitoring blood glucose, interpreting and using results  Description: Identify recommended blood glucose targets  and personal targets. Outcome: Progressing Towards Goal     Problem: Impaired Skin Integrity/Pressure Injury Treatment  Goal: *Improvement of Existing Pressure Injury  Outcome: Progressing Towards Goal     Problem: Risk for Spread of Infection  Goal: Prevent transmission of infectious organism to others  Description: Prevent the transmission of infectious organisms to other patients, staff members, and visitors.   Outcome: Progressing Towards Goal

## 2020-04-22 ENCOUNTER — APPOINTMENT (OUTPATIENT)
Dept: INTERVENTIONAL RADIOLOGY/VASCULAR | Age: 60
DRG: 870 | End: 2020-04-22
Attending: ANESTHESIOLOGY
Payer: COMMERCIAL

## 2020-04-22 ENCOUNTER — APPOINTMENT (OUTPATIENT)
Dept: GENERAL RADIOLOGY | Age: 60
DRG: 870 | End: 2020-04-22
Attending: RADIOLOGY
Payer: COMMERCIAL

## 2020-04-22 LAB
ALBUMIN SERPL-MCNC: 2.5 G/DL (ref 3.5–5)
ANION GAP SERPL CALC-SCNC: 10 MMOL/L (ref 5–15)
BUN SERPL-MCNC: 95 MG/DL (ref 6–20)
BUN/CREAT SERPL: 45 (ref 12–20)
CALCIUM SERPL-MCNC: 8.5 MG/DL (ref 8.5–10.1)
CHLORIDE SERPL-SCNC: 100 MMOL/L (ref 97–108)
CO2 SERPL-SCNC: 24 MMOL/L (ref 21–32)
CREAT SERPL-MCNC: 2.1 MG/DL (ref 0.55–1.02)
CRP SERPL HS-MCNC: >9.5 MG/L
ERYTHROCYTE [DISTWIDTH] IN BLOOD BY AUTOMATED COUNT: 20.5 % (ref 11.5–14.5)
ERYTHROCYTE [DISTWIDTH] IN BLOOD BY AUTOMATED COUNT: 20.7 % (ref 11.5–14.5)
GLUCOSE BLD STRIP.AUTO-MCNC: 101 MG/DL (ref 65–100)
GLUCOSE BLD STRIP.AUTO-MCNC: 103 MG/DL (ref 65–100)
GLUCOSE BLD STRIP.AUTO-MCNC: 107 MG/DL (ref 65–100)
GLUCOSE BLD STRIP.AUTO-MCNC: 123 MG/DL (ref 65–100)
GLUCOSE BLD STRIP.AUTO-MCNC: 157 MG/DL (ref 65–100)
GLUCOSE BLD STRIP.AUTO-MCNC: 68 MG/DL (ref 65–100)
GLUCOSE BLD STRIP.AUTO-MCNC: 69 MG/DL (ref 65–100)
GLUCOSE SERPL-MCNC: 96 MG/DL (ref 65–100)
HCT VFR BLD AUTO: 26 % (ref 35–47)
HCT VFR BLD AUTO: 26.7 % (ref 35–47)
HGB BLD-MCNC: 8.2 G/DL (ref 11.5–16)
HGB BLD-MCNC: 8.3 G/DL (ref 11.5–16)
MAGNESIUM SERPL-MCNC: 2.2 MG/DL (ref 1.6–2.4)
MCH RBC QN AUTO: 30.4 PG (ref 26–34)
MCH RBC QN AUTO: 30.7 PG (ref 26–34)
MCHC RBC AUTO-ENTMCNC: 31.1 G/DL (ref 30–36.5)
MCHC RBC AUTO-ENTMCNC: 31.5 G/DL (ref 30–36.5)
MCV RBC AUTO: 97.4 FL (ref 80–99)
MCV RBC AUTO: 97.8 FL (ref 80–99)
NRBC # BLD: 0 K/UL (ref 0–0.01)
NRBC # BLD: 0 K/UL (ref 0–0.01)
NRBC BLD-RTO: 0 PER 100 WBC
NRBC BLD-RTO: 0 PER 100 WBC
PHOSPHATE SERPL-MCNC: 5.5 MG/DL (ref 2.6–4.7)
PLATELET # BLD AUTO: 153 K/UL (ref 150–400)
PLATELET # BLD AUTO: 169 K/UL (ref 150–400)
PMV BLD AUTO: 11.8 FL (ref 8.9–12.9)
PMV BLD AUTO: 12.2 FL (ref 8.9–12.9)
POTASSIUM SERPL-SCNC: 3.7 MMOL/L (ref 3.5–5.1)
RBC # BLD AUTO: 2.67 M/UL (ref 3.8–5.2)
RBC # BLD AUTO: 2.73 M/UL (ref 3.8–5.2)
SERVICE CMNT-IMP: ABNORMAL
SERVICE CMNT-IMP: NORMAL
SERVICE CMNT-IMP: NORMAL
SODIUM SERPL-SCNC: 134 MMOL/L (ref 136–145)
WBC # BLD AUTO: 12 K/UL (ref 3.6–11)
WBC # BLD AUTO: 13.1 K/UL (ref 3.6–11)

## 2020-04-22 PROCEDURE — 74011000250 HC RX REV CODE- 250: Performed by: RADIOLOGY

## 2020-04-22 PROCEDURE — 74011250636 HC RX REV CODE- 250/636: Performed by: INTERNAL MEDICINE

## 2020-04-22 PROCEDURE — 74011250637 HC RX REV CODE- 250/637: Performed by: NURSE PRACTITIONER

## 2020-04-22 PROCEDURE — 74011636637 HC RX REV CODE- 636/637: Performed by: NURSE PRACTITIONER

## 2020-04-22 PROCEDURE — 65610000006 HC RM INTENSIVE CARE

## 2020-04-22 PROCEDURE — 74011000250 HC RX REV CODE- 250: Performed by: INTERNAL MEDICINE

## 2020-04-22 PROCEDURE — 80069 RENAL FUNCTION PANEL: CPT

## 2020-04-22 PROCEDURE — C1752 CATH,HEMODIALYSIS,SHORT-TERM: HCPCS

## 2020-04-22 PROCEDURE — 74011000258 HC RX REV CODE- 258: Performed by: INTERNAL MEDICINE

## 2020-04-22 PROCEDURE — C1894 INTRO/SHEATH, NON-LASER: HCPCS

## 2020-04-22 PROCEDURE — 51798 US URINE CAPACITY MEASURE: CPT

## 2020-04-22 PROCEDURE — 74011250636 HC RX REV CODE- 250/636: Performed by: NURSE PRACTITIONER

## 2020-04-22 PROCEDURE — 77030002996 HC SUT SLK J&J -A

## 2020-04-22 PROCEDURE — 94003 VENT MGMT INPAT SUBQ DAY: CPT

## 2020-04-22 PROCEDURE — 36556 INSERT NON-TUNNEL CV CATH: CPT

## 2020-04-22 PROCEDURE — P9047 ALBUMIN (HUMAN), 25%, 50ML: HCPCS | Performed by: INTERNAL MEDICINE

## 2020-04-22 PROCEDURE — 94640 AIRWAY INHALATION TREATMENT: CPT

## 2020-04-22 PROCEDURE — 36415 COLL VENOUS BLD VENIPUNCTURE: CPT

## 2020-04-22 PROCEDURE — 83735 ASSAY OF MAGNESIUM: CPT

## 2020-04-22 PROCEDURE — 74011000250 HC RX REV CODE- 250: Performed by: SURGERY

## 2020-04-22 PROCEDURE — 90935 HEMODIALYSIS ONE EVALUATION: CPT

## 2020-04-22 PROCEDURE — 74011250636 HC RX REV CODE- 250/636: Performed by: RADIOLOGY

## 2020-04-22 PROCEDURE — 85027 COMPLETE CBC AUTOMATED: CPT

## 2020-04-22 PROCEDURE — 82962 GLUCOSE BLOOD TEST: CPT

## 2020-04-22 PROCEDURE — 77030040831 HC BAG URINE DRNG MDII -A

## 2020-04-22 PROCEDURE — 86141 C-REACTIVE PROTEIN HS: CPT

## 2020-04-22 PROCEDURE — 77030018798 HC PMP KT ENTRL FED COVD -A

## 2020-04-22 PROCEDURE — 74011000250 HC RX REV CODE- 250: Performed by: NURSE PRACTITIONER

## 2020-04-22 PROCEDURE — 74011250637 HC RX REV CODE- 250/637: Performed by: INTERNAL MEDICINE

## 2020-04-22 PROCEDURE — 71045 X-RAY EXAM CHEST 1 VIEW: CPT

## 2020-04-22 RX ORDER — HEPARIN SODIUM 1000 [USP'U]/ML
10000 INJECTION, SOLUTION INTRAVENOUS; SUBCUTANEOUS ONCE
Status: COMPLETED | OUTPATIENT
Start: 2020-04-22 | End: 2020-04-22

## 2020-04-22 RX ORDER — LIDOCAINE HYDROCHLORIDE 20 MG/ML
20 INJECTION, SOLUTION EPIDURAL; INFILTRATION; INTRACAUDAL; PERINEURAL ONCE
Status: COMPLETED | OUTPATIENT
Start: 2020-04-22 | End: 2020-04-22

## 2020-04-22 RX ORDER — FENTANYL CITRATE 50 UG/ML
25-50 INJECTION, SOLUTION INTRAMUSCULAR; INTRAVENOUS
Status: DISCONTINUED | OUTPATIENT
Start: 2020-04-22 | End: 2020-04-30

## 2020-04-22 RX ORDER — NOREPINEPHRINE BITARTRATE/D5W 8 MG/250ML
.5-3 PLASTIC BAG, INJECTION (ML) INTRAVENOUS
Status: DISCONTINUED | OUTPATIENT
Start: 2020-04-22 | End: 2020-04-27

## 2020-04-22 RX ORDER — ALBUMIN HUMAN 250 G/1000ML
50 SOLUTION INTRAVENOUS ONCE
Status: COMPLETED | OUTPATIENT
Start: 2020-04-22 | End: 2020-04-22

## 2020-04-22 RX ADMIN — MINERAL OIL AND WHITE PETROLATUM: 150; 830 OINTMENT OPHTHALMIC at 08:36

## 2020-04-22 RX ADMIN — ALBUTEROL SULFATE 2.5 MG: 2.5 SOLUTION RESPIRATORY (INHALATION) at 21:36

## 2020-04-22 RX ADMIN — SEVELAMER CARBONATE 1.6 G: 800 POWDER, FOR SUSPENSION ORAL at 12:46

## 2020-04-22 RX ADMIN — OXYCODONE 5 MG: 5 TABLET ORAL at 05:22

## 2020-04-22 RX ADMIN — FENTANYL CITRATE 50 MCG: 50 INJECTION INTRAMUSCULAR; INTRAVENOUS at 23:50

## 2020-04-22 RX ADMIN — SODIUM CHLORIDE 10 ML: 9 INJECTION INTRAMUSCULAR; INTRAVENOUS; SUBCUTANEOUS at 05:22

## 2020-04-22 RX ADMIN — ACETYLCYSTEINE 200 MG: 200 SOLUTION ORAL; RESPIRATORY (INHALATION) at 08:24

## 2020-04-22 RX ADMIN — HYDROCODONE BITARTRATE AND ACETAMINOPHEN 1 TABLET: 5; 325 TABLET ORAL at 02:16

## 2020-04-22 RX ADMIN — ALBUMIN (HUMAN) 12.5 G: 0.25 INJECTION, SOLUTION INTRAVENOUS at 17:41

## 2020-04-22 RX ADMIN — LIDOCAINE HYDROCHLORIDE 400 MG: 20 INJECTION, SOLUTION EPIDURAL; INFILTRATION; INTRACAUDAL; PERINEURAL at 09:00

## 2020-04-22 RX ADMIN — INSULIN LISPRO 2 UNITS: 100 INJECTION, SOLUTION INTRAVENOUS; SUBCUTANEOUS at 23:56

## 2020-04-22 RX ADMIN — INSULIN GLARGINE 40 UNITS: 100 INJECTION, SOLUTION SUBCUTANEOUS at 20:57

## 2020-04-22 RX ADMIN — HYDROCORTISONE SODIUM SUCCINATE 50 MG: 100 INJECTION, POWDER, FOR SOLUTION INTRAMUSCULAR; INTRAVENOUS at 08:34

## 2020-04-22 RX ADMIN — HEPARIN SODIUM 3200 UNITS: 1000 INJECTION INTRAVENOUS; SUBCUTANEOUS at 18:33

## 2020-04-22 RX ADMIN — HEPARIN SODIUM 10000 UNITS: 1000 INJECTION INTRAVENOUS; SUBCUTANEOUS at 09:15

## 2020-04-22 RX ADMIN — SEVELAMER CARBONATE 1.6 G: 800 POWDER, FOR SUSPENSION ORAL at 08:48

## 2020-04-22 RX ADMIN — SODIUM CHLORIDE 40 ML: 9 INJECTION INTRAMUSCULAR; INTRAVENOUS; SUBCUTANEOUS at 15:08

## 2020-04-22 RX ADMIN — CHLORHEXIDINE GLUCONATE 15 ML: 1.2 RINSE ORAL at 21:02

## 2020-04-22 RX ADMIN — Medication 1 PACKET: at 17:20

## 2020-04-22 RX ADMIN — HYDROCODONE BITARTRATE AND ACETAMINOPHEN 1 TABLET: 5; 325 TABLET ORAL at 21:02

## 2020-04-22 RX ADMIN — GUAIFENESIN 200 MG: 100 SOLUTION ORAL at 12:46

## 2020-04-22 RX ADMIN — ALBUMIN (HUMAN) 12.5 G: 0.25 INJECTION, SOLUTION INTRAVENOUS at 18:15

## 2020-04-22 RX ADMIN — FAMOTIDINE 20 MG: 40 POWDER, FOR SUSPENSION ORAL at 08:34

## 2020-04-22 RX ADMIN — ALBUMIN (HUMAN) 50 G: 0.25 INJECTION, SOLUTION INTRAVENOUS at 20:30

## 2020-04-22 RX ADMIN — GUAIFENESIN 200 MG: 100 SOLUTION ORAL at 05:21

## 2020-04-22 RX ADMIN — CASTOR OIL AND BALSAM, PERU: 788; 87 OINTMENT TOPICAL at 08:35

## 2020-04-22 RX ADMIN — GUAIFENESIN 200 MG: 100 SOLUTION ORAL at 23:54

## 2020-04-22 RX ADMIN — MINERAL OIL AND WHITE PETROLATUM: 150; 830 OINTMENT OPHTHALMIC at 21:30

## 2020-04-22 RX ADMIN — Medication 4 MCG/MIN: at 18:21

## 2020-04-22 RX ADMIN — GUAIFENESIN 200 MG: 100 SOLUTION ORAL at 17:20

## 2020-04-22 RX ADMIN — INSULIN LISPRO 2 UNITS: 100 INJECTION, SOLUTION INTRAVENOUS; SUBCUTANEOUS at 00:02

## 2020-04-22 RX ADMIN — CASTOR OIL AND BALSAM, PERU: 788; 87 OINTMENT TOPICAL at 17:20

## 2020-04-22 RX ADMIN — SEVELAMER CARBONATE 1.6 G: 800 POWDER, FOR SUSPENSION ORAL at 17:20

## 2020-04-22 RX ADMIN — SODIUM CHLORIDE 40 ML: 9 INJECTION INTRAMUSCULAR; INTRAVENOUS; SUBCUTANEOUS at 21:12

## 2020-04-22 RX ADMIN — HYDROCODONE BITARTRATE AND ACETAMINOPHEN 1 TABLET: 5; 325 TABLET ORAL at 08:34

## 2020-04-22 RX ADMIN — HYDROCORTISONE SODIUM SUCCINATE 50 MG: 100 INJECTION, POWDER, FOR SOLUTION INTRAMUSCULAR; INTRAVENOUS at 21:01

## 2020-04-22 RX ADMIN — HYDROCODONE BITARTRATE AND ACETAMINOPHEN 1 TABLET: 5; 325 TABLET ORAL at 15:08

## 2020-04-22 RX ADMIN — DEXTROSE MONOHYDRATE 125 ML: 100 INJECTION, SOLUTION INTRAVENOUS at 17:26

## 2020-04-22 RX ADMIN — ACETYLCYSTEINE 200 MG: 200 SOLUTION ORAL; RESPIRATORY (INHALATION) at 21:36

## 2020-04-22 RX ADMIN — ALBUTEROL SULFATE 2.5 MG: 2.5 SOLUTION RESPIRATORY (INHALATION) at 08:24

## 2020-04-22 RX ADMIN — INSULIN GLARGINE 40 UNITS: 100 INJECTION, SOLUTION SUBCUTANEOUS at 08:34

## 2020-04-22 RX ADMIN — CHLORHEXIDINE GLUCONATE 15 ML: 1.2 RINSE ORAL at 08:35

## 2020-04-22 RX ADMIN — FENTANYL CITRATE 25 MCG: 50 INJECTION INTRAMUSCULAR; INTRAVENOUS at 17:39

## 2020-04-22 RX ADMIN — FENTANYL CITRATE 50 MCG: 50 INJECTION INTRAMUSCULAR; INTRAVENOUS at 15:08

## 2020-04-22 RX ADMIN — Medication 1 PACKET: at 21:02

## 2020-04-22 NOTE — PROGRESS NOTES
0730: Bedside and Verbal shift change report given to Wood Barr RN (oncoming nurse) by Domenica Whittaker RN (offgoing nurse). Report included the following information SBAR, Kardex, Intake/Output, MAR, Recent Results, Cardiac Rhythm Sinus Tach and Alarm Parameters . 0815: Maria A Otto NP at bedside consulting IR for [facility] placement. Dr. Sherman Ontiveros updated on patient's status. Patient to receive HD once new catheter placed. 0915: Dr. Mark Mello at bedside, right IJ Martna placed, CXR ordered. 1220: Bladder scan >530 mL.  1010: RN performed straight cath, catheter dwelled for 15 minutes. 75 mL brown, cloudy urine drained. 1028: Bladder scan 509 mL. 1045: Two unsuccessful attempts at repeat straight cath, Dr. Bishop armenta. 1110: Dr. Sherman Ontiveros returned MD [place] states not to place Bowden at this time, monitor and will reevaluate tomorrow. Patient scheduled for HD today. 1545: Chandrika Dawn RN at bedside preparing for ordered HD.  [id number]: RN at bedside, patient diaphoretic with increased WOB. Maria A Otto NP notified, states to give 25 mcg PRN Fentanyl. 1745: HYPOGLYCEMIC EPISODE DOCUMENTATION  Patient with hypoglycemic episode(s) at [phone] on 4/22/20. BG value(s) pre-treatment 71    Was patient symptomatic? [x] yes, [] no  Patient was treated with the following rescue medications/treatments: 125 mL D10  BG value post-treatment: 123  Name of MD notified:Dewayne Kamara NP  1805: Patient's SBP 80s, MAP low 60s. Dr. Sherman Ontiveros ordered Levophed gtt & Albumin. 1830: Levophed gtt at 7 mcg/kg/hr, SBP 110s/MAP 70s. 1930: Bedside and Verbal shift change report given to Jillian Sabillon RN (oncoming nurse) by Wood Barr RN (offgoing nurse). Report included the following information SBAR, Kardex, Intake/Output, MAR, Recent Results, Cardiac Rhythm Sinus Tach and Alarm Parameters .

## 2020-04-22 NOTE — PROCEDURES
[facility] D[facility] Team [facility]  [phone]    Vitals   Pre   Post   Assessment   Pre   Post     Temp  Temp: 98.8 °F (37.1 °C) (04/22/20 1200)  98.1 LOC  Alert but not oriented Same   HR   Pulse (Heart Rate): (!) 104 (04/22/20 1530) 101 Lungs   Diminished on vent same   B/P   BP: 112/68 (04/22/20 1530) 113/68 Cardiac   Irregular Same   Resp   Resp Rate: (!) 31 (04/22/20 1530) 25 Skin   Warm and dry same   Pain level  Pain Intensity 1: 1 (04/22/20 1200) 0 Edema  generalized Same   Orders:    Duration:   Start:  9870   Procedure Start Time: 7525 End:  8319 Procedure End Time: 1200 Total:    3.5 hrs    Dialyzer:   Dialyzer/Set Up Inspection: Revaclear (04/22/20 1521)   K Bath:   Dialysate K (mEq/L): 2 (04/22/20 1521)   Ca Bath:   Dialysate CA (mEq/L): 2.5 (04/22/20 1521)   Na/Bicarb:   Dialysate NA (mEq/L): 140 (04/22/20 1521)   Target Fluid Removal:   Goal/Amount of Fluid to Remove (mL): 1000 mL (04/22/20 1521)   Access     Type & Location:   RIJ Each catheter limb disinfected for 60 seconds per limb with alcohol swabs. Caps removed, dialysis CVC hub scrubbed with Prevantics for 5 seconds, followed by a 5 second dry time per Hospital P&P.    Labs     Obtained/Reviewed   Critical Results Called   Date when labs were drawn-  Hgb-    HGB   Date Value Ref Range Status   04/22/2020 8.2 (L) 11.5 - 16.0 g/dL Final     K-    Potassium   Date Value Ref Range Status   04/22/2020 3.7 3.5 - 5.1 mmol/L Final     Ca-   Calcium   Date Value Ref Range Status   04/22/2020 8.5 8.5 - 10.1 MG/DL Final     Bun-   BUN   Date Value Ref Range Status   04/22/2020 95 (H) 6 - 20 MG/DL Final     Creat-   Creatinine   Date Value Ref Range Status   04/22/2020 2.10 (H) 0.55 - 1.02 MG/DL Final        Medications/ Blood Products Given     Name   Dose   Route and Time     Heparin 3200units  Art and Darvin ports             Blood Volume Processed (BVP):    85L Net Fluid   Removed:  0   Comments   Time Out Done: Yes at 1500  Primary Nurse Rpt Pre: Yani Frye RN  Primary Nurse Rpt Post: Yani Frye RN  Pt Education: NA. Pt sedated  Care Plan: Per Dr's orders  Tx Summary:  New access obtained after a failed attempt to dialyze earlier today. RIJ Each catheter limb disinfected for 60 seconds per limb with alcohol swabs. Caps removed, dialysis CVC hub scrubbed with Prevantics for 5 seconds, followed by a 5 second dry time per Hospital P&P.  1521 Tx started with pt SOB on Bipap. Elevated HR and RR. Dr notified and he advised to run pt  And monitor vitals. Pt with increased work of breathing. PN and Dr Montserrat Juarez  notified. BS low at 69. D-50 initiated. BP low. Albumin X 4 given. Up to 400 ml of CCs given. UF turned off and Levophed initiated per Dr Montserrat Juarez.  Elly Bowden Pt did not tolerate treatment well. Multiple complications as listed above. Tx ended with all possible blood returned from circuit. Lines heparinized and capped per policy. Each catheter limb disinfected for 60 seconds per limb with alcohol swabs. Dialysis CVC hubs scrubbed with Prevantics for 5 seconds, followed by a 5 second dry time per Hospital P&P, red and blue dialysis caps applied to ports. Admiting Diagnosis: COVID-19 infection  Pt's previous clinic-NA  Consent signed - Informed Consent Verified: Yes (04/22/20 1521)  [facility] Consent - Yes on file  Hepatitis Status- Hep B Neg 04/01/20  Machine #- Machine Number: [id number] (04/22/20 1521)  Telemetry status- Continuous bedside monitor  Pre-dialysis wt. - Pre-Dialysis Weight: 118.6 kg (261 lb 7.5 oz) (04/22/20 1521)

## 2020-04-22 NOTE — PROGRESS NOTES
[facility]    ICU TEAM Progress Note    Name: Tita Yoon   : 1960   MRN: [id number]   Date: 2020      Assessment/Plan:     Current ICU Problems:  Acute hypoxic respiratory failure 2/2 COVID 19  · Retroperitoneal bleed with hemorrhagic shock  · Acute kidney injury  · Nonocclusive PE in LLL pulmonary artery -No anticoagulation given recent right retroperitoneal bleed  · MSSA pneumonia  · Diabetes mellitus  · Thrombocytopenia  · Diarrhea             Plan for today:.  1. Remains on the ventilator, trend ABGs  2. Off Precedex , Off Fentanyl, tracking with eyes, not following commands. Patient taking a while to wake up, continuing to slowly decrease pain medication. PRN fentanyl dose decreased. White count downtrending. On solu-cortef 50mg q 12 hrs. Decrease to Q8 hours in AM.   3. Cont guaifenesin and mucomyst for mucous plugging. HH stable, continue to trend on no anticoagulants at this time  4. Nephrology following. Off CRRT, Started IHD on . Anuric. New right IJ melissa placed today by IR. On Lantus and sliding scale. Lantus 40 units BID. Sugars remain high, will reduce steroids before increasing Lantus. 5. Completed PO vanc   6. ID following  7. Levophed PRN for hypotension. 8.  updated on patient status along with daughter.        Cardiac Gtts: None, Levophed PRN during dialysis  SBP Goal of:   >90 mmHg  MAP Goal of: > 65 mmHg  Transfusion Trigger (Hgb):  <7 g/dL     Respiratory Goals: Chlorhexidine   Optimize PEEP/Ventilation/Oxygenation  Goal Tidal Volume 6 cc/kg based on IBW  Aim for lung protective ventilation  Aggressive bronchopulmonary hygiene  SPO2 Goal: > 92%  Pulmonary toilet: NA   DVT Prophylaxis (if no, list reason): SCD's or Sequential Compression Device      GI Prophylaxis: Protonix (pantoprazole)   Nutrition: TF     IVFs: NA  Bowel Movement: Yes  Bowel Regimen: None needed at this time     Bowden Catheter Present: Yes  Glycemic Control - Insulin: Yes SSI and Lantus  Antibiotics: PO Vancomycin     Pain Medications: Fentanyl   Target RASS: 0 to -1 RASS  Sedation Medications: Precedex  CAM-ICU:  JUAREZ  Mobility: Poor and Bedrest  PT/OT: NA   Restraints: Soft wrist restraints  Discussed Plan of Care/Code Status: Full Code     T/L/D  Tubes: ETT and Orogastric Tube  Lines: Central Line, Ramon   Drains: FMS    Subjective:   Progress Note: 4/22/2020      Reason for ICU Admission:  65 yo female with hx of obesity, endometrial cancer and diabetes who presented to [facility] with worsening hypoxic respiratory failure in lieu of close exposure to COVID-19 to family including mother ( passed away) sister and brother and tested resulting + here. She presented to the hospital 3/26 and intubated 3/28. She was started on broad spectrum antibiotics and plaquenil. Developed worsening renal failure and was transferred to ICU for CRRT. Her hospital course has been further notable for development of a retroperitoneal bleed, hemorrhagic shock and sepsis and ongoing acute hypoxic respiratory failure with increasing PEEP and FIo2 requirements. Not following command, continue to wean IV sedation and pain medication. Start scheduled norco. Re-test for COVID to prepare for possible trach. If results negative will consult thoracic surgery. Palliative care following.      Active Problem List:     Problem List  Date Reviewed: 4/19/2020          Codes Class    Hypotension ICD-10-CM: I95.9  ICD-9-CM: 458.9 Acute        Retroperitoneal hemorrhage ICD-10-CM: R58  ICD-9-CM: 459.0         Acute renal failure (ARF) (HCC) ICD-10-CM: N17.9  ICD-9-CM: 584.9         Encephalopathy ICD-10-CM: G93.40  ICD-9-CM: 348.30         Sepsis with multi-organ dysfunction ([facility] ICD-10-CM: A41.9, R65.20  ICD-9-CM: 038.9, 995.92         Pneumonia due to methicillin susceptible Staphylococcus aureus (MSSA) ([facility] ICD-10-CM: Y20.538  ICD-9-CM: 482.41         Thrombocytopenia ([facility] ICD-10-CM: D69.6  ICD-9-CM: 287.5         Diarrhea ICD-10-CM: R19.7  ICD-9-CM: 787.91         QKHMX-93 virus infection ICD-10-CM: U07.1         Obesity, morbid (HCC) ICD-10-CM: E66.01  ICD-9-CM: 278.01         Vaginal Pap smear following hysterectomy for malignancy ICD-10-CM: Z08, Z90.710  ICD-9-CM: V67.01         Personal history of malignant neoplasm of other parts of uterus ICD-10-CM: Z85.42  ICD-9-CM: V10.42         Endometrial cancer ([facility] ICD-10-CM: C54.1  ICD-9-CM: 182.0               Past Medical History:      has a past medical history of Basal cell carcinoma, GERD (gastroesophageal reflux disease), Kidney stones, and Polyp of ureter. Past Surgical History:      has a past surgical history that includes hysteroscopy diagnostic (); pr endometrial ablation, thermal; hx  section (); hx colonoscopy; insert arterial line (2020); and ir insert non tunl cvc over 5 yrs (2020). Home Medications:     Prior to Admission medications    Medication Sig Start Date End Date Taking? Authorizing Provider   pantoprazole (PROTONIX) 40 mg tablet TAKE 1 TABLET BY MOUTH TWICE A DAY 18   Provider, Historical   esomeprazole (NEXIUM) 20 mg capsule Take 20 mg by mouth daily. Indications: BID    Provider, Historical   zolpidem (AMBIEN) 10 mg tablet Take 0.5 Tabs by mouth nightly as needed for Sleep. Max Daily Amount: 5 mg. 17   Dale Mandel MD   fluticasone (FLONASE) 50 mcg/actuation nasal spray Mist 1-2 spray(s) into each nostril once daily. 4/3/15   Provider, Historical   ranitidine (ZANTAC) 150 mg tablet 150 mg.    Provider, Historical       Allergies/Social/Family History:      Allergies   Allergen Reactions    Augmentin [Amoxicillin-Pot Clavulanate] Rash and Itching      Social History     Tobacco Use    Smoking status: Never Smoker    Smokeless tobacco: Never Used   Substance Use Topics    Alcohol use: Not on file      Family History   Problem Relation Age of Onset    Prostate Cancer Father PROSTATE    Breast Cancer Sister 46        invasive poorly differentiated ductal carcinoma, Neg genetic testing.  Cancer Sister 62        melanoma stage 1       Objective:   Vital Signs:  Visit Vitals  /75 (BP 1 Location: Left arm, BP Patient Position: At rest)   Pulse (!) 115   Temp 98.8 °F (37.1 °C)   Resp 29   Ht 5' 4\" (1.626 m)   Wt 118.6 kg (261 lb 6.4 oz)   SpO2 99%   BMI 44.87 kg/m²      O2 Device: Endotracheal tube, Ventilator Temp (24hrs), Av.7 °F (37.1 °C), Min:98.4 °F (36.9 °C), Max:98.9 °F (37.2 °C)           Intake/Output:     Intake/Output Summary (Last 24 hours) at 2020 1624  Last data filed at 2020 1600  Gross per 24 hour   Intake 1200 ml   Output 400 ml   Net 800 ml     Physical Exam:  General:  Sedated, on Ketamine and on the ventilator. No acute distress, morbidly obese   Eyes: Sclera anicteric. Pupils equal, round, reactive to light. Eyes open but does not track   Mouth/Throat: Orotracheal tube in place. Neck: Supple. Lungs:   Deferred, vent assisted respirations, no accessory muscle use observed. Cardiovascular:  regular rate and rhythm, no murmur, click, rub, or gallop, anasarca, pulses palpable   Abdomen:   Soft, bowel sounds hypoactive, distended. Has FMS in place with liquid stool. Extremities: No cyanosis, + edema   Skin: No rash or lesions. Musculoskeletal:  No swelling other than edema, no deformity. Lines/Devices:  Intact, no erythema, drainage, or tenderness. Psych/neuro: Limited, Sedated on ventilator.  Eyes opened, does not track and does not follow commands.       LABS AND  DATA: Personally reviewed  Recent Labs     20  0512 20  0003   WBC 12.0* 13.1*   HGB 8.2* 8.3*   HCT 26.0* 26.7*    153     Recent Labs     20  0518 20  0437   * 136   K 3.7 4.3    101   CO2 24 22   BUN 95* 96*   CREA 2.10* 2.15*   GLU 96 223*   CA 8.5 8.5   MG 2.2 2.3   PHOS 5.5* 5.8*     Recent Labs     20  0518 04/21/20  0437 04/20/20  0529   SGOT  --   --  169*   AP  --   --  490*   TP  --   --  5.9*   ALB 2.5* 2.4* 2.4*  2.5*   GLOB  --   --  3.5     No results for input(s): INR, PTP, APTT, INREXT, INREXT in the last 72 hours. Recent Labs     04/20/20  1047 04/20/20  1041   PHI 7.429  --    PCO2I 31.7*  --    PO2I 141* 152*   FIO2I 0.40 0.40       Mode Rate Tidal Volume Pressure FiO2 PEEP   Assist control   430 ml  0 cm H2O 40 % 5 cm H20     Peak airway pressure: 25 cm H2O    Minute ventilation: 11.2 l/min      MEDS: Reviewed    04/10/2020, CT Abd/ Pelvis:  IMPRESSION:  1.  Scattered pulmonary air space disease consistent with atypical viral infection. 2.  Nonocclusive pulmonary emboli in the left lower lobe pulmonary arteries. 3.  Large right retroperitoneal hematoma with small foci of contrast within the hematoma. 4.  Enteric tube and endotracheal tube in appropriate position. 5.  Bilateral femoral venous catheters in the common iliac veins. CXR Results  (Last 48 hours)               04/22/20 0944  XR CHEST PORT Final result    Impression:  IMPRESSION:       Appropriate position of right-sided nontunneled dialysis catheter. No   pneumothorax. No new abnormality. Improvement in lung opacities. Narrative:  history: Dialysis catheter       COMPARISON: 4/18/2020       FINDINGS:       Frontal chest radiograph submitted for review. Right-sided nontunneled dialysis catheter extends to the right atrium, in   appropriate position. Support hardware is otherwise unchanged. Stable heart   size. Diffuse interstitial prominence of patchy right perihilar airspace disease   has decreased. No new pulmonary abnormality. No pleural effusion.  No   pneumothorax.                    ABCDEF Bundle/Checklist Completed:  Yes    SPECIAL EQUIPMENT  IHD    DISPOSITION  Stay in ICU    CRITICAL CARE CONSULTANT NOTE  I had a face to face encounter with the patient, reviewed and interpreted patient data including clinical events, labs, images, vital signs, I/O's, and examined patient. I have discussed the case and the plan and management of the patient's care with the consulting services, the bedside nurses and the respiratory therapist.      NOTE OF PERSONAL INVOLVEMENT IN CARE   This patient has a high probability of imminent, clinically significant deterioration, which requires the highest level of preparedness to intervene urgently. I participated in the decision-making and personally managed or directed the management of the following life and organ supporting interventions that required my frequent assessment to treat or prevent imminent deterioration. I personally spent 55 minutes of critical care time. This is time spent at this critically ill patient's bedside actively involved in patient care as well as the coordination of care and discussions with the patient's family. This does not include any procedural time which has been billed separately.       Ant Chao, [facility]-BC     Critical Care Medicine  [facility]

## 2020-04-22 NOTE — PROGRESS NOTES
1930: Report received from [facility]. Shift Summary: No acute issues overnight. Patient more alert, able to focus and track. Seems to intermittently, weakly nod to questions. Still does not follow commands in extremities or withdraw. Hemodynamics stable. Plan to change femoral Ramon to IJ placement in AM.  called and updated by RN. 0730: Bedside shift change report given to JUDY Hahn (oncoming nurse) by Kayla Du (offgoing nurse). Report included the following information SBAR, Intake/Output, MAR and Recent Results.

## 2020-04-22 NOTE — DIABETES MGMT
[facility]  CLINICAL NURSE SPECIALIST CONSULT  PROGRAM FOR DIABETES HEALTH  Follow up Note  Presentation   Dotty Andrade is a 64 y.o. female admitted from OSH to [facility] ICU with SARS-COV2 needing CRRT. She is currently sedated and has been intubated since 3/28/20. Current clinical course has been complicated by steroid induced hyperglycemia. Steroids are discontinued at this time. She requires CRRT for acute renal failure r/t her sepsis and hypotension. PHM: GERD, obesity, and anxiety. New diabetes diagnosis with A1C 11.0% (3/28/2020); updated A1C 3/31/20-10.4%     Recent events:   Patient remains intubated and on ventilator, on CRRT. Sedation off, Fentanyl off; patient more alert, per RN note. Consulted by Provider for advanced diabetes nursing assessment and care, specifically related to   [] Transitioning off Patrecia Muck   [x] Inpatient management strategy  [] Home management assessment  [] Survival skill education    Diabetes-related medical history  Acute complications  hyperglycemia  Neurological complications  NONE  Microvascular disease  NONE  Macrovascular disease  NONE  Other associated conditions     NONE    Diabetes medication history: NONE    Subjective   Per chart review, Ms. Nimco Cabral remains very ill  and is on isolation forSARS-COV2 in ICU. I am unable to do a physical assessment of the patient at this time. Patient reports the following home diabetes self-care practices: deferred    Objective     Vital Signs   Visit Vitals  /84 (BP 1 Location: Left arm, BP Patient Position: At rest)   Pulse (!) 115   Temp 98.8 °F (37.1 °C)   Resp (!) 33   Ht 5' 4\" (1.626 m)   Wt 118.6 kg (261 lb 6.4 oz)   SpO2 99%   BMI 44.87 kg/m²   .    Laboratory  Lab Results   Component Value Date/Time    Hemoglobin A1c 10.4 (H) 03/31/2020 03:42 PM     No results found for: LDL, LDLC, DLDLP  Lab Results   Component Value Date/Time    Creatinine 2.10 (H) 04/22/2020 05:18 AM     Lab Results   Component Value Date/Time    Sodium 134 (L) 04/22/2020 05:18 AM    Potassium 3.7 04/22/2020 05:18 AM    Chloride 100 04/22/2020 05:18 AM    CO2 24 04/22/2020 05:18 AM    Anion gap 10 04/22/2020 05:18 AM    Glucose 96 04/22/2020 05:18 AM    BUN 95 (H) 04/22/2020 05:18 AM    Creatinine 2.10 (H) 04/22/2020 05:18 AM    BUN/Creatinine ratio 45 (H) 04/22/2020 05:18 AM    GFR est AA 29 (L) 04/22/2020 05:18 AM    GFR est non-AA 24 (L) 04/22/2020 05:18 AM    Calcium 8.5 04/22/2020 05:18 AM    Bilirubin, total 1.6 (H) 04/20/2020 05:29 AM    AST (SGOT) 169 (H) 04/20/2020 05:29 AM    Alk. phosphatase 490 (H) 04/20/2020 05:29 AM    Protein, total 5.9 (L) 04/20/2020 05:29 AM    Albumin 2.5 (L) 04/22/2020 05:18 AM    Globulin 3.5 04/20/2020 05:29 AM    A-G Ratio 0.7 (L) 04/20/2020 05:29 AM    ALT (SGPT) 206 (H) 04/20/2020 05:29 AM     Lab Results   Component Value Date/Time    ALT (SGPT) 206 (H) 04/20/2020 05:29 AM       Evaluation   Ms John Moran, with new onset Type 2 diabetes,with A1C 10.4%. Steroid tapered to  on 100mg daily- hydrocortisone. Basal insulin increased to 80 units daily starting yesterday evening. Fasting BG today 86mg/dl. Highest BG yesterday 239mg/dl . BG trends steadily decreased overnight     . Since her steroid dose is tapered to 100mg daily her insulin needs can be reduced. .   Remains on TF Nepro @ 30cc/hr      To maintain her basal metabolic needs she requires 36units daily (renal dosing). In addition she also needs 35 units of basal insulin to cover for her steroid AND 7units to cover for her TF (nepro @ 30cc/hr). So total insulin needs are: 78units daily. It is imperative that we maintain her BG within target range 100-180mg/dl. Recommendations   1. Continue 80units daily; if BG falls below 70, on dose, back off to 70units daily.      Follow the dosing schedule when tapering steroid:     20 mg Hydrocortisone: add 0.05 units/kg Lantus to total daily insulin dose  40 mg Hydrocortisone: add 0.1 units/kg Lantus to total daily insulin dose  50 mg Hydrocortisone: add 0.15 units/kg Lantus to total daily insulin dose  100 mg Hydrocortisone: add 0.3 units/kg Lantus to total daily insulin dose    2. Will continue to follow.     Assessment and Plan   Nursing Diagnosis Risk for unstable blood glucose pattern   Nursing Intervention Domain 1994 Decision-making Support   Nursing Interventions Examined current inpatient diabetes control   Explored factors facilitating and impeding inpatient management  Identified self-management practices impeding diabetes control  Explored corrective strategies with patient and responsible inpatient provider   Informed patient of rational for insulin strategy while hospitalized         Billing Code(s)     [x] [phone]  subsequent hospital care - [address]   Program for Diabetes Health  Access via  [facility] [phone]

## 2020-04-22 NOTE — PROGRESS NOTES
Palliative Medicine Social Work      Greyson Da Silva NP in ICU and I spoke with patient  and daughter on phone for scheduled update. Medical update provided. Family smitha Serna had spoken with RN earlier and relayed info to Ivy Goncalves. Patient now breathing over vent, able to focus and track but not command following. Family understands still many unknowns about neurological status/repeat covid test pending. Family requesting to know results when Covid results are back and agreeable to Friday 12 pm update. Appreciate ICU support. Thank you for the opportunity to be involved in the care of Ms. Norma Phelan and her family.     Kirt Tanner LMSW, Supervisee in Social Work  Palliative Medicine   [phone]

## 2020-04-22 NOTE — PROGRESS NOTES
[facility]   [address] [facility], [facility] [address], [facility]  Phone: [phone]   [phone]       Nephrology Progress Note  Ramana Files     1960     [id number]  Date of Admission : 3/31/2020  04/22/20    CC: Follow up for JUANCHO      Assessment and Plan   JUANCHO :  - 2/2 ATN  - CRRT switched to daily IHD on 4/18  - HD today after Line change   - UF 1-1.5 kg today     Lytes  -  HyperPhos : continue Phos binders w/ TF    COVID-19 +ve   Acute Hypoxic Resp Failure   - On Vent   - completed Plaquenil. On Vit C, Zinc   - s/p Tocilizumab      RP hematoma:  - needing PRN transfusions  - Hb stable now     Cardiac arrest 4/9    Type II DM   - Insulin per primary team      Morbid Obesity        Interval History:  Line issues this morning   Unable to dialyze   Stable BP and Hb       PT NOT EXAMINED in line with ASN and RPA GUIDELINES ON MANAGING COVID-19 PTS WITH RENAL ISSUES. Examination findings discussed personally with the examining Physician team member      Review of Systems: Review of systems not obtained due to patient factors.     Current Medications:   Current Facility-Administered Medications   Medication Dose Route Frequency    sevelamer carbonate (RENVELA) oral powder 1.6 g  1.6 g Oral TID WITH MEALS    labetaloL (NORMODYNE;TRANDATE) injection 20 mg  20 mg IntraVENous Q4H PRN    hydrocortisone Sod Succ (PF) (SOLU-CORTEF) injection 50 mg  50 mg IntraVENous Q12H    HYDROcodone-acetaminophen (NORCO) 5-325 mg per tablet 1 Tab  1 Tab Oral Q6H    oxyCODONE IR (ROXICODONE) tablet 5 mg  5 mg Per NG tube Q6H    insulin glargine (LANTUS) injection 40 Units  40 Units SubCUTAneous Q12H    alteplase (CATHFLO) 1 mg in sterile water (preservative free) 1 mL injection  1 mg InterCATHeter PRN    albumin human 25% (BUMINATE) solution 12.5 g  12.5 g IntraVENous DIALYSIS PRN    dexmedeTOMidine (PRECEDEX) 400 mcg in 0.9% sodium chloride 100 mL infusion  0.2-1 mcg/kg/hr IntraVENous TITRATE    0.9% sodium chloride infusion 250 mL  250 mL IntraVENous PRN    guaiFENesin (ROBITUSSIN) 100 mg/5 mL oral liquid 200 mg  200 mg Oral Q6H    albuterol (PROVENTIL VENTOLIN) nebulizer solution 2.5 mg  2.5 mg Nebulization Q4H PRN    acetylcysteine (MUCOMYST) 200 mg/mL (20 %) solution 200 mg  200 mg Nebulization BID RT    heparin (porcine) 1,000 unit/mL injection 3,200 Units  3,200 Units IntraVENous DIALYSIS PRN    famotidine (PEPCID) 8 mg/mL oral suspension 20 mg  20 mg Per NG tube DAILY    alteplase (CATHFLO) 2 mg in sterile water (preservative free) 2 mL injection  2 mg InterCATHeter DIALYSIS PRN    alteplase (CATHFLO) 2 mg in sterile water (preservative free) 2 mL injection  2 mg InterCATHeter DIALYSIS PRN    insulin lispro (HUMALOG) injection   SubCUTAneous Q6H    0.9% sodium chloride infusion 250 mL  250 mL IntraVENous PRN    chlorhexidine (PERIDEX) 0.12 % mouthwash 15 mL  15 mL Oral Q12H    NOREPINephrine (LEVOPHED) 32,000 mcg in dextrose 5% 250 mL (128 mcg/mL) infusion  0-50 mcg/min IntraVENous TITRATE    [Held by provider] labetaloL (NORMODYNE) tablet 100 mg  100 mg Oral Q12H    0.9% sodium chloride infusion  3 mL/hr IntraVENous CONTINUOUS    0.9% sodium chloride infusion  5 mL/hr IntraVENous CONTINUOUS    fentaNYL citrate (PF) injection  mcg   mcg IntraVENous Q1H PRN    white petrolatum-mineral oiL (AKWA TEARS) 83-15 % ophthalmic ointment   Both Eyes Q12H    heparin (porcine) pf 300 Units  300 Units InterCATHeter PRN    midazolam (VERSED) injection 1-2 mg  1-2 mg IntraVENous Q2H PRN    bacitracin 500 unit/gram packet 1 Packet  1 Packet Topical PRN    balsam peru-castor oiL (VENELEX) ointment   Topical BID    sodium chloride (NS) flush 5-40 mL  5-40 mL IntraVENous Q8H    sodium chloride (NS) flush 5-40 mL  5-40 mL IntraVENous PRN    HYDROcodone-acetaminophen (NORCO) 5-325 mg per tablet 1 Tab  1 Tab Oral Q4H PRN    ondansetron (ZOFRAN) injection 4 mg  4 mg IntraVENous Q4H PRN    acetaminophen (TYLENOL) tablet 650 mg  650 mg Oral Q6H PRN    Or    acetaminophen (TYLENOL) suppository 650 mg  650 mg Rectal Q6H PRN    glucose chewable tablet 16 g  4 Tab Oral PRN    glucagon (GLUCAGEN) injection 1 mg  1 mg IntraMUSCular PRN    dextrose 10% infusion 0-250 mL  0-250 mL IntraVENous PRN      Allergies   Allergen Reactions    Augmentin [Amoxicillin-Pot Clavulanate] Rash and Itching       Objective:  Vitals:    Vitals:    04/22/20 0400 04/22/20 0500 04/22/20 0600 04/22/20 0700   BP: 122/72 133/86 120/79 129/78   Pulse: (!) 102 (!) 103 (!) 105 (!) 104   Resp: 27 20 25 26   Temp: 98.4 °F (36.9 °C)      TempSrc:       SpO2: 99% 100% 99% 99%   Weight: 118.6 kg (261 lb 6.4 oz)      Height:         Intake and Output:  No intake/output data recorded. 04/20 1901 - 04/22 0700  In: 2011.3 [I.V.:11.3]  Out: 3150 [Drains:650]    Physical Examination:   Pt intubated    Yes  General: Paralyzed on the vent  Resp:  On vent   CV:  RRR  GI:  Obese   Neurologic:  Sedated   Access:           R femoral melissa     []    High complexity decision making was performed  []    Patient is at high-risk of decompensation with multiple organ involvement    Lab Data Personally Reviewed: I have reviewed all the pertinent labs, microbiology data and radiology studies during assessment.     Recent Labs     04/22/20  0518 04/21/20  0437 04/20/20  0529   * 136 137   K 3.7 4.3 5.1    101 102   CO2 24 22 22   GLU 96 223* 234*   BUN 95* 96* 113*   CREA 2.10* 2.15* 2.44*   CA 8.5 8.5 8.7   MG 2.2 2.3 2.6*   PHOS 5.5* 5.8* 5.8*   ALB 2.5* 2.4* 2.4*  2.5*   SGOT  --   --  169*   ALT  --   --  206*     Recent Labs     04/22/20  0512 04/22/20  0003 04/21/20  1712 04/21/20  0818 04/21/20  0437   WBC 12.0* 13.1* 14.1* 13.2* 11.8*   HGB 8.2* 8.3* 8.4* 8.1* 8.0*   HCT 26.0* 26.7* 27.0* 26.0* 25.0*    153 151 135* 139*     No results found for: SDES  Lab Results   Component Value Date/Time    Culture result: NO GROWTH 5 DAYS 04/09/2020 02:32 PM    Culture result: NO GROWTH 5 DAYS 04/08/2020 10:36 AM    Culture result: NO GROWTH 5 DAYS 03/31/2020 11:15 AM    Culture result: MODERATE STAPHYLOCOCCUS AUREUS (A) 03/31/2020 10:34 AM    Culture result: LIGHT NORMAL RESPIRATORY SOFIE 03/31/2020 10:34 AM     Recent Results (from the past 24 hour(s))   CBC W/O DIFF    Collection Time: 04/21/20  8:18 AM   Result Value Ref Range    WBC 13.2 (H) 3.6 - 11.0 K/uL    RBC 2.65 (L) 3.80 - 5.20 M/uL    HGB 8.1 (L) 11.5 - 16.0 g/dL    HCT 26.0 (L) 35.0 - 47.0 %    MCV 98.1 80.0 - 99.0 FL    MCH 30.6 26.0 - 34.0 PG    MCHC 31.2 30.0 - 36.5 g/dL    RDW 21.4 (H) 11.5 - 14.5 %    PLATELET 776 (L) 493 - 400 K/uL    MPV 12.0 8.9 - 12.9 FL    NRBC 0.0 0  WBC    ABSOLUTE NRBC 0.00 0.00 - 0.01 K/uL   D DIMER    Collection Time: 04/21/20  8:18 AM   Result Value Ref Range    D-dimer 21.26 (H) 0.00 - 0.65 mg/L FEU   FIBRINOGEN    Collection Time: 04/21/20  8:18 AM   Result Value Ref Range    Fibrinogen 585 (H) 200 - 475 mg/dL   GLUCOSE, POC    Collection Time: 04/21/20 11:42 AM   Result Value Ref Range    Glucose (POC) 184 (H) 65 - 100 mg/dL    Performed by Renée Valero    CBC W/O DIFF    Collection Time: 04/21/20  5:12 PM   Result Value Ref Range    WBC 14.1 (H) 3.6 - 11.0 K/uL    RBC 2.75 (L) 3.80 - 5.20 M/uL    HGB 8.4 (L) 11.5 - 16.0 g/dL    HCT 27.0 (L) 35.0 - 47.0 %    MCV 98.2 80.0 - 99.0 FL    MCH 30.5 26.0 - 34.0 PG    MCHC 31.1 30.0 - 36.5 g/dL    RDW 21.0 (H) 11.5 - 14.5 %    PLATELET 113 117 - 423 K/uL    MPV 11.8 8.9 - 12.9 FL    NRBC 0.0 0  WBC    ABSOLUTE NRBC 0.00 0.00 - 0.01 K/uL   GLUCOSE, POC    Collection Time: 04/21/20  5:46 PM   Result Value Ref Range    Glucose (POC) 239 (H) 65 - 100 mg/dL    Performed by [address] [place] [address], POC    Collection Time: 04/21/20 11:54 PM   Result Value Ref Range    Glucose (POC) 143 (H) 65 - 100 mg/dL    Performed by AUSTEN HIDALGO    CBC W/O DIFF    Collection Time: 04/22/20 12:03 AM Result Value Ref Range    WBC 13.1 (H) 3.6 - 11.0 K/uL    RBC 2.73 (L) 3.80 - 5.20 M/uL    HGB 8.3 (L) 11.5 - 16.0 g/dL    HCT 26.7 (L) 35.0 - 47.0 %    MCV 97.8 80.0 - 99.0 FL    MCH 30.4 26.0 - 34.0 PG    MCHC 31.1 30.0 - 36.5 g/dL    RDW 20.7 (H) 11.5 - 14.5 %    PLATELET 802 768 - 286 K/uL    MPV 11.8 8.9 - 12.9 FL    NRBC 0.0 0  WBC    ABSOLUTE NRBC 0.00 0.00 - 0.01 K/uL   CBC W/O DIFF    Collection Time: 04/22/20  5:12 AM   Result Value Ref Range    WBC 12.0 (H) 3.6 - 11.0 K/uL    RBC 2.67 (L) 3.80 - 5.20 M/uL    HGB 8.2 (L) 11.5 - 16.0 g/dL    HCT 26.0 (L) 35.0 - 47.0 %    MCV 97.4 80.0 - 99.0 FL    MCH 30.7 26.0 - 34.0 PG    MCHC 31.5 30.0 - 36.5 g/dL    RDW 20.5 (H) 11.5 - 14.5 %    PLATELET 220 492 - 568 K/uL    MPV 12.2 8.9 - 12.9 FL    NRBC 0.0 0  WBC    ABSOLUTE NRBC 0.00 0.00 - 0.01 K/uL   CRP, HIGH SENSITIVITY    Collection Time: 04/22/20  5:18 AM   Result Value Ref Range    CRP, High sensitivity >9.5 mg/L   RENAL FUNCTION PANEL    Collection Time: 04/22/20  5:18 AM   Result Value Ref Range    Sodium 134 (L) 136 - 145 mmol/L    Potassium 3.7 3.5 - 5.1 mmol/L    Chloride 100 97 - 108 mmol/L    CO2 24 21 - 32 mmol/L    Anion gap 10 5 - 15 mmol/L    Glucose 96 65 - 100 mg/dL    BUN 95 (H) 6 - 20 MG/DL    Creatinine 2.10 (H) 0.55 - 1.02 MG/DL    BUN/Creatinine ratio 45 (H) 12 - 20      GFR est AA 29 (L) >60 ml/min/1.73m2    GFR est non-AA 24 (L) >60 ml/min/1.73m2    Calcium 8.5 8.5 - 10.1 MG/DL    Phosphorus 5.5 (H) 2.6 - 4.7 MG/DL    Albumin 2.5 (L) 3.5 - 5.0 g/dL   MAGNESIUM    Collection Time: 04/22/20  5:18 AM   Result Value Ref Range    Magnesium 2.2 1.6 - 2.4 mg/dL   GLUCOSE, POC    Collection Time: 04/22/20  5:20 AM   Result Value Ref Range    Glucose (POC) 103 (H) 65 - 100 mg/dL    Performed by Elis Fletcher            Total time spent with patient:  xxx   min.                                Care Plan discussed with:  Patient     Family      RN      Consulting Physician /Specialist        I have reviewed the flowsheets. Chart and Pertinent Notes have been reviewed. No change in PMH ,family and social history from Consult note.       Liang Duenas MD

## 2020-04-23 ENCOUNTER — APPOINTMENT (OUTPATIENT)
Dept: ULTRASOUND IMAGING | Age: 60
DRG: 870 | End: 2020-04-23
Attending: NURSE PRACTITIONER
Payer: COMMERCIAL

## 2020-04-23 LAB
ALBUMIN SERPL-MCNC: 3.2 G/DL (ref 3.5–5)
ANION GAP SERPL CALC-SCNC: 7 MMOL/L (ref 5–15)
ANION GAP SERPL CALC-SCNC: 8 MMOL/L (ref 5–15)
APTT PPP: 23.1 SEC (ref 22.1–32)
BASOPHILS # BLD: 0 K/UL (ref 0–0.1)
BASOPHILS # BLD: 0 K/UL (ref 0–0.1)
BASOPHILS NFR BLD: 0 % (ref 0–1)
BASOPHILS NFR BLD: 0 % (ref 0–1)
BUN SERPL-MCNC: 27 MG/DL (ref 6–20)
BUN SERPL-MCNC: 65 MG/DL (ref 6–20)
BUN/CREAT SERPL: 35 (ref 12–20)
BUN/CREAT SERPL: 41 (ref 12–20)
CALCIUM SERPL-MCNC: 8.6 MG/DL (ref 8.5–10.1)
CALCIUM SERPL-MCNC: 8.7 MG/DL (ref 8.5–10.1)
CHLORIDE SERPL-SCNC: 101 MMOL/L (ref 97–108)
CHLORIDE SERPL-SCNC: 104 MMOL/L (ref 97–108)
CO2 SERPL-SCNC: 27 MMOL/L (ref 21–32)
CO2 SERPL-SCNC: 28 MMOL/L (ref 21–32)
CREAT SERPL-MCNC: 0.78 MG/DL (ref 0.55–1.02)
CREAT SERPL-MCNC: 1.57 MG/DL (ref 0.55–1.02)
CRP SERPL HS-MCNC: >9.5 MG/L
D DIMER PPP FEU-MCNC: 14.66 MG/L FEU (ref 0–0.65)
D DIMER PPP FEU-MCNC: 19.11 MG/L FEU (ref 0–0.65)
DIFFERENTIAL METHOD BLD: ABNORMAL
DIFFERENTIAL METHOD BLD: ABNORMAL
EOSINOPHIL # BLD: 0 K/UL (ref 0–0.4)
EOSINOPHIL # BLD: 0 K/UL (ref 0–0.4)
EOSINOPHIL NFR BLD: 0 % (ref 0–7)
EOSINOPHIL NFR BLD: 0 % (ref 0–7)
ERYTHROCYTE [DISTWIDTH] IN BLOOD BY AUTOMATED COUNT: 18.7 % (ref 11.5–14.5)
ERYTHROCYTE [DISTWIDTH] IN BLOOD BY AUTOMATED COUNT: 19.8 % (ref 11.5–14.5)
ERYTHROCYTE [DISTWIDTH] IN BLOOD BY AUTOMATED COUNT: 19.8 % (ref 11.5–14.5)
FIBRINOGEN PPP-MCNC: 420 MG/DL (ref 200–475)
FIBRINOGEN PPP-MCNC: 547 MG/DL (ref 200–475)
GLUCOSE BLD STRIP.AUTO-MCNC: 113 MG/DL (ref 65–100)
GLUCOSE BLD STRIP.AUTO-MCNC: 114 MG/DL (ref 65–100)
GLUCOSE BLD STRIP.AUTO-MCNC: 130 MG/DL (ref 65–100)
GLUCOSE BLD STRIP.AUTO-MCNC: 139 MG/DL (ref 65–100)
GLUCOSE SERPL-MCNC: 131 MG/DL (ref 65–100)
GLUCOSE SERPL-MCNC: 97 MG/DL (ref 65–100)
HAPTOGLOB SERPL-MCNC: <8 MG/DL (ref 30–200)
HCT VFR BLD AUTO: 17.4 % (ref 35–47)
HCT VFR BLD AUTO: 18.3 % (ref 35–47)
HCT VFR BLD AUTO: 24.2 % (ref 35–47)
HGB BLD-MCNC: 5.4 G/DL (ref 11.5–16)
HGB BLD-MCNC: 5.7 G/DL (ref 11.5–16)
HGB BLD-MCNC: 7.9 G/DL (ref 11.5–16)
IMM GRANULOCYTES # BLD AUTO: 0.1 K/UL (ref 0–0.04)
IMM GRANULOCYTES # BLD AUTO: 0.1 K/UL (ref 0–0.04)
IMM GRANULOCYTES NFR BLD AUTO: 1 % (ref 0–0.5)
IMM GRANULOCYTES NFR BLD AUTO: 1 % (ref 0–0.5)
INR PPP: 1.1 (ref 0.9–1.1)
LYMPHOCYTES # BLD: 0.6 K/UL (ref 0.8–3.5)
LYMPHOCYTES # BLD: 0.9 K/UL (ref 0.8–3.5)
LYMPHOCYTES NFR BLD: 12 % (ref 12–49)
LYMPHOCYTES NFR BLD: 6 % (ref 12–49)
MAGNESIUM SERPL-MCNC: 2 MG/DL (ref 1.6–2.4)
MAGNESIUM SERPL-MCNC: 2.2 MG/DL (ref 1.6–2.4)
MCH RBC QN AUTO: 30.9 PG (ref 26–34)
MCH RBC QN AUTO: 31 PG (ref 26–34)
MCH RBC QN AUTO: 31 PG (ref 26–34)
MCHC RBC AUTO-ENTMCNC: 31 G/DL (ref 30–36.5)
MCHC RBC AUTO-ENTMCNC: 31.1 G/DL (ref 30–36.5)
MCHC RBC AUTO-ENTMCNC: 32.6 G/DL (ref 30–36.5)
MCV RBC AUTO: 94.9 FL (ref 80–99)
MCV RBC AUTO: 99.4 FL (ref 80–99)
MCV RBC AUTO: 99.5 FL (ref 80–99)
MONOCYTES # BLD: 0.4 K/UL (ref 0–1)
MONOCYTES # BLD: 0.6 K/UL (ref 0–1)
MONOCYTES NFR BLD: 6 % (ref 5–13)
MONOCYTES NFR BLD: 6 % (ref 5–13)
NEUTS SEG # BLD: 6 K/UL (ref 1.8–8)
NEUTS SEG # BLD: 8.6 K/UL (ref 1.8–8)
NEUTS SEG NFR BLD: 81 % (ref 32–75)
NEUTS SEG NFR BLD: 87 % (ref 32–75)
NRBC # BLD: 0 K/UL (ref 0–0.01)
NRBC BLD-RTO: 0 PER 100 WBC
PHOSPHATE SERPL-MCNC: 1.6 MG/DL (ref 2.6–4.7)
PHOSPHATE SERPL-MCNC: 3.9 MG/DL (ref 2.6–4.7)
PLATELET # BLD AUTO: 138 K/UL (ref 150–400)
PLATELET # BLD AUTO: 150 K/UL (ref 150–400)
PLATELET # BLD AUTO: 154 K/UL (ref 150–400)
PLATELET COMMENTS,PCOM: ABNORMAL
PLATELET COMMENTS,PCOM: ABNORMAL
PMV BLD AUTO: 11.5 FL (ref 8.9–12.9)
PMV BLD AUTO: 11.6 FL (ref 8.9–12.9)
PMV BLD AUTO: 12.1 FL (ref 8.9–12.9)
POTASSIUM SERPL-SCNC: 3 MMOL/L (ref 3.5–5.1)
POTASSIUM SERPL-SCNC: 3.1 MMOL/L (ref 3.5–5.1)
PROTHROMBIN TIME: 11.4 SEC (ref 9–11.1)
RBC # BLD AUTO: 1.75 M/UL (ref 3.8–5.2)
RBC # BLD AUTO: 1.84 M/UL (ref 3.8–5.2)
RBC # BLD AUTO: 2.55 M/UL (ref 3.8–5.2)
RBC MORPH BLD: ABNORMAL
SERVICE CMNT-IMP: ABNORMAL
SODIUM SERPL-SCNC: 135 MMOL/L (ref 136–145)
SODIUM SERPL-SCNC: 140 MMOL/L (ref 136–145)
THERAPEUTIC RANGE,PTTT: NORMAL SECS (ref 58–77)
WBC # BLD AUTO: 10.4 K/UL (ref 3.6–11)
WBC # BLD AUTO: 7.4 K/UL (ref 3.6–11)
WBC # BLD AUTO: 9.9 K/UL (ref 3.6–11)

## 2020-04-23 PROCEDURE — 83735 ASSAY OF MAGNESIUM: CPT

## 2020-04-23 PROCEDURE — 74011000250 HC RX REV CODE- 250: Performed by: NURSE PRACTITIONER

## 2020-04-23 PROCEDURE — 74011250636 HC RX REV CODE- 250/636: Performed by: NURSE PRACTITIONER

## 2020-04-23 PROCEDURE — 84100 ASSAY OF PHOSPHORUS: CPT

## 2020-04-23 PROCEDURE — 74011000250 HC RX REV CODE- 250: Performed by: SURGERY

## 2020-04-23 PROCEDURE — 76705 ECHO EXAM OF ABDOMEN: CPT

## 2020-04-23 PROCEDURE — 77030018798 HC PMP KT ENTRL FED COVD -A

## 2020-04-23 PROCEDURE — 80048 BASIC METABOLIC PNL TOTAL CA: CPT

## 2020-04-23 PROCEDURE — 85730 THROMBOPLASTIN TIME PARTIAL: CPT

## 2020-04-23 PROCEDURE — 36415 COLL VENOUS BLD VENIPUNCTURE: CPT

## 2020-04-23 PROCEDURE — 85384 FIBRINOGEN ACTIVITY: CPT

## 2020-04-23 PROCEDURE — P9016 RBC LEUKOCYTES REDUCED: HCPCS

## 2020-04-23 PROCEDURE — 74011250637 HC RX REV CODE- 250/637: Performed by: NURSE PRACTITIONER

## 2020-04-23 PROCEDURE — 65610000006 HC RM INTENSIVE CARE

## 2020-04-23 PROCEDURE — 74011636637 HC RX REV CODE- 636/637: Performed by: NURSE PRACTITIONER

## 2020-04-23 PROCEDURE — 74011250636 HC RX REV CODE- 250/636: Performed by: INTERNAL MEDICINE

## 2020-04-23 PROCEDURE — 83010 ASSAY OF HAPTOGLOBIN QUANT: CPT

## 2020-04-23 PROCEDURE — 86923 COMPATIBILITY TEST ELECTRIC: CPT

## 2020-04-23 PROCEDURE — 86141 C-REACTIVE PROTEIN HS: CPT

## 2020-04-23 PROCEDURE — 86900 BLOOD TYPING SEROLOGIC ABO: CPT

## 2020-04-23 PROCEDURE — 74011250637 HC RX REV CODE- 250/637: Performed by: INTERNAL MEDICINE

## 2020-04-23 PROCEDURE — 90935 HEMODIALYSIS ONE EVALUATION: CPT

## 2020-04-23 PROCEDURE — 94640 AIRWAY INHALATION TREATMENT: CPT

## 2020-04-23 PROCEDURE — 74011000250 HC RX REV CODE- 250: Performed by: INTERNAL MEDICINE

## 2020-04-23 PROCEDURE — 85025 COMPLETE CBC W/AUTO DIFF WBC: CPT

## 2020-04-23 PROCEDURE — 82962 GLUCOSE BLOOD TEST: CPT

## 2020-04-23 PROCEDURE — 85027 COMPLETE CBC AUTOMATED: CPT

## 2020-04-23 PROCEDURE — 85610 PROTHROMBIN TIME: CPT

## 2020-04-23 PROCEDURE — 85379 FIBRIN DEGRADATION QUANT: CPT

## 2020-04-23 PROCEDURE — 80069 RENAL FUNCTION PANEL: CPT

## 2020-04-23 RX ORDER — SODIUM CHLORIDE 9 MG/ML
250 INJECTION, SOLUTION INTRAVENOUS AS NEEDED
Status: DISCONTINUED | OUTPATIENT
Start: 2020-04-23 | End: 2020-04-30

## 2020-04-23 RX ORDER — POTASSIUM CHLORIDE 29.8 MG/ML
20 INJECTION INTRAVENOUS
Status: DISCONTINUED | OUTPATIENT
Start: 2020-04-23 | End: 2020-04-23

## 2020-04-23 RX ORDER — POTASSIUM CHLORIDE 29.8 MG/ML
20 INJECTION INTRAVENOUS
Status: COMPLETED | OUTPATIENT
Start: 2020-04-23 | End: 2020-04-23

## 2020-04-23 RX ORDER — HYDROCORTISONE SODIUM SUCCINATE 100 MG/2ML
25 INJECTION, POWDER, FOR SOLUTION INTRAMUSCULAR; INTRAVENOUS EVERY 12 HOURS
Status: DISCONTINUED | OUTPATIENT
Start: 2020-04-23 | End: 2020-04-26

## 2020-04-23 RX ORDER — INSULIN GLARGINE 100 [IU]/ML
25 INJECTION, SOLUTION SUBCUTANEOUS EVERY 12 HOURS
Status: DISCONTINUED | OUTPATIENT
Start: 2020-04-23 | End: 2020-04-27

## 2020-04-23 RX ORDER — HEPARIN SODIUM 1000 [USP'U]/ML
2500 INJECTION, SOLUTION INTRAVENOUS; SUBCUTANEOUS
Status: DISCONTINUED | OUTPATIENT
Start: 2020-04-23 | End: 2020-05-07 | Stop reason: DRUGHIGH

## 2020-04-23 RX ADMIN — ACETYLCYSTEINE 200 MG: 200 SOLUTION ORAL; RESPIRATORY (INHALATION) at 08:24

## 2020-04-23 RX ADMIN — ALBUTEROL SULFATE 2.5 MG: 2.5 SOLUTION RESPIRATORY (INHALATION) at 08:24

## 2020-04-23 RX ADMIN — INSULIN GLARGINE 40 UNITS: 100 INJECTION, SOLUTION SUBCUTANEOUS at 08:39

## 2020-04-23 RX ADMIN — HYDROCORTISONE SODIUM SUCCINATE 50 MG: 100 INJECTION, POWDER, FOR SOLUTION INTRAMUSCULAR; INTRAVENOUS at 08:39

## 2020-04-23 RX ADMIN — Medication 1 MCG/MIN: at 03:05

## 2020-04-23 RX ADMIN — Medication 1 PACKET: at 08:40

## 2020-04-23 RX ADMIN — SEVELAMER CARBONATE 1.6 G: 800 POWDER, FOR SUSPENSION ORAL at 17:58

## 2020-04-23 RX ADMIN — POTASSIUM CHLORIDE 20 MEQ: 400 INJECTION, SOLUTION INTRAVENOUS at 19:12

## 2020-04-23 RX ADMIN — ACETYLCYSTEINE 200 MG: 200 SOLUTION ORAL; RESPIRATORY (INHALATION) at 20:31

## 2020-04-23 RX ADMIN — GUAIFENESIN 200 MG: 100 SOLUTION ORAL at 23:22

## 2020-04-23 RX ADMIN — HYDROCODONE BITARTRATE AND ACETAMINOPHEN 1 TABLET: 5; 325 TABLET ORAL at 14:03

## 2020-04-23 RX ADMIN — HYDROCODONE BITARTRATE AND ACETAMINOPHEN 1 TABLET: 5; 325 TABLET ORAL at 09:00

## 2020-04-23 RX ADMIN — SEVELAMER CARBONATE 1.6 G: 800 POWDER, FOR SUSPENSION ORAL at 08:40

## 2020-04-23 RX ADMIN — CASTOR OIL AND BALSAM, PERU: 788; 87 OINTMENT TOPICAL at 08:57

## 2020-04-23 RX ADMIN — FAMOTIDINE 20 MG: 40 POWDER, FOR SUSPENSION ORAL at 08:41

## 2020-04-23 RX ADMIN — HEPARIN SODIUM 2500 UNITS: 1000 INJECTION INTRAVENOUS; SUBCUTANEOUS at 14:48

## 2020-04-23 RX ADMIN — CHLORHEXIDINE GLUCONATE 15 ML: 1.2 RINSE ORAL at 21:03

## 2020-04-23 RX ADMIN — Medication 1 PACKET: at 16:17

## 2020-04-23 RX ADMIN — HYDROCODONE BITARTRATE AND ACETAMINOPHEN 1 TABLET: 5; 325 TABLET ORAL at 01:25

## 2020-04-23 RX ADMIN — GUAIFENESIN 200 MG: 100 SOLUTION ORAL at 05:46

## 2020-04-23 RX ADMIN — MINERAL OIL AND WHITE PETROLATUM: 150; 830 OINTMENT OPHTHALMIC at 08:57

## 2020-04-23 RX ADMIN — Medication 1 PACKET: at 21:04

## 2020-04-23 RX ADMIN — INSULIN GLARGINE 25 UNITS: 100 INJECTION, SOLUTION SUBCUTANEOUS at 21:04

## 2020-04-23 RX ADMIN — POTASSIUM CHLORIDE 20 MEQ: 400 INJECTION, SOLUTION INTRAVENOUS at 06:40

## 2020-04-23 RX ADMIN — MINERAL OIL AND WHITE PETROLATUM: 150; 830 OINTMENT OPHTHALMIC at 21:05

## 2020-04-23 RX ADMIN — CASTOR OIL AND BALSAM, PERU: 788; 87 OINTMENT TOPICAL at 18:00

## 2020-04-23 RX ADMIN — SEVELAMER CARBONATE 1.6 G: 800 POWDER, FOR SUSPENSION ORAL at 12:56

## 2020-04-23 RX ADMIN — HYDROCORTISONE SODIUM SUCCINATE 25 MG: 100 INJECTION, POWDER, FOR SOLUTION INTRAMUSCULAR; INTRAVENOUS at 21:03

## 2020-04-23 RX ADMIN — SODIUM CHLORIDE 40 ML: 9 INJECTION INTRAMUSCULAR; INTRAVENOUS; SUBCUTANEOUS at 05:47

## 2020-04-23 RX ADMIN — CHLORHEXIDINE GLUCONATE 15 ML: 1.2 RINSE ORAL at 09:11

## 2020-04-23 RX ADMIN — GUAIFENESIN 200 MG: 100 SOLUTION ORAL at 12:56

## 2020-04-23 RX ADMIN — SODIUM CHLORIDE 40 ML: 9 INJECTION INTRAMUSCULAR; INTRAVENOUS; SUBCUTANEOUS at 14:03

## 2020-04-23 RX ADMIN — POTASSIUM CHLORIDE 20 MEQ: 400 INJECTION, SOLUTION INTRAVENOUS at 21:04

## 2020-04-23 RX ADMIN — GUAIFENESIN 200 MG: 100 SOLUTION ORAL at 17:58

## 2020-04-23 RX ADMIN — SODIUM CHLORIDE 40 ML: 9 INJECTION INTRAMUSCULAR; INTRAVENOUS; SUBCUTANEOUS at 21:04

## 2020-04-23 RX ADMIN — POTASSIUM CHLORIDE 20 MEQ: 400 INJECTION, SOLUTION INTRAVENOUS at 05:40

## 2020-04-23 RX ADMIN — HYDROCODONE BITARTRATE AND ACETAMINOPHEN 1 TABLET: 5; 325 TABLET ORAL at 20:04

## 2020-04-23 RX ADMIN — ALBUTEROL SULFATE 2.5 MG: 2.5 SOLUTION RESPIRATORY (INHALATION) at 20:31

## 2020-04-23 NOTE — PROGRESS NOTES
Right IJ dressing and biopatch soiled with patient sputum causing the tegaderm to become loose. Dressing and biopatch changed per P&P.  No signs and symptoms of infection noted at insertion site

## 2020-04-23 NOTE — DIABETES MGMT
[facility]  CLINICAL NURSE SPECIALIST CONSULT  PROGRAM FOR DIABETES HEALTH  Follow up Note  Presentation   Cody Dean is a 64 y.o. female admitted from OSH to [facility] ICU with SARS-COV2 needing CRRT. She is currently sedated and has been intubated since 3/28/20. Current clinical course has been complicated by steroid induced hyperglycemia. Steroids are discontinued at this time. She requires CRRT for acute renal failure r/t her sepsis and hypotension. PHM: GERD, obesity, and anxiety. New diabetes diagnosis with A1C 11.0% (3/28/2020); updated A1C 3/31/20-10.4%     Recent events:   Patient remains intubated and on ventilator, on CRRT. Sedation off, Fentanyl devreased- patient more alert, per RN note. Consulted by Provider for advanced diabetes nursing assessment and care, specifically related to   [] Transitioning off Shelley Kingman   [x] Inpatient management strategy  [] Home management assessment  [] Survival skill education    Diabetes-related medical history  Acute complications  hyperglycemia  Neurological complications  NONE  Microvascular disease  NONE  Macrovascular disease  NONE  Other associated conditions     NONE    Diabetes medication history: NONE    Subjective   Per chart review, Ms. Holley Bueno remains very ill  and is on isolation forSARS-COV2 in ICU. I am unable to do a physical assessment of the patient at this time. Patient reports the following home diabetes self-care practices: deferred    Objective     Vital Signs   Visit Vitals  /74   Pulse (!) 105   Temp 98.7 °F (37.1 °C) (Axillary)   Resp 25   Ht 5' 4\" (1.626 m)   Wt 120.7 kg (266 lb 3.2 oz)   SpO2 99%   BMI 45.69 kg/m²   .    Laboratory  Lab Results   Component Value Date/Time    Hemoglobin A1c 10.4 (H) 03/31/2020 03:42 PM     No results found for: LDL, LDLC, DLDLP  Lab Results   Component Value Date/Time    Creatinine 1.57 (H) 04/23/2020 03:11 AM     Lab Results   Component Value Date/Time    Sodium 135 (L) 04/23/2020 03:11 AM    Potassium 3.1 (L) 04/23/2020 03:11 AM    Chloride 101 04/23/2020 03:11 AM    CO2 27 04/23/2020 03:11 AM    Anion gap 7 04/23/2020 03:11 AM    Glucose 131 (H) 04/23/2020 03:11 AM    BUN 65 (H) 04/23/2020 03:11 AM    Creatinine 1.57 (H) 04/23/2020 03:11 AM    BUN/Creatinine ratio 41 (H) 04/23/2020 03:11 AM    GFR est AA 41 (L) 04/23/2020 03:11 AM    GFR est non-AA 34 (L) 04/23/2020 03:11 AM    Calcium 8.6 04/23/2020 03:11 AM    Bilirubin, total 1.6 (H) 04/20/2020 05:29 AM    AST (SGOT) 169 (H) 04/20/2020 05:29 AM    Alk. phosphatase 490 (H) 04/20/2020 05:29 AM    Protein, total 5.9 (L) 04/20/2020 05:29 AM    Albumin 3.2 (L) 04/23/2020 03:11 AM    Globulin 3.5 04/20/2020 05:29 AM    A-G Ratio 0.7 (L) 04/20/2020 05:29 AM    ALT (SGPT) 206 (H) 04/20/2020 05:29 AM     Lab Results   Component Value Date/Time    ALT (SGPT) 206 (H) 04/20/2020 05:29 AM         Evaluation   Ms Cyn Chan, with new onset Type 2 diabetes,with A1C 10.4%. Steroid tapered to  on 100mg daily- hydrocortisone. Basal insulin increased to 80 units daily starting yesterday evening. Fasting BG today 130mg/dl. Highest BG yesterday 157mg/dl . BG trends steadily decreased overnight. Did have a low BG 69mg/dl yesterday afternoon. .Since her steroid dose is tapered to 100mg daily her insulin needs can be reduced. .   Remains on TF Nepro @ 30cc/hr      To maintain her basal metabolic needs she requires 36units daily (renal dosing). In addition she also needs 35 units of basal insulin to cover for her steroid AND 7units to cover for her TF (nepro @ 30cc/hr). So total insulin needs are: 78units daily. It is imperative that we maintain her BG within target range 100-180mg/dl. Recommendations   1. Continue 80units daily; if BG falls below 70, on dose, back off to 70units daily.      Follow the dosing schedule when tapering steroid:     20 mg Hydrocortisone: add 0.05 units/kg Lantus to total daily insulin dose  40 mg Hydrocortisone: add 0.1 units/kg Lantus to total daily insulin dose  50 mg Hydrocortisone: add 0.15 units/kg Lantus to total daily insulin dose  100 mg Hydrocortisone: add 0.3 units/kg Lantus to total daily insulin dose    2. Will continue to follow.     Assessment and Plan   Nursing Diagnosis Risk for unstable blood glucose pattern   Nursing Intervention Domain 2506 Decision-making Support   Nursing Interventions Examined current inpatient diabetes control   Explored factors facilitating and impeding inpatient management  Identified self-management practices impeding diabetes control  Explored corrective strategies with patient and responsible inpatient provider   Informed patient of rational for insulin strategy while hospitalized         Billing Code(s)     [x] [phone]  subsequent hospital care - [address] [address], [facility]   Program for Diabetes Health  Access via  Joel Chinwilliam [phone]

## 2020-04-23 NOTE — WOUND CARE
Wound Note: attempted to see this afternoon however patient having dialysis and blood transfusions. Will attempt to assess tomorrow.   Nina Flores RN,CWCN

## 2020-04-23 NOTE — PROGRESS NOTES
Primary Nurse Leticia Diaz RN and JUDY de la cruz performed a dual skin assessment on this patient Impairment noted- see wound doc flow sheet  Aniket score is 9  Skin tear sacral area and back, pannus, forehead.  See wound note

## 2020-04-23 NOTE — PROCEDURES
[facility]  [phone]    Vitals   Pre   Post   Assessment   Pre   Post     Temp  98.8 axillary  98.6 axillary LOC  Alert, but not oriented. Does not follow commands. Follows movement with eyes Alert, but not oriented. Does not follow commands for me. Follows movement with eyes   HR   Pulse (Heart Rate): 97 (04/23/20 1100) 101 Lungs   intubated  intubated   B/P   BP: 113/67 (04/23/20 1100) 136/71 Cardiac   Bedside monitor. Sinus tachycardia  bedside monitor, tachycardia   Resp   Resp Rate: 24 (04/23/20 1100) 25 Skin   Warm/dry intact  warm/dry/intact   Pain level  Pain Intensity 1: 0 (04/23/20 0400) 0 Edema  2 + bilateral lower extremity pitting edema     2+ bilateral lower extremity pitting edema   Orders:    Duration:   Start:   11:57 End:   15:27 Total:   3.5 hours   Dialyzer:   revaclear   K Bath:   2   Ca Bath:   2.5   Na/Bicarb:   140/35   Target Fluid Removal:   500mL, increased to 1000mL mid treatment due to BP tolerating and UF   Access     Type & Location:   Right IJ temporary CVC. Dressing soiled and changed, patient drooling on initial dressing. No redness or drainage noted at insertion site, new dressing and biopatch clean, dry, and intact dated 04/23/20. Each catheter limb disinfected for 60 seconds per limb with alcohol swabs. Caps removed, dialysis CVC hub scrubbed with Prevantics for 5 seconds, followed by a 5 second dry time per Hospital P&P. At end of HD all possible blood returned, hubs and limbs disinfected per P&P and heparin dwell instilled per MD order. Red and blue caps applied.      Labs     Obtained/Reviewed   Critical Results Called   Date when labs were drawn-  Hgb-    HGB   Date Value Ref Range Status   04/23/2020 5.7 (LL) 11.5 - 16.0 g/dL Final     Comment:     RESULTS VERIFIED, PHONED TO AND READ BACK BY  JUDY DONOVAN AT 1133/DW       K-    Potassium   Date Value Ref Range Status   04/23/2020 3.1 (L) 3.5 - 5.1 mmol/L Final     Ca-   Calcium   Date Value Ref Range Status   04/23/2020 8.6 8.5 - 10.1 MG/DL Final     Bun-   BUN   Date Value Ref Range Status   04/23/2020 65 (H) 6 - 20 MG/DL Final     Creat-   Creatinine   Date Value Ref Range Status   04/23/2020 1.57 (H) 0.55 - 1.02 MG/DL Final        Medications/ Blood Products Given     Name   Dose   Route and Time     heparin 1.4 mL blue limb, 1.1mL red limb  Post dialysis catheter dwell   albumin 25%/50mL-12.5gm PRN-not needed this treatment. No hypotension        Blood Volume Processed (BVP):    76.5 liters Net Fluid   Removed:  1000mL   Comments   Time Out Done: yes 11:45am  Primary Nurse Rpt Pre: April JUDY Will  Primary Nurse Rpt Post: April JUDY Will  Pt Education: procedural, blood product administration  Care Plan: continue daily HD as ordered by Nephrologist. Covid-19 screening pending. Previously positive  Tx Summary: Toleratied HD well. 2 units PRBCs transfused with HD as ordered by MD for critically low hgb 5.7*, PRBC volume not calculated into intake towards net fluid removal goal per order. No need for albumin or pressor support during HD. All appropriate PPE donned during entirety of treatment (gloves, gown, N95 mask, and faceshield). Admiting Diagnosis: SARS-COV2 with renal failure  Pt's previous clinic-N/A  Consent signed - Informed Consent Verified: Yes (04/22/20 1521)  [facility] Consent - NOTED  Hepatitis Status- surface antigen negative 04/01/20, surface antibody less than 10 04/01/20. Patient susceptible  Machine #- Machine Number: [id number] (04/22/20 1521)  Telemetry status-bedside monitor  Pre-dialysis wt. - Pre-Dialysis Weight: 118.6 kg (261 lb 7.5 oz) (04/22/20 1521)

## 2020-04-23 NOTE — PROGRESS NOTES
Pt presently receiving dialysis, medicated as ordered, v/s remains with range of begin of shift vitals. Suctioned prn, comfort and hygienic needs being met.

## 2020-04-23 NOTE — PROGRESS NOTES
[facility]   [address] [facility], [facility] [address], [facility]  Phone: [phone]   [phone]       Nephrology Progress Note  Richar Mustafa     1960     [id number]  Date of Admission : 3/31/2020  04/23/20    CC: Follow up for JUANCHO      Assessment and Plan   JUANCHO :  - 2/2 ATN  - CRRT switched to daily IHD on 4/18  - New line placed by IR on 4/22  - HD today +/- UF     Lytes  -  HyperPhos : continue Phos binders w/ TF  - Hypo K : being replaced     COVID-19 +ve   Acute Hypoxic Resp Failure   - On Vent   - completed Plaquenil. On Vit C, Zinc   - s/p Tocilizumab      RP hematoma:  - ? rebleed now   - if hemodynamically unstable today --> consider Angio/ embolization   - 3 units of blood planned     Cardiac arrest 4/9    Type II DM   - Insulin per primary team      Morbid Obesity        Interval History:  New line worked well   HD c/b Hypotension and tachycardia , that started even before HD  Hb down to 5 range today       PT NOT EXAMINED in line with ASN and RPA GUIDELINES ON MANAGING COVID-19 PTS WITH RENAL ISSUES. Examination findings discussed personally with the examining Physician team member      Review of Systems: Review of systems not obtained due to patient factors.     Current Medications:   Current Facility-Administered Medications   Medication Dose Route Frequency    0.9% sodium chloride infusion 250 mL  250 mL IntraVENous PRN    potassium chloride 20 mEq in 50 ml IVPB  20 mEq IntraVENous Q2H    guar gum (BENEFIBER) packet 1 Packet  4 g Oral TID    fentaNYL citrate (PF) injection 25-50 mcg  25-50 mcg IntraVENous Q2H PRN    NOREPINephrine (LEVOPHED) 8 mg in 5% dextrose 250mL (32 mcg/mL) infusion  0.5-30 mcg/min IntraVENous TITRATE    sevelamer carbonate (RENVELA) oral powder 1.6 g  1.6 g Oral TID WITH MEALS    labetaloL (NORMODYNE;TRANDATE) injection 20 mg  20 mg IntraVENous Q4H PRN    hydrocortisone Sod Succ (PF) (SOLU-CORTEF) injection 50 mg  50 mg IntraVENous Q12H    HYDROcodone-acetaminophen (NORCO) 5-325 mg per tablet 1 Tab  1 Tab Oral Q6H    insulin glargine (LANTUS) injection 40 Units  40 Units SubCUTAneous Q12H    alteplase (CATHFLO) 1 mg in sterile water (preservative free) 1 mL injection  1 mg InterCATHeter PRN    albumin human 25% (BUMINATE) solution 12.5 g  12.5 g IntraVENous DIALYSIS PRN    0.9% sodium chloride infusion 250 mL  250 mL IntraVENous PRN    guaiFENesin (ROBITUSSIN) 100 mg/5 mL oral liquid 200 mg  200 mg Oral Q6H    albuterol (PROVENTIL VENTOLIN) nebulizer solution 2.5 mg  2.5 mg Nebulization Q4H PRN    acetylcysteine (MUCOMYST) 200 mg/mL (20 %) solution 200 mg  200 mg Nebulization BID RT    heparin (porcine) 1,000 unit/mL injection 3,200 Units  3,200 Units IntraVENous DIALYSIS PRN    famotidine (PEPCID) 8 mg/mL oral suspension 20 mg  20 mg Per NG tube DAILY    alteplase (CATHFLO) 2 mg in sterile water (preservative free) 2 mL injection  2 mg InterCATHeter DIALYSIS PRN    alteplase (CATHFLO) 2 mg in sterile water (preservative free) 2 mL injection  2 mg InterCATHeter DIALYSIS PRN    insulin lispro (HUMALOG) injection   SubCUTAneous Q6H    0.9% sodium chloride infusion 250 mL  250 mL IntraVENous PRN    chlorhexidine (PERIDEX) 0.12 % mouthwash 15 mL  15 mL Oral Q12H    [Held by provider] labetaloL (NORMODYNE) tablet 100 mg  100 mg Oral Q12H    0.9% sodium chloride infusion  3 mL/hr IntraVENous CONTINUOUS    0.9% sodium chloride infusion  5 mL/hr IntraVENous CONTINUOUS    white petrolatum-mineral oiL (AKWA TEARS) 83-15 % ophthalmic ointment   Both Eyes Q12H    heparin (porcine) pf 300 Units  300 Units InterCATHeter PRN    bacitracin 500 unit/gram packet 1 Packet  1 Packet Topical PRN    balsam peru-castor oiL (VENELEX) ointment   Topical BID    sodium chloride (NS) flush 5-40 mL  5-40 mL IntraVENous Q8H    sodium chloride (NS) flush 5-40 mL  5-40 mL IntraVENous PRN    HYDROcodone-acetaminophen (NORCO) 5-325 mg per tablet 1 Tab  1 Tab Oral Q4H PRN    ondansetron (ZOFRAN) injection 4 mg  4 mg IntraVENous Q4H PRN    acetaminophen (TYLENOL) tablet 650 mg  650 mg Oral Q6H PRN    Or    acetaminophen (TYLENOL) suppository 650 mg  650 mg Rectal Q6H PRN    glucose chewable tablet 16 g  4 Tab Oral PRN    glucagon (GLUCAGEN) injection 1 mg  1 mg IntraMUSCular PRN    dextrose 10% infusion 0-250 mL  0-250 mL IntraVENous PRN      Allergies   Allergen Reactions    Augmentin [Amoxicillin-Pot Clavulanate] Rash and Itching       Objective:  Vitals:    Vitals:    04/23/20 0600 04/23/20 0700 04/23/20 0800 04/23/20 0824   BP: 110/58 122/59 121/61    Pulse: (!) 107 (!) 107 (!) 118 (!) 107   Resp: 29 29 29 (!) 31   Temp:   99.3 °F (37.4 °C)    TempSrc:       SpO2: 100% 100% 99% 100%   Weight:       Height:         Intake and Output:  04/23 0701 - 04/23 1900  In: -   Out: 100 [Drains:100]  04/21 1901 - 04/23 0700  In: 1719.3 [I.V.:329.3]  Out: 575 [Urine:75; Drains:500]    Physical Examination:   Pt intubated    Yes  General: Paralyzed on the vent  Resp:  On vent   CV:  RRR  GI:  Obese   Neurologic:  Sedated   Access:           R femoral melissa     []    High complexity decision making was performed  []    Patient is at high-risk of decompensation with multiple organ involvement    Lab Data Personally Reviewed: I have reviewed all the pertinent labs, microbiology data and radiology studies during assessment.     Recent Labs     04/23/20  0330 04/23/20  0311 04/22/20  0518 04/21/20  0437   NA  --  135* 134* 136   K  --  3.1* 3.7 4.3   CL  --  101 100 101   CO2  --  27 24 22   GLU  --  131* 96 223*   BUN  --  65* 95* 96*   CREA  --  1.57* 2.10* 2.15*   CA  --  8.6 8.5 8.5   MG  --  2.2 2.2 2.3   PHOS  --  3.9 5.5* 5.8*   ALB  --  3.2* 2.5* 2.4*   INR 1.1  --   --   --      Recent Labs     04/23/20  0422 04/22/20  0512 04/22/20  0003 04/21/20  1712 04/21/20  0818   WBC 7.4 12.0* 13.1* 14.1* 13.2*   HGB 5.4* 8.2* 8.3* 8.4* 8.1*   HCT 17.4* 26.0* 26.7* 27.0* 26.0* * 169 153 151 135*     No results found for: SDES  Lab Results   Component Value Date/Time    Culture result: NO GROWTH 5 DAYS 04/09/2020 02:32 PM    Culture result: NO GROWTH 5 DAYS 04/08/2020 10:36 AM    Culture result: NO GROWTH 5 DAYS 03/31/2020 11:15 AM    Culture result: MODERATE STAPHYLOCOCCUS AUREUS (A) 03/31/2020 10:34 AM    Culture result: LIGHT NORMAL RESPIRATORY SOFIE 03/31/2020 10:34 AM     Recent Results (from the past 24 hour(s))   GLUCOSE, POC    Collection Time: 04/22/20 12:45 PM   Result Value Ref Range    Glucose (POC) 101 (H) 65 - 100 mg/dL    Performed by [facility]    GLUCOSE, POC    Collection Time: 04/22/20  5:15 PM   Result Value Ref Range    Glucose (POC) 68 65 - 100 mg/dL    Performed by [facility]    GLUCOSE, POC    Collection Time: 04/22/20  5:17 PM   Result Value Ref Range    Glucose (POC) 69 65 - 100 mg/dL    Performed by [facility]    GLUCOSE, POC    Collection Time: 04/22/20  5:46 PM   Result Value Ref Range    Glucose (POC) 123 (H) 65 - 100 mg/dL    Performed by [facility]    GLUCOSE, POC    Collection Time: 04/22/20  8:59 PM   Result Value Ref Range    Glucose (POC) 107 (H) 65 - 100 mg/dL    Performed by [facility]    GLUCOSE, POC    Collection Time: 04/22/20 11:52 PM   Result Value Ref Range    Glucose (POC) 157 (H) 65 - 100 mg/dL    Performed by Myron Bertrand    CRP, HIGH SENSITIVITY    Collection Time: 04/23/20  3:11 AM   Result Value Ref Range    CRP, High sensitivity >9.5 mg/L   RENAL FUNCTION PANEL    Collection Time: 04/23/20  3:11 AM   Result Value Ref Range    Sodium 135 (L) 136 - 145 mmol/L    Potassium 3.1 (L) 3.5 - 5.1 mmol/L    Chloride 101 97 - 108 mmol/L    CO2 27 21 - 32 mmol/L    Anion gap 7 5 - 15 mmol/L    Glucose 131 (H) 65 - 100 mg/dL    BUN 65 (H) 6 - 20 MG/DL    Creatinine 1.57 (H) 0.55 - 1.02 MG/DL    BUN/Creatinine ratio 41 (H) 12 - 20      GFR est AA 41 (L) >60 ml/min/1.73m2    GFR est non-AA 34 (L) >60 ml/min/1.73m2 Calcium 8.6 8.5 - 10.1 MG/DL    Phosphorus 3.9 2.6 - 4.7 MG/DL    Albumin 3.2 (L) 3.5 - 5.0 g/dL   MAGNESIUM    Collection Time: 04/23/20  3:11 AM   Result Value Ref Range    Magnesium 2.2 1.6 - 2.4 mg/dL   FIBRINOGEN    Collection Time: 04/23/20  3:30 AM   Result Value Ref Range    Fibrinogen 420 200 - 475 mg/dL   D DIMER    Collection Time: 04/23/20  3:30 AM   Result Value Ref Range    D-dimer 14.66 (H) 0.00 - 0.65 mg/L FEU   PROTHROMBIN TIME + INR    Collection Time: 04/23/20  3:30 AM   Result Value Ref Range    INR 1.1 0.9 - 1.1      Prothrombin time 11.4 (H) 9.0 - 11.1 sec   CBC WITH AUTOMATED DIFF    Collection Time: 04/23/20  4:22 AM   Result Value Ref Range    WBC 7.4 3.6 - 11.0 K/uL    RBC 1.75 (L) 3.80 - 5.20 M/uL    HGB 5.4 (LL) 11.5 - 16.0 g/dL    HCT 17.4 (LL) 35.0 - 47.0 %    MCV 99.4 (H) 80.0 - 99.0 FL    MCH 30.9 26.0 - 34.0 PG    MCHC 31.0 30.0 - 36.5 g/dL    RDW 19.8 (H) 11.5 - 14.5 %    PLATELET 725 (L) 413 - 400 K/uL    MPV 12.1 8.9 - 12.9 FL    NRBC 0.0 0  WBC    ABSOLUTE NRBC 0.00 0.00 - 0.01 K/uL    NEUTROPHILS 81 (H) 32 - 75 %    LYMPHOCYTES 12 12 - 49 %    MONOCYTES 6 5 - 13 %    EOSINOPHILS 0 0 - 7 %    BASOPHILS 0 0 - 1 %    IMMATURE GRANULOCYTES 1 (H) 0.0 - 0.5 %    ABS. NEUTROPHILS 6.0 1.8 - 8.0 K/UL    ABS. LYMPHOCYTES 0.9 0.8 - 3.5 K/UL    ABS. MONOCYTES 0.4 0.0 - 1.0 K/UL    ABS. EOSINOPHILS 0.0 0.0 - 0.4 K/UL    ABS. BASOPHILS 0.0 0.0 - 0.1 K/UL    ABS. IMM.  GRANS. 0.1 (H) 0.00 - 0.04 K/UL    DF SMEAR SCANNED      PLATELET COMMENTS Large Platelets      RBC COMMENTS MACROCYTOSIS  1+        RBC COMMENTS ANISOCYTOSIS  1+        RBC COMMENTS MICROCYTOSIS  1+       GLUCOSE, POC    Collection Time: 04/23/20  5:45 AM   Result Value Ref Range    Glucose (POC) 130 (H) 65 - 100 mg/dL    Performed by Juli RYDER + CROSSMATCH    Collection Time: 04/23/20  6:58 AM   Result Value Ref Range    Crossmatch Expiration 04/26/2020     ABO/Rh(D) A POSITIVE     Antibody screen NEG Unit number [id number]     Blood component type RC LR     Unit division 00     Status of unit ALLOCATED     Crossmatch result Compatible            Total time spent with patient:  xxx   min. Care Plan discussed with:  Patient     Family      RN      Consulting Physician [address] [facility],         I have reviewed the flowsheets. Chart and Pertinent Notes have been reviewed. No change in PMH ,family and social history from Consult note.       Arnel Siddiqui MD

## 2020-04-23 NOTE — PROGRESS NOTES
[facility]    ICU TEAM Progress Note    Name: Yudelka Bowman   : 1960   MRN: [id number]   Date: 2020      Assessment/Plan:     Current ICU Problems:  Acute hypoxic respiratory failure 2/2 COVID 19   White count normalized. Retroperitoneal bleed with hemorrhagic shock on 4/10/20    Anemia   Hemoglobin drop overnight to 5.4. Unclear source. Spoke with IR physician. Recommended abdominal ultrasound. PT 11.4. Fibrinogen normal at 420. D-dimer elevated at 14.66 but overall improved. Acute kidney injury   Receiving HD daily and tolerating well. Required levophed yesterday but not today. Nonocclusive PE in LLL pulmonary artery   No anticoagulation given recent right retroperitoneal bleed    MSSA pneumonia    Diabetes mellitus   On Humalog and Lantus    Thrombocytopenia   Platelets normal today. Diarrhea - FMS in place. Hypokalemia   Receiving dialysis today. Plan for today:.  1. Remains on the ventilator, continue to trend ABGs. 2. Off Precedex, Off Fentanyl, tracking with eyes but not following commands. 3. Decrease solu-cortef to 25 mg q 12 hrs. 4. Cont guaifenesin and mucomyst for mucous plugging. 5. Nephrology following. Off CRRT and started IHD on . Anuric. New right IJ melissa placed 20. 6. Completed PO vanc. 7. ID following  8. Levophed PRN for hypotension, not on currently. 5.  called with update.        Cardiac Gtts: None, Levophed PRN during dialysis  SBP Goal of:   >90 mmHg  MAP Goal of: > 65 mmHg  Transfusion Trigger (Hgb):  <7 g/dL     Respiratory Goals: Chlorhexidine   Optimize PEEP/Ventilation/Oxygenation  Goal Tidal Volume 6 cc/kg based on IBW  Aim for lung protective ventilation  Aggressive bronchopulmonary hygiene  SPO2 Goal: > 92%  Pulmonary toilet: NA   DVT Prophylaxis (if no, list reason): SCD's or Sequential Compression Device      GI Prophylaxis: Protonix (pantoprazole)   Nutrition: TFs  IVFs: NA  Bowel Movement: Yes  Bowel Regimen: None needed at this time     Bowden Catheter Present: Yes  Glycemic Control - Insulin: Yes SSI and Lantus  Antibiotics: PO Vancomycin     Pain Medications: Fentanyl   Target RASS: 0 to -1 RASS  Sedation Medications: Precedex  CAM-ICU:  JUAREZ  Mobility: Poor and Bedrest  PT/OT: NA   Restraints: Soft wrist restraints  Discussed Plan of Care/Code Status: Full Code     T/L/D  Tubes: ETT and Orogastric Tube  Lines: Ruel Liu, Ramon   Drains: FMS    Subjective:   Progress Note: 4/23/2020      Reason for ICU Admission:  65 yo female with hx of obesity, endometrial cancer and diabetes who presented to [facility] with worsening hypoxic respiratory failure in lieu of close exposure to COVID-19 to family including mother (passed away) sister and brother and tested resulting + here. She presented to the hospital 3/26 and intubated 3/28. She was started on broad spectrum antibiotics and plaquenil. Developed worsening renal failure and was transferred to ICU for CRRT. Her hospital course has been further notable for development of a retroperitoneal bleed, hemorrhagic shock and sepsis and ongoing acute hypoxic respiratory failure with increasing PEEP and FIo2 requirements. She has been retested for covid and will consider trach when results return. She has transitioned from CRRT to HD.       Active Problem List:     Problem List  Date Reviewed: 4/19/2020          Codes Class    Hypotension ICD-10-CM: I95.9  ICD-9-CM: 458.9 Acute        Retroperitoneal hemorrhage ICD-10-CM: R58  ICD-9-CM: 459.0         Acute renal failure (ARF) ([facility] ICD-10-CM: N17.9  ICD-9-CM: 584.9         Encephalopathy ICD-10-CM: G93.40  ICD-9-CM: 348.30         Sepsis with multi-organ dysfunction ([facility] ICD-10-CM: A41.9, R65.20  ICD-9-CM: 038.9, 995.92         Pneumonia due to methicillin susceptible Staphylococcus aureus (MSSA) ([facility] ICD-10-CM: K65.871  ICD-9-CM: 482.41         Thrombocytopenia ([facility] ICD-10-CM: D69.6  ICD-9-CM: 287.5         Diarrhea ICD-10-CM: R19.7  ICD-9-CM: 787.91         JNDPE-64 virus infection ICD-10-CM: U07.1         Obesity, morbid (HCC) ICD-10-CM: E66.01  ICD-9-CM: 278.01         Vaginal Pap smear following hysterectomy for malignancy ICD-10-CM: Z08, Z90.710  ICD-9-CM: V67.01         Personal history of malignant neoplasm of other parts of uterus ICD-10-CM: Z85.42  ICD-9-CM: V10.42         Endometrial cancer ([facility] ICD-10-CM: C54.1  ICD-9-CM: 182.0             Past Medical History:      has a past medical history of Basal cell carcinoma, GERD (gastroesophageal reflux disease), Kidney stones, and Polyp of ureter. Past Surgical History:      has a past surgical history that includes hysteroscopy diagnostic (); pr endometrial ablation, thermal; hx  section (); hx colonoscopy; insert arterial line (2020); and ir insert non tunl cvc over 5 yrs (2020). Home Medications:     Prior to Admission medications    Medication Sig Start Date End Date Taking? Authorizing Provider   pantoprazole (PROTONIX) 40 mg tablet TAKE 1 TABLET BY MOUTH TWICE A DAY 18   Provider, Historical   esomeprazole (NEXIUM) 20 mg capsule Take 20 mg by mouth daily. Indications: BID    Provider, Historical   zolpidem (AMBIEN) 10 mg tablet Take 0.5 Tabs by mouth nightly as needed for Sleep. Max Daily Amount: 5 mg. 17   Brisa Mandel MD   fluticasone (FLONASE) 50 mcg/actuation nasal spray Mist 1-2 spray(s) into each nostril once daily. 4/3/15   Provider, Historical   ranitidine (ZANTAC) 150 mg tablet 150 mg.    Provider, Historical     Allergies/Social/Family History:      Allergies   Allergen Reactions    Augmentin [Amoxicillin-Pot Clavulanate] Rash and Itching      Social History     Tobacco Use    Smoking status: Never Smoker    Smokeless tobacco: Never Used   Substance Use Topics    Alcohol use: Not on file      Family History   Problem Relation Age of Onset    Prostate Cancer Father         PROSTATE    Breast Cancer Sister 46        invasive poorly differentiated ductal carcinoma, Neg genetic testing.  Cancer Sister 62        melanoma stage 1     Objective:   Vital Signs:  Visit Vitals  /77   Pulse (!) 104   Temp 98.4 °F (36.9 °C) (Axillary)   Resp 26   Ht 5' 4\" (1.626 m)   Wt 120.7 kg (266 lb 3.2 oz)   SpO2 100%   BMI 45.69 kg/m²      O2 Device: Endotracheal tube Temp (24hrs), Av.7 °F (37.1 °C), Min:98.1 °F (36.7 °C), Max:99.3 °F (37.4 °C)           Intake/Output:     Intake/Output Summary (Last 24 hours) at 2020 1415  Last data filed at 2020 1336  Gross per 24 hour   Intake 1354.27 ml   Output 275 ml   Net 1079.27 ml     Physical Exam:    General:  More awake but not following commands. Eyes: Sclera anicteric. Pupils equal, round, reactive to light. Mouth/Throat: Orotracheal tube in place. Neck: Supple. Lungs:   Coarse bbs, no accessory muscle use observed. Cardiovascular:  Regular rate and rhythm, no murmur, + anasarca, pulses palpable   Abdomen:   Soft, bowel sounds hypoactive, distended. Has FMS in place with liquid stool. Extremities: No cyanosis, + edema   Skin: No rash or lesions. Musculoskeletal:  No swelling other than edema, no deformity. Lines/Devices:  Intact, no erythema, drainage, or tenderness. Psych/neuro: Limited, sedated on ventilator.  Eyes opened, does not track and does not follow commands.       LABS AND  DATA: Personally reviewed  Recent Labs     20  1119 20  0422   WBC 9.9 7.4   HGB 5.7* 5.4*   HCT 18.3* 17.4*    138*     Recent Labs     20  03120  0518   * 134*   K 3.1* 3.7    100   CO2 27 24   BUN 65* 95*   CREA 1.57* 2.10*   * 96   CA 8.6 8.5   MG 2.2 2.2   PHOS 3.9 5.5*     Recent Labs     20  0518   ALB 3.2* 2.5*     Recent Labs     20  0330   INR 1.1   PTP 11.4*       Mode Rate Tidal Volume Pressure FiO2 PEEP   Assist control 430 ml  0 cm H2O 40 % 5 cm H20     Peak airway pressure: 24 cm H2O    Minute ventilation: 13.4 l/min      MEDS: Reviewed    04/10/2020, CT Abd/ Pelvis:  IMPRESSION:  1.  Scattered pulmonary air space disease consistent with atypical viral infection. 2.  Nonocclusive pulmonary emboli in the left lower lobe pulmonary arteries. 3.  Large right retroperitoneal hematoma with small foci of contrast within the hematoma. 4.  Enteric tube and endotracheal tube in appropriate position. 5.  Bilateral femoral venous catheters in the common iliac veins. CXR Results  (Last 48 hours)               04/22/20 0944  XR CHEST PORT Final result    Impression:  IMPRESSION:       Appropriate position of right-sided nontunneled dialysis catheter. No   pneumothorax. No new abnormality. Improvement in lung opacities. Narrative:  history: Dialysis catheter       COMPARISON: 4/18/2020       FINDINGS:       Frontal chest radiograph submitted for review. Right-sided nontunneled dialysis catheter extends to the right atrium, in   appropriate position. Support hardware is otherwise unchanged. Stable heart   size. Diffuse interstitial prominence of patchy right perihilar airspace disease   has decreased. No new pulmonary abnormality. No pleural effusion. No   pneumothorax.                    ABCDEF Bundle/Checklist Completed:  Yes    SPECIAL EQUIPMENT  IHD    DISPOSITION  Stay in ICU    CRITICAL CARE CONSULTANT NOTE  I had a face to face encounter with the patient, reviewed and interpreted patient data including clinical events, labs, images, vital signs, I/O's, and examined patient.   I have discussed the case and the plan and management of the patient's care with the consulting services, the bedside nurses and the respiratory therapist.      NOTE OF PERSONAL INVOLVEMENT IN CARE   This patient has a high probability of imminent, clinically significant deterioration, which requires the highest level of preparedness to intervene urgently. I participated in the decision-making and personally managed or directed the management of the following life and organ supporting interventions that required my frequent assessment to treat or prevent imminent deterioration. I personally spent 45 minutes of critical care time. This is time spent at this critically ill patient's bedside actively involved in patient care as well as the coordination of care and discussions with the patient's family. This does not include any procedural time which has been billed separately.       STEWART Westfall-BC, MSN  Critical Care Medicine  [facility]

## 2020-04-23 NOTE — PROGRESS NOTES
Palliative Medicine Social Work      Family check in call for Friday pushed to 1:30 PM (instead of 12 PM)            Thank you for the opportunity to be involved in the care of Ms. Tam Reyna and her family.     Amy Early LMSW, Supervisee in Social Work  Palliative Medicine   [phone]

## 2020-04-24 LAB
ABO + RH BLD: NORMAL
ALBUMIN SERPL-MCNC: 2.8 G/DL (ref 3.5–5)
ANION GAP SERPL CALC-SCNC: 7 MMOL/L (ref 5–15)
BASOPHILS # BLD: 0 K/UL (ref 0–0.1)
BASOPHILS NFR BLD: 0 % (ref 0–1)
BLD PROD TYP BPU: NORMAL
BLD PROD TYP BPU: NORMAL
BLOOD GROUP ANTIBODIES SERPL: NORMAL
BPU ID: NORMAL
BPU ID: NORMAL
BUN SERPL-MCNC: 54 MG/DL (ref 6–20)
BUN/CREAT SERPL: 40 (ref 12–20)
CALCIUM SERPL-MCNC: 8.9 MG/DL (ref 8.5–10.1)
CHLORIDE SERPL-SCNC: 104 MMOL/L (ref 97–108)
CO2 SERPL-SCNC: 28 MMOL/L (ref 21–32)
CREAT SERPL-MCNC: 1.35 MG/DL (ref 0.55–1.02)
CROSSMATCH RESULT,%XM: NORMAL
CROSSMATCH RESULT,%XM: NORMAL
CRP SERPL HS-MCNC: >9.5 MG/L
DIFFERENTIAL METHOD BLD: ABNORMAL
EOSINOPHIL # BLD: 0.1 K/UL (ref 0–0.4)
EOSINOPHIL NFR BLD: 1 % (ref 0–7)
ERYTHROCYTE [DISTWIDTH] IN BLOOD BY AUTOMATED COUNT: 19.9 % (ref 11.5–14.5)
GLUCOSE BLD STRIP.AUTO-MCNC: 106 MG/DL (ref 65–100)
GLUCOSE BLD STRIP.AUTO-MCNC: 109 MG/DL (ref 65–100)
GLUCOSE BLD STRIP.AUTO-MCNC: 60 MG/DL (ref 65–100)
GLUCOSE BLD STRIP.AUTO-MCNC: 86 MG/DL (ref 65–100)
GLUCOSE SERPL-MCNC: 97 MG/DL (ref 65–100)
HCT VFR BLD AUTO: 23.4 % (ref 35–47)
HGB BLD-MCNC: 7.5 G/DL (ref 11.5–16)
IMM GRANULOCYTES # BLD AUTO: 0 K/UL (ref 0–0.04)
IMM GRANULOCYTES NFR BLD AUTO: 0 % (ref 0–0.5)
LYMPHOCYTES # BLD: 1 K/UL (ref 0.8–3.5)
LYMPHOCYTES NFR BLD: 10 % (ref 12–49)
MAGNESIUM SERPL-MCNC: 2.1 MG/DL (ref 1.6–2.4)
MCH RBC QN AUTO: 30.7 PG (ref 26–34)
MCHC RBC AUTO-ENTMCNC: 32.1 G/DL (ref 30–36.5)
MCV RBC AUTO: 95.9 FL (ref 80–99)
MONOCYTES # BLD: 0.6 K/UL (ref 0–1)
MONOCYTES NFR BLD: 6 % (ref 5–13)
NEUTS SEG # BLD: 8.6 K/UL (ref 1.8–8)
NEUTS SEG NFR BLD: 83 % (ref 32–75)
NRBC # BLD: 0 K/UL (ref 0–0.01)
NRBC BLD-RTO: 0 PER 100 WBC
PHOSPHATE SERPL-MCNC: 2.5 MG/DL (ref 2.6–4.7)
PLATELET # BLD AUTO: 184 K/UL (ref 150–400)
PMV BLD AUTO: 11.6 FL (ref 8.9–12.9)
POTASSIUM SERPL-SCNC: 3.2 MMOL/L (ref 3.5–5.1)
RBC # BLD AUTO: 2.44 M/UL (ref 3.8–5.2)
SERVICE CMNT-IMP: ABNORMAL
SERVICE CMNT-IMP: NORMAL
SODIUM SERPL-SCNC: 139 MMOL/L (ref 136–145)
SPECIMEN EXP DATE BLD: NORMAL
STATUS OF UNIT,%ST: NORMAL
STATUS OF UNIT,%ST: NORMAL
UNIT DIVISION, %UDIV: 0
UNIT DIVISION, %UDIV: 0
WBC # BLD AUTO: 10.3 K/UL (ref 3.6–11)

## 2020-04-24 PROCEDURE — 74011250637 HC RX REV CODE- 250/637: Performed by: NURSE PRACTITIONER

## 2020-04-24 PROCEDURE — 74011250636 HC RX REV CODE- 250/636: Performed by: NURSE PRACTITIONER

## 2020-04-24 PROCEDURE — 94640 AIRWAY INHALATION TREATMENT: CPT

## 2020-04-24 PROCEDURE — 74011250636 HC RX REV CODE- 250/636: Performed by: INTERNAL MEDICINE

## 2020-04-24 PROCEDURE — 86141 C-REACTIVE PROTEIN HS: CPT

## 2020-04-24 PROCEDURE — 36415 COLL VENOUS BLD VENIPUNCTURE: CPT

## 2020-04-24 PROCEDURE — 94003 VENT MGMT INPAT SUBQ DAY: CPT

## 2020-04-24 PROCEDURE — 82962 GLUCOSE BLOOD TEST: CPT

## 2020-04-24 PROCEDURE — 74011000250 HC RX REV CODE- 250: Performed by: SURGERY

## 2020-04-24 PROCEDURE — 74011250637 HC RX REV CODE- 250/637: Performed by: INTERNAL MEDICINE

## 2020-04-24 PROCEDURE — 83735 ASSAY OF MAGNESIUM: CPT

## 2020-04-24 PROCEDURE — 80069 RENAL FUNCTION PANEL: CPT

## 2020-04-24 PROCEDURE — 74011636637 HC RX REV CODE- 636/637: Performed by: NURSE PRACTITIONER

## 2020-04-24 PROCEDURE — 74011000258 HC RX REV CODE- 258: Performed by: INTERNAL MEDICINE

## 2020-04-24 PROCEDURE — 65610000006 HC RM INTENSIVE CARE

## 2020-04-24 PROCEDURE — 85025 COMPLETE CBC W/AUTO DIFF WBC: CPT

## 2020-04-24 PROCEDURE — 74011000250 HC RX REV CODE- 250: Performed by: NURSE PRACTITIONER

## 2020-04-24 PROCEDURE — 90935 HEMODIALYSIS ONE EVALUATION: CPT

## 2020-04-24 RX ORDER — POTASSIUM CHLORIDE 29.8 MG/ML
20 INJECTION INTRAVENOUS
Status: COMPLETED | OUTPATIENT
Start: 2020-04-24 | End: 2020-04-24

## 2020-04-24 RX ORDER — SEVELAMER CARBONATE FOR ORAL SUSPENSION 800 MG/1
0.8 POWDER, FOR SUSPENSION ORAL
Status: DISCONTINUED | OUTPATIENT
Start: 2020-04-24 | End: 2020-05-19

## 2020-04-24 RX ADMIN — Medication 1 PACKET: at 22:49

## 2020-04-24 RX ADMIN — SEVELAMER CARBONATE 0.8 G: 800 POWDER, FOR SUSPENSION ORAL at 11:14

## 2020-04-24 RX ADMIN — CHLORHEXIDINE GLUCONATE 15 ML: 1.2 RINSE ORAL at 09:00

## 2020-04-24 RX ADMIN — SODIUM CHLORIDE 40 ML: 9 INJECTION INTRAMUSCULAR; INTRAVENOUS; SUBCUTANEOUS at 06:01

## 2020-04-24 RX ADMIN — ACETYLCYSTEINE 200 MG: 200 SOLUTION ORAL; RESPIRATORY (INHALATION) at 08:02

## 2020-04-24 RX ADMIN — CASTOR OIL AND BALSAM, PERU: 788; 87 OINTMENT TOPICAL at 09:00

## 2020-04-24 RX ADMIN — POTASSIUM CHLORIDE 20 MEQ: 400 INJECTION, SOLUTION INTRAVENOUS at 08:13

## 2020-04-24 RX ADMIN — SODIUM CHLORIDE 30 ML: 9 INJECTION INTRAMUSCULAR; INTRAVENOUS; SUBCUTANEOUS at 22:47

## 2020-04-24 RX ADMIN — FAMOTIDINE 20 MG: 40 POWDER, FOR SUSPENSION ORAL at 08:13

## 2020-04-24 RX ADMIN — FENTANYL CITRATE 50 MCG: 50 INJECTION INTRAMUSCULAR; INTRAVENOUS at 12:25

## 2020-04-24 RX ADMIN — FENTANYL CITRATE 50 MCG: 50 INJECTION INTRAMUSCULAR; INTRAVENOUS at 17:27

## 2020-04-24 RX ADMIN — HYDROCODONE BITARTRATE AND ACETAMINOPHEN 1 TABLET: 5; 325 TABLET ORAL at 08:14

## 2020-04-24 RX ADMIN — ALBUTEROL SULFATE 2.5 MG: 2.5 SOLUTION RESPIRATORY (INHALATION) at 19:28

## 2020-04-24 RX ADMIN — FENTANYL CITRATE 50 MCG: 50 INJECTION INTRAMUSCULAR; INTRAVENOUS at 22:47

## 2020-04-24 RX ADMIN — MINERAL OIL AND WHITE PETROLATUM: 150; 830 OINTMENT OPHTHALMIC at 08:14

## 2020-04-24 RX ADMIN — SEVELAMER CARBONATE 1.6 G: 800 POWDER, FOR SUSPENSION ORAL at 08:00

## 2020-04-24 RX ADMIN — ALBUTEROL SULFATE 2.5 MG: 2.5 SOLUTION RESPIRATORY (INHALATION) at 08:02

## 2020-04-24 RX ADMIN — GUAIFENESIN 200 MG: 100 SOLUTION ORAL at 17:09

## 2020-04-24 RX ADMIN — ACETYLCYSTEINE 200 MG: 200 SOLUTION ORAL; RESPIRATORY (INHALATION) at 19:28

## 2020-04-24 RX ADMIN — GUAIFENESIN 200 MG: 100 SOLUTION ORAL at 11:14

## 2020-04-24 RX ADMIN — HYDROCODONE BITARTRATE AND ACETAMINOPHEN 1 TABLET: 5; 325 TABLET ORAL at 20:11

## 2020-04-24 RX ADMIN — Medication 1 PACKET: at 16:18

## 2020-04-24 RX ADMIN — Medication 1 PACKET: at 08:14

## 2020-04-24 RX ADMIN — SODIUM CHLORIDE 30 ML: 9 INJECTION INTRAMUSCULAR; INTRAVENOUS; SUBCUTANEOUS at 14:00

## 2020-04-24 RX ADMIN — CASTOR OIL AND BALSAM, PERU: 788; 87 OINTMENT TOPICAL at 17:17

## 2020-04-24 RX ADMIN — HYDROCODONE BITARTRATE AND ACETAMINOPHEN 1 TABLET: 5; 325 TABLET ORAL at 13:38

## 2020-04-24 RX ADMIN — HYDROCODONE BITARTRATE AND ACETAMINOPHEN 1 TABLET: 5; 325 TABLET ORAL at 01:14

## 2020-04-24 RX ADMIN — HYDROCORTISONE SODIUM SUCCINATE 25 MG: 100 INJECTION, POWDER, FOR SOLUTION INTRAMUSCULAR; INTRAVENOUS at 08:13

## 2020-04-24 RX ADMIN — HEPARIN SODIUM 2500 UNITS: 1000 INJECTION INTRAVENOUS; SUBCUTANEOUS at 10:52

## 2020-04-24 RX ADMIN — INSULIN GLARGINE 25 UNITS: 100 INJECTION, SOLUTION SUBCUTANEOUS at 08:13

## 2020-04-24 RX ADMIN — SEVELAMER CARBONATE 0.8 G: 800 POWDER, FOR SUSPENSION ORAL at 17:18

## 2020-04-24 RX ADMIN — HYDROCORTISONE SODIUM SUCCINATE 25 MG: 100 INJECTION, POWDER, FOR SOLUTION INTRAMUSCULAR; INTRAVENOUS at 20:11

## 2020-04-24 RX ADMIN — GUAIFENESIN 200 MG: 100 SOLUTION ORAL at 06:01

## 2020-04-24 RX ADMIN — DEXTROSE MONOHYDRATE 125 ML: 100 INJECTION, SOLUTION INTRAVENOUS at 17:11

## 2020-04-24 RX ADMIN — CHLORHEXIDINE GLUCONATE 15 ML: 1.2 RINSE ORAL at 20:11

## 2020-04-24 RX ADMIN — POTASSIUM CHLORIDE 20 MEQ: 400 INJECTION, SOLUTION INTRAVENOUS at 06:02

## 2020-04-24 RX ADMIN — INSULIN GLARGINE 25 UNITS: 100 INJECTION, SOLUTION SUBCUTANEOUS at 20:16

## 2020-04-24 NOTE — WOUND CARE
Wound Consult:  New Patient Visit. Chart reviewed. Consulted for lower lumbar/left upper buttock skin injuries. Spoke with patients nurse,  Anuradha and we were in together to provide care. Patient is resting on a total care baritatric plus air  bed. Patient intubated on vent; eyes spontaneous; COVID19 positive. Assessment:  Lower lumbar fold midline - linear resurfacing skin breakdown, pink to pale pink, dry, ~ 0.4 x 2.5 cm. No surrounding skin discoloration. Left upper buttock - linear skin injury ~ 1 x 4 x 0.1 cm, pink to red/isadora, no drainage, no surrounding redness. Distal gluteal cleft - linear skin breakdown ~ 3 x 0.3 x 0.1 cm, red/pink, moist, no surrounding skin damage, intertrigo/MASD. No rash or secondary candidiasis. No redness to sacrum. Midline abdominal pannus - linear pink to pale pink resurfacing area of intertrigo/MASD. Left heel with small 0.3 x 1 cm area of delayed blanching erythema. Otherwise no redness. Forehead - dry abrasions mid forehead - suspect possibly from pulse ox sensor when discussing with Anuradha. Patient COVID but was never prone positioned. Right groin - old central line site - some yellow slough at what was line site - scant serous drainage. Dry dressings over. Left groin - old central line site/small clean puncture site. Dry dressings over. Treatment:  Bathed. Has sacral foam in place. Orders for care in place. Wound Recommendations:  Continue to provide care to affected areas - appear to be resolving/healing and no further injury or redness noted. Skin Care Recommendations:  1. Minimize friction/shear: minimize layers of linen/pads under patient. Current support surface appropriate. 2. Off load pressure/reposition: continue to turn and reposition approximately every 2 - 3 hours; float heels. 3. Manage Moisture - keep skin folds dry; incontinence skin care; FMS in use.   4. Continue to monitor nutrition, pain, and skin risk scale, and skin assessment. Plan: We will continue to reassess routinely and as needed.   Eden Reardon RN,[facility]  Wound Healing Office [phone]  Pager [phone]

## 2020-04-24 NOTE — PROGRESS NOTES
[facility]    ICU TEAM Progress Note    Name: Terence Schaefer   : 1960   MRN: [id number]   Date: 2020      Assessment/Plan:     Current ICU Problems:  Acute hypoxic respiratory failure 2/2 COVID 19   White count normalized. Retroperitoneal bleed with hemorrhagic shock on 4/10/20    Anemia- Appears to be acute blood loss, unclear source. Hemoglobin 7.5 today. Has received PRBCs in the last 24 hours. Unclear source of bleeding, Abd US was performed, shows third spacing of fluid, and known retroperitoneal hematoma, no acute finding. Trend Hgb. Stable. Acute kidney injury   Receiving HD daily and tolerating well. Currently receiving and doing well. Nonocclusive PE in LLL pulmonary artery   No anticoagulation given recent right retroperitoneal bleed    MSSA pneumonia    Diabetes mellitus   On Humalog and Lantus    Thrombocytopenia   Platelets normal today at 184. Continue to trend. Diarrhea - FMS in place. Hypokalemia   Receiving dialysis today. This should increase her K. Will trend. Plan for today:.  1. Remains on the ventilator, continue to trend ABGs. 2. Off Precedex, Off Fentanyl, tracking with eyes but not following commands. 3.  Solu-cortef to 25 mg q 12 hrs. Continue to taper as tolerated. 4. Cont guaifenesin and mucomyst for mucous plugging. 5. Nephrology following. Off CRRT and started IHD on . Anuric. New right IJ melissa placed 20. 6. Completed PO vanc. 7. ID following  8. Levophed PRN for hypotension, not on currently. Has needed it intermittently at times for her HD.        Cardiac Gtts: None, Levophed PRN during dialysis  SBP Goal of:   >90 mmHg  MAP Goal of: > 65 mmHg  Transfusion Trigger (Hgb):  <7 g/dL     Respiratory Goals: Chlorhexidine   Optimize PEEP/Ventilation/Oxygenation  Goal Tidal Volume 6 cc/kg based on IBW  Aim for lung protective ventilation  Aggressive bronchopulmonary hygiene  SPO2 Goal: > 92%  Pulmonary toilet: NA   DVT Prophylaxis (if no, list reason): SCD's or Sequential Compression Device      GI Prophylaxis: Protonix (pantoprazole)   Nutrition: TFs  IVFs: NA  Bowel Movement: Yes  Bowel Regimen: None needed at this time     Bowden Catheter Present: Yes  Glycemic Control - Insulin: Yes SSI and Lantus  Antibiotics: PO Vancomycin     Pain Medications: Fentanyl   Target RASS: 0 to -1 RASS  Sedation Medications: Precedex  CAM-ICU:  JUAREZ  Mobility: Poor and Bedrest  PT/OT: NA   Restraints: Soft wrist restraints  Discussed Plan of Care/Code Status: Full Code     T/L/D  Tubes: ETT and Orogastric Tube  Lines: [employer], Ramon   Drains: FMS    Subjective:   Progress Note: 4/24/2020      Reason for ICU Admission:  65 yo female with hx of obesity, endometrial cancer and diabetes who presented to [facility] with worsening hypoxic respiratory failure in lieu of close exposure to COVID-19 to family including mother (passed away) sister and brother and tested resulting + here. She presented to the hospital 3/26 and intubated 3/28. She was started on broad spectrum antibiotics and plaquenil. Developed worsening renal failure and was transferred to ICU for CRRT. Her hospital course has been further notable for development of a retroperitoneal bleed, hemorrhagic shock and sepsis and ongoing acute hypoxic respiratory failure with increasing PEEP and FIo2 requirements. She has been retested for covid and will consider trach when results return. She has transitioned from CRRT to HD, receiving daily at this time, tolerating well. Once we receive her COVID testing she should have trach and PEG, we can then start talking about placement for long term vent weaning and rehabilitation.      Active Problem List:     Problem List  Date Reviewed: 4/19/2020          Codes Class    Hypotension ICD-10-CM: I95.9  ICD-9-CM: 458.9 Acute        Retroperitoneal hemorrhage ICD-10-CM: R58  ICD-9-CM: 459.0         Acute renal failure (ARF) (HCC) ICD-10-CM: N17.9  ICD-9-CM: 584.9         Encephalopathy ICD-10-CM: G93.40  ICD-9-CM: 348.30         Sepsis with multi-organ dysfunction (HCC) ICD-10-CM: A41.9, R65.20  ICD-9-CM: 038.9, 995.92         Pneumonia due to methicillin susceptible Staphylococcus aureus (MSSA) ([place] [address].) ICD-10-CM: J15.211  ICD-9-CM: 482.41         Thrombocytopenia ([place] [address].) ICD-10-CM: D69.6  ICD-9-CM: 287.5         Diarrhea ICD-10-CM: R19.7  ICD-9-CM: 787.91         COVID-19 virus infection ICD-10-CM: U07.1         Obesity, morbid ([place] [address].) ICD-10-CM: E66.01  ICD-9-CM: 278.01         Vaginal Pap smear following hysterectomy for malignancy ICD-10-CM: Z08, Z90.710  ICD-9-CM: V67.01         Personal history of malignant neoplasm of other parts of uterus ICD-10-CM: Z85.42  ICD-9-CM: V10.42         Endometrial cancer ([place] [address].) ICD-10-CM: C54.1  ICD-9-CM: 182.0             Past Medical History:      has a past medical history of Basal cell carcinoma, GERD (gastroesophageal reflux disease), Kidney stones, and Polyp of ureter. Past Surgical History:      has a past surgical history that includes hysteroscopy diagnostic (); pr endometrial ablation, thermal; hx  section (); hx colonoscopy; insert arterial line (2020); and ir insert non tunl cvc over 5 yrs (2020). Home Medications:     Prior to Admission medications    Medication Sig Start Date End Date Taking? Authorizing Provider   pantoprazole (PROTONIX) 40 mg tablet TAKE 1 TABLET BY MOUTH TWICE A DAY 18   Provider, Historical   esomeprazole (NEXIUM) 20 mg capsule Take 20 mg by mouth daily. Indications: BID    Provider, Historical   zolpidem (AMBIEN) 10 mg tablet Take 0.5 Tabs by mouth nightly as needed for Sleep. Max Daily Amount: 5 mg. 17   Sherice Mandel MD   fluticasone (FLONASE) 50 mcg/actuation nasal spray Mist 1-2 spray(s) into each nostril once daily. 4/3/15   Provider, Historical   ranitidine (ZANTAC) 150 mg tablet 150 mg. Provider, Historical     Allergies/Social/Family History: Allergies   Allergen Reactions    Augmentin [Amoxicillin-Pot Clavulanate] Rash and Itching      Social History     Tobacco Use    Smoking status: Never Smoker    Smokeless tobacco: Never Used   Substance Use Topics    Alcohol use: Not on file      Family History   Problem Relation Age of Onset    Prostate Cancer Father         PROSTATE    Breast Cancer Sister 46        invasive poorly differentiated ductal carcinoma, Neg genetic testing.  Cancer Sister 62        melanoma stage 1     Objective:   Vital Signs:  Visit Vitals  /82   Pulse (!) 107   Temp 98.5 °F (36.9 °C) (Axillary)   Resp 27   Ht 5' 4\" (1.626 m)   Wt 118.4 kg (261 lb)   SpO2 100%   BMI 44.80 kg/m²      O2 Device: Endotracheal tube Temp (24hrs), Av.8 °F (37.1 °C), Min:98.4 °F (36.9 °C), Max:99.5 °F (37.5 °C)           Intake/Output:     Intake/Output Summary (Last 24 hours) at 2020 0802  Last data filed at 2020 0615  Gross per 24 hour   Intake 1330 ml   Output 2175 ml   Net -845 ml     Physical Exam:    General:  More awake but not following commands, moves head, not tracking with eyes. Eyes: Sclera anicteric. Pupils equal, round, reactive to light. Mouth/Throat: Orotracheal tube in place. Neck: Supple. Lungs:   Coarse bbs, no accessory muscle use observed. Cardiovascular:  Regular rate and rhythm, no murmur, + anasarca, pulses palpable   Abdomen:   Soft, bowel sounds hypoactive, distended. Has FMS in place with liquid stool. Extremities: No cyanosis, + edema   Skin: No rash or lesions. Musculoskeletal:  No swelling other than edema, no deformity. Lines/Devices:  Intact, no erythema, drainage, or tenderness. Psych/neuro: Limited, sedated on ventilator.  Eyes opened, does not track and does not follow commands.       LABS AND  DATA: Personally reviewed  Recent Labs     20  0345 20  1604   WBC 10.3 10.4   HGB 7.5* 7.9*   HCT 23.4* 24.2*    154     Recent Labs     04/24/20  0345 04/23/20  1601    140   K 3.2* 3.0*    104   CO2 28 28   BUN 54* 27*   CREA 1.35* 0.78   GLU 97 97   CA 8.9 8.7   MG 2.1 2.0   PHOS 2.5* 1.6*     Recent Labs     04/24/20  0345 04/23/20  0311   ALB 2.8* 3.2*     Recent Labs     04/23/20  1601 04/23/20  0330   INR  --  1.1   PTP  --  11.4*   APTT 23.1  --        Mode Rate Tidal Volume Pressure FiO2 PEEP   Assist control, Volume control   430 ml  0 cm H2O 35 % 5 cm H20     Peak airway pressure: 22 cm H2O    Minute ventilation: 10.2 l/min      MEDS: Reviewed    04/10/2020, CT Abd/ Pelvis:  IMPRESSION:  1.  Scattered pulmonary air space disease consistent with atypical viral infection. 2.  Nonocclusive pulmonary emboli in the left lower lobe pulmonary arteries. 3.  Large right retroperitoneal hematoma with small foci of contrast within the hematoma. 4.  Enteric tube and endotracheal tube in appropriate position. 5.  Bilateral femoral venous catheters in the common iliac veins. CXR Results  (Last 48 hours)               04/22/20 0944  XR CHEST PORT Final result    Impression:  IMPRESSION:       Appropriate position of right-sided nontunneled dialysis catheter. No   pneumothorax. No new abnormality. Improvement in lung opacities. Narrative:  history: Dialysis catheter       COMPARISON: 4/18/2020       FINDINGS:       Frontal chest radiograph submitted for review. Right-sided nontunneled dialysis catheter extends to the right atrium, in   appropriate position. Support hardware is otherwise unchanged. Stable heart   size. Diffuse interstitial prominence of patchy right perihilar airspace disease   has decreased. No new pulmonary abnormality. No pleural effusion.  No   pneumothorax.                    ABCDEF Bundle/Checklist Completed:  Yes    SPECIAL EQUIPMENT  IHD    DISPOSITION  Stay in ICU    CRITICAL CARE CONSULTANT NOTE  I had a face to face encounter with the patient, reviewed and interpreted patient data including clinical events, labs, images, vital signs, I/O's, and examined patient. I have discussed the case and the plan and management of the patient's care with the consulting services, the bedside nurses and the respiratory therapist.      NOTE OF PERSONAL INVOLVEMENT IN CARE   This patient has a high probability of imminent, clinically significant deterioration, which requires the highest level of preparedness to intervene urgently. I participated in the decision-making and personally managed or directed the management of the following life and organ supporting interventions that required my frequent assessment to treat or prevent imminent deterioration. I personally spent 45 minutes of critical care time. This is time spent at this critically ill patient's bedside actively involved in patient care as well as the coordination of care and discussions with the patient's family. This does not include any procedural time which has been billed separately.       Win Gross, [facility]  Critical Care Medicine  [facility]

## 2020-04-24 NOTE — PROGRESS NOTES
Transitions of Care  Pending Medical progress  Patient is more alert, intermittently following some commands. Transitioned to HD  Awaiting recent COVID testing to move forward with trach. Will likely need LTAC.    Bogdan Montes RN,CRM

## 2020-04-24 NOTE — PROCEDURES
[facility] Team [facility]  [phone]    Vitals   Pre   Post   Assessment   Pre   Post     Temp  Temp: 98.5 °F (36.9 °C) (04/24/20 0730)  98.6 a LOC  Intubated. Eyes open and can track. Nods appropriately. Same   HR   Pulse (Heart Rate): (!) 103 (04/24/20 0730) 95 Lungs   Clear/diminished. Vented on 35%. Same    B/P   BP: (!) 135/108 (04/24/20 0730) 122/75 Cardiac   Sinus tach NSR    Resp   Resp Rate: 23 (04/24/20 0730) 24 Skin   Intact. Same    Pain level  0 0 Edema  3+ pitting edema all extremities. 2+ pitting edema in all extremities. Orders:    Duration:   Start:   0730 End:   1100 Total:   3.5 hours   Dialyzer:   Dialyzer/Set Up Inspection: Shima Molina (04/24/20 0730)   K Bath:   Dialysate K (mEq/L): 2 (04/24/20 0730)   Ca Bath:   Dialysate CA (mEq/L): 2.5 (04/24/20 0730)   Na/Bicarb:   Dialysate NA (mEq/L): 140 (04/24/20 0730)   Target Fluid Removal:   Goal/Amount of Fluid to Remove (mL): 1000 mL (04/24/20 0730)   Access     Type & Location:   RIJ CVC: Dressing CDI. No s/s of infection. Both lumens aspirate & flush well. Dsg date 4/23/20.    Labs     Obtained/Reviewed   Critical Results Called   Date when labs were drawn-  Hgb-    HGB   Date Value Ref Range Status   04/24/2020 7.5 (L) 11.5 - 16.0 g/dL Final     K-    Potassium   Date Value Ref Range Status   04/24/2020 3.2 (L) 3.5 - 5.1 mmol/L Final     Ca-   Calcium   Date Value Ref Range Status   04/24/2020 8.9 8.5 - 10.1 MG/DL Final     Bun-   BUN   Date Value Ref Range Status   04/24/2020 54 (H) 6 - 20 MG/DL Final     Comment:     INVESTIGATED PER DELTA CHECK PROTOCOL     Creat-   Creatinine   Date Value Ref Range Status   04/24/2020 1.35 (H) 0.55 - 1.02 MG/DL Final        Medications/ Blood Products Given     Name   Dose   Route and Time     Heparin dwell 2500 units 1.1A  1.4V             Blood Volume Processed (BVP):    76.7 L Net Fluid   Removed:  1500 ml   Comments   Time Out Done: 3406  Primary Nurse Rpt Pre: Luis Alberto Hayward RN  Primary Nurse Rpt Post: Sam Shannon RN  Pt Education: Procedural  Care Plan: Continue daily HD per MD.  Tx Summary:  SBAR received from Primary RN. Pt Intubated. Eyes open and can track with eyes. Able to nod appropriately. Consent signed & on file. 0730: Both lumens of Ramon/permcath disinfected with Prevantics per policy. Each lumen aspirated for blood return and flushed with Normal Saline per policy. VSS. Dialysis Tx initiated. 0815: Orders received from Dr. Rob Jurado to increase UF goal to 1500 and change bath to a 3k/2.5ca.    1100: Tx ended. VSS. All possible blood returned to patient. Central line catheter flushed with normal saline per policy. Ports disinfected with Prevantics per policy, lines disconnected, Heparin dwells instilled, and lines capped using aseptic technique. Bed locked and in the lowest position, call bell and belongings in reach. SBAR given to Primary, RN. Patient is stable at time of my departure. All Dialysis related medications have been reviewed. Admiting Diagnosis: COVID +  Pt's previous clinic- N/A  Consent signed - Informed Consent Verified: Yes (04/24/20 0730)  [facility] Consent - verified  Hepatitis Status- neg/susc-4/1/20  Machine #- Machine Number: [id number] (04/24/20 0730)  Telemetry status- Bedside  Pre-dialysis wt. - Pre-Dialysis Weight: 120.7 kg (266 lb 1.5 oz) (04/23/20 9386)

## 2020-04-24 NOTE — DIABETES MGMT
[facility]  CLINICAL NURSE SPECIALIST CONSULT  PROGRAM FOR DIABETES HEALTH  Follow up Note  Presentation   Veronica Grant is a 64 y.o. female admitted from OSH to [facility] ICU with SARS-COV2 needing CRRT. She is currently sedated and has been intubated since 3/28/20. Current clinical course has been complicated by steroid induced hyperglycemia. Steroids are discontinued at this time. She requires CRRT for acute renal failure r/t her sepsis and hypotension. PHM: GERD, obesity, and anxiety. New diabetes diagnosis with A1C 11.0% (3/28/2020); updated A1C 3/31/20-10.4%     Recent events:   Patient remains intubated and on ventilator, on CRRT. Once COVID re-test results; plan for trach and PEG placement. Has needed more PRBCs last night for low hgb  5.7, now up to 7.5 after PRBCs. Consulted by Provider for advanced diabetes nursing assessment and care, specifically related to   [] Transitioning off Darolyn Riser   [x] Inpatient management strategy  [] Home management assessment  [] Survival skill education    Diabetes-related medical history  Acute complications  hyperglycemia  Neurological complications  NONE  Microvascular disease  NONE  Macrovascular disease  NONE  Other associated conditions     NONE    Diabetes medication history: NONE    Subjective   Per chart review, Ms. Lucia Gabriel remains very ill  and is on isolation forSARS-COV2 in ICU. I am unable to do a physical assessment of the patient at this time. Patient reports the following home diabetes self-care practices: deferred    Objective     Vital Signs   Visit Vitals  /75   Pulse 95   Temp 98.6 °F (37 °C) (Axillary)   Resp 24   Ht 5' 4\" (1.626 m)   Wt 118.4 kg (261 lb)   SpO2 99%   BMI 44.80 kg/m²   .    Laboratory  Lab Results   Component Value Date/Time    Hemoglobin A1c 10.4 (H) 03/31/2020 03:42 PM     No results found for: LDL, LDLC, DLDLP  Lab Results   Component Value Date/Time    Creatinine 1.35 (H) 04/24/2020 03:45 AM     Lab Results Component Value Date/Time    Sodium 139 04/24/2020 03:45 AM    Potassium 3.2 (L) 04/24/2020 03:45 AM    Chloride 104 04/24/2020 03:45 AM    CO2 28 04/24/2020 03:45 AM    Anion gap 7 04/24/2020 03:45 AM    Glucose 97 04/24/2020 03:45 AM    BUN 54 (H) 04/24/2020 03:45 AM    Creatinine 1.35 (H) 04/24/2020 03:45 AM    BUN/Creatinine ratio 40 (H) 04/24/2020 03:45 AM    GFR est AA 49 (L) 04/24/2020 03:45 AM    GFR est non-AA 40 (L) 04/24/2020 03:45 AM    Calcium 8.9 04/24/2020 03:45 AM    Bilirubin, total 1.6 (H) 04/20/2020 05:29 AM    AST (SGOT) 169 (H) 04/20/2020 05:29 AM    Alk. phosphatase 490 (H) 04/20/2020 05:29 AM    Protein, total 5.9 (L) 04/20/2020 05:29 AM    Albumin 2.8 (L) 04/24/2020 03:45 AM    Globulin 3.5 04/20/2020 05:29 AM    A-G Ratio 0.7 (L) 04/20/2020 05:29 AM    ALT (SGPT) 206 (H) 04/20/2020 05:29 AM     Lab Results   Component Value Date/Time    ALT (SGPT) 206 (H) 04/20/2020 05:29 AM         Evaluation   Ms Nimco Cabral, with new onset Type 2 diabetes,with A1C 10.4%. Steroid tapered to  50mg daily- hydrocortisone. Basal insulin decreased  to 50 units daily starting yesterday evening. Fasting BG today 106mg/dl . Highest BG yesterday 139mg/dl . BG trends steadily decreased overnight, but within target . No low BG yesterday. .Since her steroid dose is tapered to 50mg daily her insulin needs can be reduced. .   Remains on TF Nepro @ 30cc/hr      To maintain her basal metabolic needs she requires 36units daily (renal dosing). In addition she also needs 17 units of basal insulin to cover for her steroid AND 7units to cover for her TF (nepro @ 30cc/hr). So total insulin needs are: 60 units daily. It is imperative that we maintain her BG within target range 100-180mg/dl. Recommendations   1. Continue basal at 50 units daily; if BG trends back up >200mg/dl, consider increasing basal to 55units daily.     Follow the dosing schedule when tapering steroid:     20 mg Hydrocortisone: add 0.05 units/kg Lantus to total daily insulin dose  40 mg Hydrocortisone: add 0.1 units/kg Lantus to total daily insulin dose  50 mg Hydrocortisone: add 0.15 units/kg Lantus to total daily insulin dose  100 mg Hydrocortisone: add 0.3 units/kg Lantus to total daily insulin dose    2. Will continue to follow.     Assessment and Plan   Nursing Diagnosis Risk for unstable blood glucose pattern   Nursing Intervention Domain 0449 Decision-making Support   Nursing Interventions Examined current inpatient diabetes control   Explored factors facilitating and impeding inpatient management  Identified self-management practices impeding diabetes control  Explored corrective strategies with patient and responsible inpatient provider   Informed patient of rational for insulin strategy while hospitalized         Billing Code(s)     [x] [phone] IP subsequent hospital care - [address] [place] [id number], [facility]   Program for Diabetes Health  Access via KESHIA Roca [phone]

## 2020-04-24 NOTE — PROGRESS NOTES
04/24/20 1700   Weaning Parameters   Spontaneous Breathing Trial Complete Yes   Resp Rate Observed 28   Ve 12.1      RSBI 61   SBT RESULTS

## 2020-04-24 NOTE — PROGRESS NOTES
Palliative Medicine Social Work    ICU RN Anuradha and I spoke with patient  and dtr on phone for scheduled check in call. RN provided update - appreciate her support and time. Patient is more alert, intermittently following some commands; currently not on sedation and is being weaned from pain med. Covid retest results are not back, which is concerning family. They are hoping for a negative test so patient can have trach and also a visitor. Discussed if patient is Covid negative, she can possibly have one patient advocate visitor. Family had some questions regarding some what ifs:  1. They are wondering if patient would be able to be weaned from vent and extubated without having to do trach? RN discussed that due to prolonged intubation, and patient still needing quite a bit of suctioning bc she can't clear her own secretions, most likely she would need trach to continue weaning from vent. 2. If patient is Covid+, family wanting to know if family can donate plasma and how (pt sister and brother both had and recovered from [address]). 3. If patient gets trach, are there other barriers to her moving to [facility] 19. Took some time to review that once trach is placed, patient would need PEG and then would most likely d/c to LTAC where she would continue to be weaned from vent. Still unknowns about neurological status and how she does day to day will dictate where she goes and her recovery time - set expectation that this will be a long road. Bienvenido Benton not accepting of that - said he expects patient to be able to walk out of LTAC/rehab after 4 weeks. I explored this more with him and discussed while it's important to have hope and enma in her recovery, realistically, she is going to have a much longer road. He acknowledged this and also told me patient is stubborn and hopes this trait is supportive in her recovery. Touched base with Kelly Gomes NP after call and also appreciate his continued support.       Thank you for the opportunity to be involved in the care of Ms. Tana Giraldo and her family.     Tigist Correia, FLAKITA, Supervisee in Social Work  Palliative Medicine   [phone]

## 2020-04-24 NOTE — PROGRESS NOTES
Bedside, Verbal and Written shift change report given to Anuradha (oncoming nurse) by Omar Pyle (offgoing nurse). Report included the following information SBAR, Kardex, MAR and Recent Results. SHIFT SUMMARY- VSS. Afebrile. Pt intermittently following commands, POLANCO, nods appropriately. Pt was placed on SBT for 45 min today- pt tolerated well. Family updated throughout shift.

## 2020-04-24 NOTE — PROGRESS NOTES
[facility]   [address] [facility], [facility] [address], [facility]  Phone: [phone]   [phone]       Nephrology Progress Note  Michelle Hussein     1960     [id number]  Date of Admission : 3/31/2020  04/24/20    CC: Follow up for JUANCHO      Assessment and Plan   JUANCHO :  - 2/2 ATN  - CRRT switched to daily IHD on 4/18  - New line placed by IR on 4/22  - HD today , tomorrow and then mwf   - daily labs     Lytes  -  HyperPhos : reduced renvela dose   -  Hypo K : replete PRN    COVID-19 +ve   Acute Hypoxic Resp Failure   - On Vent   - completed Plaquenil. On Vit C, Zinc   - s/p Tocilizumab   - Trach being planned      RP hematoma:  - HB AT 7.5 after 3 units of blood     Cardiac arrest 4/9    Type II DM   - Insulin per primary team      Morbid Obesity        Interval History:  Getting hd now   Tolerating well   k low and replaced this am   Hb 7.9 to 7.5   Per nursing staff, MS IMPROVING   REPEAT COVID TEST PENDING TO DECIDE ON TRACH       PT NOT EXAMINED in line with ASN and RPA GUIDELINES ON MANAGING COVID-19 PTS WITH RENAL ISSUES. Examination findings discussed personally with the examining Physician team member      Review of Systems: Review of systems not obtained due to patient factors.     Current Medications:   Current Facility-Administered Medications   Medication Dose Route Frequency    potassium chloride 20 mEq in 50 ml IVPB  20 mEq IntraVENous Q2H    0.9% sodium chloride infusion 250 mL  250 mL IntraVENous PRN    heparin (porcine) 1,000 unit/mL injection 2,500 Units  2,500 Units IntraVENous DIALYSIS PRN    hydrocortisone Sod Succ (PF) (SOLU-CORTEF) injection 25 mg  25 mg IntraVENous Q12H    insulin glargine (LANTUS) injection 25 Units  25 Units SubCUTAneous Q12H    guar gum (BENEFIBER) packet 1 Packet  4 g Oral TID    fentaNYL citrate (PF) injection 25-50 mcg  25-50 mcg IntraVENous Q2H PRN    NOREPINephrine (LEVOPHED) 8 mg in 5% dextrose 250mL (32 mcg/mL) infusion  0.5-30 mcg/min IntraVENous TITRATE    sevelamer carbonate (RENVELA) oral powder 1.6 g  1.6 g Oral TID WITH MEALS    labetaloL (NORMODYNE;TRANDATE) injection 20 mg  20 mg IntraVENous Q4H PRN    HYDROcodone-acetaminophen (NORCO) 5-325 mg per tablet 1 Tab  1 Tab Oral Q6H    alteplase (CATHFLO) 1 mg in sterile water (preservative free) 1 mL injection  1 mg InterCATHeter PRN    albumin human 25% (BUMINATE) solution 12.5 g  12.5 g IntraVENous DIALYSIS PRN    0.9% sodium chloride infusion 250 mL  250 mL IntraVENous PRN    guaiFENesin (ROBITUSSIN) 100 mg/5 mL oral liquid 200 mg  200 mg Oral Q6H    albuterol (PROVENTIL VENTOLIN) nebulizer solution 2.5 mg  2.5 mg Nebulization Q4H PRN    acetylcysteine (MUCOMYST) 200 mg/mL (20 %) solution 200 mg  200 mg Nebulization BID RT    famotidine (PEPCID) 8 mg/mL oral suspension 20 mg  20 mg Per NG tube DAILY    alteplase (CATHFLO) 2 mg in sterile water (preservative free) 2 mL injection  2 mg InterCATHeter DIALYSIS PRN    alteplase (CATHFLO) 2 mg in sterile water (preservative free) 2 mL injection  2 mg InterCATHeter DIALYSIS PRN    insulin lispro (HUMALOG) injection   SubCUTAneous Q6H    0.9% sodium chloride infusion 250 mL  250 mL IntraVENous PRN    chlorhexidine (PERIDEX) 0.12 % mouthwash 15 mL  15 mL Oral Q12H    [Held by provider] labetaloL (NORMODYNE) tablet 100 mg  100 mg Oral Q12H    0.9% sodium chloride infusion  3 mL/hr IntraVENous CONTINUOUS    0.9% sodium chloride infusion  5 mL/hr IntraVENous CONTINUOUS    white petrolatum-mineral oiL (AKWA TEARS) 83-15 % ophthalmic ointment   Both Eyes Q12H    heparin (porcine) pf 300 Units  300 Units InterCATHeter PRN    bacitracin 500 unit/gram packet 1 Packet  1 Packet Topical PRN    balsam peru-castor oiL (VENELEX) ointment   Topical BID    sodium chloride (NS) flush 5-40 mL  5-40 mL IntraVENous Q8H    sodium chloride (NS) flush 5-40 mL  5-40 mL IntraVENous PRN    HYDROcodone-acetaminophen (NORCO) 5-325 mg per tablet 1 Tab  1 Tab Oral Q4H PRN    ondansetron (ZOFRAN) injection 4 mg  4 mg IntraVENous Q4H PRN    acetaminophen (TYLENOL) tablet 650 mg  650 mg Oral Q6H PRN    Or    acetaminophen (TYLENOL) suppository 650 mg  650 mg Rectal Q6H PRN    glucose chewable tablet 16 g  4 Tab Oral PRN    glucagon (GLUCAGEN) injection 1 mg  1 mg IntraMUSCular PRN    dextrose 10% infusion 0-250 mL  0-250 mL IntraVENous PRN      Allergies   Allergen Reactions    Augmentin [Amoxicillin-Pot Clavulanate] Rash and Itching       Objective:  Vitals:    Vitals:    04/24/20 0900 04/24/20 0915 04/24/20 0930 04/24/20 0945   BP: 139/75 133/82 (!) 128/92 151/88   Pulse: 96 (!) 103 (!) 104 (!) 102   Resp: 24 25 21 26   Temp:       TempSrc:       SpO2: 100% 100% 100% 100%   Weight:       Height:         Intake and Output:  04/24 0701 - 04/24 1900  In: 210 [I.V.:100]  Out: 0   04/22 1901 - 04/24 0700  In: 1921.2 [I.V.:361.2]  Out: 4566 [Drains:1450]    Physical Examination:   Pt intubated    Yes  General: Awake on vent   Resp:  On vent   CV:  RRR  GI:  Obese   Neurologic:  Awake, unable to assess   Access:           R JAZZ torres     []    High complexity decision making was performed  []    Patient is at high-risk of decompensation with multiple organ involvement    Lab Data Personally Reviewed: I have reviewed all the pertinent labs, microbiology data and radiology studies during assessment.     Recent Labs     04/24/20  0345 04/23/20  1601 04/23/20  0330 04/23/20  0311 04/22/20  0518    140  --  135* 134*   K 3.2* 3.0*  --  3.1* 3.7    104  --  101 100   CO2 28 28  --  27 24   GLU 97 97  --  131* 96   BUN 54* 27*  --  65* 95*   CREA 1.35* 0.78  --  1.57* 2.10*   CA 8.9 8.7  --  8.6 8.5   MG 2.1 2.0  --  2.2 2.2   PHOS 2.5* 1.6*  --  3.9 5.5*   ALB 2.8*  --   --  3.2* 2.5*   INR  --   --  1.1  --   --      Recent Labs     04/24/20  0345 04/23/20  1604 04/23/20  1119 04/23/20  0422 04/22/20  0512   WBC 10.3 10.4 9.9 7.4 12.0*   HGB 7.5* 7.9* 5. 7* 5.4* 8.2*   HCT 23.4* 24.2* 18.3* 17.4* 26.0*    154 150 138* 169     No results found for: SDES  Lab Results   Component Value Date/Time    Culture result: NO GROWTH 5 DAYS 04/09/2020 02:32 PM    Culture result: NO GROWTH 5 DAYS 04/08/2020 10:36 AM    Culture result: NO GROWTH 5 DAYS 03/31/2020 11:15 AM    Culture result: MODERATE STAPHYLOCOCCUS AUREUS (A) 03/31/2020 10:34 AM    Culture result: LIGHT NORMAL RESPIRATORY SOFIE 03/31/2020 10:34 AM     Recent Results (from the past 24 hour(s))   CBC WITH AUTOMATED DIFF    Collection Time: 04/23/20 11:19 AM   Result Value Ref Range    WBC 9.9 3.6 - 11.0 K/uL    RBC 1.84 (L) 3.80 - 5.20 M/uL    HGB 5.7 (LL) 11.5 - 16.0 g/dL    HCT 18.3 (L) 35.0 - 47.0 %    MCV 99.5 (H) 80.0 - 99.0 FL    MCH 31.0 26.0 - 34.0 PG    MCHC 31.1 30.0 - 36.5 g/dL    RDW 19.8 (H) 11.5 - 14.5 %    PLATELET 821 660 - 553 K/uL    MPV 11.6 8.9 - 12.9 FL    NRBC 0.0 0  WBC    ABSOLUTE NRBC 0.00 0.00 - 0.01 K/uL    NEUTROPHILS 87 (H) 32 - 75 %    LYMPHOCYTES 6 (L) 12 - 49 %    MONOCYTES 6 5 - 13 %    EOSINOPHILS 0 0 - 7 %    BASOPHILS 0 0 - 1 %    IMMATURE GRANULOCYTES 1 (H) 0.0 - 0.5 %    ABS. NEUTROPHILS 8.6 (H) 1.8 - 8.0 K/UL    ABS. LYMPHOCYTES 0.6 (L) 0.8 - 3.5 K/UL    ABS. MONOCYTES 0.6 0.0 - 1.0 K/UL    ABS. EOSINOPHILS 0.0 0.0 - 0.4 K/UL    ABS. BASOPHILS 0.0 0.0 - 0.1 K/UL    ABS. IMM.  GRANS. 0.1 (H) 0.00 - 0.04 K/UL    DF SMEAR SCANNED      PLATELET COMMENTS Large Platelets      RBC COMMENTS ANISOCYTOSIS  2+        RBC COMMENTS MACROCYTOSIS  1+        RBC COMMENTS BASOPHILIC STIPPLING  PRESENT       GLUCOSE, POC    Collection Time: 04/23/20 12:55 PM   Result Value Ref Range    Glucose (POC) 113 (H) 65 - 100 mg/dL    Performed by Kiara Paz    D DIMER    Collection Time: 04/23/20  4:01 PM   Result Value Ref Range    D-dimer 19.11 (H) 0.00 - 0.65 mg/L FEU   FIBRINOGEN    Collection Time: 04/23/20  4:01 PM   Result Value Ref Range    Fibrinogen 547 (H) 200 - 475 mg/dL METABOLIC PANEL, BASIC    Collection Time: 04/23/20  4:01 PM   Result Value Ref Range    Sodium 140 136 - 145 mmol/L    Potassium 3.0 (L) 3.5 - 5.1 mmol/L    Chloride 104 97 - 108 mmol/L    CO2 28 21 - 32 mmol/L    Anion gap 8 5 - 15 mmol/L    Glucose 97 65 - 100 mg/dL    BUN 27 (H) 6 - 20 MG/DL    Creatinine 0.78 0.55 - 1.02 MG/DL    BUN/Creatinine ratio 35 (H) 12 - 20      GFR est AA >60 >60 ml/min/1.73m2    GFR est non-AA >60 >60 ml/min/1.73m2    Calcium 8.7 8.5 - 10.1 MG/DL   MAGNESIUM    Collection Time: 04/23/20  4:01 PM   Result Value Ref Range    Magnesium 2.0 1.6 - 2.4 mg/dL   PHOSPHORUS    Collection Time: 04/23/20  4:01 PM   Result Value Ref Range    Phosphorus 1.6 (L) 2.6 - 4.7 MG/DL   PTT    Collection Time: 04/23/20  4:01 PM   Result Value Ref Range    aPTT 23.1 22.1 - 32.0 sec    aPTT, therapeutic range     58.0 - 77.0 SECS   HAPTOGLOBIN    Collection Time: 04/23/20  4:01 PM   Result Value Ref Range    Haptoglobin <8 (L) 30 - 200 mg/dL   CBC W/O DIFF    Collection Time: 04/23/20  4:04 PM   Result Value Ref Range    WBC 10.4 3.6 - 11.0 K/uL    RBC 2.55 (L) 3.80 - 5.20 M/uL    HGB 7.9 (L) 11.5 - 16.0 g/dL    HCT 24.2 (L) 35.0 - 47.0 %    MCV 94.9 80.0 - 99.0 FL    MCH 31.0 26.0 - 34.0 PG    MCHC 32.6 30.0 - 36.5 g/dL    RDW 18.7 (H) 11.5 - 14.5 %    PLATELET 025 782 - 204 K/uL    MPV 11.5 8.9 - 12.9 FL    NRBC 0.0 0  WBC    ABSOLUTE NRBC 0.00 0.00 - 0.01 K/uL   GLUCOSE, POC    Collection Time: 04/23/20  5:57 PM   Result Value Ref Range    Glucose (POC) 114 (H) 65 - 100 mg/dL    Performed by Marcelle Mtz, POC    Collection Time: 04/23/20 11:20 PM   Result Value Ref Range    Glucose (POC) 139 (H) 65 - 100 mg/dL    Performed by Shagufta Weiner    CRP, HIGH SENSITIVITY    Collection Time: 04/24/20  3:45 AM   Result Value Ref Range    CRP, High sensitivity >9.5 mg/L   RENAL FUNCTION PANEL    Collection Time: 04/24/20  3:45 AM   Result Value Ref Range    Sodium 139 136 - 145 mmol/L Potassium 3.2 (L) 3.5 - 5.1 mmol/L    Chloride 104 97 - 108 mmol/L    CO2 28 21 - 32 mmol/L    Anion gap 7 5 - 15 mmol/L    Glucose 97 65 - 100 mg/dL    BUN 54 (H) 6 - 20 MG/DL    Creatinine 1.35 (H) 0.55 - 1.02 MG/DL    BUN/Creatinine ratio 40 (H) 12 - 20      GFR est AA 49 (L) >60 ml/min/1.73m2    GFR est non-AA 40 (L) >60 ml/min/1.73m2    Calcium 8.9 8.5 - 10.1 MG/DL    Phosphorus 2.5 (L) 2.6 - 4.7 MG/DL    Albumin 2.8 (L) 3.5 - 5.0 g/dL   MAGNESIUM    Collection Time: 04/24/20  3:45 AM   Result Value Ref Range    Magnesium 2.1 1.6 - 2.4 mg/dL   CBC WITH AUTOMATED DIFF    Collection Time: 04/24/20  3:45 AM   Result Value Ref Range    WBC 10.3 3.6 - 11.0 K/uL    RBC 2.44 (L) 3.80 - 5.20 M/uL    HGB 7.5 (L) 11.5 - 16.0 g/dL    HCT 23.4 (L) 35.0 - 47.0 %    MCV 95.9 80.0 - 99.0 FL    MCH 30.7 26.0 - 34.0 PG    MCHC 32.1 30.0 - 36.5 g/dL    RDW 19.9 (H) 11.5 - 14.5 %    PLATELET 560 991 - 025 K/uL    MPV 11.6 8.9 - 12.9 FL    NRBC 0.0 0  WBC    ABSOLUTE NRBC 0.00 0.00 - 0.01 K/uL    NEUTROPHILS 83 (H) 32 - 75 %    LYMPHOCYTES 10 (L) 12 - 49 %    MONOCYTES 6 5 - 13 %    EOSINOPHILS 1 0 - 7 %    BASOPHILS 0 0 - 1 %    IMMATURE GRANULOCYTES 0 0.0 - 0.5 %    ABS. NEUTROPHILS 8.6 (H) 1.8 - 8.0 K/UL    ABS. LYMPHOCYTES 1.0 0.8 - 3.5 K/UL    ABS. MONOCYTES 0.6 0.0 - 1.0 K/UL    ABS. EOSINOPHILS 0.1 0.0 - 0.4 K/UL    ABS. BASOPHILS 0.0 0.0 - 0.1 K/UL    ABS. IMM. GRANS. 0.0 0.00 - 0.04 K/UL    DF AUTOMATED     GLUCOSE, POC    Collection Time: 04/24/20  5:28 AM   Result Value Ref Range    Glucose (POC) 106 (H) 65 - 100 mg/dL    Performed by Asya Ziegler            Total time spent with patient:  xxx   min. Care Plan discussed with:  Patient     Family      RN      Consulting Physician [facility]        I have reviewed the flowsheets. Chart and Pertinent Notes have been reviewed. No change in PMH ,family and social history from Consult note.       Kinjal Lopez MD

## 2020-04-25 LAB
ALBUMIN SERPL-MCNC: 2.7 G/DL (ref 3.5–5)
ANION GAP SERPL CALC-SCNC: 8 MMOL/L (ref 5–15)
BASOPHILS # BLD: 0 K/UL (ref 0–0.1)
BASOPHILS NFR BLD: 0 % (ref 0–1)
BUN SERPL-MCNC: 58 MG/DL (ref 6–20)
BUN/CREAT SERPL: 41 (ref 12–20)
CALCIUM SERPL-MCNC: 8.7 MG/DL (ref 8.5–10.1)
CHLORIDE SERPL-SCNC: 102 MMOL/L (ref 97–108)
CO2 SERPL-SCNC: 27 MMOL/L (ref 21–32)
CREAT SERPL-MCNC: 1.41 MG/DL (ref 0.55–1.02)
CRP SERPL HS-MCNC: >9.5 MG/L
D DIMER PPP FEU-MCNC: 19.63 MG/L FEU (ref 0–0.65)
DIFFERENTIAL METHOD BLD: ABNORMAL
EOSINOPHIL # BLD: 0.1 K/UL (ref 0–0.4)
EOSINOPHIL NFR BLD: 1 % (ref 0–7)
ERYTHROCYTE [DISTWIDTH] IN BLOOD BY AUTOMATED COUNT: 19.1 % (ref 11.5–14.5)
FIBRINOGEN PPP-MCNC: 447 MG/DL (ref 200–475)
GLUCOSE BLD STRIP.AUTO-MCNC: 105 MG/DL (ref 65–100)
GLUCOSE BLD STRIP.AUTO-MCNC: 108 MG/DL (ref 65–100)
GLUCOSE BLD STRIP.AUTO-MCNC: 115 MG/DL (ref 65–100)
GLUCOSE BLD STRIP.AUTO-MCNC: 144 MG/DL (ref 65–100)
GLUCOSE BLD STRIP.AUTO-MCNC: 67 MG/DL (ref 65–100)
GLUCOSE SERPL-MCNC: 85 MG/DL (ref 65–100)
HCT VFR BLD AUTO: 24.8 % (ref 35–47)
HGB BLD-MCNC: 8 G/DL (ref 11.5–16)
IMM GRANULOCYTES # BLD AUTO: 0.1 K/UL (ref 0–0.04)
IMM GRANULOCYTES NFR BLD AUTO: 1 % (ref 0–0.5)
LYMPHOCYTES # BLD: 1 K/UL (ref 0.8–3.5)
LYMPHOCYTES NFR BLD: 8 % (ref 12–49)
MAGNESIUM SERPL-MCNC: 2 MG/DL (ref 1.6–2.4)
MCH RBC QN AUTO: 31.3 PG (ref 26–34)
MCHC RBC AUTO-ENTMCNC: 32.3 G/DL (ref 30–36.5)
MCV RBC AUTO: 96.9 FL (ref 80–99)
MONOCYTES # BLD: 0.8 K/UL (ref 0–1)
MONOCYTES NFR BLD: 6 % (ref 5–13)
NEUTS SEG # BLD: 11.1 K/UL (ref 1.8–8)
NEUTS SEG NFR BLD: 84 % (ref 32–75)
NRBC # BLD: 0 K/UL (ref 0–0.01)
NRBC BLD-RTO: 0 PER 100 WBC
PHOSPHATE SERPL-MCNC: 2.8 MG/DL (ref 2.6–4.7)
PLATELET # BLD AUTO: 226 K/UL (ref 150–400)
PMV BLD AUTO: 11.3 FL (ref 8.9–12.9)
POTASSIUM SERPL-SCNC: 3.4 MMOL/L (ref 3.5–5.1)
RBC # BLD AUTO: 2.56 M/UL (ref 3.8–5.2)
SERVICE CMNT-IMP: ABNORMAL
SERVICE CMNT-IMP: NORMAL
SODIUM SERPL-SCNC: 137 MMOL/L (ref 136–145)
WBC # BLD AUTO: 13.1 K/UL (ref 3.6–11)

## 2020-04-25 PROCEDURE — 74011000258 HC RX REV CODE- 258: Performed by: INTERNAL MEDICINE

## 2020-04-25 PROCEDURE — 65610000006 HC RM INTENSIVE CARE

## 2020-04-25 PROCEDURE — 80069 RENAL FUNCTION PANEL: CPT

## 2020-04-25 PROCEDURE — 74011250637 HC RX REV CODE- 250/637: Performed by: NURSE PRACTITIONER

## 2020-04-25 PROCEDURE — 74011250636 HC RX REV CODE- 250/636: Performed by: NURSE PRACTITIONER

## 2020-04-25 PROCEDURE — 74011250637 HC RX REV CODE- 250/637: Performed by: INTERNAL MEDICINE

## 2020-04-25 PROCEDURE — 36415 COLL VENOUS BLD VENIPUNCTURE: CPT

## 2020-04-25 PROCEDURE — 85379 FIBRIN DEGRADATION QUANT: CPT

## 2020-04-25 PROCEDURE — 74011000250 HC RX REV CODE- 250: Performed by: SURGERY

## 2020-04-25 PROCEDURE — 86141 C-REACTIVE PROTEIN HS: CPT

## 2020-04-25 PROCEDURE — 85384 FIBRINOGEN ACTIVITY: CPT

## 2020-04-25 PROCEDURE — 97110 THERAPEUTIC EXERCISES: CPT

## 2020-04-25 PROCEDURE — 94003 VENT MGMT INPAT SUBQ DAY: CPT

## 2020-04-25 PROCEDURE — 82962 GLUCOSE BLOOD TEST: CPT

## 2020-04-25 PROCEDURE — 74011636637 HC RX REV CODE- 636/637: Performed by: NURSE PRACTITIONER

## 2020-04-25 PROCEDURE — 36600 WITHDRAWAL OF ARTERIAL BLOOD: CPT

## 2020-04-25 PROCEDURE — 94640 AIRWAY INHALATION TREATMENT: CPT

## 2020-04-25 PROCEDURE — 74011250636 HC RX REV CODE- 250/636: Performed by: INTERNAL MEDICINE

## 2020-04-25 PROCEDURE — 83735 ASSAY OF MAGNESIUM: CPT

## 2020-04-25 PROCEDURE — 97162 PT EVAL MOD COMPLEX 30 MIN: CPT

## 2020-04-25 PROCEDURE — 85025 COMPLETE CBC W/AUTO DIFF WBC: CPT

## 2020-04-25 PROCEDURE — 74011000250 HC RX REV CODE- 250: Performed by: NURSE PRACTITIONER

## 2020-04-25 PROCEDURE — 90935 HEMODIALYSIS ONE EVALUATION: CPT

## 2020-04-25 RX ADMIN — Medication 1 PACKET: at 09:42

## 2020-04-25 RX ADMIN — HYDROCORTISONE SODIUM SUCCINATE 25 MG: 100 INJECTION, POWDER, FOR SOLUTION INTRAMUSCULAR; INTRAVENOUS at 20:09

## 2020-04-25 RX ADMIN — INSULIN GLARGINE 25 UNITS: 100 INJECTION, SOLUTION SUBCUTANEOUS at 20:09

## 2020-04-25 RX ADMIN — MINERAL OIL AND WHITE PETROLATUM: 150; 830 OINTMENT OPHTHALMIC at 09:43

## 2020-04-25 RX ADMIN — HYDROCODONE BITARTRATE AND ACETAMINOPHEN 1 TABLET: 5; 325 TABLET ORAL at 20:10

## 2020-04-25 RX ADMIN — Medication 1 PACKET: at 15:53

## 2020-04-25 RX ADMIN — Medication 1 PACKET: at 22:03

## 2020-04-25 RX ADMIN — CASTOR OIL AND BALSAM, PERU: 788; 87 OINTMENT TOPICAL at 18:15

## 2020-04-25 RX ADMIN — FAMOTIDINE 20 MG: 40 POWDER, FOR SUSPENSION ORAL at 09:42

## 2020-04-25 RX ADMIN — FENTANYL CITRATE 50 MCG: 50 INJECTION INTRAMUSCULAR; INTRAVENOUS at 05:18

## 2020-04-25 RX ADMIN — ALBUTEROL SULFATE 2.5 MG: 2.5 SOLUTION RESPIRATORY (INHALATION) at 19:37

## 2020-04-25 RX ADMIN — SEVELAMER CARBONATE 0.8 G: 800 POWDER, FOR SUSPENSION ORAL at 12:00

## 2020-04-25 RX ADMIN — SEVELAMER CARBONATE 0.8 G: 800 POWDER, FOR SUSPENSION ORAL at 16:00

## 2020-04-25 RX ADMIN — ALBUTEROL SULFATE 2.5 MG: 2.5 SOLUTION RESPIRATORY (INHALATION) at 07:28

## 2020-04-25 RX ADMIN — GUAIFENESIN 200 MG: 100 SOLUTION ORAL at 02:23

## 2020-04-25 RX ADMIN — CHLORHEXIDINE GLUCONATE 15 ML: 1.2 RINSE ORAL at 09:03

## 2020-04-25 RX ADMIN — SODIUM CHLORIDE 20 ML: 9 INJECTION INTRAMUSCULAR; INTRAVENOUS; SUBCUTANEOUS at 05:11

## 2020-04-25 RX ADMIN — HYDROCODONE BITARTRATE AND ACETAMINOPHEN 1 TABLET: 5; 325 TABLET ORAL at 15:53

## 2020-04-25 RX ADMIN — SODIUM CHLORIDE 10 ML: 9 INJECTION INTRAMUSCULAR; INTRAVENOUS; SUBCUTANEOUS at 22:03

## 2020-04-25 RX ADMIN — SODIUM CHLORIDE 40 ML: 9 INJECTION INTRAMUSCULAR; INTRAVENOUS; SUBCUTANEOUS at 15:53

## 2020-04-25 RX ADMIN — HYDROCODONE BITARTRATE AND ACETAMINOPHEN 1 TABLET: 5; 325 TABLET ORAL at 09:42

## 2020-04-25 RX ADMIN — CASTOR OIL AND BALSAM, PERU: 788; 87 OINTMENT TOPICAL at 09:43

## 2020-04-25 RX ADMIN — HYDROCORTISONE SODIUM SUCCINATE 25 MG: 100 INJECTION, POWDER, FOR SOLUTION INTRAMUSCULAR; INTRAVENOUS at 09:38

## 2020-04-25 RX ADMIN — DEXTROSE MONOHYDRATE 125 ML: 100 INJECTION, SOLUTION INTRAVENOUS at 05:07

## 2020-04-25 RX ADMIN — HEPARIN SODIUM 2500 UNITS: 1000 INJECTION INTRAVENOUS; SUBCUTANEOUS at 18:26

## 2020-04-25 RX ADMIN — GUAIFENESIN 200 MG: 100 SOLUTION ORAL at 18:13

## 2020-04-25 RX ADMIN — GUAIFENESIN 200 MG: 100 SOLUTION ORAL at 23:57

## 2020-04-25 RX ADMIN — MINERAL OIL AND WHITE PETROLATUM: 150; 830 OINTMENT OPHTHALMIC at 20:09

## 2020-04-25 RX ADMIN — CHLORHEXIDINE GLUCONATE 15 ML: 1.2 RINSE ORAL at 20:11

## 2020-04-25 RX ADMIN — GUAIFENESIN 200 MG: 100 SOLUTION ORAL at 05:18

## 2020-04-25 RX ADMIN — ACETYLCYSTEINE 200 MG: 200 SOLUTION ORAL; RESPIRATORY (INHALATION) at 19:38

## 2020-04-25 RX ADMIN — INSULIN GLARGINE 25 UNITS: 100 INJECTION, SOLUTION SUBCUTANEOUS at 09:41

## 2020-04-25 RX ADMIN — ACETYLCYSTEINE 200 MG: 200 SOLUTION ORAL; RESPIRATORY (INHALATION) at 07:28

## 2020-04-25 RX ADMIN — SEVELAMER CARBONATE 0.8 G: 800 POWDER, FOR SUSPENSION ORAL at 09:38

## 2020-04-25 RX ADMIN — GUAIFENESIN 200 MG: 100 SOLUTION ORAL at 12:00

## 2020-04-25 RX ADMIN — HYDROCODONE BITARTRATE AND ACETAMINOPHEN 1 TABLET: 5; 325 TABLET ORAL at 02:25

## 2020-04-25 NOTE — ROUTINE PROCESS
Bedside and Verbal shift change report given to [address] (oncoming nurse) by [address] (offgoing nurse).  Report included the following information SBAR, Kardex, Procedure Summary, Intake/Output and Cardiac Rhythm SR.

## 2020-04-25 NOTE — ROUTINE PROCESS
Primary Nurse Estelle Meredith RN and Maggie Jovel RN performed a dual skin assessment on this patient Impairment noted- see wound doc flow sheet  Aniket score is 11

## 2020-04-25 NOTE — DIALYSIS
[facility]  [phone]    Vitals   Pre   Post   Assessment   Pre   Post     Temp  Temp: 98.6 °F (37 °C) (04/25/20 1200) 96.9 LOC  Alert on vent Alert on vent   HR   Pulse (Heart Rate): (!) 102 (04/25/20 1455) 91 Lungs   diminished  diminished   B/P   BP: 138/72 (04/25/20 1455) 141/83 Cardiac   NSR  NSR   Resp   Resp Rate: 25 (04/25/20 1455) 17 Skin   intact  intact   Pain level  Pain Intensity 1: 0 (04/25/20 1200) 1 Edema  Diffuse 1+     Diffuse 1 +   Orders:    Duration:   Start:   1455 End:   1755 Total:   3 hours   Dialyzer:   Dialyzer/Set Up Inspection: Revaclear (04/25/20 1455)   K Bath:   Dialysate K (mEq/L): 3 (04/25/20 1455)   Ca Bath:   Dialysate CA (mEq/L): 2.5 (04/25/20 1455)   Na/Bicarb:   Dialysate NA (mEq/L): 140 (04/25/20 1455)   Target Fluid Removal:   Goal/Amount of Fluid to Remove (mL): 1500 mL (04/25/20 1455)   Access     Type & Location:   CADEN torres. Each catheter limb disinfected for 60 seconds per limb with alcohol swabs. Caps removed, dialysis CVC hub scrubbed with Prevantics for 5 seconds, followed by a 5 second dry time per Hospital P&P. Each catheter limb disinfected for 60 seconds per limb with alcohol swabs. Dialysis CVC hubs scrubbed with Prevantics for 5 seconds, followed by a 5 second dry time per Hospital P&P, red and blue dialysis caps applied to ports.      Labs     Obtained/Reviewed   Critical Results Called   Date when labs were drawn-  Hgb-    HGB   Date Value Ref Range Status   04/25/2020 8.0 (L) 11.5 - 16.0 g/dL Final     K-    Potassium   Date Value Ref Range Status   04/25/2020 3.4 (L) 3.5 - 5.1 mmol/L Final     Comment:     SPECIMEN HEMOLYZED, RESULTS MAY BE AFFECTED     Ca-   Calcium   Date Value Ref Range Status   04/25/2020 8.7 8.5 - 10.1 MG/DL Final     Bun-   BUN   Date Value Ref Range Status   04/25/2020 58 (H) 6 - 20 MG/DL Final     Creat-   Creatinine   Date Value Ref Range Status   04/25/2020 1.41 (H) 0.55 - 1.02 MG/DL Final Medications/ Blood Products Given     Name   Dose   Route and Time     Heparin 1000u/ml 2500 units  Cath block/1755             Blood Volume Processed (BVP):    67.4 liters Net Fluid   Removed:  1500 ml   Comments Patient tolerated tx well, no complaints or problems. Report to ALEXEI Martienz RN using SBAR  Time Out Done: yes 0018  Primary Nurse Rpt Pre: Froilan Gutierrez RN  Primary Nurse Rpt Post: ALEXEI Fitzpatrick RN  Pt Education: n/a  Care Plan: HD with volume removal  Tx Summary: 3 hours on 3 K 2.5 Ca 140/35 removed 1500 ml. Admiting Diagnosis:  Pt's previous clinic-n/a  Consent signed - Informed Consent Verified: Yes (04/25/20 2204)  Breannaita Consent - yes  Hepatitis Status- antigen negative 4-1-20, susceptable  Machine #- Machine Number: [id number] (04/25/20 6011)  Telemetry status-on monitor in ICU  Pre-dialysis wt. - Pre-Dialysis Weight: 120.7 kg (266 lb 1.5 oz) (04/23/20 5307)

## 2020-04-25 NOTE — PROGRESS NOTES
Problem: Mobility Impaired (Adult and Pediatric)  Goal: *Acute Goals and Plan of Care (Insert Text)  Description: FUNCTIONAL STATUS PRIOR TO ADMISSION: Patient was independent and active without use of DME but poor history due to not being able to communicate. HOME SUPPORT PRIOR TO ADMISSION: Pt lives with family    Physical Therapy Goals  Initiated 4/25/2020  1. Patient will move from supine to sit and sit to supine , scoot up and down and roll side to side in bed with moderate assistance once off vent within 7 day(s). 2.  Patient will participate in sitting with min A for 2 minutes once off vent within 7 day(s). 3.  Patient will perform active supine or sitting TE with min A within 7 day(s). Outcome: Progressing Towards Goal    PHYSICAL THERAPY EVALUATION  Patient: Lisa Briggs (81 y.o. female)  Date: 4/25/2020  Primary Diagnosis: COVID-19 virus infection [U07.1]        Precautions: droplet +, falls, vented         ASSESSMENT  Based on the objective data described below, the patient presents with desaturation, requires intubation, poorly interactive, lack of AROM and voluntary movement s/p COVID infection. It is unclear whether patient is too sedated to move, unwilling to voluntarily move or unable to tolerate due to weakness. Occasional flexion of fingers noted briefly, poorly replicates. Pt stretched and ranged for BLE flex/ext, IR/ER, and abd/add, UE for flex/ext of elbow and shoulder and wrist, fist opening/closing, horizontal abd and pronation/supination of forearm. Will continue to follow. Functional Outcome Measure: The patient scored Total: 0/100 on the Barthel Index which is indicative of severe impaired ability to care for basic self needs/dependency on others. Other factors to consider for discharge: COVID positive     Patient will benefit from skilled therapy intervention to address the above noted impairments.        PLAN :  Recommendations and Planned Interventions: bed mobility training, transfer training, gait training, therapeutic exercises, neuromuscular re-education, patient and family training/education, and therapeutic activities      Frequency/Duration: Patient will be followed by physical therapy:  3 times a week to address goals. Recommendation for discharge: (in order for the patient to meet his/her long term goals)  Therapy 3 hours per day 5-7 days per week    This discharge recommendation:  Has been made in collaboration with the attending provider and/or case management    IF patient discharges home will need the following DME: to be determined (TBD)         SUBJECTIVE:   Patient stated nothing, poorly communative despite attempting blinks for yes/no, unable to thumbs up/down    OBJECTIVE DATA SUMMARY:   HISTORY:    Past Medical History:   Diagnosis Date    Basal cell carcinoma     GERD (gastroesophageal reflux disease)     Kidney stones     Polyp of ureter      Past Surgical History:   Procedure Laterality Date    ENDOMETRIAL ABLATION, THERMAL      HX  SECTION      HX COLONOSCOPY      [id number]    HYSTEROSCOPY DIAGNOSTIC      D&C/POLYP REMOVAL    INSERT ARTERIAL LINE  2020         IR INSERT NON TUNL CVC OVER 5 YRS  2020       Personal factors and/or comorbidities impacting plan of care:     Home Situation  Home Environment: Other (comment)  One/Two Story Residence: One story  Living Alone: No  Support Systems: Other (comments)  Patient Expects to be Discharged to[de-identified] Unknown  Current DME Used/Available at Home: None    EXAMINATION/PRESENTATION/DECISION MAKING:   Critical Behavior:  Neurologic State: Alert  Orientation Level: Unable to verbalize  Cognition: Follows commands     Hearing:   Auditory  Auditory Impairment: None  Skin:  intact  Edema: none  Range Of Motion:  AROM: Grossly decreased, non-functional           PROM: Generally decreased, functional           Strength:    Strength: Grossly decreased, non-functional                    Tone & Sensation:   Tone: Abnormal(poor active tone, flaccid grossly, unclear why )                              Coordination:  Coordination: Grossly decreased, non-functional  Vision:      Functional Mobility:  Bed Mobility:              Transfers:                             Balance:      Ambulation/Gait Training:                      Functional Measure:  Barthel Index:    Bathin  Bladder: 0  Bowels: 0  Groomin  Dressin  Feedin  Mobility: 0  Stairs: 0  Toilet Use: 0  Transfer (Bed to Chair and Back): 0  Total: 0/100       The Barthel ADL Index: Guidelines  1. The index should be used as a record of what a patient does, not as a record of what a patient could do. 2. The main aim is to establish degree of independence from any help, physical or verbal, however minor and for whatever reason. 3. The need for supervision renders the patient not independent. 4. A patient's performance should be established using the best available evidence. Asking the patient, friends/relatives and nurses are the usual sources, but direct observation and common sense are also important. However direct testing is not needed. 5. Usually the patient's performance over the preceding 24-48 hours is important, but occasionally longer periods will be relevant. 6. Middle categories imply that the patient supplies over 50 per cent of the effort. 7. Use of aids to be independent is allowed. Maki Melendez., Barthel, D.W. (8424). Functional evaluation: the Barthel Index. [facility] (14)2. ROBERT Chávez, [facility] [place]., Torsten Saldana., [place], [address] [place] [address] (). Measuring the change indisability after inpatient rehabilitation; comparison of the responsiveness of the Barthel Index and Functional Caroline Measure. Journal of Neurology, Neurosurgery, and Psychiatry, 66(4), 659-654.   Merna Fajardo, N.ESCOBAR.A, OLAYINKA Pastrana.LACI, & Bianka Calvert M.A. (2004.) Assessment of post-stroke quality of life in cost-effectiveness studies: The usefulness of the Barthel Index and the EuroQoL-5D. Quality of Life Research, 15, 311-88        Physical Therapy Evaluation Charge Determination   History Examination Presentation Decision-Making   HIGH Complexity :3+ comorbidities / personal factors will impact the outcome/ POC  HIGH Complexity : 4+ Standardized tests and measures addressing body structure, function, activity limitation and / or participation in recreation  MEDIUM Complexity : Evolving with changing characteristics  Other outcome measures barthel  HIGH       Based on the above components, the patient evaluation is determined to be of the following complexity level: MEDIUM    Pain Rating:  Unknown, occasional frown with stretching    Activity Tolerance:   Poor, desaturates with exertion and requires oxygen, requires frequent rest breaks, and no desaturation noted with passive TE   Please refer to the flowsheet for vital signs taken during this treatment. After treatment patient left in no apparent distress:   Supine in bed    COMMUNICATION/EDUCATION:   The patients plan of care was discussed with: Registered nurse. Fall prevention education was provided and the patient/caregiver indicated understanding. and Patient/family have participated as able in goal setting and plan of care.     Thank you for this referral.  Hossein Baker, PT   Time Calculation: 34 mins

## 2020-04-25 NOTE — PROGRESS NOTES
1930-bedside shift report received from [facility] using sbar format  0507-BS 67-treated per protocol  0522-repeat glucose 144  0730-bedside shift report given to RN using sbar format

## 2020-04-25 NOTE — PROGRESS NOTES
[facility]   [id number] [facility], [address] [place] [address], [facility] [place]  Phone: [phone]   [phone]       Nephrology Progress Note  Melanie Mixon     1960     [id number]  Date of Admission : 3/31/2020  04/25/20    CC: Follow up for JUANCHO       Assessment and Plan   ACUTE KIDNEY INJURY:  - 2/2 ATN  - CRRT switched to daily IHD on 4/18  - New line placed by IR on 4/22  - HD TODAY  - 3 K BATH      COVID-19 +ve   Acute Hypoxic Resp Failure   - On Vent      RP hematoma:    Cardiac arrest 4/9    Type II DM      Morbid Obesity        Interval History:  ON VENT  ON small dose of levophed        PT NOT EXAMINED in line with ASN and RPA GUIDELINES ON MANAGING COVID-19 PTS WITH RENAL ISSUES. Examination findings discussed personally with the examining Physician team member      Review of Systems: Review of systems not obtained due to patient factors.     Current Medications:   Current Facility-Administered Medications   Medication Dose Route Frequency    sevelamer carbonate (RENVELA) oral powder 0.8 g  0.8 g Oral TID WITH MEALS    0.9% sodium chloride infusion 250 mL  250 mL IntraVENous PRN    heparin (porcine) 1,000 unit/mL injection 2,500 Units  2,500 Units IntraVENous DIALYSIS PRN    hydrocortisone Sod Succ (PF) (SOLU-CORTEF) injection 25 mg  25 mg IntraVENous Q12H    insulin glargine (LANTUS) injection 25 Units  25 Units SubCUTAneous Q12H    guar gum (BENEFIBER) packet 1 Packet  4 g Oral TID    fentaNYL citrate (PF) injection 25-50 mcg  25-50 mcg IntraVENous Q2H PRN    NOREPINephrine (LEVOPHED) 8 mg in 5% dextrose 250mL (32 mcg/mL) infusion  0.5-30 mcg/min IntraVENous TITRATE    labetaloL (NORMODYNE;TRANDATE) injection 20 mg  20 mg IntraVENous Q4H PRN    HYDROcodone-acetaminophen (NORCO) 5-325 mg per tablet 1 Tab  1 Tab Oral Q6H    alteplase (CATHFLO) 1 mg in sterile water (preservative free) 1 mL injection  1 mg InterCATHeter PRN    albumin human 25% (BUMINATE) solution 12.5 g  12.5 g IntraVENous DIALYSIS PRN    0.9% sodium chloride infusion 250 mL  250 mL IntraVENous PRN    guaiFENesin (ROBITUSSIN) 100 mg/5 mL oral liquid 200 mg  200 mg Oral Q6H    albuterol (PROVENTIL VENTOLIN) nebulizer solution 2.5 mg  2.5 mg Nebulization Q4H PRN    acetylcysteine (MUCOMYST) 200 mg/mL (20 %) solution 200 mg  200 mg Nebulization BID RT    famotidine (PEPCID) 8 mg/mL oral suspension 20 mg  20 mg Per NG tube DAILY    alteplase (CATHFLO) 2 mg in sterile water (preservative free) 2 mL injection  2 mg InterCATHeter DIALYSIS PRN    alteplase (CATHFLO) 2 mg in sterile water (preservative free) 2 mL injection  2 mg InterCATHeter DIALYSIS PRN    insulin lispro (HUMALOG) injection   SubCUTAneous Q6H    0.9% sodium chloride infusion 250 mL  250 mL IntraVENous PRN    chlorhexidine (PERIDEX) 0.12 % mouthwash 15 mL  15 mL Oral Q12H    [Held by provider] labetaloL (NORMODYNE) tablet 100 mg  100 mg Oral Q12H    0.9% sodium chloride infusion  3 mL/hr IntraVENous CONTINUOUS    0.9% sodium chloride infusion  5 mL/hr IntraVENous CONTINUOUS    white petrolatum-mineral oiL (AKWA TEARS) 83-15 % ophthalmic ointment   Both Eyes Q12H    heparin (porcine) pf 300 Units  300 Units InterCATHeter PRN    bacitracin 500 unit/gram packet 1 Packet  1 Packet Topical PRN    balsam peru-castor oiL (VENELEX) ointment   Topical BID    sodium chloride (NS) flush 5-40 mL  5-40 mL IntraVENous Q8H    sodium chloride (NS) flush 5-40 mL  5-40 mL IntraVENous PRN    HYDROcodone-acetaminophen (NORCO) 5-325 mg per tablet 1 Tab  1 Tab Oral Q4H PRN    ondansetron (ZOFRAN) injection 4 mg  4 mg IntraVENous Q4H PRN    acetaminophen (TYLENOL) tablet 650 mg  650 mg Oral Q6H PRN    Or    acetaminophen (TYLENOL) suppository 650 mg  650 mg Rectal Q6H PRN    glucose chewable tablet 16 g  4 Tab Oral PRN    glucagon (GLUCAGEN) injection 1 mg  1 mg IntraMUSCular PRN    dextrose 10% infusion 0-250 mL  0-250 mL IntraVENous PRN      Allergies Allergen Reactions    Augmentin [Amoxicillin-Pot Clavulanate] Rash and Itching       Objective:  Vitals:    Vitals:    04/25/20 0800 04/25/20 0900 04/25/20 1000 04/25/20 1111   BP: 142/87 123/76 112/69    Pulse: (!) 108 (!) 104 96 99   Resp: 23 28 24 24   Temp: 98.7 °F (37.1 °C)      TempSrc:       SpO2: 98% 100% 100% 100%   Weight:       Height:         Intake and Output:  04/25 0701 - 04/25 1900  In: 200   Out: 0   04/23 1901 - 04/25 0700  In: 1190 [I.V.:200]  Out: 2000 [Drains:500]    Physical exam:  Limited   GEN: ON VENT  NECK-et tube  RESP: no distress  NEURO:Can't access due to patient's current condition     I didn't go in the room to see patient to preserve PPE per ASN & RPA guidelines. []    High complexity decision making was performed  []    Patient is at high-risk of decompensation with multiple organ involvement    Lab Data Personally Reviewed: I have reviewed all the pertinent labs, microbiology data and radiology studies during assessment.     Recent Labs     04/25/20 0447 04/24/20  0345 04/23/20  1601 04/23/20  0330 04/23/20  0311    139 140  --  135*   K 3.4* 3.2* 3.0*  --  3.1*    104 104  --  101   CO2 27 28 28  --  27   GLU 85 97 97  --  131*   BUN 58* 54* 27*  --  65*   CREA 1.41* 1.35* 0.78  --  1.57*   CA 8.7 8.9 8.7  --  8.6   MG 2.0 2.1 2.0  --  2.2   PHOS 2.8 2.5* 1.6*  --  3.9   ALB 2.7* 2.8*  --   --  3.2*   INR  --   --   --  1.1  --      Recent Labs     04/25/20  0447 04/24/20  0345 04/23/20  1604 04/23/20  1119 04/23/20  0422   WBC 13.1* 10.3 10.4 9.9 7.4   HGB 8.0* 7.5* 7.9* 5.7* 5.4*   HCT 24.8* 23.4* 24.2* 18.3* 17.4*    184 154 150 138*     No results found for: SDES  Lab Results   Component Value Date/Time    Culture result: NO GROWTH 5 DAYS 04/09/2020 02:32 PM    Culture result: NO GROWTH 5 DAYS 04/08/2020 10:36 AM    Culture result: NO GROWTH 5 DAYS 03/31/2020 11:15 AM    Culture result: MODERATE STAPHYLOCOCCUS AUREUS (A) 03/31/2020 10:34 AM Culture result: LIGHT NORMAL RESPIRATORY SOFIE 03/31/2020 10:34 AM     Recent Results (from the past 24 hour(s))   GLUCOSE, POC    Collection Time: 04/24/20 11:28 AM   Result Value Ref Range    Glucose (POC) 86 65 - 100 mg/dL    Performed by [address]ana [address], POC    Collection Time: 04/24/20  5:08 PM   Result Value Ref Range    Glucose (POC) 60 (L) 65 - 100 mg/dL    Performed by [address], POC    Collection Time: 04/24/20  5:30 PM   Result Value Ref Range    Glucose (POC) 109 (H) 65 - 100 mg/dL    Performed by Anika Forrester    GLUCOSE, POC    Collection Time: 04/25/20  1:00 AM   Result Value Ref Range    Glucose (POC) 108 (H) 65 - 100 mg/dL    Performed by AUSTEN HIDALGO    CRP, HIGH SENSITIVITY    Collection Time: 04/25/20  4:47 AM   Result Value Ref Range    CRP, High sensitivity >9.5 mg/L   RENAL FUNCTION PANEL    Collection Time: 04/25/20  4:47 AM   Result Value Ref Range    Sodium 137 136 - 145 mmol/L    Potassium 3.4 (L) 3.5 - 5.1 mmol/L    Chloride 102 97 - 108 mmol/L    CO2 27 21 - 32 mmol/L    Anion gap 8 5 - 15 mmol/L    Glucose 85 65 - 100 mg/dL    BUN 58 (H) 6 - 20 MG/DL    Creatinine 1.41 (H) 0.55 - 1.02 MG/DL    BUN/Creatinine ratio 41 (H) 12 - 20      GFR est AA 47 (L) >60 ml/min/1.73m2    GFR est non-AA 38 (L) >60 ml/min/1.73m2    Calcium 8.7 8.5 - 10.1 MG/DL    Phosphorus 2.8 2.6 - 4.7 MG/DL    Albumin 2.7 (L) 3.5 - 5.0 g/dL   MAGNESIUM    Collection Time: 04/25/20  4:47 AM   Result Value Ref Range    Magnesium 2.0 1.6 - 2.4 mg/dL   CBC WITH AUTOMATED DIFF    Collection Time: 04/25/20  4:47 AM   Result Value Ref Range    WBC 13.1 (H) 3.6 - 11.0 K/uL    RBC 2.56 (L) 3.80 - 5.20 M/uL    HGB 8.0 (L) 11.5 - 16.0 g/dL    HCT 24.8 (L) 35.0 - 47.0 %    MCV 96.9 80.0 - 99.0 FL    MCH 31.3 26.0 - 34.0 PG    MCHC 32.3 30.0 - 36.5 g/dL    RDW 19.1 (H) 11.5 - 14.5 %    PLATELET 843 581 - 671 K/uL    MPV 11.3 8.9 - 12.9 FL    NRBC 0.0 0  WBC    ABSOLUTE NRBC 0.00 0.00 - 0.01 K/uL    NEUTROPHILS 84 (H) 32 - 75 %    LYMPHOCYTES 8 (L) 12 - 49 %    MONOCYTES 6 5 - 13 %    EOSINOPHILS 1 0 - 7 %    BASOPHILS 0 0 - 1 %    IMMATURE GRANULOCYTES 1 (H) 0.0 - 0.5 %    ABS. NEUTROPHILS 11.1 (H) 1.8 - 8.0 K/UL    ABS. LYMPHOCYTES 1.0 0.8 - 3.5 K/UL    ABS. MONOCYTES 0.8 0.0 - 1.0 K/UL    ABS. EOSINOPHILS 0.1 0.0 - 0.4 K/UL    ABS. BASOPHILS 0.0 0.0 - 0.1 K/UL    ABS. IMM. GRANS. 0.1 (H) 0.00 - 0.04 K/UL    DF AUTOMATED     D DIMER    Collection Time: 04/25/20  4:54 AM   Result Value Ref Range    D-dimer 19.63 (H) 0.00 - 0.65 mg/L FEU   FIBRINOGEN    Collection Time: 04/25/20  4:54 AM   Result Value Ref Range    Fibrinogen 447 200 - 475 mg/dL   GLUCOSE, POC    Collection Time: 04/25/20  5:07 AM   Result Value Ref Range    Glucose (POC) 67 65 - 100 mg/dL    Performed by [facility], POC    Collection Time: 04/25/20  5:23 AM   Result Value Ref Range    Glucose (POC) 144 (H) 65 - 100 mg/dL    Performed by [id number]            Total time spent with patient:  xxx   min. Care Plan discussed with:  Patient     Family      RN      Consulting Physician [id number],         I have reviewed the flowsheets. Chart and Pertinent Notes have been reviewed. No change in PMH ,family and social history from Consult note.       Rima Vargas MD

## 2020-04-25 NOTE — PROGRESS NOTES
[facility]    ICU TEAM Progress Note    Name: Lex Dumont   : 1960   MRN: [id number]   Date: 2020      Assessment/Plan:     Current ICU Problems:    Acute hypoxic respiratory failure 2/2 COVID 19   White count a little higher today, 13.1      Retroperitoneal bleed with hemorrhagic shock on 4/10/20    Anemia-HH stable. Acute kidney injury   Receiving HD daily and tolerating well. Nonocclusive PE in LLL pulmonary artery   No anticoagulation given recent right retroperitoneal bleed    MSSA pneumonia    Diabetes mellitus   On Humalog and Lantus    Thrombocytopenia   Resolved. Diarrhea - FMS in place. Hypokalemia   Receiving dialysis today. This should increase her K. Will trend. Plan for today:.  1. Remains on the ventilator, continue to trend ABGs. 2. SBT after dialysis today. 3. Tracking with eyes and following commands, very weakly. 4. Solu-cortef to 25 mg q 12 hrs. Continue to taper as tolerated. 5. Cont guaifenesin and mucomyst for mucous plugging. 6. Nephrology following. Off CRRT and started IHD on . Anuric. Right IJ melissa placed 20.     7. Completed PO vanc. 8. ID following  9. Levophed PRN for hypotension, not on currently. Has needed it intermittently at times for her HD.        Cardiac Gtts: None, Levophed PRN during dialysis  SBP Goal of:   >90 mmHg  MAP Goal of: > 65 mmHg  Transfusion Trigger (Hgb):  <7 g/dL     Respiratory Goals: Chlorhexidine   Optimize PEEP/Ventilation/Oxygenation  Goal Tidal Volume 6 cc/kg based on IBW  Aim for lung protective ventilation  Aggressive bronchopulmonary hygiene  SPO2 Goal: > 92%  Pulmonary toilet: NA   DVT Prophylaxis (if no, list reason): SCD's or Sequential Compression Device      GI Prophylaxis: Protonix (pantoprazole)   Nutrition: TFs  IVFs: NA  Bowel Movement: Yes  Bowel Regimen: None needed at this time     Bowden Catheter Present: Yes  Glycemic Control - Insulin: Yes SSI and Lantus  Antibiotics: Vanc completed.     Pain Medications: Fentanyl   Target RASS: 0 to -1 RASS  Sedation Medications: Precedex  CAM-ICU:  JUAREZ  Mobility: Poor and Bedrest  PT/OT: NA   Restraints: Soft wrist restraints  Discussed Plan of Care/Code Status: Full Code     T/L/D  Tubes: ETT and Orogastric Tube  Lines: Central Line, Ramon   Drains: FMS    Subjective:   Progress Note: 4/25/2020      Reason for ICU Admission:  65 yo female with hx of obesity, endometrial cancer and diabetes who presented to [facility] with worsening hypoxic respiratory failure in lieu of close exposure to COVID-19 to family including mother (passed away) sister and brother and tested resulting + here. She presented to the hospital 3/26 and intubated 3/28. She was started on broad spectrum antibiotics and plaquenil. Developed worsening renal failure and was transferred to ICU for CRRT. Her hospital course has been further notable for development of a retroperitoneal bleed, hemorrhagic shock and sepsis and ongoing acute hypoxic respiratory failure with increasing PEEP and FIo2 requirements. She has been retested for covid and will consider trach when results return. She has transitioned from CRRT to HD, receiving daily at this time, tolerating well. Once we receive her COVID testing she should have trach and PEG, we can then start talking about placement for long term vent weaning and rehabilitation.      Active Problem List:     Problem List  Date Reviewed: 4/19/2020          Codes Class    Hypotension ICD-10-CM: I95.9  ICD-9-CM: 458.9 Acute        Retroperitoneal hemorrhage ICD-10-CM: R58  ICD-9-CM: 459.0         Acute renal failure (ARF) ([facility] ICD-10-CM: N17.9  ICD-9-CM: 584.9         Encephalopathy ICD-10-CM: G93.40  ICD-9-CM: 348.30         Sepsis with multi-organ dysfunction ([facility] ICD-10-CM: A41.9, R65.20  ICD-9-CM: 038.9, 995.92         Pneumonia due to methicillin susceptible Staphylococcus aureus (MSSA) ([facility] ICD-10-CM: J15.211  ICD-9-CM: 482.41         Thrombocytopenia ([facility] ICD-10-CM: D69.6  ICD-9-CM: 287.5         Diarrhea ICD-10-CM: R19.7  ICD-9-CM: 787.91         WEDDR-61 virus infection ICD-10-CM: U07.1         Obesity, morbid (HCC) ICD-10-CM: E66.01  ICD-9-CM: 278.01         Vaginal Pap smear following hysterectomy for malignancy ICD-10-CM: Z08, Z90.710  ICD-9-CM: V67.01         Personal history of malignant neoplasm of other parts of uterus ICD-10-CM: Z85.42  ICD-9-CM: V10.42         Endometrial cancer ([facility] ICD-10-CM: C54.1  ICD-9-CM: 182.0             Past Medical History:      has a past medical history of Basal cell carcinoma, GERD (gastroesophageal reflux disease), Kidney stones, and Polyp of ureter. Past Surgical History:      has a past surgical history that includes hysteroscopy diagnostic (); pr endometrial ablation, thermal; hx  section (); hx colonoscopy; insert arterial line (2020); and ir insert non tunl cvc over 5 yrs (2020). Home Medications:     Prior to Admission medications    Medication Sig Start Date End Date Taking? Authorizing Provider   pantoprazole (PROTONIX) 40 mg tablet TAKE 1 TABLET BY MOUTH TWICE A DAY 18   Provider, Historical   esomeprazole (NEXIUM) 20 mg capsule Take 20 mg by mouth daily. Indications: BID    Provider, Historical   zolpidem (AMBIEN) 10 mg tablet Take 0.5 Tabs by mouth nightly as needed for Sleep. Max Daily Amount: 5 mg. 17   Frida Mandel MD   fluticasone (FLONASE) 50 mcg/actuation nasal spray Mist 1-2 spray(s) into each nostril once daily. 4/3/15   Provider, Historical   ranitidine (ZANTAC) 150 mg tablet 150 mg.    Provider, Historical     Allergies/Social/Family History:      Allergies   Allergen Reactions    Augmentin [Amoxicillin-Pot Clavulanate] Rash and Itching      Social History     Tobacco Use    Smoking status: Never Smoker    Smokeless tobacco: Never Used   Substance Use Topics    Alcohol use: Not on file      Family History   Problem Relation Age of Onset    Prostate Cancer Father         PROSTATE    Breast Cancer Sister 46        invasive poorly differentiated ductal carcinoma, Neg genetic testing.  Cancer Sister 62        melanoma stage 1     Objective:   Vital Signs:  Visit Vitals  /72 (BP 1 Location: Left arm, BP Patient Position: At rest)   Pulse 96   Temp 98.6 °F (37 °C)   Resp 24   Ht 5' 4\" (1.626 m)   Wt 119.5 kg (263 lb 7.2 oz)   SpO2 100%   BMI 45.22 kg/m²      O2 Device: Endotracheal tube, Ventilator Temp (24hrs), Av.4 °F (36.9 °C), Min:98.2 °F (36.8 °C), Max:98.7 °F (37.1 °C)           Intake/Output:     Intake/Output Summary (Last 24 hours) at 2020 1356  Last data filed at 2020 1200  Gross per 24 hour   Intake 920 ml   Output 225 ml   Net 695 ml     Physical Exam:    General:  More awake and following commands albeit weakly. Moves head and appears to be tracking. Eyes: Sclera anicteric. Pupils equal, round, reactive to light. Mouth/Throat: Orotracheal tube in place. Neck: Supple. Lungs:   Clear bbs, no accessory muscle use observed. Cardiovascular:  Regular rate and rhythm, no murmur, + anasarca, pulses palpable   Abdomen:   Soft, bowel sounds hypoactive, distended. Has FMS in place with liquid stool. Extremities: No cyanosis, + edema   Skin: No rash or lesions. Musculoskeletal:  No swelling other than edema, no deformity.    Lines/Devices:  Intact, no erythema, drainage, or tenderness.          LABS AND  DATA: Personally reviewed  Recent Labs     20   WBC 13.1* 10.3   HGB 8.0* 7.5*   HCT 24.8* 23.4*    184     Recent Labs     20    139   K 3.4* 3.2*    104   CO2 27 28   BUN 58* 54*   CREA 1.41* 1.35*   GLU 85 97   CA 8.7 8.9   MG 2.0 2.1   PHOS 2.8 2.5*     Recent Labs     04/25/20  0447 04/24/20  0345   ALB 2.7* 2.8*     Recent Labs     20  1601 20  0330   INR  --  1.1   PTP  -- 11.4*   APTT 23.1  --        Mode Rate Tidal Volume Pressure FiO2 PEEP   Assist control, Volume control   430 ml  5 cm H2O 35 % 5 cm H20     Peak airway pressure: 17 cm H2O    Minute ventilation: 12.6 l/min      MEDS: Reviewed    04/10/2020, CT Abd/ Pelvis:  IMPRESSION:  1.  Scattered pulmonary air space disease consistent with atypical viral infection. 2.  Nonocclusive pulmonary emboli in the left lower lobe pulmonary arteries. 3.  Large right retroperitoneal hematoma with small foci of contrast within the hematoma. 4.  Enteric tube and endotracheal tube in appropriate position. 5.  Bilateral femoral venous catheters in the common iliac veins. CXR Results  (Last 48 hours)    None         ABCDEF Bundle/Checklist Completed:  Yes    SPECIAL EQUIPMENT  IHD    DISPOSITION  Stay in ICU    CRITICAL CARE CONSULTANT NOTE  I had a face to face encounter with the patient, reviewed and interpreted patient data including clinical events, labs, images, vital signs, I/O's, and examined patient. I have discussed the case and the plan and management of the patient's care with the consulting services, the bedside nurses and the respiratory therapist.      NOTE OF PERSONAL INVOLVEMENT IN CARE   This patient has a high probability of imminent, clinically significant deterioration, which requires the highest level of preparedness to intervene urgently. I participated in the decision-making and personally managed or directed the management of the following life and organ supporting interventions that required my frequent assessment to treat or prevent imminent deterioration. I personally spent 30 minutes of critical care time. This is time spent at this critically ill patient's bedside actively involved in patient care as well as the coordination of care and discussions with the patient's family. This does not include any procedural time which has been billed separately.       Sylvester Dow, STEWART-BC, MSN  Critical Care Medicine  [facility]

## 2020-04-26 LAB
ALBUMIN SERPL-MCNC: 2.6 G/DL (ref 3.5–5)
ANION GAP SERPL CALC-SCNC: 7 MMOL/L (ref 5–15)
BASOPHILS # BLD: 0 K/UL (ref 0–0.1)
BASOPHILS NFR BLD: 0 % (ref 0–1)
BUN SERPL-MCNC: 59 MG/DL (ref 6–20)
BUN/CREAT SERPL: 42 (ref 12–20)
CALCIUM SERPL-MCNC: 8.4 MG/DL (ref 8.5–10.1)
CHLORIDE SERPL-SCNC: 99 MMOL/L (ref 97–108)
CO2 SERPL-SCNC: 28 MMOL/L (ref 21–32)
COVID-19, XGCOVT: NEGATIVE
CREAT SERPL-MCNC: 1.42 MG/DL (ref 0.55–1.02)
CRP SERPL HS-MCNC: >9.5 MG/L
DIFFERENTIAL METHOD BLD: ABNORMAL
EOSINOPHIL # BLD: 0.1 K/UL (ref 0–0.4)
EOSINOPHIL NFR BLD: 1 % (ref 0–7)
ERYTHROCYTE [DISTWIDTH] IN BLOOD BY AUTOMATED COUNT: 18.2 % (ref 11.5–14.5)
GLUCOSE BLD STRIP.AUTO-MCNC: 144 MG/DL (ref 65–100)
GLUCOSE BLD STRIP.AUTO-MCNC: 186 MG/DL (ref 65–100)
GLUCOSE BLD STRIP.AUTO-MCNC: 97 MG/DL (ref 65–100)
GLUCOSE BLD STRIP.AUTO-MCNC: 98 MG/DL (ref 65–100)
GLUCOSE SERPL-MCNC: 153 MG/DL (ref 65–100)
HCT VFR BLD AUTO: 25.7 % (ref 35–47)
HCT VFR BLD AUTO: 26.1 % (ref 35–47)
HGB BLD-MCNC: 8 G/DL (ref 11.5–16)
HGB BLD-MCNC: 8.1 G/DL (ref 11.5–16)
IMM GRANULOCYTES # BLD AUTO: 0.1 K/UL (ref 0–0.04)
IMM GRANULOCYTES NFR BLD AUTO: 1 % (ref 0–0.5)
LYMPHOCYTES # BLD: 1.1 K/UL (ref 0.8–3.5)
LYMPHOCYTES NFR BLD: 8 % (ref 12–49)
MAGNESIUM SERPL-MCNC: 1.9 MG/DL (ref 1.6–2.4)
MCH RBC QN AUTO: 30.4 PG (ref 26–34)
MCHC RBC AUTO-ENTMCNC: 31.1 G/DL (ref 30–36.5)
MCV RBC AUTO: 97.7 FL (ref 80–99)
MONOCYTES # BLD: 0.7 K/UL (ref 0–1)
MONOCYTES NFR BLD: 5 % (ref 5–13)
NEUTS SEG # BLD: 11.4 K/UL (ref 1.8–8)
NEUTS SEG NFR BLD: 85 % (ref 32–75)
NRBC # BLD: 0 K/UL (ref 0–0.01)
NRBC BLD-RTO: 0 PER 100 WBC
PHOSPHATE SERPL-MCNC: 3.3 MG/DL (ref 2.6–4.7)
PLATELET # BLD AUTO: 235 K/UL (ref 150–400)
PMV BLD AUTO: 10.8 FL (ref 8.9–12.9)
POTASSIUM SERPL-SCNC: 3.4 MMOL/L (ref 3.5–5.1)
RBC # BLD AUTO: 2.63 M/UL (ref 3.8–5.2)
SERVICE CMNT-IMP: ABNORMAL
SERVICE CMNT-IMP: ABNORMAL
SERVICE CMNT-IMP: NORMAL
SERVICE CMNT-IMP: NORMAL
SODIUM SERPL-SCNC: 134 MMOL/L (ref 136–145)
SPECIMEN SOURCE, FCOV2M: NORMAL
WBC # BLD AUTO: 13.4 K/UL (ref 3.6–11)

## 2020-04-26 PROCEDURE — 94640 AIRWAY INHALATION TREATMENT: CPT

## 2020-04-26 PROCEDURE — 86141 C-REACTIVE PROTEIN HS: CPT

## 2020-04-26 PROCEDURE — 65610000006 HC RM INTENSIVE CARE

## 2020-04-26 PROCEDURE — 74011250637 HC RX REV CODE- 250/637: Performed by: NURSE PRACTITIONER

## 2020-04-26 PROCEDURE — 74011636637 HC RX REV CODE- 636/637: Performed by: NURSE PRACTITIONER

## 2020-04-26 PROCEDURE — 74011000250 HC RX REV CODE- 250: Performed by: SURGERY

## 2020-04-26 PROCEDURE — 83735 ASSAY OF MAGNESIUM: CPT

## 2020-04-26 PROCEDURE — 77030040392 HC DRSG OPTIFOAM MDII -A

## 2020-04-26 PROCEDURE — 85018 HEMOGLOBIN: CPT

## 2020-04-26 PROCEDURE — 74011250636 HC RX REV CODE- 250/636: Performed by: NURSE PRACTITIONER

## 2020-04-26 PROCEDURE — 94003 VENT MGMT INPAT SUBQ DAY: CPT

## 2020-04-26 PROCEDURE — 80069 RENAL FUNCTION PANEL: CPT

## 2020-04-26 PROCEDURE — 74011250637 HC RX REV CODE- 250/637: Performed by: INTERNAL MEDICINE

## 2020-04-26 PROCEDURE — 74011000250 HC RX REV CODE- 250: Performed by: NURSE PRACTITIONER

## 2020-04-26 PROCEDURE — 85025 COMPLETE CBC W/AUTO DIFF WBC: CPT

## 2020-04-26 PROCEDURE — 36415 COLL VENOUS BLD VENIPUNCTURE: CPT

## 2020-04-26 RX ADMIN — HYDROCODONE BITARTRATE AND ACETAMINOPHEN 1 TABLET: 5; 325 TABLET ORAL at 14:46

## 2020-04-26 RX ADMIN — GUAIFENESIN 200 MG: 100 SOLUTION ORAL at 06:12

## 2020-04-26 RX ADMIN — Medication 1 PACKET: at 18:10

## 2020-04-26 RX ADMIN — HYDROCORTISONE SODIUM SUCCINATE 25 MG: 100 INJECTION, POWDER, FOR SOLUTION INTRAMUSCULAR; INTRAVENOUS at 09:25

## 2020-04-26 RX ADMIN — INSULIN GLARGINE 25 UNITS: 100 INJECTION, SOLUTION SUBCUTANEOUS at 21:40

## 2020-04-26 RX ADMIN — INSULIN LISPRO 2 UNITS: 100 INJECTION, SOLUTION INTRAVENOUS; SUBCUTANEOUS at 06:14

## 2020-04-26 RX ADMIN — SEVELAMER CARBONATE 0.8 G: 800 POWDER, FOR SUSPENSION ORAL at 18:10

## 2020-04-26 RX ADMIN — CASTOR OIL AND BALSAM, PERU: 788; 87 OINTMENT TOPICAL at 09:26

## 2020-04-26 RX ADMIN — SEVELAMER CARBONATE 0.8 G: 800 POWDER, FOR SUSPENSION ORAL at 12:02

## 2020-04-26 RX ADMIN — HYDROCODONE BITARTRATE AND ACETAMINOPHEN 1 TABLET: 5; 325 TABLET ORAL at 21:40

## 2020-04-26 RX ADMIN — ALBUTEROL SULFATE 2.5 MG: 2.5 SOLUTION RESPIRATORY (INHALATION) at 08:50

## 2020-04-26 RX ADMIN — FENTANYL CITRATE 50 MCG: 50 INJECTION INTRAMUSCULAR; INTRAVENOUS at 00:05

## 2020-04-26 RX ADMIN — GUAIFENESIN 200 MG: 100 SOLUTION ORAL at 18:10

## 2020-04-26 RX ADMIN — POTASSIUM BICARBONATE 40 MEQ: 782 TABLET, EFFERVESCENT ORAL at 06:13

## 2020-04-26 RX ADMIN — FAMOTIDINE 20 MG: 40 POWDER, FOR SUSPENSION ORAL at 09:25

## 2020-04-26 RX ADMIN — SODIUM CHLORIDE 40 ML: 9 INJECTION INTRAMUSCULAR; INTRAVENOUS; SUBCUTANEOUS at 14:46

## 2020-04-26 RX ADMIN — SODIUM CHLORIDE 10 ML: 9 INJECTION INTRAMUSCULAR; INTRAVENOUS; SUBCUTANEOUS at 06:17

## 2020-04-26 RX ADMIN — HYDROCODONE BITARTRATE AND ACETAMINOPHEN 1 TABLET: 5; 325 TABLET ORAL at 01:00

## 2020-04-26 RX ADMIN — CHLORHEXIDINE GLUCONATE 15 ML: 1.2 RINSE ORAL at 09:26

## 2020-04-26 RX ADMIN — GUAIFENESIN 200 MG: 100 SOLUTION ORAL at 12:01

## 2020-04-26 RX ADMIN — Medication 1 PACKET: at 21:40

## 2020-04-26 RX ADMIN — CHLORHEXIDINE GLUCONATE 15 ML: 1.2 RINSE ORAL at 21:43

## 2020-04-26 RX ADMIN — Medication 1 PACKET: at 09:25

## 2020-04-26 RX ADMIN — ACETYLCYSTEINE 200 MG: 200 SOLUTION ORAL; RESPIRATORY (INHALATION) at 08:50

## 2020-04-26 RX ADMIN — MINERAL OIL AND WHITE PETROLATUM: 150; 830 OINTMENT OPHTHALMIC at 09:27

## 2020-04-26 RX ADMIN — CASTOR OIL AND BALSAM, PERU: 788; 87 OINTMENT TOPICAL at 18:10

## 2020-04-26 RX ADMIN — HYDROCODONE BITARTRATE AND ACETAMINOPHEN 1 TABLET: 5; 325 TABLET ORAL at 09:25

## 2020-04-26 RX ADMIN — SEVELAMER CARBONATE 0.8 G: 800 POWDER, FOR SUSPENSION ORAL at 09:26

## 2020-04-26 RX ADMIN — INSULIN GLARGINE 25 UNITS: 100 INJECTION, SOLUTION SUBCUTANEOUS at 09:24

## 2020-04-26 RX ADMIN — SODIUM CHLORIDE 10 ML: 9 INJECTION INTRAMUSCULAR; INTRAVENOUS; SUBCUTANEOUS at 21:43

## 2020-04-26 NOTE — PROGRESS NOTES
[facility]    ICU TEAM Progress Note    Name: Mery Dominguez   : 1960   MRN: [id number]   Date: 2020      Assessment/Plan:     Current ICU Problems:    Acute hypoxic respiratory failure 2/2 COVID 19  Retroperitoneal bleed with hemorrhagic shock on 4/10/20  Anemia-HH stable. Acute kidney injury   Receiving HD daily and tolerating well. Nonocclusive PE in LLL pulmonary artery   No anticoagulation given recent right retroperitoneal bleed  MSSA pneumonia - s/p abx  Diabetes mellitus   On Humalog and Lantus  Diarrhea - FMS in place. Hypokalemia- replaced     Plan for today:.  1. Remains on the ventilator, continue to trend ABGs. 2. SBT today. 3. Waiting on repeat COVID test to come back. If negative, will begin consulting for possible trach and PEG if needed. 4. Tracking with eyes and following commands, very weakly. 5. Discontinue Solu-Cortef. 6. Discontinue labetalol as patient has not been on it for several days  7. Discontinue Mucomyst  8. Continue guaifenesin. 9. Nephrology following. IHD started on . (Previously on CRRT) Anuric. Right IJ melissa placed 20. 10. Off antibiotics. ID following  11. Levophed PRN for hypotension, not on currently. Has needed it intermittently at times for her HD.      12. Monitor H/H q 12hrs for potential rebleed    Cardiac Gtts: None, Levophed PRN during dialysis  SBP Goal of:   >90 mmHg  MAP Goal of: > 65 mmHg  Transfusion Trigger (Hgb):  <7 g/dL     Respiratory Goals: Chlorhexidine   Optimize PEEP/Ventilation/Oxygenation  Goal Tidal Volume 6 cc/kg based on IBW  Aim for lung protective ventilation  Aggressive bronchopulmonary hygiene  SPO2 Goal: > 92%  Pulmonary toilet: NA   DVT Prophylaxis (if no, list reason): SCD's or Sequential Compression Device      GI Prophylaxis: Protonix (pantoprazole)   Nutrition: TFs  IVFs: NA  Bowel Movement: Yes  Bowel Regimen: None needed at this time     Bowden Catheter Present: Yes  Glycemic Control - Insulin: Yes SSI and Lantus  Antibiotics: Vanc completed.     Pain Medications: Fentanyl PRN  Target RASS: 0 to -1 RASS  Sedation Medications: None. CAM-ICU:  JUAREZ  Mobility: Poor and Bedrest  PT/OT: NA   Restraints: Soft wrist restraints  Discussed Plan of Care/Code Status: Full Code     T/L/D  Tubes: ETT and Orogastric Tube  Lines: Central Line, Ramon   Drains: FMS    Subjective:   Progress Note: 4/26/2020      Reason for ICU Admission:  63 yo female with hx of obesity, endometrial cancer and diabetes who presented to [facility] with worsening hypoxic respiratory failure in lieu of close exposure to COVID-19 to family including mother (passed away) sister and brother and tested resulting + here. She presented to the hospital 3/26 and intubated 3/28. She was started on broad spectrum antibiotics and plaquenil. Developed worsening renal failure and was transferred to ICU for CRRT. Her hospital course has been further notable for development of a retroperitoneal bleed, hemorrhagic shock and sepsis and ongoing acute hypoxic respiratory failure with increasing PEEP and FIo2 requirements. She has been retested for covid and will consider trach when results return. She has transitioned from CRRT to HD, receiving daily at this time, tolerating well. Once we receive her COVID testing she should have trach and PEG, we can then start talking about placement for long term vent weaning and rehabilitation. Overnight Events: No acute events.     Active Problem List:     Problem List  Date Reviewed: 4/19/2020          Codes Class    Hypotension ICD-10-CM: I95.9  ICD-9-CM: 458.9 Acute        Retroperitoneal hemorrhage ICD-10-CM: R58  ICD-9-CM: 459.0         Acute renal failure (ARF) ([facility] [address] ICD-10-CM: N17.9  ICD-9-CM: 584.9         Encephalopathy ICD-10-CM: G93.40  ICD-9-CM: 348.30         Sepsis with multi-organ dysfunction ([facility] [address] ICD-10-CM: A41.9, R65.20  ICD-9-CM: 038.9, 995.92         Pneumonia due to methicillin susceptible Staphylococcus aureus (MSSA) ([place] [address].) ICD-10-CM: J15.211  ICD-9-CM: 482.41         Thrombocytopenia ([facility] ICD-10-CM: D69.6  ICD-9-CM: 287.5         Diarrhea ICD-10-CM: R19.7  ICD-9-CM: 787.91         LYLAS-86 virus infection ICD-10-CM: U07.1         Obesity, morbid ([place] [address].) ICD-10-CM: E66.01  ICD-9-CM: 278.01         Vaginal Pap smear following hysterectomy for malignancy ICD-10-CM: Z08, Z90.710  ICD-9-CM: V67.01         Personal history of malignant neoplasm of other parts of uterus ICD-10-CM: Z85.42  ICD-9-CM: V10.42         Endometrial cancer ([place] [address].) ICD-10-CM: C54.1  ICD-9-CM: 182.0             Past Medical History:      has a past medical history of Basal cell carcinoma, GERD (gastroesophageal reflux disease), Kidney stones, and Polyp of ureter. Past Surgical History:      has a past surgical history that includes hysteroscopy diagnostic (); pr endometrial ablation, thermal; hx  section (); hx colonoscopy; insert arterial line (2020); and ir insert non tunl cvc over 5 yrs (2020). Home Medications:     Prior to Admission medications    Medication Sig Start Date End Date Taking? Authorizing Provider   pantoprazole (PROTONIX) 40 mg tablet TAKE 1 TABLET BY MOUTH TWICE A DAY 18   Provider, Historical   esomeprazole (NEXIUM) 20 mg capsule Take 20 mg by mouth daily. Indications: BID    Provider, Historical   zolpidem (AMBIEN) 10 mg tablet Take 0.5 Tabs by mouth nightly as needed for Sleep. Max Daily Amount: 5 mg. 17   Antonia Mandel MD   fluticasone (FLONASE) 50 mcg/actuation nasal spray Mist 1-2 spray(s) into each nostril once daily. 4/3/15   Provider, Historical   ranitidine (ZANTAC) 150 mg tablet 150 mg.    Provider, Historical     Allergies/Social/Family History:      Allergies   Allergen Reactions    Augmentin [Amoxicillin-Pot Clavulanate] Rash and Itching      Social History     Tobacco Use    Smoking status: Never Smoker    Smokeless tobacco: Never Used   Substance Use Topics    Alcohol use: Not on file      Family History   Problem Relation Age of Onset    Prostate Cancer Father         PROSTATE    Breast Cancer Sister 46        invasive poorly differentiated ductal carcinoma, Neg genetic testing.  Cancer Sister 62        melanoma stage 1     Objective:   Vital Signs:  Visit Vitals  /76   Pulse (!) 108   Temp 98.2 °F (36.8 °C)   Resp 27   Ht 5' 4\" (1.626 m)   Wt 117.4 kg (258 lb 12.8 oz)   SpO2 100%   BMI 44.42 kg/m²      O2 Device: Endotracheal tube, Ventilator Temp (24hrs), Av.3 °F (36.8 °C), Min:97.4 °F (36.3 °C), Max:98.8 °F (37.1 °C)           Intake/Output:     Intake/Output Summary (Last 24 hours) at 2020 0825  Last data filed at 2020 0600  Gross per 24 hour   Intake 620 ml   Output 2050 ml   Net -1430 ml     Physical Exam:    General:  More awake and following commands albeit weakly. Moves head and appears to be tracking. Eyes: Sclera anicteric. Pupils equal, round, reactive to light. Mouth/Throat: Orotracheal tube in place. Neck: Supple. Lungs:   Clear bbs, no accessory muscle use observed. Cardiovascular:  Regular rate and rhythm, no murmur, + anasarca, pulses palpable   Abdomen:   Soft, bowel sounds hypoactive, distended. Has FMS in place with liquid stool. Extremities: No cyanosis, + edema   Skin: No rash or lesions. Musculoskeletal:  No swelling other than edema, no deformity.    Lines/Devices:  Intact, no erythema, drainage, or tenderness.          LABS AND  DATA: Personally reviewed  Recent Labs     20   WBC 13.4* 13.1*   HGB 8.0* 8.0*   HCT 25.7* 24.8*    226     Recent Labs     20   * 137   K 3.4* 3.4*   CL 99 102   CO2 28 27   BUN 59* 58*   CREA 1.42* 1.41*   * 85   CA 8.4* 8.7   MG 1.9 2.0   PHOS 3.3 2.8     Recent Labs     04/26/20  0354 04/25/20  0447   ALB 2.6* 2.7*     Recent Labs     20  1601   APTT 23.1 Mode Rate Tidal Volume Pressure FiO2 PEEP   Volume control   430 ml  8 cm H2O 35 % 5 cm H20     Peak airway pressure: 22 cm H2O    Minute ventilation: 11.9 l/min      MEDS: Reviewed    04/10/2020, CT Abd/ Pelvis:  IMPRESSION:  1.  Scattered pulmonary air space disease consistent with atypical viral infection. 2.  Nonocclusive pulmonary emboli in the left lower lobe pulmonary arteries. 3.  Large right retroperitoneal hematoma with small foci of contrast within the hematoma. 4.  Enteric tube and endotracheal tube in appropriate position. 5.  Bilateral femoral venous catheters in the common iliac veins. CXR Results  (Last 48 hours)    None         ABCDEF Bundle/Checklist Completed:  Yes    SPECIAL EQUIPMENT  IHD    DISPOSITION  Stay in ICU    CRITICAL CARE CONSULTANT NOTE  I had a face to face encounter with the patient, reviewed and interpreted patient data including clinical events, labs, images, vital signs, I/O's, and examined patient. I have discussed the case and the plan and management of the patient's care with the consulting services, the bedside nurses and the respiratory therapist.      NOTE OF PERSONAL INVOLVEMENT IN CARE   This patient has a high probability of imminent, clinically significant deterioration, which requires the highest level of preparedness to intervene urgently. I participated in the decision-making and personally managed or directed the management of the following life and organ supporting interventions that required my frequent assessment to treat or prevent imminent deterioration. I personally spent 50 minutes of critical care time. This is time spent at this critically ill patient's bedside actively involved in patient care as well as the coordination of care and discussions with the patient's family. This does not include any procedural time which has been billed separately.     Ann Marie Clark [facility]     [address] [facility]

## 2020-04-26 NOTE — PROGRESS NOTES
0730: Bedside and Verbal shift change report given to Meena Rubio RN (oncoming nurse) by Ivan Pabon RN (offgoing nurse). Report included the following information SBAR, Kardex, Procedure Summary, Intake/Output, MAR, Recent Results, Cardiac Rhythm NSR/Sinus Tachycardia and Alarm Parameters . 1930: Bedside and Verbal shift change report given to Fabiola Nath RN (oncoming nurse) by Meena Rubio RN (offgoing nurse). Report included the following information SBAR, Kardex, Intake/Output, MAR, Recent Results, Cardiac Rhythm NSR/Sinus Tachycardia and Alarm Parameters . Shift Summary: VSS, alert, nods appropriately, decreased attention, ETT, OGT with Tube Feed infusing, anuric, FlexiSeal output 50, right PICC. Patient on SBT for approximately 6 hours. COVID swab from 4/20/20 negative. Erika Alvarenga NP updated patient's .

## 2020-04-26 NOTE — PROGRESS NOTES
04/26/20 1524   Weaning Parameters   Spontaneous Breathing Trial Complete Yes   Resp Rate Observed 21   Ve 8.5      RSBI 50   Returned to previous vent settings at this time AC/VC 24 430 5CMH2O FIO2 35

## 2020-04-26 NOTE — PROGRESS NOTES
[facility]   [address] [facility] [place] [facility]  Phone: [phone]   [phone]       Nephrology Progress Note  Lisa Briggs     1960     [id number]  Date of Admission : 3/31/2020  04/26/20    CC: Follow up for JUANCHO      Assessment and Plan   JUANCHO :  - 2/2 ATN  - CRRT switched to daily IHD on 4/18  - New line placed by IR on 4/22  - NO HD TODAY  - NEXT HD ON MONDAY      COVID-19 +ve   Acute Hypoxic Resp Failure   - On Vent      RP hematoma:    Cardiac arrest 4/9    Type II DM      Morbid Obesity        Interval History:  ON VENT  S/P HD YESTERDAY        PT NOT EXAMINED in line with ASN and RPA GUIDELINES ON MANAGING COVID-19 PTS WITH RENAL ISSUES. Examination findings discussed personally with the examining Physician team member      Review of Systems: Review of systems not obtained due to patient factors.     Current Medications:   Current Facility-Administered Medications   Medication Dose Route Frequency    sevelamer carbonate (RENVELA) oral powder 0.8 g  0.8 g Oral TID WITH MEALS    0.9% sodium chloride infusion 250 mL  250 mL IntraVENous PRN    heparin (porcine) 1,000 unit/mL injection 2,500 Units  2,500 Units IntraVENous DIALYSIS PRN    hydrocortisone Sod Succ (PF) (SOLU-CORTEF) injection 25 mg  25 mg IntraVENous Q12H    insulin glargine (LANTUS) injection 25 Units  25 Units SubCUTAneous Q12H    guar gum (BENEFIBER) packet 1 Packet  4 g Oral TID    fentaNYL citrate (PF) injection 25-50 mcg  25-50 mcg IntraVENous Q2H PRN    NOREPINephrine (LEVOPHED) 8 mg in 5% dextrose 250mL (32 mcg/mL) infusion  0.5-30 mcg/min IntraVENous TITRATE    labetaloL (NORMODYNE;TRANDATE) injection 20 mg  20 mg IntraVENous Q4H PRN    HYDROcodone-acetaminophen (NORCO) 5-325 mg per tablet 1 Tab  1 Tab Oral Q6H    alteplase (CATHFLO) 1 mg in sterile water (preservative free) 1 mL injection  1 mg InterCATHeter PRN    albumin human 25% (BUMINATE) solution 12.5 g  12.5 g IntraVENous DIALYSIS PRN    0.9% sodium chloride infusion 250 mL  250 mL IntraVENous PRN    guaiFENesin (ROBITUSSIN) 100 mg/5 mL oral liquid 200 mg  200 mg Oral Q6H    albuterol (PROVENTIL VENTOLIN) nebulizer solution 2.5 mg  2.5 mg Nebulization Q4H PRN    acetylcysteine (MUCOMYST) 200 mg/mL (20 %) solution 200 mg  200 mg Nebulization BID RT    famotidine (PEPCID) 8 mg/mL oral suspension 20 mg  20 mg Per NG tube DAILY    alteplase (CATHFLO) 2 mg in sterile water (preservative free) 2 mL injection  2 mg InterCATHeter DIALYSIS PRN    alteplase (CATHFLO) 2 mg in sterile water (preservative free) 2 mL injection  2 mg InterCATHeter DIALYSIS PRN    insulin lispro (HUMALOG) injection   SubCUTAneous Q6H    0.9% sodium chloride infusion 250 mL  250 mL IntraVENous PRN    chlorhexidine (PERIDEX) 0.12 % mouthwash 15 mL  15 mL Oral Q12H    [Held by provider] labetaloL (NORMODYNE) tablet 100 mg  100 mg Oral Q12H    0.9% sodium chloride infusion  3 mL/hr IntraVENous CONTINUOUS    0.9% sodium chloride infusion  5 mL/hr IntraVENous CONTINUOUS    white petrolatum-mineral oiL (AKWA TEARS) 83-15 % ophthalmic ointment   Both Eyes Q12H    heparin (porcine) pf 300 Units  300 Units InterCATHeter PRN    bacitracin 500 unit/gram packet 1 Packet  1 Packet Topical PRN    balsam peru-castor oiL (VENELEX) ointment   Topical BID    sodium chloride (NS) flush 5-40 mL  5-40 mL IntraVENous Q8H    sodium chloride (NS) flush 5-40 mL  5-40 mL IntraVENous PRN    HYDROcodone-acetaminophen (NORCO) 5-325 mg per tablet 1 Tab  1 Tab Oral Q4H PRN    ondansetron (ZOFRAN) injection 4 mg  4 mg IntraVENous Q4H PRN    acetaminophen (TYLENOL) tablet 650 mg  650 mg Oral Q6H PRN    Or    acetaminophen (TYLENOL) suppository 650 mg  650 mg Rectal Q6H PRN    glucose chewable tablet 16 g  4 Tab Oral PRN    glucagon (GLUCAGEN) injection 1 mg  1 mg IntraMUSCular PRN    dextrose 10% infusion 0-250 mL  0-250 mL IntraVENous PRN      Allergies   Allergen Reactions    Augmentin [Amoxicillin-Pot Clavulanate] Rash and Itching       Objective:  Vitals:    Vitals:    04/26/20 1000 04/26/20 1030 04/26/20 1100 04/26/20 1112   BP: 120/74  114/68    Pulse: (!) 107 100 (!) 104 97   Resp: 26 21 29 18   Temp:       TempSrc:       SpO2: 99% 100% 100% 100%   Weight:       Height:         Intake and Output:  04/26 0701 - 04/26 1900  In: -   Out: 50 [Drains:50]  04/24 1901 - 04/26 0700  In: 1310   Out: 9856 [Drains:775]    Physical exam:  Limited   GEN: ON vent  NECK-ET tube  RESP: no distress  NEURO:Can't access due to patient's current condition       I didn't go in the room to see patient to preserve PPE per ASN & RPA guidelines. []    High complexity decision making was performed  []    Patient is at high-risk of decompensation with multiple organ involvement    Lab Data Personally Reviewed: I have reviewed all the pertinent labs, microbiology data and radiology studies during assessment.     Recent Labs     04/26/20  0354 04/25/20 0447 04/24/20  0345 04/23/20  1601   * 137 139 140   K 3.4* 3.4* 3.2* 3.0*   CL 99 102 104 104   CO2 28 27 28 28   * 85 97 97   BUN 59* 58* 54* 27*   CREA 1.42* 1.41* 1.35* 0.78   CA 8.4* 8.7 8.9 8.7   MG 1.9 2.0 2.1 2.0   PHOS 3.3 2.8 2.5* 1.6*   ALB 2.6* 2.7* 2.8*  --      Recent Labs     04/26/20  0354 04/25/20  0447 04/24/20  0345 04/23/20  1604   WBC 13.4* 13.1* 10.3 10.4   HGB 8.0* 8.0* 7.5* 7.9*   HCT 25.7* 24.8* 23.4* 24.2*    226 184 154     No results found for: SDES  Lab Results   Component Value Date/Time    Culture result: NO GROWTH 5 DAYS 04/09/2020 02:32 PM    Culture result: NO GROWTH 5 DAYS 04/08/2020 10:36 AM    Culture result: NO GROWTH 5 DAYS 03/31/2020 11:15 AM    Culture result: MODERATE STAPHYLOCOCCUS AUREUS (A) 03/31/2020 10:34 AM    Culture result: LIGHT NORMAL RESPIRATORY SOFIE 03/31/2020 10:34 AM     Recent Results (from the past 24 hour(s))   GLUCOSE, POC    Collection Time: 04/25/20  6:14 PM Result Value Ref Range    Glucose (POC) 105 (H) 65 - 100 mg/dL    Performed by Joyce ROWE    GLUCOSE, POC    Collection Time: 04/25/20 11:56 PM   Result Value Ref Range    Glucose (POC) 186 (H) 65 - 100 mg/dL    Performed by Soledad Neville    CRP, HIGH SENSITIVITY    Collection Time: 04/26/20  3:54 AM   Result Value Ref Range    CRP, High sensitivity >9.5 mg/L   RENAL FUNCTION PANEL    Collection Time: 04/26/20  3:54 AM   Result Value Ref Range    Sodium 134 (L) 136 - 145 mmol/L    Potassium 3.4 (L) 3.5 - 5.1 mmol/L    Chloride 99 97 - 108 mmol/L    CO2 28 21 - 32 mmol/L    Anion gap 7 5 - 15 mmol/L    Glucose 153 (H) 65 - 100 mg/dL    BUN 59 (H) 6 - 20 MG/DL    Creatinine 1.42 (H) 0.55 - 1.02 MG/DL    BUN/Creatinine ratio 42 (H) 12 - 20      GFR est AA 46 (L) >60 ml/min/1.73m2    GFR est non-AA 38 (L) >60 ml/min/1.73m2    Calcium 8.4 (L) 8.5 - 10.1 MG/DL    Phosphorus 3.3 2.6 - 4.7 MG/DL    Albumin 2.6 (L) 3.5 - 5.0 g/dL   MAGNESIUM    Collection Time: 04/26/20  3:54 AM   Result Value Ref Range    Magnesium 1.9 1.6 - 2.4 mg/dL   CBC WITH AUTOMATED DIFF    Collection Time: 04/26/20  3:54 AM   Result Value Ref Range    WBC 13.4 (H) 3.6 - 11.0 K/uL    RBC 2.63 (L) 3.80 - 5.20 M/uL    HGB 8.0 (L) 11.5 - 16.0 g/dL    HCT 25.7 (L) 35.0 - 47.0 %    MCV 97.7 80.0 - 99.0 FL    MCH 30.4 26.0 - 34.0 PG    MCHC 31.1 30.0 - 36.5 g/dL    RDW 18.2 (H) 11.5 - 14.5 %    PLATELET 265 129 - 982 K/uL    MPV 10.8 8.9 - 12.9 FL    NRBC 0.0 0  WBC    ABSOLUTE NRBC 0.00 0.00 - 0.01 K/uL    NEUTROPHILS 85 (H) 32 - 75 %    LYMPHOCYTES 8 (L) 12 - 49 %    MONOCYTES 5 5 - 13 %    EOSINOPHILS 1 0 - 7 %    BASOPHILS 0 0 - 1 %    IMMATURE GRANULOCYTES 1 (H) 0.0 - 0.5 %    ABS. NEUTROPHILS 11.4 (H) 1.8 - 8.0 K/UL    ABS. LYMPHOCYTES 1.1 0.8 - 3.5 K/UL    ABS. MONOCYTES 0.7 0.0 - 1.0 K/UL    ABS. EOSINOPHILS 0.1 0.0 - 0.4 K/UL    ABS. BASOPHILS 0.0 0.0 - 0.1 K/UL    ABS. IMM.  GRANS. 0.1 (H) 0.00 - 0.04 K/UL    DF AUTOMATED GLUCOSE, POC    Collection Time: 04/26/20  6:11 AM   Result Value Ref Range    Glucose (POC) 144 (H) 65 - 100 mg/dL    Performed by Cintia Lopez            Total time spent with patient:  xxx   min. Care Plan discussed with:  Patient     Family      RN      Consulting Physician [facility],         I have reviewed the flowsheets. Chart and Pertinent Notes have been reviewed. No change in PMH ,family and social history from Consult note.       Dari Adamson MD

## 2020-04-26 NOTE — PROGRESS NOTES
1930: Bedside and Verbal shift change report given to [address] (oncoming nurse) by Negro Richards (offgoing nurse). Report included the following information SBAR, Kardex, ED Summary, MAR, Recent Results, and Cardiac Rhythm sinus tach . Shift summary: No significant events overnight.

## 2020-04-27 LAB
ALBUMIN SERPL-MCNC: 2.5 G/DL (ref 3.5–5)
ANION GAP SERPL CALC-SCNC: 9 MMOL/L (ref 5–15)
BASOPHILS # BLD: 0 K/UL (ref 0–0.1)
BASOPHILS NFR BLD: 0 % (ref 0–1)
BUN SERPL-MCNC: 89 MG/DL (ref 6–20)
BUN/CREAT SERPL: 45 (ref 12–20)
CALCIUM SERPL-MCNC: 8.7 MG/DL (ref 8.5–10.1)
CHLORIDE SERPL-SCNC: 99 MMOL/L (ref 97–108)
CO2 SERPL-SCNC: 26 MMOL/L (ref 21–32)
CREAT SERPL-MCNC: 2 MG/DL (ref 0.55–1.02)
CRP SERPL HS-MCNC: >9.5 MG/L
D DIMER PPP FEU-MCNC: 24.08 MG/L FEU (ref 0–0.65)
DIFFERENTIAL METHOD BLD: ABNORMAL
EOSINOPHIL # BLD: 0.1 K/UL (ref 0–0.4)
EOSINOPHIL NFR BLD: 1 % (ref 0–7)
ERYTHROCYTE [DISTWIDTH] IN BLOOD BY AUTOMATED COUNT: 17.4 % (ref 11.5–14.5)
FIBRINOGEN PPP-MCNC: 619 MG/DL (ref 200–475)
GLUCOSE BLD STRIP.AUTO-MCNC: 109 MG/DL (ref 65–100)
GLUCOSE BLD STRIP.AUTO-MCNC: 124 MG/DL (ref 65–100)
GLUCOSE BLD STRIP.AUTO-MCNC: 132 MG/DL (ref 65–100)
GLUCOSE BLD STRIP.AUTO-MCNC: 66 MG/DL (ref 65–100)
GLUCOSE BLD STRIP.AUTO-MCNC: 72 MG/DL (ref 65–100)
GLUCOSE BLD STRIP.AUTO-MCNC: 72 MG/DL (ref 65–100)
GLUCOSE SERPL-MCNC: 115 MG/DL (ref 65–100)
HCT VFR BLD AUTO: 25.2 % (ref 35–47)
HCT VFR BLD AUTO: 26.2 % (ref 35–47)
HGB BLD-MCNC: 7.8 G/DL (ref 11.5–16)
HGB BLD-MCNC: 8.3 G/DL (ref 11.5–16)
IMM GRANULOCYTES # BLD AUTO: 0.1 K/UL (ref 0–0.04)
IMM GRANULOCYTES NFR BLD AUTO: 1 % (ref 0–0.5)
LYMPHOCYTES # BLD: 1.1 K/UL (ref 0.8–3.5)
LYMPHOCYTES NFR BLD: 8 % (ref 12–49)
MAGNESIUM SERPL-MCNC: 2.1 MG/DL (ref 1.6–2.4)
MCH RBC QN AUTO: 30.2 PG (ref 26–34)
MCHC RBC AUTO-ENTMCNC: 31 G/DL (ref 30–36.5)
MCV RBC AUTO: 97.7 FL (ref 80–99)
MONOCYTES # BLD: 0.7 K/UL (ref 0–1)
MONOCYTES NFR BLD: 5 % (ref 5–13)
NEUTS SEG # BLD: 11.7 K/UL (ref 1.8–8)
NEUTS SEG NFR BLD: 85 % (ref 32–75)
NRBC # BLD: 0 K/UL (ref 0–0.01)
NRBC BLD-RTO: 0 PER 100 WBC
PHOSPHATE SERPL-MCNC: 3.7 MG/DL (ref 2.6–4.7)
PLATELET # BLD AUTO: 240 K/UL (ref 150–400)
PMV BLD AUTO: 10.8 FL (ref 8.9–12.9)
POTASSIUM SERPL-SCNC: 3.6 MMOL/L (ref 3.5–5.1)
RBC # BLD AUTO: 2.58 M/UL (ref 3.8–5.2)
SERVICE CMNT-IMP: ABNORMAL
SERVICE CMNT-IMP: NORMAL
SODIUM SERPL-SCNC: 134 MMOL/L (ref 136–145)
WBC # BLD AUTO: 13.7 K/UL (ref 3.6–11)

## 2020-04-27 PROCEDURE — 94003 VENT MGMT INPAT SUBQ DAY: CPT

## 2020-04-27 PROCEDURE — 82962 GLUCOSE BLOOD TEST: CPT

## 2020-04-27 PROCEDURE — 83735 ASSAY OF MAGNESIUM: CPT

## 2020-04-27 PROCEDURE — 74011636637 HC RX REV CODE- 636/637: Performed by: NURSE PRACTITIONER

## 2020-04-27 PROCEDURE — 65610000006 HC RM INTENSIVE CARE

## 2020-04-27 PROCEDURE — 74011250636 HC RX REV CODE- 250/636: Performed by: INTERNAL MEDICINE

## 2020-04-27 PROCEDURE — 74011250637 HC RX REV CODE- 250/637: Performed by: NURSE PRACTITIONER

## 2020-04-27 PROCEDURE — 87635 SARS-COV-2 COVID-19 AMP PRB: CPT

## 2020-04-27 PROCEDURE — 85384 FIBRINOGEN ACTIVITY: CPT

## 2020-04-27 PROCEDURE — 85379 FIBRIN DEGRADATION QUANT: CPT

## 2020-04-27 PROCEDURE — 80069 RENAL FUNCTION PANEL: CPT

## 2020-04-27 PROCEDURE — 85025 COMPLETE CBC W/AUTO DIFF WBC: CPT

## 2020-04-27 PROCEDURE — 85018 HEMOGLOBIN: CPT

## 2020-04-27 PROCEDURE — 36415 COLL VENOUS BLD VENIPUNCTURE: CPT

## 2020-04-27 PROCEDURE — 86141 C-REACTIVE PROTEIN HS: CPT

## 2020-04-27 PROCEDURE — 90935 HEMODIALYSIS ONE EVALUATION: CPT

## 2020-04-27 PROCEDURE — 74011250637 HC RX REV CODE- 250/637: Performed by: INTERNAL MEDICINE

## 2020-04-27 RX ORDER — HYDROCODONE BITARTRATE AND ACETAMINOPHEN 5; 325 MG/1; MG/1
1 TABLET ORAL EVERY 8 HOURS
Status: DISCONTINUED | OUTPATIENT
Start: 2020-04-27 | End: 2020-04-29

## 2020-04-27 RX ORDER — INSULIN GLARGINE 100 [IU]/ML
15 INJECTION, SOLUTION SUBCUTANEOUS EVERY 12 HOURS
Status: DISCONTINUED | OUTPATIENT
Start: 2020-04-27 | End: 2020-04-28

## 2020-04-27 RX ADMIN — GUAIFENESIN 200 MG: 100 SOLUTION ORAL at 06:09

## 2020-04-27 RX ADMIN — SODIUM CHLORIDE 20 ML: 9 INJECTION INTRAMUSCULAR; INTRAVENOUS; SUBCUTANEOUS at 21:59

## 2020-04-27 RX ADMIN — GUAIFENESIN 200 MG: 100 SOLUTION ORAL at 18:00

## 2020-04-27 RX ADMIN — MINERAL OIL AND WHITE PETROLATUM: 150; 830 OINTMENT OPHTHALMIC at 09:33

## 2020-04-27 RX ADMIN — GUAIFENESIN 200 MG: 100 SOLUTION ORAL at 00:16

## 2020-04-27 RX ADMIN — CHLORHEXIDINE GLUCONATE 15 ML: 1.2 RINSE ORAL at 21:59

## 2020-04-27 RX ADMIN — CASTOR OIL AND BALSAM, PERU: 788; 87 OINTMENT TOPICAL at 18:00

## 2020-04-27 RX ADMIN — Medication 1 PACKET: at 21:59

## 2020-04-27 RX ADMIN — SEVELAMER CARBONATE 0.8 G: 800 POWDER, FOR SUSPENSION ORAL at 18:00

## 2020-04-27 RX ADMIN — SEVELAMER CARBONATE 0.8 G: 800 POWDER, FOR SUSPENSION ORAL at 09:32

## 2020-04-27 RX ADMIN — HEPARIN SODIUM 2500 UNITS: 1000 INJECTION INTRAVENOUS; SUBCUTANEOUS at 10:16

## 2020-04-27 RX ADMIN — CASTOR OIL AND BALSAM, PERU: 788; 87 OINTMENT TOPICAL at 09:32

## 2020-04-27 RX ADMIN — INSULIN GLARGINE 25 UNITS: 100 INJECTION, SOLUTION SUBCUTANEOUS at 09:32

## 2020-04-27 RX ADMIN — GUAIFENESIN 200 MG: 100 SOLUTION ORAL at 23:37

## 2020-04-27 RX ADMIN — CHLORHEXIDINE GLUCONATE 15 ML: 1.2 RINSE ORAL at 09:33

## 2020-04-27 RX ADMIN — SODIUM CHLORIDE 30 ML: 9 INJECTION INTRAMUSCULAR; INTRAVENOUS; SUBCUTANEOUS at 14:32

## 2020-04-27 RX ADMIN — Medication 1 PACKET: at 09:32

## 2020-04-27 RX ADMIN — HYDROCODONE BITARTRATE AND ACETAMINOPHEN 1 TABLET: 5; 325 TABLET ORAL at 18:00

## 2020-04-27 RX ADMIN — FAMOTIDINE 20 MG: 40 POWDER, FOR SUSPENSION ORAL at 09:32

## 2020-04-27 RX ADMIN — SEVELAMER CARBONATE 0.8 G: 800 POWDER, FOR SUSPENSION ORAL at 12:37

## 2020-04-27 RX ADMIN — HYDROCODONE BITARTRATE AND ACETAMINOPHEN 1 TABLET: 5; 325 TABLET ORAL at 09:32

## 2020-04-27 RX ADMIN — Medication 1 PACKET: at 18:00

## 2020-04-27 RX ADMIN — SODIUM CHLORIDE 30 ML: 9 INJECTION INTRAMUSCULAR; INTRAVENOUS; SUBCUTANEOUS at 06:09

## 2020-04-27 RX ADMIN — HYDROCODONE BITARTRATE AND ACETAMINOPHEN 1 TABLET: 5; 325 TABLET ORAL at 02:43

## 2020-04-27 RX ADMIN — GUAIFENESIN 200 MG: 100 SOLUTION ORAL at 12:36

## 2020-04-27 NOTE — PROGRESS NOTES
HD TRANSFER - OUT REPORT:    Verbal report given to Franko Millan RN on Tita Yoon remaining in CCU for routine progression of care       Report consisted of patient's Situation, Background, Assessment and   Recommendations(SBAR). Information from the following report(s) SBAR, Kardex and Recent Results was reviewed with the receiving nurse. Method:  $$ Method: Hemodialysis (04/27/20 0717)    Fluid Removed  NET Fluid Removed (mL): 1500 ml (04/27/20 1018)     Patient response to treatment:  Improved    End Time  Hemodialysis End Time: 1252 (04/27/20 1018)  If not documented, dialysis nurse to update post-dialysis row in HD/Filtration flowsheet     Medications /Volume expansion agents or Fluid boluses administered during treatment? no    Post-dialysis medication administration due?  yes  Remind nurse to administer post-HD medication upon return to unit. Line heparinization? yes    Lines: 2.5 mls total heparin to dwell    Opportunity for questions and clarification was provided.

## 2020-04-27 NOTE — DIABETES MGMT
MARTA ROMERO  CLINICAL NURSE SPECIALIST CONSULT  PROGRAM FOR DIABETES HEALTH  Follow up Note  Presentation   Larry Cruz is a 64 y.o. female admitted from OSH to [facility] ICU with SARS-COV2 needing CRRT. She is currently sedated and has been intubated since 3/28/20. Current clinical course has been complicated by steroid induced hyperglycemia. Steroids are discontinued at this time. She requires CRRT for acute renal failure r/t her sepsis and hypotension. PHM: GERD, obesity, and anxiety. New diabetes diagnosis with A1C 11.0% (3/28/2020); updated A1C 3/31/20-10.4%     Recent events:   Patient remains intubated and on ventilator, on CRRT. Once COVID re-test results; plan for trach and PEG placement.-On SBT - tolerating. Steroids discontinued. Consulted by Provider for advanced diabetes nursing assessment and care, specifically related to   [] Transitioning off [place] Single   [x] Inpatient management strategy  [] Home management assessment  [] Survival skill education    Diabetes-related medical history  Acute complications  hyperglycemia  Neurological complications  NONE  Microvascular disease  NONE  Macrovascular disease  NONE  Other associated conditions     NONE    Diabetes medication history: NONE    Subjective   Per chart review, Ms. Tana Giraldo remains very ill  and is on isolation forSARS-COV2 in ICU. I am unable to do a physical assessment of the patient at this time. Patient reports the following home diabetes self-care practices: deferred    Objective     Vital Signs   Visit Vitals  /77   Pulse (!) 111   Temp 98.3 °F (36.8 °C)   Resp 25   Ht 5' 4\" (1.626 m)   Wt 117.7 kg (259 lb 8 oz)   SpO2 100%   BMI 44.54 kg/m²   .    Laboratory  Lab Results   Component Value Date/Time    Hemoglobin A1c 10.4 (H) 03/31/2020 03:42 PM     No results found for: LDL, LDLC, DLDLP  Lab Results   Component Value Date/Time    Creatinine 2.00 (H) 04/27/2020 04:35 AM     Lab Results   Component Value Date/Time    Sodium 134 (L) 04/27/2020 04:35 AM    Potassium 3.6 04/27/2020 04:35 AM    Chloride 99 04/27/2020 04:35 AM    CO2 26 04/27/2020 04:35 AM    Anion gap 9 04/27/2020 04:35 AM    Glucose 115 (H) 04/27/2020 04:35 AM    BUN 89 (H) 04/27/2020 04:35 AM    Creatinine 2.00 (H) 04/27/2020 04:35 AM    BUN/Creatinine ratio 45 (H) 04/27/2020 04:35 AM    GFR est AA 31 (L) 04/27/2020 04:35 AM    GFR est non-AA 26 (L) 04/27/2020 04:35 AM    Calcium 8.7 04/27/2020 04:35 AM    Bilirubin, total 1.6 (H) 04/20/2020 05:29 AM    AST (SGOT) 169 (H) 04/20/2020 05:29 AM    Alk. phosphatase 490 (H) 04/20/2020 05:29 AM    Protein, total 5.9 (L) 04/20/2020 05:29 AM    Albumin 2.5 (L) 04/27/2020 04:35 AM    Globulin 3.5 04/20/2020 05:29 AM    A-G Ratio 0.7 (L) 04/20/2020 05:29 AM    ALT (SGPT) 206 (H) 04/20/2020 05:29 AM     Lab Results   Component Value Date/Time    ALT (SGPT) 206 (H) 04/20/2020 05:29 AM         Evaluation   Ms Tam Reyna, with new onset Type 2 diabetes,with A1C 10.4%. Steroid discontinued  Basal insulin decreased  to 30 units daily starting today. Fasting BG today 132mg/dl . BG trends within target over the weekend. No low BG noted. Remains on TF Nepro @ 30cc/hr      To maintain her basal metabolic needs she requires 36units daily (renal dosing). In addition she  Requires  7units to cover for her TF (nepro @ 30cc/hr). So total insulin needs are: 43 units daily. It is imperative that we maintain her BG within target range 100-180mg/dl. Recommendations   1. Continue basal insulin at  30 units daily; if BG trends  up >200mg/dl, consider increasing basal  35 to 40 units daily. 2. Will continue to follow.     Assessment and Plan   Nursing Diagnosis Risk for unstable blood glucose pattern   Nursing Intervention Domain 9776 Decision-making Support   Nursing Interventions Examined current inpatient diabetes control   Explored factors facilitating and impeding inpatient management  Identified self-management practices impeding diabetes control  Explored corrective strategies with patient and responsible inpatient provider   Informed patient of rational for insulin strategy while hospitalized         Billing Code(s)     [x] 06385 IP subsequent hospital care - [address] [facility] 256, CNS   Program for Diabetes Health  Access via  Joel Chinwilliam [phone]

## 2020-04-27 NOTE — PROGRESS NOTES
TRANSFER - IN REPORT:    Verbal report received from Raz [place] Agustin on [facility]  being received in ICU for ordered procedure      Report consisted of patients Situation, Background, Assessment and   Recommendations(SBAR). Information from the following report(s) SBAR, Kardex and Recent Results was reviewed with the receiving nurse. Opportunity for questions and clarification was provided. Assessment completed upon patients arrival to unit and care assumed.

## 2020-04-27 NOTE — PROGRESS NOTES
Transitions of Care  Pending Medical progress  Patient is more alert, intermittently following some commands. Transitioned to HD  Awaiting recent COVID testing to move forward with trach and peg. Will likely need LTAC.    Yaw Hylton RN,CRM

## 2020-04-27 NOTE — PROCEDURES
[facility]  [phone]    Vitals   Pre   Post   Assessment   Pre   Post     Temp  Temp: 98.3 °F (36.8 °C) (04/27/20 0700)  98.3 LOC  Awake able to shake head yes and no Awake able to follow commands   HR   Pulse (Heart Rate): (!) 106 (04/27/20 0717) 108 Lungs   Intubated coarse  intubated coarse   B/P   BP: 142/82 (04/27/20 0717) 133/69 Cardiac   Sinus tachy  sinus tacky   Resp   Resp Rate: 23 (04/27/20 0717) 28 Skin   W/D  W/D   Pain level  Pain Intensity 1: 0 (04/27/20 0400) 0 Edema    +2 generalized   +1 generalized   Orders:    Duration:   Start:   07:17 End:   10:18 Total:   3   Dialyzer:   Dialyzer/Set Up Inspection: Revaclear (04/27/20 0717)   Sindi [place] Bath:   Dialysate K (mEq/L): 3 (04/27/20 0717)   Ca Bath:   Dialysate CA (mEq/L): 2.5 (04/27/20 0717)   Na/Bicarb:   Dialysate NA (mEq/L): 138 (04/27/20 0717)   Target Fluid Removal:   Goal/Amount of Fluid to Remove (mL): 1500 mL (04/27/20 0717)   Access     Type & Location:   Right IJ no signs symptoms of infection   Labs     Obtained/Reviewed   Critical Results Called   Date when labs were drawn-  Hgb-    HGB   Date Value Ref Range Status   04/27/2020 7.8 (L) 11.5 - 16.0 g/dL Final     K-    Potassium   Date Value Ref Range Status   04/27/2020 3.6 3.5 - 5.1 mmol/L Final     Ca-   Calcium   Date Value Ref Range Status   04/27/2020 8.7 8.5 - 10.1 MG/DL Final     Bun-   BUN   Date Value Ref Range Status   04/27/2020 89 (H) 6 - 20 MG/DL Final     Comment:     INVESTIGATED PER DELTA CHECK PROTOCOL     Creat-   Creatinine   Date Value Ref Range Status   04/27/2020 2.00 (H) 0.55 - 1.02 MG/DL Final        Medications/ Blood Products Given     Name   Dose   Route and Time     Heparin  1:1000  2500 units to dwell in lines 1.1 arterial 1.4 venous             Blood Volume Processed (BVP):    65.3 Net Fluid   Removed:  1500   Comments   Time Out Done: yes 07:15  Primary Nurse Rpt Pre: Rachel Cristina RN  Primary Nurse Rpt Post: Toshia Talbert RN  Pt Education: procedural/ access care  Care Plan: continue current plan of care  Tx Summary: came to patients room, time out and assessment performed and documented. Each catheter limb disinfected for 60 seconds per limb with alcohol swabs. Caps removed, dialysis CVC hub scrubbed with Prevantics for 5 seconds, followed by a 5 second dry time per Hospital P&P. Treatment started at 07:17  Treatment ended at 10:18 no issues during treatment. All possible blood returned to patient. Each catheter limb disinfected for 60 seconds per limb with alcohol swabs. Dialysis CVC hubs scrubbed with Prevantics for 5 seconds, followed by a 5 second dry time per Hospital P&P, red and blue dialysis caps applied to ports. Admiting Diagnosis: Respiratory Failure COVID  Pt's previous clinic-n/a  Consent signed - Informed Consent Verified: Yes (04/27/20 8590)  Breannaita Consent - yes  Hepatitis Status- negative/susceptible 4/1/20  Machine #- Machine Number: [id number] (04/27/20 9430)  Telemetry status-bedside Sinus tachy  Pre-dialysis wt. - Pre-Dialysis Weight: 120.7 kg (266 lb 1.5 oz) (04/23/20 8303)

## 2020-04-27 NOTE — PROGRESS NOTES
[facility]    ICU TEAM Progress Note    Name: Ovidio Valentin   : 1960   MRN: [id number]   Date: 2020        Subjective:     Reason for ICU Admission:   63 yo female with hx of obesity, endometrial cancer and diabetes who presented to [facility] with worsening hypoxic respiratory failure in lieu of close exposure to COVID-19 to family including mother (passed away) sister and brother and tested resulting + here. She presented to the hospital 3/26 and intubated 3/28. She was started on broad spectrum antibiotics and plaquenil. Developed worsening renal failure and was transferred to ICU for CRRT. Her hospital course has been further notable for development of a retroperitoneal bleed, hemorrhagic shock and sepsis and ongoing acute hypoxic respiratory failure with increasing PEEP and FIo2 requirements. She has been retested for covid and will consider trach when results return. She has transitioned from CRRT to HD, receiving daily at this time, tolerating well. Once we receive her COVID testing she should have trach and PEG, we can then start talking about placement for long term vent weaning and rehabilitation. Overnight Events: Awake and interactive this am. Makes eye contact, follows commands. Hemodynamics stable with HD in progress. Tolerating SBT x multiple hours. Working towards extubation with good promise. Remains weak generally.       Objective:   Vital Signs:  Visit Vitals  /84   Pulse (!) 110   Temp 99 °F (37.2 °C)   Resp 25   Ht 5' 4\" (1.626 m)   Wt 117.7 kg (259 lb 8 oz)   SpO2 98%   BMI 44.54 kg/m²      O2 Device: Endotracheal tube, Ventilator Temp (24hrs), Av.5 °F (36.9 °C), Min:98.1 °F (36.7 °C), Max:99 °F (37.2 °C)           Intake/Output:     Intake/Output Summary (Last 24 hours) at 2020 1609  Last data filed at 2020 1300  Gross per 24 hour   Intake 825 ml   Output 1560 ml   Net -735 ml       Physical Exam:    General:  Alert and calm and on the ventilator. No acute distress. Eyes:  Sclera anicteric. Pupils equal, round, reactive to light 5mm soren. .   Mouth/Throat: Orotracheal tube in place, OGT in place   Neck: Supple. Lungs:   Clear to auscultation bilaterally, good effort, toleating SBT with good TV   Cardiovascular:  Regular rate and rhythm, no murmur, click, rub, or gallop. Abdomen:   Soft, non-tender, bowel sounds normal, non-distended. Extremities: No cyanosis or general edema + 2-3. Skin: No acute rash or lesions. Lymph Nodes: Cervical and supraclavicular normal.   Musculoskeletal:  No swelling or deformity. Lines/Devices:  Intact, no erythema, drainage, or tenderness. Psychiatric: Alert, calm, cooperative, attends, attempts to mouth words, attempts to follow simple commands but weak, able to hold up fingers upon command. Appears comfortable on ventilator. T/L/D  Tubes: ETT and Orogastric Tube  Lines: Arterial Line, Central Line and Ramon  Drains: Bowden Catheter    LABS AND  DATA: Personally reviewed  Recent Labs     04/27/20 0435 04/26/20 1946 04/26/20  0354   WBC 13.7*  --  13.4*   HGB 7.8* 8.1* 8.0*   HCT 25.2* 26.1* 25.7*     --  235     Recent Labs     04/27/20 0435 04/26/20  0354   * 134*   K 3.6 3.4*   CL 99 99   CO2 26 28   BUN 89* 59*   CREA 2.00* 1.42*   * 153*   CA 8.7 8.4*   MG 2.1 1.9   PHOS 3.7 3.3     Recent Labs     04/27/20 0435 04/26/20  0354   ALB 2.5* 2.6*     No results for input(s): INR, PTP, APTT, INREXT in the last 72 hours. No results for input(s): PHI, PCO2I, PO2I, FIO2I in the last 72 hours. No results for input(s): CPK, CKMB, TROIQ, BNPP in the last 72 hours.     Hemodynamics:   PAP:   CO:     Wedge:   CI:     CVP:    SVR:       PVR:       Ventilator Settings:  Mode Rate Tidal Volume Pressure FiO2 PEEP   Assist control, Volume control   430 ml  10 cm H2O 35 % 5 cm H20     Peak airway pressure: 23 cm H2O    Minute ventilation: 12.3 l/min        MEDS: Reviewed    Chest X-Ray:  CXR Results  (Last 48 hours)    None        ABCDEF Bundle/Checklist Completed:  YES-See Plan    SPECIAL EQUIPMENT  IHD    Active Problem List:     Problem List  Date Reviewed: 4/19/2020          Codes Class    Hypotension ICD-10-CM: I95.9  ICD-9-CM: 458.9 Acute        Retroperitoneal hemorrhage ICD-10-CM: R58  ICD-9-CM: 459.0         Acute renal failure (ARF) (HCC) ICD-10-CM: N17.9  ICD-9-CM: 584.9         Encephalopathy ICD-10-CM: G93.40  ICD-9-CM: 348.30         Sepsis with multi-organ dysfunction ([facility].) ICD-10-CM: A41.9, R65.20  ICD-9-CM: 038.9, 995.92         Pneumonia due to methicillin susceptible Staphylococcus aureus (MSSA) ([facility].) ICD-10-CM: J15.211  ICD-9-CM: 482.41         Thrombocytopenia (HCC) ICD-10-CM: D69.6  ICD-9-CM: 287.5         Diarrhea ICD-10-CM: R19.7  ICD-9-CM: 787.91         COVID-19 virus infection ICD-10-CM: U07.1         Obesity, morbid ([facility].) ICD-10-CM: E66.01  ICD-9-CM: 278.01         Vaginal Pap smear following hysterectomy for malignancy ICD-10-CM: Z08, Z90.710  ICD-9-CM: V67.01         Personal history of malignant neoplasm of other parts of uterus ICD-10-CM: Z85.42  ICD-9-CM: V10.42         Endometrial cancer ([place] [address].) ICD-10-CM: C54.1  ICD-9-CM: 182.0             ICU Assessment/ Comprehensive Plan of Care:       NEURO  1. No acute concerns  Pain Medications: Franctiffany Reek decreased today to Q8hr from Q6hr  Target RASS: 0 - Alert & Calm - Spontaneously pays attention to caregiver  Sedation Medications: None  CAM-ICU:  Negative  Restraints: None needed at this time    CARDIAC  1. No acute concerns  Remains off vasoactive infusions with supported BP  Cardiac Gtts: None  SBP Goal of: > 100 mmHg  MAP Goal of: > 65 mmHg  Transfusion Trigger (Hgb): <7 g/dL    RESPIRATORY  1.  Acute hypoxic respiratory failure 2/2 COVID 19 and MSSA pneumonia  Tolerating SBT today with initial RSBI 55  With good tV 400-500, RR 25-30  Tolerating SBT 6 hr 04/26  Working towards extubation possible 04/28 or 04/29 once stronger  Guifenesin Q6hr    Respiratory Goals: Chlorhexidine   Optimize PEEP/Ventilation/Oxygenation  Head of bed > 30 degrees  Aggressive bronchopulmonary hygiene  Incentive spirometry  SPO2 Goal: > 92%  Pulmonary toilet: Albuterol   DVT Prophylaxis (if no, list reason): SCD's or Sequential Compression Device     RENAL  1. Acute renal failure requiring HD  Now on daily IHD with good tolerance  Removed 1.5 liters today 04/27  Nephrology following  Bowden Catheter Present: Yes  IVFs: NA    GASTROINTESTINAL  1. No acute concerns  2. Diarrhea  Added Benepro today TID to tube feeds  Flexiseal in place  GI Prophylaxis: Pepcid (famotidine)   Nutrition: Yes TF  Bowel Movement: Yes  Bowel Regimen: None needed at this time    HEMATOLOGIC  1. Retroperitoneal bleed this hospitalization, resolved  Avoid AP/AC meds  Monitor H/H daily  No current s/s of bleeding  DVT Prophylaxis (if no, list reason): SCD's or Sequential Compression Device   2. Non occlusive PE in LLL pulmonary artery    ID  1. No concerns for infection at present  Trend WBC/ Fevers  2. COVID 19 + on admit  COVID 19 NEG x 1 04/20  Repeat today to DC isolation  Completed 5 days of Plaquenil and zinc  Antibiotics:NA    ENDOCRINE  1. Diabetes Mellitus Type 2  Lantus insulin decreased today to 15 units BID as steroids no longer in use  Continue SSI Q6hr  Glycemic Control - Insulin: Yes    MUSCULOSKELETAL  1. Severe deconditioning 2/2 critical illness  Bedrest  Encourage and assist with PROM and AROM  Mobility: Poor and Bedrest  PT/OT: PT consulted and on board and OT consulted and on board     DISPOSITION/COMMUNICATION  Discussed Plan of Care/Code Status: Full Code  Stay in ICU    CRITICAL CARE CONSULTANT NOTE  I had a face to face encounter with the patient, reviewed and interpreted patient data including clinical events, labs, images, vital signs, I/O's, and examined patient.   I have discussed the case and the plan and management of the patient's care with the consulting services, the bedside nurses and the respiratory therapist.      NOTE OF PERSONAL INVOLVEMENT IN CARE   This patient has a high probability of imminent, clinically significant deterioration, which requires the highest level of preparedness to intervene urgently. I participated in the decision-making and personally managed or directed the management of the following life and organ supporting interventions that required my frequent assessment to treat or prevent imminent deterioration. I personally spent 45 minutes of critical care time. This is time spent at this critically ill patient's bedside actively involved in patient care as well as the coordination of care and discussions with the patient's family. This does not include any procedural time which has been billed separately.     Tim Batres, [facility] Hospital[facility]-BC  Intensivist Nurse Practitioner  [facility] [place] [facility]  4/27/2020

## 2020-04-27 NOTE — PROGRESS NOTES
[facility]   [address] [facility], [facility] [address], [facility]  Phone: [phone]   [phone]       Nephrology Progress Note  Rajan Kay     1960     [id number]  Date of Admission : 3/31/2020  04/27/20    CC: Follow up for JUANCHO      Assessment and Plan   JUANCHO :  - 2/2 ATN  - CRRT switched to daily IHD on 4/18  - New line placed by IR on 4/22  - HD today , MWF schedule this week   - daily labs     Lytes  -  Stable     COVID-19 +ve   Acute Hypoxic Resp Failure   - On Vent   - completed Plaquenil. On Vit C, Zinc   - s/p Tocilizumab   - on daily SBT      RP hematoma:  - Hb stable    Cardiac arrest 4/9    Type II DM   - Insulin per primary team      Morbid Obesity        Interval History:  Seen on dialysis  Tolerated well   1.5 kg removed   On SBT   BP stable       PT NOT EXAMINED in line with ASN and RPA GUIDELINES ON MANAGING COVID-19 PTS WITH RENAL ISSUES. Examination findings discussed personally with the examining Physician team member      Review of Systems: Review of systems not obtained due to patient factors.     Current Medications:   Current Facility-Administered Medications   Medication Dose Route Frequency    sevelamer carbonate (RENVELA) oral powder 0.8 g  0.8 g Oral TID WITH MEALS    0.9% sodium chloride infusion 250 mL  250 mL IntraVENous PRN    heparin (porcine) 1,000 unit/mL injection 2,500 Units  2,500 Units IntraVENous DIALYSIS PRN    insulin glargine (LANTUS) injection 25 Units  25 Units SubCUTAneous Q12H    guar gum (BENEFIBER) packet 1 Packet  4 g Oral TID    fentaNYL citrate (PF) injection 25-50 mcg  25-50 mcg IntraVENous Q2H PRN    NOREPINephrine (LEVOPHED) 8 mg in 5% dextrose 250mL (32 mcg/mL) infusion  0.5-30 mcg/min IntraVENous TITRATE    labetaloL (NORMODYNE;TRANDATE) injection 20 mg  20 mg IntraVENous Q4H PRN    HYDROcodone-acetaminophen (NORCO) 5-325 mg per tablet 1 Tab  1 Tab Oral Q6H    alteplase (CATHFLO) 1 mg in sterile water (preservative free) 1 mL injection  1 mg InterCATHeter PRN    albumin human 25% (BUMINATE) solution 12.5 g  12.5 g IntraVENous DIALYSIS PRN    0.9% sodium chloride infusion 250 mL  250 mL IntraVENous PRN    guaiFENesin (ROBITUSSIN) 100 mg/5 mL oral liquid 200 mg  200 mg Oral Q6H    albuterol (PROVENTIL VENTOLIN) nebulizer solution 2.5 mg  2.5 mg Nebulization Q4H PRN    famotidine (PEPCID) 8 mg/mL oral suspension 20 mg  20 mg Per NG tube DAILY    alteplase (CATHFLO) 2 mg in sterile water (preservative free) 2 mL injection  2 mg InterCATHeter DIALYSIS PRN    alteplase (CATHFLO) 2 mg in sterile water (preservative free) 2 mL injection  2 mg InterCATHeter DIALYSIS PRN    insulin lispro (HUMALOG) injection   SubCUTAneous Q6H    0.9% sodium chloride infusion 250 mL  250 mL IntraVENous PRN    chlorhexidine (PERIDEX) 0.12 % mouthwash 15 mL  15 mL Oral Q12H    0.9% sodium chloride infusion  3 mL/hr IntraVENous CONTINUOUS    0.9% sodium chloride infusion  5 mL/hr IntraVENous CONTINUOUS    white petrolatum-mineral oiL (AKWA TEARS) 83-15 % ophthalmic ointment   Both Eyes Q12H    heparin (porcine) pf 300 Units  300 Units InterCATHeter PRN    bacitracin 500 unit/gram packet 1 Packet  1 Packet Topical PRN    balsam peru-castor oiL (VENELEX) ointment   Topical BID    sodium chloride (NS) flush 5-40 mL  5-40 mL IntraVENous Q8H    sodium chloride (NS) flush 5-40 mL  5-40 mL IntraVENous PRN    HYDROcodone-acetaminophen (NORCO) 5-325 mg per tablet 1 Tab  1 Tab Oral Q4H PRN    ondansetron (ZOFRAN) injection 4 mg  4 mg IntraVENous Q4H PRN    acetaminophen (TYLENOL) tablet 650 mg  650 mg Oral Q6H PRN    Or    acetaminophen (TYLENOL) suppository 650 mg  650 mg Rectal Q6H PRN    glucose chewable tablet 16 g  4 Tab Oral PRN    glucagon (GLUCAGEN) injection 1 mg  1 mg IntraMUSCular PRN    dextrose 10% infusion 0-250 mL  0-250 mL IntraVENous PRN      Allergies   Allergen Reactions    Augmentin [Amoxicillin-Pot Clavulanate] Rash and Itching       Objective:  Vitals:    Vitals:    04/27/20 0930 04/27/20 0945 04/27/20 1000 04/27/20 1015   BP: (!) 148/103 121/82 127/81 120/76   Pulse: (!) 106 (!) 110 (!) 110 (!) 105   Resp: 14 24 26 29   Temp:       TempSrc:       SpO2: 100% 99% 99% 100%   Weight:       Height:         Intake and Output:  No intake/output data recorded. 04/25 1901 - 04/27 0700  In: 1420 [I.V.:50]  Out: 500 [Drains:500]    Physical Examination:   Pt intubated    Yes  General: Awake on vent   Resp:  On vent   CV:  RRR  GI:  Obese   Neurologic:  Awake, unable to assess   Access:           R JAZZ torres     []    High complexity decision making was performed  []    Patient is at high-risk of decompensation with multiple organ involvement    Lab Data Personally Reviewed: I have reviewed all the pertinent labs, microbiology data and radiology studies during assessment.     Recent Labs     04/27/20 0435 04/26/20 0354 04/25/20 0447   * 134* 137   K 3.6 3.4* 3.4*   CL 99 99 102   CO2 26 28 27   * 153* 85   BUN 89* 59* 58*   CREA 2.00* 1.42* 1.41*   CA 8.7 8.4* 8.7   MG 2.1 1.9 2.0   PHOS 3.7 3.3 2.8   ALB 2.5* 2.6* 2.7*     Recent Labs     04/27/20 0435 04/26/20 1946 04/26/20 0354 04/25/20 0447   WBC 13.7*  --  13.4* 13.1*   HGB 7.8* 8.1* 8.0* 8.0*   HCT 25.2* 26.1* 25.7* 24.8*     --  235 226     No results found for: SDES  Lab Results   Component Value Date/Time    Culture result: NO GROWTH 5 DAYS 04/09/2020 02:32 PM    Culture result: NO GROWTH 5 DAYS 04/08/2020 10:36 AM    Culture result: NO GROWTH 5 DAYS 03/31/2020 11:15 AM    Culture result: MODERATE STAPHYLOCOCCUS AUREUS (A) 03/31/2020 10:34 AM    Culture result: LIGHT NORMAL RESPIRATORY SOFIE 03/31/2020 10:34 AM     Recent Results (from the past 24 hour(s))   GLUCOSE, POC    Collection Time: 04/26/20 11:59 AM   Result Value Ref Range    Glucose (POC) 97 65 - 100 mg/dL    Performed by [address] [place] [address], POC    Collection Time: 04/26/20  6:09 PM Result Value Ref Range    Glucose (POC) 98 65 - 100 mg/dL    Performed by Rosalie Curry    HGB & HCT    Collection Time: 04/26/20  7:46 PM   Result Value Ref Range    HGB 8.1 (L) 11.5 - 16.0 g/dL    HCT 26.1 (L) 35.0 - 47.0 %   GLUCOSE, POC    Collection Time: 04/27/20 12:15 AM   Result Value Ref Range    Glucose (POC) 124 (H) 65 - 100 mg/dL    Performed by [facility] BILL    CRP, HIGH SENSITIVITY    Collection Time: 04/27/20  4:35 AM   Result Value Ref Range    CRP, High sensitivity >9.5 mg/L   CBC WITH AUTOMATED DIFF    Collection Time: 04/27/20  4:35 AM   Result Value Ref Range    WBC 13.7 (H) 3.6 - 11.0 K/uL    RBC 2.58 (L) 3.80 - 5.20 M/uL    HGB 7.8 (L) 11.5 - 16.0 g/dL    HCT 25.2 (L) 35.0 - 47.0 %    MCV 97.7 80.0 - 99.0 FL    MCH 30.2 26.0 - 34.0 PG    MCHC 31.0 30.0 - 36.5 g/dL    RDW 17.4 (H) 11.5 - 14.5 %    PLATELET 464 299 - 423 K/uL    MPV 10.8 8.9 - 12.9 FL    NRBC 0.0 0  WBC    ABSOLUTE NRBC 0.00 0.00 - 0.01 K/uL    NEUTROPHILS 85 (H) 32 - 75 %    LYMPHOCYTES 8 (L) 12 - 49 %    MONOCYTES 5 5 - 13 %    EOSINOPHILS 1 0 - 7 %    BASOPHILS 0 0 - 1 %    IMMATURE GRANULOCYTES 1 (H) 0.0 - 0.5 %    ABS. NEUTROPHILS 11.7 (H) 1.8 - 8.0 K/UL    ABS. LYMPHOCYTES 1.1 0.8 - 3.5 K/UL    ABS. MONOCYTES 0.7 0.0 - 1.0 K/UL    ABS. EOSINOPHILS 0.1 0.0 - 0.4 K/UL    ABS. BASOPHILS 0.0 0.0 - 0.1 K/UL    ABS. IMM.  GRANS. 0.1 (H) 0.00 - 0.04 K/UL    DF AUTOMATED     D DIMER    Collection Time: 04/27/20  4:35 AM   Result Value Ref Range    D-dimer 24.08 (H) 0.00 - 0.65 mg/L FEU   FIBRINOGEN    Collection Time: 04/27/20  4:35 AM   Result Value Ref Range    Fibrinogen 619 (H) 200 - 475 mg/dL   MAGNESIUM    Collection Time: 04/27/20  4:35 AM   Result Value Ref Range    Magnesium 2.1 1.6 - 2.4 mg/dL   RENAL FUNCTION PANEL    Collection Time: 04/27/20  4:35 AM   Result Value Ref Range    Sodium 134 (L) 136 - 145 mmol/L    Potassium 3.6 3.5 - 5.1 mmol/L    Chloride 99 97 - 108 mmol/L    CO2 26 21 - 32 mmol/L    Anion gap 9 5 - 15 mmol/L    Glucose 115 (H) 65 - 100 mg/dL    BUN 89 (H) 6 - 20 MG/DL    Creatinine 2.00 (H) 0.55 - 1.02 MG/DL    BUN/Creatinine ratio 45 (H) 12 - 20      GFR est AA 31 (L) >60 ml/min/1.73m2    GFR est non-AA 26 (L) >60 ml/min/1.73m2    Calcium 8.7 8.5 - 10.1 MG/DL    Phosphorus 3.7 2.6 - 4.7 MG/DL    Albumin 2.5 (L) 3.5 - 5.0 g/dL   GLUCOSE, POC    Collection Time: 04/27/20  6:13 AM   Result Value Ref Range    Glucose (POC) 132 (H) 65 - 100 mg/dL    Performed by Mee Liu            Total time spent with patient:  xxx   min. Care Plan discussed with:  Patient     Family      RN      Consulting Physician [address],         I have reviewed the flowsheets. Chart and Pertinent Notes have been reviewed. No change in PMH ,family and social history from Consult note.       Kinjal Lopez MD

## 2020-04-27 NOTE — PROGRESS NOTES
0730: Bedside and Verbal shift change report given to Tata Sharif RN (oncoming nurse) by Nandini Santizo RN (offgoing nurse). Report included the following information SBAR, Kardex, Intake/Output, MAR, Recent Results, Cardiac Rhythm Sinus Tach and Alarm Parameters . Shift Summary: VSS, alert, nods appropriately, wiggles toes bilaterally on command,  weak bilaterally, ETT, OGT with Tube Feed infusing per orders, anuric, FlexiSeal output 60, right PICC. Patient on SBT for approximately 5 hours. Second COVID swab sent per orders. Patient received ordered HD, 1.5 L removed. Recent HgB 8.3. Family support provided via conference call between patient's , daughter, [address]: Bedside and Verbal shift change report given to Althia Gitelman, RN (oncoming nurse) by Tata Sharif RN (offgoing nurse).  Report included the following information SBAR, Kardex, Intake/Output, MAR, Recent Results, Cardiac Rhythm NSR/Sinus Tachycardia and Alarm Parameters .

## 2020-04-27 NOTE — PROGRESS NOTES
Bedside, Verbal and Written shift change report given to Krzysztof Gonzalez RN (oncoming nurse) by Harish Eagle RN (offgoing nurse). Report included the following information SBAR, Kardex, Procedure Summary, Intake/Output, MAR, Accordion, Recent Results, Cardiac Rhythm NSR/Sinus Tachycardia and Alarm Parameters . 0600: Dialysis at bedside. Shift Summary: Patient nods appropriately. Decreased command following. Tracks and focuses. Remains on ETT.

## 2020-04-28 LAB
ALBUMIN SERPL-MCNC: 2.5 G/DL (ref 3.5–5)
ANION GAP SERPL CALC-SCNC: 9 MMOL/L (ref 5–15)
BASOPHILS # BLD: 0 K/UL (ref 0–0.1)
BASOPHILS NFR BLD: 0 % (ref 0–1)
BUN SERPL-MCNC: 63 MG/DL (ref 6–20)
BUN/CREAT SERPL: 38 (ref 12–20)
CALCIUM SERPL-MCNC: 8.8 MG/DL (ref 8.5–10.1)
CHLORIDE SERPL-SCNC: 99 MMOL/L (ref 97–108)
CO2 SERPL-SCNC: 27 MMOL/L (ref 21–32)
CREAT SERPL-MCNC: 1.67 MG/DL (ref 0.55–1.02)
CRP SERPL HS-MCNC: >9.5 MG/L
DIFFERENTIAL METHOD BLD: ABNORMAL
EOSINOPHIL # BLD: 0.1 K/UL (ref 0–0.4)
EOSINOPHIL NFR BLD: 1 % (ref 0–7)
ERYTHROCYTE [DISTWIDTH] IN BLOOD BY AUTOMATED COUNT: 17 % (ref 11.5–14.5)
GLUCOSE BLD STRIP.AUTO-MCNC: 101 MG/DL (ref 65–100)
GLUCOSE BLD STRIP.AUTO-MCNC: 127 MG/DL (ref 65–100)
GLUCOSE BLD STRIP.AUTO-MCNC: 201 MG/DL (ref 65–100)
GLUCOSE SERPL-MCNC: 137 MG/DL (ref 65–100)
HCT VFR BLD AUTO: 25.4 % (ref 35–47)
HGB BLD-MCNC: 8 G/DL (ref 11.5–16)
IMM GRANULOCYTES # BLD AUTO: 0.1 K/UL (ref 0–0.04)
IMM GRANULOCYTES NFR BLD AUTO: 1 % (ref 0–0.5)
LYMPHOCYTES # BLD: 0.9 K/UL (ref 0.8–3.5)
LYMPHOCYTES NFR BLD: 9 % (ref 12–49)
MAGNESIUM SERPL-MCNC: 2 MG/DL (ref 1.6–2.4)
MCH RBC QN AUTO: 30.8 PG (ref 26–34)
MCHC RBC AUTO-ENTMCNC: 31.5 G/DL (ref 30–36.5)
MCV RBC AUTO: 97.7 FL (ref 80–99)
MONOCYTES # BLD: 0.7 K/UL (ref 0–1)
MONOCYTES NFR BLD: 7 % (ref 5–13)
NEUTS SEG # BLD: 8.4 K/UL (ref 1.8–8)
NEUTS SEG NFR BLD: 82 % (ref 32–75)
NRBC # BLD: 0 K/UL (ref 0–0.01)
NRBC BLD-RTO: 0 PER 100 WBC
PHOSPHATE SERPL-MCNC: 2.6 MG/DL (ref 2.6–4.7)
PLATELET # BLD AUTO: 225 K/UL (ref 150–400)
PMV BLD AUTO: 10.4 FL (ref 8.9–12.9)
POTASSIUM SERPL-SCNC: 3.3 MMOL/L (ref 3.5–5.1)
RBC # BLD AUTO: 2.6 M/UL (ref 3.8–5.2)
SERVICE CMNT-IMP: ABNORMAL
SODIUM SERPL-SCNC: 135 MMOL/L (ref 136–145)
WBC # BLD AUTO: 10.1 K/UL (ref 3.6–11)

## 2020-04-28 PROCEDURE — 77030018798 HC PMP KT ENTRL FED COVD -A

## 2020-04-28 PROCEDURE — 97110 THERAPEUTIC EXERCISES: CPT

## 2020-04-28 PROCEDURE — 65610000006 HC RM INTENSIVE CARE

## 2020-04-28 PROCEDURE — 74011250637 HC RX REV CODE- 250/637: Performed by: NURSE PRACTITIONER

## 2020-04-28 PROCEDURE — 86141 C-REACTIVE PROTEIN HS: CPT

## 2020-04-28 PROCEDURE — 94003 VENT MGMT INPAT SUBQ DAY: CPT

## 2020-04-28 PROCEDURE — 80069 RENAL FUNCTION PANEL: CPT

## 2020-04-28 PROCEDURE — 74011250636 HC RX REV CODE- 250/636: Performed by: INTERNAL MEDICINE

## 2020-04-28 PROCEDURE — 83735 ASSAY OF MAGNESIUM: CPT

## 2020-04-28 PROCEDURE — 97530 THERAPEUTIC ACTIVITIES: CPT

## 2020-04-28 PROCEDURE — 36600 WITHDRAWAL OF ARTERIAL BLOOD: CPT

## 2020-04-28 PROCEDURE — 36415 COLL VENOUS BLD VENIPUNCTURE: CPT

## 2020-04-28 PROCEDURE — 74011250637 HC RX REV CODE- 250/637: Performed by: INTERNAL MEDICINE

## 2020-04-28 PROCEDURE — 74011636637 HC RX REV CODE- 636/637: Performed by: NURSE PRACTITIONER

## 2020-04-28 PROCEDURE — 85025 COMPLETE CBC W/AUTO DIFF WBC: CPT

## 2020-04-28 RX ORDER — POTASSIUM CHLORIDE 29.8 MG/ML
20 INJECTION INTRAVENOUS ONCE
Status: DISCONTINUED | OUTPATIENT
Start: 2020-04-29 | End: 2020-04-28

## 2020-04-28 RX ADMIN — Medication 1 PACKET: at 16:00

## 2020-04-28 RX ADMIN — SODIUM CHLORIDE 10 ML: 9 INJECTION INTRAMUSCULAR; INTRAVENOUS; SUBCUTANEOUS at 21:16

## 2020-04-28 RX ADMIN — SEVELAMER CARBONATE 0.8 G: 800 POWDER, FOR SUSPENSION ORAL at 09:04

## 2020-04-28 RX ADMIN — GUAIFENESIN 200 MG: 100 SOLUTION ORAL at 05:55

## 2020-04-28 RX ADMIN — CASTOR OIL AND BALSAM, PERU: 788; 87 OINTMENT TOPICAL at 09:00

## 2020-04-28 RX ADMIN — MINERAL OIL AND WHITE PETROLATUM: 150; 830 OINTMENT OPHTHALMIC at 20:46

## 2020-04-28 RX ADMIN — Medication 1 PACKET: at 21:15

## 2020-04-28 RX ADMIN — HYDROCODONE BITARTRATE AND ACETAMINOPHEN 1 TABLET: 5; 325 TABLET ORAL at 18:00

## 2020-04-28 RX ADMIN — INSULIN LISPRO 3 UNITS: 100 INJECTION, SOLUTION INTRAVENOUS; SUBCUTANEOUS at 18:04

## 2020-04-28 RX ADMIN — Medication 1 PACKET: at 10:46

## 2020-04-28 RX ADMIN — FAMOTIDINE 20 MG: 40 POWDER, FOR SUSPENSION ORAL at 09:03

## 2020-04-28 RX ADMIN — GUAIFENESIN 200 MG: 100 SOLUTION ORAL at 17:49

## 2020-04-28 RX ADMIN — SODIUM CHLORIDE 20 ML: 9 INJECTION INTRAMUSCULAR; INTRAVENOUS; SUBCUTANEOUS at 05:56

## 2020-04-28 RX ADMIN — CHLORHEXIDINE GLUCONATE 15 ML: 1.2 RINSE ORAL at 20:45

## 2020-04-28 RX ADMIN — ONDANSETRON 4 MG: 2 INJECTION INTRAMUSCULAR; INTRAVENOUS at 11:55

## 2020-04-28 RX ADMIN — CASTOR OIL AND BALSAM, PERU: 788; 87 OINTMENT TOPICAL at 20:44

## 2020-04-28 RX ADMIN — POTASSIUM BICARBONATE 40 MEQ: 782 TABLET, EFFERVESCENT ORAL at 05:57

## 2020-04-28 RX ADMIN — HYDROCODONE BITARTRATE AND ACETAMINOPHEN 1 TABLET: 5; 325 TABLET ORAL at 01:26

## 2020-04-28 RX ADMIN — SODIUM CHLORIDE 10 ML: 9 INJECTION INTRAMUSCULAR; INTRAVENOUS; SUBCUTANEOUS at 14:00

## 2020-04-28 RX ADMIN — HYDROCODONE BITARTRATE AND ACETAMINOPHEN 1 TABLET: 5; 325 TABLET ORAL at 21:15

## 2020-04-28 RX ADMIN — CHLORHEXIDINE GLUCONATE 15 ML: 1.2 RINSE ORAL at 09:00

## 2020-04-28 RX ADMIN — SEVELAMER CARBONATE 0.8 G: 800 POWDER, FOR SUSPENSION ORAL at 17:49

## 2020-04-28 RX ADMIN — ONDANSETRON 4 MG: 2 INJECTION INTRAMUSCULAR; INTRAVENOUS at 21:15

## 2020-04-28 NOTE — PROGRESS NOTES
Problem: Mobility Impaired (Adult and Pediatric)  Goal: *Acute Goals and Plan of Care (Insert Text)  Description: FUNCTIONAL STATUS PRIOR TO ADMISSION: Patient was independent and active without use of DME but poor history due to not being able to communicate. HOME SUPPORT PRIOR TO ADMISSION: Pt lives with family    Physical Therapy Goals  Initiated 4/25/2020  1. Patient will move from supine to sit and sit to supine , scoot up and down and roll side to side in bed with moderate assistance once off vent within 7 day(s). 2.  Patient will participate in sitting with min A for 2 minutes once off vent within 7 day(s). 3.  Patient will perform active supine or sitting TE with min A within 7 day(s). Outcome: Progressing Towards Goal       PHYSICAL THERAPY TREATMENT  Patient: Tramaine Daugherty (89 y.o. female)  Date: 4/28/2020  Diagnosis: COVID-19 virus infection [U07.1]   <principal problem not specified>       Precautions:    Chart, physical therapy assessment, plan of care and goals were reviewed. ASSESSMENT  Patient continues with skilled PT services and is progressing towards goals. Pt tolerates rolling with max A x2  for 3 reps for hygiene management. Pt tolerates ROM with UE and LE for all ranges. Pt may be weaned off vent tomorrow to BiPAP. Will continue to follow. Current Level of Function Impacting Discharge (mobility/balance): max A    Other factors to consider for discharge: none         PLAN :  Patient continues to benefit from skilled intervention to address the above impairments. Continue treatment per established plan of care. to address goals.     Recommendation for discharge: (in order for the patient to meet his/her long term goals)  Therapy 3 hours per day 5-7 days per week    This discharge recommendation:  Has been made in collaboration with the attending provider and/or case management    IF patient discharges home will need the following DME: to be determined (TBD)       SUBJECTIVE: Patient nods and shakes head for communication    OBJECTIVE DATA SUMMARY:   Critical Behavior:  Neurologic State: Drowsy, Eyes open spontaneously, Eyes open to voice, Sleeping  Orientation Level: Unable to verbalize  Cognition: Decreased attention/concentration, Decreased command following     Functional Mobility Training:  Bed Mobility:  Rolling: Maximum assistance;Assist x2;Total assistance                 Transfers:    Pain Rating:  Unknown    Activity Tolerance:   Fair and requires rest breaks  Please refer to the flowsheet for vital signs taken during this treatment. After treatment patient left in no apparent distress:   Supine in bed and Call bell within reach    COMMUNICATION/COLLABORATION:   The patients plan of care was discussed with: Registered nurse and Case management.      Max Baker, PT   Time Calculation: 32 mins

## 2020-04-28 NOTE — PROGRESS NOTES
[facility]   [address] [facility], [facility] [address], [facility]  Phone: [phone]   [phone]       Nephrology Progress Note  Yudelka Bowman     1960     [id number]  Date of Admission : 3/31/2020  04/28/20    CC: Follow up for JUANCHO      Assessment and Plan   JUANCHO :  - 2/2 ATN  - CRRT switched to daily IHD on 4/18  - New line placed by IR on 4/22  - HD MWF this week   - daily labs     Lytes  -  Stable     COVID-19 +ve   Acute Hypoxic Resp Failure   - On Vent   - completed Plaquenil. On Vit C, Zinc   - s/p Tocilizumab   - on daily SBT      RP hematoma:  - Hb stable    Cardiac arrest 4/9    Type II DM   - Insulin per primary team      Morbid Obesity        Interval History:  Seen and discussed w/ RT   Did not pass SBT YESTERDAY   LABS stable       PT NOT EXAMINED in line with ASN and RPA GUIDELINES ON MANAGING COVID-19 PTS WITH RENAL ISSUES. Examination findings discussed personally with the examining Physician team member      Review of Systems: Review of systems not obtained due to patient factors.     Current Medications:   Current Facility-Administered Medications   Medication Dose Route Frequency    HYDROcodone-acetaminophen (NORCO) 5-325 mg per tablet 1 Tab  1 Tab Per NG tube Q8H    insulin glargine (LANTUS) injection 15 Units  15 Units SubCUTAneous Q12H    sevelamer carbonate (RENVELA) oral powder 0.8 g  0.8 g Oral TID WITH MEALS    0.9% sodium chloride infusion 250 mL  250 mL IntraVENous PRN    heparin (porcine) 1,000 unit/mL injection 2,500 Units  2,500 Units IntraVENous DIALYSIS PRN    guar gum (BENEFIBER) packet 1 Packet  4 g Oral TID    fentaNYL citrate (PF) injection 25-50 mcg  25-50 mcg IntraVENous Q2H PRN    labetaloL (NORMODYNE;TRANDATE) injection 20 mg  20 mg IntraVENous Q4H PRN    alteplase (CATHFLO) 1 mg in sterile water (preservative free) 1 mL injection  1 mg InterCATHeter PRN    albumin human 25% (BUMINATE) solution 12.5 g  12.5 g IntraVENous DIALYSIS PRN    0.9% sodium chloride infusion 250 mL  250 mL IntraVENous PRN    guaiFENesin (ROBITUSSIN) 100 mg/5 mL oral liquid 200 mg  200 mg Oral Q6H    albuterol (PROVENTIL VENTOLIN) nebulizer solution 2.5 mg  2.5 mg Nebulization Q4H PRN    famotidine (PEPCID) 8 mg/mL oral suspension 20 mg  20 mg Per NG tube DAILY    alteplase (CATHFLO) 2 mg in sterile water (preservative free) 2 mL injection  2 mg InterCATHeter DIALYSIS PRN    alteplase (CATHFLO) 2 mg in sterile water (preservative free) 2 mL injection  2 mg InterCATHeter DIALYSIS PRN    insulin lispro (HUMALOG) injection   SubCUTAneous Q6H    0.9% sodium chloride infusion 250 mL  250 mL IntraVENous PRN    chlorhexidine (PERIDEX) 0.12 % mouthwash 15 mL  15 mL Oral Q12H    0.9% sodium chloride infusion  3 mL/hr IntraVENous CONTINUOUS    0.9% sodium chloride infusion  5 mL/hr IntraVENous CONTINUOUS    white petrolatum-mineral oiL (AKWA TEARS) 83-15 % ophthalmic ointment   Both Eyes Q12H    heparin (porcine) pf 300 Units  300 Units InterCATHeter PRN    bacitracin 500 unit/gram packet 1 Packet  1 Packet Topical PRN    balsam peru-castor oiL (VENELEX) ointment   Topical BID    sodium chloride (NS) flush 5-40 mL  5-40 mL IntraVENous Q8H    sodium chloride (NS) flush 5-40 mL  5-40 mL IntraVENous PRN    HYDROcodone-acetaminophen (NORCO) 5-325 mg per tablet 1 Tab  1 Tab Oral Q4H PRN    ondansetron (ZOFRAN) injection 4 mg  4 mg IntraVENous Q4H PRN    acetaminophen (TYLENOL) tablet 650 mg  650 mg Oral Q6H PRN    Or    acetaminophen (TYLENOL) suppository 650 mg  650 mg Rectal Q6H PRN    glucose chewable tablet 16 g  4 Tab Oral PRN    glucagon (GLUCAGEN) injection 1 mg  1 mg IntraMUSCular PRN    dextrose 10% infusion 0-250 mL  0-250 mL IntraVENous PRN      Allergies   Allergen Reactions    Augmentin [Amoxicillin-Pot Clavulanate] Rash and Itching       Objective:  Vitals:    Vitals:    04/28/20 0700 04/28/20 0800 04/28/20 0910 04/28/20 0913   BP: 126/76 128/77 Pulse: (!) 108 (!) 105 87 (!) 108   Resp: 19 21 25 22   Temp:  99 °F (37.2 °C)     TempSrc:       SpO2: 99% 98% 100% 99%   Weight:       Height:         Intake and Output:  No intake/output data recorded. 04/26 1901 - 04/28 0700  In: 6742 [I.V.:45]  Out: 18 [Drains:60]    Physical Examination:   Pt intubated    Yes  General: Awake on vent   Resp:  On vent   CV:  RRR  GI:  Obese   Neurologic:  Awake, unable to assess   Access:           R JAZZ torres     []    High complexity decision making was performed  []    Patient is at high-risk of decompensation with multiple organ involvement    Lab Data Personally Reviewed: I have reviewed all the pertinent labs, microbiology data and radiology studies during assessment.     Recent Labs     04/28/20 0346 04/27/20  0435 04/26/20  0354   * 134* 134*   K 3.3* 3.6 3.4*   CL 99 99 99   CO2 27 26 28   * 115* 153*   BUN 63* 89* 59*   CREA 1.67* 2.00* 1.42*   CA 8.8 8.7 8.4*   MG 2.0 2.1 1.9   PHOS 2.6 3.7 3.3   ALB 2.5* 2.5* 2.6*     Recent Labs     04/28/20 0346 04/27/20  1811 04/27/20  0435 04/26/20  1946 04/26/20  0354   WBC 10.1  --  13.7*  --  13.4*   HGB 8.0* 8.3* 7.8* 8.1* 8.0*   HCT 25.4* 26.2* 25.2* 26.1* 25.7*     --  240  --  235     No results found for: SDES  Lab Results   Component Value Date/Time    Culture result: NO GROWTH 5 DAYS 04/09/2020 02:32 PM    Culture result: NO GROWTH 5 DAYS 04/08/2020 10:36 AM    Culture result: NO GROWTH 5 DAYS 03/31/2020 11:15 AM    Culture result: MODERATE STAPHYLOCOCCUS AUREUS (A) 03/31/2020 10:34 AM    Culture result: LIGHT NORMAL RESPIRATORY SOFIE 03/31/2020 10:34 AM     Recent Results (from the past 24 hour(s))   GLUCOSE, POC    Collection Time: 04/27/20 12:36 PM   Result Value Ref Range    Glucose (POC) 109 (H) 65 - 100 mg/dL    Performed by Neal [address], POC    Collection Time: 04/27/20  6:05 PM   Result Value Ref Range    Glucose (POC) 66 65 - 100 mg/dL    Performed by Patty Spencer SARS-COV-2    Collection Time: 04/27/20  6:07 PM   Result Value Ref Range    COVID-19 PENDING NEG   HGB & HCT    Collection Time: 04/27/20  6:11 PM   Result Value Ref Range    HGB 8.3 (L) 11.5 - 16.0 g/dL    HCT 26.2 (L) 35.0 - 47.0 %   GLUCOSE, POC    Collection Time: 04/27/20  6:15 PM   Result Value Ref Range    Glucose (POC) 72 65 - 100 mg/dL    Performed by Nallely Jensen    GLUCOSE, POC    Collection Time: 04/27/20  7:59 PM   Result Value Ref Range    Glucose (POC) 72 65 - 100 mg/dL    Performed by [address] [place] [address], POC    Collection Time: 04/27/20 11:36 PM   Result Value Ref Range    Glucose (POC) 101 (H) 65 - 100 mg/dL    Performed by [address] [place] [address] ROSY    CRP, HIGH SENSITIVITY    Collection Time: 04/28/20  3:46 AM   Result Value Ref Range    CRP, High sensitivity >9.5 mg/L   RENAL FUNCTION PANEL    Collection Time: 04/28/20  3:46 AM   Result Value Ref Range    Sodium 135 (L) 136 - 145 mmol/L    Potassium 3.3 (L) 3.5 - 5.1 mmol/L    Chloride 99 97 - 108 mmol/L    CO2 27 21 - 32 mmol/L    Anion gap 9 5 - 15 mmol/L    Glucose 137 (H) 65 - 100 mg/dL    BUN 63 (H) 6 - 20 MG/DL    Creatinine 1.67 (H) 0.55 - 1.02 MG/DL    BUN/Creatinine ratio 38 (H) 12 - 20      GFR est AA 38 (L) >60 ml/min/1.73m2    GFR est non-AA 32 (L) >60 ml/min/1.73m2    Calcium 8.8 8.5 - 10.1 MG/DL    Phosphorus 2.6 2.6 - 4.7 MG/DL    Albumin 2.5 (L) 3.5 - 5.0 g/dL   MAGNESIUM    Collection Time: 04/28/20  3:46 AM   Result Value Ref Range    Magnesium 2.0 1.6 - 2.4 mg/dL   CBC WITH AUTOMATED DIFF    Collection Time: 04/28/20  3:46 AM   Result Value Ref Range    WBC 10.1 3.6 - 11.0 K/uL    RBC 2.60 (L) 3.80 - 5.20 M/uL    HGB 8.0 (L) 11.5 - 16.0 g/dL    HCT 25.4 (L) 35.0 - 47.0 %    MCV 97.7 80.0 - 99.0 FL    MCH 30.8 26.0 - 34.0 PG    MCHC 31.5 30.0 - 36.5 g/dL    RDW 17.0 (H) 11.5 - 14.5 %    PLATELET 225 762 - 653 K/uL    MPV 10.4 8.9 - 12.9 FL    NRBC 0.0 0  WBC    ABSOLUTE NRBC 0.00 0.00 - 0.01 K/uL    NEUTROPHILS 82 (H) 32 - 75 %    LYMPHOCYTES 9 (L) 12 - 49 %    MONOCYTES 7 5 - 13 %    EOSINOPHILS 1 0 - 7 %    BASOPHILS 0 0 - 1 %    IMMATURE GRANULOCYTES 1 (H) 0.0 - 0.5 %    ABS. NEUTROPHILS 8.4 (H) 1.8 - 8.0 K/UL    ABS. LYMPHOCYTES 0.9 0.8 - 3.5 K/UL    ABS. MONOCYTES 0.7 0.0 - 1.0 K/UL    ABS. EOSINOPHILS 0.1 0.0 - 0.4 K/UL    ABS. BASOPHILS 0.0 0.0 - 0.1 K/UL    ABS. IMM. GRANS. 0.1 (H) 0.00 - 0.04 K/UL    DF AUTOMATED             Total time spent with patient:  xxx   min. Care Plan discussed with:  Patient     Family      RN      Consulting Physician [facility],         I have reviewed the flowsheets. Chart and Pertinent Notes have been reviewed. No change in PMH ,family and social history from Consult note.       Mariah Goodwin MD

## 2020-04-28 NOTE — PROGRESS NOTES
NUTRITION COMPLETE ASSESSMENT    RECOMMENDATIONS:   Hold tube feeding ~30 minutes prior to extubation (during SBT)       -Do not need to hold prior to starting SBT or when SBT > 30 minutes    Interventions/Plan:   Food/Nutrient Delivery:    Modify rate, concentration, composition, and schedule      Nepro @ 35 ml/hr with 2 packets Prosource daily and 50 ml water flush q 6 hr    Assessment:   Reason for Assessment: Reassessment    Tube Feeding: Nepro @ 30 ml/hr with 3 packets Prosource daily and 50 ml water flush q 6 hr  Diet: NPO  Nutritionally Significant Medications: [x] Reviewed & Includes: Benefiber, correction scale insulin, Renvela, Effer-K  Meal Intake: No data found. Subjective: Staff Interviewed    Objective:  Ms Nehemias Espinal transferred from [facility] d/t worsening renal function requiring CRRT. Noted: JUANCHO d/t ATN-transitioned off CVVHD to daily IHD 4/18-now MWF;; acute hypoxic respiratory failure d/t COVID-19 and bacterial PNA, intubated 3/28, trach placement once COVID negative; PEA arrest 4/9, large retroperitoneal bleed with hemorrhagic shock-off pressors; hyperglycemia improved. Good to see Ms Nehemias Espinal doing better overall-possible extubation tomorrow. Tube feedings held prior to and during SBT. Note if SBT >30 minutes then continue running tube feeding. Only need to hold 30 minutes prior to extubation to avoid unnecessary interruption in enteral nutrition support (unable to meet nutrient needs). Weight down 14 kg since admission-suspect d/t volume removal. Phosphorus now WNL 2/2 binder. Potassium replaced today. Flexiseal output appears to be decreasing-?remove soon. Recalculated energy needs d/t overall improvement (see below) therefore recommend increasing tube feeding rate slightly. New goal: Nepro @ 35 ml/hr with 2 packets Prosource daily and 50 ml water flush q 6 hr.  This will provide 840 ml, 1610 calories, 97 gm protein and 920 ml free water (tube feeding/flush) per day to meet estimated needs. Estimated Nutrition Needs:   Kcals/day: 1640 Kcals/day(14 kcal/kg)  Protein: 108 g(2g/kg IBW)  Fluid: (1 ml/kcal)  Based On: Kcal/kg - specify (Comment)  Weight Used: Actual wt(117 kg)    Pt expected to meet estimated nutrient needs:  [x]   Yes     []  No  [] Unable to predict at this time  Nutrition Diagnosis:   1. Inadequate protein-energy intake related to SBT's as evidenced by temporary cessation of enteral feeding for greater than 30 minutes. Goals:     Tube feeding to meet 90% estimated needs x 5-7 days. Monitoring & Evaluation:    - Enteral/parenteral nutrition intake   - Electrolyte and renal profile, CV-pulmonary, Weight/weight change, Glucose profile, GI     Previous Nutrition Goals Met: Progressing  Previous Recommendations: Yes    Education & Discharge Needs:   [x] None Identified   [] Identified and addressed    [x] Participated in care plan, discharge planning, and/or interdisciplinary rounds        Cultural, Hinduism and ethnic food preferences identified:  None    Skin Integrity: [x]Intact  []Other  Edema: []None [x] 1+ pitting generalized  Last BM: 4/28 via flexiseal  Food Allergies: [x]None []Other    Anthropometrics:    Weight Loss Metrics 4/28/2020 1/23/2020 1/22/2019 1/16/2018 1/9/2017 7/6/2016 12/14/2015   Today's Wt 257 lb 4.4 oz 270 lb 12.8 oz 278 lb 279 lb 275 lb 234 lb 213 lb 6.4 oz   BMI 44.16 kg/m2 43.73 kg/m2 44.89 kg/m2 45.05 kg/m2 44.39 kg/m2 37.79 kg/m2 34.46 kg/m2      Last 3 Recorded Weights in this Encounter    04/27/20 0500 04/28/20 0343 04/28/20 1601   Weight: 117.7 kg (259 lb 8 oz) 116.7 kg (257 lb 4.4 oz) 116.7 kg (257 lb 4.4 oz)      Weight Source: Bed  Height: 5' 4\" (162.6 cm),    Body mass index is 44.16 kg/m².      IBW : 54.4 kg (120 lb), % IBW (Calculated): 214.4 %   ,      Labs:    Lab Results   Component Value Date/Time    Sodium 135 (L) 04/28/2020 03:46 AM    Potassium 3.3 (L) 04/28/2020 03:46 AM    Chloride 99 04/28/2020 03:46 AM    CO2 27 04/28/2020 03:46 AM    Glucose 137 (H) 04/28/2020 03:46 AM    BUN 63 (H) 04/28/2020 03:46 AM    Creatinine 1.67 (H) 04/28/2020 03:46 AM    Calcium 8.8 04/28/2020 03:46 AM    Magnesium 2.0 04/28/2020 03:46 AM    Phosphorus 2.6 04/28/2020 03:46 AM    Albumin 2.5 (L) 04/28/2020 03:46 AM     Lab Results   Component Value Date/Time    Hemoglobin A1c 10.4 (H) 03/31/2020 03:42 PM     Lab Results   Component Value Date/Time    Glucose (POC) 127 (H) 04/28/2020 11:59 AM      Lab Results   Component Value Date/Time    ALT (SGPT) 206 (H) 04/20/2020 05:29 AM    AST (SGOT) 169 (H) 04/20/2020 05:29 AM    Alk.  phosphatase 490 (H) 04/20/2020 05:29 AM    Bilirubin, direct 0.7 (H) 04/20/2020 05:29 AM    Bilirubin, total 1.6 (H) 04/20/2020 05:29 AM        Prashant Levine RD [facility]

## 2020-04-28 NOTE — DIABETES MGMT
MARTA ROMERO  CLINICAL NURSE SPECIALIST CONSULT  PROGRAM FOR DIABETES HEALTH  Follow up Note  Presentation   Hesham Escobar is a 64 y.o. female admitted from OSH to [facility] ICU with SARS-COV2 needing CRRT. She is currently sedated and has been intubated since 3/28/20. Current clinical course has been complicated by steroid induced hyperglycemia. Steroids are discontinued at this time. She requires CRRT for acute renal failure r/t her sepsis and hypotension. PHM: GERD, obesity, and anxiety. New diabetes diagnosis with A1C 11.0% (3/28/2020); updated A1C 3/31/20-10.4%     Recent events:   Patient remains intubated and on ventilator, on HD M/W/F.   -On SBT - tolerating. On CPAP now- will do CPAP trial today-  Steroids discontinued. Consulted by Provider for advanced diabetes nursing assessment and care, specifically related to   [] Transitioning off Audi Menghini   [x] Inpatient management strategy  [] Home management assessment  [] Survival skill education    Diabetes-related medical history  Acute complications  hyperglycemia  Neurological complications  NONE  Microvascular disease  NONE  Macrovascular disease  NONE  Other associated conditions     NONE    Diabetes medication history: NONE    Subjective   Per chart review, Ms. Camryn Linn remains very ill  and is on isolation forSARS-COV2 in ICU. I am unable to do a physical assessment of the patient at this time. Patient reports the following home diabetes self-care practices: deferred    From the door, I can see her with eyes open, moving head. RN reports she is weak. Following commands, shaking and nodding her head. Objective     Vital Signs   Visit Vitals  /77   Pulse (!) 108   Temp 99 °F (37.2 °C)   Resp 22   Ht 5' 4\" (1.626 m)   Wt 116.7 kg (257 lb 4.4 oz)   SpO2 99%   BMI 44.16 kg/m²   .    Laboratory  Lab Results   Component Value Date/Time    Hemoglobin A1c 10.4 (H) 03/31/2020 03:42 PM     No results found for: LDL, LDLC, DLDLP  Lab Results Component Value Date/Time    Creatinine 1.67 (H) 04/28/2020 03:46 AM     Lab Results   Component Value Date/Time    Sodium 135 (L) 04/28/2020 03:46 AM    Potassium 3.3 (L) 04/28/2020 03:46 AM    Chloride 99 04/28/2020 03:46 AM    CO2 27 04/28/2020 03:46 AM    Anion gap 9 04/28/2020 03:46 AM    Glucose 137 (H) 04/28/2020 03:46 AM    BUN 63 (H) 04/28/2020 03:46 AM    Creatinine 1.67 (H) 04/28/2020 03:46 AM    BUN/Creatinine ratio 38 (H) 04/28/2020 03:46 AM    GFR est AA 38 (L) 04/28/2020 03:46 AM    GFR est non-AA 32 (L) 04/28/2020 03:46 AM    Calcium 8.8 04/28/2020 03:46 AM    Bilirubin, total 1.6 (H) 04/20/2020 05:29 AM    AST (SGOT) 169 (H) 04/20/2020 05:29 AM    Alk. phosphatase 490 (H) 04/20/2020 05:29 AM    Protein, total 5.9 (L) 04/20/2020 05:29 AM    Albumin 2.5 (L) 04/28/2020 03:46 AM    Globulin 3.5 04/20/2020 05:29 AM    A-G Ratio 0.7 (L) 04/20/2020 05:29 AM    ALT (SGPT) 206 (H) 04/20/2020 05:29 AM     Lab Results   Component Value Date/Time    ALT (SGPT) 206 (H) 04/20/2020 05:29 AM         Evaluation   Ms Vincent Butler, with new onset Type 2 diabetes,with A1C 10.4%. Steroid discontinued. Basal insulin held this morning due to lower BG trends overnight. TF were held for SBT which explains her lower BG trends. Will re-start TF today. Since she is now off steroids and BG normalizing, will see how her BG trends and cover with correctional  until BG trends >200mg/dl. It is imperative that we maintain her BG within target range 100-180mg/dl. Recommendations   1. If BG trends >200mg/dl consistently, consider re-initiating basal insulin at renal dosing (0.3units/kg). 2. Will continue to follow.     Assessment and Plan   Nursing Diagnosis Risk for unstable blood glucose pattern   Nursing Intervention Domain 7853 Decision-making Support   Nursing Interventions Examined current inpatient diabetes control   Explored factors facilitating and impeding inpatient management  Identified self-management practices impeding diabetes control  Explored corrective strategies with patient and responsible inpatient provider   Informed patient of rational for insulin strategy while hospitalized         Billing Code(s)     [x] 98993 IP subsequent hospital care - [address] [facility] 256, CNS   Program for Diabetes Health  Access via [facility] Center[facility]elm[facility] ChinLists of hospitals in the [place] Stateskayla [phone]

## 2020-04-28 NOTE — PROGRESS NOTES
[facility]    ICU TEAM Progress Note    Name: Rachel Gutierrez   : 1960   MRN: [id number]   Date: 2020        Subjective:     Reason for ICU Admission:   65 yo female with hx of obesity, endometrial cancer and diabetes who presented to [facility] with worsening hypoxic respiratory failure in lieu of close exposure to COVID-19 to family including mother (passed away) sister and brother and tested resulting + here. She presented to the hospital 3/26 and intubated 3/28. She was started on broad spectrum antibiotics and plaquenil. Developed worsening renal failure and was transferred to ICU for CRRT. Her hospital course has been further notable for development of a retroperitoneal bleed, hemorrhagic shock and sepsis and ongoing acute hypoxic respiratory failure with increasing PEEP and FIo2 requirements. She has been retested for covid and will consider trach when results return. She has transitioned from CRRT to HD, receiving daily at this time, tolerating well. Once we receive her COVID testing she should have trach and PEG, we can then start talking about placement for long term vent weaning and rehabilitation. Overnight Events: Awake and interactive this am. Makes eye contact, follows commands. Hemodynamics stable. Tolerating SBT today, though tV look slightly less than yesterday. Will SBT x 4 hrs. Weak cough, tV approx 510 with deep breath. Plan to extubate tomorrow am to BIPAP. Hypoglycemic overnight 2/2 held tube feeds. Lantus discontinued.       Objective:   Vital Signs:  Visit Vitals  /76   Pulse (!) 112   Temp 99 °F (37.2 °C)   Resp 23   Ht 5' 4\" (1.626 m)   Wt 116.7 kg (257 lb 4.4 oz)   SpO2 100%   BMI 44.16 kg/m²      O2 Device: Endotracheal tube(7.5 , 21 at lip) Temp (24hrs), Av.4 °F (36.9 °C), Min:97.8 °F (36.6 °C), Max:99 °F (37.2 °C)           Intake/Output:     Intake/Output Summary (Last 24 hours) at 2020 1205  Last data filed at 2020 0800  Gross per 24 hour   Intake 615 ml   Output 0 ml   Net 615 ml       Physical Exam:    General:  Alert and calm and on the ventilator. No acute distress. Eyes:  Sclera anicteric. Pupils equal, round, reactive to light 5mm soren. .   Mouth/Throat: Orotracheal tube in place, OGT in place   Neck: Supple. Lungs:   Clear to auscultation bilaterally, good effort, toleating SBT with good TV   Cardiovascular:  Regular rate and rhythm, no murmur, click, rub, or gallop. Abdomen:   Soft, non-tender, bowel sounds normal, non-distended. Extremities: No cyanosis or general edema + 2-3. Skin: No acute rash or lesions. Lymph Nodes: Cervical and supraclavicular normal.   Musculoskeletal:  No swelling or deformity. Lines/Devices:  Intact, no erythema, drainage, or tenderness. Psychiatric: Alert, calm, cooperative, attends, attempts to mouth words, attempts to follow simple commands but weak, able to hold up fingers upon command. Appears comfortable on ventilator. T/L/D  Tubes: ETT and Orogastric Tube  Lines: Arterial Line, Central Line and Ramon  Drains: Bowden Catheter    LABS AND  DATA: Personally reviewed  Recent Labs     04/28/20 0346 04/27/20  1811 04/27/20  0435   WBC 10.1  --  13.7*   HGB 8.0* 8.3* 7.8*   HCT 25.4* 26.2* 25.2*     --  240     Recent Labs     04/28/20  0346 04/27/20  0435   * 134*   K 3.3* 3.6   CL 99 99   CO2 27 26   BUN 63* 89*   CREA 1.67* 2.00*   * 115*   CA 8.8 8.7   MG 2.0 2.1   PHOS 2.6 3.7     Recent Labs     04/28/20  0346 04/27/20  0435   ALB 2.5* 2.5*     No results for input(s): INR, PTP, APTT, INREXT, INREXT in the last 72 hours. No results for input(s): PHI, PCO2I, PO2I, FIO2I in the last 72 hours. No results for input(s): CPK, CKMB, TROIQ, BNPP in the last 72 hours.     Hemodynamics:   PAP:   CO:     Wedge:   CI:     CVP:    SVR:       PVR:       Ventilator Settings:  Mode Rate Tidal Volume Pressure FiO2 PEEP   Spontaneous, Pressure support   430 ml  10 cm H2O 30 % 5 cm H20     Peak airway pressure: 15 cm H2O    Minute ventilation: 7.8 l/min        MEDS: Reviewed    Chest X-Ray:  CXR Results  (Last 48 hours)    None        ABCDEF Bundle/Checklist Completed:  YES-See Plan    SPECIAL EQUIPMENT  IHD    Active Problem List:     Problem List  Date Reviewed: 4/19/2020          Codes Class    Hypotension ICD-10-CM: I95.9  ICD-9-CM: 458.9 Acute        Retroperitoneal hemorrhage ICD-10-CM: R58  ICD-9-CM: 459.0         Acute renal failure (ARF) (HCC) ICD-10-CM: N17.9  ICD-9-CM: 584.9         Encephalopathy ICD-10-CM: G93.40  ICD-9-CM: 348.30         Sepsis with multi-organ dysfunction ([facility].) ICD-10-CM: A41.9, R65.20  ICD-9-CM: 038.9, 995.92         Pneumonia due to methicillin susceptible Staphylococcus aureus (MSSA) ([place] [address].) ICD-10-CM: J15.211  ICD-9-CM: 482.41         Thrombocytopenia ([facility] 75.) ICD-10-CM: D69.6  ICD-9-CM: 287.5         Diarrhea ICD-10-CM: R19.7  ICD-9-CM: 787.91         BZVMM-69 virus infection ICD-10-CM: U07.1         Obesity, morbid ([facility] 75.) ICD-10-CM: E66.01  ICD-9-CM: 278.01         Vaginal Pap smear following hysterectomy for malignancy ICD-10-CM: Z08, Z90.710  ICD-9-CM: V67.01         Personal history of malignant neoplasm of other parts of uterus ICD-10-CM: Z85.42  ICD-9-CM: V10.42         Endometrial cancer ([place] [address].) ICD-10-CM: C54.1  ICD-9-CM: 182.0             ICU Assessment/ Comprehensive Plan of Care:       NEURO  1. No acute concerns  Pain Medications: [address] decreased today to Q8hr from Q6hr  Target RASS: 0 - Alert & Calm - Spontaneously pays attention to caregiver  Sedation Medications: None  CAM-ICU:  Negative  Restraints: None needed at this time    CARDIAC  1. No acute concerns  Remains off vasoactive infusions with supported BP  Cardiac Gtts: None  SBP Goal of: > 100 mmHg  MAP Goal of: > 65 mmHg  Transfusion Trigger (Hgb): <7 g/dL    RESPIRATORY  1.  Acute hypoxic respiratory failure 2/2 COVID 19 and MSSA pneumonia  Tolerating SBT today with initial RSBI 65  With good tV 350-500, RR 25-34  SBT x 4 hrs today  Tolerating SBT 6 hr 04/26 and 04/27  Working towards extubation 04/29 to BIPAP  Guifenesin Q6hr    Respiratory Goals: Chlorhexidine   Optimize PEEP/Ventilation/Oxygenation  Head of bed > 30 degrees  Aggressive bronchopulmonary hygiene  Incentive spirometry  SPO2 Goal: > 92%  Pulmonary toilet: Albuterol   DVT Prophylaxis (if no, list reason): SCD's or Sequential Compression Device     RENAL  1. Acute renal failure requiring HD  Now on daily IHD with good tolerance  Removed 1.5 liters today 04/27  Nephrology following  Bowden Catheter Present: Yes  IVFs: NA    GASTROINTESTINAL  1. No acute concerns  2. Diarrhea  Added Benepro today TID to tube feeds  Flexiseal in place  GI Prophylaxis: Pepcid (famotidine)   Nutrition: Yes TF  Bowel Movement: Yes  Bowel Regimen: None needed at this time    HEMATOLOGIC  1. Retroperitoneal bleed this hospitalization, resolved  Avoid AP/AC meds  Monitor H/H daily  No current s/s of bleeding  DVT Prophylaxis (if no, list reason): SCD's or Sequential Compression Device   2. Non occlusive PE in LLL pulmonary artery    ID  1. No concerns for infection at present  Trend WBC/ Fevers  2. COVID 19 + on admit  COVID 19 NEG x 1 04/20  Repeat 04/27 to DC isolation  Completed 5 days of Plaquenil and zinc  Antibiotics:NA    ENDOCRINE  1. Diabetes Mellitus Type 2  Lantus D/C'd today 2/2 hypoglycemia overnight  Continue SSI Q6hr  Glycemic Control - Insulin: Yes    MUSCULOSKELETAL  1.  Severe deconditioning 2/2 critical illness  Bedrest  Encourage and assist with PROM and AROM  Mobility: Poor and Bedrest  PT/OT: PT consulted and on board and OT consulted and on board     DISPOSITION/COMMUNICATION  Discussed Plan of Care/Code Status: Full Code  Stay in ICU    CRITICAL CARE CONSULTANT NOTE  I had a face to face encounter with the patient, reviewed and interpreted patient data including clinical events, labs, images, vital signs, I/O's, and examined patient. I have discussed the case and the plan and management of the patient's care with the consulting services, the bedside nurses and the respiratory therapist.      NOTE OF PERSONAL INVOLVEMENT IN CARE   This patient has a high probability of imminent, clinically significant deterioration, which requires the highest level of preparedness to intervene urgently. I participated in the decision-making and personally managed or directed the management of the following life and organ supporting interventions that required my frequent assessment to treat or prevent imminent deterioration. I personally spent 45 minutes of critical care time. This is time spent at this critically ill patient's bedside actively involved in patient care as well as the coordination of care and discussions with the patient's family. This does not include any procedural time which has been billed separately.     MARC BackROCHELLE-BC  Intensivist Nurse Practitioner  [facility]  4/28/2020

## 2020-04-28 NOTE — PROGRESS NOTES
1930: Bedside and Verbal shift change report given to Yanelis Carpenter, [address]) by Marcus Vásquez, JUDY (offgoing nurse).  Report included the following information SBAR, Kardex, Intake/Output, MAR, Recent Results, Cardiac Rhythm NSR/Sinus Tachycardia and Alarm Parameters   1959-BS 72 lantus held and JOSE JUAN Rosales updated-will re-check BS at MN  2336- JOSE JUAN Rosales updated-order received to hold lantus  0500-TF stopped for SBT at 0630    0730-bedside shift report given to RN using sbar format

## 2020-04-29 LAB
ALBUMIN SERPL-MCNC: 2.4 G/DL (ref 3.5–5)
ANION GAP SERPL CALC-SCNC: 9 MMOL/L (ref 5–15)
BASOPHILS # BLD: 0 K/UL (ref 0–0.1)
BASOPHILS NFR BLD: 0 % (ref 0–1)
BUN SERPL-MCNC: 92 MG/DL (ref 6–20)
BUN/CREAT SERPL: 39 (ref 12–20)
CALCIUM SERPL-MCNC: 8.7 MG/DL (ref 8.5–10.1)
CHLORIDE SERPL-SCNC: 99 MMOL/L (ref 97–108)
CO2 SERPL-SCNC: 25 MMOL/L (ref 21–32)
COVID-19, XGCOVT: NEGATIVE
CREAT SERPL-MCNC: 2.34 MG/DL (ref 0.55–1.02)
CRP SERPL HS-MCNC: >9.5 MG/L
D DIMER PPP FEU-MCNC: 19.89 MG/L FEU (ref 0–0.65)
DIFFERENTIAL METHOD BLD: ABNORMAL
EOSINOPHIL # BLD: 0.1 K/UL (ref 0–0.4)
EOSINOPHIL NFR BLD: 1 % (ref 0–7)
ERYTHROCYTE [DISTWIDTH] IN BLOOD BY AUTOMATED COUNT: 16.5 % (ref 11.5–14.5)
FIBRINOGEN PPP-MCNC: 606 MG/DL (ref 200–475)
GLUCOSE BLD STRIP.AUTO-MCNC: 148 MG/DL (ref 65–100)
GLUCOSE BLD STRIP.AUTO-MCNC: 189 MG/DL (ref 65–100)
GLUCOSE BLD STRIP.AUTO-MCNC: 209 MG/DL (ref 65–100)
GLUCOSE BLD STRIP.AUTO-MCNC: 210 MG/DL (ref 65–100)
GLUCOSE SERPL-MCNC: 193 MG/DL (ref 65–100)
HBV SURFACE AB SER QL: NONREACTIVE
HBV SURFACE AB SER-ACNC: 5.89 MIU/ML
HBV SURFACE AG SER QL: <0.1 INDEX
HBV SURFACE AG SER QL: NEGATIVE
HCT VFR BLD AUTO: 37.7 % (ref 35–47)
HGB BLD-MCNC: 12.3 G/DL (ref 11.5–16)
IMM GRANULOCYTES # BLD AUTO: 0.1 K/UL (ref 0–0.04)
IMM GRANULOCYTES NFR BLD AUTO: 1 % (ref 0–0.5)
LYMPHOCYTES # BLD: 0.7 K/UL (ref 0.8–3.5)
LYMPHOCYTES NFR BLD: 13 % (ref 12–49)
MAGNESIUM SERPL-MCNC: 2.1 MG/DL (ref 1.6–2.4)
MCH RBC QN AUTO: 30.8 PG (ref 26–34)
MCHC RBC AUTO-ENTMCNC: 32.6 G/DL (ref 30–36.5)
MCV RBC AUTO: 94.3 FL (ref 80–99)
MONOCYTES # BLD: 0.4 K/UL (ref 0–1)
MONOCYTES NFR BLD: 8 % (ref 5–13)
NEUTS SEG # BLD: 3.9 K/UL (ref 1.8–8)
NEUTS SEG NFR BLD: 77 % (ref 32–75)
NRBC # BLD: 0 K/UL (ref 0–0.01)
NRBC BLD-RTO: 0 PER 100 WBC
PHOSPHATE SERPL-MCNC: 3.4 MG/DL (ref 2.6–4.7)
PLATELET # BLD AUTO: 175 K/UL (ref 150–400)
PLATELET COMMENTS,PCOM: ABNORMAL
PMV BLD AUTO: 10.6 FL (ref 8.9–12.9)
POTASSIUM SERPL-SCNC: 3.6 MMOL/L (ref 3.5–5.1)
RBC # BLD AUTO: 4 M/UL (ref 3.8–5.2)
RBC MORPH BLD: ABNORMAL
SERVICE CMNT-IMP: ABNORMAL
SODIUM SERPL-SCNC: 133 MMOL/L (ref 136–145)
WBC # BLD AUTO: 5.2 K/UL (ref 3.6–11)

## 2020-04-29 PROCEDURE — 94660 CPAP INITIATION&MGMT: CPT

## 2020-04-29 PROCEDURE — 82962 GLUCOSE BLOOD TEST: CPT

## 2020-04-29 PROCEDURE — 74011250636 HC RX REV CODE- 250/636: Performed by: NURSE PRACTITIONER

## 2020-04-29 PROCEDURE — 36415 COLL VENOUS BLD VENIPUNCTURE: CPT

## 2020-04-29 PROCEDURE — 74011250637 HC RX REV CODE- 250/637: Performed by: NURSE PRACTITIONER

## 2020-04-29 PROCEDURE — 85384 FIBRINOGEN ACTIVITY: CPT

## 2020-04-29 PROCEDURE — 80069 RENAL FUNCTION PANEL: CPT

## 2020-04-29 PROCEDURE — 74011250636 HC RX REV CODE- 250/636: Performed by: INTERNAL MEDICINE

## 2020-04-29 PROCEDURE — 86706 HEP B SURFACE ANTIBODY: CPT

## 2020-04-29 PROCEDURE — 65610000006 HC RM INTENSIVE CARE

## 2020-04-29 PROCEDURE — 74011250637 HC RX REV CODE- 250/637: Performed by: INTERNAL MEDICINE

## 2020-04-29 PROCEDURE — 85379 FIBRIN DEGRADATION QUANT: CPT

## 2020-04-29 PROCEDURE — 83735 ASSAY OF MAGNESIUM: CPT

## 2020-04-29 PROCEDURE — 74011636637 HC RX REV CODE- 636/637: Performed by: NURSE PRACTITIONER

## 2020-04-29 PROCEDURE — 86141 C-REACTIVE PROTEIN HS: CPT

## 2020-04-29 PROCEDURE — 85025 COMPLETE CBC W/AUTO DIFF WBC: CPT

## 2020-04-29 PROCEDURE — 94003 VENT MGMT INPAT SUBQ DAY: CPT

## 2020-04-29 PROCEDURE — 77010033678 HC OXYGEN DAILY

## 2020-04-29 PROCEDURE — 90935 HEMODIALYSIS ONE EVALUATION: CPT

## 2020-04-29 PROCEDURE — 87340 HEPATITIS B SURFACE AG IA: CPT

## 2020-04-29 RX ORDER — HYDROCODONE BITARTRATE AND ACETAMINOPHEN 5; 325 MG/1; MG/1
1 TABLET ORAL 2 TIMES DAILY
Status: DISCONTINUED | OUTPATIENT
Start: 2020-04-29 | End: 2020-04-30

## 2020-04-29 RX ADMIN — SODIUM CHLORIDE 20 ML: 9 INJECTION INTRAMUSCULAR; INTRAVENOUS; SUBCUTANEOUS at 14:00

## 2020-04-29 RX ADMIN — PHENOL 1 SPRAY: 1.5 LIQUID ORAL at 13:00

## 2020-04-29 RX ADMIN — SEVELAMER CARBONATE 0.8 G: 800 POWDER, FOR SUSPENSION ORAL at 17:00

## 2020-04-29 RX ADMIN — SODIUM CHLORIDE 10 ML: 9 INJECTION INTRAMUSCULAR; INTRAVENOUS; SUBCUTANEOUS at 05:11

## 2020-04-29 RX ADMIN — GUAIFENESIN 200 MG: 100 SOLUTION ORAL at 00:01

## 2020-04-29 RX ADMIN — GUAIFENESIN 200 MG: 100 SOLUTION ORAL at 05:11

## 2020-04-29 RX ADMIN — HYDROCODONE BITARTRATE AND ACETAMINOPHEN 1 TABLET: 5; 325 TABLET ORAL at 20:45

## 2020-04-29 RX ADMIN — INSULIN LISPRO 3 UNITS: 100 INJECTION, SOLUTION INTRAVENOUS; SUBCUTANEOUS at 13:47

## 2020-04-29 RX ADMIN — GUAIFENESIN 200 MG: 100 SOLUTION ORAL at 23:09

## 2020-04-29 RX ADMIN — Medication 1 PACKET: at 20:47

## 2020-04-29 RX ADMIN — ONDANSETRON 4 MG: 2 INJECTION INTRAMUSCULAR; INTRAVENOUS at 08:46

## 2020-04-29 RX ADMIN — INSULIN LISPRO 2 UNITS: 100 INJECTION, SOLUTION INTRAVENOUS; SUBCUTANEOUS at 18:42

## 2020-04-29 RX ADMIN — SODIUM CHLORIDE 5 ML/HR: 900 INJECTION, SOLUTION INTRAVENOUS at 06:34

## 2020-04-29 RX ADMIN — GUAIFENESIN 200 MG: 100 SOLUTION ORAL at 18:00

## 2020-04-29 RX ADMIN — FENTANYL CITRATE 50 MCG: 50 INJECTION INTRAMUSCULAR; INTRAVENOUS at 05:45

## 2020-04-29 RX ADMIN — CHLORHEXIDINE GLUCONATE 15 ML: 1.2 RINSE ORAL at 08:56

## 2020-04-29 RX ADMIN — Medication 1 PACKET: at 16:00

## 2020-04-29 RX ADMIN — HEPARIN SODIUM 2500 UNITS: 1000 INJECTION INTRAVENOUS; SUBCUTANEOUS at 17:15

## 2020-04-29 RX ADMIN — CASTOR OIL AND BALSAM, PERU: 788; 87 OINTMENT TOPICAL at 18:23

## 2020-04-29 RX ADMIN — INSULIN LISPRO 3 UNITS: 100 INJECTION, SOLUTION INTRAVENOUS; SUBCUTANEOUS at 01:00

## 2020-04-29 RX ADMIN — HYDROCODONE BITARTRATE AND ACETAMINOPHEN 1 TABLET: 5; 325 TABLET ORAL at 01:13

## 2020-04-29 RX ADMIN — MINERAL OIL AND WHITE PETROLATUM: 150; 830 OINTMENT OPHTHALMIC at 09:00

## 2020-04-29 RX ADMIN — MINERAL OIL AND WHITE PETROLATUM: 150; 830 OINTMENT OPHTHALMIC at 20:44

## 2020-04-29 RX ADMIN — CASTOR OIL AND BALSAM, PERU: 788; 87 OINTMENT TOPICAL at 13:50

## 2020-04-29 RX ADMIN — INSULIN LISPRO 2 UNITS: 100 INJECTION, SOLUTION INTRAVENOUS; SUBCUTANEOUS at 23:09

## 2020-04-29 RX ADMIN — SODIUM CHLORIDE 10 ML: 9 INJECTION INTRAMUSCULAR; INTRAVENOUS; SUBCUTANEOUS at 20:48

## 2020-04-29 RX ADMIN — INSULIN LISPRO 2 UNITS: 100 INJECTION, SOLUTION INTRAVENOUS; SUBCUTANEOUS at 06:35

## 2020-04-29 RX ADMIN — FAMOTIDINE 20 MG: 40 POWDER, FOR SUSPENSION ORAL at 08:53

## 2020-04-29 RX ADMIN — CHLORHEXIDINE GLUCONATE 15 ML: 1.2 RINSE ORAL at 20:45

## 2020-04-29 RX ADMIN — CASTOR OIL AND BALSAM, PERU: 788; 87 OINTMENT TOPICAL at 09:00

## 2020-04-29 NOTE — DIABETES MGMT
MARTA ROMERO  CLINICAL NURSE SPECIALIST CONSULT  PROGRAM FOR DIABETES HEALTH  Follow up Note  Presentation   Trev York is a 64 y.o. female admitted from OSH to [facility] ICU with SARS-COV2 needing CRRT. She is currently sedated and has been intubated since 3/28/20. Current clinical course has been complicated by steroid induced hyperglycemia. Steroids are discontinued at this time. She requires CRRT for acute renal failure r/t her sepsis and hypotension. PHM: GERD, obesity, and anxiety. New diabetes diagnosis with A1C 11.0% (3/28/2020); updated A1C 3/31/20-10.4%     Recent events:   Patient extubated. This morning and tolerated fairly well per RN note. Currently on BIPA 40%. TF off while extubated. Consulted by Provider for advanced diabetes nursing assessment and care, specifically related to   [] Transitioning off Merlene Silvius   [x] Inpatient management strategy  [] Home management assessment  [] Survival skill education    Diabetes-related medical history  Acute complications  hyperglycemia  Neurological complications  NONE  Microvascular disease  NONE  Macrovascular disease  NONE  Other associated conditions     NONE    Diabetes medication history: NONE    Subjective   Per chart review, Ms. Latanya Peterson remains very ill  and is on isolation forSARS-COV2 in ICU. I am unable to do a physical assessment of the patient at this time. Patient reports the following home diabetes self-care practices: deferred    From the door,She is awake, tolerating bipap mask. Objective     Vital Signs   Visit Vitals  BP (!) 134/91   Pulse (!) 106   Temp 98.2 °F (36.8 °C)   Resp 18   Ht 5' 4\" (1.626 m)   Wt 116.7 kg (257 lb 4.4 oz)   SpO2 100%   BMI 44.16 kg/m²   .    Laboratory  Lab Results   Component Value Date/Time    Hemoglobin A1c 10.4 (H) 03/31/2020 03:42 PM     No results found for: LDL, LDLC, DLDLP  Lab Results   Component Value Date/Time    Creatinine 2.34 (H) 04/29/2020 04:55 AM     Lab Results   Component Value Date/Time    Sodium 133 (L) 04/29/2020 04:55 AM    Potassium 3.6 04/29/2020 04:55 AM    Chloride 99 04/29/2020 04:55 AM    CO2 25 04/29/2020 04:55 AM    Anion gap 9 04/29/2020 04:55 AM    Glucose 193 (H) 04/29/2020 04:55 AM    BUN 92 (H) 04/29/2020 04:55 AM    Creatinine 2.34 (H) 04/29/2020 04:55 AM    BUN/Creatinine ratio 39 (H) 04/29/2020 04:55 AM    GFR est AA 26 (L) 04/29/2020 04:55 AM    GFR est non-AA 21 (L) 04/29/2020 04:55 AM    Calcium 8.7 04/29/2020 04:55 AM    Bilirubin, total 1.6 (H) 04/20/2020 05:29 AM    AST (SGOT) 169 (H) 04/20/2020 05:29 AM    Alk. phosphatase 490 (H) 04/20/2020 05:29 AM    Protein, total 5.9 (L) 04/20/2020 05:29 AM    Albumin 2.4 (L) 04/29/2020 04:55 AM    Globulin 3.5 04/20/2020 05:29 AM    A-G Ratio 0.7 (L) 04/20/2020 05:29 AM    ALT (SGPT) 206 (H) 04/20/2020 05:29 AM     Lab Results   Component Value Date/Time    ALT (SGPT) 206 (H) 04/20/2020 05:29 AM         Evaluation   Ms Elfida Mcardle, with new onset Type 2 diabetes,with A1C 10.4%. Basal insulin still on hold. Treating BG >200mg/dl with correctional insulin. BG has trended up from yesterday after TF got re-started. TF today are on hold for extubation. It is imperative that we maintain her BG within target range 100-180mg/dl. Recommendations   1. If BG trends >200mg/dl consistently, consider re-initiating basal insulin at renal dosing (0.3units/kg). 2. Will continue to follow.     Assessment and Plan   Nursing Diagnosis Risk for unstable blood glucose pattern   Nursing Intervention Domain 5155 Decision-making Support   Nursing Interventions Examined current inpatient diabetes control   Explored factors facilitating and impeding inpatient management  Identified self-management practices impeding diabetes control  Explored corrective strategies with patient and responsible inpatient provider   Informed patient of rational for insulin strategy while hospitalized         Billing Code(s)     [x] 64724 IP subsequent hospital care - [address] [facility] 256, CNS   Program for Diabetes Health  Access via [address] [phone]

## 2020-04-29 NOTE — PROGRESS NOTES
1930: Bedside and Verbal shift change report given to Josie Gusman, [facility] nurse) by Crista Guzman, JUDY (offgoing nurse). Report included the following information SBAR, Kardex, Intake/Output, MAR, Recent Results, Cardiac Rhythm NSR/Sinus Tachycardia and Alarm Parameters     2000 I/C from , Frannie Mcdermott; updated via phone. 0530 I/C from , Frannie Mcdermott; updated via phone. 0710 I/C from sister, asking to speak with patient. Requested she call back later in shift. [address] NP to bedside, pt very anxious on SBT and with right hand on ETT. Plan for extubation this AM. RT Bibiana notified. Bedside and Verbal shift change report given to [facility] nurse) by Josie Gusman RN (offgoing nurse).  Report included the following information SBAR, Kardex, Intake/Output, MAR, Recent Results, Cardiac Rhythm NSR/Sinus Tachycardia and Alarm Parameters

## 2020-04-29 NOTE — PROGRESS NOTES
[facility]   [address] [facility], [facility] [address], [facility]  Phone: [phone]   [phone]       Nephrology Progress Note  Jarret Forrester     1960     [id number]  Date of Admission : 3/31/2020  04/29/20    CC: Follow up for JUANCHO      Assessment and Plan   JUANCHO :  - 2/2 ATN  - CRRT switched to daily IHD on 4/18  - New line placed by IR on 4/22  - HD MWF this week. HD today w/ 1.5 kg UF goal   - daily labs     Lytes  -  Stable     COVID-19 +ve   Acute Hypoxic Resp Failure   - On Vent   - completed Plaquenil. On Vit C, Zinc   - s/p Tocilizumab   - on daily SBT      RP hematoma:  - repeat CBC     Cardiac arrest 4/9    Type II DM   - Insulin per primary team      Morbid Obesity        Interval History:  Plans for extubation noted   COVID-ve on last check   ? Accuracy of CBC today     PT NOT EXAMINED in line with ASN and RPA GUIDELINES ON MANAGING COVID-19 PTS WITH RENAL ISSUES. Examination findings discussed personally with the examining Physician team member      Review of Systems: Review of systems not obtained due to patient factors.     Current Medications:   Current Facility-Administered Medications   Medication Dose Route Frequency    HYDROcodone-acetaminophen (NORCO) 5-325 mg per tablet 1 Tab  1 Tab Per NG tube Q8H    sevelamer carbonate (RENVELA) oral powder 0.8 g  0.8 g Oral TID WITH MEALS    0.9% sodium chloride infusion 250 mL  250 mL IntraVENous PRN    heparin (porcine) 1,000 unit/mL injection 2,500 Units  2,500 Units IntraVENous DIALYSIS PRN    guar gum (BENEFIBER) packet 1 Packet  4 g Oral TID    fentaNYL citrate (PF) injection 25-50 mcg  25-50 mcg IntraVENous Q2H PRN    labetaloL (NORMODYNE;TRANDATE) injection 20 mg  20 mg IntraVENous Q4H PRN    alteplase (CATHFLO) 1 mg in sterile water (preservative free) 1 mL injection  1 mg InterCATHeter PRN    albumin human 25% (BUMINATE) solution 12.5 g  12.5 g IntraVENous DIALYSIS PRN    0.9% sodium chloride infusion 250 mL 250 mL IntraVENous PRN    guaiFENesin (ROBITUSSIN) 100 mg/5 mL oral liquid 200 mg  200 mg Oral Q6H    albuterol (PROVENTIL VENTOLIN) nebulizer solution 2.5 mg  2.5 mg Nebulization Q4H PRN    famotidine (PEPCID) 8 mg/mL oral suspension 20 mg  20 mg Per NG tube DAILY    alteplase (CATHFLO) 2 mg in sterile water (preservative free) 2 mL injection  2 mg InterCATHeter DIALYSIS PRN    alteplase (CATHFLO) 2 mg in sterile water (preservative free) 2 mL injection  2 mg InterCATHeter DIALYSIS PRN    insulin lispro (HUMALOG) injection   SubCUTAneous Q6H    0.9% sodium chloride infusion 250 mL  250 mL IntraVENous PRN    chlorhexidine (PERIDEX) 0.12 % mouthwash 15 mL  15 mL Oral Q12H    0.9% sodium chloride infusion  3 mL/hr IntraVENous CONTINUOUS    0.9% sodium chloride infusion  5 mL/hr IntraVENous CONTINUOUS    white petrolatum-mineral oiL (AKWA TEARS) 83-15 % ophthalmic ointment   Both Eyes Q12H    heparin (porcine) pf 300 Units  300 Units InterCATHeter PRN    bacitracin 500 unit/gram packet 1 Packet  1 Packet Topical PRN    balsam peru-castor oiL (VENELEX) ointment   Topical BID    sodium chloride (NS) flush 5-40 mL  5-40 mL IntraVENous Q8H    sodium chloride (NS) flush 5-40 mL  5-40 mL IntraVENous PRN    HYDROcodone-acetaminophen (NORCO) 5-325 mg per tablet 1 Tab  1 Tab Oral Q4H PRN    ondansetron (ZOFRAN) injection 4 mg  4 mg IntraVENous Q4H PRN    acetaminophen (TYLENOL) tablet 650 mg  650 mg Oral Q6H PRN    Or    acetaminophen (TYLENOL) suppository 650 mg  650 mg Rectal Q6H PRN    glucose chewable tablet 16 g  4 Tab Oral PRN    glucagon (GLUCAGEN) injection 1 mg  1 mg IntraMUSCular PRN    dextrose 10% infusion 0-250 mL  0-250 mL IntraVENous PRN      Allergies   Allergen Reactions    Augmentin [Amoxicillin-Pot Clavulanate] Rash and Itching       Objective:  Vitals:    Vitals:    04/29/20 0628 04/29/20 0700 04/29/20 0800 04/29/20 0900   BP:  133/76 124/87 126/84   Pulse: 99 (!) 118 (!) 111 (!) 108 Resp: 24 22 26 26   Temp:   98.2 °F (36.8 °C)    TempSrc:       SpO2: 100% 100% 99% 100%   Weight:       Height:         Intake and Output:  04/29 0701 - 04/29 1900  In: 40 [I.V.:10]  Out: 0   04/27 1901 - 04/29 0700  In: 1420 [I.V.:60]  Out: 125 [Drains:125]    Physical Examination:   Pt intubated    Yes  General: Awake on vent   Resp:  On vent   CV:  RRR  GI:  Obese   Neurologic:  Awake, unable to assess   Access:           R JAZZ torres     []    High complexity decision making was performed  []    Patient is at high-risk of decompensation with multiple organ involvement    Lab Data Personally Reviewed: I have reviewed all the pertinent labs, microbiology data and radiology studies during assessment.     Recent Labs     04/29/20  0455 04/28/20  0346 04/27/20  0435   * 135* 134*   K 3.6 3.3* 3.6   CL 99 99 99   CO2 25 27 26   * 137* 115*   BUN 92* 63* 89*   CREA 2.34* 1.67* 2.00*   CA 8.7 8.8 8.7   MG 2.1 2.0 2.1   PHOS 3.4 2.6 3.7   ALB 2.4* 2.5* 2.5*     Recent Labs     04/29/20  0455 04/28/20  0346 04/27/20  1811 04/27/20  0435 04/26/20  1946   WBC 5.2 10.1  --  13.7*  --    HGB 12.3 8.0* 8.3* 7.8* 8.1*   HCT 37.7 25.4* 26.2* 25.2* 26.1*    225  --  240  --      No results found for: SDES  Lab Results   Component Value Date/Time    Culture result: NO GROWTH 5 DAYS 04/09/2020 02:32 PM    Culture result: NO GROWTH 5 DAYS 04/08/2020 10:36 AM    Culture result: NO GROWTH 5 DAYS 03/31/2020 11:15 AM    Culture result: MODERATE STAPHYLOCOCCUS AUREUS (A) 03/31/2020 10:34 AM    Culture result: LIGHT NORMAL RESPIRATORY SOFIE 03/31/2020 10:34 AM     Recent Results (from the past 24 hour(s))   GLUCOSE, POC    Collection Time: 04/28/20 11:59 AM   Result Value Ref Range    Glucose (POC) 127 (H) 65 - 100 mg/dL    Performed by [address], POC    Collection Time: 04/28/20  5:57 PM   Result Value Ref Range    Glucose (POC) 201 (H) 65 - 100 mg/dL    Performed by Delmy Bueno    GLUCOSE, POC    Collection Time: 04/29/20 12:00 AM   Result Value Ref Range    Glucose (POC) 210 (H) 65 - 100 mg/dL    Performed by Martín Godfrey    CRP, HIGH SENSITIVITY    Collection Time: 04/29/20  4:55 AM   Result Value Ref Range    CRP, High sensitivity >9.5 mg/L   RENAL FUNCTION PANEL    Collection Time: 04/29/20  4:55 AM   Result Value Ref Range    Sodium 133 (L) 136 - 145 mmol/L    Potassium 3.6 3.5 - 5.1 mmol/L    Chloride 99 97 - 108 mmol/L    CO2 25 21 - 32 mmol/L    Anion gap 9 5 - 15 mmol/L    Glucose 193 (H) 65 - 100 mg/dL    BUN 92 (H) 6 - 20 MG/DL    Creatinine 2.34 (H) 0.55 - 1.02 MG/DL    BUN/Creatinine ratio 39 (H) 12 - 20      GFR est AA 26 (L) >60 ml/min/1.73m2    GFR est non-AA 21 (L) >60 ml/min/1.73m2    Calcium 8.7 8.5 - 10.1 MG/DL    Phosphorus 3.4 2.6 - 4.7 MG/DL    Albumin 2.4 (L) 3.5 - 5.0 g/dL   MAGNESIUM    Collection Time: 04/29/20  4:55 AM   Result Value Ref Range    Magnesium 2.1 1.6 - 2.4 mg/dL   CBC WITH AUTOMATED DIFF    Collection Time: 04/29/20  4:55 AM   Result Value Ref Range    WBC 5.2 3.6 - 11.0 K/uL    RBC 4.00 3.80 - 5.20 M/uL    HGB 12.3 11.5 - 16.0 g/dL    HCT 37.7 35.0 - 47.0 %    MCV 94.3 80.0 - 99.0 FL    MCH 30.8 26.0 - 34.0 PG    MCHC 32.6 30.0 - 36.5 g/dL    RDW 16.5 (H) 11.5 - 14.5 %    PLATELET 400 591 - 170 K/uL    MPV 10.6 8.9 - 12.9 FL    NRBC 0.0 0  WBC    ABSOLUTE NRBC 0.00 0.00 - 0.01 K/uL    NEUTROPHILS 77 (H) 32 - 75 %    LYMPHOCYTES 13 12 - 49 %    MONOCYTES 8 5 - 13 %    EOSINOPHILS 1 0 - 7 %    BASOPHILS 0 0 - 1 %    IMMATURE GRANULOCYTES 1 (H) 0.0 - 0.5 %    ABS. NEUTROPHILS 3.9 1.8 - 8.0 K/UL    ABS. LYMPHOCYTES 0.7 (L) 0.8 - 3.5 K/UL    ABS. MONOCYTES 0.4 0.0 - 1.0 K/UL    ABS. EOSINOPHILS 0.1 0.0 - 0.4 K/UL    ABS. BASOPHILS 0.0 0.0 - 0.1 K/UL    ABS. IMM.  GRANS. 0.1 (H) 0.00 - 0.04 K/UL    DF SMEAR SCANNED      PLATELET COMMENTS Large Platelets      RBC COMMENTS ANISOCYTOSIS  1+        RBC COMMENTS MACROCYTOSIS  1+        RBC COMMENTS TARGET CELLS  1+       D DIMER    Collection Time: 04/29/20  4:55 AM   Result Value Ref Range    D-dimer 19.89 (H) 0.00 - 0.65 mg/L FEU   FIBRINOGEN    Collection Time: 04/29/20  4:55 AM   Result Value Ref Range    Fibrinogen 606 (H) 200 - 475 mg/dL   HEP B SURFACE AB    Collection Time: 04/29/20  4:55 AM   Result Value Ref Range    Hepatitis B surface Ab 5.89 mIU/mL    Hep B surface Ab Interp. NONREACTIVE NR     HEP B SURFACE AG    Collection Time: 04/29/20  4:55 AM   Result Value Ref Range    Hepatitis B surface Ag <0.10 Index    Hep B surface Ag Interp. Negative NEG             Total time spent with patient:  xxx   min. Care Plan discussed with:  Patient     Family      RN      Consulting Physician [facility],         I have reviewed the flowsheets. Chart and Pertinent Notes have been reviewed. No change in PMH ,family and social history from Consult note.       Liang Duenas MD

## 2020-04-29 NOTE — PROGRESS NOTES
0800 received report from [address] [place] [address] anxious at this time:nurse practitioner in room to see her; tube feedings off for planned extubation  0900 - patient calmer, alert; indicates she is nauseous; zofran 4 mg. IV given  0930 - patient indicates nausea is better; RT and NP in room for extubation; pt. Tolerated fairly well; hoarse cough; indicates throat irritation when asked; placed on Bipap 40%   1015 patient's  updated; pt. Continuing to do well on bipap  1200 reassessment done  1300 new nasogastric tube inserted in right nare for tube feedings;pt.  Turned and cleaned  1400 patient on hemodialysis  1730 patient done with hemodialysis; tolerated bipap well; taken off bipap and switched to nasal cannula at 4L patient cleaned and turned  1900 patient resting well and calm  1930 report given to az Carmine

## 2020-04-29 NOTE — PROGRESS NOTES
[facility]    ICU TEAM Progress Note    Name: Titus Maria   : 1960   MRN: [id number]   Date: 2020        Subjective:     Reason for ICU Admission:   65 yo female with hx of obesity, endometrial cancer and diabetes who presented to [facility] with worsening hypoxic respiratory failure in lieu of close exposure to COVID-19 to family including mother (passed away) sister and brother and tested resulting + here. She presented to the hospital 3/26 and intubated 3/28. She was started on broad spectrum antibiotics and plaquenil. Developed worsening renal failure and was transferred to ICU for CRRT. Her hospital course has been further notable for development of a retroperitoneal bleed, hemorrhagic shock and sepsis and ongoing acute hypoxic respiratory failure with increasing PEEP and FIo2 requirements. She has been retested for covid and will consider trach when results return. She has transitioned from CRRT to HD, receiving daily at this time, tolerating well. Once we receive her COVID testing she should have trach and PEG, we can then start talking about placement for long term vent weaning and rehabilitation. Overnight Events: Awake and interactive this am, anxious on SBT with good TV and RSBI. Extubated to BIPAP 10/5 today at approx. 0930 with good tolerance. HD planned for this am.      Objective:   Vital Signs:  Visit Vitals  /78   Pulse 99   Temp 97.8 °F (36.6 °C)   Resp 24   Ht 5' 4\" (1.626 m)   Wt 116.7 kg (257 lb 4.4 oz)   SpO2 100%   BMI 44.16 kg/m²      O2 Device: Endotracheal tube Temp (24hrs), Av.5 °F (36.9 °C), Min:97.8 °F (36.6 °C), Max:99 °F (37.2 °C)           Intake/Output:     Intake/Output Summary (Last 24 hours) at 2020 0752  Last data filed at 2020 0600  Gross per 24 hour   Intake 960 ml   Output 125 ml   Net 835 ml       Physical Exam:    General:  Alert and calm and on the ventilator. No acute distress. Eyes:  Sclera anicteric. Pupils equal, round, reactive to light 5mm soren. .   Mouth/Throat: Mucous membranes moist, clear   Neck: Supple. Lungs:   Clear to auscultation bilaterally, good effort, tolerating BIPAP with good TV   Cardiovascular:  Regular rate and rhythm, no murmur, click, rub, or gallop. Abdomen:   Soft, non-tender, bowel sounds normal, non-distended. Extremities: No cyanosis or general edema + 2-3. Skin: No acute rash or lesions. Lymph Nodes: Cervical and supraclavicular normal.   Musculoskeletal:  No swelling or deformity. Lines/Devices:  Intact, no erythema, drainage, or tenderness. Psychiatric: Alert, calm, cooperative, attends, attempts to mouth words,  follows simple commands but weak, able to hold up fingers upon command. Appears angry on BIPAP. T/L/D  Tubes: NA  Lines: Arterial Line, Central Line and Ramon  Drains: Bowden Catheter    LABS AND  DATA: Personally reviewed  Recent Labs     04/29/20 0455 04/28/20 0346   WBC 5.2 10.1   HGB 12.3 8.0*   HCT 37.7 25.4*    225     Recent Labs     04/29/20 0455 04/28/20  0346   * 135*   K 3.6 3.3*   CL 99 99   CO2 25 27   BUN 92* 63*   CREA 2.34* 1.67*   * 137*   CA 8.7 8.8   MG 2.1 2.0   PHOS 3.4 2.6     Recent Labs     04/29/20 0455 04/28/20  0346   ALB 2.4* 2.5*     No results for input(s): INR, PTP, APTT, INREXT, INREXT in the last 72 hours. No results for input(s): PHI, PCO2I, PO2I, FIO2I in the last 72 hours. No results for input(s): CPK, CKMB, TROIQ, BNPP in the last 72 hours.     Hemodynamics:   PAP:   CO:     Wedge:   CI:     CVP:    SVR:       PVR:       Ventilator Settings:  Mode Rate Tidal Volume Pressure FiO2 PEEP   Pressure support, Spontaneous   430 ml  10 cm H2O 30 % 5 cm H20     Peak airway pressure: 16 cm H2O    Minute ventilation: 9.56 l/min        MEDS: Reviewed    Chest X-Ray:  CXR Results  (Last 48 hours)    None        ABCDEF Bundle/Checklist Completed:  YES-See Plan    SPECIAL EQUIPMENT  IHD    Active Problem List:     Problem List  Date Reviewed: 4/19/2020          Codes Class    Hypotension ICD-10-CM: I95.9  ICD-9-CM: 458.9 Acute        Retroperitoneal hemorrhage ICD-10-CM: R58  ICD-9-CM: 459.0         Acute renal failure (ARF) (HCC) ICD-10-CM: N17.9  ICD-9-CM: 584.9         Encephalopathy ICD-10-CM: G93.40  ICD-9-CM: 348.30         Sepsis with multi-organ dysfunction ([facility] ICD-10-CM: A41.9, R65.20  ICD-9-CM: 038.9, 995.92         Pneumonia due to methicillin susceptible Staphylococcus aureus (MSSA) ([facility] ICD-10-CM: J15.211  ICD-9-CM: 482.41         Thrombocytopenia (HCC) ICD-10-CM: D69.6  ICD-9-CM: 287.5         Diarrhea ICD-10-CM: R19.7  ICD-9-CM: 787.91         COVID-19 virus infection ICD-10-CM: U07.1         Obesity, morbid ([facility] ICD-10-CM: E66.01  ICD-9-CM: 278.01         Vaginal Pap smear following hysterectomy for malignancy ICD-10-CM: Z08, Z90.710  ICD-9-CM: V67.01         Personal history of malignant neoplasm of other parts of uterus ICD-10-CM: Z85.42  ICD-9-CM: V10.42         Endometrial cancer ([place] [address].) ICD-10-CM: C54.1  ICD-9-CM: 182.0             ICU Assessment/ Comprehensive Plan of Care:       NEURO  1. No acute concerns  Pain Medications: Joe Kayser decreased today to Q8hr from Q6hr  Target RASS: 0 - Alert & Calm - Spontaneously pays attention to caregiver  Sedation Medications: None  CAM-ICU:  Negative  Restraints: None needed at this time    CARDIAC  1. No acute concerns  Remains off vasoactive infusions with supported BP  Cardiac Gtts: None  SBP Goal of: > 100 mmHg  MAP Goal of: > 65 mmHg  Transfusion Trigger (Hgb): <7 g/dL    RESPIRATORY  1.  Acute hypoxic respiratory failure 2/2 COVID 19 and MSSA pneumonia  extubation 04/29 to BIPAP 10/5, wean to nasal cannula with PRN BIPAP  Guifenesin Q6hr    Respiratory Goals: Chlorhexidine   Optimize PEEP/Ventilation/Oxygenation  Head of bed > 30 degrees  Aggressive bronchopulmonary hygiene  Incentive spirometry  SPO2 Goal: > 92%  Pulmonary toilet: Albuterol   DVT Prophylaxis (if no, list reason): SCD's or Sequential Compression Device     RENAL  1. Acute renal failure requiring HD  Now on daily IHD with good tolerance  Removed 1.5 liters today 04/27, plan for HD today  Nephrology following  Bowden Catheter Present: Yes  IVFs: NA    GASTROINTESTINAL  1. No acute concerns  2. Diarrhea  Added Benepro today TID to tube feeds  Flexiseal in place  GI Prophylaxis: Pepcid (famotidine)   Nutrition: Yes TF  Bowel Movement: Yes  Bowel Regimen: None needed at this time    HEMATOLOGIC  1. Retroperitoneal bleed this hospitalization, resolved  Avoid AP/AC meds  Monitor H/H daily  No current s/s of bleeding  DVT Prophylaxis (if no, list reason): SCD's or Sequential Compression Device   2. Non occlusive PE in LLL pulmonary artery    ID  1. No concerns for infection at present  Trend WBC/ Fevers  2. COVID 19 + on admit  COVID 19 NEG x 1 04/20, 04/27  Completed 5 days of Plaquenil and zinc  Antibiotics:NA    ENDOCRINE  1. Diabetes Mellitus Type 2  Lantus insulin dc'd  Continue SSI Q6hr  Glycemic Control - Insulin: Yes    MUSCULOSKELETAL  1. Severe deconditioning 2/2 critical illness  Bedrest  Encourage and assist with PROM and AROM  Mobility: Poor and Bedrest  PT/OT: PT consulted and on board and OT consulted and on board     DISPOSITION/COMMUNICATION  Discussed Plan of Care/Code Status: Full Code  Stay in ICU    CRITICAL CARE CONSULTANT NOTE  I had a face to face encounter with the patient, reviewed and interpreted patient data including clinical events, labs, images, vital signs, I/O's, and examined patient. I have discussed the case and the plan and management of the patient's care with the consulting services, the bedside nurses and the respiratory therapist.      NOTE OF PERSONAL INVOLVEMENT IN CARE   This patient has a high probability of imminent, clinically significant deterioration, which requires the highest level of preparedness to intervene urgently.  I participated in the decision-making and personally managed or directed the management of the following life and organ supporting interventions that required my frequent assessment to treat or prevent imminent deterioration. I personally spent 45 minutes of critical care time. This is time spent at this critically ill patient's bedside actively involved in patient care as well as the coordination of care and discussions with the patient's family. This does not include any procedural time which has been billed separately.     Behzad Marcos [facility]-BC  Intensivist Nurse Practitioner  [facility]  4/29/2020

## 2020-04-29 NOTE — PROCEDURES
[facility]  [phone]    Vitals   Pre   Post   Assessment   Pre   Post     Temp  Temp: 98.2 °F (36.8 °C) (04/29/20 0800)  98.6 LOC  Alert  Alert    HR   Pulse (Heart Rate): 95 (04/29/20 1415) 112 Lungs   Intubated  Intubated   B/P   BP: 142/79 (04/29/20 1415) 124/73 Cardiac   ST   ST   Resp   Resp Rate: 30 (04/29/20 1415) 26 Skin   Warm   warm    Pain level  Pain Intensity 1: 0 (04/29/20 0800) 0/10 Edema  Pitting    Pitting    Orders:    Duration:   Start:   1112 End:   6958 Total:   3 hrs   Dialyzer:   Dialyzer/Set Up Inspection: Parth Underwood (04/29/20 1415)   K Bath:   Dialysate K (mEq/L): 3 (04/29/20 1415)   Ca Bath:   Dialysate CA (mEq/L): 2.5 (04/29/20 1415)   Na/Bicarb:   Dialysate NA (mEq/L): 138 (04/27/20 0717)   Target Fluid Removal:   Goal/Amount of Fluid to Remove (mL): 1500 mL (04/29/20 1415)   Access     Type & Location:   RIJ CVC: Dressing CDI. No s/s of infection. Both lumens aspirate & flush well. Running well at .     Labs     Obtained/Reviewed   Critical Results Called   Date when labs were drawn-  Hgb-    HGB   Date Value Ref Range Status   04/29/2020 12.3 11.5 - 16.0 g/dL Final     Comment:     INVESTIGATED PER DELTA CHECK PROTOCOL     K-    Potassium   Date Value Ref Range Status   04/29/2020 3.6 3.5 - 5.1 mmol/L Final     Ca-   Calcium   Date Value Ref Range Status   04/29/2020 8.7 8.5 - 10.1 MG/DL Final     Bun-   BUN   Date Value Ref Range Status   04/29/2020 92 (H) 6 - 20 MG/DL Final     Creat-   Creatinine   Date Value Ref Range Status   04/29/2020 2.34 (H) 0.55 - 1.02 MG/DL Final        Medications/ Blood Products Given     Name   Dose   Route and Time     Heparin 1:1000 units 1.1 ml A line  1.4 ml V line  HD CVC 1715             Blood Volume Processed (BVP):    66 L Net Fluid   Removed:  1500 cc   Comments   Time Out Done: 1410  Primary Nurse Rpt Pre: Dandre Ziegler RN  Primary Nurse Rpt Post: Dandre Ziegler RN  Pt Education: site care Care Plan: on going  Tx Summary:  SBAR received from Primary RN. Pt alert Consent signed & on file. 1415: Both lumens of Ramon/permcath disinfected with Prevantics per policy. Each lumen aspirated for blood return and flushed with Normal Saline per policy. VSS. Dialysis Tx initiated. 1715: Tx ended. VSS. All possible blood returned to patient. Central line catheter flushed with normal saline per policy. Ports disinfected with Prevantics per policy, lines disconnected, Heparin dwells instilled, and lines capped using aseptic technique. Bed locked and in the lowest position, call bell and belongings in reach. SBAR given to Primary, RN. Patient is stable at time of my departure. All Dialysis related medications have been reviewed. Admiting Diagnosis: Respiratory Failure COVID  Pt's previous clinic- N/A  Consent signed - Informed Consent Verified: Yes (04/29/20 1415)  Breannaita Consent - on file  Hepatitis Status- Ag negative & Ab susceptible 4/29/20  Machine #- Machine Number: [id number] (04/29/20 1415)  Telemetry status- on monitor  Pre-dialysis wt. - Pre-Dialysis Weight: 120.7 kg (266 lb 1.5 oz) (04/23/20 4136)

## 2020-04-29 NOTE — PROGRESS NOTES
[facility] assessed, more alert; shaking and nodding head appropriately to questions; following simple commands but weak.  0900  updated and able to talk to patient on phone; nurse practitioner in to see pt.; would like to resume tube feedings while doing spontaneous breathing trial  0915 patient placed on CPAP by RT for SBT  1100 patient indicated she was having nausea ; zofran given  1200 tube feeding back on after nausea resolved  1315 - pt. Back on previous vent settings after 4 hour SBT; tolerated well but indicated she was tired  1400 - pt. With stool leaking around flexiseal; tube reinforced and pt. Cleaned  1600 PT in to see patient and help turn her to do some exercises  1800 pt.  Sleeping at this time  1930 Report given to [facility]

## 2020-04-30 ENCOUNTER — APPOINTMENT (OUTPATIENT)
Dept: NON INVASIVE DIAGNOSTICS | Age: 60
DRG: 870 | End: 2020-04-30
Attending: SURGERY
Payer: COMMERCIAL

## 2020-04-30 LAB
ALBUMIN SERPL-MCNC: 2.3 G/DL (ref 3.5–5)
ANION GAP SERPL CALC-SCNC: 5 MMOL/L (ref 5–15)
BASOPHILS # BLD: 0 K/UL (ref 0–0.1)
BASOPHILS NFR BLD: 0 % (ref 0–1)
BUN SERPL-MCNC: 51 MG/DL (ref 6–20)
BUN/CREAT SERPL: 31 (ref 12–20)
CALCIUM SERPL-MCNC: 8.3 MG/DL (ref 8.5–10.1)
CHLORIDE SERPL-SCNC: 100 MMOL/L (ref 97–108)
CO2 SERPL-SCNC: 32 MMOL/L (ref 21–32)
CREAT SERPL-MCNC: 1.63 MG/DL (ref 0.55–1.02)
CRP SERPL HS-MCNC: >9.5 MG/L
DIFFERENTIAL METHOD BLD: ABNORMAL
EOSINOPHIL # BLD: 0.1 K/UL (ref 0–0.4)
EOSINOPHIL NFR BLD: 1 % (ref 0–7)
ERYTHROCYTE [DISTWIDTH] IN BLOOD BY AUTOMATED COUNT: 16.2 % (ref 11.5–14.5)
GLUCOSE BLD STRIP.AUTO-MCNC: 142 MG/DL (ref 65–100)
GLUCOSE BLD STRIP.AUTO-MCNC: 191 MG/DL (ref 65–100)
GLUCOSE BLD STRIP.AUTO-MCNC: 222 MG/DL (ref 65–100)
GLUCOSE SERPL-MCNC: 197 MG/DL (ref 65–100)
HCT VFR BLD AUTO: 24.8 % (ref 35–47)
HCT VFR BLD AUTO: 24.8 % (ref 35–47)
HGB BLD-MCNC: 7.6 G/DL (ref 11.5–16)
HGB BLD-MCNC: 7.7 G/DL (ref 11.5–16)
IMM GRANULOCYTES # BLD AUTO: 0 K/UL (ref 0–0.04)
IMM GRANULOCYTES NFR BLD AUTO: 0 % (ref 0–0.5)
LYMPHOCYTES # BLD: 0.9 K/UL (ref 0.8–3.5)
LYMPHOCYTES NFR BLD: 12 % (ref 12–49)
MAGNESIUM SERPL-MCNC: 1.9 MG/DL (ref 1.6–2.4)
MCH RBC QN AUTO: 30.2 PG (ref 26–34)
MCHC RBC AUTO-ENTMCNC: 30.6 G/DL (ref 30–36.5)
MCV RBC AUTO: 98.4 FL (ref 80–99)
MONOCYTES # BLD: 0.5 K/UL (ref 0–1)
MONOCYTES NFR BLD: 7 % (ref 5–13)
NEUTS SEG # BLD: 5.7 K/UL (ref 1.8–8)
NEUTS SEG NFR BLD: 80 % (ref 32–75)
NRBC # BLD: 0 K/UL (ref 0–0.01)
NRBC BLD-RTO: 0 PER 100 WBC
PHOSPHATE SERPL-MCNC: 2.5 MG/DL (ref 2.6–4.7)
PLATELET # BLD AUTO: 207 K/UL (ref 150–400)
PMV BLD AUTO: 10.7 FL (ref 8.9–12.9)
POTASSIUM SERPL-SCNC: 3.1 MMOL/L (ref 3.5–5.1)
RBC # BLD AUTO: 2.52 M/UL (ref 3.8–5.2)
RBC MORPH BLD: ABNORMAL
RBC MORPH BLD: ABNORMAL
SERVICE CMNT-IMP: ABNORMAL
SODIUM SERPL-SCNC: 137 MMOL/L (ref 136–145)
WBC # BLD AUTO: 7.2 K/UL (ref 3.6–11)

## 2020-04-30 PROCEDURE — 82962 GLUCOSE BLOOD TEST: CPT

## 2020-04-30 PROCEDURE — 83735 ASSAY OF MAGNESIUM: CPT

## 2020-04-30 PROCEDURE — 85025 COMPLETE CBC W/AUTO DIFF WBC: CPT

## 2020-04-30 PROCEDURE — 65660000000 HC RM CCU STEPDOWN

## 2020-04-30 PROCEDURE — 36415 COLL VENOUS BLD VENIPUNCTURE: CPT

## 2020-04-30 PROCEDURE — 74011250637 HC RX REV CODE- 250/637: Performed by: NURSE PRACTITIONER

## 2020-04-30 PROCEDURE — 97530 THERAPEUTIC ACTIVITIES: CPT

## 2020-04-30 PROCEDURE — 85018 HEMOGLOBIN: CPT

## 2020-04-30 PROCEDURE — 97535 SELF CARE MNGMENT TRAINING: CPT

## 2020-04-30 PROCEDURE — 97165 OT EVAL LOW COMPLEX 30 MIN: CPT

## 2020-04-30 PROCEDURE — 74011636637 HC RX REV CODE- 636/637: Performed by: NURSE PRACTITIONER

## 2020-04-30 PROCEDURE — 77010033678 HC OXYGEN DAILY

## 2020-04-30 PROCEDURE — 80069 RENAL FUNCTION PANEL: CPT

## 2020-04-30 PROCEDURE — 93306 TTE W/DOPPLER COMPLETE: CPT

## 2020-04-30 PROCEDURE — 94660 CPAP INITIATION&MGMT: CPT

## 2020-04-30 PROCEDURE — 74011250636 HC RX REV CODE- 250/636: Performed by: INTERNAL MEDICINE

## 2020-04-30 PROCEDURE — 74011250637 HC RX REV CODE- 250/637: Performed by: INTERNAL MEDICINE

## 2020-04-30 PROCEDURE — 86141 C-REACTIVE PROTEIN HS: CPT

## 2020-04-30 PROCEDURE — 74011250636 HC RX REV CODE- 250/636: Performed by: NURSE PRACTITIONER

## 2020-04-30 PROCEDURE — 90935 HEMODIALYSIS ONE EVALUATION: CPT

## 2020-04-30 RX ORDER — POTASSIUM CHLORIDE 29.8 MG/ML
20 INJECTION INTRAVENOUS ONCE
Status: COMPLETED | OUTPATIENT
Start: 2020-04-30 | End: 2020-04-30

## 2020-04-30 RX ORDER — GUAIFENESIN 100 MG/5ML
100 SOLUTION ORAL EVERY 12 HOURS
Status: DISCONTINUED | OUTPATIENT
Start: 2020-04-30 | End: 2020-05-22 | Stop reason: HOSPADM

## 2020-04-30 RX ORDER — HYDROCODONE BITARTRATE AND ACETAMINOPHEN 5; 325 MG/1; MG/1
1 TABLET ORAL
Status: DISCONTINUED | OUTPATIENT
Start: 2020-04-30 | End: 2020-05-22 | Stop reason: HOSPADM

## 2020-04-30 RX ADMIN — CASTOR OIL AND BALSAM, PERU: 788; 87 OINTMENT TOPICAL at 08:19

## 2020-04-30 RX ADMIN — SEVELAMER CARBONATE 0.8 G: 800 POWDER, FOR SUSPENSION ORAL at 08:22

## 2020-04-30 RX ADMIN — SEVELAMER CARBONATE 0.8 G: 800 POWDER, FOR SUSPENSION ORAL at 21:16

## 2020-04-30 RX ADMIN — GUAIFENESIN 100 MG: 200 SOLUTION ORAL at 21:14

## 2020-04-30 RX ADMIN — CASTOR OIL AND BALSAM, PERU: 788; 87 OINTMENT TOPICAL at 18:20

## 2020-04-30 RX ADMIN — INSULIN LISPRO 2 UNITS: 100 INJECTION, SOLUTION INTRAVENOUS; SUBCUTANEOUS at 18:00

## 2020-04-30 RX ADMIN — SODIUM CHLORIDE 10 ML: 9 INJECTION INTRAMUSCULAR; INTRAVENOUS; SUBCUTANEOUS at 21:14

## 2020-04-30 RX ADMIN — FAMOTIDINE 20 MG: 40 POWDER, FOR SUSPENSION ORAL at 08:21

## 2020-04-30 RX ADMIN — HEPARIN SODIUM 2500 UNITS: 1000 INJECTION INTRAVENOUS; SUBCUTANEOUS at 11:38

## 2020-04-30 RX ADMIN — HYDROCODONE BITARTRATE AND ACETAMINOPHEN 1 TABLET: 5; 325 TABLET ORAL at 08:21

## 2020-04-30 RX ADMIN — EPOETIN ALFA-EPBX 20000 UNITS: 10000 INJECTION, SOLUTION INTRAVENOUS; SUBCUTANEOUS at 23:53

## 2020-04-30 RX ADMIN — MINERAL OIL AND WHITE PETROLATUM: 150; 830 OINTMENT OPHTHALMIC at 08:19

## 2020-04-30 RX ADMIN — Medication 1 PACKET: at 16:00

## 2020-04-30 RX ADMIN — Medication 1 PACKET: at 08:22

## 2020-04-30 RX ADMIN — INSULIN LISPRO 2 UNITS: 100 INJECTION, SOLUTION INTRAVENOUS; SUBCUTANEOUS at 11:36

## 2020-04-30 RX ADMIN — SODIUM CHLORIDE 10 ML: 9 INJECTION INTRAMUSCULAR; INTRAVENOUS; SUBCUTANEOUS at 05:00

## 2020-04-30 RX ADMIN — POTASSIUM CHLORIDE 20 MEQ: 400 INJECTION, SOLUTION INTRAVENOUS at 05:01

## 2020-04-30 RX ADMIN — Medication 1 PACKET: at 21:14

## 2020-04-30 RX ADMIN — SEVELAMER CARBONATE 0.8 G: 800 POWDER, FOR SUSPENSION ORAL at 12:25

## 2020-04-30 RX ADMIN — GUAIFENESIN 200 MG: 100 SOLUTION ORAL at 05:00

## 2020-04-30 RX ADMIN — INSULIN LISPRO 3 UNITS: 100 INJECTION, SOLUTION INTRAVENOUS; SUBCUTANEOUS at 05:06

## 2020-04-30 NOTE — PROGRESS NOTES
Pt currently extubated, on HD. Will attempt therapy at a later date.  Noted that patient has received second negative COVID test.    Alexandra Shaffer, DPT, PT

## 2020-04-30 NOTE — PROGRESS NOTES
SLP Contact Note     SLP evaluation/consult noted. Pt on dialysis at time of attempt and then was transferring on second attempt. Therefore, will hold evaluation for now. Patient will benefit from more time s/p extubation given prolonged intubation. Recommend strict oral care.  Will plan to follow-up tomorrow morning.         Thank you,  Lauretha Lesches, M.Ed, [address] [facility]  Speech-Language Pathologist

## 2020-04-30 NOTE — PROGRESS NOTES
TRANSFER - IN REPORT:    Verbal report received from Drea(ever) on Ramana Files  being received from ICU(unit) for routine progression of care      Report consisted of patients Situation, Background, Assessment and   Recommendations(SBAR). Information from the following report(s) SBAR, Procedure Summary, Intake/Output, MAR and Cardiac Rhythm SR/ST was reviewed with the receiving nurse. Opportunity for questions and clarification was provided. Assessment completed upon patients arrival to unit and care assumed.

## 2020-04-30 NOTE — PROGRESS NOTES
Transitions of Care  Per therapy notes patient would benefit from inpatient rehab prior to returning home. Will need insurance auth    Care management spoke with  this morning to discuss transitions of care. Emailed him a list of inpatient rehab facilities.    Roberta Jones RN,CRM

## 2020-04-30 NOTE — PROGRESS NOTES
Problem: Self Care Deficits Care Plan (Adult)  Goal: *Acute Goals and Plan of Care (Insert Text)  Description:   FUNCTIONAL STATUS PRIOR TO ADMISSION: Patient unable to provide history. Per chart, patient was fully independent PTA. HOME SUPPORT: Per chart, patient lived with family. Occupational Therapy Goals  Initiated 4/30/2020  1. Patient will perform grooming with maximal assistance within 7 day(s). 2.  Patient will perform self-feeding if appropriate for PO with maximal assistance within 7 day(s). 3.  Patient will perform bathing with maximal assistance within 7 day(s). 4.  Patient will participate in upper extremity therapeutic exercise/activities with moderate assistance  for 10 minutes within 7 day(s). 5.  Patient will follow 100% simple commands in preparation for functional tasks within 7 days     Outcome: Progressing Towards Goal    OCCUPATIONAL THERAPY EVALUATION  Patient: Cody Dean (89 y.o. female)  Date: 4/30/2020  Primary Diagnosis: COVID-19 virus infection [U07.1]        Precautions: fall       ASSESSMENT  Based on the objective data described below, the patient presents with severe gross debility, decreased command following, inability to verbalize, and impaired sitting balance s/p prolonged immobilization, intubated 3/28 to 4/29 s/p admission for respiratory failure, previously COVID-19 positive but now negative and off precautions, renal failure, and 4/9 PEA arrest.      Patient was received today shortly after HD and was drowsy but alertness improved with mobilization. She followed ~75% simple commands to physical ability and required total assistance x2 to mobilize to EOB. Patient nodded/ shook head to some Y/N questions, and nodded to indicate she felt good sitting EOB. She required constant posterior support and performed BUE AROM/ AAROM exercises.   BUE strength overall 1 to 3/5, and non-functional.  Patient with poor trunk control but was able to volitionally initiate R lean to return to supine. Patient is significantly below functional baseline and now requires total assistance for all ADLs and mobility. Recommend inpatient rehab at d/c. Current Level of Function Impacting Discharge (ADLs/self-care): total assistance    Functional Outcome Measure: The patient scored 0/100 on the Barthel Index outcome measure which is indicative of ~100% impairment in functional performance. Patient will benefit from skilled therapy intervention to address the above noted impairments. PLAN :  Recommendations and Planned Interventions: self care training, functional mobility training, therapeutic exercise, balance training, therapeutic activities, endurance activities, patient education, home safety training, and family training/education    Frequency/Duration: Patient will be followed by occupational therapy 5 times a week to address goals. Recommendation for discharge: (in order for the patient to meet his/her long term goals)  Therapy 3 hours per day 5-7 days per week    This discharge recommendation:  Has been made in collaboration with the attending provider and/or case management       SUBJECTIVE:   Patient mouthed \"tired\" when asked how she felt.     OBJECTIVE DATA SUMMARY:   HISTORY:   Past Medical History:   Diagnosis Date    Basal cell carcinoma     GERD (gastroesophageal reflux disease)     Kidney stones     Polyp of ureter      Past Surgical History:   Procedure Laterality Date    ENDOMETRIAL ABLATION, THERMAL      HX  SECTION      HX COLONOSCOPY      eber     HYSTEROSCOPY DIAGNOSTIC      D&C/POLYP REMOVAL    INSERT ARTERIAL LINE  2020         IR INSERT NON TUNL CVC OVER 5 YRS  2020       Expanded or extensive additional review of patient history:     Home Situation  Home Environment: Other (comment)  One/Two Story Residence: One story  Living Alone: No  Support Systems: Other (comments)  Patient Expects to be Discharged to[de-identified] Unknown  Current DME Used/Available at Home: None      EXAMINATION OF PERFORMANCE DEFICITS:  Cognitive/Behavioral Status:  Neurologic State: Alert;Confused; Eyes open spontaneously  Orientation Level: Oriented to person  Cognition: Follows commands;Recognition of people/places  Perception: Appears intact  Perseveration: No perseveration noted       Skin: visible skin appears intact    Edema: none noted    Hearing: Auditory  Auditory Impairment: None    Vision/Perceptual:    Tracking: Able to track stimulus in all quadrants w/o difficulty                                Range of Motion:    AROM: Grossly decreased, non-functional                         Strength:  B/l  strength 3/5, wrist flexion/ extension 1/5, elbow flexion/ extension 2-/5, shoulder flexion 1/5, scapular elevation 3-/5    Strength: Grossly decreased, non-functional                Coordination:  Coordination: Grossly decreased, non-functional  Fine Motor Skills-Upper: Left Impaired;Right Impaired    Gross Motor Skills-Upper: Right Impaired;Left Impaired    Tone & Sensation:    Tone: Normal  Sensation: Intact                      Balance:  Sitting: Impaired  Sitting - Static: Poor (constant support)  Sitting - Dynamic: Poor (constant support)    Functional Mobility and Transfers for ADLs:  Bed Mobility:  Supine to Sit: Total assistance;Assist x2  Sit to Supine: Total assistance;Assist x2      ADL Assessment:  Feeding: Total assistance(NPO, tube)    Oral Facial Hygiene/Grooming: Total assistance(inferred d/t gross debility)    Bathing: Total assistance(inferred d/t gross debility)    Upper Body Dressing: Total assistance(inferred d/t gross debility)    Lower Body Dressing: Total assistance(inferred d/t gross debility)    Toileting:  Total assistance(inferred d/t gross debility)             Therapeutic Exercise:  BUE hand flexion/ extension AAROM: 10 reps  BUE wrist flexion/ extension AAROM: 10 reps  BUE elbow flexion/ extension AAROM: 10 reps  BUE scapular elevation AROM: 10 reps      Functional Measure:  Barthel Index:    Bathin  Bladder: 0  Bowels: 0  Groomin  Dressin  Feedin  Mobility: 0  Stairs: 0  Toilet Use: 0  Transfer (Bed to Chair and Back): 0  Total: 0/100        The Barthel ADL Index: Guidelines  1. The index should be used as a record of what a patient does, not as a record of what a patient could do. 2. The main aim is to establish degree of independence from any help, physical or verbal, however minor and for whatever reason. 3. The need for supervision renders the patient not independent. 4. A patient's performance should be established using the best available evidence. Asking the patient, friends/relatives and nurses are the usual sources, but direct observation and common sense are also important. However direct testing is not needed. 5. Usually the patient's performance over the preceding 24-48 hours is important, but occasionally longer periods will be relevant. 6. Middle categories imply that the patient supplies over 50 per cent of the effort. 7. Use of aids to be independent is allowed. Tomi Verdin., Barthel, D.W. (8580). Functional evaluation: the Barthel Index. [address] [facility] (14)2. Endy Juares, VERONIQUEF, Della Bowden., Bev Zhong., Kecia [facility], [address] [place] [address] (). Measuring the change indisability after inpatient rehabilitation; comparison of the responsiveness of the Barthel Index and Functional [place] Measure. Journal of Neurology, Neurosurgery, and Psychiatry, 66(4), 585-794. CESAR [email or web address], VANESA Pastrana, & Migdalia Jaime M.A. (2004.) Assessment of post-stroke quality of life in cost-effectiveness studies: The usefulness of the Barthel Index and the EuroQoL-5D.  Quality of Life Research, 15, 221-01           Occupational Therapy Evaluation Charge Determination   History Examination Decision-Making   LOW Complexity : Brief history review  MEDIUM Complexity : 3-5 performance deficits relating to physical, cognitive , or psychosocial skils that result in activity limitations and / or participation restrictions MEDIUM Complexity : Patient may present with comorbidities that affect occupational performnce. Miniml to moderate modification of tasks or assistance (eg, physical or verbal ) with assesment(s) is necessary to enable patient to complete evaluation       Based on the above components, the patient evaluation is determined to be of the following complexity level: LOW   Pain Rating:  Patient did not indicate pain    Activity Tolerance:   VSS on 2L O2 NC.  with activity, /69 sitting EOB    After treatment patient left in no apparent distress:    Supine in bed and Call bell within reach    COMMUNICATION/EDUCATION:   The patients plan of care was discussed with: Physical therapist and Registered nurse. Home safety education was provided and the patient/caregiver indicated understanding., Patient/family have participated as able in goal setting and plan of care. , and Patient/family agree to work toward stated goals and plan of care. This patients plan of care is appropriate for delegation to PAUL.     Thank you for this referral.  Froilan Keys OT  Time Calculation: 30 mins

## 2020-04-30 NOTE — PROGRESS NOTES
[facility]     ICU Transfer/Accept Summary     This patient is being transferred [place] [address] ICU  DATE OF TRANSFER: 4/30/2020       PATIENT ID: Terence Schaefer  MRN: [id number]   YOB: 1960    PRIMARY CARE PROVIDER: Claude Pay, MD   DATE OF ADMISSION: 3/31/2020  9:02 AM    ATTENDING PHYSICIAN: Alfonso Che MD  CONSULTATIONS:   IP CONSULT TO PALLIATIVE CARE - PROVIDER  IP CONSULT TO INTERVENTIONAL RADIOLOGY  IP CONSULT TO HOSPITALIST    PROCEDURES/SURGERIES:   * No surgery found *    REASON FOR ADMISSION: <principal problem not specified>     HOSPITAL PROBLEM LIST:  Patient Active Problem List   Diagnosis Code    Endometrial cancer ([facility] [address] C54.1    Vaginal Pap smear following hysterectomy for malignancy Z08, Z90.710    Personal history of malignant neoplasm of other parts of uterus Z85.42    Obesity, morbid ([facility] [address] E66.01    COVID-19 virus infection U07.1    Hypotension I95.9    Retroperitoneal hemorrhage R58    Acute renal failure (ARF) (HCC) N17.9    Encephalopathy G93.40    Sepsis with multi-organ dysfunction (HCC) A41.9, R65.20    Pneumonia due to methicillin susceptible Staphylococcus aureus (MSSA) ([facility] [address] J15.211    Thrombocytopenia ([facility] [address] D69.6    Diarrhea R19.7         Brief HPI and Hospital Course:    63 yo female with hx of obesity, endometrial cancer and diabetes who presented to [facility] with worsening hypoxic respiratory failure in lieu of close exposure to COVID-19 to family including mother (passed away) sister and brother and tested resulting in [place] -23 positive here. She presented to the hospital 3/26 and intubated 3/28. She was started on broad spectrum antibiotics and plaquenil.  Developed worsening renal failure and was transferred to ICU for CRRT.  Her hospital course has been further notable for development of a retroperitoneal bleed, hemorrhagic shock and sepsis and acute hypoxic respiratory failure with high PEEP and FIo2 requirements. She had a Code blue on 04/09/20 with ROSC 7 minutes. She has now transitioned from CRRT to HD, receiving daily at this time, tolerating well. Patient was extubated after several days of improvement on SBT, Extubated to Bi-pap on 04/29/20. Now on NC. Speech/OT/PT consulted. SARS - COV - 2 re-test negative x 2 on 4/20/20 and 4/27/20. Assessment and Plan:      JUANCHO :  Acute renal failure requiring HD  Now on daily IHD with good tolerance  Removed 1.5 liters today 04/29, plan for HD today  Nephrology following  Bowden Catheter Present: No  IVFs: NA           COVID-19 +ve   Acute Hypoxic Resp Failure   - On Vent   - completed Plaquenil. On Vit C, Zinc   - s/p Tocilizumab   - on daily SBT      Anemia in CKD  Retroperitoneal bleed this hospitalization, resolved  Avoid AP/AC meds  Monitor H/H daily  No current s/s of bleeding  DVT Prophylaxis (if no, list reason): SCD's or Sequential Compression Device   2. Non occlusive PE in LLL pulmonary artery - no anticoagulation due to retroperitoneal bleed. Cardiac arrest 4/9     Type II DM   Diabetes Mellitus Type 2  Continue SSI q 6hr  Glycemic Control - Insulin: Yes        Morbid Obesity  Body mass index is 44.11 kg/m². PHYSICAL EXAMINATION:  Visit Vitals  /70 (BP 1 Location: Right arm, BP Patient Position: At rest)   Pulse (!) 111 Comment: Art line out.     Temp 98.6 °F (37 °C)   Resp 20   Ht 5' 4\" (1.626 m)   Wt 116.8 kg (257 lb 9.6 oz)   SpO2 99%   BMI 44.22 kg/m²        CODE STATUS:   Full Code    DNR    Partial    Comfort Care     Signed:   Urmila Josue MD  Date of Service:  4/30/2020  1:02 PM

## 2020-04-30 NOTE — PROGRESS NOTES
[facility]   [address] [facility], [facility] [address], [facility]  Phone: [phone]   [phone]       Nephrology Progress Note  Michelle Hussein     1960     [id number]  Date of Admission : 3/31/2020  04/30/20    CC: Follow up for JUANCHO      Assessment and Plan   JUANCHO :  - 2/2 ATN  - CRRT switched to daily IHD on 4/18  - New line placed by IR on 4/22  - HD today and then TTS   - daily labs     Lytes  -  Stable     COVID-19 +ve   Acute Hypoxic Resp Failure   - On Vent   - completed Plaquenil. On Vit C, Zinc   - s/p Tocilizumab   - on daily SBT      Anemia in CKD  RP hematoma:  - Hb at 7.6   - start KOKI     Cardiac arrest 4/9    Type II DM   - Insulin per primary team      Morbid Obesity        Interval History:  Discussed Intensivisit   Extubated and doingf well   Did not wear CPAP last night. On 2L NC  Not on pressors  Remains edematous     PT NOT EXAMINED in line with [facility] and RPA GUIDELINES ON MANAGING COVID-19 PTS WITH RENAL ISSUES. Examination findings discussed personally with the examining Physician team member      Review of Systems: Review of systems not obtained due to patient factors.     Current Medications:   Current Facility-Administered Medications   Medication Dose Route Frequency    guaiFENesin (ROBITUSSIN) 100 mg/5 mL oral liquid 100 mg  100 mg Oral Q12H    phenol throat spray (CHLORASEPTIC) 1 Spray  1 Spray Oral Q6H PRN    HYDROcodone-acetaminophen (NORCO) 5-325 mg per tablet 1 Tab  1 Tab Oral BID    sevelamer carbonate (RENVELA) oral powder 0.8 g  0.8 g Oral TID WITH MEALS    0.9% sodium chloride infusion 250 mL  250 mL IntraVENous PRN    heparin (porcine) 1,000 unit/mL injection 2,500 Units  2,500 Units IntraVENous DIALYSIS PRN    guar gum (BENEFIBER) packet 1 Packet  4 g Oral TID    fentaNYL citrate (PF) injection 25-50 mcg  25-50 mcg IntraVENous Q2H PRN    labetaloL (NORMODYNE;TRANDATE) injection 20 mg  20 mg IntraVENous Q4H PRN    alteplase (CATHFLO) 1 mg in sterile water (preservative free) 1 mL injection  1 mg InterCATHeter PRN    albumin human 25% (BUMINATE) solution 12.5 g  12.5 g IntraVENous DIALYSIS PRN    0.9% sodium chloride infusion 250 mL  250 mL IntraVENous PRN    albuterol (PROVENTIL VENTOLIN) nebulizer solution 2.5 mg  2.5 mg Nebulization Q4H PRN    famotidine (PEPCID) 8 mg/mL oral suspension 20 mg  20 mg Per NG tube DAILY    alteplase (CATHFLO) 2 mg in sterile water (preservative free) 2 mL injection  2 mg InterCATHeter DIALYSIS PRN    alteplase (CATHFLO) 2 mg in sterile water (preservative free) 2 mL injection  2 mg InterCATHeter DIALYSIS PRN    insulin lispro (HUMALOG) injection   SubCUTAneous Q6H    0.9% sodium chloride infusion 250 mL  250 mL IntraVENous PRN    0.9% sodium chloride infusion  3 mL/hr IntraVENous CONTINUOUS    0.9% sodium chloride infusion  5 mL/hr IntraVENous CONTINUOUS    white petrolatum-mineral oiL (AKWA TEARS) 83-15 % ophthalmic ointment   Both Eyes Q12H    heparin (porcine) pf 300 Units  300 Units InterCATHeter PRN    bacitracin 500 unit/gram packet 1 Packet  1 Packet Topical PRN    balsam peru-castor oiL (VENELEX) ointment   Topical BID    sodium chloride (NS) flush 5-40 mL  5-40 mL IntraVENous Q8H    sodium chloride (NS) flush 5-40 mL  5-40 mL IntraVENous PRN    HYDROcodone-acetaminophen (NORCO) 5-325 mg per tablet 1 Tab  1 Tab Oral Q4H PRN    ondansetron (ZOFRAN) injection 4 mg  4 mg IntraVENous Q4H PRN    acetaminophen (TYLENOL) tablet 650 mg  650 mg Oral Q6H PRN    Or    acetaminophen (TYLENOL) suppository 650 mg  650 mg Rectal Q6H PRN    glucose chewable tablet 16 g  4 Tab Oral PRN    glucagon (GLUCAGEN) injection 1 mg  1 mg IntraMUSCular PRN    dextrose 10% infusion 0-250 mL  0-250 mL IntraVENous PRN      Allergies   Allergen Reactions    Augmentin [Amoxicillin-Pot Clavulanate] Rash and Itching       Objective:  Vitals:    Vitals:    04/30/20 0507 04/30/20 0600 04/30/20 0700 04/30/20 0800   BP: 119/69 118/67 107/73   Pulse:  (!) 104 (!) 101 (!) 111   Resp:  19 21 25   Temp:    98 °F (36.7 °C)   TempSrc:       SpO2:  100% 99% 96%   Weight: 116.8 kg (257 lb 9.6 oz)      Height:         Intake and Output:  No intake/output data recorded. 04/28 1901 - 04/30 0700  In: 1090 [I.V.:55]  Out: 1650 [Drains:150]    Physical Examination:   Pt intubated    no  General: No distress  Resp:  On vent   CV:  RRR  GI:  Obese   Neurologic:  Awake, unable to assess   Access:           R IJ melissa     []    High complexity decision making was performed  []    Patient is at high-risk of decompensation with multiple organ involvement    Lab Data Personally Reviewed: I have reviewed all the pertinent labs, microbiology data and radiology studies during assessment.     Recent Labs     04/30/20  0227 04/29/20  0455 04/28/20  0346    133* 135*   K 3.1* 3.6 3.3*    99 99   CO2 32 25 27   * 193* 137*   BUN 51* 92* 63*   CREA 1.63* 2.34* 1.67*   CA 8.3* 8.7 8.8   MG 1.9 2.1 2.0   PHOS 2.5* 3.4 2.6   ALB 2.3* 2.4* 2.5*     Recent Labs     04/30/20  0227 04/29/20  0455 04/28/20  0346 04/27/20  1811   WBC 7.2 5.2 10.1  --    HGB 7.6* 12.3 8.0* 8.3*   HCT 24.8* 37.7 25.4* 26.2*    175 225  --      No results found for: SDES  Lab Results   Component Value Date/Time    Culture result: NO GROWTH 5 DAYS 04/09/2020 02:32 PM    Culture result: NO GROWTH 5 DAYS 04/08/2020 10:36 AM    Culture result: NO GROWTH 5 DAYS 03/31/2020 11:15 AM    Culture result: MODERATE STAPHYLOCOCCUS AUREUS (A) 03/31/2020 10:34 AM    Culture result: LIGHT NORMAL RESPIRATORY SOFIE 03/31/2020 10:34 AM     Recent Results (from the past 24 hour(s))   GLUCOSE, POC    Collection Time: 04/29/20 11:46 AM   Result Value Ref Range    Glucose (POC) 209 (H) 65 - 100 mg/dL    Performed by Jazmine Samuel    GLUCOSE, POC    Collection Time: 04/29/20  6:02 PM   Result Value Ref Range    Glucose (POC) 148 (H) 65 - 100 mg/dL    Performed by Jazmine Samuel GLUCOSE, POC    Collection Time: 04/29/20 11:08 PM   Result Value Ref Range    Glucose (POC) 189 (H) 65 - 100 mg/dL    Performed by Rashawn Love    CRP, HIGH SENSITIVITY    Collection Time: 04/30/20  2:27 AM   Result Value Ref Range    CRP, High sensitivity >9.5 mg/L   RENAL FUNCTION PANEL    Collection Time: 04/30/20  2:27 AM   Result Value Ref Range    Sodium 137 136 - 145 mmol/L    Potassium 3.1 (L) 3.5 - 5.1 mmol/L    Chloride 100 97 - 108 mmol/L    CO2 32 21 - 32 mmol/L    Anion gap 5 5 - 15 mmol/L    Glucose 197 (H) 65 - 100 mg/dL    BUN 51 (H) 6 - 20 MG/DL    Creatinine 1.63 (H) 0.55 - 1.02 MG/DL    BUN/Creatinine ratio 31 (H) 12 - 20      GFR est AA 39 (L) >60 ml/min/1.73m2    GFR est non-AA 33 (L) >60 ml/min/1.73m2    Calcium 8.3 (L) 8.5 - 10.1 MG/DL    Phosphorus 2.5 (L) 2.6 - 4.7 MG/DL    Albumin 2.3 (L) 3.5 - 5.0 g/dL   MAGNESIUM    Collection Time: 04/30/20  2:27 AM   Result Value Ref Range    Magnesium 1.9 1.6 - 2.4 mg/dL   CBC WITH AUTOMATED DIFF    Collection Time: 04/30/20  2:27 AM   Result Value Ref Range    WBC 7.2 3.6 - 11.0 K/uL    RBC 2.52 (L) 3.80 - 5.20 M/uL    HGB 7.6 (L) 11.5 - 16.0 g/dL    HCT 24.8 (L) 35.0 - 47.0 %    MCV 98.4 80.0 - 99.0 FL    MCH 30.2 26.0 - 34.0 PG    MCHC 30.6 30.0 - 36.5 g/dL    RDW 16.2 (H) 11.5 - 14.5 %    PLATELET 053 985 - 800 K/uL    MPV 10.7 8.9 - 12.9 FL    NRBC 0.0 0  WBC    ABSOLUTE NRBC 0.00 0.00 - 0.01 K/uL    NEUTROPHILS PENDING %    LYMPHOCYTES PENDING %    MONOCYTES PENDING %    EOSINOPHILS PENDING %    BASOPHILS PENDING %    IMMATURE GRANULOCYTES PENDING %    ABS. NEUTROPHILS PENDING K/UL    ABS. LYMPHOCYTES PENDING K/UL    ABS. MONOCYTES PENDING K/UL    ABS. EOSINOPHILS PENDING K/UL    ABS. BASOPHILS PENDING K/UL    ABS. IMM. GRANS.  PENDING K/UL    DF PENDING    GLUCOSE, POC    Collection Time: 04/30/20  4:59 AM   Result Value Ref Range    Glucose (POC) 222 (H) 65 - 100 mg/dL    Performed by Rashawn Pereira time spent with patient:  xxx   min. Care Plan discussed with:  Patient     Family      RN      Consulting Physician [address] [facility],         I have reviewed the flowsheets. Chart and Pertinent Notes have been reviewed. No change in PMH ,family and social history from Consult note.       Arlin Golden MD

## 2020-04-30 NOTE — PROGRESS NOTES
Called and updated  on renal issues       [facility]  Office :[phone]  Fax: [phone]

## 2020-04-30 NOTE — PROGRESS NOTES
[facility]    ICU TEAM Progress Note    Name: Ovidio Valentin   : 1960   MRN: [id number]   Date: 2020        Subjective:     Reason for ICU Admission:   65 yo female with hx of obesity, endometrial cancer and diabetes who presented to [facility] with worsening hypoxic respiratory failure in lieu of close exposure to COVID-19 to family including mother (passed away) sister and brother and tested resulting in [place] -23 positive here. She presented to the hospital 3/26 and intubated 3/28. She was started on broad spectrum antibiotics and plaquenil. Developed worsening renal failure and was transferred to ICU for CRRT. Her hospital course has been further notable for development of a retroperitoneal bleed, hemorrhagic shock and sepsis and acute hypoxic respiratory failure with high PEEP and FIo2 requirements. She had a Code blue on 20 with ROSC 7 minutes. She has now transitioned from CRRT to HD, receiving daily at this time, tolerating well. Patient was extubated after several days of improvement on SBT, Extubated to Bi-pap on 20. Now on NC. Speech/OT/PT consulted. SARS - COV - 2 re-test negative x 2 on 20 and 20. Overnight Events:    Remains liberated from ventilator  Tolerated 2L NC overnight, refused Bi-pap.    HD planned for this am.  Ordered Speech for swallow eval  PT/OT ordered  COVID retest negative x2       Objective:   Vital Signs:  Visit Vitals  /73 (BP 1 Location: Left arm, BP Patient Position: At rest)   Pulse (!) 111   Temp 98 °F (36.7 °C)   Resp 25   Ht 5' 4\" (1.626 m)   Wt 116.8 kg (257 lb 9.6 oz)   SpO2 96%   BMI 44.22 kg/m²    O2 Flow Rate (L/min): 3 l/min O2 Device: Nasal cannula Temp (24hrs), Av.2 °F (36.8 °C), Min:97.6 °F (36.4 °C), Max:98.7 °F (37.1 °C)           Intake/Output:     Intake/Output Summary (Last 24 hours) at 2020 0924  Last data filed at 2020 0600  Gross per 24 hour   Intake 700 ml   Output 1550 ml   Net -850 ml       Physical Exam:  General:  Alert and calm. No acute distress. Eyes:  Sclera anicteric. Pupils equal, round, reactive to light. Mouth/Throat: Mucous membranes moist, clear. Hoarse. Neck: Supple. No JVD   Lungs:   Bilateral coarse lung sounds. Equal chest excursion. Weak cough. Cardiovascular:  Regular rate and rhythm, no murmur, click, rub, or gallop. Abdomen:   Soft, non-tender, bowel sounds normal, non-distended. Extremities: No cyanosis or general edema + 1-2 in lower extremities. Skin: No acute rash or lesions. Lymph Nodes: Cervical and supraclavicular normal.   Musculoskeletal:  No swelling or deformity. Lines/Devices:  Intact, no erythema, drainage, or tenderness. Psychiatric/ Neuro: Alert, calm, cooperative, attends, attempts to answer, follows commands but weak. Moves all extremities, Non focal.     T/L/D  Tubes: NGT  Lines: Arterial Line, Central Line and Ramon - Discontinue A-line today   Drains: FMS    LABS AND  DATA: Personally reviewed  Recent Labs     04/30/20 0227 04/29/20 0455   WBC 7.2 5.2   HGB 7.6* 12.3   HCT 24.8* 37.7    175     Recent Labs     04/30/20 0227 04/29/20 0455    133*   K 3.1* 3.6    99   CO2 32 25   BUN 51* 92*   CREA 1.63* 2.34*   * 193*   CA 8.3* 8.7   MG 1.9 2.1   PHOS 2.5* 3.4     Recent Labs     04/30/20 0227 04/29/20 0455   ALB 2.3* 2.4*     No results for input(s): INR, PTP, APTT, INREXT, INREXT in the last 72 hours. No results for input(s): PHI, PCO2I, PO2I, FIO2I in the last 72 hours. No results for input(s): CPK, CKMB, TROIQ, BNPP in the last 72 hours.     Hemodynamics:   PAP:   CO:     Wedge:   CI:     CVP:    SVR:       PVR:       Ventilator Settings:  Mode Rate Tidal Volume Pressure FiO2 PEEP   Pressure support, Spontaneous   430 ml  10 cm H2O 40 % 5 cm H20     Peak airway pressure: 16 cm H2O    Minute ventilation: 11.7 l/min        MEDS: Reviewed    Chest X-Ray: No New Imaging to Review    Echo: Ordered and Pending    ABCDEF Bundle/Checklist Completed:  YES-See Plan    SPECIAL EQUIPMENT  IHD    Active Problem List:     Problem List  Date Reviewed: 4/19/2020          Codes Class    Hypotension ICD-10-CM: I95.9  ICD-9-CM: 458.9 Acute        Retroperitoneal hemorrhage ICD-10-CM: R58  ICD-9-CM: 459.0         Acute renal failure (ARF) ([place] [address].) ICD-10-CM: N17.9  ICD-9-CM: 584.9         Encephalopathy ICD-10-CM: G93.40  ICD-9-CM: 348.30         Sepsis with multi-organ dysfunction ([facility] ICD-10-CM: A41.9, R65.20  ICD-9-CM: 038.9, 995.92         Pneumonia due to methicillin susceptible Staphylococcus aureus (MSSA) ([place] [address].) ICD-10-CM: J15.211  ICD-9-CM: 482.41         Thrombocytopenia (HCC) ICD-10-CM: D69.6  ICD-9-CM: 287.5         Diarrhea ICD-10-CM: R19.7  ICD-9-CM: 787.91         COVID-19 virus infection ICD-10-CM: U07.1         Obesity, morbid ([place] [address].) ICD-10-CM: E66.01  ICD-9-CM: 278.01         Vaginal Pap smear following hysterectomy for malignancy ICD-10-CM: Z08, Z90.710  ICD-9-CM: V67.01         Personal history of malignant neoplasm of other parts of uterus ICD-10-CM: Z85.42  ICD-9-CM: V10.42         Endometrial cancer ([place] [address].) ICD-10-CM: C54.1  ICD-9-CM: 182.0             ICU Assessment/ Comprehensive Plan of Care:     NEURO  1. No acute concerns  Pain Medications: Norco changed from scheduled to PRN   Target RASS: N/A  Sedation Medications: None  CAM-ICU:  Negative  Restraints: None needed at this time    CARDIAC  1. Get Echo today  Cardiac Gtts: None  SBP Goal of: > 100 mmHg  MAP Goal of: > 65 mmHg  Transfusion Trigger (Hgb): <7 g/dL    RESPIRATORY  1. S/p Acute hypoxic respiratory failure 2/2 COVID 19 and MSSA pneumonia  2. Extubated on 04/29 to BIPAP 10/5  3.  Weaned to nasal cannula with PRN BIPAP  4. Guifenesin changed to 100 mg q 12hrs    Respiratory Goals: Head of bed > 30 degrees  Aggressive bronchopulmonary hygiene  Incentive spirometry  SPO2 Goal: > 92%  Pulmonary toilet: Incentive Spirometry   DVT Prophylaxis (if no, list reason): SCD's or Sequential Compression Device      RENAL  1. Acute renal failure requiring HD  Now on daily IHD with good tolerance  Removed 1.5 liters today 04/29, plan for HD today  Nephrology following  Bowden Catheter Present: No  IVFs: NA    GASTROINTESTINAL  1. Remove FMS  2. Benepro today TID to tube feeds    GI Prophylaxis: Pepcid (famotidine)   Nutrition: Yes TF  Bowel Movement: Yes  Bowel Regimen: None needed at this time    HEMATOLOGIC  1. Retroperitoneal bleed this hospitalization, resolved  Avoid AP/AC meds  Monitor H/H daily  No current s/s of bleeding  DVT Prophylaxis (if no, list reason): SCD's or Sequential Compression Device   2. Non occlusive PE in LLL pulmonary artery - no anticoagulation due to retroperitoneal bleed. ID  1. No concerns for infection at present  Trend WBC/ Fevers  2. COVID 19 + on admit, now COVID 19 NEG x 1 04/20, 04/27  Completed 5 days of Plaquenil and zinc  Antibiotics:NA    ENDOCRINE  1. Diabetes Mellitus Type 2  Continue SSI q 6hr  Glycemic Control - Insulin: Yes    MUSCULOSKELETAL  1. Severe deconditioning 2/2 critical illness  2. Bedrest with activity allowed for PT/OT   Encourage and assist with PROM and AROM  Mobility: Poor and Bedrest  PT/OT: PT consulted and on board and OT consulted and on board     DISPOSITION/COMMUNICATION  Discussed Plan of Care/Code Status: Full Code  Transfer to non-ICU bed    CRITICAL CARE CONSULTANT NOTE  I had a face to face encounter with the patient, reviewed and interpreted patient data including clinical events, labs, images, vital signs, I/O's, and examined patient. I have discussed the case and the plan and management of the patient's care with the consulting services, the bedside nurses and the respiratory therapist.      NOTE OF PERSONAL INVOLVEMENT IN CARE   This patient has a high probability of imminent, clinically significant deterioration, which requires the highest level of preparedness to intervene urgently.  I participated in the decision-making and personally managed or directed the management of the following life and organ supporting interventions that required my frequent assessment to treat or prevent imminent deterioration. I personally spent 50 minutes of critical care time. This is time spent at this critically ill patient's bedside actively involved in patient care as well as the coordination of care and discussions with the patient's family. This does not include any procedural time which has been billed separately.     Alex Albrecht [facility]-BC     [address] [facility]

## 2020-04-30 NOTE — DIABETES MGMT
[facility]  CLINICAL NURSE SPECIALIST CONSULT  PROGRAM FOR DIABETES HEALTH  Follow up Note  Presentation   Lisa Briggs is a 64 y.o. female admitted from OSH to [facility] ICU with SARS-COV2 needing CRRT. She is currently sedated and has been intubated since 3/28/20. Current clinical course has been complicated by steroid induced hyperglycemia. Steroids are discontinued at this time. She requires CRRT for acute renal failure r/t her sepsis and hypotension. PHM: GERD, obesity, and anxiety. New diabetes diagnosis with A1C 11.0% (3/28/2020); updated A1C 3/31/20-10.4%     Recent events:   Patient awake, on 2L 02 NC. Still remains weak. Now COVID-19 negative. Off isolation. Remains on TF. Per nurse she sat on the side of the bed today. Will be getting transferred to step down today. Consulted by Provider for advanced diabetes nursing assessment and care, specifically related to   [] Transitioning off Horace Gilbert   [x] Inpatient management strategy  [] Home management assessment  [] Survival skill education    Diabetes-related medical history  Acute complications  hyperglycemia  Neurological complications  NONE  Microvascular disease  NONE  Macrovascular disease  NONE  Other associated conditions     NONE    Diabetes medication history: NONE    Subjective    I was able to visit Ms. Luz Elena Paz today for the first time. She is awake, slow to respond and appears very weak. Objective     Vital Signs   Visit Vitals  /73   Pulse (!) 108   Temp 98.6 °F (37 °C)   Resp 24   Ht 5' 4\" (1.626 m)   Wt 116.6 kg (257 lb)   SpO2 100%   BMI 44.11 kg/m²   .    Laboratory  Lab Results   Component Value Date/Time    Hemoglobin A1c 10.4 (H) 03/31/2020 03:42 PM     No results found for: LDL, LDLC, DLDLP  Lab Results   Component Value Date/Time    Creatinine 1.63 (H) 04/30/2020 02:27 AM     Lab Results   Component Value Date/Time    Sodium 137 04/30/2020 02:27 AM    Potassium 3.1 (L) 04/30/2020 02:27 AM    Chloride 100 04/30/2020 02:27 AM    CO2 32 04/30/2020 02:27 AM    Anion gap 5 04/30/2020 02:27 AM    Glucose 197 (H) 04/30/2020 02:27 AM    BUN 51 (H) 04/30/2020 02:27 AM    Creatinine 1.63 (H) 04/30/2020 02:27 AM    BUN/Creatinine ratio 31 (H) 04/30/2020 02:27 AM    GFR est AA 39 (L) 04/30/2020 02:27 AM    GFR est non-AA 33 (L) 04/30/2020 02:27 AM    Calcium 8.3 (L) 04/30/2020 02:27 AM    Bilirubin, total 1.6 (H) 04/20/2020 05:29 AM    AST (SGOT) 169 (H) 04/20/2020 05:29 AM    Alk. phosphatase 490 (H) 04/20/2020 05:29 AM    Protein, total 5.9 (L) 04/20/2020 05:29 AM    Albumin 2.3 (L) 04/30/2020 02:27 AM    Globulin 3.5 04/20/2020 05:29 AM    A-G Ratio 0.7 (L) 04/20/2020 05:29 AM    ALT (SGPT) 206 (H) 04/20/2020 05:29 AM     Lab Results   Component Value Date/Time    ALT (SGPT) 206 (H) 04/20/2020 05:29 AM         Evaluation   Ms Lenora Damon, with new onset Type 2 diabetes,with A1C 10.4%. AM fasting BG 222mg/dl today. She required 12 units of Lispro coverage yesterday. TF Nepro 35cc/hr. I would anticipate she will need basal insulin to cover for her metabolic needs and TF. It is imperative that we maintain her BG within target range 100-180mg/dl. Recommendations   1. If BG trends >200mg/dl consistently, consider re-initiating basal insulin at 0.2units/kg dosing = 20units daily. 2. Will continue to follow.     Assessment and Plan   Nursing Diagnosis Risk for unstable blood glucose pattern   Nursing Intervention Domain 8979 Decision-making Support   Nursing Interventions Examined current inpatient diabetes control   Explored factors facilitating and impeding inpatient management  Identified self-management practices impeding diabetes control  Explored corrective strategies with patient and responsible inpatient provider   Informed patient of rational for insulin strategy while hospitalized         Billing Code(s)     [x] 97492 IP subsequent hospital care - [address], [place]   Program for Diabetes Health  Access via KESHIA Roca [phone]

## 2020-04-30 NOTE — PROGRESS NOTES
Palliative Medicine Social Work      Returned call to patient . He voiced concerns about communication with him re: patient care. This has been an ongoing discussion and have worked on finding a solution for this including daily family meetings to keep he and daughter in the loop (we backed off to every other day at [facility] request last week). Discussed when patient is moved to new unit that it will be important to plan a daily check in time with nurses to support the communication process (Versus random calls). Also set the expectation (again) that nurses have many patients to care for/everyone doing their best to support patient and update families. Referred Hebert Torres to patient advocacy. Provided background to patient advocate Sri Brown. Appreciate their help. Patient to tx out of ICU today! Thank you for the opportunity to be involved in the care of Ms. Aman Rodriguez and her family.     Mert Geiger, FLAKITA, Supervisee in Social Work  Palliative Medicine   [phone]

## 2020-04-30 NOTE — PROCEDURES
[facility] Team [facility]  [phone]    Vitals   Pre   Post   Assessment   Pre   Post     Temp  Temp: 98.7 °F (37.1 °C) (04/30/20 0915)  98.6 LOC  Awake, cannot verbalize Awake, watching TV, can use head signals   HR   Pulse (Heart Rate): (!) 112(listening to  talk to her on phone) (04/30/20 0930) 107 Lungs   Diminished in bases. Denies SOB. O2 at 3L/min via NC  O2 at 2L/min NC   B/P   BP: 122/77 (04/30/20 0930) 112/73 Cardiac   irreg rate. Monitored at [facility] and remotely  irreg heart rate. Resp   Resp Rate: 21 (04/30/20 0930) 30 Skin   WDI  WDI   Pain level  Pain Intensity 1: 0 (04/30/20 0800) 0/10 Edema  gen puffiness  extrems 1+     gen puffiness. Arms/ hands still 1+ edema   Orders:    Duration:   Start:   0920 End:   1220 Total:   3 hrs   Dialyzer:   Dialyzer/Set Up Inspection: Robinar([id number]/ [id number]) (04/30/20 0915)   K Bath:   Dialysate K (mEq/L): 3.5 (04/30/20 0915)   Ca Bath:   Dialysate CA (mEq/L): 2.5 (04/30/20 0915)   Na/Bicarb:   Dialysate NA (mEq/L): 140 (04/30/20 0915)   Target Fluid Removal:   Goal/Amount of Fluid to Remove (mL): 1500 mL (04/30/20 0915)   Access     Type & Location:   Rt neck temp cath:  +aspir/ NS flushes. No redness or drainage.   CHG drsg CDI, dated 4/29/20   Labs     Obtained/Reviewed   Critical Results Called   Date when labs were drawn-  Hgb-    HGB   Date Value Ref Range Status   04/30/2020 7.6 (L) 11.5 - 16.0 g/dL Final     Comment:     INVESTIGATED PER DELTA CHECK PROTOCOL     K-    Potassium   Date Value Ref Range Status   04/30/2020 3.1 (L) 3.5 - 5.1 mmol/L Final     Ca-   Calcium   Date Value Ref Range Status   04/30/2020 8.3 (L) 8.5 - 10.1 MG/DL Final     Bun-   BUN   Date Value Ref Range Status   04/30/2020 51 (H) 6 - 20 MG/DL Final     Comment:     INVESTIGATED PER DELTA CHECK PROTOCOL     Creat-   Creatinine   Date Value Ref Range Status   04/30/2020 1.63 (H) 0.55 - 1.02 MG/DL Final        Medications/ Blood Products Given Name   Dose   Route and Time     Heparin 1000 units/ ml 1.1ml  ICD art port   Heparin 1000 units/ml 1.4ml ICD nell port        Blood Volume Processed (BVP):    66L Net Fluid   Removed:  1500ml   Comments   Time Out Done: yes, PIEDAD at 0915  Primary Nurse Rpt Pre:  Kalpana Dominguez RN  Primary Nurse Rpt Post:  Kalpana Dominguez RN  Pt Education:  Discussed cath care and need for mask. Explained procedure  Care Plan:  Continue to do HD txs as ordered. Now on T-T-S schedule  Tx Summary:  Tolerated tx well. At end,blood in circuit returned w/ 300ml NS. Portsflushed and dwelled w/ heparin. Sterile caps applied, clamps secured. Drsg still CDI. Admiting Diagnosis:  COVID Sxs/ renal failure  Pt's previous clinic-  Consent signed - Informed Consent Verified: Yes (04/30/20 0915)  [facility] Consent - verified  Hepatitis Status- Hep B Ag neg 4/ 29/20  Machine #- [id number]/ [id number]  Telemetry status-monitored @ BS & remotely  Pre-dialysis wt. - Pre-Dialysis Weight: 121.1 kg (267 lb) (04/30/20 0915)

## 2020-04-30 NOTE — ROUTINE PROCESS
0730: Bedside and Verbal shift change report given to Manuel Vargas RN (oncoming nurse) by Rashel Duran RN (offgoing nurse). Report included the following information SBAR, Kardex, ED Summary, Procedure Summary, Intake/Output, MAR, Recent Results, Med Rec Status, Cardiac Rhythm SR/ST and Alarm Parameters . 0845: Dialysis RN at bedside for HD. Per Dr. Debra Ndiaye, patient now on T/Th/Sat HD schedule. 7920: Spoke to Carol Quan,  of patient, and updated on plan of care. 1030: Patient removed from isolation precautions after speaking with infection prevention. Covid test neg x2 and asymptomatic.     1200: Primary Nurse Annalee Moses and Enoch Pope RN performed a dual skin assessment on this patient Impairment noted- see wound doc flow sheet  Aniket score is 11    1220: OT at bedside. Patient sitting up on bedside with OT and OT tech. VSS.     1320: Echo tech at bedside. 1345: Spoke to Carol Quan,  of patient, and updated on plan of care. Told  patient going to room 444 and provided telephone number of that unit. 1434:  TRANSFER - IN REPORT:    [facility]  being received from  (unit) for routine progression of care      Report consisted of patients Situation, Background, Assessment and   Recommendations(SBAR). Information from the following report(s) SBAR, Kardex, ED Summary, Procedure Summary, Intake/Output, MAR, Recent Results, Med Rec Status, Cardiac Rhythm SR/ST and Alarm Parameters  was reviewed with the receiving nurse. Opportunity for questions and clarification was provided. Assessment completed upon patients arrival to unit and care assumed.

## 2020-05-01 ENCOUNTER — APPOINTMENT (OUTPATIENT)
Dept: GENERAL RADIOLOGY | Age: 60
DRG: 870 | End: 2020-05-01
Attending: INTERNAL MEDICINE
Payer: COMMERCIAL

## 2020-05-01 LAB
ALBUMIN SERPL-MCNC: 2.4 G/DL (ref 3.5–5)
ANION GAP SERPL CALC-SCNC: 5 MMOL/L (ref 5–15)
ARTERIAL PATENCY WRIST A: NO
BASE EXCESS BLD CALC-SCNC: 9 MMOL/L
BASOPHILS # BLD: 0 K/UL (ref 0–0.1)
BASOPHILS NFR BLD: 0 % (ref 0–1)
BDY SITE: ABNORMAL
BUN SERPL-MCNC: 41 MG/DL (ref 6–20)
BUN/CREAT SERPL: 24 (ref 12–20)
CA-I BLD-SCNC: 1.25 MMOL/L (ref 1.12–1.32)
CALCIUM SERPL-MCNC: 8.8 MG/DL (ref 8.5–10.1)
CHLORIDE SERPL-SCNC: 101 MMOL/L (ref 97–108)
CO2 SERPL-SCNC: 30 MMOL/L (ref 21–32)
CREAT SERPL-MCNC: 1.71 MG/DL (ref 0.55–1.02)
DIFFERENTIAL METHOD BLD: ABNORMAL
EOSINOPHIL # BLD: 0.1 K/UL (ref 0–0.4)
EOSINOPHIL NFR BLD: 1 % (ref 0–7)
ERYTHROCYTE [DISTWIDTH] IN BLOOD BY AUTOMATED COUNT: 15.9 % (ref 11.5–14.5)
GAS FLOW.O2 O2 DELIVERY SYS: ABNORMAL L/MIN
GLUCOSE BLD STRIP.AUTO-MCNC: 225 MG/DL (ref 65–100)
GLUCOSE BLD STRIP.AUTO-MCNC: 225 MG/DL (ref 65–100)
GLUCOSE BLD STRIP.AUTO-MCNC: 226 MG/DL (ref 65–100)
GLUCOSE BLD STRIP.AUTO-MCNC: 232 MG/DL (ref 65–100)
GLUCOSE BLD STRIP.AUTO-MCNC: 243 MG/DL (ref 65–100)
GLUCOSE BLD STRIP.AUTO-MCNC: 278 MG/DL (ref 65–100)
GLUCOSE SERPL-MCNC: 252 MG/DL (ref 65–100)
HCO3 BLD-SCNC: 31.6 MMOL/L (ref 22–26)
HCT VFR BLD AUTO: 25.7 % (ref 35–47)
HGB BLD-MCNC: 7.8 G/DL (ref 11.5–16)
IMM GRANULOCYTES # BLD AUTO: 0.1 K/UL (ref 0–0.04)
IMM GRANULOCYTES NFR BLD AUTO: 1 % (ref 0–0.5)
LYMPHOCYTES # BLD: 1.1 K/UL (ref 0.8–3.5)
LYMPHOCYTES NFR BLD: 13 % (ref 12–49)
MAGNESIUM SERPL-MCNC: 2 MG/DL (ref 1.6–2.4)
MCH RBC QN AUTO: 30 PG (ref 26–34)
MCHC RBC AUTO-ENTMCNC: 30.4 G/DL (ref 30–36.5)
MCV RBC AUTO: 98.8 FL (ref 80–99)
MONOCYTES # BLD: 0.7 K/UL (ref 0–1)
MONOCYTES NFR BLD: 8 % (ref 5–13)
NEUTS SEG # BLD: 6.4 K/UL (ref 1.8–8)
NEUTS SEG NFR BLD: 77 % (ref 32–75)
NRBC # BLD: 0 K/UL (ref 0–0.01)
NRBC BLD-RTO: 0 PER 100 WBC
O2/TOTAL GAS SETTING VFR VENT: 40 %
PCO2 BLD: 38.4 MMHG (ref 35–45)
PEEP RESPIRATORY: 5 CMH2O
PH BLD: 7.52 [PH] (ref 7.35–7.45)
PHOSPHATE SERPL-MCNC: 2.4 MG/DL (ref 2.6–4.7)
PIP ISTAT,IPIP: 10
PLATELET # BLD AUTO: 243 K/UL (ref 150–400)
PMV BLD AUTO: 10.2 FL (ref 8.9–12.9)
PO2 BLD: 125 MMHG (ref 80–100)
POTASSIUM SERPL-SCNC: 3.6 MMOL/L (ref 3.5–5.1)
RBC # BLD AUTO: 2.6 M/UL (ref 3.8–5.2)
SAO2 % BLD: 99 % (ref 92–97)
SERVICE CMNT-IMP: ABNORMAL
SODIUM SERPL-SCNC: 136 MMOL/L (ref 136–145)
SPECIMEN TYPE: ABNORMAL
SPONTANEOUS TIMED, IST: YES
WBC # BLD AUTO: 8.3 K/UL (ref 3.6–11)

## 2020-05-01 PROCEDURE — 94660 CPAP INITIATION&MGMT: CPT

## 2020-05-01 PROCEDURE — 83735 ASSAY OF MAGNESIUM: CPT

## 2020-05-01 PROCEDURE — 74011250637 HC RX REV CODE- 250/637: Performed by: INTERNAL MEDICINE

## 2020-05-01 PROCEDURE — 74011636637 HC RX REV CODE- 636/637: Performed by: INTERNAL MEDICINE

## 2020-05-01 PROCEDURE — 74011250637 HC RX REV CODE- 250/637: Performed by: NURSE PRACTITIONER

## 2020-05-01 PROCEDURE — 82803 BLOOD GASES ANY COMBINATION: CPT

## 2020-05-01 PROCEDURE — 85025 COMPLETE CBC W/AUTO DIFF WBC: CPT

## 2020-05-01 PROCEDURE — 82962 GLUCOSE BLOOD TEST: CPT

## 2020-05-01 PROCEDURE — 80069 RENAL FUNCTION PANEL: CPT

## 2020-05-01 PROCEDURE — 36415 COLL VENOUS BLD VENIPUNCTURE: CPT

## 2020-05-01 PROCEDURE — 97110 THERAPEUTIC EXERCISES: CPT

## 2020-05-01 PROCEDURE — 65660000000 HC RM CCU STEPDOWN

## 2020-05-01 PROCEDURE — 74011636637 HC RX REV CODE- 636/637: Performed by: NURSE PRACTITIONER

## 2020-05-01 PROCEDURE — L4396 STATIC OR DYNAMI AFO PRE CST: HCPCS

## 2020-05-01 PROCEDURE — 36600 WITHDRAWAL OF ARTERIAL BLOOD: CPT

## 2020-05-01 PROCEDURE — 71045 X-RAY EXAM CHEST 1 VIEW: CPT

## 2020-05-01 RX ORDER — BALSAM PERU/CASTOR OIL
OINTMENT (GRAM) TOPICAL 2 TIMES DAILY
Status: DISCONTINUED | OUTPATIENT
Start: 2020-05-01 | End: 2020-05-22 | Stop reason: HOSPADM

## 2020-05-01 RX ORDER — INSULIN GLARGINE 100 [IU]/ML
20 INJECTION, SOLUTION SUBCUTANEOUS
Status: DISCONTINUED | OUTPATIENT
Start: 2020-05-01 | End: 2020-05-02

## 2020-05-01 RX ORDER — BUMETANIDE 1 MG/1
2 TABLET ORAL DAILY
Status: DISCONTINUED | OUTPATIENT
Start: 2020-05-01 | End: 2020-05-15

## 2020-05-01 RX ADMIN — SODIUM CHLORIDE 10 ML: 9 INJECTION INTRAMUSCULAR; INTRAVENOUS; SUBCUTANEOUS at 06:45

## 2020-05-01 RX ADMIN — GUAIFENESIN 100 MG: 200 SOLUTION ORAL at 09:19

## 2020-05-01 RX ADMIN — SODIUM CHLORIDE 10 ML: 9 INJECTION INTRAMUSCULAR; INTRAVENOUS; SUBCUTANEOUS at 06:44

## 2020-05-01 RX ADMIN — Medication 1 PACKET: at 09:19

## 2020-05-01 RX ADMIN — Medication 1 PACKET: at 18:55

## 2020-05-01 RX ADMIN — SEVELAMER CARBONATE 0.8 G: 800 POWDER, FOR SUSPENSION ORAL at 18:55

## 2020-05-01 RX ADMIN — INSULIN LISPRO 5 UNITS: 100 INJECTION, SOLUTION INTRAVENOUS; SUBCUTANEOUS at 06:44

## 2020-05-01 RX ADMIN — FAMOTIDINE 20 MG: 40 POWDER, FOR SUSPENSION ORAL at 09:20

## 2020-05-01 RX ADMIN — MINERAL OIL AND WHITE PETROLATUM: 150; 830 OINTMENT OPHTHALMIC at 09:20

## 2020-05-01 RX ADMIN — CASTOR OIL AND BALSAM, PERU: 788; 87 OINTMENT TOPICAL at 18:55

## 2020-05-01 RX ADMIN — INSULIN LISPRO 3 UNITS: 100 INJECTION, SOLUTION INTRAVENOUS; SUBCUTANEOUS at 00:18

## 2020-05-01 RX ADMIN — MINERAL OIL AND WHITE PETROLATUM: 150; 830 OINTMENT OPHTHALMIC at 00:00

## 2020-05-01 RX ADMIN — BUMETANIDE 2 MG: 1 TABLET ORAL at 12:00

## 2020-05-01 RX ADMIN — Medication 1 PACKET: at 21:52

## 2020-05-01 RX ADMIN — GUAIFENESIN 100 MG: 200 SOLUTION ORAL at 20:52

## 2020-05-01 RX ADMIN — CASTOR OIL AND BALSAM, PERU: 788; 87 OINTMENT TOPICAL at 09:20

## 2020-05-01 RX ADMIN — INSULIN LISPRO 3 UNITS: 100 INJECTION, SOLUTION INTRAVENOUS; SUBCUTANEOUS at 18:55

## 2020-05-01 RX ADMIN — INSULIN LISPRO 3 UNITS: 100 INJECTION, SOLUTION INTRAVENOUS; SUBCUTANEOUS at 12:09

## 2020-05-01 RX ADMIN — INSULIN GLARGINE 20 UNITS: 100 INJECTION, SOLUTION SUBCUTANEOUS at 21:45

## 2020-05-01 RX ADMIN — SODIUM CHLORIDE 10 ML: 9 INJECTION INTRAMUSCULAR; INTRAVENOUS; SUBCUTANEOUS at 13:33

## 2020-05-01 RX ADMIN — SEVELAMER CARBONATE 0.8 G: 800 POWDER, FOR SUSPENSION ORAL at 12:09

## 2020-05-01 RX ADMIN — SEVELAMER CARBONATE 0.8 G: 800 POWDER, FOR SUSPENSION ORAL at 09:19

## 2020-05-01 NOTE — PROGRESS NOTES
05/01/20 1528   Oxygen Therapy   O2 Sat (%) 99 %   Pulse via Oximetry 111 beats per minute   O2 Device Nasal cannula   O2 Flow Rate (L/min) 2 l/min  (decrease to 1.5lpm nc)

## 2020-05-01 NOTE — PROGRESS NOTES
[facility]   [address] [facility], [facility] [address], [facility]  Phone: [phone]   [phone]       Nephrology Progress Note  Hesham Escobar     1960     [id number]  Date of Admission : 3/31/2020  05/01/20    CC: Follow up for JUANCHO      Assessment and Plan   JUANCHO :  - 2/2 ATN  - CRRT switched to daily IHD on 4/18  - New line placed by IR on 4/22  - HD  TTS   - daily Bumex   - UOP improving :  daily labs   - I have called and updated pts  yesterday     COVID-19 +ve   Acute Hypoxic Resp Failure   - On Vent   - completed Plaquenil, - s/p Tocilizumab   - extubated 4/29  - ordered stat CXR and ABG   - requested Pulmonary consult     Anemia in CKD  RP hematoma:  - continue KOKI     Cardiac arrest 4/9    Type II DM   - Insulin per primary team      Morbid Obesity        Interval History:  Extubated 2 days ago   Transferred out of ICU yesterday on NC during the day   She is very lethargic now and not following commands and needing BIPAP    cc   No fevers       Review of Systems: Review of systems not obtained due to patient factors.     Current Medications:   Current Facility-Administered Medications   Medication Dose Route Frequency    guaiFENesin (ROBITUSSIN) 100 mg/5 mL oral liquid 100 mg  100 mg Oral Q12H    epoetin jovani-epbx (RETACRIT) injection 20,000 Units  20,000 Units SubCUTAneous Q TUE, THU & SAT    HYDROcodone-acetaminophen (NORCO) 5-325 mg per tablet 1 Tab  1 Tab Oral Q6H PRN    phenol throat spray (CHLORASEPTIC) 1 Spray  1 Spray Oral Q6H PRN    sevelamer carbonate (RENVELA) oral powder 0.8 g  0.8 g Oral TID WITH MEALS    heparin (porcine) 1,000 unit/mL injection 2,500 Units  2,500 Units IntraVENous DIALYSIS PRN    guar gum (BENEFIBER) packet 1 Packet  4 g Oral TID    labetaloL (NORMODYNE;TRANDATE) injection 20 mg  20 mg IntraVENous Q4H PRN    albumin human 25% (BUMINATE) solution 12.5 g  12.5 g IntraVENous DIALYSIS PRN    albuterol (PROVENTIL VENTOLIN) nebulizer solution 2.5 mg  2.5 mg Nebulization Q4H PRN    famotidine (PEPCID) 8 mg/mL oral suspension 20 mg  20 mg Per NG tube DAILY    alteplase (CATHFLO) 2 mg in sterile water (preservative free) 2 mL injection  2 mg InterCATHeter DIALYSIS PRN    alteplase (CATHFLO) 2 mg in sterile water (preservative free) 2 mL injection  2 mg InterCATHeter DIALYSIS PRN    insulin lispro (HUMALOG) injection   SubCUTAneous Q6H    white petrolatum-mineral oiL (AKWA TEARS) 83-15 % ophthalmic ointment   Both Eyes Q12H    bacitracin 500 unit/gram packet 1 Packet  1 Packet Topical PRN    balsam peru-castor oiL (VENELEX) ointment   Topical BID    sodium chloride (NS) flush 5-40 mL  5-40 mL IntraVENous Q8H    sodium chloride (NS) flush 5-40 mL  5-40 mL IntraVENous PRN    ondansetron (ZOFRAN) injection 4 mg  4 mg IntraVENous Q4H PRN    acetaminophen (TYLENOL) tablet 650 mg  650 mg Oral Q6H PRN    Or    acetaminophen (TYLENOL) suppository 650 mg  650 mg Rectal Q6H PRN    glucose chewable tablet 16 g  4 Tab Oral PRN    glucagon (GLUCAGEN) injection 1 mg  1 mg IntraMUSCular PRN    dextrose 10% infusion 0-250 mL  0-250 mL IntraVENous PRN      Allergies   Allergen Reactions    Augmentin [Amoxicillin-Pot Clavulanate] Rash and Itching       Objective:  Vitals:    Vitals:    05/01/20 0236 05/01/20 0318 05/01/20 0746 05/01/20 0855   BP:  136/74  139/80   Pulse: (!) 108 (!) 104  (!) 105   Resp:  20  20   Temp:  98.7 °F (37.1 °C)  98.1 °F (36.7 °C)   TempSrc:       SpO2:  100% 100% 100%   Weight:       Height:         Intake and Output:  05/01 0701 - 05/01 1900  In: -   Out: 400 [Urine:400]  04/29 1901 - 05/01 0700  In: 975   Out: 3200 [Urine:100; Drains:100]    Physical Examination:     General: Lethargic, Obese   Resp:  Diffuse rhonchi, diminished BS   CV:  RRR, no murmur   GI:  Obese , soft, NT   Neurologic:  Lethargic, confused   Access:           R IJ melissa     []    High complexity decision making was performed  []    Patient is at high-risk of decompensation with multiple organ involvement    Lab Data Personally Reviewed: I have reviewed all the pertinent labs, microbiology data and radiology studies during assessment.     Recent Labs     05/01/20 0552 04/30/20 0227 04/29/20 0455    137 133*   K 3.6 3.1* 3.6    100 99   CO2 30 32 25   * 197* 193*   BUN 41* 51* 92*   CREA 1.71* 1.63* 2.34*   CA 8.8 8.3* 8.7   MG 2.0 1.9 2.1   PHOS 2.4* 2.5* 3.4   ALB 2.4* 2.3* 2.4*     Recent Labs     05/01/20 0552 04/30/20  1643 04/30/20  0227 04/29/20  0455   WBC 8.3  --  7.2 5.2   HGB 7.8* 7.7* 7.6* 12.3   HCT 25.7* 24.8* 24.8* 37.7     --  207 175     No results found for: SDES  Lab Results   Component Value Date/Time    Culture result: NO GROWTH 5 DAYS 04/09/2020 02:32 PM    Culture result: NO GROWTH 5 DAYS 04/08/2020 10:36 AM    Culture result: NO GROWTH 5 DAYS 03/31/2020 11:15 AM    Culture result: MODERATE STAPHYLOCOCCUS AUREUS (A) 03/31/2020 10:34 AM    Culture result: LIGHT NORMAL RESPIRATORY SOFIE 03/31/2020 10:34 AM     Recent Results (from the past 24 hour(s))   GLUCOSE, POC    Collection Time: 04/30/20 11:32 AM   Result Value Ref Range    Glucose (POC) 142 (H) 65 - 100 mg/dL    Performed by [address] [place] [address], POC    Collection Time: 04/30/20  4:04 PM   Result Value Ref Range    Glucose (POC) 191 (H) 65 - 100 mg/dL    Performed by William Jessica    HGB & HCT    Collection Time: 04/30/20  4:43 PM   Result Value Ref Range    HGB 7.7 (L) 11.5 - 16.0 g/dL    HCT 24.8 (L) 35.0 - 47.0 %   GLUCOSE, POC    Collection Time: 05/01/20 12:11 AM   Result Value Ref Range    Glucose (POC) 232 (H) 65 - 100 mg/dL    Performed by Darcy Redd    RENAL FUNCTION PANEL    Collection Time: 05/01/20  5:52 AM   Result Value Ref Range    Sodium 136 136 - 145 mmol/L    Potassium 3.6 3.5 - 5.1 mmol/L    Chloride 101 97 - 108 mmol/L    CO2 30 21 - 32 mmol/L    Anion gap 5 5 - 15 mmol/L    Glucose 252 (H) 65 - 100 mg/dL BUN 41 (H) 6 - 20 MG/DL    Creatinine 1.71 (H) 0.55 - 1.02 MG/DL    BUN/Creatinine ratio 24 (H) 12 - 20      GFR est AA 37 (L) >60 ml/min/1.73m2    GFR est non-AA 31 (L) >60 ml/min/1.73m2    Calcium 8.8 8.5 - 10.1 MG/DL    Phosphorus 2.4 (L) 2.6 - 4.7 MG/DL    Albumin 2.4 (L) 3.5 - 5.0 g/dL   MAGNESIUM    Collection Time: 05/01/20  5:52 AM   Result Value Ref Range    Magnesium 2.0 1.6 - 2.4 mg/dL   CBC WITH AUTOMATED DIFF    Collection Time: 05/01/20  5:52 AM   Result Value Ref Range    WBC 8.3 3.6 - 11.0 K/uL    RBC 2.60 (L) 3.80 - 5.20 M/uL    HGB 7.8 (L) 11.5 - 16.0 g/dL    HCT 25.7 (L) 35.0 - 47.0 %    MCV 98.8 80.0 - 99.0 FL    MCH 30.0 26.0 - 34.0 PG    MCHC 30.4 30.0 - 36.5 g/dL    RDW 15.9 (H) 11.5 - 14.5 %    PLATELET 917 277 - 949 K/uL    MPV 10.2 8.9 - 12.9 FL    NRBC 0.0 0  WBC    ABSOLUTE NRBC 0.00 0.00 - 0.01 K/uL    NEUTROPHILS 77 (H) 32 - 75 %    LYMPHOCYTES 13 12 - 49 %    MONOCYTES 8 5 - 13 %    EOSINOPHILS 1 0 - 7 %    BASOPHILS 0 0 - 1 %    IMMATURE GRANULOCYTES 1 (H) 0.0 - 0.5 %    ABS. NEUTROPHILS 6.4 1.8 - 8.0 K/UL    ABS. LYMPHOCYTES 1.1 0.8 - 3.5 K/UL    ABS. MONOCYTES 0.7 0.0 - 1.0 K/UL    ABS. EOSINOPHILS 0.1 0.0 - 0.4 K/UL    ABS. BASOPHILS 0.0 0.0 - 0.1 K/UL    ABS. IMM. GRANS. 0.1 (H) 0.00 - 0.04 K/UL    DF AUTOMATED     GLUCOSE, POC    Collection Time: 05/01/20  6:15 AM   Result Value Ref Range    Glucose (POC) 278 (H) 65 - 100 mg/dL    Performed by Mojgan Montenegro            Total time spent with patient:  xxx   min. Care Plan discussed with:  Patient     Family      RN      Consulting Physician [facility],         I have reviewed the flowsheets. Chart and Pertinent Notes have been reviewed. No change in PMH ,family and social history from Consult note.       Robinson Sher MD

## 2020-05-01 NOTE — PROGRESS NOTES
Bedside and Verbal shift change report given to JUDY Vance (oncoming nurse) by Eliezer Doshi RN (offgoing nurse). Report included the following information SBAR, Kardex, Intake/Output, MAR, Accordion, Recent Results and Cardiac Rhythm Sinus Tach.

## 2020-05-01 NOTE — DIABETES MGMT
MARTA ROMERO  CLINICAL NURSE SPECIALIST CONSULT  PROGRAM FOR DIABETES HEALTH  Follow up Note  Presentation   Ramón Aguilar is a 64 y.o. female admitted from OSH to [facility] ICU with SARS-COV2 needing CRRT. She is currently sedated and has been intubated since 3/28/20. Current clinical course has been complicated by steroid induced hyperglycemia. Steroids are discontinued at this time. She requires CRRT for acute renal failure r/t her sepsis and hypotension. PHM: GERD, obesity, and anxiety. New diabetes diagnosis with A1C 11.0% (3/28/2020); updated A1C 3/31/20-10.4%     Recent events:   Patient awake not obeying verbal commands-on Bipap for respiratory support-     Consulted by Provider for advanced diabetes nursing assessment and care, specifically related to   [] Transitioning off Toño Jenniefr   [x] Inpatient management strategy  [] Home management assessment  [] Survival skill education    Diabetes-related medical history  Acute complications  hyperglycemia  Neurological complications  NONE  Microvascular disease  NONE  Macrovascular disease  NONE  Other associated conditions     NONE    Diabetes medication history: NONE    Subjective     Objective   General:  Ms. Roz Grace is sitting up in bed- tolerating Bipap support. Eyes open and moving head around  But not following commands. Vital Signs   Visit Vitals  /72 (BP 1 Location: Left arm, BP Patient Position: At rest)   Pulse (!) 108   Temp 98.1 °F (36.7 °C)   Resp 20   Ht 5' 4\" (1.626 m)   Wt 116.6 kg (257 lb)   SpO2 100%   BMI 44.11 kg/m²   .    Laboratory  Lab Results   Component Value Date/Time    Hemoglobin A1c 10.4 (H) 03/31/2020 03:42 PM     No results found for: LDL, LDLC, DLDLP  Lab Results   Component Value Date/Time    Creatinine 1.71 (H) 05/01/2020 05:52 AM     Lab Results   Component Value Date/Time    Sodium 136 05/01/2020 05:52 AM    Potassium 3.6 05/01/2020 05:52 AM    Chloride 101 05/01/2020 05:52 AM    CO2 30 05/01/2020 05:52 AM    Anion gap 5 05/01/2020 05:52 AM    Glucose 252 (H) 05/01/2020 05:52 AM    BUN 41 (H) 05/01/2020 05:52 AM    Creatinine 1.71 (H) 05/01/2020 05:52 AM    BUN/Creatinine ratio 24 (H) 05/01/2020 05:52 AM    GFR est AA 37 (L) 05/01/2020 05:52 AM    GFR est non-AA 31 (L) 05/01/2020 05:52 AM    Calcium 8.8 05/01/2020 05:52 AM    Bilirubin, total 1.6 (H) 04/20/2020 05:29 AM    AST (SGOT) 169 (H) 04/20/2020 05:29 AM    Alk. phosphatase 490 (H) 04/20/2020 05:29 AM    Protein, total 5.9 (L) 04/20/2020 05:29 AM    Albumin 2.4 (L) 05/01/2020 05:52 AM    Globulin 3.5 04/20/2020 05:29 AM    A-G Ratio 0.7 (L) 04/20/2020 05:29 AM    ALT (SGPT) 206 (H) 04/20/2020 05:29 AM     Lab Results   Component Value Date/Time    ALT (SGPT) 206 (H) 04/20/2020 05:29 AM         Evaluation   Ms Chavez Méndez, with new onset Type 2 diabetes,with A1C 10.4%. AM fasting BG 278mg/dl today. She required 12 units of Lispro coverage yesterday. TF Nepro 35cc/hr. I would anticipate she will need basal insulin to cover for her metabolic needs and TF. It is imperative that we maintain her BG within target range 100-180mg/dl. Recommendations   1. If BG trends >200mg/dl consistently, consider re-initiating basal insulin at 0.2units/kg dosing = 20units daily. 2. Will continue to follow.     Assessment and Plan   Nursing Diagnosis Risk for unstable blood glucose pattern   Nursing Intervention Domain 7082 Decision-making Support   Nursing Interventions Examined current inpatient diabetes control   Explored factors facilitating and impeding inpatient management  Identified self-management practices impeding diabetes control  Explored corrective strategies with patient and responsible inpatient provider   Informed patient of rational for insulin strategy while hospitalized         Billing Code(s)     [x] 17384 IP subsequent hospital care - [address], CNS   Program for Diabetes Health  Access via K[address]IA [address] [phone]

## 2020-05-01 NOTE — PROGRESS NOTES
IFTIKHAR  -Acute rehab List & SNF List provided to patients spouse.  -Patients spouse is reviewing options IP Rehab -Declined SNF  -PT to assess patient again next week-uncertain if patient can tolerate IP Rehab. CM called spouse discussed Acute Rehab options. Spouse has interest in [facility] but has not made a decision just yet. CM noted OT eval today that states patient is appropriate for SNF. CM awaiting PT eval today to confirm. CM will email patient a SNF list to review as well. CM followed up with spouse to confirm he received the SNF list. CM further explained SNF vs IP Rehab, patients spouse advised that he \"absolutely does not want my wife in a nursing home\". Patient spouse that he is actually struggling with her doing anything other than coming home and is open to Outpatient PT/OT if necessary. Patient spouse advised that he is still wiling to consider IP rehab, but if its no longer an option, nor is a SNF per the spouses preference. CM spoke with patients son advised that PT is uncertain if the patient can tolerate IP Rehab and will further assess next week. Spouse is in agreement and is awaiting updates for appropriate care recommendations.      CM will follow     GEOVANNY Reddy/ALEX

## 2020-05-01 NOTE — ROUTINE PROCESS
2226 PT HR jumped up to the 170's Asymptomatic, not in any distress, /52. HR back down to 120. Hospitalist made aware. Will continue to monitor    Bedside and Verbal shift change report given to Laquita Griffith (oncoming nurse) by Lary Tiraod (offgoing nurse).  Report included the following information SBAR, Kardex, ED Summary, Intake/Output, MAR, Recent Results and Cardiac Rhythm ST.

## 2020-05-01 NOTE — PROGRESS NOTES
Problem: Self Care Deficits Care Plan (Adult)  Goal: *Acute Goals and Plan of Care (Insert Text)  Description:   FUNCTIONAL STATUS PRIOR TO ADMISSION: Patient unable to provide history. Per chart, patient was fully independent PTA. HOME SUPPORT: Per chart, patient lived with family. Occupational Therapy Goals  Initiated 4/30/2020  1. Patient will perform grooming with maximal assistance within 7 day(s). 2.  Patient will perform self-feeding if appropriate for PO with maximal assistance within 7 day(s). 3.  Patient will perform bathing with maximal assistance within 7 day(s). 4.  Patient will participate in upper extremity therapeutic exercise/activities with moderate assistance  for 10 minutes within 7 day(s). 5.  Patient will follow 100% simple commands in preparation for functional tasks within 7 days. Outcome: not progressing  OCCUPATIONAL THERAPY TREATMENT  Patient: Hesham Escobar (78 y.o. female)  Date: 5/1/2020  Diagnosis: COVID-19 virus infection [U07.1]   <principal problem not specified>       Precautions:    Chart, occupational therapy assessment, plan of care, and goals were reviewed. ASSESSMENT  Patient continues with skilled OT services and is not progressing towards goals today. Patient today placed on Bipap and having ABG pulled, 0/5 strength throughout, following 40% yes and no questions, but 0% motor commands, and not visually tracking from neutral to left. This is a change since OT evaluation. Patient benefited from bed in chair position and UE exercise. PT does she need PRAFO boot/alternate between LEs? Currently, pillow now placed between foot and footboard to provide support. Current Level of Function Impacting Discharge (ADLs): total A          PLAN :  Patient continues to benefit from skilled intervention to address the above impairments. Continue treatment per established plan of care. to address goals.     Recommend with staff: bed in chair position, feet on foot board to prevent tissue shortening    Recommendation for discharge: (in order for the patient to meet his/her long term goals)  Therapy up to 5 days/week in SNF setting    This discharge recommendation:  Has not yet been discussed the attending provider and/or case management       SUBJECTIVE:   Patient yes and no head nods    OBJECTIVE DATA SUMMARY:   Cognitive/Behavioral Status:  Neurologic State: Alert  Orientation Level: Unable to verbalize                Functional Mobility and Transfers for ADLs:  Bed Mobility:     Patient tolerated bed placed in chair position to increase cardiopulmonary tolerance/open up lungs but also in prep for ADLs total A for positioning. Transfers:             Balance:       ADL Intervention:        Tracked visually items on R hand side. Did not track items neutral to then left. Cognitive Retraining  Orientation Retraining: Reorienting  Attention to Task: Single task  Maintains Attention For (Time): 30 seconds  Following Commands: Follows one step commands/directions(40% answered yes no questions; 0% motor commands)    Therapeutic Exercises:   Patient instructed and demonstrated shoulder, elbow, hands 5 reps 2 sets with total A. Even with reps and sets did not notice trace muscle movement. However, patient did attend to right hand side, did not turn to left during this noxious stimuli. Pain:      Activity Tolerance:   Bipap 40% FIO2  -103, , RR 22-29  Please refer to the flowsheet for vital signs taken during this treatment. After treatment patient left in no apparent distress:       COMMUNICATION/COLLABORATION:   The patients plan of care was discussed with: Registered nurse.      Josue Childers  Time Calculation: 15 mins

## 2020-05-01 NOTE — WOUND CARE
Wound Consult: Follow Up Visit. Chart reviewed. Consulted for skin injuries to left upper buttock/lumbar area and lumbar back fold; patient admitted with severe respiratory failure intubated; never prone positioned, extubated to BiPap on 4/29; two negative COVID tests since admission; in step down unit. Spoke with patients nurse,  Klaudia Gracia and we were in to turn and provide care. Patient is resting on a total care bariatric plus air support bed. Opens eyes to name. Assessment:  Lower lumbar fold midline - resolved, resurfaced pink skin. Left upper buttock - 4 x 1 x 0.1 cm, linear full thickness injury, moist pink 30%, yellow 70%, no odor, no drainage, no surrounding redness. Gluteal cleft - resurfaced with ~ 2.5 x 0.3 x 0.1 cm moist red to dry pink area remaining. MASD. Midline pannus - 0.1 x 0.2 x 0.1 cm, pink,red nearly resurface area of MASD./intertrigo. No surrounding rash or redness. Left heel - 0.5 x 1 cm blanching to delayed blanching area of redness. Left groin - puncture site from previous line  ~0.3 x 0.3 cm, yellow; No surrounding redness, induration or drainage. Right groin - puncture/suture sites from previous lines - ~ 2 x 1 x 0.1 cm, pink edges/yellow base. No surrounding redness, induration or drainage. Forehead - scattered scabbed areas midline forehead; noted in ICU last week; we believed at that time to be related to O2 Sat monitor in use on forehead. No redness or injury to sacrum or right buttocks. Treatment:  Hydrocolloid dressing to left upper buttock injury. Incontinence skin care for loose brown stool, barrier ointment/z guard applied and to gluteal cleft, groin sites and abdominal pannus - tucked 4x4s into groin sites. Turned and repositioned. Wound Recommendations:  Hydrocolloid dressing to left upper buttock injury; change Monday and Thursday. Z guard to gluteal cleft, groin sites and abdominal pannus - tuck 4x4s into groin sites as needed.   Skin Care Recommendations:  1. Minimize friction/shear: minimize layers of linen/pads under patient. Current support surface appropriate. 2. Off load pressure/reposition: continue to turn and reposition approximately every 2- 3 hours; float heels. 3. Manage Moisture - keep skin folds dry; incontinence skin care routinely; Pure wick in use. 4. Continue to monitor nutrition, pain, and skin risk scale, and skin assessment. Plan:  Hand off to Dr. Derald Canavan and will update orders. We will continue to reassess routinely and as needed.   Casie Alvarez, [facility] Office [phone]  Pager [phone]

## 2020-05-01 NOTE — PROGRESS NOTES
Full consult note to follow.   ABG with primary metabolic alkalosis with preserved oxygenation, CXR with no new infiltrates  May have undiagnosed MIKE  Agree with NIPPV with sleep, wean O2 as tolerated  May need diamox  Discussed recommendations with RN  Orders placed      Mireya Barber MD

## 2020-05-01 NOTE — PROGRESS NOTES
Problem: Mobility Impaired (Adult and Pediatric)  Goal: *Acute Goals and Plan of Care (Insert Text)  Description: FUNCTIONAL STATUS PRIOR TO ADMISSION: Patient was independent and active without use of DME but poor history due to not being able to communicate. HOME SUPPORT PRIOR TO ADMISSION: Pt lives with family    Physical Therapy Goals  Initiated 4/25/2020  1. Patient will move from supine to sit and sit to supine , scoot up and down and roll side to side in bed with moderate assistance once off vent within 7 day(s). 2.  Patient will participate in sitting with min A for 2 minutes once off vent within 7 day(s). 3.  Patient will perform active supine or sitting TE with min A within 7 day(s). Outcome: Progressing Towards Goal    PHYSICAL THERAPY TREATMENT  Patient: Cynthia Vásquez (27 y.o. female)  Date: 5/1/2020  Diagnosis: COVID-19 virus infection [U07.1]   <principal problem not specified>       Precautions:    Chart, physical therapy assessment, plan of care and goals were reviewed. ASSESSMENT  Patient continues with skilled PT services and is progressing towards goals. Pt able to follow simple commands to assist with AAROM to 4 extremities. Noted profound weakness and activity tolerance. Pt tolerating bed in chair position. Pt is currently a total assist for bed mobility. Pt will need to increase activity tolerance before attempting inpt rehab. Noted in CM note family declining SNF. Will continue to progress  as able. Pt  will be re-evaluated next week. Pt may benefit from increase frequency to assist with progression     PRAFO boot prlaced on pt. Discussed with RN to alternate LE 2hrs at a time to prevent PF contracture. Current Level of Function Impacting Discharge (mobility/balance): total A    Other factors to consider for discharge: profound weakness          PLAN :  Patient continues to benefit from skilled intervention to address the above impairments.   Continue treatment per established plan of care. to address goals. Recommendation for discharge: (in order for the patient to meet his/her long term goals)  To be determined: family declining SNF and  questionable tolerance to inpt rehab     This discharge recommendation:  Has not yet been discussed the attending provider and/or case management    IF patient discharges home will need the following DME: hospital bed and to be determined (TBD)       SUBJECTIVE:   Patient stated on bipap yes no questions     OBJECTIVE DATA SUMMARY:   Critical Behavior:  Neurologic State: Alert  Orientation Level: Unable to verbalize  Cognition: Follows commands     Functional Mobility Training:  Bed Mobility:      Total assist               Transfers:                                   Balance:     Ambulation/Gait Training:                                                        Stairs: Therapeutic Exercises:   AAROM ankle pumps, SAQ, hip abduction/adduction, hip flexion (passive). Shoulder flexion, bicep curl, pronation supination     Pt displayed more movement in left LE than right. But more movement in right UE than left but pt also stating fatigue. Left UE was passive ROM  Pain Rating:      Activity Tolerance:   poor  Please refer to the flowsheet for vital signs taken during this treatment. After treatment patient left in no apparent distress:   Supine in bed, Heels elevated for pressure relief, Call bell within reach, and PRAFO on left      COMMUNICATION/COLLABORATION:   The patients plan of care was discussed with: Occupational therapist and Registered nurse.      Ledy Ballard PTA   Time Calculation: 24 mins

## 2020-05-01 NOTE — PROGRESS NOTES
Hospitalist Progress Note  Erlinda Handy MD  Answering service: [phone] from in house phone      Date of Service:  2020  NAME:  Lex Dumont  :  1960  MRN:  [id number]    Admission Summary:   59F s/p COVID treatement in ICU - extubated  Interval history / Subjective:   Patient seen and examined at bedside, looks comfortable, no acute events. on Cpap now as she is sleeping. Otherwise well on 2L NC. Assessment & Plan:     ARF: - Nephrology following- Now on daily IHD with good tolerance      COVID-19 +ve - Resolved- completed Plaquenil. On Vit C, Zinc- s/p Tocilizumab   Acute Hypoxic Resp Failure- resolving, Pulm cs pending  Anemia in CKD  Retroperitoneal bleed this hospitalization, resolved - Avoid AP/AC meds- Monitor H/H daily  Non occlusive PE in LLL pulmonary artery - no ACs due to retroperitoneal bleed. #. S/p Cardiac arrest   #. DM2: A1c 10.4, SSI, AccuChecks, monitor  #. Morbid obesity: counseled on health benefits of weight loss, Healthy diet, BMI 44.11 kg/m².      Code status: Full  DVT prophylaxis: SCD  Care Plan discussed with: Patient/Family and Nurse  Disposition: TBD     Hospital Problems  Date Reviewed: 2020          Codes Class Noted POA    Hypotension ICD-10-CM: I95.9  ICD-9-CM: 458.9 Acute 2020 Yes        Retroperitoneal hemorrhage ICD-10-CM: R58  ICD-9-CM: 459.0  2020 No        Acute renal failure (ARF) ([facility] ICD-10-CM: N17.9  ICD-9-CM: 584.9  2020 No        Encephalopathy ICD-10-CM: G93.40  ICD-9-CM: 348.30  2020 No        Sepsis with multi-organ dysfunction (HCC) ICD-10-CM: A41.9, R65.20  ICD-9-CM: 038.9, 995.92  2020 Unknown        Pneumonia due to methicillin susceptible Staphylococcus aureus (MSSA) ([facility] [address] ICD-10-CM: B12.520  ICD-9-CM: 482.41  2020 Unknown        Thrombocytopenia ([facility] ICD-10-CM: D69.6  ICD-9-CM: 287.5  2020 Unknown        Diarrhea ICD-10-CM: R19.7  ICD-9-CM: 787.91  4/19/2020 Unknown        COVID-19 virus infection ICD-10-CM: U07.1  3/31/2020 Unknown            Review of Systems:   Pertinent items are mentioned in interval history. Vital Signs:    Last 24hrs VS reviewed since prior progress note. Most recent are:  Visit Vitals  /72 (BP 1 Location: Left arm, BP Patient Position: At rest)   Pulse (!) 108   Temp 98.1 °F (36.7 °C)   Resp 20   Ht 5' 4\" (1.626 m)   Wt 116.6 kg (257 lb)   SpO2 100%   BMI 44.11 kg/m²         Intake/Output Summary (Last 24 hours) at 5/1/2020 1248  Last data filed at 5/1/2020 0855  Gross per 24 hour   Intake 155 ml   Output 500 ml   Net -345 ml        Physical Examination:   General:  Alert, No acute distress  Card:  S1, S2 without murmurs, good peripheral perfusion  Resp:  No accessory muscle use, Good AE, no wheezes, no rhonchi  Abd:  Soft, non-tender, non-distended, BS+, obese  Extremities:  No cyanosis or clubbing, no significant edema  Neuro:  Grossly normal, no focal neuro deficits, follows commands   Psych:  Good insight, not agitated. Data Review:    Review and/or order of clinical lab test  Review and/or order of tests in the radiology section of CPT  Review and/or order of tests in the medicine section of CPT  Labs:     Recent Labs     05/01/20 0552 04/30/20  1643 04/30/20 0227   WBC 8.3  --  7.2   HGB 7.8* 7.7* 7.6*   HCT 25.7* 24.8* 24.8*     --  207     Recent Labs     05/01/20  0552 04/30/20 0227 04/29/20  0455    137 133*   K 3.6 3.1* 3.6    100 99   CO2 30 32 25   BUN 41* 51* 92*   CREA 1.71* 1.63* 2.34*   * 197* 193*   CA 8.8 8.3* 8.7   MG 2.0 1.9 2.1   PHOS 2.4* 2.5* 3.4     Recent Labs     05/01/20  0552 04/30/20 0227 04/29/20  0455   ALB 2.4* 2.3* 2.4*     No results for input(s): INR, PTP, APTT, INREXT in the last 72 hours. No results for input(s): FE, TIBC, PSAT, FERR in the last 72 hours.    No results found for: FOL, RBCF   No results for input(s): PH, PCO2, PO2 in the last 72 hours. No results for input(s): CPK, CKNDX, TROIQ in the last 72 hours.     No lab exists for component: CPKMB  Lab Results   Component Value Date/Time    Triglyceride 159 (H) 04/12/2020 08:34 PM     Lab Results   Component Value Date/Time    Glucose (POC) 225 (H) 05/01/2020 12:07 PM    Glucose (POC) 278 (H) 05/01/2020 06:15 AM    Glucose (POC) 232 (H) 05/01/2020 12:11 AM    Glucose (POC) 191 (H) 04/30/2020 04:04 PM    Glucose (POC) 142 (H) 04/30/2020 11:32 AM     No results found for: COLOR, APPRN, SPGRU, REFSG, SARKIS, PROTU, GLUCU, KETU, BILU, UROU, GALE, LEUKU, GLUKE, EPSU, BACTU, WBCU, RBCU, CASTS, UCRY  Medications Reviewed:     Current Facility-Administered Medications   Medication Dose Route Frequency    balsam peru-castor oiL (VENELEX) ointment   Topical BID    bumetanide (BUMEX) tablet 2 mg  2 mg Oral DAILY    guaiFENesin (ROBITUSSIN) 100 mg/5 mL oral liquid 100 mg  100 mg Oral Q12H    epoetin jovani-epbx (RETACRIT) injection 20,000 Units  20,000 Units SubCUTAneous Q TUE, THU & SAT    HYDROcodone-acetaminophen (NORCO) 5-325 mg per tablet 1 Tab  1 Tab Oral Q6H PRN    phenol throat spray (CHLORASEPTIC) 1 Spray  1 Spray Oral Q6H PRN    sevelamer carbonate (RENVELA) oral powder 0.8 g  0.8 g Oral TID WITH MEALS    heparin (porcine) 1,000 unit/mL injection 2,500 Units  2,500 Units IntraVENous DIALYSIS PRN    guar gum (BENEFIBER) packet 1 Packet  4 g Oral TID    labetaloL (NORMODYNE;TRANDATE) injection 20 mg  20 mg IntraVENous Q4H PRN    albumin human 25% (BUMINATE) solution 12.5 g  12.5 g IntraVENous DIALYSIS PRN    albuterol (PROVENTIL VENTOLIN) nebulizer solution 2.5 mg  2.5 mg Nebulization Q4H PRN    famotidine (PEPCID) 8 mg/mL oral suspension 20 mg  20 mg Per NG tube DAILY    alteplase (CATHFLO) 2 mg in sterile water (preservative free) 2 mL injection  2 mg InterCATHeter DIALYSIS PRN    alteplase (CATHFLO) 2 mg in sterile water (preservative free) 2 mL injection  2 mg InterCATHeter DIALYSIS PRN    insulin lispro (HUMALOG) injection   SubCUTAneous Q6H    white petrolatum-mineral oiL (AKWA TEARS) 83-15 % ophthalmic ointment   Both Eyes Q12H    bacitracin 500 unit/gram packet 1 Packet  1 Packet Topical PRN    sodium chloride (NS) flush 5-40 mL  5-40 mL IntraVENous Q8H    sodium chloride (NS) flush 5-40 mL  5-40 mL IntraVENous PRN    ondansetron (ZOFRAN) injection 4 mg  4 mg IntraVENous Q4H PRN    acetaminophen (TYLENOL) tablet 650 mg  650 mg Oral Q6H PRN    Or    acetaminophen (TYLENOL) suppository 650 mg  650 mg Rectal Q6H PRN    glucose chewable tablet 16 g  4 Tab Oral PRN    glucagon (GLUCAGEN) injection 1 mg  1 mg IntraMUSCular PRN    dextrose 10% infusion 0-250 mL  0-250 mL IntraVENous PRN   ______________________________________________________________________  EXPECTED LENGTH OF STAY: 12d 0h  ACTUAL LENGTH OF STAY:          31               Travis Montemayor MD

## 2020-05-01 NOTE — CONSULTS
PULMONARY MEDICINE    Initial Physician Consultation Note    Name: Radha Manuel   : 1960   MRN: [id number]   Date: 2020      Subjective:   Consult Note: 2020   Requesting Physician: Dr. Ekaterina Granger  Reason for consult: Extubated COVID patient    Medical records and data reviewed. Patient is a 64 y.o. female who was admitted on 3/31 with severe COVID 19 pneumonia and multisystem organ failure and required invasive mechanical ventilation, CRRT and pressors. She recovered slowly, was extubated and transferred to telemetry for ongoing care. She has generalized weakness. She was noted to be lethargic this morning with labored breathing and was placed on bipap. ABG that was done sis not show resp acidosis, oxygenation was preserved. CXR does not show new or worsening infiltrates. She appears to be at risk for underlying MIKE which is not known from before. She is starting to work with PT/OT.  COVID 19 test was negative on      Review of Systems:     A comprehensive 12 system review of systems was not obtained from the patient    Assessment:   S/P multisystem organ failure with COVID 19 pneumonia  Encephalopathy- slowly improving, ABG did not show resp acidosis  Generalized weakness, likely with a component of critical illness neuropathy/myopathy without respiratory muscle weakness, episodic increased work of breathing  Metabolic alkalosis on diuretic therapy  Acute PE, not anticoagulated secondary to retroperitoneal bleed  Other medical problems per chart      Recommendations:     NIPPV at night and will sleep for increased WOB  Wean O2 as tolerated  PT/OT  Agree with diuresis as tolerated- may need diamox  May need to consider LE dopplers and IVC filter if positive given high VTE risk for recurrence  Other management per other teams  D/W RN       Active Problem List:     Problem List  Date Reviewed: 2020          Codes Class    Hypotension ICD-10-CM: I95.9  ICD-9-CM: 458.9 Acute Retroperitoneal hemorrhage ICD-10-CM: R58  ICD-9-CM: 459.0         Acute renal failure (ARF) (HCC) ICD-10-CM: N17.9  ICD-9-CM: 584.9         Encephalopathy ICD-10-CM: G93.40  ICD-9-CM: 348.30         Sepsis with multi-organ dysfunction (HCC) ICD-10-CM: A41.9, R65.20  ICD-9-CM: 038.9, 995.92         Pneumonia due to methicillin susceptible Staphylococcus aureus (MSSA) ([place] [address] ICD-10-CM: J15.211  ICD-9-CM: 482.41         Thrombocytopenia ([place] [address] ICD-10-CM: D69.6  ICD-9-CM: 287.5         Diarrhea ICD-10-CM: R19.7  ICD-9-CM: 787.91         COVID-19 virus infection ICD-10-CM: U07.1         Obesity, morbid ([place] [address] ICD-10-CM: E66.01  ICD-9-CM: 278.01         Vaginal Pap smear following hysterectomy for malignancy ICD-10-CM: Z08, Z90.710  ICD-9-CM: V67.01         Personal history of malignant neoplasm of other parts of uterus ICD-10-CM: Z85.42  ICD-9-CM: V10.42         Endometrial cancer ([place] [address] ICD-10-CM: C54.1  ICD-9-CM: 182.0               Past Medical History:      has a past medical history of Basal cell carcinoma, GERD (gastroesophageal reflux disease), Kidney stones, and Polyp of ureter. Past Surgical History:      has a past surgical history that includes hysteroscopy diagnostic (); pr endometrial ablation, thermal; hx  section (); hx colonoscopy; insert arterial line (2020); and ir insert non tunl cvc over 5 yrs (2020). Home Medications:     Prior to Admission medications    Medication Sig Start Date End Date Taking? Authorizing Provider   pantoprazole (PROTONIX) 40 mg tablet TAKE 1 TABLET BY MOUTH TWICE A DAY 18   Provider, Historical   esomeprazole (NEXIUM) 20 mg capsule Take 20 mg by mouth daily. Indications: BID    Provider, Historical   zolpidem (AMBIEN) 10 mg tablet Take 0.5 Tabs by mouth nightly as needed for Sleep. Max Daily Amount: 5 mg. 17   Annette Mandel MD   fluticasone (FLONASE) 50 mcg/actuation nasal spray Mist 1-2 spray(s) into each nostril once daily.  4/3/15 Provider, Historical   ranitidine (ZANTAC) 150 mg tablet 150 mg.    Provider, Historical       Allergies/Social/Family History: Allergies   Allergen Reactions    Augmentin [Amoxicillin-Pot Clavulanate] Rash and Itching      Social History     Tobacco Use    Smoking status: Never Smoker    Smokeless tobacco: Never Used   Substance Use Topics    Alcohol use: Not on file      Family History   Problem Relation Age of Onset    Prostate Cancer Father         PROSTATE    Breast Cancer Sister 46        invasive poorly differentiated ductal carcinoma, Neg genetic testing.  Cancer Sister 62        melanoma stage 1            Objective:   Vital Signs:  Visit Vitals  /72 (BP 1 Location: Left arm, BP Patient Position: At rest)   Pulse (!) 108   Temp 98.1 °F (36.7 °C)   Resp 20   Ht 5' 4\" (1.626 m)   Wt 116.6 kg (257 lb)   SpO2 99%   BMI 44.11 kg/m²    O2 Flow Rate (L/min): 2 l/min(decrease to 1.5lpm nc) O2 Device: Nasal cannula Temp (24hrs), Av.7 °F (37.1 °C), Min:98.1 °F (36.7 °C), Max:99.8 °F (37.7 °C)           Intake/Output:     Intake/Output Summary (Last 24 hours) at 2020 1724  Last data filed at 2020 1518  Gross per 24 hour   Intake 195 ml   Output 500 ml   Net -305 ml       Physical Exam: Detailed exam deferred in view of ongoing pandemic.  Physical exam as documented by attending physician was reviewed and discussed   General:  Awake, weak   Head:     Eyes:     Neck:    Lungs:      Chest wall:     Heart:     Abdomen:      Extremities:    Pulses:    Skin:    Neurologic:          LABS AND  DATA: Personally reviewed  Recent Labs     20  0552 20  1643 20  0227   WBC 8.3  --  7.2   HGB 7.8* 7.7* 7.6*   HCT 25.7* 24.8* 24.8*     --  207     Recent Labs     20  0552 20    137   K 3.6 3.1*    100   CO2 30 32   BUN 41* 51*   CREA 1.71* 1.63*   * 197*   CA 8.8 8.3*   MG 2.0 1.9   PHOS 2.4* 2.5*     Recent Labs     20  5870 04/30/20  0227   ALB 2.4* 2.3*     No results for input(s): INR, PTP, APTT, INREXT in the last 72 hours. Recent Labs     05/01/20  1109   PHI 7.522*   PCO2I 38.4   PO2I 125*   FIO2I 40     No results for input(s): CPK, CKMB, TROIQ, BNPP in the last 72 hours. MEDS: Reviewed    Chest Imaging: personally reviewed and report checked    Tele- reviewed    Medical decision making:   I have reviewed the flowsheet and previous day's notes  Patient has acute or chronic illness that poses a threat to life or bodily function  Review and order of Clinical lab tests  Review and Order of Radiology tests  Independent visualization of Image          Thank you for allowing me to participate in this patient's care.     Branden Davis MD      [facility]

## 2020-05-02 LAB
ALBUMIN SERPL-MCNC: 2.3 G/DL (ref 3.5–5)
ANION GAP SERPL CALC-SCNC: 7 MMOL/L (ref 5–15)
ATRIAL RATE: 126 BPM
BASOPHILS # BLD: 0 K/UL (ref 0–0.1)
BASOPHILS NFR BLD: 0 % (ref 0–1)
BUN SERPL-MCNC: 59 MG/DL (ref 6–20)
BUN/CREAT SERPL: 26 (ref 12–20)
CALCIUM SERPL-MCNC: 9.1 MG/DL (ref 8.5–10.1)
CALCULATED P AXIS, ECG09: 49 DEGREES
CALCULATED R AXIS, ECG10: 10 DEGREES
CALCULATED T AXIS, ECG11: 139 DEGREES
CHLORIDE SERPL-SCNC: 99 MMOL/L (ref 97–108)
CO2 SERPL-SCNC: 28 MMOL/L (ref 21–32)
CREAT SERPL-MCNC: 2.25 MG/DL (ref 0.55–1.02)
DIAGNOSIS, 93000: NORMAL
DIFFERENTIAL METHOD BLD: ABNORMAL
EOSINOPHIL # BLD: 0.1 K/UL (ref 0–0.4)
EOSINOPHIL NFR BLD: 1 % (ref 0–7)
ERYTHROCYTE [DISTWIDTH] IN BLOOD BY AUTOMATED COUNT: 15.5 % (ref 11.5–14.5)
GLUCOSE BLD STRIP.AUTO-MCNC: 184 MG/DL (ref 65–100)
GLUCOSE BLD STRIP.AUTO-MCNC: 268 MG/DL (ref 65–100)
GLUCOSE SERPL-MCNC: 236 MG/DL (ref 65–100)
HCT VFR BLD AUTO: 24.9 % (ref 35–47)
HGB BLD-MCNC: 7.6 G/DL (ref 11.5–16)
IMM GRANULOCYTES # BLD AUTO: 0 K/UL (ref 0–0.04)
IMM GRANULOCYTES NFR BLD AUTO: 0 % (ref 0–0.5)
LYMPHOCYTES # BLD: 1.3 K/UL (ref 0.8–3.5)
LYMPHOCYTES NFR BLD: 16 % (ref 12–49)
MAGNESIUM SERPL-MCNC: 2.1 MG/DL (ref 1.6–2.4)
MCH RBC QN AUTO: 30 PG (ref 26–34)
MCHC RBC AUTO-ENTMCNC: 30.5 G/DL (ref 30–36.5)
MCV RBC AUTO: 98.4 FL (ref 80–99)
MONOCYTES # BLD: 0.6 K/UL (ref 0–1)
MONOCYTES NFR BLD: 8 % (ref 5–13)
NEUTS SEG # BLD: 5.7 K/UL (ref 1.8–8)
NEUTS SEG NFR BLD: 75 % (ref 32–75)
NRBC # BLD: 0 K/UL (ref 0–0.01)
NRBC BLD-RTO: 0 PER 100 WBC
P-R INTERVAL, ECG05: 126 MS
PHOSPHATE SERPL-MCNC: 2.4 MG/DL (ref 2.6–4.7)
PLATELET # BLD AUTO: 226 K/UL (ref 150–400)
PMV BLD AUTO: 10.1 FL (ref 8.9–12.9)
POTASSIUM SERPL-SCNC: 3.5 MMOL/L (ref 3.5–5.1)
Q-T INTERVAL, ECG07: 316 MS
QRS DURATION, ECG06: 72 MS
QTC CALCULATION (BEZET), ECG08: 457 MS
RBC # BLD AUTO: 2.53 M/UL (ref 3.8–5.2)
SERVICE CMNT-IMP: ABNORMAL
SERVICE CMNT-IMP: ABNORMAL
SODIUM SERPL-SCNC: 134 MMOL/L (ref 136–145)
TROPONIN I SERPL-MCNC: 0.05 NG/ML
VENTRICULAR RATE, ECG03: 126 BPM
WBC # BLD AUTO: 7.8 K/UL (ref 3.6–11)

## 2020-05-02 PROCEDURE — 36415 COLL VENOUS BLD VENIPUNCTURE: CPT

## 2020-05-02 PROCEDURE — 84484 ASSAY OF TROPONIN QUANT: CPT

## 2020-05-02 PROCEDURE — 83735 ASSAY OF MAGNESIUM: CPT

## 2020-05-02 PROCEDURE — 74011636637 HC RX REV CODE- 636/637: Performed by: INTERNAL MEDICINE

## 2020-05-02 PROCEDURE — 74011250637 HC RX REV CODE- 250/637: Performed by: NURSE PRACTITIONER

## 2020-05-02 PROCEDURE — 74011000250 HC RX REV CODE- 250: Performed by: INTERNAL MEDICINE

## 2020-05-02 PROCEDURE — 65660000000 HC RM CCU STEPDOWN

## 2020-05-02 PROCEDURE — 74011250637 HC RX REV CODE- 250/637: Performed by: INTERNAL MEDICINE

## 2020-05-02 PROCEDURE — 93005 ELECTROCARDIOGRAM TRACING: CPT

## 2020-05-02 PROCEDURE — 74011636637 HC RX REV CODE- 636/637: Performed by: NURSE PRACTITIONER

## 2020-05-02 PROCEDURE — 77010033678 HC OXYGEN DAILY

## 2020-05-02 PROCEDURE — 94640 AIRWAY INHALATION TREATMENT: CPT

## 2020-05-02 PROCEDURE — 82962 GLUCOSE BLOOD TEST: CPT

## 2020-05-02 PROCEDURE — 85025 COMPLETE CBC W/AUTO DIFF WBC: CPT

## 2020-05-02 PROCEDURE — 90935 HEMODIALYSIS ONE EVALUATION: CPT

## 2020-05-02 PROCEDURE — 74011000250 HC RX REV CODE- 250: Performed by: NURSE PRACTITIONER

## 2020-05-02 PROCEDURE — 80069 RENAL FUNCTION PANEL: CPT

## 2020-05-02 PROCEDURE — 74011250636 HC RX REV CODE- 250/636: Performed by: INTERNAL MEDICINE

## 2020-05-02 RX ORDER — INSULIN GLARGINE 100 [IU]/ML
26 INJECTION, SOLUTION SUBCUTANEOUS
Status: DISCONTINUED | OUTPATIENT
Start: 2020-05-02 | End: 2020-05-04

## 2020-05-02 RX ORDER — METOPROLOL TARTRATE 5 MG/5ML
5 INJECTION INTRAVENOUS ONCE
Status: COMPLETED | OUTPATIENT
Start: 2020-05-02 | End: 2020-05-02

## 2020-05-02 RX ORDER — ALPRAZOLAM 0.25 MG/1
0.25 TABLET ORAL ONCE
Status: COMPLETED | OUTPATIENT
Start: 2020-05-02 | End: 2020-05-02

## 2020-05-02 RX ORDER — INSULIN GLARGINE 100 [IU]/ML
8 INJECTION, SOLUTION SUBCUTANEOUS ONCE
Status: COMPLETED | OUTPATIENT
Start: 2020-05-02 | End: 2020-05-02

## 2020-05-02 RX ADMIN — ALPRAZOLAM 0.25 MG: 0.25 TABLET ORAL at 02:45

## 2020-05-02 RX ADMIN — MINERAL OIL AND WHITE PETROLATUM: 150; 830 OINTMENT OPHTHALMIC at 08:38

## 2020-05-02 RX ADMIN — INSULIN LISPRO 5 UNITS: 100 INJECTION, SOLUTION INTRAVENOUS; SUBCUTANEOUS at 06:37

## 2020-05-02 RX ADMIN — Medication 1 PACKET: at 21:58

## 2020-05-02 RX ADMIN — INSULIN LISPRO 2 UNITS: 100 INJECTION, SOLUTION INTRAVENOUS; SUBCUTANEOUS at 00:00

## 2020-05-02 RX ADMIN — HEPARIN SODIUM 2500 UNITS: 1000 INJECTION INTRAVENOUS; SUBCUTANEOUS at 13:09

## 2020-05-02 RX ADMIN — INSULIN GLARGINE 8 UNITS: 100 INJECTION, SOLUTION SUBCUTANEOUS at 08:33

## 2020-05-02 RX ADMIN — ALBUTEROL SULFATE 2.5 MG: 2.5 SOLUTION RESPIRATORY (INHALATION) at 02:08

## 2020-05-02 RX ADMIN — Medication 1 PACKET: at 17:04

## 2020-05-02 RX ADMIN — EPOETIN ALFA-EPBX 20000 UNITS: 10000 INJECTION, SOLUTION INTRAVENOUS; SUBCUTANEOUS at 23:08

## 2020-05-02 RX ADMIN — CASTOR OIL AND BALSAM, PERU: 788; 87 OINTMENT TOPICAL at 08:38

## 2020-05-02 RX ADMIN — SODIUM CHLORIDE 30 ML: 9 INJECTION INTRAMUSCULAR; INTRAVENOUS; SUBCUTANEOUS at 21:59

## 2020-05-02 RX ADMIN — METOPROLOL TARTRATE 5 MG: 5 INJECTION INTRAVENOUS at 05:39

## 2020-05-02 RX ADMIN — Medication 1 PACKET: at 08:36

## 2020-05-02 RX ADMIN — INSULIN GLARGINE 26 UNITS: 100 INJECTION, SOLUTION SUBCUTANEOUS at 21:58

## 2020-05-02 RX ADMIN — SEVELAMER CARBONATE 0.8 G: 800 POWDER, FOR SUSPENSION ORAL at 17:04

## 2020-05-02 RX ADMIN — CASTOR OIL AND BALSAM, PERU: 788; 87 OINTMENT TOPICAL at 17:04

## 2020-05-02 RX ADMIN — SEVELAMER CARBONATE 0.8 G: 800 POWDER, FOR SUSPENSION ORAL at 08:38

## 2020-05-02 RX ADMIN — GUAIFENESIN 100 MG: 200 SOLUTION ORAL at 21:58

## 2020-05-02 RX ADMIN — SODIUM CHLORIDE 10 ML: 9 INJECTION INTRAMUSCULAR; INTRAVENOUS; SUBCUTANEOUS at 14:00

## 2020-05-02 RX ADMIN — INSULIN LISPRO 2 UNITS: 100 INJECTION, SOLUTION INTRAVENOUS; SUBCUTANEOUS at 17:09

## 2020-05-02 RX ADMIN — FAMOTIDINE 20 MG: 40 POWDER, FOR SUSPENSION ORAL at 08:38

## 2020-05-02 RX ADMIN — GUAIFENESIN 100 MG: 200 SOLUTION ORAL at 08:36

## 2020-05-02 RX ADMIN — MINERAL OIL AND WHITE PETROLATUM: 150; 830 OINTMENT OPHTHALMIC at 21:58

## 2020-05-02 NOTE — PROGRESS NOTES
HD TRANSFER - OUT REPORT:    Verbal report given to Francoise Del Rosario RN on Rachel Gutierrez being transferred to room 444 for continuing care       Report consisted of patient's Situation, Background, Assessment and   Recommendations(SBAR). Information from the following report(s) dialysis treatment note was reviewed with the receiving nurse. Method:  $$ Method: Hemodialysis (05/02/20 1006)    Fluid Removed  NET Fluid Removed (mL): 1500 ml (05/02/20 1309)     Patient response to treatment:  well    End Time  Hemodialysis End Time: 0903 (05/02/20 1309)  If not documented, dialysis nurse to update post-dialysis row in HD/Filtration flowsheet     Medications /Volume expansion agents or Fluid boluses administered during treatment? no    Post-dialysis medication administration due?  no  Remind nurse to administer post-HD medication upon return to unit. Line heparinization? yes    Lines: RIJ CVC    Opportunity for questions and clarification was provided.       Patient transported with: belongings by staff in stable condition

## 2020-05-02 NOTE — PROGRESS NOTES
Problem: Falls - Risk of  Goal: *Absence of Falls  Description: Document Danne Lobe Fall Risk and appropriate interventions in the flowsheet. Outcome: Progressing Towards Goal  Note: Fall Risk Interventions:  Mobility Interventions: PT Consult for mobility concerns, PT Consult for assist device competence, Strengthening exercises (ROM-active/passive)    Mentation Interventions: Door open when patient unattended, Familiar objects from home, Increase mobility, More frequent rounding, Update white board    Medication Interventions: Utilize gait belt for transfers/ambulation, Assess postural VS orthostatic hypotension, Evaluate medications/consider consulting pharmacy    Elimination Interventions: Call light in reach, Patient to call for help with toileting needs    History of Falls Interventions: Consult care management for discharge planning, Room close to nurse's station         Problem: Pressure Injury - Risk of  Goal: *Prevention of pressure injury  Description: Document Aniket Scale and appropriate interventions in the flowsheet. Outcome: Progressing Towards Goal  Note: Pressure Injury Interventions:  Sensory Interventions: Assess changes in LOC, Check visual cues for pain, Float heels, Keep linens dry and wrinkle-free, Minimize linen layers, Monitor skin under medical devices, Turn and reposition approx. every two hours (pillows and wedges if needed), Pressure redistribution bed/mattress (bed type)    Moisture Interventions: Absorbent underpads, Internal/External urinary devices, Check for incontinence Q2 hours and as needed, Minimize layers, Moisture barrier, Maintain skin hydration (lotion/cream)    Activity Interventions: Assess need for specialty bed, Pressure redistribution bed/mattress(bed type), PT/OT evaluation    Mobility Interventions: Assess need for specialty bed, HOB 30 degrees or less, PT/OT evaluation, Turn and reposition approx.  every two hours(pillow and wedges)    Nutrition Interventions: Document food/fluid/supplement intake    Friction and Shear Interventions: Apply protective barrier, creams and emollients, Lift sheet, Minimize layers, Lift team/patient mobility team, HOB 30 degrees or less, Transferring/repositioning devices                Problem: Patient Education: Go to Patient Education Activity  Goal: Patient/Family Education  Outcome: Progressing Towards Goal     Problem: Diabetes Self-Management  Goal: *Disease process and treatment process  Description: Define diabetes and identify own type of diabetes; list 3 options for treating diabetes. Outcome: Progressing Towards Goal  Goal: *Incorporating nutritional management into lifestyle  Description: Describe effect of type, amount and timing of food on blood glucose; list 3 methods for planning meals. Outcome: Progressing Towards Goal  Goal: *Incorporating physical activity into lifestyle  Description: State effect of exercise on blood glucose levels. Outcome: Progressing Towards Goal  Goal: *Developing strategies to promote health/change behavior  Description: Define the ABC's of diabetes; identify appropriate screenings, schedule and personal plan for screenings. Outcome: Progressing Towards Goal  Goal: *Using medications safely  Description: State effect of diabetes medications on diabetes; name diabetes medication taking, action and side effects. Outcome: Progressing Towards Goal  Goal: *Monitoring blood glucose, interpreting and using results  Description: Identify recommended blood glucose targets  and personal targets. Outcome: Progressing Towards Goal  Goal: *Prevention, detection, treatment of acute complications  Description: List symptoms of hyper- and hypoglycemia; describe how to treat low blood sugar and actions for lowering  high blood glucose level.   Outcome: Progressing Towards Goal  Goal: *Prevention, detection and treatment of chronic complications  Description: Define the natural course of diabetes and describe the relationship of blood glucose levels to long term complications of diabetes. Outcome: Progressing Towards Goal  Goal: *Developing strategies to address psychosocial issues  Description: Describe feelings about living with diabetes; identify support needed and support network  Outcome: Progressing Towards Goal  Goal: *Insulin pump training  Outcome: Progressing Towards Goal  Goal: *Sick day guidelines  Outcome: Progressing Towards Goal  Goal: *Patient Specific Goal (EDIT GOAL, INSERT TEXT)  Outcome: Progressing Towards Goal     Problem: Impaired Skin Integrity/Pressure Injury Treatment  Goal: *Improvement of Existing Pressure Injury  Outcome: Progressing Towards Goal  Goal: *Prevention of pressure injury  Description: Document Aniket Scale and appropriate interventions in the flowsheet. Outcome: Progressing Towards Goal  Note: Pressure Injury Interventions:  Sensory Interventions: Assess changes in LOC, Check visual cues for pain, Float heels, Keep linens dry and wrinkle-free, Minimize linen layers, Monitor skin under medical devices, Turn and reposition approx. every two hours (pillows and wedges if needed), Pressure redistribution bed/mattress (bed type)    Moisture Interventions: Absorbent underpads, Internal/External urinary devices, Check for incontinence Q2 hours and as needed, Minimize layers, Moisture barrier, Maintain skin hydration (lotion/cream)    Activity Interventions: Assess need for specialty bed, Pressure redistribution bed/mattress(bed type), PT/OT evaluation    Mobility Interventions: Assess need for specialty bed, HOB 30 degrees or less, PT/OT evaluation, Turn and reposition approx.  every two hours(pillow and wedges)    Nutrition Interventions: Document food/fluid/supplement intake    Friction and Shear Interventions: Apply protective barrier, creams and emollients, Lift sheet, Minimize layers, Lift team/patient mobility team, HOB 30 degrees or less, Transferring/repositioning devices

## 2020-05-02 NOTE — PROGRESS NOTES
Bedside and Verbal shift change report given to Kirit Jaimes (oncoming nurse) by Shanae Andrade (offgoing nurse). Report included the following information Kardex, Intake/Output, MAR, Recent Results and Cardiac Rhythm Sinus Tach.

## 2020-05-02 NOTE — PROGRESS NOTES
PICC line  Due for  Change,  This  Writer changed the dressing  , however the  PICC line  Kit   In  Unit  Did  Not have  [place] ,  Called all  Units in hospital , none had the anchor ,   Supervisor   Made aware  Had to  Tape  To anchor the PICC line in place and complete   Dressing . Pt  Declined to be  Turned at this  Time. Will continue  Monitor.

## 2020-05-02 NOTE — PROGRESS NOTES
Hospitalist Progress Note  Shane Vance MD  Answering service: [phone] from in house phone      Date of Service:  2020  NAME:  Michelle Hussein  :  1960  MRN:  [id number]    Admission Summary:   59F s/p COVID treatement in ICU - extubated  Interval history / Subjective:   Patient seen and examined at bedside, looks in mild distress with tachycardia in 110s, and tachypnea. Didn't wear BIPAP at night. sats 97% on 2L NC. Assessment & Plan:     ARF: - Nephrology following- Now on daily IHD with good tolerance      COVID-19 +ve - Resolved- completed Plaquenil. On Vit C, Zinc- s/p Tocilizumab   Acute Hypoxic Resp Failure- resolved, Pulm following. Bipap at night and PRN. #. Acute metabolic Encephalopathy: likely ICU delirium- no agitation. monitor    Anemia in CKD  Retroperitoneal bleed this hospitalization, resolved - Avoid AP/AC meds- Monitor H/H daily  Non occlusive PE in LLL pulmonary artery - no ACs due to retroperitoneal bleed. #. S/p Cardiac arrest   #. DM2: A1c 10.4, SSI, AccuChecks, monitor  #. Morbid obesity: counseled on health benefits of weight loss, Healthy diet, BMI 44.11 kg/m².      Code status: Full  DVT prophylaxis: SCD  Care Plan discussed with: Patient/Family and Nurse  Disposition: TBD     Hospital Problems  Date Reviewed: 2020          Codes Class Noted POA    Hypotension ICD-10-CM: I95.9  ICD-9-CM: 458.9 Acute 2020 Yes        Retroperitoneal hemorrhage ICD-10-CM: R58  ICD-9-CM: 459.0  2020 No        Acute renal failure (ARF) ([facility] [address] ICD-10-CM: N17.9  ICD-9-CM: 584.9  2020 No        Encephalopathy ICD-10-CM: G93.40  ICD-9-CM: 348.30  2020 No        Sepsis with multi-organ dysfunction [facility]) ICD-10-CM: A41.9, R65.20  ICD-9-CM: 038.9, 995.92  2020 Unknown        Pneumonia due to methicillin susceptible Staphylococcus aureus (MSSA) ([address] ICD-10-CM: U84.912  ICD-9-CM: 482.41 4/19/2020 Unknown        Thrombocytopenia ([facility] [address] ICD-10-CM: D69.6  ICD-9-CM: 287.5  4/19/2020 Unknown        Diarrhea ICD-10-CM: R19.7  ICD-9-CM: 787.91  4/19/2020 Unknown        COVID-19 virus infection ICD-10-CM: U07.1  3/31/2020 Unknown            Review of Systems:   Pertinent items are mentioned in interval history. Vital Signs:    Last 24hrs VS reviewed since prior progress note. Most recent are:  Visit Vitals  /83 (BP 1 Location: Left arm)   Pulse (!) 111   Temp 98.4 °F (36.9 °C)   Resp 20   Ht 5' 4\" (1.626 m)   Wt 116.6 kg (257 lb)   SpO2 96%   BMI 44.11 kg/m²         Intake/Output Summary (Last 24 hours) at 5/2/2020 0810  Last data filed at 5/2/2020 0514  Gross per 24 hour   Intake 710 ml   Output 450 ml   Net 260 ml        Physical Examination:   General:  Alert, No acute distress  Card:  S1, S2 without murmurs, good peripheral perfusion, tachycardia  Resp:  No accessory muscle use, Good AE, no wheezes, no rhonchi  Abd:  Soft, non-tender, non-distended, obese  Extremities:  No cyanosis or clubbing, no significant edema  Neuro:  Grossly normal, no focal neuro deficits, follows commands   Psych:   not agitated. Data Review:    Review and/or order of clinical lab test  Review and/or order of tests in the radiology section of CPT  Review and/or order of tests in the medicine section of CPT  Labs:     Recent Labs     05/02/20  0201 05/01/20  0552   WBC 7.8 8.3   HGB 7.6* 7.8*   HCT 24.9* 25.7*    243     Recent Labs     05/02/20  0201 05/01/20  0552 04/30/20  0227   * 136 137   K 3.5 3.6 3.1*   CL 99 101 100   CO2 28 30 32   BUN 59* 41* 51*   CREA 2.25* 1.71* 1.63*   * 252* 197*   CA 9.1 8.8 8.3*   MG 2.1 2.0 1.9   PHOS 2.4* 2.4* 2.5*     Recent Labs     05/02/20  0201 05/01/20  0552 04/30/20  0227   ALB 2.3* 2.4* 2.3*     No results for input(s): INR, PTP, APTT, INREXT, INREXT in the last 72 hours. No results for input(s): FE, TIBC, PSAT, FERR in the last 72 hours.    No results found for: FOL, RBCF   No results for input(s): PH, PCO2, PO2 in the last 72 hours.   Recent Labs     05/02/20  0201   TROIQ 0.05*     Lab Results   Component Value Date/Time    Triglyceride 159 (H) 04/12/2020 08:34 PM     Lab Results   Component Value Date/Time    Glucose (POC) 268 (H) 05/02/2020 06:14 AM    Glucose (POC) 226 (H) 05/01/2020 11:05 PM    Glucose (POC) 225 (H) 05/01/2020 09:38 PM    Glucose (POC) 243 (H) 05/01/2020 06:48 PM    Glucose (POC) 225 (H) 05/01/2020 12:07 PM     No results found for: COLOR, APPRN, SPGRU, REFSG, SARKIS, PROTU, GLUCU, KETU, BILU, UROU, GALE, LEUKU, GLUKE, EPSU, BACTU, WBCU, RBCU, CASTS, UCRY  Medications Reviewed:     Current Facility-Administered Medications   Medication Dose Route Frequency    insulin glargine (LANTUS) injection 26 Units  26 Units SubCUTAneous QHS    insulin glargine (LANTUS) injection 8 Units  8 Units SubCUTAneous ONCE    balsam peru-castor oiL (VENELEX) ointment   Topical BID    bumetanide (BUMEX) tablet 2 mg  2 mg Oral DAILY    guaiFENesin (ROBITUSSIN) 100 mg/5 mL oral liquid 100 mg  100 mg Oral Q12H    epoetin jovani-epbx (RETACRIT) injection 20,000 Units  20,000 Units SubCUTAneous Q TUE, THU & SAT    HYDROcodone-acetaminophen (NORCO) 5-325 mg per tablet 1 Tab  1 Tab Oral Q6H PRN    phenol throat spray (CHLORASEPTIC) 1 Spray  1 Spray Oral Q6H PRN    sevelamer carbonate (RENVELA) oral powder 0.8 g  0.8 g Oral TID WITH MEALS    heparin (porcine) 1,000 unit/mL injection 2,500 Units  2,500 Units IntraVENous DIALYSIS PRN    guar gum (BENEFIBER) packet 1 Packet  4 g Oral TID    labetaloL (NORMODYNE;TRANDATE) injection 20 mg  20 mg IntraVENous Q4H PRN    albumin human 25% (BUMINATE) solution 12.5 g  12.5 g IntraVENous DIALYSIS PRN    albuterol (PROVENTIL VENTOLIN) nebulizer solution 2.5 mg  2.5 mg Nebulization Q4H PRN    famotidine (PEPCID) 8 mg/mL oral suspension 20 mg  20 mg Per NG tube DAILY    alteplase (CATHFLO) 2 mg in sterile water (preservative free) 2 mL injection  2 mg InterCATHeter DIALYSIS PRN    alteplase (CATHFLO) 2 mg in sterile water (preservative free) 2 mL injection  2 mg InterCATHeter DIALYSIS PRN    insulin lispro (HUMALOG) injection   SubCUTAneous Q6H    white petrolatum-mineral oiL (AKWA TEARS) 83-15 % ophthalmic ointment   Both Eyes Q12H    bacitracin 500 unit/gram packet 1 Packet  1 Packet Topical PRN    sodium chloride (NS) flush 5-40 mL  5-40 mL IntraVENous Q8H    sodium chloride (NS) flush 5-40 mL  5-40 mL IntraVENous PRN    ondansetron (ZOFRAN) injection 4 mg  4 mg IntraVENous Q4H PRN    acetaminophen (TYLENOL) tablet 650 mg  650 mg Oral Q6H PRN    Or    acetaminophen (TYLENOL) suppository 650 mg  650 mg Rectal Q6H PRN    glucose chewable tablet 16 g  4 Tab Oral PRN    glucagon (GLUCAGEN) injection 1 mg  1 mg IntraMUSCular PRN    dextrose 10% infusion 0-250 mL  0-250 mL IntraVENous PRN   ______________________________________________________________________  EXPECTED LENGTH OF STAY: 12d 0h  ACTUAL LENGTH OF STAY:          32               Travis Montemayor MD

## 2020-05-02 NOTE — PROCEDURES
[facility]  [phone]    Vitals   Pre   Post   Assessment   Pre   Post     Temp  Temp: 99.3 °F (37.4 °C) (05/02/20 1006)  98.8 axillary LOC  Alert, does not verbalized, shakes head yes/no but inconsistenly Alert, does not verbalized, shakes head yes/no but inconsistenly   HR   Pulse (Heart Rate): (!) 110 (05/02/20 1006) 110 Lungs   Coarse, O2 at 2L continuously Coarse, O2 at 2L continuously   B/P   BP: 122/79 (05/02/20 1006) 124/73 Cardiac   Bedside telemetry sinus tachycardia, HR irregular, S1 S2 present HR irregular, S1 S2 present   Resp   Resp Rate: 21 (05/02/20 1006) 20 Skin   Warm and dry, patient has air matress, turn and reposition patient q2h Warm and dry, patient has air matress, turn and reposition patient q2h   Pain level  Pain Intensity 1: 0 (05/02/20 0833) 0 Edema  2+ BLE, generalized     1+ BLE, generalized   Orders:    Duration:   Start:   10:06 End:   13:09 Total:   3 hrs   Dialyzer:   Dialyzer/Set Up Inspection: Adonis Ch (05/02/20 1006)   K Bath:   Dialysate K (mEq/L): 3.5 (05/02/20 1006)   Ca Bath:   Dialysate CA (mEq/L): 2.5 (05/02/20 1006)   Na/Bicarb:   Dialysate NA (mEq/L): 140 (05/02/20 1006)   Target Fluid Removal:   Goal/Amount of Fluid to Remove (mL): 1500 mL (05/02/20 1006)   Access     Type & Location:   R Ramon CVC: Dressing CDI last changed 04/30/2020. No s/s of infection. Both lumens aspirate & flush well. Running well in reverse at .    Labs     Obtained/Reviewed   Critical Results Called   Date when labs were drawn-  Hgb-    HGB   Date Value Ref Range Status   05/02/2020 7.6 (L) 11.5 - 16.0 g/dL Final     K-    Potassium   Date Value Ref Range Status   05/02/2020 3.5 3.5 - 5.1 mmol/L Final     Comment:     SPECIMEN HEMOLYZED, RESULTS MAY BE AFFECTED     Ca-   Calcium   Date Value Ref Range Status   05/02/2020 9.1 8.5 - 10.1 MG/DL Final     Bun-   BUN   Date Value Ref Range Status   05/02/2020 59 (H) 6 - 20 MG/DL Final     Creat- Creatinine   Date Value Ref Range Status   05/02/2020 2.25 (H) 0.55 - 1.02 MG/DL Final        Medications/ Blood Products Given     Name   Dose   Route and Time     Heparin 1000 units/ 1 ml concentration 2500 units  1.4 ml/ 1400 units to dwell in arterial lumen of HD CVC at 13:09  1.1 ml/ 1100 units to dwell in arterial lumen of HD CVC at 13:09             Blood Volume Processed (BVP):   66.6 L Net Fluid   Removed:  1500 ml   Comments   Time Out Done: 10:00  Primary Nurse Rpt Pre: Mey Mcpherson RN  Primary Nurse Rpt Post: Mey Mcpherson RN  Pt Education: procedural, PPE  Care Plan: ongoing  Tx Summary:  SBAR received from Primary RN Mey Mcpherson. Pt arrived to HD suite A&Ox1-2. Does not answer most questions, shakes head yes when asked if she is okay/ comfortable. Consent signed & on file. 10:06- Each catheter limb disinfected for 60 seconds per limb with alcohol swabs. Caps removed, dialysis CVC hub scrubbed with Prevantics for 5 seconds, followed by a 5 second dry time per Hospital P&P. Each lumen aspirated for blood return and flushed with Normal Saline per policy. VSS. Dialysis Tx initiated. Dialysis access visualized and lines intact. Patient is wearing surgical mask per [facility] policy. HD RN is wearing gown, mask, facesheild, and gloves per policy. 10:11- Patient arterial pressures flucuating below -260, CVC lines reversed. Catheter now running well at .  10:30- Patient resting quietly, denies pain or SOB. VSS. Dialysis access visualized and lines intact. PPE maintained by patient and staff at this time. 11:00- PPatient resting quietly, denies pain or SOB. VSS. Dialysis access visualized and lines intact. PPE maintained by patient and staff at this time. 11:30- Patient resting quietly, denies pain or SOB. VSS. Dialysis access visualized and lines intact. PPE maintained by patient and staff at this time. 12:00- Patient resting quietly, denies pain or SOB. VSS.  Dialysis access visualized and lines intact. PPE maintained by patient and staff at this time. 12:30- Patient resting quietly, denies pain or SOB. VSS. Dialysis access visualized and lines intact. PPE maintained by patient and staff at this time. 13:09- Tx ended. VSS. All possible blood returned to patient. Central line catheter flushed with normal saline per policy. Each catheter limb disinfected for 60 seconds per limb with alcohol swabs. Dialysis CVC hubs scrubbed with Prevantics for 5 seconds, followed by a 5 second dry time per Hospital P&P, Heparin dwells instilled and red and blue dialysis caps applied to ports using aseptic technique. Bed locked and in the lowest position, call bell and belongings in reach. SBAR given to Primary, JUDY Miles. Patient is stable at time of her departure. All Dialysis related medications have been reviewed. Admiting Diagnosis: COVID-19 viral infection  Pt's previous clinic- new to HD  Consent signed - Informed Consent Verified: Yes (05/02/20 1006)  [facility] Consent - on file  Hepatitis Status- HbAg negative 4/29/2020, susceptible  Machine #- Machine Number: [id number] (05/02/20 1006)  Telemetry status- Bedside, sinus tachycardia per primary RN  Pre-dialysis wt. - Pre-Dialysis Weight: 121.1 kg (267 lb) (04/30/20 6682)

## 2020-05-02 NOTE — PROGRESS NOTES
Patient  Has  Been  Awake alert  This  Shift so far ,   Heart  Rate    120 to 128    with stable  /58 mgh,  Denies  Any pain ,  Temp 99.0,   Notified  Provider and received  Order  To do  EKG and  TROPONIN .

## 2020-05-02 NOTE — PROGRESS NOTES
Patient   Now restless  Heart  Rate  Going up  To 130 patient  Appears anxious  crying , asking to call sister,   Dr Gino Eaton aware EKG report  Received order to  Give  Xanax 0.25 mg  PO    Troponin  Is   0.05  At this  Time . Will continue  monior.

## 2020-05-02 NOTE — PROGRESS NOTES
TRANSFER - IN REPORT:    Verbal [facility] for Hemodialysis treatment. Report consisted of patients Situation, Background, Assessment and   Recommendations(SBAR). Information from the following report(s) kardex/ flowsheet was reviewed with the receiving nurse. Opportunity for questions and clarification was provided. Assessment completed upon patients arrival to unit and care assumed.

## 2020-05-03 ENCOUNTER — APPOINTMENT (OUTPATIENT)
Dept: GENERAL RADIOLOGY | Age: 60
DRG: 870 | End: 2020-05-03
Attending: INTERNAL MEDICINE
Payer: COMMERCIAL

## 2020-05-03 LAB
ALBUMIN SERPL-MCNC: 2.3 G/DL (ref 3.5–5)
ANION GAP SERPL CALC-SCNC: 6 MMOL/L (ref 5–15)
BASOPHILS # BLD: 0 K/UL (ref 0–0.1)
BASOPHILS NFR BLD: 0 % (ref 0–1)
BUN SERPL-MCNC: 39 MG/DL (ref 6–20)
BUN/CREAT SERPL: 23 (ref 12–20)
CALCIUM SERPL-MCNC: 8.7 MG/DL (ref 8.5–10.1)
CHLORIDE SERPL-SCNC: 99 MMOL/L (ref 97–108)
CO2 SERPL-SCNC: 29 MMOL/L (ref 21–32)
CREAT SERPL-MCNC: 1.72 MG/DL (ref 0.55–1.02)
DIFFERENTIAL METHOD BLD: ABNORMAL
EOSINOPHIL # BLD: 0.1 K/UL (ref 0–0.4)
EOSINOPHIL NFR BLD: 2 % (ref 0–7)
ERYTHROCYTE [DISTWIDTH] IN BLOOD BY AUTOMATED COUNT: 15.2 % (ref 11.5–14.5)
GLUCOSE BLD STRIP.AUTO-MCNC: 151 MG/DL (ref 65–100)
GLUCOSE BLD STRIP.AUTO-MCNC: 200 MG/DL (ref 65–100)
GLUCOSE BLD STRIP.AUTO-MCNC: 205 MG/DL (ref 65–100)
GLUCOSE BLD STRIP.AUTO-MCNC: 208 MG/DL (ref 65–100)
GLUCOSE SERPL-MCNC: 233 MG/DL (ref 65–100)
HCT VFR BLD AUTO: 25 % (ref 35–47)
HGB BLD-MCNC: 7.8 G/DL (ref 11.5–16)
IMM GRANULOCYTES # BLD AUTO: 0 K/UL (ref 0–0.04)
IMM GRANULOCYTES NFR BLD AUTO: 0 % (ref 0–0.5)
LYMPHOCYTES # BLD: 1.2 K/UL (ref 0.8–3.5)
LYMPHOCYTES NFR BLD: 16 % (ref 12–49)
MAGNESIUM SERPL-MCNC: 1.9 MG/DL (ref 1.6–2.4)
MCH RBC QN AUTO: 30.5 PG (ref 26–34)
MCHC RBC AUTO-ENTMCNC: 31.2 G/DL (ref 30–36.5)
MCV RBC AUTO: 97.7 FL (ref 80–99)
MONOCYTES # BLD: 0.5 K/UL (ref 0–1)
MONOCYTES NFR BLD: 8 % (ref 5–13)
NEUTS SEG # BLD: 5.2 K/UL (ref 1.8–8)
NEUTS SEG NFR BLD: 74 % (ref 32–75)
NRBC # BLD: 0 K/UL (ref 0–0.01)
NRBC BLD-RTO: 0 PER 100 WBC
PHOSPHATE SERPL-MCNC: 2 MG/DL (ref 2.6–4.7)
PLATELET # BLD AUTO: 240 K/UL (ref 150–400)
PMV BLD AUTO: 10.2 FL (ref 8.9–12.9)
POTASSIUM SERPL-SCNC: 3.6 MMOL/L (ref 3.5–5.1)
RBC # BLD AUTO: 2.56 M/UL (ref 3.8–5.2)
SERVICE CMNT-IMP: ABNORMAL
SODIUM SERPL-SCNC: 134 MMOL/L (ref 136–145)
WBC # BLD AUTO: 7.1 K/UL (ref 3.6–11)

## 2020-05-03 PROCEDURE — 65660000000 HC RM CCU STEPDOWN

## 2020-05-03 PROCEDURE — 83735 ASSAY OF MAGNESIUM: CPT

## 2020-05-03 PROCEDURE — 74011250637 HC RX REV CODE- 250/637: Performed by: NURSE PRACTITIONER

## 2020-05-03 PROCEDURE — 80069 RENAL FUNCTION PANEL: CPT

## 2020-05-03 PROCEDURE — 85025 COMPLETE CBC W/AUTO DIFF WBC: CPT

## 2020-05-03 PROCEDURE — 36415 COLL VENOUS BLD VENIPUNCTURE: CPT

## 2020-05-03 PROCEDURE — 74011250637 HC RX REV CODE- 250/637: Performed by: INTERNAL MEDICINE

## 2020-05-03 PROCEDURE — 82962 GLUCOSE BLOOD TEST: CPT

## 2020-05-03 PROCEDURE — 74011636637 HC RX REV CODE- 636/637: Performed by: INTERNAL MEDICINE

## 2020-05-03 PROCEDURE — 74018 RADEX ABDOMEN 1 VIEW: CPT

## 2020-05-03 RX ADMIN — GUAIFENESIN 100 MG: 200 SOLUTION ORAL at 22:19

## 2020-05-03 RX ADMIN — INSULIN LISPRO 3 UNITS: 100 INJECTION, SOLUTION INTRAVENOUS; SUBCUTANEOUS at 19:55

## 2020-05-03 RX ADMIN — Medication 1 PACKET: at 22:19

## 2020-05-03 RX ADMIN — SODIUM CHLORIDE 10 ML: 9 INJECTION INTRAMUSCULAR; INTRAVENOUS; SUBCUTANEOUS at 05:34

## 2020-05-03 RX ADMIN — CASTOR OIL AND BALSAM, PERU: 788; 87 OINTMENT TOPICAL at 22:20

## 2020-05-03 RX ADMIN — GUAIFENESIN 100 MG: 200 SOLUTION ORAL at 09:16

## 2020-05-03 RX ADMIN — CASTOR OIL AND BALSAM, PERU: 788; 87 OINTMENT TOPICAL at 09:17

## 2020-05-03 RX ADMIN — INSULIN LISPRO 3 UNITS: 100 INJECTION, SOLUTION INTRAVENOUS; SUBCUTANEOUS at 23:17

## 2020-05-03 RX ADMIN — INSULIN LISPRO 2 UNITS: 100 INJECTION, SOLUTION INTRAVENOUS; SUBCUTANEOUS at 00:46

## 2020-05-03 RX ADMIN — INSULIN GLARGINE 26 UNITS: 100 INJECTION, SOLUTION SUBCUTANEOUS at 22:19

## 2020-05-03 RX ADMIN — Medication 1 PACKET: at 09:17

## 2020-05-03 RX ADMIN — SEVELAMER CARBONATE 0.8 G: 800 POWDER, FOR SUSPENSION ORAL at 09:16

## 2020-05-03 RX ADMIN — FAMOTIDINE 20 MG: 40 POWDER, FOR SUSPENSION ORAL at 09:16

## 2020-05-03 RX ADMIN — MINERAL OIL AND WHITE PETROLATUM: 150; 830 OINTMENT OPHTHALMIC at 22:20

## 2020-05-03 RX ADMIN — SEVELAMER CARBONATE 0.8 G: 800 POWDER, FOR SUSPENSION ORAL at 13:08

## 2020-05-03 RX ADMIN — SODIUM CHLORIDE 10 ML: 9 INJECTION INTRAMUSCULAR; INTRAVENOUS; SUBCUTANEOUS at 22:20

## 2020-05-03 RX ADMIN — INSULIN LISPRO 3 UNITS: 100 INJECTION, SOLUTION INTRAVENOUS; SUBCUTANEOUS at 05:39

## 2020-05-03 RX ADMIN — INSULIN LISPRO 3 UNITS: 100 INJECTION, SOLUTION INTRAVENOUS; SUBCUTANEOUS at 13:07

## 2020-05-03 RX ADMIN — BUMETANIDE 2 MG: 1 TABLET ORAL at 09:16

## 2020-05-03 RX ADMIN — MINERAL OIL AND WHITE PETROLATUM: 150; 830 OINTMENT OPHTHALMIC at 09:17

## 2020-05-03 RX ADMIN — Medication 1 PACKET: at 18:19

## 2020-05-03 RX ADMIN — SEVELAMER CARBONATE 0.8 G: 800 POWDER, FOR SUSPENSION ORAL at 18:19

## 2020-05-03 NOTE — PROGRESS NOTES
Bedside shift change report given to Sarika RN (oncoming nurse) by Belinda Skiff, RN (offgoing nurse). Report included the following information SBAR, Kardex, ED Summary, Procedure Summary, Intake/Output, MAR, Accordion, Recent Results, Med Rec Status, Cardiac Rhythm Sinus Tachycardia, Alarm Parameters , Pre Procedure Checklist, Procedure Verification and Quality Measures. Problem: Pressure Injury - Risk of  Goal: *Prevention of pressure injury  Description: Document Aniket Scale and appropriate interventions in the flowsheet.   Outcome: Progressing Towards Goal  Note: Pressure Injury Interventions:  Sensory Interventions: Assess changes in LOC    Moisture Interventions: Absorbent underpads, Internal/External urinary devices    Activity Interventions: Pressure redistribution bed/mattress(bed type)    Mobility Interventions: Pressure redistribution bed/mattress (bed type), HOB 30 degrees or less    Nutrition Interventions: Document food/fluid/supplement intake    Friction and Shear Interventions: Apply protective barrier, creams and emollients, Foam dressings/transparent film/skin sealants, HOB 30 degrees or less

## 2020-05-03 NOTE — PROGRESS NOTES
Bedside and Verbal shift change report given to Jackie Avitia RN (oncoming nurse) by JUDY Salinas (offgoing nurse). Report included the following information SBAR, Kardex, Intake/Output, MAR, Accordion, Recent Results and Cardiac Rhythm Sinus Tach.

## 2020-05-03 NOTE — PROGRESS NOTES
Hospitalist Progress Note  Pearl Fong MD  Answering service: [phone] from in house phone      Date of Service:  5/3/2020  NAME:  Tita Yoon  :  1960  MRN:  [id number]    Admission Summary:   59F s/p COVID treatement in ICU - extubated  Interval history / Subjective:   Patient seen and examined at bedside, looks better, more clear in mind, trying to speak and communicate, her voice is very hoarse and low. Her NC is off and sats 97%. Still in si     Assessment & Plan:     #. ARF: - Nephrology following- on HD with good tolerance      #. COVID-19 +ve - Resolved- - s/p Tocilizumab, Plaquenil. On Vit C, Zinc   #. Acute Hypoxic Resp Failure- resolved, Pulm following. Bipap at night and PRN. #. Acute metabolic Encephalopathy: likely ICU delirium- no agitation. monitor    #. Anemia in CKD  #. Retroperitoneal bleed: this hospitalization, resolved - Avoid AP/AC meds- Monitor Hb  #. Non occlusive PE in LLL pulmonary artery - no ACs due to retroperitoneal bleed. #. S/p Cardiac arrest   #. DM2: A1c 10.4, SSI, AccuChecks, monitor  #. Morbid obesity: counseled on health benefits of weight loss, Healthy diet, BMI 44.11 kg/m².      Code status: Full  DVT prophylaxis: SCD  Care Plan discussed with: Patient/Family and Nurse  Disposition: D     Hospital Problems  Date Reviewed: 2020          Codes Class Noted POA    Hypotension ICD-10-CM: I95.9  ICD-9-CM: 458.9 Acute 2020 Yes        Retroperitoneal hemorrhage ICD-10-CM: R58  ICD-9-CM: 459.0  2020 No        Acute renal failure (ARF) ([facility] [address] ICD-10-CM: N17.9  ICD-9-CM: 584.9  2020 No        Encephalopathy ICD-10-CM: G93.40  ICD-9-CM: 348.30  2020 No        Sepsis with multi-organ dysfunction [facility]) ICD-10-CM: A41.9, R65.20  ICD-9-CM: 038.9, 995.92  2020 Unknown        Pneumonia due to methicillin susceptible Staphylococcus aureus (MSSA) ([facility] [address] ICD-10-CM: L55.793  ICD-9-CM: 482.41  4/19/2020 Unknown        Thrombocytopenia ([facility] [address] ICD-10-CM: D69.6  ICD-9-CM: 287.5  4/19/2020 Unknown        Diarrhea ICD-10-CM: R19.7  ICD-9-CM: 787.91  4/19/2020 Unknown        COVID-19 virus infection ICD-10-CM: U07.1  3/31/2020 Unknown            Review of Systems:   Pertinent items are mentioned in interval history. Vital Signs:    Last 24hrs VS reviewed since prior progress note. Most recent are:  Visit Vitals  /46 (BP 1 Location: Left arm, BP Patient Position: At rest)   Pulse (!) 108   Temp 98 °F (36.7 °C)   Resp 20   Ht 5' 4\" (1.626 m)   Wt 116.6 kg (257 lb)   SpO2 98%   BMI 44.11 kg/m²         Intake/Output Summary (Last 24 hours) at 5/3/2020 0756  Last data filed at 5/3/2020 0400  Gross per 24 hour   Intake 1167 ml   Output 1550 ml   Net -383 ml        Physical Examination:   General:  Alert, No acute distress  Resp:  No accessory muscle use, Good AE, no wheezes, few rhonchi  Abd:  Soft, non-tender, non-distended, obese  Extremities:  No cyanosis or clubbing, no significant edema  Neuro:  Grossly normal, no focal neuro deficits, follows commands   Psych:   not agitated. Data Review:    Review and/or order of clinical lab test  Review and/or order of tests in the radiology section of CPT  Review and/or order of tests in the medicine section of CPT  Labs:     Recent Labs     05/03/20 0311 05/02/20  0201   WBC 7.1 7.8   HGB 7.8* 7.6*   HCT 25.0* 24.9*    226     Recent Labs     05/03/20  0311 05/02/20  0201 05/01/20  0552   * 134* 136   K 3.6 3.5 3.6   CL 99 99 101   CO2 29 28 30   BUN 39* 59* 41*   CREA 1.72* 2.25* 1.71*   * 236* 252*   CA 8.7 9.1 8.8   MG 1.9 2.1 2.0   PHOS 2.0* 2.4* 2.4*     Recent Labs     05/03/20  0311 05/02/20  0201 05/01/20  0552   ALB 2.3* 2.3* 2.4*     No results for input(s): INR, PTP, APTT, INREXT, INREXT in the last 72 hours. No results for input(s): FE, TIBC, PSAT, FERR in the last 72 hours.    No results found for: FOL, RBCF   No results for input(s): PH, PCO2, PO2 in the last 72 hours.   Recent Labs     05/02/20  0201   TROIQ 0.05*     Lab Results   Component Value Date/Time    Triglyceride 159 (H) 04/12/2020 08:34 PM     Lab Results   Component Value Date/Time    Glucose (POC) 208 (H) 05/03/2020 05:30 AM    Glucose (POC) 151 (H) 05/03/2020 12:22 AM    Glucose (POC) 184 (H) 05/02/2020 05:06 PM    Glucose (POC) 268 (H) 05/02/2020 06:14 AM    Glucose (POC) 226 (H) 05/01/2020 11:05 PM     No results found for: COLOR, APPRN, SPGRU, REFSG, SARKIS, PROTU, GLUCU, KETU, BILU, UROU, GALE, LEUKU, GLUKE, EPSU, BACTU, WBCU, RBCU, CASTS, UCRY  Medications Reviewed:     Current Facility-Administered Medications   Medication Dose Route Frequency    insulin glargine (LANTUS) injection 26 Units  26 Units SubCUTAneous QHS    balsam peru-castor oiL (VENELEX) ointment   Topical BID    bumetanide (BUMEX) tablet 2 mg  2 mg Oral DAILY    guaiFENesin (ROBITUSSIN) 100 mg/5 mL oral liquid 100 mg  100 mg Oral Q12H    epoetin jovani-epbx (RETACRIT) injection 20,000 Units  20,000 Units SubCUTAneous Q TUE, THU & SAT    HYDROcodone-acetaminophen (NORCO) 5-325 mg per tablet 1 Tab  1 Tab Oral Q6H PRN    phenol throat spray (CHLORASEPTIC) 1 Spray  1 Spray Oral Q6H PRN    sevelamer carbonate (RENVELA) oral powder 0.8 g  0.8 g Oral TID WITH MEALS    heparin (porcine) 1,000 unit/mL injection 2,500 Units  2,500 Units IntraVENous DIALYSIS PRN    guar gum (BENEFIBER) packet 1 Packet  4 g Oral TID    labetaloL (NORMODYNE;TRANDATE) injection 20 mg  20 mg IntraVENous Q4H PRN    albumin human 25% (BUMINATE) solution 12.5 g  12.5 g IntraVENous DIALYSIS PRN    albuterol (PROVENTIL VENTOLIN) nebulizer solution 2.5 mg  2.5 mg Nebulization Q4H PRN    famotidine (PEPCID) 8 mg/mL oral suspension 20 mg  20 mg Per NG tube DAILY    alteplase (CATHFLO) 2 mg in sterile water (preservative free) 2 mL injection  2 mg InterCATHeter DIALYSIS PRN    alteplase (CATHFLO) 2 mg in sterile water (preservative free) 2 mL injection  2 mg InterCATHeter DIALYSIS PRN    insulin lispro (HUMALOG) injection   SubCUTAneous Q6H    white petrolatum-mineral oiL (AKWA TEARS) 83-15 % ophthalmic ointment   Both Eyes Q12H    bacitracin 500 unit/gram packet 1 Packet  1 Packet Topical PRN    sodium chloride (NS) flush 5-40 mL  5-40 mL IntraVENous Q8H    sodium chloride (NS) flush 5-40 mL  5-40 mL IntraVENous PRN    ondansetron (ZOFRAN) injection 4 mg  4 mg IntraVENous Q4H PRN    acetaminophen (TYLENOL) tablet 650 mg  650 mg Oral Q6H PRN    Or    acetaminophen (TYLENOL) suppository 650 mg  650 mg Rectal Q6H PRN    glucose chewable tablet 16 g  4 Tab Oral PRN    glucagon (GLUCAGEN) injection 1 mg  1 mg IntraMUSCular PRN    dextrose 10% infusion 0-250 mL  0-250 mL IntraVENous PRN   ______________________________________________________________________  EXPECTED LENGTH OF STAY: 12d 0h  ACTUAL LENGTH OF STAY:          33               Travis Montemayor MD

## 2020-05-03 NOTE — PROGRESS NOTES
Bedside and Verbal shift change report given to [address] (oncoming nurse) by Stephany Crowder (offgoing nurse). Report included the following information SBAR, Intake/Output, MAR, Recent Results and Cardiac Rhythm sinus tachy.

## 2020-05-04 ENCOUNTER — APPOINTMENT (OUTPATIENT)
Dept: VASCULAR SURGERY | Age: 60
DRG: 870 | End: 2020-05-04
Attending: PHYSICIAN ASSISTANT
Payer: COMMERCIAL

## 2020-05-04 LAB
ALBUMIN SERPL-MCNC: 2.4 G/DL (ref 3.5–5)
ANION GAP SERPL CALC-SCNC: 8 MMOL/L (ref 5–15)
BUN SERPL-MCNC: 58 MG/DL (ref 6–20)
BUN/CREAT SERPL: 23 (ref 12–20)
CALCIUM SERPL-MCNC: 9.4 MG/DL (ref 8.5–10.1)
CHLORIDE SERPL-SCNC: 100 MMOL/L (ref 97–108)
CO2 SERPL-SCNC: 28 MMOL/L (ref 21–32)
CREAT SERPL-MCNC: 2.47 MG/DL (ref 0.55–1.02)
ERYTHROCYTE [DISTWIDTH] IN BLOOD BY AUTOMATED COUNT: 14.9 % (ref 11.5–14.5)
GLUCOSE BLD STRIP.AUTO-MCNC: 149 MG/DL (ref 65–100)
GLUCOSE BLD STRIP.AUTO-MCNC: 177 MG/DL (ref 65–100)
GLUCOSE BLD STRIP.AUTO-MCNC: 209 MG/DL (ref 65–100)
GLUCOSE SERPL-MCNC: 218 MG/DL (ref 65–100)
HCT VFR BLD AUTO: 27.3 % (ref 35–47)
HGB BLD-MCNC: 8.3 G/DL (ref 11.5–16)
MAGNESIUM SERPL-MCNC: 2 MG/DL (ref 1.6–2.4)
MCH RBC QN AUTO: 29.7 PG (ref 26–34)
MCHC RBC AUTO-ENTMCNC: 30.4 G/DL (ref 30–36.5)
MCV RBC AUTO: 97.8 FL (ref 80–99)
NRBC # BLD: 0 K/UL (ref 0–0.01)
NRBC BLD-RTO: 0 PER 100 WBC
PHOSPHATE SERPL-MCNC: 2.7 MG/DL (ref 2.6–4.7)
PLATELET # BLD AUTO: 282 K/UL (ref 150–400)
PMV BLD AUTO: 10.3 FL (ref 8.9–12.9)
POTASSIUM SERPL-SCNC: 3.6 MMOL/L (ref 3.5–5.1)
RBC # BLD AUTO: 2.79 M/UL (ref 3.8–5.2)
SERVICE CMNT-IMP: ABNORMAL
SODIUM SERPL-SCNC: 136 MMOL/L (ref 136–145)
WBC # BLD AUTO: 8.3 K/UL (ref 3.6–11)

## 2020-05-04 PROCEDURE — 93970 EXTREMITY STUDY: CPT

## 2020-05-04 PROCEDURE — 74011250637 HC RX REV CODE- 250/637: Performed by: INTERNAL MEDICINE

## 2020-05-04 PROCEDURE — 83735 ASSAY OF MAGNESIUM: CPT

## 2020-05-04 PROCEDURE — 74011250636 HC RX REV CODE- 250/636: Performed by: INTERNAL MEDICINE

## 2020-05-04 PROCEDURE — 80069 RENAL FUNCTION PANEL: CPT

## 2020-05-04 PROCEDURE — 74011636637 HC RX REV CODE- 636/637: Performed by: INTERNAL MEDICINE

## 2020-05-04 PROCEDURE — 85027 COMPLETE CBC AUTOMATED: CPT

## 2020-05-04 PROCEDURE — 65660000000 HC RM CCU STEPDOWN

## 2020-05-04 PROCEDURE — 36415 COLL VENOUS BLD VENIPUNCTURE: CPT

## 2020-05-04 PROCEDURE — 74011250637 HC RX REV CODE- 250/637: Performed by: NURSE PRACTITIONER

## 2020-05-04 PROCEDURE — 97535 SELF CARE MNGMENT TRAINING: CPT

## 2020-05-04 PROCEDURE — 97110 THERAPEUTIC EXERCISES: CPT

## 2020-05-04 PROCEDURE — 92522 EVALUATE SPEECH PRODUCTION: CPT

## 2020-05-04 PROCEDURE — 82962 GLUCOSE BLOOD TEST: CPT

## 2020-05-04 PROCEDURE — 94660 CPAP INITIATION&MGMT: CPT

## 2020-05-04 PROCEDURE — 92610 EVALUATE SWALLOWING FUNCTION: CPT

## 2020-05-04 RX ORDER — INSULIN GLARGINE 100 [IU]/ML
28 INJECTION, SOLUTION SUBCUTANEOUS
Status: DISCONTINUED | OUTPATIENT
Start: 2020-05-04 | End: 2020-05-16

## 2020-05-04 RX ADMIN — METOPROLOL TARTRATE 12.5 MG: 50 TABLET, FILM COATED ORAL at 16:01

## 2020-05-04 RX ADMIN — SEVELAMER CARBONATE 0.8 G: 800 POWDER, FOR SUSPENSION ORAL at 16:01

## 2020-05-04 RX ADMIN — GUAIFENESIN 100 MG: 200 SOLUTION ORAL at 21:36

## 2020-05-04 RX ADMIN — SEVELAMER CARBONATE 0.8 G: 800 POWDER, FOR SUSPENSION ORAL at 09:38

## 2020-05-04 RX ADMIN — FAMOTIDINE 20 MG: 40 POWDER, FOR SUSPENSION ORAL at 09:38

## 2020-05-04 RX ADMIN — Medication 1 PACKET: at 21:36

## 2020-05-04 RX ADMIN — Medication 1 PACKET: at 09:37

## 2020-05-04 RX ADMIN — Medication 1 PACKET: at 16:01

## 2020-05-04 RX ADMIN — INSULIN LISPRO 2 UNITS: 100 INJECTION, SOLUTION INTRAVENOUS; SUBCUTANEOUS at 13:01

## 2020-05-04 RX ADMIN — SEVELAMER CARBONATE 0.8 G: 800 POWDER, FOR SUSPENSION ORAL at 13:12

## 2020-05-04 RX ADMIN — INSULIN LISPRO 3 UNITS: 100 INJECTION, SOLUTION INTRAVENOUS; SUBCUTANEOUS at 05:47

## 2020-05-04 RX ADMIN — CASTOR OIL AND BALSAM, PERU: 788; 87 OINTMENT TOPICAL at 19:18

## 2020-05-04 RX ADMIN — GUAIFENESIN 100 MG: 200 SOLUTION ORAL at 09:37

## 2020-05-04 RX ADMIN — SODIUM CHLORIDE 10 ML: 9 INJECTION INTRAMUSCULAR; INTRAVENOUS; SUBCUTANEOUS at 14:40

## 2020-05-04 RX ADMIN — INSULIN GLARGINE 28 UNITS: 100 INJECTION, SOLUTION SUBCUTANEOUS at 21:36

## 2020-05-04 RX ADMIN — CASTOR OIL AND BALSAM, PERU: 788; 87 OINTMENT TOPICAL at 09:38

## 2020-05-04 RX ADMIN — SODIUM CHLORIDE 10 ML: 9 INJECTION INTRAMUSCULAR; INTRAVENOUS; SUBCUTANEOUS at 21:37

## 2020-05-04 RX ADMIN — BUMETANIDE 2 MG: 1 TABLET ORAL at 09:37

## 2020-05-04 RX ADMIN — INSULIN LISPRO 2 UNITS: 100 INJECTION, SOLUTION INTRAVENOUS; SUBCUTANEOUS at 19:18

## 2020-05-04 RX ADMIN — ONDANSETRON 4 MG: 2 INJECTION INTRAMUSCULAR; INTRAVENOUS at 14:40

## 2020-05-04 NOTE — PROGRESS NOTES
Problem: Self Care Deficits Care Plan (Adult)  Goal: *Acute Goals and Plan of Care (Insert Text)  Description:   FUNCTIONAL STATUS PRIOR TO ADMISSION: Patient unable to provide history. Per chart, patient was fully independent PTA. HOME SUPPORT: Per chart, patient lived with family. Occupational Therapy Goals  Initiated 4/30/2020  1. Patient will perform grooming with maximal assistance within 7 day(s). 2.  Patient will perform self-feeding if appropriate for PO with maximal assistance within 7 day(s). 3.  Patient will perform bathing with maximal assistance within 7 day(s). 4.  Patient will participate in upper extremity therapeutic exercise/activities with moderate assistance  for 10 minutes within 7 day(s). 5.  Patient will follow 100% simple commands in preparation for functional tasks within 7 days. Outcome: Progressing Towards Goal     OCCUPATIONAL THERAPY TREATMENT  Patient: Ramana Gomez (65 y.o. female)  Date: 5/4/2020  Diagnosis: COVID-19 virus infection [U07.1]   <principal problem not specified>       Precautions:  fall  Chart, occupational therapy assessment, plan of care, and goals were reviewed. ASSESSMENT  Patient continues with skilled OT services and is progressing towards goals. Patient continues to be limited by gross debility but demonstrates improved command following (100% simple commands), improved alertness/ responsiveness, increased attempted verbalizations, and improving BUE strength. Patient received following physical therapy and required total assistance x2 for repositioning in bed and tolerated bed-in-claudia position well. Patient practiced unilateral AAROM exercises with each UE, including reaching to face in preparation for ADLs. B/l  3+, provided with red foam resistance block. Patient able to initiate ~30 degrees elbow flexion against gravity.   Patient able to grasp wash cloth with each hand and able to initiate reach to face but required maximum assistance for UE support and control to wash face. Patient is significantly below functional baseline and requires maximum to total assistance for all ADLs. She participates well in therapy and is motivated, stating \"I've wanted to do therapy. \"  Recommend SNF vs inpatient rehab at d/c pending progression/ activity tolerance. Current Level of Function Impacting Discharge (ADLs): maximum to total assistance         PLAN :  Patient continues to benefit from skilled intervention to address the above impairments. Continue treatment per established plan of care. to address goals. Recommendation for discharge: (in order for the patient to meet his/her long term goals)   Recommend SNF vs inpatient rehab at d/c pending progression/ activity tolerance. This discharge recommendation:  Has not yet been discussed the attending provider and/or case management         SUBJECTIVE:   Patient stated I've wanted to do therapy.     OBJECTIVE DATA SUMMARY:   Cognitive/Behavioral Status:  Neurologic State: Alert  Orientation Level: Oriented X4(doesnt vocalize from longterm intubation)  Cognition: Follows commands  Perception: Appears intact  Perseveration: Perseverates during conversation  Safety/Judgement: Decreased awareness of environment;Decreased awareness of need for assistance;Decreased awareness of need for safety;Decreased insight into deficits      ADL Intervention:       Grooming  Washing Face: Maximum assistance(using wash cloth, alternated UE)            Cognitive Retraining  Safety/Judgement: Decreased awareness of environment;Decreased awareness of need for assistance;Decreased awareness of need for safety;Decreased insight into deficits    Therapeutic Exercises:   Patient practiced unilateral AAROM exercises with each UE, including reaching to face in preparation for ADLs. B/l  3+, provided with red foam resistance block.   Patient able to initiate ~30 degrees elbow flexion against gravity  -10 reps unilateral scapular elevation, each UE, AAROM  -10 reps unilateral  with red block, each UE  -10 reps functional reach to chin, each UE    Pain:  Patient reported no pain    Activity Tolerance:   VSS, fair tolerance    After treatment patient left in no apparent distress:   Supine in bed, in partial chair position, stable and well-supported, call bell left within reach, RN notified    COMMUNICATION/COLLABORATION:   The patients plan of care was discussed with: Physical therapist and Registered nurse.      Isreal Francisco OT  Time Calculation: 30 mins

## 2020-05-04 NOTE — PROGRESS NOTES
IFTIKHAR PLAN:    Awaiting PT/OT treatments today to determine appropriate disposition.  declined SNF placement but opened to IPR if absolutely necessary. He prefers home health vs outpatient therapy per previous CM. Family will transport home or may need transportation arranged at discharge.     Jered Muñoz MSA, RN, CRM

## 2020-05-04 NOTE — ROUTINE PROCESS
Bedside and Verbal shift change report given to Rajni (oncoming nurse) by Keyon Andrews (offgoing nurse). Report included the following information SBAR, Kardex, ED Summary, Intake/Output, MAR, Recent Results and Cardiac Rhythm NSR.

## 2020-05-04 NOTE — PROGRESS NOTES
SLP Contact Note    SLP evaluation complete. Recommend small amounts of ice chips for swallow rehab/patient comfort. Integrated language evaluation complete as well. Full notes to follow.       Thank you,  VALERIA AlexandraEd, [id number] [facility]  Speech-Language Pathologist

## 2020-05-04 NOTE — PROGRESS NOTES
Problem: Dysphagia (Adult)  Goal: *Acute Goals and Plan of Care (Insert Text)  Description: Speech Therapy Goals  Initiated 5/4/2020    1. Patient will tolerate small amounts of ice chips for swallow rehab without adverse effects within 7 days. 2. Patient will participate in swallow re-evaluation within 7 days. 5/4/2020 1140 by Vita Moraes SLP  Outcome: Progressing Towards Goal  5/4/2020 1130 by Vita Moraes SLP  Outcome: Progressing Towards Goal     Problem: Neurolinguistics Impaired (Adult)  Goal: *Acute Goals and Plan of Care (Insert Text)  Description: Speech Therapy Goals  Initiated 5/4/2020    1. Patient will be oriented x4 with visual cues within 7 days. 2. Patient will participate in verbal recall tasks with 60% accuracy within 7 days. 5/4/2020 1140 by Vita Moraes SLP  Outcome: Progressing Towards Goal  5/4/2020 1130 by Vita Moraes, SLP  Outcome: Progressing Towards Goal     SPEECH [address] AND  INTEGRATED LANGUAGE EVALUATION  Patient: Melanie Mixon (43 y.o. female)  Date: 5/4/2020  Diagnosis: COVID-19 virus infection [U07.1]   <principal problem not specified>       Precautions: n/a      ASSESSMENT:  Pt is currently at high risk for prandial aspiration given recent, prolonged intubation and subsequent dysphonia (likely some vocal fold dysfunction), overt s/s of aspiration with ice chips, and prolonged NPO status with overall weakness/debility. Recommend pt initiate ice chips, however, for swallow rehab. Will need to monitor overall vocal quality. If improvement not noted, may need to consider ENT consult. Almost certainly would benefit from objective imaging of the swallow prior to diet initiation, however, would likely need a second COVID negative test in order to leave the floor, per radiology. Pt also seen for integrated language evaluation. Pt likely with some degree of hypoxia s/p code blue as well as COVID-19 infection.   Pt with decreased orientation and memory at this time. Pt continually stating that she needed to \"call the police to make sure I'm where I'm supposed to be. \"  Will benefit from continued SLP intervention at this and possibly the next level of care. PLAN:  Recommendations and Planned Interventions:  --NPO with continued NGT for alternate means of nutrition/hydration/medication  --Ice chips after oral care as tolerated for swallow rehab  --Frequent orientation. Discuss pt's situation and location, specifically. Play pt's bear that has family members voices on it. Patient continues to benefit from skilled intervention to address the above impairments. Continue treatment per established plan of care. Discharge Recommendations: To Be Determined     SUBJECTIVE:   Patient stated, \"I'm sorry that I've been such a burden  after SLP discussed with her her hospitalization. SLP explained that she has not been a burden at all and that her medical team is thrilled that she no longer requires ICU level care. OBJECTIVE:   Cognitive and Communication Status:  Neurologic State: Alert  Orientation Level: Oriented to person, Disoriented to place, Disoriented to situation, Disoriented to time  Cognition: Follows commands  Perception: Appears intact  Perseveration: Perseverates during conversation  Safety/Judgement: Decreased awareness of environment, Decreased awareness of need for assistance, Decreased awareness of need for safety, Decreased insight into deficits    Dysphagia Evaluation  Oral Assessment:  Oral Assessment  Labial: No impairment  Dentition: Intact  Oral Hygiene: oral mucosa dry  Lingual: No impairment  Velum: No impairment  Mandible: No impairment  P.O.  Trials:  Patient Position: upright in bed  Vocal quality prior to P.O.: Aphonic  Consistency Presented: Ice chips  How Presented: SLP-fed/presented;Spoon     Bolus Acceptance: No impairment  Bolus Formation/Control: No impairment     Propulsion: Delayed (# of seconds)  Oral Residue: None  Initiation of Swallow: Delayed (# of seconds)  Laryngeal Elevation: Decreased  Aspiration Signs/Symptoms: Weak cough  Pharyngeal Phase Characteristics: Easily fatigued ; Poor endurance             Oral Phase Severity: No impairment  Pharyngeal Phase Severity : Severe  Speech Evaluation:    Neuro-Linguistics:     Verbal Reasoning Tasks: Impaired  Verbal Problem Solving: Impaired  Verbal Organization: Impaired  Memory: Impaired  Orientation: Oriented to self. Not to location, situation, nor date. After treatment:   Call bell within reach and Nursing notified    COMMUNICATION/EDUCATION:     The patient's plan of care including recommendations, planned interventions, and recommended diet changes were discussed with: Registered nurse.        Mikey Hickman, SLP  Time Calculation: 25 mins

## 2020-05-04 NOTE — PROGRESS NOTES
[facility]   [address] [address], [facility] [address], [facility]  Phone: [phone]   [phone]       Nephrology Progress Note  Titus Maria     1960     [id number]  Date of Admission : 3/31/2020  05/04/20    CC: Follow up for JUANCHO      Assessment and Plan   JUANCHO :  - 2/2 ATN  - no signs of recovery  yet  - HD  TTS   - cont w/ bumex  - daily labs    COVID-19 +ve   Acute Hypoxic Resp Failure   - On Vent   - completed Plaquenil, - s/p Tocilizumab   - extubated 4/29  - ordered stat CXR and ABG   - requested Pulmonary consult     Anemia in CKD  RP hematoma:  - continue KOKI     Cardiac arrest 4/9    Type II DM   - Insulin per primary team      Morbid Obesity        Interval History:  Confused, dialyzed sat w/o issues. Making some urine, only 400cc recorded. No cp or sob reported. Review of Systems: Review of systems not obtained due to patient factors.     Current Medications:   Current Facility-Administered Medications   Medication Dose Route Frequency    metoprolol (LOPRESSOR) 5 mg/mL oral suspension 12.5 mg  12.5 mg Per NG tube Q12H    insulin glargine (LANTUS) injection 28 Units  28 Units SubCUTAneous QHS    balsam peru-castor oiL (VENELEX) ointment   Topical BID    bumetanide (BUMEX) tablet 2 mg  2 mg Oral DAILY    guaiFENesin (ROBITUSSIN) 100 mg/5 mL oral liquid 100 mg  100 mg Oral Q12H    epoetin jovani-epbx (RETACRIT) injection 20,000 Units  20,000 Units SubCUTAneous Q TUE, THU & SAT    HYDROcodone-acetaminophen (NORCO) 5-325 mg per tablet 1 Tab  1 Tab Oral Q6H PRN    phenol throat spray (CHLORASEPTIC) 1 Spray  1 Spray Oral Q6H PRN    sevelamer carbonate (RENVELA) oral powder 0.8 g  0.8 g Oral TID WITH MEALS    heparin (porcine) 1,000 unit/mL injection 2,500 Units  2,500 Units IntraVENous DIALYSIS PRN    guar gum (BENEFIBER) packet 1 Packet  4 g Oral TID    labetaloL (NORMODYNE;TRANDATE) injection 20 mg  20 mg IntraVENous Q4H PRN    albumin human 25% (BUMINATE) solution 12.5 g  12.5 g IntraVENous DIALYSIS PRN    albuterol (PROVENTIL VENTOLIN) nebulizer solution 2.5 mg  2.5 mg Nebulization Q4H PRN    famotidine (PEPCID) 8 mg/mL oral suspension 20 mg  20 mg Per NG tube DAILY    alteplase (CATHFLO) 2 mg in sterile water (preservative free) 2 mL injection  2 mg InterCATHeter DIALYSIS PRN    alteplase (CATHFLO) 2 mg in sterile water (preservative free) 2 mL injection  2 mg InterCATHeter DIALYSIS PRN    insulin lispro (HUMALOG) injection   SubCUTAneous Q6H    white petrolatum-mineral oiL (AKWA TEARS) 83-15 % ophthalmic ointment   Both Eyes Q12H    bacitracin 500 unit/gram packet 1 Packet  1 Packet Topical PRN    sodium chloride (NS) flush 5-40 mL  5-40 mL IntraVENous Q8H    sodium chloride (NS) flush 5-40 mL  5-40 mL IntraVENous PRN    ondansetron (ZOFRAN) injection 4 mg  4 mg IntraVENous Q4H PRN    acetaminophen (TYLENOL) tablet 650 mg  650 mg Oral Q6H PRN    Or    acetaminophen (TYLENOL) suppository 650 mg  650 mg Rectal Q6H PRN    glucose chewable tablet 16 g  4 Tab Oral PRN    glucagon (GLUCAGEN) injection 1 mg  1 mg IntraMUSCular PRN    dextrose 10% infusion 0-250 mL  0-250 mL IntraVENous PRN      Allergies   Allergen Reactions    Augmentin [Amoxicillin-Pot Clavulanate] Rash and Itching       Objective:  Vitals:    Vitals:    05/03/20 1954 05/03/20 2311 05/04/20 0359 05/04/20 0538   BP: 156/83 123/76 132/75    Pulse: (!) 113 (!) 108 (!) 108    Resp: 17 19 20    Temp: 98.4 °F (36.9 °C) 98.4 °F (36.9 °C) 98 °F (36.7 °C)    TempSrc:       SpO2: 97% 97% 97%    Weight:    114.4 kg (252 lb 5.1 oz)   Height:         Intake and Output:  No intake/output data recorded.   05/02 1901 - 05/04 0700  In: 950   Out: 400 [Urine:400]    Physical Examination:     General: confused, Obese   Resp:  Reduced bibasilar breath sounds  CV:  RRR, no murmur   GI:  Obese , soft, NT   Neurologic:  Lethargic, confused   Access:           R IJ melissa     []    High complexity decision making was performed  []    Patient is at high-risk of decompensation with multiple organ involvement    Lab Data Personally Reviewed: I have reviewed all the pertinent labs, microbiology data and radiology studies during assessment.     Recent Labs     05/04/20 0405 05/03/20 0311 05/02/20  0201    134* 134*   K 3.6 3.6 3.5    99 99   CO2 28 29 28   * 233* 236*   BUN 58* 39* 59*   CREA 2.47* 1.72* 2.25*   CA 9.4 8.7 9.1   MG 2.0 1.9 2.1   PHOS 2.7 2.0* 2.4*   ALB 2.4* 2.3* 2.3*     Recent Labs     05/04/20 0405 05/03/20 0311 05/02/20  0201   WBC 8.3 7.1 7.8   HGB 8.3* 7.8* 7.6*   HCT 27.3* 25.0* 24.9*    240 226     No results found for: SDES  Lab Results   Component Value Date/Time    Culture result: NO GROWTH 5 DAYS 04/09/2020 02:32 PM    Culture result: NO GROWTH 5 DAYS 04/08/2020 10:36 AM    Culture result: NO GROWTH 5 DAYS 03/31/2020 11:15 AM    Culture result: MODERATE STAPHYLOCOCCUS AUREUS (A) 03/31/2020 10:34 AM    Culture result: LIGHT NORMAL RESPIRATORY SOFIE 03/31/2020 10:34 AM     Recent Results (from the past 24 hour(s))   GLUCOSE, POC    Collection Time: 05/03/20  6:46 PM   Result Value Ref Range    Glucose (POC) 200 (H) 65 - 100 mg/dL    Performed by Christopher Abarca    GLUCOSE, POC    Collection Time: 05/03/20 11:10 PM   Result Value Ref Range    Glucose (POC) 205 (H) 65 - 100 mg/dL    Performed by Светлана Hall    RENAL FUNCTION PANEL    Collection Time: 05/04/20  4:05 AM   Result Value Ref Range    Sodium 136 136 - 145 mmol/L    Potassium 3.6 3.5 - 5.1 mmol/L    Chloride 100 97 - 108 mmol/L    CO2 28 21 - 32 mmol/L    Anion gap 8 5 - 15 mmol/L    Glucose 218 (H) 65 - 100 mg/dL    BUN 58 (H) 6 - 20 MG/DL    Creatinine 2.47 (H) 0.55 - 1.02 MG/DL    BUN/Creatinine ratio 23 (H) 12 - 20      GFR est AA 24 (L) >60 ml/min/1.73m2    GFR est non-AA 20 (L) >60 ml/min/1.73m2    Calcium 9.4 8.5 - 10.1 MG/DL    Phosphorus 2.7 2.6 - 4.7 MG/DL    Albumin 2.4 (L) 3.5 - 5.0 g/dL MAGNESIUM    Collection Time: 05/04/20  4:05 AM   Result Value Ref Range    Magnesium 2.0 1.6 - 2.4 mg/dL   CBC W/O DIFF    Collection Time: 05/04/20  4:05 AM   Result Value Ref Range    WBC 8.3 3.6 - 11.0 K/uL    RBC 2.79 (L) 3.80 - 5.20 M/uL    HGB 8.3 (L) 11.5 - 16.0 g/dL    HCT 27.3 (L) 35.0 - 47.0 %    MCV 97.8 80.0 - 99.0 FL    MCH 29.7 26.0 - 34.0 PG    MCHC 30.4 30.0 - 36.5 g/dL    RDW 14.9 (H) 11.5 - 14.5 %    PLATELET 519 578 - 759 K/uL    MPV 10.3 8.9 - 12.9 FL    NRBC 0.0 0  WBC    ABSOLUTE NRBC 0.00 0.00 - 0.01 K/uL   GLUCOSE, POC    Collection Time: 05/04/20  5:43 AM   Result Value Ref Range    Glucose (POC) 209 (H) 65 - 100 mg/dL    Performed by Ofelia Mcmullen            Total time spent with patient:  xxx   min. Care Plan discussed with:  Patient     Family      RN      Consulting Physician [id number] [facility],         I have reviewed the flowsheets. Chart and Pertinent Notes have been reviewed. No change in PMH ,family and social history from Consult note.       Ruthie Goodwin MD

## 2020-05-04 NOTE — DIABETES MGMT
[facility]  CLINICAL NURSE SPECIALIST CONSULT  PROGRAM FOR DIABETES HEALTH  Follow up Note  Presentation   Terence Schaefer is a 64 y.o. female admitted from OSH to [facility] ICU with SARS-COV2 needing CRRT. She is currently sedated and has been intubated since 3/28/20. Current clinical course has been complicated by steroid induced hyperglycemia. Steroids are discontinued at this time. She requires CRRT for acute renal failure r/t her sepsis and hypotension. PHM: GERD, obesity, and anxiety. New diabetes diagnosis with A1C 11.0% (3/28/2020); updated A1C 3/31/20-10.4%     Recent events:       Consulted by Provider for advanced diabetes nursing assessment and care, specifically related to   [] Transitioning off Sheliah Guess   [x] Inpatient management strategy  [] Home management assessment  [] Survival skill education    Diabetes-related medical history  Acute complications  hyperglycemia  Neurological complications  NONE  Microvascular disease  NONE  Macrovascular disease  NONE  Other associated conditions     NONE    Diabetes medication history: NONE    Subjective     Objective   General:  Ms. Vincent Butler is sitting up in bed- tolerating Bipap support. Eyes open and moving head around  But not following commands. Vital Signs   Visit Vitals  /78 (BP 1 Location: Right arm, BP Patient Position: At rest)   Pulse (!) 114   Temp 98.8 °F (37.1 °C)   Resp 27   Ht 5' 4\" (1.626 m)   Wt 114.4 kg (252 lb 5.1 oz)   SpO2 100%   BMI 43.31 kg/m²   .    Laboratory  Lab Results   Component Value Date/Time    Hemoglobin A1c 10.4 (H) 03/31/2020 03:42 PM     No results found for: LDL, LDLC, DLDLP  Lab Results   Component Value Date/Time    Creatinine 2.47 (H) 05/04/2020 04:05 AM     Lab Results   Component Value Date/Time    Sodium 136 05/04/2020 04:05 AM    Potassium 3.6 05/04/2020 04:05 AM    Chloride 100 05/04/2020 04:05 AM    CO2 28 05/04/2020 04:05 AM    Anion gap 8 05/04/2020 04:05 AM    Glucose 218 (H) 05/04/2020 04:05 AM BUN 58 (H) 05/04/2020 04:05 AM    Creatinine 2.47 (H) 05/04/2020 04:05 AM    BUN/Creatinine ratio 23 (H) 05/04/2020 04:05 AM    GFR est AA 24 (L) 05/04/2020 04:05 AM    GFR est non-AA 20 (L) 05/04/2020 04:05 AM    Calcium 9.4 05/04/2020 04:05 AM    Bilirubin, total 1.6 (H) 04/20/2020 05:29 AM    AST (SGOT) 169 (H) 04/20/2020 05:29 AM    Alk. phosphatase 490 (H) 04/20/2020 05:29 AM    Protein, total 5.9 (L) 04/20/2020 05:29 AM    Albumin 2.4 (L) 05/04/2020 04:05 AM    Globulin 3.5 04/20/2020 05:29 AM    A-G Ratio 0.7 (L) 04/20/2020 05:29 AM    ALT (SGPT) 206 (H) 04/20/2020 05:29 AM     Lab Results   Component Value Date/Time    ALT (SGPT) 206 (H) 04/20/2020 05:29 AM           Evaluation   Ms Nehemias Espinal, with new onset Type 2 diabetes,with A1C 10.4%. AM fasting BG 177mg/dl today. . TF Nepro 35cc/hr. BG trends remain at 200mg/dl consistently. Basal insulin increased to 26 units over the weekend and BG still remain @200mg/dl. Since she still is hyperglycemic with BG at or above 200mg/dl, it is appropriate to increase basal insulin in order to keep BG <200mg/dl. It is imperative that we maintain her BG within target range 100-180mg/dl. Recommendations   1. INCREASE basal insulin to 30units daily if BG trends >200mg/dl consistently. 2. Will continue to follow.     Assessment and Plan   Nursing Diagnosis Risk for unstable blood glucose pattern   Nursing Intervention Domain 3145 Decision-making Support   Nursing Interventions Examined current inpatient diabetes control   Explored factors facilitating and impeding inpatient management  Identified self-management practices impeding diabetes control  Explored corrective strategies with patient and responsible inpatient provider   Informed patient of rational for insulin strategy while hospitalized         Billing Code(s)     [x] 68730 IP subsequent hospital care - [address], CNS   Program for Diabetes Health  Access via [address] (C) [phone]

## 2020-05-04 NOTE — PROGRESS NOTES
discussed use of BiPap during night prevent needing during the day.   Discussed vomiting and anxiety and coughing inter relationship.  moving from side to side

## 2020-05-04 NOTE — PROGRESS NOTES
PULMONARY MEDICINE    Progress Note    Name: Axel Whitaker   : 1960   MRN: [id number]   Date: 2020      Subjective:       Some bouts of confusion  No WOB    Consult Note:   Requesting Physician: Dr. Shemar Martínez  Reason for consult: Extubated COVID patient    Medical records and data reviewed. Patient is a 64 y.o. female who was admitted on 3/31 with severe COVID 19 pneumonia and multisystem organ failure and required invasive mechanical ventilation, CRRT and pressors. She recovered slowly, was extubated and transferred to telemetry for ongoing care. She has generalized weakness. She was noted to be lethargic this morning with labored breathing and was placed on bipap. ABG that was done sis not show resp acidosis, oxygenation was preserved. CXR does not show new or worsening infiltrates. She appears to be at risk for underlying MIKE which is not known from before. She is starting to work with PT/OT.  COVID 19 test was negative on      Review of Systems:     A comprehensive 12 system review of systems was not obtained from the patient    Assessment:   S/P multisystem organ failure with COVID 19 pneumonia  Encephalopathy- slowly improving, ABG did not show resp acidosis  Generalized weakness, likely with a component of critical illness neuropathy/myopathy without respiratory muscle weakness, episodic increased work of breathing  Metabolic alkalosis on diuretic therapy  Acute PE, not anticoagulated secondary to retroperitoneal bleed  Other medical problems per chart      Recommendations:     NIPPV at night and will sleep for increased WOB  Wean O2 as tolerated  PT/OT  Creatinine went up; renal involved  On diuretics   SLP  get LE dopplers and IVC filter if positive given high VTE risk for recurrence  Other management per other teams  Chest x-ray tomorrow   D/W RN       Active Problem List:     Problem List  Date Reviewed: 2020          Codes Class    Hypotension ICD-10-CM: I95.9  ICD-9-CM: 458.9 Acute        Retroperitoneal hemorrhage ICD-10-CM: R58  ICD-9-CM: 459.0         Acute renal failure (ARF) (HCC) ICD-10-CM: N17.9  ICD-9-CM: 584.9         Encephalopathy ICD-10-CM: G93.40  ICD-9-CM: 348.30         Sepsis with multi-organ dysfunction (HCC) ICD-10-CM: A41.9, R65.20  ICD-9-CM: 038.9, 995.92         Pneumonia due to methicillin susceptible Staphylococcus aureus (MSSA) ([facility].) ICD-10-CM: J15.211  ICD-9-CM: 482.41         Thrombocytopenia ([place] [address].) ICD-10-CM: D69.6  ICD-9-CM: 287.5         Diarrhea ICD-10-CM: R19.7  ICD-9-CM: 787.91         COVID-19 virus infection ICD-10-CM: U07.1         Obesity, morbid ([place] [address].) ICD-10-CM: E66.01  ICD-9-CM: 278.01         Vaginal Pap smear following hysterectomy for malignancy ICD-10-CM: Z08, Z90.710  ICD-9-CM: V67.01         Personal history of malignant neoplasm of other parts of uterus ICD-10-CM: Z85.42  ICD-9-CM: V10.42         Endometrial cancer ([place] [address].) ICD-10-CM: C54.1  ICD-9-CM: 182.0               Past Medical History:      has a past medical history of Basal cell carcinoma, GERD (gastroesophageal reflux disease), Kidney stones, and Polyp of ureter. Past Surgical History:      has a past surgical history that includes hysteroscopy diagnostic (); pr endometrial ablation, thermal; hx  section (); hx colonoscopy; insert arterial line (2020); and ir insert non tunl cvc over 5 yrs (2020). Home Medications:     Prior to Admission medications    Medication Sig Start Date End Date Taking? Authorizing Provider   pantoprazole (PROTONIX) 40 mg tablet TAKE 1 TABLET BY MOUTH TWICE A DAY 18   Provider, Historical   esomeprazole (NEXIUM) 20 mg capsule Take 20 mg by mouth daily. Indications: BID    Provider, Historical   zolpidem (AMBIEN) 10 mg tablet Take 0.5 Tabs by mouth nightly as needed for Sleep.  Max Daily Amount: 5 mg. 17   Letty Mandel MD   fluticasone (FLONASE) 50 mcg/actuation nasal spray Mist 1-2 spray(s) into each nostril once daily. 4/3/15   Provider, Historical   ranitidine (ZANTAC) 150 mg tablet 150 mg.    Provider, Historical       Allergies/Social/Family History: Allergies   Allergen Reactions    Augmentin [Amoxicillin-Pot Clavulanate] Rash and Itching      Social History     Tobacco Use    Smoking status: Never Smoker    Smokeless tobacco: Never Used   Substance Use Topics    Alcohol use: Not on file      Family History   Problem Relation Age of Onset    Prostate Cancer Father         PROSTATE    Breast Cancer Sister 46        invasive poorly differentiated ductal carcinoma, Neg genetic testing.  Cancer Sister 62        melanoma stage 1            Objective:   Vital Signs:  Visit Vitals  /88 (BP 1 Location: Left arm, BP Patient Position: At rest)   Pulse (!) 115   Temp 98.7 °F (37.1 °C)   Resp 26   Ht 5' 4\" (1.626 m)   Wt 114.4 kg (252 lb 5.1 oz)   SpO2 93%   BMI 43.31 kg/m²    O2 Flow Rate (L/min): 2 l/min O2 Device: Room air(nasal canula was below patients eye) Temp (24hrs), Av.3 °F (36.8 °C), Min:98 °F (36.7 °C), Max:98.7 °F (37.1 °C)           Intake/Output:     Intake/Output Summary (Last 24 hours) at 2020 1024  Last data filed at 2020 0538  Gross per 24 hour   Intake 120 ml   Output 400 ml   Net -280 ml       Physical Exam: Detailed exam deferred in view of ongoing pandemic.  Physical exam as documented by attending physician was reviewed and discussed   General:  Awake, weak   Head:     Eyes:     Neck:    Lungs:      Chest wall:     Heart:     Abdomen:      Extremities:    Pulses:    Skin:    Neurologic:          LABS AND  DATA: Personally reviewed  Recent Labs     20  0311   WBC 8.3 7.1   HGB 8.3* 7.8*   HCT 27.3* 25.0*    240     Recent Labs     20  0311    134*   K 3.6 3.6    99   CO2 28 29   BUN 58* 39*   CREA 2.47* 1.72*   * 233*   CA 9.4 8.7   MG 2.0 1.9   PHOS 2.7 2.0*     Recent Labs     20  2680 ALB 2.4* 2.3*     No results for input(s): INR, PTP, APTT, INREXT, INREXT in the last 72 hours. Recent Labs     05/01/20  1109   PHI 7.522*   PCO2I 38.4   PO2I 125*   FIO2I 40     Recent Labs     05/02/20  0201   TROIQ 0.05*       MEDS: Reviewed    Chest Imaging: personally reviewed and report checked    Tele- reviewed    Medical decision making:   I have reviewed the flowsheet and previous day's notes  Patient has acute or chronic illness that poses a threat to life or bodily function  Review and order of Clinical lab tests  Review and Order of Radiology tests  Independent visualization of Image          Thank you for allowing me to participate in this patient's care.     Virginia Ames PA-C      [facility]

## 2020-05-04 NOTE — PROGRESS NOTES
Discussed Speaking, swallowing ice chips. The need to use the Bipap at night. Updated family on progress.   Discussed need for SCD's and the flesion boot

## 2020-05-04 NOTE — PALLIATIVE CARE DISCHARGE
Goals of Care/Treatment Preferences    The Palliative Medicine team was consulted as part of your/your loved one's care in the hospital. Our team is a supportive service; we strive to relieve suffering and improve quality of life. We reviewed advance care planning information, which includes the following:  Patient's [place] is[de-identified] Named in scanned ACP document  Confirm Advance Directive: Yes, not on file    Patient/Health Care Proxy Stated Goals: Prolong life    We reviewed / discussed your code status as: Full Code     Full Code means perform CPR in the event of cardiac arrest.      DNR means do NOT perform CPR in the event of cardiac arrest.      Partial Code means you have specific preferences, please discuss with your healthcare team.      Marissa Timur means this issue was not addressed / resolved during your stay    Medical Interventions: Full interventions    Dear MsAimee Tejal Brown,    During this admission, your family met with Dr. Cyn Lopez and Ab Moses LMSW on the Palliative Medicine team. We had discussions about your medical care throughout your hospitalization and provided regular updates to your family regarding your care. It was a privilege to support both you and them during this very difficult time, and we wish you all the best going forward into your recovery. Please call Palliative Medicine at [phone] ([phone] with any questions or concerns. Sincerely,     Ab Moses LMSW, Supervisee in Social Work  Palliative Medicine      Because of the importance of this information, we are providing you with a printed copy to share with other healthcare providers after this hospitalization is complete.

## 2020-05-04 NOTE — PROGRESS NOTES
Problem: Mobility Impaired (Adult and Pediatric)  Goal: *Acute Goals and Plan of Care (Insert Text)  Description: FUNCTIONAL STATUS PRIOR TO ADMISSION: Patient was independent and active without use of DME but poor history due to not being able to communicate. HOME SUPPORT PRIOR TO ADMISSION: Pt lives with family. Physical Therapy Goals  Initiated 5/4/2020  1. Patient will move from supine to sit and sit to supine , scoot up and down and roll side to side in bed with maximal assistance within 7 day(s). 2.  Patient will sit EOB with moderate assistance of 2 people, x5 minutes with active core initiation to correct sitting balance within 7 days. 3.  Patient will verbalize 3 exercises she can complete on her own outside of therapy sessions with 7 days. 4.  Patient will participate in supine HEP (AAROM as needed) with min assist for AAROM within 7 days. 5.  Patient will tolerate bed in chair position 30 min BID within 7 days. Initiated 4/25/2020  1. Patient will move from supine to sit and sit to supine , scoot up and down and roll side to side in bed with moderate assistance once off vent within 7 day(s). 2.  Patient will participate in sitting with min A for 2 minutes once off vent within 7 day(s). 3.  Patient will perform active supine or sitting TE with min A within 7 day(s). Outcome: Progressing Towards Goal  PHYSICAL THERAPY TREATMENT: WEEKLY REASSESSMENT  Patient: Hesham Escobar (19 y.o. female)  Date: 5/4/2020  Primary Diagnosis: COVID-19 virus infection [U07.1]  Precautions: Fall      ASSESSMENT  Patient continues with skilled PT services and is progressing towards goals. Patient received supine in bed with HOB elevated, awake but appeared drowsy. Great participation in therapy session, despite profound weakness. She was able to perform active DF which is great improvement since last session.  Strength appears to be returning more distally>proximally, as she has a  and active elbow flexion (although still requires AAROM), active DF, but little in shoulders or hips/knees. Non-productive present throughout session. Repositioned and bed>chair position at end of session. Changed side of PRAFO boot and RN aware. Patient not yet ready for inpatient rehab, more appropriate for SNF currently with eventual transfer to inpatient from SNF stay. Patient's progression toward goals since last assessment: more active movement distally than proximally, progress towards all goals, goals updated as above    Current Level of Function Impacting Discharge (mobility/balance): total assist    Other factors to consider for discharge: has tested negative for COVID-19 x2, new HD patient         PLAN :  Goals have been updated based on progression since last assessment. Patient continues to benefit from skilled intervention to address the above impairments. Recommendations and Planned Interventions: bed mobility training, transfer training, gait training, therapeutic exercises, neuromuscular re-education, edema management/control, patient and family training/education, and therapeutic activities      Frequency/Duration: Patient will be followed by physical therapy:  5 times a week to address goals. Recommendation for discharge: (in order for the patient to meet his/her long term goals)  Therapy up to 5 days/week in SNF setting    This discharge recommendation:  Has been made in collaboration with the attending provider and/or case management    IF patient discharges home will need the following DME: none         SUBJECTIVE:   Patient stated That's my daughter.  patient able to point out her daughter in family photo at bedside    OBJECTIVE DATA SUMMARY:   HISTORY:    Past Medical History:   Diagnosis Date    Basal cell carcinoma     GERD (gastroesophageal reflux disease)     Kidney stones     Polyp of ureter      Past Surgical History:   Procedure Laterality Date    ENDOMETRIAL ABLATION, THERMAL      HX [address]    HX COLONOSCOPY      Novemeber 2017    HYSTEROSCOPY DIAGNOSTIC  2011    D&C/POLYP REMOVAL    INSERT ARTERIAL LINE  4/16/2020         IR INSERT NON TUNL CVC OVER 5 YRS  4/22/2020       Personal factors and/or comorbidities impacting plan of care: PMH, COVID 19    Home Situation  Home Environment: Other (comment)  One/Two Story Residence: One story  Living Alone: No  Support Systems: Other (comments)  Patient Expects to be Discharged to[de-identified] Unknown  Current DME Used/Available at Home: None    EXAMINATION/PRESENTATION/DECISION MAKING:   Critical Behavior:  Neurologic State: Alert  Orientation Level: Oriented X4(doesnt vocalize from longterm intubation)  Cognition: Follows commands  Safety/Judgement: Decreased awareness of environment, Decreased awareness of need for assistance, Decreased awareness of need for safety, Decreased insight into deficits  Hearing: Auditory  Auditory Impairment: None  Range Of Motion:  AROM: Grossly decreased, non-functional  PROM: Generally decreased, functional  Strength:    Strength: Grossly decreased, non-functional  Tone & Sensation:   Tone: Abnormal  Coordination:  Coordination: Grossly decreased, non-functional  Functional Mobility:  Bed mobility:   Total assist: bed mechanics for bed in chair position  Balance:   Sitting: Impaired; With support  Sitting - Static: Poor (constant support)    Therapeutic Exercises: All exercises performed bilaterally  -active ankle pumps 2x5  -AAROM elbow flexion 1x5  -AAROM internal/external rotation shoulder 1x5  -PROM hip/knee flexion/extension 1x5  -AAROM hip ER/IR 1x5  -AAROM shoulder flexion 1x5  - 1x5    Educated on importance of completing what exercises she's able outside of therapy sessions. (ankle pumps, )    Pain Rating:  Denied pain    Activity Tolerance:   Fair and requires frequent rest breaks  Please refer to the flowsheet for vital signs taken during this treatment.     After treatment patient left in no apparent distress:   Supine in bed, Call bell within reach, and bed in chair position     COMMUNICATION/EDUCATION:   The patients plan of care was discussed with: Registered nurse and OT. Fall prevention education was provided and the patient/caregiver indicated understanding., Patient/family have participated as able in goal setting and plan of care. , and Patient/family agree to work toward stated goals and plan of care.     Thank you for this referral.  Arthur Ford, PT, DPT   Time Calculation: 31 mins

## 2020-05-04 NOTE — PROGRESS NOTES
Palliative Medicine Social Work      Palliative team will sign off for now. Please re-consult if needed. 12:10 PM  I visited with patient in her room. She was awake and alert, reached out for me with right hand. She was able to answer very simple questions with head shakes and whispers but overall not much communication as expected. RN came in to hold up phone to her ear so  could talk to her. Appreciate RN update. Spoke with  on phone a few minutes later. He confirmed his plan for patient to go to inpatient rehab - hoping for [facility] in [place]. He is adamantly refusing to send patient to SNF - he said it would \"crush her\" spirit to know she was in a place like that. (He reported he has a lot of experience with NF as he has worked in some as a ). Provided education that SNF is different than nursing level care as patient would be working with rehab daily. This did not sway  and confirmed he would never send her to NF and that she has to qualify for IPR. Updated  re: care manager would be calling and that she is up to date on all conversations re: d/c planning. Appreciate RN and CM support as always. Palliative team will sign off for now. Please re-consult if needed. Thank you for the opportunity to be involved in the care of Ms. Nupur Alva and her family.     Anam Matamoros, FLAKITA, Supervisee in Social Work  Palliative Medicine   [phone]

## 2020-05-04 NOTE — PROGRESS NOTES
Hospitalist Progress Note  Fiona Whiteside MD  Answering service: [phone] from in house phone      Date of Service:  2020  NAME:  Billy Marin  :  1960  MRN:  [id number]    Admission Summary:   59F s/p COVID treatement in ICU - extubated  Interval history / Subjective:   Patient seen and examined at bedside, looks ok, denies pain/complaints. Off oxygen sats mid 90s, still in sinus tachy cardia 100s-110s. Will try rate control. Assessment & Plan:     #. ARF: - Nephrology following- on HD with good tolerance      #. COVID-19 +ve - Resolved- s/p Tocilizumab, Plaquenil. On Vit C, Zinc   #. Acute Hypoxic Resp Failure- resolved, Pulm following. Bipap at night and PRN. #. Acute metabolic Encephalopathy: likely ICU delirium- no agitation. Monitor- resolved. #. Sinus tachycardia: persistent, will try Metoprolol, check TSH  #. Anemia in CKD: monitor HB. #. Retroperitoneal bleed: this hospitalization, resolved - Avoid AP/AC meds- Monitor Hb  #. Non occlusive PE in LLL pulmonary artery - no ACs due to retroperitoneal bleed. #. S/p Cardiac arrest   #. DM2: A1c 10.4, SSI, AccuChecks, monitor  #. Morbid obesity: counseled on health benefits of weight loss, Healthy diet, BMI 44.11 kg/m².      Code status: Full  DVT prophylaxis: SCD  Care Plan discussed with: Patient/Family and Nurse  Disposition: TBD. >2days     Hospital Problems  Date Reviewed: 2020          Codes Class Noted POA    Hypotension ICD-10-CM: I95.9  ICD-9-CM: 458.9 Acute 2020 Yes        Retroperitoneal hemorrhage ICD-10-CM: R58  ICD-9-CM: 459.0  2020 No        Acute renal failure (ARF) (HCC) ICD-10-CM: N17.9  ICD-9-CM: 584.9  2020 No        Encephalopathy ICD-10-CM: G93.40  ICD-9-CM: 348.30  2020 No        Sepsis with multi-organ dysfunction (HCC) ICD-10-CM: A41.9, R65.20  ICD-9-CM: 038.9, 995.92  2020 Unknown        Pneumonia due to methicillin susceptible Staphylococcus aureus (MSSA) ([facility] ICD-10-CM: Y45.872  ICD-9-CM: 482.41  4/19/2020 Unknown        Thrombocytopenia ([facility] ICD-10-CM: D69.6  ICD-9-CM: 287.5  4/19/2020 Unknown        Diarrhea ICD-10-CM: R19.7  ICD-9-CM: 787.91  4/19/2020 Unknown        COVID-19 virus infection ICD-10-CM: U07.1  3/31/2020 Unknown            Review of Systems:   Pertinent items are mentioned in interval history. Vital Signs:    Last 24hrs VS reviewed since prior progress note. Most recent are:  Visit Vitals  /75 (BP 1 Location: Left arm, BP Patient Position: At rest)   Pulse (!) 108   Temp 98 °F (36.7 °C)   Resp 20   Ht 5' 4\" (1.626 m)   Wt 114.4 kg (252 lb 5.1 oz)   SpO2 97%   BMI 43.31 kg/m²         Intake/Output Summary (Last 24 hours) at 5/4/2020 0758  Last data filed at 5/4/2020 0538  Gross per 24 hour   Intake 230 ml   Output 400 ml   Net -170 ml        Physical Examination:   General:  Alert, No acute distress  Resp:  No accessory muscle use, Good AE, no wheezes, few rhonchi  Abd:  Soft, non-tender, non-distended, obese  Extremities:  No cyanosis or clubbing, no significant edema  Neuro:  Grossly normal, no focal neuro deficits, follows commands   Psych:   not agitated. Data Review:    Review and/or order of clinical lab test  Review and/or order of tests in the radiology section of CPT  Review and/or order of tests in the medicine section of CPT  Labs:     Recent Labs     05/04/20  0405 05/03/20  0311   WBC 8.3 7.1   HGB 8.3* 7.8*   HCT 27.3* 25.0*    240     Recent Labs     05/04/20  0405 05/03/20  0311 05/02/20  0201    134* 134*   K 3.6 3.6 3.5    99 99   CO2 28 29 28   BUN 58* 39* 59*   CREA 2.47* 1.72* 2.25*   * 233* 236*   CA 9.4 8.7 9.1   MG 2.0 1.9 2.1   PHOS 2.7 2.0* 2.4*     Recent Labs     05/04/20  0405 05/03/20  0311 05/02/20  0201   ALB 2.4* 2.3* 2.3*     No results for input(s): INR, PTP, APTT, INREXT, INREXT in the last 72 hours.    No results for input(s): FE, TIBC, PSAT, FERR in the last 72 hours. No results found for: FOL, RBCF   No results for input(s): PH, PCO2, PO2 in the last 72 hours.   Recent Labs     05/02/20  0201   TROIQ 0.05*     Lab Results   Component Value Date/Time    Triglyceride 159 (H) 04/12/2020 08:34 PM     Lab Results   Component Value Date/Time    Glucose (POC) 209 (H) 05/04/2020 05:43 AM    Glucose (POC) 205 (H) 05/03/2020 11:10 PM    Glucose (POC) 200 (H) 05/03/2020 06:46 PM    Glucose (POC) 208 (H) 05/03/2020 05:30 AM    Glucose (POC) 151 (H) 05/03/2020 12:22 AM     No results found for: COLOR, APPRN, SPGRU, REFSG, SARKIS, PROTU, GLUCU, KETU, BILU, UROU, GALE, LEUKU, GLUKE, EPSU, BACTU, WBCU, RBCU, CASTS, UCRY  Medications Reviewed:     Current Facility-Administered Medications   Medication Dose Route Frequency    insulin glargine (LANTUS) injection 26 Units  26 Units SubCUTAneous QHS    balsam peru-castor oiL (VENELEX) ointment   Topical BID    bumetanide (BUMEX) tablet 2 mg  2 mg Oral DAILY    guaiFENesin (ROBITUSSIN) 100 mg/5 mL oral liquid 100 mg  100 mg Oral Q12H    epoetin jovani-epbx (RETACRIT) injection 20,000 Units  20,000 Units SubCUTAneous Q TUE, THU & SAT    HYDROcodone-acetaminophen (NORCO) 5-325 mg per tablet 1 Tab  1 Tab Oral Q6H PRN    phenol throat spray (CHLORASEPTIC) 1 Spray  1 Spray Oral Q6H PRN    sevelamer carbonate (RENVELA) oral powder 0.8 g  0.8 g Oral TID WITH MEALS    heparin (porcine) 1,000 unit/mL injection 2,500 Units  2,500 Units IntraVENous DIALYSIS PRN    guar gum (BENEFIBER) packet 1 Packet  4 g Oral TID    labetaloL (NORMODYNE;TRANDATE) injection 20 mg  20 mg IntraVENous Q4H PRN    albumin human 25% (BUMINATE) solution 12.5 g  12.5 g IntraVENous DIALYSIS PRN    albuterol (PROVENTIL VENTOLIN) nebulizer solution 2.5 mg  2.5 mg Nebulization Q4H PRN    famotidine (PEPCID) 8 mg/mL oral suspension 20 mg  20 mg Per NG tube DAILY    alteplase (CATHFLO) 2 mg in sterile water (preservative free) 2 mL injection  2 mg InterCATHeter DIALYSIS PRN    alteplase (CATHFLO) 2 mg in sterile water (preservative free) 2 mL injection  2 mg InterCATHeter DIALYSIS PRN    insulin lispro (HUMALOG) injection   SubCUTAneous Q6H    white petrolatum-mineral oiL (AKWA TEARS) 83-15 % ophthalmic ointment   Both Eyes Q12H    bacitracin 500 unit/gram packet 1 Packet  1 Packet Topical PRN    sodium chloride (NS) flush 5-40 mL  5-40 mL IntraVENous Q8H    sodium chloride (NS) flush 5-40 mL  5-40 mL IntraVENous PRN    ondansetron (ZOFRAN) injection 4 mg  4 mg IntraVENous Q4H PRN    acetaminophen (TYLENOL) tablet 650 mg  650 mg Oral Q6H PRN    Or    acetaminophen (TYLENOL) suppository 650 mg  650 mg Rectal Q6H PRN    glucose chewable tablet 16 g  4 Tab Oral PRN    glucagon (GLUCAGEN) injection 1 mg  1 mg IntraMUSCular PRN    dextrose 10% infusion 0-250 mL  0-250 mL IntraVENous PRN   ______________________________________________________________________  EXPECTED LENGTH OF STAY: 12d 0h  ACTUAL LENGTH OF STAY:          34               Travis Montemayor MD

## 2020-05-04 NOTE — ROUTINE PROCESS
+Discussed a more set schedule for BiPap and tubefeeding. Left a message for Redd Lang RD and ANAND Thomas about coordinating these efforts with a set number of hours for bipap at night and tubefeeds during the day. Patient will still have the option of Bipap as needed in the day time. Desire outcome would be less need during the day and patient more alert after an full duration of Bipap at night.

## 2020-05-05 ENCOUNTER — APPOINTMENT (OUTPATIENT)
Dept: GENERAL RADIOLOGY | Age: 60
DRG: 870 | End: 2020-05-05
Attending: PHYSICIAN ASSISTANT
Payer: COMMERCIAL

## 2020-05-05 LAB
ALBUMIN SERPL-MCNC: 2.4 G/DL (ref 3.5–5)
ANION GAP SERPL CALC-SCNC: 6 MMOL/L (ref 5–15)
BUN SERPL-MCNC: 73 MG/DL (ref 6–20)
BUN/CREAT SERPL: 24 (ref 12–20)
CALCIUM SERPL-MCNC: 9.6 MG/DL (ref 8.5–10.1)
CHLORIDE SERPL-SCNC: 101 MMOL/L (ref 97–108)
CO2 SERPL-SCNC: 30 MMOL/L (ref 21–32)
CREAT SERPL-MCNC: 3 MG/DL (ref 0.55–1.02)
GLUCOSE BLD STRIP.AUTO-MCNC: 125 MG/DL (ref 65–100)
GLUCOSE BLD STRIP.AUTO-MCNC: 138 MG/DL (ref 65–100)
GLUCOSE BLD STRIP.AUTO-MCNC: 158 MG/DL (ref 65–100)
GLUCOSE BLD STRIP.AUTO-MCNC: 175 MG/DL (ref 65–100)
GLUCOSE SERPL-MCNC: 120 MG/DL (ref 65–100)
MAGNESIUM SERPL-MCNC: 1.9 MG/DL (ref 1.6–2.4)
PHOSPHATE SERPL-MCNC: 3 MG/DL (ref 2.6–4.7)
POTASSIUM SERPL-SCNC: 3.7 MMOL/L (ref 3.5–5.1)
SERVICE CMNT-IMP: ABNORMAL
SODIUM SERPL-SCNC: 137 MMOL/L (ref 136–145)
TSH SERPL DL<=0.05 MIU/L-ACNC: 1.36 UIU/ML (ref 0.36–3.74)

## 2020-05-05 PROCEDURE — 36415 COLL VENOUS BLD VENIPUNCTURE: CPT

## 2020-05-05 PROCEDURE — 74011250637 HC RX REV CODE- 250/637: Performed by: INTERNAL MEDICINE

## 2020-05-05 PROCEDURE — 97110 THERAPEUTIC EXERCISES: CPT

## 2020-05-05 PROCEDURE — 74011250636 HC RX REV CODE- 250/636: Performed by: INTERNAL MEDICINE

## 2020-05-05 PROCEDURE — 82962 GLUCOSE BLOOD TEST: CPT

## 2020-05-05 PROCEDURE — 84443 ASSAY THYROID STIM HORMONE: CPT

## 2020-05-05 PROCEDURE — 74011250637 HC RX REV CODE- 250/637: Performed by: NURSE PRACTITIONER

## 2020-05-05 PROCEDURE — 90935 HEMODIALYSIS ONE EVALUATION: CPT

## 2020-05-05 PROCEDURE — 74011636637 HC RX REV CODE- 636/637: Performed by: INTERNAL MEDICINE

## 2020-05-05 PROCEDURE — 65660000000 HC RM CCU STEPDOWN

## 2020-05-05 PROCEDURE — 92526 ORAL FUNCTION THERAPY: CPT

## 2020-05-05 PROCEDURE — 80069 RENAL FUNCTION PANEL: CPT

## 2020-05-05 PROCEDURE — 83735 ASSAY OF MAGNESIUM: CPT

## 2020-05-05 PROCEDURE — 71045 X-RAY EXAM CHEST 1 VIEW: CPT

## 2020-05-05 RX ADMIN — SODIUM CHLORIDE 10 ML: 9 INJECTION INTRAMUSCULAR; INTRAVENOUS; SUBCUTANEOUS at 05:45

## 2020-05-05 RX ADMIN — SEVELAMER CARBONATE 0.8 G: 800 POWDER, FOR SUSPENSION ORAL at 11:58

## 2020-05-05 RX ADMIN — EPOETIN ALFA-EPBX 20000 UNITS: 10000 INJECTION, SOLUTION INTRAVENOUS; SUBCUTANEOUS at 21:37

## 2020-05-05 RX ADMIN — SODIUM CHLORIDE 10 ML: 9 INJECTION INTRAMUSCULAR; INTRAVENOUS; SUBCUTANEOUS at 21:31

## 2020-05-05 RX ADMIN — Medication 1 PACKET: at 21:32

## 2020-05-05 RX ADMIN — GUAIFENESIN 100 MG: 200 SOLUTION ORAL at 21:32

## 2020-05-05 RX ADMIN — Medication 1 PACKET: at 17:12

## 2020-05-05 RX ADMIN — CASTOR OIL AND BALSAM, PERU: 788; 87 OINTMENT TOPICAL at 09:01

## 2020-05-05 RX ADMIN — MINERAL OIL AND WHITE PETROLATUM: 150; 830 OINTMENT OPHTHALMIC at 21:35

## 2020-05-05 RX ADMIN — FAMOTIDINE 20 MG: 40 POWDER, FOR SUSPENSION ORAL at 09:03

## 2020-05-05 RX ADMIN — Medication 1 PACKET: at 09:01

## 2020-05-05 RX ADMIN — METOPROLOL TARTRATE 25 MG: 50 TABLET, FILM COATED ORAL at 17:25

## 2020-05-05 RX ADMIN — SEVELAMER CARBONATE 0.8 G: 800 POWDER, FOR SUSPENSION ORAL at 17:12

## 2020-05-05 RX ADMIN — SEVELAMER CARBONATE 0.8 G: 800 POWDER, FOR SUSPENSION ORAL at 09:01

## 2020-05-05 RX ADMIN — METOPROLOL TARTRATE 12.5 MG: 50 TABLET, FILM COATED ORAL at 05:41

## 2020-05-05 RX ADMIN — GUAIFENESIN 100 MG: 200 SOLUTION ORAL at 09:01

## 2020-05-05 RX ADMIN — SODIUM CHLORIDE 10 ML: 9 INJECTION INTRAMUSCULAR; INTRAVENOUS; SUBCUTANEOUS at 16:14

## 2020-05-05 RX ADMIN — CASTOR OIL AND BALSAM, PERU: 788; 87 OINTMENT TOPICAL at 17:12

## 2020-05-05 RX ADMIN — INSULIN LISPRO 2 UNITS: 100 INJECTION, SOLUTION INTRAVENOUS; SUBCUTANEOUS at 17:12

## 2020-05-05 RX ADMIN — INSULIN LISPRO 2 UNITS: 100 INJECTION, SOLUTION INTRAVENOUS; SUBCUTANEOUS at 01:01

## 2020-05-05 RX ADMIN — INSULIN GLARGINE 28 UNITS: 100 INJECTION, SOLUTION SUBCUTANEOUS at 21:33

## 2020-05-05 NOTE — ROUTINE PROCESS
0900 Vascular studies called regarding pt duplex results, hospitalist made aware. No orders given. Pt placed on bipap, pt wanted it removed stated \"I can't sleep with this on. It keeps me up all night\" taught the importance of using the bipap at night. Pt continued to refuse. Bedside and Verbal shift change report given to [facility] (oncoming nurse) by Angela Rasheed (offgoing nurse).  Report included the following information SBAR, Kardex, ED Summary, Intake/Output, MAR, Recent Results and Cardiac Rhythm

## 2020-05-05 NOTE — PROGRESS NOTES
Hospitalist Progress Note  Efrain Feldman MD  Answering service: [phone] from in house phone      Date of Service:  2020  NAME:  Lizabeth Gary  :  1960  MRN:  [id number]    Admission Summary:   59F s/p COVID treatement in ICU - extubated  Interval history / Subjective:   Patient seen and examined at bedside, looks fairly well last 2days. Off oxygen. HR still  110, will increase dose of metoprolol. Just had HD today. Assessment & Plan:     #. ARF: - Nephrology following- on HD with good tolerance      #. COVID-19 +ve - Resolved- s/p Tocilizumab, Plaquenil. On Vit C, Zinc   #. Acute Hypoxic Resp Failure- resolved, Pulm following. Bipap at night and PRN. #. Acute metabolic Encephalopathy: likely ICU delirium- no agitation. Monitor- resolved. #. Sinus tachycardia: persistent, will try Metoprolol, check TSH  #. Anemia in CKD: monitor HB. #. Retroperitoneal bleed: this hospitalization, resolved - Avoid AP/AC meds- Monitor Hb  #. Non occlusive PE in LLL pulmonary artery - no ACs due to retroperitoneal bleed. #. S/p Cardiac arrest   #. DM2: A1c 10.4, SSI, AccuChecks, monitor  #. Morbid obesity: counseled on health benefits of weight loss, Healthy diet, BMI 44.11 kg/m².      Code status: Full  DVT prophylaxis: SCD  Care Plan discussed with: Patient/Family and Nurse  Disposition: TBD. >2days     Hospital Problems  Date Reviewed: 2020          Codes Class Noted POA    Hypotension ICD-10-CM: I95.9  ICD-9-CM: 458.9 Acute 2020 Yes        Retroperitoneal hemorrhage ICD-10-CM: R58  ICD-9-CM: 459.0  2020 No        Acute renal failure (ARF) (HCC) ICD-10-CM: N17.9  ICD-9-CM: 584.9  2020 No        Encephalopathy ICD-10-CM: G93.40  ICD-9-CM: 348.30  2020 No        Sepsis with multi-organ dysfunction (HCC) ICD-10-CM: A41.9, R65.20  ICD-9-CM: 038.9, 995.92  2020 Unknown        Pneumonia due to methicillin susceptible Staphylococcus aureus (MSSA) ([facility] ICD-10-CM: G30.314  ICD-9-CM: 482.41  4/19/2020 Unknown        Thrombocytopenia ([facility] ICD-10-CM: D69.6  ICD-9-CM: 287.5  4/19/2020 Unknown        Diarrhea ICD-10-CM: R19.7  ICD-9-CM: 787.91  4/19/2020 Unknown        COVID-19 virus infection ICD-10-CM: U07.1  3/31/2020 Unknown            Review of Systems:   Pertinent items are mentioned in interval history. Vital Signs:    Last 24hrs VS reviewed since prior progress note. Most recent are:  Visit Vitals  BP (!) 130/100   Pulse (!) 112   Temp 99.5 °F (37.5 °C)   Resp 27   Ht 5' 4\" (1.626 m)   Wt 115.5 kg (254 lb 10.1 oz)   SpO2 98%   BMI 43.71 kg/m²         Intake/Output Summary (Last 24 hours) at 5/5/2020 1407  Last data filed at 5/5/2020 1400  Gross per 24 hour   Intake 390 ml   Output 2400 ml   Net -2010 ml        Physical Examination:   General:  Alert, No acute distress  Resp:  No accessory muscle use, Good AE, no wheezes, few rhonchi  Abd:  Soft, non-tender, non-distended, obese  Extremities:  No cyanosis or clubbing, no significant edema  Neuro:  Grossly normal, no focal neuro deficits, follows commands   Psych:   not agitated. Data Review:    Review and/or order of clinical lab test  Review and/or order of tests in the radiology section of CPT  Review and/or order of tests in the medicine section of CPT  Labs:     Recent Labs     05/04/20 0405 05/03/20 0311   WBC 8.3 7.1   HGB 8.3* 7.8*   HCT 27.3* 25.0*    240     Recent Labs     05/05/20 0317 05/04/20 0405 05/03/20 0311    136 134*   K 3.7 3.6 3.6    100 99   CO2 30 28 29   BUN 73* 58* 39*   CREA 3.00* 2.47* 1.72*   * 218* 233*   CA 9.6 9.4 8.7   MG 1.9 2.0 1.9   PHOS 3.0 2.7 2.0*     Recent Labs     05/05/20  0317 05/04/20  0405 05/03/20  0311   ALB 2.4* 2.4* 2.3*     No results for input(s): INR, PTP, APTT, INREXT, INREXT in the last 72 hours. No results for input(s): FE, TIBC, PSAT, FERR in the last 72 hours.    No results found for: FOL, RBCF   No results for input(s): PH, PCO2, PO2 in the last 72 hours. No results for input(s): CPK, CKNDX, TROIQ in the last 72 hours.     No lab exists for component: CPKMB  Lab Results   Component Value Date/Time    Triglyceride 159 (H) 04/12/2020 08:34 PM     Lab Results   Component Value Date/Time    Glucose (POC) 138 (H) 05/05/2020 11:35 AM    Glucose (POC) 125 (H) 05/05/2020 05:44 AM    Glucose (POC) 175 (H) 05/05/2020 12:49 AM    Glucose (POC) 149 (H) 05/04/2020 05:29 PM    Glucose (POC) 177 (H) 05/04/2020 11:11 AM     No results found for: COLOR, APPRN, SPGRU, REFSG, SARKIS, PROTU, GLUCU, KETU, BILU, UROU, GALE, LEUKU, GLUKE, EPSU, BACTU, WBCU, RBCU, CASTS, UCRY  Medications Reviewed:     Current Facility-Administered Medications   Medication Dose Route Frequency    metoprolol (LOPRESSOR) 5 mg/mL oral suspension 12.5 mg  12.5 mg Per NG tube Q12H    insulin glargine (LANTUS) injection 28 Units  28 Units SubCUTAneous QHS    balsam peru-castor oiL (VENELEX) ointment   Topical BID    bumetanide (BUMEX) tablet 2 mg  2 mg Oral DAILY    guaiFENesin (ROBITUSSIN) 100 mg/5 mL oral liquid 100 mg  100 mg Oral Q12H    epoetin jovani-epbx (RETACRIT) injection 20,000 Units  20,000 Units SubCUTAneous Q TUE, THU & SAT    HYDROcodone-acetaminophen (NORCO) 5-325 mg per tablet 1 Tab  1 Tab Oral Q6H PRN    phenol throat spray (CHLORASEPTIC) 1 Spray  1 Spray Oral Q6H PRN    sevelamer carbonate (RENVELA) oral powder 0.8 g  0.8 g Oral TID WITH MEALS    heparin (porcine) 1,000 unit/mL injection 2,500 Units  2,500 Units IntraVENous DIALYSIS PRN    guar gum (BENEFIBER) packet 1 Packet  4 g Oral TID    labetaloL (NORMODYNE;TRANDATE) injection 20 mg  20 mg IntraVENous Q4H PRN    albumin human 25% (BUMINATE) solution 12.5 g  12.5 g IntraVENous DIALYSIS PRN    albuterol (PROVENTIL VENTOLIN) nebulizer solution 2.5 mg  2.5 mg Nebulization Q4H PRN    famotidine (PEPCID) 8 mg/mL oral suspension 20 mg  20 mg Per NG tube DAILY    alteplase (CATHFLO) 2 mg in sterile water (preservative free) 2 mL injection  2 mg InterCATHeter DIALYSIS PRN    alteplase (CATHFLO) 2 mg in sterile water (preservative free) 2 mL injection  2 mg InterCATHeter DIALYSIS PRN    insulin lispro (HUMALOG) injection   SubCUTAneous Q6H    white petrolatum-mineral oiL (AKWA TEARS) 83-15 % ophthalmic ointment   Both Eyes Q12H    bacitracin 500 unit/gram packet 1 Packet  1 Packet Topical PRN    sodium chloride (NS) flush 5-40 mL  5-40 mL IntraVENous Q8H    sodium chloride (NS) flush 5-40 mL  5-40 mL IntraVENous PRN    ondansetron (ZOFRAN) injection 4 mg  4 mg IntraVENous Q4H PRN    acetaminophen (TYLENOL) tablet 650 mg  650 mg Oral Q6H PRN    Or    acetaminophen (TYLENOL) suppository 650 mg  650 mg Rectal Q6H PRN    glucose chewable tablet 16 g  4 Tab Oral PRN    glucagon (GLUCAGEN) injection 1 mg  1 mg IntraMUSCular PRN    dextrose 10% infusion 0-250 mL  0-250 mL IntraVENous PRN   ______________________________________________________________________  EXPECTED LENGTH OF STAY: 12d 0h  ACTUAL LENGTH OF STAY:          35               Travis Montemayor MD

## 2020-05-05 NOTE — PROGRESS NOTES
NUTRITION COMPLETE ASSESSMENT    RECOMMENDATIONS:   Diet advancement per SLP. Interventions/Plan:   Food/Nutrient Delivery:   Modify rate, concentration, composition, and schedule      Intermittent feedin ml Nepro 4 x/day with 2 packets Prosource daily and 50 ml water flush before/after each bolus    Assessment:   Reason for Assessment: Reassessment    Tube Feeding: Nepro @ 35 ml/hr with 2 packets Prosource daily and 50 ml water flush q 6 hr  Diet: NPO  Nutritionally Significant Medications: [x] Reviewed & Includes: Bumex, Benefiber tid, Lantus, correction scale insulin, Renvela     Subjective: Staff Interviewed  Pt sleeping at time of RD visit (~11 am). Objective:  Ms Rebecca Kearney transferred from [facility] d/t worsening renal function requiring CRRT. Noted: JUANCHO d/t ATN-transitioned off CVVHD to daily IHD -now MWF; acute hypoxic respiratory failure d/t COVID-19 and bacterial PNA, intubated 3/28-extubated , BiPAP @ night; PEA arrest , hyperglycemia improved; critical illness neuropathy/myopathy; encephalopathy; metabolic alkalosis. Question of aspiration on CXR today. Patient remains on enteral nutrition support. SLP following-Ms Gill at high risk for aspiration. Only allowed ice chips for swallow rehab (no other oral intake). SLP recommends MBS prior to any diet advancement. Patient had OGT during entire intubation; NG tube placed . May need to consider PEG tube placement for comfort-especially if diet progression is slow. Currently tube feedings are running continuously. Will transition to intermittent feeds to allow a more normal feeding schedule. Also noted patient ordered to have BiPAP at night (but sometimes refused per RN). Recommended new tube feeding schedule: 240 ml Nepro 4 x/day with 2 packets Prosource daily and 50 ml water flush before/after each bolus.  This will provide 960 ml,1820 calories, 106 gm protein and 1200 ml free water (tube feeding/flush) per day to meet estimated needs. Lytes WNL. BG improved. May need to adjust insulin regimen with transition to intermittent feedings. Estimated Nutrition Needs:   Kcals/day: 3534 Kcals/day(7801-8698 (MSJ x 1.0-1.1)  Protein: 108 g(2g/kg IBW)  Fluid: (1 ml/kcal)  Based On: Brittany Wilburn  Weight Used: Actual wt(115 kg)    Pt expected to meet estimated nutrient needs:  [x]   Yes     []  No  [] Unable to predict at this time  Nutrition Diagnosis:   1. Swallowing difficulty related to critical illness neuropathy/myopathy as evidenced by dysphagia and enteral nutrition support via NGT. Goals: Tolerate transition to intermittent feeding in next 1-2 days. Monitoring & Evaluation:    - Enteral/parenteral nutrition intake   - Electrolyte and renal profile, CV-pulmonary, Weight/weight change, Glucose profile, GI    Previous Nutrition Goals Met: Yes  Previous Recommendations: Yes    Education & Discharge Needs:   [x] None Identified   [] Identified and addressed    [x] Participated in care plan, discharge planning, and/or interdisciplinary rounds        Cultural, Religion and ethnic food preferences identified:  None    Skin Integrity: [x]Intact  []Other  Edema: []None [x] Trace generalized, 1+ B/L upper and lower extremities  Last BM: 5/5  Food Allergies: [x]None []Other    Anthropometrics:    Weight Loss Metrics 5/5/2020 1/23/2020 1/22/2019 1/16/2018 1/9/2017 7/6/2016 12/14/2015   Today's Wt 254 lb 10.1 oz 270 lb 12.8 oz 278 lb 279 lb 275 lb 234 lb 213 lb 6.4 oz   BMI 43.71 kg/m2 43.73 kg/m2 44.89 kg/m2 45.05 kg/m2 44.39 kg/m2 37.79 kg/m2 34.46 kg/m2      Last 3 Recorded Weights in this Encounter    05/04/20 0538 05/05/20 0547 05/05/20 1320   Weight: 114.4 kg (252 lb 5.1 oz) 115.5 kg (254 lb 11.7 oz) 115.5 kg (254 lb 10.1 oz)      Weight Source: Bed  Height: 5' 4\" (162.6 cm),    Body mass index is 43.71 kg/m².      IBW : 54.4 kg (120 lb), % IBW (Calculated): 212.19 %   ,      Labs:    Lab Results   Component Value Date/Time    Sodium 137 05/05/2020 03:17 AM    Potassium 3.7 05/05/2020 03:17 AM    Chloride 101 05/05/2020 03:17 AM    CO2 30 05/05/2020 03:17 AM    Glucose 120 (H) 05/05/2020 03:17 AM    BUN 73 (H) 05/05/2020 03:17 AM    Creatinine 3.00 (H) 05/05/2020 03:17 AM    Calcium 9.6 05/05/2020 03:17 AM    Magnesium 1.9 05/05/2020 03:17 AM    Phosphorus 3.0 05/05/2020 03:17 AM    Albumin 2.4 (L) 05/05/2020 03:17 AM     Lab Results   Component Value Date/Time    Hemoglobin A1c 10.4 (H) 03/31/2020 03:42 PM     Lab Results   Component Value Date/Time    Glucose (POC) 138 (H) 05/05/2020 11:35 AM      Lab Results   Component Value Date/Time    ALT (SGPT) 206 (H) 04/20/2020 05:29 AM    AST (SGOT) 169 (H) 04/20/2020 05:29 AM    Alk.  phosphatase 490 (H) 04/20/2020 05:29 AM    Bilirubin, direct 0.7 (H) 04/20/2020 05:29 AM    Bilirubin, total 1.6 (H) 04/20/2020 05:29 AM        Mojgan Carballo RD [facility]

## 2020-05-05 NOTE — PROGRESS NOTES
0950 Pt really wanted to taste orange juice, gave 3 squeezed swaps, one ice chip. 1210 Given mouth care, pt spitted dime size dried mucus, she tolerated well, smiled after care. 1730 Pt vomited a little. Pt want us to stretch her extremities, massaged, stretched x 3. Pt's  called, pt listened, replied with whispering. 1900 Pt wanted to go to bathroom, \"I think I can walk! \"  Told her that not comfortable with it until evaluation of PT, OT.

## 2020-05-05 NOTE — PROGRESS NOTES
Problem: Dysphagia (Adult)  Goal: *Acute Goals and Plan of Care (Insert Text)  Description: Speech Therapy Goals  Initiated 5/4/2020    1. Patient will tolerate small amounts of ice chips for swallow rehab without adverse effects within 7 days. 2. Patient will participate in swallow re-evaluation within 7 days. Outcome: Progressing Towards Goal     SPEECH LANGUAGE PATHOLOGY DYSPHAGIA TREATMENT  Patient: Lex Dumont (72 y.o. female)  Date: 5/5/2020  Diagnosis: COVID-19 virus infection [U07.1]   <principal problem not specified>       Precautions: swallow, Fall    ASSESSMENT:  Patient continues with high aspiration risk (and specifically silent aspiration) secondary to prolonged intubation, aphonia which is indicative of poor vocal fold closure, general debility, and overt s/s aspiration with minimal ice chips. Patient will require MBS prior to diet initiation, however patient not yet ready for MBS. PLAN:  Recommendations and Planned Interventions:  -NPO with NGT for nutrition, hydration, meds  -Ice chips sparingly after oral care  -SLP to follow for dysphagia and integrated language treatment  -Consider ENT consult if dysphonia persists  Patient continues to benefit from skilled intervention to address the above impairments. Continue treatment per established plan of care. Discharge Recommendations: To Be Determined     SUBJECTIVE:   Patient stated Roque Bump when asked where she is. Patient alert, confused. Oriented to person and year. Disoriented to place, situation, and month, so SLP re-oriented to patient to all of these. Patient aphonic during conversation, however able to achieve weak, breathy, hoarse vocal quality x1 with prolonged /a/.     OBJECTIVE:   Cognitive and Communication Status:  Neurologic State: Alert, Confused  Orientation Level: Oriented to person, Disoriented to time, Disoriented to situation, Disoriented to place  Cognition: Follows commands  Perception: Appears intact  Perseveration: Perseverates during conversation  Safety/Judgement: Decreased awareness of environment, Decreased awareness of need for assistance, Decreased awareness of need for safety, Decreased insight into deficits  Dysphagia Treatment:     P.O. Trials:  Patient Position: upright in bed  Vocal quality prior to P.O.: Aphonic(able to phonate /a/ x1 on command, hoarse/breathy)  Consistency Presented: Ice chips  How Presented: SLP-fed/presented;Spoon     Bolus Acceptance: No impairment  Bolus Formation/Control: No impairment     Propulsion: No impairment  Oral Residue: None  Initiation of Swallow: Delayed (# of seconds)  Laryngeal Elevation: Decreased  Aspiration Signs/Symptoms: Clear throat;Delayed cough/throat clear                     After treatment:   Patient left in no apparent distress in bed, Call bell within reach, and Nursing notified    COMMUNICATION/EDUCATION:   Patient was educated regarding her deficit(s) of dysphagia as this relates to her diagnosis. She demonstrated Fair understanding as evidenced by cognitive deficits. The patient's plan of care including recommendations, planned interventions, and recommended diet changes were discussed with: Registered nurse.      Clydene Holter, SLP  Time Calculation: 10 mins

## 2020-05-05 NOTE — PROGRESS NOTES
PULMONARY MEDICINE    Progress Note    Name: Lisa Briggs   : 1960   MRN: [id number]   Date: 2020      Subjective:     5/  Less confused. No WOB      Some bouts of confusion  No WOB    Consult Note:   Requesting Physician: Dr. Radha Charlton  Reason for consult: Extubated COVID patient    Medical records and data reviewed. Patient is a 64 y.o. female who was admitted on 3/31 with severe COVID 19 pneumonia and multisystem organ failure and required invasive mechanical ventilation, CRRT and pressors. She recovered slowly, was extubated and transferred to telemetry for ongoing care. She has generalized weakness. She was noted to be lethargic this morning with labored breathing and was placed on bipap. ABG that was done sis not show resp acidosis, oxygenation was preserved. CXR does not show new or worsening infiltrates. She appears to be at risk for underlying MIKE which is not known from before. She is starting to work with PT/OT. COVID 19 test was negative on      Review of Systems:     A comprehensive 12 system review of systems was not obtained from the patient    Assessment:   S/P multisystem organ failure with COVID 19 pneumonia  Encephalopathy- slowly improving, ABG did not show resp acidosis  Generalized weakness, likely with a component of critical illness neuropathy/myopathy without respiratory muscle weakness, episodic increased work of breathing  Metabolic alkalosis on diuretic therapy  Acute PE, not anticoagulated secondary to retroperitoneal bleed  Other medical problems per chart      Recommendations:     NIPPV at night   Wean O2 as tolerated  PT/OT  Creatinine went up; renal involved  On diuretics   SLP  get LE dopplers and IVC filter if positive given high VTE risk for recurrence; pending   Other management per other teams  Chest x-ray noted chest film. Aspiration?     D/W RN       Active Problem List:     Problem List  Date Reviewed: 2020          Codes Class Hypotension ICD-10-CM: I95.9  ICD-9-CM: 458.9 Acute        Retroperitoneal hemorrhage ICD-10-CM: R58  ICD-9-CM: 459.0         Acute renal failure (ARF) (HCC) ICD-10-CM: N17.9  ICD-9-CM: 584.9         Encephalopathy ICD-10-CM: G93.40  ICD-9-CM: 348.30         Sepsis with multi-organ dysfunction (HCC) ICD-10-CM: A41.9, R65.20  ICD-9-CM: 038.9, 995.92         Pneumonia due to methicillin susceptible Staphylococcus aureus (MSSA) ([facility] ICD-10-CM: J15.211  ICD-9-CM: 482.41         Thrombocytopenia (HCC) ICD-10-CM: D69.6  ICD-9-CM: 287.5         Diarrhea ICD-10-CM: R19.7  ICD-9-CM: 787.91         COVID-19 virus infection ICD-10-CM: U07.1         Obesity, morbid ([facility] ICD-10-CM: E66.01  ICD-9-CM: 278.01         Vaginal Pap smear following hysterectomy for malignancy ICD-10-CM: Z08, Z90.710  ICD-9-CM: V67.01         Personal history of malignant neoplasm of other parts of uterus ICD-10-CM: Z85.42  ICD-9-CM: V10.42         Endometrial cancer ([facility] ICD-10-CM: C54.1  ICD-9-CM: 182.0               Past Medical History:      has a past medical history of Basal cell carcinoma, GERD (gastroesophageal reflux disease), Kidney stones, and Polyp of ureter. Past Surgical History:      has a past surgical history that includes hysteroscopy diagnostic (); pr endometrial ablation, thermal; hx  section (); hx colonoscopy; insert arterial line (2020); and ir insert non tunl cvc over 5 yrs (2020). Home Medications:     Prior to Admission medications    Medication Sig Start Date End Date Taking? Authorizing Provider   pantoprazole (PROTONIX) 40 mg tablet TAKE 1 TABLET BY MOUTH TWICE A DAY 18   Provider, Historical   esomeprazole (NEXIUM) 20 mg capsule Take 20 mg by mouth daily. Indications: BID    Provider, Historical   zolpidem (AMBIEN) 10 mg tablet Take 0.5 Tabs by mouth nightly as needed for Sleep.  Max Daily Amount: 5 mg. 17   Fidelia Mandel MD   fluticasone (FLONASE) 50 mcg/actuation nasal spray Mist 1-2 spray(s) into each nostril once daily. 4/3/15   Provider, Historical   ranitidine (ZANTAC) 150 mg tablet 150 mg.    Provider, Historical       Allergies/Social/Family History: Allergies   Allergen Reactions    Augmentin [Amoxicillin-Pot Clavulanate] Rash and Itching      Social History     Tobacco Use    Smoking status: Never Smoker    Smokeless tobacco: Never Used   Substance Use Topics    Alcohol use: Not on file      Family History   Problem Relation Age of Onset    Prostate Cancer Father         PROSTATE    Breast Cancer Sister 46        invasive poorly differentiated ductal carcinoma, Neg genetic testing.  Cancer Sister 62        melanoma stage 1            Objective:   Vital Signs:  Visit Vitals  /86 (BP 1 Location: Left arm, BP Patient Position: At rest)   Pulse (!) 103   Temp 98.6 °F (37 °C)   Resp 20   Ht 5' 4\" (1.626 m)   Wt 115.5 kg (254 lb 11.7 oz)   SpO2 99%   BMI 43.72 kg/m²    O2 Flow Rate (L/min): 2 l/min O2 Device: Room air Temp (24hrs), Av.2 °F (36.8 °C), Min:97.4 °F (36.3 °C), Max:98.8 °F (37.1 °C)           Intake/Output:     Intake/Output Summary (Last 24 hours) at 2020 0950  Last data filed at 2020 0547  Gross per 24 hour   Intake 100 ml   Output 400 ml   Net -300 ml       Physical Exam: Detailed exam deferred in view of ongoing pandemic.  Physical exam as documented by attending physician was reviewed and discussed   General:  Awake, weak   Head:     Eyes:     Neck:    Lungs:      Chest wall:     Heart:     Abdomen:      Extremities:    Pulses:    Skin:    Neurologic:          LABS AND  DATA: Personally reviewed  Recent Labs     205 20  031   WBC 8.3 7.1   HGB 8.3* 7.8*   HCT 27.3* 25.0*    240     Recent Labs     20  040    136   K 3.7 3.6    100   CO2 30 28   BUN 73* 58*   CREA 3.00* 2.47*   * 218*   CA 9.6 9.4   MG 1.9 2.0   PHOS 3.0 2.7     Recent Labs     20 05/04/20  0405   ALB 2.4* 2.4*     No results for input(s): INR, PTP, APTT, INREXT, INREXT in the last 72 hours. No results for input(s): PHI, PCO2I, PO2I, FIO2I in the last 72 hours. No results for input(s): CPK, CKMB, TROIQ, BNPP in the last 72 hours. MEDS: Reviewed    Chest Imaging: personally reviewed and report checked    Tele- reviewed    Medical decision making:   I have reviewed the flowsheet and previous day's notes  Patient has acute or chronic illness that poses a threat to life or bodily function  Review and order of Clinical lab tests  Review and Order of Radiology tests  Independent visualization of Image          Thank you for allowing me to participate in this patient's care.     Elmer Pang PA-C      [facility]

## 2020-05-05 NOTE — PROGRESS NOTES
Chart reviewed and attempted to see patient for OT intervention. Patient on dialysis at bedside at this time. Will follow up for OT intervention as able. Thank you.

## 2020-05-05 NOTE — PROGRESS NOTES
[facility]   [address] [facility], [facility] [address], [facility]  Phone: [phone]   [facility] [phone]       Nephrology Progress Note  Radha Manuel     1960     [id number]  Date of Admission : 3/31/2020  05/05/20    CC: Follow up for JUANCHO      Assessment and Plan   JUANCHO :  - 2/2 ATN  - no signs of recovery  yet  - HD  TTS   - cont w/ bumex  - daily labs    COVID-19 +ve   Acute Hypoxic Resp Failure   - On Vent   - completed Plaquenil, - s/p Tocilizumab   - extubated 4/29  - repeat SARS-CoV-2 neg 4/27     Anemia in CKD:  - cont KOKI    RP hematoma:  - stable    Cardiac arrest 4/9    Type II DM   - Insulin per primary team      Morbid Obesity        Interval History:  Seen and examined. More awake, NGT in place. Cr up, minimal UOP. O2 sats stable. Review of Systems: Review of systems not obtained due to patient factors.     Current Medications:   Current Facility-Administered Medications   Medication Dose Route Frequency    metoprolol (LOPRESSOR) 5 mg/mL oral suspension 12.5 mg  12.5 mg Per NG tube Q12H    insulin glargine (LANTUS) injection 28 Units  28 Units SubCUTAneous QHS    balsam peru-castor oiL (VENELEX) ointment   Topical BID    bumetanide (BUMEX) tablet 2 mg  2 mg Oral DAILY    guaiFENesin (ROBITUSSIN) 100 mg/5 mL oral liquid 100 mg  100 mg Oral Q12H    epoetin jovani-epbx (RETACRIT) injection 20,000 Units  20,000 Units SubCUTAneous Q TUE, THU & SAT    HYDROcodone-acetaminophen (NORCO) 5-325 mg per tablet 1 Tab  1 Tab Oral Q6H PRN    phenol throat spray (CHLORASEPTIC) 1 Spray  1 Spray Oral Q6H PRN    sevelamer carbonate (RENVELA) oral powder 0.8 g  0.8 g Oral TID WITH MEALS    heparin (porcine) 1,000 unit/mL injection 2,500 Units  2,500 Units IntraVENous DIALYSIS PRN    guar gum (BENEFIBER) packet 1 Packet  4 g Oral TID    labetaloL (NORMODYNE;TRANDATE) injection 20 mg  20 mg IntraVENous Q4H PRN    albumin human 25% (BUMINATE) solution 12.5 g  12.5 g IntraVENous DIALYSIS PRN    albuterol (PROVENTIL VENTOLIN) nebulizer solution 2.5 mg  2.5 mg Nebulization Q4H PRN    famotidine (PEPCID) 8 mg/mL oral suspension 20 mg  20 mg Per NG tube DAILY    alteplase (CATHFLO) 2 mg in sterile water (preservative free) 2 mL injection  2 mg InterCATHeter DIALYSIS PRN    alteplase (CATHFLO) 2 mg in sterile water (preservative free) 2 mL injection  2 mg InterCATHeter DIALYSIS PRN    insulin lispro (HUMALOG) injection   SubCUTAneous Q6H    white petrolatum-mineral oiL (AKWA TEARS) 83-15 % ophthalmic ointment   Both Eyes Q12H    bacitracin 500 unit/gram packet 1 Packet  1 Packet Topical PRN    sodium chloride (NS) flush 5-40 mL  5-40 mL IntraVENous Q8H    sodium chloride (NS) flush 5-40 mL  5-40 mL IntraVENous PRN    ondansetron (ZOFRAN) injection 4 mg  4 mg IntraVENous Q4H PRN    acetaminophen (TYLENOL) tablet 650 mg  650 mg Oral Q6H PRN    Or    acetaminophen (TYLENOL) suppository 650 mg  650 mg Rectal Q6H PRN    glucose chewable tablet 16 g  4 Tab Oral PRN    glucagon (GLUCAGEN) injection 1 mg  1 mg IntraMUSCular PRN    dextrose 10% infusion 0-250 mL  0-250 mL IntraVENous PRN      Allergies   Allergen Reactions    Augmentin [Amoxicillin-Pot Clavulanate] Rash and Itching       Objective:  Vitals:    Vitals:    05/04/20 2305 05/05/20 0309 05/05/20 0547 05/05/20 0729   BP: 141/84 151/87  136/86   Pulse: (!) 112 (!) 103  (!) 103   Resp: 19 20 20   Temp: 97.4 °F (36.3 °C) 98.3 °F (36.8 °C)  98.6 °F (37 °C)   TempSrc:       SpO2: 100% 100%  99%   Weight:   115.5 kg (254 lb 11.7 oz)    Height:         Intake and Output:  No intake/output data recorded.   05/03 1901 - 05/05 0700  In: 200   Out: 600 [Urine:600]    Physical Examination:     General: confused, Obese   Resp:  Reduced bibasilar breath sounds  CV:  RRR, no murmur   GI:  Obese , soft, NT   Neurologic:  Lethargic, confused   Access:           R IJ melissa     []    High complexity decision making was performed  []    Patient is at high-risk of decompensation with multiple organ involvement    Lab Data Personally Reviewed: I have reviewed all the pertinent labs, microbiology data and radiology studies during assessment.     Recent Labs     05/05/20 0317 05/04/20 0405 05/03/20 0311    136 134*   K 3.7 3.6 3.6    100 99   CO2 30 28 29   * 218* 233*   BUN 73* 58* 39*   CREA 3.00* 2.47* 1.72*   CA 9.6 9.4 8.7   MG 1.9 2.0 1.9   PHOS 3.0 2.7 2.0*   ALB 2.4* 2.4* 2.3*     Recent Labs     05/04/20 0405 05/03/20 0311   WBC 8.3 7.1   HGB 8.3* 7.8*   HCT 27.3* 25.0*    240     No results found for: SDES  Lab Results   Component Value Date/Time    Culture result: NO GROWTH 5 DAYS 04/09/2020 02:32 PM    Culture result: NO GROWTH 5 DAYS 04/08/2020 10:36 AM    Culture result: NO GROWTH 5 DAYS 03/31/2020 11:15 AM    Culture result: MODERATE STAPHYLOCOCCUS AUREUS (A) 03/31/2020 10:34 AM    Culture result: LIGHT NORMAL RESPIRATORY SOFIE 03/31/2020 10:34 AM     Recent Results (from the past 24 hour(s))   GLUCOSE, POC    Collection Time: 05/04/20 11:11 AM   Result Value Ref Range    Glucose (POC) 177 (H) 65 - 100 mg/dL    Performed by [facility]    GLUCOSE, POC    Collection Time: 05/04/20  5:29 PM   Result Value Ref Range    Glucose (POC) 149 (H) 65 - 100 mg/dL    Performed by [facility]    GLUCOSE, POC    Collection Time: 05/05/20 12:49 AM   Result Value Ref Range    Glucose (POC) 175 (H) 65 - 100 mg/dL    Performed by Tor Wes    RENAL FUNCTION PANEL    Collection Time: 05/05/20  3:17 AM   Result Value Ref Range    Sodium 137 136 - 145 mmol/L    Potassium 3.7 3.5 - 5.1 mmol/L    Chloride 101 97 - 108 mmol/L    CO2 30 21 - 32 mmol/L    Anion gap 6 5 - 15 mmol/L    Glucose 120 (H) 65 - 100 mg/dL    BUN 73 (H) 6 - 20 MG/DL    Creatinine 3.00 (H) 0.55 - 1.02 MG/DL    BUN/Creatinine ratio 24 (H) 12 - 20      GFR est AA 19 (L) >60 ml/min/1.73m2    GFR est non-AA 16 (L) >60 ml/min/1.73m2    Calcium 9.6 8.5 - 10.1 MG/DL    Phosphorus 3.0 2.6 - 4.7 MG/DL    Albumin 2.4 (L) 3.5 - 5.0 g/dL   MAGNESIUM    Collection Time: 05/05/20  3:17 AM   Result Value Ref Range    Magnesium 1.9 1.6 - 2.4 mg/dL   TSH 3RD GENERATION    Collection Time: 05/05/20  3:17 AM   Result Value Ref Range    TSH 1.36 0.36 - 3.74 uIU/mL   GLUCOSE, POC    Collection Time: 05/05/20  5:44 AM   Result Value Ref Range    Glucose (POC) 125 (H) 65 - 100 mg/dL    Performed by Kae Castro            Total time spent with patient:  xxx   min. Care Plan discussed with:  Patient     Family      RN      Consulting Physician [facility],         I have reviewed the flowsheets. Chart and Pertinent Notes have been reviewed. No change in PMH ,family and social history from Consult note.       Aaliyah Coley MD

## 2020-05-05 NOTE — PROGRESS NOTES
Problem: Mobility Impaired (Adult and Pediatric)  Goal: *Acute Goals and Plan of Care (Insert Text)  Description: FUNCTIONAL STATUS PRIOR TO ADMISSION: Patient was independent and active without use of DME but poor history due to not being able to communicate. HOME SUPPORT PRIOR TO ADMISSION: Pt lives with family. Physical Therapy Goals  Initiated 5/4/2020  1. Patient will move from supine to sit and sit to supine , scoot up and down and roll side to side in bed with maximal assistance within 7 day(s). 2.  Patient will sit EOB with moderate assistance of 2 people, x5 minutes with active core initiation to correct sitting balance within 7 days. 3.  Patient will verbalize 3 exercises she can complete on her own outside of therapy sessions with 7 days. 4.  Patient will participate in supine HEP (AAROM as needed) with min assist for AAROM within 7 days. 5.  Patient will tolerate bed in chair position 30 min BID within 7 days. Initiated 4/25/2020  1. Patient will move from supine to sit and sit to supine , scoot up and down and roll side to side in bed with moderate assistance once off vent within 7 day(s). 2.  Patient will participate in sitting with min A for 2 minutes once off vent within 7 day(s). 3.  Patient will perform active supine or sitting TE with min A within 7 day(s). Outcome: Progressing Towards Goal    PHYSICAL THERAPY TREATMENT  Patient: Ramón Aguilar (19 y.o. female)  Date: 5/5/2020  Diagnosis: COVID-19 virus infection [U07.1]   <principal problem not specified>       Precautions: Fall  Chart, physical therapy assessment, plan of care and goals were reviewed. ASSESSMENT  Patient continues with skilled PT services and is progressing towards goals. Pt is preparing for dialysis but was agreeable to supine exercises. Pt was more interactive during session but voice was at a whisper. Pt became fatigued with task requiring more prompting to initiate task.  Pt has had a prolonged hospital say causing decrease activity tolerance and strength. Noted pt's  desire for pt to go to inpt rehab, pt at this time would have a difficult time with meeting requirement of activity. Pt may be able to start with SNF and then transition to inpt rehab    Current Level of Function Impacting Discharge (mobility/balance): total A    Other factors to consider for discharge: prolonged hospital stay. PTA pt was independent          PLAN :  Patient continues to benefit from skilled intervention to address the above impairments. Continue treatment per established plan of care. to address goals. Recommendation for discharge: (in order for the patient to meet his/her long term goals)  To be determined: progressing to inpt rehab      This discharge recommendation:  Has not yet been discussed the attending provider and/or case management    IF patient discharges home will need the following DME: to be determined (TBD)       SUBJECTIVE:   Patient stated I am tired.     OBJECTIVE DATA SUMMARY:   Critical Behavior:  Neurologic State: Alert  Orientation Level: Other (Comment)(whispering)  Cognition: Follows commands  Safety/Judgement: Decreased awareness of environment, Decreased awareness of need for assistance, Decreased awareness of need for safety, Decreased insight into deficits  Functional Mobility Training:  total A            Therapeutic Exercises:   Supine  AAROM to ankle knee hip shoulder elbow and hands. Pt required v.c to stay on task. Pt fatiguing. Educated pt on moving/exercises through out the day. Pt reports not moving. Pain Rating:  none    Activity Tolerance:   Poor  Please refer to the flowsheet for vital signs taken during this treatment. After treatment patient left in no apparent distress:   Supine in bed, Heels elevated for pressure relief, and Call bell within reach    COMMUNICATION/COLLABORATION:   The patients plan of care was discussed with: Registered nurse.      Hola Baker GRANT Vázquez   Time Calculation: 25 mins

## 2020-05-05 NOTE — DIABETES MGMT
[facility]A [facility]  CLINICAL NURSE SPECIALIST CONSULT  PROGRAM FOR DIABETES HEALTH  Follow up Note  Presentation   Titus Maria is a 64 y.o. female admitted from OSH to [facility] ICU with SARS-COV2 needing CRRT. She is currently sedated and has been intubated since 3/28/20. Current clinical course has been complicated by steroid induced hyperglycemia. Steroids are discontinued at this time. She requires CRRT for acute renal failure r/t her sepsis and hypotension. PHM: GERD, obesity, and anxiety. New diabetes diagnosis with A1C 11.0% (3/28/2020); updated A1C 3/31/20-10.4%     Recent events:  Having HD today. TF infusing. Recent CXR revealed small right pleural effusion-    Consulted by Provider for advanced diabetes nursing assessment and care, specifically related to   [] Transitioning off Mittie Mayes   [x] Inpatient management strategy  [] Home management assessment  [] Survival skill education    Diabetes-related medical history  Acute complications  hyperglycemia  Neurological complications  NONE  Microvascular disease  NONE  Macrovascular disease  NONE  Other associated conditions     NONE    Diabetes medication history: NONE    Subjective   Ms. Cyn Chan is resting in bed; Receiving HD today. Objective     Vital Signs   Visit Vitals  /86 (BP 1 Location: Left arm, BP Patient Position: At rest)   Pulse (!) 103   Temp 98.6 °F (37 °C)   Resp 20   Ht 5' 4\" (1.626 m)   Wt 115.5 kg (254 lb 11.7 oz)   SpO2 99%   BMI 43.72 kg/m²   .    Laboratory  Lab Results   Component Value Date/Time    Hemoglobin A1c 10.4 (H) 03/31/2020 03:42 PM     No results found for: LDL, LDLC, DLDLP  Lab Results   Component Value Date/Time    Creatinine 3.00 (H) 05/05/2020 03:17 AM     Lab Results   Component Value Date/Time    Sodium 137 05/05/2020 03:17 AM    Potassium 3.7 05/05/2020 03:17 AM    Chloride 101 05/05/2020 03:17 AM    CO2 30 05/05/2020 03:17 AM    Anion gap 6 05/05/2020 03:17 AM    Glucose 120 (H) 05/05/2020 03:17 AM    BUN 73 (H) 05/05/2020 03:17 AM    Creatinine 3.00 (H) 05/05/2020 03:17 AM    BUN/Creatinine ratio 24 (H) 05/05/2020 03:17 AM    GFR est AA 19 (L) 05/05/2020 03:17 AM    GFR est non-AA 16 (L) 05/05/2020 03:17 AM    Calcium 9.6 05/05/2020 03:17 AM    Bilirubin, total 1.6 (H) 04/20/2020 05:29 AM    AST (SGOT) 169 (H) 04/20/2020 05:29 AM    Alk. phosphatase 490 (H) 04/20/2020 05:29 AM    Protein, total 5.9 (L) 04/20/2020 05:29 AM    Albumin 2.4 (L) 05/05/2020 03:17 AM    Globulin 3.5 04/20/2020 05:29 AM    A-G Ratio 0.7 (L) 04/20/2020 05:29 AM    ALT (SGPT) 206 (H) 04/20/2020 05:29 AM     Lab Results   Component Value Date/Time    ALT (SGPT) 206 (H) 04/20/2020 05:29 AM           Evaluation   Ms Vincent Butler, with new onset Type 2 diabetes,with A1C 10.4%. AM fasting BG 177mg/dl today. . TF Nepro 35cc/hr. BG trended down overnight to 125mg/dl this AM.  Basal insulin increased again today from 26units to  28 units. It is imperative that we maintain her BG within target range 100-180mg/dl. Recommendations   1. Continue basal insulin 28units daily ; if  BG trends >200mg/dl consistently continue to increase basal insulin by 2units every 2 to 3 days. * Earlier in her ICU stay (4/2-4/6) she was on 35units of basal daily with her TF and BG was within range 100-150s, without lows*      2. Will continue to follow.     Assessment and Plan   Nursing Diagnosis Risk for unstable blood glucose pattern   Nursing Intervention Domain 1157 Decision-making Support   Nursing Interventions Examined current inpatient diabetes control   Explored factors facilitating and impeding inpatient management  Identified self-management practices impeding diabetes control  Explored corrective strategies with patient and responsible inpatient provider   Informed patient of rational for insulin strategy while hospitalized         Billing Code(s)     [x] 77822  subsequent hospital care - [facility], CNS   Program for Diabetes Health  Access via KESHIA Roca [phone]

## 2020-05-05 NOTE — PROCEDURES
[facility]  [phone]    Vitals   Pre   Post   Assessment   Pre   Post     Temp  Temp: 98.9 °F (37.2 °C) (05/05/20 1100)  99.4 LOC  Alert Alert   HR   Pulse (Heart Rate): 97 (05/05/20 1100) 114 Lungs   Coarse/  Diminished  Coarse/  Diminished   B/P   BP: 142/84 (05/05/20 1100) 135/76 Cardiac   ST  ST   Resp   Resp Rate: 25 (05/05/20 1100) 18   Skin   Warm  Warm   Pain level  Pain Intensity 1: 0 (05/04/20 0939) 0/10 Edema  BLE pitting      BLE pitting    Orders:    Duration:   Start:   1100 End:   1400 Total:   3 hrs   Dialyzer:   Dialyzer/Set Up Inspection: Revaclear (05/05/20 1100)   K Bath:   Dialysate K (mEq/L): 3.5 (05/05/20 1100)   Ca Bath:   Dialysate CA (mEq/L): 2.5 (05/05/20 1100)   Na/Bicarb:   Dialysate NA (mEq/L): 140 (05/05/20 1100)   Target Fluid Removal:   Goal/Amount of Fluid to Remove (mL): 1500 mL (05/05/20 1100)   Access     Type & Location:   RIJ CVC: Dressing CDI. No s/s of infection. Both lumens aspirate & flush well. Running well at .     Labs     Obtained/Reviewed   Critical Results Called   Date when labs were drawn-  Hgb-    HGB   Date Value Ref Range Status   05/04/2020 8.3 (L) 11.5 - 16.0 g/dL Final     K-    Potassium   Date Value Ref Range Status   05/05/2020 3.7 3.5 - 5.1 mmol/L Final     Ca-   Calcium   Date Value Ref Range Status   05/05/2020 9.6 8.5 - 10.1 MG/DL Final     Bun-   BUN   Date Value Ref Range Status   05/05/2020 73 (H) 6 - 20 MG/DL Final     Creat-   Creatinine   Date Value Ref Range Status   05/05/2020 3.00 (H) 0.55 - 1.02 MG/DL Final        Medications/ Blood Products Given     Name   Dose   Route and Time     Heparin 1:1000 units 1.1  1.4  HD CVC 1400             Blood Volume Processed (BVP):    74 L Net Fluid   Removed:  2000 cc   Comments   Time Out Done: [facility]  Primary Nurse Rpt Pre: Roscoe Cohen RN  Primary Nurse Rpt Post: Roscoe Cohen RN  Pt Education: site care  Care Plan: on going  Tx Summary:  SBAR received from Primary RN. Pt alert. Consent signed & on file. 1100: Both lumens of Ramon/permcath disinfected with Prevantics per policy. Each lumen aspirated for blood return and flushed with Normal Saline per policy. VSS. Dialysis Tx initiated. 1400: Tx ended. VSS. All possible blood returned to patient. Central line catheter flushed with normal saline per policy. Ports disinfected with Prevantics per policy, lines disconnected, Heparin dwells instilled, and lines capped using aseptic technique. Bed locked and in the lowest position, call bell and belongings in reach. SBAR given to Primary, RN. Patient is stable at time of my departure. All Dialysis related medications have been reviewed. Admiting Diagnosis: CORVID [id number]  Pt's previous clinic- N/A  Consent signed - Informed Consent Verified: Yes (05/05/20 1100)  Ester Consent - on file  Hepatitis Status- Ag negative & Ab susceptible 4/29/20  Machine #- Machine Number: [id number] (05/05/20 1100)  Telemetry status- on monitor  Pre-dialysis wt. - Pre-Dialysis Weight: 121.1 kg (267 lb) (04/30/20 0915)

## 2020-05-05 NOTE — PROGRESS NOTES
SLP Contact Note    Pt very drowsy s/p dialysis. Therefore, will defer for now. Of note, pt noted to have water with straws as well as orange juice at bedside. Pt is only to have SPARING ice chips after oral care. No other PO. Given that pt is s/p COVID-19 (in recovery), prolonged intubation, and significant weakness/debility, pt is at both high risk for prandial aspiration and low risk to tolerate it given decreased respiratory status. SLP removed items from pt's bedside and discussed with RN.       Thank you,  VALERIA DaoEd, [id number] [facility]  Speech-Language Pathologist

## 2020-05-06 LAB
ALBUMIN SERPL-MCNC: 2.5 G/DL (ref 3.5–5)
ANION GAP SERPL CALC-SCNC: 6 MMOL/L (ref 5–15)
BUN SERPL-MCNC: 41 MG/DL (ref 6–20)
BUN/CREAT SERPL: 22 (ref 12–20)
CALCIUM SERPL-MCNC: 8.8 MG/DL (ref 8.5–10.1)
CHLORIDE SERPL-SCNC: 98 MMOL/L (ref 97–108)
CO2 SERPL-SCNC: 30 MMOL/L (ref 21–32)
CREAT SERPL-MCNC: 1.84 MG/DL (ref 0.55–1.02)
ERYTHROCYTE [DISTWIDTH] IN BLOOD BY AUTOMATED COUNT: 15.2 % (ref 11.5–14.5)
GLUCOSE BLD STRIP.AUTO-MCNC: 179 MG/DL (ref 65–100)
GLUCOSE BLD STRIP.AUTO-MCNC: 181 MG/DL (ref 65–100)
GLUCOSE BLD STRIP.AUTO-MCNC: 203 MG/DL (ref 65–100)
GLUCOSE BLD STRIP.AUTO-MCNC: 208 MG/DL (ref 65–100)
GLUCOSE BLD STRIP.AUTO-MCNC: 213 MG/DL (ref 65–100)
GLUCOSE SERPL-MCNC: 207 MG/DL (ref 65–100)
HCT VFR BLD AUTO: 27.8 % (ref 35–47)
HGB BLD-MCNC: 8.3 G/DL (ref 11.5–16)
MAGNESIUM SERPL-MCNC: 1.9 MG/DL (ref 1.6–2.4)
MCH RBC QN AUTO: 28.9 PG (ref 26–34)
MCHC RBC AUTO-ENTMCNC: 29.9 G/DL (ref 30–36.5)
MCV RBC AUTO: 96.9 FL (ref 80–99)
NRBC # BLD: 0 K/UL (ref 0–0.01)
NRBC BLD-RTO: 0 PER 100 WBC
PHOSPHATE SERPL-MCNC: 2.1 MG/DL (ref 2.6–4.7)
PLATELET # BLD AUTO: 269 K/UL (ref 150–400)
PMV BLD AUTO: 9.8 FL (ref 8.9–12.9)
POTASSIUM SERPL-SCNC: 3.6 MMOL/L (ref 3.5–5.1)
RBC # BLD AUTO: 2.87 M/UL (ref 3.8–5.2)
SERVICE CMNT-IMP: ABNORMAL
SODIUM SERPL-SCNC: 134 MMOL/L (ref 136–145)
WBC # BLD AUTO: 10.2 K/UL (ref 3.6–11)

## 2020-05-06 PROCEDURE — 74011250637 HC RX REV CODE- 250/637: Performed by: NURSE PRACTITIONER

## 2020-05-06 PROCEDURE — 97112 NEUROMUSCULAR REEDUCATION: CPT | Performed by: OCCUPATIONAL THERAPIST

## 2020-05-06 PROCEDURE — 74011250637 HC RX REV CODE- 250/637: Performed by: INTERNAL MEDICINE

## 2020-05-06 PROCEDURE — 97535 SELF CARE MNGMENT TRAINING: CPT | Performed by: OCCUPATIONAL THERAPIST

## 2020-05-06 PROCEDURE — 65660000000 HC RM CCU STEPDOWN

## 2020-05-06 PROCEDURE — 82962 GLUCOSE BLOOD TEST: CPT

## 2020-05-06 PROCEDURE — 74011250636 HC RX REV CODE- 250/636: Performed by: INTERNAL MEDICINE

## 2020-05-06 PROCEDURE — 85027 COMPLETE CBC AUTOMATED: CPT

## 2020-05-06 PROCEDURE — 83735 ASSAY OF MAGNESIUM: CPT

## 2020-05-06 PROCEDURE — 97530 THERAPEUTIC ACTIVITIES: CPT

## 2020-05-06 PROCEDURE — 97530 THERAPEUTIC ACTIVITIES: CPT | Performed by: OCCUPATIONAL THERAPIST

## 2020-05-06 PROCEDURE — 80069 RENAL FUNCTION PANEL: CPT

## 2020-05-06 PROCEDURE — 36415 COLL VENOUS BLD VENIPUNCTURE: CPT

## 2020-05-06 PROCEDURE — 74011636637 HC RX REV CODE- 636/637: Performed by: INTERNAL MEDICINE

## 2020-05-06 PROCEDURE — 74011000250 HC RX REV CODE- 250: Performed by: INTERNAL MEDICINE

## 2020-05-06 RX ADMIN — INSULIN LISPRO 2 UNITS: 100 INJECTION, SOLUTION INTRAVENOUS; SUBCUTANEOUS at 11:48

## 2020-05-06 RX ADMIN — ONDANSETRON 4 MG: 2 INJECTION INTRAMUSCULAR; INTRAVENOUS at 01:11

## 2020-05-06 RX ADMIN — SODIUM CHLORIDE 10 ML: 9 INJECTION INTRAMUSCULAR; INTRAVENOUS; SUBCUTANEOUS at 05:46

## 2020-05-06 RX ADMIN — SEVELAMER CARBONATE 0.8 G: 800 POWDER, FOR SUSPENSION ORAL at 16:29

## 2020-05-06 RX ADMIN — INSULIN GLARGINE 28 UNITS: 100 INJECTION, SOLUTION SUBCUTANEOUS at 22:41

## 2020-05-06 RX ADMIN — CASTOR OIL AND BALSAM, PERU: 788; 87 OINTMENT TOPICAL at 09:09

## 2020-05-06 RX ADMIN — GUAIFENESIN 100 MG: 200 SOLUTION ORAL at 22:40

## 2020-05-06 RX ADMIN — MINERAL OIL AND WHITE PETROLATUM: 150; 830 OINTMENT OPHTHALMIC at 11:48

## 2020-05-06 RX ADMIN — BUMETANIDE 2 MG: 1 TABLET ORAL at 09:11

## 2020-05-06 RX ADMIN — SODIUM CHLORIDE 10 ML: 9 INJECTION INTRAMUSCULAR; INTRAVENOUS; SUBCUTANEOUS at 16:30

## 2020-05-06 RX ADMIN — SEVELAMER CARBONATE 0.8 G: 800 POWDER, FOR SUSPENSION ORAL at 11:49

## 2020-05-06 RX ADMIN — GUAIFENESIN 100 MG: 200 SOLUTION ORAL at 09:11

## 2020-05-06 RX ADMIN — SEVELAMER CARBONATE 0.8 G: 800 POWDER, FOR SUSPENSION ORAL at 09:14

## 2020-05-06 RX ADMIN — SODIUM CHLORIDE 5 MG: 9 INJECTION INTRAMUSCULAR; INTRAVENOUS; SUBCUTANEOUS at 18:06

## 2020-05-06 RX ADMIN — INSULIN LISPRO 3 UNITS: 100 INJECTION, SOLUTION INTRAVENOUS; SUBCUTANEOUS at 18:04

## 2020-05-06 RX ADMIN — CASTOR OIL AND BALSAM, PERU: 788; 87 OINTMENT TOPICAL at 18:55

## 2020-05-06 RX ADMIN — METOPROLOL TARTRATE 25 MG: 50 TABLET, FILM COATED ORAL at 16:29

## 2020-05-06 RX ADMIN — INSULIN LISPRO 3 UNITS: 100 INJECTION, SOLUTION INTRAVENOUS; SUBCUTANEOUS at 05:45

## 2020-05-06 RX ADMIN — Medication 1 PACKET: at 09:11

## 2020-05-06 RX ADMIN — FAMOTIDINE 20 MG: 40 POWDER, FOR SUSPENSION ORAL at 09:15

## 2020-05-06 RX ADMIN — Medication 1 PACKET: at 16:29

## 2020-05-06 RX ADMIN — INSULIN LISPRO 2 UNITS: 100 INJECTION, SOLUTION INTRAVENOUS; SUBCUTANEOUS at 01:01

## 2020-05-06 RX ADMIN — METOPROLOL TARTRATE 25 MG: 50 TABLET, FILM COATED ORAL at 04:26

## 2020-05-06 NOTE — PROGRESS NOTES
[facility]   [address] [facility], [facility] [address], [facility]  Phone: [phone]   JOSÉ [phone]       Nephrology Progress Note  Billy Marin     1960     [id number]  Date of Admission : 3/31/2020  05/06/20    CC: Follow up for JUANCHO      Assessment and Plan   JUANCHO :  - 2/2 ATN  - no signs of recovery  Yet  - cont HD TTS while here  - will need permacath placed - will try tomorrow  - CM to work on  HD unit    COVID-19 +ve   Acute Hypoxic Resp Failure   - On Vent   - completed Plaquenil, - s/p Tocilizumab   - extubated 4/29  - repeat SARS-CoV-2 neg 4/20 and 4/27     Anemia in CKD:  - cont KOKI    RP hematoma:  - stable    Cardiac arrest 4/9    Type II DM   - Insulin per primary team      Morbid Obesity        Interval History:  Seen and examined. More awake, NGT in place. Dialyzed yesterday, 2 L UF. Review of Systems: Review of systems not obtained due to patient factors.     Current Medications:   Current Facility-Administered Medications   Medication Dose Route Frequency    metoprolol (LOPRESSOR) 5 mg/mL oral suspension 25 mg  25 mg Per NG tube Q12H    insulin glargine (LANTUS) injection 28 Units  28 Units SubCUTAneous QHS    balsam peru-castor oiL (VENELEX) ointment   Topical BID    bumetanide (BUMEX) tablet 2 mg  2 mg Oral DAILY    guaiFENesin (ROBITUSSIN) 100 mg/5 mL oral liquid 100 mg  100 mg Oral Q12H    epoetin jovani-epbx (RETACRIT) injection 20,000 Units  20,000 Units SubCUTAneous Q TUE, THU & SAT    HYDROcodone-acetaminophen (NORCO) 5-325 mg per tablet 1 Tab  1 Tab Oral Q6H PRN    phenol throat spray (CHLORASEPTIC) 1 Spray  1 Spray Oral Q6H PRN    sevelamer carbonate (RENVELA) oral powder 0.8 g  0.8 g Oral TID WITH MEALS    heparin (porcine) 1,000 unit/mL injection 2,500 Units  2,500 Units IntraVENous DIALYSIS PRN    guar gum (BENEFIBER) packet 1 Packet  4 g Oral TID    labetaloL (NORMODYNE;TRANDATE) injection 20 mg  20 mg IntraVENous Q4H PRN    albumin human 25% (BUMINATE) solution 12.5 g  12.5 g IntraVENous DIALYSIS PRN    albuterol (PROVENTIL VENTOLIN) nebulizer solution 2.5 mg  2.5 mg Nebulization Q4H PRN    famotidine (PEPCID) 8 mg/mL oral suspension 20 mg  20 mg Per NG tube DAILY    alteplase (CATHFLO) 2 mg in sterile water (preservative free) 2 mL injection  2 mg InterCATHeter DIALYSIS PRN    alteplase (CATHFLO) 2 mg in sterile water (preservative free) 2 mL injection  2 mg InterCATHeter DIALYSIS PRN    insulin lispro (HUMALOG) injection   SubCUTAneous Q6H    white petrolatum-mineral oiL (AKWA TEARS) 83-15 % ophthalmic ointment   Both Eyes Q12H    bacitracin 500 unit/gram packet 1 Packet  1 Packet Topical PRN    sodium chloride (NS) flush 5-40 mL  5-40 mL IntraVENous Q8H    sodium chloride (NS) flush 5-40 mL  5-40 mL IntraVENous PRN    ondansetron (ZOFRAN) injection 4 mg  4 mg IntraVENous Q4H PRN    acetaminophen (TYLENOL) tablet 650 mg  650 mg Oral Q6H PRN    Or    acetaminophen (TYLENOL) suppository 650 mg  650 mg Rectal Q6H PRN    glucose chewable tablet 16 g  4 Tab Oral PRN    glucagon (GLUCAGEN) injection 1 mg  1 mg IntraMUSCular PRN    dextrose 10% infusion 0-250 mL  0-250 mL IntraVENous PRN      Allergies   Allergen Reactions    Augmentin [Amoxicillin-Pot Clavulanate] Rash and Itching       Objective:  Vitals:    Vitals:    05/06/20 0025 05/06/20 0322 05/06/20 0905 05/06/20 1123   BP: 140/71 135/71 106/66    Pulse: (!) 105 (!) 110 (!) 101 (!) 110   Resp: 18 22 20 29   Temp: 98 °F (36.7 °C) 98 °F (36.7 °C) 98.3 °F (36.8 °C) 97.9 °F (36.6 °C)   TempSrc:       SpO2: 94% 98% 98% 99%   Weight:  115 kg (253 lb 8.5 oz)     Height:         Intake and Output:  No intake/output data recorded.   05/04 1901 - 05/06 0700  In: 728 [I.V.:150]  Out: 2550 [Urine:550]    Physical Examination:     General: confused, Obese   Resp:  Reduced bibasilar breath sounds  CV:  RRR, no murmur   GI:  Obese , soft, NT   Neurologic:  Lethargic, confused   Access: KESHIA torres     []    High complexity decision making was performed  []    Patient is at high-risk of decompensation with multiple organ involvement    Lab Data Personally Reviewed: I have reviewed all the pertinent labs, microbiology data and radiology studies during assessment.     Recent Labs     05/06/20 0051 05/05/20 0317 05/04/20 0405   * 137 136   K 3.6 3.7 3.6   CL 98 101 100   CO2 30 30 28   * 120* 218*   BUN 41* 73* 58*   CREA 1.84* 3.00* 2.47*   CA 8.8 9.6 9.4   MG 1.9 1.9 2.0   PHOS 2.1* 3.0 2.7   ALB 2.5* 2.4* 2.4*     Recent Labs     05/06/20 0051 05/04/20 0405   WBC 10.2 8.3   HGB 8.3* 8.3*   HCT 27.8* 27.3*    282     No results found for: SDES  Lab Results   Component Value Date/Time    Culture result: NO GROWTH 5 DAYS 04/09/2020 02:32 PM    Culture result: NO GROWTH 5 DAYS 04/08/2020 10:36 AM    Culture result: NO GROWTH 5 DAYS 03/31/2020 11:15 AM    Culture result: MODERATE STAPHYLOCOCCUS AUREUS (A) 03/31/2020 10:34 AM    Culture result: LIGHT NORMAL RESPIRATORY SOFIE 03/31/2020 10:34 AM     Recent Results (from the past 24 hour(s))   GLUCOSE, POC    Collection Time: 05/05/20 11:35 AM   Result Value Ref Range    Glucose (POC) 138 (H) 65 - 100 mg/dL    Performed by Erick LINDA (CON)    GLUCOSE, POC    Collection Time: 05/05/20  4:23 PM   Result Value Ref Range    Glucose (POC) 158 (H) 65 - 100 mg/dL    Performed by Erick LINDA (CON)    RENAL FUNCTION PANEL    Collection Time: 05/06/20 12:51 AM   Result Value Ref Range    Sodium 134 (L) 136 - 145 mmol/L    Potassium 3.6 3.5 - 5.1 mmol/L    Chloride 98 97 - 108 mmol/L    CO2 30 21 - 32 mmol/L    Anion gap 6 5 - 15 mmol/L    Glucose 207 (H) 65 - 100 mg/dL    BUN 41 (H) 6 - 20 MG/DL    Creatinine 1.84 (H) 0.55 - 1.02 MG/DL    BUN/Creatinine ratio 22 (H) 12 - 20      GFR est AA 34 (L) >60 ml/min/1.73m2    GFR est non-AA 28 (L) >60 ml/min/1.73m2    Calcium 8.8 8.5 - 10.1 MG/DL    Phosphorus 2.1 (L) 2.6 - 4.7 MG/DL Albumin 2.5 (L) 3.5 - 5.0 g/dL   MAGNESIUM    Collection Time: 05/06/20 12:51 AM   Result Value Ref Range    Magnesium 1.9 1.6 - 2.4 mg/dL   CBC W/O DIFF    Collection Time: 05/06/20 12:51 AM   Result Value Ref Range    WBC 10.2 3.6 - 11.0 K/uL    RBC 2.87 (L) 3.80 - 5.20 M/uL    HGB 8.3 (L) 11.5 - 16.0 g/dL    HCT 27.8 (L) 35.0 - 47.0 %    MCV 96.9 80.0 - 99.0 FL    MCH 28.9 26.0 - 34.0 PG    MCHC 29.9 (L) 30.0 - 36.5 g/dL    RDW 15.2 (H) 11.5 - 14.5 %    PLATELET 528 787 - 672 K/uL    MPV 9.8 8.9 - 12.9 FL    NRBC 0.0 0  WBC    ABSOLUTE NRBC 0.00 0.00 - 0.01 K/uL   GLUCOSE, POC    Collection Time: 05/06/20 12:52 AM   Result Value Ref Range    Glucose (POC) 203 (H) 65 - 100 mg/dL    Performed by Frederick Wallace, POC    Collection Time: 05/06/20  5:42 AM   Result Value Ref Range    Glucose (POC) 213 (H) 65 - 100 mg/dL    Performed by Daniela Holley    GLUCOSE, POC    Collection Time: 05/06/20 11:24 AM   Result Value Ref Range    Glucose (POC) 181 (H) 65 - 100 mg/dL    Performed by Adolfo LINDA (CON)            Total time spent with patient:  xxx   min. Care Plan discussed with:  Patient     Family      RN      Consulting Physician [facility],         I have reviewed the flowsheets. Chart and Pertinent Notes have been reviewed. No change in PMH ,family and social history from Consult note.       Arnaud Dias MD

## 2020-05-06 NOTE — DIABETES MGMT
MARTA ROMERO  CLINICAL NURSE SPECIALIST CONSULT  PROGRAM FOR DIABETES HEALTH  Follow up Note  Presentation   Tramaine Daugherty is a 64 y.o. female admitted from OSH to [facility] ICU with SARS-COV2 needing CRRT. She is currently sedated and has been intubated since 3/28/20. Current clinical course has been complicated by steroid induced hyperglycemia. Steroids are discontinued at this time. She requires CRRT for acute renal failure r/t her sepsis and hypotension. PHM: GERD, obesity, and anxiety. New diabetes diagnosis with A1C 11.0% (3/28/2020); updated A1C 3/31/20-10.4%     Recent events:  TF switched to intermittent boluses throughout the day. Will make insulin adjustments. Consulted by Provider for advanced diabetes nursing assessment and care, specifically related to   [] Transitioning off [employer]   [x] Inpatient management strategy  [] Home management assessment  [] Survival skill education    Diabetes-related medical history  Acute complications  hyperglycemia  Neurological complications  NONE  Microvascular disease  NONE  Macrovascular disease  NONE  Other associated conditions     NONE    Diabetes medication history: NONE    Subjective   Ms. Nupur Alva is resting in bed; Receiving HD today. Objective     Vital Signs   Visit Vitals  /66 (BP 1 Location: Left arm)   Pulse (!) 101   Temp 98.3 °F (36.8 °C)   Resp 20   Ht 5' 4\" (1.626 m)   Wt 115 kg (253 lb 8.5 oz)   SpO2 98%   BMI 43.52 kg/m²   .    Laboratory  Lab Results   Component Value Date/Time    Hemoglobin A1c 10.4 (H) 03/31/2020 03:42 PM     No results found for: LDL, LDLC, DLDLP  Lab Results   Component Value Date/Time    Creatinine 1.84 (H) 05/06/2020 12:51 AM     Lab Results   Component Value Date/Time    Sodium 134 (L) 05/06/2020 12:51 AM    Potassium 3.6 05/06/2020 12:51 AM    Chloride 98 05/06/2020 12:51 AM    CO2 30 05/06/2020 12:51 AM    Anion gap 6 05/06/2020 12:51 AM    Glucose 207 (H) 05/06/2020 12:51 AM    BUN 41 (H) 05/06/2020 12:51 AM    Creatinine 1.84 (H) 05/06/2020 12:51 AM    BUN/Creatinine ratio 22 (H) 05/06/2020 12:51 AM    GFR est AA 34 (L) 05/06/2020 12:51 AM    GFR est non-AA 28 (L) 05/06/2020 12:51 AM    Calcium 8.8 05/06/2020 12:51 AM    Bilirubin, total 1.6 (H) 04/20/2020 05:29 AM    AST (SGOT) 169 (H) 04/20/2020 05:29 AM    Alk. phosphatase 490 (H) 04/20/2020 05:29 AM    Protein, total 5.9 (L) 04/20/2020 05:29 AM    Albumin 2.5 (L) 05/06/2020 12:51 AM    Globulin 3.5 04/20/2020 05:29 AM    A-G Ratio 0.7 (L) 04/20/2020 05:29 AM    ALT (SGPT) 206 (H) 04/20/2020 05:29 AM     Lab Results   Component Value Date/Time    ALT (SGPT) 206 (H) 04/20/2020 05:29 AM           Evaluation   Ms Yelitza Funes, with new onset Type 2 diabetes,with A1C 10.4%. AM fasting BG 213mg/dl today. . RICARDO Alcantara switched to bolus feedings. It will be necessary to cover her with insulin for each feeding as well as cover her with daily basal insulin for her metabolic needs. .      It is imperative that we maintain her BG within target range 100-180mg/dl. Recommendations   1.  4 units of Humalog to be administered with each bolus feeding. 20 units of Lantus daily (start tonight). 2. Will continue to follow.     Assessment and Plan   Nursing Diagnosis Risk for unstable blood glucose pattern   Nursing Intervention Domain 4693 Decision-making Support   Nursing Interventions Examined current inpatient diabetes control   Explored factors facilitating and impeding inpatient management  Identified self-management practices impeding diabetes control  Explored corrective strategies with patient and responsible inpatient provider   Informed patient of rational for insulin strategy while hospitalized         Billing Code(s)     [x] 93167 IP [facility]   Program for Diabetes Health  Access via [facility] [phone]

## 2020-05-06 NOTE — PROGRESS NOTES
Problem: Self Care Deficits Care Plan (Adult)  Goal: *Acute Goals and Plan of Care (Insert Text)  Description:   FUNCTIONAL STATUS PRIOR TO ADMISSION: Patient unable to provide history. Per chart, patient was fully independent PTA. HOME SUPPORT: Per chart, patient lived with family. Occupational Therapy Goals  Initiated 4/30/2020  1. Patient will perform grooming with maximal assistance within 7 day(s). 2.  Patient will perform self-feeding if appropriate for PO with maximal assistance within 7 day(s). 3.  Patient will perform bathing with maximal assistance within 7 day(s). 4.  Patient will participate in upper extremity therapeutic exercise/activities with moderate assistance  for 10 minutes within 7 day(s). 5.  Patient will follow 100% simple commands in preparation for functional tasks within 7 days. Outcome: Progressing Towards Goal     OCCUPATIONAL THERAPY TREATMENT  Patient: Billy Marin (6 y.o. female)  Date: 5/6/2020  Diagnosis: COVID-19 virus infection [U07.1]   <principal problem not specified>       Precautions: Fall  Chart, occupational therapy assessment, plan of care, and goals were reviewed. ASSESSMENT  Patient continues with skilled OT services and is progressing towards goals. She is demonstrating slow improvements in activity tolerance and UE and LE strength. Pt incontinent of bowel up on arrival and tolerated > 20 minutes rolling side to side in bed and assisting with sidelying position. She demonstrated 3-/5 hand strength and 2-/5 elbow  flexion and 3-/5 shoulder shrugs. Pt has been hospitalized since 3/31/20 with Covid-19. Pt now Covid negative. Recommend rehab at discharge and pt is working towards tolerating 3 hours of intense therapy a day.     Current Level of Function Impacting Discharge (ADLs): total A    Other factors to consider for discharge: pt will require significant cognitive and physical assist at discharge         PLAN :  Patient continues to benefit from skilled intervention to address the above impairments. Continue treatment per established plan of care. to address goals. Recommend with staff: chair position in bed at least 3 x day. Assist with ROM BUEs and LEs 3x day. Recommend next OT session: face washing with hand over hand A,  AAROM BUEs, possible EOB sitting with PT    Recommendation for discharge: (in order for the patient to meet his/her long term goals)  Therapy 3 hours per day 5-7 days per week    This discharge recommendation:  Has been made in collaboration with the attending provider and/or case management    IF patient discharges home will need the following DME: TBD       SUBJECTIVE:   Patient stated I want my mom.     OBJECTIVE DATA SUMMARY:   Cognitive/Behavioral Status:  Neurologic State: Alert  Orientation Level: Oriented to person;Oriented to place;Oriented to situation;Disoriented to time  Cognition: Decreased attention/concentration; Follows commands  Perception: Cues to maintain midline in sitting; Tactile;Verbal;Visual(seated in chair position in bed)  Perseveration: No perseveration noted  Safety/Judgement: Awareness of environment;Decreased awareness of need for assistance;Decreased awareness of need for safety;Decreased insight into deficits; Fall prevention    Functional Mobility and Transfers for ADLs:  Bed Mobility:  Rolling: Total assistance; Additional time;Assist x2  Scooting: Total assistance; Additional time;Assist x2    Balance:  Sitting: Impaired; With support(chair position in bed)  Sitting - Static: Poor (constant support)  Sitting - Dynamic: Poor (constant support)    ADL Intervention:  Grooming  Grooming Assistance: Total assistance(dependent)  Position Performed: Long sitting on bed  Washing Hands: Total assistance (dependent)(after toileting)  Cues: Physical assistance; Tactile cues provided;Verbal cues provided;Visual cues provided    Lower Body Dressing Assistance  Socks:  Total assistance (dependent)  Position Performed: Long sitting on bed  Cues: Don;Tactile cues provided;Physical assistance;Verbal cues provided;Visual cues provided    Toileting  Toileting Assistance: Total assistance(dependent)(pt incontinent of bowel)  Bladder Hygiene: Total assistance (dependent)  Bowel Hygiene: Total assistance (dependent)  Clothing Management: Total assistance (dependent)  Cues: Physical assistance for pants down;Physical assistance for pants up; Tactile cues provided;Verbal cues provided;Visual cues provided    Cognitive Retraining  Safety/Judgement: Awareness of environment;Decreased awareness of need for assistance;Decreased awareness of need for safety;Decreased insight into deficits; Fall prevention    Neuro Re-Education:   Pt participated in [employer] [address] exer with focus on initiating muscle activity and sustaining for at least 5 sec. All joints and planes performed with shoulder flexion and triceps extension most difficult. Pain:  C/o vaginal and buttocks pain with toileting    Activity Tolerance:   Fair, desaturates with exertion and requires oxygen, requires frequent rest breaks, and observed SOB with activity  Please refer to the flowsheet for vital signs taken during this treatment. After treatment patient left in no apparent distress:   Supine in bed and Call bell within reach    COMMUNICATION/COLLABORATION:   The patients plan of care was discussed with: Physical therapy assistant and Registered nurse.      Marc Hobson OT  Time Calculation: 39 mins

## 2020-05-06 NOTE — PROGRESS NOTES
Problem: Falls - Risk of  Goal: *Absence of Falls  Description: Document Danney Mess Fall Risk and appropriate interventions in the flowsheet. Outcome: Progressing Towards Goal  Note: Fall Risk Interventions:  Mobility Interventions: Patient to call before getting OOB, Communicate number of staff needed for ambulation/transfer    Mentation Interventions: Adequate sleep, hydration, pain control    Medication Interventions: Patient to call before getting OOB, Evaluate medications/consider consulting pharmacy    Elimination Interventions: Call light in reach, Patient to call for help with toileting needs    History of Falls Interventions: Consult care management for discharge planning, Room close to nurse's station         Problem: Nutrition Deficit  Goal: *Optimize nutritional status  Outcome: Progressing Towards Goal       Problem: Pressure Injury - Risk of  Goal: *Prevention of pressure injury  Description: Document Aniket Scale and appropriate interventions in the flowsheet.   Outcome: Not Progressing Towards Goal  Note: Pressure Injury Interventions:  Sensory Interventions: Float heels, Assess changes in LOC    Moisture Interventions: Absorbent underpads, Minimize layers    Activity Interventions: Pressure redistribution bed/mattress(bed type)    Mobility Interventions: PT/OT evaluation    Nutrition Interventions: Document food/fluid/supplement intake    Friction and Shear Interventions: Lift sheet, Minimize layers, Apply protective barrier, creams and emollients                Problem: Patient Education: Go to Patient Education Activity  Goal: Patient/Family Education  Outcome: Progressing Towards Goal

## 2020-05-06 NOTE — PROGRESS NOTES
13:30  Pt vomiting. HOB elevated. Residual checked - 20 ml. Placement verified. Perfect-served MD to inquire if RN should proceed with next scheduled bolus feeding. 13:40  Per Md, ok to proceed with next bolus feeding (14:30)    15:30  Pt vomited large amount emesis. Notified Md. Per MD tube feeds on hold for now. 16:30 Pt vomited up recently administered meds. Residual checked - 20 ml. Notified MD.  Nausea med administered. Passed

## 2020-05-06 NOTE — PROGRESS NOTES
IFTIKHAR PLAN:     Patient's  agreed to IPR, he chose [facility] as his first choice and [facility] as second. Referrals were sent to both facilities. CM spoke with Ana Rosa Webb in Admission at [facility] ([phone]) regarding referral and also informed her that they the most preferred as patient lives in [place]. [facility] will also provide HD in the facility if patient still needs HD at discharge. Patient may need to have outpatient dialysis set up prior to discharge to [facility] if necessary.     Ajay Zaragoza MSA, RN,CRM

## 2020-05-06 NOTE — PROGRESS NOTES
PULMONARY MEDICINE    Progress Note    Name: Cody Dean   : 1960   MRN: [id number]   Date: 2020      Subjective:     5/6   Not verbalizing much. Noted chest film from yesterday. 5/5  Less confused. No WOB    5/4  Some bouts of confusion  No WOB    Consult Note:   Requesting Physician: Dr. Enrico Knowles  Reason for consult: Extubated COVID patient    Medical records and data reviewed. Patient is a 64 y.o. female who was admitted on 3/31 with severe COVID 19 pneumonia and multisystem organ failure and required invasive mechanical ventilation, CRRT and pressors. She recovered slowly, was extubated and transferred to telemetry for ongoing care. She has generalized weakness. She was noted to be lethargic this morning with labored breathing and was placed on bipap. ABG that was done sis not show resp acidosis, oxygenation was preserved. CXR does not show new or worsening infiltrates. She appears to be at risk for underlying MIKE which is not known from before. She is starting to work with PT/OT. COVID 19 test was negative on      Review of Systems:     A comprehensive 12 system review of systems was not obtained from the patient    Assessment:   S/P multisystem organ failure with COVID 19 pneumonia  Encephalopathy- slowly improving, ABG did not show resp acidosis  Generalized weakness, likely with a component of critical illness neuropathy/myopathy without respiratory muscle weakness, episodic increased work of breathing  Metabolic alkalosis on diuretic therapy  Acute PE, not anticoagulated secondary to retroperitoneal bleed  Other medical problems per chart      Recommendations:     NIPPV PRN   Wean O2 as tolerated  PT/OT  renal involved  SLP  Noted 5/4 dopplers with partially occluded superficial thrombus, no obvious DVT, but it sounds like a limited exam. We may need to consider an IVC filter will discuss with hospitalist   Other management per other teams  Chest x-ray noted chest film. Aspiration? Active Problem List:     Problem List  Date Reviewed: 2020          Codes Class    Hypotension ICD-10-CM: I95.9  ICD-9-CM: 458.9 Acute        Retroperitoneal hemorrhage ICD-10-CM: R58  ICD-9-CM: 459.0         Acute renal failure (ARF) ([place] [address].) ICD-10-CM: N17.9  ICD-9-CM: 584.9         Encephalopathy ICD-10-CM: G93.40  ICD-9-CM: 348.30         Sepsis with multi-organ dysfunction ([place] [address].) ICD-10-CM: A41.9, R65.20  ICD-9-CM: 038.9, 995.92         Pneumonia due to methicillin susceptible Staphylococcus aureus (MSSA) ([place] [address].) ICD-10-CM: J15.211  ICD-9-CM: 482.41         Thrombocytopenia (HCC) ICD-10-CM: D69.6  ICD-9-CM: 287.5         Diarrhea ICD-10-CM: R19.7  ICD-9-CM: 787.91         COVID-19 virus infection ICD-10-CM: U07.1         Obesity, morbid ([place] [address].) ICD-10-CM: E66.01  ICD-9-CM: 278.01         Vaginal Pap smear following hysterectomy for malignancy ICD-10-CM: Z08, Z90.710  ICD-9-CM: V67.01         Personal history of malignant neoplasm of other parts of uterus ICD-10-CM: Z85.42  ICD-9-CM: V10.42         Endometrial cancer ([place] [address].) ICD-10-CM: C54.1  ICD-9-CM: 182.0               Past Medical History:      has a past medical history of Basal cell carcinoma, GERD (gastroesophageal reflux disease), Kidney stones, and Polyp of ureter. Past Surgical History:      has a past surgical history that includes hysteroscopy diagnostic (); pr endometrial ablation, thermal; hx  section (); hx colonoscopy; insert arterial line (2020); and ir insert non tunl cvc over 5 yrs (2020). Home Medications:     Prior to Admission medications    Medication Sig Start Date End Date Taking? Authorizing Provider   pantoprazole (PROTONIX) 40 mg tablet TAKE 1 TABLET BY MOUTH TWICE A DAY 18   Provider, Historical   esomeprazole (NEXIUM) 20 mg capsule Take 20 mg by mouth daily. Indications: BID    Provider, Historical   zolpidem (AMBIEN) 10 mg tablet Take 0.5 Tabs by mouth nightly as needed for Sleep. Max Daily Amount: 5 mg. 17   Roland Mandel MD   fluticasone (FLONASE) 50 mcg/actuation nasal spray Mist 1-2 spray(s) into each nostril once daily. 4/3/15   Provider, Historical   ranitidine (ZANTAC) 150 mg tablet 150 mg.    Provider, Historical       Allergies/Social/Family History: Allergies   Allergen Reactions    Augmentin [Amoxicillin-Pot Clavulanate] Rash and Itching      Social History     Tobacco Use    Smoking status: Never Smoker    Smokeless tobacco: Never Used   Substance Use Topics    Alcohol use: Not on file      Family History   Problem Relation Age of Onset    Prostate Cancer Father         PROSTATE    Breast Cancer Sister 46        invasive poorly differentiated ductal carcinoma, Neg genetic testing.  Cancer Sister 62        melanoma stage 1            Objective:   Vital Signs:  Visit Vitals  /66 (BP 1 Location: Left arm)   Pulse (!) 110   Temp 97.9 °F (36.6 °C)   Resp 29   Ht 5' 4\" (1.626 m)   Wt 115 kg (253 lb 8.5 oz)   SpO2 99%   BMI 43.52 kg/m²    O2 Flow Rate (L/min): 2 l/min O2 Device: Room air Temp (24hrs), Av.3 °F (36.8 °C), Min:97.9 °F (36.6 °C), Max:99.4 °F (37.4 °C)           Intake/Output:     Intake/Output Summary (Last 24 hours) at 2020 1136  Last data filed at 2020 2132  Gross per 24 hour   Intake 438 ml   Output 2150 ml   Net -1712 ml       Physical Exam: Detailed exam deferred in view of ongoing pandemic.  Physical exam as documented by attending physician was reviewed and discussed   General:  Awake, weak   Head:     Eyes:     Neck:    Lungs:      Chest wall:     Heart:     Abdomen:      Extremities:    Pulses:    Skin:    Neurologic:          LABS AND  DATA: Personally reviewed  Recent Labs     20  0405   WBC 10.2 8.3   HGB 8.3* 8.3*   HCT 27.8* 27.3*    282     Recent Labs     20  0317   * 137   K 3.6 3.7   CL 98 101   CO2 30 30   BUN 41* 73*   CREA 1.84* 3.00*   * 120*   CA 8.8 9.6   MG 1.9 1.9   PHOS 2.1* 3.0     Recent Labs     05/06/20  0051 05/05/20  0317   ALB 2.5* 2.4*     No results for input(s): INR, PTP, APTT, INREXT, INREXT in the last 72 hours. No results for input(s): PHI, PCO2I, PO2I, FIO2I in the last 72 hours. No results for input(s): CPK, CKMB, TROIQ, BNPP in the last 72 hours. MEDS: Reviewed    Chest Imaging: personally reviewed and report checked    Tele- reviewed    Medical decision making:   I have reviewed the flowsheet and previous day's notes  Patient has acute or chronic illness that poses a threat to life or bodily function  Review and order of Clinical lab tests  Review and Order of Radiology tests  Independent visualization of Image          Thank you for allowing me to participate in this patient's care.     Allyn Mujica PA-C      [facility]

## 2020-05-06 NOTE — CONSULTS
[facility]    Name:  Louie Cox  MR#:  [id number]  :  1960  ACCOUNT #:  [de-identified]  DATE OF SERVICE:  2020    REASON FOR CONSULTATION:  Hoarseness. HISTORY OF PRESENT ILLNESS:  The patient is a 61-year-old female, who was transferred to [place] [address] on  with severe COVID, with worsening hypoxic respiratory failure and renal failure. She has had a prolonged course, was intubated in the ICU, and was eventually extubated last week and, since that time, has had hoarseness and a weak voice. There has been no stridor. She has had no drooling. She has a feeding tube in place and has had some dysphagia, was seen by Speech yesterday. PAST MEDICAL HISTORY:  Significant for,  1. Basal cell carcinoma. 2.  Reflux disease. 3.  Kidney stones. 4.  Uterine polyps. MEDICATIONS AT HOME:  Include,  1. Protonix. 2.  Nexium. 3.  Ambien. 4.  Flonase as needed. 5.  Zantac     PAST SURGICAL HISTORY:  Involves,  1. Hysteroscopy with endometrial ablation. 2.  . 3.  Colonoscopy in the past.    PHYSICAL EXAMINATION:  GENERAL:  Examination today demonstrates an obese female who is in bed. She appears very weak. She does respond to verbal stimuli. Her voice is very weak; however, she has an intermittent cough. HEENT:  The oral cavity demonstrates dry mucosa. There is no intraoral mucosal lesion. Tongue moves well. The ears are unremarkable. The nasal cavity demonstrates a nasogastric feeding tube in place on the left side. The right side was decongested and topically anesthetized with Afrin and 1% Xylocaine. The flexible fiberoptic nasopharyngoscope was passed. The nasal cavity and nasal passages were normal.  Feeding tube could be seen entering the esophagus. The larynx was well visualized.   She had some laryngeal edema posteriorly of the arytenoids, but this is most likely due to her recent intubation and the feeding tube in place and her history of reflux. There are no laryngeal lesions noted. There is no mucosal granulation noted. She does have paralysis of the left vocal cord. The right cord moves normally. The airway is widely patent. There are no subglottic lesions seen. Epiglottis is normal.  Tongue base is normal.  NECK:  Palpation of the neck demonstrates a central line in place on the right. There are no neck masses. The larynx and trachea are midline. There is no crepitus and no tenderness. IMPRESSION:  Left vocal cord paralysis. Certainly, this may be due to her intubation or it could be a postviral inflammatory process, it is hard to say. Certainly, Speech will need to evaluate her for swallowing and give her appropriate dietary instructions as she progresses. Spontaneous vocal cord paralysis will often resolve spontaneously over time with observation. We should re-examine her larynx in the next 2 to 3 months to assess for resolution. If the problem does not resolve and her voice is unacceptable, certainly there are voice augmentation options that will be open to her once she recovers but, at this point, she is very weak and as she regains her strength, her voice will hopefully  improve. Often, there will be excellent compensation  from the right side as people regain strength. For now, observation is all I recommend.       Selena Hi MD      [email or web address]  D:  05/06/2020 16:36  T:  05/06/2020 19:32  JOB #:  [id number]  CC:  Howie Pineda MD

## 2020-05-06 NOTE — PROGRESS NOTES
Problem: Falls - Risk of  Goal: *Absence of Falls  Description: Document Jen Blood Fall Risk and appropriate interventions in the flowsheet. Outcome: Progressing Towards Goal  Note: Fall Risk Interventions:  Mobility Interventions: Patient to call before getting OOB    Mentation Interventions: Adequate sleep, hydration, pain control    Medication Interventions: Patient to call before getting OOB    Elimination Interventions: Call light in reach    History of Falls Interventions: Consult care management for discharge planning, Room close to nurse's station         Problem: Patient Education: Go to Patient Education Activity  Goal: Patient/Family Education  Outcome: Progressing Towards Goal     Problem: Pressure Injury - Risk of  Goal: *Prevention of pressure injury  Description: Document Aniket Scale and appropriate interventions in the flowsheet. Outcome: Progressing Towards Goal  Note: Pressure Injury Interventions:  Sensory Interventions: Float heels, Maintain/enhance activity level    Moisture Interventions: Minimize layers    Activity Interventions: Pressure redistribution bed/mattress(bed type)    Mobility Interventions: PT/OT evaluation    Nutrition Interventions: Document food/fluid/supplement intake    Friction and Shear Interventions: Lift sheet, Minimize layers                Problem: Patient Education: Go to Patient Education Activity  Goal: Patient/Family Education  Outcome: Progressing Towards Goal     Problem: Impaired Skin Integrity/Pressure Injury Treatment  Goal: *Improvement of Existing Pressure Injury  Outcome: Progressing Towards Goal  Goal: *Prevention of pressure injury  Description: Document Aniket Scale and appropriate interventions in the flowsheet.   Outcome: Progressing Towards Goal  Note: Pressure Injury Interventions:  Sensory Interventions: Float heels, Maintain/enhance activity level    Moisture Interventions: Minimize layers    Activity Interventions: Pressure redistribution bed/mattress(bed type)    Mobility Interventions: PT/OT evaluation    Nutrition Interventions: Document food/fluid/supplement intake    Friction and Shear Interventions: Lift sheet, Minimize layers

## 2020-05-06 NOTE — PROGRESS NOTES
SLP Contact Note    Attempted to see patient. Patient working with another service and therefore will defer for now.       Thank you,  VALERIA WhippleEd, [address] [facility], [place], operated by [facility]  Speech-Language Pathologist

## 2020-05-06 NOTE — PROGRESS NOTES
Hospitalist Progress Note  Eliseo Millan MD  Answering service: [phone] from in house phone      Date of Service:  2020  NAME:  Brenda Pierson  :  1960  MRN:  [id number]    Admission Summary:   59F s/p COVID treatement in ICU - extubated  Interval history / Subjective:   Patient seen and examined at bedside, looks stable, was sleepy/drowsy this morning, refused Bipap overnight, now clear in mind. No hypoxia. Still hoarse in her voice, will get ENT opinion     Assessment & Plan:     #. ARF: - Nephrology following- on HD with good tolerance   - Nephrology plan for permaCath then start planning for dc.     #. COVID-19 +ve - Resolved- s/p Tocilizumab, Plaquenil. On Vit C, Zinc   #. Acute Hypoxic Resp Failure- resolved, Pulm following. Bipap at night and PRN. #. Acute metabolic Encephalopathy: likely ICU delirium- no agitation. Monitor- resolved. #. Sinus tachycardia: persistent, will try Metoprolol, check TSH  #. Anemia in CKD: monitor HB. #. Retroperitoneal bleed: this hospitalization, resolved - Avoid AP/AC meds- Monitor Hb  #. Non occlusive PE in LLL pulmonary artery - no ACs due to retroperitoneal bleed. #. S/p Cardiac arrest   #. DM2: A1c 10.4, SSI, AccuChecks, monitor  #. Morbid obesity: counseled on health benefits of weight loss, Healthy diet, BMI 44.11 kg/m².      Code status: Full  DVT prophylaxis: SCD  Care Plan discussed with: Patient/Family and Nurse  Disposition: TBD. >2days     Hospital Problems  Date Reviewed: 2020          Codes Class Noted POA    Hypotension ICD-10-CM: I95.9  ICD-9-CM: 458.9 Acute 2020 Yes        Retroperitoneal hemorrhage ICD-10-CM: R58  ICD-9-CM: 459.0  2020 No        Acute renal failure (ARF) ([facility] ICD-10-CM: N17.9  ICD-9-CM: 584.9  2020 No        Encephalopathy ICD-10-CM: G93.40  ICD-9-CM: 348.30  2020 No        Sepsis with multi-organ dysfunction ([facility] ICD-10-CM: A41.9, R65.20  ICD-9-CM: 038.9, 995.92  4/19/2020 Unknown        Pneumonia due to methicillin susceptible Staphylococcus aureus (MSSA) ([facility] ICD-10-CM: S21.786  ICD-9-CM: 482.41  4/19/2020 Unknown        Thrombocytopenia ([facility] ICD-10-CM: D69.6  ICD-9-CM: 287.5  4/19/2020 Unknown        Diarrhea ICD-10-CM: R19.7  ICD-9-CM: 787.91  4/19/2020 Unknown        COVID-19 virus infection ICD-10-CM: U07.1  3/31/2020 Unknown            Review of Systems:   Pertinent items are mentioned in interval history. Vital Signs:    Last 24hrs VS reviewed since prior progress note. Most recent are:  Visit Vitals  /66 (BP 1 Location: Left arm)   Pulse (!) 110   Temp 97.9 °F (36.6 °C)   Resp 29   Ht 5' 4\" (1.626 m)   Wt 115 kg (253 lb 8.5 oz)   SpO2 99%   BMI 43.52 kg/m²         Intake/Output Summary (Last 24 hours) at 5/6/2020 1337  Last data filed at 5/6/2020 1030  Gross per 24 hour   Intake 728 ml   Output 2250 ml   Net -1522 ml        Physical Examination:   General:  Alert, No acute distress  Resp:  No accessory muscle use, Good AE, no wheezes, few rhonchi  Abd:  Soft, non-tender, non-distended, obese  Extremities:  No cyanosis or clubbing, no significant edema  Neuro:  Grossly normal, no focal neuro deficits, follows commands   Psych:   not agitated.     Data Review:    Review and/or order of clinical lab test  Review and/or order of tests in the radiology section of CPT  Review and/or order of tests in the medicine section of CPT  Labs:     Recent Labs     05/06/20  0051 05/04/20  0405   WBC 10.2 8.3   HGB 8.3* 8.3*   HCT 27.8* 27.3*    282     Recent Labs     05/06/20  0051 05/05/20  0317 05/04/20  0405   * 137 136   K 3.6 3.7 3.6   CL 98 101 100   CO2 30 30 28   BUN 41* 73* 58*   CREA 1.84* 3.00* 2.47*   * 120* 218*   CA 8.8 9.6 9.4   MG 1.9 1.9 2.0   PHOS 2.1* 3.0 2.7     Recent Labs     05/06/20  0051 05/05/20  0317 05/04/20  0405   ALB 2.5* 2.4* 2.4*     No results for input(s): INR, PTP, APTT, INREXT, INREXT in the last 72 hours. No results for input(s): FE, TIBC, PSAT, FERR in the last 72 hours. No results found for: FOL, RBCF   No results for input(s): PH, PCO2, PO2 in the last 72 hours. No results for input(s): CPK, CKNDX, TROIQ in the last 72 hours.     No lab exists for component: CPKMB  Lab Results   Component Value Date/Time    Triglyceride 159 (H) 04/12/2020 08:34 PM     Lab Results   Component Value Date/Time    Glucose (POC) 181 (H) 05/06/2020 11:24 AM    Glucose (POC) 213 (H) 05/06/2020 05:42 AM    Glucose (POC) 203 (H) 05/06/2020 12:52 AM    Glucose (POC) 158 (H) 05/05/2020 04:23 PM    Glucose (POC) 138 (H) 05/05/2020 11:35 AM     No results found for: COLOR, APPRN, SPGRU, REFSG, SARKIS, PROTU, GLUCU, KETU, BILU, UROU, GALE, LEUKU, GLUKE, EPSU, BACTU, WBCU, RBCU, CASTS, UCRY  Medications Reviewed:     Current Facility-Administered Medications   Medication Dose Route Frequency    metoprolol (LOPRESSOR) 5 mg/mL oral suspension 25 mg  25 mg Per NG tube Q12H    insulin glargine (LANTUS) injection 28 Units  28 Units SubCUTAneous QHS    balsam peru-castor oiL (VENELEX) ointment   Topical BID    bumetanide (BUMEX) tablet 2 mg  2 mg Oral DAILY    guaiFENesin (ROBITUSSIN) 100 mg/5 mL oral liquid 100 mg  100 mg Oral Q12H    epoetin jovani-epbx (RETACRIT) injection 20,000 Units  20,000 Units SubCUTAneous Q TUE, THU & SAT    HYDROcodone-acetaminophen (NORCO) 5-325 mg per tablet 1 Tab  1 Tab Oral Q6H PRN    phenol throat spray (CHLORASEPTIC) 1 Spray  1 Spray Oral Q6H PRN    sevelamer carbonate (RENVELA) oral powder 0.8 g  0.8 g Oral TID WITH MEALS    heparin (porcine) 1,000 unit/mL injection 2,500 Units  2,500 Units IntraVENous DIALYSIS PRN    guar gum (BENEFIBER) packet 1 Packet  4 g Oral TID    labetaloL (NORMODYNE;TRANDATE) injection 20 mg  20 mg IntraVENous Q4H PRN    albumin human 25% (BUMINATE) solution 12.5 g  12.5 g IntraVENous DIALYSIS PRN    albuterol (PROVENTIL VENTOLIN) nebulizer solution 2.5 mg  2.5 mg Nebulization Q4H PRN    famotidine (PEPCID) 8 mg/mL oral suspension 20 mg  20 mg Per NG tube DAILY    alteplase (CATHFLO) 2 mg in sterile water (preservative free) 2 mL injection  2 mg InterCATHeter DIALYSIS PRN    alteplase (CATHFLO) 2 mg in sterile water (preservative free) 2 mL injection  2 mg InterCATHeter DIALYSIS PRN    insulin lispro (HUMALOG) injection   SubCUTAneous Q6H    white petrolatum-mineral oiL (AKWA TEARS) 83-15 % ophthalmic ointment   Both Eyes Q12H    bacitracin 500 unit/gram packet 1 Packet  1 Packet Topical PRN    sodium chloride (NS) flush 5-40 mL  5-40 mL IntraVENous Q8H    sodium chloride (NS) flush 5-40 mL  5-40 mL IntraVENous PRN    ondansetron (ZOFRAN) injection 4 mg  4 mg IntraVENous Q4H PRN    acetaminophen (TYLENOL) tablet 650 mg  650 mg Oral Q6H PRN    Or    acetaminophen (TYLENOL) suppository 650 mg  650 mg Rectal Q6H PRN    glucose chewable tablet 16 g  4 Tab Oral PRN    glucagon (GLUCAGEN) injection 1 mg  1 mg IntraMUSCular PRN    dextrose 10% infusion 0-250 mL  0-250 mL IntraVENous PRN   ______________________________________________________________________  EXPECTED LENGTH OF STAY: 12d 0h  ACTUAL LENGTH OF STAY:          36               Travis Montemayor MD

## 2020-05-06 NOTE — PROGRESS NOTES
Pts bed in chair position. Pt noted to be very sleepy and difficult to arouse. Pts nurse states that PT worked with her recently. Asked pt if we could place bipap on her when she did arouse-pt shook head no several times.   Will con t to monitor

## 2020-05-06 NOTE — PROGRESS NOTES
Problem: Mobility Impaired (Adult and Pediatric)  Goal: *Acute Goals and Plan of Care (Insert Text)  Description: FUNCTIONAL STATUS PRIOR TO ADMISSION: Patient was independent and active without use of DME but poor history due to not being able to communicate. HOME SUPPORT PRIOR TO ADMISSION: Pt lives with family. Physical Therapy Goals  Initiated 5/4/2020  1. Patient will move from supine to sit and sit to supine , scoot up and down and roll side to side in bed with maximal assistance within 7 day(s). 2.  Patient will sit EOB with moderate assistance of 2 people, x5 minutes with active core initiation to correct sitting balance within 7 days. 3.  Patient will verbalize 3 exercises she can complete on her own outside of therapy sessions with 7 days. 4.  Patient will participate in supine HEP (AAROM as needed) with min assist for AAROM within 7 days. 5.  Patient will tolerate bed in chair position 30 min BID within 7 days. Initiated 4/25/2020  1. Patient will move from supine to sit and sit to supine , scoot up and down and roll side to side in bed with moderate assistance once off vent within 7 day(s). 2.  Patient will participate in sitting with min A for 2 minutes once off vent within 7 day(s). 3.  Patient will perform active supine or sitting TE with min A within 7 day(s). Outcome: Progressing Towards Goal       PHYSICAL THERAPY TREATMENT  Patient: Cynthia Vásquez (89 y.o. female)  Date: 5/6/2020  Diagnosis: COVID-19 virus infection [U07.1]   <principal problem not specified>       Precautions: Fall  Chart, physical therapy assessment, plan of care and goals were reviewed. ASSESSMENT  Patient continues with skilled PT services and is progressing towards goals. Pt soiled on arrival OT present to assist with self care. Pt was initiating commands and attempting to assist with turning with UE reaching. Pt  noticeable fatigue with task. Pt placed in chair position.  Pt reports being comfortable. Pt demonstrated DF of right LE on command but not left. Pt has decrease activity tolerance and is working on increasing to tolerate inpt rehab. Current Level of Function Impacting Discharge (mobility/balance): total A 2    Other factors to consider for discharge: PTA pt independent. PLAN :  Patient continues to benefit from skilled intervention to address the above impairments. Continue treatment per established plan of care. to address goals. Recommendation for discharge: (in order for the patient to meet his/her long term goals)  Therapy 3 hours per day 5-7 days per week    This discharge recommendation:  Has not yet been discussed the attending provider and/or case management    IF patient discharges home will need the following DME: to be determined (TBD)       SUBJECTIVE:   Patient stated I want my mom.  pt tearful at end of session     OBJECTIVE DATA SUMMARY:   Critical Behavior:  Neurologic State: Alert  Orientation Level: Oriented to person, Oriented to place, Oriented to situation, Disoriented to time  Cognition: Decreased attention/concentration, Follows commands  Safety/Judgement: Awareness of environment, Decreased awareness of need for assistance, Decreased awareness of need for safety, Decreased insight into deficits, Fall prevention  Functional Mobility Training:  Bed Mobility:  Rolling: Total assistance; Additional time;Assist x2        Scooting: Total assistance; Additional time;Assist x2        Transfers:                                   Balance:  Sitting: Impaired; With support(chair position in bed)  Sitting - Static: Poor (constant support)  Sitting - Dynamic: Poor (constant support)  Ambulation/Gait Training:                                                        Stairs:               Therapeutic Exercises:     Pain Rating:      Activity Tolerance:   Poor and requires frequent rest breaks  Please refer to the flowsheet for vital signs taken during this treatment. After treatment patient left in no apparent distress:   Call bell within reach and chair position      COMMUNICATION/COLLABORATION:   The patients plan of care was discussed with: Occupational therapist and Registered nurse.      Jamal Kwong PTA   Time Calculation: 24 mins

## 2020-05-06 NOTE — PROGRESS NOTES
Bedside shift change report given to Yani Frye RN (oncoming nurse) by Nayeli Guido (offgoing nurse). Report included the following information SBAR, Kardex, Intake/Output, MAR and Cardiac Rhythm NSR. Sinus tach.

## 2020-05-06 NOTE — PROGRESS NOTES
Attempted to obtain labs and blood sugar. Pt stated \"you are not authorized to touch me\". Attempted to explain the process of getting blood from PICC and the rationale behind checking labs, pt adamantly refused stating \"you're not gonna like what I'm gonna do if you touch me\". Able to answer orientation questions appropriately, charge nurse able to obtain blood. Attempted with IMCU nurse to place pt on bipap, pt refused, as per notes appears pt refused previous night as well.

## 2020-05-07 ENCOUNTER — APPOINTMENT (OUTPATIENT)
Dept: GENERAL RADIOLOGY | Age: 60
DRG: 870 | End: 2020-05-07
Attending: NURSE PRACTITIONER
Payer: COMMERCIAL

## 2020-05-07 ENCOUNTER — APPOINTMENT (OUTPATIENT)
Dept: INTERVENTIONAL RADIOLOGY/VASCULAR | Age: 60
DRG: 870 | End: 2020-05-07
Attending: INTERNAL MEDICINE
Payer: COMMERCIAL

## 2020-05-07 LAB
ALBUMIN SERPL-MCNC: 2.4 G/DL (ref 3.5–5)
ANION GAP SERPL CALC-SCNC: 8 MMOL/L (ref 5–15)
BUN SERPL-MCNC: 59 MG/DL (ref 6–20)
BUN/CREAT SERPL: 25 (ref 12–20)
CALCIUM SERPL-MCNC: 9 MG/DL (ref 8.5–10.1)
CHLORIDE SERPL-SCNC: 100 MMOL/L (ref 97–108)
CO2 SERPL-SCNC: 28 MMOL/L (ref 21–32)
CREAT SERPL-MCNC: 2.34 MG/DL (ref 0.55–1.02)
GLUCOSE BLD STRIP.AUTO-MCNC: 113 MG/DL (ref 65–100)
GLUCOSE BLD STRIP.AUTO-MCNC: 121 MG/DL (ref 65–100)
GLUCOSE BLD STRIP.AUTO-MCNC: 148 MG/DL (ref 65–100)
GLUCOSE BLD STRIP.AUTO-MCNC: 171 MG/DL (ref 65–100)
GLUCOSE BLD STRIP.AUTO-MCNC: 179 MG/DL (ref 65–100)
GLUCOSE SERPL-MCNC: 164 MG/DL (ref 65–100)
INR PPP: 1.2 (ref 0.9–1.1)
MAGNESIUM SERPL-MCNC: 1.9 MG/DL (ref 1.6–2.4)
PHOSPHATE SERPL-MCNC: 3.4 MG/DL (ref 2.6–4.7)
POTASSIUM SERPL-SCNC: 3.7 MMOL/L (ref 3.5–5.1)
PROTHROMBIN TIME: 11.8 SEC (ref 9–11.1)
SERVICE CMNT-IMP: ABNORMAL
SODIUM SERPL-SCNC: 136 MMOL/L (ref 136–145)

## 2020-05-07 PROCEDURE — 74011636637 HC RX REV CODE- 636/637: Performed by: INTERNAL MEDICINE

## 2020-05-07 PROCEDURE — 74011250637 HC RX REV CODE- 250/637: Performed by: INTERNAL MEDICINE

## 2020-05-07 PROCEDURE — 97110 THERAPEUTIC EXERCISES: CPT

## 2020-05-07 PROCEDURE — 74011250636 HC RX REV CODE- 250/636: Performed by: INTERNAL MEDICINE

## 2020-05-07 PROCEDURE — 74011250636 HC RX REV CODE- 250/636: Performed by: RADIOLOGY

## 2020-05-07 PROCEDURE — 71045 X-RAY EXAM CHEST 1 VIEW: CPT

## 2020-05-07 PROCEDURE — 36415 COLL VENOUS BLD VENIPUNCTURE: CPT

## 2020-05-07 PROCEDURE — 74011000250 HC RX REV CODE- 250

## 2020-05-07 PROCEDURE — 97530 THERAPEUTIC ACTIVITIES: CPT

## 2020-05-07 PROCEDURE — 74011250637 HC RX REV CODE- 250/637: Performed by: NURSE PRACTITIONER

## 2020-05-07 PROCEDURE — 83735 ASSAY OF MAGNESIUM: CPT

## 2020-05-07 PROCEDURE — 90935 HEMODIALYSIS ONE EVALUATION: CPT

## 2020-05-07 PROCEDURE — 92526 ORAL FUNCTION THERAPY: CPT

## 2020-05-07 PROCEDURE — 80069 RENAL FUNCTION PANEL: CPT

## 2020-05-07 PROCEDURE — 85610 PROTHROMBIN TIME: CPT

## 2020-05-07 PROCEDURE — 74011250636 HC RX REV CODE- 250/636

## 2020-05-07 PROCEDURE — 36558 INSERT TUNNELED CV CATH: CPT

## 2020-05-07 PROCEDURE — 82962 GLUCOSE BLOOD TEST: CPT

## 2020-05-07 PROCEDURE — C1750 CATH, HEMODIALYSIS,LONG-TERM: HCPCS

## 2020-05-07 PROCEDURE — 65660000000 HC RM CCU STEPDOWN

## 2020-05-07 PROCEDURE — 77030010507 HC ADH SKN DERMBND J&J -B

## 2020-05-07 PROCEDURE — 77030002986 HC SUT PROL J&J -A

## 2020-05-07 RX ORDER — MIDAZOLAM HYDROCHLORIDE 1 MG/ML
5 INJECTION, SOLUTION INTRAMUSCULAR; INTRAVENOUS
Status: DISCONTINUED | OUTPATIENT
Start: 2020-05-07 | End: 2020-05-07

## 2020-05-07 RX ORDER — CEFAZOLIN SODIUM 1 G/3ML
2 INJECTION, POWDER, FOR SOLUTION INTRAMUSCULAR; INTRAVENOUS ONCE
Status: COMPLETED | OUTPATIENT
Start: 2020-05-07 | End: 2020-05-07

## 2020-05-07 RX ORDER — FENTANYL CITRATE 50 UG/ML
100 INJECTION, SOLUTION INTRAMUSCULAR; INTRAVENOUS
Status: DISCONTINUED | OUTPATIENT
Start: 2020-05-07 | End: 2020-05-07

## 2020-05-07 RX ORDER — SODIUM CHLORIDE 9 MG/ML
50 INJECTION, SOLUTION INTRAVENOUS CONTINUOUS
Status: DISCONTINUED | OUTPATIENT
Start: 2020-05-07 | End: 2020-05-07

## 2020-05-07 RX ORDER — HEPARIN SODIUM 1000 [USP'U]/ML
3800 INJECTION, SOLUTION INTRAVENOUS; SUBCUTANEOUS
Status: DISCONTINUED | OUTPATIENT
Start: 2020-05-07 | End: 2020-05-22 | Stop reason: HOSPADM

## 2020-05-07 RX ORDER — HEPARIN SODIUM 1000 [USP'U]/ML
INJECTION, SOLUTION INTRAVENOUS; SUBCUTANEOUS
Status: COMPLETED
Start: 2020-05-07 | End: 2020-05-07

## 2020-05-07 RX ORDER — CEFAZOLIN SODIUM 1 G/3ML
INJECTION, POWDER, FOR SOLUTION INTRAMUSCULAR; INTRAVENOUS
Status: DISPENSED
Start: 2020-05-07 | End: 2020-05-08

## 2020-05-07 RX ORDER — LIDOCAINE HYDROCHLORIDE 20 MG/ML
INJECTION, SOLUTION INFILTRATION; PERINEURAL
Status: COMPLETED
Start: 2020-05-07 | End: 2020-05-07

## 2020-05-07 RX ADMIN — MINERAL OIL AND WHITE PETROLATUM: 150; 830 OINTMENT OPHTHALMIC at 22:14

## 2020-05-07 RX ADMIN — INSULIN LISPRO 2 UNITS: 100 INJECTION, SOLUTION INTRAVENOUS; SUBCUTANEOUS at 07:05

## 2020-05-07 RX ADMIN — HEPARIN SODIUM 3000 UNITS: 1000 INJECTION INTRAVENOUS; SUBCUTANEOUS at 13:55

## 2020-05-07 RX ADMIN — SEVELAMER CARBONATE 0.8 G: 800 POWDER, FOR SUSPENSION ORAL at 14:38

## 2020-05-07 RX ADMIN — Medication 1 PACKET: at 08:41

## 2020-05-07 RX ADMIN — HEPARIN SODIUM 3800 UNITS: 1000 INJECTION INTRAVENOUS; SUBCUTANEOUS at 17:56

## 2020-05-07 RX ADMIN — HYDROCODONE BITARTRATE AND ACETAMINOPHEN 1 TABLET: 5; 325 TABLET ORAL at 16:02

## 2020-05-07 RX ADMIN — METOPROLOL TARTRATE 25 MG: 50 TABLET, FILM COATED ORAL at 03:42

## 2020-05-07 RX ADMIN — LIDOCAINE HYDROCHLORIDE 10 MG: 20 INJECTION, SOLUTION INFILTRATION; PERINEURAL at 13:56

## 2020-05-07 RX ADMIN — MIDAZOLAM 0.5 MG: 1 INJECTION INTRAMUSCULAR; INTRAVENOUS at 13:53

## 2020-05-07 RX ADMIN — MIDAZOLAM 0.5 MG: 1 INJECTION INTRAMUSCULAR; INTRAVENOUS at 13:51

## 2020-05-07 RX ADMIN — FENTANYL CITRATE 25 MCG: 50 INJECTION INTRAMUSCULAR; INTRAVENOUS at 13:52

## 2020-05-07 RX ADMIN — CASTOR OIL AND BALSAM, PERU: 788; 87 OINTMENT TOPICAL at 18:09

## 2020-05-07 RX ADMIN — FENTANYL CITRATE 25 MCG: 50 INJECTION INTRAMUSCULAR; INTRAVENOUS at 13:51

## 2020-05-07 RX ADMIN — INSULIN GLARGINE 28 UNITS: 100 INJECTION, SOLUTION SUBCUTANEOUS at 22:23

## 2020-05-07 RX ADMIN — METOPROLOL TARTRATE 25 MG: 50 TABLET, FILM COATED ORAL at 17:01

## 2020-05-07 RX ADMIN — Medication 10 ML: at 18:10

## 2020-05-07 RX ADMIN — CASTOR OIL AND BALSAM, PERU: 788; 87 OINTMENT TOPICAL at 08:42

## 2020-05-07 RX ADMIN — Medication 10 ML: at 16:04

## 2020-05-07 RX ADMIN — GUAIFENESIN 100 MG: 200 SOLUTION ORAL at 08:41

## 2020-05-07 RX ADMIN — Medication 1 PACKET: at 16:03

## 2020-05-07 RX ADMIN — CEFAZOLIN 2 G: 1 INJECTION, POWDER, FOR SOLUTION INTRAMUSCULAR; INTRAVENOUS at 13:32

## 2020-05-07 RX ADMIN — SEVELAMER CARBONATE 0.8 G: 800 POWDER, FOR SUSPENSION ORAL at 18:09

## 2020-05-07 RX ADMIN — SEVELAMER CARBONATE 0.8 G: 800 POWDER, FOR SUSPENSION ORAL at 08:41

## 2020-05-07 RX ADMIN — FAMOTIDINE 20 MG: 40 POWDER, FOR SUSPENSION ORAL at 09:39

## 2020-05-07 RX ADMIN — GUAIFENESIN 100 MG: 200 SOLUTION ORAL at 21:31

## 2020-05-07 RX ADMIN — BUMETANIDE 2 MG: 1 TABLET ORAL at 08:41

## 2020-05-07 RX ADMIN — MINERAL OIL AND WHITE PETROLATUM: 150; 830 OINTMENT OPHTHALMIC at 08:43

## 2020-05-07 RX ADMIN — ACETAMINOPHEN 650 MG: 325 TABLET ORAL at 08:41

## 2020-05-07 RX ADMIN — Medication 1 PACKET: at 21:32

## 2020-05-07 RX ADMIN — EPOETIN ALFA-EPBX 20000 UNITS: 10000 INJECTION, SOLUTION INTRAVENOUS; SUBCUTANEOUS at 22:09

## 2020-05-07 RX ADMIN — HYDROCODONE BITARTRATE AND ACETAMINOPHEN 1 TABLET: 5; 325 TABLET ORAL at 04:57

## 2020-05-07 NOTE — DIALYSIS
[facility]  [phone]    Vitals   Pre   Post   Assessment   Pre   Post     Temp  Temp: 98.1 °F (36.7 °C) (05/07/20 1515)  97.9 LOC  Lethargic from procedure easily awakens watching tv Lethargic from pain medication   HR   Pulse (Heart Rate): (!) 105 (05/07/20 1515) 109 Lungs   Productive cough RR 13 saturation 95% on room air  room air even and unlabored   B/P   BP: 129/82 (05/07/20 1515) 126/77 Cardiac   Tachycardia bedside telemetry rate regular   tachycardia bedside telemetry regular    Resp   Resp Rate: 22 (05/07/20 1515) 10 Skin   Warm and dry   warm and dry    Pain level  Pain Intensity 1: 0 (05/07/20 1410) Medicated for pain Edema    1+ BLE   1+ble   Orders:    Duration:   Start:   0151 End:   1815 Total:   3   Dialyzer:   Dialyzer/Set Up Inspection: Adonis Ch (05/07/20 1515)   K Bath:   Dialysate K (mEq/L): 3.5 (05/07/20 1515)   Ca Bath:   Dialysate CA (mEq/L): 2.5 (05/07/20 1515)   Na/Bicarb:   Dialysate NA (mEq/L): 140 (05/07/20 1515)   Target Fluid Removal:   Goal/Amount of Fluid to Remove (mL): 2000 mL (05/07/20 1515)   Access     Type & Location:   Right tunnelled CVC, placement confirmed, dressing clean, dry and intact, Each catheter limb disinfected for 60 seconds per limb with alcohol swabs. Caps removed, dialysis CVC hub scrubbed with Prevantics for 5 seconds, followed by a 5 second dry time per Hospital P&P.         Labs     Obtained/Reviewed   Critical Results Called   Date when labs were drawn-  Hgb-    HGB   Date Value Ref Range Status   05/06/2020 8.3 (L) 11.5 - 16.0 g/dL Final     K-    Potassium   Date Value Ref Range Status   05/07/2020 3.7 3.5 - 5.1 mmol/L Final     Ca-   Calcium   Date Value Ref Range Status   05/07/2020 9.0 8.5 - 10.1 MG/DL Final     Bun-   BUN   Date Value Ref Range Status   05/07/2020 59 (H) 6 - 20 MG/DL Final     Creat-   Creatinine   Date Value Ref Range Status   05/07/2020 2.34 (H) 0.55 - 1.02 MG/DL Final        Medications/ Blood Products Given     Name   Dose   Route and Time     Heparin 1:1000 3800 1.9 arterial and 1.9 venous             Blood Volume Processed (BVP):    66 Net Fluid   Removed:  2000   Comments   Time Out Done: 1218  Primary Nurse Rpt Pre: Kevyn Gilbert RN   Primary Nurse Rpt Post: Kevyn Gilbert RN   Pt Education: cvc infection control  Care Plan: continue current HD plan of care   Tx Summary:  1899 after receving confirmation of tunneled catheter placement HD initiated as ordered. 1815 treatment completed, all possible blood returned, Each catheter limb disinfected for 60 seconds per limb with alcohol swabs. Dialysis CVC hubs scrubbed with Prevantics for 5 seconds, followed by a 5 second dry time per Hospital P&P, flushed, heparinized, red and blue dialysis caps applied to ports. Admiting Diagnosis: Covid 19  Pt's previous clinic-n/a  Consent signed - Informed Consent Verified: Yes (05/07/20 1515)  Breannaita Consent - verified   Hepatitis Status-   Machine #- Machine Number: [id number] (05/07/20 1515)  Telemetry status-bedside telemetry   Pre-dialysis wt. - Pre-Dialysis Weight: 121.1 kg (267 lb) (04/30/20 2963)

## 2020-05-07 NOTE — PROGRESS NOTES
Bedside shift change report given to Candace Galan RN (oncoming nurse) by Alejandro El RN (offgoing nurse). Report included the following information SBAR, Procedure Summary, Intake/Output, MAR, Med Rec Status and Cardiac Rhythm Sinus Tach.

## 2020-05-07 NOTE — PROGRESS NOTES
[facility]   [address] [address], [address], [facility] [place]  Phone: [phone]   [phone]       Nephrology Progress Note  Lisa Briggs     1960     [id number]  Date of Admission : 3/31/2020  05/07/20    CC: Follow up for JUANCHO      Assessment and Plan   JUANCHO :  - 2/2 ATN  - no signs of recovery  Yet  - cont HD TTS while here  - PC to be placed today  - CM to work on  HD unit    COVID-19 +ve   Acute Hypoxic Resp Failure   - On Vent   - completed Plaquenil, - s/p Tocilizumab   - extubated 4/29  - repeat SARS-CoV-2 neg 4/20 and 4/27     Anemia in CKD:  - cont KOKI    RP hematoma:  - stable    Cardiac arrest 4/9    PE this admission  Non-occlusive superfical DVT:  - on on AC due to large RP hematoma  - ? IVC filter    Type II DM   - Insulin per primary team      Morbid Obesity     Nutrition:  - TF on hold for now  - per primary team     Interval History:  Seen and examined. UOP still minimal.   For dialysis today. Confusion better. No cp or sob reported. Hoarse voice. Review of Systems: Review of systems not obtained due to patient factors.     Current Medications:   Current Facility-Administered Medications   Medication Dose Route Frequency    prochlorperazine (COMPAZINE) with saline injection 5 mg  5 mg IntraVENous Q6H PRN    metoprolol (LOPRESSOR) 5 mg/mL oral suspension 25 mg  25 mg Per NG tube Q12H    insulin glargine (LANTUS) injection 28 Units  28 Units SubCUTAneous QHS    balsam peru-castor oiL (VENELEX) ointment   Topical BID    bumetanide (BUMEX) tablet 2 mg  2 mg Oral DAILY    guaiFENesin (ROBITUSSIN) 100 mg/5 mL oral liquid 100 mg  100 mg Oral Q12H    epoetin jovani-epbx (RETACRIT) injection 20,000 Units  20,000 Units SubCUTAneous Q TUE, THU & SAT    HYDROcodone-acetaminophen (NORCO) 5-325 mg per tablet 1 Tab  1 Tab Oral Q6H PRN    phenol throat spray (CHLORASEPTIC) 1 Spray  1 Spray Oral Q6H PRN    sevelamer carbonate (RENVELA) oral powder 0.8 g  0.8 g Oral TID WITH MEALS    heparin (porcine) 1,000 unit/mL injection 2,500 Units  2,500 Units IntraVENous DIALYSIS PRN    guar gum (BENEFIBER) packet 1 Packet  4 g Oral TID    labetaloL (NORMODYNE;TRANDATE) injection 20 mg  20 mg IntraVENous Q4H PRN    albumin human 25% (BUMINATE) solution 12.5 g  12.5 g IntraVENous DIALYSIS PRN    albuterol (PROVENTIL VENTOLIN) nebulizer solution 2.5 mg  2.5 mg Nebulization Q4H PRN    famotidine (PEPCID) 8 mg/mL oral suspension 20 mg  20 mg Per NG tube DAILY    alteplase (CATHFLO) 2 mg in sterile water (preservative free) 2 mL injection  2 mg InterCATHeter DIALYSIS PRN    alteplase (CATHFLO) 2 mg in sterile water (preservative free) 2 mL injection  2 mg InterCATHeter DIALYSIS PRN    insulin lispro (HUMALOG) injection   SubCUTAneous Q6H    white petrolatum-mineral oiL (AKWA TEARS) 83-15 % ophthalmic ointment   Both Eyes Q12H    bacitracin 500 unit/gram packet 1 Packet  1 Packet Topical PRN    sodium chloride (NS) flush 5-40 mL  5-40 mL IntraVENous Q8H    sodium chloride (NS) flush 5-40 mL  5-40 mL IntraVENous PRN    ondansetron (ZOFRAN) injection 4 mg  4 mg IntraVENous Q4H PRN    acetaminophen (TYLENOL) tablet 650 mg  650 mg Oral Q6H PRN    Or    acetaminophen (TYLENOL) suppository 650 mg  650 mg Rectal Q6H PRN    glucose chewable tablet 16 g  4 Tab Oral PRN    glucagon (GLUCAGEN) injection 1 mg  1 mg IntraMUSCular PRN    dextrose 10% infusion 0-250 mL  0-250 mL IntraVENous PRN      Allergies   Allergen Reactions    Augmentin [Amoxicillin-Pot Clavulanate] Rash and Itching       Objective:  Vitals:    Vitals:    05/06/20 2028 05/07/20 0021 05/07/20 0331 05/07/20 0830   BP:  121/59 103/64 115/72   Pulse: (!) 105 (!) 104 (!) 101 (!) 110   Resp:  22 22 20   Temp:  97.6 °F (36.4 °C) 98.3 °F (36.8 °C) 98.5 °F (36.9 °C)   TempSrc:       SpO2:  97% 99%    Weight:   109.2 kg (240 lb 11.5 oz)    Height:         Intake and Output:  No intake/output data recorded.   05/05 1901 - 05/07 0700  In: 18 [I.V.:150]  Out: 400 [Urine:400]    Physical Examination:     General: In NAD  HEENT:           NGT in place  Resp:  Reduced bibasilar breath sounds  CV:  RRR, no murmur   GI:  Obese , soft, NT   Neurologic:  awake  Access:           KESHIA torres     []    High complexity decision making was performed  []    Patient is at high-risk of decompensation with multiple organ involvement    Lab Data Personally Reviewed: I have reviewed all the pertinent labs, microbiology data and radiology studies during assessment.     Recent Labs     05/07/20  0350 05/06/20  0051 05/05/20  0317    134* 137   K 3.7 3.6 3.7    98 101   CO2 28 30 30   * 207* 120*   BUN 59* 41* 73*   CREA 2.34* 1.84* 3.00*   CA 9.0 8.8 9.6   MG 1.9 1.9 1.9   PHOS 3.4 2.1* 3.0   ALB 2.4* 2.5* 2.4*   INR 1.2*  --   --      Recent Labs     05/06/20 0051   WBC 10.2   HGB 8.3*   HCT 27.8*        No results found for: SDES  Lab Results   Component Value Date/Time    Culture result: NO GROWTH 5 DAYS 04/09/2020 02:32 PM    Culture result: NO GROWTH 5 DAYS 04/08/2020 10:36 AM    Culture result: NO GROWTH 5 DAYS 03/31/2020 11:15 AM    Culture result: MODERATE STAPHYLOCOCCUS AUREUS (A) 03/31/2020 10:34 AM    Culture result: LIGHT NORMAL RESPIRATORY SOFIE 03/31/2020 10:34 AM     Recent Results (from the past 24 hour(s))   GLUCOSE, POC    Collection Time: 05/06/20 11:24 AM   Result Value Ref Range    Glucose (POC) 181 (H) 65 - 100 mg/dL    Performed by Julieta LINDA (CON)    GLUCOSE, POC    Collection Time: 05/06/20  5:50 PM   Result Value Ref Range    Glucose (POC) 208 (H) 65 - 100 mg/dL    Performed by Trace Gutierrez    GLUCOSE, POC    Collection Time: 05/06/20 10:20 PM   Result Value Ref Range    Glucose (POC) 179 (H) 65 - 100 mg/dL    Performed by Sherri Aguilera (CON)    GLUCOSE, POC    Collection Time: 05/07/20 12:17 AM   Result Value Ref Range    Glucose (POC) 179 (H) 65 - 100 mg/dL    Performed by Oneida ARREOLA FUNCTION PANEL    Collection Time: 05/07/20  3:50 AM   Result Value Ref Range    Sodium 136 136 - 145 mmol/L    Potassium 3.7 3.5 - 5.1 mmol/L    Chloride 100 97 - 108 mmol/L    CO2 28 21 - 32 mmol/L    Anion gap 8 5 - 15 mmol/L    Glucose 164 (H) 65 - 100 mg/dL    BUN 59 (H) 6 - 20 MG/DL    Creatinine 2.34 (H) 0.55 - 1.02 MG/DL    BUN/Creatinine ratio 25 (H) 12 - 20      GFR est AA 26 (L) >60 ml/min/1.73m2    GFR est non-AA 21 (L) >60 ml/min/1.73m2    Calcium 9.0 8.5 - 10.1 MG/DL    Phosphorus 3.4 2.6 - 4.7 MG/DL    Albumin 2.4 (L) 3.5 - 5.0 g/dL   MAGNESIUM    Collection Time: 05/07/20  3:50 AM   Result Value Ref Range    Magnesium 1.9 1.6 - 2.4 mg/dL   PROTHROMBIN TIME + INR    Collection Time: 05/07/20  3:50 AM   Result Value Ref Range    INR 1.2 (H) 0.9 - 1.1      Prothrombin time 11.8 (H) 9.0 - 11.1 sec   GLUCOSE, POC    Collection Time: 05/07/20  6:53 AM   Result Value Ref Range    Glucose (POC) 148 (H) 65 - 100 mg/dL    Performed by Bonny Gonzalez            Total time spent with patient:  xxx   min. Care Plan discussed with:  Patient     Family      RN      Consulting Physician [address] [facility],         I have reviewed the flowsheets. Chart and Pertinent Notes have been reviewed. No change in PMH ,family and social history from Consult note.       Enid Beard MD

## 2020-05-07 NOTE — PROGRESS NOTES
Problem: Mobility Impaired (Adult and Pediatric)  Goal: *Acute Goals and Plan of Care (Insert Text)  Description: FUNCTIONAL STATUS PRIOR TO ADMISSION: Patient was independent and active without use of DME but poor history due to not being able to communicate. HOME SUPPORT PRIOR TO ADMISSION: Pt lives with family. Physical Therapy Goals  Initiated 5/4/2020  1. Patient will move from supine to sit and sit to supine , scoot up and down and roll side to side in bed with maximal assistance within 7 day(s). 2.  Patient will sit EOB with moderate assistance of 2 people, x5 minutes with active core initiation to correct sitting balance within 7 days. 3.  Patient will verbalize 3 exercises she can complete on her own outside of therapy sessions with 7 days. 4.  Patient will participate in supine HEP (AAROM as needed) with min assist for AAROM within 7 days. 5.  Patient will tolerate bed in chair position 30 min BID within 7 days. Initiated 4/25/2020  1. Patient will move from supine to sit and sit to supine , scoot up and down and roll side to side in bed with moderate assistance once off vent within 7 day(s). 2.  Patient will participate in sitting with min A for 2 minutes once off vent within 7 day(s). 3.  Patient will perform active supine or sitting TE with min A within 7 day(s). Outcome: Progressing Towards Goal    PHYSICAL THERAPY TREATMENT  Patient: Jael Price (88 y.o. female)  Date: 5/7/2020  Diagnosis: COVID-19 virus infection [U07.1]   <principal problem not specified>       Precautions: Fall  Chart, physical therapy assessment, plan of care and goals were reviewed. ASSESSMENT  Patient continues with skilled PT services and is progressing towards goals. Pt alert on arrival on arrival and eager to participate. Pt demonstrated improve movement of UE (biceps) and hands. Pt was able to achieve EOB with assistance. Pt attempting to self correct LOB.  Pt was able to have periods of SBA seated balance. Pt would fatigue requiring increase support. Pt was able to assist with cell phone use to send a picture to family. Pt is increasing activity tolerance. Will attempted kris lift transfer to recliner chair tomorrow if appropriate. Pt is progressing activity tolerance to meet inpt requirements. Current Level of Function Impacting Discharge (mobility/balance): max A x2    Other factors to consider for discharge: PTA pt was independent          PLAN :  Patient continues to benefit from skilled intervention to address the above impairments. Continue treatment per established plan of care. to address goals. Recommendation for discharge: (in order for the patient to meet his/her long term goals)  Therapy 3 hours per day 5-7 days per week    This discharge recommendation:  Has not yet been discussed the attending provider and/or case management    IF patient discharges home will need the following DME: to be determined (TBD)       SUBJECTIVE:   Patient stated it feels good .  regarding sitting EOB    OBJECTIVE DATA SUMMARY:   Critical Behavior:  Neurologic State: Alert  Orientation Level: Oriented X4  Cognition: Decreased command following  Safety/Judgement: Awareness of environment, Decreased awareness of need for assistance, Decreased awareness of need for safety, Decreased insight into deficits, Fall prevention  Functional Mobility Training:  Bed Mobility:  Rolling: Maximum assistance;Assist x2(pt attempteing to assist)  Supine to Sit: Maximum assistance;Assist x2  Sit to Supine: Maximum assistance;Assist x2  Scooting: Total assistance;Assist x2        Transfers:                                   Balance:  Sitting: Impaired  Sitting - Static: Poor (constant support)  Sitting - Dynamic: Poor (constant support)  Ambulation/Gait Training:      Therapeutic Exercises: Ankle pump, hand squeezes, bicep curl,   AAROM shoulder flexion  Seated attempted LAQ, unable to clear the floor.    Seated static balance variable assist level, SBA to max A. Pt was able to attempt to self correct LOB. Pain Rating:  Buttocks     Activity Tolerance:   Improving   Please refer to the flowsheet for vital signs taken during this treatment. After treatment patient left in no apparent distress:   Supine in bed, Heels elevated for pressure relief, and Call bell within reach    COMMUNICATION/COLLABORATION:   The patients plan of care was discussed with: Physical therapist, Occupational therapist, and Registered nurse.      Deirdre Rider PTA   Time Calculation: 39 mins

## 2020-05-07 NOTE — DIABETES MGMT
[facility]  CLINICAL NURSE SPECIALIST CONSULT  PROGRAM FOR DIABETES HEALTH  Follow up Note  Presentation   Ovidio Valentin is a 64 y.o. female admitted from OSH to [facility] ICU with SARS-COV2 needing CRRT. She is currently sedated and has been intubated since 3/28/20. Current clinical course has been complicated by steroid induced hyperglycemia. Steroids are discontinued at this time. She requires CRRT for acute renal failure r/t her sepsis and hypotension. PHM: GERD, obesity, and anxiety. New diabetes diagnosis with A1C 11.0% (3/28/2020); updated A1C 3/31/20-10.4%     Recent events:  Had some  Vomiting yesterday/ minimal residuals noted (20cc). Had ENT consult yesterday -left vocal cord paralysis noted via ENT MD note. Continues on bolus TF. Consulted by Provider for advanced diabetes nursing assessment and care, specifically related to   [] Transitioning off Monica Veras   [x] Inpatient management strategy  [] Home management assessment  [] Survival skill education    Diabetes-related medical history  Acute complications  hyperglycemia  Neurological complications  NONE  Microvascular disease  NONE  Macrovascular disease  NONE  Other associated conditions     NONE    Diabetes medication history: NONE    Subjective   Ms. Tejal Brown is resting in bed; Just finished with PT/OT therapy. I was able to speak with her re: her diabetes diagnosis today. She was not aware that she had diabetes until today. She stated   'my  has it too'. We discussed how we are managing her BG while she's been sick. She verbalized she understood. Objective   Diabetic Foot Exam: Left foot: warm, no calluses noted, red spot noted on top of foot over bone, but no breakdown noted. + microfilament sensation noted in all areas. Right foot: in brace, however, foot warm. Microfilament test deferred due to brace.     Vital Signs   Visit Vitals  /72 (BP 1 Location: Left arm, BP Patient Position: At rest)   Pulse (!) 110   Temp 98.5 °F (36.9 °C)   Resp 20   Ht 5' 4\" (1.626 m)   Wt 109.2 kg (240 lb 11.5 oz)   SpO2 99%   BMI 41.32 kg/m²   . Laboratory  Lab Results   Component Value Date/Time    Hemoglobin A1c 10.4 (H) 03/31/2020 03:42 PM     No results found for: LDL, LDLC, DLDLP  Lab Results   Component Value Date/Time    Creatinine 2.34 (H) 05/07/2020 03:50 AM     Lab Results   Component Value Date/Time    Sodium 136 05/07/2020 03:50 AM    Potassium 3.7 05/07/2020 03:50 AM    Chloride 100 05/07/2020 03:50 AM    CO2 28 05/07/2020 03:50 AM    Anion gap 8 05/07/2020 03:50 AM    Glucose 164 (H) 05/07/2020 03:50 AM    BUN 59 (H) 05/07/2020 03:50 AM    Creatinine 2.34 (H) 05/07/2020 03:50 AM    BUN/Creatinine ratio 25 (H) 05/07/2020 03:50 AM    GFR est AA 26 (L) 05/07/2020 03:50 AM    GFR est non-AA 21 (L) 05/07/2020 03:50 AM    Calcium 9.0 05/07/2020 03:50 AM    Bilirubin, total 1.6 (H) 04/20/2020 05:29 AM    AST (SGOT) 169 (H) 04/20/2020 05:29 AM    Alk. phosphatase 490 (H) 04/20/2020 05:29 AM    Protein, total 5.9 (L) 04/20/2020 05:29 AM    Albumin 2.4 (L) 05/07/2020 03:50 AM    Globulin 3.5 04/20/2020 05:29 AM    A-G Ratio 0.7 (L) 04/20/2020 05:29 AM    ALT (SGPT) 206 (H) 04/20/2020 05:29 AM     Lab Results   Component Value Date/Time    ALT (SGPT) 206 (H) 04/20/2020 05:29 AM           Evaluation   Ms Vincent Butler, with new onset Type 2 diabetes,with A1C 10.4%. AM fasting BG 213mg/dl today. . TF Nepro switched to bolus feedings. Patient had n/v with bolus feeds so RN reaching out to MD to see if TF need to be switched back to continuous. BG within target on basal insulin- Requiring minimal correctional insulin. .      It is imperative that we maintain her BG within target range 100-180mg/dl. Recommendations   1. Continue basal insulin- 28units daily;      2. Will continue to follow.     Assessment and Plan   Nursing Diagnosis Risk for unstable blood glucose pattern   Nursing Intervention Domain 8063 Decision-making Support   Nursing Interventions Examined current inpatient diabetes control   Explored factors facilitating and impeding inpatient management  Identified self-management practices impeding diabetes control  Explored corrective strategies with patient and responsible inpatient provider   Informed patient of rational for insulin strategy while hospitalized         Billing Code(s)     [x] 01886 IP subsequent hospital care - Aimee Griffith 72, CNS   Program for Diabetes Health  Access via  Joel Roca [phone]

## 2020-05-07 NOTE — WOUND CARE
Wound Consult: Follow Up Visit. Chart reviewed. Consulted for skin injuries early in stay for severe respiratory failure with COVID19; no tests negative. Spoke with patients nurse,  James Wheeler and we were in together to assist her to turn and provide care. Patient is resting on a total care bariatric plus air bed. Nice to see Ruth Yates talking; alert and oriented. Assessment:  Left upper buttock (fold) - 3 x 0.6 x 0.1 cm, moist pale yellow 90%, pink 10%, no surrounding redness, scant drainage - serous. Hydrocolloid in use. Gluteal cleft - 4 x 0.2 x 0.1 cm, linear MASD/intertrigo, moist pink to red, no surrounding redness. No redness or injury to sacrum or buttocks. Left heel with small area of fading, blanching pink skin. Right groin - 100% yellow in puncture site, no drainage. 1.8 x 0.8 cm. Left groin - 100% yellow in puncture site, no drainage; 0.3 x 0.3 cm. Forehead - resolving scabbed area. Treatment:  New hydrocolloid to left upper buttock. Z guard to gluteal cleft. 4x4s tucked into groin. Wound Recommendations:  Hydrocolloid dressing to left upper buttock injury; change Monday and Thursday. Z guard to gluteal cleft, groin sites and abdominal pannus - tuck 4x4s into groin sites as needed. Skin Care Recommendations:  1. Minimize friction/shear: minimize layers of linen/pads under patient. 2. Off load pressure/reposition: continue to turn and reposition approximately every 2 hours; float heels; multipodis boot in use. 3. Manage Moisture - keep skin folds dry; incontinence skin care as needed. 4. Continue to monitor nutrition, pain, and skin risk scale, and skin assessment. Plan: We will continue to reassess routinely and as needed.   Azra Queen, [address] [facility] Office [phone]  Pager [phone]

## 2020-05-07 NOTE — CONSULTS
Vascular Surgery Consult  --full note dictated  --in brief, CT scan from 4/10 shows small PE/ large rp hematoma  --duplex from 5/4 shows bilateral superficial vein thrombosis (no actual DVT)  --she is now Covid negative and slowly increasing her mobility  --I think is best to continue to treat conservatively for now rather than place an IVC filter at this time; I have discussed in detail with her   --please call with questions; will check on periodically

## 2020-05-07 NOTE — PROGRESS NOTES
Hospitalist Progress Note  Aydee Matamoros MD  Answering service: [phone] from in house phone      Date of Service:  2020  NAME:  Cody Dean  :  1960  MRN:  [id number]    Admission Summary:   59F s/p COVID treatement in ICU - extubated  Interval history / Subjective: Follow up Acute hypoxic resp. Failure, acute renal failure  Patient seen and examined by the bedside, Labs, images and notes reviewed  Patient was seen while she was having therapy. Patient requests some ice chips. She was explained about aspiration risk therefore it is better to avoid them. Discussed with patient's nurse to start back on the tube feeds and watch for the vomiting. Called patient's  to update and also discuss about the PEG tube but was not able to talk to him and left a voicemail. Discussed with nursing staff, orders reviewed. Plan discussed with patient/Family       Assessment & Plan:     #. ARF: - Nephrology following- on HD with good tolerance,  permacath to be placed today. CM is setting up the dialysis outpatient.       #. COVID-19 +ve - Resolved- s/p Tocilizumab, Plaquenil. On Vit C, Zinc   #. Acute Hypoxic Resp Failure- resolved, Pulm following. Bipap at night and PRN. #. Acute metabolic Encephalopathy: likely ICU delirium- no agitation. Monitor- resolved. #. Sinus tachycardia: persistent, will try Metoprolol, check TSH  #. Anemia in CKD: monitor HB. #. Retroperitoneal bleed: this hospitalization, resolved - Avoid AP/AC meds- Monitor Hb  #. Non occlusive PE in LLL pulmonary artery - no ACs due to retroperitoneal bleed. Vascular surgery consulted for IVC filter evaluation. #. S/p Cardiac arrest   #. DM2: A1c 10.4, SSI, AccuChecks, monitor  #. Morbid obesity: counseled on health benefits of weight loss, Healthy diet, BMI 44.11 kg/m².    #Nutrition patient is on tube feeds via NGT, once we discussed with family will consult GI for PEG tube evaluation    Code status: Full  DVT prophylaxis: SCD  Care Plan discussed with: Patient/Family and Nurse  Disposition: TBD. >2days     Hospital Problems  Date Reviewed: 4/19/2020          Codes Class Noted POA    Hypotension ICD-10-CM: I95.9  ICD-9-CM: 458.9 Acute 4/2/2020 Yes        Retroperitoneal hemorrhage ICD-10-CM: R58  ICD-9-CM: 459.0  4/19/2020 No        Acute renal failure (ARF) ([facility] ICD-10-CM: N17.9  ICD-9-CM: 584.9  4/19/2020 No        Encephalopathy ICD-10-CM: G93.40  ICD-9-CM: 348.30  4/19/2020 No        Sepsis with multi-organ dysfunction (HCC) ICD-10-CM: A41.9, R65.20  ICD-9-CM: 038.9, 995.92  4/19/2020 Unknown        Pneumonia due to methicillin susceptible Staphylococcus aureus (MSSA) ([facility] ICD-10-CM: X94.893  ICD-9-CM: 482.41  4/19/2020 Unknown        Thrombocytopenia ([facility] ICD-10-CM: D69.6  ICD-9-CM: 287.5  4/19/2020 Unknown        Diarrhea ICD-10-CM: R19.7  ICD-9-CM: 787.91  4/19/2020 Unknown        COVID-19 virus infection ICD-10-CM: U07.1  3/31/2020 Unknown            Review of Systems:   Pertinent items are mentioned in interval history. Vital Signs:    Last 24hrs VS reviewed since prior progress note. Most recent are:  Visit Vitals  /66   Pulse (!) 103   Temp 98 °F (36.7 °C)   Resp 17   Ht 5' 4\" (1.626 m)   Wt 109.2 kg (240 lb 11.5 oz)   SpO2 97%   BMI 41.32 kg/m²         Intake/Output Summary (Last 24 hours) at 5/7/2020 1509  Last data filed at 5/7/2020 1457  Gross per 24 hour   Intake 65 ml   Output 150 ml   Net -85 ml        Physical Examination:   General:  Alert, No acute distress  Resp:  No accessory muscle use, Good AE, no wheezes, few rhonchi  Abd:  Soft, non-tender, non-distended, obese  Extremities:  No cyanosis or clubbing, no significant edema  Neuro:  Grossly normal, no focal neuro deficits, follows commands   Psych:   not agitated.     Data Review:    Review and/or order of clinical lab test  Review and/or order of tests in the radiology section of CPT  Review and/or order of tests in the medicine section of CPT  Labs:     Recent Labs     05/06/20 0051   WBC 10.2   HGB 8.3*   HCT 27.8*        Recent Labs     05/07/20 0350 05/06/20 0051 05/05/20 0317    134* 137   K 3.7 3.6 3.7    98 101   CO2 28 30 30   BUN 59* 41* 73*   CREA 2.34* 1.84* 3.00*   * 207* 120*   CA 9.0 8.8 9.6   MG 1.9 1.9 1.9   PHOS 3.4 2.1* 3.0     Recent Labs     05/07/20 0350 05/06/20 0051 05/05/20 0317   ALB 2.4* 2.5* 2.4*     Recent Labs     05/07/20 0350   INR 1.2*   PTP 11.8*      No results for input(s): FE, TIBC, PSAT, FERR in the last 72 hours. No results found for: FOL, RBCF   No results for input(s): PH, PCO2, PO2 in the last 72 hours. No results for input(s): CPK, CKNDX, TROIQ in the last 72 hours.     No lab exists for component: CPKMB  Lab Results   Component Value Date/Time    Triglyceride 159 (H) 04/12/2020 08:34 PM     Lab Results   Component Value Date/Time    Glucose (POC) 113 (H) 05/07/2020 11:33 AM    Glucose (POC) 148 (H) 05/07/2020 06:53 AM    Glucose (POC) 179 (H) 05/07/2020 12:17 AM    Glucose (POC) 179 (H) 05/06/2020 10:20 PM    Glucose (POC) 208 (H) 05/06/2020 05:50 PM     No results found for: COLOR, APPRN, SPGRU, REFSG, SARKIS, PROTU, GLUCU, KETU, BILU, UROU, GALE, LEUKU, GLUKE, EPSU, BACTU, WBCU, RBCU, CASTS, UCRY  Medications Reviewed:     Current Facility-Administered Medications   Medication Dose Route Frequency    ceFAZolin (ANCEF) 1 gram injection        heparin (porcine) 1,000 unit/mL injection 3,800 Units  3,800 Units Hemodialysis DIALYSIS PRN    prochlorperazine (COMPAZINE) with saline injection 5 mg  5 mg IntraVENous Q6H PRN    metoprolol (LOPRESSOR) 5 mg/mL oral suspension 25 mg  25 mg Per NG tube Q12H    insulin glargine (LANTUS) injection 28 Units  28 Units SubCUTAneous QHS    balsam peru-castor oiL (VENELEX) ointment   Topical BID    bumetanide (BUMEX) tablet 2 mg  2 mg Oral DAILY    guaiFENesin (ROBITUSSIN) 100 mg/5 mL oral liquid 100 mg  100 mg Oral Q12H    epoetin jovani-epbx (RETACRIT) injection 20,000 Units  20,000 Units SubCUTAneous Q TUE, THU & SAT    HYDROcodone-acetaminophen (NORCO) 5-325 mg per tablet 1 Tab  1 Tab Oral Q6H PRN    phenol throat spray (CHLORASEPTIC) 1 Spray  1 Spray Oral Q6H PRN    sevelamer carbonate (RENVELA) oral powder 0.8 g  0.8 g Oral TID WITH MEALS    guar gum (BENEFIBER) packet 1 Packet  4 g Oral TID    labetaloL (NORMODYNE;TRANDATE) injection 20 mg  20 mg IntraVENous Q4H PRN    albumin human 25% (BUMINATE) solution 12.5 g  12.5 g IntraVENous DIALYSIS PRN    albuterol (PROVENTIL VENTOLIN) nebulizer solution 2.5 mg  2.5 mg Nebulization Q4H PRN    famotidine (PEPCID) 8 mg/mL oral suspension 20 mg  20 mg Per NG tube DAILY    alteplase (CATHFLO) 2 mg in sterile water (preservative free) 2 mL injection  2 mg InterCATHeter DIALYSIS PRN    alteplase (CATHFLO) 2 mg in sterile water (preservative free) 2 mL injection  2 mg InterCATHeter DIALYSIS PRN    insulin lispro (HUMALOG) injection   SubCUTAneous Q6H    white petrolatum-mineral oiL (AKWA TEARS) 83-15 % ophthalmic ointment   Both Eyes Q12H    bacitracin 500 unit/gram packet 1 Packet  1 Packet Topical PRN    sodium chloride (NS) flush 5-40 mL  5-40 mL IntraVENous Q8H    sodium chloride (NS) flush 5-40 mL  5-40 mL IntraVENous PRN    ondansetron (ZOFRAN) injection 4 mg  4 mg IntraVENous Q4H PRN    acetaminophen (TYLENOL) tablet 650 mg  650 mg Oral Q6H PRN    Or    acetaminophen (TYLENOL) suppository 650 mg  650 mg Rectal Q6H PRN    glucose chewable tablet 16 g  4 Tab Oral PRN    glucagon (GLUCAGEN) injection 1 mg  1 mg IntraMUSCular PRN    dextrose 10% infusion 0-250 mL  0-250 mL IntraVENous PRN   ______________________________________________________________________  EXPECTED LENGTH OF STAY: 12d 0h  ACTUAL LENGTH OF STAY:          Jered Centeno MD

## 2020-05-07 NOTE — H&P
Interventional and Vascular Radiology History and Physical    Patient: Maria A Christianson 64 y.o. female       Chief Complaint: No chief complaint on file. History of Present Illness: dialysis     History:    Past Medical History:   Diagnosis Date    Basal cell carcinoma     GERD (gastroesophageal reflux disease)     Kidney stones     Polyp of ureter      Family History   Problem Relation Age of Onset    Prostate Cancer Father         PROSTATE    Breast Cancer Sister 46        invasive poorly differentiated ductal carcinoma, Neg genetic testing.     Cancer Sister 62        melanoma stage 1     Social History     Socioeconomic History    Marital status:      Spouse name: Not on file    Number of children: Not on file    Years of education: Not on file    Highest education level: Not on file   Occupational History    Not on file   Social Needs    Financial resource strain: Not on file    Food insecurity     Worry: Not on file     Inability: Not on file    Transportation needs     Medical: Not on file     Non-medical: Not on file   Tobacco Use    Smoking status: Never Smoker    Smokeless tobacco: Never Used   Substance and Sexual Activity    Alcohol use: Not on file    Drug use: Not on file    Sexual activity: Not on file   Lifestyle    Physical activity     Days per week: Not on file     Minutes per session: Not on file    Stress: Not on file   Relationships    Social connections     Talks on phone: Not on file     Gets together: Not on file     Attends [employer] service: Not on file     Active member of club or organization: Not on file     Attends meetings of clubs or organizations: Not on file     Relationship status: Not on file    Intimate partner violence     Fear of current or ex partner: Not on file     Emotionally abused: Not on file     Physically abused: Not on file     Forced sexual activity: Not on file   Other Topics Concern    Not on file   Social History Narrative    Not on file       Allergies:    Allergies   Allergen Reactions    Augmentin [Amoxicillin-Pot Clavulanate] Rash and Itching       Current Medications:  Current Facility-Administered Medications   Medication Dose Route Frequency    ceFAZolin (ANCEF) 1 gram injection        heparin (porcine) 1,000 unit/mL injection 3,800 Units  3,800 Units Hemodialysis DIALYSIS PRN    prochlorperazine (COMPAZINE) with saline injection 5 mg  5 mg IntraVENous Q6H PRN    metoprolol (LOPRESSOR) 5 mg/mL oral suspension 25 mg  25 mg Per NG tube Q12H    insulin glargine (LANTUS) injection 28 Units  28 Units SubCUTAneous QHS    balsam peru-castor oiL (VENELEX) ointment   Topical BID    bumetanide (BUMEX) tablet 2 mg  2 mg Oral DAILY    guaiFENesin (ROBITUSSIN) 100 mg/5 mL oral liquid 100 mg  100 mg Oral Q12H    epoetin jovani-epbx (RETACRIT) injection 20,000 Units  20,000 Units SubCUTAneous Q TUE, THU & SAT    HYDROcodone-acetaminophen (NORCO) 5-325 mg per tablet 1 Tab  1 Tab Oral Q6H PRN    phenol throat spray (CHLORASEPTIC) 1 Spray  1 Spray Oral Q6H PRN    sevelamer carbonate (RENVELA) oral powder 0.8 g  0.8 g Oral TID WITH MEALS    guar gum (BENEFIBER) packet 1 Packet  4 g Oral TID    labetaloL (NORMODYNE;TRANDATE) injection 20 mg  20 mg IntraVENous Q4H PRN    albumin human 25% (BUMINATE) solution 12.5 g  12.5 g IntraVENous DIALYSIS PRN    albuterol (PROVENTIL VENTOLIN) nebulizer solution 2.5 mg  2.5 mg Nebulization Q4H PRN    famotidine (PEPCID) 8 mg/mL oral suspension 20 mg  20 mg Per NG tube DAILY    alteplase (CATHFLO) 2 mg in sterile water (preservative free) 2 mL injection  2 mg InterCATHeter DIALYSIS PRN    alteplase (CATHFLO) 2 mg in sterile water (preservative free) 2 mL injection  2 mg InterCATHeter DIALYSIS PRN    insulin lispro (HUMALOG) injection   SubCUTAneous Q6H    white petrolatum-mineral oiL (AKWA TEARS) 83-15 % ophthalmic ointment   Both Eyes Q12H    bacitracin 500 unit/gram packet 1 Packet  1 Packet Topical PRN    sodium chloride (NS) flush 5-40 mL  5-40 mL IntraVENous Q8H    sodium chloride (NS) flush 5-40 mL  5-40 mL IntraVENous PRN    ondansetron (ZOFRAN) injection 4 mg  4 mg IntraVENous Q4H PRN    acetaminophen (TYLENOL) tablet 650 mg  650 mg Oral Q6H PRN    Or    acetaminophen (TYLENOL) suppository 650 mg  650 mg Rectal Q6H PRN    glucose chewable tablet 16 g  4 Tab Oral PRN    glucagon (GLUCAGEN) injection 1 mg  1 mg IntraMUSCular PRN    dextrose 10% infusion 0-250 mL  0-250 mL IntraVENous PRN        Physical Exam:  Blood pressure 116/77, pulse (!) 108, temperature 98.1 °F (36.7 °C), temperature source Oral, resp. rate 16, height 5' 4\" (1.626 m), weight 109.2 kg (240 lb 11.5 oz), SpO2 95 %.   LUNG: clear to auscultation bilaterally, HEART: regular rate and rhythm, S1, S2 normal, no murmur, click, rub or gallop      Alerts:    Hospital Problems  Date Reviewed: 4/19/2020          Codes Class Noted POA    Hypotension ICD-10-CM: I95.9  ICD-9-CM: 458.9 Acute 4/2/2020 Yes        Retroperitoneal hemorrhage ICD-10-CM: R58  ICD-9-CM: 459.0  4/19/2020 No        Acute renal failure (ARF) (HCC) ICD-10-CM: N17.9  ICD-9-CM: 584.9  4/19/2020 No        Encephalopathy ICD-10-CM: G93.40  ICD-9-CM: 348.30  4/19/2020 No        Sepsis with multi-organ dysfunction ([facility] ICD-10-CM: A41.9, R65.20  ICD-9-CM: 038.9, 995.92  4/19/2020 Unknown        Pneumonia due to methicillin susceptible Staphylococcus aureus (MSSA) ([facility] ICD-10-CM: Y94.798  ICD-9-CM: 482.41  4/19/2020 Unknown        Thrombocytopenia ([facility] ICD-10-CM: D69.6  ICD-9-CM: 287.5  4/19/2020 Unknown        Diarrhea ICD-10-CM: R19.7  ICD-9-CM: 787.91  4/19/2020 Unknown        COVID-19 virus infection ICD-10-CM: U07.1  3/31/2020 Unknown              Laboratory:      Recent Labs     05/07/20  0350 05/06/20  0051   HGB  --  8.3*   HCT  --  27.8*   WBC  --  10.2   PLT  --  269   INR 1.2*  --    BUN 59* 41*   CREA 2.34* 1.84*   K 3.7 3.6         Plan of Care/Planned Procedure:  Risks, benefits, and alternatives reviewed with patient and she agrees to proceed with the procedure. Conscious sedation will be performed with IV fentanyl and versed.  Plan is for permcath placement       Daisha Pelletier MD

## 2020-05-07 NOTE — PROGRESS NOTES
Problem: Nutrition Deficit  Goal: *Optimize nutritional status  Outcome: Not Progressing Towards Goal     Problem: Falls - Risk of  Goal: *Absence of Falls  Description: Document Sami Crissy Fall Risk and appropriate interventions in the flowsheet. Outcome: Progressing Towards Goal  Note: Fall Risk Interventions:  Mobility Interventions: Patient to call before getting OOB    Mentation Interventions: Adequate sleep, hydration, pain control    Medication Interventions: Patient to call before getting OOB    Elimination Interventions: Call light in reach    History of Falls Interventions: Door open when patient unattended         Problem: Diabetes Self-Management  Goal: *Disease process and treatment process  Description: Define diabetes and identify own type of diabetes; list 3 options for treating diabetes.   Outcome: Not Progressing Towards Goal     Problem: Impaired Skin Integrity/Pressure Injury Treatment  Goal: *Improvement of Existing Pressure Injury  Outcome: Progressing Towards Goal

## 2020-05-07 NOTE — PROGRESS NOTES
Bedside and Verbal shift change report given to Ezra Weiss (oncoming nurse) by Reina Bundy (offgoing nurse). Report included the following information Kardex, Intake/Output, MAR, Recent Results and Cardiac Rhythm Sinus Tach.

## 2020-05-07 NOTE — PROGRESS NOTES
PULMONARY MEDICINE    Progress Note    Name: Melanie Mixon   : 1960   MRN: [id number]   Date: 2020      Subjective:       Down to room air    5/6   Not verbalizing much. Noted chest film from yesterday. 5/5  Less confused. No WOB    5/4  Some bouts of confusion  No WOB    Consult Note:   Requesting Physician: Dr. Kenneth Joseph  Reason for consult: Extubated COVID patient    Medical records and data reviewed. Patient is a 64 y.o. female who was admitted on 3/31 with severe COVID 19 pneumonia and multisystem organ failure and required invasive mechanical ventilation, CRRT and pressors. She recovered slowly, was extubated and transferred to telemetry for ongoing care. She has generalized weakness. She was noted to be lethargic this morning with labored breathing and was placed on bipap. ABG that was done sis not show resp acidosis, oxygenation was preserved. CXR does not show new or worsening infiltrates. She appears to be at risk for underlying MIKE which is not known from before. She is starting to work with PT/OT.  COVID 19 test was negative on      Review of Systems:     A comprehensive 12 system review of systems was not obtained from the patient    Assessment:   S/P multisystem organ failure with COVID 19 pneumonia  Encephalopathy- slowly improving, ABG did not show resp acidosis  Generalized weakness, likely with a component of critical illness neuropathy/myopathy without respiratory muscle weakness, episodic increased work of breathing  Metabolic alkalosis on diuretic therapy  Acute PE, not anticoagulated secondary to retroperitoneal bleed  Other medical problems per chart      Recommendations:     NIPPV PRN   Wean O2 as tolerated  PT/OT  renal involved  SLP  Noted 5/4 dopplers with partially occluded superficial thrombus, no obvious DVT, but it sounds like a limited exam. We may need to consider an IVC filter  Chest x-ray tomorrow        Active Problem List:     Problem List  Date Reviewed: 2020          Codes Class    Hypotension ICD-10-CM: I95.9  ICD-9-CM: 458.9 Acute        Retroperitoneal hemorrhage ICD-10-CM: R58  ICD-9-CM: 459.0         Acute renal failure (ARF) (HCC) ICD-10-CM: N17.9  ICD-9-CM: 584.9         Encephalopathy ICD-10-CM: G93.40  ICD-9-CM: 348.30         Sepsis with multi-organ dysfunction (HCC) ICD-10-CM: A41.9, R65.20  ICD-9-CM: 038.9, 995.92         Pneumonia due to methicillin susceptible Staphylococcus aureus (MSSA) ([facility] ICD-10-CM: J15.211  ICD-9-CM: 482.41         Thrombocytopenia ([facility]) ICD-10-CM: D69.6  ICD-9-CM: 287.5         Diarrhea ICD-10-CM: R19.7  ICD-9-CM: 787.91         COVID-19 virus infection ICD-10-CM: U07.1         Obesity, morbid ([facility] ICD-10-CM: E66.01  ICD-9-CM: 278.01         Vaginal Pap smear following hysterectomy for malignancy ICD-10-CM: Z08, Z90.710  ICD-9-CM: V67.01         Personal history of malignant neoplasm of other parts of uterus ICD-10-CM: Z85.42  ICD-9-CM: V10.42         Endometrial cancer ([place] [address].) ICD-10-CM: C54.1  ICD-9-CM: 182.0               Past Medical History:      has a past medical history of Basal cell carcinoma, GERD (gastroesophageal reflux disease), Kidney stones, and Polyp of ureter. Past Surgical History:      has a past surgical history that includes hysteroscopy diagnostic (); pr endometrial ablation, thermal; hx  section (); hx colonoscopy; insert arterial line (2020); and ir insert non tunl cvc over 5 yrs (2020). Home Medications:     Prior to Admission medications    Medication Sig Start Date End Date Taking? Authorizing Provider   pantoprazole (PROTONIX) 40 mg tablet TAKE 1 TABLET BY MOUTH TWICE A DAY 18   Provider, Historical   esomeprazole (NEXIUM) 20 mg capsule Take 20 mg by mouth daily. Indications: BID    Provider, Historical   zolpidem (AMBIEN) 10 mg tablet Take 0.5 Tabs by mouth nightly as needed for Sleep.  Max Daily Amount: 5 mg. 17   Breonna Ballesteros MD fluticasone (FLONASE) 50 mcg/actuation nasal spray Mist 1-2 spray(s) into each nostril once daily. 4/3/15   Provider, Historical   ranitidine (ZANTAC) 150 mg tablet 150 mg.    Provider, Historical       Allergies/Social/Family History: Allergies   Allergen Reactions    Augmentin [Amoxicillin-Pot Clavulanate] Rash and Itching      Social History     Tobacco Use    Smoking status: Never Smoker    Smokeless tobacco: Never Used   Substance Use Topics    Alcohol use: Not on file      Family History   Problem Relation Age of Onset    Prostate Cancer Father         PROSTATE    Breast Cancer Sister 46        invasive poorly differentiated ductal carcinoma, Neg genetic testing.  Cancer Sister 62        melanoma stage 1            Objective:   Vital Signs:  Visit Vitals  /72 (BP 1 Location: Left arm, BP Patient Position: At rest)   Pulse (!) 110   Temp 98.5 °F (36.9 °C)   Resp 20   Ht 5' 4\" (1.626 m)   Wt 109.2 kg (240 lb 11.5 oz)   SpO2 99%   BMI 41.32 kg/m²    O2 Flow Rate (L/min): 2 l/min O2 Device: Room air Temp (24hrs), Av.6 °F (37 °C), Min:97.6 °F (36.4 °C), Max:99.7 °F (37.6 °C)           Intake/Output:     Intake/Output Summary (Last 24 hours) at 2020 1009  Last data filed at 2020  Gross per 24 hour   Intake 290 ml   Output 250 ml   Net 40 ml       Physical Exam: Detailed exam deferred in view of ongoing pandemic.  Physical exam as documented by attending physician was reviewed and discussed   General:  Awake, weak   Head:     Eyes:     Neck:    Lungs:      Chest wall:     Heart:     Abdomen:      Extremities:    Pulses:    Skin:    Neurologic:          LABS AND  DATA: Personally reviewed  Recent Labs     20  0051   WBC 10.2   HGB 8.3*   HCT 27.8*        Recent Labs     20  0350 20  0051    134*   K 3.7 3.6    98   CO2 28 30   BUN 59* 41*   CREA 2.34* 1.84*   * 207*   CA 9.0 8.8   MG 1.9 1.9   PHOS 3.4 2.1*     Recent Labs 05/07/20  0350 05/06/20  0051   ALB 2.4* 2.5*     Recent Labs     05/07/20  0350   INR 1.2*   PTP 11.8*      No results for input(s): PHI, PCO2I, PO2I, FIO2I in the last 72 hours. No results for input(s): CPK, CKMB, TROIQ, BNPP in the last 72 hours. MEDS: Reviewed    Chest Imaging: personally reviewed and report checked    Tele- reviewed    Medical decision making:   I have reviewed the flowsheet and previous day's notes  Patient has acute or chronic illness that poses a threat to life or bodily function  Review and order of Clinical lab tests  Review and Order of Radiology tests  Independent visualization of Image          Thank you for allowing me to participate in this patient's care.     John Graham PA-C      [facility]

## 2020-05-07 NOTE — PROGRESS NOTES
Problem: Dysphagia (Adult)  Goal: *Acute Goals and Plan of Care (Insert Text)  Description: Speech Therapy Goals  Initiated 5/4/2020    1. Patient will tolerate small amounts of ice chips for swallow rehab without adverse effects within 7 days. 2. Patient will participate in swallow re-evaluation within 7 days. Outcome: Progressing Towards Goal     SPEECH LANGUAGE PATHOLOGY DYSPHAGIA TREATMENT  Patient: Ramana Gomez (89 y.o. female)  Date: 5/7/2020  Diagnosis: COVID-19 virus infection [U07.1]   <principal problem not specified>       Precautions: Fall    ASSESSMENT:  Pt seen by ENT yesterday, sincerely appreciate Dr. Benítez Dial note. Reported L VF paralysis either 2/2 to intubation or infectious process. This is consistent with pt's aphonia at bedside. Given these findings, pt at even higher risk for silent prandial aspiration. However, strongly recommend pt participate in ice chip and water swab trials to work on swallow rehab. Would recommend continuing ice chips through the weekend, and then hopeful to have MBS completed Monday if patient tolerating ice chips. PLAN:  Recommendations and Planned Interventions:  --NPO with ice chips/water swabs for swallow rehab  --MBS hopefully Monday    Patient continues to benefit from skilled intervention to address the above impairments. Continue treatment per established plan of care. Discharge Recommendations: To Be Determined     SUBJECTIVE:   Patient stated, \"He said that my vocal cord isn't moving but he'd check back in a little while on it.     OBJECTIVE:   Cognitive and Communication Status:  Neurologic State: Alert  Orientation Level: Oriented X4  Cognition: Decreased attention/concentration, Decreased command following  Perception: Appears intact  Perseveration: No perseveration noted  Safety/Judgement: Awareness of environment  Dysphagia Treatment:  Oral Assessment:  Oral Assessment  Labial: No impairment  Dentition: Intact  Oral Hygiene: oral mucosa dry  Lingual: No impairment  Velum: No impairment  Mandible: No impairment  P.O. Trials:  Patient Position: upright in bed  Vocal quality prior to P.O.: Aphonic  Consistency Presented: Ice chips; Thin liquid  How Presented: Self-fed/presented;Cup/sip;Spoon     Bolus Acceptance: No impairment  Bolus Formation/Control: No impairment     Propulsion: No impairment  Oral Residue: None  Initiation of Swallow: Delayed (# of seconds)  Laryngeal Elevation: Decreased  Aspiration Signs/Symptoms: Weak cough  Pharyngeal Phase Characteristics: Easily fatigued ; Poor endurance             Oral Phase Severity: No impairment  Pharyngeal Phase Severity : Moderate-severe  Pain:  Pain Scale 1: Numeric (0 - 10)  Pain Intensity 1: 0  Pain Location 1: Buttocks    After treatment:   Call bell within reach and Nursing notified    COMMUNICATION/EDUCATION:     The patient's plan of care including recommendations, planned interventions, and recommended diet changes were discussed with: Registered nurse.      TITA Azar  Time Calculation: 13 mins

## 2020-05-07 NOTE — PROGRESS NOTES
Chart reviewed and attempted to see patient for OT intervention. Patient DARLENE at this time in endoscopy. Will follow up for OT intervention as able. Thank you.

## 2020-05-07 NOTE — PROGRESS NOTES
NUTRITION         Brief Note:    Consult received for tube feeding adjustment--thank you for the consult. Pt has had some issues with vomiting over the past couple of days, so MD would like to change pt's tube feeding from bolus to continuous for the time being. RD has put in order for: Nepro at 40 ml/hr, 65 ml water flush every 4 hours, 1 packet liquid Prosource at 0800 and at 2000. This equates to the same volume/calories/protein pt was receiving while on bolus feed schedule. RD continues to follow pt's progress and nutrition support. Thank you again for the consult.         Kevin Jalloh, [address] [place] [address], [address] [facility]

## 2020-05-07 NOTE — PROGRESS NOTES
10:00  Per Md resume tube feedings as ordered and monitor for vomitting/tolerance. 11:00   Per MD notified Dietary  - IP dietary consult for continuous feed    15:00 Pt returned to [facility] from having permacath placed in angio. Continuous tube feeding initiated.

## 2020-05-07 NOTE — PROGRESS NOTES
Pt returned from IR. Sleepy but arousable. drsg to right chest dialysis catheter c/d/i. VSS.  Dialysis nurse at bedside setting up for HD

## 2020-05-07 NOTE — PROGRESS NOTES
IFTIKHAR PLAN:    Referrals sent to [facility] as first choice and [facility] as second as requested by patient's . (They live in [place])    CM to fax updated clinicals to Jerrica Giraldo at [phone] on Monday for review. Tel # [phone]    1. [facility]- Per Chelsea Collar, patient must have the real COVID-19 testing(nasally not by sputum) repeated and be negative. 2. Patient must either be eating food or fed via a PEG tube as [facility] could not accept with TF.     3. A referral was sent to [facility] via [employer] to set up an outpatient HD schedule prior to discharge. CM to follow up in am    4. Patient for Perma-cath placement today. 5. Patient will need transportation at discharge    CM notified Dr. Alexa Galan that [facility] is requesting a COVID-19 testing done nasally and that patient had to have a PEG tube if unable to eat before considering her for admission. Dr. Alexa Galan said she would speak with patient's  regarding PEG placement and order the COVID-19 test as requested. CM will continue to follow.  Florina Ponce MSA, RN,CRM

## 2020-05-08 ENCOUNTER — APPOINTMENT (OUTPATIENT)
Dept: GENERAL RADIOLOGY | Age: 60
DRG: 870 | End: 2020-05-08
Attending: INTERNAL MEDICINE
Payer: COMMERCIAL

## 2020-05-08 ENCOUNTER — APPOINTMENT (OUTPATIENT)
Dept: GENERAL RADIOLOGY | Age: 60
DRG: 870 | End: 2020-05-08
Attending: NURSE PRACTITIONER
Payer: COMMERCIAL

## 2020-05-08 LAB
ALBUMIN SERPL-MCNC: 2.4 G/DL (ref 3.5–5)
ANION GAP SERPL CALC-SCNC: 4 MMOL/L (ref 5–15)
BUN SERPL-MCNC: 32 MG/DL (ref 6–20)
BUN/CREAT SERPL: 22 (ref 12–20)
CALCIUM SERPL-MCNC: 8.6 MG/DL (ref 8.5–10.1)
CHLORIDE SERPL-SCNC: 102 MMOL/L (ref 97–108)
CO2 SERPL-SCNC: 31 MMOL/L (ref 21–32)
CREAT SERPL-MCNC: 1.48 MG/DL (ref 0.55–1.02)
ERYTHROCYTE [DISTWIDTH] IN BLOOD BY AUTOMATED COUNT: 15.2 % (ref 11.5–14.5)
GLUCOSE BLD STRIP.AUTO-MCNC: 104 MG/DL (ref 65–100)
GLUCOSE BLD STRIP.AUTO-MCNC: 127 MG/DL (ref 65–100)
GLUCOSE BLD STRIP.AUTO-MCNC: 151 MG/DL (ref 65–100)
GLUCOSE BLD STRIP.AUTO-MCNC: 152 MG/DL (ref 65–100)
GLUCOSE BLD STRIP.AUTO-MCNC: 157 MG/DL (ref 65–100)
GLUCOSE SERPL-MCNC: 106 MG/DL (ref 65–100)
HCT VFR BLD AUTO: 27 % (ref 35–47)
HGB BLD-MCNC: 8.2 G/DL (ref 11.5–16)
MAGNESIUM SERPL-MCNC: 1.9 MG/DL (ref 1.6–2.4)
MCH RBC QN AUTO: 30.5 PG (ref 26–34)
MCHC RBC AUTO-ENTMCNC: 30.4 G/DL (ref 30–36.5)
MCV RBC AUTO: 100.4 FL (ref 80–99)
NRBC # BLD: 0 K/UL (ref 0–0.01)
NRBC BLD-RTO: 0 PER 100 WBC
PHOSPHATE SERPL-MCNC: 2.6 MG/DL (ref 2.6–4.7)
PLATELET # BLD AUTO: 255 K/UL (ref 150–400)
PMV BLD AUTO: 9.6 FL (ref 8.9–12.9)
POTASSIUM SERPL-SCNC: 3.5 MMOL/L (ref 3.5–5.1)
RBC # BLD AUTO: 2.69 M/UL (ref 3.8–5.2)
SARS-COV-2, COV2: NOT DETECTED
SERVICE CMNT-IMP: ABNORMAL
SODIUM SERPL-SCNC: 137 MMOL/L (ref 136–145)
SPECIMEN SOURCE, FCOV2M: NORMAL
WBC # BLD AUTO: 9.7 K/UL (ref 3.6–11)

## 2020-05-08 PROCEDURE — 36415 COLL VENOUS BLD VENIPUNCTURE: CPT

## 2020-05-08 PROCEDURE — 65660000000 HC RM CCU STEPDOWN

## 2020-05-08 PROCEDURE — 74018 RADEX ABDOMEN 1 VIEW: CPT

## 2020-05-08 PROCEDURE — 74011250637 HC RX REV CODE- 250/637: Performed by: INTERNAL MEDICINE

## 2020-05-08 PROCEDURE — 74011250637 HC RX REV CODE- 250/637: Performed by: NURSE PRACTITIONER

## 2020-05-08 PROCEDURE — 87635 SARS-COV-2 COVID-19 AMP PRB: CPT

## 2020-05-08 PROCEDURE — 74011636637 HC RX REV CODE- 636/637: Performed by: INTERNAL MEDICINE

## 2020-05-08 PROCEDURE — 97110 THERAPEUTIC EXERCISES: CPT

## 2020-05-08 PROCEDURE — 80069 RENAL FUNCTION PANEL: CPT

## 2020-05-08 PROCEDURE — 83735 ASSAY OF MAGNESIUM: CPT

## 2020-05-08 PROCEDURE — 97530 THERAPEUTIC ACTIVITIES: CPT

## 2020-05-08 PROCEDURE — 74011250636 HC RX REV CODE- 250/636: Performed by: INTERNAL MEDICINE

## 2020-05-08 PROCEDURE — 85027 COMPLETE CBC AUTOMATED: CPT

## 2020-05-08 PROCEDURE — 92526 ORAL FUNCTION THERAPY: CPT

## 2020-05-08 PROCEDURE — 82962 GLUCOSE BLOOD TEST: CPT

## 2020-05-08 RX ORDER — TRAZODONE HYDROCHLORIDE 50 MG/1
50 TABLET ORAL
Status: DISCONTINUED | OUTPATIENT
Start: 2020-05-08 | End: 2020-05-19

## 2020-05-08 RX ADMIN — SODIUM CHLORIDE 10 ML: 9 INJECTION INTRAMUSCULAR; INTRAVENOUS; SUBCUTANEOUS at 14:00

## 2020-05-08 RX ADMIN — INSULIN LISPRO 2 UNITS: 100 INJECTION, SOLUTION INTRAVENOUS; SUBCUTANEOUS at 13:38

## 2020-05-08 RX ADMIN — CASTOR OIL AND BALSAM, PERU: 788; 87 OINTMENT TOPICAL at 18:00

## 2020-05-08 RX ADMIN — CASTOR OIL AND BALSAM, PERU: 788; 87 OINTMENT TOPICAL at 11:23

## 2020-05-08 RX ADMIN — FAMOTIDINE 20 MG: 40 POWDER, FOR SUSPENSION ORAL at 11:22

## 2020-05-08 RX ADMIN — BUMETANIDE 2 MG: 1 TABLET ORAL at 11:24

## 2020-05-08 RX ADMIN — ONDANSETRON 4 MG: 2 INJECTION INTRAMUSCULAR; INTRAVENOUS at 02:11

## 2020-05-08 RX ADMIN — Medication 1 PACKET: at 11:22

## 2020-05-08 RX ADMIN — SEVELAMER CARBONATE 0.8 G: 800 POWDER, FOR SUSPENSION ORAL at 11:22

## 2020-05-08 RX ADMIN — GUAIFENESIN 100 MG: 200 SOLUTION ORAL at 11:22

## 2020-05-08 RX ADMIN — METOPROLOL TARTRATE 25 MG: 50 TABLET, FILM COATED ORAL at 05:29

## 2020-05-08 RX ADMIN — SODIUM CHLORIDE 10 ML: 9 INJECTION INTRAMUSCULAR; INTRAVENOUS; SUBCUTANEOUS at 05:29

## 2020-05-08 RX ADMIN — ONDANSETRON 4 MG: 2 INJECTION INTRAMUSCULAR; INTRAVENOUS at 11:18

## 2020-05-08 NOTE — PROGRESS NOTES
[facility]   [address] [address] [facility] [address], [facility]  Phone: [phone]   [phone]       Nephrology Progress Note  Titus Maria     1960     [id number]  Date of Admission : 3/31/2020  05/08/20    CC: Follow up for JUANCHO      Assessment and Plan   JUANCHO :  - 2/2 ATN  - no signs of recovery yet  - cont HD TTS while here  - PC placed 5/7  - CM working on outpt unit    COVID-19 +ve   Acute Hypoxic Resp Failure   - On Vent   - completed Plaquenil, - s/p Tocilizumab   - extubated 4/29  - repeat SARS-CoV-2 neg 4/20 and 4/27     Anemia in CKD:  - cont KOKI    RP hematoma:  - stable    Cardiac arrest 4/9    PE this admission  Non-occlusive superfical DVT:  - on on AC due to large RP hematoma  - no IVC to be placed    Type II DM   - Insulin per primary team      Morbid Obesity     Nutrition:  - on TF now     Interval History:  Seen and examined. Dialyzed yesterday. Poor UOP. PC placed yesterday. More awake, hoarse voice. Working on placement. Review of Systems: Review of systems not obtained due to patient factors.     Current Medications:   Current Facility-Administered Medications   Medication Dose Route Frequency    heparin (porcine) 1,000 unit/mL injection 3,800 Units  3,800 Units Hemodialysis DIALYSIS PRN    prochlorperazine (COMPAZINE) with saline injection 5 mg  5 mg IntraVENous Q6H PRN    metoprolol (LOPRESSOR) 5 mg/mL oral suspension 25 mg  25 mg Per NG tube Q12H    insulin glargine (LANTUS) injection 28 Units  28 Units SubCUTAneous QHS    balsam peru-castor oiL (VENELEX) ointment   Topical BID    bumetanide (BUMEX) tablet 2 mg  2 mg Oral DAILY    guaiFENesin (ROBITUSSIN) 100 mg/5 mL oral liquid 100 mg  100 mg Oral Q12H    epoetin jovani-epbx (RETACRIT) injection 20,000 Units  20,000 Units SubCUTAneous Q TUE, THU & SAT    HYDROcodone-acetaminophen (NORCO) 5-325 mg per tablet 1 Tab  1 Tab Oral Q6H PRN    phenol throat spray (CHLORASEPTIC) 1 Spray  1 Spray Oral Q6H PRN    sevelamer carbonate (RENVELA) oral powder 0.8 g  0.8 g Oral TID WITH MEALS    guar gum (BENEFIBER) packet 1 Packet  4 g Oral TID    labetaloL (NORMODYNE;TRANDATE) injection 20 mg  20 mg IntraVENous Q4H PRN    albumin human 25% (BUMINATE) solution 12.5 g  12.5 g IntraVENous DIALYSIS PRN    albuterol (PROVENTIL VENTOLIN) nebulizer solution 2.5 mg  2.5 mg Nebulization Q4H PRN    famotidine (PEPCID) 8 mg/mL oral suspension 20 mg  20 mg Per NG tube DAILY    alteplase (CATHFLO) 2 mg in sterile water (preservative free) 2 mL injection  2 mg InterCATHeter DIALYSIS PRN    alteplase (CATHFLO) 2 mg in sterile water (preservative free) 2 mL injection  2 mg InterCATHeter DIALYSIS PRN    insulin lispro (HUMALOG) injection   SubCUTAneous Q6H    white petrolatum-mineral oiL (AKWA TEARS) 83-15 % ophthalmic ointment   Both Eyes Q12H    bacitracin 500 unit/gram packet 1 Packet  1 Packet Topical PRN    sodium chloride (NS) flush 5-40 mL  5-40 mL IntraVENous Q8H    sodium chloride (NS) flush 5-40 mL  5-40 mL IntraVENous PRN    ondansetron (ZOFRAN) injection 4 mg  4 mg IntraVENous Q4H PRN    acetaminophen (TYLENOL) tablet 650 mg  650 mg Oral Q6H PRN    Or    acetaminophen (TYLENOL) suppository 650 mg  650 mg Rectal Q6H PRN    glucose chewable tablet 16 g  4 Tab Oral PRN    glucagon (GLUCAGEN) injection 1 mg  1 mg IntraMUSCular PRN    dextrose 10% infusion 0-250 mL  0-250 mL IntraVENous PRN      Allergies   Allergen Reactions    Augmentin [Amoxicillin-Pot Clavulanate] Rash and Itching       Objective:  Vitals:    Vitals:    05/07/20 2349 05/08/20 0058 05/08/20 0134 05/08/20 0530   BP: 117/61   112/54   Pulse: (!) 108   (!) 103   Resp: 16   16   Temp: 98 °F (36.7 °C)   98.1 °F (36.7 °C)   TempSrc:       SpO2: 96% 96%  98%   Weight:   107.9 kg (237 lb 14.4 oz)    Height:         Intake and Output:  No intake/output data recorded.   05/06 1901 - 05/08 0700  In: 195   Out: 2150 [Urine:150]    Physical Examination:     General: In NAD  HEENT:           NGT in place  Resp:  Reduced bibasilar breath sounds  CV:  RRR, no murmur   GI:  Obese , soft, NT   Neurologic:  awake  Access:           R JAZZ torres     []    High complexity decision making was performed  []    Patient is at high-risk of decompensation with multiple organ involvement    Lab Data Personally Reviewed: I have reviewed all the pertinent labs, microbiology data and radiology studies during assessment.     Recent Labs     05/08/20  0552 05/07/20  0350 05/06/20  0051    136 134*   K 3.5 3.7 3.6    100 98   CO2 31 28 30   * 164* 207*   BUN 32* 59* 41*   CREA 1.48* 2.34* 1.84*   CA 8.6 9.0 8.8   MG 1.9 1.9 1.9   PHOS 2.6 3.4 2.1*   ALB 2.4* 2.4* 2.5*   INR  --  1.2*  --      Recent Labs     05/08/20  0552 05/06/20  0051   WBC 9.7 10.2   HGB 8.2* 8.3*   HCT 27.0* 27.8*    269     No results found for: SDES  Lab Results   Component Value Date/Time    Culture result: NO GROWTH 5 DAYS 04/09/2020 02:32 PM    Culture result: NO GROWTH 5 DAYS 04/08/2020 10:36 AM    Culture result: NO GROWTH 5 DAYS 03/31/2020 11:15 AM    Culture result: MODERATE STAPHYLOCOCCUS AUREUS (A) 03/31/2020 10:34 AM    Culture result: LIGHT NORMAL RESPIRATORY SOFIE 03/31/2020 10:34 AM     Recent Results (from the past 24 hour(s))   GLUCOSE, POC    Collection Time: 05/07/20 11:33 AM   Result Value Ref Range    Glucose (POC) 113 (H) 65 - 100 mg/dL    Performed by Alba Schafer    GLUCOSE, POC    Collection Time: 05/07/20  5:33 PM   Result Value Ref Range    Glucose (POC) 121 (H) 65 - 100 mg/dL    Performed by Elida Martinez, POC    Collection Time: 05/07/20 10:03 PM   Result Value Ref Range    Glucose (POC) 171 (H) 65 - 100 mg/dL    Performed by Anuj COLORADO)    GLUCOSE, POC    Collection Time: 05/08/20  1:35 AM   Result Value Ref Range    Glucose (POC) 152 (H) 65 - 100 mg/dL    Performed by Lesley Blake    GLUCOSE, POC    Collection Time: 05/08/20  5:38 AM   Result Value Ref Range    Glucose (POC) 104 (H) 65 - 100 mg/dL    Performed by Carolee Liao    RENAL FUNCTION PANEL    Collection Time: 05/08/20  5:52 AM   Result Value Ref Range    Sodium 137 136 - 145 mmol/L    Potassium 3.5 3.5 - 5.1 mmol/L    Chloride 102 97 - 108 mmol/L    CO2 31 21 - 32 mmol/L    Anion gap 4 (L) 5 - 15 mmol/L    Glucose 106 (H) 65 - 100 mg/dL    BUN 32 (H) 6 - 20 MG/DL    Creatinine 1.48 (H) 0.55 - 1.02 MG/DL    BUN/Creatinine ratio 22 (H) 12 - 20      GFR est AA 44 (L) >60 ml/min/1.73m2    GFR est non-AA 36 (L) >60 ml/min/1.73m2    Calcium 8.6 8.5 - 10.1 MG/DL    Phosphorus 2.6 2.6 - 4.7 MG/DL    Albumin 2.4 (L) 3.5 - 5.0 g/dL   MAGNESIUM    Collection Time: 05/08/20  5:52 AM   Result Value Ref Range    Magnesium 1.9 1.6 - 2.4 mg/dL   CBC W/O DIFF    Collection Time: 05/08/20  5:52 AM   Result Value Ref Range    WBC 9.7 3.6 - 11.0 K/uL    RBC 2.69 (L) 3.80 - 5.20 M/uL    HGB 8.2 (L) 11.5 - 16.0 g/dL    HCT 27.0 (L) 35.0 - 47.0 %    .4 (H) 80.0 - 99.0 FL    MCH 30.5 26.0 - 34.0 PG    MCHC 30.4 30.0 - 36.5 g/dL    RDW 15.2 (H) 11.5 - 14.5 %    PLATELET 765 744 - 956 K/uL    MPV 9.6 8.9 - 12.9 FL    NRBC 0.0 0  WBC    ABSOLUTE NRBC 0.00 0.00 - 0.01 K/uL           Total time spent with patient:  xxx   min. Care Plan discussed with:  Patient     Family      RN      Consulting Physician [facility],         I have reviewed the flowsheets. Chart and Pertinent Notes have been reviewed. No change in PMH ,family and social history from Consult note.       Brian Sumner MD

## 2020-05-08 NOTE — PROGRESS NOTES
This  Writer advanced ,NGT 17  Cm  To 52  Sadiq,pt  Did not want  It advanced more,  Will  Wait  For   Chest  X- ray  Before  Resuming  feed

## 2020-05-08 NOTE — PROGRESS NOTES
Problem: Pressure Injury - Risk of  Goal: Prevention of pressure injury  Outcome: Progressing Towards Goal  Absorbent underpads, Minimize layers, Limit adult briefs, Check for incontinence Q2 hours and as needed, Internal/External urinary devices, Moisture barrier, Assess changes in LOC, Discuss PT/OT consult with provider, Float heels, Keep linens dry and wrinkle-free, Minimize linen layers, Turn and reposition approx. every two hours (pillows and wedges if needed), Pressure redistribution bed/mattress (bed type)      Problem: Diabetes Self-Management  Goal: Monitoring blood glucose, interpreting and using results  Outcome: Not Progressing Towards Goal   Pt on Q6 blood glucose checks, treated per sliding scale      0120-NG tube advanced to 55cm, Natalee Rubin NP paged to reorder XUB to verify placement    0230-Mart NP paged      Hourly rounding done, pt in NSR, on RA, O2 saturation greater than 93%, on bedrest, using pure wick, urine ayesha and clear, no complaints of pain throughout shift. 0800-Bedside shift change report given to Nichelle Hunter (oncoming nurse) by Maria R Matthews RN (offgoing nurse). Report included the following information SBAR, Kardex, ED Summary, Intake/Output, MAR, Accordion, Recent Results, Med Rec Status, and Cardiac Rhythm NSR .

## 2020-05-08 NOTE — PROGRESS NOTES
Problem: Mobility Impaired (Adult and Pediatric)  Goal: *Acute Goals and Plan of Care (Insert Text)  Description: FUNCTIONAL STATUS PRIOR TO ADMISSION: Patient was independent and active without use of DME but poor history due to not being able to communicate. HOME SUPPORT PRIOR TO ADMISSION: Pt lives with family. Physical Therapy Goals  Initiated 5/4/2020  1. Patient will move from supine to sit and sit to supine , scoot up and down and roll side to side in bed with maximal assistance within 7 day(s). 2.  Patient will sit EOB with moderate assistance of 2 people, x5 minutes with active core initiation to correct sitting balance within 7 days. 3.  Patient will verbalize 3 exercises she can complete on her own outside of therapy sessions with 7 days. 4.  Patient will participate in supine HEP (AAROM as needed) with min assist for AAROM within 7 days. 5.  Patient will tolerate bed in chair position 30 min BID within 7 days. Initiated 4/25/2020  1. Patient will move from supine to sit and sit to supine , scoot up and down and roll side to side in bed with moderate assistance once off vent within 7 day(s). 2.  Patient will participate in sitting with min A for 2 minutes once off vent within 7 day(s). 3.  Patient will perform active supine or sitting TE with min A within 7 day(s). Outcome: Progressing Towards Goal   PHYSICAL THERAPY TREATMENT  Patient: Axel Whitaker (40 y.o. female)  Date: 5/8/2020  Diagnosis: COVID-19 virus infection [U07.1]   Precautions: Fall  Chart, physical therapy assessment, plan of care and goals were reviewed. ASSESSMENT  Patient continues with skilled PT services and is progressing towards goals. Patient received supine in bed, agreeable to therapy. Patient mobility team assisted patient OOB to chair via kris lift.  While in the chair, patient able to initiate forward lean for unsupported sitting and hold unsupported sitting x1 minute with cues for forward gaze and shoulder retraction. Participated in exercises as below. Patient with questions regarding what happened to her and Melissa Roe (she) got like this. \" PT explained her complicated course, prolonged intubation, and how this is related to her current weakness. Explained goals of therapy and importance of participating in her own care as able and participating in exercises outside of therapy sessions. She verbalized her frustration with not being allowed water and was able to state the risks involved with intake prior to appropriate clearance by SLP. Offered to page pastoral care for patient to help process her course and to have someone to talk to and she stated \"maybe this afternoon. \" RN aware. Her tolerance for mobility and activity continues to improve and she is working towards tolerating 3 hrs/day for hopeful to d/c to inpatient rehab. Current Level of Function Impacting Discharge (mobility/balance): max-total assist    Other factors to consider for discharge: previously independent, supportive family, motivated         PLAN :  Patient continues to benefit from skilled intervention to address the above impairments. Continue treatment per established plan of care. to address goals. Recommendation for discharge: (in order for the patient to meet his/her long term goals)  Therapy 3 hours per day 5-7 days per week    This discharge recommendation:  Has been made in collaboration with the attending provider and/or case management    IF patient discharges home will need the following DME: to be determined (TBD)       SUBJECTIVE:   Patient stated I don't know how I got like this.  I just don't remember    OBJECTIVE DATA SUMMARY:   Critical Behavior:  Neurologic State: Alert, Eyes open spontaneously  Orientation Level: Oriented X4  Cognition: Appropriate decision making, Appropriate safety awareness, Follows commands  Safety/Judgement: Awareness of environment  Functional Mobility Training:  Balance:  Sitting: Impaired; With support; Without support  Sitting - Static: Fair (occasional); Poor (constant support)  Sitting - Dynamic: Fair (occasional); Poor (constant support)  Therapeutic Exercises:   AAROM LAQ 1x5 each side  Ankle pumps 2x10  Static unsupported sitting x1 minute  Scapular retraction 1x3  AROM elbow flexion 1x5  Pain Rating:  Pain at sacral wound-- waffle cushion placed in chair prior to patient kris lifted to chair    Activity Tolerance:   Fair and requires frequent rest breaks  Please refer to the flowsheet for vital signs taken during this treatment.     After treatment patient left in no apparent distress:   Sitting in chair, Heels elevated for pressure relief, and Call bell within reach    COMMUNICATION/COLLABORATION:   The patients plan of care was discussed with: Registered nurse and SLP    Abbi Smith PT, DPT   Time Calculation: 46 mins

## 2020-05-08 NOTE — PROGRESS NOTES
Hospitalist Progress Note  Rosalinda Chappell MD  Answering service: [phone] from in house phone      Date of Service:  2020  NAME:  Stefan Baker  :  1960  MRN:  [id number]    Admission Summary:   59F s/p COVID treatement in ICU - extubated  Interval history / Subjective: Follow up Acute hypoxic resp. Failure, acute renal failure  Patient seen and examined by the bedside, Labs, images and notes reviewed  Patient was seen while she was having therapy. Discussed with nursing staff, orders reviewed. Discussed with Speech therapist about MBS on Monday  Called and Discussed with  about the updates regarding MBS on Monday and placement in rehab         Assessment & Plan:     #. ARF: - Nephrology following- on HD with good tolerance,  permacath placed 5/7  CM is setting up the dialysis outpatient.       #. COVID-19 +ve - Resolved- s/p Tocilizumab, Plaquenil. On Vit C, Zinc   #. Acute Hypoxic Resp Failure- resolved, Pulm following. Bipap at night and PRN. #. Acute metabolic Encephalopathy: likely ICU delirium- no agitation. Monitor- resolved. #. Sinus tachycardia: persistent,continue Metoprolol, check TSH  #. Anemia in CKD: monitor HB. #. Retroperitoneal bleed: this hospitalization, resolved - Avoid AP/AC meds- Monitor Hb  #. Non occlusive PE in LLL pulmonary artery - no ACs due to retroperitoneal bleed. Appreciate Vascular surgery recommendations for IVC filter, comments noted, conservative approach    #. S/p Cardiac arrest   #. DM2: Controlled, A1c 10.4, SSI, AccuChecks, monitor  #. Morbid obesity: counseled on health benefits of weight loss, Healthy diet, BMI 44.11 kg/m².    #Nutrition patient is on tube feeds via NGT, once we discussed with family will consult GI for PEG tube evaluation  # depression: start on Trazodone at bedtime and do Psych consult    Code status: Full  DVT prophylaxis: SCD  Care Plan discussed with: Patient/Family and Nurse  Disposition: TBD. >2days     Hospital Problems  Date Reviewed: 4/19/2020          Codes Class Noted POA    Hypotension ICD-10-CM: I95.9  ICD-9-CM: 458.9 Acute 4/2/2020 Yes        Retroperitoneal hemorrhage ICD-10-CM: R58  ICD-9-CM: 459.0  4/19/2020 No        Acute renal failure (ARF) (HCC) ICD-10-CM: N17.9  ICD-9-CM: 584.9  4/19/2020 No        Encephalopathy ICD-10-CM: G93.40  ICD-9-CM: 348.30  4/19/2020 No        Sepsis with multi-organ dysfunction (HCC) ICD-10-CM: A41.9, R65.20  ICD-9-CM: 038.9, 995.92  4/19/2020 Unknown        Pneumonia due to methicillin susceptible Staphylococcus aureus (MSSA) ([facility] ICD-10-CM: P33.110  ICD-9-CM: 482.41  4/19/2020 Unknown        Thrombocytopenia ([facility] ICD-10-CM: D69.6  ICD-9-CM: 287.5  4/19/2020 Unknown        Diarrhea ICD-10-CM: R19.7  ICD-9-CM: 787.91  4/19/2020 Unknown        COVID-19 virus infection ICD-10-CM: U07.1  3/31/2020 Unknown            Review of Systems:   Pertinent items are mentioned in interval history. Vital Signs:    Last 24hrs VS reviewed since prior progress note. Most recent are:  Visit Vitals  /69 (BP 1 Location: Right arm, BP Patient Position: Sitting)   Pulse (!) 110   Temp 97.7 °F (36.5 °C)   Resp 20   Ht 5' 4\" (1.626 m)   Wt 107.9 kg (237 lb 14.4 oz)   SpO2 99%   BMI 40.84 kg/m²         Intake/Output Summary (Last 24 hours) at 5/8/2020 1308  Last data filed at 5/7/2020 1954  Gross per 24 hour   Intake 195 ml   Output 2000 ml   Net -1805 ml        Physical Examination:   General:  Alert, No acute distress  Resp:  No accessory muscle use, Good AE, no wheezes, few rhonchi  Abd:  Soft, non-tender, non-distended, obese  Extremities:  No cyanosis or clubbing, no significant edema  Neuro:  Grossly normal, no focal neuro deficits, follows commands   Psych:   not agitated.     Data Review:    Review and/or order of clinical lab test  Review and/or order of tests in the radiology section of CPT  Review and/or order of tests in the medicine section of [facility]  Labs:     Recent Labs     05/08/20  0552 05/06/20  0051   WBC 9.7 10.2   HGB 8.2* 8.3*   HCT 27.0* 27.8*    269     Recent Labs     05/08/20  0552 05/07/20  0350 05/06/20  0051    136 134*   K 3.5 3.7 3.6    100 98   CO2 31 28 30   BUN 32* 59* 41*   CREA 1.48* 2.34* 1.84*   * 164* 207*   CA 8.6 9.0 8.8   MG 1.9 1.9 1.9   PHOS 2.6 3.4 2.1*     Recent Labs     05/08/20 0552 05/07/20 0350 05/06/20 0051   ALB 2.4* 2.4* 2.5*     Recent Labs     05/07/20 0350   INR 1.2*   PTP 11.8*      No results for input(s): FE, TIBC, PSAT, FERR in the last 72 hours. No results found for: FOL, RBCF   No results for input(s): PH, PCO2, PO2 in the last 72 hours. No results for input(s): CPK, CKNDX, TROIQ in the last 72 hours.     No lab exists for component: CPKMB  Lab Results   Component Value Date/Time    Triglyceride 159 (H) 04/12/2020 08:34 PM     Lab Results   Component Value Date/Time    Glucose (POC) 157 (H) 05/08/2020 11:51 AM    Glucose (POC) 104 (H) 05/08/2020 05:38 AM    Glucose (POC) 152 (H) 05/08/2020 01:35 AM    Glucose (POC) 171 (H) 05/07/2020 10:03 PM    Glucose (POC) 121 (H) 05/07/2020 05:33 PM     No results found for: COLOR, APPRN, SPGRU, REFSG, SARKIS, PROTU, GLUCU, KETU, BILU, UROU, GALE, LEUKU, GLUKE, EPSU, BACTU, WBCU, RBCU, CASTS, UCRY  Medications Reviewed:     Current Facility-Administered Medications   Medication Dose Route Frequency    heparin (porcine) 1,000 unit/mL injection 3,800 Units  3,800 Units Hemodialysis DIALYSIS PRN    prochlorperazine (COMPAZINE) with saline injection 5 mg  5 mg IntraVENous Q6H PRN    metoprolol (LOPRESSOR) 5 mg/mL oral suspension 25 mg  25 mg Per NG tube Q12H    insulin glargine (LANTUS) injection 28 Units  28 Units SubCUTAneous QHS    balsam peru-castor oiL (VENELEX) ointment   Topical BID    bumetanide (BUMEX) tablet 2 mg  2 mg Oral DAILY    guaiFENesin (ROBITUSSIN) 100 mg/5 mL oral liquid 100 mg 100 mg Oral Q12H    epoetin jovani-epbx (RETACRIT) injection 20,000 Units  20,000 Units SubCUTAneous Q TUE, THU & SAT    HYDROcodone-acetaminophen (NORCO) 5-325 mg per tablet 1 Tab  1 Tab Oral Q6H PRN    phenol throat spray (CHLORASEPTIC) 1 Spray  1 Spray Oral Q6H PRN    sevelamer carbonate (RENVELA) oral powder 0.8 g  0.8 g Oral TID WITH MEALS    guar gum (BENEFIBER) packet 1 Packet  4 g Oral TID    labetaloL (NORMODYNE;TRANDATE) injection 20 mg  20 mg IntraVENous Q4H PRN    albumin human 25% (BUMINATE) solution 12.5 g  12.5 g IntraVENous DIALYSIS PRN    albuterol (PROVENTIL VENTOLIN) nebulizer solution 2.5 mg  2.5 mg Nebulization Q4H PRN    famotidine (PEPCID) 8 mg/mL oral suspension 20 mg  20 mg Per NG tube DAILY    alteplase (CATHFLO) 2 mg in sterile water (preservative free) 2 mL injection  2 mg InterCATHeter DIALYSIS PRN    alteplase (CATHFLO) 2 mg in sterile water (preservative free) 2 mL injection  2 mg InterCATHeter DIALYSIS PRN    insulin lispro (HUMALOG) injection   SubCUTAneous Q6H    white petrolatum-mineral oiL (AKWA TEARS) 83-15 % ophthalmic ointment   Both Eyes Q12H    bacitracin 500 unit/gram packet 1 Packet  1 Packet Topical PRN    sodium chloride (NS) flush 5-40 mL  5-40 mL IntraVENous Q8H    sodium chloride (NS) flush 5-40 mL  5-40 mL IntraVENous PRN    ondansetron (ZOFRAN) injection 4 mg  4 mg IntraVENous Q4H PRN    acetaminophen (TYLENOL) tablet 650 mg  650 mg Oral Q6H PRN    Or    acetaminophen (TYLENOL) suppository 650 mg  650 mg Rectal Q6H PRN    glucose chewable tablet 16 g  4 Tab Oral PRN    glucagon (GLUCAGEN) injection 1 mg  1 mg IntraMUSCular PRN    dextrose 10% infusion 0-250 mL  0-250 mL IntraVENous PRN   ______________________________________________________________________  EXPECTED LENGTH OF STAY: 12d 9h  ACTUAL LENGTH OF STAY:          38               Leyla Wolff MD

## 2020-05-08 NOTE — PROGRESS NOTES
1330 Pt sat on recliner by lifting with FEDERICO Chand.  1400 Pt wanted to go back to bed because of bowel movement. Called lifting team, pt was so anxious, asked Nick Coil, monitor tech help to lift her. Pt went back to bed, cleaned. Complained about being delayed of cleaning. Explained her delaying of lifting team.   36 Pt accidentally pulled out NG tube after sleep. 1640 Pt refused NG tube insertion, \"I don't want it now, maybe later. \"  1730 Attempted to insert, reached to 58cm insertion, but pt gagged a lot, vomited, she really wanted to take out, said, \"I don't mind redo. \" Had to take the tube out. 1830 Attempted again but couldn't insert due to gagging and vomiting, pt told, \"I'm okay, I'll tell doctor tomorrow. \"  Told her,'You'll be hungry,\" she said, \"I'm okay, will be okay. \"  2015 MD notified, ordered holding bed time Lantus.

## 2020-05-08 NOTE — CONSULTS
[address]    Name:  Olivia Dawson  MR#:  [id number]  :  1960  ACCOUNT #:  [de-identified]  DATE OF SERVICE:  2020    REASON FOR CONSULTATION:  Consider IVC filter placement. HISTORY OF PRESENT ILLNESS:  The patient is a 68-year-old with multiple medical issues, who has been admitted since 2020 for evaluation and treatment of COVID-19-related pneumonia. She has had multiple medical issues during this process, including vocal cord paralysis, difficulty swallowing, and need for dialysis. Specifically, we are consulted for bilateral nonocclusive superficial vein thrombosis, seen on a duplex on . She also had a CT scan of the chest on 04/10 that showed a small nonocclusive pulmonary embolus. She also, at that time, on 04/10 had a large right retroperitoneal hematoma. To my understanding, her anticoagulation has been held since then. We are asked about placing an IVC filter proactively. The patient is unable to really communicate. She is reportedly understanding, but is unable to speak because of this vocal cord paralysis. PAST MEDICAL HISTORY:  Extensive, consists of,  1. Morbid obesity. 2.  Diabetes. 3.  History of endometrial cancer. 4.  Multiple issues due to her current admission as listed above. CURRENT MEDICATIONS:  Include,  1. Bumex. 2.  Epogen. 3.  Pepcid. 4.  Robitussin. 5.  Fiber. 6.  Lantus insulin. 7.  Humalog insulin. 8.  Lopressor. 9.  Renvela. 10.  Eye drops. ALLERGIES:  SHE IS ALLERGIC TO AUGMENTIN. REVIEW OF SYSTEMS:  I am unable to obtain due to the patient's inability to communicate. SOCIAL HISTORY:  I am unable to obtain due to the patient's inability to communicate. PHYSICAL EXAMINATION:  VITAL SIGNS:  Her temperature is 98.1, heart rate 111, blood pressure is 112/78. She is 99% on room air. GENERAL:  Reportedly, she is alert. LUNGS:  Coarse breath sounds.   HEART:  She has sinus tachycardia. ABDOMEN:  Soft and nontender at this time. EXTREMITIES:  Her lower extremities are mildly swollen bilaterally, but are not really that edematous. LABORATORY DATA:  Labs values have been reviewed. Her white blood cell count is 10.2, hemoglobin is 8.3. Chemistries have been reviewed. Her last imaging is as discussed above. IMPRESSION AND PLAN:  A 82-year-old woman, who appears to be recovering from severe COVID-related pneumonia amongst other medical problems. She does not have an actual deep vein thrombosis, and given that she has had a very small pulmonary embolus approximately one month ago and has nonocclusive superficial vein thrombosis bilaterally, I think given the fact that she is now COVID negative and fully increasing her mobility, she may be at lower risk for venous thromboembolism, and I think it is best to treat conservatively. We will follow along and if she ends up needing an inferior vena cava filter, we can do this, but for now we will treat conservatively and observe. I have discussed all of this with her  and he agreed. Thank you for consultation.       Claudia Toledo MD [email or web address]  D:  05/07/2020 16:34  T:  05/07/2020 20:17  JOB #:  [id number]  CC:  Rois Randolph MD

## 2020-05-08 NOTE — PROGRESS NOTES
When doing   Assessment   Noted  The  NGT  To  Be  At  The  30 cm  sin  Yesterday was at 48 sin  Notified  Provider ,  Will   Do  chest x-ray   To  Confirm  Whether  Still in place

## 2020-05-08 NOTE — PROGRESS NOTES
IFTIKHAR PLAN:     [facility] choice and [facility] as second as requested by patient's . (They live in [place])     CM to fax updated clinicals to Minna Rueda at [phone] on Monday for review. Tel # [phone]  -  -CM spoke with Chyna Lambert, Director at [facility] [phone] regarding referral as requested by patient's      1. Patient had COVID-19 nare swab done 5/8/20 as requested by [facility]     2. Patient must either be eating food or fed via a PEG tube as [facility] could not accept with TF.      3. A referral was sent to [facility] via [facility] to set up an outpatient HD schedule prior to discharge. Dialysis flow sheets and 2 COVID-19 neg results faxed to [facility] at [phone]     4. Patient has a Perma-cath placed on 5/7/20     5. Patient will need transportation at discharge     CM notified Dr. Eva Escobar that [facility] said patient had to have a PEG tube placed if unable to eat before considering her for admission. Dr. Eva Escobar said she would speak with patient's  regarding PEG placement.      CM will continue to follow.  Larry Edwards MSA, RN,CRM

## 2020-05-08 NOTE — PROGRESS NOTES
Problem: Dysphagia (Adult)  Goal: *Acute Goals and Plan of Care (Insert Text)  Description: Speech Therapy Goals  Initiated 5/4/2020    1. Patient will tolerate small amounts of ice chips for swallow rehab without adverse effects within 7 days. 2. Patient will participate in swallow re-evaluation within 7 days. Outcome: Progressing Towards Goal     SPEECH LANGUAGE PATHOLOGY DYSPHAGIA TREATMENT  Patient: Lisa Briggs (78 y.o. female)  Date: 5/8/2020  Diagnosis: COVID-19 virus infection [U07.1]   <principal problem not specified>       Precautions: Fall    ASSESSMENT:  Pt participated well in incentive spirometer and effortful swallow exercises on this date. Extensive oral care completed, as pt still complaining of secretions despite extensive oral care just completed by JUDY Woody. Given pt's high risk for silent aspiration given prolonged intubation, L VF paralysis, and weakness/debility from complex medical course, as well as pt's low threshold to tolerate any amount of aspiration s/p COVID infection, recommend Modified Barium Swallow Study on Monday. In the interim, please encourage patient to utilize effortful swallow and incentive spirometer over the weekend. PLAN:  Recommendations and Planned Interventions:  --NPO  --ice chips/water swabs  --Effortful swallow  --Incentive spirometer (start at 750)    Patient continues to benefit from skilled intervention to address the above impairments. Continue treatment per established plan of care. Discharge Recommendations: To Be Determined     SUBJECTIVE:   Patient stated, \"I haven't done that re: incentive spirometer.     OBJECTIVE:   Cognitive and Communication Status:  Neurologic State: Alert, Eyes open spontaneously  Orientation Level: Oriented X4  Cognition: Appropriate decision making, Appropriate safety awareness, Follows commands  Perception: Appears intact  Perseveration: No perseveration noted  Safety/Judgement: Awareness of environment  Dysphagia Treatment:    Exercises:  Laryngeal Exercises:   Effortful Swallow: Yes  Sets : 1  Reps : 10  Incentive Spirometer: Yes  Sets : 1  Reps : 5    Pain:  Pain Scale 1: Numeric (0 - 10)  Pain Intensity 1: 0       After treatment:   Call bell within reach and Nursing notified    COMMUNICATION/EDUCATION:     The patient's plan of care including recommendations, planned interventions, and recommended diet changes were discussed with: Registered nurse, OT.      Issac Boxer, SLP  Time Calculation: 15 mins

## 2020-05-08 NOTE — PROGRESS NOTES
Bedside and Verbal shift change report given to Kellie Rn(oncoming nurse) by Leela Lucero (offgoing nurse). Report included the following information SBAR, Kardex, Intake/Output, MAR, Recent Results and Cardiac Rhythm sinus tach.

## 2020-05-08 NOTE — DIABETES MGMT
[facility]  CLINICAL NURSE SPECIALIST CONSULT  PROGRAM FOR DIABETES HEALTH  Follow up Note  Presentation   Jh Tilley is a 64 y.o. female admitted from OSH to [facility] ICU with SARS-COV2 needing CRRT. She is currently sedated and has been intubated since 3/28/20. Current clinical course has been complicated by steroid induced hyperglycemia. Steroids are discontinued at this time. She requires CRRT for acute renal failure r/t her sepsis and hypotension. PHM: GERD, obesity, and anxiety. New diabetes diagnosis with A1C 11.0% (3/28/2020); updated A1C 3/31/20-10.4%     Recent events:     Consulted by Provider for advanced diabetes nursing assessment and care, specifically related to   [] Transitioning off Sayra Klaudia   [x] Inpatient management strategy  [] Home management assessment  [] Survival skill education    Diabetes-related medical history  Acute complications  hyperglycemia  Neurological complications  NONE  Microvascular disease  NONE  Macrovascular disease  NONE  Other associated conditions     NONE    Diabetes medication history: NONE    Subjective   As I was walking to the room she was whispering \"HELP\". I noted TF running out of her nose down to her mouth. I alerted the nurse who came in and noted the NGT tube was dislodged. Patient cleaned up and TF stopped. Objective   Diabetic Foot Exam: Left foot: warm, no calluses noted, red spot noted on top of foot over bone, but no breakdown noted. + microfilament sensation noted in all areas. Right foot: in brace, however, foot warm. Microfilament test deferred due to brace. Vital Signs   Visit Vitals  /69 (BP 1 Location: Right arm, BP Patient Position: Sitting)   Pulse (!) 110   Temp 97.7 °F (36.5 °C)   Resp 20   Ht 5' 4\" (1.626 m)   Wt 107.9 kg (237 lb 14.4 oz)   SpO2 99%   BMI 40.84 kg/m²   .    Laboratory  Lab Results   Component Value Date/Time    Hemoglobin A1c 10.4 (H) 03/31/2020 03:42 PM     No results found for: LDL, LDLC, DLDLP  Lab Results   Component Value Date/Time    Creatinine 1.48 (H) 05/08/2020 05:52 AM     Lab Results   Component Value Date/Time    Sodium 137 05/08/2020 05:52 AM    Potassium 3.5 05/08/2020 05:52 AM    Chloride 102 05/08/2020 05:52 AM    CO2 31 05/08/2020 05:52 AM    Anion gap 4 (L) 05/08/2020 05:52 AM    Glucose 106 (H) 05/08/2020 05:52 AM    BUN 32 (H) 05/08/2020 05:52 AM    Creatinine 1.48 (H) 05/08/2020 05:52 AM    BUN/Creatinine ratio 22 (H) 05/08/2020 05:52 AM    GFR est AA 44 (L) 05/08/2020 05:52 AM    GFR est non-AA 36 (L) 05/08/2020 05:52 AM    Calcium 8.6 05/08/2020 05:52 AM    Bilirubin, total 1.6 (H) 04/20/2020 05:29 AM    AST (SGOT) 169 (H) 04/20/2020 05:29 AM    Alk. phosphatase 490 (H) 04/20/2020 05:29 AM    Protein, total 5.9 (L) 04/20/2020 05:29 AM    Albumin 2.4 (L) 05/08/2020 05:52 AM    Globulin 3.5 04/20/2020 05:29 AM    A-G Ratio 0.7 (L) 04/20/2020 05:29 AM    ALT (SGPT) 206 (H) 04/20/2020 05:29 AM     Lab Results   Component Value Date/Time    ALT (SGPT) 206 (H) 04/20/2020 05:29 AM           Evaluation   Ms Matt Garcia, with new onset Type 2 diabetes,with A1C 10.4%. AM fasting BG 213mg/dl today. .TF back to continuous. BG trends within target 104-171mg/dl. On basal and correctional only. Requiring minimal amount of correctional coverage. .      It is imperative that we maintain her BG within target range 100-180mg/dl. Recommendations   1. Continue basal insulin- 28units daily;      2. Will continue to follow.     Assessment and Plan   Nursing Diagnosis Risk for unstable blood glucose pattern   Nursing Intervention Domain 0311 Decision-making Support   Nursing Interventions Examined current inpatient diabetes control   Explored factors facilitating and impeding inpatient management  Identified self-management practices impeding diabetes control  Explored corrective strategies with patient and responsible inpatient provider   Informed patient of rational for insulin strategy while hospitalized Billing Code(s)     [x] [id number] IP subsequent hospital care - . [place] , [facility]   Program for Diabetes Health  Access via [facility] [phone]

## 2020-05-08 NOTE — PROGRESS NOTES
Received patient at  [employer], as per report  From  Day shift nurse , she had   Given   Pain med ,    1954  Went to  Pt room,she was awake  then did her   Vital  Signs , declined repositioningr, she said   She was okay  Would call  When  She  Is ready   For  Turning. 2100  Patient called she wanted to  Use  Bathroom ,   This  Writer called  PCT  To  Help  And  We both  Placed  Her on  Bedpan , finished using bathroom, we   Cleaned her up and repositioned her . 2300: Patient called PCT and said that    She  Does not  Want  This   Nurse  Take care  Of  Her and  She  Would call  Rapid  Response  If  Not  Moved  From unit ,  Charge  Nurse  aware  And switched   Assignment.

## 2020-05-08 NOTE — PROGRESS NOTES
PULMONARY MEDICINE    Progress Note    Name: Hesham Escobar   : 1960   MRN: [id number]   Date: 2020      Subjective:     5/8  stable    5/7  Down to room air    5/6   Not verbalizing much. Noted chest film from yesterday. 5/5  Less confused. No WOB    5/4  Some bouts of confusion  No WOB    Consult Note:   Requesting Physician: Dr. Maddie Lange  Reason for consult: Extubated COVID patient    Medical records and data reviewed. Patient is a 64 y.o. female who was admitted on 3/31 with severe COVID 19 pneumonia and multisystem organ failure and required invasive mechanical ventilation, CRRT and pressors. She recovered slowly, was extubated and transferred to telemetry for ongoing care. She has generalized weakness. She was noted to be lethargic this morning with labored breathing and was placed on bipap. ABG that was done sis not show resp acidosis, oxygenation was preserved. CXR does not show new or worsening infiltrates. She appears to be at risk for underlying MIKE which is not known from before. She is starting to work with PT/OT.  COVID 19 test was negative on      Review of Systems:     A comprehensive 12 system review of systems was not obtained from the patient    Assessment:   S/P multisystem organ failure with COVID 19 pneumonia  Encephalopathy- slowly improving, ABG did not show resp acidosis  Generalized weakness, likely with a component of critical illness neuropathy/myopathy without respiratory muscle weakness, episodic increased work of breathing  Metabolic alkalosis on diuretic therapy  Acute PE, not anticoagulated secondary to retroperitoneal bleed  Other medical problems per chart      Recommendations:     NIPPV PRN   Wean O2 as tolerated  PT/OT  renal involved  SLP  Noted 5/4 dopplers with partially occluded superficial thrombus, no obvious DVT, but it sounds like a limited exam. Agree with an IVC filter  Chest x-ray with some improvement in the RLL ASD  Follow up in 3 days post hospitalization  PRN over the weekend       Active Problem List:     Problem List  Date Reviewed: 2020          Codes Class    Hypotension ICD-10-CM: I95.9  ICD-9-CM: 458.9 Acute        Retroperitoneal hemorrhage ICD-10-CM: R58  ICD-9-CM: 459.0         Acute renal failure (ARF) (HCC) ICD-10-CM: N17.9  ICD-9-CM: 584.9         Encephalopathy ICD-10-CM: G93.40  ICD-9-CM: 348.30         Sepsis with multi-organ dysfunction ([facility] ICD-10-CM: A41.9, R65.20  ICD-9-CM: 038.9, 995.92         Pneumonia due to methicillin susceptible Staphylococcus aureus (MSSA) ([facility] ICD-10-CM: J15.211  ICD-9-CM: 482.41         Thrombocytopenia ([facility] ICD-10-CM: D69.6  ICD-9-CM: 287.5         Diarrhea ICD-10-CM: R19.7  ICD-9-CM: 787.91         COVID-19 virus infection ICD-10-CM: U07.1         Obesity, morbid ([place] [address].) ICD-10-CM: E66.01  ICD-9-CM: 278.01         Vaginal Pap smear following hysterectomy for malignancy ICD-10-CM: Z08, Z90.710  ICD-9-CM: V67.01         Personal history of malignant neoplasm of other parts of uterus ICD-10-CM: Z85.42  ICD-9-CM: V10.42         Endometrial cancer ([place] [address].) ICD-10-CM: C54.1  ICD-9-CM: 182.0               Past Medical History:      has a past medical history of Basal cell carcinoma, GERD (gastroesophageal reflux disease), Kidney stones, and Polyp of ureter. Past Surgical History:      has a past surgical history that includes hysteroscopy diagnostic (); pr endometrial ablation, thermal; hx  section (); hx colonoscopy; insert arterial line (2020); ir insert non tunl cvc over 5 yrs (2020); and ir insert tunl cvc w/o port over 5 yr (2020). Home Medications:     Prior to Admission medications    Medication Sig Start Date End Date Taking? Authorizing Provider   pantoprazole (PROTONIX) 40 mg tablet TAKE 1 TABLET BY MOUTH TWICE A DAY 18   Provider, Historical   esomeprazole (NEXIUM) 20 mg capsule Take 20 mg by mouth daily.  Indications: BID    Provider, Historical zolpidem (AMBIEN) 10 mg tablet Take 0.5 Tabs by mouth nightly as needed for Sleep. Max Daily Amount: 5 mg. 17   Nilda Mandel MD   fluticasone (FLONASE) 50 mcg/actuation nasal spray Mist 1-2 spray(s) into each nostril once daily. 4/3/15   Provider, Historical   ranitidine (ZANTAC) 150 mg tablet 150 mg.    Provider, Historical       Allergies/Social/Family History: Allergies   Allergen Reactions    Augmentin [Amoxicillin-Pot Clavulanate] Rash and Itching      Social History     Tobacco Use    Smoking status: Never Smoker    Smokeless tobacco: Never Used   Substance Use Topics    Alcohol use: Not on file      Family History   Problem Relation Age of Onset    Prostate Cancer Father         PROSTATE    Breast Cancer Sister 46        invasive poorly differentiated ductal carcinoma, Neg genetic testing.  Cancer Sister 62        melanoma stage 1            Objective:   Vital Signs:  Visit Vitals  /54 (BP 1 Location: Left arm, BP Patient Position: At rest)   Pulse (!) 103   Temp 98.1 °F (36.7 °C)   Resp 16   Ht 5' 4\" (1.626 m)   Wt 107.9 kg (237 lb 14.4 oz)   SpO2 98%   BMI 40.84 kg/m²    O2 Flow Rate (L/min): 2 l/min O2 Device: Room air Temp (24hrs), Av °F (36.7 °C), Min:97.6 °F (36.4 °C), Max:98.1 °F (36.7 °C)           Intake/Output:     Intake/Output Summary (Last 24 hours) at 2020 1109  Last data filed at 2020 1954  Gross per 24 hour   Intake 195 ml   Output 2000 ml   Net -1805 ml       Physical Exam: Detailed exam deferred in view of ongoing pandemic.  Physical exam as documented by attending physician was reviewed and discussed   General:  Awake, weak   Head:     Eyes:     Neck:    Lungs:      Chest wall:     Heart:     Abdomen:      Extremities:    Pulses:    Skin:    Neurologic:          LABS AND  DATA: Personally reviewed  Recent Labs     20  0552 20  0051   WBC 9.7 10.2   HGB 8.2* 8.3*   HCT 27.0* 27.8*    269     Recent Labs     20  0552 05/07/20  0350    136   K 3.5 3.7    100   CO2 31 28   BUN 32* 59*   CREA 1.48* 2.34*   * 164*   CA 8.6 9.0   MG 1.9 1.9   PHOS 2.6 3.4     Recent Labs     05/08/20  0552 05/07/20  0350   ALB 2.4* 2.4*     Recent Labs     05/07/20  0350   INR 1.2*   PTP 11.8*      No results for input(s): PHI, PCO2I, PO2I, FIO2I in the last 72 hours. No results for input(s): CPK, CKMB, TROIQ, BNPP in the last 72 hours. MEDS: Reviewed    Chest Imaging: personally reviewed and report checked    Tele- reviewed    Medical decision making:   I have reviewed the flowsheet and previous day's notes  Patient has acute or chronic illness that poses a threat to life or bodily function  Review and order of Clinical lab tests  Review and Order of Radiology tests  Independent visualization of Image          Thank you for allowing me to participate in this patient's care.     Brenton Orourke PA-C      [facility] [address] [place] [address]

## 2020-05-08 NOTE — PROGRESS NOTES
2732: Pt wanted to speak to the charge nurse. Pt claims that nurses were belligerent with the pt and wants to be in a different unit. Pt also says, Taj Sabillon is ready to call 911\" if no actions is taken. CCL made aware. CCL with patient. Several nurses have been in and out of the room throughout the night to assist with the patient.

## 2020-05-08 NOTE — PROGRESS NOTES
[facility] NP Progress note    Name: Larry Cruz  YOB: 1960  MRN: [id number]  Admission Date: 3/31/2020  9:02 AM    Date of Service: 5/7/2020 11:59 PM                                Overnight Update:        Complaint: Dislodged NGT  Paged by: Corrine Hair RN  Subjective:   Objective: No clinical change reported. CXR Nasogastric tube was retracted into the upper esophagus. Some improvement in the right lower lobe airspace opacity and right effusion. Plan: Hold feeds, reposition tube or replace. COnfirm positioning prior to resumption of enteral feeds.       KENZIE Maciel, RN, NP-C  [phone] or via Perfect Serve

## 2020-05-09 ENCOUNTER — APPOINTMENT (OUTPATIENT)
Dept: GENERAL RADIOLOGY | Age: 60
DRG: 870 | End: 2020-05-09
Attending: INTERNAL MEDICINE
Payer: COMMERCIAL

## 2020-05-09 LAB
ANION GAP SERPL CALC-SCNC: 7 MMOL/L (ref 5–15)
BASOPHILS # BLD: 0 K/UL (ref 0–0.1)
BASOPHILS NFR BLD: 0 % (ref 0–1)
BUN SERPL-MCNC: 43 MG/DL (ref 6–20)
BUN/CREAT SERPL: 23 (ref 12–20)
CALCIUM SERPL-MCNC: 8.7 MG/DL (ref 8.5–10.1)
CHLORIDE SERPL-SCNC: 103 MMOL/L (ref 97–108)
CO2 SERPL-SCNC: 28 MMOL/L (ref 21–32)
CREAT SERPL-MCNC: 1.91 MG/DL (ref 0.55–1.02)
DIFFERENTIAL METHOD BLD: ABNORMAL
EOSINOPHIL # BLD: 0.2 K/UL (ref 0–0.4)
EOSINOPHIL NFR BLD: 2 % (ref 0–7)
ERYTHROCYTE [DISTWIDTH] IN BLOOD BY AUTOMATED COUNT: 15.4 % (ref 11.5–14.5)
GLUCOSE BLD STRIP.AUTO-MCNC: 118 MG/DL (ref 65–100)
GLUCOSE BLD STRIP.AUTO-MCNC: 139 MG/DL (ref 65–100)
GLUCOSE BLD STRIP.AUTO-MCNC: 142 MG/DL (ref 65–100)
GLUCOSE BLD STRIP.AUTO-MCNC: 180 MG/DL (ref 65–100)
GLUCOSE SERPL-MCNC: 152 MG/DL (ref 65–100)
HCT VFR BLD AUTO: 26.8 % (ref 35–47)
HGB BLD-MCNC: 8 G/DL (ref 11.5–16)
IMM GRANULOCYTES # BLD AUTO: 0.1 K/UL (ref 0–0.04)
IMM GRANULOCYTES NFR BLD AUTO: 1 % (ref 0–0.5)
LYMPHOCYTES # BLD: 1.7 K/UL (ref 0.8–3.5)
LYMPHOCYTES NFR BLD: 19 % (ref 12–49)
MAGNESIUM SERPL-MCNC: 1.9 MG/DL (ref 1.6–2.4)
MCH RBC QN AUTO: 29.5 PG (ref 26–34)
MCHC RBC AUTO-ENTMCNC: 29.9 G/DL (ref 30–36.5)
MCV RBC AUTO: 98.9 FL (ref 80–99)
MONOCYTES # BLD: 0.8 K/UL (ref 0–1)
MONOCYTES NFR BLD: 9 % (ref 5–13)
NEUTS SEG # BLD: 6.3 K/UL (ref 1.8–8)
NEUTS SEG NFR BLD: 69 % (ref 32–75)
NRBC # BLD: 0 K/UL (ref 0–0.01)
NRBC BLD-RTO: 0 PER 100 WBC
PLATELET # BLD AUTO: 243 K/UL (ref 150–400)
PMV BLD AUTO: 9.4 FL (ref 8.9–12.9)
POTASSIUM SERPL-SCNC: 3.3 MMOL/L (ref 3.5–5.1)
RBC # BLD AUTO: 2.71 M/UL (ref 3.8–5.2)
SERVICE CMNT-IMP: ABNORMAL
SODIUM SERPL-SCNC: 138 MMOL/L (ref 136–145)
WBC # BLD AUTO: 9.1 K/UL (ref 3.6–11)

## 2020-05-09 PROCEDURE — 90935 HEMODIALYSIS ONE EVALUATION: CPT

## 2020-05-09 PROCEDURE — 74011250637 HC RX REV CODE- 250/637: Performed by: PSYCHIATRY & NEUROLOGY

## 2020-05-09 PROCEDURE — 83735 ASSAY OF MAGNESIUM: CPT

## 2020-05-09 PROCEDURE — 74011250636 HC RX REV CODE- 250/636: Performed by: INTERNAL MEDICINE

## 2020-05-09 PROCEDURE — 74011250637 HC RX REV CODE- 250/637: Performed by: NURSE PRACTITIONER

## 2020-05-09 PROCEDURE — 85025 COMPLETE CBC W/AUTO DIFF WBC: CPT

## 2020-05-09 PROCEDURE — 74018 RADEX ABDOMEN 1 VIEW: CPT

## 2020-05-09 PROCEDURE — 82962 GLUCOSE BLOOD TEST: CPT

## 2020-05-09 PROCEDURE — 74011250637 HC RX REV CODE- 250/637: Performed by: INTERNAL MEDICINE

## 2020-05-09 PROCEDURE — 74011636637 HC RX REV CODE- 636/637: Performed by: INTERNAL MEDICINE

## 2020-05-09 PROCEDURE — 65660000000 HC RM CCU STEPDOWN

## 2020-05-09 PROCEDURE — 80048 BASIC METABOLIC PNL TOTAL CA: CPT

## 2020-05-09 PROCEDURE — 36415 COLL VENOUS BLD VENIPUNCTURE: CPT

## 2020-05-09 RX ORDER — LORAZEPAM 2 MG/ML
0.5 INJECTION INTRAMUSCULAR ONCE
Status: COMPLETED | OUTPATIENT
Start: 2020-05-09 | End: 2020-05-09

## 2020-05-09 RX ORDER — ESCITALOPRAM OXALATE 10 MG/1
10 TABLET ORAL DAILY
Status: DISCONTINUED | OUTPATIENT
Start: 2020-05-09 | End: 2020-05-19

## 2020-05-09 RX ADMIN — CASTOR OIL AND BALSAM, PERU: 788; 87 OINTMENT TOPICAL at 18:23

## 2020-05-09 RX ADMIN — SEVELAMER CARBONATE 0.8 G: 800 POWDER, FOR SUSPENSION ORAL at 18:22

## 2020-05-09 RX ADMIN — SODIUM CHLORIDE 10 ML: 9 INJECTION INTRAMUSCULAR; INTRAVENOUS; SUBCUTANEOUS at 13:22

## 2020-05-09 RX ADMIN — MINERAL OIL AND WHITE PETROLATUM: 150; 830 OINTMENT OPHTHALMIC at 20:44

## 2020-05-09 RX ADMIN — LORAZEPAM 0.5 MG: 2 INJECTION INTRAMUSCULAR; INTRAVENOUS at 19:10

## 2020-05-09 RX ADMIN — INSULIN GLARGINE 28 UNITS: 100 INJECTION, SOLUTION SUBCUTANEOUS at 22:37

## 2020-05-09 RX ADMIN — EPOETIN ALFA-EPBX 20000 UNITS: 10000 INJECTION, SOLUTION INTRAVENOUS; SUBCUTANEOUS at 22:37

## 2020-05-09 RX ADMIN — MINERAL OIL AND WHITE PETROLATUM: 150; 830 OINTMENT OPHTHALMIC at 09:28

## 2020-05-09 RX ADMIN — ACETAMINOPHEN 650 MG: 325 TABLET ORAL at 15:52

## 2020-05-09 RX ADMIN — Medication 1 PACKET: at 15:53

## 2020-05-09 RX ADMIN — ESCITALOPRAM OXALATE 10 MG: 10 TABLET ORAL at 15:53

## 2020-05-09 RX ADMIN — CASTOR OIL AND BALSAM, PERU: 788; 87 OINTMENT TOPICAL at 09:28

## 2020-05-09 RX ADMIN — SODIUM CHLORIDE 10 ML: 9 INJECTION INTRAMUSCULAR; INTRAVENOUS; SUBCUTANEOUS at 22:37

## 2020-05-09 RX ADMIN — METOPROLOL TARTRATE 25 MG: 50 TABLET, FILM COATED ORAL at 15:52

## 2020-05-09 RX ADMIN — HEPARIN SODIUM 3800 UNITS: 1000 INJECTION INTRAVENOUS; SUBCUTANEOUS at 12:37

## 2020-05-09 RX ADMIN — GUAIFENESIN 100 MG: 200 SOLUTION ORAL at 20:43

## 2020-05-09 NOTE — PROGRESS NOTES
Hospitalist Progress Note  Azalia Arguelles MD  Answering service: [phone] from in house phone      Date of Service:  2020  NAME:  Ovidio Valentin  :  1960  MRN:  [id number]    Admission Summary:   59F s/p COVID treatement in ICU - extubated  Interval history / Subjective: Follow up Acute hypoxic resp. Failure, acute renal failure  Patient seen and examined by the bedside, Labs, images and notes reviewed   Removed NGT, will be placed again, MBS on Monday   Starting on Trazodone last night  Discussed with nursing staff, orders reviewed. Called and Discussed with  about the updates regarding MBS on Monday and placement in rehab         Assessment & Plan:     #. ARF: - Nephrology following- on HD with good tolerance,  permacath placed 5/7  CM is setting up the dialysis outpatient.       #. COVID-19 +ve - Resolved- s/p Tocilizumab, Plaquenil. On Vit C, Zinc   #. Acute Hypoxic Resp Failure- resolved, Pulm following. Bipap at night and PRN. #. Acute metabolic Encephalopathy: likely ICU delirium- no agitation. Monitor- resolved. #. Sinus tachycardia: improving,continue Metoprolol, check TSH  #. Anemia in CKD: monitor HB. #. Retroperitoneal bleed: this hospitalization, resolved - Avoid AP/AC meds- Monitor Hb  #. Non occlusive PE in LLL pulmonary artery - no ACs due to retroperitoneal bleed. Appreciate Vascular surgery recommendations for IVC filter, comments noted, conservative approach    #. S/p Cardiac arrest   #. DM2: Controlled, A1c 10.4, SSI, AccuChecks, monitor  #. Morbid obesity: counseled on health benefits of weight loss, Healthy diet, BMI 44.11 kg/m².    #Nutrition patient is on tube feeds via NGT, removed yesterday, need NGT and start TF, MBS Monday  # depression: continue Trazodone at bedtime and do Psych consult    Code status: Full  DVT prophylaxis: SCD  Care Plan discussed with: Patient/Family and Nurse  Disposition: TBD. >2days     Hospital Problems  Date Reviewed: 4/19/2020          Codes Class Noted POA    Hypotension ICD-10-CM: I95.9  ICD-9-CM: 458.9 Acute 4/2/2020 Yes        Retroperitoneal hemorrhage ICD-10-CM: R58  ICD-9-CM: 459.0  4/19/2020 No        Acute renal failure (ARF) (HCC) ICD-10-CM: N17.9  ICD-9-CM: 584.9  4/19/2020 No        Encephalopathy ICD-10-CM: G93.40  ICD-9-CM: 348.30  4/19/2020 No        Sepsis with multi-organ dysfunction (HCC) ICD-10-CM: A41.9, R65.20  ICD-9-CM: 038.9, 995.92  4/19/2020 Unknown        Pneumonia due to methicillin susceptible Staphylococcus aureus (MSSA) ([facility] ICD-10-CM: W55.340  ICD-9-CM: 482.41  4/19/2020 Unknown        Thrombocytopenia ([facility] ICD-10-CM: D69.6  ICD-9-CM: 287.5  4/19/2020 Unknown        Diarrhea ICD-10-CM: R19.7  ICD-9-CM: 787.91  4/19/2020 Unknown        COVID-19 virus infection ICD-10-CM: U07.1  ICD-9-CM: 079.89  3/31/2020 Unknown            Review of Systems:   Pertinent items are mentioned in interval history. Vital Signs:    Last 24hrs VS reviewed since prior progress note. Most recent are:  Visit Vitals  /73   Pulse 100   Temp 98 °F (36.7 °C) (Oral)   Resp 20   Ht 5' 4\" (1.626 m)   Wt 108 kg (238 lb)   SpO2 97%   BMI 40.85 kg/m²         Intake/Output Summary (Last 24 hours) at 5/9/2020 1128  Last data filed at 5/8/2020 2122  Gross per 24 hour   Intake --   Output 100 ml   Net -100 ml        Physical Examination:   General:  Alert, No acute distress  Resp:  No accessory muscle use, Good AE, no wheezes, few rhonchi  Abd:  Soft, non-tender, non-distended, obese  Extremities:  No cyanosis or clubbing, no significant edema  Neuro:  Grossly normal, no focal neuro deficits, follows commands   Psych:   not agitated.     Data Review:    Review and/or order of clinical lab test  Review and/or order of tests in the radiology section of CPT  Review and/or order of tests in the medicine section of CPT  Labs:     Recent Labs     05/09/20  0334 05/08/20  0552   WBC 9.1 9.7   HGB 8.0* 8.2*   HCT 26.8* 27.0*    255     Recent Labs     05/09/20  0338 05/08/20 0552 05/07/20  0350    137 136   K 3.3* 3.5 3.7    102 100   CO2 28 31 28   BUN 43* 32* 59*   CREA 1.91* 1.48* 2.34*   * 106* 164*   CA 8.7 8.6 9.0   MG 1.9 1.9 1.9   PHOS  --  2.6 3.4     Recent Labs     05/08/20 0552 05/07/20  0350   ALB 2.4* 2.4*     Recent Labs     05/07/20 0350   INR 1.2*   PTP 11.8*      No results for input(s): FE, TIBC, PSAT, FERR in the last 72 hours. No results found for: FOL, RBCF   No results for input(s): PH, PCO2, PO2 in the last 72 hours. No results for input(s): CPK, CKNDX, TROIQ in the last 72 hours.     No lab exists for component: CPKMB  Lab Results   Component Value Date/Time    Triglyceride 159 (H) 04/12/2020 08:34 PM     Lab Results   Component Value Date/Time    Glucose (POC) 118 (H) 05/09/2020 11:12 AM    Glucose (POC) 142 (H) 05/09/2020 05:59 AM    Glucose (POC) 151 (H) 05/08/2020 11:16 PM    Glucose (POC) 127 (H) 05/08/2020 06:14 PM    Glucose (POC) 157 (H) 05/08/2020 11:51 AM     No results found for: COLOR, APPRN, SPGRU, REFSG, SARKIS, PROTU, GLUCU, KETU, BILU, UROU, GALE, LEUKU, GLUKE, EPSU, BACTU, WBCU, RBCU, CASTS, UCRY  Medications Reviewed:     Current Facility-Administered Medications   Medication Dose Route Frequency    traZODone (DESYREL) tablet 50 mg  50 mg Oral QHS    heparin (porcine) 1,000 unit/mL injection 3,800 Units  3,800 Units Hemodialysis DIALYSIS PRN    prochlorperazine (COMPAZINE) with saline injection 5 mg  5 mg IntraVENous Q6H PRN    metoprolol (LOPRESSOR) 5 mg/mL oral suspension 25 mg  25 mg Per NG tube Q12H    insulin glargine (LANTUS) injection 28 Units  28 Units SubCUTAneous QHS    balsam peru-castor oiL (VENELEX) ointment   Topical BID    bumetanide (BUMEX) tablet 2 mg  2 mg Oral DAILY    guaiFENesin (ROBITUSSIN) 100 mg/5 mL oral liquid 100 mg  100 mg Oral Q12H    epoetin jovani-epbx (RETACRIT) injection 20,000 Units  20,000 Units SubCUTAneous Q TUE, THU & SAT    HYDROcodone-acetaminophen (NORCO) 5-325 mg per tablet 1 Tab  1 Tab Oral Q6H PRN    phenol throat spray (CHLORASEPTIC) 1 Spray  1 Spray Oral Q6H PRN    sevelamer carbonate (RENVELA) oral powder 0.8 g  0.8 g Oral TID WITH MEALS    guar gum (BENEFIBER) packet 1 Packet  4 g Oral TID    labetaloL (NORMODYNE;TRANDATE) injection 20 mg  20 mg IntraVENous Q4H PRN    albumin human 25% (BUMINATE) solution 12.5 g  12.5 g IntraVENous DIALYSIS PRN    albuterol (PROVENTIL VENTOLIN) nebulizer solution 2.5 mg  2.5 mg Nebulization Q4H PRN    famotidine (PEPCID) 8 mg/mL oral suspension 20 mg  20 mg Per NG tube DAILY    alteplase (CATHFLO) 2 mg in sterile water (preservative free) 2 mL injection  2 mg InterCATHeter DIALYSIS PRN    alteplase (CATHFLO) 2 mg in sterile water (preservative free) 2 mL injection  2 mg InterCATHeter DIALYSIS PRN    insulin lispro (HUMALOG) injection   SubCUTAneous Q6H    white petrolatum-mineral oiL (AKWA TEARS) 83-15 % ophthalmic ointment   Both Eyes Q12H    bacitracin 500 unit/gram packet 1 Packet  1 Packet Topical PRN    sodium chloride (NS) flush 5-40 mL  5-40 mL IntraVENous Q8H    sodium chloride (NS) flush 5-40 mL  5-40 mL IntraVENous PRN    ondansetron (ZOFRAN) injection 4 mg  4 mg IntraVENous Q4H PRN    acetaminophen (TYLENOL) tablet 650 mg  650 mg Oral Q6H PRN    Or    acetaminophen (TYLENOL) suppository 650 mg  650 mg Rectal Q6H PRN    glucose chewable tablet 16 g  4 Tab Oral PRN    glucagon (GLUCAGEN) injection 1 mg  1 mg IntraMUSCular PRN    dextrose 10% infusion 0-250 mL  0-250 mL IntraVENous PRN   ______________________________________________________________________  EXPECTED LENGTH OF STAY: 12d 9h  ACTUAL LENGTH OF STAY:          39               Rosalinda Chappell MD

## 2020-05-09 NOTE — PROCEDURES
[facility]  [phone]    Vitals   Pre   Post   Assessment   Pre   Post     Temp  Temp: 98 °F (36.7 °C) (05/09/20 1005)   LOC  A&Ox4 A&Ox4   HR   Pulse (Heart Rate): 90 (05/09/20 1005) 105 Lungs   RA, +cough with mod secretions Remains on RA   B/P  BP: 126/63 (05/09/20 1005) 130/73 Cardiac   NSR, tachycardic NSR   Resp   Resp Rate: 20 (05/09/20 1005) 20 Skin   Warm and dry No change   Pain level  Pain Intensity 1: 0 (05/09/20 0843)  Edema  BLE +1-2 BLE 1-2+   Orders:    Duration:   Start:   1005 End:   1081 Total:   3 hrs   Dialyzer:   Dialyzer/Set Up Inspection: Homar Man (05/09/20 1005)   K Bath:   Dialysate K (mEq/L): 3.5 (05/09/20 1005)   Ca Bath:   Dialysate CA (mEq/L): 2.5 (05/09/20 1005)   Na/Bicarb:   Dialysate NA (mEq/L): 140 (05/09/20 1005)   Target Fluid Removal:   Goal/Amount of Fluid to Remove (mL): 2000 mL (05/09/20 1005)   Access     Type & Location:   RIJ tunneled CVC, procedure dressing not dated. Running well with . At end of treatment, transparent dressing applied per procedure, biopatch kept in place as not to disturb insertion site clot. Labs     Obtained/Reviewed   Critical Results Called   Date when labs were drawn-  Hgb-    HGB   Date Value Ref Range Status   05/09/2020 8.0 (L) 11.5 - 16.0 g/dL Final     K-    Potassium   Date Value Ref Range Status   05/09/2020 3.3 (L) 3.5 - 5.1 mmol/L Final     Ca-   Calcium   Date Value Ref Range Status   05/09/2020 8.7 8.5 - 10.1 MG/DL Final     Bun-   BUN   Date Value Ref Range Status   05/09/2020 43 (H) 6 - 20 MG/DL Final     Creat-   Creatinine   Date Value Ref Range Status   05/09/2020 1.91 (H) 0.55 - 1.02 MG/DL Final      Medications/ Blood Products Given     Name   Dose   Route and Time     Heparin 1:1000 3800 units  Arterial dwell 1.9mL, venous dwell 1.9mL             Blood Volume Processed (BVP):   66.3L Net Fluid   Removed:  2000 mL   Comments   Time Out Done: 1000  Primary Nurse Rpt Pre: MERRILL John Paul Boateng, RN  Primary Nurse Rpt Post: Ludwig Apgar, RN  Pt Education: CVC precautions  Care Plan: HD today, cont TTS schedule  Tx Summary:  1000: Safety checks complete, time out performed. 1005: CVC assessed, no redness, warmth or drainage noted. Procedure dressing dry and intact, small amount of dried blood at insertion site. Each catheter limb disinfected for 60 seconds per limb with alcohol swabs. Caps removed, dialysis CVC hub scrubbed with Pre[employer] for 5 seconds, followed by a 5 second dry time per Hospital P&P. Aspirated and discarded 5ml from each lumen. +aspiration/flush. HD initiated. Access visible, lines secure. Medications reviewed. 1305: HD complete. All possible blood rinsed back. Each catheter limb disinfected for 60 seconds per limb with alcohol swabs. Dialysis CVC hubs scrubbed with Prevantics for 5 seconds, followed by a 5 second dry time per Hospital P&P. Saline flushed, heparinized and capped. Transparent dressing applied per procedure, kept biopatch in place as not to disturb insertion site clot. SBAR given to primary RN.   Admitting Diagnosis: COVID, [facility]  Pt's previous clinic: [facility] this admission, pending outpt clinic  Informed Consent Verified: Yes (20 1005)  [facility] Consent: Verified  Hepatitis Status: HBsAg: Neg (20) HBsAb: 5/susceptible (20)  Machine Number: [id number] (20 1005)  Telemetry status: bedside cardiac monitor  Pre-Dialysis Weight: 108 kg (238 lb 1.6 oz) (20 1005)

## 2020-05-09 NOTE — PROGRESS NOTES
[facility]   [address] [facility], [facility] [address], [facility] [place]  Phone: [phone]   [phone]       Nephrology Progress Note  Altaf      1960     [id number]  Date of Admission : 3/31/2020  05/09/20    CC: Follow up for JUANCHO      Assessment and Plan   JUANCHO :  - 2/2 ATN  - no signs of recovery yet. 100 cc urine in last 24 hrs   - cont HD TTS. HD later today   - PC placed 5/7  - CM working on outpt unit  - We will check back again on Monday. Please call the oncall physician over the weekend for any urgent renal issues      COVID-19 +ve   Acute Hypoxic Resp Failure   - On Vent   - completed Plaquenil, - s/p Tocilizumab   - extubated 4/29  - repeat SARS-CoV-2 neg 4/20 and 4/27     Anemia in CKD:  - cont KOKI    RP hematoma:  - stable    Cardiac arrest 4/9    PE this admission  Non-occlusive superfical DVT:  - on on AC due to large RP hematoma  - no IVC to be placed    Type II DM   - Insulin per primary team      Morbid Obesity     Nutrition:  - on TF now     Interval History:  Seen and examined. 100 cc urine in last 24 hrs. Per nursing -- her swallow might have improved and she is refusing any DHT placement . Review of Systems: Review of systems not obtained due to patient factors.     Current Medications:   Current Facility-Administered Medications   Medication Dose Route Frequency    traZODone (DESYREL) tablet 50 mg  50 mg Oral QHS    heparin (porcine) 1,000 unit/mL injection 3,800 Units  3,800 Units Hemodialysis DIALYSIS PRN    prochlorperazine (COMPAZINE) with saline injection 5 mg  5 mg IntraVENous Q6H PRN    metoprolol (LOPRESSOR) 5 mg/mL oral suspension 25 mg  25 mg Per NG tube Q12H    insulin glargine (LANTUS) injection 28 Units  28 Units SubCUTAneous QHS    balsam peru-castor oiL (VENELEX) ointment   Topical BID    bumetanide (BUMEX) tablet 2 mg  2 mg Oral DAILY    guaiFENesin (ROBITUSSIN) 100 mg/5 mL oral liquid 100 mg  100 mg Oral Q12H    epoetin jovani-epbx (RETACRIT) injection 20,000 Units  20,000 Units SubCUTAneous Q TUE, THU & SAT    HYDROcodone-acetaminophen (NORCO) 5-325 mg per tablet 1 Tab  1 Tab Oral Q6H PRN    phenol throat spray (CHLORASEPTIC) 1 Spray  1 Spray Oral Q6H PRN    sevelamer carbonate (RENVELA) oral powder 0.8 g  0.8 g Oral TID WITH MEALS    guar gum (BENEFIBER) packet 1 Packet  4 g Oral TID    labetaloL (NORMODYNE;TRANDATE) injection 20 mg  20 mg IntraVENous Q4H PRN    albumin human 25% (BUMINATE) solution 12.5 g  12.5 g IntraVENous DIALYSIS PRN    albuterol (PROVENTIL VENTOLIN) nebulizer solution 2.5 mg  2.5 mg Nebulization Q4H PRN    famotidine (PEPCID) 8 mg/mL oral suspension 20 mg  20 mg Per NG tube DAILY    alteplase (CATHFLO) 2 mg in sterile water (preservative free) 2 mL injection  2 mg InterCATHeter DIALYSIS PRN    alteplase (CATHFLO) 2 mg in sterile water (preservative free) 2 mL injection  2 mg InterCATHeter DIALYSIS PRN    insulin lispro (HUMALOG) injection   SubCUTAneous Q6H    white petrolatum-mineral oiL (AKWA TEARS) 83-15 % ophthalmic ointment   Both Eyes Q12H    bacitracin 500 unit/gram packet 1 Packet  1 Packet Topical PRN    sodium chloride (NS) flush 5-40 mL  5-40 mL IntraVENous Q8H    sodium chloride (NS) flush 5-40 mL  5-40 mL IntraVENous PRN    ondansetron (ZOFRAN) injection 4 mg  4 mg IntraVENous Q4H PRN    acetaminophen (TYLENOL) tablet 650 mg  650 mg Oral Q6H PRN    Or    acetaminophen (TYLENOL) suppository 650 mg  650 mg Rectal Q6H PRN    glucose chewable tablet 16 g  4 Tab Oral PRN    glucagon (GLUCAGEN) injection 1 mg  1 mg IntraMUSCular PRN    dextrose 10% infusion 0-250 mL  0-250 mL IntraVENous PRN      Allergies   Allergen Reactions    Augmentin [Amoxicillin-Pot Clavulanate] Rash and Itching       Objective:  Vitals:    Vitals:    05/08/20 1912 05/08/20 2312 05/08/20 2324 05/09/20 0329   BP: 108/71 (!) 84/41 104/48 94/52   Pulse: (!) 109 (!) 101  99   Resp: 17 21 18   Temp: 97.8 °F (36.6 °C) 96.7 °F (35.9 °C)  97.9 °F (36.6 °C)   TempSrc:       SpO2: 99% 97%  97%   Weight:    108 kg (238 lb)   Height:         Intake and Output:  No intake/output data recorded. 05/07 1901 - 05/09 0700  In: 72   Out: 100 [Urine:100]    Physical Examination:     General: In NAD  HEENT:           NGT in place  Resp:  Reduced bibasilar breath sounds  CV:  RRR, no murmur   GI:  Obese , soft, NT   Neurologic:  awake  Access:           KESHIA torrse     []    High complexity decision making was performed  []    Patient is at high-risk of decompensation with multiple organ involvement    Lab Data Personally Reviewed: I have reviewed all the pertinent labs, microbiology data and radiology studies during assessment.     Recent Labs     05/09/20 0338 05/08/20  0552 05/07/20  0350    137 136   K 3.3* 3.5 3.7    102 100   CO2 28 31 28   * 106* 164*   BUN 43* 32* 59*   CREA 1.91* 1.48* 2.34*   CA 8.7 8.6 9.0   MG 1.9 1.9 1.9   PHOS  --  2.6 3.4   ALB  --  2.4* 2.4*   INR  --   --  1.2*     Recent Labs     05/09/20 0338 05/08/20  0552   WBC 9.1 9.7   HGB 8.0* 8.2*   HCT 26.8* 27.0*    255     No results found for: SDES  Lab Results   Component Value Date/Time    Culture result: NO GROWTH 5 DAYS 04/09/2020 02:32 PM    Culture result: NO GROWTH 5 DAYS 04/08/2020 10:36 AM    Culture result: NO GROWTH 5 DAYS 03/31/2020 11:15 AM    Culture result: MODERATE STAPHYLOCOCCUS AUREUS (A) 03/31/2020 10:34 AM    Culture result: LIGHT NORMAL RESPIRATORY SOFIE 03/31/2020 10:34 AM     Recent Results (from the past 24 hour(s))   SARS-COV-2    Collection Time: 05/08/20  9:58 AM   Result Value Ref Range    Specimen source Nasopharyngeal      SARS-CoV-2 Not detected NOTD     GLUCOSE, POC    Collection Time: 05/08/20 11:51 AM   Result Value Ref Range    Glucose (POC) 157 (H) 65 - 100 mg/dL    Performed by Pedro Mims (CON)    GLUCOSE, POC    Collection Time: 05/08/20  6:14 PM   Result Value Ref Range    Glucose (POC) 127 (H) 65 - 100 mg/dL    Performed by Yoseph Hills (CON)    GLUCOSE, POC    Collection Time: 05/08/20 11:16 PM   Result Value Ref Range    Glucose (POC) 151 (H) 65 - 100 mg/dL    Performed by Lissette Goodwin    Collection Time: 05/09/20  3:38 AM   Result Value Ref Range    Magnesium 1.9 1.6 - 2.4 mg/dL   CBC WITH AUTOMATED DIFF    Collection Time: 05/09/20  3:38 AM   Result Value Ref Range    WBC 9.1 3.6 - 11.0 K/uL    RBC 2.71 (L) 3.80 - 5.20 M/uL    HGB 8.0 (L) 11.5 - 16.0 g/dL    HCT 26.8 (L) 35.0 - 47.0 %    MCV 98.9 80.0 - 99.0 FL    MCH 29.5 26.0 - 34.0 PG    MCHC 29.9 (L) 30.0 - 36.5 g/dL    RDW 15.4 (H) 11.5 - 14.5 %    PLATELET 497 348 - 761 K/uL    MPV 9.4 8.9 - 12.9 FL    NRBC 0.0 0  WBC    ABSOLUTE NRBC 0.00 0.00 - 0.01 K/uL    NEUTROPHILS 69 32 - 75 %    LYMPHOCYTES 19 12 - 49 %    MONOCYTES 9 5 - 13 %    EOSINOPHILS 2 0 - 7 %    BASOPHILS 0 0 - 1 %    IMMATURE GRANULOCYTES 1 (H) 0.0 - 0.5 %    ABS. NEUTROPHILS 6.3 1.8 - 8.0 K/UL    ABS. LYMPHOCYTES 1.7 0.8 - 3.5 K/UL    ABS. MONOCYTES 0.8 0.0 - 1.0 K/UL    ABS. EOSINOPHILS 0.2 0.0 - 0.4 K/UL    ABS. BASOPHILS 0.0 0.0 - 0.1 K/UL    ABS. IMM. GRANS. 0.1 (H) 0.00 - 0.04 K/UL    DF AUTOMATED     METABOLIC PANEL, BASIC    Collection Time: 05/09/20  3:38 AM   Result Value Ref Range    Sodium 138 136 - 145 mmol/L    Potassium 3.3 (L) 3.5 - 5.1 mmol/L    Chloride 103 97 - 108 mmol/L    CO2 28 21 - 32 mmol/L    Anion gap 7 5 - 15 mmol/L    Glucose 152 (H) 65 - 100 mg/dL    BUN 43 (H) 6 - 20 MG/DL    Creatinine 1.91 (H) 0.55 - 1.02 MG/DL    BUN/Creatinine ratio 23 (H) 12 - 20      GFR est AA 33 (L) >60 ml/min/1.73m2    GFR est non-AA 27 (L) >60 ml/min/1.73m2    Calcium 8.7 8.5 - 10.1 MG/DL   GLUCOSE, POC    Collection Time: 05/09/20  5:59 AM   Result Value Ref Range    Glucose (POC) 142 (H) 65 - 100 mg/dL    Performed by Frederic Orozco (CON)            Total time spent with patient:  xxx   min.                                Care Plan discussed with:  Patient     Family      RN      Consulting Physician [id number] [facility]        I have reviewed the flowsheets. Chart and Pertinent Notes have been reviewed. No change in PMH ,family and social history from Consult note.       Danay Anglin MD

## 2020-05-09 NOTE — CONSULTS
[address]    Name:  Emmanuel Zuleta  MR#:  [id number]  :  1960  ACCOUNT #:  [de-identified]  DATE OF SERVICE:  2020      CHIEF COMPLAINT:  \"I feel a little bit better. \"    HISTORY OF PRESENT ILLNESS:  The patient is a 61-year-old  female who is currently admitted to the medical ICU at [facility].  She was admitted with a history of shortness of breath and acute renal failure and was found to be in acute respiratory failure. She was positive for COVID-19 and has been treated with IV antibiotics. Her symptoms have resolved and she has been taken off the ventilator. I was consulted because of concerns about depression. The patient is able to speak only minimally because of pain in her throat but indicated that she had been depressed for several months now. She feels that her depression started even before her current medical issues were present. She states that she has been having some trouble sleeping and has struggled with energy and motivation. Denies any suicidal ideation or plan. She struggled to explain any ongoing stressors, but this was more probably due to limitation in her ability to speech. Denies any psychotic symptoms. She says she does not drink regularly and denies use of any recreational substances. PAST MEDICAL HISTORY:  Reviewed as per the history and physical exam.      Past Medical History:   Diagnosis Date    Basal cell carcinoma     GERD (gastroesophageal reflux disease)     Kidney stones     Polyp of ureter      Prior to Admission medications    Medication Sig Start Date End Date Taking? Authorizing Provider   pantoprazole (PROTONIX) 40 mg tablet TAKE 1 TABLET BY MOUTH TWICE A DAY 18   Provider, Historical   esomeprazole (NEXIUM) 20 mg capsule Take 20 mg by mouth daily. Indications: BID    Provider, Historical   zolpidem (AMBIEN) 10 mg tablet Take 0.5 Tabs by mouth nightly as needed for Sleep.  Max Daily Amount: 5 mg. 1/9/17   Akbar Mandel MD   fluticasone (FLONASE) 50 mcg/actuation nasal spray Mist 1-2 spray(s) into each nostril once daily. 4/3/15   Provider, Historical   ranitidine (ZANTAC) 150 mg tablet 150 mg.    Provider, Historical     Vitals:    05/09/20 2324 05/10/20 0348 05/10/20 0818 05/10/20 1154   BP: 129/70 130/63 119/88 105/51   Pulse: (!) 103 (!) 104 (!) 110 (!) 110   Resp: 19 19 15 21   Temp: 98.6 °F (37 °C) 98.4 °F (36.9 °C) 98.5 °F (36.9 °C) 99 °F (37.2 °C)   TempSrc:       SpO2: 98% 96% 97% 97%   Weight:  102.2 kg (225 lb 6.4 oz)     Height:         Lab Results   Component Value Date/Time    WBC 9.6 05/10/2020 04:05 AM    HGB 8.9 (L) 05/10/2020 04:05 AM    HCT 29.4 (L) 05/10/2020 04:05 AM    PLATELET 142 90/88/1364 04:05 AM    .3 (H) 05/10/2020 04:05 AM     Lab Results   Component Value Date/Time    Sodium 138 05/09/2020 03:38 AM    Potassium 3.3 (L) 05/09/2020 03:38 AM    Chloride 103 05/09/2020 03:38 AM    CO2 28 05/09/2020 03:38 AM    Anion gap 7 05/09/2020 03:38 AM    Glucose 152 (H) 05/09/2020 03:38 AM    BUN 43 (H) 05/09/2020 03:38 AM    Creatinine 1.91 (H) 05/09/2020 03:38 AM    BUN/Creatinine ratio 23 (H) 05/09/2020 03:38 AM    GFR est AA 33 (L) 05/09/2020 03:38 AM    GFR est non-AA 27 (L) 05/09/2020 03:38 AM    Calcium 8.7 05/09/2020 03:38 AM    Bilirubin, total 1.6 (H) 04/20/2020 05:29 AM    AST (SGOT) 169 (H) 04/20/2020 05:29 AM    Alk. phosphatase 490 (H) 04/20/2020 05:29 AM    Protein, total 5.9 (L) 04/20/2020 05:29 AM    Albumin 2.4 (L) 05/08/2020 05:52 AM    Globulin 3.5 04/20/2020 05:29 AM    A-G Ratio 0.7 (L) 04/20/2020 05:29 AM    ALT (SGPT) 206 (H) 04/20/2020 05:29 AM     No results found for: VALF2, VALAC, VALP, VALPR, DS6, CRBAM, CRBAMP, CARB2, XCRBAM  No results found for: LITHM  RADIOLOGY REPORTS:(reviewed/updated 5/10/2020)  Xr Abd (kub)    Result Date: 5/9/2020  INDICATION: NG tube placement. Exam: Single supine frontal view of the upper abdomen.      IMPRESSION: NG tube side port and tip overlie the stomach. There is a nonspecific bowel gas pattern. Xr Abd (kub)    Result Date: 5/8/2020  EXAM:  XR ABD (KUB) INDICATION: Enteric tube placement COMPARISON: 5/8/2020 at 0018 hours. TECHNIQUE: Portable AP supine abdomen view at 0847 hours FINDINGS: The enteric tube terminates in the stomach. There are no dilated bowel loops. The bones are stable. IMPRESSION: The enteric tube terminates in the stomach. Xr Abd (kub)    Result Date: 5/3/2020  EXAM: XR ABD (KUB) INDICATION: varify NG placement possible dislodge COMPARISON: March 31. FINDINGS: A supine radiograph of the abdomen shows an NG tube with its tip in the left midabdomen. It is been pulled back several inches in comparison to the prior study. It remains in satisfactory position. No large bowel distention is identified. No soft tissue masses or pathologic calcifications are identified. The bones and soft tissues are within normal limits. IMPRESSION: NG tube in the stomach. Ir Insert Gurmeet Mcmanus Cvc W/o Port Over 5 Yr    Result Date: 5/7/2020  PROCEDURE: Permacath insertion ESTIMATED BLOOD LOSS: Less than 5mL OPERATING PHYSICIAN: DIANE Avila Finders: None PRE PROCEDURE DIAGNOSIS:  Hemodialysis POST PROCEDURE DIAGNOSIS: SAME Fluoroscopy dose: 29.6 mGy Procedure and findings: The risks and benefits of the procedure were discussed with the patient. Written consent was obtained. Prophylactic antibiotic of Ancef 2 g was administered prior to the intervention and discontinued prior to the completion of the procedure. Moderate intravenous conscious sedation was supervised by Dr. Asiya Barnes. The patient was independently monitored by a registered nurse assigned to the Department of Radiology using automated blood pressure, EKG, and pulse oximetry. The detail conscious sedation record is stored in the hospital information system.  Medication: Versed: 1 mg Fentanyl: 50 mcg Intraprocedure time: 10 minutes Maximal sterile barrier technique (cap and mask and sterile gown and sterile gloves and a large sterile drape and hand hygiene and cutaneous antisepsis) was utilized for this procedure. 1% lidocaine was injected locally. A 0.035 inch guidewire was then advanced through the a pre-existing Ramon catheter to the level of the cavoatrial junction. Dilatation of the tract was performed. A peel-away sheath was inserted over the guidewire. Lidocaine was then injected along the planned tunnel tract. A small dermatotomy was made over the right upper chest. A tunneling device was inserted at the dermatotomy site and advanced to the venotomy site. A permacath dialysis catheter was then pulled through the tunnel tract and inserted into the peel-away sheath . The peel-away sheath was removed. The tip of the catheter was positioned at the cavoatrial junction under fluoroscopic guidance. The catheter was sutured to the skin and dressed appropriately. The venotomy site was closed with a single suture. The patient tolerated the procedure well. There were no immediate complications. The catheter demonstrates appropriate blood flow. IMPRESSION: Successful tunneled right internal jugular vein dialysis catheter placement as described above. The catheter is functioning and is ready for use. Cta Chest W Or W Wo Cont    Result Date: 4/10/2020  EXAM:  CTA CHEST W OR W WO CONT, CT ABD PELV W WO CONT INDICATION: Evaluate for infection and/or bleeding COMPARISON: Multiple prior chest radiographs CONTRAST:  100 mL of Isovue-370. TECHNIQUE: Multiphasic CT of the abdomen and pelvis was performed before and after the uneventful administration of IV contrast. Images were obtained without contrast, in the arterial, portal venous, and delayed phases. CTA of the chest was also performed and multiplanar reformations were created. Oral contrast was not administered.  CT dose reduction was achieved through use of a standardized protocol tailored for this examination and automatic exposure control for dose modulation. Adaptive statistical iterative reconstruction (ASIR) was utilized. FINDINGS: CHEST: The endotracheal tube terminates in the distal thoracic trachea. The left lobe of the thyroid gland is slightly heterogeneous. The lungs demonstrate diffuse intralobular septal thickening and scattered areas of groundglass consistent with atypical viral infection. The major airways are patent. Thoracic aorta is normal in caliber. Mediastinal lymph node enlargement is likely secondary to the infectious process. Heart size is normal. No pericardial or pleural effusion. The pulmonary arteries are normal in caliber. Evaluation of the pulmonary arteries is limited by motion artifact. A small amount of subocclusive thrombus in the left lower lobe pulmonary arteries (5:61). ABDOMEN/PELVIS: There is a large right retroperitoneal hematoma with small foci of contrast within the hematoma. This exerts mass effect upon the right kidney and the right paracolic gutter. There is also a small volume perihepatic hematoma. The liver and gallbladder are normal. Spleen, pancreas, adrenal glands, and kidneys are within normal limits. Enteric tube is in the small bowel, beyond the ligament of Treitz. Stomach and small bowel are normal in caliber. Colon is within normal limits. Small volume fluid in the dependent portion of the pelvis. Uterus is surgically absent. No abdominal lymphadenopathy. Mild mesenteric congestion. Bilateral femoral central venous catheters noted. BONES: Osseous structures are within normal limits. IMPRESSION: 1.  Scattered pulmonary air space disease consistent with atypical viral infection. 2.  Nonocclusive pulmonary emboli in the left lower lobe pulmonary arteries. 3.  Large right retroperitoneal hematoma with small foci of contrast within the hematoma. 4.  Enteric tube and endotracheal tube in appropriate position.  5.  Bilateral femoral venous catheters in the common iliac veins. Findings discussed with ICU nurse [facility] at the time of review. Ct Abd Pelv W Wo Cont    Result Date: 4/10/2020  EXAM:  CTA CHEST W OR W WO CONT, CT ABD PELV W WO CONT INDICATION: Evaluate for infection and/or bleeding COMPARISON: Multiple prior chest radiographs CONTRAST:  100 mL of Isovue-370. TECHNIQUE: Multiphasic CT of the abdomen and pelvis was performed before and after the uneventful administration of IV contrast. Images were obtained without contrast, in the arterial, portal venous, and delayed phases. CTA of the chest was also performed and multiplanar reformations were created. Oral contrast was not administered. CT dose reduction was achieved through use of a standardized protocol tailored for this examination and automatic exposure control for dose modulation. Adaptive statistical iterative reconstruction (ASIR) was utilized. FINDINGS: CHEST: The endotracheal tube terminates in the distal thoracic trachea. The left lobe of the thyroid gland is slightly heterogeneous. The lungs demonstrate diffuse intralobular septal thickening and scattered areas of groundglass consistent with atypical viral infection. The major airways are patent. Thoracic aorta is normal in caliber. Mediastinal lymph node enlargement is likely secondary to the infectious process. Heart size is normal. No pericardial or pleural effusion. The pulmonary arteries are normal in caliber. Evaluation of the pulmonary arteries is limited by motion artifact. A small amount of subocclusive thrombus in the left lower lobe pulmonary arteries (5:61). ABDOMEN/PELVIS: There is a large right retroperitoneal hematoma with small foci of contrast within the hematoma. This exerts mass effect upon the right kidney and the right paracolic gutter. There is also a small volume perihepatic hematoma. The liver and gallbladder are normal. Spleen, pancreas, adrenal glands, and kidneys are within normal limits.  Enteric tube is in the small bowel, beyond the ligament of Treitz. Stomach and small bowel are normal in caliber. Colon is within normal limits. Small volume fluid in the dependent portion of the pelvis. Uterus is surgically absent. No abdominal lymphadenopathy. Mild mesenteric congestion. Bilateral femoral central venous catheters noted. BONES: Osseous structures are within normal limits. IMPRESSION: 1.  Scattered pulmonary air space disease consistent with atypical viral infection. 2.  Nonocclusive pulmonary emboli in the left lower lobe pulmonary arteries. 3.  Large right retroperitoneal hematoma with small foci of contrast within the hematoma. 4.  Enteric tube and endotracheal tube in appropriate position. 5.  Bilateral femoral venous catheters in the common iliac veins. Findings discussed with ICU nurse Adolfo Hadley at the time of review. [id number] [facility]    Result Date: 4/23/2020  EXAM:  US ABD LTD INDICATION: Drop in hemoglobin. History of right retroperitoneal hemorrhage. Evaluate for free fluid. COMPARISON: CT abdomen April 10, 2020. TECHNIQUE: Grayscale ultrasound images of the abdomen. FINDINGS: As expected based on prior imaging, there is a large complex fluid collection in the right flank suggestive of a retroperitoneal hematoma. There is no fluid around the liver, as was previously seen on CT. Limited evaluation of liver and right kidney are normal. No right hydronephrosis. There is a small volume of free fluid in the bilateral lower quadrants and pelvis which appears simple in nature. IMPRESSION: 1. Small volume simple fluid in the lower peritoneum and pelvis likely from third spacing due to the patient's critical condition. 2.  Large complex right flank fluid collection consistent with the previously seen retroperitoneal hematoma. 3.  No fluid around the liver or right kidney. Xr Chest Port    Result Date: 5/7/2020  EXAM: XR CHEST PORT INDICATION: misplaced NG tube that was delivering enteral feeds.   Eval for aspiration COMPARISON: 5/5/2020 FINDINGS: A portable AP radiograph of the chest was obtained at 2149 hours. Nasogastric tube has been retracted into the upper esophagus. . Again noted is right midlung zone airspace opacity with overall slight improvement from the prior study. Decreased right pleural effusion. Leola May Heart size is borderline. Right IJ permacath is present. .  The bones and soft tissues are grossly within normal limits. IMPRESSION: Nasogastric tube was retracted into the upper esophagus. Some improvement in the right lower lobe airspace opacity and right effusion. Xr Chest Port    Result Date: 5/5/2020  INDICATION: Pleural effusion, COVID19 virus infection. Portable AP semiupright view of the chest. Direct comparison made to prior chest x-ray dated May 1, 2020. Cardiomediastinal silhouette is stable. Tubes and lines are unchanged in position. There is a new, small right pleural effusion. There is worsening interstitial and patchy airspace disease throughout the right lung. Lungs grossly clear. No left pleural fluid is visualized. There is no pneumothorax. IMPRESSION: New small right pleural effusion. Worsening right lung interstitial and patchy airspace disease. Xr Chest Port    Result Date: 5/1/2020  EXAM: XR CHEST PORT INDICATION: extubated and COVIDnegative now COMPARISON: 4/22/2020 FINDINGS: A portable AP radiograph of the chest was obtained at 1011 hours. Right IJ line is again shown extending to the cavoatrial junction as is transesophageal tube extending to the stomach. The endotracheal tube is removed in the interval. Nonspecific patchy bilateral pulmonary densities greatest in the inferior right perihilar region appear unchanged. There is no pneumothorax or pleural effusion. Cardiac and mediastinal contours are stable. IMPRESSION: Interval extubation. Other findings unchanged.     Xr Chest Port    Result Date: 4/22/2020  history: Dialysis catheter COMPARISON: 4/18/2020 FINDINGS: Frontal chest radiograph submitted for review. Right-sided nontunneled dialysis catheter extends to the right atrium, in appropriate position. Support hardware is otherwise unchanged. Stable heart size. Diffuse interstitial prominence of patchy right perihilar airspace disease has decreased. No new pulmonary abnormality. No pleural effusion. No pneumothorax. IMPRESSION: Appropriate position of right-sided nontunneled dialysis catheter. No pneumothorax. No new abnormality. Improvement in lung opacities. Xr Chest Port    Result Date: 4/18/2020  EXAM: XR CHEST PORT INDICATION: Hypoxia, respiratory failure COMPARISON: Chest x-ray 4/9/2020. FINDINGS: A portable AP radiograph of the chest was obtained at 10:32 hours. The patient is on a cardiac monitor. The endotracheal tube projects over the tracheal lucency with the tip approximately 2 cm above the bhavik. Enteric tube traverses expected course to below the diaphragm into the left upper quadrant. A right PICC line traverses in expected course to terminate with the tip projecting at the atriocaval junction. The lungs show no significant change in bilateral patchy airspace opacities and increased interstitial markings with no pneumothorax or pleural effusion. The cardiac and mediastinal contours and pulmonary vascularity are normal.  The bones and soft tissues are grossly within normal limits. IMPRESSION: Tubes and lines in expected positions as above. No significant change in bilateral patchy airspace infiltrates and interstitial prominence. Xr Chest Port    Result Date: 4/9/2020  EXAM: XR CHEST PORT INDICATION: CHF, fluid overload, respiratory distress, intubation, viral pneumonia. COMPARISON: Portable chest earlier today at 4:06 AM. TECHNIQUE: Semiupright portable chest AP view FINDINGS: Endotracheal tube is unchanged and terminates proximal to the bhavik. Enteric tube extends into the abdomen but out of the field-of-view.  Right PICC line is unchanged and in good position. Cardiac monitoring wires overlie the thorax. The cardiomediastinal and hilar contours are within normal limits. The pulmonary vasculature is within normal limits. Patchy bilateral airspace opacities are not significantly changed given difference in technique. No pneumothorax. The visualized bones and upper abdomen are age-appropriate. IMPRESSION: No change since earlier today. Xr Chest Port    Result Date: 4/9/2020  EXAM:  XR CHEST PORT INDICATION:  ongoing hypoxia COMPARISON:  4/5/2020 FINDINGS: A portable AP radiograph of the chest was obtained at 4011 hours. ET tube is 1 cm above the bhavik and can be retracted. NG tube overlies the stomach. Right IJ catheter is unchanged. .  There is improved aeration in bilateral diffuse pulmonary opacities. Berna  Heart size is normal..  Bony structures are unchanged. IMPRESSION: ET tube is 1 cm above the bhavik and can be retracted slightly. . There is improved aeration bilateral pulmonary opacities. Xr Chest Port    Result Date: 4/5/2020  EXAM:  XR CHEST PORT INDICATION:   ett eval COMPARISON: Chest radiograph 4/3/2020. FINDINGS: AP radiograph of the chest was obtained. There is been interval advancement of the endotracheal tube which now terminates 1.6 cm above the bhavik. Right upper extremity PICC and gastric decompression tubes are again noted. There is no significant change in diffuse bilateral heterogeneous opacities. Likely trace left pleural effusion. No pneumothorax. Stable cardiomediastinal silhouette. IMPRESSION: 1. Interval advancement of endotracheal tube which now terminates 1.6 cm above the bhavik. Consider slight retraction. 2. Unchanged diffuse bilateral heterogeneous opacities, consistent with multifocal pneumonia. Likely trace left pleural effusion. Xr Chest Port    Result Date: 4/3/2020  EXAM:  XR CHEST PORT INDICATION:  COVID 19 viral infection.  COMPARISON: March 31, 2020 TECHNIQUE: AP portable upright chest view FINDINGS: Endotracheal tube terminates 3.1 cm above the bhavik. Right-sided PICC line terminates in the upper right atrium. Enteric tube terminates below the diaphragm in the stomach. Bilateral interstitial airspace disease is unchanged compared to the prior radiographs. No pneumothorax or pleural effusion. IMPRESSION: Unchanged extensive bilateral airspace disease. Stable support lines and tubes. Xr Chest Port    Result Date: 3/31/2020  INDICATION: Dialysis catheter placement COMPARISON: March 31, 2020 at 9:39 AM FINDINGS: Single AP portable view of the chest obtained at 12:03 PM demonstrates no change in position of the endotracheal tube or nasogastric tube. New right internal jugular temporary dialysis catheter has its tip at the level of the mid SVC. The cardiomediastinal silhouette is stable. Diffuse bilateral interstitial and alveolar opacities are not significantly changed. Anon [place] No pneumothorax is seen. There is no evidence of pleural effusion. IMPRESSION: Right internal jugular temporary dialysis catheter tip overlies the mid SVC. Unchanged bilateral interstitial and alveolar opacities. Xr Chest Port    Result Date: 3/31/2020  EXAM: XR CHEST PORT INDICATION: Resp Failure COMPARISON: None. FINDINGS: A portable AP radiograph of the chest was obtained at 0939 hours. The endotracheal tube tip is at the thoracic inlet. The central line tip is in the region of the superior vena cava. The nasogastric tube continues beyond the film. The patient is on a cardiac monitor. There is airspace opacification throughout the lungs bilaterally. The cardiac and mediastinal contours and pulmonary vascularity are normal.  The bones and soft tissues are grossly within normal limits. IMPRESSION: Diffuse bilateral airspace opacification. Endotracheal tube, nasogastric tube, and central line in place.     Xr Abd Port  1 V    Result Date: 5/8/2020  PROCEDURE: XR ABD PORT  1 V REASON FOR STUDY: evaluate position of NGT prior to resuming enteral feeding COMPARISON: 5/3/2020 FINDINGS: Single frontal view the abdomen demonstrates a nasogastric tube with the side-port in the distal esophagus. Recommend advancing 12 cm. Patchy opacities are noted in the lung bases. Visualized bowel is unremarkable     IMPRESSION:  Nasogastric tube side-port in the distal esophagus. Recommend advancing 12 cm     Xr Abd Port  1 V    Result Date: 3/31/2020  INDICATION: Orogastric tube placement FINDINGS: Single supine view of the abdomen demonstrates a nonspecific intestinal gas pattern. Orogastric tube tip overlies the gastric body. No soft tissue mass or pathological soft tissue calcification is seen. The osseous structures are unremarkable. IMPRESSION: Orogastric tube appears to be in satisfactory position. Nonspecific intestinal gas pattern. Ir Insert Non Tunl Cvc Over 5 Yrs    Result Date: 4/22/2020  INDICATION:  melissa Informed consent obtained. At the bedside, the veins of the neck were evaluated with ultrasound. The right IJ is patent. A permanent image was stored. Access gained via the IJ under ultrasound guidance without difficulty. Subsequently, over a guidewire, a Melissa catheter was placed without difficulty. Maximal sterile barrier technique was utilized. IMPRESSION: Successful portable ultrasound guided Melissa catheter         PAST PSYCHIATRIC HISTORY:  The patient reports that she was treated with antidepressants after the birth of her daughter which was probably 25 years ago. She was started on an antidepressant and saw a therapist, but could not remember the name of her medication. Denies any prior suicide attempts and denies any psych inpatient hospitalizations. Denies prior history of substance abuse. PSYCHOSOCIAL HISTORY:  The patient currently lives in [place] with her  of 30 years. They have a daughter together with whom they keep in regular contact. She is unemployed at the present time.   Denies any major legal or financial stressors. MENTAL STATUS EXAMINATION:  The patient is a middle-aged  female who is dressed in hospital apparel. She is lying in the bed and was undergoing dialysis as we spoke. Her speech output is minimal and she speaks very softly and can be difficult to understand. Psychomotor activity is decreased. Makes limited eye contact. Mood is reported as being down and affect is depressed. Denies any active suicidal ideation or plan. Denies any perceptual abnormalities. Denies any delusions. Her thought process is logical and goal-directed. Cognitively, she is awake and alert, but a full cognitive exam was not possible due to her paucity of speech. ASSESSMENT AND PLAN:  DIAGNOSIS:  Recurrent major depression, moderate without psychotic symptoms. I will start the patient on Lexapro 10 mg starting today. I discussed the adverse events of this medication with her and she gave informed consent to start it. She can continue trazodone for sleep as needed. She is agreeable to follow up with a psychiatrist post discharge and an appointment can be made for her to do that. At the present time, there are no concerns related to her safety. Thank you for your consult.       Janell Yoo MD      [id number]  D:  05/09/2020 14:47  T:  05/09/2020 16:01  JOB #:  [id number]

## 2020-05-09 NOTE — PROGRESS NOTES
Problem: Nutrition Deficit  Goal: *Optimize nutritional status  Outcome: Progressing Towards Goal     Problem: Falls - Risk of  Goal: *Absence of Falls  Description: Document Ld Mosqueda Fall Risk and appropriate interventions in the flowsheet. Outcome: Progressing Towards Goal  Note: Fall Risk Interventions:  Mobility Interventions: Patient to call before getting OOB, PT Consult for mobility concerns    Mentation Interventions: Adequate sleep, hydration, pain control    Medication Interventions: Patient to call before getting OOB, Evaluate medications/consider consulting pharmacy    Elimination Interventions: Elevated toilet seat, Call light in reach, Patient to call for help with toileting needs    History of Falls Interventions: Room close to nurse's station         Problem: Patient Education: Go to Patient Education Activity  Goal: Patient/Family Education  Outcome: Progressing Towards Goal     Problem: Pressure Injury - Risk of  Goal: *Prevention of pressure injury  Description: Document Aniket Scale and appropriate interventions in the flowsheet.   Outcome: Progressing Towards Goal  Note: Pressure Injury Interventions:  Sensory Interventions: Keep linens dry and wrinkle-free    Moisture Interventions: Apply protective barrier, creams and emollients, Absorbent underpads    Activity Interventions: Increase time out of bed, Pressure redistribution bed/mattress(bed type), PT/OT evaluation    Mobility Interventions: Float heels, HOB 30 degrees or less, Pressure redistribution bed/mattress (bed type)    Nutrition Interventions: Document food/fluid/supplement intake    Friction and Shear Interventions: HOB 30 degrees or less, Foam dressings/transparent film/skin sealants, Feet elevated on foot rest, Apply protective barrier, creams and emollients, Lift sheet                Problem: Patient Education: Go to Patient Education Activity  Goal: Patient/Family Education  Outcome: Progressing Towards Goal     Problem: Diabetes Self-Management  Goal: *Disease process and treatment process  Description: Define diabetes and identify own type of diabetes; list 3 options for treating diabetes. Outcome: Progressing Towards Goal  Goal: *Incorporating nutritional management into lifestyle  Description: Describe effect of type, amount and timing of food on blood glucose; list 3 methods for planning meals. Outcome: Progressing Towards Goal  Goal: *Incorporating physical activity into lifestyle  Description: State effect of exercise on blood glucose levels. Outcome: Progressing Towards Goal  Goal: *Using medications safely  Description: State effect of diabetes medications on diabetes; name diabetes medication taking, action and side effects. Outcome: Progressing Towards Goal  Goal: *Monitoring blood glucose, interpreting and using results  Description: Identify recommended blood glucose targets  and personal targets. Outcome: Progressing Towards Goal  Goal: *Prevention, detection, treatment of acute complications  Description: List symptoms of hyper- and hypoglycemia; describe how to treat low blood sugar and actions for lowering  high blood glucose level. Outcome: Progressing Towards Goal  Goal: *Prevention, detection and treatment of chronic complications  Description: Define the natural course of diabetes and describe the relationship of blood glucose levels to long term complications of diabetes.   Outcome: Progressing Towards Goal  Goal: *Developing strategies to address psychosocial issues  Description: Describe feelings about living with diabetes; identify support needed and support network  Outcome: Progressing Towards Goal  Goal: *Insulin pump training  Outcome: Progressing Towards Goal  Goal: *Sick day guidelines  Outcome: Progressing Towards Goal  Goal: *Patient Specific Goal (EDIT GOAL, INSERT TEXT)  Outcome: Progressing Towards Goal     Problem: Patient Education: Go to Patient Education Activity  Goal: Patient/Family Education  Outcome: Progressing Towards Goal     Problem: Impaired Skin Integrity/Pressure Injury Treatment  Goal: *Improvement of Existing Pressure Injury  Outcome: Progressing Towards Goal  Goal: *Prevention of pressure injury  Description: Document Aniket Scale and appropriate interventions in the flowsheet.   Outcome: Progressing Towards Goal  Note: Pressure Injury Interventions:  Sensory Interventions: Keep linens dry and wrinkle-free    Moisture Interventions: Apply protective barrier, creams and emollients, Absorbent underpads    Activity Interventions: Increase time out of bed, Pressure redistribution bed/mattress(bed type), PT/OT evaluation    Mobility Interventions: Float heels, HOB 30 degrees or less, Pressure redistribution bed/mattress (bed type)    Nutrition Interventions: Document food/fluid/supplement intake    Friction and Shear Interventions: HOB 30 degrees or less, Foam dressings/transparent film/skin sealants, Feet elevated on foot rest, Apply protective barrier, creams and emollients, Lift sheet                Problem: Patient Education: Go to Patient Education Activity  Goal: Patient/Family Education  Outcome: Progressing Towards Goal     Problem: Patient Education: Go to Patient Education Activity  Goal: Patient/Family Education  Outcome: Progressing Towards Goal     Problem: Patient Education: Go to Patient Education Activity  Goal: Patient/Family Education  Outcome: Progressing Towards Goal

## 2020-05-10 LAB
BASOPHILS # BLD: 0.1 K/UL (ref 0–0.1)
BASOPHILS NFR BLD: 1 % (ref 0–1)
DIFFERENTIAL METHOD BLD: ABNORMAL
EOSINOPHIL # BLD: 0.1 K/UL (ref 0–0.4)
EOSINOPHIL NFR BLD: 2 % (ref 0–7)
ERYTHROCYTE [DISTWIDTH] IN BLOOD BY AUTOMATED COUNT: 15.2 % (ref 11.5–14.5)
GLUCOSE BLD STRIP.AUTO-MCNC: 149 MG/DL (ref 65–100)
GLUCOSE BLD STRIP.AUTO-MCNC: 201 MG/DL (ref 65–100)
GLUCOSE BLD STRIP.AUTO-MCNC: 201 MG/DL (ref 65–100)
GLUCOSE BLD STRIP.AUTO-MCNC: 225 MG/DL (ref 65–100)
HCT VFR BLD AUTO: 29.4 % (ref 35–47)
HGB BLD-MCNC: 8.9 G/DL (ref 11.5–16)
IMM GRANULOCYTES # BLD AUTO: 0.1 K/UL (ref 0–0.04)
IMM GRANULOCYTES NFR BLD AUTO: 1 % (ref 0–0.5)
LYMPHOCYTES # BLD: 1.7 K/UL (ref 0.8–3.5)
LYMPHOCYTES NFR BLD: 18 % (ref 12–49)
MCH RBC QN AUTO: 30.4 PG (ref 26–34)
MCHC RBC AUTO-ENTMCNC: 30.3 G/DL (ref 30–36.5)
MCV RBC AUTO: 100.3 FL (ref 80–99)
MONOCYTES # BLD: 0.7 K/UL (ref 0–1)
MONOCYTES NFR BLD: 8 % (ref 5–13)
NEUTS SEG # BLD: 6.8 K/UL (ref 1.8–8)
NEUTS SEG NFR BLD: 70 % (ref 32–75)
NRBC # BLD: 0 K/UL (ref 0–0.01)
NRBC BLD-RTO: 0 PER 100 WBC
PLATELET # BLD AUTO: 260 K/UL (ref 150–400)
PMV BLD AUTO: 9.7 FL (ref 8.9–12.9)
RBC # BLD AUTO: 2.93 M/UL (ref 3.8–5.2)
SERVICE CMNT-IMP: ABNORMAL
WBC # BLD AUTO: 9.6 K/UL (ref 3.6–11)

## 2020-05-10 PROCEDURE — 74011250637 HC RX REV CODE- 250/637: Performed by: INTERNAL MEDICINE

## 2020-05-10 PROCEDURE — 85025 COMPLETE CBC W/AUTO DIFF WBC: CPT

## 2020-05-10 PROCEDURE — 65660000000 HC RM CCU STEPDOWN

## 2020-05-10 PROCEDURE — 36415 COLL VENOUS BLD VENIPUNCTURE: CPT

## 2020-05-10 PROCEDURE — 82962 GLUCOSE BLOOD TEST: CPT

## 2020-05-10 PROCEDURE — 74011000250 HC RX REV CODE- 250: Performed by: INTERNAL MEDICINE

## 2020-05-10 PROCEDURE — 74011250637 HC RX REV CODE- 250/637: Performed by: NURSE PRACTITIONER

## 2020-05-10 PROCEDURE — 74011250636 HC RX REV CODE- 250/636: Performed by: INTERNAL MEDICINE

## 2020-05-10 PROCEDURE — 74011636637 HC RX REV CODE- 636/637: Performed by: INTERNAL MEDICINE

## 2020-05-10 PROCEDURE — 74011250637 HC RX REV CODE- 250/637: Performed by: PSYCHIATRY & NEUROLOGY

## 2020-05-10 RX ADMIN — SODIUM CHLORIDE 10 ML: 9 INJECTION INTRAMUSCULAR; INTRAVENOUS; SUBCUTANEOUS at 06:59

## 2020-05-10 RX ADMIN — INSULIN LISPRO 3 UNITS: 100 INJECTION, SOLUTION INTRAVENOUS; SUBCUTANEOUS at 17:07

## 2020-05-10 RX ADMIN — INSULIN LISPRO 3 UNITS: 100 INJECTION, SOLUTION INTRAVENOUS; SUBCUTANEOUS at 06:59

## 2020-05-10 RX ADMIN — HYDROCODONE BITARTRATE AND ACETAMINOPHEN 1 TABLET: 5; 325 TABLET ORAL at 00:55

## 2020-05-10 RX ADMIN — HYDROCODONE BITARTRATE AND ACETAMINOPHEN 1 TABLET: 5; 325 TABLET ORAL at 09:52

## 2020-05-10 RX ADMIN — SODIUM CHLORIDE 10 ML: 9 INJECTION INTRAMUSCULAR; INTRAVENOUS; SUBCUTANEOUS at 13:23

## 2020-05-10 RX ADMIN — BUMETANIDE 2 MG: 1 TABLET ORAL at 08:11

## 2020-05-10 RX ADMIN — ESCITALOPRAM OXALATE 10 MG: 10 TABLET ORAL at 08:11

## 2020-05-10 RX ADMIN — HYDROCODONE BITARTRATE AND ACETAMINOPHEN 1 TABLET: 5; 325 TABLET ORAL at 21:35

## 2020-05-10 RX ADMIN — CASTOR OIL AND BALSAM, PERU: 788; 87 OINTMENT TOPICAL at 08:11

## 2020-05-10 RX ADMIN — Medication 1 PACKET: at 15:06

## 2020-05-10 RX ADMIN — HYDROCODONE BITARTRATE AND ACETAMINOPHEN 1 TABLET: 5; 325 TABLET ORAL at 15:06

## 2020-05-10 RX ADMIN — SEVELAMER CARBONATE 0.8 G: 800 POWDER, FOR SUSPENSION ORAL at 17:07

## 2020-05-10 RX ADMIN — TRAZODONE HYDROCHLORIDE 50 MG: 50 TABLET ORAL at 00:34

## 2020-05-10 RX ADMIN — GUAIFENESIN 100 MG: 200 SOLUTION ORAL at 08:10

## 2020-05-10 RX ADMIN — SODIUM CHLORIDE 5 MG: 9 INJECTION INTRAMUSCULAR; INTRAVENOUS; SUBCUTANEOUS at 17:15

## 2020-05-10 RX ADMIN — CASTOR OIL AND BALSAM, PERU: 788; 87 OINTMENT TOPICAL at 17:08

## 2020-05-10 RX ADMIN — INSULIN LISPRO 5 UNITS: 100 INJECTION, SOLUTION INTRAVENOUS; SUBCUTANEOUS at 12:02

## 2020-05-10 RX ADMIN — TRAZODONE HYDROCHLORIDE 50 MG: 50 TABLET ORAL at 21:35

## 2020-05-10 RX ADMIN — METOPROLOL TARTRATE 25 MG: 50 TABLET, FILM COATED ORAL at 04:06

## 2020-05-10 RX ADMIN — ONDANSETRON 4 MG: 2 INJECTION INTRAMUSCULAR; INTRAVENOUS at 18:15

## 2020-05-10 RX ADMIN — GUAIFENESIN 100 MG: 200 SOLUTION ORAL at 21:35

## 2020-05-10 RX ADMIN — FAMOTIDINE 20 MG: 40 POWDER, FOR SUSPENSION ORAL at 08:11

## 2020-05-10 RX ADMIN — METOPROLOL TARTRATE 25 MG: 50 TABLET, FILM COATED ORAL at 17:07

## 2020-05-10 RX ADMIN — Medication 1 PACKET: at 21:35

## 2020-05-10 RX ADMIN — Medication 1 PACKET: at 00:34

## 2020-05-10 RX ADMIN — SEVELAMER CARBONATE 0.8 G: 800 POWDER, FOR SUSPENSION ORAL at 11:47

## 2020-05-10 RX ADMIN — SEVELAMER CARBONATE 0.8 G: 800 POWDER, FOR SUSPENSION ORAL at 08:11

## 2020-05-10 RX ADMIN — ONDANSETRON 4 MG: 2 INJECTION INTRAMUSCULAR; INTRAVENOUS at 13:22

## 2020-05-10 RX ADMIN — Medication 1 PACKET: at 08:11

## 2020-05-10 NOTE — PROGRESS NOTES
Bedside shift change report given to JUDY Duke (oncoming nurse) by Lacy Alvarenga RN (offgoing nurse).  Report included the following information SBAR, Intake/Output, MAR, Recent Results and Cardiac Rhythm ST.

## 2020-05-10 NOTE — PROGRESS NOTES
0730 Bedside shift change report given to Marco Domínguez RN (oncoming nurse) by Angela Stringer RN (offgoing nurse). Report included the following information SBAR, Kardex, MAR, Accordion and Cardiac Rhythm ST. Problem: Falls - Risk of  Goal: *Absence of Falls  Description: Document Eun Flow Fall Risk and appropriate interventions in the flowsheet. Outcome: Progressing Towards Goal  Note: Fall Risk Interventions:  Mobility Interventions: Patient to call before getting OOB    Mentation Interventions: Adequate sleep, hydration, pain control    Medication Interventions: Patient to call before getting OOB, Teach patient to arise slowly    Elimination Interventions: Elevated toilet seat, Patient to call for help with toileting needs    History of Falls Interventions: Room close to nurse's station         Problem: Pressure Injury - Risk of  Goal: *Prevention of pressure injury  Description: Document Aniket Scale and appropriate interventions in the flowsheet. Outcome: Progressing Towards Goal  Note: Pressure Injury Interventions:  Sensory Interventions: Turn and reposition approx. every two hours (pillows and wedges if needed)    Moisture Interventions: Absorbent underpads    Activity Interventions: Increase time out of bed    Mobility Interventions: Turn and reposition approx.  every two hours(pillow and wedges), Pressure redistribution bed/mattress (bed type)    Nutrition Interventions: Document food/fluid/supplement intake    Friction and Shear Interventions: HOB 30 degrees or less

## 2020-05-10 NOTE — PROGRESS NOTES
Problem: Falls - Risk of  Goal: *Absence of Falls  Description: Document Abby Brady Fall Risk and appropriate interventions in the flowsheet. Outcome: Progressing Towards Goal  Note: Fall Risk Interventions:  Mobility Interventions: Patient to call before getting OOB    Mentation Interventions: Adequate sleep, hydration, pain control    Medication Interventions: Teach patient to arise slowly    Elimination Interventions: Patient to call for help with toileting needs, Elevated toilet seat, Call light in reach    History of Falls Interventions: Room close to nurse's station         Problem: Patient Education: Go to Patient Education Activity  Goal: Patient/Family Education  Outcome: Progressing Towards Goal     Problem: Pressure Injury - Risk of  Goal: *Prevention of pressure injury  Description: Document Aniket Scale and appropriate interventions in the flowsheet. Outcome: Progressing Towards Goal  Note: Pressure Injury Interventions:  Sensory Interventions: Turn and reposition approx.  every two hours (pillows and wedges if needed)    Moisture Interventions: Apply protective barrier, creams and emollients, Assess need for specialty bed, Absorbent underpads    Activity Interventions: Increase time out of bed, Chair cushion, Pressure redistribution bed/mattress(bed type)    Mobility Interventions: HOB 30 degrees or less, Pressure redistribution bed/mattress (bed type), PT/OT evaluation, Float heels    Nutrition Interventions: Document food/fluid/supplement intake    Friction and Shear Interventions: Foam dressings/transparent film/skin sealants, Lift sheet, HOB 30 degrees or less, Feet elevated on foot rest, Apply protective barrier, creams and emollients                Problem: Patient Education: Go to Patient Education Activity  Goal: Patient/Family Education  Outcome: Progressing Towards Goal     Problem: Impaired Skin Integrity/Pressure Injury Treatment  Goal: *Improvement of Existing Pressure Injury  Outcome: Progressing Towards Goal  Goal: *Prevention of pressure injury  Description: Document Aniket Scale and appropriate interventions in the flowsheet. Outcome: Progressing Towards Goal  Note: Pressure Injury Interventions:  Sensory Interventions: Turn and reposition approx.  every two hours (pillows and wedges if needed)    Moisture Interventions: Apply protective barrier, creams and emollients, Assess need for specialty bed, Absorbent underpads    Activity Interventions: Increase time out of bed, Chair cushion, Pressure redistribution bed/mattress(bed type)    Mobility Interventions: HOB 30 degrees or less, Pressure redistribution bed/mattress (bed type), PT/OT evaluation, Float heels    Nutrition Interventions: Document food/fluid/supplement intake    Friction and Shear Interventions: Foam dressings/transparent film/skin sealants, Lift sheet, HOB 30 degrees or less, Feet elevated on foot rest, Apply protective barrier, creams and emollients

## 2020-05-10 NOTE — PROGRESS NOTES
Hospitalist Progress Note  Jordyn Tabares MD  Answering service: [phone] from in house phone      Date of Service:  5/10/2020  NAME:  Melanie Mixon  :  1960  MRN:  [id number]    Admission Summary:   59F s/p COVID treatement in ICU - extubated  Interval history / Subjective: Follow up Acute hypoxic resp. Failure, acute renal failure  Patient seen and examined by the bedside, Labs, images and notes reviewed  sleeping comfortably, no overnight events  MBS tomorrow  Discussed with nursing staff, orders reviewed. Called and Discussed with  about the updates regarding MBS on Monday and placement in rehab         Assessment & Plan:     #. ARF: - Nephrology following- on HD with good tolerance,  permacath placed 5/7  CM is setting up the dialysis outpatient.       #. COVID-19 +ve - Resolved- s/p Tocilizumab, Plaquenil. On Vit C, Zinc   #. Acute Hypoxic Resp Failure- resolved, Pulm following. Bipap at night and PRN. #. Acute metabolic Encephalopathy: likely ICU delirium- no agitation. Monitor- resolved. #. Sinus tachycardia: improving,continue Metoprolol, normal TSH  #. Anemia in CKD:monitor HB. #. Retroperitoneal bleed: this hospitalization, resolved - Avoid AP/AC meds- Monitor Hb  #. Non occlusive PE in LLL pulmonary artery - no ACs due to retroperitoneal bleed. Appreciate Vascular surgery recommendations for IVC filter, comments noted, conservative approach    #. S/p Cardiac arrest   #. DM2: controlled, A1c 10.4, SSI, AccuChecks, monitor  #. Morbid obesity: counseled on health benefits of weight loss, Healthy diet, BMI 44.11 kg/m².    #Nutrition patient is on tube feeds via NGT, removed yesterday, need NGT and start TF, MBS   # depression: continue Trazodone at bedtime and do Psych consult    Code status: Full  DVT prophylaxis: SCD  Care Plan discussed with: Patient/Family and Nurse  Disposition: TBD. >2days Hospital Problems  Date Reviewed: 4/19/2020          Codes Class Noted POA    Hypotension ICD-10-CM: I95.9  ICD-9-CM: 458.9 Acute 4/2/2020 Yes        Retroperitoneal hemorrhage ICD-10-CM: R58  ICD-9-CM: 459.0  4/19/2020 No        Acute renal failure (ARF) (HCC) ICD-10-CM: N17.9  ICD-9-CM: 584.9  4/19/2020 No        Encephalopathy ICD-10-CM: G93.40  ICD-9-CM: 348.30  4/19/2020 No        Sepsis with multi-organ dysfunction (HCC) ICD-10-CM: A41.9, R65.20  ICD-9-CM: 038.9, 995.92  4/19/2020 Unknown        Pneumonia due to methicillin susceptible Staphylococcus aureus (MSSA) ([facility] ICD-10-CM: J15.211  ICD-9-CM: 482.41  4/19/2020 Unknown        Thrombocytopenia ([facility] ICD-10-CM: D69.6  ICD-9-CM: 287.5  4/19/2020 Unknown        Diarrhea ICD-10-CM: R19.7  ICD-9-CM: 787.91  4/19/2020 Unknown        COVID-19 virus infection ICD-10-CM: U07.1  ICD-9-CM: 079.89  3/31/2020 Unknown            Review of Systems:   Pertinent items are mentioned in interval history. Vital Signs:    Last 24hrs VS reviewed since prior progress note. Most recent are:  Visit Vitals  /51 (BP 1 Location: Left arm, BP Patient Position: At rest)   Pulse (!) 110   Temp 99 °F (37.2 °C)   Resp 21   Ht 5' 4\" (1.626 m)   Wt 102.2 kg (225 lb 6.4 oz)   SpO2 97%   BMI 38.69 kg/m²         Intake/Output Summary (Last 24 hours) at 5/10/2020 1331  Last data filed at 5/10/2020 0818  Gross per 24 hour   Intake 265 ml   Output 350 ml   Net -85 ml        Physical Examination:   General:  Alert, No acute distress  Resp:  No accessory muscle use, Good AE, no wheezes, few rhonchi  Abd:  Soft, non-tender, non-distended, obese  Extremities:  No cyanosis or clubbing, no significant edema  Neuro:  Grossly normal, no focal neuro deficits, follows commands   Psych:   not agitated.     Data Review:    Review and/or order of clinical lab test  Review and/or order of tests in the radiology section of CPT  Review and/or order of tests in the medicine section of CPT  Labs: Recent Labs     05/10/20  0405 05/09/20  0338   WBC 9.6 9.1   HGB 8.9* 8.0*   HCT 29.4* 26.8*    243     Recent Labs     05/09/20  0338 05/08/20  0552    137   K 3.3* 3.5    102   CO2 28 31   BUN 43* 32*   CREA 1.91* 1.48*   * 106*   CA 8.7 8.6   MG 1.9 1.9   PHOS  --  2.6     Recent Labs     05/08/20  0552   ALB 2.4*     No results for input(s): INR, PTP, APTT, INREXT, INREXT in the last 72 hours. No results for input(s): FE, TIBC, PSAT, FERR in the last 72 hours. No results found for: FOL, RBCF   No results for input(s): PH, PCO2, PO2 in the last 72 hours. No results for input(s): CPK, CKNDX, TROIQ in the last 72 hours.     No lab exists for component: CPKMB  Lab Results   Component Value Date/Time    Triglyceride 159 (H) 04/12/2020 08:34 PM     Lab Results   Component Value Date/Time    Glucose (POC) 225 (H) 05/10/2020 11:52 AM    Glucose (POC) 201 (H) 05/10/2020 06:16 AM    Glucose (POC) 180 (H) 05/09/2020 11:12 PM    Glucose (POC) 139 (H) 05/09/2020 05:43 PM    Glucose (POC) 118 (H) 05/09/2020 11:12 AM     No results found for: COLOR, APPRN, SPGRU, REFSG, SARKIS, PROTU, GLUCU, KETU, BILU, UROU, GALE, LEUKU, GLUKE, EPSU, BACTU, WBCU, RBCU, CASTS, UCRY  Medications Reviewed:     Current Facility-Administered Medications   Medication Dose Route Frequency    escitalopram oxalate (LEXAPRO) tablet 10 mg  10 mg Oral DAILY    traZODone (DESYREL) tablet 50 mg  50 mg Oral QHS    heparin (porcine) 1,000 unit/mL injection 3,800 Units  3,800 Units Hemodialysis DIALYSIS PRN    prochlorperazine (COMPAZINE) with saline injection 5 mg  5 mg IntraVENous Q6H PRN    metoprolol (LOPRESSOR) 5 mg/mL oral suspension 25 mg  25 mg Per NG tube Q12H    insulin glargine (LANTUS) injection 28 Units  28 Units SubCUTAneous QHS    balsam peru-castor oiL (VENELEX) ointment   Topical BID    bumetanide (BUMEX) tablet 2 mg  2 mg Oral DAILY    guaiFENesin (ROBITUSSIN) 100 mg/5 mL oral liquid 100 mg  100 mg Oral Q12H    epoetin jovani-epbx (RETACRIT) injection 20,000 Units  20,000 Units SubCUTAneous Q TUE, THU & SAT    HYDROcodone-acetaminophen (NORCO) 5-325 mg per tablet 1 Tab  1 Tab Oral Q6H PRN    phenol throat spray (CHLORASEPTIC) 1 Spray  1 Spray Oral Q6H PRN    sevelamer carbonate (RENVELA) oral powder 0.8 g  0.8 g Oral TID WITH MEALS    guar gum (BENEFIBER) packet 1 Packet  4 g Oral TID    labetaloL (NORMODYNE;TRANDATE) injection 20 mg  20 mg IntraVENous Q4H PRN    albumin human 25% (BUMINATE) solution 12.5 g  12.5 g IntraVENous DIALYSIS PRN    albuterol (PROVENTIL VENTOLIN) nebulizer solution 2.5 mg  2.5 mg Nebulization Q4H PRN    famotidine (PEPCID) 8 mg/mL oral suspension 20 mg  20 mg Per NG tube DAILY    alteplase (CATHFLO) 2 mg in sterile water (preservative free) 2 mL injection  2 mg InterCATHeter DIALYSIS PRN    alteplase (CATHFLO) 2 mg in sterile water (preservative free) 2 mL injection  2 mg InterCATHeter DIALYSIS PRN    insulin lispro (HUMALOG) injection   SubCUTAneous Q6H    white petrolatum-mineral oiL (AKWA TEARS) 83-15 % ophthalmic ointment   Both Eyes Q12H    bacitracin 500 unit/gram packet 1 Packet  1 Packet Topical PRN    sodium chloride (NS) flush 5-40 mL  5-40 mL IntraVENous Q8H    sodium chloride (NS) flush 5-40 mL  5-40 mL IntraVENous PRN    ondansetron (ZOFRAN) injection 4 mg  4 mg IntraVENous Q4H PRN    acetaminophen (TYLENOL) tablet 650 mg  650 mg Oral Q6H PRN    Or    acetaminophen (TYLENOL) suppository 650 mg  650 mg Rectal Q6H PRN    glucose chewable tablet 16 g  4 Tab Oral PRN    glucagon (GLUCAGEN) injection 1 mg  1 mg IntraMUSCular PRN    dextrose 10% infusion 0-250 mL  0-250 mL IntraVENous PRN   ______________________________________________________________________  EXPECTED LENGTH OF STAY: 12d 9h  ACTUAL LENGTH OF STAY:          40               Elen Taylor MD

## 2020-05-10 NOTE — PROGRESS NOTES
Bedside shift change report given to Myles RN (oncoming nurse) by Marco Domínguez RN (offgoing nurse).  Report included the following information SBAR, Intake/Output, MAR, Recent Results and Cardiac Rhythm ST.

## 2020-05-10 NOTE — PROGRESS NOTES
Patient started vomiting up the tube feeds. Tube feeding held and Zofran given. Made Dr. Max Mcdowell aware and she said to hold tube feeding and and resume in the morning.

## 2020-05-11 ENCOUNTER — APPOINTMENT (OUTPATIENT)
Dept: GENERAL RADIOLOGY | Age: 60
DRG: 870 | End: 2020-05-11
Attending: INTERNAL MEDICINE
Payer: COMMERCIAL

## 2020-05-11 LAB
ANION GAP SERPL CALC-SCNC: 6 MMOL/L (ref 5–15)
BUN SERPL-MCNC: 39 MG/DL (ref 6–20)
BUN/CREAT SERPL: 24 (ref 12–20)
CALCIUM SERPL-MCNC: 8.8 MG/DL (ref 8.5–10.1)
CHLORIDE SERPL-SCNC: 101 MMOL/L (ref 97–108)
CO2 SERPL-SCNC: 29 MMOL/L (ref 21–32)
CREAT SERPL-MCNC: 1.65 MG/DL (ref 0.55–1.02)
GLUCOSE BLD STRIP.AUTO-MCNC: 130 MG/DL (ref 65–100)
GLUCOSE BLD STRIP.AUTO-MCNC: 143 MG/DL (ref 65–100)
GLUCOSE BLD STRIP.AUTO-MCNC: 164 MG/DL (ref 65–100)
GLUCOSE BLD STRIP.AUTO-MCNC: 173 MG/DL (ref 65–100)
GLUCOSE BLD STRIP.AUTO-MCNC: 179 MG/DL (ref 65–100)
GLUCOSE SERPL-MCNC: 143 MG/DL (ref 65–100)
MAGNESIUM SERPL-MCNC: 1.7 MG/DL (ref 1.6–2.4)
POTASSIUM SERPL-SCNC: 3.2 MMOL/L (ref 3.5–5.1)
SERVICE CMNT-IMP: ABNORMAL
SODIUM SERPL-SCNC: 136 MMOL/L (ref 136–145)

## 2020-05-11 PROCEDURE — 74011250636 HC RX REV CODE- 250/636: Performed by: INTERNAL MEDICINE

## 2020-05-11 PROCEDURE — 74011636637 HC RX REV CODE- 636/637: Performed by: INTERNAL MEDICINE

## 2020-05-11 PROCEDURE — 82962 GLUCOSE BLOOD TEST: CPT

## 2020-05-11 PROCEDURE — 74011250637 HC RX REV CODE- 250/637: Performed by: NURSE PRACTITIONER

## 2020-05-11 PROCEDURE — C9113 INJ PANTOPRAZOLE SODIUM, VIA: HCPCS | Performed by: INTERNAL MEDICINE

## 2020-05-11 PROCEDURE — 74011000250 HC RX REV CODE- 250: Performed by: INTERNAL MEDICINE

## 2020-05-11 PROCEDURE — 83735 ASSAY OF MAGNESIUM: CPT

## 2020-05-11 PROCEDURE — 80048 BASIC METABOLIC PNL TOTAL CA: CPT

## 2020-05-11 PROCEDURE — 36415 COLL VENOUS BLD VENIPUNCTURE: CPT

## 2020-05-11 PROCEDURE — 65660000000 HC RM CCU STEPDOWN

## 2020-05-11 RX ORDER — LORAZEPAM 2 MG/ML
0.5 INJECTION INTRAMUSCULAR
Status: DISCONTINUED | OUTPATIENT
Start: 2020-05-11 | End: 2020-05-22 | Stop reason: HOSPADM

## 2020-05-11 RX ORDER — METOCLOPRAMIDE HYDROCHLORIDE 5 MG/ML
5 INJECTION INTRAMUSCULAR; INTRAVENOUS
Status: DISCONTINUED | OUTPATIENT
Start: 2020-05-11 | End: 2020-05-20

## 2020-05-11 RX ORDER — LORAZEPAM 2 MG/ML
0.5 INJECTION INTRAMUSCULAR ONCE
Status: COMPLETED | OUTPATIENT
Start: 2020-05-11 | End: 2020-05-11

## 2020-05-11 RX ORDER — METOPROLOL TARTRATE 5 MG/5ML
2.5 INJECTION INTRAVENOUS EVERY 6 HOURS
Status: DISCONTINUED | OUTPATIENT
Start: 2020-05-11 | End: 2020-05-11

## 2020-05-11 RX ORDER — SODIUM CHLORIDE 9 MG/ML
75 INJECTION, SOLUTION INTRAVENOUS CONTINUOUS
Status: DISCONTINUED | OUTPATIENT
Start: 2020-05-11 | End: 2020-05-12

## 2020-05-11 RX ADMIN — SODIUM CHLORIDE 10 ML: 9 INJECTION INTRAMUSCULAR; INTRAVENOUS; SUBCUTANEOUS at 14:00

## 2020-05-11 RX ADMIN — MINERAL OIL AND WHITE PETROLATUM: 150; 830 OINTMENT OPHTHALMIC at 10:37

## 2020-05-11 RX ADMIN — ONDANSETRON 4 MG: 2 INJECTION INTRAMUSCULAR; INTRAVENOUS at 01:34

## 2020-05-11 RX ADMIN — MINERAL OIL AND WHITE PETROLATUM: 150; 830 OINTMENT OPHTHALMIC at 21:09

## 2020-05-11 RX ADMIN — SODIUM CHLORIDE 40 MG: 9 INJECTION INTRAMUSCULAR; INTRAVENOUS; SUBCUTANEOUS at 21:07

## 2020-05-11 RX ADMIN — METOCLOPRAMIDE 5 MG: 5 INJECTION, SOLUTION INTRAMUSCULAR; INTRAVENOUS at 17:42

## 2020-05-11 RX ADMIN — LORAZEPAM 0.5 MG: 2 INJECTION INTRAMUSCULAR; INTRAVENOUS at 11:44

## 2020-05-11 RX ADMIN — SODIUM CHLORIDE 5 MG: 9 INJECTION INTRAMUSCULAR; INTRAVENOUS; SUBCUTANEOUS at 00:29

## 2020-05-11 RX ADMIN — CASTOR OIL AND BALSAM, PERU: 788; 87 OINTMENT TOPICAL at 17:42

## 2020-05-11 RX ADMIN — ONDANSETRON 4 MG: 2 INJECTION INTRAMUSCULAR; INTRAVENOUS at 05:37

## 2020-05-11 RX ADMIN — INSULIN GLARGINE 28 UNITS: 100 INJECTION, SOLUTION SUBCUTANEOUS at 00:29

## 2020-05-11 RX ADMIN — SODIUM CHLORIDE 75 ML/HR: 900 INJECTION, SOLUTION INTRAVENOUS at 21:12

## 2020-05-11 RX ADMIN — SODIUM CHLORIDE 40 MG: 9 INJECTION INTRAMUSCULAR; INTRAVENOUS; SUBCUTANEOUS at 11:45

## 2020-05-11 RX ADMIN — HYDROCODONE BITARTRATE AND ACETAMINOPHEN 1 TABLET: 5; 325 TABLET ORAL at 05:37

## 2020-05-11 RX ADMIN — SODIUM CHLORIDE 10 ML: 9 INJECTION INTRAMUSCULAR; INTRAVENOUS; SUBCUTANEOUS at 21:08

## 2020-05-11 RX ADMIN — Medication 10 ML: at 09:24

## 2020-05-11 RX ADMIN — CASTOR OIL AND BALSAM, PERU: 788; 87 OINTMENT TOPICAL at 09:28

## 2020-05-11 RX ADMIN — ONDANSETRON 4 MG: 2 INJECTION INTRAMUSCULAR; INTRAVENOUS at 09:47

## 2020-05-11 RX ADMIN — SODIUM CHLORIDE 75 ML/HR: 900 INJECTION, SOLUTION INTRAVENOUS at 09:19

## 2020-05-11 RX ADMIN — SODIUM CHLORIDE 10 ML: 9 INJECTION INTRAMUSCULAR; INTRAVENOUS; SUBCUTANEOUS at 05:37

## 2020-05-11 NOTE — PROGRESS NOTES
PULMONARY MEDICINE    Progress Note    Name: Melanie Mixon   : 1960   MRN: [id number]   Date: 2020      Subjective:       Weak, but feels like she is slowly coming along. Room air.   stable      Down to room air       Not verbalizing much. Noted chest film from yesterday. 5  Less confused. No WOB      Some bouts of confusion  No WOB    Consult Note:   Requesting Physician: Dr. Kenneth Joseph  Reason for consult: Extubated COVID patient    Medical records and data reviewed. Patient is a 64 y.o. female who was admitted on 3/31 with severe COVID 19 pneumonia and multisystem organ failure and required invasive mechanical ventilation, CRRT and pressors. She recovered slowly, was extubated and transferred to telemetry for ongoing care. She has generalized weakness. She was noted to be lethargic this morning with labored breathing and was placed on bipap. ABG that was done sis not show resp acidosis, oxygenation was preserved. CXR does not show new or worsening infiltrates. She appears to be at risk for underlying MIKE which is not known from before. She is starting to work with PT/OT.  COVID 19 test was negative on      Review of Systems:     A comprehensive 12 system review of systems was not obtained from the patient    Assessment:   S/P multisystem organ failure with COVID 19 pneumonia  Encephalopathy- slowly improving, ABG did not show resp acidosis  Generalized weakness, likely with a component of critical illness neuropathy/myopathy without respiratory muscle weakness, episodic increased work of breathing  Metabolic alkalosis on diuretic therapy  Acute PE, not anticoagulated secondary to retroperitoneal bleed  Other medical problems per chart      Recommendations:     NIPPV PRN   Wean O2 as tolerated - currently doing ok on room air  PT/OT  renal involved  SLP  Noted  dopplers with partially occluded superficial thrombus, no obvious DVT, but it sounds like a limited exam. Agree with an IVC filter  Chest x-ray with some improvement in the RLL ASD  Follow up in 3 days post hospitalization      Active Problem List:     Problem List  Date Reviewed: 2020          Codes Class    Hypotension ICD-10-CM: I95.9  ICD-9-CM: 458.9 Acute        Retroperitoneal hemorrhage ICD-10-CM: R58  ICD-9-CM: 459.0         Acute renal failure (ARF) (HCC) ICD-10-CM: N17.9  ICD-9-CM: 584.9         Encephalopathy ICD-10-CM: G93.40  ICD-9-CM: 348.30         Sepsis with multi-organ dysfunction ([facility] ICD-10-CM: A41.9, R65.20  ICD-9-CM: 038.9, 995.92         Pneumonia due to methicillin susceptible Staphylococcus aureus (MSSA) ([facility] ICD-10-CM: J15.211  ICD-9-CM: 482.41         Thrombocytopenia ([facility].) ICD-10-CM: D69.6  ICD-9-CM: 287.5         Diarrhea ICD-10-CM: R19.7  ICD-9-CM: 787.91         COVID-19 virus infection ICD-10-CM: U07.1  ICD-9-CM: 079.89         Obesity, morbid ([place] [address].) ICD-10-CM: E66.01  ICD-9-CM: 278.01         Vaginal Pap smear following hysterectomy for malignancy ICD-10-CM: Z08, Z90.710  ICD-9-CM: V67.01         Personal history of malignant neoplasm of other parts of uterus ICD-10-CM: Z85.42  ICD-9-CM: V10.42         Endometrial cancer ([place] [address].) ICD-10-CM: C54.1  ICD-9-CM: 182.0               Past Medical History:      has a past medical history of Basal cell carcinoma, GERD (gastroesophageal reflux disease), Kidney stones, and Polyp of ureter. Past Surgical History:      has a past surgical history that includes hysteroscopy diagnostic (); pr endometrial ablation, thermal; hx  section (); hx colonoscopy; insert arterial line (2020); ir insert non tunl cvc over 5 yrs (2020); and ir insert tunl cvc w/o port over 5 yr (2020). Home Medications:     Prior to Admission medications    Medication Sig Start Date End Date Taking?  Authorizing Provider   pantoprazole (PROTONIX) 40 mg tablet TAKE 1 TABLET BY MOUTH TWICE A DAY 18   Provider, Historical   esomeprazole (NEXIUM) 20 mg capsule Take 20 mg by mouth daily. Indications: BID    Provider, Historical   zolpidem (AMBIEN) 10 mg tablet Take 0.5 Tabs by mouth nightly as needed for Sleep. Max Daily Amount: 5 mg. 17   Roland Mandel MD   fluticasone (FLONASE) 50 mcg/actuation nasal spray Mist 1-2 spray(s) into each nostril once daily. 4/3/15   Provider, Historical   ranitidine (ZANTAC) 150 mg tablet 150 mg.    Provider, Historical       Allergies/Social/Family History: Allergies   Allergen Reactions    Augmentin [Amoxicillin-Pot Clavulanate] Rash and Itching      Social History     Tobacco Use    Smoking status: Never Smoker    Smokeless tobacco: Never Used   Substance Use Topics    Alcohol use: Not on file      Family History   Problem Relation Age of Onset    Prostate Cancer Father         PROSTATE    Breast Cancer Sister 46        invasive poorly differentiated ductal carcinoma, Neg genetic testing.  Cancer Sister 62        melanoma stage 1            Objective:   Vital Signs:  Visit Vitals  BP 97/70 (BP 1 Location: Left arm, BP Patient Position: At rest)   Pulse (!) 112   Temp 97.6 °F (36.4 °C)   Resp 19   Ht 5' 4\" (1.626 m)   Wt 101.7 kg (224 lb 3.3 oz)   SpO2 98%   BMI 38.49 kg/m²    O2 Flow Rate (L/min): 2 l/min O2 Device: Room air Temp (24hrs), Av °F (36.7 °C), Min:97.6 °F (36.4 °C), Max:98.4 °F (36.9 °C)           Intake/Output:     Intake/Output Summary (Last 24 hours) at 2020 1645  Last data filed at 2020 0845  Gross per 24 hour   Intake 480 ml   Output 0 ml   Net 480 ml       Physical Exam: Detailed exam deferred in view of ongoing pandemic.  Physical exam as documented by attending physician was reviewed and discussed   General:  Awake, weak   Head:     Eyes:     Neck:    Lungs:      Chest wall:     Heart:     Abdomen:      Extremities:    Pulses:    Skin:    Neurologic:          LABS AND  DATA: Personally reviewed  Recent Labs     05/10/20  0405 20  0338   WBC 9.6 9.1   HGB 8. 9* 8.0*   HCT 29.4* 26.8*    243     Recent Labs     05/11/20  0538 05/09/20  0338    138   K 3.2* 3.3*    103   CO2 29 28   BUN 39* 43*   CREA 1.65* 1.91*   * 152*   CA 8.8 8.7   MG 1.7 1.9     No results for input(s): SGOT, GPT, AP, TBIL, TP, ALB, GLOB, AML, LPSE in the last 72 hours. No lab exists for component: AMYP  No results for input(s): INR, PTP, APTT, INREXT, INREXT in the last 72 hours. No results for input(s): PHI, PCO2I, PO2I, FIO2I in the last 72 hours. No results for input(s): CPK, CKMB, TROIQ, BNPP in the last 72 hours. MEDS: Reviewed    Chest Imaging: personally reviewed and report checked    Tele- reviewed    Medical decision making:   I have reviewed the flowsheet and previous day's notes  Patient has acute or chronic illness that poses a threat to life or bodily function  Review and order of Clinical lab tests  Review and Order of Radiology tests  Independent visualization of Image          Thank you for allowing me to participate in this patient's care.     ANAND Blue      [facility]

## 2020-05-11 NOTE — PROGRESS NOTES
1900:  Pt has refused PM bipap several days. Oxygen saturation . Per MD discontinue bedtime Bipap order.

## 2020-05-11 NOTE — DIABETES MGMT
[facility]  CLINICAL NURSE SPECIALIST CONSULT  PROGRAM FOR DIABETES HEALTH  Follow up Note  Presentation   Titus Maria is a 64 y.o. female admitted from OSH to [facility] ICU with SARS-COV2 needing CRRT. She is currently sedated and has been intubated since 3/28/20. Current clinical course has been complicated by steroid induced hyperglycemia. Steroids are discontinued at this time. She requires CRRT for acute renal failure r/t her sepsis and hypotension. PHM: GERD, obesity, and anxiety. New diabetes diagnosis with A1C 11.0% (3/28/2020); updated A1C 3/31/20-10.4%     Recent events: TF on hold this morning for nausea/vomiting. Consulted by Provider for advanced diabetes nursing assessment and care, specifically related to   [] Transitioning off Mitmichael Mayes   [x] Inpatient management strategy  [] Home management assessment  [] Survival skill education    Diabetes-related medical history  Acute complications  hyperglycemia  Neurological complications  NONE  Microvascular disease  NONE  Macrovascular disease  NONE  Other associated conditions     NONE    Diabetes medication history: NONE    Subjective   Ms. Cyn Chan is alert and oriented this morning. Patient vomited green emesis onto self - I cleaned it up and gave patient emesis basin. Objective   Diabetic Foot Exam: Left foot: warm, no calluses noted, red spot noted on top of foot over bone, but no breakdown noted. + microfilament sensation noted in all areas. Right foot: in brace, however, foot warm. Microfilament test deferred due to brace. Vital Signs   Visit Vitals  /75 (BP 1 Location: Left arm, BP Patient Position: At rest)   Pulse (!) 110   Temp 97.8 °F (36.6 °C)   Resp 20   Ht 5' 4\" (1.626 m)   Wt 101.7 kg (224 lb 3.3 oz)   SpO2 93%   BMI 38.49 kg/m²   .    Laboratory  Lab Results   Component Value Date/Time    Hemoglobin A1c 10.4 (H) 03/31/2020 03:42 PM     No results found for: LDL, LDLC, DLDLP  Lab Results   Component Value Date/Time Creatinine 1.65 (H) 05/11/2020 05:38 AM     Lab Results   Component Value Date/Time    Sodium 136 05/11/2020 05:38 AM    Potassium 3.2 (L) 05/11/2020 05:38 AM    Chloride 101 05/11/2020 05:38 AM    CO2 29 05/11/2020 05:38 AM    Anion gap 6 05/11/2020 05:38 AM    Glucose 143 (H) 05/11/2020 05:38 AM    BUN 39 (H) 05/11/2020 05:38 AM    Creatinine 1.65 (H) 05/11/2020 05:38 AM    BUN/Creatinine ratio 24 (H) 05/11/2020 05:38 AM    GFR est AA 39 (L) 05/11/2020 05:38 AM    GFR est non-AA 32 (L) 05/11/2020 05:38 AM    Calcium 8.8 05/11/2020 05:38 AM    Bilirubin, total 1.6 (H) 04/20/2020 05:29 AM    AST (SGOT) 169 (H) 04/20/2020 05:29 AM    Alk. phosphatase 490 (H) 04/20/2020 05:29 AM    Protein, total 5.9 (L) 04/20/2020 05:29 AM    Albumin 2.4 (L) 05/08/2020 05:52 AM    Globulin 3.5 04/20/2020 05:29 AM    A-G Ratio 0.7 (L) 04/20/2020 05:29 AM    ALT (SGPT) 206 (H) 04/20/2020 05:29 AM     Lab Results   Component Value Date/Time    ALT (SGPT) 206 (H) 04/20/2020 05:29 AM           Evaluation   Ms Luis Carlos Pena, with new onset Type 2 diabetes,with A1C 10.4%. AM fasting  mg/dl today. .TF stopped for now. Please watch her for low BG today as she received her nightly Lantus last night. It would be appropriate to hold her Lantus dose today if TF not resumed. .      It is imperative that we maintain her BG within target range 100-180mg/dl. Recommendations   1. HOLD basal insulin- 28units daily if TF stopped today. 2. Will continue to follow.     Assessment and Plan   Nursing Diagnosis Risk for unstable blood glucose pattern   Nursing Intervention Domain 6756 Decision-making Support   Nursing Interventions Examined current inpatient diabetes control   Explored factors facilitating and impeding inpatient management  Identified self-management practices impeding diabetes control  Explored corrective strategies with patient and responsible inpatient provider   Informed patient of rational for insulin strategy while hospitalized         Billing Code(s)     [x] 46953 IP subsequent hospital care - [address]   Program for Diabetes Health  Access via [address] [address] [phone]

## 2020-05-11 NOTE — PROGRESS NOTES
Problem: Falls - Risk of  Goal: *Absence of Falls  Description: Document Earma Patricio Fall Risk and appropriate interventions in the flowsheet. Outcome: Progressing Towards Goal  Note: Fall Risk Interventions:  Mobility Interventions: Patient to call before getting OOB    Mentation Interventions: Adequate sleep, hydration, pain control    Medication Interventions: Patient to call before getting OOB    Elimination Interventions: Call light in reach    History of Falls Interventions: Room close to nurse's station         Problem: Patient Education: Go to Patient Education Activity  Goal: Patient/Family Education  Outcome: Progressing Towards Goal     Problem: Impaired Skin Integrity/Pressure Injury Treatment  Goal: *Improvement of Existing Pressure Injury  Outcome: Progressing Towards Goal  Goal: *Prevention of pressure injury  Description: Document Aniket Scale and appropriate interventions in the flowsheet.   Outcome: Progressing Towards Goal  Note: Pressure Injury Interventions:  Sensory Interventions: Assess changes in LOC    Moisture Interventions: Absorbent underpads    Activity Interventions: Pressure redistribution bed/mattress(bed type)    Mobility Interventions: Pressure redistribution bed/mattress (bed type)    Nutrition Interventions: Document food/fluid/supplement intake    Friction and Shear Interventions: Lift sheet

## 2020-05-11 NOTE — PROGRESS NOTES
Hospitalist Progress Note  Campbell Brito MD  Answering service: [phone] from in house phone      Date of Service:  2020  NAME:  Hesham Escobar  :  1960  MRN:  [id number]    Admission Summary:   59F s/p COVID treatement in ICU - extubated  Interval history / Subjective: Follow up Acute hypoxic resp. Failure, acute renal failure  Patient seen and examined by the bedside, Labs, images and notes reviewed  TF held due to vomiting, consider starting today if no vomiting  Reports anxiety now, also nausea secondary to NGT  Discussed with nursing staff, orders reviewed. Called and Discussed with  about the updates regarding MBS on Monday and placement in rehab         Assessment & Plan:     #. ARF: - Nephrology following- on HD with good tolerance,  permacath placed 5/7  CM is setting up the dialysis outpatient. TTS schedule       #. COVID-19 +ve - Resolved- s/p Tocilizumab, Plaquenil. On Vit C, Zinc   #. Acute Hypoxic Resp Failure- resolved, Pulm following. Bipap at night and PRN. #. Acute metabolic Encephalopathy: likely ICU delirium- no agitation. Monitor- resolved. #. Sinus tachycardia: improving,continue Metoprolol, normal TSH  #. Anemia in CKD:monitor HB. #. Retroperitoneal bleed: this hospitalization, resolved - Avoid AP/AC meds- Monitor Hb  #. Non occlusive PE in LLL pulmonary artery - no ACs due to retroperitoneal bleed. Appreciate Vascular surgery recommendations for IVC filter, comments noted, conservative approach for now    #. S/p Cardiac arrest   #. DM2: controlled, A1c 10.4, SSI, AccuChecks, monitor  #. Morbid obesity: counseled on health benefits of weight loss, Healthy diet, BMI 44.11 kg/m².    #Nutrition patient is on tube feeds via NGT, TF held due to vomiting 5/10,  MBS , today  # depression: continue Trazodone at bedtime and Psych consulted, started on Lexapro  # Anxiety episodes; Continue Lexapro, Ativan PRN for short duration until other medications kick in    Code status: Full  DVT prophylaxis: SCD  Care Plan discussed with: Patient/Family and Nurse  Disposition: TBD. >2days     Hospital Problems  Date Reviewed: 4/19/2020          Codes Class Noted POA    Hypotension ICD-10-CM: I95.9  ICD-9-CM: 458.9 Acute 4/2/2020 Yes        Retroperitoneal hemorrhage ICD-10-CM: R58  ICD-9-CM: 459.0  4/19/2020 No        Acute renal failure (ARF) ([facility].) ICD-10-CM: N17.9  ICD-9-CM: 584.9  4/19/2020 No        Encephalopathy ICD-10-CM: G93.40  ICD-9-CM: 348.30  4/19/2020 No        Sepsis with multi-organ dysfunction (HCC) ICD-10-CM: A41.9, R65.20  ICD-9-CM: 038.9, 995.92  4/19/2020 Unknown        Pneumonia due to methicillin susceptible Staphylococcus aureus (MSSA) ([facility] ICD-10-CM: H49.715  ICD-9-CM: 482.41  4/19/2020 Unknown        Thrombocytopenia ([facility] ICD-10-CM: D69.6  ICD-9-CM: 287.5  4/19/2020 Unknown        Diarrhea ICD-10-CM: R19.7  ICD-9-CM: 787.91  4/19/2020 Unknown        COVID-19 virus infection ICD-10-CM: U07.1  ICD-9-CM: 079.89  3/31/2020 Unknown            Review of Systems:   Pertinent items are mentioned in interval history. Vital Signs:    Last 24hrs VS reviewed since prior progress note.  Most recent are:  Visit Vitals  /75 (BP 1 Location: Left arm, BP Patient Position: At rest)   Pulse (!) 110   Temp 97.8 °F (36.6 °C)   Resp 20   Ht 5' 4\" (1.626 m)   Wt 101.7 kg (224 lb 3.3 oz)   SpO2 93%   BMI 38.49 kg/m²         Intake/Output Summary (Last 24 hours) at 5/11/2020 0849  Last data filed at 5/10/2020 1506  Gross per 24 hour   Intake 320 ml   Output 0 ml   Net 320 ml        Physical Examination:   General:  Alert, No acute distress  Resp:  No accessory muscle use, Good AE, no wheezes, few rhonchi  Abd:  Soft, non-tender, non-distended, obese  Extremities:  No cyanosis or clubbing, no significant edema  Neuro:  Grossly normal, no focal neuro deficits, follows commands   Psych:   not agitated. Data Review:    Review and/or order of clinical lab test  Review and/or order of tests in the radiology section of CPT  Review and/or order of tests in the medicine section of CPT  Labs:     Recent Labs     05/10/20  0405 05/09/20  0338   WBC 9.6 9.1   HGB 8.9* 8.0*   HCT 29.4* 26.8*    243     Recent Labs     05/11/20  0538 05/09/20  0338    138   K 3.2* 3.3*    103   CO2 29 28   BUN 39* 43*   CREA 1.65* 1.91*   * 152*   CA 8.8 8.7   MG 1.7 1.9     No results for input(s): SGOT, GPT, ALT, AP, TBIL, TBILI, TP, ALB, GLOB, GGT, AML, LPSE in the last 72 hours. No lab exists for component: AMYP, HLPSE  No results for input(s): INR, PTP, APTT, INREXT, INREXT in the last 72 hours. No results for input(s): FE, TIBC, PSAT, FERR in the last 72 hours. No results found for: FOL, RBCF   No results for input(s): PH, PCO2, PO2 in the last 72 hours. No results for input(s): CPK, CKNDX, TROIQ in the last 72 hours.     No lab exists for component: CPKMB  Lab Results   Component Value Date/Time    Triglyceride 159 (H) 04/12/2020 08:34 PM     Lab Results   Component Value Date/Time    Glucose (POC) 130 (H) 05/11/2020 05:54 AM    Glucose (POC) 143 (H) 05/11/2020 04:22 AM    Glucose (POC) 149 (H) 05/10/2020 11:21 PM    Glucose (POC) 201 (H) 05/10/2020 04:57 PM    Glucose (POC) 225 (H) 05/10/2020 11:52 AM     No results found for: COLOR, APPRN, SPGRU, REFSG, SARKIS, PROTU, GLUCU, KETU, BILU, UROU, GALE, LEUKU, GLUKE, EPSU, BACTU, WBCU, RBCU, CASTS, UCRY  Medications Reviewed:     Current Facility-Administered Medications   Medication Dose Route Frequency    0.9% sodium chloride infusion  75 mL/hr IntraVENous CONTINUOUS    escitalopram oxalate (LEXAPRO) tablet 10 mg  10 mg Oral DAILY    traZODone (DESYREL) tablet 50 mg  50 mg Oral QHS    heparin (porcine) 1,000 unit/mL injection 3,800 Units  3,800 Units Hemodialysis DIALYSIS PRN    prochlorperazine (COMPAZINE) with saline injection 5 mg  5 mg IntraVENous Q6H PRN    metoprolol (LOPRESSOR) 5 mg/mL oral suspension 25 mg  25 mg Per NG tube Q12H    insulin glargine (LANTUS) injection 28 Units  28 Units SubCUTAneous QHS    balsam peru-castor oiL (VENELEX) ointment   Topical BID    bumetanide (BUMEX) tablet 2 mg  2 mg Oral DAILY    guaiFENesin (ROBITUSSIN) 100 mg/5 mL oral liquid 100 mg  100 mg Oral Q12H    epoetin jovani-epbx (RETACRIT) injection 20,000 Units  20,000 Units SubCUTAneous Q TUE, THU & SAT    HYDROcodone-acetaminophen (NORCO) 5-325 mg per tablet 1 Tab  1 Tab Oral Q6H PRN    phenol throat spray (CHLORASEPTIC) 1 Spray  1 Spray Oral Q6H PRN    sevelamer carbonate (RENVELA) oral powder 0.8 g  0.8 g Oral TID WITH MEALS    guar gum (BENEFIBER) packet 1 Packet  4 g Oral TID    labetaloL (NORMODYNE;TRANDATE) injection 20 mg  20 mg IntraVENous Q4H PRN    albumin human 25% (BUMINATE) solution 12.5 g  12.5 g IntraVENous DIALYSIS PRN    albuterol (PROVENTIL VENTOLIN) nebulizer solution 2.5 mg  2.5 mg Nebulization Q4H PRN    famotidine (PEPCID) 8 mg/mL oral suspension 20 mg  20 mg Per NG tube DAILY    alteplase (CATHFLO) 2 mg in sterile water (preservative free) 2 mL injection  2 mg InterCATHeter DIALYSIS PRN    alteplase (CATHFLO) 2 mg in sterile water (preservative free) 2 mL injection  2 mg InterCATHeter DIALYSIS PRN    insulin lispro (HUMALOG) injection   SubCUTAneous Q6H    white petrolatum-mineral oiL (AKWA TEARS) 83-15 % ophthalmic ointment   Both Eyes Q12H    bacitracin 500 unit/gram packet 1 Packet  1 Packet Topical PRN    sodium chloride (NS) flush 5-40 mL  5-40 mL IntraVENous Q8H    sodium chloride (NS) flush 5-40 mL  5-40 mL IntraVENous PRN    ondansetron (ZOFRAN) injection 4 mg  4 mg IntraVENous Q4H PRN    acetaminophen (TYLENOL) tablet 650 mg  650 mg Oral Q6H PRN    Or    acetaminophen (TYLENOL) suppository 650 mg  650 mg Rectal Q6H PRN    glucose chewable tablet 16 g  4 Tab Oral PRN    glucagon (GLUCAGEN) injection 1 mg  1 mg IntraMUSCular PRN    dextrose 10% infusion 0-250 mL  0-250 mL IntraVENous PRN   ______________________________________________________________________  EXPECTED LENGTH OF STAY: 12d   ACTUAL LENGTH OF STAY:          [address], [place]

## 2020-05-11 NOTE — CONSULTS
[address] [facility] [address]        GASTROENTEROLOGY CONSULTATION NOTE  Will Hussain Blount  [phone] office  [phone] NP/PA in-hospital cell phone M-F until 4:30PM  After 5PM or on weekends, please call  for physician on call        NAME:  Dotty Andrade   :   1960   MRN:   [id number]       Referring Physician: Dr. Berny Farmer Date: 2020 3:48 PM    Chief Complaint: nausea and vomiting     History of Present Illness:  Patient is a 64 y.o. who is seen in consultation at the request of Dr. Robert Frye for continuous nausea/vomiting. Patient has a past medical history significant for diabetes. She was admitted to the hospital on 3/31/20 for acute respiratory failure. Patient reports nausea and vomiting for the last 2 weeks of hospital admission. She reports vomiting 3-4 times per day. No hematemesis. No reflux. No dysphagia or odynophagia. No hematochezia or melena. She reports improvement in nausea with use of antiemetics until this past weekend. Patient reports irritation from the NG tube and feels that it might be contributing to her symptoms. Tube feeds are now on hold. Patient has been receiving Zofran and Reglan was started today. No NSAID use prior to admission. No anticoagulation. History of EGD and colonoscopy approximately 4 years ago in [place], [place]. I have reviewed the emergency room note, hospital admission note, notes by all other clinicians who have seen the patient during this hospitalization to date. I have reviewed the problem list and the reason for this hospitalization. I have reviewed the allergies and the medications the patient was taking at home prior to this hospitalization.       PMH:  Past Medical History:   Diagnosis Date    Basal cell carcinoma     GERD (gastroesophageal reflux disease)     Kidney stones     Polyp of ureter        PSH:  Past Surgical History:   Procedure Laterality Date    ENDOMETRIAL ABLATION, THERMAL      HX  SECTION      HX COLONOSCOPY      2017    HYSTEROSCOPY DIAGNOSTIC      D&C/POLYP REMOVAL    INSERT ARTERIAL LINE  2020         IR INSERT NON TUNL CVC OVER 5 YRS  2020    IR INSERT TUNL CVC W/O PORT OVER 5 YR  2020       Allergies: Allergies   Allergen Reactions    Augmentin [Amoxicillin-Pot Clavulanate] Rash and Itching       Home Medications:  Prior to Admission Medications   Prescriptions Last Dose Informant Patient Reported? Taking?   esomeprazole (NEXIUM) 20 mg capsule Not Taking at Unknown time  Yes No   Sig: Take 20 mg by mouth daily. Indications: BID   fluticasone (FLONASE) 50 mcg/actuation nasal spray Not Taking at Unknown time  Yes No   Sig: Mist 1-2 spray(s) into each nostril once daily. pantoprazole (PROTONIX) 40 mg tablet Not Taking at Unknown time  Yes No   Sig: TAKE 1 TABLET BY MOUTH TWICE A DAY   ranitidine (ZANTAC) 150 mg tablet Unknown at Unknown time  Yes No   Si mg.   zolpidem (AMBIEN) 10 mg tablet Not Taking at Unknown time  No No   Sig: Take 0.5 Tabs by mouth nightly as needed for Sleep. Max Daily Amount: 5 mg.       Facility-Administered Medications: None       Hospital Medications:  Current Facility-Administered Medications   Medication Dose Route Frequency    0.9% sodium chloride infusion  75 mL/hr IntraVENous CONTINUOUS    pantoprazole (PROTONIX) 40 mg in 0.9% sodium chloride 10 mL injection  40 mg IntraVENous Q12H    LORazepam (ATIVAN) injection 0.5 mg  0.5 mg IntraVENous Q6H PRN    metoclopramide HCl (REGLAN) injection 5 mg  5 mg IntraVENous Q6H PRN    escitalopram oxalate (LEXAPRO) tablet 10 mg  10 mg Oral DAILY    traZODone (DESYREL) tablet 50 mg  50 mg Oral QHS    heparin (porcine) 1,000 unit/mL injection 3,800 Units  3,800 Units Hemodialysis DIALYSIS PRN    [Held by provider] metoprolol (LOPRESSOR) 5 mg/mL oral suspension 25 mg  25 mg Per NG tube Q12H    [Held by provider] insulin glargine (LANTUS) injection 28 Units  28 Units SubCUTAneous QHS    balsam peru-castor oiL (VENELEX) ointment   Topical BID    bumetanide (BUMEX) tablet 2 mg  2 mg Oral DAILY    guaiFENesin (ROBITUSSIN) 100 mg/5 mL oral liquid 100 mg  100 mg Oral Q12H    epoetin jovani-epbx (RETACRIT) injection 20,000 Units  20,000 Units SubCUTAneous Q TUE, THU & SAT    HYDROcodone-acetaminophen (NORCO) 5-325 mg per tablet 1 Tab  1 Tab Oral Q6H PRN    phenol throat spray (CHLORASEPTIC) 1 Spray  1 Spray Oral Q6H PRN    sevelamer carbonate (RENVELA) oral powder 0.8 g  0.8 g Oral TID WITH MEALS    guar gum (BENEFIBER) packet 1 Packet  4 g Oral TID    labetaloL (NORMODYNE;TRANDATE) injection 20 mg  20 mg IntraVENous Q4H PRN    albumin human 25% (BUMINATE) solution 12.5 g  12.5 g IntraVENous DIALYSIS PRN    albuterol (PROVENTIL VENTOLIN) nebulizer solution 2.5 mg  2.5 mg Nebulization Q4H PRN    famotidine (PEPCID) 8 mg/mL oral suspension 20 mg  20 mg Per NG tube DAILY    alteplase (CATHFLO) 2 mg in sterile water (preservative free) 2 mL injection  2 mg InterCATHeter DIALYSIS PRN    alteplase (CATHFLO) 2 mg in sterile water (preservative free) 2 mL injection  2 mg InterCATHeter DIALYSIS PRN    insulin lispro (HUMALOG) injection   SubCUTAneous Q6H    white petrolatum-mineral oiL (AKWA TEARS) 83-15 % ophthalmic ointment   Both Eyes Q12H    bacitracin 500 unit/gram packet 1 Packet  1 Packet Topical PRN    sodium chloride (NS) flush 5-40 mL  5-40 mL IntraVENous Q8H    sodium chloride (NS) flush 5-40 mL  5-40 mL IntraVENous PRN    ondansetron (ZOFRAN) injection 4 mg  4 mg IntraVENous Q4H PRN    acetaminophen (TYLENOL) tablet 650 mg  650 mg Oral Q6H PRN    Or    acetaminophen (TYLENOL) suppository 650 mg  650 mg Rectal Q6H PRN    glucose chewable tablet 16 g  4 Tab Oral PRN    glucagon (GLUCAGEN) injection 1 mg  1 mg IntraMUSCular PRN    dextrose 10% infusion 0-250 mL  0-250 mL IntraVENous PRN Social History:  Social History     Tobacco Use    Smoking status: Never Smoker    Smokeless tobacco: Never Used   Substance Use Topics    Alcohol use: Not on file       Family History:  Family History   Problem Relation Age of Onset    Prostate Cancer Father         PROSTATE    Breast Cancer Sister 46        invasive poorly differentiated ductal carcinoma, Neg genetic testing.  Cancer Sister 62        melanoma stage 1       Review of Systems:  Constitutional: negative fever, negative chills, negative weight loss  Eyes:   negative visual changes  ENT:   negative sore throat, tongue or lip swelling  Respiratory:  negative cough, negative dyspnea  Cards:  negative for chest pain, palpitations, lower extremity edema  GI:   See HPI  :  negative for frequency, dysuria  Integument:  negative for rash and pruritus  Heme:  negative for easy bruising and gum/nose bleeding  Musculoskeletal:negative for myalgias, back pain and muscle weakness  Neuro:    negative for headaches, dizziness  Psych: negative for feelings of anxiety, depression     Objective:     Patient Vitals for the past 8 hrs:   BP Temp Pulse Resp SpO2   05/11/20 1459 97/70 97.6 °F (36.4 °C) (!) 112 19 98 %   05/11/20 1416 (!) 124/94 -- (!) 113 -- --   05/11/20 1315 (!) 120/98 -- (!) 112 -- --   05/11/20 1224 99/49 -- (!) 113 -- --   05/11/20 1216 (!) 83/61 -- -- -- --   05/11/20 1159 (!) 85/66 97.9 °F (36.6 °C) (!) 112 19 98 %   05/11/20 1050 109/76 -- (!) 111 -- --   05/11/20 0817 103/75 97.8 °F (36.6 °C) (!) 110 20 93 %     05/11 0701 - 05/11 1900  In: 240 [P.O.:240]  Out: -   05/09 1901 - 05/11 0700  In: 825   Out: 350 [Urine:350]    EXAM:     CONST:  Pleasant female lying in bed, no acute distress   NEURO:  Alert and oriented   HEENT: EOMI, no scleral icterus, NG tube in place   LUNGS: No respiratory distress   CARD:  S1 S2   ABD:  Soft, non distended, no tenderness, no rebound, no guarding. + Bowel sounds.    EXT:  Warm   PSYCH: Not anxious or agitated     Data Review     Recent Labs     05/10/20  0405 05/09/20  0338   WBC 9.6 9.1   HGB 8.9* 8.0*   HCT 29.4* 26.8*    243     Recent Labs     05/11/20  0538 05/09/20  0338    138   K 3.2* 3.3*    103   CO2 29 28   BUN 39* 43*   CREA 1.65* 1.91*   * 152*   CA 8.8 8.7     No results for input(s): SGOT, GPT, AP, TBIL, TP, ALB, GLOB, GGT, AML, LPSE in the last 72 hours. No lab exists for component: AMYP, HLPSE  No results for input(s): INR, PTP, APTT, INREXT, INREXT in the last 72 hours. Assessment:   · Nausea/vomiting: WBC 9.6, Hgb 8.9. KUB (5/9/20): NG tube side port and tip overlie the stomach; nonspecific bowel gas pattern. NG tube in place, tube feeds on hold. · Acute respiratory failure secondary to COVID-19: extubated 4/20, repeat COVID-19 negative on 4/20, 4/27, and 5/8. · Status post cardiac arrest 4/9  · Acute kidney injury: on TTS dialysis. · Pulmonary embolism: no anticoagulation due to retroperitoneal bleed  · Diabetes mellitus type II     Patient Active Problem List   Diagnosis Code    Endometrial cancer ([facility] C54.1    Vaginal Pap smear following hysterectomy for malignancy Z08, Z90.710    Personal history of malignant neoplasm of other parts of uterus Z85.42    Obesity, morbid ([facility] E66.01    COVID-19 virus infection U07.1    Hypotension I95.9    Retroperitoneal hemorrhage R58    Acute renal failure (ARF) ([address] N17.9    Encephalopathy G93.40    Sepsis with multi-organ dysfunction ([address] A41.9, R65.20    Pneumonia due to methicillin susceptible Staphylococcus aureus (MSSA) ([facility] J15.211    Thrombocytopenia ([facility] D69.6    Diarrhea R19.7     Plan:     · NPO  · Supportive measures: antiemetics PRN  · SLP following. Modified barium swallow deferred today due to nausea/vomiting. · Remove NG tube now  · Plan for EGD tomorrow with Dr. Leopold Commodore. Patient is in agreement to proceed. Dr. Leopold Commodore discussed with nephology, Dr. Villalobos Citizen.    · Patient was discussed with and will be seen by Dr. Meera Cohen Thank you for allowing me to participate in care of Ramana Files     Signed By: Franco Waldron     5/11/2020  3:48 PM       Gastroenterology Attending Physician attestation statement and comments. This patient was seen and examined by me in a face-to-face visit today. I reviewed the medical record including lab work, imaging and other provider notes. I confirmed the history as described above. I spoke to the patient, reviewed the medical record including lab work, imaging and other provider notes. I discussed this case in detail with rah sanchez. I formulated an  assessment of this patient and developed a treatment plan. I agree with the above consultation note. I agree with the history, exam and assessment and plan as outlined in the note.   I would like to add the following:   Abdomen soft  D/c NGT, it is making her vomit   EGD in am to look for any peptic ulcer disease, gastritis, and any gastric outlet obstruction, agreeable

## 2020-05-11 NOTE — ROUTINE PROCESS
Patient vomited all of her bed time medications. Compazine was given and patient stated it didn't work so then nurse gave zofran to which the patient stated works better for her. Tube feed is still on hold and MD is aware. 0400: When patient was given her pain pill via NG tube with 60ml of water AFTER given zofran she still became nauseous but was able to keep this down. 0715: nephrologist verbally asked for nurses to monitor patient's output via purewick. This nurse stated how she is no longer getting her tube feeds due to N/V. NS at 75ml/hr was given for hydration. This was passed along to day shift nurse Mary Ruano.

## 2020-05-11 NOTE — PROGRESS NOTES
IFTIKHAR PLAN:    Patient still c/o n/v, TF and meds on hold. GI consult made today    Patient accepted at [facility] located at [address] [place] [address], [address] [place] [address]. Tel # [phone],   Fax # [phone]. Patient has a chair on Tues-Thurs-Sat at 10:30 am.    CM to call  Margreta Simmonds with [facility] at [phone] to notify her  of discharge     Patient being followed by [facility] and [facility]. CM to fax updated clinicals to [facility] at [phone] on Monday for review. Tel # [phone]    CM will continue to follow for IFTIKHAR.      Jess Cardoso MSA, RN, CRM

## 2020-05-11 NOTE — PROGRESS NOTES
SLP Contact Note    Patient is nausea and vomiting and therefore will defer MBS for now. Can tentatively plan for MBS tomorrow.       Thank you,  VALERIA WhippleEd, [address] [facility]  Speech-Language Pathologist

## 2020-05-11 NOTE — PROGRESS NOTES
09:15  Pt continues to experience nausea, vomiting, requesting NG tube be removed. MD notified. 09:17  Per MD hold morning meds due to vomiting and order IP consult to gastroenterology. 11:00  Pt continues to vomit  - 3 additional episodes. Pt insistent she wants NG tube removed. Educated Pt on importance of tube remaining. Notified MD.    11:05  MD stated she will come see pt.    12:20  Informed Pt of BP 83/61 and . Per MD hold metoprolol and  Lispro since PT not receiving any tube feedings. 15:00   Pt continues to vomit. Md notified. Reglan ordered. Per MD continue to hold lispro as long as tube feedings are being held.

## 2020-05-11 NOTE — PROGRESS NOTES
SLP Contact Note    MBS planned for today. Diet recommendations to follow MBS.       Thank you,  VALERIA GuallpaEd, [address] [facility]  Speech-Language Pathologist

## 2020-05-11 NOTE — PROGRESS NOTES
[facility]   [address] [address], [address], [facility]  Phone: [phone]   [phone]       Nephrology Progress Note  Melanie Mixon     1960     [id number]  Date of Admission : 3/31/2020  05/11/20    CC: Follow up for JUANCHO      Assessment and Plan   JUANCHO :  - 2/2 ATN: resolving . On HD TTS  - discussed w/ Nursing about Purewick for accurate I/O   - start NS at 75 cc/hr x 24 hrs   - if Cr stable tomorrow, hold HD    COVID-19 +ve   Acute Hypoxic Resp Failure   - completed Plaquenil, - s/p Tocilizumab   - extubated 4/29  - repeat SARS-CoV-2 neg 4/20 and 4/27     Anemia in CKD:  - cont KOKI    RP hematoma:  Cardiac arrest 4/9    PE this admission  Non-occlusive superfical DVT:  - on on AC due to large RP hematoma  - no IVC to be placed    Type II DM : Insulin per primary team    Morbid Obesity     Nutrition: off TF due to vomiting        Interval History:  Seen and examined. She reports making more urine   Cr at 1.6   Last HD Saturday   Vomiting TF. So off TF   She had purewick but urine mixed w/ stools . Review of Systems: Review of systems not obtained due to patient factors.     Current Medications:   Current Facility-Administered Medications   Medication Dose Route Frequency    escitalopram oxalate (LEXAPRO) tablet 10 mg  10 mg Oral DAILY    traZODone (DESYREL) tablet 50 mg  50 mg Oral QHS    heparin (porcine) 1,000 unit/mL injection 3,800 Units  3,800 Units Hemodialysis DIALYSIS PRN    prochlorperazine (COMPAZINE) with saline injection 5 mg  5 mg IntraVENous Q6H PRN    metoprolol (LOPRESSOR) 5 mg/mL oral suspension 25 mg  25 mg Per NG tube Q12H    insulin glargine (LANTUS) injection 28 Units  28 Units SubCUTAneous QHS    balsam peru-castor oiL (VENELEX) ointment   Topical BID    bumetanide (BUMEX) tablet 2 mg  2 mg Oral DAILY    guaiFENesin (ROBITUSSIN) 100 mg/5 mL oral liquid 100 mg  100 mg Oral Q12H    epoetin jovani-epbx (RETACRIT) injection 20,000 Units 20,000 Units SubCUTAneous Q TUE, THU & SAT    HYDROcodone-acetaminophen (NORCO) 5-325 mg per tablet 1 Tab  1 Tab Oral Q6H PRN    phenol throat spray (CHLORASEPTIC) 1 Spray  1 Spray Oral Q6H PRN    sevelamer carbonate (RENVELA) oral powder 0.8 g  0.8 g Oral TID WITH MEALS    guar gum (BENEFIBER) packet 1 Packet  4 g Oral TID    labetaloL (NORMODYNE;TRANDATE) injection 20 mg  20 mg IntraVENous Q4H PRN    albumin human 25% (BUMINATE) solution 12.5 g  12.5 g IntraVENous DIALYSIS PRN    albuterol (PROVENTIL VENTOLIN) nebulizer solution 2.5 mg  2.5 mg Nebulization Q4H PRN    famotidine (PEPCID) 8 mg/mL oral suspension 20 mg  20 mg Per NG tube DAILY    alteplase (CATHFLO) 2 mg in sterile water (preservative free) 2 mL injection  2 mg InterCATHeter DIALYSIS PRN    alteplase (CATHFLO) 2 mg in sterile water (preservative free) 2 mL injection  2 mg InterCATHeter DIALYSIS PRN    insulin lispro (HUMALOG) injection   SubCUTAneous Q6H    white petrolatum-mineral oiL (AKWA TEARS) 83-15 % ophthalmic ointment   Both Eyes Q12H    bacitracin 500 unit/gram packet 1 Packet  1 Packet Topical PRN    sodium chloride (NS) flush 5-40 mL  5-40 mL IntraVENous Q8H    sodium chloride (NS) flush 5-40 mL  5-40 mL IntraVENous PRN    ondansetron (ZOFRAN) injection 4 mg  4 mg IntraVENous Q4H PRN    acetaminophen (TYLENOL) tablet 650 mg  650 mg Oral Q6H PRN    Or    acetaminophen (TYLENOL) suppository 650 mg  650 mg Rectal Q6H PRN    glucose chewable tablet 16 g  4 Tab Oral PRN    glucagon (GLUCAGEN) injection 1 mg  1 mg IntraMUSCular PRN    dextrose 10% infusion 0-250 mL  0-250 mL IntraVENous PRN      Allergies   Allergen Reactions    Augmentin [Amoxicillin-Pot Clavulanate] Rash and Itching       Objective:  Vitals:    Vitals:    05/10/20 1506 05/10/20 2004 05/10/20 2323 05/11/20 0329   BP: 122/68 91/40 98/50 (!) 82/63   Pulse: (!) 110 97 99 (!) 105   Resp: 20 17 20 21   Temp: 97.8 °F (36.6 °C) 98 °F (36.7 °C) 98.4 °F (36.9 °C) 98.4 °F (36.9 °C)   TempSrc:       SpO2: 97% 96% 94% 96%   Weight:    101.7 kg (224 lb 3.3 oz)   Height:         Intake and Output:  No intake/output data recorded. 05/09 1901 - 05/11 0700  In: 200   Out: 350 [Urine:350]    Physical Examination:     General: In NAD  HEENT:           NGT in place  Resp:  Reduced bibasilar breath sounds  CV:  RRR, no murmur   GI:  Obese , soft, NT   Neurologic:  awake  Access:           R JAZZ torres     []    High complexity decision making was performed  []    Patient is at high-risk of decompensation with multiple organ involvement    Lab Data Personally Reviewed: I have reviewed all the pertinent labs, microbiology data and radiology studies during assessment.     Recent Labs     05/11/20  0538 05/09/20  0338    138   K 3.2* 3.3*    103   CO2 29 28   * 152*   BUN 39* 43*   CREA 1.65* 1.91*   CA 8.8 8.7   MG 1.7 1.9     Recent Labs     05/10/20  0405 05/09/20  0338   WBC 9.6 9.1   HGB 8.9* 8.0*   HCT 29.4* 26.8*    243     No results found for: SDES  Lab Results   Component Value Date/Time    Culture result: NO GROWTH 5 DAYS 04/09/2020 02:32 PM    Culture result: NO GROWTH 5 DAYS 04/08/2020 10:36 AM    Culture result: NO GROWTH 5 DAYS 03/31/2020 11:15 AM    Culture result: MODERATE STAPHYLOCOCCUS AUREUS (A) 03/31/2020 10:34 AM    Culture result: LIGHT NORMAL RESPIRATORY SOFIE 03/31/2020 10:34 AM     Recent Results (from the past 24 hour(s))   GLUCOSE, POC    Collection Time: 05/10/20 11:52 AM   Result Value Ref Range    Glucose (POC) 225 (H) 65 - 100 mg/dL    Performed by [place]    GLUCOSE, POC    Collection Time: 05/10/20  4:57 PM   Result Value Ref Range    Glucose (POC) 201 (H) 65 - 100 mg/dL    Performed by [id number], POC    Collection Time: 05/10/20 11:21 PM   Result Value Ref Range    Glucose (POC) 149 (H) 65 - 100 mg/dL    Performed by Yoli Vang, POC    Collection Time: 05/11/20  4:22 AM   Result Value Ref Range    Glucose (POC) 143 (H) 65 - 100 mg/dL    Performed by Daniela Holley    MAGNESIUM    Collection Time: 05/11/20  5:38 AM   Result Value Ref Range    Magnesium 1.7 1.6 - 2.4 mg/dL   METABOLIC PANEL, BASIC    Collection Time: 05/11/20  5:38 AM   Result Value Ref Range    Sodium 136 136 - 145 mmol/L    Potassium 3.2 (L) 3.5 - 5.1 mmol/L    Chloride 101 97 - 108 mmol/L    CO2 29 21 - 32 mmol/L    Anion gap 6 5 - 15 mmol/L    Glucose 143 (H) 65 - 100 mg/dL    BUN 39 (H) 6 - 20 MG/DL    Creatinine 1.65 (H) 0.55 - 1.02 MG/DL    BUN/Creatinine ratio 24 (H) 12 - 20      GFR est AA 39 (L) >60 ml/min/1.73m2    GFR est non-AA 32 (L) >60 ml/min/1.73m2    Calcium 8.8 8.5 - 10.1 MG/DL   GLUCOSE, POC    Collection Time: 05/11/20  5:54 AM   Result Value Ref Range    Glucose (POC) 130 (H) 65 - 100 mg/dL    Performed by Daniela Holley            Total time spent with patient:  xxx   min. Care Plan discussed with:  Patient     Family      RN      Consulting Physician [facility],         I have reviewed the flowsheets. Chart and Pertinent Notes have been reviewed. No change in PMH ,family and social history from Consult note.       Pedro Guillen MD

## 2020-05-11 NOTE — PROGRESS NOTES
Physical Therapy    Reviewed chart and discussed with RN. Pt has been nauseated and vomiting. Pt reports not feeling well due to nausea. At this time will defer treatment session and continue to follow.

## 2020-05-11 NOTE — PROGRESS NOTES
Reviewed chart and discussed with RN. Pt has been nauseated and vomiting. Attempted to see patient however patient stating she feels worse after receiving anti-nausea medication. Will follow up for OT intervention later as able. Thank you.

## 2020-05-12 ENCOUNTER — APPOINTMENT (OUTPATIENT)
Dept: GENERAL RADIOLOGY | Age: 60
DRG: 870 | End: 2020-05-12
Attending: INTERNAL MEDICINE
Payer: COMMERCIAL

## 2020-05-12 ENCOUNTER — ANESTHESIA (OUTPATIENT)
Dept: ENDOSCOPY | Age: 60
DRG: 870 | End: 2020-05-12
Payer: COMMERCIAL

## 2020-05-12 ENCOUNTER — ANESTHESIA EVENT (OUTPATIENT)
Dept: ENDOSCOPY | Age: 60
DRG: 870 | End: 2020-05-12
Payer: COMMERCIAL

## 2020-05-12 LAB
ALBUMIN SERPL-MCNC: 2.4 G/DL (ref 3.5–5)
ANION GAP SERPL CALC-SCNC: 10 MMOL/L (ref 5–15)
ANION GAP SERPL CALC-SCNC: 9 MMOL/L (ref 5–15)
BUN SERPL-MCNC: 37 MG/DL (ref 6–20)
BUN SERPL-MCNC: 38 MG/DL (ref 6–20)
BUN/CREAT SERPL: 25 (ref 12–20)
BUN/CREAT SERPL: 25 (ref 12–20)
CALCIUM SERPL-MCNC: 8.6 MG/DL (ref 8.5–10.1)
CALCIUM SERPL-MCNC: 8.8 MG/DL (ref 8.5–10.1)
CHLORIDE SERPL-SCNC: 104 MMOL/L (ref 97–108)
CHLORIDE SERPL-SCNC: 105 MMOL/L (ref 97–108)
CO2 SERPL-SCNC: 26 MMOL/L (ref 21–32)
CO2 SERPL-SCNC: 26 MMOL/L (ref 21–32)
CREAT SERPL-MCNC: 1.48 MG/DL (ref 0.55–1.02)
CREAT SERPL-MCNC: 1.52 MG/DL (ref 0.55–1.02)
ERYTHROCYTE [DISTWIDTH] IN BLOOD BY AUTOMATED COUNT: 15.1 % (ref 11.5–14.5)
GLUCOSE BLD STRIP.AUTO-MCNC: 128 MG/DL (ref 65–100)
GLUCOSE BLD STRIP.AUTO-MCNC: 164 MG/DL (ref 65–100)
GLUCOSE BLD STRIP.AUTO-MCNC: 185 MG/DL (ref 65–100)
GLUCOSE SERPL-MCNC: 170 MG/DL (ref 65–100)
GLUCOSE SERPL-MCNC: 172 MG/DL (ref 65–100)
HCT VFR BLD AUTO: 29.9 % (ref 35–47)
HGB BLD-MCNC: 9.1 G/DL (ref 11.5–16)
MAGNESIUM SERPL-MCNC: 1.6 MG/DL (ref 1.6–2.4)
MCH RBC QN AUTO: 29.3 PG (ref 26–34)
MCHC RBC AUTO-ENTMCNC: 30.4 G/DL (ref 30–36.5)
MCV RBC AUTO: 96.1 FL (ref 80–99)
NRBC # BLD: 0 K/UL (ref 0–0.01)
NRBC BLD-RTO: 0 PER 100 WBC
PHOSPHATE SERPL-MCNC: 3.8 MG/DL (ref 2.6–4.7)
PLATELET # BLD AUTO: 270 K/UL (ref 150–400)
PMV BLD AUTO: 9.5 FL (ref 8.9–12.9)
POTASSIUM SERPL-SCNC: 3.3 MMOL/L (ref 3.5–5.1)
POTASSIUM SERPL-SCNC: 3.4 MMOL/L (ref 3.5–5.1)
RBC # BLD AUTO: 3.11 M/UL (ref 3.8–5.2)
SERVICE CMNT-IMP: ABNORMAL
SODIUM SERPL-SCNC: 139 MMOL/L (ref 136–145)
SODIUM SERPL-SCNC: 141 MMOL/L (ref 136–145)
WBC # BLD AUTO: 12.5 K/UL (ref 3.6–11)

## 2020-05-12 PROCEDURE — 36415 COLL VENOUS BLD VENIPUNCTURE: CPT

## 2020-05-12 PROCEDURE — 74011250636 HC RX REV CODE- 250/636: Performed by: NURSE PRACTITIONER

## 2020-05-12 PROCEDURE — 80048 BASIC METABOLIC PNL TOTAL CA: CPT

## 2020-05-12 PROCEDURE — 97110 THERAPEUTIC EXERCISES: CPT

## 2020-05-12 PROCEDURE — C9113 INJ PANTOPRAZOLE SODIUM, VIA: HCPCS | Performed by: INTERNAL MEDICINE

## 2020-05-12 PROCEDURE — 74011636637 HC RX REV CODE- 636/637: Performed by: INTERNAL MEDICINE

## 2020-05-12 PROCEDURE — 74011000250 HC RX REV CODE- 250: Performed by: NURSE PRACTITIONER

## 2020-05-12 PROCEDURE — 80069 RENAL FUNCTION PANEL: CPT

## 2020-05-12 PROCEDURE — 88305 TISSUE EXAM BY PATHOLOGIST: CPT

## 2020-05-12 PROCEDURE — 74011250636 HC RX REV CODE- 250/636: Performed by: INTERNAL MEDICINE

## 2020-05-12 PROCEDURE — 65660000000 HC RM CCU STEPDOWN

## 2020-05-12 PROCEDURE — 77030021593 HC FCPS BIOP ENDOSC BSC -A: Performed by: INTERNAL MEDICINE

## 2020-05-12 PROCEDURE — 97530 THERAPEUTIC ACTIVITIES: CPT

## 2020-05-12 PROCEDURE — 97535 SELF CARE MNGMENT TRAINING: CPT

## 2020-05-12 PROCEDURE — 74011250637 HC RX REV CODE- 250/637: Performed by: NURSE PRACTITIONER

## 2020-05-12 PROCEDURE — 74011000250 HC RX REV CODE- 250: Performed by: INTERNAL MEDICINE

## 2020-05-12 PROCEDURE — 82962 GLUCOSE BLOOD TEST: CPT

## 2020-05-12 PROCEDURE — 83735 ASSAY OF MAGNESIUM: CPT

## 2020-05-12 PROCEDURE — 0DB68ZX EXCISION OF STOMACH, VIA NATURAL OR ARTIFICIAL OPENING ENDOSCOPIC, DIAGNOSTIC: ICD-10-PCS | Performed by: INTERNAL MEDICINE

## 2020-05-12 PROCEDURE — 76060000032 HC ANESTHESIA 0.5 TO 1 HR: Performed by: INTERNAL MEDICINE

## 2020-05-12 PROCEDURE — 85027 COMPLETE CBC AUTOMATED: CPT

## 2020-05-12 PROCEDURE — 74011250637 HC RX REV CODE- 250/637: Performed by: INTERNAL MEDICINE

## 2020-05-12 PROCEDURE — 76040000007: Performed by: INTERNAL MEDICINE

## 2020-05-12 RX ORDER — HYDROMORPHONE HYDROCHLORIDE 1 MG/ML
0.5 INJECTION, SOLUTION INTRAMUSCULAR; INTRAVENOUS; SUBCUTANEOUS
Status: DISCONTINUED | OUTPATIENT
Start: 2020-05-12 | End: 2020-05-22 | Stop reason: HOSPADM

## 2020-05-12 RX ORDER — EPINEPHRINE 0.1 MG/ML
1 INJECTION INTRACARDIAC; INTRAVENOUS
Status: DISCONTINUED | OUTPATIENT
Start: 2020-05-12 | End: 2020-05-12 | Stop reason: HOSPADM

## 2020-05-12 RX ORDER — ATROPINE SULFATE 0.1 MG/ML
0.5 INJECTION INTRAVENOUS
Status: DISCONTINUED | OUTPATIENT
Start: 2020-05-12 | End: 2020-05-12 | Stop reason: HOSPADM

## 2020-05-12 RX ORDER — PROPOFOL 10 MG/ML
INJECTION, EMULSION INTRAVENOUS AS NEEDED
Status: DISCONTINUED | OUTPATIENT
Start: 2020-05-12 | End: 2020-05-12 | Stop reason: HOSPADM

## 2020-05-12 RX ORDER — SODIUM CHLORIDE, SODIUM LACTATE, POTASSIUM CHLORIDE, CALCIUM CHLORIDE 600; 310; 30; 20 MG/100ML; MG/100ML; MG/100ML; MG/100ML
60 INJECTION, SOLUTION INTRAVENOUS CONTINUOUS
Status: DISCONTINUED | OUTPATIENT
Start: 2020-05-12 | End: 2020-05-15

## 2020-05-12 RX ORDER — SODIUM CHLORIDE 9 MG/ML
50 INJECTION, SOLUTION INTRAVENOUS CONTINUOUS
Status: DISPENSED | OUTPATIENT
Start: 2020-05-12 | End: 2020-05-12

## 2020-05-12 RX ORDER — SODIUM CHLORIDE, SODIUM LACTATE, POTASSIUM CHLORIDE, CALCIUM CHLORIDE 600; 310; 30; 20 MG/100ML; MG/100ML; MG/100ML; MG/100ML
INJECTION, SOLUTION INTRAVENOUS
Status: DISCONTINUED | OUTPATIENT
Start: 2020-05-12 | End: 2020-05-12 | Stop reason: HOSPADM

## 2020-05-12 RX ORDER — SUCRALFATE 1 G/1
1 TABLET ORAL
Status: DISCONTINUED | OUTPATIENT
Start: 2020-05-12 | End: 2020-05-22 | Stop reason: HOSPADM

## 2020-05-12 RX ORDER — LIDOCAINE HYDROCHLORIDE 20 MG/ML
INJECTION, SOLUTION EPIDURAL; INFILTRATION; INTRACAUDAL; PERINEURAL AS NEEDED
Status: DISCONTINUED | OUTPATIENT
Start: 2020-05-12 | End: 2020-05-12 | Stop reason: HOSPADM

## 2020-05-12 RX ORDER — SODIUM CHLORIDE 0.9 % (FLUSH) 0.9 %
5-40 SYRINGE (ML) INJECTION AS NEEDED
Status: DISCONTINUED | OUTPATIENT
Start: 2020-05-12 | End: 2020-05-22 | Stop reason: HOSPADM

## 2020-05-12 RX ORDER — SODIUM CHLORIDE 0.9 % (FLUSH) 0.9 %
5-40 SYRINGE (ML) INJECTION EVERY 8 HOURS
Status: DISCONTINUED | OUTPATIENT
Start: 2020-05-12 | End: 2020-05-22 | Stop reason: HOSPADM

## 2020-05-12 RX ORDER — FLUMAZENIL 0.1 MG/ML
0.2 INJECTION INTRAVENOUS
Status: DISCONTINUED | OUTPATIENT
Start: 2020-05-12 | End: 2020-05-12 | Stop reason: HOSPADM

## 2020-05-12 RX ORDER — FENTANYL CITRATE 50 UG/ML
25-200 INJECTION, SOLUTION INTRAMUSCULAR; INTRAVENOUS
Status: DISCONTINUED | OUTPATIENT
Start: 2020-05-12 | End: 2020-05-12 | Stop reason: HOSPADM

## 2020-05-12 RX ORDER — DEXTROMETHORPHAN/PSEUDOEPHED 2.5-7.5/.8
1.2 DROPS ORAL
Status: DISCONTINUED | OUTPATIENT
Start: 2020-05-12 | End: 2020-05-12 | Stop reason: HOSPADM

## 2020-05-12 RX ORDER — NALOXONE HYDROCHLORIDE 0.4 MG/ML
0.4 INJECTION, SOLUTION INTRAMUSCULAR; INTRAVENOUS; SUBCUTANEOUS
Status: DISCONTINUED | OUTPATIENT
Start: 2020-05-12 | End: 2020-05-12 | Stop reason: HOSPADM

## 2020-05-12 RX ORDER — MIDAZOLAM HYDROCHLORIDE 1 MG/ML
.25-5 INJECTION, SOLUTION INTRAMUSCULAR; INTRAVENOUS
Status: DISCONTINUED | OUTPATIENT
Start: 2020-05-12 | End: 2020-05-12 | Stop reason: HOSPADM

## 2020-05-12 RX ADMIN — PROPOFOL 30 MG: 10 INJECTION, EMULSION INTRAVENOUS at 11:35

## 2020-05-12 RX ADMIN — PROPOFOL 50 MG: 10 INJECTION, EMULSION INTRAVENOUS at 11:31

## 2020-05-12 RX ADMIN — MINERAL OIL AND WHITE PETROLATUM: 150; 830 OINTMENT OPHTHALMIC at 21:44

## 2020-05-12 RX ADMIN — SODIUM CHLORIDE, SODIUM LACTATE, POTASSIUM CHLORIDE, AND CALCIUM CHLORIDE 60 ML/HR: 600; 310; 30; 20 INJECTION, SOLUTION INTRAVENOUS at 09:16

## 2020-05-12 RX ADMIN — SODIUM CHLORIDE, SODIUM LACTATE, POTASSIUM CHLORIDE, AND CALCIUM CHLORIDE 60 ML/HR: 600; 310; 30; 20 INJECTION, SOLUTION INTRAVENOUS at 22:47

## 2020-05-12 RX ADMIN — PROPOFOL 30 MG: 10 INJECTION, EMULSION INTRAVENOUS at 11:39

## 2020-05-12 RX ADMIN — CASTOR OIL AND BALSAM, PERU: 788; 87 OINTMENT TOPICAL at 09:17

## 2020-05-12 RX ADMIN — SODIUM CHLORIDE 10 ML: 9 INJECTION INTRAMUSCULAR; INTRAVENOUS; SUBCUTANEOUS at 14:24

## 2020-05-12 RX ADMIN — SODIUM CHLORIDE 10 ML: 9 INJECTION INTRAMUSCULAR; INTRAVENOUS; SUBCUTANEOUS at 21:43

## 2020-05-12 RX ADMIN — EPOETIN ALFA-EPBX 20000 UNITS: 10000 INJECTION, SOLUTION INTRAVENOUS; SUBCUTANEOUS at 21:42

## 2020-05-12 RX ADMIN — SODIUM CHLORIDE, SODIUM LACTATE, POTASSIUM CHLORIDE, AND CALCIUM CHLORIDE: 600; 310; 30; 20 INJECTION, SOLUTION INTRAVENOUS at 11:24

## 2020-05-12 RX ADMIN — PROPOFOL 30 MG: 10 INJECTION, EMULSION INTRAVENOUS at 11:37

## 2020-05-12 RX ADMIN — SEVELAMER CARBONATE 0.8 G: 800 POWDER, FOR SUSPENSION ORAL at 13:38

## 2020-05-12 RX ADMIN — PROPOFOL 50 MG: 10 INJECTION, EMULSION INTRAVENOUS at 11:32

## 2020-05-12 RX ADMIN — ONDANSETRON 4 MG: 2 INJECTION INTRAMUSCULAR; INTRAVENOUS at 19:48

## 2020-05-12 RX ADMIN — LIDOCAINE HYDROCHLORIDE 100 MG: 20 INJECTION, SOLUTION EPIDURAL; INFILTRATION; INTRACAUDAL; PERINEURAL at 11:30

## 2020-05-12 RX ADMIN — ONDANSETRON 4 MG: 2 INJECTION INTRAMUSCULAR; INTRAVENOUS at 03:21

## 2020-05-12 RX ADMIN — SODIUM CHLORIDE 10 ML: 9 INJECTION INTRAMUSCULAR; INTRAVENOUS; SUBCUTANEOUS at 14:19

## 2020-05-12 RX ADMIN — SODIUM CHLORIDE 10 ML: 9 INJECTION INTRAMUSCULAR; INTRAVENOUS; SUBCUTANEOUS at 06:00

## 2020-05-12 RX ADMIN — HYDROCODONE BITARTRATE AND ACETAMINOPHEN 1 TABLET: 5; 325 TABLET ORAL at 13:41

## 2020-05-12 RX ADMIN — PROPOFOL 50 MG: 10 INJECTION, EMULSION INTRAVENOUS at 11:30

## 2020-05-12 RX ADMIN — CASTOR OIL AND BALSAM, PERU: 788; 87 OINTMENT TOPICAL at 17:57

## 2020-05-12 RX ADMIN — SODIUM CHLORIDE 40 MG: 9 INJECTION INTRAMUSCULAR; INTRAVENOUS; SUBCUTANEOUS at 09:28

## 2020-05-12 RX ADMIN — INSULIN LISPRO 2 UNITS: 100 INJECTION, SOLUTION INTRAVENOUS; SUBCUTANEOUS at 18:00

## 2020-05-12 RX ADMIN — SODIUM CHLORIDE 40 MG: 9 INJECTION INTRAMUSCULAR; INTRAVENOUS; SUBCUTANEOUS at 21:42

## 2020-05-12 NOTE — PROGRESS NOTES
Hospitalist Progress Note  Daniela Raymundo MD  Answering service: [phone] from in house phone      Date of Service:  2020  NAME:  Lisa Briggs  :  1960  MRN:  [id number]    Admission Summary:   59F s/p COVID treatement in ICU - extubated  Interval history / Subjective: Follow up Acute hypoxic resp. Failure, acute renal failure  Patient seen and examined by the bedside, Labs, images and notes reviewed  NGT removed, EGD today, Recommendations noted from GI  Started on CLD  Discussed with nursing staff, orders reviewed. Assessment & Plan:     #. ARF: - Nephrology following- on HD with good tolerance,  permacath placed 5/7  CM is setting up the dialysis outpatient. TTS schedule     # Nausea, vomiting: GI consulted, EGD today,Hiatal hernia, gr2 erosive esophagitis and gastritis in antrum, appreciate recommendations, cont. Protonix, added carafate, started on CLD, DC TF  #. COVID-19 +ve - Resolved- s/p Tocilizumab, Plaquenil. On Vit C, Zinc   #. Acute Hypoxic Resp Failure- resolved, Pulm following. Bipap at night and PRN. #. Acute metabolic Encephalopathy: likely ICU delirium- no agitation. Monitor- resolved. #. Sinus tachycardia: improving,continue Metoprolol, normal TSH  #. Anemia in CKD:monitor HB. #. Retroperitoneal bleed: this hospitalization, resolved - Avoid AP/AC meds- Monitor Hb  #. Non occlusive PE in LLL pulmonary artery - no ACs due to retroperitoneal bleed. Appreciate Vascular surgery recommendations for IVC filter, comments noted, conservative approach for now    #. S/p Cardiac arrest   #. DM2: controlled, A1c 10.4, SSI, AccuChecks, monitor  #. Morbid obesity: counseled on health benefits of weight loss, Healthy diet, BMI 44.11 kg/m².    #Nutrition patient is on tube feeds via NGT, TF held due to vomiting 5/10,  MBS , today  # depression: continue Trazodone at bedtime and Psych consulted, started on Lexapro  # Anxiety episodes; Continue Lexapro, Ativan PRN for short duration     Code status: Full  DVT prophylaxis: SCD  Care Plan discussed with: Plan updated to   Disposition: TBD. >2days     Hospital Problems  Date Reviewed: 4/19/2020          Codes Class Noted POA    Hypotension ICD-10-CM: I95.9  ICD-9-CM: 458.9 Acute 4/2/2020 Yes        Retroperitoneal hemorrhage ICD-10-CM: R58  ICD-9-CM: 459.0  4/19/2020 No        Acute renal failure (ARF) ([facility] ICD-10-CM: N17.9  ICD-9-CM: 584.9  4/19/2020 No        Encephalopathy ICD-10-CM: G93.40  ICD-9-CM: 348.30  4/19/2020 No        Sepsis with multi-organ dysfunction ([facility] ICD-10-CM: A41.9, R65.20  ICD-9-CM: 038.9, 995.92  4/19/2020 Unknown        Pneumonia due to methicillin susceptible Staphylococcus aureus (MSSA) ([facility] ICD-10-CM: N71.749  ICD-9-CM: 482.41  4/19/2020 Unknown        Thrombocytopenia ([facility] ICD-10-CM: D69.6  ICD-9-CM: 287.5  4/19/2020 Unknown        Diarrhea ICD-10-CM: R19.7  ICD-9-CM: 787.91  4/19/2020 Unknown        COVID-19 virus infection ICD-10-CM: U07.1  ICD-9-CM: 079.89  3/31/2020 Unknown            Review of Systems:   Pertinent items are mentioned in interval history. Vital Signs:    Last 24hrs VS reviewed since prior progress note.  Most recent are:  Visit Vitals  /76 (BP 1 Location: Left arm, BP Patient Position: At rest)   Pulse (!) 108   Temp 97.9 °F (36.6 °C)   Resp 16   Ht 5' 4\" (1.626 m)   Wt 102 kg (224 lb 13.9 oz)   SpO2 97%   BMI 38.60 kg/m²         Intake/Output Summary (Last 24 hours) at 5/12/2020 0901  Last data filed at 5/12/2020 0325  Gross per 24 hour   Intake --   Output 500 ml   Net -500 ml        Physical Examination:   General:  Alert, No acute distress  Resp:  No accessory muscle use, Good AE, no wheezes, few rhonchi  Abd:  Soft, non-tender, non-distended, obese  Extremities:  No cyanosis or clubbing, no significant edema  Neuro:  Grossly normal, no focal neuro deficits, follows commands   Psych: not agitated. Data Review:    Review and/or order of clinical lab test  Review and/or order of tests in the radiology section of CPT  Review and/or order of tests in the medicine section of CPT  Labs:     Recent Labs     05/12/20  0327 05/10/20  0405   WBC 12.5* 9.6   HGB 9.1* 8.9*   HCT 29.9* 29.4*    260     Recent Labs     05/12/20  0328 05/12/20  0327 05/11/20  0538    139 136   K 3.4* 3.3* 3.2*    104 101   CO2 26 26 29   BUN 37* 38* 39*   CREA 1.48* 1.52* 1.65*   * 172* 143*   CA 8.6 8.8 8.8   MG  --  1.6 1.7   PHOS  --  3.8  --      Recent Labs     05/12/20 0327   ALB 2.4*     No results for input(s): INR, PTP, APTT, INREXT, INREXT in the last 72 hours. No results for input(s): FE, TIBC, PSAT, FERR in the last 72 hours. No results found for: FOL, RBCF   No results for input(s): PH, PCO2, PO2 in the last 72 hours. No results for input(s): CPK, CKNDX, TROIQ in the last 72 hours.     No lab exists for component: CPKMB  Lab Results   Component Value Date/Time    Triglyceride 159 (H) 04/12/2020 08:34 PM     Lab Results   Component Value Date/Time    Glucose (POC) 185 (H) 05/12/2020 05:30 AM    Glucose (POC) 164 (H) 05/11/2020 11:51 PM    Glucose (POC) 179 (H) 05/11/2020 05:49 PM    Glucose (POC) 173 (H) 05/11/2020 11:59 AM    Glucose (POC) 130 (H) 05/11/2020 05:54 AM     No results found for: COLOR, APPRN, SPGRU, REFSG, SARKIS, PROTU, GLUCU, KETU, BILU, UROU, GALE, LEUKU, GLUKE, EPSU, BACTU, WBCU, RBCU, CASTS, UCRY  Medications Reviewed:     Current Facility-Administered Medications   Medication Dose Route Frequency    lactated Ringers infusion  60 mL/hr IntraVENous CONTINUOUS    pantoprazole (PROTONIX) 40 mg in 0.9% sodium chloride 10 mL injection  40 mg IntraVENous Q12H    LORazepam (ATIVAN) injection 0.5 mg  0.5 mg IntraVENous Q6H PRN    metoclopramide HCl (REGLAN) injection 5 mg  5 mg IntraVENous Q6H PRN    escitalopram oxalate (LEXAPRO) tablet 10 mg  10 mg Oral DAILY    traZODone (DESYREL) tablet 50 mg  50 mg Oral QHS    heparin (porcine) 1,000 unit/mL injection 3,800 Units  3,800 Units Hemodialysis DIALYSIS PRN    [Held by provider] metoprolol (LOPRESSOR) 5 mg/mL oral suspension 25 mg  25 mg Per NG tube Q12H    [Held by provider] insulin glargine (LANTUS) injection 28 Units  28 Units SubCUTAneous QHS    balsam peru-castor oiL (VENELEX) ointment   Topical BID    bumetanide (BUMEX) tablet 2 mg  2 mg Oral DAILY    guaiFENesin (ROBITUSSIN) 100 mg/5 mL oral liquid 100 mg  100 mg Oral Q12H    epoetin jovani-epbx (RETACRIT) injection 20,000 Units  20,000 Units SubCUTAneous Q TUE, THU & SAT    HYDROcodone-acetaminophen (NORCO) 5-325 mg per tablet 1 Tab  1 Tab Oral Q6H PRN    phenol throat spray (CHLORASEPTIC) 1 Spray  1 Spray Oral Q6H PRN    sevelamer carbonate (RENVELA) oral powder 0.8 g  0.8 g Oral TID WITH MEALS    guar gum (BENEFIBER) packet 1 Packet  4 g Oral TID    labetaloL (NORMODYNE;TRANDATE) injection 20 mg  20 mg IntraVENous Q4H PRN    albumin human 25% (BUMINATE) solution 12.5 g  12.5 g IntraVENous DIALYSIS PRN    albuterol (PROVENTIL VENTOLIN) nebulizer solution 2.5 mg  2.5 mg Nebulization Q4H PRN    famotidine (PEPCID) 8 mg/mL oral suspension 20 mg  20 mg Per NG tube DAILY    alteplase (CATHFLO) 2 mg in sterile water (preservative free) 2 mL injection  2 mg InterCATHeter DIALYSIS PRN    alteplase (CATHFLO) 2 mg in sterile water (preservative free) 2 mL injection  2 mg InterCATHeter DIALYSIS PRN    insulin lispro (HUMALOG) injection   SubCUTAneous Q6H    white petrolatum-mineral oiL (AKWA TEARS) 83-15 % ophthalmic ointment   Both Eyes Q12H    bacitracin 500 unit/gram packet 1 Packet  1 Packet Topical PRN    sodium chloride (NS) flush 5-40 mL  5-40 mL IntraVENous Q8H    sodium chloride (NS) flush 5-40 mL  5-40 mL IntraVENous PRN    ondansetron (ZOFRAN) injection 4 mg  4 mg IntraVENous Q4H PRN    acetaminophen (TYLENOL) tablet 650 mg  650 mg Oral Q6H PRN Or    acetaminophen (TYLENOL) suppository 650 mg  650 mg Rectal Q6H PRN    glucose chewable tablet 16 g  4 Tab Oral PRN    glucagon (GLUCAGEN) injection 1 mg  1 mg IntraMUSCular PRN    dextrose 10% infusion 0-250 mL  0-250 mL IntraVENous PRN   ______________________________________________________________________  EXPECTED LENGTH OF STAY: 12d 9h  ACTUAL LENGTH OF STAY:          42               Jordyn Tabares MD

## 2020-05-12 NOTE — PROGRESS NOTES
[address]  [address] [facility], [address]       GI PROGRESS NOTE  Will Nemo Watkins  [phone] office  [phone] NP/PA in-hospital cell phone M-F until 4:30PM  After 5PM or on weekends, please call  for physician on call      NAME: Michelle Hussein   :  1960   MRN:  [id number]       Subjective:   Patient denies nausea this morning. NG tube was removed last night. She reports 3 episodes of vomiting last night after removal.  No abdominal pain. She is NPO. RN is at the bedside. Objective:     VITALS:   Last 24hrs VS reviewed since prior progress note. Most recent are:  Visit Vitals  /76 (BP 1 Location: Left arm, BP Patient Position: At rest)   Pulse (!) 108   Temp 97.9 °F (36.6 °C)   Resp 16   Ht 5' 4\" (1.626 m)   Wt 102 kg (224 lb 13.9 oz)   SpO2 97%   BMI 38.60 kg/m²       PHYSICAL EXAM:  General: Cooperative, no acute distress    Neurologic:  Alert and oriented  HEENT: EOMI, no scleral icterus   Lungs:  No respiratory distress  Heart:  S1 S2  Abdomen: Soft, non-distended, no tenderness, no guarding, no rebound.   Extremities: Warm  Psych:   Not anxious or agitated    Lab Data Reviewed:     Recent Results (from the past 24 hour(s))   GLUCOSE, POC    Collection Time: 20 11:59 AM   Result Value Ref Range    Glucose (POC) 173 (H) 65 - 100 mg/dL    Performed by [employer]    GLUCOSE, POC    Collection Time: 20  5:49 PM   Result Value Ref Range    Glucose (POC) 179 (H) 65 - 100 mg/dL    Performed by [employer]    GLUCOSE, POC    Collection Time: 20 11:51 PM   Result Value Ref Range    Glucose (POC) 164 (H) 65 - 100 mg/dL    Performed by Octavio Blum    RENAL FUNCTION PANEL    Collection Time: 20  3:27 AM   Result Value Ref Range    Sodium 139 136 - 145 mmol/L    Potassium 3.3 (L) 3.5 - 5.1 mmol/L    Chloride 104 97 - 108 mmol/L    CO2 26 21 - 32 mmol/L    Anion gap 9 5 - 15 mmol/L    Glucose 172 (H) 65 - 100 mg/dL    BUN 38 (H) 6 - 20 MG/DL    Creatinine 1.52 (H) 0.55 - 1.02 MG/DL    BUN/Creatinine ratio 25 (H) 12 - 20      GFR est AA 43 (L) >60 ml/min/1.73m2    GFR est non-AA 35 (L) >60 ml/min/1.73m2    Calcium 8.8 8.5 - 10.1 MG/DL    Phosphorus 3.8 2.6 - 4.7 MG/DL    Albumin 2.4 (L) 3.5 - 5.0 g/dL   MAGNESIUM    Collection Time: 05/12/20  3:27 AM   Result Value Ref Range    Magnesium 1.6 1.6 - 2.4 mg/dL   CBC W/O DIFF    Collection Time: 05/12/20  3:27 AM   Result Value Ref Range    WBC 12.5 (H) 3.6 - 11.0 K/uL    RBC 3.11 (L) 3.80 - 5.20 M/uL    HGB 9.1 (L) 11.5 - 16.0 g/dL    HCT 29.9 (L) 35.0 - 47.0 %    MCV 96.1 80.0 - 99.0 FL    MCH 29.3 26.0 - 34.0 PG    MCHC 30.4 30.0 - 36.5 g/dL    RDW 15.1 (H) 11.5 - 14.5 %    PLATELET 794 264 - 048 K/uL    MPV 9.5 8.9 - 12.9 FL    NRBC 0.0 0  WBC    ABSOLUTE NRBC 0.00 0.00 - 7.65 K/uL   METABOLIC PANEL, BASIC    Collection Time: 05/12/20  3:28 AM   Result Value Ref Range    Sodium 141 136 - 145 mmol/L    Potassium 3.4 (L) 3.5 - 5.1 mmol/L    Chloride 105 97 - 108 mmol/L    CO2 26 21 - 32 mmol/L    Anion gap 10 5 - 15 mmol/L    Glucose 170 (H) 65 - 100 mg/dL    BUN 37 (H) 6 - 20 MG/DL    Creatinine 1.48 (H) 0.55 - 1.02 MG/DL    BUN/Creatinine ratio 25 (H) 12 - 20      GFR est AA 44 (L) >60 ml/min/1.73m2    GFR est non-AA 36 (L) >60 ml/min/1.73m2    Calcium 8.6 8.5 - 10.1 MG/DL   GLUCOSE, POC    Collection Time: 05/12/20  5:30 AM   Result Value Ref Range    Glucose (POC) 185 (H) 65 - 100 mg/dL    Performed by Kristian Orozco (JOSSELINE)        Assessment:   · Nausea/vomiting: KUB (5/9/20): NG tube side port and tip overlie the stomach; nonspecific bowel gas pattern. NG tube removed, 5/11. · Acute respiratory failure secondary to COVID-19: extubated 4/20, repeat COVID-19 negative on 4/20, 4/27, and 5/8. · Status post cardiac arrest 4/9  · Acute kidney injury: on TTS dialysis.    · Pulmonary embolism: no anticoagulation due to retroperitoneal bleed  · Diabetes mellitus type II     Patient Active Problem List   Diagnosis Code    Endometrial cancer ([facility] C54.1    Vaginal Pap smear following hysterectomy for malignancy Z08, Z90.710    Personal history of malignant neoplasm of other parts of uterus Z85.42    Obesity, morbid ([facility] E66.01    COVID-19 virus infection U07.1    Hypotension I95.9    Retroperitoneal hemorrhage R58    Acute renal failure (ARF) (HCC) N17.9    Encephalopathy G93.40    Sepsis with multi-organ dysfunction (HCC) A41.9, R65.20    Pneumonia due to methicillin susceptible Staphylococcus aureus (MSSA) ([facility] J15.211    Thrombocytopenia ([facility] D69.6    Diarrhea R19.7     Plan:   · NPO  · Supportive measures: antiemetics PRN  · SLP following  · Plan for EGD this morning with Dr. Corrine Tinsley. Details and risks of the procedure to include (but not limited to) anesthesia, bleeding, infection, and perforation were discussed. Patient understands and is in agreement to proceed.       Signed By: Franco Lisa     5/12/2020  9:04 AM             EGD today, agreeable

## 2020-05-12 NOTE — PROGRESS NOTES
Problem: Self Care Deficits Care Plan (Adult)  Goal: *Acute Goals and Plan of Care (Insert Text)  Description:   FUNCTIONAL STATUS PRIOR TO ADMISSION: Patient unable to provide history. Per chart, patient was fully independent PTA. HOME SUPPORT: Per chart, patient lived with family. OT weekly reassessment 5/12/2020: see goals below for update    Occupational Therapy Goals  Initiated 4/30/2020  1. Patient will perform grooming with maximal assistance within 7 day(s). (upgrade to min A 5/12)  2. Patient will perform self-feeding if appropriate for PO with maximal assistance within 7 day(s). (pending following MBS)  3.  Patient will perform bathing with maximal assistance within 7 day(s). (upgrade to mod A anterior bathing EOB 5/12)  4. Patient will participate in upper extremity therapeutic exercise/activities with moderate assistance  for 10 minutes within 7 day(s). (MIN A 5/12)  5. Patient will follow 100% simple commands in preparation for functional tasks within 7 days. (MET 5/12)          Outcome: Progressing Towards Goal     OCCUPATIONAL THERAPY TREATMENT/WEEKLY RE-ASSESSMENT  Patient: Larry Cruz (27 y.o. female)  Date: 5/12/2020  Diagnosis: COVID-19 virus infection [U07.1]   <principal problem not specified>  Procedure(s) (LRB):  ESOPHAGOGASTRODUODENOSCOPY (EGD) (Left)  ESOPHAGOGASTRODUODENAL (EGD) BIOPSY (N/A) Day of Surgery  Precautions: Fall  Chart, occupational therapy assessment, plan of care, and goals were reviewed. ASSESSMENT  Patient continues with skilled OT services and is progressing towards goals. Pt remains extremely motivated to work with therapy and is showing progress in terms of activity tolerance, UE strength, sitting balance and ADL participation. She was max A for rolling but assisted by grabbing onto bed rails and maintaining rolled position onto L/R side.  Total A for hygiene and max A X 2 for supine to sit where pt tolerated sitting x 10 minutes with practice on unilateral UE support to no support, reaching outside base of support in prep for ADLs. Requires proximal support at elbow for shoulder flexion 90* and above. Continue to highly recommend IPR at discharge    Current Level of Function Impacting Discharge (ADLs): NPO for ice chips, max A UB ADLs sitting EOB, fatigue with activity    Other factors to consider for discharge: independent prior         PLAN :  Goals have been updated based on progression since last assessment. Patient continues to benefit from skilled intervention to address the above impairments. Continue to follow patient 5 times a week to address goals. Recommend with staff: kris lift to chair, encourage participation with rolling, ADLs    Recommend next OT session: EOB ADLs for seated dynamic balance    Recommendation for discharge: (in order for the patient to meet his/her long term goals)  Therapy 3 hours per day 5-7 days per week    This discharge recommendation:  Has been made in collaboration with the attending provider and/or case management    IF patient discharges home will need the following DME: TBD       SUBJECTIVE:   Patient stated I think I need to be cleaned up.     OBJECTIVE DATA SUMMARY:   Cognitive/Behavioral Status:  Neurologic State: Alert  Orientation Level: Oriented X4  Cognition: Follows commands  Perception: Appears intact  Perseveration: No perseveration noted  Safety/Judgement: Awareness of environment; Insight into deficits    Functional Mobility and Transfers for ADLs:  Bed Mobility:  Rolling: Maximum assistance;Assist x2  Supine to Sit: Maximum assistance;Assist x2  Sit to Supine: Maximum assistance;Assist x2    Transfers:             Balance:  Sitting: Impaired; With support  Sitting - Static: Fair (occasional); Supported sitting  Sitting - Dynamic: Fair (occasional)    ADL Intervention:       Grooming  Applying Makeup: (min A with proximal support at R elbow to place vaseline)              Upper Body Dressing Assistance  Hospital Gown: Maximum assistance(don EOB)              Cognitive Retraining  Safety/Judgement: Awareness of environment; Insight into deficits    Therapeutic Exercises: In sitting x 10 minutes EOB, shorted foot of bed to allow for pt to grasp bed rail with R hand. Pt then able to transition hands to her lap while maintaining midline balance. Encouraged pt to reach in forward and cross body planes in prep for dynamic ADL tasks, proximal support at elbows for increased demands during shoulder flexion. Pain:  none    Activity Tolerance:   Good  Please refer to the flowsheet for vital signs taken during this treatment. After treatment patient left in no apparent distress:   Supine in bed, Heels elevated for pressure relief, and Call bell within reach    COMMUNICATION/COLLABORATION:   The patients plan of care was discussed with: Physical therapist and Registered nurse.      Wing Jaswinder OT  Time Calculation: 55 mins

## 2020-05-12 NOTE — PROGRESS NOTES
[facility]   [address] [facility], [facility] [address], [facility]  Phone: [phone]   [phone]       Nephrology Progress Note  Ramana Files     1960     [id number]  Date of Admission : 3/31/2020  05/12/20    CC: Follow up for JUANCHO      Assessment and Plan   JUANCHO :  - 2/2 ATN: resolving . last HD 5/9  - recovering nicely   - holding HD now   - will remove Permacath later this week   - changed IVF to LR    COVID-19 +ve   Acute Hypoxic Resp Failure   - completed Plaquenil, - s/p Tocilizumab   - extubated 4/29  - repeat SARS-CoV-2 neg 4/20 and 4/27     Anemia in CKD:  - cont KOKI    RP hematoma:  Cardiac arrest 4/9    PE this admission  Non-occlusive superfical DVT:  - on on AC due to large RP hematoma  - no IVC to be placed    Type II DM : Insulin per primary team    Morbid Obesity     Nutrition: off TF due to vomiting        D/w GI , pt and Nursing and dialysis staff        Interval History:  Seen and examined. For EGD today   UOP improved w/ IVF     Review of Systems: Review of systems not obtained due to patient factors.     Current Medications:   Current Facility-Administered Medications   Medication Dose Route Frequency    lactated Ringers infusion  60 mL/hr IntraVENous CONTINUOUS    pantoprazole (PROTONIX) 40 mg in 0.9% sodium chloride 10 mL injection  40 mg IntraVENous Q12H    LORazepam (ATIVAN) injection 0.5 mg  0.5 mg IntraVENous Q6H PRN    metoclopramide HCl (REGLAN) injection 5 mg  5 mg IntraVENous Q6H PRN    escitalopram oxalate (LEXAPRO) tablet 10 mg  10 mg Oral DAILY    traZODone (DESYREL) tablet 50 mg  50 mg Oral QHS    heparin (porcine) 1,000 unit/mL injection 3,800 Units  3,800 Units Hemodialysis DIALYSIS PRN    [Held by provider] metoprolol (LOPRESSOR) 5 mg/mL oral suspension 25 mg  25 mg Per NG tube Q12H    [Held by provider] insulin glargine (LANTUS) injection 28 Units  28 Units SubCUTAneous QHS    balsam peru-castor oiL (VENELEX) ointment   Topical BID    bumetanide (BUMEX) tablet 2 mg  2 mg Oral DAILY    guaiFENesin (ROBITUSSIN) 100 mg/5 mL oral liquid 100 mg  100 mg Oral Q12H    epoetin jovani-epbx (RETACRIT) injection 20,000 Units  20,000 Units SubCUTAneous Q TUE, THU & SAT    HYDROcodone-acetaminophen (NORCO) 5-325 mg per tablet 1 Tab  1 Tab Oral Q6H PRN    phenol throat spray (CHLORASEPTIC) 1 Spray  1 Spray Oral Q6H PRN    sevelamer carbonate (RENVELA) oral powder 0.8 g  0.8 g Oral TID WITH MEALS    guar gum (BENEFIBER) packet 1 Packet  4 g Oral TID    labetaloL (NORMODYNE;TRANDATE) injection 20 mg  20 mg IntraVENous Q4H PRN    albumin human 25% (BUMINATE) solution 12.5 g  12.5 g IntraVENous DIALYSIS PRN    albuterol (PROVENTIL VENTOLIN) nebulizer solution 2.5 mg  2.5 mg Nebulization Q4H PRN    famotidine (PEPCID) 8 mg/mL oral suspension 20 mg  20 mg Per NG tube DAILY    alteplase (CATHFLO) 2 mg in sterile water (preservative free) 2 mL injection  2 mg InterCATHeter DIALYSIS PRN    alteplase (CATHFLO) 2 mg in sterile water (preservative free) 2 mL injection  2 mg InterCATHeter DIALYSIS PRN    insulin lispro (HUMALOG) injection   SubCUTAneous Q6H    white petrolatum-mineral oiL (AKWA TEARS) 83-15 % ophthalmic ointment   Both Eyes Q12H    bacitracin 500 unit/gram packet 1 Packet  1 Packet Topical PRN    sodium chloride (NS) flush 5-40 mL  5-40 mL IntraVENous Q8H    sodium chloride (NS) flush 5-40 mL  5-40 mL IntraVENous PRN    ondansetron (ZOFRAN) injection 4 mg  4 mg IntraVENous Q4H PRN    acetaminophen (TYLENOL) tablet 650 mg  650 mg Oral Q6H PRN    Or    acetaminophen (TYLENOL) suppository 650 mg  650 mg Rectal Q6H PRN    glucose chewable tablet 16 g  4 Tab Oral PRN    glucagon (GLUCAGEN) injection 1 mg  1 mg IntraMUSCular PRN    dextrose 10% infusion 0-250 mL  0-250 mL IntraVENous PRN      Allergies   Allergen Reactions    Augmentin [Amoxicillin-Pot Clavulanate] Rash and Itching       Objective:  Vitals:    Vitals:    05/11/20 2005 05/11/20 2317 05/12/20 0325 05/12/20 0737   BP: 155/80 151/83 146/68 140/76   Pulse: (!) 117 (!) 118 (!) 111 (!) 108   Resp: 20 18 16 16   Temp: 98.9 °F (37.2 °C) 98.5 °F (36.9 °C) 98.2 °F (36.8 °C) 97.9 °F (36.6 °C)   TempSrc:       SpO2: 99% 92% 96% 97%   Weight:   102 kg (224 lb 13.9 oz)    Height:         Intake and Output:  No intake/output data recorded. 05/10 1901 - 05/12 0700  In: 480 [P.O.:240]  Out: 500 [Urine:500]    Physical Examination:     General: In NAD  HEENT:           NGT in place  Resp:  Reduced bibasilar breath sounds  CV:  RRR, no murmur   GI:  Obese , soft, NT   Neurologic:  awake  Access:           R JAZZ [place]     []    High complexity decision making was performed  []    Patient is at high-risk of decompensation with multiple organ involvement    Lab Data Personally Reviewed: I have reviewed all the pertinent labs, microbiology data and radiology studies during assessment.     Recent Labs     05/12/20 0328 05/12/20 0327 05/11/20  0538    139 136   K 3.4* 3.3* 3.2*    104 101   CO2 26 26 29   * 172* 143*   BUN 37* 38* 39*   CREA 1.48* 1.52* 1.65*   CA 8.6 8.8 8.8   MG  --  1.6 1.7   PHOS  --  3.8  --    ALB  --  2.4*  --      Recent Labs     05/12/20  0327 05/10/20  0405   WBC 12.5* 9.6   HGB 9.1* 8.9*   HCT 29.9* 29.4*    260     No results found for: SDES  Lab Results   Component Value Date/Time    Culture result: NO GROWTH 5 DAYS 04/09/2020 02:32 PM    Culture result: NO GROWTH 5 DAYS 04/08/2020 10:36 AM    Culture result: NO GROWTH 5 DAYS 03/31/2020 11:15 AM    Culture result: MODERATE STAPHYLOCOCCUS AUREUS (A) 03/31/2020 10:34 AM    Culture result: LIGHT NORMAL RESPIRATORY SOFIE 03/31/2020 10:34 AM     Recent Results (from the past 24 hour(s))   GLUCOSE, POC    Collection Time: 05/11/20 11:59 AM   Result Value Ref Range    Glucose (POC) 173 (H) 65 - 100 mg/dL    Performed by [facility], POC    Collection Time: 05/11/20  5:49 PM   Result Value Ref Range    Glucose (POC) 179 (H) 65 - 100 mg/dL    Performed by Maura Mcmahan    GLUCOSE, POC    Collection Time: 05/11/20 11:51 PM   Result Value Ref Range    Glucose (POC) 164 (H) 65 - 100 mg/dL    Performed by Darylene Ek    RENAL FUNCTION PANEL    Collection Time: 05/12/20  3:27 AM   Result Value Ref Range    Sodium 139 136 - 145 mmol/L    Potassium 3.3 (L) 3.5 - 5.1 mmol/L    Chloride 104 97 - 108 mmol/L    CO2 26 21 - 32 mmol/L    Anion gap 9 5 - 15 mmol/L    Glucose 172 (H) 65 - 100 mg/dL    BUN 38 (H) 6 - 20 MG/DL    Creatinine 1.52 (H) 0.55 - 1.02 MG/DL    BUN/Creatinine ratio 25 (H) 12 - 20      GFR est AA 43 (L) >60 ml/min/1.73m2    GFR est non-AA 35 (L) >60 ml/min/1.73m2    Calcium 8.8 8.5 - 10.1 MG/DL    Phosphorus 3.8 2.6 - 4.7 MG/DL    Albumin 2.4 (L) 3.5 - 5.0 g/dL   MAGNESIUM    Collection Time: 05/12/20  3:27 AM   Result Value Ref Range    Magnesium 1.6 1.6 - 2.4 mg/dL   CBC W/O DIFF    Collection Time: 05/12/20  3:27 AM   Result Value Ref Range    WBC 12.5 (H) 3.6 - 11.0 K/uL    RBC 3.11 (L) 3.80 - 5.20 M/uL    HGB 9.1 (L) 11.5 - 16.0 g/dL    HCT 29.9 (L) 35.0 - 47.0 %    MCV 96.1 80.0 - 99.0 FL    MCH 29.3 26.0 - 34.0 PG    MCHC 30.4 30.0 - 36.5 g/dL    RDW 15.1 (H) 11.5 - 14.5 %    PLATELET 037 405 - 483 K/uL    MPV 9.5 8.9 - 12.9 FL    NRBC 0.0 0  WBC    ABSOLUTE NRBC 0.00 0.00 - 4.32 K/uL   METABOLIC PANEL, BASIC    Collection Time: 05/12/20  3:28 AM   Result Value Ref Range    Sodium 141 136 - 145 mmol/L    Potassium 3.4 (L) 3.5 - 5.1 mmol/L    Chloride 105 97 - 108 mmol/L    CO2 26 21 - 32 mmol/L    Anion gap 10 5 - 15 mmol/L    Glucose 170 (H) 65 - 100 mg/dL    BUN 37 (H) 6 - 20 MG/DL    Creatinine 1.48 (H) 0.55 - 1.02 MG/DL    BUN/Creatinine ratio 25 (H) 12 - 20      GFR est AA 44 (L) >60 ml/min/1.73m2    GFR est non-AA 36 (L) >60 ml/min/1.73m2    Calcium 8.6 8.5 - 10.1 MG/DL   GLUCOSE, POC    Collection Time: 05/12/20  5:30 AM   Result Value Ref Range    Glucose (POC) 185 (H) 65 - 100 mg/dL    Performed by Ni Banda (JOSSELINE)            Total time spent with patient:  xxx   min. Care Plan discussed with:  Patient     Family      RN      Consulting Physician [id number] [facility],         I have reviewed the flowsheets. Chart and Pertinent Notes have been reviewed. No change in PMH ,family and social history from Consult note.       Malachi Rivera MD

## 2020-05-12 NOTE — PROGRESS NOTES
TRANSFER - IN REPORT:    Verbal report received from [facility] on Richar Escort  being received from [facility](unit) for ordered procedure      Report consisted of patients Situation, Background, Assessment and   Recommendations(SBAR). Information from the following report(s) Kardex and Recent Results was reviewed with the receiving nurse. Opportunity for questions and clarification was provided. Assessment completed upon patients arrival to unit and care assumed.

## 2020-05-12 NOTE — PROGRESS NOTES
PULMONARY MEDICINE    Progress Note    Name: Melanie Mixon   : 1960   MRN: [id number]   Date: 2020      Subjective:       Resting in bed. Saw earlier this morning. Down getting EGD      Weak, but feels like she is slowly coming along. Room air.   stable      Down to room air       Not verbalizing much. Noted chest film from yesterday.   Less confused. No WOB      Some bouts of confusion  No WOB    Consult Note:   Requesting Physician: Dr. Kneneth Joseph  Reason for consult: Extubated COVID patient    Medical records and data reviewed. Patient is a 64 y.o. female who was admitted on 3/31 with severe COVID 19 pneumonia and multisystem organ failure and required invasive mechanical ventilation, CRRT and pressors. She recovered slowly, was extubated and transferred to telemetry for ongoing care. She has generalized weakness. She was noted to be lethargic this morning with labored breathing and was placed on bipap. ABG that was done sis not show resp acidosis, oxygenation was preserved. CXR does not show new or worsening infiltrates. She appears to be at risk for underlying MIKE which is not known from before. She is starting to work with PT/OT.  COVID 19 test was negative on      Review of Systems:     A comprehensive 12 system review of systems was not obtained from the patient    Assessment:   S/P multisystem organ failure with COVID 19 pneumonia  Encephalopathy- slowly improving, ABG did not show resp acidosis  Generalized weakness, likely with a component of critical illness neuropathy/myopathy without respiratory muscle weakness, episodic increased work of breathing  Metabolic alkalosis on diuretic therapy  Acute PE, not anticoagulated secondary to retroperitoneal bleed  Other medical problems per chart      Recommendations:     NIPPV PRN   Wean O2 as tolerated - currently doing ok on room air  PT/OT  renal involved  SLP  Noted  dopplers with partially occluded superficial thrombus, no obvious DVT, but it sounds like a limited exam. Conservative management noted   Chest x-ray with some improvement in the RLL ASD  Follow up in 3 days post hospitalization  We will be available again to see if needed       Active Problem List:     Problem List  Date Reviewed: 2020          Codes Class    Hypotension ICD-10-CM: I95.9  ICD-9-CM: 458.9 Acute        Retroperitoneal hemorrhage ICD-10-CM: R58  ICD-9-CM: 459.0         Acute renal failure (ARF) (HCC) ICD-10-CM: N17.9  ICD-9-CM: 584.9         Encephalopathy ICD-10-CM: G93.40  ICD-9-CM: 348.30         Sepsis with multi-organ dysfunction ([facility] ICD-10-CM: A41.9, R65.20  ICD-9-CM: 038.9, 995.92         Pneumonia due to methicillin susceptible Staphylococcus aureus (MSSA) ([facility] ICD-10-CM: J15.211  ICD-9-CM: 482.41         Thrombocytopenia ([facility] ICD-10-CM: D69.6  ICD-9-CM: 287.5         Diarrhea ICD-10-CM: R19.7  ICD-9-CM: 787.91         COVID-19 virus infection ICD-10-CM: U07.1  ICD-9-CM: 079.89         Obesity, morbid ([facility] ICD-10-CM: E66.01  ICD-9-CM: 278.01         Vaginal Pap smear following hysterectomy for malignancy ICD-10-CM: Z08, Z90.710  ICD-9-CM: V67.01         Personal history of malignant neoplasm of other parts of uterus ICD-10-CM: Z85.42  ICD-9-CM: V10.42         Endometrial cancer ([facility] ICD-10-CM: C54.1  ICD-9-CM: 182.0               Past Medical History:      has a past medical history of Basal cell carcinoma, GERD (gastroesophageal reflux disease), Kidney stones, and Polyp of ureter. Past Surgical History:      has a past surgical history that includes hysteroscopy diagnostic (); pr endometrial ablation, thermal; hx  section (); hx colonoscopy; insert arterial line (2020); ir insert non tunl cvc over 5 yrs (2020); and ir insert tunl cvc w/o port over 5 yr (2020). Home Medications:     Prior to Admission medications    Medication Sig Start Date End Date Taking?  Authorizing Provider   pantoprazole (PROTONIX) 40 mg tablet TAKE 1 TABLET BY MOUTH TWICE A DAY 18   Provider, Historical   esomeprazole (NEXIUM) 20 mg capsule Take 20 mg by mouth daily. Indications: BID    Provider, Historical   zolpidem (AMBIEN) 10 mg tablet Take 0.5 Tabs by mouth nightly as needed for Sleep. Max Daily Amount: 5 mg. 17   Bret Mandel MD   fluticasone (FLONASE) 50 mcg/actuation nasal spray Mist 1-2 spray(s) into each nostril once daily. 4/3/15   Provider, Historical   ranitidine (ZANTAC) 150 mg tablet 150 mg.    Provider, Historical       Allergies/Social/Family History: Allergies   Allergen Reactions    Augmentin [Amoxicillin-Pot Clavulanate] Rash and Itching      Social History     Tobacco Use    Smoking status: Never Smoker    Smokeless tobacco: Never Used   Substance Use Topics    Alcohol use: Not on file      Family History   Problem Relation Age of Onset    Prostate Cancer Father         PROSTATE    Breast Cancer Sister 46        invasive poorly differentiated ductal carcinoma, Neg genetic testing.  Cancer Sister 62        melanoma stage 1            Objective:   Vital Signs:  Visit Vitals  /76 (BP 1 Location: Left arm, BP Patient Position: At rest)   Pulse (!) 108   Temp 97.9 °F (36.6 °C)   Resp 16   Ht 5' 4\" (1.626 m)   Wt 102 kg (224 lb 13.9 oz)   SpO2 97%   BMI 38.60 kg/m²    O2 Flow Rate (L/min): 2 l/min O2 Device: Room air Temp (24hrs), Av.2 °F (36.8 °C), Min:97.6 °F (36.4 °C), Max:98.9 °F (37.2 °C)           Intake/Output:     Intake/Output Summary (Last 24 hours) at 2020 1100  Last data filed at 2020 0325  Gross per 24 hour   Intake --   Output 500 ml   Net -500 ml       Physical Exam: Detailed exam deferred in view of ongoing pandemic.  Physical exam as documented by attending physician was reviewed and discussed   General:  Awake, weak   Head:     Eyes:     Neck:    Lungs:      Chest wall:     Heart:     Abdomen:      Extremities:    Pulses: Skin:    Neurologic:          LABS AND  DATA: Personally reviewed  Recent Labs     05/12/20  0327 05/10/20  0405   WBC 12.5* 9.6   HGB 9.1* 8.9*   HCT 29.9* 29.4*    260     Recent Labs     05/12/20  0328 05/12/20 0327 05/11/20  0538    139 136   K 3.4* 3.3* 3.2*    104 101   CO2 26 26 29   BUN 37* 38* 39*   CREA 1.48* 1.52* 1.65*   * 172* 143*   CA 8.6 8.8 8.8   MG  --  1.6 1.7   PHOS  --  3.8  --      Recent Labs     05/12/20 0327   ALB 2.4*     No results for input(s): INR, PTP, APTT, INREXT, INREXT in the last 72 hours. No results for input(s): PHI, PCO2I, PO2I, FIO2I in the last 72 hours. No results for input(s): CPK, CKMB, TROIQ, BNPP in the last 72 hours. MEDS: Reviewed    Chest Imaging: personally reviewed and report checked    Tele- reviewed    Medical decision making:   I have reviewed the flowsheet and previous day's notes  Patient has acute or chronic illness that poses a threat to life or bodily function  Review and order of Clinical lab tests  Review and Order of Radiology tests  Independent visualization of Image          Thank you for allowing me to participate in this patient's care.     Lelo Edmonds PA-C      [facility]

## 2020-05-12 NOTE — PROGRESS NOTES

## 2020-05-12 NOTE — ANESTHESIA POSTPROCEDURE EVALUATION
Post-Anesthesia Evaluation and Assessment    Patient: Larry Cruz MRN: [id number]  SSN: [id number]    YOB: 1960  Age: 64 y.o. Sex: female      I have evaluated the patient and they are stable and ready for discharge from the PACU. Cardiovascular Function/Vital Signs  Visit Vitals  /87   Pulse (!) 107   Temp 36.8 °C (98.3 °F)   Resp 24   Ht 5' 4\" (1.626 m)   Wt 102 kg (224 lb 13.9 oz)   SpO2 100%   BMI 38.60 kg/m²       Patient is status post MAC anesthesia for Procedure(s):  ESOPHAGOGASTRODUODENOSCOPY (EGD)  ESOPHAGOGASTRODUODENAL (EGD) BIOPSY. Nausea/Vomiting: None    Postoperative hydration reviewed and adequate. Pain:  Pain Scale 1: Numeric (0 - 10) (05/12/20 1106)  Pain Intensity 1: 0 (05/12/20 1106)   Managed    Neurological Status:   Neuro  Neurologic State: Alert (05/11/20 2005)  Orientation Level: Oriented X4 (05/11/20 2005)  Cognition: Follows commands (05/11/20 2005)  Speech: Nods appropriately(soft voice) (05/11/20 2005)  Assessment L Pupil: Brisk (05/06/20 0028)  Size L Pupil (mm): 3 (05/06/20 0028)  Assessment R Pupil: Brisk (05/06/20 0028)  Size R Pupil (mm): 3 (05/06/20 0028)  LUE Motor Response: Weak;Purposeful (05/11/20 2005)  LLE Motor Response: Weak (05/11/20 2005)  RUE Motor Response: Weak;Purposeful (05/11/20 2005)  RLE Motor Response: Weak (05/11/20 2005)   At baseline    Mental Status, Level of Consciousness: Alert and  oriented to person, place, and time    Pulmonary Status:   O2 Device: CO2 nasal cannula (05/12/20 1141)   Adequate oxygenation and airway patent    Complications related to anesthesia: None    Post-anesthesia assessment completed. No concerns    Signed By: Mariela Smith MD     May 12, 2020              Procedure(s):  ESOPHAGOGASTRODUODENOSCOPY (EGD)  ESOPHAGOGASTRODUODENAL (EGD) BIOPSY. MAC    <BSHSIANPOST>    No vitals data found for the desired time range.

## 2020-05-12 NOTE — PROGRESS NOTES
SLP Contact Note    Patient planned for EGD today. Therefore, will defer MBS to tomorrow pending GI findings.        Thank you,  LACI Dykes.Ed, [id number] [facility]  Speech-Language Pathologist

## 2020-05-12 NOTE — PROGRESS NOTES
TRANSFER - OUT REPORT:    Verbal report given to Brandy Aburto on Cynthia Vásquez  being transferred to [facility](unit) for routine progression of care       Report consisted of patients Situation, Background, Assessment and   Recommendations(SBAR). Information from the following report(s) Kardex, Procedure Summary and Recent Results was reviewed with the receiving nurse. Lines:   PICC Triple Lumen 03/28/20 Right (Active)   Central Line Being Utilized Yes 5/12/2020  3:25 AM   Criteria for Appropriate Use Limited/no vessel suitable for conventional peripheral access 5/12/2020  3:25 AM   Site Assessment Clean, dry, & intact 5/12/2020  3:25 AM   Phlebitis Assessment 0 5/12/2020  3:25 AM   Infiltration Assessment 0 5/12/2020  3:25 AM   Date of Last Dressing Change 05/09/20 5/10/2020  3:06 PM   Dressing Status Clean, dry, & intact 5/12/2020  3:25 AM   Dressing Type Transparent 5/12/2020  3:25 AM   Action Taken Open ports on tubing capped 5/12/2020  3:25 AM   Hub Color/Line Status White;Capped 5/12/2020  3:25 AM   Positive Blood Return (Site #1) Yes 5/12/2020  3:25 AM   Hub Color/Line Status Red;Capped 5/12/2020  3:25 AM   Positive Blood Return (Site #2) Yes 5/12/2020  3:25 AM   Hub Color/Line Status Gray;Capped 5/12/2020  3:25 AM   Positive Blood Return (Site #3) Yes 5/12/2020  3:25 AM   Alcohol Cap Used Yes 5/12/2020  3:25 AM        Opportunity for questions and clarification was provided.       Patient transported with:   [employer]

## 2020-05-12 NOTE — DIABETES MGMT
[facility]  CLINICAL NURSE SPECIALIST CONSULT  PROGRAM FOR DIABETES HEALTH  Follow up Note  Presentation   Suellen Segura is a 64 y.o. female admitted from OSH to [facility] ICU with SARS-COV2 needing CRRT. She is currently sedated and has been intubated since 3/28/20. Current clinical course has been complicated by steroid induced hyperglycemia. Steroids are discontinued at this time. She requires CRRT for acute renal failure r/t her sepsis and hypotension. PHM: GERD, obesity, and anxiety. New diabetes diagnosis with A1C 11.0% (3/28/2020); updated A1C 3/31/20-10.4%     Recent events: N/V subsided today. NGT removed yesterday; EGD scheduled for today. Patient NPO. insulin on hold for EGD. Consulted by Provider for advanced diabetes nursing assessment and care, specifically related to   [] Transitioning off Nadia Spangler   [x] Inpatient management strategy  [] Home management assessment  [] Survival skill education    Diabetes-related medical history  Acute complications  hyperglycemia  Neurological complications  NONE  Microvascular disease  NONE  Macrovascular disease  NONE  Other associated conditions     NONE    Diabetes medication history: NONE    Subjective   Ms. Elfida Mcardle is resting with eyes closed in bed this morning. Objective   Diabetic Foot Exam: Left foot: warm, no calluses noted, red spot noted on top of foot over bone, but no breakdown noted. + microfilament sensation noted in all areas. Right foot: in brace, however, foot warm. Microfilament test deferred due to brace. Vital Signs   Visit Vitals  /76 (BP 1 Location: Left arm, BP Patient Position: At rest)   Pulse (!) 108   Temp 97.9 °F (36.6 °C)   Resp 16   Ht 5' 4\" (1.626 m)   Wt 102 kg (224 lb 13.9 oz)   SpO2 97%   BMI 38.60 kg/m²   .    Laboratory  Lab Results   Component Value Date/Time    Hemoglobin A1c 10.4 (H) 03/31/2020 03:42 PM     No results found for: LDL, LDLC, DLDLP  Lab Results   Component Value Date/Time    Creatinine 1.48 (H) 05/12/2020 03:28 AM     Lab Results   Component Value Date/Time    Sodium 141 05/12/2020 03:28 AM    Potassium 3.4 (L) 05/12/2020 03:28 AM    Chloride 105 05/12/2020 03:28 AM    CO2 26 05/12/2020 03:28 AM    Anion gap 10 05/12/2020 03:28 AM    Glucose 170 (H) 05/12/2020 03:28 AM    BUN 37 (H) 05/12/2020 03:28 AM    Creatinine 1.48 (H) 05/12/2020 03:28 AM    BUN/Creatinine ratio 25 (H) 05/12/2020 03:28 AM    GFR est AA 44 (L) 05/12/2020 03:28 AM    GFR est non-AA 36 (L) 05/12/2020 03:28 AM    Calcium 8.6 05/12/2020 03:28 AM    Bilirubin, total 1.6 (H) 04/20/2020 05:29 AM    AST (SGOT) 169 (H) 04/20/2020 05:29 AM    Alk. phosphatase 490 (H) 04/20/2020 05:29 AM    Protein, total 5.9 (L) 04/20/2020 05:29 AM    Albumin 2.4 (L) 05/12/2020 03:27 AM    Globulin 3.5 04/20/2020 05:29 AM    A-G Ratio 0.7 (L) 04/20/2020 05:29 AM    ALT (SGPT) 206 (H) 04/20/2020 05:29 AM     Lab Results   Component Value Date/Time    ALT (SGPT) 206 (H) 04/20/2020 05:29 AM           Evaluation   Ms Clement Estevez, with new onset Type 2 diabetes,with A1C 10.4%. AM fasting  mg/dl today. .TF stopped for now. NGT removed 5/11/20. BG remains <200mg/dl while NPO for EGD. If BG continues to rise >200mg/dl, it will be necessary to initiate basal insulin, along with correctional to control BG even if NPO. .      It is imperative that we maintain her BG within target range 100-180mg/dl. Recommendations   1. When BG trends >200mg/dl, re-start basal insulin -20 units daily;       2. Will continue to follow.     Assessment and Plan   Nursing Diagnosis Risk for unstable blood glucose pattern   Nursing Intervention Domain 0627 Decision-making Support   Nursing Interventions Examined current inpatient diabetes control   Explored factors facilitating and impeding inpatient management  Identified self-management practices impeding diabetes control  Explored corrective strategies with patient and responsible inpatient provider   Informed patient of rational for insulin strategy while hospitalized         Billing Code(s)     [x] 24980 IP subsequent hospital care - . Nacho , CNS   Program for Diabetes Health  Access via KESHIA Roca 8 [phone]

## 2020-05-12 NOTE — PROGRESS NOTES
Problem: Mobility Impaired (Adult and Pediatric)  Goal: *Acute Goals and Plan of Care (Insert Text)  Description: FUNCTIONAL STATUS PRIOR TO ADMISSION: Patient was independent and active without use of DME but poor history due to not being able to communicate. HOME SUPPORT PRIOR TO ADMISSION: Pt lives with family. Physical Therapy Goals  1. Patient will move from supine to sit and sit to supine , scoot up and down and roll side to side in bed with moderate assistance x1 person within 7 day(s). 2.  Patient will sit EOB with supervision (occasional min A), x10 minutes with retracted shoulders, midline posture, and forward gaze within 7 days. 3.  Patient will verbalize and demonstrate 5 exercises she can complete on her own outside of therapy sessions with 7 days. 4.  Patient will participate in scooting to [facility] with control of core and max Ax2 for scooting while sitting EOB within 7 days. 5.  Patient will tolerate sitting in chair x1 hour within 7 days. Initiated 5/4/2020- All goals met 5/12/2020  1. Patient will move from supine to sit and sit to supine , scoot up and down and roll side to side in bed with maximal assistance within 7 day(s). 2.  Patient will sit EOB with moderate assistance of 2 people, x5 minutes with active core initiation to correct sitting balance within 7 days. 3.  Patient will verbalize 3 exercises she can complete on her own outside of therapy sessions with 7 days. 4.  Patient will participate in supine HEP (AAROM as needed) with min assist for AAROM within 7 days. 5.  Patient will tolerate bed in chair position 30 min BID within 7 days. Initiated 4/25/2020  1. Patient will move from supine to sit and sit to supine , scoot up and down and roll side to side in bed with moderate assistance once off vent within 7 day(s). 2.  Patient will participate in sitting with min A for 2 minutes once off vent within 7 day(s).   3.  Patient will perform active supine or sitting TE with min A within 7 day(s). Outcome: Progressing Towards Goal   PHYSICAL THERAPY TREATMENT: WEEKLY REASSESSMENT  Patient: Cynthia Vásquez (4 y.o. female)  Date: 5/12/2020  Primary Diagnosis: COVID-19 virus infection [U07.1]  Procedure(s) (LRB):  ESOPHAGOGASTRODUODENOSCOPY (EGD) (Left)  ESOPHAGOGASTRODUODENAL (EGD) BIOPSY (N/A) Day of Surgery   Precautions:  Fall      ASSESSMENT  Patient continues with skilled PT services and is progressing towards goals. Patient received supine in bed, agreeable to therapy with PT/OT. Tolerated a 51 minute session that included supine exercises, bed mobility for multiple parmjit changes/hygiene/and sheet change, and sitting EOB with exercises. Patient's HR resting tachy 110's but gradually increased sitting EOB (max 137) indicating fatigue. Patient able to sit unsupported better this date, able to actively lean anteriorly and return to upright midline sitting, as well as reach across body and forward for core strengthening. Her core stabilization continues to improve and hopeful to initiate more dynamic core activities in order to progress her to mechanical standing frame and weightbearing. Patient inquiring about getting pictures posted in her room for exercise examples so that she can ask nursing/staff to assist her multiple times per day as she is getting stiff. Will bring these next session. Patient's progression toward goals since last assessment: met all goals, all upgraded as above    Current Level of Function Impacting Discharge (mobility/balance): max A x2    Other factors to consider for discharge: previously independent         PLAN :  Goals have been updated based on progression since last assessment. Patient continues to benefit from skilled intervention to address the above impairments.     Recommendations and Planned Interventions: bed mobility training, transfer training, gait training, therapeutic exercises, neuromuscular re-education, patient and family training/education, and therapeutic activities      Frequency/Duration: Patient will be followed by physical therapy:  5 times a week to address goals. Recommendation for discharge: (in order for the patient to meet his/her long term goals)  Therapy 3 hours per day 5-7 days per week    This discharge recommendation:  Has been made in collaboration with the attending provider and/or case management    IF patient discharges home will need the following DME: none         SUBJECTIVE:   Patient stated Would a walker help me?  Patient very motivated to participate and improve. OBJECTIVE DATA SUMMARY:   HISTORY:    Past Medical History:   Diagnosis Date    Basal cell carcinoma     GERD (gastroesophageal reflux disease)     Kidney stones     Polyp of ureter      Past Surgical History:   Procedure Laterality Date    ENDOMETRIAL ABLATION, THERMAL      HX  SECTION      HX COLONOSCOPY      2017    HYSTEROSCOPY DIAGNOSTIC      D&C/POLYP REMOVAL    INSERT ARTERIAL LINE  2020         IR INSERT NON TUNL CVC OVER 5 YRS  2020    IR INSERT TUNL CVC W/O PORT OVER 5 YR  2020       Personal factors and/or comorbidities impacting plan of care: PMH    Home Situation  Home Environment: Other (comment)  One/Two Story Residence: One story  Living Alone: No  Support Systems: Other (comments)  Patient Expects to be Discharged to[de-identified] Unknown  Current DME Used/Available at Home: None    EXAMINATION/PRESENTATION/DECISION MAKING:   Critical Behavior:  Neurologic State: Alert  Orientation Level: Oriented X4  Cognition: Follows commands  Safety/Judgement: Awareness of environment, Insight into deficits  Hearing:   Auditory  Auditory Impairment: None  Range Of Motion:  AROM: Grossly decreased, non-functional(LEs )  Strength:    Strength: Grossly decreased, non-functional  1+/5 knee ext and hip flex bilaterally  3+/5 ankle DF/PF bilaterally  Tone & Sensation:   Tone: Normal  Sensation: Intact  Functional Mobility:  Bed Mobility:  Rolling: Maximum assistance;Assist x2  Supine to Sit: Maximum assistance;Assist x2  Sit to Supine: Maximum assistance;Assist x2  Balance:   Sitting: Impaired; With support  Sitting - Static: Fair (occasional); Supported sitting  Sitting - Dynamic: Fair (occasional)  Therapeutic Exercises:   Seated ankle pumps x10  Supine AAROM heel slides 1x5 ea  Supine quad sets 1x5 with 3 second holds    Pain Rating:  Back pain    Activity Tolerance:   Good, SpO2 stable on RA, and requires frequent rest breaks  Please refer to the flowsheet for vital signs taken during this treatment. After treatment patient left in no apparent distress:   Supine in bed, Heels elevated for pressure relief, Call bell within reach, and Side rails x 3    COMMUNICATION/EDUCATION:   The patients plan of care was discussed with: Occupational therapist and Registered nurse. Fall prevention education was provided and the patient/caregiver indicated understanding., Patient/family have participated as able in goal setting and plan of care. , and Patient/family agree to work toward stated goals and plan of care.     Thank you for this referral.  Lauri An, PT, DPT   Time Calculation: 51 mins

## 2020-05-12 NOTE — PROGRESS NOTES
Occupational Therapy  Chart reviewed. Pt is currently off the floor for EGD. Will follow. Thank you.  Amanda King MS, OTR/L

## 2020-05-12 NOTE — ROUTINE PROCESS
[facility]  1960  [id number]    Situation:  Verbal report received from: ROSEMARY Jennings, RN  Procedure: Procedure(s):  ESOPHAGOGASTRODUODENOSCOPY (EGD)  ESOPHAGOGASTRODUODENAL (EGD) BIOPSY    Background:    Preoperative diagnosis: nausea vomiting  Postoperative diagnosis: Gastritis  small hiatal hernia  esophagitis      :  Dr. Lonnie Dow  Assistant(s): Endoscopy Technician-1: Mercedes Kilgore  Endoscopy RN-1: Karime Ferrell RN    Specimens:   ID Type Source Tests Collected by Time Destination   1 : Gastric Preservative   Jayro Dowling MD 5/12/2020 1137 Pathology     H. Pylori  no    Assessment:  Intra-procedure medications   Anesthesia gave intra-procedure sedation and medications, see anesthesia flow sheet yes    Intravenous fluids: NS@ KVO     Vital signs stable     Abdominal assessment: round and soft     Recommendation:    Return to floor

## 2020-05-12 NOTE — PROGRESS NOTES
IFTIKHAR PLAN:    EGD today    Clear liquids started today    HD on hold, Perma -cath may be removed later this week    CM to send an update to [facility] and fax notes to [facility] at [phone] tomorrow    CM will continue to follow for IFTIKHAR.     Jess Cardoso MSA, RN, CRM

## 2020-05-12 NOTE — PROGRESS NOTES
Verbal shift change report given to JUDY Buchanan (oncoming nurse) by Talbot Runner, RN (offgoing nurse). Report included the following information SBAR, Procedure Summary, Intake/Output, MAR, Recent Results and Cardiac Rhythm Sinus Tach.

## 2020-05-12 NOTE — PROCEDURES
[address]  [address] [facility], [address] [facility] (EGD) Procedure Note    Lisa Briggs  1960  [id number]      Procedure: Endoscopic Gastroduodenoscopy with biopsy    Indication:  Vomiting, persitent of unclear etiology     Pre-operative Diagnosis: see indication above    Post-operative Diagnosis: see findings below    : Katrina Sanon MD    Surgical Assistant: None    Implants:  None    Referring Provider:  Wisam Coronado MD      Anesthesia/Sedation:  MAC anesthesia Propofol        Procedure Details     After infomed consent was obtained for the procedure, with all risks and benefits of procedure explained the patient was taken to the endoscopy suite and placed in the left lateral decubitus position. Following sequential administration of sedation as per above, the endoscope was inserted into the mouth and advanced under direct vision to third portion of the duodenum. A careful inspection was made as the gastroscope was withdrawn, including a retroflexed view of the proximal stomach; findings and interventions are described below. Findings:   Esophagus:hiatal hernia 3 cm in size   Grade 2 erosive esophagitis   Stomach: gastritis in antrum, biopsies taken  Duodenum: normal      Therapies:  none    Specimens: as above         EBL: None      Complications:   None; patient tolerated the procedure well. Impression:    -See post-procedure diagnoses.     Recommendations:  -Continue acid suppression with protonix  -added carafate  -clear liquid diet  -will follow  -D/C tube feeding    Signed By: Katrina Sanon MD     5/12/2020  12:16 PM

## 2020-05-12 NOTE — PROGRESS NOTES
SLP Contact Note    Noted EGD results. Strongly recommend NPO given high risk for aspiration s/p prolonged intubation and L vocal fold paralysis until MBS is completed tomorrow. Will plan on MBS tomorrow.        Thank you,  VALERIA CastilloEd, [address] [facility]  Speech-Language Pathologist

## 2020-05-12 NOTE — PROGRESS NOTES
NUTRITION COMPLETE ASSESSMENT    RECOMMENDATIONS:     1. Continue with tube feeding until pt has MBS on 5/13    2. Tube feeds have been:   --- Nepro at 40 ml/hr continuous   --- Liquid Prosource 1 packet BID   --- Give 65 ml water flushes every 4 hours    Interventions/Plan:   Food/Nutrient Delivery:   Modify rate, concentration, composition, and schedule          Assessment:   Reason for Assessment: Reassessment    Tube Feeding: Nepro @ 35 ml/hr with 2 packets Prosource daily and 50 ml water flush q 6 hr  Diet: NPO  Nutritionally Significant Medications: [x] Reviewed & Includes: Bumex, Benefiber tid, Lantus, correction scale insulin, Renvela     Subjective: Staff Interviewed  Pt DARLENE at time of my visit for EGD    Objective:  Ms Matt Garcia transferred from [facility] d/t worsening renal function requiring CRRT. Noted: JUANCHO d/t ATN-transitioned off CVVHD to daily IHD 4/18-now MWF; acute hypoxic respiratory failure d/t COVID-19 and bacterial PNA, intubated 3/28-extubated 4/29, BiPAP @ night; PEA arrest 4/9, hyperglycemia improved; critical illness neuropathy/myopathy; encephalopathy; metabolic alkalosis. Question of aspiration on CXR today. Patient remains on enteral nutrition support. SLP following-Ms Gill at high risk for aspiration. Only allowed ice chips for swallow rehab (no other oral intake). SLP recommends MBS prior to any diet advancement. Patient had OGT during entire intubation; NG tube placed 4/29. May need to consider PEG tube placement for comfort-especially if diet progression is slow. Currently tube feedings are running continuously. Will transition to intermittent feeds to allow a more normal feeding schedule. Also noted patient ordered to have BiPAP at night (but sometimes refused per RN). Recommended new tube feeding schedule: 240 ml Nepro 4 x/day with 2 packets Prosource daily and 50 ml water flush before/after each bolus.  This will provide 960 ml,1820 calories, 106 gm protein and 1200 ml free water (tube feeding/flush) per day to meet estimated needs. Lytes WNL. BG improved. May need to adjust insulin regimen with transition to intermittent feedings. 5/12: Pt underwent EGD today d/t continued N/V on tube feedings. EGD showed hiatal hernia, erosive esophagitis, gastritis. SLP recommends tube feedings continue until pt can have MBS on 5/13, as pt remains at very high risk for aspiration (long time on the vent, left vocal cord paralysis). RD will continue to follow and make additional recommendations following MBS. Estimated Nutrition Needs:   Kcals/day: 7817 Kcals/day(3975-8718 (MSJ x 1.0-1.1)  Protein: 108 g(2g/kg IBW)  Fluid: (1 ml/kcal)  Based On: [place] [facility]  Weight Used: Actual wt(115 kg)    Pt expected to meet estimated nutrient needs:  [x]   Yes     []  No  [] Unable to predict at this time  Nutrition Diagnosis:   1. Swallowing difficulty related to critical illness neuropathy/myopathy as evidenced by dysphagia and enteral nutrition support via NGT. Goals: Tolerate transition to intermittent feeding in next 1-2 days.      Monitoring & Evaluation:    - Enteral/parenteral nutrition intake   - Electrolyte and renal profile, CV-pulmonary, Weight/weight change, Glucose profile, GI    Previous Nutrition Goals Met: Yes  Previous Recommendations: Yes    Education & Discharge Needs:   [x] None Identified   [] Identified and addressed    [x] Participated in care plan, discharge planning, and/or interdisciplinary rounds        Cultural, Tenriism and ethnic food preferences identified:  None    Skin Integrity: [x]Intact  []Other  Edema: []None [x] Trace generalized, 1+ B/L upper and lower extremities  Last BM: 5/11/20  Food Allergies: [x]None []Other    Anthropometrics:    Weight Loss Metrics 5/12/2020 1/23/2020 1/22/2019 1/16/2018 1/9/2017 7/6/2016 12/14/2015   Today's Wt 224 lb 13.9 oz 270 lb 12.8 oz 278 lb 279 lb 275 lb 234 lb 213 lb 6.4 oz   BMI 38.6 kg/m2 43.73 kg/m2 44.89 kg/m2 45.05 kg/m2 44.39 kg/m2 37.79 kg/m2 34.46 kg/m2      Last 3 Recorded Weights in this Encounter    05/10/20 0348 05/11/20 0329 05/12/20 0325   Weight: 102.2 kg (225 lb 6.4 oz) 101.7 kg (224 lb 3.3 oz) 102 kg (224 lb 13.9 oz)      Weight Source: Bed  Height: 5' 4\" (162.6 cm),    Body mass index is 38.6 kg/m². IBW : 54.4 kg (120 lb), % IBW (Calculated): 212.19 %   ,      Labs:    Lab Results   Component Value Date/Time    Sodium 141 05/12/2020 03:28 AM    Potassium 3.4 (L) 05/12/2020 03:28 AM    Chloride 105 05/12/2020 03:28 AM    CO2 26 05/12/2020 03:28 AM    Glucose 170 (H) 05/12/2020 03:28 AM    BUN 37 (H) 05/12/2020 03:28 AM    Creatinine 1.48 (H) 05/12/2020 03:28 AM    Calcium 8.6 05/12/2020 03:28 AM    Magnesium 1.6 05/12/2020 03:27 AM    Phosphorus 3.8 05/12/2020 03:27 AM    Albumin 2.4 (L) 05/12/2020 03:27 AM     Lab Results   Component Value Date/Time    Hemoglobin A1c 10.4 (H) 03/31/2020 03:42 PM     Lab Results   Component Value Date/Time    Glucose (POC) 138 (H) 05/05/2020 11:35 AM      Lab Results   Component Value Date/Time    ALT (SGPT) 206 (H) 04/20/2020 05:29 AM    AST (SGOT) 169 (H) 04/20/2020 05:29 AM    Alk.  phosphatase 490 (H) 04/20/2020 05:29 AM    Bilirubin, direct 0.7 (H) 04/20/2020 05:29 AM    Bilirubin, total 1.6 (H) 04/20/2020 05:29 AM        Korin Joy, [address], [address] [place] [address]

## 2020-05-12 NOTE — PROGRESS NOTES
Pt is off the unit at endo. PT will defer, follow, and see as able and appropriate. . Thank you, Haven Marlow, PT

## 2020-05-12 NOTE — ROUTINE PROCESS
Bedside and Verbal shift change report given to Lyle (oncoming nurse) by Chuy Washington (offgoing nurse). Report included the following information SBAR, Kardex, Intake/Output, MAR and Cardiac Rhythm ST/SR.

## 2020-05-12 NOTE — ANESTHESIA PREPROCEDURE EVALUATION
Relevant Problems   No relevant active problems       Anesthetic History   No history of anesthetic complications            Review of Systems / Medical History  Patient summary reviewed, nursing notes reviewed and pertinent labs reviewed    Pulmonary  Within defined limits                 Neuro/Psych   Within defined limits           Cardiovascular  Within defined limits                     GI/Hepatic/Renal     GERD    Renal disease: dialysis       Endo/Other        Morbid obesity and cancer     Other Findings   Comments: Covid19 +         Physical Exam    Airway  Mallampati: II  TM Distance: > 6 cm  Neck ROM: normal range of motion   Mouth opening: Normal     Cardiovascular  Regular rate and rhythm,  S1 and S2 normal,  no murmur, click, rub, or gallop             Dental  No notable dental hx       Pulmonary  Breath sounds clear to auscultation               Abdominal  GI exam deferred       Other Findings            Anesthetic Plan    ASA: 3  Anesthesia type: MAC            Anesthetic plan and risks discussed with: Patient

## 2020-05-13 ENCOUNTER — APPOINTMENT (OUTPATIENT)
Dept: GENERAL RADIOLOGY | Age: 60
DRG: 870 | End: 2020-05-13
Attending: INTERNAL MEDICINE
Payer: COMMERCIAL

## 2020-05-13 LAB
ALBUMIN SERPL-MCNC: 2.5 G/DL (ref 3.5–5)
ANION GAP SERPL CALC-SCNC: 11 MMOL/L (ref 5–15)
ANION GAP SERPL CALC-SCNC: 12 MMOL/L (ref 5–15)
BUN SERPL-MCNC: 34 MG/DL (ref 6–20)
BUN SERPL-MCNC: 34 MG/DL (ref 6–20)
BUN/CREAT SERPL: 27 (ref 12–20)
BUN/CREAT SERPL: 27 (ref 12–20)
CALCIUM SERPL-MCNC: 8.4 MG/DL (ref 8.5–10.1)
CALCIUM SERPL-MCNC: 8.5 MG/DL (ref 8.5–10.1)
CHLORIDE SERPL-SCNC: 107 MMOL/L (ref 97–108)
CHLORIDE SERPL-SCNC: 108 MMOL/L (ref 97–108)
CO2 SERPL-SCNC: 21 MMOL/L (ref 21–32)
CO2 SERPL-SCNC: 23 MMOL/L (ref 21–32)
CREAT SERPL-MCNC: 1.24 MG/DL (ref 0.55–1.02)
CREAT SERPL-MCNC: 1.25 MG/DL (ref 0.55–1.02)
GLUCOSE BLD STRIP.AUTO-MCNC: 153 MG/DL (ref 65–100)
GLUCOSE BLD STRIP.AUTO-MCNC: 157 MG/DL (ref 65–100)
GLUCOSE BLD STRIP.AUTO-MCNC: 171 MG/DL (ref 65–100)
GLUCOSE SERPL-MCNC: 157 MG/DL (ref 65–100)
GLUCOSE SERPL-MCNC: 163 MG/DL (ref 65–100)
MAGNESIUM SERPL-MCNC: 1.4 MG/DL (ref 1.6–2.4)
PHOSPHATE SERPL-MCNC: 3.4 MG/DL (ref 2.6–4.7)
POTASSIUM SERPL-SCNC: 3.4 MMOL/L (ref 3.5–5.1)
POTASSIUM SERPL-SCNC: 3.5 MMOL/L (ref 3.5–5.1)
SERVICE CMNT-IMP: ABNORMAL
SODIUM SERPL-SCNC: 140 MMOL/L (ref 136–145)
SODIUM SERPL-SCNC: 142 MMOL/L (ref 136–145)

## 2020-05-13 PROCEDURE — 97110 THERAPEUTIC EXERCISES: CPT

## 2020-05-13 PROCEDURE — 74011250636 HC RX REV CODE- 250/636: Performed by: INTERNAL MEDICINE

## 2020-05-13 PROCEDURE — 80048 BASIC METABOLIC PNL TOTAL CA: CPT

## 2020-05-13 PROCEDURE — 74011250637 HC RX REV CODE- 250/637: Performed by: INTERNAL MEDICINE

## 2020-05-13 PROCEDURE — 74011636637 HC RX REV CODE- 636/637: Performed by: INTERNAL MEDICINE

## 2020-05-13 PROCEDURE — 65660000000 HC RM CCU STEPDOWN

## 2020-05-13 PROCEDURE — 92526 ORAL FUNCTION THERAPY: CPT

## 2020-05-13 PROCEDURE — 92611 MOTION FLUOROSCOPY/SWALLOW: CPT

## 2020-05-13 PROCEDURE — 94760 N-INVAS EAR/PLS OXIMETRY 1: CPT

## 2020-05-13 PROCEDURE — 74011000250 HC RX REV CODE- 250: Performed by: INTERNAL MEDICINE

## 2020-05-13 PROCEDURE — 74230 X-RAY XM SWLNG FUNCJ C+: CPT

## 2020-05-13 PROCEDURE — 36415 COLL VENOUS BLD VENIPUNCTURE: CPT

## 2020-05-13 PROCEDURE — 97530 THERAPEUTIC ACTIVITIES: CPT

## 2020-05-13 PROCEDURE — C9113 INJ PANTOPRAZOLE SODIUM, VIA: HCPCS | Performed by: INTERNAL MEDICINE

## 2020-05-13 PROCEDURE — 83735 ASSAY OF MAGNESIUM: CPT

## 2020-05-13 PROCEDURE — 80069 RENAL FUNCTION PANEL: CPT

## 2020-05-13 PROCEDURE — 82962 GLUCOSE BLOOD TEST: CPT

## 2020-05-13 RX ORDER — LORAZEPAM 2 MG/ML
0.5 INJECTION INTRAMUSCULAR ONCE
Status: COMPLETED | OUTPATIENT
Start: 2020-05-13 | End: 2020-05-13

## 2020-05-13 RX ADMIN — SODIUM CHLORIDE 10 ML: 9 INJECTION INTRAMUSCULAR; INTRAVENOUS; SUBCUTANEOUS at 13:22

## 2020-05-13 RX ADMIN — SODIUM CHLORIDE 40 MG: 9 INJECTION INTRAMUSCULAR; INTRAVENOUS; SUBCUTANEOUS at 09:05

## 2020-05-13 RX ADMIN — LORAZEPAM 0.5 MG: 2 INJECTION INTRAMUSCULAR; INTRAVENOUS at 21:14

## 2020-05-13 RX ADMIN — ONDANSETRON 4 MG: 2 INJECTION INTRAMUSCULAR; INTRAVENOUS at 18:16

## 2020-05-13 RX ADMIN — METOCLOPRAMIDE 5 MG: 5 INJECTION, SOLUTION INTRAMUSCULAR; INTRAVENOUS at 18:38

## 2020-05-13 RX ADMIN — MINERAL OIL AND WHITE PETROLATUM: 150; 830 OINTMENT OPHTHALMIC at 09:05

## 2020-05-13 RX ADMIN — ONDANSETRON 4 MG: 2 INJECTION INTRAMUSCULAR; INTRAVENOUS at 00:28

## 2020-05-13 RX ADMIN — SODIUM CHLORIDE 40 MG: 9 INJECTION INTRAMUSCULAR; INTRAVENOUS; SUBCUTANEOUS at 21:15

## 2020-05-13 RX ADMIN — SODIUM CHLORIDE 10 ML: 9 INJECTION INTRAMUSCULAR; INTRAVENOUS; SUBCUTANEOUS at 21:16

## 2020-05-13 RX ADMIN — SODIUM CHLORIDE 10 ML: 9 INJECTION INTRAMUSCULAR; INTRAVENOUS; SUBCUTANEOUS at 06:12

## 2020-05-13 RX ADMIN — ONDANSETRON 4 MG: 2 INJECTION INTRAMUSCULAR; INTRAVENOUS at 06:12

## 2020-05-13 RX ADMIN — LORAZEPAM 0.5 MG: 2 INJECTION INTRAMUSCULAR; INTRAVENOUS at 13:22

## 2020-05-13 RX ADMIN — INSULIN LISPRO 2 UNITS: 100 INJECTION, SOLUTION INTRAVENOUS; SUBCUTANEOUS at 18:16

## 2020-05-13 RX ADMIN — CASTOR OIL AND BALSAM, PERU: 788; 87 OINTMENT TOPICAL at 09:03

## 2020-05-13 RX ADMIN — INSULIN LISPRO 2 UNITS: 100 INJECTION, SOLUTION INTRAVENOUS; SUBCUTANEOUS at 11:47

## 2020-05-13 RX ADMIN — ONDANSETRON 4 MG: 2 INJECTION INTRAMUSCULAR; INTRAVENOUS at 11:56

## 2020-05-13 RX ADMIN — SODIUM CHLORIDE, SODIUM LACTATE, POTASSIUM CHLORIDE, AND CALCIUM CHLORIDE 60 ML/HR: 600; 310; 30; 20 INJECTION, SOLUTION INTRAVENOUS at 14:50

## 2020-05-13 RX ADMIN — METOCLOPRAMIDE 5 MG: 5 INJECTION, SOLUTION INTRAMUSCULAR; INTRAVENOUS at 02:04

## 2020-05-13 RX ADMIN — LORAZEPAM 0.5 MG: 2 INJECTION INTRAMUSCULAR; INTRAVENOUS at 14:50

## 2020-05-13 RX ADMIN — CASTOR OIL AND BALSAM, PERU: 788; 87 OINTMENT TOPICAL at 18:12

## 2020-05-13 NOTE — PROGRESS NOTES
NUTRITION COMPLETE ASSESSMENT    RECOMMENDATIONS:     Following MBS this morning pt has been cleared for Dysphagia 2 diet + honey thick liquids    RD has added Magic Cup to all meals    Wean down IV as pt increases po intake    Interventions/Plan:   Food/Nutrient Delivery:   Modify rate, concentration, composition, and schedule     Assessment:   Reason for Assessment: Reassessment    Diet:  Dysphagia 2 + honey thick liquids  Nutritionally Significant Medications: [x] Reviewed & Includes: Lexapro, Benefiber tid, Lantus, correction scale insulin, Renvela, LR at 60 ml/hr, protonix    Subjective: Staff Interviewed  ---    Objective:  Ms Hue Christianson transferred from [facility] d/t worsening renal function requiring CRRT. Noted: JUANCHO d/t ATN-transitioned off CVVHD to daily IHD 4/18-now MWF; acute hypoxic respiratory failure d/t COVID-19 and bacterial PNA, intubated 3/28-extubated 4/29, BiPAP @ night; PEA arrest 4/9, hyperglycemia improved; critical illness neuropathy/myopathy; encephalopathy; metabolic alkalosis. Question of aspiration on CXR today. 5/13: Pt was extubated on 4/29/20, still with very sore throat (also had OGT entire time of intubation). Pt passed MBS today for Dysphagia 2 with honey thick liquids; RD has added magic cup to all meals to help increase calorie/protein intake. Psychiatry started pt on Lexapro several days ago as pt voiced she has felt depressed \"for awhile even before I got sick. \" RD will continue to follow. Estimated Nutrition Needs:   Kcals/day: 2191 Kcals/day(1270-0390 (MSJ x 1.0-1.1)  Protein: 108 g(2g/kg IBW)  Fluid: (1 ml/kcal)  Based On: [facility]  Weight Used: Actual wt(115 kg)    Pt expected to meet estimated nutrient needs:  [x]   Yes     []  No  [] Unable to predict at this time  Nutrition Diagnosis:   1. Swallowing difficulty related to critical illness neuropathy/myopathy as evidenced by dysphagia and enteral nutrition support via NGT. Goals:      Tolerate transition to intermittent feeding in next 1-2 days. Monitoring & Evaluation:    - Enteral/parenteral nutrition intake   - Electrolyte and renal profile, CV-pulmonary, Weight/weight change, Glucose profile, GI    Previous Nutrition Goals Met: Yes  Previous Recommendations: Yes    Education & Discharge Needs:   [x] None Identified   [] Identified and addressed    [x] Participated in care plan, discharge planning, and/or interdisciplinary rounds        Cultural, Restorationism and ethnic food preferences identified:  None    Skin Integrity: [x]Intact  []Other  Edema: []None [x] Trace generalized, 1+ B/L upper and lower extremities  Last BM: 5/11/20  Food Allergies: [x]None []Other    Anthropometrics:    Weight Loss Metrics 5/13/2020 1/23/2020 1/22/2019 1/16/2018 1/9/2017 7/6/2016 12/14/2015   Today's Wt 240 lb 9.6 oz 270 lb 12.8 oz 278 lb 279 lb 275 lb 234 lb 213 lb 6.4 oz   BMI 41.3 kg/m2 43.73 kg/m2 44.89 kg/m2 45.05 kg/m2 44.39 kg/m2 37.79 kg/m2 34.46 kg/m2      Last 3 Recorded Weights in this Encounter    05/11/20 0329 05/12/20 0325 05/13/20 0216   Weight: 101.7 kg (224 lb 3.3 oz) 102 kg (224 lb 13.9 oz) 109.1 kg (240 lb 9.6 oz)      Weight Source: Bed  Height: 5' 4\" (162.6 cm),    Body mass index is 41.3 kg/m².      IBW : 54.4 kg (120 lb), % IBW (Calculated): 212.19 %   ,      Labs:    Lab Results   Component Value Date/Time    Sodium 142 05/13/2020 02:10 AM    Potassium 3.4 (L) 05/13/2020 02:10 AM    Chloride 108 05/13/2020 02:10 AM    CO2 23 05/13/2020 02:10 AM    Glucose 163 (H) 05/13/2020 02:10 AM    BUN 34 (H) 05/13/2020 02:10 AM    Creatinine 1.25 (H) 05/13/2020 02:10 AM    Calcium 8.5 05/13/2020 02:10 AM    Magnesium 1.4 (L) 05/13/2020 02:09 AM    Phosphorus 3.4 05/13/2020 02:09 AM    Albumin 2.5 (L) 05/13/2020 02:09 AM     Lab Results   Component Value Date/Time    Hemoglobin A1c 10.4 (H) 03/31/2020 03:42 PM     Lab Results   Component Value Date/Time    Glucose (POC) 138 (H) 05/05/2020 11:35 AM      Lab Results   Component Value Date/Time    ALT (SGPT) 206 (H) 04/20/2020 05:29 AM    AST (SGOT) 169 (H) 04/20/2020 05:29 AM    Alk.  phosphatase 490 (H) 04/20/2020 05:29 AM    Bilirubin, direct 0.7 (H) 04/20/2020 05:29 AM    Bilirubin, total 1.6 (H) 04/20/2020 05:29 AM        Sera Greenfield RD, CSP  C: [phone]

## 2020-05-13 NOTE — DIABETES MGMT
MARTA ROMERO  CLINICAL NURSE SPECIALIST CONSULT  PROGRAM FOR DIABETES HEALTH  Follow up Note  Presentation   Jael Price is a 64 y.o. female admitted from OSH to [facility] ICU with SARS-COV2 . Now recovering and COVID -. Per Nephorology kidneys are recovering nicely and now not requiring HD. Patient is now suffering with left paralysis of vocal cord which  affects her swallowing ability. EGD completed yesterday with results noted. New diabetes diagnosis with A1C 11.0% (3/28/2020); updated A1C 3/31/20-10.4%     Recent events: Will be taking PO that aligns with dysphagia mechanial altered diet and also carb consistent. Only receiving correctional insulin since she is has been NPO and now starting to take PO. Consulted by Provider for advanced diabetes nursing assessment and care, specifically related to   [] Transitioning off Jenness Riis   [x] Inpatient management strategy  [] Home management assessment  [] Survival skill education    Diabetes-related medical history  Acute complications  hyperglycemia  Neurological complications  NONE  Microvascular disease  NONE  Macrovascular disease  NONE  Other associated conditions     NONE    Diabetes medication history: NONE    Subjective   Ms. Dara Galvan is attempting to take PO -  She is looking forward to it. Objective   Diabetic Foot Exam: Left foot: warm, no calluses noted, red spot noted on top of foot over bone, but no breakdown noted. + microfilament sensation noted in all areas. Right foot: in brace, however, foot warm. Microfilament test deferred due to brace. Vital Signs   Visit Vitals  /80 (BP 1 Location: Left arm, BP Patient Position: At rest)   Pulse (!) 111   Temp 98.5 °F (36.9 °C)   Resp 14   Ht 5' 4\" (1.626 m)   Wt 109.1 kg (240 lb 9.6 oz)   SpO2 99%   BMI 41.30 kg/m²   .    Laboratory  Lab Results   Component Value Date/Time    Hemoglobin A1c 10.4 (H) 03/31/2020 03:42 PM     No results found for: LDL, LDLC, DLDLP  Lab Results   Component Value Date/Time    Creatinine 1.25 (H) 05/13/2020 02:10 AM     Lab Results   Component Value Date/Time    Sodium 142 05/13/2020 02:10 AM    Potassium 3.4 (L) 05/13/2020 02:10 AM    Chloride 108 05/13/2020 02:10 AM    CO2 23 05/13/2020 02:10 AM    Anion gap 11 05/13/2020 02:10 AM    Glucose 163 (H) 05/13/2020 02:10 AM    BUN 34 (H) 05/13/2020 02:10 AM    Creatinine 1.25 (H) 05/13/2020 02:10 AM    BUN/Creatinine ratio 27 (H) 05/13/2020 02:10 AM    GFR est AA 54 (L) 05/13/2020 02:10 AM    GFR est non-AA 44 (L) 05/13/2020 02:10 AM    Calcium 8.5 05/13/2020 02:10 AM    Bilirubin, total 1.6 (H) 04/20/2020 05:29 AM    AST (SGOT) 169 (H) 04/20/2020 05:29 AM    Alk. phosphatase 490 (H) 04/20/2020 05:29 AM    Protein, total 5.9 (L) 04/20/2020 05:29 AM    Albumin 2.5 (L) 05/13/2020 02:09 AM    Globulin 3.5 04/20/2020 05:29 AM    A-G Ratio 0.7 (L) 04/20/2020 05:29 AM    ALT (SGPT) 206 (H) 04/20/2020 05:29 AM     Lab Results   Component Value Date/Time    ALT (SGPT) 206 (H) 04/20/2020 05:29 AM         Evaluation   Ms Tana Giraldo, with new onset Type 2 diabetes,with A1C 10.4%. AM fasting  mg/dl today. Since she will now be taking PO thickened liquids and food, anticipate she will have post prandial hyperglycemia. If BG  rises >200mg/dl, it will be necessary to initiate basal insulin, along with correctional to control BG. .      It is imperative that we maintain her BG within target range 100-180mg/dl. Recommendations   1. When BG trends >200mg/dl, re-start basal insulin -20 units daily;       2. Will continue to follow.     Assessment and Plan   Nursing Diagnosis Risk for unstable blood glucose pattern   Nursing Intervention Domain 7026 Decision-making Support   Nursing Interventions Examined current inpatient diabetes control   Explored factors facilitating and impeding inpatient management  Identified self-management practices impeding diabetes control  Explored corrective strategies with patient and responsible inpatient provider   Informed patient of rational for insulin strategy while hospitalized         Billing Code(s)     [x] 86953 IP subsequent hospital care - [address]   Program for Diabetes Health  Access via [address] [phone]

## 2020-05-13 NOTE — PROGRESS NOTES
[address]  [address] [facility], [address]       GI PROGRESS NOTE  Will Stephen Rena  [phone] office  [phone] NP/PA in-hospital cell phone M-F until 4:30PM  After 5PM or on weekends, please call  for physician on call      NAME: Ovidio Valentin   :  1960   MRN:  [id number]       Subjective:   Patient denies nausea, vomiting, or abdominal pain. EGD was done yesterday. She is going down for a modified barium swallow. Objective:     VITALS:   Last 24hrs VS reviewed since prior progress note. Most recent are:  Visit Vitals  /80 (BP 1 Location: Left arm, BP Patient Position: At rest)   Pulse (!) 111   Temp 98.5 °F (36.9 °C)   Resp 14   Ht 5' 4\" (1.626 m)   Wt 109.1 kg (240 lb 9.6 oz)   SpO2 99%   BMI 41.30 kg/m²       PHYSICAL EXAM:  General: Cooperative, no acute distress    Neurologic:  Alert and oriented  HEENT: EOMI, no scleral icterus   Lungs:  No respiratory distress  Abdomen: Soft, non-distended, no tenderness, no guarding, no rebound. Graylon Vladimir  +Bowel sounds  Extremities: Warm  Psych:   Not anxious or agitated    Lab Data Reviewed:     Recent Results (from the past 24 hour(s))   GLUCOSE, POC    Collection Time: 20  5:33 PM   Result Value Ref Range    Glucose (POC) 164 (H) 65 - 100 mg/dL    Performed by Shady Tierney April    GLUCOSE, POC    Collection Time: 20 10:32 PM   Result Value Ref Range    Glucose (POC) 128 (H) 65 - 100 mg/dL    Performed by Jag Nicole    RENAL FUNCTION PANEL    Collection Time: 20  2:09 AM   Result Value Ref Range    Sodium 140 136 - 145 mmol/L    Potassium 3.5 3.5 - 5.1 mmol/L    Chloride 107 97 - 108 mmol/L    CO2 21 21 - 32 mmol/L    Anion gap 12 5 - 15 mmol/L    Glucose 157 (H) 65 - 100 mg/dL    BUN 34 (H) 6 - 20 MG/DL    Creatinine 1.24 (H) 0.55 - 1.02 MG/DL    BUN/Creatinine ratio 27 (H) 12 - 20      GFR est AA 54 (L) >60 ml/min/1.73m2    GFR est non-AA 45 (L) >60 ml/min/1.73m2    Calcium 8.4 (L) 8.5 - 10.1 MG/DL    Phosphorus 3.4 2.6 - 4.7 MG/DL    Albumin 2.5 (L) 3.5 - 5.0 g/dL   MAGNESIUM    Collection Time: 05/13/20  2:09 AM   Result Value Ref Range    Magnesium 1.4 (L) 1.6 - 2.4 mg/dL   METABOLIC PANEL, BASIC    Collection Time: 05/13/20  2:10 AM   Result Value Ref Range    Sodium 142 136 - 145 mmol/L    Potassium 3.4 (L) 3.5 - 5.1 mmol/L    Chloride 108 97 - 108 mmol/L    CO2 23 21 - 32 mmol/L    Anion gap 11 5 - 15 mmol/L    Glucose 163 (H) 65 - 100 mg/dL    BUN 34 (H) 6 - 20 MG/DL    Creatinine 1.25 (H) 0.55 - 1.02 MG/DL    BUN/Creatinine ratio 27 (H) 12 - 20      GFR est AA 54 (L) >60 ml/min/1.73m2    GFR est non-AA 44 (L) >60 ml/min/1.73m2    Calcium 8.5 8.5 - 10.1 MG/DL   GLUCOSE, POC    Collection Time: 05/13/20  7:05 AM   Result Value Ref Range    Glucose (POC) 157 (H) 65 - 100 mg/dL    Performed by Katherine Orozco (CON)        Assessment:   ·  Nausea/vomiting: KUB (5/9/20): NG tube side port and tip overlie the stomach; nonspecific bowel gas pattern. NG tube removed, 5/11. EGD (5/12/20): 3 cm hiatal hernia, grade 2 erosive esophagitis; gastritis in the antrum (biopsied); normal duodenum. ·  Acute respiratory failure secondary to COVID-19: extubated 4/20, repeat COVID-19 negative on 4/20, 4/27, and 5/8.    ·  Status post cardiac arrest 4/9  ·  Acute kidney injury  ·  Pulmonary embolism: no anticoagulation due to retroperitoneal bleed  ·  Diabetes mellitus type II     Patient Active Problem List   Diagnosis Code    Endometrial cancer ([facility] [address] C54.1    Vaginal Pap smear following hysterectomy for malignancy Z08, Z90.710    Personal history of malignant neoplasm of other parts of uterus Z85.42    Obesity, morbid ([address] E66.01    COVID-19 virus infection U07.1    Hypotension I95.9    Retroperitoneal hemorrhage R58    Acute renal failure (ARF) (HCC) N17.9    Encephalopathy G93.40    Sepsis with multi-organ dysfunction ([facility] [address] A41.9, R65.20    Pneumonia due to methicillin susceptible Staphylococcus aureus (MSSA) ([facility] [address] J15.211    Thrombocytopenia ([facility] [address] D69.6    Diarrhea R19.7     Plan:   · Diet per SLP. Modified barium swallow planned. · Continue PPI and Carafate  · Follow surgical pathology  · Patient's , Brianda Michaels ([phone]) requested to be called. EGD findings and recommendations were discussed with the patient and her  on the phone. · We will sign off and be available again as needed. Please call us with any questions. Thank you. Signed By: Franco Waldron     5/13/2020  9:57 AM      This patient was seen and examined by me in a face-to-face visit today. I reviewed the medical record including lab work, imaging and other provider notes. I confirmed the interval history as described above. I spoke to the patient, discussing our findings and plans. I discussed this case in detail with robinson hernandez. . I formulated an updated  assessment of this patient and guided our treatment plan. I agree with the above progress note. I agree with the history, exam and assessment and plan as outlined in the note.   I would like to add the following:   Abdomen soft  To follow speech therapy recommendation for PO  H/o vomiting Tube feeding  No benefit for PEG TUBE placement because of h/o vomiting of TF   If she keeps vomiting and no improvement in PO intake then will benefit from G/J tube placement by IR for jejunal feeding versus surgical J tube placement   Discussed earlier with Dr Mart Hanna  Will follow

## 2020-05-13 NOTE — PROGRESS NOTES
CM sent clinical update to [facility]. GI for PEG/G-tube placement. [facility] may accept if tube placed    Clinical notes faxed to [facility] IPR at [phone]     CM contacted Denis Phelan at [facility] ([phone]) regarding discharge today. ,    [facility] will not have a bed available until next week Wednesday. CM will continue to follow for IFTIKHAR.

## 2020-05-13 NOTE — PROGRESS NOTES
SLP Contact Note    Plan for Modified Barium Swallow Study today. Talked with GI who said that she is okay to have MBS despite esophagitis. Please keep patient NPO (NO thin liquids) until study complete.       Thank you,  VALERIA LovettEd, [id number] [facility]  Speech-Language Pathologist

## 2020-05-13 NOTE — PROGRESS NOTES
Problem: Self Care Deficits Care Plan (Adult)  Goal: *Acute Goals and Plan of Care (Insert Text)  Description:   FUNCTIONAL STATUS PRIOR TO ADMISSION: Patient unable to provide history. Per chart, patient was fully independent PTA. HOME SUPPORT: Per chart, patient lived with family. OT weekly reassessment 5/12/2020: see goals below for update    Occupational Therapy Goals  Initiated 4/30/2020  1. Patient will perform grooming with maximal assistance within 7 day(s). (upgrade to min A 5/12)  2. Patient will perform self-feeding if appropriate for PO with maximal assistance within 7 day(s). (pending following MBS)  3.  Patient will perform bathing with maximal assistance within 7 day(s). (upgrade to mod A anterior bathing EOB 5/12)  4. Patient will participate in upper extremity therapeutic exercise/activities with moderate assistance  for 10 minutes within 7 day(s). (MIN A 5/12)  5. Patient will follow 100% simple commands in preparation for functional tasks within 7 days. (MET 5/12)          Outcome: Progressing Towards Goal    OCCUPATIONAL THERAPY TREATMENT  Patient: Titus Maria (89 y.o. female)  Date: 5/13/2020  Diagnosis: COVID-19 virus infection [U07.1]   <principal problem not specified>  Procedure(s) (LRB):  ESOPHAGOGASTRODUODENOSCOPY (EGD) (Left)  ESOPHAGOGASTRODUODENAL (EGD) BIOPSY (N/A) 1 Day Post-Op  Precautions: Fall  Chart, occupational therapy assessment, plan of care, and goals were reviewed. ASSESSMENT  Patient continues with skilled OT services and is progressing towards goals. Pt not feeling well today, nauseous and vomiting upon arrival after MBS/diet upgrade. RN aware as pt also very anxious and felt on verge of \"anxiety attack\". She was max A x 2 for rolling L/R for total care bowel hygiene, participated in UE AAROM exercises in all planes. Pt depressed this date, expressing \"I just want to check out. \" Provided words of encouragement and assisted pt with clean up and repositioning for comfort. Of note, while sidelying, pt with small blister to L heel, blanchable, and heels floated at end of session. She remains significantly below her ADL baseline and remains appropriate for IPR at discharge. Current Level of Function Impacting Discharge (ADLs): min A feeding and grooming, mod A UB, total A LB ADLs and toileting    Other factors to consider for discharge: completely independent         PLAN :  Patient continues to benefit from skilled intervention to address the above impairments. Continue treatment per established plan of care. to address goals. Recommend with staff: modified bed to chair position, ROM extremities, participation in ADLs    Recommend next OT session: access feeding skills, need for adaptive aides    Recommendation for discharge: (in order for the patient to meet his/her long term goals)  Therapy 3 hours per day 5-7 days per week    This discharge recommendation:  Has been made in collaboration with the attending provider and/or case management    IF patient discharges home will need the following DME: tbd       SUBJECTIVE:   Patient stated I just don't see the point. I am ready to check out.     OBJECTIVE DATA SUMMARY:   Cognitive/Behavioral Status:  Neurologic State: Alert  Orientation Level: Oriented X4  Cognition: Follows commands  Perception: Appears intact  Perseveration: No perseveration noted       Functional Mobility and Transfers for ADLs:  Bed Mobility:  Rolling: Maximum assistance  Scooting: Total assistance;Assist x2(bed placed in reverse Trendelenburg)    Transfers:      Not safe to attempt       Balance:   Intact in supported sitting with HOB    ADL Intervention:                                [facility]   Bowel Hygiene:  Total assistance (dependent)  Clothing Management: Total assistance (dependent)  Cues: Tactile cues provided;Verbal cues provided;Visual cues provided         Therapeutic Exercises:   AAROM shoulder flexion/extension, abduction/adduction, IR/ER, supination/pronation    Pain:  none    Activity Tolerance:   Fair tachycardic at rest 110s  Please refer to the flowsheet for vital signs taken during this treatment. After treatment patient left in no apparent distress:   Supine in bed, Heels elevated for pressure relief, Patient positioned in R sidelying for pressure relief, and Call bell within reach    COMMUNICATION/COLLABORATION:   The patients plan of care was discussed with: Physical therapist and Registered nurse.      Yetta Apgar, OT  Time Calculation: 25 mins

## 2020-05-13 NOTE — PROGRESS NOTES
Problem: Mobility Impaired (Adult and Pediatric)  Goal: *Acute Goals and Plan of Care (Insert Text)  Description: FUNCTIONAL STATUS PRIOR TO ADMISSION: Patient was independent and active without use of DME but poor history due to not being able to communicate. HOME SUPPORT PRIOR TO ADMISSION: Pt lives with family. Physical Therapy Goals  1. Patient will move from supine to sit and sit to supine , scoot up and down and roll side to side in bed with moderate assistance x1 person within 7 day(s). 2.  Patient will sit EOB with supervision (occasional min A), x10 minutes with retracted shoulders, midline posture, and forward gaze within 7 days. 3.  Patient will verbalize and demonstrate 5 exercises she can complete on her own outside of therapy sessions with 7 days. 4.  Patient will participate in scooting to [facility] with control of core and max Ax2 for scooting while sitting EOB within 7 days. 5.  Patient will tolerate sitting in chair x1 hour within 7 days. Initiated 5/4/2020- All goals met 5/12/2020  1. Patient will move from supine to sit and sit to supine , scoot up and down and roll side to side in bed with maximal assistance within 7 day(s). 2.  Patient will sit EOB with moderate assistance of 2 people, x5 minutes with active core initiation to correct sitting balance within 7 days. 3.  Patient will verbalize 3 exercises she can complete on her own outside of therapy sessions with 7 days. 4.  Patient will participate in supine HEP (AAROM as needed) with min assist for AAROM within 7 days. 5.  Patient will tolerate bed in chair position 30 min BID within 7 days. Initiated 4/25/2020  1. Patient will move from supine to sit and sit to supine , scoot up and down and roll side to side in bed with moderate assistance once off vent within 7 day(s). 2.  Patient will participate in sitting with min A for 2 minutes once off vent within 7 day(s).   3.  Patient will perform active supine or sitting TE with min A within 7 day(s). Outcome: Progressing Towards Goal   PHYSICAL THERAPY TREATMENT  Patient: Suellen Segura (80 y.o. female)  Date: 5/13/2020  Diagnosis: COVID-19 virus infection [U07.1]   <principal problem not specified>  Procedure(s) (LRB):  ESOPHAGOGASTRODUODENOSCOPY (EGD) (Left)  ESOPHAGOGASTRODUODENAL (EGD) BIOPSY (N/A) 1 Day Post-Op  Precautions: Fall  Chart, physical therapy assessment, plan of care and goals were reviewed. ASSESSMENT  Patient continues with skilled PT services and is progressing towards goals. Patient received in bed, actively vomiting upon PT entry. PT/OT assisted with elevating HOB and cleaning up after episode of vomiting. Requesting anxiety medicine to prevent a panic attack and RN able to provide. Patient discouraged about her current condition and not being able to tolerate even thickened drinks at this time due to nausea, and PT/OT reminded patient of the progress she has made. She remains highly motivated even in setting of her n/v, and participated in some supine exercises as well as multiple reps of rolling for hygiene. Noted a small area on L heel of redness (blanchable) and ensured heels elevated at end of session. Current Level of Function Impacting Discharge (mobility/balance): max-total assist    Other factors to consider for discharge: previously independent, not tolerating diet, plans for PEG? PLAN :  Patient continues to benefit from skilled intervention to address the above impairments. Continue treatment per established plan of care. to address goals. Recommendation for discharge: (in order for the patient to meet his/her long term goals)  Therapy 3 hours per day 5-7 days per week    This discharge recommendation:  Has been made in collaboration with the attending provider and/or case management    IF patient discharges home will need the following DME: to be determined (TBD)       SUBJECTIVE:   Patient stated I'm so discouraged.  All I wanted was something to drink.     OBJECTIVE DATA SUMMARY:   Critical Behavior:  Neurologic State: Alert  Orientation Level: Oriented X4  Cognition: Follows commands  Safety/Judgement: Awareness of environment, Insight into deficits  Functional Mobility Training:  Bed Mobility:  Rolling: Maximum assistance  Scooting: Total assistance;Assist x2(bed placed in reverse Trendelenburg)  Therapeutic Exercises:   AAROM heel slides while supine  PROM hip circles due to stiffness and discomfort at hips   Pain Rating:  Pain at sacrum     Activity Tolerance:   Fair and requires rest breaks  Please refer to the flowsheet for vital signs taken during this treatment. After treatment patient left in no apparent distress:   Supine in bed, Heels elevated for pressure relief, Call bell within reach, and Side rails x 3, turned to L at end of session    COMMUNICATION/COLLABORATION:   The patients plan of care was discussed with: Occupational therapist and Registered nurse.      Tanesha Upton PT, DPT   Time Calculation: 30 mins

## 2020-05-13 NOTE — PROGRESS NOTES
SLP Contact Note    Called patient's  and provided him with an update on pt's Modified Barium Swallow Study as well as her voicing. Pt asked appropriate questions and SLP answered to the best of her knowledge.           Thank you,  Freddie Dorado M.Ed, [address] [facility]  Speech-Language Pathologist

## 2020-05-13 NOTE — PROGRESS NOTES
[facility]   [address] [facility], [facility] [address], [facility]  Phone: [phone]   [phone]       Nephrology Progress Note  Radha Manuel     1960     [id number]  Date of Admission : 3/31/2020  05/13/20    CC: Follow up for JUANCHO      Assessment and Plan   JUANCHO :  - 2/2 ATN: resolving . last HD 5/9  - recovered and no longer needs dialysis   - will remove Permacath post PEG placement   - continue LR     COVID-19 +ve   Acute Hypoxic Resp Failure   - completed Plaquenil, - s/p Tocilizumab   - extubated 4/29  - repeat SARS-CoV-2 neg 4/20 and 4/27     Anemia in CKD:  - cont KOKI    RP hematoma:  Cardiac arrest 4/9    PE this admission  Non-occlusive superfical DVT:  - on on AC due to large RP hematoma  - no IVC to be placed    Type II DM : Insulin per primary team    Morbid Obesity     Nutrition: ? Needs PEG       D/w pt and attg        Interval History:  Seen and examined. EGD noted   Swallow eval noted   UOP improved w/ IVF     Review of Systems: Review of systems not obtained due to patient factors.     Current Medications:   Current Facility-Administered Medications   Medication Dose Route Frequency    sodium chloride (NS) flush 5-40 mL  5-40 mL IntraVENous Q8H    sodium chloride (NS) flush 5-40 mL  5-40 mL IntraVENous PRN    lactated Ringers infusion  60 mL/hr IntraVENous CONTINUOUS    HYDROmorphone (PF) (DILAUDID) injection 0.5 mg  0.5 mg IntraVENous Q4H PRN    sucralfate (CARAFATE) tablet 1 g  1 g Oral TIDAC    pantoprazole (PROTONIX) 40 mg in 0.9% sodium chloride 10 mL injection  40 mg IntraVENous Q12H    LORazepam (ATIVAN) injection 0.5 mg  0.5 mg IntraVENous Q6H PRN    metoclopramide HCl (REGLAN) injection 5 mg  5 mg IntraVENous Q6H PRN    escitalopram oxalate (LEXAPRO) tablet 10 mg  10 mg Oral DAILY    traZODone (DESYREL) tablet 50 mg  50 mg Oral QHS    heparin (porcine) 1,000 unit/mL injection 3,800 Units  3,800 Units Hemodialysis DIALYSIS PRN    metoprolol (LOPRESSOR) 5 mg/mL oral suspension 25 mg  25 mg Per NG tube Q12H    [Held by provider] insulin glargine (LANTUS) injection 28 Units  28 Units SubCUTAneous QHS    balsam peru-castor oiL (VENELEX) ointment   Topical BID    bumetanide (BUMEX) tablet 2 mg  2 mg Oral DAILY    guaiFENesin (ROBITUSSIN) 100 mg/5 mL oral liquid 100 mg  100 mg Oral Q12H    epoetin jovani-epbx (RETACRIT) injection 20,000 Units  20,000 Units SubCUTAneous Q TUE, THU & SAT    HYDROcodone-acetaminophen (NORCO) 5-325 mg per tablet 1 Tab  1 Tab Oral Q6H PRN    phenol throat spray (CHLORASEPTIC) 1 Spray  1 Spray Oral Q6H PRN    sevelamer carbonate (RENVELA) oral powder 0.8 g  0.8 g Oral TID WITH MEALS    guar gum (BENEFIBER) packet 1 Packet  4 g Oral TID    labetaloL (NORMODYNE;TRANDATE) injection 20 mg  20 mg IntraVENous Q4H PRN    albumin human 25% (BUMINATE) solution 12.5 g  12.5 g IntraVENous DIALYSIS PRN    albuterol (PROVENTIL VENTOLIN) nebulizer solution 2.5 mg  2.5 mg Nebulization Q4H PRN    alteplase (CATHFLO) 2 mg in sterile water (preservative free) 2 mL injection  2 mg InterCATHeter DIALYSIS PRN    alteplase (CATHFLO) 2 mg in sterile water (preservative free) 2 mL injection  2 mg InterCATHeter DIALYSIS PRN    insulin lispro (HUMALOG) injection   SubCUTAneous Q6H    white petrolatum-mineral oiL (AKWA TEARS) 83-15 % ophthalmic ointment   Both Eyes Q12H    bacitracin 500 unit/gram packet 1 Packet  1 Packet Topical PRN    sodium chloride (NS) flush 5-40 mL  5-40 mL IntraVENous Q8H    sodium chloride (NS) flush 5-40 mL  5-40 mL IntraVENous PRN    ondansetron (ZOFRAN) injection 4 mg  4 mg IntraVENous Q4H PRN    acetaminophen (TYLENOL) tablet 650 mg  650 mg Oral Q6H PRN    Or    acetaminophen (TYLENOL) suppository 650 mg  650 mg Rectal Q6H PRN    glucose chewable tablet 16 g  4 Tab Oral PRN    glucagon (GLUCAGEN) injection 1 mg  1 mg IntraMUSCular PRN    dextrose 10% infusion 0-250 mL  0-250 mL IntraVENous PRN Allergies   Allergen Reactions    Augmentin [Amoxicillin-Pot Clavulanate] Rash and Itching       Objective:  Vitals:    Vitals:    05/13/20 0313 05/13/20 0845 05/13/20 1053 05/13/20 1150   BP: 166/85 154/80 150/78    Pulse: (!) 121 (!) 111 (!) 111 (!) 112   Resp: 19 14 16 17   Temp: 98.4 °F (36.9 °C) 98.5 °F (36.9 °C) 98.1 °F (36.7 °C) 98.1 °F (36.7 °C)   TempSrc:       SpO2: 100% 99% 99% 100%   Weight:       Height:         Intake and Output:  No intake/output data recorded. 05/11 1901 - 05/13 0700  In: 200 [I.V.:200]  Out: 800 [Urine:800]    Physical Examination:     General: In NAD  HEENT:           NGT in place  Resp:  Reduced bibasilar breath sounds  CV:  RRR, no murmur   GI:  Obese , soft, NT   Neurologic:  awake  Access:           R JAZZ torres     []    High complexity decision making was performed  []    Patient is at high-risk of decompensation with multiple organ involvement    Lab Data Personally Reviewed: I have reviewed all the pertinent labs, microbiology data and radiology studies during assessment.     Recent Labs     05/13/20  0210 05/13/20  0209 05/12/20  0328 05/12/20  0327 05/11/20  0538    140 141 139 136   K 3.4* 3.5 3.4* 3.3* 3.2*    107 105 104 101   CO2 23 21 26 26 29   * 157* 170* 172* 143*   BUN 34* 34* 37* 38* 39*   CREA 1.25* 1.24* 1.48* 1.52* 1.65*   CA 8.5 8.4* 8.6 8.8 8.8   MG  --  1.4*  --  1.6 1.7   PHOS  --  3.4  --  3.8  --    ALB  --  2.5*  --  2.4*  --      Recent Labs     05/12/20 0327   WBC 12.5*   HGB 9.1*   HCT 29.9*        No results found for: SDES  Lab Results   Component Value Date/Time    Culture result: NO GROWTH 5 DAYS 04/09/2020 02:32 PM    Culture result: NO GROWTH 5 DAYS 04/08/2020 10:36 AM    Culture result: NO GROWTH 5 DAYS 03/31/2020 11:15 AM    Culture result: MODERATE STAPHYLOCOCCUS AUREUS (A) 03/31/2020 10:34 AM    Culture result: LIGHT NORMAL RESPIRATORY SOFIE 03/31/2020 10:34 AM     Recent Results (from the past 24 hour(s)) GLUCOSE, POC    Collection Time: 05/12/20  5:33 PM   Result Value Ref Range    Glucose (POC) 164 (H) 65 - 100 mg/dL    Performed by Paloma Duke    GLUCOSE, POC    Collection Time: 05/12/20 10:32 PM   Result Value Ref Range    Glucose (POC) 128 (H) 65 - 100 mg/dL    Performed by Yoli Alvarez    RENAL FUNCTION PANEL    Collection Time: 05/13/20  2:09 AM   Result Value Ref Range    Sodium 140 136 - 145 mmol/L    Potassium 3.5 3.5 - 5.1 mmol/L    Chloride 107 97 - 108 mmol/L    CO2 21 21 - 32 mmol/L    Anion gap 12 5 - 15 mmol/L    Glucose 157 (H) 65 - 100 mg/dL    BUN 34 (H) 6 - 20 MG/DL    Creatinine 1.24 (H) 0.55 - 1.02 MG/DL    BUN/Creatinine ratio 27 (H) 12 - 20      GFR est AA 54 (L) >60 ml/min/1.73m2    GFR est non-AA 45 (L) >60 ml/min/1.73m2    Calcium 8.4 (L) 8.5 - 10.1 MG/DL    Phosphorus 3.4 2.6 - 4.7 MG/DL    Albumin 2.5 (L) 3.5 - 5.0 g/dL   MAGNESIUM    Collection Time: 05/13/20  2:09 AM   Result Value Ref Range    Magnesium 1.4 (L) 1.6 - 2.4 mg/dL   METABOLIC PANEL, BASIC    Collection Time: 05/13/20  2:10 AM   Result Value Ref Range    Sodium 142 136 - 145 mmol/L    Potassium 3.4 (L) 3.5 - 5.1 mmol/L    Chloride 108 97 - 108 mmol/L    CO2 23 21 - 32 mmol/L    Anion gap 11 5 - 15 mmol/L    Glucose 163 (H) 65 - 100 mg/dL    BUN 34 (H) 6 - 20 MG/DL    Creatinine 1.25 (H) 0.55 - 1.02 MG/DL    BUN/Creatinine ratio 27 (H) 12 - 20      GFR est AA 54 (L) >60 ml/min/1.73m2    GFR est non-AA 44 (L) >60 ml/min/1.73m2    Calcium 8.5 8.5 - 10.1 MG/DL   GLUCOSE, POC    Collection Time: 05/13/20  7:05 AM   Result Value Ref Range    Glucose (POC) 157 (H) 65 - 100 mg/dL    Performed by Afshin Orozco (CON)    GLUCOSE, POC    Collection Time: 05/13/20 11:04 AM   Result Value Ref Range    Glucose (POC) 171 (H) 65 - 100 mg/dL    Performed by Chata Cruz            Total time spent with patient:  xxx   min.                                Care Plan discussed with:  Patient     Family      RN      Consulting Physician /Specialist        I have reviewed the flowsheets. Chart and Pertinent Notes have been reviewed. No change in PMH ,family and social history from Consult note.       Cynthia Quinn MD

## 2020-05-13 NOTE — PROGRESS NOTES
Problem: Dysphagia (Adult)  Goal: *Acute Goals and Plan of Care (Insert Text)  Description: Speech Therapy Goals  Initiated 5/13/2020  1. Patient will tolerate NDD2/honey-thick liquids without adverse effects within 7 days. Initiated 5/4/2020    1. Patient will tolerate small amounts of ice chips for swallow rehab without adverse effects within 7 days. 2. Patient will participate in swallow re-evaluation within 7 days. Outcome: Progressing Towards Goal     SPEECH PATHOLOGY MODIFIED BARIUM SWALLOW STUDY  Patient: Hesham Escobar (40 y.o. female)  Date: 5/13/2020  Primary Diagnosis: COVID-19 virus infection [U07.1]  Procedure(s) (LRB):  ESOPHAGOGASTRODUODENOSCOPY (EGD) (Left)  ESOPHAGOGASTRODUODENAL (EGD) BIOPSY (N/A) 1 Day Post-Op   Precautions:   Fall    ASSESSMENT :  Based on the objective data described below, the patient presents with functional oral phase of swallow and moderate-severe pharyngeal dysphagia. Pharyngeal swallow dysfunction characterized by delayed swallow initiation with nectar-thick liquids and pharyngeal weakness including decreased BOT retraction, decreased hyolaryngeal excursion with laryngeal vestibule gap, and incomplete epiglottic retroflexion. The combination of swallow delay as well as pharyngeal weakness and known left vocal fold paralysis results in repeated severe aspiration with nectar-thick liquids. Aspiration was silent and, given that this was 2/2 swallow delay, thin liquids not trialed, as certain as thins would yield the same result if not worsened aspiration. Pharyngeal weakness results in vallecular residue with all consistencies trialed, however, pt double swallows and clears vallecular residue somewhat. Suspect current swallow dysfunction is the direct result of left vocal fold paralysis, prolonged intubation, and overall weakness/debility from complex medical course. Given these results, recommend pt initiate diet as outlined below.   Prognosis of swallow function will be dependent on improvement in vocal fold dysfunction as well as improvement in strength. However, pt will certainly require a repeat MBS prior to liquid upgrade, as aspiration is silent in nature. Patient will benefit from skilled intervention to address the above impairments. Patients rehabilitation potential is considered to be Good     PLAN :  Recommendations and Planned Interventions:  --Mechanically-altered ground diet/honey-thick liquids  --straws ok  --upright  --alternate liquids/solids  --small sips/bites  --Do NOT add ice to liquids (put them if the fridge if patient wants them cold)  --No milkshakes, ice cream, nor smoothies  --Consider magic cups (these melt into a pudding)    Frequency/Duration: Patient will be followed by speech-language pathology 4 times a week to address goals. Discharge Recommendations: To Be Determined     SUBJECTIVE:   Patient stated, \"That's better than nothing.     OBJECTIVE:     Past Medical History:   Diagnosis Date    Basal cell carcinoma     GERD (gastroesophageal reflux disease)     Kidney stones     Polyp of ureter      Past Surgical History:   Procedure Laterality Date    ENDOMETRIAL ABLATION, THERMAL      HX  SECTION      HX COLONOSCOPY      2017    HYSTEROSCOPY DIAGNOSTIC      D&C/POLYP REMOVAL    INSERT ARTERIAL LINE  2020         IR INSERT NON TUNL CVC OVER 5 YRS  2020    IR INSERT TUNL CVC W/O PORT OVER 5 YR  2020     Prior Level of Function/Home Situation:   Home Situation  Home Environment: Other (comment)  One/Two Story Residence: One story  Living Alone: No  Support Systems: Other (comments)  Patient Expects to be Discharged to[de-identified] Unknown  Current DME Used/Available at Home: None  Diet prior to admission: Regular diet/thin liquids  Current Diet:  NPO   Radiologist: Dr. Yobani Ruby: Lateral;Fluoro  Patient Position: upright in hausted chair    Trial 1: Trial 2:   Consistency Presented: Kizzy Covarrubias thick liquid Consistency Presented: Honey thick liquid;Puree; Solid   How Presented: Cup/sip;Straw How Presented: Self-fed/presented;Cup/sip;Straw;Spoon   Consistency Amount: (50 cc nectar) Consistency Amount: (100 cc honey-thick; 5 tsp Ba paste)   Bolus Acceptance: No impairment Bolus Acceptance: No impairment    :   Bolus Formation/Control: No impairment:     Propulsion: No impairment Propulsion: No impairment   Oral Residue: None Oral Residue: None   Initiation of Swallow: Triggered at pyriform sinus(es) Initiation of Swallow: Triggered at valleculae   Timing: Pooling 6-10 sec;Pyriform sinus Timing: No impairment   Penetration: During swallow; To cords Penetration: None   Aspiration/Timing: Silent ;Repeated;During Aspiration/Timing: No evidence of aspiration   Pharyngeal Clearance: Vallecular residue;10-50% Pharyngeal Clearance: Vallecular residue;10-50%     Attempted Modifications: Alternate liquids/solids     Effective Modifications: Alternate liquids/solids(somewhat helpful)                   Decreased Tongue Base Retraction?: Yes  Laryngeal Elevation: Reduced excursion with laryngeal vestibule gap; Inadequate epiglottic inversion  Aspiration/Penetration Score: 8 (Aspiration-Contrast passes cords/glottis with no effort to eject, ie/silent aspiration)  Pharyngeal Symmetry: Not assessed  Pharyngeal-Esophageal Segment: No impairment  Pharyngeal Dysfunction: Decreased strength;Decreased elevation/closure;Decreased tongue base retraction    Oral Phase Severity: No impairment  Pharyngeal Phase Severity: Moderately severe    Dysphagia Treatment:  Patient seen immediately following MBS for education. Utilized images from study and discussed pt's swallow dysfunction. Explained silent aspiration. Pt expressed understanding. NOMS:   The NOMS functional outcome measure was used to quantify this patient's level of swallowing impairment.   Based on the NOMS, the patient was determined to be at level 3 for swallow function NOMS Swallowing Levels:  Level 1 (CN): NPO  Level 2 (CM): NPO but takes consistency in therapy  Level 3 (CL): Takes less than 50% of nutrition p.o. and continues with nonoral feedings; and/or safe with mod cues; and/or max diet restriction  Level 4 (CK): Safe swallow but needs mod cues; and/or mod diet restriction; and/or still requires some nonoral feeding/supplements  Level 5 (CJ): Safe swallow with min diet restriction; and/or needs min cues  Level 6 (CI): Independent with p.o.; rare cues; usually self cues; may need to avoid some foods or needs extra time  Level 7 ([address] [place] [address]): Independent for all p.o.  JULIANO. (2003). National Outcomes Measurement System (NOMS): Adult Speech-Language Pathology User's Guide. COMMUNICATION/EDUCATION:     The patients plan of care including findings from MBS, recommendations, planned interventions, and recommended diet changes were discussed with: Registered nurse. Patient is unable to participate in goal setting and plan of care.     Thank you for this referral.  Lauretha Lesches, SLP  Time Calculation: 35 mins

## 2020-05-13 NOTE — PROGRESS NOTES
SLP Contact Note    MBS complete. Recommend honey-thick liquids and mechanically-altered ground diet. Discussed with MD Dr. La Tran who instructed this SLP to contact GI to let them know results. Will put in a phone call to Franco Meeks. Full note to follow.       Thank you,  Shelvy Shone, M.Ed, [address] [facility]  Speech-Language Pathologist

## 2020-05-14 ENCOUNTER — HOSPITAL ENCOUNTER (OUTPATIENT)
Dept: INTERVENTIONAL RADIOLOGY/VASCULAR | Age: 60
Discharge: HOME | DRG: 870 | End: 2020-05-14
Attending: INTERNAL MEDICINE | Admitting: SURGERY
Payer: COMMERCIAL

## 2020-05-14 ENCOUNTER — APPOINTMENT (OUTPATIENT)
Dept: INTERVENTIONAL RADIOLOGY/VASCULAR | Age: 60
DRG: 870 | End: 2020-05-14
Attending: HOSPITALIST
Payer: COMMERCIAL

## 2020-05-14 LAB
ANION GAP SERPL CALC-SCNC: 7 MMOL/L (ref 5–15)
BUN SERPL-MCNC: 29 MG/DL (ref 6–20)
BUN/CREAT SERPL: 25 (ref 12–20)
CALCIUM SERPL-MCNC: 8.3 MG/DL (ref 8.5–10.1)
CHLORIDE SERPL-SCNC: 110 MMOL/L (ref 97–108)
CO2 SERPL-SCNC: 25 MMOL/L (ref 21–32)
CREAT SERPL-MCNC: 1.15 MG/DL (ref 0.55–1.02)
ERYTHROCYTE [DISTWIDTH] IN BLOOD BY AUTOMATED COUNT: 16 % (ref 11.5–14.5)
GLUCOSE BLD STRIP.AUTO-MCNC: 127 MG/DL (ref 65–100)
GLUCOSE BLD STRIP.AUTO-MCNC: 132 MG/DL (ref 65–100)
GLUCOSE BLD STRIP.AUTO-MCNC: 132 MG/DL (ref 65–100)
GLUCOSE BLD STRIP.AUTO-MCNC: 136 MG/DL (ref 65–100)
GLUCOSE BLD STRIP.AUTO-MCNC: 140 MG/DL (ref 65–100)
GLUCOSE SERPL-MCNC: 133 MG/DL (ref 65–100)
HCT VFR BLD AUTO: 28.9 % (ref 35–47)
HGB BLD-MCNC: 8.5 G/DL (ref 11.5–16)
MAGNESIUM SERPL-MCNC: 1.5 MG/DL (ref 1.6–2.4)
MCH RBC QN AUTO: 29.7 PG (ref 26–34)
MCHC RBC AUTO-ENTMCNC: 29.4 G/DL (ref 30–36.5)
MCV RBC AUTO: 101 FL (ref 80–99)
NRBC # BLD: 0 K/UL (ref 0–0.01)
NRBC BLD-RTO: 0 PER 100 WBC
PLATELET # BLD AUTO: 239 K/UL (ref 150–400)
PMV BLD AUTO: 9.5 FL (ref 8.9–12.9)
POTASSIUM SERPL-SCNC: 2.9 MMOL/L (ref 3.5–5.1)
RBC # BLD AUTO: 2.86 M/UL (ref 3.8–5.2)
SERVICE CMNT-IMP: ABNORMAL
SODIUM SERPL-SCNC: 142 MMOL/L (ref 136–145)
WBC # BLD AUTO: 8.9 K/UL (ref 3.6–11)

## 2020-05-14 PROCEDURE — C1769 GUIDE WIRE: HCPCS

## 2020-05-14 PROCEDURE — 82962 GLUCOSE BLOOD TEST: CPT

## 2020-05-14 PROCEDURE — C1887 CATHETER, GUIDING: HCPCS

## 2020-05-14 PROCEDURE — 74011250636 HC RX REV CODE- 250/636: Performed by: STUDENT IN AN ORGANIZED HEALTH CARE EDUCATION/TRAINING PROGRAM

## 2020-05-14 PROCEDURE — 36415 COLL VENOUS BLD VENIPUNCTURE: CPT

## 2020-05-14 PROCEDURE — C1894 INTRO/SHEATH, NON-LASER: HCPCS

## 2020-05-14 PROCEDURE — 74011636637 HC RX REV CODE- 636/637: Performed by: INTERNAL MEDICINE

## 2020-05-14 PROCEDURE — 65660000000 HC RM CCU STEPDOWN

## 2020-05-14 PROCEDURE — 83735 ASSAY OF MAGNESIUM: CPT

## 2020-05-14 PROCEDURE — 97110 THERAPEUTIC EXERCISES: CPT

## 2020-05-14 PROCEDURE — 77030004561 HC CATH ANGI DX COBRA ANGI -B

## 2020-05-14 PROCEDURE — C1892 INTRO/SHEATH,FIXED,PEEL-AWAY: HCPCS

## 2020-05-14 PROCEDURE — 85027 COMPLETE CBC AUTOMATED: CPT

## 2020-05-14 PROCEDURE — C9113 INJ PANTOPRAZOLE SODIUM, VIA: HCPCS | Performed by: INTERNAL MEDICINE

## 2020-05-14 PROCEDURE — 99152 MOD SED SAME PHYS/QHP 5/>YRS: CPT

## 2020-05-14 PROCEDURE — 36589 REMOVAL TUNNELED CV CATH: CPT

## 2020-05-14 PROCEDURE — 77030008735 HC TU GASTMY JEJU HALY -C

## 2020-05-14 PROCEDURE — 74011250636 HC RX REV CODE- 250/636: Performed by: INTERNAL MEDICINE

## 2020-05-14 PROCEDURE — 97530 THERAPEUTIC ACTIVITIES: CPT

## 2020-05-14 PROCEDURE — 80048 BASIC METABOLIC PNL TOTAL CA: CPT

## 2020-05-14 PROCEDURE — 74011000250 HC RX REV CODE- 250: Performed by: INTERNAL MEDICINE

## 2020-05-14 PROCEDURE — 74011000250 HC RX REV CODE- 250: Performed by: STUDENT IN AN ORGANIZED HEALTH CARE EDUCATION/TRAINING PROGRAM

## 2020-05-14 PROCEDURE — 74011250636 HC RX REV CODE- 250/636: Performed by: HOSPITALIST

## 2020-05-14 RX ORDER — LIDOCAINE HYDROCHLORIDE 20 MG/ML
20 INJECTION, SOLUTION INFILTRATION; PERINEURAL ONCE
Status: DISCONTINUED | OUTPATIENT
Start: 2020-05-14 | End: 2020-05-14 | Stop reason: HOSPADM

## 2020-05-14 RX ORDER — SODIUM CHLORIDE 9 MG/ML
50 INJECTION, SOLUTION INTRAVENOUS CONTINUOUS
Status: DISCONTINUED | OUTPATIENT
Start: 2020-05-14 | End: 2020-05-14

## 2020-05-14 RX ORDER — LIDOCAINE HYDROCHLORIDE 20 MG/ML
JELLY TOPICAL AS NEEDED
Status: DISCONTINUED | OUTPATIENT
Start: 2020-05-14 | End: 2020-05-22 | Stop reason: HOSPADM

## 2020-05-14 RX ORDER — MIDAZOLAM HYDROCHLORIDE 1 MG/ML
5 INJECTION, SOLUTION INTRAMUSCULAR; INTRAVENOUS
Status: DISCONTINUED | OUTPATIENT
Start: 2020-05-14 | End: 2020-05-14

## 2020-05-14 RX ORDER — FENTANYL CITRATE 50 UG/ML
200 INJECTION, SOLUTION INTRAMUSCULAR; INTRAVENOUS
Status: DISCONTINUED | OUTPATIENT
Start: 2020-05-14 | End: 2020-05-14

## 2020-05-14 RX ORDER — CLINDAMYCIN PHOSPHATE 600 MG/50ML
600 INJECTION INTRAVENOUS
Status: DISCONTINUED | OUTPATIENT
Start: 2020-05-14 | End: 2020-05-14 | Stop reason: SDUPTHER

## 2020-05-14 RX ORDER — POTASSIUM CHLORIDE 29.8 MG/ML
20 INJECTION INTRAVENOUS ONCE
Status: COMPLETED | OUTPATIENT
Start: 2020-05-14 | End: 2020-05-14

## 2020-05-14 RX ORDER — HYDROMORPHONE HYDROCHLORIDE 1 MG/ML
0.5 INJECTION, SOLUTION INTRAMUSCULAR; INTRAVENOUS; SUBCUTANEOUS ONCE
Status: COMPLETED | OUTPATIENT
Start: 2020-05-14 | End: 2020-05-14

## 2020-05-14 RX ORDER — CEFAZOLIN SODIUM/WATER 2 G/20 ML
2 SYRINGE (ML) INTRAVENOUS ONCE
Status: COMPLETED | OUTPATIENT
Start: 2020-05-14 | End: 2020-05-14

## 2020-05-14 RX ADMIN — SODIUM CHLORIDE 40 MG: 9 INJECTION INTRAMUSCULAR; INTRAVENOUS; SUBCUTANEOUS at 21:21

## 2020-05-14 RX ADMIN — SODIUM CHLORIDE 10 ML: 9 INJECTION INTRAMUSCULAR; INTRAVENOUS; SUBCUTANEOUS at 21:22

## 2020-05-14 RX ADMIN — CASTOR OIL AND BALSAM, PERU: 788; 87 OINTMENT TOPICAL at 09:27

## 2020-05-14 RX ADMIN — FENTANYL CITRATE 25 MCG: 50 INJECTION INTRAMUSCULAR; INTRAVENOUS at 15:05

## 2020-05-14 RX ADMIN — CASTOR OIL AND BALSAM, PERU: 788; 87 OINTMENT TOPICAL at 18:54

## 2020-05-14 RX ADMIN — SODIUM CHLORIDE 10 ML: 9 INJECTION INTRAMUSCULAR; INTRAVENOUS; SUBCUTANEOUS at 07:04

## 2020-05-14 RX ADMIN — HYDROMORPHONE HYDROCHLORIDE 0.5 MG: 1 INJECTION, SOLUTION INTRAMUSCULAR; INTRAVENOUS; SUBCUTANEOUS at 21:35

## 2020-05-14 RX ADMIN — MINERAL OIL AND WHITE PETROLATUM: 150; 830 OINTMENT OPHTHALMIC at 09:27

## 2020-05-14 RX ADMIN — MIDAZOLAM 1 MG: 1 INJECTION INTRAMUSCULAR; INTRAVENOUS at 14:55

## 2020-05-14 RX ADMIN — SODIUM CHLORIDE 10 ML: 9 INJECTION INTRAMUSCULAR; INTRAVENOUS; SUBCUTANEOUS at 13:46

## 2020-05-14 RX ADMIN — HYDROMORPHONE HYDROCHLORIDE 0.5 MG: 1 INJECTION, SOLUTION INTRAMUSCULAR; INTRAVENOUS; SUBCUTANEOUS at 13:43

## 2020-05-14 RX ADMIN — FENTANYL CITRATE 25 MCG: 50 INJECTION INTRAMUSCULAR; INTRAVENOUS at 15:00

## 2020-05-14 RX ADMIN — INSULIN LISPRO 2 UNITS: 100 INJECTION, SOLUTION INTRAVENOUS; SUBCUTANEOUS at 12:05

## 2020-05-14 RX ADMIN — HYDROMORPHONE HYDROCHLORIDE 0.5 MG: 1 INJECTION, SOLUTION INTRAMUSCULAR; INTRAVENOUS; SUBCUTANEOUS at 09:27

## 2020-05-14 RX ADMIN — HYDROMORPHONE HYDROCHLORIDE 0.5 MG: 1 INJECTION, SOLUTION INTRAMUSCULAR; INTRAVENOUS; SUBCUTANEOUS at 01:04

## 2020-05-14 RX ADMIN — SODIUM CHLORIDE 40 MG: 9 INJECTION INTRAMUSCULAR; INTRAVENOUS; SUBCUTANEOUS at 09:27

## 2020-05-14 RX ADMIN — MINERAL OIL AND WHITE PETROLATUM: 150; 830 OINTMENT OPHTHALMIC at 00:51

## 2020-05-14 RX ADMIN — SODIUM CHLORIDE 10 ML: 9 INJECTION INTRAMUSCULAR; INTRAVENOUS; SUBCUTANEOUS at 13:45

## 2020-05-14 RX ADMIN — SODIUM CHLORIDE, SODIUM LACTATE, POTASSIUM CHLORIDE, AND CALCIUM CHLORIDE 60 ML/HR: 600; 310; 30; 20 INJECTION, SOLUTION INTRAVENOUS at 05:34

## 2020-05-14 RX ADMIN — MIDAZOLAM 1 MG: 1 INJECTION INTRAMUSCULAR; INTRAVENOUS at 15:04

## 2020-05-14 RX ADMIN — EPOETIN ALFA-EPBX 20000 UNITS: 10000 INJECTION, SOLUTION INTRAVENOUS; SUBCUTANEOUS at 21:22

## 2020-05-14 RX ADMIN — MIDAZOLAM 1 MG: 1 INJECTION INTRAMUSCULAR; INTRAVENOUS at 14:53

## 2020-05-14 RX ADMIN — FENTANYL CITRATE 25 MCG: 50 INJECTION INTRAMUSCULAR; INTRAVENOUS at 14:59

## 2020-05-14 RX ADMIN — FENTANYL CITRATE 25 MCG: 50 INJECTION INTRAMUSCULAR; INTRAVENOUS at 14:55

## 2020-05-14 RX ADMIN — CEFAZOLIN SODIUM 2 G: 300 INJECTION, POWDER, LYOPHILIZED, FOR SOLUTION INTRAVENOUS at 14:46

## 2020-05-14 RX ADMIN — SODIUM CHLORIDE 50 ML/HR: 900 INJECTION, SOLUTION INTRAVENOUS at 14:40

## 2020-05-14 RX ADMIN — MIDAZOLAM 1 MG: 1 INJECTION INTRAMUSCULAR; INTRAVENOUS at 15:00

## 2020-05-14 RX ADMIN — FENTANYL CITRATE 25 MCG: 50 INJECTION INTRAMUSCULAR; INTRAVENOUS at 15:04

## 2020-05-14 RX ADMIN — SODIUM CHLORIDE 10 ML: 9 INJECTION INTRAMUSCULAR; INTRAVENOUS; SUBCUTANEOUS at 21:23

## 2020-05-14 RX ADMIN — SODIUM CHLORIDE, SODIUM LACTATE, POTASSIUM CHLORIDE, AND CALCIUM CHLORIDE 60 ML/HR: 600; 310; 30; 20 INJECTION, SOLUTION INTRAVENOUS at 21:13

## 2020-05-14 RX ADMIN — HYDROMORPHONE HYDROCHLORIDE 0.5 MG: 1 INJECTION, SOLUTION INTRAMUSCULAR; INTRAVENOUS; SUBCUTANEOUS at 16:47

## 2020-05-14 RX ADMIN — POTASSIUM CHLORIDE 20 MEQ: 400 INJECTION, SOLUTION INTRAVENOUS at 07:04

## 2020-05-14 RX ADMIN — FENTANYL CITRATE 25 MCG: 50 INJECTION INTRAMUSCULAR; INTRAVENOUS at 14:54

## 2020-05-14 RX ADMIN — ONDANSETRON 4 MG: 2 INJECTION INTRAMUSCULAR; INTRAVENOUS at 21:21

## 2020-05-14 NOTE — PROGRESS NOTES
1630: Dr. Zachery Gaming at bedside. Patient complaining of increased pain after G-J tube placement. MD to enter pain medication orders in CC. RN also asked MD to call  with update. 1730: Caitlin Galvez, , called with daily update s/p procedure.  appreciated call and has \"not received a call from anyone else at the hospital\" at this time.

## 2020-05-14 NOTE — PROGRESS NOTES
1024: Patient states that she is \"anxious, confused and upset\" as to why she is no longer having G-J tube placement today. Patient states that she still wants the procedure done. Patient also asked for  to be updated as well. Dr. Dara Nails, hospitalist, notified. 1025: Dr. Dara Nails requested RN speak with GI and have GI update patient's . 1045: Franco Turk paged. 1100: Will Trent Elizondo returned call. Will states he will call and update  this afternoon. 1110: Telephone call to Dr. Dara Nails. MD would like to proceed with GJ tube placement with IR. IR notified by RN at this time. 1210: RN at bedside. Telephone call placed to Dale Haddad, patient's . RN provided update at this time per patients request. [place] understanding and has no questions at this time. RN held phone up to patient's ear for patient to talk to . Patient spoke with  for approximately 15 min.   1225: Patient quietly resting in bed at this time

## 2020-05-14 NOTE — PROGRESS NOTES
[facility]  [facility], [address]       GI PROGRESS NOTE  Will Ebony Qureshi  [phone] office  [phone] NP/PA in-hospital cell phone M-F until 4:30PM  After 5PM or on weekends, please call  for physician on call      NAME: Hesham Escobar   :  1960   MRN:  [id number]       Subjective:   Patient denies nausea, vomiting, or abdominal pain. She is NPO. Patient had a modified barium swallow yesterday. She was started on a mechanically-altered ground diet/honey-thick liquids. Patient reports vomiting after eating. Objective:     VITALS:   Last 24hrs VS reviewed since prior progress note. Most recent are:  Visit Vitals  /58 (BP 1 Location: Left arm, BP Patient Position: At rest)   Pulse (!) 104   Temp 98.7 °F (37.1 °C)   Resp 14   Ht 5' 4\" (1.626 m)   Wt 104.8 kg (231 lb 1.6 oz)   SpO2 98%   BMI 39.67 kg/m²       PHYSICAL EXAM:  General:          Cooperative, no acute distress    Neurologic:      Alert and oriented  HEENT:           EOMI, no scleral icterus   Lungs:             No respiratory distress  Abdomen:        Soft, non-distended, no tenderness, no guarding, no rebound.   Extremities:     Warm  Psych:             Not anxious or agitated      Lab Data Reviewed:     Recent Results (from the past 24 hour(s))   GLUCOSE, POC    Collection Time: 20 11:04 AM   Result Value Ref Range    Glucose (POC) 171 (H) 65 - 100 mg/dL    Performed by Trace Gutierrez    GLUCOSE, POC    Collection Time: 20  6:11 PM   Result Value Ref Range    Glucose (POC) 153 (H) 65 - 100 mg/dL    Performed by Tia Wayne, POC    Collection Time: 20 12:42 AM   Result Value Ref Range    Glucose (POC) 132 (H) 65 - 100 mg/dL    Performed by Saeed Josue    MAGNESIUM    Collection Time: 20  4:04 AM   Result Value Ref Range    Magnesium 1.5 (L) 1.6 - 2.4 mg/dL   CBC W/O DIFF    Collection Time: 20  4:04 AM   Result Value Ref Range WBC 8.9 3.6 - 11.0 K/uL    RBC 2.86 (L) 3.80 - 5.20 M/uL    HGB 8.5 (L) 11.5 - 16.0 g/dL    HCT 28.9 (L) 35.0 - 47.0 %    .0 (H) 80.0 - 99.0 FL    MCH 29.7 26.0 - 34.0 PG    MCHC 29.4 (L) 30.0 - 36.5 g/dL    RDW 16.0 (H) 11.5 - 14.5 %    PLATELET 389 155 - 678 K/uL    MPV 9.5 8.9 - 12.9 FL    NRBC 0.0 0  WBC    ABSOLUTE NRBC 0.00 0.00 - 2.76 K/uL   METABOLIC PANEL, BASIC    Collection Time: 05/14/20  4:04 AM   Result Value Ref Range    Sodium 142 136 - 145 mmol/L    Potassium 2.9 (L) 3.5 - 5.1 mmol/L    Chloride 110 (H) 97 - 108 mmol/L    CO2 25 21 - 32 mmol/L    Anion gap 7 5 - 15 mmol/L    Glucose 133 (H) 65 - 100 mg/dL    BUN 29 (H) 6 - 20 MG/DL    Creatinine 1.15 (H) 0.55 - 1.02 MG/DL    BUN/Creatinine ratio 25 (H) 12 - 20      GFR est AA 59 (L) >60 ml/min/1.73m2    GFR est non-AA 49 (L) >60 ml/min/1.73m2    Calcium 8.3 (L) 8.5 - 10.1 MG/DL   GLUCOSE, POC    Collection Time: 05/14/20  7:06 AM   Result Value Ref Range    Glucose (POC) 132 (H) 65 - 100 mg/dL    Performed by Morales Fall        Assessment:   · Nausea/vomiting: KUB (5/9/20): NG tube side port and tip overlie the stomach; nonspecific bowel gas pattern. NG tube removed, 5/11. EGD (5/12/20): 3 cm hiatal hernia, grade 2 erosive esophagitis; gastritis in the antrum (biopsied); normal duodenum.  Modified barium swallow (5/13/20): recommended mechanically-altered ground diet/honey-thick liquids. · Acute respiratory failure secondary to COVID-19: extubated 4/20, repeat COVID-19 negative on 4/20, 4/27, and 5/8.   · Status post cardiac arrest 4/9  · Acute kidney injury  · Pulmonary embolism: no anticoagulation due to retroperitoneal bleed  · Diabetes mellitus type II     Patient Active Problem List   Diagnosis Code    [facility] C54.1    Vaginal Pap smear following hysterectomy for malignancy Z08, Z90.710    Personal history of malignant neoplasm of other parts of uterus Z85.42    Obesity, morbid ([facility] E66.01    COVID-19 virus infection U07.1    Hypotension I95.9    Retroperitoneal hemorrhage R58    Acute renal failure (ARF) ([facility]) N17.9    Encephalopathy G93.40    Sepsis with multi-organ dysfunction (HCC) A41.9, R65.20    Pneumonia due to methicillin susceptible Staphylococcus aureus (MSSA) ([facility]) J15.211    Thrombocytopenia ([facility] D69.6    Diarrhea R19.7     Plan:   · Diet per SLP   · Continue PPI and Carafate  · Supportive measures  · If persistent vomiting and no improvement in PO intake, would recommend G-J tube placement by IR versus surgical J-tube placement. Patient is not a candidate for PEG tube placement due to history of vomiting with tube feeds. Discussed with hospitalist, Dr. Andra Ferguson, and procedure canceled for today.       Signed By: ANAND Pina     5/14/2020  9:02 AM         Agree with above  G/J tube placement by IR  Will sign off

## 2020-05-14 NOTE — PROGRESS NOTES
Returned phone call to Brianda Michaels, patient's , at this time.  Informed  that Dutch Merlos did not want to talk with him on the phone and wanted to rest.

## 2020-05-14 NOTE — PROGRESS NOTES
Problem: Nutrition Deficit  Goal: *Optimize nutritional status  Outcome: Progressing Towards Goal   Pt plan for GJ tube placement. Problem: Impaired Skin Integrity/Pressure Injury Treatment  Goal: *Improvement of Existing Pressure Injury  Outcome: Progressing Towards Goal   Zinc based cream applied to gluteal wound with purwick changes. Mepilex intact on sacrum. Pt refusing pillow behind her back. Assisted pt with turns from side to side. 0800- pts  Matheus Wynn updated over the phone on pts night and plan for today. Bedside shift change report given to Eliezer Doshi RN (oncoming nurse) by Danielle Avila RN (offgoing nurse). Report included the following information SBAR, Kardex, ED Summary, Intake/Output, MAR, Accordion, Recent Results, Med Rec Status, Cardiac Rhythm NSR, and Alarm Parameters .

## 2020-05-14 NOTE — PROGRESS NOTES
Bedside and Verbal shift change report given to Anjel Loyola RN (oncoming nurse) by Yolis Bustamante RN (offgoing nurse). Report included the following information SBAR, Kardex, Procedure Summary, Intake/Output, MAR, Accordion, Recent Results and Cardiac Rhythm Sinus Tach.

## 2020-05-14 NOTE — DIABETES MGMT
MARTA ROMERO  CLINICAL NURSE SPECIALIST CONSULT  PROGRAM FOR DIABETES HEALTH  Follow up Note  Presentation   Jael Price is a 64 y.o. female admitted from OSH to [facility] ICU with SARS-COV2 . Now recovering and COVID -. Per Nephorology kidneys are recovering nicely and now not requiring HD. Patient is now suffering with left paralysis of vocal cord which  affects her swallowing ability. New diabetes diagnosis with A1C 11.0% (3/28/2020); updated A1C 3/31/20-10.4%     Recent events: Patient did not tolerate the PO thickened diet as she had N/V. Now NPO and pending possible G-J tube placement in near future. Perma-cath removed today. Consulted by Provider for advanced diabetes nursing assessment and care, specifically related to   [] Transitioning off Jenness Riis   [x] Inpatient management strategy  [] Home management assessment  [] Survival skill education    Diabetes-related medical history  Acute complications  hyperglycemia  Neurological complications  NONE  Microvascular disease  NONE  Macrovascular disease  NONE  Other associated conditions     NONE    Diabetes medication history: NONE    Subjective   Ms. Dara Galvan is resting with eyes closed. S/p perma cath removal.  Objective   Diabetic Foot Exam: Left foot: warm, no calluses noted, red spot noted on top of foot over bone, but no breakdown noted. + microfilament sensation noted in all areas. Right foot: in brace, however, foot warm. Microfilament test deferred due to brace. Vital Signs   Visit Vitals  /58 (BP 1 Location: Left arm, BP Patient Position: At rest)   Pulse (!) 104   Temp 98.7 °F (37.1 °C)   Resp 14   Ht 5' 4\" (1.626 m)   Wt 104.8 kg (231 lb 1.6 oz)   SpO2 98%   BMI 39.67 kg/m²   .    Laboratory  Lab Results   Component Value Date/Time    Hemoglobin A1c 10.4 (H) 03/31/2020 03:42 PM     No results found for: LDL, LDLC, DLDLP  Lab Results   Component Value Date/Time    Creatinine 1.15 (H) 05/14/2020 04:04 AM     Lab Results Component Value Date/Time    Sodium 142 05/14/2020 04:04 AM    Potassium 2.9 (L) 05/14/2020 04:04 AM    Chloride 110 (H) 05/14/2020 04:04 AM    CO2 25 05/14/2020 04:04 AM    Anion gap 7 05/14/2020 04:04 AM    Glucose 133 (H) 05/14/2020 04:04 AM    BUN 29 (H) 05/14/2020 04:04 AM    Creatinine 1.15 (H) 05/14/2020 04:04 AM    BUN/Creatinine ratio 25 (H) 05/14/2020 04:04 AM    GFR est AA 59 (L) 05/14/2020 04:04 AM    GFR est non-AA 49 (L) 05/14/2020 04:04 AM    Calcium 8.3 (L) 05/14/2020 04:04 AM    Bilirubin, total 1.6 (H) 04/20/2020 05:29 AM    AST (SGOT) 169 (H) 04/20/2020 05:29 AM    Alk. phosphatase 490 (H) 04/20/2020 05:29 AM    Protein, total 5.9 (L) 04/20/2020 05:29 AM    Albumin 2.5 (L) 05/13/2020 02:09 AM    Globulin 3.5 04/20/2020 05:29 AM    A-G Ratio 0.7 (L) 04/20/2020 05:29 AM    ALT (SGPT) 206 (H) 04/20/2020 05:29 AM     Lab Results   Component Value Date/Time    ALT (SGPT) 206 (H) 04/20/2020 05:29 AM         Evaluation   Ms Luis Carlos Pena, with new onset Type 2 diabetes,with A1C 10.4%. AM fasting BG 132mg/dl today. Patient currently NPO for nausea/vomiting with PO - BG within range since NPO. Continue correctional coverage only for now. It is imperative that we maintain her BG within target range 100-180mg/dl. Recommendations   1. Continue correctional insulin only for now. 2. Will continue to follow. Assessment and Plan   Nursing Diagnosis Risk for unstable blood glucose pattern   Nursing Intervention Domain 9458 Decision-making Support   Nursing Interventions Examined current inpatient diabetes control   Explored factors facilitating and impeding inpatient management  Identified self-management practices impeding diabetes control  Explored corrective strategies with patient and responsible inpatient provider   Informed patient of rational for insulin strategy while hospitalized         Billing Code(s)   Thank you for including us in their care.   I spent 30 minutes in direct patient care today for this patient.   Time includes chart review, face to face with patient and collaboration with interdisciplinary care team.    [x] IP subsequent hospital care - [address]   Program for Diabetes Health  Access via [address] [phone]

## 2020-05-14 NOTE — PROGRESS NOTES
Problem: Mobility Impaired (Adult and Pediatric)  Goal: *Acute Goals and Plan of Care (Insert Text)  Description: FUNCTIONAL STATUS PRIOR TO ADMISSION: Patient was independent and active without use of DME but poor history due to not being able to communicate. HOME SUPPORT PRIOR TO ADMISSION: Pt lives with family. Physical Therapy Goals  1. Patient will move from supine to sit and sit to supine , scoot up and down and roll side to side in bed with moderate assistance x1 person within 7 day(s). 2.  Patient will sit EOB with supervision (occasional min A), x10 minutes with retracted shoulders, midline posture, and forward gaze within 7 days. 3.  Patient will verbalize and demonstrate 5 exercises she can complete on her own outside of therapy sessions with 7 days. 4.  Patient will participate in scooting to [address] with control of core and max Ax2 for scooting while sitting EOB within 7 days. 5.  Patient will tolerate sitting in chair x1 hour within 7 days. Initiated 5/4/2020- All goals met 5/12/2020  1. Patient will move from supine to sit and sit to supine , scoot up and down and roll side to side in bed with maximal assistance within 7 day(s). 2.  Patient will sit EOB with moderate assistance of 2 people, x5 minutes with active core initiation to correct sitting balance within 7 days. 3.  Patient will verbalize 3 exercises she can complete on her own outside of therapy sessions with 7 days. 4.  Patient will participate in supine HEP (AAROM as needed) with min assist for AAROM within 7 days. 5.  Patient will tolerate bed in chair position 30 min BID within 7 days. Initiated 4/25/2020  1. Patient will move from supine to sit and sit to supine , scoot up and down and roll side to side in bed with moderate assistance once off vent within 7 day(s). 2.  Patient will participate in sitting with min A for 2 minutes once off vent within 7 day(s).   3.  Patient will perform active supine or sitting TE with min A within 7 day(s). Outcome: Progressing Towards Goal    PHYSICAL THERAPY TREATMENT  Patient: Ovidio Valentin (3 y.o. female)  Date: 5/14/2020  Diagnosis: COVID-19 virus infection [U07.1]   Sepsis with multi-organ dysfunction ([facility] [address]  Procedure(s) (LRB):  ESOPHAGOGASTRODUODENOSCOPY (EGD) (Left)  ESOPHAGOGASTRODUODENAL (EGD) BIOPSY (N/A) 2 Days Post-Op  Precautions: Fall  Chart, physical therapy assessment, plan of care and goals were reviewed. ASSESSMENT  Patient continues with skilled PT services and is progressing towards goals. Pt displayed improve static seated balance. Pt has increase anxiety with task of falling forward. Pt had improved AROM of ankles  DF and AAROM with LE. Pt displayed improved EOB tolerance. Pt is command following and motivated to improve independence. Pt could benefit from inpt rehab to continue progression. Pt has progressed activity tolerance     Current Level of Function Impacting Discharge (mobility/balance): Max A x2     Other factors to consider for discharge: decrease mobility, strength, desire to improve independence          PLAN :  Patient continues to benefit from skilled intervention to address the above impairments. Continue treatment per established plan of care. to address goals. Recommendation for discharge: (in order for the patient to meet his/her long term goals)  Therapy 3 hours per day 5-7 days per week    This discharge recommendation:  Has not yet been discussed the attending provider and/or case management    IF patient discharges home will need the following DME: to be determined (TBD)       SUBJECTIVE:   Patient stated get more independence .     OBJECTIVE DATA SUMMARY:   Critical Behavior:  Neurologic State: Alert  Orientation Level: Oriented X4  Cognition: Follows commands  Safety/Judgement: Awareness of environment, Insight into deficits  Functional Mobility Training:  Bed Mobility:     Supine to Sit: Maximum assistance;Assist x2  Sit to Supine: Maximum assistance;Assist x2           Transfers:        Balance:  Sitting: Impaired  Sitting - Static: Fair (occasional)  Sitting - Dynamic: Fair (occasional)  Ambulation/Gait Training:        Stairs: Therapeutic Exercises:   LE ankle pump, quad sets, hip flexion/extension, SAQ  Trunk rotation, trunk flexion to neutral   Pain Rating:  Sciatica left    Activity Tolerance:   Limited   Please refer to the flowsheet for vital signs taken during this treatment. After treatment patient left in no apparent distress:   Supine in bed and Call bell within reach    COMMUNICATION/COLLABORATION:   The patients plan of care was discussed with: Registered nurse.      Za Fernandez PTA   Time Calculation: 39 mins

## 2020-05-14 NOTE — PROGRESS NOTES
SLP Contact Note    Patient going for [address] tube today and therefore will defer for now.        Thank you,  VALERIA MccloudEd, [address] [facility]  Speech-Language Pathologist

## 2020-05-14 NOTE — PROGRESS NOTES
TRANSFER - OUT REPORT:    Verbal report given to Che Cramer RN(name) on Jarret Forrester  being transferred to [facility](unit) for routine progression of care       Report consisted of patients Situation, Background, Assessment and   Recommendations(SBAR). Information from the following report(s) SBAR, Kardex, Procedure Summary, Intake/Output, MAR and Recent Results was reviewed with the receiving nurse. Lines:   PICC Triple Lumen 03/28/20 Right (Active)   Central Line Being Utilized Yes 5/14/2020  7:48 AM   Criteria for Appropriate Use Limited/no vessel suitable for conventional peripheral access 5/14/2020  7:48 AM   Site Assessment Clean, dry, & intact 5/14/2020  7:48 AM   Phlebitis Assessment 0 5/13/2020  8:30 PM   Infiltration Assessment 0 5/13/2020  8:30 PM   Date of Last Dressing Change 05/09/20 5/14/2020  4:05 AM   Dressing Status Clean, dry, & intact 5/14/2020  4:05 AM   Dressing Type Disk with Chlorhexadine gluconate (CHG); Transparent 5/14/2020  4:05 AM   Action Taken Open ports on tubing capped 5/14/2020  4:05 AM   Hub Color/Line Status White; Infusing 5/14/2020  4:05 AM   Positive Blood Return (Site #1) Yes 5/14/2020  4:05 AM   Hub Color/Line Status Red;Flushed;Cap end changed 5/14/2020  4:05 AM   Positive Blood Return (Site #2) Yes 5/14/2020  4:05 AM   Hub Color/Line Status Gray;Capped 5/14/2020  4:05 AM   Positive Blood Return (Site #3) Yes 5/14/2020  4:05 AM   Alcohol Cap Used Yes 5/14/2020  4:05 AM        Opportunity for questions and clarification was provided.       Patient transported with:   [employer]

## 2020-05-14 NOTE — WOUND CARE
Wound Consult: Follow Up Visit. Chart reviewed. Consulted for injuries to skin while in ICU with severe respiratory failure and other organ failure secondary to COVID19 infection. Spoke with patients nurse,  [facility] and in with fellow Matt Beth to turn and provide care. Patient is resting on a total care bariatric air support bed. She alert and oriented; going for PEGJ today. Assessment/Treatment:  Left upper buttock (fold) - 3 x 0.6 x 0.1 cm, moist 90% yellow slough, pink edge, no odor or purulence, no surrounding redness, scant serous drainage; hydrocolloid applied. Gluteal cleft - linear MASD; ~ 4 x 0.1 cm, moist red/pink. No surrounding redness or rash. No redness to sacrum or buttocks or gluteal folds. Z guard. Right groin - resurfaced pink skin where she has line. Left groin - 0.2 x 0.2 cm puncture site from line, no surrounding redness. No rash or redness in pannus or inner thighs. Forehead healed. Wound Recommendations:  Continue hydrocolloid to left upper buttock. Z guard to gluteal cleft. Skin Care Recommendations:  1. Minimize friction/shear: minimize layers of linen/pads under patient. Current support surface appropriate. 2. Off load pressure/reposition: continue to turn and reposition approximately every 2 hours; float heels. 3. Manage Moisture - keep skin folds dry; incontinence skin care; Pure wick, no brief in use. 4. Continue to monitor nutrition, pain, and skin risk scale, and skin assessment. Plan: We will continue to reassess routinely and as needed.   Paty Hannah, [facility] Office [phone]  Pager [phone]

## 2020-05-14 NOTE — PROGRESS NOTES
Hospitalist Progress Note  Daniela Raymundo MD  Answering service: [phone] from in house phone      Date of Service:  2020  NAME:  Lisa Briggs  :  1960  MRN:  [id number]    Admission Summary:   59F s/p COVID treatement in ICU - extubated  Interval history / Subjective: Follow up Acute hypoxic resp. Failure, acute renal failure  Patient seen and examined by the bedside, Labs, images and notes reviewed  Patient continues to vomit today after started on honey thick liquid by speech therapy. Consulted GI to do the PEG tube. Had a long discussion with GI, Dr. Bri Yoon regarding PEG tube but unfortunately because patient did not tolerate the tube feedings, she will not tolerate the PEG tube feeding also, per GI. Recommended GJ tube versus waiting for few days on diet suggested by speech therapy so to improve the swallowing function. I discussed both options with patient, patient wants to go ahead and do the [address] tube. IR consulted for GJ tube, most likely will happen tomorrow morning. This whole plan is discussed with Mr. Luz Elena Paz, patient's  today . discussed with nursing staff, orders reviewed. Assessment & Plan:     #. ARF: - Nephrology following- on HD with good tolerance,  permacath placed 5/7  CM is setting up the dialysis outpatient. TTS schedule     # Nausea, vomiting: GI consulted, EGD today,Hiatal hernia, gr2 erosive esophagitis and gastritis in antrum, appreciate recommendations, cont. Protonix, added carafate, started on CLD, DC TF  #. COVID-19 +ve - Resolved- s/p Tocilizumab, Plaquenil. On Vit C, Zinc   #. Acute Hypoxic Resp Failure- resolved, Pulm following. Bipap at night and PRN. #. Acute metabolic Encephalopathy: likely ICU delirium- no agitation. Monitor- resolved. #. Sinus tachycardia: improving,continue Metoprolol, normal TSH  #. Anemia in CKD:monitor HB.    #. Retroperitoneal bleed: this hospitalization, resolved - Avoid AP/AC meds- Monitor Hb  #. Non occlusive PE in LLL pulmonary artery - no ACs due to retroperitoneal bleed. Appreciate Vascular surgery recommendations for IVC filter, comments noted, conservative approach for now    #. S/p Cardiac arrest 4/9  #. DM2: controlled, A1c 10.4, SSI, AccuChecks, monitor  #. Morbid obesity: counseled on health benefits of weight loss, Healthy diet, BMI 44.11 kg/m². #Nutrition patient is on tube feeds via NGT, TF held due to vomiting 5/10,  MBS 5/11, today  # depression: continue Trazodone at bedtime and Psych consulted, started on Lexapro  # Anxiety episodes; Continue Lexapro, Ativan PRN for short duration     Had a long discussion with GI, Dr. Shaina Chahal regarding PEG tube but unfortunately because patient did not tolerate the tube feedings, she will not tolerate the PEG tube feeding also, per GI. Recommended GJ tube versus waiting for few days on diet suggested by speech therapy so to improve the swallowing function. I discussed both options with patient, patient wants to go ahead and do the [address] tube. IR consulted for GJ tube, most likely will happen tomorrow morning. This whole plan is discussed with Mr. Camryn Linn, patient's  today 5/13.     Code status: Full  DVT prophylaxis: SCD  Care Plan discussed with: Plan updated to   Disposition: TBD. >2days     Hospital Problems  Date Reviewed: 5/12/2020          Codes Class Noted POA    Hypotension ICD-10-CM: I95.9  ICD-9-CM: 458.9 Acute 4/2/2020 Yes        Retroperitoneal hemorrhage ICD-10-CM: R58  ICD-9-CM: 459.0  4/19/2020 No        Acute renal failure (ARF) ([facility] [address] ICD-10-CM: N17.9  ICD-9-CM: 584.9  4/19/2020 No        Encephalopathy ICD-10-CM: G93.40  ICD-9-CM: 348.30  4/19/2020 No        Sepsis with multi-organ dysfunction [facility]) ICD-10-CM: A41.9, R65.20  ICD-9-CM: 038.9, 995.92  4/19/2020 Unknown        Pneumonia due to methicillin susceptible Staphylococcus aureus (MSSA) ([facility] ICD-10-CM: W86.293  ICD-9-CM: 482.41  4/19/2020 Unknown        Thrombocytopenia ([facility] [address] ICD-10-CM: D69.6  ICD-9-CM: 287.5  4/19/2020 Unknown        Diarrhea ICD-10-CM: R19.7  ICD-9-CM: 787.91  4/19/2020 Unknown        COVID-19 virus infection ICD-10-CM: U07.1  ICD-9-CM: 079.89  3/31/2020 Unknown            Review of Systems:   Pertinent items are mentioned in interval history. Vital Signs:    Last 24hrs VS reviewed since prior progress note. Most recent are:  Visit Vitals  /78 (BP 1 Location: Left arm, BP Patient Position: At rest)   Pulse (!) 114   Temp 99 °F (37.2 °C)   Resp 20   Ht 5' 4\" (1.626 m)   Wt 109.1 kg (240 lb 9.6 oz)   SpO2 100%   BMI 41.30 kg/m²         Intake/Output Summary (Last 24 hours) at 5/13/2020 2043  Last data filed at 5/13/2020 1515  Gross per 24 hour   Intake 1919 ml   Output 750 ml   Net 1169 ml        Physical Examination:   General:  Alert, No acute distress  Resp:  No accessory muscle use, Good AE, no wheezes, few rhonchi  Abd:  Soft, non-tender, non-distended, obese  Extremities:  No cyanosis or clubbing, no significant edema  Neuro:  Grossly normal, no focal neuro deficits, follows commands   Psych:   not agitated.     Data Review:    Review and/or order of clinical lab test  Review and/or order of tests in the radiology section of CPT  Review and/or order of tests in the medicine section of CPT  Labs:     Recent Labs     05/12/20  0327   WBC 12.5*   HGB 9.1*   HCT 29.9*        Recent Labs     05/13/20  0210 05/13/20  0209 05/12/20  0328 05/12/20  0327 05/11/20  0538    140 141 139 136   K 3.4* 3.5 3.4* 3.3* 3.2*    107 105 104 101   CO2 23 21 26 26 29   BUN 34* 34* 37* 38* 39*   CREA 1.25* 1.24* 1.48* 1.52* 1.65*   * 157* 170* 172* 143*   CA 8.5 8.4* 8.6 8.8 8.8   MG  --  1.4*  --  1.6 1.7   PHOS  --  3.4  --  3.8  --      Recent Labs     05/13/20  0209 05/12/20  0327   ALB 2.5* 2.4*     No results for input(s): INR, PTP, APTT, INREXT, INREXT in the last 72 hours. No results for input(s): FE, TIBC, PSAT, FERR in the last 72 hours. No results found for: FOL, RBCF   No results for input(s): PH, PCO2, PO2 in the last 72 hours. No results for input(s): CPK, CKNDX, TROIQ in the last 72 hours.     No lab exists for component: CPKMB  Lab Results   Component Value Date/Time    Triglyceride 159 (H) 04/12/2020 08:34 PM     Lab Results   Component Value Date/Time    Glucose (POC) 153 (H) 05/13/2020 06:11 PM    Glucose (POC) 171 (H) 05/13/2020 11:04 AM    Glucose (POC) 157 (H) 05/13/2020 07:05 AM    Glucose (POC) 128 (H) 05/12/2020 10:32 PM    Glucose (POC) 164 (H) 05/12/2020 05:33 PM     No results found for: COLOR, APPRN, SPGRU, REFSG, SARKIS, PROTU, GLUCU, KETU, BILU, UROU, GALE, LEUKU, GLUKE, EPSU, BACTU, WBCU, RBCU, CASTS, UCRY  Medications Reviewed:     Current Facility-Administered Medications   Medication Dose Route Frequency    sodium chloride (NS) flush 5-40 mL  5-40 mL IntraVENous Q8H    sodium chloride (NS) flush 5-40 mL  5-40 mL IntraVENous PRN    lactated Ringers infusion  60 mL/hr IntraVENous CONTINUOUS    HYDROmorphone (PF) (DILAUDID) injection 0.5 mg  0.5 mg IntraVENous Q4H PRN    sucralfate (CARAFATE) tablet 1 g  1 g Oral TIDAC    pantoprazole (PROTONIX) 40 mg in 0.9% sodium chloride 10 mL injection  40 mg IntraVENous Q12H    LORazepam (ATIVAN) injection 0.5 mg  0.5 mg IntraVENous Q6H PRN    metoclopramide HCl (REGLAN) injection 5 mg  5 mg IntraVENous Q6H PRN    escitalopram oxalate (LEXAPRO) tablet 10 mg  10 mg Oral DAILY    traZODone (DESYREL) tablet 50 mg  50 mg Oral QHS    heparin (porcine) 1,000 unit/mL injection 3,800 Units  3,800 Units Hemodialysis DIALYSIS PRN    metoprolol (LOPRESSOR) 5 mg/mL oral suspension 25 mg  25 mg Per NG tube Q12H    [Held by provider] insulin glargine (LANTUS) injection 28 Units  28 Units SubCUTAneous QHS    balsam peru-castor oiL (VENELEX) ointment   Topical BID    bumetanide (BUMEX) tablet 2 mg  2 mg Oral DAILY    guaiFENesin (ROBITUSSIN) 100 mg/5 mL oral liquid 100 mg  100 mg Oral Q12H    epoetin jovani-epbx (RETACRIT) injection 20,000 Units  20,000 Units SubCUTAneous Q TUE, THU & SAT    HYDROcodone-acetaminophen (NORCO) 5-325 mg per tablet 1 Tab  1 Tab Oral Q6H PRN    phenol throat spray (CHLORASEPTIC) 1 Spray  1 Spray Oral Q6H PRN    sevelamer carbonate (RENVELA) oral powder 0.8 g  0.8 g Oral TID WITH MEALS    guar gum (BENEFIBER) packet 1 Packet  4 g Oral TID    labetaloL (NORMODYNE;TRANDATE) injection 20 mg  20 mg IntraVENous Q4H PRN    albumin human 25% (BUMINATE) solution 12.5 g  12.5 g IntraVENous DIALYSIS PRN    albuterol (PROVENTIL VENTOLIN) nebulizer solution 2.5 mg  2.5 mg Nebulization Q4H PRN    alteplase (CATHFLO) 2 mg in sterile water (preservative free) 2 mL injection  2 mg InterCATHeter DIALYSIS PRN    alteplase (CATHFLO) 2 mg in sterile water (preservative free) 2 mL injection  2 mg InterCATHeter DIALYSIS PRN    insulin lispro (HUMALOG) injection   SubCUTAneous Q6H    white petrolatum-mineral oiL (AKWA TEARS) 83-15 % ophthalmic ointment   Both Eyes Q12H    bacitracin 500 unit/gram packet 1 Packet  1 Packet Topical PRN    sodium chloride (NS) flush 5-40 mL  5-40 mL IntraVENous Q8H    sodium chloride (NS) flush 5-40 mL  5-40 mL IntraVENous PRN    ondansetron (ZOFRAN) injection 4 mg  4 mg IntraVENous Q4H PRN    acetaminophen (TYLENOL) tablet 650 mg  650 mg Oral Q6H PRN    Or    acetaminophen (TYLENOL) suppository 650 mg  650 mg Rectal Q6H PRN    glucose chewable tablet 16 g  4 Tab Oral PRN    glucagon (GLUCAGEN) injection 1 mg  1 mg IntraMUSCular PRN    dextrose 10% infusion 0-250 mL  0-250 mL IntraVENous PRN   ______________________________________________________________________  EXPECTED LENGTH OF STAY: 12d 9h  ACTUAL LENGTH OF STAY:          43               Aydee Matamoros MD

## 2020-05-14 NOTE — PROGRESS NOTES
Chart reviewed and noted patient DARLENE in AIR/PL at this time. Will follow up for OT intervention as able. Thank you.

## 2020-05-14 NOTE — PROGRESS NOTES
[facility]   [address] [facility], [address] [place] [address], [facility]  Phone: [phone]   [phone]       Nephrology Progress Note  Cynthia Vásquez     1960     [id number]  Date of Admission : 3/31/2020  05/14/20    CC: Follow up for JUANCHO      Assessment and Plan   JUANCHO :  - 2/2 ATN: resolving . last HD 5/9  - recovered and no longer needs dialysis   - remove permacath in IR today   - continue LR until she starts tube feeds     COVID-19 +ve   Acute Hypoxic Resp Failure   - completed Plaquenil, - s/p Tocilizumab   - extubated 4/29  - repeat SARS-CoV-2 neg 4/20 and 4/27     Anemia in CKD:  - cont KOKI    RP hematoma:  Cardiac arrest 4/9    PE this admission  Non-occlusive superfical DVT:  - on on AC due to large RP hematoma  - no IVC to be placed    Type II DM : Insulin per primary team    Morbid Obesity     Nutrition:   - for J tube placement by IR today       D/w pt and attg        Interval History:  Seen and examined. For J tube today   No new sx     Review of Systems: Review of systems not obtained due to patient factors.     Current Medications:   Current Facility-Administered Medications   Medication Dose Route Frequency    potassium bicarb-citric acid (EFFER-K) tablet 40 mEq  40 mEq Oral NOW    sodium chloride (NS) flush 5-40 mL  5-40 mL IntraVENous Q8H    sodium chloride (NS) flush 5-40 mL  5-40 mL IntraVENous PRN    lactated Ringers infusion  60 mL/hr IntraVENous CONTINUOUS    HYDROmorphone (PF) (DILAUDID) injection 0.5 mg  0.5 mg IntraVENous Q4H PRN    sucralfate (CARAFATE) tablet 1 g  1 g Oral TIDAC    pantoprazole (PROTONIX) 40 mg in 0.9% sodium chloride 10 mL injection  40 mg IntraVENous Q12H    LORazepam (ATIVAN) injection 0.5 mg  0.5 mg IntraVENous Q6H PRN    metoclopramide HCl (REGLAN) injection 5 mg  5 mg IntraVENous Q6H PRN    escitalopram oxalate (LEXAPRO) tablet 10 mg  10 mg Oral DAILY    traZODone (DESYREL) tablet 50 mg  50 mg Oral QHS    heparin (porcine) 1,000 unit/mL injection 3,800 Units  3,800 Units Hemodialysis DIALYSIS PRN    metoprolol (LOPRESSOR) 5 mg/mL oral suspension 25 mg  25 mg Per NG tube Q12H    [Held by provider] insulin glargine (LANTUS) injection 28 Units  28 Units SubCUTAneous QHS    balsam peru-castor oiL (VENELEX) ointment   Topical BID    bumetanide (BUMEX) tablet 2 mg  2 mg Oral DAILY    guaiFENesin (ROBITUSSIN) 100 mg/5 mL oral liquid 100 mg  100 mg Oral Q12H    epoetin jovani-epbx (RETACRIT) injection 20,000 Units  20,000 Units SubCUTAneous Q TUE, THU & SAT    HYDROcodone-acetaminophen (NORCO) 5-325 mg per tablet 1 Tab  1 Tab Oral Q6H PRN    phenol throat spray (CHLORASEPTIC) 1 Spray  1 Spray Oral Q6H PRN    sevelamer carbonate (RENVELA) oral powder 0.8 g  0.8 g Oral TID WITH MEALS    guar gum (BENEFIBER) packet 1 Packet  4 g Oral TID    labetaloL (NORMODYNE;TRANDATE) injection 20 mg  20 mg IntraVENous Q4H PRN    albumin human 25% (BUMINATE) solution 12.5 g  12.5 g IntraVENous DIALYSIS PRN    albuterol (PROVENTIL VENTOLIN) nebulizer solution 2.5 mg  2.5 mg Nebulization Q4H PRN    alteplase (CATHFLO) 2 mg in sterile water (preservative free) 2 mL injection  2 mg InterCATHeter DIALYSIS PRN    alteplase (CATHFLO) 2 mg in sterile water (preservative free) 2 mL injection  2 mg InterCATHeter DIALYSIS PRN    insulin lispro (HUMALOG) injection   SubCUTAneous Q6H    white petrolatum-mineral oiL (AKWA TEARS) 83-15 % ophthalmic ointment   Both Eyes Q12H    bacitracin 500 unit/gram packet 1 Packet  1 Packet Topical PRN    sodium chloride (NS) flush 5-40 mL  5-40 mL IntraVENous Q8H    sodium chloride (NS) flush 5-40 mL  5-40 mL IntraVENous PRN    ondansetron (ZOFRAN) injection 4 mg  4 mg IntraVENous Q4H PRN    acetaminophen (TYLENOL) tablet 650 mg  650 mg Oral Q6H PRN    Or    acetaminophen (TYLENOL) suppository 650 mg  650 mg Rectal Q6H PRN    glucose chewable tablet 16 g  4 Tab Oral PRN    glucagon (GLUCAGEN) injection 1 mg  1 mg IntraMUSCular PRN    dextrose 10% infusion 0-250 mL  0-250 mL IntraVENous PRN      Allergies   Allergen Reactions    Augmentin [Amoxicillin-Pot Clavulanate] Rash and Itching       Objective:  Vitals:    Vitals:    05/14/20 0405 05/14/20 0707 05/14/20 0727 05/14/20 0748   BP: 125/83 121/75 121/79 139/58   Pulse: (!) 108 (!) 106 (!) 104 (!) 104   Resp: 15 14 16 14   Temp: 98 °F (36.7 °C) 97.9 °F (36.6 °C) 97.8 °F (36.6 °C) 98.7 °F (37.1 °C)   TempSrc:       SpO2: 94% 97% 97% 98%   Weight: 104.8 kg (231 lb 1.6 oz)      Height:         Intake and Output:  05/14 0701 - 05/14 1900  In: -   Out: 550 [Urine:550]  05/12 1901 - 05/14 0700  In: 2689 [P.O.:120; I.V.:2569]  Out: 750 [Urine:750]    Physical Examination:     General: In NAD  HEENT:           NGT in place  Resp:  Reduced bibasilar breath sounds  CV:  RRR, no murmur   GI:  Obese , soft, NT   Neurologic:  awake  Access:           R JAZZ torres     []    High complexity decision making was performed  []    Patient is at high-risk of decompensation with multiple organ involvement    Lab Data Personally Reviewed: I have reviewed all the pertinent labs, microbiology data and radiology studies during assessment.     Recent Labs     05/14/20  0404 05/13/20  0210 05/13/20  0209 05/12/20  0328 05/12/20  0327    142 140 141 139   K 2.9* 3.4* 3.5 3.4* 3.3*   * 108 107 105 104   CO2 25 23 21 26 26   * 163* 157* 170* 172*   BUN 29* 34* 34* 37* 38*   CREA 1.15* 1.25* 1.24* 1.48* 1.52*   CA 8.3* 8.5 8.4* 8.6 8.8   MG 1.5*  --  1.4*  --  1.6   PHOS  --   --  3.4  --  3.8   ALB  --   --  2.5*  --  2.4*     Recent Labs     05/14/20  0404 05/12/20  0327   WBC 8.9 12.5*   HGB 8.5* 9.1*   HCT 28.9* 29.9*    270     No results found for: YRN  Lab Results   Component Value Date/Time    Culture result: NO GROWTH 5 DAYS 04/09/2020 02:32 PM    Culture result: NO GROWTH 5 DAYS 04/08/2020 10:36 AM    Culture result: NO GROWTH 5 DAYS 03/31/2020 11:15 AM    Culture result: MODERATE STAPHYLOCOCCUS AUREUS (A) 03/31/2020 10:34 AM    Culture result: LIGHT NORMAL RESPIRATORY SOFIE 03/31/2020 10:34 AM     Recent Results (from the past 24 hour(s))   GLUCOSE, POC    Collection Time: 05/13/20 11:04 AM   Result Value Ref Range    Glucose (POC) 171 (H) 65 - 100 mg/dL    Performed by Chata Cruz    GLUCOSE, POC    Collection Time: 05/13/20  6:11 PM   Result Value Ref Range    Glucose (POC) 153 (H) 65 - 100 mg/dL    Performed by Chuck MEEKS    GLUCOSE, POC    Collection Time: 05/14/20 12:42 AM   Result Value Ref Range    Glucose (POC) 132 (H) 65 - 100 mg/dL    Performed by Tg James    MAGNESIUM    Collection Time: 05/14/20  4:04 AM   Result Value Ref Range    Magnesium 1.5 (L) 1.6 - 2.4 mg/dL   CBC W/O DIFF    Collection Time: 05/14/20  4:04 AM   Result Value Ref Range    WBC 8.9 3.6 - 11.0 K/uL    RBC 2.86 (L) 3.80 - 5.20 M/uL    HGB 8.5 (L) 11.5 - 16.0 g/dL    HCT 28.9 (L) 35.0 - 47.0 %    .0 (H) 80.0 - 99.0 FL    MCH 29.7 26.0 - 34.0 PG    MCHC 29.4 (L) 30.0 - 36.5 g/dL    RDW 16.0 (H) 11.5 - 14.5 %    PLATELET 692 743 - 934 K/uL    MPV 9.5 8.9 - 12.9 FL    NRBC 0.0 0  WBC    ABSOLUTE NRBC 0.00 0.00 - 5.67 K/uL   METABOLIC PANEL, BASIC    Collection Time: 05/14/20  4:04 AM   Result Value Ref Range    Sodium 142 136 - 145 mmol/L    Potassium 2.9 (L) 3.5 - 5.1 mmol/L    Chloride 110 (H) 97 - 108 mmol/L    CO2 25 21 - 32 mmol/L    Anion gap 7 5 - 15 mmol/L    Glucose 133 (H) 65 - 100 mg/dL    BUN 29 (H) 6 - 20 MG/DL    Creatinine 1.15 (H) 0.55 - 1.02 MG/DL    BUN/Creatinine ratio 25 (H) 12 - 20      GFR est AA 59 (L) >60 ml/min/1.73m2    GFR est non-AA 49 (L) >60 ml/min/1.73m2    Calcium 8.3 (L) 8.5 - 10.1 MG/DL   GLUCOSE, POC    Collection Time: 05/14/20  7:06 AM   Result Value Ref Range    Glucose (POC) 132 (H) 65 - 100 mg/dL    Performed by Tg James            Total time spent with patient:  xxx   min.                                Care Plan discussed with:  Patient     Family      RN      Consulting Physician [facility]        I have reviewed the flowsheets. Chart and Pertinent Notes have been reviewed. No change in PMH ,family and social history from Consult note.       Sol Elizabeth MD

## 2020-05-14 NOTE — PROGRESS NOTES
TRANSFER - IN REPORT:    Verbal report received from Maral Lorenzo RN (name) on Stefan Baker  being received from [facility], [facility] (unit) for routine progression of care      Report consisted of patients Situation, Background, Assessment and   Recommendations(SBAR). Information from the following report(s) SBAR, Kardex, Procedure Summary, Intake/Output, MAR, Accordion, Recent Results and Cardiac Rhythm Sinus Tach was reviewed with the receiving nurse. Opportunity for questions and clarification was provided. Assessment completed upon patients arrival to unit and care assumed.

## 2020-05-14 NOTE — PROGRESS NOTES
Permacath removed per MD order. Tip intact. No bleeding or hematoma noted to site. Dressing placed to site. Tegaderm and 4x4. Pt tolerated well. Update given to floor RN who will assume care of pt.

## 2020-05-15 LAB
ALBUMIN SERPL-MCNC: 2.2 G/DL (ref 3.5–5)
ANION GAP SERPL CALC-SCNC: 7 MMOL/L (ref 5–15)
BASOPHILS # BLD: 0 K/UL (ref 0–0.1)
BASOPHILS NFR BLD: 0 % (ref 0–1)
BUN SERPL-MCNC: 24 MG/DL (ref 6–20)
BUN/CREAT SERPL: 24 (ref 12–20)
CALCIUM SERPL-MCNC: 8.2 MG/DL (ref 8.5–10.1)
CHLORIDE SERPL-SCNC: 112 MMOL/L (ref 97–108)
CO2 SERPL-SCNC: 26 MMOL/L (ref 21–32)
CREAT SERPL-MCNC: 1 MG/DL (ref 0.55–1.02)
DIFFERENTIAL METHOD BLD: ABNORMAL
EOSINOPHIL # BLD: 0.2 K/UL (ref 0–0.4)
EOSINOPHIL NFR BLD: 2 % (ref 0–7)
ERYTHROCYTE [DISTWIDTH] IN BLOOD BY AUTOMATED COUNT: 16.2 % (ref 11.5–14.5)
GLUCOSE BLD STRIP.AUTO-MCNC: 176 MG/DL (ref 65–100)
GLUCOSE BLD STRIP.AUTO-MCNC: 179 MG/DL (ref 65–100)
GLUCOSE BLD STRIP.AUTO-MCNC: 203 MG/DL (ref 65–100)
GLUCOSE BLD STRIP.AUTO-MCNC: 259 MG/DL (ref 65–100)
GLUCOSE SERPL-MCNC: 135 MG/DL (ref 65–100)
HCT VFR BLD AUTO: 30.2 % (ref 35–47)
HGB BLD-MCNC: 8.7 G/DL (ref 11.5–16)
IMM GRANULOCYTES # BLD AUTO: 0.1 K/UL (ref 0–0.04)
IMM GRANULOCYTES NFR BLD AUTO: 1 % (ref 0–0.5)
LYMPHOCYTES # BLD: 1.3 K/UL (ref 0.8–3.5)
LYMPHOCYTES NFR BLD: 14 % (ref 12–49)
MAGNESIUM SERPL-MCNC: 1.4 MG/DL (ref 1.6–2.4)
MCH RBC QN AUTO: 28.5 PG (ref 26–34)
MCHC RBC AUTO-ENTMCNC: 28.8 G/DL (ref 30–36.5)
MCV RBC AUTO: 99 FL (ref 80–99)
MONOCYTES # BLD: 0.6 K/UL (ref 0–1)
MONOCYTES NFR BLD: 7 % (ref 5–13)
NEUTS SEG # BLD: 6.8 K/UL (ref 1.8–8)
NEUTS SEG NFR BLD: 76 % (ref 32–75)
NRBC # BLD: 0 K/UL (ref 0–0.01)
NRBC BLD-RTO: 0 PER 100 WBC
PHOSPHATE SERPL-MCNC: 4.5 MG/DL (ref 2.6–4.7)
PLATELET # BLD AUTO: 246 K/UL (ref 150–400)
PMV BLD AUTO: 9.5 FL (ref 8.9–12.9)
POTASSIUM SERPL-SCNC: 3.3 MMOL/L (ref 3.5–5.1)
RBC # BLD AUTO: 3.05 M/UL (ref 3.8–5.2)
RBC MORPH BLD: ABNORMAL
RBC MORPH BLD: ABNORMAL
SERVICE CMNT-IMP: ABNORMAL
SODIUM SERPL-SCNC: 145 MMOL/L (ref 136–145)
WBC # BLD AUTO: 9 K/UL (ref 3.6–11)

## 2020-05-15 PROCEDURE — 74011250637 HC RX REV CODE- 250/637: Performed by: INTERNAL MEDICINE

## 2020-05-15 PROCEDURE — 74011250637 HC RX REV CODE- 250/637: Performed by: PSYCHIATRY & NEUROLOGY

## 2020-05-15 PROCEDURE — 82962 GLUCOSE BLOOD TEST: CPT

## 2020-05-15 PROCEDURE — 74011636637 HC RX REV CODE- 636/637: Performed by: INTERNAL MEDICINE

## 2020-05-15 PROCEDURE — 74011250637 HC RX REV CODE- 250/637: Performed by: HOSPITALIST

## 2020-05-15 PROCEDURE — 36592 COLLECT BLOOD FROM PICC: CPT

## 2020-05-15 PROCEDURE — 74011250636 HC RX REV CODE- 250/636: Performed by: INTERNAL MEDICINE

## 2020-05-15 PROCEDURE — 74011000250 HC RX REV CODE- 250: Performed by: INTERNAL MEDICINE

## 2020-05-15 PROCEDURE — 97530 THERAPEUTIC ACTIVITIES: CPT

## 2020-05-15 PROCEDURE — 65660000000 HC RM CCU STEPDOWN

## 2020-05-15 PROCEDURE — 97110 THERAPEUTIC EXERCISES: CPT

## 2020-05-15 PROCEDURE — 97535 SELF CARE MNGMENT TRAINING: CPT

## 2020-05-15 PROCEDURE — 74011250637 HC RX REV CODE- 250/637: Performed by: NURSE PRACTITIONER

## 2020-05-15 PROCEDURE — 80069 RENAL FUNCTION PANEL: CPT

## 2020-05-15 PROCEDURE — 83735 ASSAY OF MAGNESIUM: CPT

## 2020-05-15 PROCEDURE — 36415 COLL VENOUS BLD VENIPUNCTURE: CPT

## 2020-05-15 PROCEDURE — 74011250636 HC RX REV CODE- 250/636: Performed by: HOSPITALIST

## 2020-05-15 PROCEDURE — C9113 INJ PANTOPRAZOLE SODIUM, VIA: HCPCS | Performed by: INTERNAL MEDICINE

## 2020-05-15 PROCEDURE — 85025 COMPLETE CBC W/AUTO DIFF WBC: CPT

## 2020-05-15 RX ORDER — MAGNESIUM SULFATE HEPTAHYDRATE 40 MG/ML
2 INJECTION, SOLUTION INTRAVENOUS ONCE
Status: COMPLETED | OUTPATIENT
Start: 2020-05-15 | End: 2020-05-15

## 2020-05-15 RX ORDER — POTASSIUM CHLORIDE 750 MG/1
40 TABLET, FILM COATED, EXTENDED RELEASE ORAL
Status: DISCONTINUED | OUTPATIENT
Start: 2020-05-15 | End: 2020-05-15 | Stop reason: SDUPTHER

## 2020-05-15 RX ORDER — DEXTROSE MONOHYDRATE, SODIUM CHLORIDE, SODIUM LACTATE, POTASSIUM CHLORIDE, CALCIUM CHLORIDE 5; 600; 310; 179; 20 G/100ML; MG/100ML; MG/100ML; MG/100ML; MG/100ML
INJECTION, SOLUTION INTRAVENOUS CONTINUOUS
Status: DISCONTINUED | OUTPATIENT
Start: 2020-05-15 | End: 2020-05-15

## 2020-05-15 RX ORDER — BUMETANIDE 1 MG/1
1 TABLET ORAL DAILY
Status: DISCONTINUED | OUTPATIENT
Start: 2020-05-16 | End: 2020-05-22 | Stop reason: HOSPADM

## 2020-05-15 RX ADMIN — SODIUM CHLORIDE 10 ML: 9 INJECTION INTRAMUSCULAR; INTRAVENOUS; SUBCUTANEOUS at 22:35

## 2020-05-15 RX ADMIN — CASTOR OIL AND BALSAM, PERU: 788; 87 OINTMENT TOPICAL at 09:40

## 2020-05-15 RX ADMIN — METOPROLOL TARTRATE 25 MG: 50 TABLET, FILM COATED ORAL at 17:59

## 2020-05-15 RX ADMIN — TRAZODONE HYDROCHLORIDE 50 MG: 50 TABLET ORAL at 22:33

## 2020-05-15 RX ADMIN — SEVELAMER CARBONATE 0.8 G: 800 POWDER, FOR SUSPENSION ORAL at 11:00

## 2020-05-15 RX ADMIN — HYDROMORPHONE HYDROCHLORIDE 0.5 MG: 1 INJECTION, SOLUTION INTRAMUSCULAR; INTRAVENOUS; SUBCUTANEOUS at 21:18

## 2020-05-15 RX ADMIN — SEVELAMER CARBONATE 0.8 G: 800 POWDER, FOR SUSPENSION ORAL at 17:59

## 2020-05-15 RX ADMIN — SODIUM CHLORIDE 10 ML: 9 INJECTION INTRAMUSCULAR; INTRAVENOUS; SUBCUTANEOUS at 14:30

## 2020-05-15 RX ADMIN — Medication 1 PACKET: at 17:59

## 2020-05-15 RX ADMIN — LORAZEPAM 0.5 MG: 2 INJECTION INTRAMUSCULAR; INTRAVENOUS at 13:50

## 2020-05-15 RX ADMIN — HYDROMORPHONE HYDROCHLORIDE 0.5 MG: 1 INJECTION, SOLUTION INTRAMUSCULAR; INTRAVENOUS; SUBCUTANEOUS at 01:57

## 2020-05-15 RX ADMIN — Medication 1 PACKET: at 22:33

## 2020-05-15 RX ADMIN — INSULIN LISPRO 5 UNITS: 100 INJECTION, SOLUTION INTRAVENOUS; SUBCUTANEOUS at 17:59

## 2020-05-15 RX ADMIN — MAGNESIUM SULFATE IN WATER 2 G: 40 INJECTION, SOLUTION INTRAVENOUS at 09:24

## 2020-05-15 RX ADMIN — SUCRALFATE 1 G: 1 TABLET ORAL at 10:41

## 2020-05-15 RX ADMIN — INSULIN LISPRO 2 UNITS: 100 INJECTION, SOLUTION INTRAVENOUS; SUBCUTANEOUS at 12:33

## 2020-05-15 RX ADMIN — GUAIFENESIN 100 MG: 200 SOLUTION ORAL at 10:41

## 2020-05-15 RX ADMIN — INSULIN LISPRO 2 UNITS: 100 INJECTION, SOLUTION INTRAVENOUS; SUBCUTANEOUS at 06:16

## 2020-05-15 RX ADMIN — Medication 10 ML: at 22:35

## 2020-05-15 RX ADMIN — INSULIN LISPRO 3 UNITS: 100 INJECTION, SOLUTION INTRAVENOUS; SUBCUTANEOUS at 23:34

## 2020-05-15 RX ADMIN — SUCRALFATE 1 G: 1 TABLET ORAL at 17:59

## 2020-05-15 RX ADMIN — BUMETANIDE 2 MG: 1 TABLET ORAL at 10:41

## 2020-05-15 RX ADMIN — HYDROMORPHONE HYDROCHLORIDE 0.5 MG: 1 INJECTION, SOLUTION INTRAMUSCULAR; INTRAVENOUS; SUBCUTANEOUS at 09:24

## 2020-05-15 RX ADMIN — SODIUM CHLORIDE 40 MG: 9 INJECTION INTRAMUSCULAR; INTRAVENOUS; SUBCUTANEOUS at 22:32

## 2020-05-15 RX ADMIN — Medication 1 PACKET: at 10:41

## 2020-05-15 RX ADMIN — ESCITALOPRAM OXALATE 10 MG: 10 TABLET ORAL at 10:41

## 2020-05-15 RX ADMIN — SODIUM CHLORIDE 40 MG: 9 INJECTION INTRAMUSCULAR; INTRAVENOUS; SUBCUTANEOUS at 09:24

## 2020-05-15 RX ADMIN — POTASSIUM BICARBONATE 40 MEQ: 782 TABLET, EFFERVESCENT ORAL at 10:42

## 2020-05-15 RX ADMIN — DEXTROSE MONOHYDRATE, SODIUM CHLORIDE, SODIUM LACTATE, POTASSIUM CHLORIDE, CALCIUM CHLORIDE: 5; 600; 310; 179; 20 INJECTION, SOLUTION INTRAVENOUS at 09:25

## 2020-05-15 RX ADMIN — HYDROCODONE BITARTRATE AND ACETAMINOPHEN 1 TABLET: 5; 325 TABLET ORAL at 17:59

## 2020-05-15 RX ADMIN — SODIUM CHLORIDE 10 ML: 9 INJECTION INTRAMUSCULAR; INTRAVENOUS; SUBCUTANEOUS at 22:34

## 2020-05-15 RX ADMIN — SODIUM CHLORIDE 10 ML: 9 INJECTION INTRAMUSCULAR; INTRAVENOUS; SUBCUTANEOUS at 04:54

## 2020-05-15 RX ADMIN — CASTOR OIL AND BALSAM, PERU: 788; 87 OINTMENT TOPICAL at 18:00

## 2020-05-15 RX ADMIN — HYDROMORPHONE HYDROCHLORIDE 0.5 MG: 1 INJECTION, SOLUTION INTRAMUSCULAR; INTRAVENOUS; SUBCUTANEOUS at 04:50

## 2020-05-15 NOTE — PROGRESS NOTES
IFTIKHAR PLAN:    RUR 39%    Disposition plan is to discharge to [facility] in [place]. They will not have a bed until Wed. 5/20/20.  did not want patient to go anywhere else. [address] is 2 nd choice. They are following patient as well. Patient will need transportation arranged.  However, depending on mileage, patient may have to pay out of pocket

## 2020-05-15 NOTE — PROGRESS NOTES
Problem: Mobility Impaired (Adult and Pediatric)  Goal: *Acute Goals and Plan of Care (Insert Text)  Description: FUNCTIONAL STATUS PRIOR TO ADMISSION: Patient was independent and active without use of DME but poor history due to not being able to communicate. HOME SUPPORT PRIOR TO ADMISSION: Pt lives with family. Physical Therapy Goals  1. Patient will move from supine to sit and sit to supine , scoot up and down and roll side to side in bed with moderate assistance x1 person within 7 day(s). 2.  Patient will sit EOB with supervision (occasional min A), x10 minutes with retracted shoulders, midline posture, and forward gaze within 7 days. 3.  Patient will verbalize and demonstrate 5 exercises she can complete on her own outside of therapy sessions with 7 days. 4.  Patient will participate in scooting to [facility] with control of core and max Ax2 for scooting while sitting EOB within 7 days. 5.  Patient will tolerate sitting in chair x1 hour within 7 days. Initiated 5/4/2020- All goals met 5/12/2020  1. Patient will move from supine to sit and sit to supine , scoot up and down and roll side to side in bed with maximal assistance within 7 day(s). 2.  Patient will sit EOB with moderate assistance of 2 people, x5 minutes with active core initiation to correct sitting balance within 7 days. 3.  Patient will verbalize 3 exercises she can complete on her own outside of therapy sessions with 7 days. 4.  Patient will participate in supine HEP (AAROM as needed) with min assist for AAROM within 7 days. 5.  Patient will tolerate bed in chair position 30 min BID within 7 days. Initiated 4/25/2020  1. Patient will move from supine to sit and sit to supine , scoot up and down and roll side to side in bed with moderate assistance once off vent within 7 day(s). 2.  Patient will participate in sitting with min A for 2 minutes once off vent within 7 day(s).   3.  Patient will perform active supine or sitting TE with min A within 7 day(s). Outcome: Progressing Towards Goal    PHYSICAL THERAPY TREATMENT  Patient: Cody Dean (89 y.o. female)  Date: 5/15/2020  Diagnosis: COVID-19 virus infection [U07.1]   Sepsis with multi-organ dysfunction (HCC)  Procedure(s) (LRB):  ESOPHAGOGASTRODUODENOSCOPY (EGD) (Left)  ESOPHAGOGASTRODUODENAL (EGD) BIOPSY (N/A) 3 Days Post-Op  Precautions: Fall  Chart, physical therapy assessment, plan of care and goals were reviewed. ASSESSMENT  Patient continues with skilled PT services and is progressing towards goals. Pt displayed increase trunk rotation to assist with turning. Pt initiating LE off the side of bed for supine to sit. Pt also had better control with trunk with returning supine. Pt tolerated an extended period EOB will hair was being managed. Pt does experience anxiety with movement and increase HR. Pt was able to manage to sips of thicken liquids in sitting. AA to get cup to mouth. Current Level of Function Impacting Discharge (mobility/balance): max Ax2    Other factors to consider for discharge: improving activity tolerance         PLAN :  Patient continues to benefit from skilled intervention to address the above impairments. Continue treatment per established plan of care. to address goals. Recommendation for discharge: (in order for the patient to meet his/her long term goals)  Therapy 3 hours per day 5-7 days per week    This discharge recommendation:  Has not yet been discussed the attending provider and/or case management    IF patient discharges home will need the following DME: to be determined (TBD)       SUBJECTIVE:   Patient stated I don't mean to be a baby.     OBJECTIVE DATA SUMMARY:   Critical Behavior:  Neurologic State: Alert, Appropriate for age  Orientation Level: Oriented X4  Cognition: Follows commands  Safety/Judgement: Awareness of environment, Insight into deficits  Functional Mobility Training:  Bed Mobility:     Supine to Sit: Maximum assistance;Assist x2  Sit to Supine: Maximum assistance;Assist x2           Transfers:                                   Balance:  Sitting: Impaired  Sitting - Static: Fair (occasional)  Sitting - Dynamic: Fair (occasional)  Ambulation/Gait Training:                                                        Stairs: Therapeutic Exercises:   Trunk stability, ankle pumps. Pain Rating:  Abdominal and left shoulder    Activity Tolerance:   Limited   Please refer to the flowsheet for vital signs taken during this treatment. After treatment patient left in no apparent distress:   Supine in bed and Call bell within reach    COMMUNICATION/COLLABORATION:   The patients plan of care was discussed with: Occupational therapist and Registered nurse.      Madelyn Gutierrez PTA   Time Calculation: 39 mins

## 2020-05-15 NOTE — PROGRESS NOTES
Hospitalist Progress Note  Mick Hilario MD  Answering service: [phone] from in house phone      Date of Service:  2020  NAME:  Terence Schaefer  :  1960  MRN:  [id number]    Admission Summary:   59F s/p COVID treatement in ICU - extubated  Interval history / Subjective: Follow up Acute hypoxic resp. Failure, acute renal failure  Patient seen and examined by the bedside, Labs, images and notes reviewed  Patient continues to vomit today after started on honey thick liquid by speech therapy. Consulted GI to do the PEG tube. Had a long discussion with GI, Dr. Sam Baires regarding PEG tube but unfortunately because patient did not tolerate the tube feedings, she will not tolerate the PEG tube feeding also, per GI. Recommended GJ tube versus waiting for few days on diet suggested by speech therapy so to improve the swallowing function. I discussed both options with patient, patient wants to go ahead and do the [address] tube. IR consulted for GJ tube, most likely will happen tomorrow morning. This whole plan is discussed with Mr. Vincent Butler, patient's  today . discussed with nursing staff, orders reviewed. Assessment & Plan:     #. ARF:   - Nephrology following- was on HD  -last HD   -now recovered and no more need for HD  -Permacath removed   -Appreciate discussion with Nephrology  -now resolved     # Nausea, vomiting: GI consulted, EGD today,Hiatal hernia, gr2 erosive esophagitis and gastritis in antrum, appreciate recommendations, cont. Protonix, added carafate, started on CLD, reportedly the patient continued to have nausea/vomiting.  -Appreciate discussion with GI  -s/p GJ tube placement     #. COVID-19 +ve   - Resolved- s/p Tocilizumab, Plaquenil. On Vit C, Zinc   -COVID negative ,  and     #. Acute Hypoxic Resp Failure  - resolved    #.  Acute metabolic Encephalopathy:  - likely ICU delirium- no agitation. Monitor  - resolved. #. Sinus tachycardia: improving,continue Metoprolol, normal TSH  #. Anemia in CKD:monitor HB. #. Retroperitoneal bleed: this hospitalization, resolved - Avoid AP/AC meds- Monitor Hb  #. Non occlusive PE in LLL pulmonary artery - no ACs due to retroperitoneal bleed. Appreciate Vascular surgery recommendations for IVC filter, comments noted, conservative approach for now    #. S/p Cardiac arrest 4/9  #. DM2: controlled, A1c 10.4, SSI, AccuChecks, monitor  #. Morbid obesity: counseled on health benefits of weight loss, Healthy diet, BMI 44.11 kg/m². # depression: continue Trazodone at bedtime and Psych consulted, started on Lexapro  # Anxiety episodes; Continue Lexapro, Ativan PRN for short duration     Dysphagia  -s/p GJ tube 5/14, will start tube feeds from tomorrow  -nutrition consult    PT/OT rehab    Code status: Full  DVT prophylaxis: SCD    Plan: Tube feeds to start tomorrow. If tolerates, discharge to rehab.     Disposition: TBD. >2days     Hospital Problems  Date Reviewed: 5/14/2020          Codes Class Noted POA    Hypotension ICD-10-CM: I95.9  ICD-9-CM: 458.9 Acute 4/2/2020 Yes        Retroperitoneal hemorrhage ICD-10-CM: R58  ICD-9-CM: 459.0  4/19/2020 No        Acute renal failure (ARF) (HCC) ICD-10-CM: N17.9  ICD-9-CM: 584.9  4/19/2020 No        Encephalopathy ICD-10-CM: G93.40  ICD-9-CM: 348.30  4/19/2020 No        * (Principal) Sepsis with multi-organ dysfunction (HCC) ICD-10-CM: A41.9, R65.20  ICD-9-CM: 038.9, 995.92  4/19/2020 Yes        Pneumonia due to methicillin susceptible Staphylococcus aureus (MSSA) ([facility] ICD-10-CM: Y02.371  ICD-9-CM: 482.41  4/19/2020 Unknown        Thrombocytopenia ([facility] ICD-10-CM: D69.6  ICD-9-CM: 287.5  4/19/2020 Unknown        Diarrhea ICD-10-CM: R19.7  ICD-9-CM: 787.91  4/19/2020 Unknown        COVID-19 virus infection ICD-10-CM: U07.1  ICD-9-CM: 079.89  3/31/2020 Unknown            Review of Systems:   Pertinent items are mentioned in interval history. Vital Signs:    Last 24hrs VS reviewed since prior progress note. Most recent are:  Visit Vitals  BP (!) 145/94 (BP 1 Location: Left arm, BP Patient Position: At rest)   Pulse (!) 113   Temp 97.8 °F (36.6 °C)   Resp 16   Ht 5' 4\" (1.626 m)   Wt 104.8 kg (231 lb 1.6 oz)   SpO2 98%   BMI 39.67 kg/m²         Intake/Output Summary (Last 24 hours) at 5/14/2020 2038  Last data filed at 5/14/2020 0845  Gross per 24 hour   Intake 455 ml   Output 550 ml   Net -95 ml        Physical Examination:   General:  Alert, No acute distress  Resp:  No accessory muscle use, Good AE, no wheezes, few rhonchi  Abd:  Soft, non-tender, non-distended, obese  Extremities:  No cyanosis or clubbing, no significant edema  Neuro:  Grossly normal, no focal neuro deficits, follows commands   Psych:   not agitated. Data Review:    Review and/or order of clinical lab test  Review and/or order of tests in the radiology section of CPT  Review and/or order of tests in the medicine section of CPT  Labs:     Recent Labs     05/14/20  0404 05/12/20  0327   WBC 8.9 12.5*   HGB 8.5* 9.1*   HCT 28.9* 29.9*    270     Recent Labs     05/14/20  0404 05/13/20  0210 05/13/20  0209  05/12/20  0327    142 140   < > 139   K 2.9* 3.4* 3.5   < > 3.3*   * 108 107   < > 104   CO2 25 23 21   < > 26   BUN 29* 34* 34*   < > 38*   CREA 1.15* 1.25* 1.24*   < > 1.52*   * 163* 157*   < > 172*   CA 8.3* 8.5 8.4*   < > 8.8   MG 1.5*  --  1.4*  --  1.6   PHOS  --   --  3.4  --  3.8    < > = values in this interval not displayed. Recent Labs     05/13/20  0209 05/12/20  0327   ALB 2.5* 2.4*     No results for input(s): INR, PTP, APTT, INREXT, INREXT in the last 72 hours. No results for input(s): FE, TIBC, PSAT, FERR in the last 72 hours. No results found for: FOL, RBCF   No results for input(s): PH, PCO2, PO2 in the last 72 hours. No results for input(s): CPK, CKNDX, TROIQ in the last 72 hours.     No lab exists for component: CPKMB  Lab Results   Component Value Date/Time    Triglyceride 159 (H) 04/12/2020 08:34 PM     Lab Results   Component Value Date/Time    Glucose (POC) 136 (H) 05/14/2020 06:45 PM    Glucose (POC) 140 (H) 05/14/2020 11:57 AM    Glucose (POC) 132 (H) 05/14/2020 07:06 AM    Glucose (POC) 132 (H) 05/14/2020 12:42 AM    Glucose (POC) 153 (H) 05/13/2020 06:11 PM     No results found for: COLOR, APPRN, SPGRU, REFSG, SARKIS, PROTU, GLUCU, KETU, BILU, UROU, GALE, LEUKU, GLUKE, EPSU, BACTU, WBCU, RBCU, CASTS, UCRY  Medications Reviewed:     Current Facility-Administered Medications   Medication Dose Route Frequency    iohexoL (OMNIPAQUE) 240 mg iodine/mL solution        lidocaine (XYLOCAINE) 2 % jelly   Mucous Membrane PRN    sodium chloride (NS) flush 5-40 mL  5-40 mL IntraVENous Q8H    sodium chloride (NS) flush 5-40 mL  5-40 mL IntraVENous PRN    lactated Ringers infusion  60 mL/hr IntraVENous CONTINUOUS    HYDROmorphone (PF) (DILAUDID) injection 0.5 mg  0.5 mg IntraVENous Q4H PRN    sucralfate (CARAFATE) tablet 1 g  1 g Oral TIDAC    pantoprazole (PROTONIX) 40 mg in 0.9% sodium chloride 10 mL injection  40 mg IntraVENous Q12H    LORazepam (ATIVAN) injection 0.5 mg  0.5 mg IntraVENous Q6H PRN    metoclopramide HCl (REGLAN) injection 5 mg  5 mg IntraVENous Q6H PRN    escitalopram oxalate (LEXAPRO) tablet 10 mg  10 mg Oral DAILY    traZODone (DESYREL) tablet 50 mg  50 mg Oral QHS    heparin (porcine) 1,000 unit/mL injection 3,800 Units  3,800 Units Hemodialysis DIALYSIS PRN    metoprolol (LOPRESSOR) 5 mg/mL oral suspension 25 mg  25 mg Per NG tube Q12H    [Held by provider] insulin glargine (LANTUS) injection 28 Units  28 Units SubCUTAneous QHS    balsam peru-castor oiL (VENELEX) ointment   Topical BID    bumetanide (BUMEX) tablet 2 mg  2 mg Oral DAILY    guaiFENesin (ROBITUSSIN) 100 mg/5 mL oral liquid 100 mg  100 mg Oral Q12H    epoetin jovani-epbx (RETACRIT) injection 20,000 Units 20,000 Units SubCUTAneous Q TUE, THU & SAT    HYDROcodone-acetaminophen (NORCO) 5-325 mg per tablet 1 Tab  1 Tab Oral Q6H PRN    phenol throat spray (CHLORASEPTIC) 1 Spray  1 Spray Oral Q6H PRN    sevelamer carbonate (RENVELA) oral powder 0.8 g  0.8 g Oral TID WITH MEALS    guar gum (BENEFIBER) packet 1 Packet  4 g Oral TID    labetaloL (NORMODYNE;TRANDATE) injection 20 mg  20 mg IntraVENous Q4H PRN    albumin human 25% (BUMINATE) solution 12.5 g  12.5 g IntraVENous DIALYSIS PRN    albuterol (PROVENTIL VENTOLIN) nebulizer solution 2.5 mg  2.5 mg Nebulization Q4H PRN    alteplase (CATHFLO) 2 mg in sterile water (preservative free) 2 mL injection  2 mg InterCATHeter DIALYSIS PRN    alteplase (CATHFLO) 2 mg in sterile water (preservative free) 2 mL injection  2 mg InterCATHeter DIALYSIS PRN    insulin lispro (HUMALOG) injection   SubCUTAneous Q6H    white petrolatum-mineral oiL (AKWA TEARS) 83-15 % ophthalmic ointment   Both Eyes Q12H    bacitracin 500 unit/gram packet 1 Packet  1 Packet Topical PRN    sodium chloride (NS) flush 5-40 mL  5-40 mL IntraVENous Q8H    sodium chloride (NS) flush 5-40 mL  5-40 mL IntraVENous PRN    ondansetron (ZOFRAN) injection 4 mg  4 mg IntraVENous Q4H PRN    acetaminophen (TYLENOL) tablet 650 mg  650 mg Oral Q6H PRN    Or    acetaminophen (TYLENOL) suppository 650 mg  650 mg Rectal Q6H PRN    glucose chewable tablet 16 g  4 Tab Oral PRN    glucagon (GLUCAGEN) injection 1 mg  1 mg IntraMUSCular PRN    dextrose 10% infusion 0-250 mL  0-250 mL IntraVENous PRN   ______________________________________________________________________  EXPECTED LENGTH OF STAY: 12d 9h  ACTUAL LENGTH OF STAY:          [address] MD

## 2020-05-15 NOTE — PROGRESS NOTES
[facility]   [address] [address], [facility] [address], [facility] [place]  Phone: [phone]   [phone]       Nephrology Progress Note  Margarita Coe     1960     [id number]  Date of Admission : 3/31/2020  05/15/20    CC: Follow up for JUANCHO      Assessment and Plan   JUANCHO :  - 2/2 ATN: resolving . last HD 5/9  - recovered and no longer needs dialysis   - PC removed 5/14  - stopped IVF as she is getting TF w/ water flushes   - reduced Bumex to 1 mg daily   - We will check back again on Monday. Please call the oncall physician over the weekend for any urgent renal issues    COVID-19 +ve   Acute Hypoxic Resp Failure   - completed Plaquenil, - s/p Tocilizumab   - extubated 4/29  - repeat SARS-CoV-2 neg 4/20 and 4/27     Anemia in CKD:  - cont KOKI    RP hematoma:  Cardiac arrest 4/9    PE this admission  Non-occlusive superfical DVT:  - on on AC due to large RP hematoma  - no IVC to be placed    Type II DM : Insulin per primary team    Morbid Obesity     Nutrition:   - s/p J  tube placement 5/14      D/w pt and attg        Interval History:  Seen and examined. Started J tube feeds  Cr better   No complaints     Review of Systems: A comprehensive review of systems was negative except for that written in the HPI.     Current Medications:   Current Facility-Administered Medications   Medication Dose Route Frequency    dextrose 5% - LR with KCl 20 mEq/L infusion   IntraVENous CONTINUOUS    lidocaine (XYLOCAINE) 2 % jelly   Mucous Membrane PRN    sodium chloride (NS) flush 5-40 mL  5-40 mL IntraVENous Q8H    sodium chloride (NS) flush 5-40 mL  5-40 mL IntraVENous PRN    HYDROmorphone (PF) (DILAUDID) injection 0.5 mg  0.5 mg IntraVENous Q4H PRN    sucralfate (CARAFATE) tablet 1 g  1 g Oral TIDAC    pantoprazole (PROTONIX) 40 mg in 0.9% sodium chloride 10 mL injection  40 mg IntraVENous Q12H    LORazepam (ATIVAN) injection 0.5 mg  0.5 mg IntraVENous Q6H PRN    metoclopramide HCl (REGLAN) injection 5 mg  5 mg IntraVENous Q6H PRN    escitalopram oxalate (LEXAPRO) tablet 10 mg  10 mg Oral DAILY    traZODone (DESYREL) tablet 50 mg  50 mg Oral QHS    heparin (porcine) 1,000 unit/mL injection 3,800 Units  3,800 Units Hemodialysis DIALYSIS PRN    metoprolol (LOPRESSOR) 5 mg/mL oral suspension 25 mg  25 mg Per NG tube Q12H    [Held by provider] insulin glargine (LANTUS) injection 28 Units  28 Units SubCUTAneous QHS    balsam peru-castor oiL (VENELEX) ointment   Topical BID    bumetanide (BUMEX) tablet 2 mg  2 mg Oral DAILY    guaiFENesin (ROBITUSSIN) 100 mg/5 mL oral liquid 100 mg  100 mg Oral Q12H    epoetin jovani-epbx (RETACRIT) injection 20,000 Units  20,000 Units SubCUTAneous Q TUE, THU & SAT    HYDROcodone-acetaminophen (NORCO) 5-325 mg per tablet 1 Tab  1 Tab Oral Q6H PRN    phenol throat spray (CHLORASEPTIC) 1 Spray  1 Spray Oral Q6H PRN    sevelamer carbonate (RENVELA) oral powder 0.8 g  0.8 g Oral TID WITH MEALS    guar gum (BENEFIBER) packet 1 Packet  4 g Oral TID    labetaloL (NORMODYNE;TRANDATE) injection 20 mg  20 mg IntraVENous Q4H PRN    albumin human 25% (BUMINATE) solution 12.5 g  12.5 g IntraVENous DIALYSIS PRN    albuterol (PROVENTIL VENTOLIN) nebulizer solution 2.5 mg  2.5 mg Nebulization Q4H PRN    alteplase (CATHFLO) 2 mg in sterile water (preservative free) 2 mL injection  2 mg InterCATHeter DIALYSIS PRN    alteplase (CATHFLO) 2 mg in sterile water (preservative free) 2 mL injection  2 mg InterCATHeter DIALYSIS PRN    insulin lispro (HUMALOG) injection   SubCUTAneous Q6H    white petrolatum-mineral oiL (AKWA TEARS) 83-15 % ophthalmic ointment   Both Eyes Q12H    bacitracin 500 unit/gram packet 1 Packet  1 Packet Topical PRN    sodium chloride (NS) flush 5-40 mL  5-40 mL IntraVENous Q8H    sodium chloride (NS) flush 5-40 mL  5-40 mL IntraVENous PRN    ondansetron (ZOFRAN) injection 4 mg  4 mg IntraVENous Q4H PRN    acetaminophen (TYLENOL) tablet 650 mg  650 mg Oral Q6H PRN    Or    acetaminophen (TYLENOL) suppository 650 mg  650 mg Rectal Q6H PRN    glucose chewable tablet 16 g  4 Tab Oral PRN    glucagon (GLUCAGEN) injection 1 mg  1 mg IntraMUSCular PRN    dextrose 10% infusion 0-250 mL  0-250 mL IntraVENous PRN      Allergies   Allergen Reactions    Augmentin [Amoxicillin-Pot Clavulanate] Rash and Itching       Objective:  Vitals:    Vitals:    05/15/20 0205 05/15/20 0427 05/15/20 0935 05/15/20 1110   BP:  (!) 155/91 130/60 150/75   Pulse:  95 (!) 105 (!) 118   Resp:  13 15 18   Temp:  98.2 °F (36.8 °C) 98 °F (36.7 °C) 97.9 °F (36.6 °C)   TempSrc:       SpO2: 100% 98% 98% 96%   Weight:  106.1 kg (234 lb)     Height:         Intake and Output:  05/15 0701 - 05/15 1900  In: 793 [I.V.:303]  Out: -   05/13 1901 - 05/15 0700  In: 5231 [P.O.:300; I.V.:2232]  Out: 80 [Urine:1100]    Physical Examination:     General: In NAD  HEENT:           NGT in place  Neck :              supple   Resp:  CTA  CV:  RRR, no murmur   GI:  Obese , soft, NT J tube +  Neurologic:  awake      []    High complexity decision making was performed  []    Patient is at high-risk of decompensation with multiple organ involvement    Lab Data Personally Reviewed: I have reviewed all the pertinent labs, microbiology data and radiology studies during assessment.     Recent Labs     05/15/20  0438 05/14/20  0404 05/13/20  0210 05/13/20  0209    142 142 140   K 3.3* 2.9* 3.4* 3.5   * 110* 108 107   CO2 26 25 23 21   * 133* 163* 157*   BUN 24* 29* 34* 34*   CREA 1.00 1.15* 1.25* 1.24*   CA 8.2* 8.3* 8.5 8.4*   MG 1.4* 1.5*  --  1.4*   PHOS 4.5  --   --  3.4   ALB 2.2*  --   --  2.5*     Recent Labs     05/15/20  0432 05/14/20  0404   WBC 9.0 8.9   HGB 8.7* 8.5*   HCT 30.2* 28.9*    239     No results found for: SDES  Lab Results   Component Value Date/Time    Culture result: NO GROWTH 5 DAYS 04/09/2020 02:32 PM    Culture result: NO GROWTH 5 DAYS 04/08/2020 10:36 AM    Culture result: NO GROWTH 5 DAYS 03/31/2020 11:15 AM    Culture result: MODERATE STAPHYLOCOCCUS AUREUS (A) 03/31/2020 10:34 AM    Culture result: LIGHT NORMAL RESPIRATORY SOFIE 03/31/2020 10:34 AM     Recent Results (from the past 24 hour(s))   GLUCOSE, POC    Collection Time: 05/14/20  6:45 PM   Result Value Ref Range    Glucose (POC) 136 (H) 65 - 100 mg/dL    Performed by Aimee LINDA (CON)    GLUCOSE, POC    Collection Time: 05/14/20 11:54 PM   Result Value Ref Range    Glucose (POC) 127 (H) 65 - 100 mg/dL    Performed by SATNAM NEAL    CBC WITH AUTOMATED DIFF    Collection Time: 05/15/20  4:32 AM   Result Value Ref Range    WBC 9.0 3.6 - 11.0 K/uL    RBC 3.05 (L) 3.80 - 5.20 M/uL    HGB 8.7 (L) 11.5 - 16.0 g/dL    HCT 30.2 (L) 35.0 - 47.0 %    MCV 99.0 80.0 - 99.0 FL    MCH 28.5 26.0 - 34.0 PG    MCHC 28.8 (L) 30.0 - 36.5 g/dL    RDW 16.2 (H) 11.5 - 14.5 %    PLATELET 217 103 - 737 K/uL    MPV 9.5 8.9 - 12.9 FL    NRBC 0.0 0  WBC    ABSOLUTE NRBC 0.00 0.00 - 0.01 K/uL    NEUTROPHILS 76 (H) 32 - 75 %    LYMPHOCYTES 14 12 - 49 %    MONOCYTES 7 5 - 13 %    EOSINOPHILS 2 0 - 7 %    BASOPHILS 0 0 - 1 %    IMMATURE GRANULOCYTES 1 (H) 0.0 - 0.5 %    ABS. NEUTROPHILS 6.8 1.8 - 8.0 K/UL    ABS. LYMPHOCYTES 1.3 0.8 - 3.5 K/UL    ABS. MONOCYTES 0.6 0.0 - 1.0 K/UL    ABS. EOSINOPHILS 0.2 0.0 - 0.4 K/UL    ABS. BASOPHILS 0.0 0.0 - 0.1 K/UL    ABS. IMM.  GRANS. 0.1 (H) 0.00 - 0.04 K/UL    DF SMEAR SCANNED      RBC COMMENTS POLYCHROMASIA  1+        RBC COMMENTS ANISOCYTOSIS  1+       RENAL FUNCTION PANEL    Collection Time: 05/15/20  4:38 AM   Result Value Ref Range    Sodium 145 136 - 145 mmol/L    Potassium 3.3 (L) 3.5 - 5.1 mmol/L    Chloride 112 (H) 97 - 108 mmol/L    CO2 26 21 - 32 mmol/L    Anion gap 7 5 - 15 mmol/L    Glucose 135 (H) 65 - 100 mg/dL    BUN 24 (H) 6 - 20 MG/DL    Creatinine 1.00 0.55 - 1.02 MG/DL    BUN/Creatinine ratio 24 (H) 12 - 20      GFR est AA >60 >60 ml/min/1.73m2    GFR est non-AA 57 (L) >60 ml/min/1.73m2    Calcium 8.2 (L) 8.5 - 10.1 MG/DL    Phosphorus 4.5 2.6 - 4.7 MG/DL    Albumin 2.2 (L) 3.5 - 5.0 g/dL   MAGNESIUM    Collection Time: 05/15/20  4:38 AM   Result Value Ref Range    Magnesium 1.4 (L) 1.6 - 2.4 mg/dL   GLUCOSE, POC    Collection Time: 05/15/20  6:09 AM   Result Value Ref Range    Glucose (POC) 179 (H) 65 - 100 mg/dL    Performed by Chayito Blanchard    GLUCOSE, POC    Collection Time: 05/15/20 12:24 PM   Result Value Ref Range    Glucose (POC) 176 (H) 65 - 100 mg/dL    Performed by Tommy Campos            Total time spent with patient:  xxx   min. Care Plan discussed with:  Patient     Family      RN      Consulting Physician [facility]        I have reviewed the flowsheets. Chart and Pertinent Notes have been reviewed. No change in PMH ,family and social history from Consult note.       Joselyn Greer MD

## 2020-05-15 NOTE — PROGRESS NOTES
Spiritual Care Assessment/Progress Note  [facility]      NAME: Rajan Kay      MRN: [id number]  AGE: 64 y.o. SEX: female  Zoroastrianism Affiliation: Unknown   Language: English     5/15/2020     Total Time (in minutes): 10     [facility] conversation with:         []Patient        [] Family    [] Friend(s)        Reason for Consult: Follow-up, routine     Spiritual beliefs: (Please include comment if needed)     [] Identifies with a enma tradition:         [] Supported by a enma community:            [] Claims no spiritual orientation:           [] Seeking spiritual identity:                [] Adheres to an individual form of spirituality:           [x] Not able to assess:                           Identified resources for coping:      [] Prayer                               [] Music                  [] Guided Imagery     [] Family/friends                 [] Pet visits     [] Devotional reading                         [x] Unknown     [] Other:                                               Interventions offered during this visit: (See comments for more details)          Family/Friend(s):  Affirmation of emotions/emotional suffering, Bridging, Catharsis/review of pertinent events in supportive environment, Coping skills reviewed/reinforced, Iconic (affirming the presence of God/Higher Power), Normalization of emotional/spiritual concerns     Plan of Care:     [] Support spiritual and/or cultural needs    [] Support AMD and/or advance care planning process      [] Support grieving process   [] Coordinate Rites and/or Rituals    [] Coordination with community clergy   [] No spiritual needs identified at this time   [] Detailed Plan of Care below (See Comments)  [] Make referral to Music Therapy  [] Make referral to Pet Therapy     [] Make referral to Addiction services  [] Make referral to [facility]  [] Make referral to Spiritual Care Partner  [] No future visits requested        [x] Follow up visits as needed     Comments:  visit for routine follow up. Patient resting in bed, sleeping comfortably. Did not awaken to knock at door or verbal greeting. Will continue to follow up as needed and upon request as able. Visited by Rev. Kishan Barajas MDiv, [facility], [facility]   paging service: [phone])

## 2020-05-15 NOTE — PROGRESS NOTES
Hospitalist Progress Note  Cinthya Perez MD  Answering service: [phone] from in house phone      Date of Service:  5/15/2020  NAME:  Hesham Escobar  :  1960  MRN:  [id number]    Admission Summary:   59F s/p COVID treatement in ICU - extubated  Interval history / Subjective: Follow up Acute hypoxic resp. Failure, acute renal failure  No new issues  Complains of abd pain at the site of GJ tube  Per RN no residual       Assessment & Plan:     #. ARF:   - Nephrology following- was on HD  -last HD   -now recovered and no more need for HD  -Permacath removed   -Appreciate discussion with Nephrology  -now resolved    Nausea, vomiting: GI consulted,   -s/p EGD:Hiatal hernia, gr2 erosive esophagitis and gastritis in antrum, appreciate recommendations, cont. Protonix, added carafate, started on CLD  - reportedly the patient continued to have nausea/vomiting.  -Appreciate discussion with GI  -s/p GJ tube placement   -Tube feeds started 5/15    COVID-19 +ve   - Resolved- s/p Tocilizumab, Plaquenil. On Vit C, Zinc   -COVID negative ,  and     Acute Hypoxic Resp Failure  - resolved     Acute metabolic Encephalopathy:  - likely ICU delirium- no agitation. Monitor  - resolved. Sinus tachycardia:   -improving,continue Metoprolol, normal TSH     Anemia in CKD:monitor HB. Retroperitoneal bleed: this hospitalization,   -resolved - Avoid AP/AC meds- Monitor Hb     Non occlusive PE in LLL pulmonary artery - no ACs due to retroperitoneal bleed. Appreciate Vascular surgery recommendations for IVC filter, comments noted, conservative approach for now    #. S/p Cardiac arrest   #. DM2: controlled, A1c 10.4, SSI, AccuChecks, monitor  #. Morbid obesity: counseled on health benefits of weight loss, Healthy diet, BMI 44.11 kg/m².    # depression: continue Trazodone at bedtime and Psych consulted, started on Lexapro  # Anxiety episodes; Continue Lexapro, Ativan PRN for short duration     Dysphagia  -s/p GJ tube 5/14, tube feeds started 5/15  -nutrition consult    PT/OT rehab    Code status: Full  DVT prophylaxis: SCD    Plan: The patient is medically stable, awaiting discharge to rehab coming Wednesday    Disposition: as above     Hospital Problems  Date Reviewed: 5/14/2020          Codes Class Noted POA    Hypotension ICD-10-CM: I95.9  ICD-9-CM: 458.9 Acute 4/2/2020 Yes        Retroperitoneal hemorrhage ICD-10-CM: R58  ICD-9-CM: 459.0  4/19/2020 No        Acute renal failure (ARF) ([facility] ICD-10-CM: N17.9  ICD-9-CM: 584.9  4/19/2020 No        Encephalopathy ICD-10-CM: G93.40  ICD-9-CM: 348.30  4/19/2020 No        * (Principal) Sepsis with multi-organ dysfunction ([facility] ICD-10-CM: A41.9, R65.20  ICD-9-CM: 038.9, 995.92  4/19/2020 Yes        Pneumonia due to methicillin susceptible Staphylococcus aureus (MSSA) ([facility] ICD-10-CM: E52.542  ICD-9-CM: 482.41  4/19/2020 Unknown        Thrombocytopenia ([facility] ICD-10-CM: D69.6  ICD-9-CM: 287.5  4/19/2020 Unknown        Diarrhea ICD-10-CM: R19.7  ICD-9-CM: 787.91  4/19/2020 Unknown        COVID-19 virus infection ICD-10-CM: U07.1  ICD-9-CM: 079.89  3/31/2020 Unknown            Review of Systems:   Pertinent items are mentioned in interval history. Vital Signs:    Last 24hrs VS reviewed since prior progress note.  Most recent are:  Visit Vitals  BP (!) 155/91 (BP 1 Location: Left arm, BP Patient Position: At rest)   Pulse 95   Temp 98.2 °F (36.8 °C)   Resp 13   Ht 5' 4\" (1.626 m)   Wt 106.1 kg (234 lb)   SpO2 98%   BMI 40.17 kg/m²         Intake/Output Summary (Last 24 hours) at 5/15/2020 0830  Last data filed at 5/15/2020 0616  Gross per 24 hour   Intake 1539 ml   Output 1100 ml   Net 439 ml        Physical Examination:   General:  Alert, No acute distress  Resp:  No accessory muscle use, Good AE, no wheezes, few rhonchi  Abd:  Soft, non-tender, non-distended, obese  Extremities:  No cyanosis or clubbing, no significant edema  Neuro:  Grossly normal, no focal neuro deficits, follows commands   Psych:   not agitated. Data Review:    Review and/or order of clinical lab test  Review and/or order of tests in the radiology section of CPT  Review and/or order of tests in the medicine section of [facility]  Labs:     Recent Labs     05/15/20  0432 05/14/20  0404   WBC 9.0 8.9   HGB 8.7* 8.5*   HCT 30.2* 28.9*    239     Recent Labs     05/15/20  0438 05/14/20  0404 05/13/20  0210 05/13/20  0209    142 142 140   K 3.3* 2.9* 3.4* 3.5   * 110* 108 107   CO2 26 25 23 21   BUN 24* 29* 34* 34*   CREA 1.00 1.15* 1.25* 1.24*   * 133* 163* 157*   CA 8.2* 8.3* 8.5 8.4*   MG 1.4* 1.5*  --  1.4*   PHOS 4.5  --   --  3.4     Recent Labs     05/15/20  0438 05/13/20  0209   ALB 2.2* 2.5*     No results for input(s): INR, PTP, APTT, INREXT, INREXT in the last 72 hours. No results for input(s): FE, TIBC, PSAT, FERR in the last 72 hours. No results found for: FOL, RBCF   No results for input(s): PH, PCO2, PO2 in the last 72 hours. No results for input(s): CPK, CKNDX, TROIQ in the last 72 hours.     No lab exists for component: CPKMB  Lab Results   Component Value Date/Time    Triglyceride 159 (H) 04/12/2020 08:34 PM     Lab Results   Component Value Date/Time    Glucose (POC) 179 (H) 05/15/2020 06:09 AM    Glucose (POC) 127 (H) 05/14/2020 11:54 PM    Glucose (POC) 136 (H) 05/14/2020 06:45 PM    Glucose (POC) 140 (H) 05/14/2020 11:57 AM    Glucose (POC) 132 (H) 05/14/2020 07:06 AM     No results found for: COLOR, APPRN, SPGRU, REFSG, SARKIS, PROTU, GLUCU, KETU, BILU, UROU, GALE, LEUKU, GLUKE, EPSU, BACTU, WBCU, RBCU, CASTS, UCRY  Medications Reviewed:     Current Facility-Administered Medications   Medication Dose Route Frequency    dextrose 5% - LR with KCl 20 mEq/L infusion   IntraVENous CONTINUOUS    lidocaine (XYLOCAINE) 2 % jelly   Mucous Membrane PRN    sodium chloride (NS) flush 5-40 mL  5-40 mL IntraVENous Q8H    sodium chloride (NS) flush 5-40 mL  5-40 mL IntraVENous PRN    HYDROmorphone (PF) (DILAUDID) injection 0.5 mg  0.5 mg IntraVENous Q4H PRN    sucralfate (CARAFATE) tablet 1 g  1 g Oral TIDAC    pantoprazole (PROTONIX) 40 mg in 0.9% sodium chloride 10 mL injection  40 mg IntraVENous Q12H    LORazepam (ATIVAN) injection 0.5 mg  0.5 mg IntraVENous Q6H PRN    metoclopramide HCl (REGLAN) injection 5 mg  5 mg IntraVENous Q6H PRN    escitalopram oxalate (LEXAPRO) tablet 10 mg  10 mg Oral DAILY    traZODone (DESYREL) tablet 50 mg  50 mg Oral QHS    heparin (porcine) 1,000 unit/mL injection 3,800 Units  3,800 Units Hemodialysis DIALYSIS PRN    metoprolol (LOPRESSOR) 5 mg/mL oral suspension 25 mg  25 mg Per NG tube Q12H    [Held by provider] insulin glargine (LANTUS) injection 28 Units  28 Units SubCUTAneous QHS    balsam peru-castor oiL (VENELEX) ointment   Topical BID    bumetanide (BUMEX) tablet 2 mg  2 mg Oral DAILY    guaiFENesin (ROBITUSSIN) 100 mg/5 mL oral liquid 100 mg  100 mg Oral Q12H    epoetin jovani-epbx (RETACRIT) injection 20,000 Units  20,000 Units SubCUTAneous Q TUE, THU & SAT    HYDROcodone-acetaminophen (NORCO) 5-325 mg per tablet 1 Tab  1 Tab Oral Q6H PRN    phenol throat spray (CHLORASEPTIC) 1 Spray  1 Spray Oral Q6H PRN    sevelamer carbonate (RENVELA) oral powder 0.8 g  0.8 g Oral TID WITH MEALS    guar gum (BENEFIBER) packet 1 Packet  4 g Oral TID    labetaloL (NORMODYNE;TRANDATE) injection 20 mg  20 mg IntraVENous Q4H PRN    albumin human 25% (BUMINATE) solution 12.5 g  12.5 g IntraVENous DIALYSIS PRN    albuterol (PROVENTIL VENTOLIN) nebulizer solution 2.5 mg  2.5 mg Nebulization Q4H PRN    alteplase (CATHFLO) 2 mg in sterile water (preservative free) 2 mL injection  2 mg InterCATHeter DIALYSIS PRN    alteplase (CATHFLO) 2 mg in sterile water (preservative free) 2 mL injection  2 mg InterCATHeter DIALYSIS PRN    insulin lispro (HUMALOG) injection SubCUTAneous Q6H    white petrolatum-mineral oiL (AKWA TEARS) 83-15 % ophthalmic ointment   Both Eyes Q12H    bacitracin 500 unit/gram packet 1 Packet  1 Packet Topical PRN    sodium chloride (NS) flush 5-40 mL  5-40 mL IntraVENous Q8H    sodium chloride (NS) flush 5-40 mL  5-40 mL IntraVENous PRN    ondansetron (ZOFRAN) injection 4 mg  4 mg IntraVENous Q4H PRN    acetaminophen (TYLENOL) tablet 650 mg  650 mg Oral Q6H PRN    Or    acetaminophen (TYLENOL) suppository 650 mg  650 mg Rectal Q6H PRN    glucose chewable tablet 16 g  4 Tab Oral PRN    glucagon (GLUCAGEN) injection 1 mg  1 mg IntraMUSCular PRN    dextrose 10% infusion 0-250 mL  0-250 mL IntraVENous PRN   ______________________________________________________________________  EXPECTED LENGTH OF STAY: 12d 9h  ACTUAL LENGTH OF STAY:          [address], MD

## 2020-05-15 NOTE — DIABETES MGMT
MARTA ROMERO  CLINICAL NURSE SPECIALIST CONSULT  PROGRAM FOR DIABETES HEALTH  Follow up Note  Presentation   Dotty Andrade is a 64 y.o. female admitted from OSH to [facility] ICU with SARS-COV2 . Now recovering and COVID -. Per Nephorology kidneys are recovering nicely and now not requiring HD. Patient is now suffering with left paralysis of vocal cord which  affects her swallowing ability. New diabetes diagnosis with A1C 11.0% (3/28/2020); updated A1C 3/31/20-10.4%     Recent events: Patient had G-J tube placed yesterday and TF initiated today. RN states Ms. Nimco Cabral has been anxious today -having a panic attack. Consulted by Provider for advanced diabetes nursing assessment and care, specifically related to   [] Transitioning off Patrecia Muck   [x] Inpatient management strategy  [] Home management assessment  [] Survival skill education    Diabetes-related medical history  Acute complications  hyperglycemia  Neurological complications  NONE  Microvascular disease  NONE  Macrovascular disease  NONE  Other associated conditions     NONE    Diabetes medication history: NONE    Subjective   Ms. Nimco Cabral is having a tough day today suffering with panic attacks. Currently resting in bed with eyes closed-Per RN she gave her some ativan for her panic attacks. Objective   Diabetic Foot Exam: Left foot: warm, no calluses noted, red spot noted on top of foot over bone, but no breakdown noted. + microfilament sensation noted in all areas. Right foot: in brace, however, foot warm. Microfilament test deferred due to brace. Vital Signs   Visit Vitals  /75 (BP 1 Location: Left arm, BP Patient Position: At rest)   Pulse (!) 118   Temp 97.9 °F (36.6 °C)   Resp 18   Ht 5' 4\" (1.626 m)   Wt 106.1 kg (234 lb)   SpO2 96%   BMI 40.17 kg/m²   .    Laboratory  Lab Results   Component Value Date/Time    Hemoglobin A1c 10.4 (H) 03/31/2020 03:42 PM     No results found for: LDL, LDLC, DLDLP  Lab Results   Component Value Date/Time    Creatinine 1.00 05/15/2020 04:38 AM     Lab Results   Component Value Date/Time    Sodium 145 05/15/2020 04:38 AM    Potassium 3.3 (L) 05/15/2020 04:38 AM    Chloride 112 (H) 05/15/2020 04:38 AM    CO2 26 05/15/2020 04:38 AM    Anion gap 7 05/15/2020 04:38 AM    Glucose 135 (H) 05/15/2020 04:38 AM    BUN 24 (H) 05/15/2020 04:38 AM    Creatinine 1.00 05/15/2020 04:38 AM    BUN/Creatinine ratio 24 (H) 05/15/2020 04:38 AM    GFR est AA >60 05/15/2020 04:38 AM    GFR est non-AA 57 (L) 05/15/2020 04:38 AM    Calcium 8.2 (L) 05/15/2020 04:38 AM    Bilirubin, total 1.6 (H) 04/20/2020 05:29 AM    AST (SGOT) 169 (H) 04/20/2020 05:29 AM    Alk. phosphatase 490 (H) 04/20/2020 05:29 AM    Protein, total 5.9 (L) 04/20/2020 05:29 AM    Albumin 2.2 (L) 05/15/2020 04:38 AM    Globulin 3.5 04/20/2020 05:29 AM    A-G Ratio 0.7 (L) 04/20/2020 05:29 AM    ALT (SGPT) 206 (H) 04/20/2020 05:29 AM     Lab Results   Component Value Date/Time    ALT (SGPT) 206 (H) 04/20/2020 05:29 AM           Evaluation   Ms Roz Grace, with new onset Type 2 diabetes,with A1C 10.4%. AM fasting BG 179mg/dl today. Continuous TF were restarted today -Osmolite 1.5 ,currently infusing @30cc/hr , with goal rate to 50cc/hr. With previous TFs she required 28units of daily basal insulin to cover with minimal correctional insulin needed. Her BG on that amount were <200s. She had no low BG. BG levels are trending up towards 200mg/dl with TF @ 30cc/hr. I anticipate her BG to continue to rise >200mg/dl over the next 12-24 hours as her rate is increased to 50cc/hr. It is imperative that we maintain her BG within target range 100-180mg/dl. Recommendations   1. Re-Start basal insulin starting tonight- 30units daily       2. Will continue to follow.     Assessment and Plan   Nursing Diagnosis Risk for unstable blood glucose pattern   Nursing Intervention Domain 1102 Decision-making Support   Nursing Interventions Examined current inpatient diabetes control Explored factors facilitating and impeding inpatient management  Identified self-management practices impeding diabetes control  Explored corrective strategies with patient and responsible inpatient provider   Informed patient of rational for insulin strategy while hospitalized         Billing Code(s)   Thank you for including us in their care. I spent 20 minutes in direct patient care today for this patient.   Time includes chart review, face to face with patient and collaboration with interdisciplinary care team.      FABI Campos   Program for Diabetes Health  Access via [facility] [phone]

## 2020-05-15 NOTE — PROGRESS NOTES
NUTRITION COMPLETE ASSESSMENT    RECOMMENDATIONS:     1. Starting tube feedings with new G/J tube:   --- Osmolite 1.5 starting at 30 ml/hr x 6 hours   --- Increase rate by 10 ml/hr every 6 hours until at goal of 50 ml/hr   --- Give 130 ml water flush every 4 hours   --- Goal provides: 1800 kcals, 75 gm pro, 1700 ml free fluid    2. All feedings go through J-port, medications through the G-port    3. Please start pt on adult MVI [place] daily    Interventions/Plan:   Food/Nutrient Delivery:   Modify rate, concentration, composition, and schedule     Assessment:   Reason for Assessment: Reassessment    Diet:  NPO, starting Jtube feedings  Nutritionally Significant Medications: [x] Reviewed & Includes: Lexapro, Benefiber tid, Lantus, correction scale insulin, Renvela, LR at 60 ml/hr, protonix    Subjective: Staff Interviewed  Pt reports she is so thirsty, dying for a diet pepsi    Objective:  Ms Hue Christianson transferred from [facility] d/t worsening renal function requiring CRRT. Noted: JUANCHO d/t ATN-transitioned off CVVHD to daily IHD 4/18-now MWF; acute hypoxic respiratory failure d/t COVID-19 and bacterial PNA, intubated 3/28-extubated 4/29, BiPAP @ night; PEA arrest 4/9, hyperglycemia improved; critical illness neuropathy/myopathy; encephalopathy; metabolic alkalosis. Question of aspiration on CXR today. 5/13: Pt was extubated on 4/29/20, still with very sore throat (also had OGT entire time of intubation). Pt passed MBS today for Dysphagia 2 with honey thick liquids; RD has added magic cup to all meals to help increase calorie/protein intake. Psychiatry started pt on Lexapro several days ago as pt voiced she has felt depressed \"for awhile even before I got sick. \" RD will continue to follow. 5/15: Pt underwent PEG/J tube placement yesterday 5/14. She was not tolerating po feedings (N/V), ongoing concerns for aspiration. She is now off of dialysis.   Spoke at length with pt this morning and explained how the tube feeding will slowly progress to goal over the next 24 hours. Pt tearful at times, wants so much to eat. Hopefully with improved tolerance of feeding into small bowel, pt will get stronger and be able to tolerate po feedings in the near future. Plan is still for pt to go to rehab once medically ready for discharge. I called pt's  and explained the tube feeding plan, answered his questions. RD continues to follow. Estimated Nutrition Needs:   Kcals/day: 0164 Kcals/day(8810-6130 (MSJ x 1.0-1.1)  Protein: 108 g(2g/kg IBW)  Fluid: (1 ml/kcal)  Based On: [place] Jeor  Weight Used: Actual wt(115 kg)    Pt expected to meet estimated nutrient needs:  [x]   Yes--via tube feeding    []  No  [] Unable to predict at this time  Nutrition Diagnosis:   1. Swallowing difficulty related to critical illness neuropathy/myopathy as evidenced by dysphagia and enteral nutrition support via NGT.     Goals:       Pt will tolerate goal J-tube feeding without N/V over the next 3-5 days     Monitoring & Evaluation:    - Enteral/parenteral nutrition intake   - Electrolyte and renal profile, CV-pulmonary, Weight/weight change, Glucose profile, GI    Previous Nutrition Goals Met: Yes  Previous Recommendations: Yes    Education & Discharge Needs:   [x] None Identified   [] Identified and addressed    [x] Participated in care plan, discharge planning, and/or interdisciplinary rounds        Cultural, Islam and ethnic food preferences identified:  None    Skin Integrity: [x]Intact  []Other  Edema: []None [x] Trace BLE  Last BM: 5/13/20  Food Allergies: [x]None []Other    Anthropometrics:    Weight Loss Metrics 5/15/2020 1/23/2020 1/22/2019 1/16/2018 1/9/2017 7/6/2016 12/14/2015   Today's Wt 234 lb 270 lb 12.8 oz 278 lb 279 lb 275 lb 234 lb 213 lb 6.4 oz   BMI 40.17 kg/m2 43.73 kg/m2 44.89 kg/m2 45.05 kg/m2 44.39 kg/m2 37.79 kg/m2 34.46 kg/m2      Last 3 Recorded Weights in this Encounter    05/13/20 0216 05/14/20 0405 05/15/20 0427   Weight: 109.1 kg (240 lb 9.6 oz) 104.8 kg (231 lb 1.6 oz) 106.1 kg (234 lb)      Weight Source: Bed  Height: 5' 4\" (162.6 cm),    Body mass index is 40.17 kg/m². IBW : 54.4 kg (120 lb), % IBW (Calculated): 212.19 %   ,      Labs:    Lab Results   Component Value Date/Time    Sodium 145 05/15/2020 04:38 AM    Potassium 3.3 (L) 05/15/2020 04:38 AM    Chloride 112 (H) 05/15/2020 04:38 AM    CO2 26 05/15/2020 04:38 AM    Glucose 135 (H) 05/15/2020 04:38 AM    BUN 24 (H) 05/15/2020 04:38 AM    Creatinine 1.00 05/15/2020 04:38 AM    Calcium 8.2 (L) 05/15/2020 04:38 AM    Magnesium 1.4 (L) 05/15/2020 04:38 AM    Phosphorus 4.5 05/15/2020 04:38 AM    Albumin 2.2 (L) 05/15/2020 04:38 AM     Lab Results   Component Value Date/Time    Hemoglobin A1c 10.4 (H) 03/31/2020 03:42 PM     Lab Results   Component Value Date/Time    Glucose (POC) 138 (H) 05/05/2020 11:35 AM      Lab Results   Component Value Date/Time    ALT (SGPT) 206 (H) 04/20/2020 05:29 AM    AST (SGOT) 169 (H) 04/20/2020 05:29 AM    Alk.  phosphatase 490 (H) 04/20/2020 05:29 AM    Bilirubin, direct 0.7 (H) 04/20/2020 05:29 AM    Bilirubin, total 1.6 (H) 04/20/2020 05:29 AM        Jjaa Parks RD, CSP  C: [phone]

## 2020-05-15 NOTE — PROGRESS NOTES
Problem: Nutrition Deficit  Goal: *Optimize nutritional status  Outcome: Progressing Towards Goal  Note: Patient had J tube place and will hopeful start feedings on 5/15/20     Problem: Falls - Risk of  Goal: *Absence of Falls  Description: Document Stacey Fall Risk and appropriate interventions in the flowsheet. Outcome: Progressing Towards Goal  Note: Fall Risk Interventions:  Mobility Interventions: Patient to call before getting OOB, PT Consult for mobility concerns, PT Consult for assist device competence    Mentation Interventions: Door open when patient unattended, More frequent rounding, Increase mobility, Update white board    Medication Interventions: Teach patient to arise slowly, Patient to call before getting OOB    Elimination Interventions: Call light in reach, Patient to call for help with toileting needs    History of Falls Interventions: Bed/chair exit alarm, Room close to nurse's station, Evaluate medications/consider consulting pharmacy, Consult care management for discharge planning, Utilize gait belt for transfer/ambulation         Problem: Impaired Skin Integrity/Pressure Injury Treatment  Goal: *Improvement of Existing Pressure Injury  Outcome: Progressing Towards Goal  Goal: *Prevention of pressure injury  Description: Document Aniket Scale and appropriate interventions in the flowsheet.   Outcome: Progressing Towards Goal  Note: Pressure Injury Interventions:  Sensory Interventions: Assess changes in LOC, Discuss PT/OT consult with provider, Keep linens dry and wrinkle-free, Minimize linen layers, Float heels, Maintain/enhance activity level    Moisture Interventions: Apply protective barrier, creams and emollients, Check for incontinence Q2 hours and as needed, Internal/External urinary devices    Activity Interventions: Increase time out of bed, Pressure redistribution bed/mattress(bed type), PT/OT evaluation    Mobility Interventions: HOB 30 degrees or less, Pressure redistribution bed/mattress (bed type), PT/OT evaluation    Nutrition Interventions: Document food/fluid/supplement intake, Discuss nutritional consult with provider    Friction and Shear Interventions: Apply protective barrier, creams and emollients, HOB 30 degrees or less, Lift sheet, Lift team/patient mobility team, Minimize layers, Foam dressings/transparent film/skin sealants, Transferring/repositioning devices

## 2020-05-15 NOTE — PROGRESS NOTES
2015: Receive report from [facility] LILY ROGER, patient resting in bed at this time with no complaints. 2120: Zofran given at this time patient attempted to have honey thickened liquid but patient vomited about 100cc. Patient stated its just a difficult taste to get over. 2135: Pain medication given to patient at this time. 0230: Patient resting in bed at this time. 5104: Patient given pain medication at this time and ice place to G-Tube site. 0630: Updated patient's  regarding night which patient had an uneventful shift. 0730: Bedside and Verbal shift change report given to [address] (oncoming nurse) by Lucas Smith (offgoing nurse).  Report included the following information SBAR, Kardex, MAR, Recent Results, Med Rec Status and Cardiac Rhythm ST.

## 2020-05-15 NOTE — PROGRESS NOTES
Problem: Nutrition Deficit  Goal: *Optimize nutritional status  Outcome: Progressing Towards Goal  Note: Pt started on tube feeding today, Meds given thru G tube. Goal is 50mL. Problem: Falls - Risk of  Goal: *Absence of Falls  Description: Document Pedro Dominguez Fall Risk and appropriate interventions in the flowsheet. Outcome: Progressing Towards Goal  Note: Fall Risk Interventions:  Mobility Interventions: Patient to call before getting OOB, PT Consult for mobility concerns, PT Consult for assist device competence    Mentation Interventions: Door open when patient unattended, More frequent rounding, Increase mobility, Update white board    Medication Interventions: Teach patient to arise slowly, Patient to call before getting OOB    Elimination Interventions: Call light in reach, Patient to call for help with toileting needs    History of Falls Interventions: Bed/chair exit alarm, Room close to nurse's station, Evaluate medications/consider consulting pharmacy, Consult care management for discharge planning, Utilize gait belt for transfer/ambulation         Problem: Pressure Injury - Risk of  Goal: *Prevention of pressure injury  Description: Document Aniket Scale and appropriate interventions in the flowsheet.   Outcome: Progressing Towards Goal  Note: Pressure Injury Interventions:  Sensory Interventions: Assess changes in LOC, Discuss PT/OT consult with provider, Keep linens dry and wrinkle-free, Minimize linen layers, Float heels, Maintain/enhance activity level    Moisture Interventions: Apply protective barrier, creams and emollients, Check for incontinence Q2 hours and as needed, Internal/External urinary devices    Activity Interventions: Increase time out of bed, Pressure redistribution bed/mattress(bed type), PT/OT evaluation    Mobility Interventions: HOB 30 degrees or less, Pressure redistribution bed/mattress (bed type), PT/OT evaluation    Nutrition Interventions: Document food/fluid/supplement intake, Discuss nutritional consult with provider    Friction and Shear Interventions: Apply protective barrier, creams and emollients, HOB 30 degrees or less, Lift sheet, Lift team/patient mobility team, Minimize layers, Foam dressings/transparent film/skin sealants, Transferring/repositioning devices                Problem: Patient Education: Go to Patient Education Activity  Goal: Patient/Family Education  Outcome: Progressing Towards Goal  Note: Pt on honey thick liquids. 1254: Updated patient's  on pt status. 12: Spoke with pt's  updated him on pt's care. Bedside shift change report given to Simran RN (oncoming nurse) by Carola Murrell RN (offgoing nurse). Report included the following information SBAR and Cardiac Rhythm NSR/ST.

## 2020-05-15 NOTE — PROGRESS NOTES
Problem: Self Care Deficits Care Plan (Adult)  Goal: *Acute Goals and Plan of Care (Insert Text)  Description:   FUNCTIONAL STATUS PRIOR TO ADMISSION: Patient unable to provide history. Per chart, patient was fully independent PTA. HOME SUPPORT: Per chart, patient lived with family. OT weekly reassessment 5/12/2020: see goals below for update    Occupational Therapy Goals  Initiated 4/30/2020  1. Patient will perform grooming with maximal assistance within 7 day(s). (upgrade to min A 5/12)  2. Patient will perform self-feeding if appropriate for PO with maximal assistance within 7 day(s). (pending following MBS)  3.  Patient will perform bathing with maximal assistance within 7 day(s). (upgrade to mod A anterior bathing EOB 5/12)  4. Patient will participate in upper extremity therapeutic exercise/activities with moderate assistance  for 10 minutes within 7 day(s). (MIN A 5/12)  5. Patient will follow 100% simple commands in preparation for functional tasks within 7 days. (MET 5/12)          Outcome: Progressing Towards Goal   OCCUPATIONAL THERAPY TREATMENT  Patient: Mery Dominguez (44 y.o. female)  Date: 5/15/2020  Diagnosis: COVID-19 virus infection [U07.1]   Sepsis with multi-organ dysfunction ([facility] [address]  Procedure(s) (LRB):  ESOPHAGOGASTRODUODENOSCOPY (EGD) (Left)  ESOPHAGOGASTRODUODENAL (EGD) BIOPSY (N/A) 3 Days Post-Op  Precautions: Fall  Chart, occupational therapy assessment, plan of care, and goals were reviewed. ASSESSMENT  Patient continues with skilled OT services and is progressing towards goals. Patient able to tolerate sitting EOB for prolonged time today to complete hair combing and shampooing. Patient required MIN A to maintain sitting balance however required MAX A for completion of grooming tasks due to patient relying on BUE to support trunk EOB. Patient with impaired cardiopulmonary tolerance EOB however with HR 120s-140s.  Patient also continues to be limited by generalized weakness, impaired balance, and impaired activity tolerance. Encouraged patient to completed BUE AROM in bed for increased strength to complete UB ADLs. Continue to recommend inpatient rehab to maximize patient safety a and independence with ADL transfers and tasks. Current Level of Function Impacting Discharge (ADLs): MAX A for unsupported UB ADLs (MIN A with patient supported in bed with hand over hand assist); MAX A LB ADLs         PLAN :  Patient continues to benefit from skilled intervention to address the above impairments. Continue treatment per established plan of care. to address goals. Recommend with staff: positioning in bed    Recommend next OT session: seated unsupported UB ADL tasks    Recommendation for discharge: (in order for the patient to meet his/her long term goals)  Therapy 3 hours per day 5-7 days per week    This discharge recommendation:  Has been made in collaboration with the attending provider and/or case management    IF patient discharges home will need the following DME: TBD       SUBJECTIVE:   Patient stated Can I please have a drink (patient given thickened Pepsi).     OBJECTIVE DATA SUMMARY:   Cognitive/Behavioral Status:  Neurologic State: Alert  Orientation Level: Oriented X4  Cognition: Appropriate for age attention/concentration  Perception: Appears intact  Perseveration: No perseveration noted  Safety/Judgement: Decreased insight into deficits; Decreased awareness of need for safety;Decreased awareness of need for assistance    Functional Mobility and Transfers for ADLs:  Bed Mobility:  Supine to Sit: Maximum assistance;Assist x2  Sit to Supine: Maximum assistance;Assist x2    Balance:  Sitting: Impaired  Sitting - Static: Fair (occasional)  Sitting - Dynamic: Fair (occasional)    ADL Intervention:   Patient required MAX A x 2 for bed mobility to complete grooming tasks seated EOB. Patient required CGA-MIN A to maintain sitting balance and MAX A for grooming tasks. Grooming  Brushing/Combing Hair: Maximum assistance; Compensatory technique training  Cues: Physical assistance;Verbal cues provided; Tactile cues provided                             Cognitive Retraining  Safety/Judgement: Decreased insight into deficits; Decreased awareness of need for safety;Decreased awareness of need for assistance    Activity Tolerance:   Fair and requires rest breaks  Please refer to the flowsheet for vital signs taken during this treatment. After treatment patient left in no apparent distress:   Supine in bed and Call bell within reach    COMMUNICATION/COLLABORATION:   The patients plan of care was discussed with: Physical therapist and Registered nurse.      Blas Szymanski  Time Calculation: 46 mins

## 2020-05-15 NOTE — PROGRESS NOTES
Bedside and Verbal shift change report given to [place] (oncoming nurse) by Olivia Dunham RN (offgoing nurse). Report included the following information SBAR, Kardex, Procedure Summary, Intake/Output, MAR, Accordion, Recent Results and Cardiac Rhythm Sinus Tach. no

## 2020-05-16 LAB
ANION GAP SERPL CALC-SCNC: 5 MMOL/L (ref 5–15)
BASOPHILS # BLD: 0.1 K/UL (ref 0–0.1)
BASOPHILS NFR BLD: 1 % (ref 0–1)
BUN SERPL-MCNC: 20 MG/DL (ref 6–20)
BUN/CREAT SERPL: 21 (ref 12–20)
CALCIUM SERPL-MCNC: 8.1 MG/DL (ref 8.5–10.1)
CHLORIDE SERPL-SCNC: 111 MMOL/L (ref 97–108)
CO2 SERPL-SCNC: 30 MMOL/L (ref 21–32)
CREAT SERPL-MCNC: 0.94 MG/DL (ref 0.55–1.02)
DIFFERENTIAL METHOD BLD: ABNORMAL
EOSINOPHIL # BLD: 0.2 K/UL (ref 0–0.4)
EOSINOPHIL NFR BLD: 3 % (ref 0–7)
ERYTHROCYTE [DISTWIDTH] IN BLOOD BY AUTOMATED COUNT: 16.5 % (ref 11.5–14.5)
GLUCOSE BLD STRIP.AUTO-MCNC: 243 MG/DL (ref 65–100)
GLUCOSE BLD STRIP.AUTO-MCNC: 247 MG/DL (ref 65–100)
GLUCOSE BLD STRIP.AUTO-MCNC: 254 MG/DL (ref 65–100)
GLUCOSE SERPL-MCNC: 244 MG/DL (ref 65–100)
HCT VFR BLD AUTO: 30.5 % (ref 35–47)
HGB BLD-MCNC: 9.1 G/DL (ref 11.5–16)
IMM GRANULOCYTES # BLD AUTO: 0.1 K/UL (ref 0–0.04)
IMM GRANULOCYTES NFR BLD AUTO: 1 % (ref 0–0.5)
LYMPHOCYTES # BLD: 1.4 K/UL (ref 0.8–3.5)
LYMPHOCYTES NFR BLD: 16 % (ref 12–49)
MAGNESIUM SERPL-MCNC: 1.7 MG/DL (ref 1.6–2.4)
MCH RBC QN AUTO: 29.7 PG (ref 26–34)
MCHC RBC AUTO-ENTMCNC: 29.8 G/DL (ref 30–36.5)
MCV RBC AUTO: 99.7 FL (ref 80–99)
MONOCYTES # BLD: 0.6 K/UL (ref 0–1)
MONOCYTES NFR BLD: 7 % (ref 5–13)
NEUTS SEG # BLD: 6.5 K/UL (ref 1.8–8)
NEUTS SEG NFR BLD: 72 % (ref 32–75)
NRBC # BLD: 0 K/UL (ref 0–0.01)
NRBC BLD-RTO: 0 PER 100 WBC
PLATELET # BLD AUTO: 227 K/UL (ref 150–400)
PMV BLD AUTO: 9.6 FL (ref 8.9–12.9)
POTASSIUM SERPL-SCNC: 3.2 MMOL/L (ref 3.5–5.1)
RBC # BLD AUTO: 3.06 M/UL (ref 3.8–5.2)
SERVICE CMNT-IMP: ABNORMAL
SODIUM SERPL-SCNC: 146 MMOL/L (ref 136–145)
WBC # BLD AUTO: 8.8 K/UL (ref 3.6–11)

## 2020-05-16 PROCEDURE — 74011250636 HC RX REV CODE- 250/636: Performed by: INTERNAL MEDICINE

## 2020-05-16 PROCEDURE — 74011000250 HC RX REV CODE- 250: Performed by: INTERNAL MEDICINE

## 2020-05-16 PROCEDURE — 85025 COMPLETE CBC W/AUTO DIFF WBC: CPT

## 2020-05-16 PROCEDURE — C9113 INJ PANTOPRAZOLE SODIUM, VIA: HCPCS | Performed by: INTERNAL MEDICINE

## 2020-05-16 PROCEDURE — 80048 BASIC METABOLIC PNL TOTAL CA: CPT

## 2020-05-16 PROCEDURE — 74011250637 HC RX REV CODE- 250/637: Performed by: NURSE PRACTITIONER

## 2020-05-16 PROCEDURE — 74011250637 HC RX REV CODE- 250/637: Performed by: HOSPITALIST

## 2020-05-16 PROCEDURE — 82962 GLUCOSE BLOOD TEST: CPT

## 2020-05-16 PROCEDURE — 74011636637 HC RX REV CODE- 636/637: Performed by: INTERNAL MEDICINE

## 2020-05-16 PROCEDURE — 74011250637 HC RX REV CODE- 250/637: Performed by: INTERNAL MEDICINE

## 2020-05-16 PROCEDURE — 65660000000 HC RM CCU STEPDOWN

## 2020-05-16 PROCEDURE — 74011636637 HC RX REV CODE- 636/637: Performed by: HOSPITALIST

## 2020-05-16 PROCEDURE — 36415 COLL VENOUS BLD VENIPUNCTURE: CPT

## 2020-05-16 PROCEDURE — 74011250637 HC RX REV CODE- 250/637: Performed by: PSYCHIATRY & NEUROLOGY

## 2020-05-16 PROCEDURE — 83735 ASSAY OF MAGNESIUM: CPT

## 2020-05-16 PROCEDURE — 77030018798 HC PMP KT ENTRL FED COVD -A

## 2020-05-16 RX ORDER — INSULIN GLARGINE 100 [IU]/ML
15 INJECTION, SOLUTION SUBCUTANEOUS DAILY
Status: DISCONTINUED | OUTPATIENT
Start: 2020-05-16 | End: 2020-05-22

## 2020-05-16 RX ORDER — FAMOTIDINE 20 MG/1
20 TABLET, FILM COATED ORAL DAILY
Status: DISCONTINUED | OUTPATIENT
Start: 2020-05-16 | End: 2020-05-19

## 2020-05-16 RX ORDER — FAMOTIDINE 20 MG/1
20 TABLET, FILM COATED ORAL DAILY
Status: DISCONTINUED | OUTPATIENT
Start: 2020-05-17 | End: 2020-05-16

## 2020-05-16 RX ADMIN — ESCITALOPRAM OXALATE 10 MG: 10 TABLET ORAL at 09:23

## 2020-05-16 RX ADMIN — HYDROCODONE BITARTRATE AND ACETAMINOPHEN 1 TABLET: 5; 325 TABLET ORAL at 01:59

## 2020-05-16 RX ADMIN — SEVELAMER CARBONATE 0.8 G: 800 POWDER, FOR SUSPENSION ORAL at 09:23

## 2020-05-16 RX ADMIN — ONDANSETRON 4 MG: 2 INJECTION INTRAMUSCULAR; INTRAVENOUS at 12:05

## 2020-05-16 RX ADMIN — METOCLOPRAMIDE 5 MG: 5 INJECTION, SOLUTION INTRAMUSCULAR; INTRAVENOUS at 21:52

## 2020-05-16 RX ADMIN — LORAZEPAM 0.5 MG: 2 INJECTION INTRAMUSCULAR; INTRAVENOUS at 09:20

## 2020-05-16 RX ADMIN — FAMOTIDINE 20 MG: 20 TABLET ORAL at 17:07

## 2020-05-16 RX ADMIN — CASTOR OIL AND BALSAM, PERU: 788; 87 OINTMENT TOPICAL at 09:24

## 2020-05-16 RX ADMIN — ONDANSETRON 4 MG: 2 INJECTION INTRAMUSCULAR; INTRAVENOUS at 02:44

## 2020-05-16 RX ADMIN — SODIUM CHLORIDE 10 ML: 9 INJECTION INTRAMUSCULAR; INTRAVENOUS; SUBCUTANEOUS at 07:11

## 2020-05-16 RX ADMIN — METOPROLOL TARTRATE 25 MG: 50 TABLET, FILM COATED ORAL at 15:21

## 2020-05-16 RX ADMIN — INSULIN LISPRO 3 UNITS: 100 INJECTION, SOLUTION INTRAVENOUS; SUBCUTANEOUS at 06:45

## 2020-05-16 RX ADMIN — HYDROMORPHONE HYDROCHLORIDE 0.5 MG: 1 INJECTION, SOLUTION INTRAMUSCULAR; INTRAVENOUS; SUBCUTANEOUS at 11:56

## 2020-05-16 RX ADMIN — HYDROMORPHONE HYDROCHLORIDE 0.5 MG: 1 INJECTION, SOLUTION INTRAMUSCULAR; INTRAVENOUS; SUBCUTANEOUS at 02:56

## 2020-05-16 RX ADMIN — SODIUM CHLORIDE 40 MG: 9 INJECTION INTRAMUSCULAR; INTRAVENOUS; SUBCUTANEOUS at 09:22

## 2020-05-16 RX ADMIN — Medication 1 PACKET: at 09:23

## 2020-05-16 RX ADMIN — INSULIN LISPRO 3 UNITS: 100 INJECTION, SOLUTION INTRAVENOUS; SUBCUTANEOUS at 12:06

## 2020-05-16 RX ADMIN — GUAIFENESIN 100 MG: 200 SOLUTION ORAL at 09:23

## 2020-05-16 RX ADMIN — CASTOR OIL AND BALSAM, PERU: 788; 87 OINTMENT TOPICAL at 17:07

## 2020-05-16 RX ADMIN — BUMETANIDE 1 MG: 1 TABLET ORAL at 09:23

## 2020-05-16 RX ADMIN — SODIUM CHLORIDE 10 ML: 9 INJECTION INTRAMUSCULAR; INTRAVENOUS; SUBCUTANEOUS at 14:00

## 2020-05-16 RX ADMIN — SODIUM CHLORIDE 20 ML: 9 INJECTION INTRAMUSCULAR; INTRAVENOUS; SUBCUTANEOUS at 07:11

## 2020-05-16 RX ADMIN — INSULIN GLARGINE 15 UNITS: 100 INJECTION, SOLUTION SUBCUTANEOUS at 09:25

## 2020-05-16 RX ADMIN — ONDANSETRON 4 MG: 2 INJECTION INTRAMUSCULAR; INTRAVENOUS at 20:55

## 2020-05-16 RX ADMIN — ONDANSETRON 4 MG: 2 INJECTION INTRAMUSCULAR; INTRAVENOUS at 14:44

## 2020-05-16 RX ADMIN — SUCRALFATE 1 G: 1 TABLET ORAL at 11:47

## 2020-05-16 RX ADMIN — INSULIN LISPRO 5 UNITS: 100 INJECTION, SOLUTION INTRAVENOUS; SUBCUTANEOUS at 18:51

## 2020-05-16 RX ADMIN — METOPROLOL TARTRATE 25 MG: 50 TABLET, FILM COATED ORAL at 04:55

## 2020-05-16 NOTE — PROGRESS NOTES
Bedside shift change report given to Rj Ndiaye RN  (oncoming nurse) by Yoselin Bush RN (offgoing nurse). Report included the following information SBAR, Kardex, Intake/Output and Cardiac Rhythm NSR. Last 3 Recorded Weights in this Encounter    05/14/20 0405 05/15/20 0427 05/16/20 0315   Weight: 104.8 kg (231 lb 1.6 oz) 106.1 kg (234 lb) 107.9 kg (237 lb 12.8 oz)   6 pound weight increase noted bed scale weight not observed, patient started tube feedings with water flushes    Patient Vitals for the past 12 hrs:   Temp Pulse Resp BP SpO2   05/16/20 0711 97.8 °F (36.6 °C) 87 13 116/61 96 %   05/16/20 0315 98.4 °F (36.9 °C) (!) 103 14 99/67 94 %   05/15/20 2304 98.3 °F (36.8 °C) 96 15 147/73 96 %   05/15/20 2044 98.1 °F (36.7 °C) 93 16 139/76 97 %     Patient started on metoprolol via g/j tube    Problem: Nutrition Deficit  Goal: *Optimize nutritional status  Outcome: Progressing Towards Goal   Receiving tube feedings via G/J tube now running at goal rate    Problem: Falls - Risk of  Goal: *Absence of Falls  Description: Document Stacey Fall Risk and appropriate interventions in the flowsheet.   Outcome: Progressing Towards Goal  Note: Fall Risk Interventions:  Mobility Interventions: PT Consult for mobility concerns, Strengthening exercises (ROM-active/passive)    Mentation Interventions: Adequate sleep, hydration, pain control, Door open when patient unattended, Update white board    Medication Interventions: Evaluate medications/consider consulting pharmacy    Elimination Interventions: Call light in reach, Patient to call for help with toileting needs, Toileting schedule/hourly rounds    History of Falls Interventions: Door open when patient unattended, Evaluate medications/consider consulting pharmacy    Patient not trying to get out of bed uses call bell appropiately     Problem: Pressure Injury - Risk of  Goal: *Prevention of pressure injury  Description: Document Aniket Scale and appropriate interventions in the flowsheet. Outcome: Progressing Towards Goal  Note: Pressure Injury Interventions:  Sensory Interventions: Assess need for specialty bed, Minimize linen layers, Turn and reposition approx. every two hours (pillows and wedges if needed), Pressure redistribution bed/mattress (bed type), Keep linens dry and wrinkle-free, Float heels, Maintain/enhance activity level    Moisture Interventions: Absorbent underpads, Apply protective barrier, creams and emollients, Internal/External urinary devices, Maintain skin hydration (lotion/cream), Minimize layers, Check for incontinence Q2 hours and as needed    Activity Interventions: Pressure redistribution bed/mattress(bed type), Assess need for specialty bed    Mobility Interventions: Assess need for specialty bed, Float heels, Pressure redistribution bed/mattress (bed type), Turn and reposition approx. every two hours(pillow and wedges)    Nutrition Interventions: Document food/fluid/supplement intake(tube feeding)    Friction and Shear Interventions: Apply protective barrier, creams and emollients, Lift sheet, Minimize layers     Venelex ointment applied after pericare given and turned frequently           Problem: Diabetes Self-Management  Goal: *Incorporating nutritional management into lifestyle  Description: Describe effect of type, amount and timing of food on blood glucose; list 3 methods for planning meals. Outcome: Progressing Towards Goal  Goal: *Using medications safely  Description: State effect of diabetes medications on diabetes; name diabetes medication taking, action and side effects. Outcome: Progressing Towards Goal  Goal: *Monitoring blood glucose, interpreting and using results  Description: Identify recommended blood glucose targets  and personal targets.   Outcome: Progressing Towards Goal   Patient education given regarding blood sugars increasing due to receiving feedings     Problem: Impaired Skin Integrity/Pressure Injury Treatment  Goal: *Improvement of Existing Pressure Injury  Outcome: Progressing Towards Goal     Problem: Patient Education: Go to Patient Education Activity  Goal: Patient/Family Education  Outcome: Not Progressing Towards Goal

## 2020-05-16 NOTE — PROGRESS NOTES
0855 Pt agitated, given ativan 0.5 mg. Pt complained about dry mouth, provide mouth swap. She felt much better. She asked, \"I have no energy even poop, I am so dehydrated, can drink water,\" I said, \"If you drink thickened water, she said,\" That's terrible, disgusting! I don't want it\"   0930 Pt want her meds through J tube instead of G tube because she vomited after having meds, I explained her the order, she accepted. 1020 Pt vomited, didn't asked nausea med. 1147 Pt vomited again, she mentioned, \" it looks like medication , I had bitter taste. \" given zofran. 1440 Pt has nausea, given zofran. 1455 Vomited again, she felt much better, wanted to sleep, refused to have meds, held few meds except metoprolol d/t , MD notified. [phone] Talked with her , he wanted to talk to her, pt refused. 1620 Pt vomited when we turned, she asked Dr. Anders souza notified, he ordered it. 1745 Pt vomited. She moves her arms, moved left side leg. Pt refused to use foot drop boot.

## 2020-05-16 NOTE — ROUTINE PROCESS
Bedside and Verbal shift change report given to [address] [place] [address] (oncoming nurse) by Purnima Walden (offgoing nurse).  Report included the following information SBAR, Kardex, Intake/Output, MAR and Cardiac Rhythm ST.

## 2020-05-16 NOTE — PROGRESS NOTES
Hospitalist Progress Note  Nica Maria MD  Answering service: [phone] from in house phone      Date of Service:  2020  NAME:  Altaf   :  1960  MRN:  [id number]    Admission Summary:   59F s/p COVID treatement in ICU - extubated  Interval history / Subjective: Follow up Acute hypoxic resp. Failure, acute renal failure  The patient continues to have nausea and vomiting  Tolerating tube feeds  Complains of generalized weakness       Assessment & Plan:     #. ARF:   - Nephrology following- was on HD  -last HD   -now recovered and no more need for HD  -Permacath removed   -Appreciate discussion with Nephrology  -now resolved    Nausea, vomiting: GI consulted,   -s/p EGD:Hiatal hernia, gr2 erosive esophagitis and gastritis in antrum, appreciate recommendations, cont. Protonix, added carafate, started on CLD  - reportedly the patient continued to have nausea/vomiting.  -Appreciate discussion with GI  -s/p GJ tube placement   -Tube feeds started 5/15  -Will give reglan and see if that helps    COVID-19 +ve   - s/p Tocilizumab, Plaquenil. On Vit C, Zinc   -COVID negative ,  and   - Resolved    Acute Hypoxic Resp Failure  - resolved     Acute metabolic Encephalopathy:  - likely ICU delirium- no agitation. Monitor  - resolved. Sinus tachycardia:   -improving,continue Metoprolol, normal TSH     Anemia in CKD:monitor HB. Retroperitoneal bleed: this hospitalization,   -resolved - Avoid AP/AC meds- Monitor Hb     Non occlusive PE in LLL pulmonary artery - no ACs due to retroperitoneal bleed. Appreciate Vascular surgery recommendations for IVC filter, comments noted, conservative approach for now    #. S/p Cardiac arrest   #. DM2: controlled, A1c 10.4, SSI, AccuChecks, monitor  #. Morbid obesity: counseled on health benefits of weight loss, Healthy diet, BMI 44.11 kg/m².    # depression: continue Trazodone at bedtime and Psych consulted, started on Lexapro  # Anxiety episodes; Continue Lexapro, Ativan PRN for short duration     Dysphagia  -s/p GJ tube 5/14, tube feeds started 5/15  -nutrition consult    PT/OT rehab    Code status: Full  DVT prophylaxis: SCD    Plan: The patient is medically stable, awaiting discharge to rehab coming Wednesday    Disposition: as above     Hospital Problems  Date Reviewed: 5/14/2020          Codes Class Noted POA    Hypotension ICD-10-CM: I95.9  ICD-9-CM: 458.9 Acute 4/2/2020 Yes        Retroperitoneal hemorrhage ICD-10-CM: R58  ICD-9-CM: 459.0  4/19/2020 No        Acute renal failure (ARF) ([facility] ICD-10-CM: N17.9  ICD-9-CM: 584.9  4/19/2020 No        Encephalopathy ICD-10-CM: G93.40  ICD-9-CM: 348.30  4/19/2020 No        * (Principal) Sepsis with multi-organ dysfunction ([facility] ICD-10-CM: A41.9, R65.20  ICD-9-CM: 038.9, 995.92  4/19/2020 Yes        Pneumonia due to methicillin susceptible Staphylococcus aureus (MSSA) ([facility] ICD-10-CM: V52.442  ICD-9-CM: 482.41  4/19/2020 Unknown        Thrombocytopenia ([facility] ICD-10-CM: D69.6  ICD-9-CM: 287.5  4/19/2020 Unknown        Diarrhea ICD-10-CM: R19.7  ICD-9-CM: 787.91  4/19/2020 Unknown        COVID-19 virus infection ICD-10-CM: U07.1  ICD-9-CM: 079.89  3/31/2020 Unknown            Review of Systems:   Pertinent items are mentioned in interval history. Vital Signs:    Last 24hrs VS reviewed since prior progress note.  Most recent are:  Visit Vitals  /61 (BP 1 Location: Left arm, BP Patient Position: At rest)   Pulse 87   Temp 97.8 °F (36.6 °C)   Resp 13   Ht 5' 4\" (1.626 m)   Wt 107.9 kg (237 lb 12.8 oz)   SpO2 96%   BMI 40.82 kg/m²         Intake/Output Summary (Last 24 hours) at 5/16/2020 0723  Last data filed at 5/16/2020 7120  Gross per 24 hour   Intake 3548 ml   Output 1000 ml   Net 2548 ml        Physical Examination:   General:  Alert, No acute distress  Resp:  No accessory muscle use, Good AE, no wheezes, few rhonchi  Abd:  Soft, slightly tender, non-distended, obese, GJ tube in place  Extremities:  No cyanosis or clubbing, no significant edema  Neuro:  Grossly normal, no focal neuro deficits, follows commands   Psych:   not agitated. Data Review:    Review and/or order of clinical lab test  Review and/or order of tests in the radiology section of [facility]  Review and/or order of tests in the medicine section of [facility]  Labs:     Recent Labs     05/16/20  0504 05/15/20  0432   WBC 8.8 9.0   HGB 9.1* 8.7*   HCT 30.5* 30.2*    246     Recent Labs     05/16/20  0504 05/15/20  0438 05/14/20  0404   * 145 142   K 3.2* 3.3* 2.9*   * 112* 110*   CO2 30 26 25   BUN 20 24* 29*   CREA 0.94 1.00 1. 15*   * 135* 133*   CA 8.1* 8.2* 8.3*   MG 1.7 1.4* 1.5*   PHOS  --  4.5  --      Recent Labs     05/15/20  0438   ALB 2.2*     No results for input(s): INR, PTP, APTT, INREXT, INREXT in the last 72 hours. No results for input(s): FE, TIBC, PSAT, FERR in the last 72 hours. No results found for: FOL, RBCF   No results for input(s): PH, PCO2, PO2 in the last 72 hours. No results for input(s): CPK, CKNDX, TROIQ in the last 72 hours.     No lab exists for component: CPKMB  Lab Results   Component Value Date/Time    Triglyceride 159 (H) 04/12/2020 08:34 PM     Lab Results   Component Value Date/Time    Glucose (POC) 243 (H) 05/16/2020 05:46 AM    Glucose (POC) 203 (H) 05/15/2020 11:14 PM    Glucose (POC) 259 (H) 05/15/2020 05:45 PM    Glucose (POC) 176 (H) 05/15/2020 12:24 PM    Glucose (POC) 179 (H) 05/15/2020 06:09 AM     No results found for: COLOR, APPRN, SPGRU, REFSG, SARKIS, PROTU, GLUCU, KETU, BILU, UROU, GALE, LEUKU, GLUKE, EPSU, BACTU, WBCU, RBCU, CASTS, UCRY  Medications Reviewed:     Current Facility-Administered Medications   Medication Dose Route Frequency    bumetanide (BUMEX) tablet 1 mg  1 mg Oral DAILY    lidocaine (XYLOCAINE) 2 % jelly   Mucous Membrane PRN    sodium chloride (NS) flush 5-40 mL  5-40 mL IntraVENous Q8H    sodium chloride (NS) flush 5-40 mL  5-40 mL IntraVENous PRN    HYDROmorphone (PF) (DILAUDID) injection 0.5 mg  0.5 mg IntraVENous Q4H PRN    sucralfate (CARAFATE) tablet 1 g  1 g Oral TIDAC    pantoprazole (PROTONIX) 40 mg in 0.9% sodium chloride 10 mL injection  40 mg IntraVENous Q12H    LORazepam (ATIVAN) injection 0.5 mg  0.5 mg IntraVENous Q6H PRN    metoclopramide HCl (REGLAN) injection 5 mg  5 mg IntraVENous Q6H PRN    escitalopram oxalate (LEXAPRO) tablet 10 mg  10 mg Oral DAILY    traZODone (DESYREL) tablet 50 mg  50 mg Oral QHS    heparin (porcine) 1,000 unit/mL injection 3,800 Units  3,800 Units Hemodialysis DIALYSIS PRN    metoprolol (LOPRESSOR) 5 mg/mL oral suspension 25 mg  25 mg Per NG tube Q12H    [Held by provider] insulin glargine (LANTUS) injection 28 Units  28 Units SubCUTAneous QHS    balsam peru-castor oiL (VENELEX) ointment   Topical BID    guaiFENesin (ROBITUSSIN) 100 mg/5 mL oral liquid 100 mg  100 mg Oral Q12H    epoetin jovani-epbx (RETACRIT) injection 20,000 Units  20,000 Units SubCUTAneous Q TUE, THU & SAT    HYDROcodone-acetaminophen (NORCO) 5-325 mg per tablet 1 Tab  1 Tab Oral Q6H PRN    phenol throat spray (CHLORASEPTIC) 1 Spray  1 Spray Oral Q6H PRN    sevelamer carbonate (RENVELA) oral powder 0.8 g  0.8 g Oral TID WITH MEALS    guar gum (BENEFIBER) packet 1 Packet  4 g Oral TID    labetaloL (NORMODYNE;TRANDATE) injection 20 mg  20 mg IntraVENous Q4H PRN    albumin human 25% (BUMINATE) solution 12.5 g  12.5 g IntraVENous DIALYSIS PRN    albuterol (PROVENTIL VENTOLIN) nebulizer solution 2.5 mg  2.5 mg Nebulization Q4H PRN    alteplase (CATHFLO) 2 mg in sterile water (preservative free) 2 mL injection  2 mg InterCATHeter DIALYSIS PRN    alteplase (CATHFLO) 2 mg in sterile water (preservative free) 2 mL injection  2 mg InterCATHeter DIALYSIS PRN    insulin lispro (HUMALOG) injection   SubCUTAneous Q6H    white petrolatum-mineral oiL (AKWA TEARS) 83-15 % ophthalmic ointment   Both Eyes Q12H    bacitracin 500 unit/gram packet 1 Packet  1 Packet Topical PRN    sodium chloride (NS) flush 5-40 mL  5-40 mL IntraVENous Q8H    sodium chloride (NS) flush 5-40 mL  5-40 mL IntraVENous PRN    ondansetron (ZOFRAN) injection 4 mg  4 mg IntraVENous Q4H PRN    acetaminophen (TYLENOL) tablet 650 mg  650 mg Oral Q6H PRN    Or    acetaminophen (TYLENOL) suppository 650 mg  650 mg Rectal Q6H PRN    glucose chewable tablet 16 g  4 Tab Oral PRN    glucagon (GLUCAGEN) injection 1 mg  1 mg IntraMUSCular PRN    dextrose 10% infusion 0-250 mL  0-250 mL IntraVENous PRN   ______________________________________________________________________  EXPECTED LENGTH OF STAY: 12d 9h  ACTUAL LENGTH OF STAY:          [address] [facility],[address], [place]

## 2020-05-16 NOTE — PROGRESS NOTES
Physical Therapy    Reviewed chart and attempted to treat pt. Pt reports not feeling well with periods of vomiting. Pt requesting to be changed and bandage change. Pt continues to report a bad day.  Deferred at this time

## 2020-05-17 LAB
ALBUMIN SERPL-MCNC: 2.2 G/DL (ref 3.5–5)
ANION GAP SERPL CALC-SCNC: 5 MMOL/L (ref 5–15)
BASOPHILS # BLD: 0 K/UL (ref 0–0.1)
BASOPHILS NFR BLD: 1 % (ref 0–1)
BUN SERPL-MCNC: 17 MG/DL (ref 6–20)
BUN/CREAT SERPL: 20 (ref 12–20)
CALCIUM SERPL-MCNC: 8.4 MG/DL (ref 8.5–10.1)
CHLORIDE SERPL-SCNC: 112 MMOL/L (ref 97–108)
CO2 SERPL-SCNC: 29 MMOL/L (ref 21–32)
CREAT SERPL-MCNC: 0.85 MG/DL (ref 0.55–1.02)
DIFFERENTIAL METHOD BLD: ABNORMAL
EOSINOPHIL # BLD: 0.2 K/UL (ref 0–0.4)
EOSINOPHIL NFR BLD: 2 % (ref 0–7)
ERYTHROCYTE [DISTWIDTH] IN BLOOD BY AUTOMATED COUNT: 16 % (ref 11.5–14.5)
GLUCOSE BLD STRIP.AUTO-MCNC: 127 MG/DL (ref 65–100)
GLUCOSE BLD STRIP.AUTO-MCNC: 141 MG/DL (ref 65–100)
GLUCOSE BLD STRIP.AUTO-MCNC: 153 MG/DL (ref 65–100)
GLUCOSE BLD STRIP.AUTO-MCNC: 157 MG/DL (ref 65–100)
GLUCOSE BLD STRIP.AUTO-MCNC: 158 MG/DL (ref 65–100)
GLUCOSE BLD STRIP.AUTO-MCNC: 182 MG/DL (ref 65–100)
GLUCOSE SERPL-MCNC: 178 MG/DL (ref 65–100)
HCT VFR BLD AUTO: 30.6 % (ref 35–47)
HGB BLD-MCNC: 9.3 G/DL (ref 11.5–16)
IMM GRANULOCYTES # BLD AUTO: 0 K/UL (ref 0–0.04)
IMM GRANULOCYTES NFR BLD AUTO: 1 % (ref 0–0.5)
LYMPHOCYTES # BLD: 1.4 K/UL (ref 0.8–3.5)
LYMPHOCYTES NFR BLD: 17 % (ref 12–49)
MAGNESIUM SERPL-MCNC: 1.5 MG/DL (ref 1.6–2.4)
MCH RBC QN AUTO: 30.2 PG (ref 26–34)
MCHC RBC AUTO-ENTMCNC: 30.4 G/DL (ref 30–36.5)
MCV RBC AUTO: 99.4 FL (ref 80–99)
MONOCYTES # BLD: 0.6 K/UL (ref 0–1)
MONOCYTES NFR BLD: 7 % (ref 5–13)
NEUTS SEG # BLD: 6.1 K/UL (ref 1.8–8)
NEUTS SEG NFR BLD: 72 % (ref 32–75)
NRBC # BLD: 0 K/UL (ref 0–0.01)
NRBC BLD-RTO: 0 PER 100 WBC
PHOSPHATE SERPL-MCNC: 2.6 MG/DL (ref 2.6–4.7)
PLATELET # BLD AUTO: 236 K/UL (ref 150–400)
PMV BLD AUTO: 9.4 FL (ref 8.9–12.9)
POTASSIUM SERPL-SCNC: 3.2 MMOL/L (ref 3.5–5.1)
RBC # BLD AUTO: 3.08 M/UL (ref 3.8–5.2)
SERVICE CMNT-IMP: ABNORMAL
SODIUM SERPL-SCNC: 146 MMOL/L (ref 136–145)
WBC # BLD AUTO: 8.4 K/UL (ref 3.6–11)

## 2020-05-17 PROCEDURE — 74011250636 HC RX REV CODE- 250/636: Performed by: INTERNAL MEDICINE

## 2020-05-17 PROCEDURE — 82962 GLUCOSE BLOOD TEST: CPT

## 2020-05-17 PROCEDURE — 74011250637 HC RX REV CODE- 250/637: Performed by: INTERNAL MEDICINE

## 2020-05-17 PROCEDURE — 74011636637 HC RX REV CODE- 636/637: Performed by: INTERNAL MEDICINE

## 2020-05-17 PROCEDURE — 74011000250 HC RX REV CODE- 250: Performed by: NURSE PRACTITIONER

## 2020-05-17 PROCEDURE — 83735 ASSAY OF MAGNESIUM: CPT

## 2020-05-17 PROCEDURE — 74011250637 HC RX REV CODE- 250/637: Performed by: NURSE PRACTITIONER

## 2020-05-17 PROCEDURE — 85025 COMPLETE CBC W/AUTO DIFF WBC: CPT

## 2020-05-17 PROCEDURE — 74011250636 HC RX REV CODE- 250/636: Performed by: NURSE PRACTITIONER

## 2020-05-17 PROCEDURE — 74011000250 HC RX REV CODE- 250: Performed by: HOSPITALIST

## 2020-05-17 PROCEDURE — 36415 COLL VENOUS BLD VENIPUNCTURE: CPT

## 2020-05-17 PROCEDURE — 80069 RENAL FUNCTION PANEL: CPT

## 2020-05-17 PROCEDURE — 65660000000 HC RM CCU STEPDOWN

## 2020-05-17 PROCEDURE — 97110 THERAPEUTIC EXERCISES: CPT

## 2020-05-17 PROCEDURE — 74011250636 HC RX REV CODE- 250/636: Performed by: HOSPITALIST

## 2020-05-17 PROCEDURE — 74011000250 HC RX REV CODE- 250: Performed by: INTERNAL MEDICINE

## 2020-05-17 PROCEDURE — C9113 INJ PANTOPRAZOLE SODIUM, VIA: HCPCS | Performed by: INTERNAL MEDICINE

## 2020-05-17 PROCEDURE — 97530 THERAPEUTIC ACTIVITIES: CPT

## 2020-05-17 RX ORDER — METOPROLOL TARTRATE 5 MG/5ML
5 INJECTION INTRAVENOUS EVERY 12 HOURS
Status: DISCONTINUED | OUTPATIENT
Start: 2020-05-17 | End: 2020-05-19

## 2020-05-17 RX ORDER — HALOPERIDOL 5 MG/ML
1 INJECTION INTRAMUSCULAR ONCE
Status: COMPLETED | OUTPATIENT
Start: 2020-05-17 | End: 2020-05-17

## 2020-05-17 RX ORDER — SODIUM CHLORIDE 9 MG/ML
75 INJECTION, SOLUTION INTRAVENOUS CONTINUOUS
Status: DISCONTINUED | OUTPATIENT
Start: 2020-05-17 | End: 2020-05-20

## 2020-05-17 RX ADMIN — CASTOR OIL AND BALSAM, PERU: 788; 87 OINTMENT TOPICAL at 08:42

## 2020-05-17 RX ADMIN — CASTOR OIL AND BALSAM, PERU: 788; 87 OINTMENT TOPICAL at 18:14

## 2020-05-17 RX ADMIN — METOCLOPRAMIDE 5 MG: 5 INJECTION, SOLUTION INTRAMUSCULAR; INTRAVENOUS at 09:54

## 2020-05-17 RX ADMIN — SODIUM CHLORIDE 40 MG: 9 INJECTION INTRAMUSCULAR; INTRAVENOUS; SUBCUTANEOUS at 11:01

## 2020-05-17 RX ADMIN — LABETALOL HYDROCHLORIDE 20 MG: 5 INJECTION INTRAVENOUS at 23:11

## 2020-05-17 RX ADMIN — SEVELAMER CARBONATE 0.8 G: 800 POWDER, FOR SUSPENSION ORAL at 17:00

## 2020-05-17 RX ADMIN — METOPROLOL TARTRATE 5 MG: 5 INJECTION INTRAVENOUS at 16:48

## 2020-05-17 RX ADMIN — METOPROLOL TARTRATE 25 MG: 50 TABLET, FILM COATED ORAL at 06:44

## 2020-05-17 RX ADMIN — INSULIN LISPRO 2 UNITS: 100 INJECTION, SOLUTION INTRAVENOUS; SUBCUTANEOUS at 18:13

## 2020-05-17 RX ADMIN — HALOPERIDOL LACTATE 1 MG: 5 INJECTION INTRAMUSCULAR at 02:26

## 2020-05-17 RX ADMIN — SODIUM CHLORIDE 10 ML: 9 INJECTION INTRAMUSCULAR; INTRAVENOUS; SUBCUTANEOUS at 21:43

## 2020-05-17 RX ADMIN — INSULIN LISPRO 2 UNITS: 100 INJECTION, SOLUTION INTRAVENOUS; SUBCUTANEOUS at 06:44

## 2020-05-17 RX ADMIN — EPOETIN ALFA-EPBX 20000 UNITS: 10000 INJECTION, SOLUTION INTRAVENOUS; SUBCUTANEOUS at 01:02

## 2020-05-17 RX ADMIN — Medication 1 PACKET: at 16:00

## 2020-05-17 RX ADMIN — SODIUM CHLORIDE 75 ML/HR: 900 INJECTION, SOLUTION INTRAVENOUS at 21:45

## 2020-05-17 RX ADMIN — SODIUM CHLORIDE 75 ML/HR: 900 INJECTION, SOLUTION INTRAVENOUS at 09:24

## 2020-05-17 RX ADMIN — SUCRALFATE 1 G: 1 TABLET ORAL at 06:43

## 2020-05-17 RX ADMIN — SODIUM CHLORIDE 40 MG: 9 INJECTION INTRAMUSCULAR; INTRAVENOUS; SUBCUTANEOUS at 21:43

## 2020-05-17 RX ADMIN — SODIUM CHLORIDE 40 MG: 9 INJECTION INTRAMUSCULAR; INTRAVENOUS; SUBCUTANEOUS at 01:02

## 2020-05-17 RX ADMIN — ONDANSETRON 4 MG: 2 INJECTION INTRAMUSCULAR; INTRAVENOUS at 23:06

## 2020-05-17 NOTE — PROGRESS NOTES
0500:  Per Md Claudia Young), resume continuous tube feedings. 05:10  Resumed feedings at 30 ml/hr and 100 ml flush. Will increase per order as tolerated. Updated hospitalist Dr. Beto Corado. 18:00  Pt  Terra Linares requesting call from MD.  Informed Md and provided phone number. 18:28  Emesis episode x2. Informed Dr. Beto Corado. Instructed to notify GI Md.  Paged Md. Will continue to monitor. 18:45  Spoke with GI Md on jn  Dr. Amy Cross. Notified of emesis episodes. Instructed to put tube feedings on hold again until Md can see her tomorrow. 18:47  Updated hospitalist Dr. Beto Corado of plan per Dr. Brett Teran.

## 2020-05-17 NOTE — PROGRESS NOTES
Bedside shift change report given to Clemente Ramirez RN (oncoming nurse) by Nam Boston RN (offgoing nurse). Report included the following information SBAR, Procedure Summary, Intake/Output, MAR and Cardiac Rhythm Sinus tach.

## 2020-05-17 NOTE — PROGRESS NOTES
Problem: Mobility Impaired (Adult and Pediatric)  Goal: *Acute Goals and Plan of Care (Insert Text)  Description: FUNCTIONAL STATUS PRIOR TO ADMISSION: Patient was independent and active without use of DME but poor history due to not being able to communicate. HOME SUPPORT PRIOR TO ADMISSION: Pt lives with family. Physical Therapy Goals  1. Patient will move from supine to sit and sit to supine , scoot up and down and roll side to side in bed with moderate assistance x1 person within 7 day(s). 2.  Patient will sit EOB with supervision (occasional min A), x10 minutes with retracted shoulders, midline posture, and forward gaze within 7 days. 3.  Patient will verbalize and demonstrate 5 exercises she can complete on her own outside of therapy sessions with 7 days. 4.  Patient will participate in scooting to [facility] with control of core and max Ax2 for scooting while sitting EOB within 7 days. 5.  Patient will tolerate sitting in chair x1 hour within 7 days. Initiated 5/4/2020- All goals met 5/12/2020  1. Patient will move from supine to sit and sit to supine , scoot up and down and roll side to side in bed with maximal assistance within 7 day(s). 2.  Patient will sit EOB with moderate assistance of 2 people, x5 minutes with active core initiation to correct sitting balance within 7 days. 3.  Patient will verbalize 3 exercises she can complete on her own outside of therapy sessions with 7 days. 4.  Patient will participate in supine HEP (AAROM as needed) with min assist for AAROM within 7 days. 5.  Patient will tolerate bed in chair position 30 min BID within 7 days. Initiated 4/25/2020  1. Patient will move from supine to sit and sit to supine , scoot up and down and roll side to side in bed with moderate assistance once off vent within 7 day(s). 2.  Patient will participate in sitting with min A for 2 minutes once off vent within 7 day(s).   3.  Patient will perform active supine or sitting TE with min A within 7 day(s). Outcome: Progressing Towards Goal     PHYSICAL THERAPY TREATMENT  Patient: Richar Mustafa (80 y.o. female)  Date: 5/17/2020  Diagnosis: COVID-19 virus infection [U07.1]   Sepsis with multi-organ dysfunction ([facility]  Procedure(s) (LRB):  ESOPHAGOGASTRODUODENOSCOPY (EGD) (Left)  ESOPHAGOGASTRODUODENAL (EGD) BIOPSY (N/A) 5 Days Post-Op  Precautions: Fall  Chart, physical therapy assessment, plan of care and goals were reviewed. ASSESSMENT  Patient continues with skilled PT services and is progressing towards goals. Pt demonstrate improved LE movement and strength. Pt not able to complete full range but noted improved strength. Pt able demonstrate improved rotation to assist with rolling. Pt is scooting LE off the bed to assist with supine to sit. Anxiety limits pt during session, noted increase HR. Educated pt on attempting to use [employer] lift for OOB starting tomorrow. Pt anxious about task. Pt continues to show progression     Current Level of Function Impacting Discharge (mobility/balance): max A x2    Other factors to consider for discharge: anxiety          PLAN :  Patient continues to benefit from skilled intervention to address the above impairments. Continue treatment per established plan of care. to address goals. Recommendation for discharge: (in order for the patient to meet his/her long term goals)  Therapy 3 hours per day 5-7 days per week    This discharge recommendation:  Has not yet been discussed the attending provider and/or case management    IF patient discharges home will need the following DME: to be determined (TBD)       SUBJECTIVE:   Patient stated you cant leave me in the chair.  reguards to OOB    OBJECTIVE DATA SUMMARY:   Critical Behavior:  Neurologic State: Confused  Orientation Level: Disoriented to situation, Disoriented to place, Oriented to person, Oriented to time  Cognition: Follows commands  Safety/Judgement: Decreased insight into deficits, Decreased awareness of need for safety, Decreased awareness of need for assistance  Functional Mobility Training:  Bed Mobility:  Rolling: Maximum assistance  Supine to Sit: Maximum assistance;Assist x2  Sit to Supine: Maximum assistance;Assist x2           Transfers:          Balance:  Sitting: Impaired  Sitting - Static: Fair (occasional)  Ambulation/Gait Training:       Stairs: Therapeutic Exercises:   Therapeutic Exercises:       EXERCISE   Sets   Reps   Active Active Assist   Passive Self ROM   Comments   Ankle Pumps   [x] [] [] []    Heel Slides   Knee to chest       [] [x] [] [] Able to initiate    Hip abd/add         [] [x] [] [] Able to initiate    Quad sets   [x] [] [] []    LAQ- seated    [x] [] [] [] Able to clear foot  from floor      [] [] [] []       [] [] [] []       [] [] [] []       [] [] [] []       [] [] [] []        Pain Rating:  J tube    Activity Tolerance:   Limited   Please refer to the flowsheet for vital signs taken during this treatment. After treatment patient left in no apparent distress:   Supine in bed, Heels elevated for pressure relief, and Call bell within reach    COMMUNICATION/COLLABORATION:   The patients plan of care was discussed with: Registered nurse.      Luz Christianson PTA   Time Calculation: 25 mins

## 2020-05-17 NOTE — PROGRESS NOTES
2100 Pt has had multiple episode of emesis during my shift. She is on continuous tube feeds. I stopped tube feeds, gave zofran. Notified Hospitalist, Mayuri hughes was instructed to hold tube feeds, and will escalate if vomiting persists.

## 2020-05-17 NOTE — PROGRESS NOTES
Problem: Nutrition Deficit  Goal: *Optimize nutritional status  Outcome: Not Progressing Towards Goal     Problem: Falls - Risk of  Goal: *Absence of Falls  Description: Document Kevinmaria teresa Rodriguez Fall Risk and appropriate interventions in the flowsheet. Outcome: Progressing Towards Goal  Note: Fall Risk Interventions:  Mobility Interventions: Assess mobility with egress test    Mentation Interventions: Adequate sleep, hydration, pain control    Medication Interventions: Patient to call before getting OOB    Elimination Interventions: Call light in reach    History of Falls Interventions: Investigate reason for fall         Problem: Pressure Injury - Risk of  Goal: *Prevention of pressure injury  Description: Document Aniket Scale and appropriate interventions in the flowsheet.   Outcome: Progressing Towards Goal  Note: Pressure Injury Interventions:  Sensory Interventions: Assess changes in LOC    Moisture Interventions: Absorbent underpads    Activity Interventions: Increase time out of bed    Mobility Interventions: HOB 30 degrees or less    Nutrition Interventions: Document food/fluid/supplement intake    Friction and Shear Interventions: HOB 30 degrees or less                Problem: Impaired Skin Integrity/Pressure Injury Treatment  Goal: *Improvement of Existing Pressure Injury  Outcome: Progressing Towards Goal     Problem: Patient Education: Go to Patient Education Activity  Goal: Patient/Family Education  Outcome: Progressing Towards Goal     Problem: Patient Education: Go to Patient Education Activity  Goal: Patient/Family Education  Outcome: Not Progressing Towards Goal     Problem: Patient Education: Go to Patient Education Activity  Goal: Patient/Family Education  Outcome: Progressing Towards Goal

## 2020-05-17 NOTE — PROGRESS NOTES
9:02  Pt continues to vomit x3. Notified Md. that tube feedings and meds still being held. Per MD, continue to hold all oral meds. Instructed to administer Reglan, begin Normal Saline @ 75.    11:00  Per MD hold Lantus as well as other meds as tube feedings not yet resumed.

## 2020-05-17 NOTE — PROGRESS NOTES
2100  Pt has vomied 3xs during my shift. Paused feeding until I am able to speak with the hospitalist. Terie Holstein. I paged the hospitalist and was instructed to hold feedings until the morning. 2152  Pt is still continuing to vomit. Paged hospitalist and was instructed to give PRN reglan. 0100  Pt has become increasingly confused. She continues to yell \"Help\" from her room. She continues to answer her orientation question appropriately however, she requested that I call an ambulance for her so she can go to the hospital. She also seems to be having some hallucinations stating \"please place a sheet on me and the person in the jasmine\". I reoriented pt and informed her that there was no one in the jasmine besides the nursing staff. Hospitalist stacy was on the unit at the time and I spoke with him about the situation. He stated he will check pt's chart. 226  Gave pt one time dose of haldol.     0600  Pt's sister called the unit wondering if her sister was fine. Pt had apparently called her sister and told her \"I'm being held captive at [facility], they gave me medicine so my legs don't work. \". I reassured family that pt has had some increased confusion tonight but the doctors have been made aware. The sister was very understanding of this.     0700  Pt seems to be less confused now. She is still very confused about the events that occurred during the night. I reassured her that nothing occurred other than her being confused. Pt is more relaxed with this. Bedside and Verbal shift change report given to Yani Frye (oncoming nurse) by Robert Collier (offgoing nurse). Report included the following information SBAR, Kardex, Intake/Output, MAR, Recent Results and Cardiac Rhythm NSR. Reported to dayshift nurse that tube feedings had been held and about the episodes during the night.

## 2020-05-18 LAB
ALBUMIN SERPL-MCNC: 2.3 G/DL (ref 3.5–5)
ALBUMIN/GLOB SERPL: 0.7 {RATIO} (ref 1.1–2.2)
ALP SERPL-CCNC: 153 U/L (ref 45–117)
ALT SERPL-CCNC: 17 U/L (ref 12–78)
ANION GAP SERPL CALC-SCNC: 8 MMOL/L (ref 5–15)
AST SERPL-CCNC: 12 U/L (ref 15–37)
BILIRUB SERPL-MCNC: 0.8 MG/DL (ref 0.2–1)
BUN SERPL-MCNC: 13 MG/DL (ref 6–20)
BUN/CREAT SERPL: 17 (ref 12–20)
CALCIUM SERPL-MCNC: 8.4 MG/DL (ref 8.5–10.1)
CHLORIDE SERPL-SCNC: 113 MMOL/L (ref 97–108)
CO2 SERPL-SCNC: 27 MMOL/L (ref 21–32)
COMMENT, HOLDF: NORMAL
CREAT SERPL-MCNC: 0.75 MG/DL (ref 0.55–1.02)
ERYTHROCYTE [DISTWIDTH] IN BLOOD BY AUTOMATED COUNT: 16.3 % (ref 11.5–14.5)
GLOBULIN SER CALC-MCNC: 3.5 G/DL (ref 2–4)
GLUCOSE BLD STRIP.AUTO-MCNC: 116 MG/DL (ref 65–100)
GLUCOSE BLD STRIP.AUTO-MCNC: 139 MG/DL (ref 65–100)
GLUCOSE BLD STRIP.AUTO-MCNC: 140 MG/DL (ref 65–100)
GLUCOSE SERPL-MCNC: 139 MG/DL (ref 65–100)
HCT VFR BLD AUTO: 31.3 % (ref 35–47)
HGB BLD-MCNC: 9.3 G/DL (ref 11.5–16)
MAGNESIUM SERPL-MCNC: 1.4 MG/DL (ref 1.6–2.4)
MCH RBC QN AUTO: 29.4 PG (ref 26–34)
MCHC RBC AUTO-ENTMCNC: 29.7 G/DL (ref 30–36.5)
MCV RBC AUTO: 99.1 FL (ref 80–99)
NRBC # BLD: 0 K/UL (ref 0–0.01)
NRBC BLD-RTO: 0 PER 100 WBC
PLATELET # BLD AUTO: 256 K/UL (ref 150–400)
PMV BLD AUTO: 9.7 FL (ref 8.9–12.9)
POTASSIUM SERPL-SCNC: 3.1 MMOL/L (ref 3.5–5.1)
PROT SERPL-MCNC: 5.8 G/DL (ref 6.4–8.2)
RBC # BLD AUTO: 3.16 M/UL (ref 3.8–5.2)
SAMPLES BEING HELD,HOLD: NORMAL
SERVICE CMNT-IMP: ABNORMAL
SODIUM SERPL-SCNC: 148 MMOL/L (ref 136–145)
WBC # BLD AUTO: 8.5 K/UL (ref 3.6–11)

## 2020-05-18 PROCEDURE — 82962 GLUCOSE BLOOD TEST: CPT

## 2020-05-18 PROCEDURE — 74011000250 HC RX REV CODE- 250: Performed by: INTERNAL MEDICINE

## 2020-05-18 PROCEDURE — 74011250637 HC RX REV CODE- 250/637: Performed by: INTERNAL MEDICINE

## 2020-05-18 PROCEDURE — 74011250636 HC RX REV CODE- 250/636: Performed by: HOSPITALIST

## 2020-05-18 PROCEDURE — 97535 SELF CARE MNGMENT TRAINING: CPT

## 2020-05-18 PROCEDURE — 97110 THERAPEUTIC EXERCISES: CPT

## 2020-05-18 PROCEDURE — 74011000250 HC RX REV CODE- 250: Performed by: HOSPITALIST

## 2020-05-18 PROCEDURE — 74011250636 HC RX REV CODE- 250/636: Performed by: INTERNAL MEDICINE

## 2020-05-18 PROCEDURE — 74011636637 HC RX REV CODE- 636/637: Performed by: INTERNAL MEDICINE

## 2020-05-18 PROCEDURE — 74011250637 HC RX REV CODE- 250/637: Performed by: NURSE PRACTITIONER

## 2020-05-18 PROCEDURE — C9113 INJ PANTOPRAZOLE SODIUM, VIA: HCPCS | Performed by: INTERNAL MEDICINE

## 2020-05-18 PROCEDURE — 65270000032 HC RM SEMIPRIVATE

## 2020-05-18 PROCEDURE — 74011636637 HC RX REV CODE- 636/637: Performed by: HOSPITALIST

## 2020-05-18 PROCEDURE — 80053 COMPREHEN METABOLIC PANEL: CPT

## 2020-05-18 PROCEDURE — 85027 COMPLETE CBC AUTOMATED: CPT

## 2020-05-18 PROCEDURE — 36415 COLL VENOUS BLD VENIPUNCTURE: CPT

## 2020-05-18 PROCEDURE — 83735 ASSAY OF MAGNESIUM: CPT

## 2020-05-18 RX ADMIN — ONDANSETRON 4 MG: 2 INJECTION INTRAMUSCULAR; INTRAVENOUS at 06:56

## 2020-05-18 RX ADMIN — ONDANSETRON 4 MG: 2 INJECTION INTRAMUSCULAR; INTRAVENOUS at 11:25

## 2020-05-18 RX ADMIN — METOPROLOL TARTRATE 5 MG: 5 INJECTION INTRAVENOUS at 04:59

## 2020-05-18 RX ADMIN — SEVELAMER CARBONATE 0.8 G: 800 POWDER, FOR SUSPENSION ORAL at 18:38

## 2020-05-18 RX ADMIN — SODIUM CHLORIDE 75 ML/HR: 900 INJECTION, SOLUTION INTRAVENOUS at 23:06

## 2020-05-18 RX ADMIN — INSULIN LISPRO 2 UNITS: 100 INJECTION, SOLUTION INTRAVENOUS; SUBCUTANEOUS at 06:13

## 2020-05-18 RX ADMIN — SODIUM CHLORIDE 10 ML: 9 INJECTION INTRAMUSCULAR; INTRAVENOUS; SUBCUTANEOUS at 21:01

## 2020-05-18 RX ADMIN — CASTOR OIL AND BALSAM, PERU: 788; 87 OINTMENT TOPICAL at 08:56

## 2020-05-18 RX ADMIN — ONDANSETRON 4 MG: 2 INJECTION INTRAMUSCULAR; INTRAVENOUS at 19:05

## 2020-05-18 RX ADMIN — METOPROLOL TARTRATE 5 MG: 5 INJECTION INTRAVENOUS at 17:52

## 2020-05-18 RX ADMIN — ONDANSETRON 4 MG: 2 INJECTION INTRAMUSCULAR; INTRAVENOUS at 02:45

## 2020-05-18 RX ADMIN — SODIUM CHLORIDE 10 ML: 9 INJECTION INTRAMUSCULAR; INTRAVENOUS; SUBCUTANEOUS at 06:09

## 2020-05-18 RX ADMIN — SODIUM CHLORIDE 75 ML/HR: 900 INJECTION, SOLUTION INTRAVENOUS at 11:25

## 2020-05-18 RX ADMIN — Medication 1 PACKET: at 21:01

## 2020-05-18 RX ADMIN — CASTOR OIL AND BALSAM, PERU: 788; 87 OINTMENT TOPICAL at 19:10

## 2020-05-18 RX ADMIN — ONDANSETRON 4 MG: 2 INJECTION INTRAMUSCULAR; INTRAVENOUS at 23:06

## 2020-05-18 RX ADMIN — METOCLOPRAMIDE 5 MG: 5 INJECTION, SOLUTION INTRAMUSCULAR; INTRAVENOUS at 14:12

## 2020-05-18 RX ADMIN — SODIUM CHLORIDE 40 MG: 9 INJECTION INTRAMUSCULAR; INTRAVENOUS; SUBCUTANEOUS at 08:55

## 2020-05-18 RX ADMIN — SUCRALFATE 1 G: 1 TABLET ORAL at 18:39

## 2020-05-18 RX ADMIN — SODIUM CHLORIDE 10 ML: 9 INJECTION INTRAMUSCULAR; INTRAVENOUS; SUBCUTANEOUS at 14:13

## 2020-05-18 RX ADMIN — INSULIN GLARGINE 15 UNITS: 100 INJECTION, SOLUTION SUBCUTANEOUS at 08:55

## 2020-05-18 RX ADMIN — GUAIFENESIN 100 MG: 200 SOLUTION ORAL at 21:00

## 2020-05-18 RX ADMIN — TRAZODONE HYDROCHLORIDE 50 MG: 50 TABLET ORAL at 21:00

## 2020-05-18 RX ADMIN — SODIUM CHLORIDE 40 MG: 9 INJECTION INTRAMUSCULAR; INTRAVENOUS; SUBCUTANEOUS at 21:00

## 2020-05-18 RX ADMIN — LORAZEPAM 0.5 MG: 2 INJECTION INTRAMUSCULAR; INTRAVENOUS at 21:44

## 2020-05-18 RX ADMIN — Medication 1 PACKET: at 18:39

## 2020-05-18 RX ADMIN — SODIUM CHLORIDE 10 ML: 9 INJECTION INTRAMUSCULAR; INTRAVENOUS; SUBCUTANEOUS at 14:00

## 2020-05-18 RX ADMIN — SODIUM CHLORIDE 10 ML: 9 INJECTION INTRAMUSCULAR; INTRAVENOUS; SUBCUTANEOUS at 21:09

## 2020-05-18 NOTE — PROGRESS NOTES
[facility]  [facility], [address]       GI PROGRESS NOTE  Will Jasper Tavares  [phone] office  [phone] NP/PA in-hospital cell phone M-F until 4:30PM  After 5PM or on weekends, please call  for physician on call      NAME: Margarita Coe   :  1960   MRN:  [id number]       Subjective:   Patient complains of persistent nausea/vomiting and fatigue. She reports vomiting 15 times since midnight. No abdominal pain. Tube feeds are on hold. Objective:     VITALS:   Last 24hrs VS reviewed since prior progress note. Most recent are:  Visit Vitals  /67 (BP 1 Location: Left arm, BP Patient Position: At rest)   Pulse 91   Temp 98 °F (36.7 °C)   Resp 16   Ht 5' 4\" (1.626 m)   Wt 106.5 kg (234 lb 11.2 oz)   SpO2 100%   BMI 40.29 kg/m²       PHYSICAL EXAM:  General: Cooperative, no acute distress    Neurologic:  Alert and oriented  HEENT: EOMI, no scleral icterus   Lungs:  No respiratory distress  Heart:  S1 S2  Abdomen: Soft, non-distended, no tenderness, no guarding, no rebound. +Bowel sounds. G-J tube in place.    Extremities: Warm  Psych:   Not anxious or agitated    Lab Data Reviewed:     Recent Results (from the past 24 hour(s))   GLUCOSE, POC    Collection Time: 20 12:03 PM   Result Value Ref Range    Glucose (POC) 158 (H) 65 - 100 mg/dL    Performed by [facility] (JOSSELINE)    GLUCOSE, POC    Collection Time: 20  5:52 PM   Result Value Ref Range    Glucose (POC) 141 (H) 65 - 100 mg/dL    Performed by Cesario Sheets, POC    Collection Time: 20  6:19 PM   Result Value Ref Range    Glucose (POC) 157 (H) 65 - 100 mg/dL    Performed by Carmelina Gramajo    GLUCOSE, POC    Collection Time: 20 11:26 PM   Result Value Ref Range    Glucose (POC) 127 (H) 65 - 100 mg/dL    Performed by Eunice Alvarado    CBC W/O DIFF    Collection Time: 20  3:58 AM   Result Value Ref Range    WBC 8.5 3.6 - 11.0 K/uL    RBC 3.16 (L) 3.80 - 5.20 M/uL    HGB 9.3 (L) 11.5 - 16.0 g/dL    HCT 31.3 (L) 35.0 - 47.0 %    MCV 99.1 (H) 80.0 - 99.0 FL    MCH 29.4 26.0 - 34.0 PG    MCHC 29.7 (L) 30.0 - 36.5 g/dL    RDW 16.3 (H) 11.5 - 14.5 %    PLATELET 868 128 - 677 K/uL    MPV 9.7 8.9 - 12.9 FL    NRBC 0.0 0  WBC    ABSOLUTE NRBC 0.00 0.00 - 8.90 K/uL   METABOLIC PANEL, COMPREHENSIVE    Collection Time: 05/18/20  3:58 AM   Result Value Ref Range    Sodium 148 (H) 136 - 145 mmol/L    Potassium 3.1 (L) 3.5 - 5.1 mmol/L    Chloride 113 (H) 97 - 108 mmol/L    CO2 27 21 - 32 mmol/L    Anion gap 8 5 - 15 mmol/L    Glucose 139 (H) 65 - 100 mg/dL    BUN 13 6 - 20 MG/DL    Creatinine 0.75 0.55 - 1.02 MG/DL    BUN/Creatinine ratio 17 12 - 20      GFR est AA >60 >60 ml/min/1.73m2    GFR est non-AA >60 >60 ml/min/1.73m2    Calcium 8.4 (L) 8.5 - 10.1 MG/DL    Bilirubin, total 0.8 0.2 - 1.0 MG/DL    ALT (SGPT) 17 12 - 78 U/L    AST (SGOT) 12 (L) 15 - 37 U/L    Alk. phosphatase 153 (H) 45 - 117 U/L    Protein, total 5.8 (L) 6.4 - 8.2 g/dL    Albumin 2.3 (L) 3.5 - 5.0 g/dL    Globulin 3.5 2.0 - 4.0 g/dL    A-G Ratio 0.7 (L) 1.1 - 2.2     MAGNESIUM    Collection Time: 05/18/20  3:58 AM   Result Value Ref Range    Magnesium 1.4 (L) 1.6 - 2.4 mg/dL   SAMPLES BEING HELD    Collection Time: 05/18/20  3:58 AM   Result Value Ref Range    SAMPLES BEING HELD 1PST     COMMENT        Add-on orders for these samples will be processed based on acceptable specimen integrity and analyte stability, which may vary by analyte. GLUCOSE, POC    Collection Time: 05/18/20  6:08 AM   Result Value Ref Range    Glucose (POC) 140 (H) 65 - 100 mg/dL    Performed by Manual Nickels        Assessment:   · Nausea/vomiting: EGD (5/12/20): 3 cm hiatal hernia, grade 2 erosive esophagitis; gastritis in the antrum (biopsied); normal duodenum. Pathology of the stomach showed reactive gastropathy. Possible anxiety/psychogenic cause.    · Status post G-J tube for inadequate PO intake and aspiration: Modified barium swallow (5/13/20): recommended mechanically-altered ground diet/honey-thick liquids. · Acute respiratory failure secondary to COVID-19: extubated 4/20, repeat COVID-19 negative on 4/20, 4/27, and 5/8. · Status post cardiac arrest 4/9  · Acute kidney injury  · Pulmonary embolism: no anticoagulation due to retroperitoneal bleed  · Diabetes mellitus type II     Patient Active Problem List   Diagnosis Code    Endometrial cancer ([facility] C54.1    Vaginal Pap smear following hysterectomy for malignancy Z08, Z90.710    Personal history of malignant neoplasm of other parts of uterus Z85.42    Obesity, morbid ([facility] E66.01    COVID-19 virus infection U07.1    Hypotension I95.9    Retroperitoneal hemorrhage R58    Acute renal failure (ARF) ([facility] N17.9    Encephalopathy G93.40    Sepsis with multi-organ dysfunction ([facility] A41.9, R65.20    Pneumonia due to methicillin susceptible Staphylococcus aureus (MSSA) ([facility] J15.211    Thrombocytopenia ([facility] D69.6    Diarrhea R19.7     Plan:   · On PPI and Carafate  · Continue supportive measures: antiemetics PRN  · Plan for trial J-tube feedings again tonight  · Please keep HOB elevated to minimize aspiration  · Consider psychiatry consult as discussed     Signed By: Franco Boyd     5/18/2020  8:56 AM       GI Attending: Agree with above plan. Will await Psychiatry consult recommendations (consult request placed this morning). Randolph Morillo MD

## 2020-05-18 NOTE — PROGRESS NOTES
Problem: Nutrition Deficit  Goal: *Optimize nutritional status  Outcome: Not Progressing Towards Goal   Tube feeds held, to be started this evening. Patient still nauseous. Problem: Falls - Risk of  Goal: *Absence of Falls  Description: Document Shaista Junior Fall Risk and appropriate interventions in the flowsheet. Outcome: Progressing Towards Goal  Note: Fall Risk Interventions:  Mobility Interventions: Communicate number of staff needed for ambulation/transfer, Patient to call before getting OOB    Mentation Interventions: Adequate sleep, hydration, pain control, Reorient patient, Room close to nurse's station    Medication Interventions: Evaluate medications/consider consulting pharmacy    Elimination Interventions: Call light in reach, Patient to call for help with toileting needs    History of Falls Interventions:  Investigate reason for fall    Problem: Impaired Skin Integrity/Pressure Injury Treatment  Goal: *Improvement of Existing Pressure Injury  Outcome: Progressing Towards Goal   Wound care completed per order

## 2020-05-18 NOTE — PROGRESS NOTES
Problem: Nutrition Deficit  Goal: *Optimize nutritional status  Outcome: Progressing Towards Goal     Problem: Falls - Risk of  Goal: *Absence of Falls  Description: Document Stacy Campos Fall Risk and appropriate interventions in the flowsheet. Outcome: Progressing Towards Goal  Note: Fall Risk Interventions:  Mobility Interventions: Communicate number of staff needed for ambulation/transfer    Mentation Interventions: Adequate sleep, hydration, pain control    Medication Interventions: Evaluate medications/consider consulting pharmacy    Elimination Interventions: Call light in reach, Patient to call for help with toileting needs, Toileting schedule/hourly rounds    History of Falls Interventions: Investigate reason for fall         Problem: Pressure Injury - Risk of  Goal: *Prevention of pressure injury  Description: Document Aniket Scale and appropriate interventions in the flowsheet. Outcome: Progressing Towards Goal  Note: Pressure Injury Interventions:  Sensory Interventions: Assess changes in LOC, Assess need for specialty bed, Avoid rigorous massage over bony prominences, Check visual cues for pain, Float heels, Keep linens dry and wrinkle-free, Minimize linen layers, Monitor skin under medical devices, Pressure redistribution bed/mattress (bed type), Turn and reposition approx. every two hours (pillows and wedges if needed)    Moisture Interventions: Absorbent underpads, Apply protective barrier, creams and emollients, Assess need for specialty bed, Check for incontinence Q2 hours and as needed, Internal/External urinary devices, Maintain skin hydration (lotion/cream), Moisture barrier    Activity Interventions: Assess need for specialty bed, Pressure redistribution bed/mattress(bed type)    Mobility Interventions: Assess need for specialty bed, Float heels, HOB 30 degrees or less, Pressure redistribution bed/mattress (bed type), Turn and reposition approx.  every two hours(pillow and wedges)    Nutrition Interventions: Document food/fluid/supplement intake    Friction and Shear Interventions: Apply protective barrier, creams and emollients, HOB 30 degrees or less, Minimize layers, Transferring/repositioning devices                Problem: Diabetes Self-Management  Goal: *Disease process and treatment process  Description: Define diabetes and identify own type of diabetes; list 3 options for treating diabetes. Outcome: Progressing Towards Goal  Goal: *Using medications safely  Description: State effect of diabetes medications on diabetes; name diabetes medication taking, action and side effects. Outcome: Progressing Towards Goal  Goal: *Monitoring blood glucose, interpreting and using results  Description: Identify recommended blood glucose targets  and personal targets. Outcome: Progressing Towards Goal  Goal: *Prevention, detection, treatment of acute complications  Description: List symptoms of hyper- and hypoglycemia; describe how to treat low blood sugar and actions for lowering  high blood glucose level. Outcome: Progressing Towards Goal  Goal: *Prevention, detection and treatment of chronic complications  Description: Define the natural course of diabetes and describe the relationship of blood glucose levels to long term complications of diabetes. Outcome: Progressing Towards Goal     Problem: Impaired Skin Integrity/Pressure Injury Treatment  Goal: *Improvement of Existing Pressure Injury  Outcome: Progressing Towards Goal  Goal: *Prevention of pressure injury  Description: Document Aniket Scale and appropriate interventions in the flowsheet.   Outcome: Progressing Towards Goal  Note: Pressure Injury Interventions:  Sensory Interventions: Assess changes in LOC, Assess need for specialty bed, Avoid rigorous massage over bony prominences, Check visual cues for pain, Float heels, Keep linens dry and wrinkle-free, Minimize linen layers, Monitor skin under medical devices, Pressure redistribution bed/mattress (bed type), Turn and reposition approx. every two hours (pillows and wedges if needed)    Moisture Interventions: Absorbent underpads, Apply protective barrier, creams and emollients, Assess need for specialty bed, Check for incontinence Q2 hours and as needed, Internal/External urinary devices, Maintain skin hydration (lotion/cream), Moisture barrier    Activity Interventions: Assess need for specialty bed, Pressure redistribution bed/mattress(bed type)    Mobility Interventions: Assess need for specialty bed, Float heels, HOB 30 degrees or less, Pressure redistribution bed/mattress (bed type), Turn and reposition approx.  every two hours(pillow and wedges)    Nutrition Interventions: Document food/fluid/supplement intake    Friction and Shear Interventions: Apply protective barrier, creams and emollients, HOB 30 degrees or less, Minimize layers, Transferring/repositioning devices

## 2020-05-18 NOTE — PROGRESS NOTES
Palliative Medicine Social Work    Palliative team signed off a while ago. Patient chart accessed.  Laura Alcantara me called to express concern that he was not notified patient was moved to new room today. Timely and regular communication is very important to him as patient has had a very long and difficult hospitalization. I referred him again to Patient Advocacy. He would like an RN update as they are able. Appreciate all support. Laura Alcantara [phone]              Thank you for the opportunity to be involved in the care of Ms. Vincent Butler and her family.     Jose Maria Enamorado LMSW, Supervisee in Social Work  Palliative Medicine   [phone]

## 2020-05-18 NOTE — PROGRESS NOTES
TRANSFER - OUT REPORT:    Verbal report given to Florian(name) on Trev York  being transferred to [facility] (unit) for routine progression of care       Report consisted of patients Situation, Background, Assessment and   Recommendations(SBAR). Information from the following report(s) SBAR, ED Summary, Procedure Summary, Intake/Output, MAR and Cardiac Rhythm normal sinus was reviewed with the receiving nurse. Lines:   PICC Triple Lumen 03/28/20 Right (Active)   Central Line Being Utilized Yes 5/18/2020  8:00 AM   Criteria for Appropriate Use Limited/no vessel suitable for conventional peripheral access 5/18/2020  8:00 AM   Site Assessment Clean, dry, & intact 5/18/2020  8:00 AM   Phlebitis Assessment 0 5/18/2020  8:00 AM   Infiltration Assessment 0 5/18/2020  8:00 AM   Date of Last Dressing Change 05/09/20 5/18/2020  8:00 AM   Dressing Status Clean, dry, & intact 5/18/2020  8:00 AM   Dressing Type Disk with Chlorhexadine gluconate (CHG) 5/18/2020  8:00 AM   Action Taken Open ports on tubing capped 5/18/2020  8:00 AM   Hub Color/Line Status White 5/18/2020  8:00 AM   Positive Blood Return (Site #1) Yes 5/18/2020  8:00 AM   Hub Color/Line Status Red 5/18/2020  8:00 AM   Positive Blood Return (Site #2) Yes 5/18/2020  8:00 AM   Hub Color/Line Status Gray 5/18/2020  8:00 AM   Positive Blood Return (Site #3) Yes 5/18/2020  8:00 AM   Alcohol Cap Used Yes 5/18/2020  8:00 AM        Opportunity for questions and clarification was provided.       Patient transported with:   [employer]

## 2020-05-18 NOTE — PROGRESS NOTES
2330: Bedside and Verbal shift change report given to [address] [place] [address] (oncoming nurse) by Dari Grant (offgoing nurse). Report included the following information SBAR, Kardex, ED Summary, Procedure Summary, MAR, Recent Results and Cardiac Rhythm NSR.     0000: Shift assessment completed per flowsheet. 0245: Patient vomited small amount of yellow emesis. PRN Zofran given. 0400: Reassessment completed per flowsheet. 6396: Patient vomited small amount of yellow emesis. 0700: Patient vomited small amount of yellow emesis. PRN Zofran given. 0730: Bedside and Verbal shift change report given to Jamal Diaz (oncoming nurse) by [address] [place] [address] (offgoing nurse). Report included the following information SBAR, Kardex, ED Summary, Procedure Summary, MAR, Recent Results and Cardiac Rhythm NSR.

## 2020-05-18 NOTE — PROGRESS NOTES
1930: Bedside and Verbal shift change report given to Allie Mayfield RN  (oncoming nurse) by Yajaira Bolanos RN (offgoing nurse). Report included the following information SBAR, Kardex, Intake/Output, MAR, Recent Results and Cardiac Rhythm NSR.   2330: Bedside and Verbal shift change report given to Saleem Jeffries RN  (oncoming nurse) by Allie Mayfield RN (offgoing nurse). Report included the following information SBAR, Kardex, Intake/Output, MAR, Recent Results and Cardiac Rhythm NSR.

## 2020-05-18 NOTE — PROGRESS NOTES
IFTIKHAR PLAN:     RUR 39%     Disposition plan is to discharge to I[facility] at [facility] in [place]. They will not have a bed until Wed. 5/20/20.  did not want patient to go anywhere else. CM to contact Shazia Greenberg at [facility] ([phone]) regarding auth for discharge      CM to fax clinical notes including PT/OT/SLP notes to [facility] at [phone] [facility] tomorrow       [facility] is 2nd choice. They are following patient remotely as well.      Patient will need transportation arranged. However, depending on mileage, patient may have to pay out of pocket    HD discontinued-  notified Aquiles Tanner 2331.

## 2020-05-18 NOTE — PROGRESS NOTES
[facility]   [address] [facility], [facility] [address], [facility]  Phone: [phone]   [phone]       Nephrology Progress Note  Trev York     1960     [id number]  Date of Admission : 3/31/2020  05/18/20    CC: Follow up for JUANCHO      Assessment and Plan   JUANCHO :  - 2/2 ATN  - resolved, last HD 5/9, PC removed  - no further HD planned  - cont present care    Hypokalemia:  - replete    Hypernatremia:  - FW flushes via J tube    COVID-19 +ve   Acute Hypoxic Resp Failure   - completed Plaquenil, - s/p Tocilizumab   - extubated 4/29  - repeat SARS-CoV-2 neg 4/20 and 4/27     Anemia in CKD:  - cont KOKI    RP hematoma:  Cardiac arrest 4/9    PE this admission  Non-occlusive superfical DVT:  - on on AC due to large RP hematoma  - no IVC to be placed    Type II DM : Insulin per primary team    Morbid Obesity     Nutrition:   - s/p J  tube placement 5/14      Will sign off. Please call us back with any issues. Interval History:  Seen and examined. Resting in bed, in NAD. Cr stable. Stable UOP. Review of Systems: A comprehensive review of systems was negative except for that written in the HPI.     Current Medications:   Current Facility-Administered Medications   Medication Dose Route Frequency    0.9% sodium chloride infusion  75 mL/hr IntraVENous CONTINUOUS    metoprolol (LOPRESSOR) injection 5 mg  5 mg IntraVENous Q12H    insulin glargine (LANTUS) injection 15 Units  15 Units SubCUTAneous DAILY    famotidine (PEPCID) tablet 20 mg  20 mg Per J Tube DAILY    bumetanide (BUMEX) tablet 1 mg  1 mg Oral DAILY    lidocaine (XYLOCAINE) 2 % jelly   Mucous Membrane PRN    sodium chloride (NS) flush 5-40 mL  5-40 mL IntraVENous Q8H    sodium chloride (NS) flush 5-40 mL  5-40 mL IntraVENous PRN    HYDROmorphone (PF) (DILAUDID) injection 0.5 mg  0.5 mg IntraVENous Q4H PRN    sucralfate (CARAFATE) tablet 1 g  1 g Oral TIDAC    pantoprazole (PROTONIX) 40 mg in 0.9% sodium chloride 10 mL injection  40 mg IntraVENous Q12H    LORazepam (ATIVAN) injection 0.5 mg  0.5 mg IntraVENous Q6H PRN    metoclopramide HCl (REGLAN) injection 5 mg  5 mg IntraVENous Q6H PRN    escitalopram oxalate (LEXAPRO) tablet 10 mg  10 mg Oral DAILY    traZODone (DESYREL) tablet 50 mg  50 mg Oral QHS    heparin (porcine) 1,000 unit/mL injection 3,800 Units  3,800 Units Hemodialysis DIALYSIS PRN    balsam peru-castor oiL (VENELEX) ointment   Topical BID    guaiFENesin (ROBITUSSIN) 100 mg/5 mL oral liquid 100 mg  100 mg Oral Q12H    epoetin jovani-epbx (RETACRIT) injection 20,000 Units  20,000 Units SubCUTAneous Q TUE, THU & SAT    HYDROcodone-acetaminophen (NORCO) 5-325 mg per tablet 1 Tab  1 Tab Oral Q6H PRN    phenol throat spray (CHLORASEPTIC) 1 Spray  1 Spray Oral Q6H PRN    sevelamer carbonate (RENVELA) oral powder 0.8 g  0.8 g Oral TID WITH MEALS    guar gum (BENEFIBER) packet 1 Packet  4 g Oral TID    labetaloL (NORMODYNE;TRANDATE) injection 20 mg  20 mg IntraVENous Q4H PRN    albumin human 25% (BUMINATE) solution 12.5 g  12.5 g IntraVENous DIALYSIS PRN    albuterol (PROVENTIL VENTOLIN) nebulizer solution 2.5 mg  2.5 mg Nebulization Q4H PRN    alteplase (CATHFLO) 2 mg in sterile water (preservative free) 2 mL injection  2 mg InterCATHeter DIALYSIS PRN    alteplase (CATHFLO) 2 mg in sterile water (preservative free) 2 mL injection  2 mg InterCATHeter DIALYSIS PRN    insulin lispro (HUMALOG) injection   SubCUTAneous Q6H    white petrolatum-mineral oiL (AKWA TEARS) 83-15 % ophthalmic ointment   Both Eyes Q12H    bacitracin 500 unit/gram packet 1 Packet  1 Packet Topical PRN    sodium chloride (NS) flush 5-40 mL  5-40 mL IntraVENous Q8H    sodium chloride (NS) flush 5-40 mL  5-40 mL IntraVENous PRN    ondansetron (ZOFRAN) injection 4 mg  4 mg IntraVENous Q4H PRN    acetaminophen (TYLENOL) tablet 650 mg  650 mg Oral Q6H PRN    Or    acetaminophen (TYLENOL) suppository 650 mg  650 mg Rectal Q6H PRN  glucose chewable tablet 16 g  4 Tab Oral PRN    glucagon (GLUCAGEN) injection 1 mg  1 mg IntraMUSCular PRN    dextrose 10% infusion 0-250 mL  0-250 mL IntraVENous PRN      Allergies   Allergen Reactions    Augmentin [Amoxicillin-Pot Clavulanate] Rash and Itching       Objective:  Vitals:    Vitals:    05/17/20 2350 05/18/20 0348 05/18/20 0456 05/18/20 0754   BP: (!) 149/91 150/62 153/75 113/67   Pulse: 90 98 98 91   Resp:  22 12 16   Temp:  97.9 °F (36.6 °C)  98 °F (36.7 °C)   TempSrc:       SpO2:  97% 98% 100%   Weight:  106.5 kg (234 lb 11.2 oz)     Height:         Intake and Output:  No intake/output data recorded. 05/16 1901 - 05/18 0700  In: 7428 [I.V.:1620]  Out: 1450 [Urine:1450]    Physical Examination:     General: In NAD  HEENT:           NGT in place  Neck :              supple   Resp:  CTA  CV:  RRR, no murmur   GI:  Obese , soft, NT J tube +  Neurologic:  awake      []    High complexity decision making was performed  []    Patient is at high-risk of decompensation with multiple organ involvement    Lab Data Personally Reviewed: I have reviewed all the pertinent labs, microbiology data and radiology studies during assessment.     Recent Labs     05/18/20  0358 05/17/20  0249 05/16/20  0504   * 146* 146*   K 3.1* 3.2* 3.2*   * 112* 111*   CO2 27 29 30   * 178* 244*   BUN 13 17 20   CREA 0.75 0.85 0.94   CA 8.4* 8.4* 8.1*   MG 1.4* 1.5* 1.7   PHOS  --  2.6  --    ALB 2.3* 2.2*  --    SGOT 12*  --   --    ALT 17  --   --      Recent Labs     05/18/20  0358 05/17/20  0716 05/16/20  0504   WBC 8.5 8.4 8.8   HGB 9.3* 9.3* 9.1*   HCT 31.3* 30.6* 30.5*    236 227     No results found for: SDES  Lab Results   Component Value Date/Time    Culture result: NO GROWTH 5 DAYS 04/09/2020 02:32 PM    Culture result: NO GROWTH 5 DAYS 04/08/2020 10:36 AM    Culture result: NO GROWTH 5 DAYS 03/31/2020 11:15 AM    Culture result: MODERATE STAPHYLOCOCCUS AUREUS (A) 03/31/2020 10:34 AM Culture result: LIGHT NORMAL RESPIRATORY SOFIE 03/31/2020 10:34 AM     Recent Results (from the past 24 hour(s))   GLUCOSE, POC    Collection Time: 05/17/20 12:03 PM   Result Value Ref Range    Glucose (POC) 158 (H) 65 - 100 mg/dL    Performed by Mau Paulson (JOSSELINE)    GLUCOSE, POC    Collection Time: 05/17/20  5:52 PM   Result Value Ref Range    Glucose (POC) 141 (H) 65 - 100 mg/dL    Performed by Chung Grippe    GLUCOSE, POC    Collection Time: 05/17/20  6:19 PM   Result Value Ref Range    Glucose (POC) 157 (H) 65 - 100 mg/dL    Performed by Chung Grippe    GLUCOSE, POC    Collection Time: 05/17/20 11:26 PM   Result Value Ref Range    Glucose (POC) 127 (H) 65 - 100 mg/dL    Performed by Marlene Tong    CBC W/O DIFF    Collection Time: 05/18/20  3:58 AM   Result Value Ref Range    WBC 8.5 3.6 - 11.0 K/uL    RBC 3.16 (L) 3.80 - 5.20 M/uL    HGB 9.3 (L) 11.5 - 16.0 g/dL    HCT 31.3 (L) 35.0 - 47.0 %    MCV 99.1 (H) 80.0 - 99.0 FL    MCH 29.4 26.0 - 34.0 PG    MCHC 29.7 (L) 30.0 - 36.5 g/dL    RDW 16.3 (H) 11.5 - 14.5 %    PLATELET 879 400 - 381 K/uL    MPV 9.7 8.9 - 12.9 FL    NRBC 0.0 0  WBC    ABSOLUTE NRBC 0.00 0.00 - 8.21 K/uL   METABOLIC PANEL, COMPREHENSIVE    Collection Time: 05/18/20  3:58 AM   Result Value Ref Range    Sodium 148 (H) 136 - 145 mmol/L    Potassium 3.1 (L) 3.5 - 5.1 mmol/L    Chloride 113 (H) 97 - 108 mmol/L    CO2 27 21 - 32 mmol/L    Anion gap 8 5 - 15 mmol/L    Glucose 139 (H) 65 - 100 mg/dL    BUN 13 6 - 20 MG/DL    Creatinine 0.75 0.55 - 1.02 MG/DL    BUN/Creatinine ratio 17 12 - 20      GFR est AA >60 >60 ml/min/1.73m2    GFR est non-AA >60 >60 ml/min/1.73m2    Calcium 8.4 (L) 8.5 - 10.1 MG/DL    Bilirubin, total 0.8 0.2 - 1.0 MG/DL    ALT (SGPT) 17 12 - 78 U/L    AST (SGOT) 12 (L) 15 - 37 U/L    Alk.  phosphatase 153 (H) 45 - 117 U/L    Protein, total 5.8 (L) 6.4 - 8.2 g/dL    Albumin 2.3 (L) 3.5 - 5.0 g/dL    Globulin 3.5 2.0 - 4.0 g/dL    A-G Ratio 0.7 (L) 1.1 - 2.2 MAGNESIUM    Collection Time: 05/18/20  3:58 AM   Result Value Ref Range    Magnesium 1.4 (L) 1.6 - 2.4 mg/dL   SAMPLES BEING HELD    Collection Time: 05/18/20  3:58 AM   Result Value Ref Range    SAMPLES BEING HELD 1PST     COMMENT        Add-on orders for these samples will be processed based on acceptable specimen integrity and analyte stability, which may vary by analyte. GLUCOSE, POC    Collection Time: 05/18/20  6:08 AM   Result Value Ref Range    Glucose (POC) 140 (H) 65 - 100 mg/dL    Performed by Sabino Middleton, POC    Collection Time: 05/18/20  8:46 AM   Result Value Ref Range    Glucose (POC) 116 (H) 65 - 100 mg/dL    Performed by Savita Lopez            Total time spent with patient:  xxx   min. Care Plan discussed with:  Patient     Family      RN      Consulting Physician [facility],         I have reviewed the flowsheets. Chart and Pertinent Notes have been reviewed. No change in PMH ,family and social history from Consult note.       Monica Catalan MD

## 2020-05-18 NOTE — DIABETES MGMT
[facility]A [facility]  CLINICAL NURSE SPECIALIST CONSULT  PROGRAM FOR DIABETES HEALTH  Follow up Note  Presentation   Rajan Kay is a 64 y.o. female admitted from OSH to [facility] ICU with SARS-COV2 . Now recovering and COVID -. Per Nephorology kidneys are recovering nicely and now not requiring HD. Patient is now suffering with left paralysis of vocal cord which  affects her swallowing ability. New diabetes diagnosis with A1C 11.0% (3/28/2020); updated A1C 3/31/20-10.4%     Recent events: Today patient has had N/V. Per GI note TF are on hold. Consulted by Provider for advanced diabetes nursing assessment and care, specifically related to   [] Transitioning off Mayra Distel   [x] Inpatient management strategy  [] Home management assessment  [] Survival skill education    Diabetes-related medical history  Acute complications  hyperglycemia  Neurological complications  NONE  Microvascular disease  NONE  Macrovascular disease  NONE  Other associated conditions     NONE    Diabetes medication history: NONE    Subjective   Ms. Matt Garcia is awake, her whole body shaking. States she does this sometimes. C/o n/v-given green bag. Objective   Diabetic Foot Exam: Left foot: warm, no calluses noted, red spot noted on top of foot over bone, but no breakdown noted. + microfilament sensation noted in all areas. Right foot: in brace, however, foot warm. Microfilament test deferred due to brace. Vital Signs   Visit Vitals  /87 (BP 1 Location: Left arm, BP Patient Position: During activity; Head of bed elevated (Comment degrees); Supine)   Pulse (P) 78   Temp 98 °F (36.7 °C)   Resp 16   Ht 5' 4\" (1.626 m)   Wt 106.5 kg (234 lb 11.2 oz)   SpO2 100%   BMI 40.29 kg/m²   .    Laboratory  Lab Results   Component Value Date/Time    Hemoglobin A1c 10.4 (H) 03/31/2020 03:42 PM     No results found for: LDL, LDLC, DLDLP  Lab Results   Component Value Date/Time    Creatinine 0.75 05/18/2020 03:58 AM     Lab Results   Component Value Date/Time    Sodium 148 (H) 05/18/2020 03:58 AM    Potassium 3.1 (L) 05/18/2020 03:58 AM    Chloride 113 (H) 05/18/2020 03:58 AM    CO2 27 05/18/2020 03:58 AM    Anion gap 8 05/18/2020 03:58 AM    Glucose 139 (H) 05/18/2020 03:58 AM    BUN 13 05/18/2020 03:58 AM    Creatinine 0.75 05/18/2020 03:58 AM    BUN/Creatinine ratio 17 05/18/2020 03:58 AM    GFR est AA >60 05/18/2020 03:58 AM    GFR est non-AA >60 05/18/2020 03:58 AM    Calcium 8.4 (L) 05/18/2020 03:58 AM    Bilirubin, total 0.8 05/18/2020 03:58 AM    AST (SGOT) 12 (L) 05/18/2020 03:58 AM    Alk. phosphatase 153 (H) 05/18/2020 03:58 AM    Protein, total 5.8 (L) 05/18/2020 03:58 AM    Albumin 2.3 (L) 05/18/2020 03:58 AM    Globulin 3.5 05/18/2020 03:58 AM    A-G Ratio 0.7 (L) 05/18/2020 03:58 AM    ALT (SGPT) 17 05/18/2020 03:58 AM     Lab Results   Component Value Date/Time    ALT (SGPT) 17 05/18/2020 03:58 AM         Evaluation   Ms Holley Bueno, with new onset Type 2 diabetes,with A1C 10.4%. TF on hold today for N/V.  BG trends <200mg/dl today. It will be appropriate to hold basal insulin while TF on hold. Recommendations   1. Basal on hold for TF being held. Re-Start basal when TF re-started. 2. Will continue to follow. Assessment and Plan   Nursing Diagnosis Risk for unstable blood glucose pattern   Nursing Intervention Domain 4169 Decision-making Support   Nursing Interventions Examined current inpatient diabetes control   Explored factors facilitating and impeding inpatient management  Identified self-management practices impeding diabetes control  Explored corrective strategies with patient and responsible inpatient provider   Informed patient of rational for insulin strategy while hospitalized         Billing Code(s)   Thank you for including us in their care. I spent 30 minutes in direct patient care today for this patient.   Time includes chart review, face to face with patient and collaboration with interdisciplinary care team.      Rolando Calhoun, [facility]   Program for Diabetes Health  Access via KESHIA [address]oca [phone]

## 2020-05-18 NOTE — PROGRESS NOTES
Attempted to see patient who was received with patient transport. She is actively transferring to a new room. Will defer and follow up tomorrow.   Sophie Mosley, PT, DPT

## 2020-05-18 NOTE — PROGRESS NOTES
Hospitalist Progress Note  Lashawn Banegas MD  Answering service: [phone] from in house phone      Date of Service:  2020  NAME:  Axel Whitaker  :  1960  MRN:  [id number]    Admission Summary:   59F s/p COVID treatement in ICU - extubated  Interval history / Subjective: Follow up Acute hypoxic resp. Failure, acute renal failure  The patient continues to have nausea and vomiting  Tube feeds held since yesterday  Complains of generalized weakness       Assessment & Plan:     #. ARF:   - Nephrology following- was on HD  -last HD   -now recovered and no more need for HD  -Permacath removed   -Appreciate discussion with Nephrology  -now resolved    Nausea, vomiting: GI consulted,   -s/p EGD:Hiatal hernia, gr2 erosive esophagitis and gastritis in antrum, appreciate recommendations, cont. Protonix, added carafate, started on CLD  - reportedly the patient continued to have nausea/vomiting.  -Appreciate discussion with GI  -s/p GJ tube placement   -Tube feeds started  but held since  sec to continued nausea and vomiting  -Appreciate discussion with GI, restarted feeds . But did not tolerate. I think atleast part of it is driven by anxiety contributed by prolonged GERD.  does mention that the patient has been having nausea and vomiting since \"many years\"    COVID-19 +ve   - s/p Tocilizumab, Plaquenil. On Vit C, Zinc   -COVID negative ,  and   - Resolved    Acute Hypoxic Resp Failure  - resolved     Acute metabolic Encephalopathy:  - likely ICU delirium- no agitation. Monitor  - resolved. Sinus tachycardia:   -improving,continue Metoprolol, normal TSH     Anemia in CKD:monitor HB. Retroperitoneal bleed: this hospitalization,   -resolved - Avoid AP/AC meds- Monitor Hb     Non occlusive PE in LLL pulmonary artery - no ACs due to retroperitoneal bleed.   Appreciate Vascular surgery recommendations for IVC filter, comments noted, conservative approach for now    #. S/p Cardiac arrest 4/9  #. DM2: controlled, A1c 10.4, SSI, AccuChecks, monitor  #. Morbid obesity: counseled on health benefits of weight loss, Healthy diet, BMI 44.11 kg/m². # depression: continue Trazodone at bedtime and Psych consulted, started on Lexapro  # Anxiety episodes; Continue Lexapro, Ativan PRN for short duration     Dysphagia  -s/p GJ tube 5/14, tube feeds started 5/15  -nutrition consult    PT/OT rehab    Code status: Full  DVT prophylaxis: SCD    Plan: The patient is medically stable, awaiting discharge to rehab coming Wednesday    Disposition: as above     Hospital Problems  Date Reviewed: 5/14/2020          Codes Class Noted POA    Hypotension ICD-10-CM: I95.9  ICD-9-CM: 458.9 Acute 4/2/2020 Yes        Retroperitoneal hemorrhage ICD-10-CM: R58  ICD-9-CM: 459.0  4/19/2020 No        Acute renal failure (ARF) ([facility] ICD-10-CM: N17.9  ICD-9-CM: 584.9  4/19/2020 No        Encephalopathy ICD-10-CM: G93.40  ICD-9-CM: 348.30  4/19/2020 No        * (Principal) Sepsis with multi-organ dysfunction ([facility] ICD-10-CM: A41.9, R65.20  ICD-9-CM: 038.9, 995.92  4/19/2020 Yes        Pneumonia due to methicillin susceptible Staphylococcus aureus (MSSA) ([facility] ICD-10-CM: Q80.801  ICD-9-CM: 482.41  4/19/2020 Unknown        Thrombocytopenia ([facility] ICD-10-CM: D69.6  ICD-9-CM: 287.5  4/19/2020 Unknown        Diarrhea ICD-10-CM: R19.7  ICD-9-CM: 787.91  4/19/2020 Unknown        COVID-19 virus infection ICD-10-CM: U07.1  ICD-9-CM: 079.89  3/31/2020 Unknown            Review of Systems:   Pertinent items are mentioned in interval history. Vital Signs:    Last 24hrs VS reviewed since prior progress note.  Most recent are:  Visit Vitals  /75 (BP 1 Location: Left arm, BP Patient Position: At rest)   Pulse 93   Temp 98.6 °F (37 °C)   Resp 20   Ht 5' 4\" (1.626 m)   Wt 104.3 kg (230 lb)   SpO2 100%   BMI 39.48 kg/m²         Intake/Output Summary (Last 24 hours) at 5/17/2020 2141  Last data filed at 5/17/2020 1553  Gross per 24 hour   Intake --   Output 500 ml   Net -500 ml        Physical Examination:   General:  Alert, No acute distress  Resp:  No accessory muscle use, Good AE, no wheezes, few rhonchi  Abd:  Soft, slightly tender, non-distended, obese, GJ tube in place  Extremities:  No cyanosis or clubbing, no significant edema  Neuro:  Grossly normal, no focal neuro deficits, follows commands   Psych:   not agitated. Data Review:    Review and/or order of clinical lab test  Review and/or order of tests in the radiology section of [facility]  Review and/or order of tests in the medicine section of [facility]  Labs:     Recent Labs     05/17/20  0716 05/16/20  0504   WBC 8.4 8.8   HGB 9.3* 9.1*   HCT 30.6* 30.5*    227     Recent Labs     05/17/20  0249 05/16/20  0504 05/15/20  0438   * 146* 145   K 3.2* 3.2* 3.3*   * 111* 112*   CO2 29 30 26   BUN 17 20 24*   CREA 0.85 0.94 1.00   * 244* 135*   CA 8.4* 8.1* 8.2*   MG 1.5* 1.7 1.4*   PHOS 2.6  --  4.5     Recent Labs     05/17/20  0249 05/15/20  0438   ALB 2.2* 2.2*     No results for input(s): INR, PTP, APTT, INREXT, INREXT in the last 72 hours. No results for input(s): FE, TIBC, PSAT, FERR in the last 72 hours. No results found for: FOL, RBCF   No results for input(s): PH, PCO2, PO2 in the last 72 hours. No results for input(s): CPK, CKNDX, TROIQ in the last 72 hours.     No lab exists for component: CPKMB  Lab Results   Component Value Date/Time    Triglyceride 159 (H) 04/12/2020 08:34 PM     Lab Results   Component Value Date/Time    Glucose (POC) 157 (H) 05/17/2020 06:19 PM    Glucose (POC) 141 (H) 05/17/2020 05:52 PM    Glucose (POC) 158 (H) 05/17/2020 12:03 PM    Glucose (POC) 153 (H) 05/17/2020 06:18 AM    Glucose (POC) 182 (H) 05/17/2020 12:01 AM     No results found for: COLOR, APPRN, SPGRU, REFSG, SARKIS, PROTU, GLUCU, KETU, BILU, UROU, GALE, LEUKU, GLUKE, EPSU, BACTU, WBCU, RBCU, CASTS, [facility]  Medications Reviewed:     Current Facility-Administered Medications   Medication Dose Route Frequency    0.9% sodium chloride infusion  75 mL/hr IntraVENous CONTINUOUS    metoprolol (LOPRESSOR) injection 5 mg  5 mg IntraVENous Q12H    insulin glargine (LANTUS) injection 15 Units  15 Units SubCUTAneous DAILY    famotidine (PEPCID) tablet 20 mg  20 mg Per J Tube DAILY    bumetanide (BUMEX) tablet 1 mg  1 mg Oral DAILY    lidocaine (XYLOCAINE) 2 % jelly   Mucous Membrane PRN    sodium chloride (NS) flush 5-40 mL  5-40 mL IntraVENous Q8H    sodium chloride (NS) flush 5-40 mL  5-40 mL IntraVENous PRN    HYDROmorphone (PF) (DILAUDID) injection 0.5 mg  0.5 mg IntraVENous Q4H PRN    sucralfate (CARAFATE) tablet 1 g  1 g Oral TIDAC    pantoprazole (PROTONIX) 40 mg in 0.9% sodium chloride 10 mL injection  40 mg IntraVENous Q12H    LORazepam (ATIVAN) injection 0.5 mg  0.5 mg IntraVENous Q6H PRN    metoclopramide HCl (REGLAN) injection 5 mg  5 mg IntraVENous Q6H PRN    escitalopram oxalate (LEXAPRO) tablet 10 mg  10 mg Oral DAILY    traZODone (DESYREL) tablet 50 mg  50 mg Oral QHS    heparin (porcine) 1,000 unit/mL injection 3,800 Units  3,800 Units Hemodialysis DIALYSIS PRN    balsam peru-castor oiL (VENELEX) ointment   Topical BID    guaiFENesin (ROBITUSSIN) 100 mg/5 mL oral liquid 100 mg  100 mg Oral Q12H    epoetin jovani-epbx (RETACRIT) injection 20,000 Units  20,000 Units SubCUTAneous Q TUE, THU & SAT    HYDROcodone-acetaminophen (NORCO) 5-325 mg per tablet 1 Tab  1 Tab Oral Q6H PRN    phenol throat spray (CHLORASEPTIC) 1 Spray  1 Spray Oral Q6H PRN    sevelamer carbonate (RENVELA) oral powder 0.8 g  0.8 g Oral TID WITH MEALS    guar gum (BENEFIBER) packet 1 Packet  4 g Oral TID    labetaloL (NORMODYNE;TRANDATE) injection 20 mg  20 mg IntraVENous Q4H PRN    albumin human 25% (BUMINATE) solution 12.5 g  12.5 g IntraVENous DIALYSIS PRN    albuterol (PROVENTIL VENTOLIN) nebulizer solution 2.5 mg 2.5 mg Nebulization Q4H PRN    alteplase (CATHFLO) 2 mg in sterile water (preservative free) 2 mL injection  2 mg InterCATHeter DIALYSIS PRN    alteplase (CATHFLO) 2 mg in sterile water (preservative free) 2 mL injection  2 mg InterCATHeter DIALYSIS PRN    insulin lispro (HUMALOG) injection   SubCUTAneous Q6H    white petrolatum-mineral oiL (AKWA TEARS) 83-15 % ophthalmic ointment   Both Eyes Q12H    bacitracin 500 unit/gram packet 1 Packet  1 Packet Topical PRN    sodium chloride (NS) flush 5-40 mL  5-40 mL IntraVENous Q8H    sodium chloride (NS) flush 5-40 mL  5-40 mL IntraVENous PRN    ondansetron (ZOFRAN) injection 4 mg  4 mg IntraVENous Q4H PRN    acetaminophen (TYLENOL) tablet 650 mg  650 mg Oral Q6H PRN    Or    acetaminophen (TYLENOL) suppository 650 mg  650 mg Rectal Q6H PRN    glucose chewable tablet 16 g  4 Tab Oral PRN    glucagon (GLUCAGEN) injection 1 mg  1 mg IntraMUSCular PRN    dextrose 10% infusion 0-250 mL  0-250 mL IntraVENous PRN   ______________________________________________________________________  EXPECTED LENGTH OF STAY: 12d 9h  ACTUAL LENGTH OF STAY:          61 Duane Jacinto MD

## 2020-05-18 NOTE — PROGRESS NOTES
TRANSFER - IN REPORT:    Verbal report received from Alyse(name) on Vernadine Safer  being received from 4W(unit) for routine progression of care      Report consisted of patients Situation, Background, Assessment and   Recommendations(SBAR). Information from the following report(s) SBAR, Kardex, [facility] and Recent Results was reviewed with the receiving nurse. Opportunity for questions and clarification was provided. Assessment completed upon patients arrival to unit and care assumed.

## 2020-05-18 NOTE — CONSULTS
[address], [address] [place] [address]    GI PROGRESS NOTE    NAME: Stefan Baker   :  1960   MRN:  [id number]       Subjective:   Re-consulted by primary team for nausea and vomiting. She had G-J tube place by IR on 5/15. She has experienced nausea and vomiting after commencement of tube feeding. Emesis contents are green/bilious and not tube feeding. She endorses a significant amount of anxiety about her clinical state. She reports to me today that she will become nauseated and vomit when she thinks about things such as not being able to eat/drink what she wants. She also reports that prior to being hospitalized, she had started seeing a therapist and had completed one session. She endorses stress related to family issues, which we did not go into more detail about. Review of Systems    Constitutional: negative fever, negative chills, negative weight loss  Eyes:   negative visual changes  ENT:   negative sore throat, tongue or lip swelling  Respiratory:  negative cough, negative dyspnea  Cards:  negative for chest pain, palpitations, lower extremity edema  GI:   See HPI  :  negative for frequency, dysuria  Integument:  negative for rash and pruritus  Heme:  negative for easy bruising and gum/nose bleeding  Musculoskel: negative for myalgias,  back pain and muscle weakness  Neuro: negative for headaches, dizziness, vertigo  Psych:  negative for feelings of anxiety, depression         Objective:     VITALS:   Last 24hrs VS reviewed since prior progress note. Most recent are:  Visit Vitals  /75 (BP 1 Location: Left arm, BP Patient Position: At rest)   Pulse 93   Temp 98.6 °F (37 °C)   Resp 20   Ht 5' 4\" (1.626 m)   Wt 104.3 kg (230 lb)   SpO2 100%   BMI 39.48 kg/m²       Intake/Output Summary (Last 24 hours) at 2020  Last data filed at 2020 1553  Gross per 24 hour   Intake --   Output 500 ml   Net -500 ml     PHYSICAL EXAM:  General: WD, WN. Alert, cooperative, no acute distress    HEENT: NC, Atraumatic. Anicteric sclerae. Lungs:  CTA Bilaterally. No Wheezing/Rhonchi/Rales. Heart:  Regular  rhythm,  No murmur (), No Rubs, No Gallops  Abdomen: Soft, Non distended, Non tender.  +Bowel sounds, no HSM, G-J tube in place  Extremities: No c/c/e  Neurologic:  CN 2-12 gi, Alert and oriented X 3. No acute neurological distress   Psych:   Good insight. Not anxious nor agitated. Lab Data Reviewed:   Recent Labs     05/17/20  0716 05/16/20  0504   WBC 8.4 8.8   HGB 9.3* 9.1*   HCT 30.6* 30.5*    227     Recent Labs     05/17/20  0249 05/16/20  0504 05/15/20  0438   * 146* 145   K 3.2* 3.2* 3.3*   * 111* 112*   CO2 29 30 26   BUN 17 20 24*   CREA 0.85 0.94 1.00   * 244* 135*   PHOS 2.6  --  4.5   CA 8.4* 8.1* 8.2*     Recent Labs     05/17/20  0249 05/15/20  0438   ALB 2.2* 2.2*       ________________________________________________________________________       Assessment:   · Nausea and vomiting - recurrence may in part be related to anxiety/psychogenic cause  · Status post G-J tube for inadequate PO intake and aspiration  · Per SLP/MBS - patient can have honey thick liquids and mechanically-altered ground diet per most recent assessment     Plan:   · I discussed her case with Dr. Edgar Serna and we both agreed that a Psychiatry consult may be helpful in better understanding why she is having recurrent nausea and vomiting. I explained to the patient that it will be equally helpful to know if this is or is not due in part to underlying anxiety/psychogenic cause (which she has endorsed on her own). She has been experiencing these symptoms despite SSRI and benzodiazepines. · We agreed to try J tube feeding again with supportive anti-emetic measures  · Please keep HOB elevated to minimize aspiration  · Based on history, I do not feel that a J tube study is indicated at this time.  If she has emesis with tube feeding contents, then we can perform imaging to clarify J tube position  · Will follow along with you     Signed By: Lin Juarez.  Arias Bacon MD     5/17/2020  9:20 PM

## 2020-05-18 NOTE — PROGRESS NOTES
Problem: Self Care Deficits Care Plan (Adult)  Goal: *Acute Goals and Plan of Care (Insert Text)  Description:   FUNCTIONAL STATUS PRIOR TO ADMISSION: Patient unable to provide history. Per chart, patient was fully independent PTA. HOME SUPPORT: Per chart, patient lived with family. OT weekly reassessment 5/12/2020: see goals below for update    Occupational Therapy Goals  Initiated 4/30/2020  1. Patient will perform grooming with maximal assistance within 7 day(s). (upgrade to min A 5/12)  2. Patient will perform self-feeding if appropriate for PO with maximal assistance within 7 day(s). (pending following MBS)  3.  Patient will perform bathing with maximal assistance within 7 day(s). (upgrade to mod A anterior bathing EOB 5/12)  4. Patient will participate in upper extremity therapeutic exercise/activities with moderate assistance  for 10 minutes within 7 day(s). (MIN A 5/12)  5. Patient will follow 100% simple commands in preparation for functional tasks within 7 days. (MET 5/12)      Outcome: Progressing Towards Goal     OCCUPATIONAL THERAPY TREATMENT  Patient: Altaf Jaramillo (33 y.o. female)  Date: 5/18/2020  Diagnosis: COVID-19 virus infection [U07.1]   Sepsis with multi-organ dysfunction ([facility] [address]  Procedure(s) (LRB):  ESOPHAGOGASTRODUODENOSCOPY (EGD) (Left)  ESOPHAGOGASTRODUODENAL (EGD) BIOPSY (N/A) 6 Days Post-Op  Precautions: Fall  Chart, occupational therapy assessment, plan of care, and goals were reviewed. ASSESSMENT  Patient continues with skilled OT services and is progressing towards goals but remains limited by debility following prolonged intubation/  immobilization. Patient's BUE strength/ AROM/ functional use have improved significantly since OT evaluation, BUE strength now overall 3+ to 4-/5. Today she presents with functional reach to entire head for grooming tasks, which she performed with set-up using each UE.   Patient was receptive to education/ demonstration of BUE exercises and was provided with yellow theraband and green foam resistance sponge with handout for / grasp strengthening. However, patient was unable to complete exercises this session d/t nausea and active vomiting. She scooted to [facility] with maximum assistance x2 but declined additional mobility d/t nausea. Continue to recommend inpatient rehab at d/ to maximize functional recovery. Current Level of Function Impacting Discharge (ADLs): set-up grooming tasks supine with HOB raised, infer up to moderate assistance UB ADLs and total assistance LB ADLs. PLAN :  Patient continues to benefit from skilled intervention to address the above impairments. Continue treatment per established plan of care. to address goals. Recommendation for discharge: (in order for the patient to meet his/her long term goals)  Therapy 3 hours per day 5-7 days per week    This discharge recommendation:  Has been made in collaboration with the attending provider and/or case management       SUBJECTIVE:   Patient stated I feel nauseous.     OBJECTIVE DATA SUMMARY:       Functional Mobility and Transfers for ADLs:  Bed Mobility:  Scooting: Maximum assistance;Assist x2(to HOB)    ADL Intervention:       Grooming  Brushing/Combing Hair: Set-up(using each UE, supine with HOB raised)    Also opened mouthwash cap, brought to face, rinsed, and spat in basin, all with set-up         Pain:  Patient did not report pain    Activity Tolerance:   VSS, tolerance limited by nausea, RN notified    After treatment patient left in no apparent distress:   Supine in bed and Call bell within reach    COMMUNICATION/COLLABORATION:   The patients plan of care was discussed with: Physical therapist and Registered nurse.      Sara Grant OT  Time Calculation: 28 mins

## 2020-05-18 NOTE — PROGRESS NOTES
SLP Contact Note    Pt still with significant emesis and therefore SLP will hold for now.       Thank you,  LACI Bennett.Ed, [address] [facility], [place], operated by [facility]  Speech-Language Pathologist

## 2020-05-18 NOTE — PROGRESS NOTES
NUTRITION COMPLETE ASSESSMENT    RECOMMENDATIONS:     1. Can we try scheduling reglan to help with N/V (currently it's prn)? 2. Consider another anti-emetic as the zofran only seems to help her nausea for very short period of time    3. Since pt is NOT currently vomiting any formula from J-tube feedings, consider NOT stopping tube feedings when pt has these \"spitting up\" episodes (which are greenish/yellowish in nature, it's not formula)    4. Pt did pass MBS for honey thick liquids and Dysphagia 2 diet--consider allowing her to have very small amounts of po within these restrictions (such as Magic Cup, applesauce, fortified pudding). Pt is fixated on the need to \"eat like a normal human being\"     5. Prior tube feeding goal:   --- Osmolite 1.5,  50 ml/hr continuous   --- Give 130 ml water flush every 4 hours   --- Goal provides: 1800 kcals, 75 gm pro, 1700 ml free fluid    6. All feedings go through J-port, medications through the G-port    7. Please start pt on adult MVI [place] daily    Interventions/Plan:   Food/Nutrient Delivery:   Modify rate, concentration, composition, and schedule     Assessment:   Reason for Assessment: Reassessment    Diet:  NPO, Jtube feedings  Nutritionally Significant Medications: [x] Reviewed & Includes: Lexapro, Benefiber tid, Lantus, correction scale insulin, Renvela, NS at 75 ml/hr, protonix      Subjective: Staff Interviewed  Pt reports her anxiety \"might have something to do with her spitting up; pt states as humans, we are meant to eat and drink and I'm not doing that. \"    Objective:  Ms Norma Phelan transferred from [facility] d/t worsening renal function requiring CRRT. Noted: JUANCHO d/t ATN-transitioned off CVVHD to daily IHD 4/18-now MWF; acute hypoxic respiratory failure d/t COVID-19 and bacterial PNA, intubated 3/28-extubated 4/29, BiPAP @ night; PEA arrest 4/9, hyperglycemia improved; critical illness neuropathy/myopathy; encephalopathy; metabolic alkalosis.  Question of aspiration on CXR today. 5/13: Pt was extubated on 4/29/20, still with very sore throat (also had OGT entire time of intubation). Pt passed MBS today for Dysphagia 2 with honey thick liquids; RD has added magic cup to all meals to help increase calorie/protein intake. Psychiatry started pt on Lexapro several days ago as pt voiced she has felt depressed \"for awhile even before I got sick. \" RD will continue to follow. 5/15: Pt underwent PEG/J tube placement yesterday 5/14. She was not tolerating po feedings (N/V), ongoing concerns for aspiration. She is now off of dialysis. Spoke at length with pt this morning and explained how the tube feeding will slowly progress to goal over the next 24 hours. Pt tearful at times, wants so much to eat. Hopefully with improved tolerance of feeding into small bowel, pt will get stronger and be able to tolerate po feedings in the near future. Plan is still for pt to go to rehab once medically ready for discharge. I called pt's  and explained the tube feeding plan, answered his questions. RD continues to follow. 5/18: Pt has continued to have many episodes of spitting up (greenish in color, it's not formula from the J-tube feedings); her tube feeds have been on/off throughout the weekend d/t spit ups. Spoke with pt and pt's nurse today. Pt may benefit from some adjustments in her medications, such as scheduling the reglan, and trying a different anti-emetic. Consult placed for psychiatry as well--there is likely a strong link between these spitting up episodes and pt's anxiety/panic attacks. Pt has not been receiving consistent nutrition for over a week now (between NGT being discontinued, starting po diet after MBS, being NPO because of vomiting, being NPO for G/J tube placement, then J-tube feeds being held because of N/V).   Suggest trying to keep the J-tube feedings going, even if it's at a lower rate than the goal of 50 ml/hr, as pt needs the nutrition for recovery; an,  up to this point she is not vomiting formula. Since pt is so fixated on eating, can also try allowing her to have very small amounts of po that fit into her diet recommendation of Dysphagia 2 + honey thick liquids and see if this helps reduce her anxiety levels, and therefore the N/V.   RD continues to follow. Estimated Nutrition Needs:   Kcals/day: 7343 Kcals/day(0047-7854 (MSJ x 1.0-1.1)  Protein: 108 g(2g/kg IBW)  Fluid: (1 ml/kcal)  Based On: [place] Je[employer]  Weight Used: Actual wt(115 kg)    Pt expected to meet estimated nutrient needs:  [x]   Yes--via tube feeding    []  No  [] Unable to predict at this time  Nutrition Diagnosis:   1. Swallowing difficulty related to critical illness neuropathy/myopathy as evidenced by dysphagia and enteral nutrition support via NGT.     Goals:       Pt will tolerate goal J-tube feeding without N/V over the next 3-5 days     Monitoring & Evaluation:    - Enteral/parenteral nutrition intake   - Electrolyte and renal profile, CV-pulmonary, Weight/weight change, Glucose profile, GI    Previous Nutrition Goals Met: Yes  Previous Recommendations: Yes    Education & Discharge Needs:   [x] None Identified   [] Identified and addressed    [x] Participated in care plan, discharge planning, and/or interdisciplinary rounds        Cultural, Anabaptist and ethnic food preferences identified:  None    Skin Integrity: [x]Intact  []Other  Edema: []None [x] Trace BLE  Last BM: 5/18/20  Food Allergies: [x]None []Other    Anthropometrics:    Weight Loss Metrics 5/18/2020 1/23/2020 1/22/2019 1/16/2018 1/9/2017 7/6/2016 12/14/2015   Today's Wt 234 lb 11.2 oz 270 lb 12.8 oz 278 lb 279 lb 275 lb 234 lb 213 lb 6.4 oz   BMI 40.29 kg/m2 43.73 kg/m2 44.89 kg/m2 45.05 kg/m2 44.39 kg/m2 37.79 kg/m2 34.46 kg/m2      Last 3 Recorded Weights in this Encounter    05/16/20 0315 05/17/20 0452 05/18/20 0348   Weight: 107.9 kg (237 lb 12.8 oz) 104.3 kg (230 lb) 106.5 kg (234 lb 11.2 oz) Weight Source: Bed  Height: 5' 4\" (162.6 cm),    Body mass index is 40.29 kg/m². IBW : 54.4 kg (120 lb), % IBW (Calculated): 212.19 %   ,      Labs:    Lab Results   Component Value Date/Time    Sodium 148 (H) 05/18/2020 03:58 AM    Potassium 3.1 (L) 05/18/2020 03:58 AM    Chloride 113 (H) 05/18/2020 03:58 AM    CO2 27 05/18/2020 03:58 AM    Glucose 139 (H) 05/18/2020 03:58 AM    BUN 13 05/18/2020 03:58 AM    Creatinine 0.75 05/18/2020 03:58 AM    Calcium 8.4 (L) 05/18/2020 03:58 AM    Magnesium 1.4 (L) 05/18/2020 03:58 AM    Phosphorus 2.6 05/17/2020 02:49 AM    Albumin 2.3 (L) 05/18/2020 03:58 AM     Lab Results   Component Value Date/Time    Hemoglobin A1c 10.4 (H) 03/31/2020 03:42 PM     Lab Results   Component Value Date/Time    Glucose (POC) 138 (H) 05/05/2020 11:35 AM      Lab Results   Component Value Date/Time    ALT (SGPT) 17 05/18/2020 03:58 AM    AST (SGOT) 12 (L) 05/18/2020 03:58 AM    Alk.  phosphatase 153 (H) 05/18/2020 03:58 AM    Bilirubin, direct 0.7 (H) 04/20/2020 05:29 AM    Bilirubin, total 0.8 05/18/2020 03:58 AM        Tita Ya RD, CSP  C: [phone]

## 2020-05-18 NOTE — PROGRESS NOTES
The patient is in the hospital from 3 /31/2020 till present. Because of her health condition she cannot go back to work till to be decided. Please call with questions.

## 2020-05-18 NOTE — PROGRESS NOTES
Problem: Mobility Impaired (Adult and Pediatric)  Goal: *Acute Goals and Plan of Care (Insert Text)  Description: FUNCTIONAL STATUS PRIOR TO ADMISSION: Patient was independent and active without use of DME but poor history due to not being able to communicate. HOME SUPPORT PRIOR TO ADMISSION: Pt lives with family. Physical Therapy Goals  1. Patient will move from supine to sit and sit to supine , scoot up and down and roll side to side in bed with moderate assistance x1 person within 7 day(s). 2.  Patient will sit EOB with supervision (occasional min A), x10 minutes with retracted shoulders, midline posture, and forward gaze within 7 days. 3.  Patient will verbalize and demonstrate 5 exercises she can complete on her own outside of therapy sessions with 7 days. 4.  Patient will participate in scooting to [facility] with control of core and max Ax2 for scooting while sitting EOB within 7 days. 5.  Patient will tolerate sitting in chair x1 hour within 7 days. Initiated 5/4/2020- All goals met 5/12/2020  1. Patient will move from supine to sit and sit to supine , scoot up and down and roll side to side in bed with maximal assistance within 7 day(s). 2.  Patient will sit EOB with moderate assistance of 2 people, x5 minutes with active core initiation to correct sitting balance within 7 days. 3.  Patient will verbalize 3 exercises she can complete on her own outside of therapy sessions with 7 days. 4.  Patient will participate in supine HEP (AAROM as needed) with min assist for AAROM within 7 days. 5.  Patient will tolerate bed in chair position 30 min BID within 7 days. Initiated 4/25/2020  1. Patient will move from supine to sit and sit to supine , scoot up and down and roll side to side in bed with moderate assistance once off vent within 7 day(s). 2.  Patient will participate in sitting with min A for 2 minutes once off vent within 7 day(s).   3.  Patient will perform active supine or sitting TE with min A within 7 day(s). Outcome: Progressing Towards Goal   PHYSICAL THERAPY TREATMENT  Patient: Veronica Grant (27 y.o. female)  Date: 5/18/2020  Diagnosis: COVID-19 virus infection [U07.1]   Sepsis with multi-organ dysfunction ([facility] [address]  Procedure(s) (LRB):  ESOPHAGOGASTRODUODENOSCOPY (EGD) (Left)  ESOPHAGOGASTRODUODENAL (EGD) BIOPSY (N/A) 6 Days Post-Op  Precautions: Fall  Chart, physical therapy assessment, plan of care and goals were reviewed. ASSESSMENT  Patient seen post transfer to a new unit and agreeable to exercise but deferred activity at EOB d/t finally having relief from nausea. Participated well in session but demonstrated significant muscle weakness. Encouraged exercises while in bed and discussed the need to transfer to EOB again tomorrow. This patient was independent prior to a now extended hospital stay. She is grossly debilitated but also motivated for progress. Recommend discharge to IP rehab when medically stable. PLAN :  Patient continues to benefit from skilled intervention to address the above impairments. Continue treatment per established plan of care. to address goals. Recommendation for discharge: (in order for the patient to meet his/her long term goals)  Therapy 3 hours per day 5-7 days per week    This discharge recommendation:  Has been made in collaboration with the attending provider and/or case management    IF patient discharges home will need the following DME: to be determined (TBD)       SUBJECTIVE:   Patient stated Can I have one of those green bags?     OBJECTIVE DATA SUMMARY:   Critical Behavior:  Neurologic State: Alert  Orientation Level: Oriented X4  Cognition: Follows commands  Safety/Judgement: Decreased insight into deficits, Decreased awareness of need for safety, Decreased awareness of need for assistance  Functional Mobility Training:    Therapeutic Exercises:   Supine ankle pumps, SAQs, hip abd/add, heel slides, glute sets x10  (couldn't position for bridges)  Challenged with full ROM for SAQs. Pain Rating:      Activity Tolerance:   Fair  Please refer to the flowsheet for vital signs taken during this treatment. After treatment patient left in no apparent distress:   Supine in bed and Call bell within reach    COMMUNICATION/COLLABORATION:   The patients plan of care was discussed with: Registered nurse.      Lauri Cogan, PT, DPT   Time Calculation: 19 mins

## 2020-05-19 LAB
ALBUMIN SERPL-MCNC: 2.3 G/DL (ref 3.5–5)
ANION GAP SERPL CALC-SCNC: 8 MMOL/L (ref 5–15)
BUN SERPL-MCNC: 9 MG/DL (ref 6–20)
BUN/CREAT SERPL: 14 (ref 12–20)
CALCIUM SERPL-MCNC: 7.8 MG/DL (ref 8.5–10.1)
CHLORIDE SERPL-SCNC: 114 MMOL/L (ref 97–108)
CO2 SERPL-SCNC: 25 MMOL/L (ref 21–32)
CREAT SERPL-MCNC: 0.64 MG/DL (ref 0.55–1.02)
GLUCOSE BLD STRIP.AUTO-MCNC: 100 MG/DL (ref 65–100)
GLUCOSE BLD STRIP.AUTO-MCNC: 101 MG/DL (ref 65–100)
GLUCOSE BLD STRIP.AUTO-MCNC: 105 MG/DL (ref 65–100)
GLUCOSE BLD STRIP.AUTO-MCNC: 149 MG/DL (ref 65–100)
GLUCOSE BLD STRIP.AUTO-MCNC: 98 MG/DL (ref 65–100)
GLUCOSE SERPL-MCNC: 105 MG/DL (ref 65–100)
MAGNESIUM SERPL-MCNC: 1.3 MG/DL (ref 1.6–2.4)
PHOSPHATE SERPL-MCNC: 4.1 MG/DL (ref 2.6–4.7)
POTASSIUM SERPL-SCNC: 2.8 MMOL/L (ref 3.5–5.1)
SERVICE CMNT-IMP: ABNORMAL
SERVICE CMNT-IMP: NORMAL
SERVICE CMNT-IMP: NORMAL
SODIUM SERPL-SCNC: 147 MMOL/L (ref 136–145)

## 2020-05-19 PROCEDURE — 74011250636 HC RX REV CODE- 250/636: Performed by: NURSE PRACTITIONER

## 2020-05-19 PROCEDURE — 97110 THERAPEUTIC EXERCISES: CPT

## 2020-05-19 PROCEDURE — 74011000250 HC RX REV CODE- 250: Performed by: INTERNAL MEDICINE

## 2020-05-19 PROCEDURE — 74011250637 HC RX REV CODE- 250/637: Performed by: HOSPITALIST

## 2020-05-19 PROCEDURE — 74011250637 HC RX REV CODE- 250/637: Performed by: NURSE PRACTITIONER

## 2020-05-19 PROCEDURE — 74011000250 HC RX REV CODE- 250: Performed by: NURSE PRACTITIONER

## 2020-05-19 PROCEDURE — 74011250636 HC RX REV CODE- 250/636: Performed by: INTERNAL MEDICINE

## 2020-05-19 PROCEDURE — 74011250637 HC RX REV CODE- 250/637: Performed by: INTERNAL MEDICINE

## 2020-05-19 PROCEDURE — 97535 SELF CARE MNGMENT TRAINING: CPT

## 2020-05-19 PROCEDURE — 74011636637 HC RX REV CODE- 636/637: Performed by: INTERNAL MEDICINE

## 2020-05-19 PROCEDURE — 65270000032 HC RM SEMIPRIVATE

## 2020-05-19 PROCEDURE — 80069 RENAL FUNCTION PANEL: CPT

## 2020-05-19 PROCEDURE — 83735 ASSAY OF MAGNESIUM: CPT

## 2020-05-19 PROCEDURE — 36415 COLL VENOUS BLD VENIPUNCTURE: CPT

## 2020-05-19 PROCEDURE — 74011000250 HC RX REV CODE- 250: Performed by: HOSPITALIST

## 2020-05-19 PROCEDURE — 74011250636 HC RX REV CODE- 250/636: Performed by: HOSPITALIST

## 2020-05-19 PROCEDURE — C9113 INJ PANTOPRAZOLE SODIUM, VIA: HCPCS | Performed by: INTERNAL MEDICINE

## 2020-05-19 PROCEDURE — 82962 GLUCOSE BLOOD TEST: CPT

## 2020-05-19 PROCEDURE — 97530 THERAPEUTIC ACTIVITIES: CPT

## 2020-05-19 RX ORDER — FAMOTIDINE 20 MG/1
20 TABLET, FILM COATED ORAL DAILY
Status: DISCONTINUED | OUTPATIENT
Start: 2020-05-20 | End: 2020-05-22 | Stop reason: HOSPADM

## 2020-05-19 RX ORDER — POTASSIUM CHLORIDE 7.45 MG/ML
10 INJECTION INTRAVENOUS
Status: COMPLETED | OUTPATIENT
Start: 2020-05-19 | End: 2020-05-19

## 2020-05-19 RX ORDER — METOPROLOL TARTRATE 25 MG/1
25 TABLET, FILM COATED ORAL 2 TIMES DAILY
Status: DISCONTINUED | OUTPATIENT
Start: 2020-05-20 | End: 2020-05-22 | Stop reason: HOSPADM

## 2020-05-19 RX ORDER — ESCITALOPRAM OXALATE 10 MG/1
20 TABLET ORAL DAILY
Status: DISCONTINUED | OUTPATIENT
Start: 2020-05-20 | End: 2020-05-22 | Stop reason: HOSPADM

## 2020-05-19 RX ORDER — TRAZODONE HYDROCHLORIDE 100 MG/1
100 TABLET ORAL
Status: DISCONTINUED | OUTPATIENT
Start: 2020-05-19 | End: 2020-05-22 | Stop reason: HOSPADM

## 2020-05-19 RX ORDER — MAGNESIUM SULFATE 1 G/100ML
1 INJECTION INTRAVENOUS
Status: COMPLETED | OUTPATIENT
Start: 2020-05-19 | End: 2020-05-19

## 2020-05-19 RX ADMIN — SODIUM CHLORIDE 5 MG: 9 INJECTION INTRAMUSCULAR; INTRAVENOUS; SUBCUTANEOUS at 22:33

## 2020-05-19 RX ADMIN — METOPROLOL TARTRATE 5 MG: 5 INJECTION INTRAVENOUS at 05:19

## 2020-05-19 RX ADMIN — SODIUM CHLORIDE 5 MG: 9 INJECTION INTRAMUSCULAR; INTRAVENOUS; SUBCUTANEOUS at 10:02

## 2020-05-19 RX ADMIN — MAGNESIUM SULFATE HEPTAHYDRATE 1 G: 1 INJECTION, SOLUTION INTRAVENOUS at 05:18

## 2020-05-19 RX ADMIN — SODIUM CHLORIDE 10 ML: 9 INJECTION INTRAMUSCULAR; INTRAVENOUS; SUBCUTANEOUS at 21:23

## 2020-05-19 RX ADMIN — POTASSIUM CHLORIDE 10 MEQ: 7.46 INJECTION, SOLUTION INTRAVENOUS at 06:24

## 2020-05-19 RX ADMIN — SODIUM CHLORIDE 5 MG: 9 INJECTION INTRAMUSCULAR; INTRAVENOUS; SUBCUTANEOUS at 17:30

## 2020-05-19 RX ADMIN — FAMOTIDINE 20 MG: 20 TABLET ORAL at 11:23

## 2020-05-19 RX ADMIN — INSULIN LISPRO 2 UNITS: 100 INJECTION, SOLUTION INTRAVENOUS; SUBCUTANEOUS at 17:29

## 2020-05-19 RX ADMIN — CASTOR OIL AND BALSAM, PERU: 788; 87 OINTMENT TOPICAL at 11:31

## 2020-05-19 RX ADMIN — SODIUM CHLORIDE 10 ML: 9 INJECTION INTRAMUSCULAR; INTRAVENOUS; SUBCUTANEOUS at 05:19

## 2020-05-19 RX ADMIN — METOPROLOL TARTRATE 25 MG: 25 TABLET, FILM COATED ORAL at 23:57

## 2020-05-19 RX ADMIN — SODIUM CHLORIDE 40 MG: 9 INJECTION INTRAMUSCULAR; INTRAVENOUS; SUBCUTANEOUS at 09:53

## 2020-05-19 RX ADMIN — SUCRALFATE 1 G: 1 TABLET ORAL at 17:36

## 2020-05-19 RX ADMIN — POTASSIUM CHLORIDE 10 MEQ: 7.46 INJECTION, SOLUTION INTRAVENOUS at 07:29

## 2020-05-19 RX ADMIN — SODIUM CHLORIDE 75 ML/HR: 900 INJECTION, SOLUTION INTRAVENOUS at 19:42

## 2020-05-19 RX ADMIN — GUAIFENESIN 100 MG: 200 SOLUTION ORAL at 21:22

## 2020-05-19 RX ADMIN — ONDANSETRON 4 MG: 2 INJECTION INTRAMUSCULAR; INTRAVENOUS at 06:31

## 2020-05-19 RX ADMIN — SODIUM CHLORIDE 40 MG: 9 INJECTION INTRAMUSCULAR; INTRAVENOUS; SUBCUTANEOUS at 21:22

## 2020-05-19 RX ADMIN — TRAZODONE HYDROCHLORIDE 100 MG: 100 TABLET ORAL at 22:33

## 2020-05-19 RX ADMIN — CASTOR OIL AND BALSAM, PERU: 788; 87 OINTMENT TOPICAL at 17:37

## 2020-05-19 RX ADMIN — HYDROMORPHONE HYDROCHLORIDE 0.5 MG: 1 INJECTION, SOLUTION INTRAMUSCULAR; INTRAVENOUS; SUBCUTANEOUS at 19:43

## 2020-05-19 RX ADMIN — POTASSIUM CHLORIDE 10 MEQ: 7.46 INJECTION, SOLUTION INTRAVENOUS at 09:51

## 2020-05-19 RX ADMIN — BUMETANIDE 1 MG: 1 TABLET ORAL at 11:23

## 2020-05-19 RX ADMIN — POTASSIUM CHLORIDE 10 MEQ: 7.46 INJECTION, SOLUTION INTRAVENOUS at 11:21

## 2020-05-19 RX ADMIN — SODIUM CHLORIDE 5 MG: 9 INJECTION INTRAMUSCULAR; INTRAVENOUS; SUBCUTANEOUS at 03:15

## 2020-05-19 RX ADMIN — SODIUM CHLORIDE 10 ML: 9 INJECTION INTRAMUSCULAR; INTRAVENOUS; SUBCUTANEOUS at 17:32

## 2020-05-19 RX ADMIN — GUAIFENESIN 100 MG: 200 SOLUTION ORAL at 11:22

## 2020-05-19 RX ADMIN — MAGNESIUM SULFATE HEPTAHYDRATE 1 G: 1 INJECTION, SOLUTION INTRAVENOUS at 06:31

## 2020-05-19 RX ADMIN — ONDANSETRON 4 MG: 2 INJECTION INTRAMUSCULAR; INTRAVENOUS at 12:57

## 2020-05-19 NOTE — PROGRESS NOTES
[address]  [address] [facility], [address] [facility]       GI PROGRESS NOTE  Will Erin Colbert  [phone] office  [phone] NP/PA in-hospital cell phone M-F until 4:30PM  After 5PM or on weekends, please call  for physician on call      NAME: Cynthia Vásquez   :  1960   MRN:  [id number]       Subjective:   Patient reports improvement in nausea and vomiting (possibly 1 episode of vomiting early this morning). No abdominal pain. Tube feeds are on hold. Objective:     VITALS:   Last 24hrs VS reviewed since prior progress note. Most recent are:  Visit Vitals  /78   Pulse 96   Temp 98.3 °F (36.8 °C)   Resp 19   Ht 5' 4\" (1.626 m)   Wt 106.5 kg (234 lb 12.8 oz)   SpO2 97%   BMI 40.30 kg/m²       PHYSICAL EXAM:  General:          Cooperative, no acute distress    Neurologic:      Alert and oriented  HEENT:           EOMI, no scleral icterus   Lungs:             No respiratory distress  Heart:              S1 S2  Abdomen:        Soft, non-distended, no tenderness, no guarding, no rebound. +Bowel sounds. G-J tube in place.    Extremities:     Warm  Psych:             Not anxious or agitated    Lab Data Reviewed:     Recent Results (from the past 24 hour(s))   GLUCOSE, POC    Collection Time: 20 11:26 AM   Result Value Ref Range    Glucose (POC) 139 (H) 65 - 100 mg/dL    Performed by Gretel Lundborg S (CON)    GLUCOSE, POC    Collection Time: 20 12:01 AM   Result Value Ref Range    Glucose (POC) 98 65 - 100 mg/dL    Performed by April Schwartz    RENAL FUNCTION PANEL    Collection Time: 20  3:17 AM   Result Value Ref Range    Sodium 147 (H) 136 - 145 mmol/L    Potassium 2.8 (L) 3.5 - 5.1 mmol/L    Chloride 114 (H) 97 - 108 mmol/L    CO2 25 21 - 32 mmol/L    Anion gap 8 5 - 15 mmol/L    Glucose 105 (H) 65 - 100 mg/dL    BUN 9 6 - 20 MG/DL    Creatinine 0.64 0.55 - 1.02 MG/DL    BUN/Creatinine ratio 14 12 - 20      GFR est AA >60 >60 ml/min/1.73m2    GFR est non-AA >60 >60 ml/min/1.73m2    Calcium 7.8 (L) 8.5 - 10.1 MG/DL    Phosphorus 4.1 2.6 - 4.7 MG/DL    Albumin 2.3 (L) 3.5 - 5.0 g/dL   MAGNESIUM    Collection Time: 05/19/20  3:17 AM   Result Value Ref Range    Magnesium 1.3 (L) 1.6 - 2.4 mg/dL   GLUCOSE, POC    Collection Time: 05/19/20  6:14 AM   Result Value Ref Range    Glucose (POC) 105 (H) 65 - 100 mg/dL    Performed by Dequan Loza        Assessment:   · Nausea/vomiting: EGD (5/12/20): 3 cm hiatal hernia, grade 2 erosive esophagitis; gastritis in the antrum (biopsied); normal duodenum. Pathology of the stomach showed reactive gastropathy. · Status post G-J tube for inadequate PO intake and aspiration: Modified barium swallow (5/13/20): recommended mechanically-altered ground diet/honey-thick liquids. · Acute respiratory failure secondary to COVID-19: extubated 4/20, repeat COVID-19 negative on 4/20, 4/27, and 5/8. · Status post cardiac arrest 4/9  · Acute kidney injury  · Pulmonary embolism: no anticoagulation due to retroperitoneal bleed  · Diabetes mellitus type II     Patient Active Problem List   Diagnosis Code    Endometrial cancer ([address] C54.1    Vaginal Pap smear following hysterectomy for malignancy Z08, Z90.710    Personal history of malignant neoplasm of other parts of uterus Z85.42    Obesity, morbid ([address] E66.01    COVID-19 virus infection U07.1    Hypotension I95.9    Retroperitoneal hemorrhage R58    Acute renal failure (ARF) ([address] N17.9    Encephalopathy G93.40    Sepsis with multi-organ dysfunction ([address] A41.9, R65.20    Pneumonia due to methicillin susceptible Staphylococcus aureus (MSSA) ([address] J15.211    Thrombocytopenia ([address] D69.6    Diarrhea R19.7     Plan:   · On PPI and Carafate  · Continue supportive measures: antiemetics PRN  · Psychiatry evaluated and adjusted medications     Signed By: ANAND Yang     5/19/2020  10:20 AM       GI Attending: Agree with above plan.  Would consider restarting TF per RD recommendations and dysphagia 2 diet. Appreciate psychiatry recommendations. We will sign off for now. Please call with any questions. Randolph Ferguson MD

## 2020-05-19 NOTE — PROGRESS NOTES
0300 - pt continues to have nausea/vomiting despite TF on hold and prn zofran q4 hours. Notified hospitalist.  Will try a dose of compazine. 0630 - compazine lasted a couple hours but pt still having a lot of nausea and vomiting/dry heaving. PRN zofran given.

## 2020-05-19 NOTE — PROGRESS NOTES
NUTRITION    Interventions/Plan/Recommendations:   1. Resume PO diet of dysphagia 2/honey thick liquids as previously cleared by SLP  2. Decrease unneccesary pill burden - d/c Renvela and Benefiber  3. Give Carafate as scheduled (now that NPO status lifted)  4. Adjust Pepcid to be given PO or gastric route  5. Continue schedule Compazine    6. Give Ativan with c/o anxiety and/or nausea  7. Resume trickle feeds of Osmolite 1.5 @ 20 mL/hr tonight via J-port  8. Hold TF 1 hour before and after Carafate administration  9. If does well with above regimen, can start increasing to goal tomorrow    10. However, if continues to complain of nausea, may be worth holding PO intake again (but allowing PO meds) to see if can tolerate J-tube feeds alone with adjusted medication regimen  11. If nausea better and does well with PO intake, can adjust TF accordingly    Assessment:     Nursing expressed concerns of ongoing nausea and need to hold TF. Pt new to me and this unit. Full assessment completed by RD yesterday. Noted strong anxiety component to nausea. No vomiting, but more spit up. Pt has a desire to eat. She was cleared by SLP on 5/13 for a mechanically altered/dysphagia 2 diet with nectar thick liquids. She was made NPO the evening of 5/13 for G/J tube placement on 5/14 and the diet was never resumed. I spoke with speech today and they confirmed from a safety standpoint she is OK to resume diet. She has had ongoing issues with nausea and therefore swallow treatments have been on hold. Additionally, she has never reached goal with her TF. Psych consulted today and increased lexapro and trazadone doses. Reviewed medications and discussed with RN. Pt hasn't been getting her carafate as it is ordered PO. However, she has still been getting her Renvela and Benefiber (assume through G-port) even though it is also ordered PO. Pepcid is order/given via J-tube. Renal fx has improved. Phos WNL.   Benefiber added weeks ago while in ICU. No indication for continued use. Pt has Ativan ordered PRN for anxiety, but this can also help with her nausea. She has been getting frequent zofran, but without relief. Compazine started. Suggest decreasing pill burden that may possibly be upsetting stomach (d/c Renvela and Benefiber). Resume PO diet as suggested yesterday by RD yesterday. Pt is fixated on wanting to eat, if allowed a PO diet this may help reduce her anxiety levels and associated nausea. By lifting NPO status, this will also allow RN to give scheduled carafate. However, need to be sure to hold TF for 1 hour before and after administration to allow best absorption. Also discussed adjusting Pepcid to be given into stomach and giving Ativan more often as this will not only help with anxiety, but can help with nausea as well. After tonight's dose of carafate and compazine given, trial trickle feeds of Osmolite 1.5 via J-port. If does well overnight, can increase as tolerate in AM.    Goal adjusted below to account for holding of TF for Carafate TID. Osmolite 1.5 @ 65 mL x 18 hours with 200 mL H2O q 4 hours provides 1170 mL, 1755 kcal, 73 gm pro, 1890 mL free H2O    Estimated Nutrition Needs:   Kcals/day: 3537 Kcals/day(1184-7779 (MSJ x 1.0-1.1)  Protein: 90 g(0.8 gm/kg)  Fluid: (1 ml/kcal)     Based On: Yeyo 1898  Weight Used: Actual wt(115 kg)      Recent Labs     05/19/20 0317 05/18/20 0358 05/17/20 0249   * 139* 178*   BUN 9 13 17   CREA 0.64 0.75 0.85   * 148* 146*   K 2.8* 3.1* 3.2*   * 113* 112*   CO2 25 27 29   CA 7.8* 8.4* 8.4*   PHOS 4.1  --  2.6   MG 1.3* 1.4* 1.5*       Recent Labs     05/19/20 0317 05/18/20 0358 05/17/20  0249   SGOT  --  12*  --    ALT  --  17  --    AP  --  153*  --    TBILI  --  0.8  --    TP  --  5.8*  --    ALB 2.3* 2.3* 2.2*   GLOB  --  3.5  --        No results for input(s): LAC in the last 72 hours.     Recent Labs     05/18/20  0358 05/17/20  0716   WBC 8.5 8.4   HGB 9.3* 9.3*   HCT 31.3* 30.6*    236       No results for input(s): PREALB in the last 72 hours. No results for input(s): TRIGL in the last 72 hours.     Recent Labs     05/19/20  1621 05/19/20  1238 05/19/20  0614 05/19/20  0001 05/18/20  1126 05/18/20  0846 05/18/20  0608 05/17/20  2326 05/17/20  1819 05/17/20  1752 05/17/20  1203 05/17/20  0618 05/17/20  0001 05/16/20  1842   GLUCPOC 149* 100 105* 98 139* 116* 140* 127* 157* 141* 158* 153* 182* 254*       Lab Results   Component Value Date/Time    Hemoglobin A1c 10.4 (H) 03/31/2020 03:42 PM               Bárbara Louis RD

## 2020-05-19 NOTE — PROGRESS NOTES
Problem: Self Care Deficits Care Plan (Adult)  Goal: *Acute Goals and Plan of Care (Insert Text)  Description:   FUNCTIONAL STATUS PRIOR TO ADMISSION: Patient unable to provide history. Per chart, patient was fully independent PTA. HOME SUPPORT: Per chart, patient lived with family. Goals reviewed and updated: 5/19/2020  1. Patient will perform two grooming activities with CGA while seated on EOB within 7 day(s). 2.  Patient will perform LE bathing with maximum assistance while seated on EOB within 7 days  3. Patient will perform upper body bathing with moderate assistance seated EOB within 7 day(s). 4.  Patient will participate in upper extremity therapeutic exercise/activities with minimum assistance and pacing  for 10 minutes within 7 day(s). 5.  Patient will demonstrate energy conservation strategies/pacing with min cues during self care activities within 7 days. 6.  Patient will participate in self feeding with set up for 50% of meal within 7 days. OT weekly reassessment 5/12/2020: see goals below for update    Occupational Therapy Goals  Initiated 4/30/2020  1. Patient will perform grooming with maximal assistance within 7 day(s). (upgrade to min A 5/12)  2. Patient will perform self-feeding if appropriate for PO with maximal assistance within 7 day(s). (pending following MBS)Discontinue 5/19/2020  3. Patient will perform bathing with maximal assistance within 7 day(s). (upgrade to mod A anterior bathing EOB 5/12)  4. Patient will participate in upper extremity therapeutic exercise/activities with moderate assistance  for 10 minutes within 7 day(s). (MIN A 5/12)  5. Patient will follow 100% simple commands in preparation for functional tasks within 7 days.  (MET 5/12)               Problem: Patient Education: Go to Patient Education Activity  Goal: Patient/Family Education  5/19/2020 1415 by FLOR Dupont  Outcome: Progressing Towards Goal  5/19/2020 1412 by Bandar Tariq FLOR Hurtado  Outcome: Progressing Towards Goal   OCCUPATIONAL THERAPY TREATMENT/WEEKLY RE-ASSESSMENT  Patient: Tramaine Daugherty (89 y.o. female)  Date: 5/19/2020  Diagnosis: COVID-19 virus infection [U07.1]   Sepsis with multi-organ dysfunction ([facility] [address]  Procedure(s) (LRB):  ESOPHAGOGASTRODUODENOSCOPY (EGD) (Left)  ESOPHAGOGASTRODUODENAL (EGD) BIOPSY (N/A) 7 Days Post-Op  Precautions: Fall  Chart, occupational therapy assessment, plan of care, and goals were reviewed. ASSESSMENT  Patient continues with skilled OT services and is progressing towards goals. Participation impacted by impaired strength and endurance for functional activity, global weakness, extended hospitalization with prolonged intubation and immobilization, impaired static and dynamic sitting balance, nausea and vomiting, GJ placement on 5/14/2020, report of and demonstration of anxiety and fear of falling. This date, nurse reports start of new medication for nausea (Compazine) with good results. Patient with report of no nausea during session. Patient reported mild dizziness with stable vitals seated on EOB for 7 min, though verbalizes poor endurance overall. Patient requiring use of bilateral UEs in active support for seated balance and decreased fear of falling. Patient making progress which should continue now that nausea better controlled. Current Level of Function Impacting Discharge (ADLs): Max to total assist self care and mobility    Other factors to consider for discharge: none         PLAN :  Goals have been updated based on progression since last assessment. Patient continues to benefit from skilled intervention to address the above impairments. Continue to follow patient 5 times a week to address goals.     Recommend with staff: Participation in self care and grooming in supported sitting in bed    Recommend next OT session: self feeding, groom/UE bathing on EOB    Recommendation for discharge: (in order for the patient to meet his/her long term goals)  Therapy 3 hours per day 5-7 days per week    This discharge recommendation:  Has been made in collaboration with the attending provider and/or case management    IF patient discharges home will need the following DME: bedside commode, hospital bed, mechanical lift, and wheelchair       SUBJECTIVE:   Patient stated I get motion sick really easily.     OBJECTIVE DATA SUMMARY:   Cognitive/Behavioral Status:  Neurologic State: Alert  Orientation Level: Oriented X4  Cognition: Appropriate for age attention/concentration; Follows commands  Perception: Appears intact;Cues to maintain midline in sitting; Tactile;Verbal  Perseveration: No perseveration noted  Safety/Judgement: Awareness of environment    Functional Mobility and Transfers for ADLs:  Bed Mobility:  Rolling: Moderate assistance;Assist x1  Supine to Sit: Maximum assistance;Assist x2;Bed Modified; Additional time; Adaptive equipment  Sit to Supine: Assist x2;Maximum assistance; Additional time;Bed Modified; Adaptive equipment  Scooting: Maximum assistance; Additional time; Adaptive equipment(of 3 to [facility])      Balance:  Sitting: Impaired; Without support  Sitting - Static: Fair (occasional)  Sitting - Dynamic: Poor (constant support)    ADL Intervention:  Feeding  Feeding Assistance: (PEG)      Lower Body Dressing Assistance  Dressing Assistance: Total assistance(dependent)(inferred from mobility)  Leg Crossed Method Used: No  Position Performed: Long sitting on bed  Cues: Physical assistance    Toileting  Bladder Hygiene: Total assistance (dependent)([employer])    Cognitive Retraining  Safety/Judgement: Awareness of environment      Activity Tolerance:   Fair  Please refer to the flowsheet for vital signs taken during this treatment.     After treatment patient left in no apparent distress:   Supine in bed, Patient positioned in left sidelying for pressure relief, Call bell within reach, and Side rails x 3    COMMUNICATION/COLLABORATION: The patients plan of care was discussed with: Physical therapist, Occupational therapist, and Registered nurse.      FLOR Julio  Time Calculation: 44 mins

## 2020-05-19 NOTE — PROGRESS NOTES
Problem: Mobility Impaired (Adult and Pediatric)  Goal: *Acute Goals and Plan of Care (Insert Text)  Description: FUNCTIONAL STATUS PRIOR TO ADMISSION: Patient was independent and active without use of DME but poor history due to not being able to communicate. HOME SUPPORT PRIOR TO ADMISSION: Pt lives with family. Weekly re-assessment 5/19/2020  Goals remain appropriate. Physical Therapy Goals  1. Patient will move from supine to sit and sit to supine , scoot up and down and roll side to side in bed with moderate assistance x1 person within 7 day(s). 2.  Patient will sit EOB with supervision (occasional min A), x10 minutes with retracted shoulders, midline posture, and forward gaze within 7 days. 3.  Patient will verbalize and demonstrate 5 exercises she can complete on her own outside of therapy sessions with 7 days. 4.  Patient will participate in scooting to [facility] with control of core and max Ax2 for scooting while sitting EOB within 7 days. 5.  Patient will tolerate sitting in chair x1 hour within 7 days. Initiated 5/4/2020- All goals met 5/12/2020  1. Patient will move from supine to sit and sit to supine , scoot up and down and roll side to side in bed with maximal assistance within 7 day(s). 2.  Patient will sit EOB with moderate assistance of 2 people, x5 minutes with active core initiation to correct sitting balance within 7 days. 3.  Patient will verbalize 3 exercises she can complete on her own outside of therapy sessions with 7 days. 4.  Patient will participate in supine HEP (AAROM as needed) with min assist for AAROM within 7 days. 5.  Patient will tolerate bed in chair position 30 min BID within 7 days. Initiated 4/25/2020  1. Patient will move from supine to sit and sit to supine , scoot up and down and roll side to side in bed with moderate assistance once off vent within 7 day(s).     2.  Patient will participate in sitting with min A for 2 minutes once off vent within 7 day(s). 3.  Patient will perform active supine or sitting TE with min A within 7 day(s). Outcome: Progressing Towards Goal   PHYSICAL THERAPY TREATMENT: WEEKLY REASSESSMENT  Patient: Tramaine Daugherty (56 y.o. female)  Date: 5/19/2020  Primary Diagnosis: COVID-19 virus infection [U07.1]  Procedure(s) (LRB):  ESOPHAGOGASTRODUODENOSCOPY (EGD) (Left)  ESOPHAGOGASTRODUODENAL (EGD) BIOPSY (N/A) 7 Days Post-Op   Precautions:   Fall      ASSESSMENT  Patient continues with skilled PT services and is progressing towards goals. Patient reports N/V better today and did not have any episodes during session. Worked on bed mobility, rolling and supine <>sit. Did tolerate sitting EOB with minimal assist and brief supervision assist for total of 6 minutes. Requiring cues to relax  and breath to relax. Able to recall some exercises and did demonstrate improved hip extension during scooting in supine. Continues to be appropriate for rehab as she is very motivated, cooperative and far below her baseline. .     Patient's progression toward goals since last assessment: limited progress this week with frequent episodes of N/V    Current Level of Function Impacting Discharge (mobility/balance): max assist ; non ambulatory; decreased sitting balance           PLAN :  Goals have been updated based on progression since last assessment. Patient continues to benefit from skilled intervention to address the above impairments. Recommendations and Planned Interventions: bed mobility training, transfer training, gait training, therapeutic exercises, patient and family training/education, and therapeutic activities      Frequency/Duration: Patient will be followed by physical therapy:  5 times a week to address goals.     Recommendation for discharge: (in order for the patient to meet his/her long term goals)  Therapy 3 hours per day 5-7 days per week    This discharge recommendation:  Has been made in collaboration with the attending provider and/or case management    IF patient discharges home will need the following DME: bedside commode, hospital bed, mechanical lift, transfer bench, and wheelchair         SUBJECTIVE:   Patient stated I have been doing some exercises.     OBJECTIVE DATA SUMMARY:   HISTORY:    Past Medical History:   Diagnosis Date    Basal cell carcinoma     GERD (gastroesophageal reflux disease)     Kidney stones     Polyp of ureter      Past Surgical History:   Procedure Laterality Date    ENDOMETRIAL ABLATION, THERMAL      HX  SECTION      HX COLONOSCOPY      2017    HYSTEROSCOPY DIAGNOSTIC      D&C/POLYP REMOVAL    INSERT ARTERIAL LINE  2020         IR INSERT GASTROSTOMY TUBE PERC  2020    IR INSERT NON TUNL CVC OVER 5 YRS  2020    IR INSERT TUNL CVC W/O PORT OVER 5 YR  2020    IR REMOVE TUNL CVAD W/O PORT / PUMP  2020       Personal factors and/or comorbidities impacting plan of care:     Home Situation  Home Environment: Other (comment)  One/Two Story Residence: One story  Living Alone: No  Support Systems: Other (comments)  Patient Expects to be Discharged to[de-identified] Unknown  Current DME Used/Available at Home: None    EXAMINATION/PRESENTATION/DECISION MAKING:   Critical Behavior:  Neurologic State: Alert  Orientation Level: Oriented X4  Cognition: Appropriate for age attention/concentration, Follows commands  Safety/Judgement: Awareness of environment  Hearing: Auditory  Auditory Impairment: None         Functional Mobility:  Bed Mobility:  Rolling: Moderate assistance;Assist x1  Supine to Sit: Maximum assistance;Assist x2;Bed Modified; Additional time; Adaptive equipment  Sit to Supine: Assist x2;Maximum assistance; Additional time;Bed Modified; Adaptive equipment  Scooting: Maximum assistance; Additional time; Adaptive equipment(of 3 to [facility])  Balance:   Sitting: Impaired; Without support  Sitting - Static: Fair (occasional)  Sitting - Dynamic: Poor (constant support) Therapeutic Exercises: Active assist heel slides, knee extension; glute isometrics  After treatment patient left in no apparent distress:   Heels elevated for pressure relief, Patient positioned in left sidelying for pressure relief, Call bell within reach, and Side rails x 3    COMMUNICATION/EDUCATION:   The patients plan of care was discussed with: Occupational therapy assistant and Registered nurse. Patient/family have participated as able in goal setting and plan of care. and Patient/family agree to work toward stated goals and plan of care.     Thank you for this referral.  Austin Locke PT   Time Calculation: 44 mins

## 2020-05-19 NOTE — PROGRESS NOTES
IFTIKHAR:  RUR: 38%  Plan:  Discharge to [facility]) pending bed availability and patient readiness for discharge. Updated therapy notes are needed. Upon receipt, CM will fax to facility. CM received hand-off from TOM Cornejo RN CRM. CM continuing to follow.   PAYAL Winchester, CRM

## 2020-05-19 NOTE — PROGRESS NOTES
IFTIKHAR PLAN:     RUR 39%     Disposition plan is to discharge to [facility] in [place].  Patient's  did not want her to go to another [facility]    CM contacted Kelsey Villegas at [address] [facility]) regarding discharge. They will not have a bed tomorrow      CM faxed clinical notes including PT/OT/SLP [facility] [address] is 2nd choice. They are following patient remotely as well.      Patient will need transportation arranged. However, depending on mileage, patient may have to pay out of pocket    Y.  Dalton Mckeon, [address] RN, CRM

## 2020-05-19 NOTE — PROGRESS NOTES
Spoke with Lillian Navarro (dietician) about patient's tube feeds and medications. Desiree Randall RD restarted patient's previous diet and will place orders to restart tube feeds at 20 mL/hr 1 hour after Carafate and Compazine administration. Patient's medications are now to be give PO crushed/mixed in applesauce. Please see Nutrition note for more information.

## 2020-05-19 NOTE — PROGRESS NOTES
Hospitalist Progress Note  Mick Hilario MD  Answering service: [phone] from in house phone      Date of Service:  2020  NAME:  Terence Schaefer  :  1960  MRN:  [id number]    Admission Summary:   59F s/p COVID treatement in ICU - extubated  Interval history / Subjective: Follow up Acute hypoxic resp. Failure, acute renal failure  The patient continues to have nausea and vomiting  Tube feeds held         Assessment & Plan:     #. ARF:   - Nephrology following- was on HD  -last HD   -now recovered and no more need for HD  -Permacath removed   -Appreciate discussion with Nephrology  -now resolved    Nausea, vomiting: GI consulted,   -s/p EGD:Hiatal hernia, gr2 erosive esophagitis and gastritis in antrum, appreciate recommendations, cont. Protonix, added carafate, started on CLD  - reportedly the patient continued to have nausea/vomiting.  -Appreciate discussion with GI  -s/p GJ tube placement   -Tube feeds started  but held since  sec to continued nausea and vomiting  -Appreciate discussion with GI, restarted feeds . But did not tolerate. I think atleast part of it is driven by anxiety contributed by prolonged GERD.  does mention that the patient has been having nausea and vomiting since \"many years\"    COVID-19 +ve   - s/p Tocilizumab, Plaquenil. On Vit C, Zinc   -COVID negative ,  and   - Resolved    Acute Hypoxic Resp Failure  - resolved     Acute metabolic Encephalopathy:  - likely ICU delirium- no agitation. Monitor  - resolved. Sinus tachycardia:   -improving,continue Metoprolol, normal TSH     Anemia in CKD:monitor HB. Retroperitoneal bleed: this hospitalization,   -resolved - Avoid AP/AC meds- Monitor Hb     Non occlusive PE in LLL pulmonary artery - no ACs due to retroperitoneal bleed.   Appreciate Vascular surgery recommendations for IVC filter, comments noted, conservative approach for now    #. S/p Cardiac arrest 4/9  #. DM2: controlled, A1c 10.4, SSI, AccuChecks, monitor  #. Morbid obesity: counseled on health benefits of weight loss, Healthy diet, BMI 44.11 kg/m². # depression: continue Trazodone at bedtime and Psych consulted, started on Lexapro  # Anxiety episodes; Continue Lexapro, Ativan PRN for short duration     Dysphagia  -s/p GJ tube 5/14, tube feeds started 5/15  -nutrition consult    PT/OT rehab    Code status: Full  DVT prophylaxis: SCD    Plan: The patient is medically stable, awaiting discharge to rehab coming Wednesday    Disposition: as above     Hospital Problems  Date Reviewed: 5/14/2020          Codes Class Noted POA    Hypotension ICD-10-CM: I95.9  ICD-9-CM: 458.9 Acute 4/2/2020 Yes        Retroperitoneal hemorrhage ICD-10-CM: R58  ICD-9-CM: 459.0  4/19/2020 No        Acute renal failure (ARF) ([facility] ICD-10-CM: N17.9  ICD-9-CM: 584.9  4/19/2020 No        Encephalopathy ICD-10-CM: G93.40  ICD-9-CM: 348.30  4/19/2020 No        * (Principal) Sepsis with multi-organ dysfunction ([facility] ICD-10-CM: A41.9, R65.20  ICD-9-CM: 038.9, 995.92  4/19/2020 Yes        Pneumonia due to methicillin susceptible Staphylococcus aureus (MSSA) ([facility] ICD-10-CM: X17.260  ICD-9-CM: 482.41  4/19/2020 Unknown        Thrombocytopenia ([facility] ICD-10-CM: D69.6  ICD-9-CM: 287.5  4/19/2020 Unknown        Diarrhea ICD-10-CM: R19.7  ICD-9-CM: 787.91  4/19/2020 Unknown        COVID-19 virus infection ICD-10-CM: U07.1  ICD-9-CM: 079.89  3/31/2020 Unknown            Review of Systems:   Pertinent items are mentioned in interval history. Vital Signs:    Last 24hrs VS reviewed since prior progress note.  Most recent are:  Visit Vitals  /78   Pulse 96   Temp 98.3 °F (36.8 °C)   Resp 19   Ht 5' 4\" (1.626 m)   Wt 106.5 kg (234 lb 12.8 oz)   SpO2 97%   BMI 40.30 kg/m²         Intake/Output Summary (Last 24 hours) at 5/19/2020 0946  Last data filed at 5/18/2020 1505  Gross per 24 hour   Intake 531.25 ml   Output --   Net 531.25 ml        Physical Examination:   General:  Alert, No acute distress  Resp:  No accessory muscle use, Good AE, no wheezes, few rhonchi  Abd:  Soft, slightly tender, non-distended, obese, GJ tube in place  Extremities:  No cyanosis or clubbing, no significant edema  Neuro:  Grossly normal, no focal neuro deficits, follows commands   Psych:   not agitated. Data Review:    Review and/or order of clinical lab test  Review and/or order of tests in the radiology section of CPT  Review and/or order of tests in the medicine section of [facility]  Labs:     Recent Labs     05/18/20 0358 05/17/20  0716   WBC 8.5 8.4   HGB 9.3* 9.3*   HCT 31.3* 30.6*    236     Recent Labs     05/19/20 0317 05/18/20 0358 05/17/20  0249   * 148* 146*   K 2.8* 3.1* 3.2*   * 113* 112*   CO2 25 27 29   BUN 9 13 17   CREA 0.64 0.75 0.85   * 139* 178*   CA 7.8* 8.4* 8.4*   MG 1.3* 1.4* 1.5*   PHOS 4.1  --  2.6     Recent Labs     05/19/20 0317 05/18/20 0358 05/17/20  0249   SGOT  --  12*  --    ALT  --  17  --    AP  --  153*  --    TBILI  --  0.8  --    TP  --  5.8*  --    ALB 2.3* 2.3* 2.2*   GLOB  --  3.5  --      No results for input(s): INR, PTP, APTT, INREXT, INREXT in the last 72 hours. No results for input(s): FE, TIBC, PSAT, FERR in the last 72 hours. No results found for: FOL, RBCF   No results for input(s): PH, PCO2, PO2 in the last 72 hours. No results for input(s): CPK, CKNDX, TROIQ in the last 72 hours.     No lab exists for component: CPKMB  Lab Results   Component Value Date/Time    Triglyceride 159 (H) 04/12/2020 08:34 PM     Lab Results   Component Value Date/Time    Glucose (POC) 105 (H) 05/19/2020 06:14 AM    Glucose (POC) 98 05/19/2020 12:01 AM    Glucose (POC) 139 (H) 05/18/2020 11:26 AM    Glucose (POC) 116 (H) 05/18/2020 08:46 AM    Glucose (POC) 140 (H) 05/18/2020 06:08 AM     No results found for: COLOR, APPRN, SPGRU, [address], SARKIS, PROTU, GLUCU, KETU, BILU, UROU, GALE, LEUKU, GLUKE, EPSU, BACTU, WBCU, RBCU, CASTS, UCRY  Medications Reviewed:     Current Facility-Administered Medications   Medication Dose Route Frequency    potassium chloride 10 mEq in 100 ml IVPB  10 mEq IntraVENous Q1H    [START ON 5/20/2020] escitalopram oxalate (LEXAPRO) tablet 20 mg  20 mg Oral DAILY    traZODone (DESYREL) tablet 100 mg  100 mg Oral QHS    0.9% sodium chloride infusion  75 mL/hr IntraVENous CONTINUOUS    metoprolol (LOPRESSOR) injection 5 mg  5 mg IntraVENous Q12H    [Held by provider] insulin glargine (LANTUS) injection 15 Units  15 Units SubCUTAneous DAILY    famotidine (PEPCID) tablet 20 mg  20 mg Per J Tube DAILY    bumetanide (BUMEX) tablet 1 mg  1 mg Oral DAILY    lidocaine (XYLOCAINE) 2 % jelly   Mucous Membrane PRN    sodium chloride (NS) flush 5-40 mL  5-40 mL IntraVENous Q8H    sodium chloride (NS) flush 5-40 mL  5-40 mL IntraVENous PRN    HYDROmorphone (PF) (DILAUDID) injection 0.5 mg  0.5 mg IntraVENous Q4H PRN    sucralfate (CARAFATE) tablet 1 g  1 g Oral TIDAC    pantoprazole (PROTONIX) 40 mg in 0.9% sodium chloride 10 mL injection  40 mg IntraVENous Q12H    LORazepam (ATIVAN) injection 0.5 mg  0.5 mg IntraVENous Q6H PRN    metoclopramide HCl (REGLAN) injection 5 mg  5 mg IntraVENous Q6H PRN    heparin (porcine) 1,000 unit/mL injection 3,800 Units  3,800 Units Hemodialysis DIALYSIS PRN    balsam peru-castor oiL (VENELEX) ointment   Topical BID    guaiFENesin (ROBITUSSIN) 100 mg/5 mL oral liquid 100 mg  100 mg Oral Q12H    epoetin jovani-epbx (RETACRIT) injection 20,000 Units  20,000 Units SubCUTAneous Q TUE, THU & SAT    HYDROcodone-acetaminophen (NORCO) 5-325 mg per tablet 1 Tab  1 Tab Oral Q6H PRN    phenol throat spray (CHLORASEPTIC) 1 Spray  1 Spray Oral Q6H PRN    sevelamer carbonate (RENVELA) oral powder 0.8 g  0.8 g Oral TID WITH MEALS    guar gum (BENEFIBER) packet 1 Packet  4 g Oral TID    labetaloL (NORMODYNE;TRANDATE) injection 20 mg  20 mg IntraVENous Q4H PRN    albumin human 25% (BUMINATE) solution 12.5 g  12.5 g IntraVENous DIALYSIS PRN    albuterol (PROVENTIL VENTOLIN) nebulizer solution 2.5 mg  2.5 mg Nebulization Q4H PRN    alteplase (CATHFLO) 2 mg in sterile water (preservative free) 2 mL injection  2 mg InterCATHeter DIALYSIS PRN    alteplase (CATHFLO) 2 mg in sterile water (preservative free) 2 mL injection  2 mg InterCATHeter DIALYSIS PRN    insulin lispro (HUMALOG) injection   SubCUTAneous Q6H    white petrolatum-mineral oiL (AKWA TEARS) 83-15 % ophthalmic ointment   Both Eyes Q12H    bacitracin 500 unit/gram packet 1 Packet  1 Packet Topical PRN    sodium chloride (NS) flush 5-40 mL  5-40 mL IntraVENous Q8H    sodium chloride (NS) flush 5-40 mL  5-40 mL IntraVENous PRN    ondansetron (ZOFRAN) injection 4 mg  4 mg IntraVENous Q4H PRN    acetaminophen (TYLENOL) tablet 650 mg  650 mg Oral Q6H PRN    Or    acetaminophen (TYLENOL) suppository 650 mg  650 mg Rectal Q6H PRN    glucose chewable tablet 16 g  4 Tab Oral PRN    glucagon (GLUCAGEN) injection 1 mg  1 mg IntraMUSCular PRN    dextrose 10% infusion 0-250 mL  0-250 mL IntraVENous PRN   ______________________________________________________________________  EXPECTED LENGTH OF STAY: 12d 9h  ACTUAL LENGTH OF STAY:          Frank Canela MD

## 2020-05-19 NOTE — DIABETES MGMT
[facility]  CLINICAL NURSE SPECIALIST CONSULT  PROGRAM [facility] DIAB[facility]ES HE[facility]  Follow up Note  Presentation   Melanie Mixon is a 64 y.o. female admitted from OSH to [facility] ICU with SARS-COV2 . Now recovering and COVID -. Per Nephorology kidneys are recovering nicely and now not requiring HD. Patient is now suffering with left paralysis of vocal cord which  affects her swallowing ability. New diabetes diagnosis with A1C 11.0% (3/28/2020); updated A1C 3/31/20-10.4%     Recent events: Today patient has had N/V. Per GI note TF are on hold. Consulted by Provider for advanced diabetes nursing assessment and care, specifically related to   [] Transitioning off Claudia Harry   [x] Inpatient management strategy  [] Home management assessment  [] Survival skill education    Diabetes-related medical history  Acute complications  hyperglycemia  Neurological complications  NONE  Microvascular disease  NONE  Macrovascular disease  NONE  Other associated conditions     NONE    Diabetes medication history: NONE    Subjective   Ms. John Moran is awake, her whole body shaking. States she does this sometimes. C/o n/v-given green bag. Objective   Diabetic Foot Exam: Left foot: warm, no calluses noted, red spot noted on top of foot over bone, but no breakdown noted. + microfilament sensation noted in all areas. Right foot: in brace, however, foot warm. Microfilament test deferred due to brace. Vital Signs   Visit Vitals  /78   Pulse 96   Temp 98.3 °F (36.8 °C)   Resp 19   Ht 5' 4\" (1.626 m)   Wt 106.5 kg (234 lb 12.8 oz)   SpO2 97%   BMI 40.30 kg/m²   .    Laboratory  Lab Results   Component Value Date/Time    Hemoglobin A1c 10.4 (H) 03/31/2020 03:42 PM     No results found for: LDL, LDLC, DLDLP  Lab Results   Component Value Date/Time    Creatinine 0.64 05/19/2020 03:17 AM     Lab Results   Component Value Date/Time    Sodium 147 (H) 05/19/2020 03:17 AM    Potassium 2.8 (L) 05/19/2020 03:17 AM    Chloride 114 (H) 05/19/2020 03:17 AM    CO2 25 05/19/2020 03:17 AM    Anion gap 8 05/19/2020 03:17 AM    Glucose 105 (H) 05/19/2020 03:17 AM    BUN 9 05/19/2020 03:17 AM    Creatinine 0.64 05/19/2020 03:17 AM    BUN/Creatinine ratio 14 05/19/2020 03:17 AM    GFR est AA >60 05/19/2020 03:17 AM    GFR est non-AA >60 05/19/2020 03:17 AM    Calcium 7.8 (L) 05/19/2020 03:17 AM    Bilirubin, total 0.8 05/18/2020 03:58 AM    AST (SGOT) 12 (L) 05/18/2020 03:58 AM    Alk. phosphatase 153 (H) 05/18/2020 03:58 AM    Protein, total 5.8 (L) 05/18/2020 03:58 AM    Albumin 2.3 (L) 05/19/2020 03:17 AM    Globulin 3.5 05/18/2020 03:58 AM    A-G Ratio 0.7 (L) 05/18/2020 03:58 AM    ALT (SGPT) 17 05/18/2020 03:58 AM     Lab Results   Component Value Date/Time    ALT (SGPT) 17 05/18/2020 03:58 AM         Evaluation   Ms Roz Grace, with new onset Type 2 diabetes,with A1C 10.4%. TF on hold today for N/V.  BG trends 98-105mg/dl today. It will be appropriate to hold basal insulin while TF on hold. Recommendations   1. Basal on hold for TF being held. Re-Start basal Lantus 15 units daily when TF re-started. 2. Will continue to follow. Assessment and Plan   Nursing Diagnosis Risk for unstable blood glucose pattern   Nursing Intervention Domain 3054 Decision-making Support   Nursing Interventions Examined current inpatient diabetes control   Explored factors facilitating and impeding inpatient management  Identified self-management practices impeding diabetes control  Explored corrective strategies with patient and responsible inpatient provider   Informed patient of rational for insulin strategy while hospitalized         Billing Code(s)   Thank you for including us in their care. I spent 30 minutes in direct patient care today for this patient.   Time includes chart review, face to face with patient and collaboration with interdisciplinary care team.      Rolando Calhoun, CNS   [facility]  Access via [address] [address] (C) [phone]

## 2020-05-19 NOTE — PROGRESS NOTES
28 [facility] Dr. Vale Rodriguez about scheduled Q 12 IV Metoprolol and patient needing to be on remote tele if he wants to keep this order. Dr. Vale Rodriguez stated that he would look into it. 2000: No response received from Dr. Vale Rodriguez. IV Metoprolol order still in place. 1700 dose held. Night shift nurse made aware.

## 2020-05-19 NOTE — CONSULTS
Psychiatry Consult Follow Up Note    Reason for consult: anxiety/depression  Please see full consult note written on 5/9/20 by Dr. Naresh Lopez:   Ms Luis Carlos Pena is currently lying in bed, calm and pleasant. She has had quite a challenging stay, been in the hospital since end of March. Says she's been treated for depression after her daughter was born 29 years ago, says it started off as post partum depression. She reports she always had depression but learned to live with it. She was started on lexapro 10mg in the hospital, and she noticed some improvement in her mood. She has ambivalence in increasing her dose but eventually agreed to do so. She gave verbal informed consent to increase both lexapro and trazodone. Continues to sleep poorly. Denies si hi or avh. RECCS:    DIAGNOSIS:  Recurrent major depression, moderate without psychotic symptoms. Increase lexapro to 20mg and trazodone 100mg. She is agreeable to follow up with a psychiatrist post discharge and an appointment can be made for her to do that. She can look into [facility] for med mgt. Please call [phone] for appt. At the present time, there are no concerns related to her safety. Please dc to home or to an appropriate facility once medically cleared.     -------------------------------------------------------------------------------------------------------------------  Clinical summary of events since last encounter:    Meds:  Current Facility-Administered Medications   Medication Dose Route Frequency    potassium chloride 10 mEq in 100 ml IVPB  10 mEq IntraVENous Q1H    0.9% sodium chloride infusion  75 mL/hr IntraVENous CONTINUOUS    metoprolol (LOPRESSOR) injection 5 mg  5 mg IntraVENous Q12H    [Held by provider] insulin glargine (LANTUS) injection 15 Units  15 Units SubCUTAneous DAILY    famotidine (PEPCID) tablet 20 mg  20 mg Per J Tube DAILY    bumetanide (BUMEX) tablet 1 mg  1 mg Oral DAILY    lidocaine (XYLOCAINE) 2 % jelly   Mucous Membrane PRN    sodium chloride (NS) flush 5-40 mL  5-40 mL IntraVENous Q8H    sodium chloride (NS) flush 5-40 mL  5-40 mL IntraVENous PRN    HYDROmorphone (PF) (DILAUDID) injection 0.5 mg  0.5 mg IntraVENous Q4H PRN    sucralfate (CARAFATE) tablet 1 g  1 g Oral TIDAC    pantoprazole (PROTONIX) 40 mg in 0.9% sodium chloride 10 mL injection  40 mg IntraVENous Q12H    LORazepam (ATIVAN) injection 0.5 mg  0.5 mg IntraVENous Q6H PRN    metoclopramide HCl (REGLAN) injection 5 mg  5 mg IntraVENous Q6H PRN    escitalopram oxalate (LEXAPRO) tablet 10 mg  10 mg Oral DAILY    traZODone (DESYREL) tablet 50 mg  50 mg Oral QHS    heparin (porcine) 1,000 unit/mL injection 3,800 Units  3,800 Units Hemodialysis DIALYSIS PRN    balsam peru-castor oiL (VENELEX) ointment   Topical BID    guaiFENesin (ROBITUSSIN) 100 mg/5 mL oral liquid 100 mg  100 mg Oral Q12H    epoetin jovani-epbx (RETACRIT) injection 20,000 Units  20,000 Units SubCUTAneous Q TUE, THU & SAT    HYDROcodone-acetaminophen (NORCO) 5-325 mg per tablet 1 Tab  1 Tab Oral Q6H PRN    phenol throat spray (CHLORASEPTIC) 1 Spray  1 Spray Oral Q6H PRN    sevelamer carbonate (RENVELA) oral powder 0.8 g  0.8 g Oral TID WITH MEALS    guar gum (BENEFIBER) packet 1 Packet  4 g Oral TID    labetaloL (NORMODYNE;TRANDATE) injection 20 mg  20 mg IntraVENous Q4H PRN    albumin human 25% (BUMINATE) solution 12.5 g  12.5 g IntraVENous DIALYSIS PRN    albuterol (PROVENTIL VENTOLIN) nebulizer solution 2.5 mg  2.5 mg Nebulization Q4H PRN    alteplase (CATHFLO) 2 mg in sterile water (preservative free) 2 mL injection  2 mg InterCATHeter DIALYSIS PRN    alteplase (CATHFLO) 2 mg in sterile water (preservative free) 2 mL injection  2 mg InterCATHeter DIALYSIS PRN    insulin lispro (HUMALOG) injection   SubCUTAneous Q6H    white petrolatum-mineral oiL (AKWA TEARS) 83-15 % ophthalmic ointment   Both Eyes Q12H    bacitracin 500 unit/gram packet 1 Packet  1 Packet Topical PRN    sodium chloride (NS) flush 5-40 mL  5-40 mL IntraVENous Q8H    sodium chloride (NS) flush 5-40 mL  5-40 mL IntraVENous PRN    ondansetron (ZOFRAN) injection 4 mg  4 mg IntraVENous Q4H PRN    acetaminophen (TYLENOL) tablet 650 mg  650 mg Oral Q6H PRN    Or    acetaminophen (TYLENOL) suppository 650 mg  650 mg Rectal Q6H PRN    glucose chewable tablet 16 g  4 Tab Oral PRN    glucagon (GLUCAGEN) injection 1 mg  1 mg IntraMUSCular PRN    dextrose 10% infusion 0-250 mL  0-250 mL IntraVENous PRN         Vital Signs:  Blood pressure 130/78, pulse 96, temperature 98.3 °F (36.8 °C), resp. rate 19, height 5' 4\" (1.626 m), weight 106.5 kg (234 lb 12.8 oz), SpO2 97 %.     Labs: (reviewed/updated 5/19/2020)  Recent Results (from the past 24 hour(s))   GLUCOSE, POC    Collection Time: 05/18/20 11:26 AM   Result Value Ref Range    Glucose (POC) 139 (H) 65 - 100 mg/dL    Performed by Carol LINDA (JOSSELINE)    GLUCOSE, POC    Collection Time: 05/19/20 12:01 AM   Result Value Ref Range    Glucose (POC) 98 65 - 100 mg/dL    Performed by Jennifer Freire    RENAL FUNCTION PANEL    Collection Time: 05/19/20  3:17 AM   Result Value Ref Range    Sodium 147 (H) 136 - 145 mmol/L    Potassium 2.8 (L) 3.5 - 5.1 mmol/L    Chloride 114 (H) 97 - 108 mmol/L    CO2 25 21 - 32 mmol/L    Anion gap 8 5 - 15 mmol/L    Glucose 105 (H) 65 - 100 mg/dL    BUN 9 6 - 20 MG/DL    Creatinine 0.64 0.55 - 1.02 MG/DL    BUN/Creatinine ratio 14 12 - 20      GFR est AA >60 >60 ml/min/1.73m2    GFR est non-AA >60 >60 ml/min/1.73m2    Calcium 7.8 (L) 8.5 - 10.1 MG/DL    Phosphorus 4.1 2.6 - 4.7 MG/DL    Albumin 2.3 (L) 3.5 - 5.0 g/dL   MAGNESIUM    Collection Time: 05/19/20  3:17 AM   Result Value Ref Range    Magnesium 1.3 (L) 1.6 - 2.4 mg/dL   GLUCOSE, POC    Collection Time: 05/19/20  6:14 AM   Result Value Ref Range    Glucose (POC) 105 (H) 65 - 100 mg/dL    Performed by Jennifer Freire      No results found for: VALF2, VALAC, VALP, VALPR, DS6, CRBAM, CRBAMP, CARB2, XCRBAM  No results found for: [facility]    Radiology (reviewed/updated 5/19/2020)  Xr Abd (kub)    Result Date: 5/9/2020  INDICATION: NG tube placement. Exam: Single supine frontal view of the upper abdomen. IMPRESSION: NG tube side port and tip overlie the stomach. There is a nonspecific bowel gas pattern. Xr Abd (kub)    Result Date: 5/8/2020  EXAM:  XR ABD (KUB) INDICATION: Enteric tube placement COMPARISON: 5/8/2020 at 0018 hours. TECHNIQUE: Portable AP supine abdomen view at 0847 hours FINDINGS: The enteric tube terminates in the stomach. There are no dilated bowel loops. The bones are stable. IMPRESSION: The enteric tube terminates in the stomach. Xr Abd (kub)    Result Date: 5/3/2020  EXAM: XR ABD (KUB) INDICATION: varify NG placement possible dislodge COMPARISON: March 31. FINDINGS: A supine radiograph of the abdomen shows an NG tube with its tip in the left midabdomen. It is been pulled back several inches in comparison to the prior study. It remains in satisfactory position. No large bowel distention is identified. No soft tissue masses or pathologic calcifications are identified. The bones and soft tissues are within normal limits. IMPRESSION: NG tube in the stomach. Ir Insert Hennie Alvina Cvc W/o Port Over 5 Yr    Result Date: 5/7/2020  PROCEDURE: Permacath insertion ESTIMATED BLOOD LOSS: Less than 5mL OPERATING PHYSICIAN: DIANE Bangura: None PRE PROCEDURE DIAGNOSIS:  Hemodialysis POST PROCEDURE DIAGNOSIS: SAME Fluoroscopy dose: 29.6 mGy Procedure and findings: The risks and benefits of the procedure were discussed with the patient. Written consent was obtained. Prophylactic antibiotic of Ancef 2 g was administered prior to the intervention and discontinued prior to the completion of the procedure. Moderate intravenous conscious sedation was supervised by Dr. Lauren Carrington.  The patient was independently monitored by a registered nurse assigned to the Department of Radiology using automated blood pressure, EKG, and pulse oximetry. The detail conscious sedation record is stored in the hospital information system. Medication: Versed: 1 mg Fentanyl: 50 mcg Intraprocedure time: 10 minutes Maximal sterile barrier technique (cap and mask and sterile gown and sterile gloves and a large sterile drape and hand hygiene and cutaneous antisepsis) was utilized for this procedure. 1% lidocaine was injected locally. A 0.035 inch guidewire was then advanced through the a pre-existing Ramon catheter to the level of the cavoatrial junction. Dilatation of the tract was performed. A peel-away sheath was inserted over the guidewire. Lidocaine was then injected along the planned tunnel tract. A small dermatotomy was made over the right upper chest. A tunneling device was inserted at the dermatotomy site and advanced to the venotomy site. A permacath dialysis catheter was then pulled through the tunnel tract and inserted into the peel-away sheath . The peel-away sheath was removed. The tip of the catheter was positioned at the cavoatrial junction under fluoroscopic guidance. The catheter was sutured to the skin and dressed appropriately. The venotomy site was closed with a single suture. The patient tolerated the procedure well. There were no immediate complications. The catheter demonstrates appropriate blood flow. IMPRESSION: Successful tunneled right internal jugular vein dialysis catheter placement as described above. The catheter is functioning and is ready for use. Ir Remove Teri Lipscomb Cvad W/o Port / Pump    Result Date: 5/14/2020  EXAM:  IR REMOVE TUNL CVAD W/O PORT / PUMP INDICATION: Remove permacath COMPARISON: May 7, 2020. TECHNIQUE: The dressings removed and the permacath was removed by applying gentle tension. Dressings were applied to the access site after hemostasis was achieved. FINDINGS: Right chest wall tunneled catheter removed intact. IMPRESSION: Right chest wall tunneled catheter removed intact. Ir Insert Gastrostomy Tube Perc    Result Date: 5/14/2020  PERCUTANEOUS GASTROSTOMY TUBE PLACEMENT, 5/14/2020 3:15 PM HISTORY: Need for enteral nutrition : Ishmael Felipe M.D. PROCEDURE SUMMARY: 1. Informed consent. 2.  Percutaneous cannulation of the stomach under fluoroscopic guidance, confirmed by contrast injection. 3.  Dilation of the tract and placement of an 22 [place] balloon retained gastrojejunostomy tube, final position confirmed by contrast injection. DETAILED PROCEDURE AND FINDINGS: After explanation of the planned procedure and its intended benefits, risks, and alternatives to the patient's healthcare proxy, signed written informed consent was obtained. The patient was brought to the angiography suite and placed in the supine position. Conscious sedation was initiated and maintained with continuous vital signs monitoring by nursing and physician staff. The epigastric region was prepped and draped using maximum sterile barrier technique. The stomach was insufflated with air. Local anesthesia was achieved with subcutaneous administration of 2% lidocaine. Percutaneous access to the stomach was then obtained under fluoroscopic guidance with 2 retaining T tacks, and confirmed by contrast injection. A third access was obtained between the 2 T tack sites, confirmed with contrast injection, under fluoroscopic guidance. A wire was passed into the stomach and the needle was removed. The catheter was advanced over the wire and used to cannulate the jejunum. A sequential dilator was used to dilate the tract and a 25 [place] gastrojejunostomy tube was advanced through the peel-away sheath. The peel-away sheath was removed and the retention balloon was filled with dilute contrast under fluoroscopic guidance. The wire was removed through the gastrojejunostomy tube and contrast injection confirmed intragastric position.  The jejunal port was injected confirming small bowel position. The T tacks were secured to the skin and dressing were applied. The tube port was flushed, and the gastrostomy port was placed to gravity bag drainage. The skin site was bandaged, and the patient was discharged to the inpatient recovery unit in good condition with no immediate complaints or complications. FLUOROSCOPY TIME: 7.2 minutes FLUOROSCOPY DOSE: 123 mGy MEDICATIONS: Ancef 2 g Moderate intravenous conscious sedation was supervised by Dr. Roberto Gardner. The patient was independently monitored by a registered nurse assigned to the Department of Radiology using automated blood pressure, EKG, and pulse oximetry. The detail conscious sedation record is stored in the hospital information system. Medication: Versed: 4 mg Fentanyl: 150 mcg Intraprocedure time: 25 minutes     IMPRESSION: Uncomplicated 22 [place] gastrojejunostomy tube placement. Cta Chest W Or W Wo Cont    Result Date: 4/10/2020  EXAM:  CTA CHEST W OR W WO CONT, CT ABD PELV W WO CONT INDICATION: Evaluate for infection and/or bleeding COMPARISON: Multiple prior chest radiographs CONTRAST:  100 mL of Isovue-370. TECHNIQUE: Multiphasic CT of the abdomen and pelvis was performed before and after the uneventful administration of IV contrast. Images were obtained without contrast, in the arterial, portal venous, and delayed phases. CTA of the chest was also performed and multiplanar reformations were created. Oral contrast was not administered. CT dose reduction was achieved through use of a standardized protocol tailored for this examination and automatic exposure control for dose modulation. Adaptive statistical iterative reconstruction (ASIR) was utilized. FINDINGS: CHEST: The endotracheal tube terminates in the distal thoracic trachea. The left lobe of the thyroid gland is slightly heterogeneous.  The lungs demonstrate diffuse intralobular septal thickening and scattered areas of groundglass consistent with atypical viral infection. The major airways are patent. Thoracic aorta is normal in caliber. Mediastinal lymph node enlargement is likely secondary to the infectious process. Heart size is normal. No pericardial or pleural effusion. The pulmonary arteries are normal in caliber. Evaluation of the pulmonary arteries is limited by motion artifact. A small amount of subocclusive thrombus in the left lower lobe pulmonary arteries (5:61). ABDOMEN/PELVIS: There is a large right retroperitoneal hematoma with small foci of contrast within the hematoma. This exerts mass effect upon the right kidney and the right paracolic gutter. There is also a small volume perihepatic hematoma. The liver and gallbladder are normal. Spleen, pancreas, adrenal glands, and kidneys are within normal limits. Enteric tube is in the small bowel, beyond the ligament of Treitz. Stomach and small bowel are normal in caliber. Colon is within normal limits. Small volume fluid in the dependent portion of the pelvis. Uterus is surgically absent. No abdominal lymphadenopathy. Mild mesenteric congestion. Bilateral femoral central venous catheters noted. BONES: Osseous structures are within normal limits. IMPRESSION: 1.  Scattered pulmonary air space disease consistent with atypical viral infection. 2.  Nonocclusive pulmonary emboli in the left lower lobe pulmonary arteries. 3.  Large right retroperitoneal hematoma with small foci of contrast within the hematoma. 4.  Enteric tube and endotracheal tube in appropriate position. 5.  Bilateral femoral venous catheters in the common iliac veins. Findings discussed with ICU nurse Mirela Ritchie at the time of review. Ct Abd Pelv W Wo Cont    Result Date: 4/10/2020  EXAM:  CTA CHEST W OR W WO CONT, CT ABD PELV W WO CONT INDICATION: Evaluate for infection and/or bleeding COMPARISON: Multiple prior chest radiographs CONTRAST:  100 mL of Isovue-370.  TECHNIQUE: Multiphasic CT of the abdomen and pelvis was performed before and after the uneventful administration of IV contrast. Images were obtained without contrast, in the arterial, portal venous, and delayed phases. CTA of the chest was also performed and multiplanar reformations were created. Oral contrast was not administered. CT dose reduction was achieved through use of a standardized protocol tailored for this examination and automatic exposure control for dose modulation. Adaptive statistical iterative reconstruction (ASIR) was utilized. FINDINGS: CHEST: The endotracheal tube terminates in the distal thoracic trachea. The left lobe of the thyroid gland is slightly heterogeneous. The lungs demonstrate diffuse intralobular septal thickening and scattered areas of groundglass consistent with atypical viral infection. The major airways are patent. Thoracic aorta is normal in caliber. Mediastinal lymph node enlargement is likely secondary to the infectious process. Heart size is normal. No pericardial or pleural effusion. The pulmonary arteries are normal in caliber. Evaluation of the pulmonary arteries is limited by motion artifact. A small amount of subocclusive thrombus in the left lower lobe pulmonary arteries (5:61). ABDOMEN/PELVIS: There is a large right retroperitoneal hematoma with small foci of contrast within the hematoma. This exerts mass effect upon the right kidney and the right paracolic gutter. There is also a small volume perihepatic hematoma. The liver and gallbladder are normal. Spleen, pancreas, adrenal glands, and kidneys are within normal limits. Enteric tube is in the small bowel, beyond the ligament of Treitz. Stomach and small bowel are normal in caliber. Colon is within normal limits. Small volume fluid in the dependent portion of the pelvis. Uterus is surgically absent. No abdominal lymphadenopathy. Mild mesenteric congestion. Bilateral femoral central venous catheters noted. BONES: Osseous structures are within normal limits. IMPRESSION: 1. Scattered pulmonary air space disease consistent with atypical viral infection. 2.  Nonocclusive pulmonary emboli in the left lower lobe pulmonary arteries. 3.  Large right retroperitoneal hematoma with small foci of contrast within the hematoma. 4.  Enteric tube and endotracheal tube in appropriate position. 5.  Bilateral femoral venous catheters in the common iliac veins. Findings discussed with ICU nurse Shlomo Quan at the time of review. [facility] [facility]    Result Date: 4/23/2020  EXAM:  US ABD LTD INDICATION: Drop in hemoglobin. History of right retroperitoneal hemorrhage. Evaluate for free fluid. COMPARISON: CT abdomen April 10, 2020. TECHNIQUE: Grayscale ultrasound images of the abdomen. FINDINGS: As expected based on prior imaging, there is a large complex fluid collection in the right flank suggestive of a retroperitoneal hematoma. There is no fluid around the liver, as was previously seen on CT. Limited evaluation of liver and right kidney are normal. No right hydronephrosis. There is a small volume of free fluid in the bilateral lower quadrants and pelvis which appears simple in nature. IMPRESSION: 1. Small volume simple fluid in the lower peritoneum and pelvis likely from third spacing due to the patient's critical condition. 2.  Large complex right flank fluid collection consistent with the previously seen retroperitoneal hematoma. 3.  No fluid around the liver or right kidney. Xr Chest Port    Result Date: 5/7/2020  EXAM: XR CHEST PORT INDICATION: misplaced NG tube that was delivering enteral feeds. Eval for aspiration COMPARISON: 5/5/2020 FINDINGS: A portable AP radiograph of the chest was obtained at 2149 hours. Nasogastric tube has been retracted into the upper esophagus. . Again noted is right midlung zone airspace opacity with overall slight improvement from the prior study. Decreased right pleural effusion. Nader Ra Heart size is borderline. Right IJ permacath is present. .  The bones and soft tissues are grossly within normal limits. IMPRESSION: Nasogastric tube was retracted into the upper esophagus. Some improvement in the right lower lobe airspace opacity and right effusion. Xr Chest Port    Result Date: 5/5/2020  INDICATION: Pleural effusion, COVID19 virus infection. Portable AP semiupright view of the chest. Direct comparison made to prior chest x-ray dated May 1, 2020. Cardiomediastinal silhouette is stable. Tubes and lines are unchanged in position. There is a new, small right pleural effusion. There is worsening interstitial and patchy airspace disease throughout the right lung. Lungs grossly clear. No left pleural fluid is visualized. There is no pneumothorax. IMPRESSION: New small right pleural effusion. Worsening right lung interstitial and patchy airspace disease. Xr Chest Port    Result Date: 5/1/2020  EXAM: XR CHEST PORT INDICATION: extubated and COVIDnegative now COMPARISON: 4/22/2020 FINDINGS: A portable AP radiograph of the chest was obtained at 1011 hours. Right IJ line is again shown extending to the cavoatrial junction as is transesophageal tube extending to the stomach. The endotracheal tube is removed in the interval. Nonspecific patchy bilateral pulmonary densities greatest in the inferior right perihilar region appear unchanged. There is no pneumothorax or pleural effusion. Cardiac and mediastinal contours are stable. IMPRESSION: Interval extubation. Other findings unchanged. Xr Chest Port    Result Date: 4/22/2020  history: Dialysis catheter COMPARISON: 4/18/2020 FINDINGS: Frontal chest radiograph submitted for review. Right-sided nontunneled dialysis catheter extends to the right atrium, in appropriate position. Support hardware is otherwise unchanged. Stable heart size. Diffuse interstitial prominence of patchy right perihilar airspace disease has decreased. No new pulmonary abnormality. No pleural effusion. No pneumothorax.      IMPRESSION: Appropriate position of right-sided nontunneled dialysis catheter. No pneumothorax. No new abnormality. Improvement in lung opacities. Xr Chest Port    Result Date: 4/18/2020  EXAM: XR CHEST PORT INDICATION: Hypoxia, respiratory failure COMPARISON: Chest x-ray 4/9/2020. FINDINGS: A portable AP radiograph of the chest was obtained at 10:32 hours. The patient is on a cardiac monitor. The endotracheal tube projects over the tracheal lucency with the tip approximately 2 cm above the bhavik. Enteric tube traverses expected course to below the diaphragm into the left upper quadrant. A right PICC line traverses in expected course to terminate with the tip projecting at the atriocaval junction. The lungs show no significant change in bilateral patchy airspace opacities and increased interstitial markings with no pneumothorax or pleural effusion. The cardiac and mediastinal contours and pulmonary vascularity are normal.  The bones and soft tissues are grossly within normal limits. IMPRESSION: Tubes and lines in expected positions as above. No significant change in bilateral patchy airspace infiltrates and interstitial prominence. Xr Chest Port    Result Date: 4/9/2020  EXAM: XR CHEST PORT INDICATION: CHF, fluid overload, respiratory distress, intubation, viral pneumonia. COMPARISON: Portable chest earlier today at 4:06 AM. TECHNIQUE: Semiupright portable chest AP view FINDINGS: Endotracheal tube is unchanged and terminates proximal to the bhavik. Enteric tube extends into the abdomen but out of the field-of-view. Right PICC line is unchanged and in good position. Cardiac monitoring wires overlie the thorax. The cardiomediastinal and hilar contours are within normal limits. The pulmonary vasculature is within normal limits. Patchy bilateral airspace opacities are not significantly changed given difference in technique. No pneumothorax. The visualized bones and upper abdomen are age-appropriate.      IMPRESSION: No change since earlier today.     Xr Chest Port    Result Date: 4/9/2020  EXAM:  XR CHEST PORT INDICATION:  ongoing hypoxia COMPARISON:  4/5/2020 FINDINGS: A portable AP radiograph of the chest was obtained at 4011 hours. ET tube is 1 cm above the bhavik and can be retracted. NG tube overlies the stomach. Right IJ catheter is unchanged. .  There is improved aeration in bilateral diffuse pulmonary opacities. [place] Heart size is normal..  Bony structures are unchanged. IMPRESSION: ET tube is 1 cm above the bhavik and can be retracted slightly. . There is improved aeration bilateral pulmonary opacities. Xr Chest Port    Result Date: 4/5/2020  EXAM:  XR CHEST PORT INDICATION:   ett eval COMPARISON: Chest radiograph 4/3/2020. FINDINGS: AP radiograph of the chest was obtained. There is been interval advancement of the endotracheal tube which now terminates 1.6 cm above the bhavik. Right upper extremity PICC and gastric decompression tubes are again noted. There is no significant change in diffuse bilateral heterogeneous opacities. Likely trace left pleural effusion. No pneumothorax. Stable cardiomediastinal silhouette. IMPRESSION: 1. Interval advancement of endotracheal tube which now terminates 1.6 cm above the bhavik. Consider slight retraction. 2. Unchanged diffuse bilateral heterogeneous opacities, consistent with multifocal pneumonia. Likely trace left pleural effusion. Xr Chest Port    Result Date: 4/3/2020  EXAM:  XR CHEST PORT INDICATION:  COVID 19 viral infection. COMPARISON: March 31, 2020 TECHNIQUE: AP portable upright chest view FINDINGS: Endotracheal tube terminates 3.1 cm above the bhavik. Right-sided PICC line terminates in the upper right atrium. Enteric tube terminates below the diaphragm in the stomach. Bilateral interstitial airspace disease is unchanged compared to the prior radiographs. No pneumothorax or pleural effusion. IMPRESSION: Unchanged extensive bilateral airspace disease.  Stable support lines and tubes.    Xr Chest Port    Result Date: 3/31/2020  INDICATION: Dialysis catheter placement COMPARISON: March 31, 2020 at 9:39 AM FINDINGS: Single AP portable view of the chest obtained at 12:03 PM demonstrates no change in position of the endotracheal tube or nasogastric tube. New right internal jugular temporary dialysis catheter has its tip at the level of the mid SVC. The cardiomediastinal silhouette is stable. Diffuse bilateral interstitial and alveolar opacities are not significantly changed. Zac Confer No pneumothorax is seen. There is no evidence of pleural effusion. IMPRESSION: Right internal jugular temporary dialysis catheter tip overlies the mid SVC. Unchanged bilateral interstitial and alveolar opacities. Xr Chest Port    Result Date: 3/31/2020  EXAM: XR CHEST PORT INDICATION: Resp Failure COMPARISON: None. FINDINGS: A portable AP radiograph of the chest was obtained at 0939 hours. The endotracheal tube tip is at the thoracic inlet. The central line tip is in the region of the superior vena cava. The nasogastric tube continues beyond the film. The patient is on a cardiac monitor. There is airspace opacification throughout the lungs bilaterally. The cardiac and mediastinal contours and pulmonary vascularity are normal.  The bones and soft tissues are grossly within normal limits. IMPRESSION: Diffuse bilateral airspace opacification. Endotracheal tube, nasogastric tube, and central line in place. Xr Abd Port  1 V    Result Date: 5/8/2020  PROCEDURE: XR ABD PORT  1 V REASON FOR STUDY: evaluate position of NGT prior to resuming enteral feeding COMPARISON: 5/3/2020 FINDINGS: Single frontal view the abdomen demonstrates a nasogastric tube with the side-port in the distal esophagus. Recommend advancing 12 cm. Patchy opacities are noted in the lung bases. Visualized bowel is unremarkable     IMPRESSION:  Nasogastric tube side-port in the distal esophagus.  Recommend advancing 12 cm     Xr Abd Port  1 V    Result Date: 3/31/2020  INDICATION: Orogastric tube placement FINDINGS: Single supine view of the abdomen demonstrates a nonspecific intestinal gas pattern. Orogastric tube tip overlies the gastric body. No soft tissue mass or pathological soft tissue calcification is seen. The osseous structures are unremarkable. IMPRESSION: Orogastric tube appears to be in satisfactory position. Nonspecific intestinal gas pattern. Ir Insert Non Tunl Cvc Over 5 Yrs    Result Date: 4/22/2020  INDICATION:  melissa Informed consent obtained. At the bedside, the veins of the neck were evaluated with ultrasound. The right IJ is patent. A permanent image was stored. Access gained via the IJ under ultrasound guidance without difficulty. Subsequently, over a guidewire, a Melissa catheter was placed without difficulty. Maximal sterile barrier technique was utilized. IMPRESSION: Successful portable ultrasound guided Melissa catheter         Mental Status Exam:  General appearance:   Stefan Baker is a 64 y.o. WHITE OR  female who is dressed in hospital apparel. Eye contact: makes good eye contact  Speech: Spontaneous and coherent  Affect : blunted  Mood: \"OK\"  Thought Process: Logical, goal directed  Perception: Denies any AH or VH. Thought Content: Denies any SI or Plan  Insight: Partial  Judgement: Fair  Cognition: Intact grossly. Length of psychotherapy session: 15 minutes     Total Patient Care Time Spent: 35 minutes : (Coordinated treatment team rounds conducted with patient present; discussions with nurses, , pharmacist,  held; counseling time with patient, individual psychotherapy with patient; and discussions with family members; chart reviewed in full including consultant notes, ancillary staff notes, vitals and labs reviewed in full).     Greater than 50% of total patient care time included counseling    Signed:  Jeferson Hills NP  5/19/2020

## 2020-05-19 NOTE — PROGRESS NOTES
Hospitalist Progress Note  Polo Andrade MD  Answering service: [phone] from in house phone      Date of Service:  2020  NAME:  Jael Price  :  1960  MRN:  [id number]    Admission Summary:   59F s/p COVID treatement in ICU - extubated  Interval history / Subjective: Follow up Acute hypoxic resp. Failure, acute renal failure  Says she feels a lot better  No more nausea or vomiting since last night  Tube feeds held         Assessment & Plan:     ARF:   - Nephrology following- was on HD  -last HD   -now recovered and no more need for HD  -Permacath removed   -Appreciate discussion with Nephrology  -now resolved    Nausea, vomiting: GI consulted,   -s/p EGD:Hiatal hernia, gr2 erosive esophagitis and gastritis in antrum, appreciate recommendations, cont. Protonix, added carafate  -Appreciate discussion with GI  -s/p GJ tube placement   -Tube feeds started 5/15 but held since  sec to continued nausea and vomiting  -Appreciate discussion with GI, restarted feeds . But did not tolerate. I think atleast part of it is driven by anxiety contributed by prolonged GERD.  does mention that the patient has been having nausea and vomiting since \"many years\"  -Psych re-consulted, recommending increasing Lexapro and trazodone. Spoke to the patient and the , agreeable on the dose    COVID-19 +ve   - s/p Tocilizumab, Plaquenil. On Vit C, Zinc   -COVID negative ,  and   - Resolved    Acute Hypoxic Resp Failure  - resolved     Acute metabolic Encephalopathy:  - likely ICU delirium- no agitation. Monitor  - resolved. Sinus tachycardia:   -improving,continue Metoprolol, normal TSH     Anemia in CKD:monitor HB. Retroperitoneal bleed: this hospitalization,   -resolved - Avoid AP/AC meds- Monitor Hb     Non occlusive PE in LLL pulmonary artery - no ACs due to retroperitoneal bleed.   Appreciate Vascular surgery recommendations for IVC filter, comments noted, conservative approach for now    #. S/p Cardiac arrest 4/9  #. DM2: controlled, A1c 10.4, SSI, AccuChecks, monitor  #. Morbid obesity: counseled on health benefits of weight loss, Healthy diet, BMI 44.11 kg/m². # depression: continue Trazodone at bedtime and Psych consulted, started on Lexapro  # Anxiety episodes; Continue Lexapro, Ativan PRN for short duration     Dysphagia  -s/p GJ tube 5/14, tube feeds started 5/15  -nutrition consult  -Nausea and vomiting today improved    PT/OT rehab    Code status: Full  DVT prophylaxis: SCD    Plan: Nausea and vomiting needs to be under control before discharge     Disposition: as above     Hospital Problems  Date Reviewed: 5/14/2020          Codes Class Noted POA    Hypotension ICD-10-CM: I95.9  ICD-9-CM: 458.9 Acute 4/2/2020 Yes        Retroperitoneal hemorrhage ICD-10-CM: R58  ICD-9-CM: 459.0  4/19/2020 No        Acute renal failure (ARF) (HCC) ICD-10-CM: N17.9  ICD-9-CM: 584.9  4/19/2020 No        Encephalopathy ICD-10-CM: G93.40  ICD-9-CM: 348.30  4/19/2020 No        * (Principal) Sepsis with multi-organ dysfunction (HCC) ICD-10-CM: A41.9, R65.20  ICD-9-CM: 038.9, 995.92  4/19/2020 Yes        Pneumonia due to methicillin susceptible Staphylococcus aureus (MSSA) ([facility] ICD-10-CM: F78.440  ICD-9-CM: 482.41  4/19/2020 Unknown        Thrombocytopenia ([facility] ICD-10-CM: D69.6  ICD-9-CM: 287.5  4/19/2020 Unknown        Diarrhea ICD-10-CM: R19.7  ICD-9-CM: 787.91  4/19/2020 Unknown        COVID-19 virus infection ICD-10-CM: U07.1  ICD-9-CM: 079.89  3/31/2020 Unknown            Review of Systems:   Pertinent items are mentioned in interval history. Vital Signs:    Last 24hrs VS reviewed since prior progress note.  Most recent are:  Visit Vitals  /78   Pulse 96   Temp 98.3 °F (36.8 °C)   Resp 19   Ht 5' 4\" (1.626 m)   Wt 106.5 kg (234 lb 12.8 oz)   SpO2 97%   BMI 40.30 kg/m²         Intake/Output Summary (Last 24 hours) at 5/19/2020 0947  Last data filed at 5/18/2020 1505  Gross per 24 hour   Intake 531.25 ml   Output --   Net 531.25 ml        Physical Examination:   General:  Alert, No acute distress  Resp:  No accessory muscle use, Good AE, no wheezes, few rhonchi  Abd:  Soft, slightly tender, non-distended, obese, GJ tube in place  Extremities:  No cyanosis or clubbing, no significant edema  Neuro:  Grossly normal, no focal neuro deficits, follows commands   Psych:   not agitated. Data Review:    Review and/or order of clinical lab test  Review and/or order of tests in the radiology section of [facility]  Review and/or order of tests in the medicine section of [facility]  Labs:     Recent Labs     05/18/20 0358 05/17/20  0716   WBC 8.5 8.4   HGB 9.3* 9.3*   HCT 31.3* 30.6*    236     Recent Labs     05/19/20 0317 05/18/20 0358 05/17/20  0249   * 148* 146*   K 2.8* 3.1* 3.2*   * 113* 112*   CO2 25 27 29   BUN 9 13 17   CREA 0.64 0.75 0.85   * 139* 178*   CA 7.8* 8.4* 8.4*   MG 1.3* 1.4* 1.5*   PHOS 4.1  --  2.6     Recent Labs     05/19/20 0317 05/18/20 0358 05/17/20  0249   SGOT  --  12*  --    ALT  --  17  --    AP  --  153*  --    TBILI  --  0.8  --    TP  --  5.8*  --    ALB 2.3* 2.3* 2.2*   GLOB  --  3.5  --      No results for input(s): INR, PTP, APTT, INREXT, INREXT in the last 72 hours. No results for input(s): FE, TIBC, PSAT, FERR in the last 72 hours. No results found for: FOL, RBCF   No results for input(s): PH, PCO2, PO2 in the last 72 hours. No results for input(s): CPK, CKNDX, TROIQ in the last 72 hours.     No lab exists for component: CPKMB  Lab Results   Component Value Date/Time    Triglyceride 159 (H) 04/12/2020 08:34 PM     Lab Results   Component Value Date/Time    Glucose (POC) 105 (H) 05/19/2020 06:14 AM    Glucose (POC) 98 05/19/2020 12:01 AM    Glucose (POC) 139 (H) 05/18/2020 11:26 AM    Glucose (POC) 116 (H) 05/18/2020 08:46 AM    Glucose (POC) 140 (H) 05/18/2020 06:08 AM No results found for: COLOR, APPRN, SPGRU, REFSG, SARKIS, PROTU, GLUCU, KETU, BILU, UROU, GALE, LEUKU, GLUKE, EPSU, BACTU, WBCU, RBCU, CASTS, UCRY  Medications Reviewed:     Current Facility-Administered Medications   Medication Dose Route Frequency    potassium chloride 10 mEq in 100 ml IVPB  10 mEq IntraVENous Q1H    [START ON 5/20/2020] escitalopram oxalate (LEXAPRO) tablet 20 mg  20 mg Oral DAILY    traZODone (DESYREL) tablet 100 mg  100 mg Oral QHS    0.9% sodium chloride infusion  75 mL/hr IntraVENous CONTINUOUS    metoprolol (LOPRESSOR) injection 5 mg  5 mg IntraVENous Q12H    [Held by provider] insulin glargine (LANTUS) injection 15 Units  15 Units SubCUTAneous DAILY    famotidine (PEPCID) tablet 20 mg  20 mg Per J Tube DAILY    bumetanide (BUMEX) tablet 1 mg  1 mg Oral DAILY    lidocaine (XYLOCAINE) 2 % jelly   Mucous Membrane PRN    sodium chloride (NS) flush 5-40 mL  5-40 mL IntraVENous Q8H    sodium chloride (NS) flush 5-40 mL  5-40 mL IntraVENous PRN    HYDROmorphone (PF) (DILAUDID) injection 0.5 mg  0.5 mg IntraVENous Q4H PRN    sucralfate (CARAFATE) tablet 1 g  1 g Oral TIDAC    pantoprazole (PROTONIX) 40 mg in 0.9% sodium chloride 10 mL injection  40 mg IntraVENous Q12H    LORazepam (ATIVAN) injection 0.5 mg  0.5 mg IntraVENous Q6H PRN    metoclopramide HCl (REGLAN) injection 5 mg  5 mg IntraVENous Q6H PRN    heparin (porcine) 1,000 unit/mL injection 3,800 Units  3,800 Units Hemodialysis DIALYSIS PRN    balsam peru-castor oiL (VENELEX) ointment   Topical BID    guaiFENesin (ROBITUSSIN) 100 mg/5 mL oral liquid 100 mg  100 mg Oral Q12H    epoetin jovain-epbx (RETACRIT) injection 20,000 Units  20,000 Units SubCUTAneous Q TUE, THU & SAT    HYDROcodone-acetaminophen (NORCO) 5-325 mg per tablet 1 Tab  1 Tab Oral Q6H PRN    phenol throat spray (CHLORASEPTIC) 1 Spray  1 Spray Oral Q6H PRN    sevelamer carbonate (RENVELA) oral powder 0.8 g  0.8 g Oral TID WITH MEALS    guar gum (BENEFIBER) packet 1 Packet  4 g Oral TID    labetaloL (NORMODYNE;TRANDATE) injection 20 mg  20 mg IntraVENous Q4H PRN    albumin human 25% (BUMINATE) solution 12.5 g  12.5 g IntraVENous DIALYSIS PRN    albuterol (PROVENTIL VENTOLIN) nebulizer solution 2.5 mg  2.5 mg Nebulization Q4H PRN    alteplase (CATHFLO) 2 mg in sterile water (preservative free) 2 mL injection  2 mg InterCATHeter DIALYSIS PRN    alteplase (CATHFLO) 2 mg in sterile water (preservative free) 2 mL injection  2 mg InterCATHeter DIALYSIS PRN    insulin lispro (HUMALOG) injection   SubCUTAneous Q6H    white petrolatum-mineral oiL (AKWA TEARS) 83-15 % ophthalmic ointment   Both Eyes Q12H    bacitracin 500 unit/gram packet 1 Packet  1 Packet Topical PRN    sodium chloride (NS) flush 5-40 mL  5-40 mL IntraVENous Q8H    sodium chloride (NS) flush 5-40 mL  5-40 mL IntraVENous PRN    ondansetron (ZOFRAN) injection 4 mg  4 mg IntraVENous Q4H PRN    acetaminophen (TYLENOL) tablet 650 mg  650 mg Oral Q6H PRN    Or    acetaminophen (TYLENOL) suppository 650 mg  650 mg Rectal Q6H PRN    glucose chewable tablet 16 g  4 Tab Oral PRN    glucagon (GLUCAGEN) injection 1 mg  1 mg IntraMUSCular PRN    dextrose 10% infusion 0-250 mL  0-250 mL IntraVENous PRN   ______________________________________________________________________  EXPECTED LENGTH OF STAY: 12d 9h  ACTUAL LENGTH OF STAY:          Frank Canela MD

## 2020-05-20 LAB
ALBUMIN SERPL-MCNC: 2.2 G/DL (ref 3.5–5)
ANION GAP SERPL CALC-SCNC: 7 MMOL/L (ref 5–15)
BUN SERPL-MCNC: 7 MG/DL (ref 6–20)
BUN/CREAT SERPL: 11 (ref 12–20)
CALCIUM SERPL-MCNC: 7.6 MG/DL (ref 8.5–10.1)
CHLORIDE SERPL-SCNC: 112 MMOL/L (ref 97–108)
CO2 SERPL-SCNC: 26 MMOL/L (ref 21–32)
CREAT SERPL-MCNC: 0.61 MG/DL (ref 0.55–1.02)
ERYTHROCYTE [DISTWIDTH] IN BLOOD BY AUTOMATED COUNT: 16.4 % (ref 11.5–14.5)
GLUCOSE BLD STRIP.AUTO-MCNC: 148 MG/DL (ref 65–100)
GLUCOSE BLD STRIP.AUTO-MCNC: 167 MG/DL (ref 65–100)
GLUCOSE BLD STRIP.AUTO-MCNC: 186 MG/DL (ref 65–100)
GLUCOSE BLD STRIP.AUTO-MCNC: 190 MG/DL (ref 65–100)
GLUCOSE SERPL-MCNC: 183 MG/DL (ref 65–100)
HCT VFR BLD AUTO: 32.6 % (ref 35–47)
HGB BLD-MCNC: 9.7 G/DL (ref 11.5–16)
MAGNESIUM SERPL-MCNC: 1.6 MG/DL (ref 1.6–2.4)
MCH RBC QN AUTO: 29.5 PG (ref 26–34)
MCHC RBC AUTO-ENTMCNC: 29.8 G/DL (ref 30–36.5)
MCV RBC AUTO: 99.1 FL (ref 80–99)
NRBC # BLD: 0 K/UL (ref 0–0.01)
NRBC BLD-RTO: 0 PER 100 WBC
PHOSPHATE SERPL-MCNC: 3.4 MG/DL (ref 2.6–4.7)
PLATELET # BLD AUTO: 227 K/UL (ref 150–400)
PMV BLD AUTO: 9.5 FL (ref 8.9–12.9)
POTASSIUM SERPL-SCNC: 3 MMOL/L (ref 3.5–5.1)
RBC # BLD AUTO: 3.29 M/UL (ref 3.8–5.2)
SERVICE CMNT-IMP: ABNORMAL
SODIUM SERPL-SCNC: 145 MMOL/L (ref 136–145)
WBC # BLD AUTO: 9.5 K/UL (ref 3.6–11)

## 2020-05-20 PROCEDURE — 83735 ASSAY OF MAGNESIUM: CPT

## 2020-05-20 PROCEDURE — 74011000250 HC RX REV CODE- 250: Performed by: NURSE PRACTITIONER

## 2020-05-20 PROCEDURE — 97535 SELF CARE MNGMENT TRAINING: CPT

## 2020-05-20 PROCEDURE — 74011250636 HC RX REV CODE- 250/636: Performed by: INTERNAL MEDICINE

## 2020-05-20 PROCEDURE — 65270000032 HC RM SEMIPRIVATE

## 2020-05-20 PROCEDURE — 82962 GLUCOSE BLOOD TEST: CPT

## 2020-05-20 PROCEDURE — 97530 THERAPEUTIC ACTIVITIES: CPT

## 2020-05-20 PROCEDURE — 36415 COLL VENOUS BLD VENIPUNCTURE: CPT

## 2020-05-20 PROCEDURE — 74011250636 HC RX REV CODE- 250/636: Performed by: HOSPITALIST

## 2020-05-20 PROCEDURE — 74011250637 HC RX REV CODE- 250/637: Performed by: HOSPITALIST

## 2020-05-20 PROCEDURE — 74011000250 HC RX REV CODE- 250: Performed by: HOSPITALIST

## 2020-05-20 PROCEDURE — 80069 RENAL FUNCTION PANEL: CPT

## 2020-05-20 PROCEDURE — 74011250637 HC RX REV CODE- 250/637: Performed by: NURSE PRACTITIONER

## 2020-05-20 PROCEDURE — 74011636637 HC RX REV CODE- 636/637: Performed by: INTERNAL MEDICINE

## 2020-05-20 PROCEDURE — 92526 ORAL FUNCTION THERAPY: CPT

## 2020-05-20 PROCEDURE — 74011250637 HC RX REV CODE- 250/637: Performed by: INTERNAL MEDICINE

## 2020-05-20 PROCEDURE — 74011000250 HC RX REV CODE- 250: Performed by: INTERNAL MEDICINE

## 2020-05-20 PROCEDURE — 85027 COMPLETE CBC AUTOMATED: CPT

## 2020-05-20 PROCEDURE — C9113 INJ PANTOPRAZOLE SODIUM, VIA: HCPCS | Performed by: INTERNAL MEDICINE

## 2020-05-20 PROCEDURE — 74011250636 HC RX REV CODE- 250/636: Performed by: NURSE PRACTITIONER

## 2020-05-20 PROCEDURE — 97110 THERAPEUTIC EXERCISES: CPT

## 2020-05-20 RX ORDER — TRAZODONE HYDROCHLORIDE 100 MG/1
100 TABLET ORAL
Qty: 30 TAB | Refills: 0 | Status: SHIPPED | OUTPATIENT
Start: 2020-05-20 | End: 2020-10-27

## 2020-05-20 RX ORDER — PANTOPRAZOLE SODIUM 40 MG/1
40 TABLET, DELAYED RELEASE ORAL
Status: DISCONTINUED | OUTPATIENT
Start: 2020-05-21 | End: 2020-05-22 | Stop reason: HOSPADM

## 2020-05-20 RX ORDER — SUCRALFATE 1 G/1
1 TABLET ORAL
Qty: 90 TAB | Refills: 1 | Status: SHIPPED | OUTPATIENT
Start: 2020-05-20 | End: 2020-10-27

## 2020-05-20 RX ORDER — POTASSIUM CHLORIDE 7.45 MG/ML
10 INJECTION INTRAVENOUS
Status: COMPLETED | OUTPATIENT
Start: 2020-05-20 | End: 2020-05-20

## 2020-05-20 RX ORDER — ESCITALOPRAM OXALATE 20 MG/1
20 TABLET ORAL DAILY
Qty: 30 TAB | Refills: 1 | Status: SHIPPED | OUTPATIENT
Start: 2020-05-21 | End: 2020-10-27

## 2020-05-20 RX ORDER — METOPROLOL TARTRATE 25 MG/1
25 TABLET, FILM COATED ORAL 2 TIMES DAILY
Qty: 60 TAB | Refills: 1 | Status: SHIPPED | OUTPATIENT
Start: 2020-05-20 | End: 2020-10-27

## 2020-05-20 RX ORDER — HYDROCODONE BITARTRATE AND ACETAMINOPHEN 5; 325 MG/1; MG/1
1 TABLET ORAL
Qty: 15 TAB | Refills: 0 | Status: SHIPPED | OUTPATIENT
Start: 2020-05-20 | End: 2020-05-23

## 2020-05-20 RX ORDER — BUMETANIDE 1 MG/1
1 TABLET ORAL DAILY
Qty: 30 TAB | Refills: 1 | Status: SHIPPED | OUTPATIENT
Start: 2020-05-21 | End: 2020-10-27

## 2020-05-20 RX ORDER — PROCHLORPERAZINE MALEATE 5 MG
5 TABLET ORAL EVERY 6 HOURS
Qty: 28 TAB | Refills: 0 | Status: SHIPPED | OUTPATIENT
Start: 2020-05-20 | End: 2020-05-27

## 2020-05-20 RX ORDER — PROCHLORPERAZINE MALEATE 5 MG
5 TABLET ORAL EVERY 6 HOURS
Status: DISCONTINUED | OUTPATIENT
Start: 2020-05-20 | End: 2020-05-20

## 2020-05-20 RX ORDER — PROCHLORPERAZINE MALEATE 5 MG
5 TABLET ORAL EVERY 6 HOURS
Status: DISCONTINUED | OUTPATIENT
Start: 2020-05-20 | End: 2020-05-21

## 2020-05-20 RX ADMIN — SODIUM CHLORIDE 10 ML: 9 INJECTION INTRAMUSCULAR; INTRAVENOUS; SUBCUTANEOUS at 14:00

## 2020-05-20 RX ADMIN — LORAZEPAM 0.5 MG: 2 INJECTION INTRAMUSCULAR; INTRAVENOUS at 20:48

## 2020-05-20 RX ADMIN — PROCHLORPERAZINE MALEATE 5 MG: 5 TABLET ORAL at 11:01

## 2020-05-20 RX ADMIN — CASTOR OIL AND BALSAM, PERU: 788; 87 OINTMENT TOPICAL at 09:14

## 2020-05-20 RX ADMIN — SUCRALFATE 1 G: 1 TABLET ORAL at 07:31

## 2020-05-20 RX ADMIN — SUCRALFATE 1 G: 1 TABLET ORAL at 17:30

## 2020-05-20 RX ADMIN — HYDROMORPHONE HYDROCHLORIDE 0.5 MG: 1 INJECTION, SOLUTION INTRAMUSCULAR; INTRAVENOUS; SUBCUTANEOUS at 10:59

## 2020-05-20 RX ADMIN — INSULIN LISPRO 2 UNITS: 100 INJECTION, SOLUTION INTRAVENOUS; SUBCUTANEOUS at 18:48

## 2020-05-20 RX ADMIN — Medication 10 ML: at 20:50

## 2020-05-20 RX ADMIN — POTASSIUM CHLORIDE 10 MEQ: 7.46 INJECTION, SOLUTION INTRAVENOUS at 12:16

## 2020-05-20 RX ADMIN — POTASSIUM CHLORIDE 10 MEQ: 7.46 INJECTION, SOLUTION INTRAVENOUS at 13:11

## 2020-05-20 RX ADMIN — BUMETANIDE 1 MG: 1 TABLET ORAL at 09:13

## 2020-05-20 RX ADMIN — PROCHLORPERAZINE MALEATE 5 MG: 5 TABLET, FILM COATED ORAL at 23:53

## 2020-05-20 RX ADMIN — SODIUM CHLORIDE 10 ML: 9 INJECTION INTRAMUSCULAR; INTRAVENOUS; SUBCUTANEOUS at 05:18

## 2020-05-20 RX ADMIN — SUCRALFATE 1 G: 1 TABLET ORAL at 11:00

## 2020-05-20 RX ADMIN — METOPROLOL TARTRATE 25 MG: 25 TABLET, FILM COATED ORAL at 17:30

## 2020-05-20 RX ADMIN — TRAZODONE HYDROCHLORIDE 100 MG: 100 TABLET ORAL at 22:18

## 2020-05-20 RX ADMIN — ESCITALOPRAM OXALATE 20 MG: 10 TABLET ORAL at 09:13

## 2020-05-20 RX ADMIN — HYDROMORPHONE HYDROCHLORIDE 0.5 MG: 1 INJECTION, SOLUTION INTRAMUSCULAR; INTRAVENOUS; SUBCUTANEOUS at 00:25

## 2020-05-20 RX ADMIN — HYDROMORPHONE HYDROCHLORIDE 0.5 MG: 1 INJECTION, SOLUTION INTRAMUSCULAR; INTRAVENOUS; SUBCUTANEOUS at 06:45

## 2020-05-20 RX ADMIN — FAMOTIDINE 20 MG: 20 TABLET ORAL at 09:14

## 2020-05-20 RX ADMIN — GUAIFENESIN 100 MG: 200 SOLUTION ORAL at 09:14

## 2020-05-20 RX ADMIN — SODIUM CHLORIDE 5 MG: 9 INJECTION INTRAMUSCULAR; INTRAVENOUS; SUBCUTANEOUS at 05:17

## 2020-05-20 RX ADMIN — SODIUM CHLORIDE 10 MG: 9 INJECTION INTRAMUSCULAR; INTRAVENOUS; SUBCUTANEOUS at 19:39

## 2020-05-20 RX ADMIN — POTASSIUM CHLORIDE 10 MEQ: 7.46 INJECTION, SOLUTION INTRAVENOUS at 10:59

## 2020-05-20 RX ADMIN — GUAIFENESIN 100 MG: 200 SOLUTION ORAL at 20:53

## 2020-05-20 RX ADMIN — SODIUM CHLORIDE 40 MG: 9 INJECTION INTRAMUSCULAR; INTRAVENOUS; SUBCUTANEOUS at 09:13

## 2020-05-20 RX ADMIN — INSULIN LISPRO 2 UNITS: 100 INJECTION, SOLUTION INTRAVENOUS; SUBCUTANEOUS at 13:11

## 2020-05-20 RX ADMIN — INSULIN LISPRO 2 UNITS: 100 INJECTION, SOLUTION INTRAVENOUS; SUBCUTANEOUS at 06:45

## 2020-05-20 RX ADMIN — METOPROLOL TARTRATE 25 MG: 25 TABLET, FILM COATED ORAL at 09:14

## 2020-05-20 RX ADMIN — SODIUM CHLORIDE 75 ML/HR: 900 INJECTION, SOLUTION INTRAVENOUS at 09:12

## 2020-05-20 RX ADMIN — ONDANSETRON 4 MG: 2 INJECTION INTRAMUSCULAR; INTRAVENOUS at 16:46

## 2020-05-20 RX ADMIN — POTASSIUM CHLORIDE 10 MEQ: 7.46 INJECTION, SOLUTION INTRAVENOUS at 14:52

## 2020-05-20 RX ADMIN — PROCHLORPERAZINE MALEATE 5 MG: 5 TABLET, FILM COATED ORAL at 17:30

## 2020-05-20 NOTE — PROGRESS NOTES
Problem: Patient Education: Go to Patient Education Activity  Goal: Patient/Family Education  Outcome: Progressing Towards Goal     Problem: Nutrition Deficit  Goal: *Optimize nutritional status  Outcome: Progressing Towards Goal     Problem: Falls - Risk of  Goal: *Absence of Falls  Description: Document Summer Starch Fall Risk and appropriate interventions in the flowsheet. Outcome: Progressing Towards Goal  Note: Fall Risk Interventions:  Mobility Interventions: Mechanical lift    Mentation Interventions: Bed/chair exit alarm    Medication Interventions: Teach patient to arise slowly    Elimination Interventions: Call light in reach, Patient to call for help with toileting needs    History of Falls Interventions: Investigate reason for fall         Problem: Patient Education: Go to Patient Education Activity  Goal: Patient/Family Education  Outcome: Progressing Towards Goal     Problem: Pressure Injury - Risk of  Goal: *Prevention of pressure injury  Description: Document Aniket Scale and appropriate interventions in the flowsheet.   Outcome: Progressing Towards Goal  Note: Pressure Injury Interventions:  Sensory Interventions: Assess changes in LOC    Moisture Interventions: Absorbent underpads    Activity Interventions: PT/OT evaluation, Pressure redistribution bed/mattress(bed type), Increase time out of bed    Mobility Interventions: Trapeze to reposition, Float heels, PT/OT evaluation, Pressure redistribution bed/mattress (bed type)    Nutrition Interventions: Document food/fluid/supplement intake    Friction and Shear Interventions: Apply protective barrier, creams and emollients, Lift sheet, Minimize layers                Problem: Patient Education: Go to Patient Education Activity  Goal: Patient/Family Education  Outcome: Progressing Towards Goal     Problem: Diabetes Self-Management  Goal: *Disease process and treatment process  Description: Define diabetes and identify own type of diabetes; list 3 options for treating diabetes. Outcome: Progressing Towards Goal  Goal: *Incorporating nutritional management into lifestyle  Description: Describe effect of type, amount and timing of food on blood glucose; list 3 methods for planning meals. Outcome: Progressing Towards Goal  Goal: *Incorporating physical activity into lifestyle  Description: State effect of exercise on blood glucose levels. Outcome: Progressing Towards Goal  Goal: *Developing strategies to promote health/change behavior  Description: Define the ABC's of diabetes; identify appropriate screenings, schedule and personal plan for screenings. Outcome: Progressing Towards Goal  Goal: *Using medications safely  Description: State effect of diabetes medications on diabetes; name diabetes medication taking, action and side effects. Outcome: Progressing Towards Goal  Goal: *Monitoring blood glucose, interpreting and using results  Description: Identify recommended blood glucose targets  and personal targets. Outcome: Progressing Towards Goal  Goal: *Prevention, detection, treatment of acute complications  Description: List symptoms of hyper- and hypoglycemia; describe how to treat low blood sugar and actions for lowering  high blood glucose level. Outcome: Progressing Towards Goal  Goal: *Prevention, detection and treatment of chronic complications  Description: Define the natural course of diabetes and describe the relationship of blood glucose levels to long term complications of diabetes.   Outcome: Progressing Towards Goal  Goal: *Developing strategies to address psychosocial issues  Description: Describe feelings about living with diabetes; identify support needed and support network  Outcome: Progressing Towards Goal  Goal: *Insulin pump training  Outcome: Progressing Towards Goal  Goal: *Sick day guidelines  Outcome: Progressing Towards Goal  Goal: *Patient Specific Goal (EDIT GOAL, INSERT TEXT)  Outcome: Progressing Towards Goal     Problem: Patient Education: Go to Patient Education Activity  Goal: Patient/Family Education  Outcome: Progressing Towards Goal     Problem: Impaired Skin Integrity/Pressure Injury Treatment  Goal: *Improvement of Existing Pressure Injury  Outcome: Progressing Towards Goal  Goal: *Prevention of pressure injury  Description: Document Aniket Scale and appropriate interventions in the flowsheet.   Outcome: Progressing Towards Goal  Note: Pressure Injury Interventions:  Sensory Interventions: Assess changes in LOC    Moisture Interventions: Absorbent underpads    Activity Interventions: PT/OT evaluation, Pressure redistribution bed/mattress(bed type), Increase time out of bed    Mobility Interventions: Trapeze to reposition, Float heels, PT/OT evaluation, Pressure redistribution bed/mattress (bed type)    Nutrition Interventions: Document food/fluid/supplement intake    Friction and Shear Interventions: Apply protective barrier, creams and emollients, Lift sheet, Minimize layers                Problem: Patient Education: Go to Patient Education Activity  Goal: Patient/Family Education  Outcome: Progressing Towards Goal     Problem: Patient Education: Go to Patient Education Activity  Goal: Patient/Family Education  Outcome: Progressing Towards Goal     Problem: Patient Education: Go to Patient Education Activity  Goal: Patient/Family Education  Outcome: Progressing Towards Goal     Problem: Patient Education: Go to Patient Education Activity  Goal: Patient/Family Education  Outcome: Progressing Towards Goal

## 2020-05-20 NOTE — PROGRESS NOTES
Hospitalist Progress Note  Olive Slade MD  Answering service: [phone] from in house phone      Date of Service:  2020  NAME:  Mery Dominguez  :  1960  MRN:  [id number]    Admission Summary:   59F s/p COVID treatement in ICU - extubated  Interval history / Subjective: Follow up Acute hypoxic resp. Failure, acute renal failure  Says she feels a lot better  No more nausea or vomiting since yesterday  Tube feeds restarted and tolerating         Assessment & Plan:     ARF:   - Nephrology following- was on HD  -last HD   -now recovered and no more need for HD  -Permacath removed   -Appreciate discussion with Nephrology  -now resolved    Nausea, vomiting: GI consulted,   -s/p EGD:Hiatal hernia, gr2 erosive esophagitis and gastritis in antrum, appreciate recommendations, cont. Protonix, added carafate  -Appreciate discussion with GI  -s/p GJ tube placement   -Tube feeds started 5/15 but held since  sec to continued nausea and vomiting  -Appreciate discussion with GI, restarted feeds . But did not tolerate. I think atleast part of it is driven by anxiety contributed by prolonged GERD.  does mention that the patient has been having nausea and vomiting since \"many years\"  -Psych re-consulted, increased Lexapro and trazodone.   -on scheduled compazine and now tolerating the diet well    COVID-19 +ve   - s/p Tocilizumab, Plaquenil. On Vit C, Zinc   -COVID negative ,  and   - Resolved    Acute Hypoxic Resp Failure  - resolved     Acute metabolic Encephalopathy:  - likely ICU delirium- no agitation. Monitor  - resolved. Sinus tachycardia:   -improving,continue Metoprolol, normal TSH     Anemia in CKD:monitor HB. Retroperitoneal bleed: this hospitalization,   -resolved - Avoid AP/AC meds- Monitor Hb     Non occlusive PE in LLL pulmonary artery - no ACs due to retroperitoneal bleed.   Appreciate Vascular surgery recommendations for IVC filter, comments noted, conservative approach for now    #. S/p Cardiac arrest 4/9  #. DM2: controlled, A1c 10.4, SSI, AccuChecks, monitor  #. Morbid obesity: counseled on health benefits of weight loss, Healthy diet, BMI 44.11 kg/m². # depression: continue Trazodone at bedtime and Psych consulted, started on Lexapro. The dose increased5/19  # Anxiety episodes; Continue Lexapro, Ativan PRN for short duration     Dysphagia  -s/p GJ tube 5/14, tube feeds started 5/15  -nutrition consult  -Nausea and vomiting today improved    PT/OT rehab    Code status: Full  DVT prophylaxis: SCD  PTA:home  Baseline: independent  Spoke to Mr Norma Phelan on phone (5/20)    Plan: The patient is medically stable, awaiting discharge to rehab    Disposition: as above     Hospital Problems  Date Reviewed: 5/14/2020          Codes Class Noted POA    Hypotension ICD-10-CM: I95.9  ICD-9-CM: 458.9 Acute 4/2/2020 Yes        Retroperitoneal hemorrhage ICD-10-CM: R58  ICD-9-CM: 459.0  4/19/2020 No        Acute renal failure (ARF) ([facility] ICD-10-CM: N17.9  ICD-9-CM: 584.9  4/19/2020 No        Encephalopathy ICD-10-CM: G93.40  ICD-9-CM: 348.30  4/19/2020 No        * (Principal) Sepsis with multi-organ dysfunction ([facility] ICD-10-CM: A41.9, R65.20  ICD-9-CM: 038.9, 995.92  4/19/2020 Yes        Pneumonia due to methicillin susceptible Staphylococcus aureus (MSSA) ([facility] ICD-10-CM: C93.534  ICD-9-CM: 482.41  4/19/2020 Unknown        Thrombocytopenia ([facility] ICD-10-CM: D69.6  ICD-9-CM: 287.5  4/19/2020 Unknown        Diarrhea ICD-10-CM: R19.7  ICD-9-CM: 787.91  4/19/2020 Unknown        COVID-19 virus infection ICD-10-CM: U07.1  ICD-9-CM: 079.89  3/31/2020 Unknown            Review of Systems:   Pertinent items are mentioned in interval history. Vital Signs:    Last 24hrs VS reviewed since prior progress note.  Most recent are:  Visit Vitals  /81   Pulse 90   Temp 98.2 °F (36.8 °C)   Resp 18   Ht 5' 4\" (1.626 m)   Wt 106.3 kg (234 lb 6.4 oz)   SpO2 97%   BMI 40.23 kg/m²         Intake/Output Summary (Last 24 hours) at 5/20/2020 1022  Last data filed at 5/20/2020 0630  Gross per 24 hour   Intake 520 ml   Output 1900 ml   Net -1380 ml        Physical Examination:   General:  Alert, No acute distress  Resp:  No accessory muscle use, Good AE, no wheezes, few rhonchi  Abd:  Soft, slightly tender, non-distended, obese, GJ tube in place  Extremities:  No cyanosis or clubbing, no significant edema  Neuro:  Grossly normal, no focal neuro deficits, follows commands   Psych:   not agitated. Data Review:    Review and/or order of clinical lab test  Review and/or order of tests in the radiology section of CPT  Review and/or order of tests in the medicine section of [facility]  Labs:     Recent Labs     05/20/20 0329 05/18/20 0358   WBC 9.5 8.5   HGB 9.7* 9.3*   HCT 32.6* 31.3*    256     Recent Labs     05/20/20 0329 05/19/20 0317 05/18/20 0358    147* 148*   K 3.0* 2.8* 3.1*   * 114* 113*   CO2 26 25 27   BUN 7 9 13   CREA 0.61 0.64 0.75   * 105* 139*   CA 7.6* 7.8* 8.4*   MG 1.6 1.3* 1.4*   PHOS 3.4 4.1  --      Recent Labs     05/20/20 0329 05/19/20 0317 05/18/20  0358   SGOT  --   --  12*   ALT  --   --  17   AP  --   --  153*   TBILI  --   --  0.8   TP  --   --  5.8*   ALB 2.2* 2.3* 2.3*   GLOB  --   --  3.5     No results for input(s): INR, PTP, APTT, INREXT, INREXT in the last 72 hours. No results for input(s): FE, TIBC, PSAT, FERR in the last 72 hours. No results found for: FOL, RBCF   No results for input(s): PH, PCO2, PO2 in the last 72 hours. No results for input(s): CPK, CKNDX, TROIQ in the last 72 hours.     No lab exists for component: CPKMB  Lab Results   Component Value Date/Time    Triglyceride 159 (H) 04/12/2020 08:34 PM     Lab Results   Component Value Date/Time    Glucose (POC) 167 (H) 05/20/2020 06:21 AM    Glucose (POC) 101 (H) 05/19/2020 11:40 PM    Glucose (POC) 149 (H) 05/19/2020 04:21 PM    Glucose (POC) 100 05/19/2020 12:38 PM    Glucose (POC) 105 (H) 05/19/2020 06:14 AM     No results found for: COLOR, APPRN, SPGRU, REFSG, SARKIS, PROTU, GLUCU, KETU, BILU, UROU, GALE, LEUKU, GLUKE, EPSU, BACTU, WBCU, RBCU, CASTS, UCRY  Medications Reviewed:     Current Facility-Administered Medications   Medication Dose Route Frequency    potassium chloride 10 mEq in 100 ml IVPB  10 mEq IntraVENous Q1H    escitalopram oxalate (LEXAPRO) tablet 20 mg  20 mg Oral DAILY    traZODone (DESYREL) tablet 100 mg  100 mg Oral QHS    prochlorperazine (COMPAZINE) with saline injection 5 mg  5 mg IntraVENous Q6H    famotidine (PEPCID) tablet 20 mg  20 mg Oral DAILY    metoprolol tartrate (LOPRESSOR) tablet 25 mg  25 mg Oral BID    0.9% sodium chloride infusion  75 mL/hr IntraVENous CONTINUOUS    [Held by provider] insulin glargine (LANTUS) injection 15 Units  15 Units SubCUTAneous DAILY    bumetanide (BUMEX) tablet 1 mg  1 mg Oral DAILY    lidocaine (XYLOCAINE) 2 % jelly   Mucous Membrane PRN    sodium chloride (NS) flush 5-40 mL  5-40 mL IntraVENous Q8H    sodium chloride (NS) flush 5-40 mL  5-40 mL IntraVENous PRN    HYDROmorphone (PF) (DILAUDID) injection 0.5 mg  0.5 mg IntraVENous Q4H PRN    sucralfate (CARAFATE) tablet 1 g  1 g Oral TIDAC    pantoprazole (PROTONIX) 40 mg in 0.9% sodium chloride 10 mL injection  40 mg IntraVENous Q12H    LORazepam (ATIVAN) injection 0.5 mg  0.5 mg IntraVENous Q6H PRN    metoclopramide HCl (REGLAN) injection 5 mg  5 mg IntraVENous Q6H PRN    heparin (porcine) 1,000 unit/mL injection 3,800 Units  3,800 Units Hemodialysis DIALYSIS PRN    balsam peru-castor oiL (VENELEX) ointment   Topical BID    guaiFENesin (ROBITUSSIN) 100 mg/5 mL oral liquid 100 mg  100 mg Oral Q12H    epoetin jovani-epbx (RETACRIT) injection 20,000 Units  20,000 Units SubCUTAneous Q TUE, THU & SAT    HYDROcodone-acetaminophen (NORCO) 5-325 mg per tablet 1 Tab  1 Tab Oral Q6H PRN    phenol throat spray (CHLORASEPTIC) 1 Spray  1 Spray Oral Q6H PRN    labetaloL (NORMODYNE;TRANDATE) injection 20 mg  20 mg IntraVENous Q4H PRN    albumin human 25% (BUMINATE) solution 12.5 g  12.5 g IntraVENous DIALYSIS PRN    albuterol (PROVENTIL VENTOLIN) nebulizer solution 2.5 mg  2.5 mg Nebulization Q4H PRN    alteplase (CATHFLO) 2 mg in sterile water (preservative free) 2 mL injection  2 mg InterCATHeter DIALYSIS PRN    alteplase (CATHFLO) 2 mg in sterile water (preservative free) 2 mL injection  2 mg InterCATHeter DIALYSIS PRN    insulin lispro (HUMALOG) injection   SubCUTAneous Q6H    white petrolatum-mineral oiL (AKWA TEARS) 83-15 % ophthalmic ointment   Both Eyes Q12H    bacitracin 500 unit/gram packet 1 Packet  1 Packet Topical PRN    sodium chloride (NS) flush 5-40 mL  5-40 mL IntraVENous Q8H    sodium chloride (NS) flush 5-40 mL  5-40 mL IntraVENous PRN    ondansetron (ZOFRAN) injection 4 mg  4 mg IntraVENous Q4H PRN    acetaminophen (TYLENOL) tablet 650 mg  650 mg Oral Q6H PRN    Or    acetaminophen (TYLENOL) suppository 650 mg  650 mg Rectal Q6H PRN    glucose chewable tablet 16 g  4 Tab Oral PRN    glucagon (GLUCAGEN) injection 1 mg  1 mg IntraMUSCular PRN    dextrose 10% infusion 0-250 mL  0-250 mL IntraVENous PRN   ______________________________________________________________________  EXPECTED LENGTH OF STAY: 12d 9h  ACTUAL LENGTH OF STAY:          [address], [place]

## 2020-05-20 NOTE — PROGRESS NOTES
Pt found with HOB at 20 degrees with TF infusing. Educated pt on the importance of having HOB at 30 degrees during TF infusion. Pt allowed HOB to be placed at 30 degrees, but c/o that having HOB that high causes nausea. Primary nurse at the bedside.

## 2020-05-20 NOTE — PROGRESS NOTES
Educated pt on the importance of keeping the HOB elevated during TF infusions. Bedside and Verbal shift change report given to Caitlyn Wu RN  (oncoming nurse) by Sophia Vernon RN  (offgoing nurse). Report included the following information SBAR.

## 2020-05-20 NOTE — DIABETES MGMT
[facility]  CLINICAL NURSE SPECIALIST CONSULT  PROGRAM FOR DIABETES HEALTH  Follow up Note  Presentation   Rachel Gutierrez is a 64 y.o. female admitted from OSH to [facility] ICU with SARS-COV2 . Now recovering and COVID -. Per Nephorology kidneys are recovering nicely and now not requiring HD. Patient is now suffering with left paralysis of vocal cord which  affects her swallowing ability. New diabetes diagnosis with A1C 11.0% (3/28/2020); updated A1C 3/31/20-10.4%     Recent events: No N/V.  TF restarted this morning. Consulted by Provider for advanced diabetes nursing assessment and care, specifically related to   [] Transitioning off Claria Eyak   [x] Inpatient management strategy  [] Home management assessment  [] Survival skill education    Diabetes-related medical history  Acute complications  hyperglycemia  Neurological complications  NONE  Microvascular disease  NONE  Macrovascular disease  NONE  Other associated conditions     NONE    Diabetes medication history: NONE    Subjective       Objective   Diabetic Foot Exam: Left foot: warm, no calluses noted, red spot noted on top of foot over bone, but no breakdown noted. + microfilament sensation noted in all areas. Right foot: in brace, however, foot warm. Microfilament test deferred due to brace. Vital Signs   Visit Vitals  /81   Pulse 90   Temp 98.2 °F (36.8 °C)   Resp 18   Ht 5' 4\" (1.626 m)   Wt 106.3 kg (234 lb 6.4 oz)   SpO2 97%   BMI 40.23 kg/m²   .    Laboratory  Lab Results   Component Value Date/Time    Hemoglobin A1c 10.4 (H) 03/31/2020 03:42 PM     No results found for: LDL, LDLC, DLDLP  Lab Results   Component Value Date/Time    Creatinine 0.61 05/20/2020 03:29 AM     Lab Results   Component Value Date/Time    Sodium 145 05/20/2020 03:29 AM    Potassium 3.0 (L) 05/20/2020 03:29 AM    Chloride 112 (H) 05/20/2020 03:29 AM    CO2 26 05/20/2020 03:29 AM    Anion gap 7 05/20/2020 03:29 AM    Glucose 183 (H) 05/20/2020 03:29 AM    BUN 7 05/20/2020 03:29 AM    Creatinine 0.61 05/20/2020 03:29 AM    BUN/Creatinine ratio 11 (L) 05/20/2020 03:29 AM    GFR est AA >60 05/20/2020 03:29 AM    GFR est non-AA >60 05/20/2020 03:29 AM    Calcium 7.6 (L) 05/20/2020 03:29 AM    Bilirubin, total 0.8 05/18/2020 03:58 AM    AST (SGOT) 12 (L) 05/18/2020 03:58 AM    Alk. phosphatase 153 (H) 05/18/2020 03:58 AM    Protein, total 5.8 (L) 05/18/2020 03:58 AM    Albumin 2.2 (L) 05/20/2020 03:29 AM    Globulin 3.5 05/18/2020 03:58 AM    A-G Ratio 0.7 (L) 05/18/2020 03:58 AM    ALT (SGPT) 17 05/18/2020 03:58 AM     Lab Results   Component Value Date/Time    ALT (SGPT) 17 05/18/2020 03:58 AM         Evaluation   Ms Nehemias Espinal, with new onset Type 2 diabetes,with A1C 10.4%. TF re-started this morning. BG trending 167mg/dl today. Anticipate BG to rise with increase in TF to goal rate. It will be appropriate to re-start her daily Lantus starting tomorrow. Recommendations   1. Re-Start basal Lantus 15 units daily when TF re-started. 2. Will continue to follow. Assessment and Plan   Nursing Diagnosis Risk for unstable blood glucose pattern   Nursing Intervention Domain 9688 Decision-making Support   Nursing Interventions Examined current inpatient diabetes control   Explored factors facilitating and impeding inpatient management  Identified self-management practices impeding diabetes control  Explored corrective strategies with patient and responsible inpatient provider   Informed patient of rational for insulin strategy while hospitalized         Billing Code(s)   Thank you for including us in their care. I spent 20 minutes in direct patient care today for this patient.   Time includes chart review, face to face with patient and collaboration with interdisciplinary care team.      Sebastián Modi, [facility]   Program for Diabetes Health  Access via [phone]

## 2020-05-20 NOTE — PROGRESS NOTES
IFTIKHAR PLAN:     RUR 39%   [facility] has a bed available tomorrow-This is the 2 nd choice      Patient's  did not want her to go to another IPR[facility] except [facility] at [facility] in [place] denied patient.      Dr. Tara Hogan spoke with the MD at [facility] regarding admission, they still declined. Dr. Tara Hogan and I spoke with , he said he wanted to speak with [facility] first.  will call Dr. Tara Hogan back of his decision.        Patient will need transportation arranged. However, depending on mileage, patient may have to pay out of pocket     YAimee Rueda, [address] LINDA RN, CRM      Revision History

## 2020-05-20 NOTE — PROGRESS NOTES
Problem: Nutrition Deficit  Goal: *Optimize nutritional status  Outcome: Progressing Towards Goal     Problem: Falls - Risk of  Goal: *Absence of Falls  Description: Document Summer Starch Fall Risk and appropriate interventions in the flowsheet. Outcome: Progressing Towards Goal  Note: Fall Risk Interventions:  Mobility Interventions: Communicate number of staff needed for ambulation/transfer, OT consult for ADLs, PT Consult for mobility concerns, PT Consult for assist device competence, Strengthening exercises (ROM-active/passive)    Mentation Interventions: Adequate sleep, hydration, pain control    Medication Interventions: Evaluate medications/consider consulting pharmacy, Patient to call before getting OOB, Teach patient to arise slowly    Elimination Interventions: Call light in reach, Patient to call for help with toileting needs, Toileting schedule/hourly rounds(purewick in place)    History of Falls Interventions: Investigate reason for fall         Problem: Pressure Injury - Risk of  Goal: *Prevention of pressure injury  Description: Document Aniket Scale and appropriate interventions in the flowsheet. Outcome: Progressing Towards Goal  Note: Pressure Injury Interventions:  Sensory Interventions: Assess changes in LOC, Float heels, Keep linens dry and wrinkle-free, Minimize linen layers, Pressure redistribution bed/mattress (bed type), Turn and reposition approx.  every two hours (pillows and wedges if needed)    Moisture Interventions: Absorbent underpads, Apply protective barrier, creams and emollients, Check for incontinence Q2 hours and as needed, Internal/External urinary devices, Limit adult briefs, Minimize layers, Moisture barrier, Maintain skin hydration (lotion/cream)    Activity Interventions: Pressure redistribution bed/mattress(bed type), PT/OT evaluation    Mobility Interventions: Float heels, HOB 30 degrees or less, Pressure redistribution bed/mattress (bed type), PT/OT evaluation, Turn and reposition approx. every two hours(pillow and wedges)    Nutrition Interventions: Document food/fluid/supplement intake, Discuss nutritional consult with provider, Offer support with meals,snacks and hydration    Friction and Shear Interventions: Apply protective barrier, creams and emollients, Feet elevated on foot rest, Foam dressings/transparent film/skin sealants, HOB 30 degrees or less, Lift sheet, Minimize layers                Problem: Impaired Skin Integrity/Pressure Injury Treatment  Goal: *Improvement of Existing Pressure Injury  Outcome: Progressing Towards Goal  Goal: *Prevention of pressure injury  Description: Document Aniket Scale and appropriate interventions in the flowsheet. Outcome: Progressing Towards Goal  Note: Pressure Injury Interventions:  Sensory Interventions: Assess changes in LOC, Float heels, Keep linens dry and wrinkle-free, Minimize linen layers, Pressure redistribution bed/mattress (bed type), Turn and reposition approx. every two hours (pillows and wedges if needed)    Moisture Interventions: Absorbent underpads, Apply protective barrier, creams and emollients, Check for incontinence Q2 hours and as needed, Internal/External urinary devices, Limit adult briefs, Minimize layers, Moisture barrier, Maintain skin hydration (lotion/cream)    Activity Interventions: Pressure redistribution bed/mattress(bed type), PT/OT evaluation    Mobility Interventions: Float heels, HOB 30 degrees or less, Pressure redistribution bed/mattress (bed type), PT/OT evaluation, Turn and reposition approx.  every two hours(pillow and wedges)    Nutrition Interventions: Document food/fluid/supplement intake, Discuss nutritional consult with provider, Offer support with meals,snacks and hydration    Friction and Shear Interventions: Apply protective barrier, creams and emollients, Feet elevated on foot rest, Foam dressings/transparent film/skin sealants, HOB 30 degrees or less, Lift sheet, Minimize layers Problem: Patient Education: Go to Patient Education Activity  Goal: Patient/Family Education  Outcome: Progressing Towards Goal     Problem: Patient Education: Go to Patient Education Activity  Goal: Patient/Family Education  Outcome: Progressing Towards Goal     Problem: Patient Education: Go to Patient Education Activity  Goal: Patient/Family Education  Outcome: Progressing Towards Goal

## 2020-05-20 NOTE — PROGRESS NOTES
Bedside shift change report given to Veronica Owen RN (oncoming nurse) by Didi Maria RN (offgoing nurse). Report included the following information SBAR, Kardex, Intake/Output, MAR and Recent Results.

## 2020-05-20 NOTE — PROGRESS NOTES
Problem: Dysphagia (Adult)  Goal: *Acute Goals and Plan of Care (Insert Text)  Description: Speech Therapy Goals  Re-evaluation 5/20/2020   1. Patient will participate in repeat MBS within 7 days    Initiated 5/13/2020  1. Patient will tolerate NDD2/honey-thick liquids without adverse effects within 7 days. MET      Initiated 5/4/2020    1. Patient will tolerate small amounts of ice chips for swallow rehab without adverse effects within 7 days. 2. Patient will participate in swallow re-evaluation within 7 days. 5/20/2020 1310 by TITA Frazier  Outcome: Progressing Towards Goal     SPEECH LANGUAGE PATHOLOGY DYSPHAGIA TREATMENT: WEEKLY REASSESSMENT  Patient: Stefan Baker (84 y.o. female)  Date: 5/20/2020  Diagnosis: COVID-19 virus infection [U07.1]   Sepsis with multi-organ dysfunction ([facility]  Procedure(s) (LRB):  ESOPHAGOGASTRODUODENOSCOPY (EGD) (Left)  ESOPHAGOGASTRODUODENAL (EGD) BIOPSY (N/A) 8 Days Post-Op  Precautions: swallow Fall    ASSESSMENT:  Patient with good tolerance of dysphagia 2/honey thick liquid diet. Intermittent coughing and wet vocal quality noted, however this occurred prior to PO intake as well. Patient with improving vocal quality and is no longer aphonic, however now vocal quality is hoarse. Patient's progression toward goals since last assessment: Good. Patient with good tolerance of dysphagia 2/honey liquid diet. Vocal quality is improving, and suspect patient will be appropriate for repeat MBS soon. Unable to liberalize liquid consistencies at bedside as patient silently aspirates. PLAN:  Goals have been updated based on progression since last assessment. Patient continues to benefit from skilled intervention to address the above impairments. Continue to follow the patient 1 time a week to address goals.   Recommendations and Planned Interventions:  -Continue dysphagia 2/honey thick liquid diet  -SLP will follow for repeat MBS as medically appropriate  Discharge Recommendations: To Be Determined     SUBJECTIVE:   Patient stated I don't like it. But I won't complain.  re: honey thick liquid. OBJECTIVE:   Cognitive and Communication Status:  Neurologic State: Alert  Orientation Level: Oriented to person  Cognition: Follows commands  Perception: Appears intact  Perseveration: No perseveration noted  Safety/Judgement: Awareness of environment  Dysphagia Treatment:     P.O. Trials:  Patient Position: upright in bed  Vocal quality prior to P.O.: Hoarse;Breathy  Consistency Presented: Honey thick liquid;Mechanical soft  How Presented: Self-fed/presented;Cup/sip     Bolus Acceptance: No impairment  Bolus Formation/Control: No impairment     Propulsion: No impairment  Oral Residue: None  Initiation of Swallow: Delayed (# of seconds)  Laryngeal Elevation: Decreased  Aspiration Signs/Symptoms: Change vocal quality;Strong cough(did not appear related to PO intake)  Pharyngeal Phase Characteristics: Altered vocal quality            Pain:  Pain Scale 1: Numeric (0 - 10)  Pain Intensity 1: 7  Pain Location 1: Abdomen    After treatment patient left in no apparent distress:   Patient left in no apparent distress in bed, Call bell within reach, and Nursing notified    COMMUNICATION/EDUCATION:   The patients plan of care including recommendations, planned interventions, and recommended diet changes were discussed with: Registered nurse.      Leonard Ponce SLP  Time Calculation: 10 mins

## 2020-05-20 NOTE — PROGRESS NOTES
Problem: Self Care Deficits Care Plan (Adult)  Goal: *Acute Goals and Plan of Care (Insert Text)  Description:   FUNCTIONAL STATUS PRIOR TO ADMISSION: Patient unable to provide history. Per chart, patient was fully independent PTA. HOME SUPPORT: Per chart, patient lived with family. Goals reviewed and updated: 5/19/2020  1. Patient will perform two grooming activities with CGA while seated on EOB within 7 day(s). MET 5/20/20, but needed mod assist to complete combing hair to reach back of head. Brushed teeth with set up. 2.  Patient will perform LE bathing with maximum assistance while seated on EOB within 7 days  3. Patient will perform upper body bathing with moderate assistance seated EOB within 7 day(s). 4.  Patient will participate in upper extremity therapeutic exercise/activities with minimum assistance and pacing  for 10 minutes within 7 day(s). 5.  Patient will demonstrate energy conservation strategies/pacing with min cues during self care activities within 7 days. 6.  Patient will participate in self feeding with set up for 50% of meal within 7 days. OT weekly reassessment 5/12/2020: see goals below for update    Occupational Therapy Goals  Initiated 4/30/2020  1. Patient will perform grooming with maximal assistance within 7 day(s). (upgrade to min A 5/12)  2. Patient will perform self-feeding if appropriate for PO with maximal assistance within 7 day(s). (pending following MBS)Discontinue 5/19/2020  3. Patient will perform bathing with maximal assistance within 7 day(s). (upgrade to mod A anterior bathing EOB 5/12)  4. Patient will participate in upper extremity therapeutic exercise/activities with moderate assistance  for 10 minutes within 7 day(s). (MIN A 5/12)  5. Patient will follow 100% simple commands in preparation for functional tasks within 7 days.  (MET 5/12)             Outcome: Progressing Towards Goal     Problem: Patient Education: Go to Patient Education Activity  Goal: Patient/Family Education  Outcome: Progressing Towards Goal   OCCUPATIONAL THERAPY TREATMENT  Patient: Mery Dominguez (65 y.o. female)  Date: 5/20/2020  Diagnosis: COVID-19 virus infection [U07.1]   Sepsis with multi-organ dysfunction ([facility] [address]  Procedure(s) (LRB):  ESOPHAGOGASTRODUODENOSCOPY (EGD) (Left)  ESOPHAGOGASTRODUODENAL (EGD) BIOPSY (N/A) 8 Days Post-Op  Precautions: Fall  Chart, occupational therapy assessment, plan of care, and goals were reviewed. ASSESSMENT  Patient continues with skilled OT services and is progressing towards goals. Participation impacted by impaired reach to LEs, impaired seated balance, global weakness, extended hospital stay with intubation, impaired bilateral shoulder external rotation, impaired bilateral UE strength, impaired endurance for functional activity. Patient participated in sitting EOB for 11 min this date with CGA to supervision. Patient completed 2 grooming activities seated on EOB with set up/supervision for balance and mod assist to reach back of head to complete combing hair, alternating use of bilateral UEs during grooming. Patient instructed in active, bilateral UE horizontal abd/adduction at shoulder height in supine and active bilateral shoulder flexion in supine. Instructed in completing approximately 3 reps and 3 times a day and building by 2 reps each session as tolerated. No nausea reported. Current Level of Function Impacting Discharge (ADLs): Mod to total assist self care, bedpan for toileting    Other factors to consider for discharge:  steady progress         PLAN :  Patient continues to benefit from skilled intervention to address the above impairments. Continue treatment per established plan of care. to address goals. Recommend with staff: Assist for self care, set up for grooming to wash face and brush teeth, encourage participation in bathing in supported sitting as tolerated.     Recommend next OT session: bathing UEs seated on EOB    Recommendation for discharge: (in order for the patient to meet his/her long term goals)  Therapy 3 hours per day 5-7 days per week    This discharge recommendation:  Has been made in collaboration with the attending provider and/or case management    IF patient discharges home will need the following DME: bedside commode, hospital bed, mechanical lift, and wheelchair       SUBJECTIVE:   Patient stated I can't believe how much progress I made since yesterday.     OBJECTIVE DATA SUMMARY:   Cognitive/Behavioral Status:  Neurologic State: Alert  Orientation Level: Oriented X4  Cognition: Follows commands; Appropriate for age attention/concentration  Perception: Appears intact  Perseveration: No perseveration noted  Safety/Judgement: Awareness of environment    Functional Mobility and Transfers for ADLs:  Bed Mobility:  Rolling: Maximum assistance;Assist x1;Adaptive equipment; Additional time  Supine to Sit: Moderate assistance;Assist x2;Adaptive equipment; Additional time;Bed Modified  Sit to Supine: Moderate assistance;Assist x2;Bed Modified; Adaptive equipment; Additional time    Transfers:             Balance:  Sitting: Impaired; Without support  Sitting - Static: Fair (occasional)  Sitting - Dynamic: Poor (constant support)    ADL Intervention:       Grooming  Position Performed: Seated edge of bed  Brushing Teeth: Supervision  Brushing/Combing Hair: Moderate assistance(for back of head)  Cues: Physical assistance;Verbal cues provided                   Lower Body Dressing Assistance  Dressing Assistance: Total assistance(dependent)(inferred from mobility)  Leg Crossed Method Used: No  Position Performed: Seated edge of bed;Supine    Toileting  Toileting Assistance: Total assistance(dependent)  Bladder Hygiene: Total assistance (dependent)(Pure [employer])  Bowel Hygiene:  Total assistance (dependent)(bowel incontinence)  Clothing Management: Total assistance (dependent)    Cognitive Retraining  Safety/Judgement: Awareness of environment    Activity Tolerance:   Fair  Please refer to the flowsheet for vital signs taken during this treatment. After treatment patient left in no apparent distress:   Supine in bed, Heels elevated for pressure relief, and Call bell within reach    COMMUNICATION/COLLABORATION:   The patients plan of care was discussed with: Physical therapist and Registered nurse.      FLOR Lowe  Time Calculation: 47 mins

## 2020-05-21 ENCOUNTER — APPOINTMENT (OUTPATIENT)
Dept: GENERAL RADIOLOGY | Age: 60
DRG: 870 | End: 2020-05-21
Attending: HOSPITALIST
Payer: COMMERCIAL

## 2020-05-21 LAB
ALBUMIN SERPL-MCNC: 2.1 G/DL (ref 3.5–5)
ANION GAP SERPL CALC-SCNC: 7 MMOL/L (ref 5–15)
BUN SERPL-MCNC: 7 MG/DL (ref 6–20)
BUN/CREAT SERPL: 13 (ref 12–20)
CALCIUM SERPL-MCNC: 7.6 MG/DL (ref 8.5–10.1)
CHLORIDE SERPL-SCNC: 111 MMOL/L (ref 97–108)
CO2 SERPL-SCNC: 25 MMOL/L (ref 21–32)
CREAT SERPL-MCNC: 0.55 MG/DL (ref 0.55–1.02)
GLUCOSE BLD STRIP.AUTO-MCNC: 167 MG/DL (ref 65–100)
GLUCOSE BLD STRIP.AUTO-MCNC: 193 MG/DL (ref 65–100)
GLUCOSE BLD STRIP.AUTO-MCNC: 208 MG/DL (ref 65–100)
GLUCOSE BLD STRIP.AUTO-MCNC: 233 MG/DL (ref 65–100)
GLUCOSE SERPL-MCNC: 170 MG/DL (ref 65–100)
MAGNESIUM SERPL-MCNC: 1.3 MG/DL (ref 1.6–2.4)
PHOSPHATE SERPL-MCNC: 3.7 MG/DL (ref 2.6–4.7)
POTASSIUM SERPL-SCNC: 3.2 MMOL/L (ref 3.5–5.1)
SERVICE CMNT-IMP: ABNORMAL
SODIUM SERPL-SCNC: 143 MMOL/L (ref 136–145)

## 2020-05-21 PROCEDURE — 74011250637 HC RX REV CODE- 250/637: Performed by: NURSE PRACTITIONER

## 2020-05-21 PROCEDURE — 74011000250 HC RX REV CODE- 250: Performed by: NURSE PRACTITIONER

## 2020-05-21 PROCEDURE — 74230 X-RAY XM SWLNG FUNCJ C+: CPT

## 2020-05-21 PROCEDURE — 74011250636 HC RX REV CODE- 250/636: Performed by: HOSPITALIST

## 2020-05-21 PROCEDURE — 74011636637 HC RX REV CODE- 636/637: Performed by: INTERNAL MEDICINE

## 2020-05-21 PROCEDURE — 74011636637 HC RX REV CODE- 636/637: Performed by: HOSPITALIST

## 2020-05-21 PROCEDURE — 36415 COLL VENOUS BLD VENIPUNCTURE: CPT

## 2020-05-21 PROCEDURE — 92611 MOTION FLUOROSCOPY/SWALLOW: CPT

## 2020-05-21 PROCEDURE — 74011250637 HC RX REV CODE- 250/637: Performed by: INTERNAL MEDICINE

## 2020-05-21 PROCEDURE — 36592 COLLECT BLOOD FROM PICC: CPT

## 2020-05-21 PROCEDURE — 92526 ORAL FUNCTION THERAPY: CPT

## 2020-05-21 PROCEDURE — 74011250636 HC RX REV CODE- 250/636: Performed by: INTERNAL MEDICINE

## 2020-05-21 PROCEDURE — 74011250637 HC RX REV CODE- 250/637: Performed by: HOSPITALIST

## 2020-05-21 PROCEDURE — 74011250636 HC RX REV CODE- 250/636: Performed by: NURSE PRACTITIONER

## 2020-05-21 PROCEDURE — 83735 ASSAY OF MAGNESIUM: CPT

## 2020-05-21 PROCEDURE — 80069 RENAL FUNCTION PANEL: CPT

## 2020-05-21 PROCEDURE — 82962 GLUCOSE BLOOD TEST: CPT

## 2020-05-21 PROCEDURE — 65270000032 HC RM SEMIPRIVATE

## 2020-05-21 RX ORDER — POTASSIUM CHLORIDE 7.45 MG/ML
10 INJECTION INTRAVENOUS
Status: COMPLETED | OUTPATIENT
Start: 2020-05-21 | End: 2020-05-21

## 2020-05-21 RX ORDER — MAGNESIUM SULFATE HEPTAHYDRATE 40 MG/ML
2 INJECTION, SOLUTION INTRAVENOUS ONCE
Status: COMPLETED | OUTPATIENT
Start: 2020-05-21 | End: 2020-05-21

## 2020-05-21 RX ADMIN — SODIUM CHLORIDE 10 MG: 9 INJECTION INTRAMUSCULAR; INTRAVENOUS; SUBCUTANEOUS at 17:32

## 2020-05-21 RX ADMIN — BUMETANIDE 1 MG: 1 TABLET ORAL at 10:59

## 2020-05-21 RX ADMIN — SUCRALFATE 1 G: 1 TABLET ORAL at 15:41

## 2020-05-21 RX ADMIN — ONDANSETRON 4 MG: 2 INJECTION INTRAMUSCULAR; INTRAVENOUS at 15:51

## 2020-05-21 RX ADMIN — LORAZEPAM 0.5 MG: 2 INJECTION INTRAMUSCULAR; INTRAVENOUS at 21:24

## 2020-05-21 RX ADMIN — PROCHLORPERAZINE MALEATE 5 MG: 5 TABLET, FILM COATED ORAL at 05:36

## 2020-05-21 RX ADMIN — ESCITALOPRAM OXALATE 20 MG: 10 TABLET ORAL at 10:59

## 2020-05-21 RX ADMIN — METOPROLOL TARTRATE 25 MG: 25 TABLET, FILM COATED ORAL at 17:42

## 2020-05-21 RX ADMIN — INSULIN LISPRO 2 UNITS: 100 INJECTION, SOLUTION INTRAVENOUS; SUBCUTANEOUS at 12:45

## 2020-05-21 RX ADMIN — EPOETIN ALFA-EPBX 20000 UNITS: 10000 INJECTION, SOLUTION INTRAVENOUS; SUBCUTANEOUS at 20:59

## 2020-05-21 RX ADMIN — METOPROLOL TARTRATE 25 MG: 25 TABLET, FILM COATED ORAL at 11:00

## 2020-05-21 RX ADMIN — MAGNESIUM SULFATE IN WATER 2 G: 40 INJECTION, SOLUTION INTRAVENOUS at 07:56

## 2020-05-21 RX ADMIN — CASTOR OIL AND BALSAM, PERU: 788; 87 OINTMENT TOPICAL at 17:51

## 2020-05-21 RX ADMIN — POTASSIUM CHLORIDE 10 MEQ: 7.46 INJECTION, SOLUTION INTRAVENOUS at 12:10

## 2020-05-21 RX ADMIN — PANTOPRAZOLE SODIUM 40 MG: 40 TABLET, DELAYED RELEASE ORAL at 06:45

## 2020-05-21 RX ADMIN — SUCRALFATE 1 G: 1 TABLET ORAL at 06:45

## 2020-05-21 RX ADMIN — POTASSIUM CHLORIDE 10 MEQ: 7.46 INJECTION, SOLUTION INTRAVENOUS at 09:45

## 2020-05-21 RX ADMIN — PROCHLORPERAZINE MALEATE 5 MG: 5 TABLET, FILM COATED ORAL at 12:01

## 2020-05-21 RX ADMIN — ONDANSETRON 4 MG: 2 INJECTION INTRAMUSCULAR; INTRAVENOUS at 10:02

## 2020-05-21 RX ADMIN — INSULIN GLARGINE 15 UNITS: 100 INJECTION, SOLUTION SUBCUTANEOUS at 09:49

## 2020-05-21 RX ADMIN — SODIUM CHLORIDE 10 ML: 9 INJECTION INTRAMUSCULAR; INTRAVENOUS; SUBCUTANEOUS at 21:02

## 2020-05-21 RX ADMIN — POTASSIUM CHLORIDE 10 MEQ: 7.46 INJECTION, SOLUTION INTRAVENOUS at 13:19

## 2020-05-21 RX ADMIN — INSULIN LISPRO 3 UNITS: 100 INJECTION, SOLUTION INTRAVENOUS; SUBCUTANEOUS at 17:40

## 2020-05-21 RX ADMIN — INSULIN LISPRO 2 UNITS: 100 INJECTION, SOLUTION INTRAVENOUS; SUBCUTANEOUS at 23:36

## 2020-05-21 RX ADMIN — TRAZODONE HYDROCHLORIDE 100 MG: 100 TABLET ORAL at 21:03

## 2020-05-21 RX ADMIN — CASTOR OIL AND BALSAM, PERU: 788; 87 OINTMENT TOPICAL at 10:07

## 2020-05-21 RX ADMIN — FAMOTIDINE 20 MG: 20 TABLET ORAL at 10:59

## 2020-05-21 RX ADMIN — SODIUM CHLORIDE 10 ML: 9 INJECTION INTRAMUSCULAR; INTRAVENOUS; SUBCUTANEOUS at 05:40

## 2020-05-21 RX ADMIN — GUAIFENESIN 100 MG: 200 SOLUTION ORAL at 20:59

## 2020-05-21 RX ADMIN — INSULIN LISPRO 3 UNITS: 100 INJECTION, SOLUTION INTRAVENOUS; SUBCUTANEOUS at 05:57

## 2020-05-21 RX ADMIN — SODIUM CHLORIDE 10 ML: 9 INJECTION INTRAMUSCULAR; INTRAVENOUS; SUBCUTANEOUS at 15:38

## 2020-05-21 RX ADMIN — SUCRALFATE 1 G: 1 TABLET ORAL at 11:09

## 2020-05-21 RX ADMIN — GUAIFENESIN 100 MG: 200 SOLUTION ORAL at 11:00

## 2020-05-21 NOTE — PROGRESS NOTES
[facility] Adult  Hospitalist Group                                                                                          Hospitalist Progress Note  Brittni Hutchins MD  Answering service: [phone] from in house phone        Date of Service:  2020  NAME:  Axel Whitaker  :  1960  MRN:  [id number]      Admission Summary:     A 64 F s/p COVID treatement in ICU - extubated    Interval history / Subjective:     She said she feels better, no left side chest pain or cough,      Assessment & Plan:     ARF  - was on HD  -last HD   -now recovered and no more need for HD  -Permacath removed   -now resolved  -Nephrology on board     Nausea and vomiting   -s/p EGD:Hiatal hernia, gr2 erosive esophagitis and gastritis in antrum, appreciate recommendations, cont. Protonix, added carafate  -Appreciate discussion with GI  -s/p GJ tube placement   -Tube feeds started 5/15 but held since  sec to continued nausea and vomiting  -Appreciate discussion with GI, restarted feeds . But did not tolerate. I think atleast part of it is driven by anxiety contributed by prolonged GERD.  does mention that the patient has been having nausea and vomiting since \"many years\"  -Psych re-consulted, increased Lexapro and trazodone.   -on scheduled compazine and now tolerating the diet well  -GI on board     COVID-19 +ve   - s/p Tocilizumab, Plaquenil. On Vit C, Zinc   -COVID negative ,  and   - Resolved     Acute Hypoxic Resp Failure  - resolved     Acute metabolic Encephalopathy  - likely ICU delirium- no agitation. Monitor  - resolved.     Sinus tachycardia  -improving,continue Metoprolol, normal TSH     Anemia of chronic disease   -H/H is stable    Retroperitoneal bleed: this hospitalization,   -resolved - Avoid AP/AC meds- Monitor Hb     Non occlusive PE in LLL pulmonary artery   - no ACs due to retroperitoneal bleed.   Appreciate Vascular surgery recommendations for IVC filter, comments noted, conservative approach for now     S/p Cardiac arrest 4/9  -resuscitated successfully     T2DM  -controlled, A1c 10.4,   -continue SSI, AccuChecks, monitor    Morbid obesity  -counseled on health benefits of weight loss, Healthy diet, BMI 44.11 kg/m².     Depression:  -stable  -continue Trazodone at bedtime and Psych consulted, started on Lexapro. The dose increased5/19    Anxiety episodes;   -Continue Lexapro, Ativan PRN for short duration      Dysphagia  -s/p GJ tube 5/14, tube feeds started 5/15  -nutrition consult  -Nausea and vomiting today improved     PT/OT rehab        Code status: Full Code  DVT prophylaxis: SCD    Care Plan discussed with: Patient/Family and Nurse  Anticipated Disposition: [facility]/Rehab  Anticipated Discharge: 24 hours to 48 hours     Hospital Problems  Date Reviewed: 5/14/2020          Codes Class Noted POA    Hypotension ICD-10-CM: I95.9  ICD-9-CM: 458.9 Acute 4/2/2020 Yes        Retroperitoneal hemorrhage ICD-10-CM: R58  ICD-9-CM: 459.0  4/19/2020 No        Acute renal failure (ARF) ([facility] ICD-10-CM: N17.9  ICD-9-CM: 584.9  4/19/2020 No        Encephalopathy ICD-10-CM: G93.40  ICD-9-CM: 348.30  4/19/2020 No        * (Principal) Sepsis with multi-organ dysfunction ([facility] ICD-10-CM: A41.9, R65.20  ICD-9-CM: 038.9, 995.92  4/19/2020 Yes        Pneumonia due to methicillin susceptible Staphylococcus aureus (MSSA) ([facility] ICD-10-CM: B87.698  ICD-9-CM: 482.41  4/19/2020 Unknown        Thrombocytopenia ([facility] ICD-10-CM: D69.6  ICD-9-CM: 287.5  4/19/2020 Unknown        Diarrhea ICD-10-CM: R19.7  ICD-9-CM: 787.91  4/19/2020 Unknown        COVID-19 virus infection ICD-10-CM: U07.1  ICD-9-CM: 079.89  3/31/2020 Unknown                Vital Signs:    Last 24hrs VS reviewed since prior progress note.  Most recent are:  Visit Vitals  /69   Pulse (!) 107   Temp 99.4 °F (37.4 °C)   Resp 19   Ht 5' 4\" (1.626 m)   Wt 105.9 kg (233 lb 6.4 oz)   SpO2 99%   BMI 40.06 kg/m² Intake/Output Summary (Last 24 hours) at 5/21/2020 1534  Last data filed at 5/21/2020 1237  Gross per 24 hour   Intake 210 ml   Output 450 ml   Net -240 ml        Physical Examination:             Constitutional:  No acute distress, cooperative, pleasant    ENT:  Oral mucosa moist, oropharynx benign. Resp:  CTA bilaterally. No wheezing/rhonchi/rales. No accessory muscle use   CV:  Regular rhythm, normal rate, no murmurs, gallops, rubs    GI:  PEG tube in place, abdomen is soft, non distended, non tender. normoactive bowel sounds, no hepatosplenomegaly     Musculoskeletal:  No edema     Neurologic:  Conscious and alert, motor LE 2/5     Skin:  Good turgor, no rashes or ulcers       Data Review:    Review and/or order of clinical lab test  Review and/or order of tests in the radiology section of CPT  Review and/or order of tests in the medicine section of CPT      Labs:     Recent Labs     05/20/20 0329   WBC 9.5   HGB 9.7*   HCT 32.6*        Recent Labs     05/21/20  0330 05/20/20  0329 05/19/20  0317    145 147*   K 3.2* 3.0* 2.8*   * 112* 114*   CO2 25 26 25   BUN 7 7 9   CREA 0.55 0.61 0.64   * 183* 105*   CA 7.6* 7.6* 7.8*   MG 1.3* 1.6 1.3*   PHOS 3.7 3.4 4.1     Recent Labs     05/21/20  0330 05/20/20  0329 05/19/20  0317   ALB 2.1* 2.2* 2.3*     No results for input(s): INR, PTP, APTT, INREXT in the last 72 hours. No results for input(s): FE, TIBC, PSAT, FERR in the last 72 hours. No results found for: FOL, RBCF   No results for input(s): PH, PCO2, PO2 in the last 72 hours. No results for input(s): CPK, CKNDX, TROIQ in the last 72 hours.     No lab exists for component: CPKMB  Lab Results   Component Value Date/Time    Triglyceride 159 (H) 04/12/2020 08:34 PM     Lab Results   Component Value Date/Time    Glucose (POC) 167 (H) 05/21/2020 12:27 PM    Glucose (POC) 233 (H) 05/21/2020 05:50 AM    Glucose (POC) 186 (H) 05/20/2020 11:47 PM    Glucose (POC) 148 (H) 05/20/2020 06:24 PM    Glucose (POC) 190 (H) 05/20/2020 12:55 PM     No results found for: COLOR, APPRN, SPGRU, REFSG, SARKIS, PROTU, GLUCU, KETU, BILU, UROU, GALE, LEUKU, GLUKE, EPSU, BACTU, WBCU, RBCU, CASTS, UCRY      Medications Reviewed:     Current Facility-Administered Medications   Medication Dose Route Frequency    pantoprazole (PROTONIX) tablet 40 mg  40 mg Oral ACB    prochlorperazine (COMPAZINE) tablet 5 mg  5 mg Per G Tube Q6H    prochlorperazine (COMPAZINE) with saline injection 10 mg  10 mg IntraVENous Q6H PRN    escitalopram oxalate (LEXAPRO) tablet 20 mg  20 mg Oral DAILY    traZODone (DESYREL) tablet 100 mg  100 mg Oral QHS    famotidine (PEPCID) tablet 20 mg  20 mg Oral DAILY    metoprolol tartrate (LOPRESSOR) tablet 25 mg  25 mg Oral BID    insulin glargine (LANTUS) injection 15 Units  15 Units SubCUTAneous DAILY    bumetanide (BUMEX) tablet 1 mg  1 mg Oral DAILY    lidocaine (XYLOCAINE) 2 % jelly   Mucous Membrane PRN    sodium chloride (NS) flush 5-40 mL  5-40 mL IntraVENous Q8H    sodium chloride (NS) flush 5-40 mL  5-40 mL IntraVENous PRN    HYDROmorphone (PF) (DILAUDID) injection 0.5 mg  0.5 mg IntraVENous Q4H PRN    sucralfate (CARAFATE) tablet 1 g  1 g Oral TIDAC    LORazepam (ATIVAN) injection 0.5 mg  0.5 mg IntraVENous Q6H PRN    heparin (porcine) 1,000 unit/mL injection 3,800 Units  3,800 Units Hemodialysis DIALYSIS PRN    balsam peru-castor oiL (VENELEX) ointment   Topical BID    guaiFENesin (ROBITUSSIN) 100 mg/5 mL oral liquid 100 mg  100 mg Oral Q12H    epoetin jovani-epbx (RETACRIT) injection 20,000 Units  20,000 Units SubCUTAneous Q TUE, THU & SAT    HYDROcodone-acetaminophen (NORCO) 5-325 mg per tablet 1 Tab  1 Tab Oral Q6H PRN    phenol throat spray (CHLORASEPTIC) 1 Spray  1 Spray Oral Q6H PRN    labetaloL (NORMODYNE;TRANDATE) injection 20 mg  20 mg IntraVENous Q4H PRN    albumin human 25% (BUMINATE) solution 12.5 g  12.5 g IntraVENous DIALYSIS PRN    albuterol (PROVENTIL VENTOLIN) nebulizer solution 2.5 mg  2.5 mg Nebulization Q4H PRN    alteplase (CATHFLO) 2 mg in sterile water (preservative free) 2 mL injection  2 mg InterCATHeter DIALYSIS PRN    alteplase (CATHFLO) 2 mg in sterile water (preservative free) 2 mL injection  2 mg InterCATHeter DIALYSIS PRN    insulin lispro (HUMALOG) injection   SubCUTAneous Q6H    white petrolatum-mineral oiL (AKWA TEARS) 83-15 % ophthalmic ointment   Both Eyes Q12H    bacitracin 500 unit/gram packet 1 Packet  1 Packet Topical PRN    sodium chloride (NS) flush 5-40 mL  5-40 mL IntraVENous Q8H    sodium chloride (NS) flush 5-40 mL  5-40 mL IntraVENous PRN    ondansetron (ZOFRAN) injection 4 mg  4 mg IntraVENous Q4H PRN    acetaminophen (TYLENOL) tablet 650 mg  650 mg Oral Q6H PRN    Or    acetaminophen (TYLENOL) suppository 650 mg  650 mg Rectal Q6H PRN    glucose chewable tablet 16 g  4 Tab Oral PRN    glucagon (GLUCAGEN) injection 1 mg  1 mg IntraMUSCular PRN    dextrose 10% infusion 0-250 mL  0-250 mL IntraVENous PRN     ______________________________________________________________________  EXPECTED LENGTH OF STAY: 12d 9h  ACTUAL LENGTH OF STAY:          51                 Parth Lopez MD

## 2020-05-21 NOTE — PROGRESS NOTES
Problem: Dysphagia (Adult)  Goal: *Acute Goals and Plan of Care (Insert Text)  Description: Speech Therapy Goals  Initiated 5/21/2020  1. Patient will tolerate regular diet/thin liquids without adverse effects within 7 days. Re-evaluation 5/20/2020   1. Patient will participate in repeat MBS within 7 days MET 5/21/2020    Initiated 5/13/2020  1. Patient will tolerate NDD2/honey-thick liquids without adverse effects within 7 days. MET      Initiated 5/4/2020    1. Patient will tolerate small amounts of ice chips for swallow rehab without adverse effects within 7 days. 2. Patient will participate in swallow re-evaluation within 7 days. Outcome: Progressing Towards Goal     SPEECH PATHOLOGY MODIFIED BARIUM SWALLOW STUDY AND EDUCATION  Patient: Suellen Segura (50 y.o. female)  Date: 5/21/2020  Primary Diagnosis: COVID-19 virus infection [U07.1]  Procedure(s) (LRB):  ESOPHAGOGASTRODUODENOSCOPY (EGD) (Left)  ESOPHAGOGASTRODUODENAL (EGD) BIOPSY (N/A) 9 Days Post-Op   Precautions:   Fall    ASSESSMENT :  Based on the objective data described below, the patient presents with now within normal limits swallow function. No difficulty with oral phase and no aspiration nor significant pharyngeal residue present with any consistency. Pt did have penetration that cleared with the force of the swallow with large, sequential sips of thin liquids. However, this is considered normal, as people with no dysphagia can exhibit this. Given this significant improvement, recommend pt initiate diet as outlined below (slowly given her nausea/vomiting). Patient will benefit from skilled intervention to address the above impairments.   Patients rehabilitation potential is considered to be Good     PLAN :  Recommendations and Planned Interventions:  --regular diet/thin liquids  --upright during meals  --alternate liquids/solids  --assistance feeding  --meds in PEG tube (reportedly, meds by mouth is what oftentimes causes nausea/vomiting)    Frequency/Duration: Patient will be followed by speech-language pathology 2 times a week to address goals. Discharge Recommendations: Inpatient Rehab     SUBJECTIVE:   Patient stated, \"Wow! So that's good, right? .    OBJECTIVE:     Past Medical History:   Diagnosis Date    Basal cell carcinoma     GERD (gastroesophageal reflux disease)     Kidney stones     Polyp of ureter      Past Surgical History:   Procedure Laterality Date    ENDOMETRIAL ABLATION, THERMAL      HX  SECTION      HX COLONOSCOPY      2017    HYSTEROSCOPY DIAGNOSTIC      D&C/POLYP REMOVAL    INSERT ARTERIAL LINE  2020         IR INSERT GASTROSTOMY TUBE PERC  2020    IR INSERT NON TUNL CVC OVER 5 YRS  2020    IR INSERT TUNL CVC W/O PORT OVER 5 YR  2020    IR REMOVE TUNL CVAD W/O PORT / PUMP  2020     Prior Level of Function/Home Situation:   Home Situation  Home Environment: Other (comment)  One/Two Story Residence: One story  Living Alone: No  Support Systems: Other (comments)  Patient Expects to be Discharged to[de-identified] Unknown  Current DME Used/Available at Home: None  Diet prior to admission: Regular diet/thin liquids  Current Diet:  NDD2/honey-thick liquids   Radiologist: Dr. Ashley Russo: Lateral;Fluoro  Patient Position: Upright in hausted chair    Trial 1:   Consistency Presented: Thin liquid; Solid   How Presented: SLP-fed/presented;Straw;Successive swallows   Consistency Amount: (200 cc thin Ba; 1 tsp Ba paste)   Bolus Acceptance: No impairment   Bolus Formation/Control: No impairment:     Propulsion: No impairment   Oral Residue: None   Initiation of Swallow: Triggered at vallecula   Timing: No impairment   Penetration: Flash/transient;During swallow   Aspiration/Timing: No evidence of aspiration   Pharyngeal Clearance: No residue                       Decreased Tongue Base Retraction?: No  Laryngeal Elevation: WFL (within functional limits)  Aspiration/Penetration Score: 2 (Penetration/No residue-Contrast enters the airway penetrates, remains above the folds/cords, and is cleared)  Pharyngeal Symmetry: Not assessed  Pharyngeal-Esophageal Segment: No impairment       Oral Phase Severity: No impairment  Pharyngeal Phase Severity: Minimal    Dysphagia Treatment:  Patient seen immediately following this study. Discussed results of this MBS particularly in comparison to last MBS. Discussed the importance of continuing aspiration precautions, as pt will still be at risk as she continues to gain strength both pharyngeal and vocal fold. Patient expressed understanding. NOMS:   The NOMS functional outcome measure was used to quantify this patient's level of swallowing impairment. Based on the NOMS, the patient was determined to be at level 7 for swallow function         NOMS Swallowing Levels:  Level 1 (CN): NPO  Level 2 (CM): NPO but takes consistency in therapy  Level 3 (CL): Takes less than 50% of nutrition p.o. and continues with nonoral feedings; and/or safe with mod cues; and/or max diet restriction  Level 4 (CK): Safe swallow but needs mod cues; and/or mod diet restriction; and/or still requires some nonoral feeding/supplements  Level 5 (CJ): Safe swallow with min diet restriction; and/or needs min cues  Level 6 (CI): Independent with p.o.; rare cues; usually self cues; may need to avoid some foods or needs extra time  Level 7 ([address] [place] [address]): Independent for all p.o.  JULIANO. (2003). National Outcomes Measurement System (NOMS): Adult Speech-Language Pathology User's Guide. COMMUNICATION/EDUCATION:     The patients plan of care including findings from MBS, recommendations, planned interventions, and recommended diet changes were discussed with: Registered nurse. Patient/family have participated as able in goal setting and plan of care.     Thank you for this referral.  Jose Ramirez SLP  Time Calculation: 30 mins

## 2020-05-21 NOTE — WOUND CARE
Wound Consult: Follow Up Visit. Chart reviewed. Consulted for skin injuries sustained early in admission with COVID19 / intubated and in ICU on first consult/visits. She is much improved; voice much stronger; does complain of nausea and using antiemetics fairly round the clock; has PEGJ; in with PCT while bathing this a.m to assess and provide care. Patient is resting on a total care bariatric plus air bed. Alert and oriented x 4. Assessment:  Left upper buttock (fold)- linear injury 2 x 0.3 x 0.1 cm, pale pink with white striations; pink resurfaced edges; no surrounding redness, scant serous drainage. Hydrocolloid in use and reapplied. Gluteal cleft - linear MASD nearly resolved - 4 x < 0.1 cm, pink. No surrounding redness or rash. No redness to sacrum or buttocks. Left groin < 0.1 x 0.1 cm puncture site from line nearly closed. Posterior occipital area - noted hair clumped together and some hair loss surrounding with intact skin (no redness) - used comb to area and centrally scab like material and hair came right off - exposed pink, resurfaced skin - believe she may have sustained injury which was unobserved due to thigh hair - explained this to [facility] and let her know she has small area of hair loss that should recover - additionally, her hair will most likely thin and some loss over all anticipated with lengthy hospital course/nutritional compromise/illness but this too should improve as she recovers and has resumed more regular diet and nutrients. small 0.5 x 1.2 cm area of intact, blanching faded red intact skin. Heels themselves without redness. Small soft nodule noted on inner right foot which patient says is her baseline - as her edema has resolved, this area is more apparent. Treatment:  Z guard to gluteal cleft. Hydrocolloid to upper left buttock. Heels floated. Skin Care Recommendations:  1. Minimize friction/shear: minimize layers of linen/pads under patient.   2. Off load pressure/reposition: continue to turn and reposition approximately every 2 hours; float heels with pillows. 3. Manage Moisture - keep skin folds dry; incontinence skin care as needed; Pure wick in use. 4. Continue to monitor nutrition, pain, and skin risk scale, and skin assessment. Plan: We will continue to follow routinely and as needed.   Camron Newsome RN,[facility]    Wound Healing Office [phone]  Pager [phone]

## 2020-05-21 NOTE — PROGRESS NOTES
Problem: Nutrition Deficit  Goal: *Optimize nutritional status  Outcome: Progressing Towards Goal     Problem: Falls - Risk of  Goal: *Absence of Falls  Description: Document Ld Mosqueda Fall Risk and appropriate interventions in the flowsheet. Outcome: Progressing Towards Goal  Note: Fall Risk Interventions:  Mobility Interventions: Mechanical lift    Mentation Interventions: Bed/chair exit alarm    Medication Interventions: Patient to call before getting OOB    Elimination Interventions: Call light in reach    History of Falls Interventions: Investigate reason for fall         Problem: Pressure Injury - Risk of  Goal: *Prevention of pressure injury  Description: Document Aniket Scale and appropriate interventions in the flowsheet. Outcome: Progressing Towards Goal  Note: Pressure Injury Interventions:  Sensory Interventions: Assess changes in LOC    Moisture Interventions: Absorbent underpads    Activity Interventions: PT/OT evaluation    Mobility Interventions: Trapeze to reposition    Nutrition Interventions: Document food/fluid/supplement intake    Friction and Shear Interventions: Apply protective barrier, creams and emollients                Problem: Diabetes Self-Management  Goal: *Disease process and treatment process  Description: Define diabetes and identify own type of diabetes; list 3 options for treating diabetes.   Outcome: Progressing Towards Goal

## 2020-05-21 NOTE — PROGRESS NOTES
Physical Therapy  Attempted to see earlier today but patient not feeling well and deferring sitting up with therapy. Now at [facility]. Will follow up tomorrow.   Uinque Lane, PT

## 2020-05-21 NOTE — PROGRESS NOTES
IFTIKHAR PLAN:     RUR 39%  Plan: [facility] has a bed available tomorrow-This is the 2nd choice     Patient's  did not want her to go to another IPRI except IPR at [facility] in [place]. This facility denied patient. Dr. Delta Pinion called facility on yesterday and decision remained. Spouse was to call facility to request approval.  No response from spouse as of 5pm yesterday. 9:20a  CM called spouse. Message left on  to return call to this CM. CM continuing to follow. Santiago Gil, [address], [facility]  [phone]    9:45a  CM received return call from patient's spouse. He is accepting bed at [facility]). He states he has called [facility] and left 2 messages requesting a return call with no response. He asked if CM had called back and CM responded that there was no need for CM to call as decision had been made along with hospitalist calling and decision remained the same. CM suggested to him to accept bed as it was doubtful that [facility] would reverse their decision. CM explained to him that it was not uncommon for one IPR to accept and another IPR to decline referral. He states [facility] is 5 miles from their residence and [facility] is 75 miles from their residence. He would like to be informed of d/c transport time as he is aware that he can't visit in [facility] due to COVID 19 restrictions. CM called [facility] Ton Mcmahon, RN at [phone]) to advise that bed had been accepted by the spouse. She responded that facility does not have bed availability today, however, will have a bed at the [address] [place] [address] location tomorrow [facility]. 5/22/20). CM discussed with Tuan Aragon conversation had with the spouse. She will call him following this conversation. CM inquired about insurance auth and her response was that a verbal Herlene Leslie can be obtained. CM asked about latest time patient can arrive to [facility] and she gave time of 6pm.    Discharge packet is being prepared and will be placed on hard chart.   Portions of EMTALA will be completed on day of discharge. CM continuing to follow. Ashley Granados, PAYAL, CRM  [phone]    CM received call from  Patient's spouse and he is accepting of bed tomorrow at [facility]. He and daughter Deidre Gómez,  son-in-law Kenny Dunlap) will arrive here tomorrow at 2pm to visit with patient and bring clothing for her rehab stay. They will follow [facility] to [facility] after her d/c.    CM requesting [facility] for 4pm transport. Packet placed on hard chart. Assigned nurse to call report to [phone].

## 2020-05-21 NOTE — PROGRESS NOTES
SLP Contact Note    MBS complete. Pt with significant improvement since last MBS. Recommend regular diet/thin liquids. Full note to follow.       Thank you,  VALERIA SarkarEd, [id number] [facility]  Speech-Language Pathologist

## 2020-05-21 NOTE — PROGRESS NOTES
NUTRITION    Interventions/Plan/Recommendations:   1. All meds via G-tube (avoid oral route as this appears to trigger \"vomiting\" and suspect it may be related to anxiety of taking PO meds)  2. Resume TF of Osmolite 1.5 @ 30 mL/hr via J-port. Continue advancing q 8 hours to goal of 50 mL/hr. 3. Pending SLP evaluation today. Advance diet as able. 4. Strict documentation of PO intake. If improvements, can consider adjusting to nocturnal TF to promote more of an appetite during the day    Assessment:     Pt discussed in rounds today. I noted that with check in yesterday, she was doing well - nausea was controlled and was tolerating TF of Osmolite 1.5 @ 20 mL/hr via J-tube. She actually ate ~50% of breakfast and wasn't hungry for lunch, so decision was made to keep her at a trickle rate and see if PO intake would improve at all. However, today RN notes that pt did well with 6 AM morning meds through G-tube, but did not want breakfast.  When she was given Carafate at 11 AM, pt wanted it through G-tube d/t nausea, but RN encouraged orally and when pt took it she immediately threw up the medication and bile. Suspect this was likely r/t anxiety of not wanting it PO. To note - tube feeds had been held since around 9 AM d/t Carafate, so it is not likely that tube feeds contributed to N/V. Also, upon further thought - RD wondered if tube feeds even needed to be held for Carafate since that is given into stomach and feeds are going through J-port. I called and spoke with pharmacy and they agree, tube feeds do not need to be held since are given distal to site of mechanism of the Carafate. Also noted plans for MBS this afternoon since pt with significant improvement overall. Hopefully pt's diet can be advanced which in turn may encourage her to take more PO.     For now, since PO intake is inconsistent d/t anxiety and nausea, recommend continuing to advance TF to goal rate (which has been adjusted as below to run over 24 hours). If diet is advanced and PO intake improves, can consider switching to nocturnal TF at reduced goal volume to promote better intake during the day. However, this would need to be monitored closely as pt is also a new diabetic and meal intake should remain consistent. I spoke with patient at bedside. She stated she was feeling fine now. I informed her of plan to have MBS this afternoon to see whether or not we could get her off the thickened liquids. This made her very happy. I recommended RN give her all medication via G-tube for now as I suspect her vomiting after PO medication maybe related to anxiety of taking them PO. Ms. Norma Phelan was accepting of the compazine through her G-tube and was willing to have her J-tube feeds restarted. Labs also indicate resolution of hypernatremia. Can consider reduction in free water especially if PO intake improves. Osmolite 1.5 @ 50 mL/hr with 150 mL H2O q 4 hours provides 1200 mL, 1800 kcal, 75 gm pro, 1812 mL free H2O    RD will continue to follow closely. Estimated Nutrition Needs:   Kcals/day: 7783 Kcals/day(1721-5032 (MSJ x 1.0-1.1)  Protein: 90 g(0.8 gm/kg)  Fluid: (1 ml/kcal)     Based On: [address] [facility]  Weight Used: Actual wt(115 kg)      Recent Labs     05/21/20  0330 05/20/20 0329 05/19/20 0317   * 183* 105*   BUN 7 7 9   CREA 0.55 0.61 0.64    145 147*   K 3.2* 3.0* 2.8*   * 112* 114*   CO2 25 26 25   CA 7.6* 7.6* 7.8*   PHOS 3.7 3.4 4.1   MG 1.3* 1.6 1.3*       Recent Labs     05/21/20  0330 05/20/20  0329 05/19/20  0317   ALB 2.1* 2.2* 2.3*       No results for input(s): LAC in the last 72 hours. Recent Labs     05/20/20 0329   WBC 9.5   HGB 9.7*   HCT 32.6*          No results for input(s): PREALB in the last 72 hours. No results for input(s): TRIGL in the last 72 hours.     Recent Labs     05/21/20  0550 05/20/20  2347 05/20/20  1824 05/20/20  1255 05/20/20  0781 05/19/20  2340 05/19/20  1621 05/19/20  1238 05/19/20  0614 05/19/20  0001   GLUCPOC 233* 186* 148* 190* 167* 101* 149* 100 105* 98       Lab Results   Component Value Date/Time    Hemoglobin A1c 10.4 (H) 03/31/2020 03:42 PM       Zoey Honeycutt RD  Available via Pathflow  Or Pager# [phone]

## 2020-05-21 NOTE — PROGRESS NOTES
SLP Contact Note    Per discussion with SLP, patient has had significant improvement in voicing and in bedside PO trials. Given that pt now having voicing, indicative of possible restoration of VF movement, will plan to complete repeat MBS today to objectively assess safety of swallow physiology and determine appropriate for an upgrade.         Thank you,  LACI Ac.Ed, [id number] [facility]  Speech-Language Pathologist

## 2020-05-22 ENCOUNTER — HOSPITAL ENCOUNTER (OUTPATIENT)
Dept: REHABILITATION | Age: 60
End: 2020-07-02
Attending: INTERNAL MEDICINE | Admitting: PHYSICAL MEDICINE & REHABILITATION

## 2020-05-22 VITALS
HEART RATE: 105 BPM | OXYGEN SATURATION: 94 % | RESPIRATION RATE: 18 BRPM | TEMPERATURE: 98.2 F | HEIGHT: 64 IN | SYSTOLIC BLOOD PRESSURE: 113 MMHG | BODY MASS INDEX: 40.62 KG/M2 | WEIGHT: 237.9 LBS | DIASTOLIC BLOOD PRESSURE: 70 MMHG

## 2020-05-22 DIAGNOSIS — G72.81 CRITICAL ILLNESS MYOPATHY: ICD-10-CM

## 2020-05-22 DIAGNOSIS — R11.0 NAUSEA: ICD-10-CM

## 2020-05-22 DIAGNOSIS — J96.00 ACUTE RESPIRATORY FAILURE, UNSPECIFIED WHETHER WITH HYPOXIA OR HYPERCAPNIA (HCC): ICD-10-CM

## 2020-05-22 DIAGNOSIS — R74.01 NONSPECIFIC ELEVATION OF LEVELS OF TRANSAMINASE AND LACTIC ACID DEHYDROGENASE (LDH): ICD-10-CM

## 2020-05-22 DIAGNOSIS — R74.02 NONSPECIFIC ELEVATION OF LEVELS OF TRANSAMINASE AND LACTIC ACID DEHYDROGENASE (LDH): ICD-10-CM

## 2020-05-22 LAB
ALBUMIN SERPL-MCNC: 2.1 G/DL (ref 3.5–5)
ALBUMIN/GLOB SERPL: 0.6 {RATIO} (ref 1.1–2.2)
ALP SERPL-CCNC: 144 U/L (ref 45–117)
ALT SERPL-CCNC: 16 U/L (ref 12–78)
ANION GAP SERPL CALC-SCNC: 4 MMOL/L (ref 5–15)
AST SERPL-CCNC: 11 U/L (ref 15–37)
BILIRUB SERPL-MCNC: 0.6 MG/DL (ref 0.2–1)
BUN SERPL-MCNC: 8 MG/DL (ref 6–20)
BUN/CREAT SERPL: 13 (ref 12–20)
CALCIUM SERPL-MCNC: 7.9 MG/DL (ref 8.5–10.1)
CHLORIDE SERPL-SCNC: 111 MMOL/L (ref 97–108)
CO2 SERPL-SCNC: 26 MMOL/L (ref 21–32)
CREAT SERPL-MCNC: 0.61 MG/DL (ref 0.55–1.02)
ERYTHROCYTE [DISTWIDTH] IN BLOOD BY AUTOMATED COUNT: 15.7 % (ref 11.5–14.5)
GLOBULIN SER CALC-MCNC: 3.4 G/DL (ref 2–4)
GLUCOSE BLD STRIP.AUTO-MCNC: 191 MG/DL (ref 65–100)
GLUCOSE BLD STRIP.AUTO-MCNC: 198 MG/DL (ref 65–100)
GLUCOSE SERPL-MCNC: 143 MG/DL (ref 65–100)
HCT VFR BLD AUTO: 32.5 % (ref 35–47)
HGB BLD-MCNC: 9.7 G/DL (ref 11.5–16)
MAGNESIUM SERPL-MCNC: 1.7 MG/DL (ref 1.6–2.4)
MCH RBC QN AUTO: 29.3 PG (ref 26–34)
MCHC RBC AUTO-ENTMCNC: 29.8 G/DL (ref 30–36.5)
MCV RBC AUTO: 98.2 FL (ref 80–99)
NRBC # BLD: 0 K/UL (ref 0–0.01)
NRBC BLD-RTO: 0 PER 100 WBC
PHOSPHATE SERPL-MCNC: 3.3 MG/DL (ref 2.6–4.7)
PLATELET # BLD AUTO: 259 K/UL (ref 150–400)
PMV BLD AUTO: 9.9 FL (ref 8.9–12.9)
POTASSIUM SERPL-SCNC: 3.4 MMOL/L (ref 3.5–5.1)
PROT SERPL-MCNC: 5.5 G/DL (ref 6.4–8.2)
RBC # BLD AUTO: 3.31 M/UL (ref 3.8–5.2)
SERVICE CMNT-IMP: ABNORMAL
SERVICE CMNT-IMP: ABNORMAL
SODIUM SERPL-SCNC: 141 MMOL/L (ref 136–145)
WBC # BLD AUTO: 8.1 K/UL (ref 3.6–11)

## 2020-05-22 PROCEDURE — 94760 N-INVAS EAR/PLS OXIMETRY 1: CPT

## 2020-05-22 PROCEDURE — 74011250637 HC RX REV CODE- 250/637: Performed by: NURSE PRACTITIONER

## 2020-05-22 PROCEDURE — 74011250637 HC RX REV CODE- 250/637: Performed by: INTERNAL MEDICINE

## 2020-05-22 PROCEDURE — 74011000250 HC RX REV CODE- 250: Performed by: INTERNAL MEDICINE

## 2020-05-22 PROCEDURE — 82962 GLUCOSE BLOOD TEST: CPT

## 2020-05-22 PROCEDURE — 84100 ASSAY OF PHOSPHORUS: CPT

## 2020-05-22 PROCEDURE — 97530 THERAPEUTIC ACTIVITIES: CPT

## 2020-05-22 PROCEDURE — 74011636637 HC RX REV CODE- 636/637: Performed by: INTERNAL MEDICINE

## 2020-05-22 PROCEDURE — 74011636637 HC RX REV CODE- 636/637: Performed by: HOSPITALIST

## 2020-05-22 PROCEDURE — 74011250637 HC RX REV CODE- 250/637: Performed by: HOSPITALIST

## 2020-05-22 PROCEDURE — 74011250636 HC RX REV CODE- 250/636: Performed by: NURSE PRACTITIONER

## 2020-05-22 PROCEDURE — 83735 ASSAY OF MAGNESIUM: CPT

## 2020-05-22 PROCEDURE — 36415 COLL VENOUS BLD VENIPUNCTURE: CPT

## 2020-05-22 PROCEDURE — 80053 COMPREHEN METABOLIC PANEL: CPT

## 2020-05-22 PROCEDURE — 74011000250 HC RX REV CODE- 250: Performed by: NURSE PRACTITIONER

## 2020-05-22 PROCEDURE — 85027 COMPLETE CBC AUTOMATED: CPT

## 2020-05-22 PROCEDURE — 74011250636 HC RX REV CODE- 250/636: Performed by: INTERNAL MEDICINE

## 2020-05-22 PROCEDURE — 51798 US URINE CAPACITY MEASURE: CPT

## 2020-05-22 PROCEDURE — 92526 ORAL FUNCTION THERAPY: CPT

## 2020-05-22 PROCEDURE — 36592 COLLECT BLOOD FROM PICC: CPT

## 2020-05-22 PROCEDURE — 74011250636 HC RX REV CODE- 250/636: Performed by: HOSPITALIST

## 2020-05-22 RX ORDER — INSULIN GLARGINE 100 [IU]/ML
15 INJECTION, SOLUTION SUBCUTANEOUS DAILY
Qty: 1 VIAL | Refills: 0 | Status: SHIPPED
Start: 2020-05-22 | End: 2020-05-22

## 2020-05-22 RX ORDER — POTASSIUM CHLORIDE 7.45 MG/ML
10 INJECTION INTRAVENOUS ONCE
Status: COMPLETED | OUTPATIENT
Start: 2020-05-22 | End: 2020-05-22

## 2020-05-22 RX ORDER — PROCHLORPERAZINE MALEATE 5 MG
5 TABLET ORAL
Qty: 20 TAB | Refills: 0 | Status: SHIPPED
Start: 2020-05-22 | End: 2020-05-29

## 2020-05-22 RX ORDER — INSULIN GLARGINE 100 [IU]/ML
20 INJECTION, SOLUTION SUBCUTANEOUS DAILY
Status: DISCONTINUED | OUTPATIENT
Start: 2020-05-23 | End: 2020-05-22 | Stop reason: HOSPADM

## 2020-05-22 RX ORDER — BALSAM PERU/CASTOR OIL
OINTMENT (GRAM) TOPICAL 2 TIMES DAILY
Qty: 1 TUBE | Refills: 0 | Status: SHIPPED
Start: 2020-05-22 | End: 2020-06-21

## 2020-05-22 RX ORDER — PROCHLORPERAZINE MALEATE 5 MG
5 TABLET ORAL
Status: DISCONTINUED | OUTPATIENT
Start: 2020-05-22 | End: 2020-05-22 | Stop reason: HOSPADM

## 2020-05-22 RX ORDER — INSULIN GLARGINE 100 [IU]/ML
20 INJECTION, SOLUTION SUBCUTANEOUS
Qty: 1 VIAL | Refills: 0 | Status: SHIPPED
Start: 2020-05-22 | End: 2020-10-27

## 2020-05-22 RX ORDER — METFORMIN HYDROCHLORIDE 500 MG/1
500 TABLET, FILM COATED, EXTENDED RELEASE ORAL
Qty: 30 TAB | Refills: 0 | Status: SHIPPED
Start: 2020-05-22 | End: 2020-10-27

## 2020-05-22 RX ADMIN — SODIUM CHLORIDE 10 ML: 9 INJECTION INTRAMUSCULAR; INTRAVENOUS; SUBCUTANEOUS at 06:13

## 2020-05-22 RX ADMIN — BUMETANIDE 1 MG: 1 TABLET ORAL at 09:49

## 2020-05-22 RX ADMIN — SUCRALFATE 1 G: 1 TABLET ORAL at 11:31

## 2020-05-22 RX ADMIN — FAMOTIDINE 20 MG: 20 TABLET ORAL at 09:49

## 2020-05-22 RX ADMIN — PROCHLORPERAZINE MALEATE 5 MG: 5 TABLET, FILM COATED ORAL at 11:31

## 2020-05-22 RX ADMIN — GUAIFENESIN 100 MG: 200 SOLUTION ORAL at 09:48

## 2020-05-22 RX ADMIN — INSULIN GLARGINE 15 UNITS: 100 INJECTION, SOLUTION SUBCUTANEOUS at 09:49

## 2020-05-22 RX ADMIN — INSULIN LISPRO 2 UNITS: 100 INJECTION, SOLUTION INTRAVENOUS; SUBCUTANEOUS at 11:32

## 2020-05-22 RX ADMIN — SODIUM CHLORIDE 10 MG: 9 INJECTION INTRAMUSCULAR; INTRAVENOUS; SUBCUTANEOUS at 06:12

## 2020-05-22 RX ADMIN — HYDROCODONE BITARTRATE AND ACETAMINOPHEN 1 TABLET: 5; 325 TABLET ORAL at 12:49

## 2020-05-22 RX ADMIN — ESCITALOPRAM OXALATE 20 MG: 10 TABLET ORAL at 09:49

## 2020-05-22 RX ADMIN — SUCRALFATE 1 G: 1 TABLET ORAL at 06:11

## 2020-05-22 RX ADMIN — INSULIN LISPRO 2 UNITS: 100 INJECTION, SOLUTION INTRAVENOUS; SUBCUTANEOUS at 06:10

## 2020-05-22 RX ADMIN — SODIUM CHLORIDE 10 MG: 9 INJECTION INTRAMUSCULAR; INTRAVENOUS; SUBCUTANEOUS at 00:35

## 2020-05-22 RX ADMIN — HYDROMORPHONE HYDROCHLORIDE 0.5 MG: 1 INJECTION, SOLUTION INTRAMUSCULAR; INTRAVENOUS; SUBCUTANEOUS at 01:32

## 2020-05-22 RX ADMIN — CASTOR OIL AND BALSAM, PERU: 788; 87 OINTMENT TOPICAL at 09:50

## 2020-05-22 RX ADMIN — PANTOPRAZOLE SODIUM 40 MG: 40 TABLET, DELAYED RELEASE ORAL at 06:11

## 2020-05-22 RX ADMIN — METOPROLOL TARTRATE 25 MG: 25 TABLET, FILM COATED ORAL at 09:49

## 2020-05-22 RX ADMIN — POTASSIUM CHLORIDE 10 MEQ: 7.46 INJECTION, SOLUTION INTRAVENOUS at 09:50

## 2020-05-22 RX ADMIN — SODIUM CHLORIDE 10 ML: 9 INJECTION INTRAMUSCULAR; INTRAVENOUS; SUBCUTANEOUS at 06:12

## 2020-05-22 NOTE — PROGRESS NOTES
Problem: Dysphagia (Adult)  Goal: *Acute Goals and Plan of Care (Insert Text)  Description: Speech Therapy Goals  Initiated 5/21/2020  1. Patient will tolerate regular diet/thin liquids without adverse effects within 7 days. Re-evaluation 5/20/2020   1. Patient will participate in repeat MBS within 7 days MET 5/21/2020    Initiated 5/13/2020  1. Patient will tolerate NDD2/honey-thick liquids without adverse effects within 7 days. MET      Initiated 5/4/2020    1. Patient will tolerate small amounts of ice chips for swallow rehab without adverse effects within 7 days. 2. Patient will participate in swallow re-evaluation within 7 days. Outcome: Progressing Towards Goal     SPEECH LANGUAGE PATHOLOGY DYSPHAGIA TREATMENT  Patient: Terence Schaefer (67 y.o. female)  Date: 5/22/2020  Diagnosis: COVID-19 virus infection [U07.1]   Sepsis with multi-organ dysfunction ([facility] [address]  Procedure(s) (LRB):  ESOPHAGOGASTRODUODENOSCOPY (EGD) (Left)  ESOPHAGOGASTRODUODENAL (EGD) BIOPSY (N/A) 10 Days Post-Op  Precautions: Fall    ASSESSMENT:  Patient seen for review of aspiration precautions and for diet tolerance. Pt eating/drinking minimally, as she remains concerned about nausea/vomiting. However, no overt s/s of aspiration appreciated and pt states that she has been swallowing without difficulty. Discussed aspiration precautions in general.  Also spoke about how IPR SLP may pick her up for cognitive-linguistics, as pt had a cardiac arrest and some high level cognitive treatment may be indicated. Patient expressed understanding. PLAN:  Recommendations and Planned Interventions:  --regular diet/thin liquids  --upright in bed  --small sips/bites  --alternate liquids/solids    Patient continues to benefit from skilled intervention to address the above impairments. Continue treatment per established plan of care.   Discharge Recommendations:  Inpatient Rehab     SUBJECTIVE:   Patient stated, \"Thanks for stopping by.    OBJECTIVE:   Cognitive and Communication Status:  Neurologic State: Alert  Orientation Level: Oriented X4  Cognition: Follows commands  Perception: Appears intact  Perseveration: No perseveration noted  Safety/Judgement: Awareness of environment  Dysphagia Treatment:  Oral Assessment:  Oral Assessment  Labial: No impairment  Dentition: Intact  Oral Hygiene: oral mucosa moist and clear of secretions  Lingual: No impairment  Velum: No impairment  Mandible: No impairment    Pain:  Pain Scale 1: Visual  Pain Intensity 1: 7  Pain Location 1: Abdomen;Back    After treatment:   Call bell within reach    COMMUNICATION/EDUCATION:     The patient's plan of care including recommendations, planned interventions, and recommended diet changes were discussed with: Registered nurse.      Sherrie Long SLP  Time Calculation: 10 mins

## 2020-05-22 NOTE — PROGRESS NOTES
Problem: Nutrition Deficit  Goal: *Optimize nutritional status  Outcome: Progressing Towards Goal     Problem: Falls - Risk of  Goal: *Absence of Falls  Description: Document Earma Patricio Fall Risk and appropriate interventions in the flowsheet. Outcome: Progressing Towards Goal  Note: Fall Risk Interventions:  Mobility Interventions: Communicate number of staff needed for ambulation/transfer, OT consult for ADLs, PT Consult for mobility concerns, PT Consult for assist device competence, Strengthening exercises (ROM-active/passive)    Mentation Interventions: Adequate sleep, hydration, pain control    Medication Interventions: Patient to call before getting OOB, Evaluate medications/consider consulting pharmacy    Elimination Interventions: Call light in reach, Patient to call for help with toileting needs, Toileting schedule/hourly rounds(purewick in place)    History of Falls Interventions: Investigate reason for fall         Problem: Pressure Injury - Risk of  Goal: *Prevention of pressure injury  Description: Document Aniket Scale and appropriate interventions in the flowsheet. Outcome: Progressing Towards Goal  Note: Pressure Injury Interventions:  Sensory Interventions: Assess changes in LOC, Float heels, Keep linens dry and wrinkle-free, Minimize linen layers, Pressure redistribution bed/mattress (bed type)    Moisture Interventions: Absorbent underpads, Apply protective barrier, creams and emollients, Check for incontinence Q2 hours and as needed, Internal/External urinary devices, Minimize layers, Moisture barrier    Activity Interventions: Pressure redistribution bed/mattress(bed type), PT/OT evaluation    Mobility Interventions: Float heels, Pressure redistribution bed/mattress (bed type), PT/OT evaluation, Turn and reposition approx.  every two hours(pillow and wedges)    Nutrition Interventions: Document food/fluid/supplement intake, Offer support with meals,snacks and hydration    Friction and Shear Interventions: Apply protective barrier, creams and emollients, Lift sheet, Minimize layers                Problem: Impaired Skin Integrity/Pressure Injury Treatment  Goal: *Improvement of Existing Pressure Injury  Outcome: Progressing Towards Goal  Goal: *Prevention of pressure injury  Description: Document Aniket Scale and appropriate interventions in the flowsheet. Outcome: Progressing Towards Goal  Note: Pressure Injury Interventions:  Sensory Interventions: Assess changes in LOC, Float heels, Keep linens dry and wrinkle-free, Minimize linen layers, Pressure redistribution bed/mattress (bed type)    Moisture Interventions: Absorbent underpads, Apply protective barrier, creams and emollients, Check for incontinence Q2 hours and as needed, Internal/External urinary devices, Minimize layers, Moisture barrier    Activity Interventions: Pressure redistribution bed/mattress(bed type), PT/OT evaluation    Mobility Interventions: Float heels, Pressure redistribution bed/mattress (bed type), PT/OT evaluation, Turn and reposition approx.  every two hours(pillow and wedges)    Nutrition Interventions: Document food/fluid/supplement intake, Offer support with meals,snacks and hydration    Friction and Shear Interventions: Apply protective barrier, creams and emollients, Lift sheet, Minimize layers                Problem: Patient Education: Go to Patient Education Activity  Goal: Patient/Family Education  Outcome: Progressing Towards Goal     Problem: Patient Education: Go to Patient Education Activity  Goal: Patient/Family Education  Outcome: Progressing Towards Goal     Problem: Patient Education: Go to Patient Education Activity  Goal: Patient/Family Education  Outcome: Progressing Towards Goal

## 2020-05-22 NOTE — PROGRESS NOTES
Problem: Mobility Impaired (Adult and Pediatric)  Goal: *Acute Goals and Plan of Care (Insert Text)  Description: FUNCTIONAL STATUS PRIOR TO ADMISSION: Patient was independent and active without use of DME but poor history due to not being able to communicate. HOME SUPPORT PRIOR TO ADMISSION: Pt lives with family. Weekly re-assessment 5/19/2020  Goals remain appropriate. Physical Therapy Goals  1. Patient will move from supine to sit and sit to supine , scoot up and down and roll side to side in bed with moderate assistance x1 person within 7 day(s). 2.  Patient will sit EOB with supervision (occasional min A), x10 minutes with retracted shoulders, midline posture, and forward gaze within 7 days. 3.  Patient will verbalize and demonstrate 5 exercises she can complete on her own outside of therapy sessions with 7 days. 4.  Patient will participate in scooting to [facility] with control of core and max Ax2 for scooting while sitting EOB within 7 days. 5.  Patient will tolerate sitting in chair x1 hour within 7 days. Initiated 5/4/2020- All goals met 5/12/2020  1. Patient will move from supine to sit and sit to supine , scoot up and down and roll side to side in bed with maximal assistance within 7 day(s). 2.  Patient will sit EOB with moderate assistance of 2 people, x5 minutes with active core initiation to correct sitting balance within 7 days. 3.  Patient will verbalize 3 exercises she can complete on her own outside of therapy sessions with 7 days. 4.  Patient will participate in supine HEP (AAROM as needed) with min assist for AAROM within 7 days. 5.  Patient will tolerate bed in chair position 30 min BID within 7 days. Initiated 4/25/2020  1. Patient will move from supine to sit and sit to supine , scoot up and down and roll side to side in bed with moderate assistance once off vent within 7 day(s).     2.  Patient will participate in sitting with min A for 2 minutes once off vent within 7 day(s). 3.  Patient will perform active supine or sitting TE with min A within 7 day(s). Outcome: Progressing Towards Goal   PHYSICAL THERAPY TREATMENT  Patient: Larry Cruz (32 y.o. female)  Date: 5/22/2020  Diagnosis: COVID-19 virus infection [U07.1]   Sepsis with multi-organ dysfunction ([facility] [address]  Procedure(s) (LRB):  ESOPHAGOGASTRODUODENOSCOPY (EGD) (Left)  ESOPHAGOGASTRODUODENAL (EGD) BIOPSY (N/A) 10 Days Post-Op  Precautions: Fall  Chart, physical therapy assessment, plan of care and goals were reviewed. ASSESSMENT  Patient continues with skilled PT services and is progressing towards goals. Patient received in bed and worked on bed mobility and sitting EOB. Continues with significant weakness and tolerance for activity. Remains anxious about mobility but able to participate. Sat today for 8 minutes which ended abruptly due to realization going to different rehab than she anticipated and requesting to lie back down. She remains limited by decreased strength, balance, bed mobility and endurance. Current Level of Function Impacting Discharge (mobility/balance): mod to max assist of 1-2 for basic bed mobility; decreased sitting balance    Other factors to consider for discharge:          PLAN :  Patient continues to benefit from skilled intervention to address the above impairments. Continue treatment per established plan of care. to address goals. Recommendation for discharge: (in order for the patient to meet his/her long term goals)  Therapy 3 hours per day 5-7 days per week    This discharge recommendation:  Has been made in collaboration with the attending provider and/or case management    IF patient discharges home will need the following DME: discharging to inpatient rehab-to determine needs at that time       SUBJECTIVE:   Patient stated I thought I was going to [facility].     OBJECTIVE DATA SUMMARY:   Critical Behavior:  Neurologic State: Alert  Orientation Level: Oriented X4  Cognition: Appropriate for age attention/concentration, Follows commands  Safety/Judgement: Awareness of environment, Fall prevention  Functional Mobility Training:  Bed Mobility:  Rolling: Minimum assistance;Assist x2  Supine to Sit: Moderate assistance;Maximum assistance;Assist x2  Sit to Supine: Maximum assistance;Assist x2  Scooting: Maximum assistance               Balance:  Sitting: Impaired; With support  Sitting - Static: Fair (occasional)  Sitting - Dynamic: Poor (constant support)        After treatment patient left in no apparent distress:   Supine in bed, Heels elevated for pressure relief, Call bell within reach, and Side rails x 3    COMMUNICATION/COLLABORATION:   The patients plan of care was discussed with: Occupational therapist, Registered nurse, and Case management.      Casie Stringer PT   Time Calculation: 25 mins

## 2020-05-22 NOTE — PROGRESS NOTES
NUTRITION    RECOMMENDATIONS/POC:     1. Continue continuous TF of Osmolite 1.5 @ 50 mL/hr with 150 mL H2O flushes q 4 hours via J-port  2. Continue to give all meds via G-port  3. Recommend SCHEDULED antiemetics to stay ahead of nausea  4. Encourage PO intake. 5. As PO intake improves to 25-50% of meals, adjust to nocturnal feeds of Osmolite 1.5 @ 75 mL/hr x 12 hours via J-port  6. If PO intake >/=50% of meals, adjust to nocturnal feeds of Osmolite 1.5 @ 50 mL/hr x 12 hours via J-port  7. Would not discontinue TF until pt demonstrates consistent ability to take PO without nausea  8. Continue to cover BG as needed. Would not switch to diabetic formula at this point since pt is tolerating Osmolite and a formula switch may cause intolerance issues  9. Consider adjusting Lantus per diabetic nurse recs. Please see note. Objective:     Pt seen briefly for follow up today. Noted diet consistency advanced to regular/thin liquids after MBS yesterday. On consistent CHO restriction. However, PO intake is very poor. Has not wanted to eat at all today. RN thinks IV compazine has been making her more drowsy. This was switched to PO/G-tube route today. TF of Osmolite 1.5 @ goal of 50 mL/hr running continuously via J-tube. Flushes of 150 mL q 4 hours. This provides 1200 mL, 1800 kcal, 75 gm pro, 1812 mL free H2O. She is tolerating without any vomiting or excessive stooling. Nausea comes with PO/ medications. Since refusal of PO intake today, would not switch to nocturnal TF yet. However, as PO intake improves, would adjust to supplemental nocturnal feeds with ultimate goal of Osmolite 1.5 @ 50 mL/hr over 12 hours to provide ~50% of needs until taking adequate PO with consistent nausea control. Noted d/c plans to rehab later today. Diabetes nurse recommends increasing Lantus from 17 to 20 units daily with continued correctional scale coverage.   I wouldn't change to diabetic formula at this point as stable on current formula and don't want to risk and intolerance issues with change. Meal intake:   Patient Vitals for the past 168 hrs:   % Diet Eaten   05/21/20 1237 0 %   05/21/20 1110 5 %   05/20/20 1159 25 %   05/20/20 0800 25 %   05/15/20 1600 0 %       Estimated Nutrition Needs:   Kcals/day: 3589 Kcals/day(2892-2219 (MSJ x 1.0-1.1)  Protein: 90 g(0.8 gm/kg)  Fluid: (1 ml/kcal)     Based On:  [address] Dexcom  Weight Used: Actual wt(115 kg)      Recent Labs     05/22/20  0148 05/21/20  0330 05/20/20  0329   * 170* 183*   BUN 8 7 7   CREA 0.61 0.55 0.61    143 145   K 3.4* 3.2* 3.0*   * 111* 112*   CO2 26 25 26   CA 7.9* 7.6* 7.6*   PHOS 3.3 3.7 3.4   MG 1.7 1.3* 1.6       Recent Labs     05/22/20  1103 05/22/20  0554 05/21/20  2330 05/21/20  1731 05/21/20  1227 05/21/20  0550 05/20/20  2347 05/20/20  1824 05/20/20  1255 05/20/20  0621 05/19/20  2340 05/19/20  1621   GLUCPOC 198* 191* 193* 208* 167* 233* 186* 148* 190* 167* 101* 149*       Lab Results   Component Value Date/Time    Hemoglobin A1c 10.4 (H) 03/31/2020 03:42 PM       Debora Anders RD  Available via Music Messenger (MM)  Or Pager# [phone]

## 2020-05-22 NOTE — PROGRESS NOTES
TRANSFER - OUT REPORT:    Telephone report given to [facility] CRISTOBAL on Larry Cruz  being transferred to [facility] for routine progression of care   (Rehab)    Report consisted of patients Situation, Background, Assessment and   Recommendations(SBAR). Information from the following report(s) SBAR, Kardex, Intake/Output, MAR and Recent Results was reviewed with the receiving nurse. Lines:       Opportunity for questions and clarification was provided.       Patient transported with:   Tech ([facility])

## 2020-05-22 NOTE — DISCHARGE SUMMARY
Discharge Summary       PATIENT ID: Lizabeth Gary  MRN: [id number]   YOB: 1960    DATE OF ADMISSION: 3/31/2020  9:02 AM    DATE OF DISCHARGE: 5/22/2020   PRIMARY CARE PROVIDER: Tavo Rico MD     ATTENDING PHYSICIAN: Chad Weeks MD  DISCHARGING PROVIDER: Lavern Irizarry MD    To contact this individual call [phone] and ask the  to page. If unavailable ask to be transferred the Adult Hospitalist Department. CONSULTATIONS: IP CONSULT TO INTERVENTIONAL RADIOLOGY  IP CONSULT TO OTOLARYNGOLOGY  IP CONSULT TO PSYCHIATRY  IP CONSULT TO GASTROENTEROLOGY  IP CONSULT TO INTERVENTIONAL RADIOLOGY  IP CONSULT TO GASTROENTEROLOGY  IP CONSULT TO PSYCHIATRY  IP CONSULT TO HOSPITALIST  IP CONSULT TO PULMONOLOGY  IP CONSULT TO VASCULAR SURGERY    PROCEDURES/SURGERIES: Procedure(s):  ESOPHAGOGASTRODUODENOSCOPY (EGD)  ESOPHAGOGASTRODUODENAL (EGD) BIOPSY    ADMITTING [address] [place] [address] COURSE:     A 65 yo female with obesity and diabetes who presented to [facility] with worsening hypoxic respiratory failure in lieu of close exposure to COVID-19. She presented to the hospital 3/26 and intubated 3/28. She was started on broad spectrum antibiotics and plaquenil. Developed worsening renal failure and was transferred to us for CRRT    ARF  -was on HD  -last HD 5/9  -now recovered and no more need for HD  -Permacath removed 5/14  -now resolved  -Out patient follow up with Nephrology   Nausea and vomiting   -s/p EGD:Hiatal hernia, grade 2 erosive esophagitis and gastritis in antrum, appreciate recommendations, cont. Protonix, added carafate  -Appreciate discussion with GI  -s/p GJ tube placement 5/14  -Tube feeds started 5/15 but held on 5/16 sec to continued nausea and vomiting, improved and restarted tube feeds on 5/17  -on scheduled compazine and now tolerating the diet well  -GI on board  COVID-19 +ve   -s/p Tocilizumab, Plaquenil.  On Vit C, Zinc   -COVID negative 4/20, 4/27 and 5/8  - Resolved  Acute Hypoxic Resp Failure  - resolved  Acute metabolic Encephalopathy  - likely ICU delirium- no agitation. Monitor  - resolved. Sinus tachycardia  -improved,continue Metoprolol, normal TSH  Anemia of chronic disease   -H/H is stable  Retroperitoneal bleed: this hospitalization,   -resolved - Avoid AP/AC meds- Monitor Hb  Non occlusive PE in LLL pulmonary artery   - no ACs due to retroperitoneal bleed  -seen by vascular surgeon and recommended conservative approach   S/p Cardiac arrest 4/9  -resuscitated successfully   T2DM  -A1c 10.4,   - lantus 20 units, add Metformin  mg daily, SSI, AccuChecks, monitor  Morbid obesity  -counseled on health benefits of weight loss, Healthy diet, BMI 44.11 kg/m².   Depression:  -stable  -continue Trazodone at bedtime and Psych consulted, started on Lexapro. The dose increased5/1   Anxiety episodes;   -Continue Lexapro, Ativan PRN for short duration   Dysphagia  -s/p GJ tube 5/14, continue tube feeds      PT/OT rehab           Code status: Full Code  DVT prophylaxis: SCD      DISCHARGE DIAGNOSES / PLAN:      ARF  -now resolved  -outpatient follow up with Nephrology   Nausea and vomiting possible due to esophagitis and gastritis  -continue Protonix and carafate  -s/p GJ tube placement on 5/14, continue tube feeding  -outpatient follow up with GI    COVID-19 +ve   - s/p Tocilizumab, Plaquenil. On Vit C, Zinc   -COVID negative 4/20, 4/27 and 5/8  - Resolved  Acute Hypoxic Resp Failure  - resolved  Acute metabolic Encephalopathy  - resolved.   Sinus tachycardia  -improving,continue Metoprolol   Anemia of chronic disease   -H/H is stable  Retroperitoneal bleed: this hospitalization,   -resolved    Non occlusive PE in LLL pulmonary artery   -improving  -SpO2 95% on RA  S/p Cardiac arrest 4/9  -resuscitated successfully   T2DM  -continue Lantus 20 units, Metformin  mg daily, SSI, AccuChecks, monitor  Morbid obesity  -counseled on health benefits of weight loss, Healthy diet, BMI 44.11 kg/m².   Depression:  -continue Trazodone at bedtime and Lexapro. Anxiety episodes;   -Continue Lexapro, Ativan PRN for short duration   Dysphagia  -s/p GJ tube 5/14, tube feeds started 5/15  -nutrition consult  -Nausea and vomiting today improved    PENDING TEST RESULTS:   At the time of discharge the following test results are still pending: None    FOLLOW UP APPOINTMENTS:    Follow-up Information     Follow up With Specialties Details Why Contact Info    Wisam Coronado MD [facility] In 1 week  Renzo Cheek [address] George Gee MD Pulmonary Disease In 3 days  [address] [facility] [address] [place] Aurelia Dunaway MD Otolaryngology In 2 weeks  [address] Isaac Rubio MD  Gastroenterologist  In 2 weeks                    DIET: Diabetic Diet    TUBE FEEDING INSTRUCTIONS:  Osmolite 1.5 jn 20 ml/hr advance 10 ml/hr q 8 hrs, goal to 50 ml/hr, free water 150 ml q 4 hrs    ACTIVITY: Activity as tolerated    WOUND CARE: Hydrocolloid dressing to left upper buttock injury; change Monday and Thursday. EQUIPMENT needed: None      DISCHARGE MEDICATIONS:  Current Discharge Medication List      START taking these medications    Details   balsam peru-castor oiL (VENELEX) ointment Apply  to affected area two (2) times a day for 30 days. Qty: 1 Tube, Refills: 0      insulin glargine (LANTUS) 100 unit/mL injection 15 Units by SubCUTAneous route daily. Qty: 1 Vial, Refills: 0      bumetanide (BUMEX) 1 mg tablet Take 1 Tab by mouth daily. Qty: 30 Tab, Refills: 1      escitalopram oxalate (LEXAPRO) 20 mg tablet Take 1 Tab by mouth daily. Qty: 30 Tab, Refills: 1      HYDROcodone-acetaminophen (NORCO) 5-325 mg per tablet Take 1 Tab by mouth every six (6) hours as needed for Pain for up to 3 days. Max Daily Amount: 4 Tabs.   Qty: 15 Tab, Refills: 0    Associated Diagnoses: Debility      metoprolol tartrate (LOPRESSOR) 25 mg tablet Take 1 Tab by mouth two (2) times a day. Qty: 60 Tab, Refills: 1      traZODone (DESYREL) 100 mg tablet Take 1 Tab by mouth nightly. Qty: 30 Tab, Refills: 0      sucralfate (CARAFATE) 1 gram tablet Take 1 Tab by mouth Before breakfast, lunch, and dinner. Qty: 90 Tab, Refills: 1      prochlorperazine (COMPAZINE) 5 mg tablet 1 Tab by Per J Tube route every six (6) hours for 7 days. Qty: 28 Tab, Refills: 0         CONTINUE these medications which have NOT CHANGED    Details   pantoprazole (PROTONIX) 40 mg tablet TAKE 1 TABLET BY MOUTH TWICE A DAY  Refills: 4      zolpidem (AMBIEN) 10 mg tablet Take 0.5 Tabs by mouth nightly as needed for Sleep. Max Daily Amount: 5 mg. Qty: 30 Tab, Refills: 0    Associated Diagnoses: Endometrial cancer ([facility] [address] Acquired absence of both cervix and uterus; Special screening for malignant neoplasms, vagina; Primary insomnia      fluticasone (FLONASE) 50 mcg/actuation nasal spray Mist 1-2 spray(s) into each nostril once daily. ranitidine (ZANTAC) 150 mg tablet 150 mg. STOP taking these medications       esomeprazole (NEXIUM) 20 mg capsule Comments:   Reason for Stopping:                 NOTIFY YOUR PHYSICIAN FOR ANY OF THE FOLLOWING:   Fever over 101 degrees for 24 hours. Chest pain, shortness of breath, fever, chills, nausea, vomiting, diarrhea, change in mentation, falling, weakness, bleeding. Severe pain or pain not relieved by medications. Or, any other signs or symptoms that you may have questions about.     DISPOSITION:    Home With:   OT  PT  HH  RN      x Long term SNF/Inpatient Rehab    Independent/assisted living    Hospice    Other:       PATIENT CONDITION AT DISCHARGE:     Functional status    Poor    x Deconditioned     Independent      Cognition    x Lucid     Forgetful     Dementia      Catheters/lines (plus indication)    [place]     PICC    x PEG     None      Code status x Full code     DNR      PHYSICAL EXAMINATION AT DISCHARGE:  Patient Vitals for the past 24 hrs:   Temp Pulse Resp BP SpO2   05/22/20 1709 98.2 °F (36.8 °C) (!) 105 18 113/70 94 %   05/22/20 1509 98.5 °F (36.9 °C) 98 18 133/81 96 %   05/22/20 0836 98.3 °F (36.8 °C) (!) 104 17 129/86 95 %   05/22/20 0327 98 °F (36.7 °C) 98 18 100/67 91 %   05/21/20 2215 -- -- -- -- 96 %   05/21/20 2029 97.9 °F (36.6 °C) 90 18 (!) 139/92 96 %     General:          Alert, cooperative, no distress, appears stated age. HEENT:           Atraumatic, anicteric sclerae, pink conjunctivae                          No oral ulcers, mucosa moist, throat clear, dentition fair  Neck:               Supple, symmetrical  Lungs:             Clear to auscultation bilaterally. No Wheezing or Rhonchi. No rales. Chest wall:      No tenderness  No Accessory muscle use. Heart:              Regular  rhythm,  No  murmur   No edema  Abdomen:        PEG tube in place, Soft, non-tender. Not distended. Bowel sounds normal  Extremities:     No cyanosis. No clubbing,                            Skin turgor normal, Capillary refill normal  Skin:                Not pale. Not Jaundiced  No rashes   Psych:             Not anxious or agitated.   Neurologic:      Alert, motor LE 3-4/5 answers questions appropriately and responds to commands       Recent Results (from the past 24 hour(s))   GLUCOSE, POC    Collection Time: 05/21/20 11:30 PM   Result Value Ref Range    Glucose (POC) 193 (H) 65 - 100 mg/dL    Performed by Mike Sal (JOSSELINE)    METABOLIC PANEL, COMPREHENSIVE    Collection Time: 05/22/20  1:48 AM   Result Value Ref Range    Sodium 141 136 - 145 mmol/L    Potassium 3.4 (L) 3.5 - 5.1 mmol/L    Chloride 111 (H) 97 - 108 mmol/L    CO2 26 21 - 32 mmol/L    Anion gap 4 (L) 5 - 15 mmol/L    Glucose 143 (H) 65 - 100 mg/dL    BUN 8 6 - 20 MG/DL    Creatinine 0.61 0.55 - 1.02 MG/DL    BUN/Creatinine ratio 13 12 - 20      GFR est AA >60 >60 ml/min/1.73m2 GFR est non-AA >60 >60 ml/min/1.73m2    Calcium 7.9 (L) 8.5 - 10.1 MG/DL    Bilirubin, total 0.6 0.2 - 1.0 MG/DL    ALT (SGPT) 16 12 - 78 U/L    AST (SGOT) 11 (L) 15 - 37 U/L    Alk.  phosphatase 144 (H) 45 - 117 U/L    Protein, total 5.5 (L) 6.4 - 8.2 g/dL    Albumin 2.1 (L) 3.5 - 5.0 g/dL    Globulin 3.4 2.0 - 4.0 g/dL    A-G Ratio 0.6 (L) 1.1 - 2.2     CBC W/O DIFF    Collection Time: 05/22/20  1:48 AM   Result Value Ref Range    WBC 8.1 3.6 - 11.0 K/uL    RBC 3.31 (L) 3.80 - 5.20 M/uL    HGB 9.7 (L) 11.5 - 16.0 g/dL    HCT 32.5 (L) 35.0 - 47.0 %    MCV 98.2 80.0 - 99.0 FL    MCH 29.3 26.0 - 34.0 PG    MCHC 29.8 (L) 30.0 - 36.5 g/dL    RDW 15.7 (H) 11.5 - 14.5 %    PLATELET 284 401 - 758 K/uL    MPV 9.9 8.9 - 12.9 FL    NRBC 0.0 0  WBC    ABSOLUTE NRBC 0.00 0.00 - 0.01 K/uL   MAGNESIUM    Collection Time: 05/22/20  1:48 AM   Result Value Ref Range    Magnesium 1.7 1.6 - 2.4 mg/dL   PHOSPHORUS    Collection Time: 05/22/20  1:48 AM   Result Value Ref Range    Phosphorus 3.3 2.6 - 4.7 MG/DL   GLUCOSE, POC    Collection Time: 05/22/20  5:54 AM   Result Value Ref Range    Glucose (POC) 191 (H) 65 - 100 mg/dL    Performed by Alexys Ames    GLUCOSE, POC    Collection Time: 05/22/20 11:03 AM   Result Value Ref Range    Glucose (POC) 198 (H) 65 - 100 mg/dL    Performed by [address] [place] [address] [address]:  Problem List as of 5/22/2020 Date Reviewed: 5/14/2020          Codes Class Noted - Resolved    Hypotension ICD-10-CM: I95.9  ICD-9-CM: 458.9 Acute 4/2/2020 - Present        Retroperitoneal hemorrhage ICD-10-CM: R58  ICD-9-CM: 459.0  4/19/2020 - Present        Acute renal failure (ARF) ([facility] [address] ICD-10-CM: N17.9  ICD-9-CM: 584.9  4/19/2020 - Present        Encephalopathy ICD-10-CM: G93.40  ICD-9-CM: 348.30  4/19/2020 - Present        * (Principal) Sepsis with multi-organ dysfunction (HCC) ICD-10-CM: A41.9, R65.20  ICD-9-CM: 038.9, 995.92  4/19/2020 - Present        Pneumonia due to methicillin susceptible Staphylococcus aureus (MSSA) ([facility] ICD-10-CM: C71.468  ICD-9-CM: 482.41  4/19/2020 - Present        Thrombocytopenia ([facility] ICD-10-CM: D69.6  ICD-9-CM: 287.5  4/19/2020 - Present        Diarrhea ICD-10-CM: R19.7  ICD-9-CM: 787.91  4/19/2020 - Present        COVID-19 virus infection ICD-10-CM: U07.1  ICD-9-CM: 079.89  3/31/2020 - Present        Obesity, morbid ([facility] ICD-10-CM: E66.01  ICD-9-CM: 278.01  1/16/2018 - Present        Vaginal Pap smear following hysterectomy for malignancy ICD-10-CM: Z08, Z90.710  ICD-9-CM: V67.01  7/6/2016 - Present        Personal history of malignant neoplasm of other parts of uterus ICD-10-CM: Z85.42  ICD-9-CM: V10.42  7/6/2016 - Present        Endometrial cancer ([facility] ICD-10-CM: C54.1  ICD-9-CM: 182.0  1/28/2013 - Present        RESOLVED: Special screening for malignant neoplasms, vagina ICD-10-CM: Z12.72  ICD-9-CM: V76.47  7/6/2016 - 9/26/2019        RESOLVED: Malignant neoplasm of corpus uteri, except isthmus ([facility] ICD-10-CM: C54.9  ICD-9-CM: 182.0  1/28/2013 - 1/28/2013              Greater than 45 minutes were spent with the patient on counseling and coordination of care    Signed:   Loraine Jin MD  5/22/2020  9:16 AM

## 2020-05-22 NOTE — PROGRESS NOTES
Problem: Self Care Deficits Care Plan (Adult)  Goal: *Acute Goals and Plan of Care (Insert Text)  Description:   FUNCTIONAL STATUS PRIOR TO ADMISSION: Patient unable to provide history. Per chart, patient was fully independent PTA. HOME SUPPORT: Per chart, patient lived with family. Goals reviewed and updated: 5/19/2020  1. Patient will perform two grooming activities with CGA while seated on EOB within 7 day(s). MET 5/20/20, but needed mod assist to complete combing hair to reach back of head. Brushed teeth with set up. 2.  Patient will perform LE bathing with maximum assistance while seated on EOB within 7 days  3. Patient will perform upper body bathing with moderate assistance seated EOB within 7 day(s). 4.  Patient will participate in upper extremity therapeutic exercise/activities with minimum assistance and pacing  for 10 minutes within 7 day(s). 5.  Patient will demonstrate energy conservation strategies/pacing with min cues during self care activities within 7 days. 6.  Patient will participate in self feeding with set up for 50% of meal within 7 days. OT weekly reassessment 5/12/2020: see goals below for update    Occupational Therapy Goals  Initiated 4/30/2020  1. Patient will perform grooming with maximal assistance within 7 day(s). (upgrade to min A 5/12)  2. Patient will perform self-feeding if appropriate for PO with maximal assistance within 7 day(s). (pending following MBS)Discontinue 5/19/2020  3. Patient will perform bathing with maximal assistance within 7 day(s). (upgrade to mod A anterior bathing EOB 5/12)  4. Patient will participate in upper extremity therapeutic exercise/activities with moderate assistance  for 10 minutes within 7 day(s). (MIN A 5/12)  5. Patient will follow 100% simple commands in preparation for functional tasks within 7 days.  (MET 5/12)      Outcome: Not Met    OCCUPATIONAL THERAPY TREATMENT  Patient: Rachel Gutierrez (78 y.o. female)  Date: 5/22/2020  Diagnosis: COVID-19 virus infection [U07.1]   Sepsis with multi-organ dysfunction (HCC)  Procedure(s) (LRB):  ESOPHAGOGASTRODUODENOSCOPY (EGD) (Left)  ESOPHAGOGASTRODUODENAL (EGD) BIOPSY (N/A) 10 Days Post-Op  Precautions: Fall  Chart, occupational therapy assessment, plan of care, and goals were reviewed. ASSESSMENT  Patient continues with skilled OT services and is progressing towards goals. Patient cleared by RN to be seen, received in bed and with encouragement, agreeable to treatment. Patient reported feeling wet, total A to manage brief, however, patient able to complete bladder hygiene using RUE while sitting upright with min A - patient dry but requested to clean up, total A to rickey brief. Patient required mod-max Ax2 to come to sitting EOB. Patient with fair/poor balance sitting EOB - able to tolerate sitting up for ~8 minutes. Attempted to have patient complete grooming tasks sitting upright on EOB, however, patient upset by news of not being sne tto IPR close to family and refused any further attempts at mobility or ADLs. Patient returned to supine with max A x2 and left with HOB elevated to 30* with TF running, call bell in reach, RN present and all needs met. Will continue to follow, recommend D/C to IPR once medically stable. Current Level of Function Impacting Discharge (ADLs): up to total A for ADLs    Other factors to consider for discharge: none         PLAN :  Patient continues to benefit from skilled intervention to address the above impairments. Continue treatment per established plan of care. to address goals. Recommend with staff: Recommend with nursing patient to complete as able in order to maintain strength, endurance and independence: ADLs with supervision/setup and bed to chair position 3x/day and mobilizing herself in bed for toileting with 2 assist. Thank you for your assistance.      Recommend next OT session: EOB ADLs    Recommendation for discharge: (in order for the patient to meet his/her long term goals)  Therapy 3 hours per day 5-7 days per week    This discharge recommendation:  Has been made in collaboration with the attending provider and/or case management    IF patient discharges home will need the following DME: bedside commode, hospital bed, mechanical lift, transfer bench, and wheelchair       SUBJECTIVE:   Patient stated No, I'm telling you I'm done and want to lie back down.     OBJECTIVE DATA SUMMARY:   Cognitive/Behavioral Status:  Neurologic State: Alert  Orientation Level: Oriented X4  Cognition: Appropriate for age attention/concentration; Follows commands  Perception: Cues to maintain midline in sitting; Tactile;Verbal  Perseveration: No perseveration noted  Safety/Judgement: Awareness of environment; Fall prevention    Functional Mobility and Transfers for ADLs:  Bed Mobility:  Rolling: Minimum assistance;Assist x2  Supine to Sit: Moderate assistance;Maximum assistance;Assist x2  Sit to Supine: Maximum assistance;Assist x2  Scooting: Maximum assistance    Transfers:  NT this session     Balance:  Sitting: Impaired; With support  Sitting - Static: Fair (occasional)  Sitting - Dynamic: Poor (constant support)    ADL Intervention:    Toileting  Toileting Assistance: Maximum assistance  Bladder Hygiene: Minimum assistance  Clothing Management: Total assistance (dependent)  Cues: Physical assistance for pants down;Physical assistance for pants up;Verbal cues provided    Cognitive Retraining  Safety/Judgement: Awareness of environment; Fall prevention    Pain:  Did not quantify    Activity Tolerance:   Fair, Poor, and requires rest breaks  Please refer to the flowsheet for vital signs taken during this treatment.     After treatment patient left in no apparent distress:   Supine in bed, Heels elevated for pressure relief, Call bell within reach, Side rails x 3, and RN present     COMMUNICATION/COLLABORATION:   The patients plan of care was discussed with: Physical therapist, Registered nurse, and Case management.      Anderson Ontiveros OT  Time Calculation: 29 mins

## 2020-05-22 NOTE — PROGRESS NOTES
[address] [facility] Adult  Hospitalist Group                                                                                          Hospitalist Progress Note  Thaddeus Greene MD  Answering service: [phone] from in house phone        Date of Service:  2020  NAME:  Altaf Jaramillo  :  1960  MRN:  [id number]      Admission Summary:     A 65 yo female with obesity and diabetes who presented to [facility] with worsening hypoxic respiratory failure in lieu of close exposure to COVID-19. She presented to the hospital 3/26 and intubated 3/28. She was started on broad spectrum antibiotics and plaquenil. Developed worsening renal failure and was transferred to ICU for CRRT and stable and transferred out of ICU. Interval history / Subjective:     She said she feels better, no left side chest pain or cough,      Assessment & Plan:     ARF  - was on HD  -last HD   -now recovered and no more need for HD  -Permacath removed   -now resolved  -Nephrology on board     Nausea and vomiting   -s/p EGD:Hiatal hernia, grade 2 erosive esophagitis and gastritis in antrum, appreciate recommendations,   -cont. Protonix and carafate  -s/p GJ tube placement   -Tube feeds started 5/15 but held since  sec to continued nausea and vomiting, improved and restarted feeds . But did not tolerate.   -Psych re-consulted, increased Lexapro and trazodone.   -on scheduled compazine and now tolerating the diet well  -GI on board     COVID-19 +ve   - s/p Tocilizumab, Plaquenil. On Vit C, Zinc   -COVID negative ,  and   - Resolved     Acute Hypoxic Resp Failure  - resolved     Acute metabolic Encephalopathy  - likely ICU delirium- no agitation.  Monitor  - resolved, conscious and well oriented     Sinus tachycardia  -improved, continue Metoprolol, normal TSH     Anemia of chronic disease   -H/H is stable    Retroperitoneal bleed: this hospitalization,   -resolved - Avoid AP/AC meds  -Stable     Non occlusive PE in LLL pulmonary artery   - no ACs due to retroperitoneal bleed  -seen by Vascular surgery recommendation conservative approach for now     S/p Cardiac arrest 4/9  -resuscitated successfully     T2DM  -controlled, A1c 10.4,   -continue lantus 15 units, SSI, AccuChecks, monitor    Morbid obesity  -counseled on health benefits of weight loss, Healthy diet, BMI 44.11 kg/m².     Depression:  -stable  -continue Trazodone at bedtime and Psych consulted, started on Lexapro. The dose increased 5/19    Anxiety episodes;   -Continue Lexapro, Ativan PRN for short duration      Dysphagia  -s/p GJ tube 5/14, tube feeds started 5/15  -nutrition consult  -Nausea and vomiting today improved     PT/OT rehab        Code status: Full Code  DVT prophylaxis: SCD    Care Plan discussed with: Patient/Family and Nurse  Anticipated Disposition: [facility]/Rehab  Anticipated Discharge: today      Hospital Problems  Date Reviewed: 5/14/2020          Codes Class Noted POA    Hypotension ICD-10-CM: I95.9  ICD-9-CM: 458.9 Acute 4/2/2020 Yes        Retroperitoneal hemorrhage ICD-10-CM: R58  ICD-9-CM: 459.0  4/19/2020 No        Acute renal failure (ARF) ([facility].) ICD-10-CM: N17.9  ICD-9-CM: 584.9  4/19/2020 No        Encephalopathy ICD-10-CM: G93.40  ICD-9-CM: 348.30  4/19/2020 No        * (Principal) Sepsis with multi-organ dysfunction ([facility] ICD-10-CM: A41.9, R65.20  ICD-9-CM: 038.9, 995.92  4/19/2020 Yes        Pneumonia due to methicillin susceptible Staphylococcus aureus (MSSA) ([facility] ICD-10-CM: H20.731  ICD-9-CM: 482.41  4/19/2020 Unknown        Thrombocytopenia ([facility] ICD-10-CM: D69.6  ICD-9-CM: 287.5  4/19/2020 Unknown        Diarrhea ICD-10-CM: R19.7  ICD-9-CM: 787.91  4/19/2020 Unknown        COVID-19 virus infection ICD-10-CM: U07.1  ICD-9-CM: 079.89  3/31/2020 Unknown                Vital Signs:    Last 24hrs VS reviewed since prior progress note.  Most recent are:  Visit Vitals  /67 (BP 1 Location: Left arm, BP Patient Position: At rest)   Pulse 98   Temp 98 °F (36.7 °C)   Resp 18   Ht 5' 4\" (1.626 m)   Wt 107.9 kg (237 lb 14.4 oz)   SpO2 91%   BMI 40.84 kg/m²         Intake/Output Summary (Last 24 hours) at 5/22/2020 6685  Last data filed at 5/22/2020 0130  Gross per 24 hour   Intake 300 ml   Output 500 ml   Net -200 ml        Physical Examination:             Constitutional:  No acute distress, cooperative, pleasant    ENT:  Oral mucosa moist, oropharynx benign. Resp:  CTA bilaterally. No wheezing/rhonchi/rales. No accessory muscle use   CV:  Regular rhythm, normal rate, no murmurs, gallops, rubs    GI:  PEG tube in place, abdomen is soft, non distended, non tender. normoactive bowel sounds, no hepatosplenomegaly     Musculoskeletal:  No edema     Neurologic:  Conscious and alert, motor LE 3-4 /5     Skin:  Good turgor, no rashes or ulcers       Data Review:    Review and/or order of clinical lab test  Review and/or order of tests in the radiology section of CPT  Review and/or order of tests in the medicine section of CPT      Labs:     Recent Labs     05/22/20 0148 05/20/20 0329   WBC 8.1 9.5   HGB 9.7* 9.7*   HCT 32.5* 32.6*    227     Recent Labs     05/22/20 0148 05/21/20  0330 05/20/20  0329    143 145   K 3.4* 3.2* 3.0*   * 111* 112*   CO2 26 25 26   BUN 8 7 7   CREA 0.61 0.55 0.61   * 170* 183*   CA 7.9* 7.6* 7.6*   MG 1.7 1.3* 1.6   PHOS 3.3 3.7 3.4     Recent Labs     05/22/20 0148 05/21/20  0330 05/20/20  0329   SGOT 11*  --   --    ALT 16  --   --    *  --   --    TBILI 0.6  --   --    TP 5.5*  --   --    ALB 2.1* 2.1* 2.2*   GLOB 3.4  --   --      No results for input(s): INR, PTP, APTT, INREXT, INREXT in the last 72 hours. No results for input(s): FE, TIBC, PSAT, FERR in the last 72 hours. No results found for: FOL, RBCF   No results for input(s): PH, PCO2, PO2 in the last 72 hours. No results for input(s): CPK, CKNDX, TROIQ in the last 72 hours.     No lab exists for component: CPKMB  Lab Results   Component Value Date/Time    Triglyceride 159 (H) 04/12/2020 08:34 PM     Lab Results   Component Value Date/Time    Glucose (POC) 191 (H) 05/22/2020 05:54 AM    Glucose (POC) 193 (H) 05/21/2020 11:30 PM    Glucose (POC) 208 (H) 05/21/2020 05:31 PM    Glucose (POC) 167 (H) 05/21/2020 12:27 PM    Glucose (POC) 233 (H) 05/21/2020 05:50 AM     No results found for: COLOR, APPRN, SPGRU, REFSG, SARKIS, PROTU, GLUCU, KETU, BILU, UROU, GALE, LEUKU, GLUKE, EPSU, BACTU, WBCU, RBCU, CASTS, UCRY      Medications Reviewed:     Current Facility-Administered Medications   Medication Dose Route Frequency    potassium chloride 10 mEq in 100 ml IVPB  10 mEq IntraVENous ONCE    pantoprazole (PROTONIX) tablet 40 mg  40 mg Oral ACB    prochlorperazine (COMPAZINE) with saline injection 10 mg  10 mg IntraVENous Q6H PRN    escitalopram oxalate (LEXAPRO) tablet 20 mg  20 mg Oral DAILY    traZODone (DESYREL) tablet 100 mg  100 mg Oral QHS    famotidine (PEPCID) tablet 20 mg  20 mg Oral DAILY    metoprolol tartrate (LOPRESSOR) tablet 25 mg  25 mg Oral BID    insulin glargine (LANTUS) injection 15 Units  15 Units SubCUTAneous DAILY    bumetanide (BUMEX) tablet 1 mg  1 mg Oral DAILY    lidocaine (XYLOCAINE) 2 % jelly   Mucous Membrane PRN    sodium chloride (NS) flush 5-40 mL  5-40 mL IntraVENous Q8H    sodium chloride (NS) flush 5-40 mL  5-40 mL IntraVENous PRN    HYDROmorphone (PF) (DILAUDID) injection 0.5 mg  0.5 mg IntraVENous Q4H PRN    sucralfate (CARAFATE) tablet 1 g  1 g Oral TIDAC    LORazepam (ATIVAN) injection 0.5 mg  0.5 mg IntraVENous Q6H PRN    heparin (porcine) 1,000 unit/mL injection 3,800 Units  3,800 Units Hemodialysis DIALYSIS PRN    balsam peru-castor oiL (VENELEX) ointment   Topical BID    guaiFENesin (ROBITUSSIN) 100 mg/5 mL oral liquid 100 mg  100 mg Oral Q12H    epoetin jovani-epbx (RETACRIT) injection 20,000 Units  20,000 Units SubCUTAneous Q TUE, THU & SAT    HYDROcodone-acetaminophen (NORCO) 5-325 mg per tablet 1 Tab  1 Tab Oral Q6H PRN    phenol throat spray (CHLORASEPTIC) 1 Spray  1 Spray Oral Q6H PRN    labetaloL (NORMODYNE;TRANDATE) injection 20 mg  20 mg IntraVENous Q4H PRN    albumin human 25% (BUMINATE) solution 12.5 g  12.5 g IntraVENous DIALYSIS PRN    albuterol (PROVENTIL VENTOLIN) nebulizer solution 2.5 mg  2.5 mg Nebulization Q4H PRN    alteplase (CATHFLO) 2 mg in sterile water (preservative free) 2 mL injection  2 mg InterCATHeter DIALYSIS PRN    alteplase (CATHFLO) 2 mg in sterile water (preservative free) 2 mL injection  2 mg InterCATHeter DIALYSIS PRN    insulin lispro (HUMALOG) injection   SubCUTAneous Q6H    white petrolatum-mineral oiL (AKWA TEARS) 83-15 % ophthalmic ointment   Both Eyes Q12H    bacitracin 500 unit/gram packet 1 Packet  1 Packet Topical PRN    sodium chloride (NS) flush 5-40 mL  5-40 mL IntraVENous Q8H    sodium chloride (NS) flush 5-40 mL  5-40 mL IntraVENous PRN    ondansetron (ZOFRAN) injection 4 mg  4 mg IntraVENous Q4H PRN    acetaminophen (TYLENOL) tablet 650 mg  650 mg Oral Q6H PRN    Or    acetaminophen (TYLENOL) suppository 650 mg  650 mg Rectal Q6H PRN    glucose chewable tablet 16 g  4 Tab Oral PRN    glucagon (GLUCAGEN) injection 1 mg  1 mg IntraMUSCular PRN    dextrose 10% infusion 0-250 mL  0-250 mL IntraVENous PRN     ______________________________________________________________________  EXPECTED LENGTH OF STAY: 12d 9h  ACTUAL LENGTH OF STAY:          52                 Chloe Talbert MD

## 2020-05-22 NOTE — PROGRESS NOTES
IFTIKHAR PLAN:     RUR 39%  Plan: [facility] has a bed available today. Discharge Transportation:  [facility] 4pm    Discharge packet is placed placed on hard chart. Portions of EMTALA will be completed. Assigned nurse to call report to [phone].     Portions of EMTALA completed at 8:57am    PAYAL Rolon, ALEX

## 2020-05-22 NOTE — DISCHARGE INSTRUCTIONS
Discharge SNF/Rehab Instructions/LTAC       PATIENT ID: Lisa Briggs  MRN: [id number]   YOB: 1960    DATE OF ADMISSION: 3/31/2020  9:02 AM    DATE OF DISCHARGE: 5/22/2020    PRIMARY CARE PROVIDER: Wisam Coronado MD       ATTENDING PHYSICIAN: Kathy Hassan MD  DISCHARGING PROVIDER: Sherman Vang MD     To contact this individual call [phone] and ask the  to page. If unavailable ask to be transferred the Adult Hospitalist Department. CONSULTATIONS: IP CONSULT TO INTERVENTIONAL RADIOLOGY  IP CONSULT TO OTOLARYNGOLOGY  IP CONSULT TO PSYCHIATRY  IP CONSULT TO GASTROENTEROLOGY  IP CONSULT TO INTERVENTIONAL RADIOLOGY  IP CONSULT TO GASTROENTEROLOGY  IP CONSULT TO PSYCHIATRY  IP CONSULT TO HOSPITALIST  IP CONSULT TO PULMONOLOGY  IP CONSULT TO VASCULAR SURGERY    PROCEDURES/SURGERIES: Procedure(s):  ESOPHAGOGASTRODUODENOSCOPY (EGD)  ESOPHAGOGASTRODUODENAL (EGD) BIOPSY    ADMITTING [address] [place] [address] COURSE:     A 65 yo female with obesity and diabetes who presented to [facility] with worsening hypoxic respiratory failure in lieu of close exposure to COVID-19. She presented to the hospital 3/26 and intubated 3/28. She was started on broad spectrum antibiotics and plaquenil. Developed worsening renal failure and was transferred to us for CRRT    ARF  -was on HD  -last HD 5/9  -now recovered and no more need for HD  -Permacath removed 5/14  -now resolved  -Out patient follow up with Nephrology   Nausea and vomiting   -s/p EGD:Hiatal hernia, grade 2 erosive esophagitis and gastritis in antrum, appreciate recommendations, cont. Protonix, added carafate  -Appreciate discussion with GI  -s/p GJ tube placement 5/14  -Tube feeds started 5/15 but held on 5/16 sec to continued nausea and vomiting, improved and restarted tube feeds on 5/17  -on scheduled compazine and now tolerating the diet well  -GI on board  COVID-19 +ve   -s/p Tocilizumab, Plaquenil.  On Vit C, Zinc   -COVID negative 4/20, 4/27 and 5/8  - Resolved  Acute Hypoxic Resp Failure  - resolved  Acute metabolic Encephalopathy  - likely ICU delirium- no agitation. Monitor  - resolved. Sinus tachycardia  -improved,continue Metoprolol, normal TSH  Anemia of chronic disease   -H/H is stable  Retroperitoneal bleed: this hospitalization,   -resolved - Avoid AP/AC meds- Monitor Hb  Non occlusive PE in LLL pulmonary artery   - no ACs due to retroperitoneal bleed  -seen by vascular surgeon and recommended conservative approach   S/p Cardiac arrest 4/9  -resuscitated successfully   T2DM  -A1c 10.4,   - lantus 20 units, add Metformin  mg daily, SSI, AccuChecks, monitor  Morbid obesity  -counseled on health benefits of weight loss, Healthy diet, BMI 44.11 kg/m².   Depression:  -stable  -continue Trazodone at bedtime and Psych consulted, started on Lexapro. The dose increased5/1   Anxiety episodes;   -Continue Lexapro, Ativan PRN for short duration   Dysphagia  -s/p GJ tube 5/14, continue tube feeds      PT/OT rehab           Code status: Full Code  DVT prophylaxis: SCD      DISCHARGE DIAGNOSES / PLAN:      ARF  -now resolved  -outpatient follow up with Nephrology   Nausea and vomiting possible due to esophagitis and gastritis  -continue Protonix and carafate  -s/p GJ tube placement on 5/14, continue tube feeding  -outpatient follow up with GI    COVID-19 +ve   - s/p Tocilizumab, Plaquenil. On Vit C, Zinc   -COVID negative 4/20, 4/27 and 5/8  - Resolved  Acute Hypoxic Resp Failure  - resolved  Acute metabolic Encephalopathy  - resolved.   Sinus tachycardia  -improving,continue Metoprolol   Anemia of chronic disease   -H/H is stable  Retroperitoneal bleed: this hospitalization,   -resolved    Non occlusive PE in LLL pulmonary artery   -improving  -SpO2 95% on RA  S/p Cardiac arrest 4/9  -resuscitated successfully   T2DM  -continue Lantus 20 units, Metformin  mg daily, SSI, AccuChecks, monitor  Morbid obesity  -counseled on health benefits of weight loss, Healthy diet, BMI 44.11 kg/m².   Depression:  -continue Trazodone at bedtime and Lexapro. Anxiety episodes;   -Continue Lexapro, Ativan PRN for short duration   Dysphagia  -s/p GJ tube 5/14, tube feeds started 5/15  -nutrition consult  -Nausea and vomiting today improved    PENDING TEST RESULTS:   At the time of discharge the following test results are still pending: None    FOLLOW UP APPOINTMENTS:    Follow-up Information     Follow up With Specialties Details Why Contact Info    Lima Lemus MD General acute hospital In 1 week  [facility] [address]      Sofia Leung MD Pulmonary Disease In 3 days  [address] [facility]  [address]  [facility] [address] Aurelia Mehta MD Otolaryngology In 2 weeks  [address] [place] [address] [phone]      Harriett Malhotra MD  Gastroenterologist  In 2 weeks                    ADDITIONAL CARE RECOMMENDATIONS:     DIET: Diabetic Diet    TUBE FEEDING INSTRUCTIONS:  Osmolite 1.5 jn 20 ml/hr advance 10 ml/hr q 8 hrs, goal to 50 ml/hr, free water 150 ml q 4 hrs    OXYGEN / BiPAP SETTINGS: None    ACTIVITY: Activity as tolerated    WOUND CARE: Hydrocolloid dressing to left upper buttock injury; change Monday and Thursday. EQUIPMENT needed: None      DISCHARGE MEDICATIONS:   See Medication Reconciliation Form      NOTIFY YOUR PHYSICIAN FOR ANY OF THE FOLLOWING:   Fever over 101 degrees for 24 hours. Chest pain, shortness of breath, fever, chills, nausea, vomiting, diarrhea, change in mentation, falling, weakness, bleeding. Severe pain or pain not relieved by medications. Or, any other signs or symptoms that you may have questions about.     DISPOSITION:    Home With:   OT  PT  HH  RN      x SNF/Inpatient Rehab/LTAC    Independent/assisted living    Hospice    Other:       PATIENT CONDITION AT DISCHARGE:     Functional status    Poor    x Deconditioned     Independent      Cognition   x  Lucid     Forgetful     Dementia      Catheters/lines (plus indication)    [place]     PICC    x PEG     None      Code status    x Full code     DNR      PHYSICAL EXAMINATION AT DISCHARGE:   Refer to Progress Note      CHRONIC MEDICAL DIAGNOSES:  Problem List as of 5/22/2020 Date Reviewed: 5/14/2020          Codes Class Noted - Resolved    Hypotension ICD-10-CM: I95.9  ICD-9-CM: 458.9 Acute 4/2/2020 - Present        Retroperitoneal hemorrhage ICD-10-CM: R58  ICD-9-CM: 459.0  4/19/2020 - Present        Acute renal failure (ARF) ([facility] ICD-10-CM: N17.9  ICD-9-CM: 584.9  4/19/2020 - Present        Encephalopathy ICD-10-CM: G93.40  ICD-9-CM: 348.30  4/19/2020 - Present        * (Principal) Sepsis with multi-organ dysfunction ([facility] ICD-10-CM: A41.9, R65.20  ICD-9-CM: 038.9, 995.92  4/19/2020 - Present        Pneumonia due to methicillin susceptible Staphylococcus aureus (MSSA) ([facility] ICD-10-CM: F42.030  ICD-9-CM: 482.41  4/19/2020 - Present        Thrombocytopenia ([facility] ICD-10-CM: D69.6  ICD-9-CM: 287.5  4/19/2020 - Present        Diarrhea ICD-10-CM: R19.7  ICD-9-CM: 787.91  4/19/2020 - Present        COVID-19 virus infection ICD-10-CM: U07.1  ICD-9-CM: 079.89  3/31/2020 - Present        Obesity, morbid ([facility] ICD-10-CM: E66.01  ICD-9-CM: 278.01  1/16/2018 - Present        Vaginal Pap smear following hysterectomy for malignancy ICD-10-CM: Z08, Z90.710  ICD-9-CM: V67.01  7/6/2016 - Present        Personal history of malignant neoplasm of other parts of uterus ICD-10-CM: Z85.42  ICD-9-CM: V10.42  7/6/2016 - Present        Endometrial cancer ([facility] ICD-10-CM: C54.1  ICD-9-CM: 182.0  1/28/2013 - Present        RESOLVED: Special screening for malignant neoplasms, vagina ICD-10-CM: Z12.72  ICD-9-CM: V76.47  7/6/2016 - 9/26/2019        RESOLVED: Malignant neoplasm of corpus uteri, except isthmus ([facility] ICD-10-CM: C54.9  ICD-9-CM: 182.0  1/28/2013 - 1/28/2013                [facility] Checked:   Yes x     PROBLEM LIST Updated:  Yes x         Signed:   Sulaiman Prater MD  5/22/2020  10:41 PM

## 2020-05-22 NOTE — DIABETES MGMT
[facility]  CLINICAL NURSE SPECIALIST CONSULT  PROGRAM FOR DIABETES HEALTH  Follow up Note  Presentation   Jh Tilley is a 64 y.o. female admitted from OSH to [address] [place] [address] ICU with SARS-COV2 . Now recovering and COVID -. Per Nephorology kidneys are recovering nicely and now not requiring HD. Patient is now suffering with left paralysis of vocal cord which  affects her swallowing ability. New diabetes diagnosis with A1C 11.0% (3/28/2020); updated A1C 3/31/20-10.4%     Recent events: Slated to go to Rehab today! Tolerating regular liquids per Speech note. BG trending at 200mg/dl. Continues on TF of Osmolite 1.5 @50cc/hr. Consulted by Provider for advanced diabetes nursing assessment and care, specifically related to   [] Transitioning off Sayra Klaudia   [x] Inpatient management strategy  [] Home management assessment  [] Survival skill education    Diabetes-related medical history  Acute complications  hyperglycemia  Neurological complications  NONE  Microvascular disease  NONE  Macrovascular disease  NONE  Other associated conditions     NONE    Diabetes medication history: NONE    Subjective     Ms. Luis Carlos Pena is awake and verbalizing her needs. She reports some nausea and repositioning in bed. Objective   Diabetic Foot Exam: Left foot: warm, no calluses noted, red spot noted on top of foot over bone, but no breakdown noted. + microfilament sensation noted in all areas. Right foot: in brace, however, foot warm. Microfilament test deferred due to brace. Vital Signs   Visit Vitals  /86 (BP 1 Location: Left arm, BP Patient Position: At rest)   Pulse (!) 104   Temp 98.3 °F (36.8 °C)   Resp 17   Ht 5' 4\" (1.626 m)   Wt 107.9 kg (237 lb 14.4 oz)   SpO2 95%   BMI 40.84 kg/m²   .    Laboratory  Lab Results   Component Value Date/Time    Hemoglobin A1c 10.4 (H) 03/31/2020 03:42 PM     No results found for: LDL, LDLC, DLDLP  Lab Results   Component Value Date/Time    Creatinine 0.61 05/22/2020 01:48 AM Lab Results   Component Value Date/Time    Sodium 141 05/22/2020 01:48 AM    Potassium 3.4 (L) 05/22/2020 01:48 AM    Chloride 111 (H) 05/22/2020 01:48 AM    CO2 26 05/22/2020 01:48 AM    Anion gap 4 (L) 05/22/2020 01:48 AM    Glucose 143 (H) 05/22/2020 01:48 AM    BUN 8 05/22/2020 01:48 AM    Creatinine 0.61 05/22/2020 01:48 AM    BUN/Creatinine ratio 13 05/22/2020 01:48 AM    GFR est AA >60 05/22/2020 01:48 AM    GFR est non-AA >60 05/22/2020 01:48 AM    Calcium 7.9 (L) 05/22/2020 01:48 AM    Bilirubin, total 0.6 05/22/2020 01:48 AM    AST (SGOT) 11 (L) 05/22/2020 01:48 AM    Alk. phosphatase 144 (H) 05/22/2020 01:48 AM    Protein, total 5.5 (L) 05/22/2020 01:48 AM    Albumin 2.1 (L) 05/22/2020 01:48 AM    Globulin 3.4 05/22/2020 01:48 AM    A-G Ratio 0.6 (L) 05/22/2020 01:48 AM    ALT (SGPT) 16 05/22/2020 01:48 AM     Lab Results   Component Value Date/Time    ALT (SGPT) 16 05/22/2020 01:48 AM       Evaluation   Ms Lenora Damon, with new onset Type 2 diabetes,with A1C 10.4%. BG trends today consistently at 200mg/dl. TF are running @ 30cc/hr. Tolerating PO liquids. In order to adequately maintain her BG while on TF and cover her needs for diabetes, she will require an increased dose in her basal insulin. Recommendations/Discharge Planning   1. Consider increasing basal insulin:  17 to 20 units daily. Discharge Recs:  1. She will require another A1c check in June (last A1C 3/26/20)    2. She will also require outpatient diabetes education once she is discharged from the rehab setting. 3.  Initiate Metformin therapy  Metformin  mg: Start with 500 mg Daily; monitor for GI side effects. Side effects should resolve after 5-7 days. If no GI side effects- advance dose by 1 tablet every 5-7 days to a total dose of 1000 mg BID. 4.  Discharge insulin :  Lantus 17 to 20 units daily along with correctional insulin.           Assessment and Plan   Nursing Diagnosis Risk for unstable blood glucose pattern   Nursing Intervention Domain 3254 Decision-making Support   Nursing Interventions Examined current inpatient diabetes control   Explored factors facilitating and impeding inpatient management  Identified self-management practices impeding diabetes control  Explored corrective strategies with patient and responsible inpatient provider   Informed patient of rational for insulin strategy while hospitalized         Billing Code(s)   Thank you for including us in their care. I spent 20 minutes in direct patient care today for this patient.   Time includes chart review, face to face with patient and collaboration with interdisciplinary care team.      Carol Wallace, CNS   Program for Diabetes Health  Access via KESHIA Maldonado [facility] [phone]

## 2020-05-23 LAB
ANION GAP SERPL CALC-SCNC: 5 MMOL/L (ref 5–15)
BUN SERPL-MCNC: 8 MG/DL (ref 6–20)
BUN/CREAT SERPL: 18 (ref 12–20)
CALCIUM SERPL-MCNC: 8.3 MG/DL (ref 8.5–10.1)
CHLORIDE SERPL-SCNC: 108 MMOL/L (ref 97–108)
CO2 SERPL-SCNC: 26 MMOL/L (ref 21–32)
CREAT SERPL-MCNC: 0.45 MG/DL (ref 0.55–1.02)
ERYTHROCYTE [DISTWIDTH] IN BLOOD BY AUTOMATED COUNT: 15.6 % (ref 11.5–14.5)
GLUCOSE SERPL-MCNC: 150 MG/DL (ref 65–100)
HCT VFR BLD AUTO: 34.1 % (ref 35–47)
HGB BLD-MCNC: 10.2 G/DL (ref 11.5–16)
MCH RBC QN AUTO: 29 PG (ref 26–34)
MCHC RBC AUTO-ENTMCNC: 29.9 G/DL (ref 30–36.5)
MCV RBC AUTO: 96.9 FL (ref 80–99)
NRBC # BLD: 0 K/UL (ref 0–0.01)
NRBC BLD-RTO: 0 PER 100 WBC
PLATELET # BLD AUTO: 260 K/UL (ref 150–400)
PMV BLD AUTO: 10.1 FL (ref 8.9–12.9)
POTASSIUM SERPL-SCNC: 3.5 MMOL/L (ref 3.5–5.1)
RBC # BLD AUTO: 3.52 M/UL (ref 3.8–5.2)
SODIUM SERPL-SCNC: 139 MMOL/L (ref 136–145)
WBC # BLD AUTO: 8.5 K/UL (ref 3.6–11)

## 2020-05-23 PROCEDURE — 85027 COMPLETE CBC AUTOMATED: CPT

## 2020-05-23 PROCEDURE — 80048 BASIC METABOLIC PNL TOTAL CA: CPT

## 2020-05-23 PROCEDURE — 36415 COLL VENOUS BLD VENIPUNCTURE: CPT

## 2020-05-24 ENCOUNTER — APPOINTMENT (OUTPATIENT)
Dept: GENERAL RADIOLOGY | Age: 60
End: 2020-05-24
Attending: PHYSICAL MEDICINE & REHABILITATION

## 2020-05-24 LAB
ALBUMIN SERPL-MCNC: 2 G/DL (ref 3.5–5)
ALBUMIN/GLOB SERPL: 0.6 {RATIO} (ref 1.1–2.2)
ALP SERPL-CCNC: 117 U/L (ref 45–117)
ALT SERPL-CCNC: 12 U/L (ref 12–78)
ANION GAP SERPL CALC-SCNC: 5 MMOL/L (ref 5–15)
AST SERPL-CCNC: 12 U/L (ref 15–37)
BILIRUB SERPL-MCNC: 0.9 MG/DL (ref 0.2–1)
BUN SERPL-MCNC: 6 MG/DL (ref 6–20)
BUN/CREAT SERPL: 15 (ref 12–20)
CALCIUM SERPL-MCNC: 8.4 MG/DL (ref 8.5–10.1)
CHLORIDE SERPL-SCNC: 110 MMOL/L (ref 97–108)
CO2 SERPL-SCNC: 26 MMOL/L (ref 21–32)
CREAT SERPL-MCNC: 0.39 MG/DL (ref 0.55–1.02)
ERYTHROCYTE [DISTWIDTH] IN BLOOD BY AUTOMATED COUNT: 15.4 % (ref 11.5–14.5)
GLOBULIN SER CALC-MCNC: 3.4 G/DL (ref 2–4)
GLUCOSE SERPL-MCNC: 100 MG/DL (ref 65–100)
HCT VFR BLD AUTO: 33 % (ref 35–47)
HGB BLD-MCNC: 10.2 G/DL (ref 11.5–16)
MAGNESIUM SERPL-MCNC: 1.4 MG/DL (ref 1.6–2.4)
MCH RBC QN AUTO: 29.3 PG (ref 26–34)
MCHC RBC AUTO-ENTMCNC: 30.9 G/DL (ref 30–36.5)
MCV RBC AUTO: 94.8 FL (ref 80–99)
NRBC # BLD: 0 K/UL (ref 0–0.01)
NRBC BLD-RTO: 0 PER 100 WBC
PLATELET # BLD AUTO: 277 K/UL (ref 150–400)
PMV BLD AUTO: 10.1 FL (ref 8.9–12.9)
POTASSIUM SERPL-SCNC: 3.7 MMOL/L (ref 3.5–5.1)
PROT SERPL-MCNC: 5.4 G/DL (ref 6.4–8.2)
RBC # BLD AUTO: 3.48 M/UL (ref 3.8–5.2)
SODIUM SERPL-SCNC: 141 MMOL/L (ref 136–145)
WBC # BLD AUTO: 8.8 K/UL (ref 3.6–11)

## 2020-05-24 PROCEDURE — 80053 COMPREHEN METABOLIC PANEL: CPT

## 2020-05-24 PROCEDURE — 36415 COLL VENOUS BLD VENIPUNCTURE: CPT

## 2020-05-24 PROCEDURE — 83735 ASSAY OF MAGNESIUM: CPT

## 2020-05-24 PROCEDURE — 86769 SARS-COV-2 COVID-19 ANTIBODY: CPT

## 2020-05-24 PROCEDURE — 85027 COMPLETE CBC AUTOMATED: CPT

## 2020-05-24 PROCEDURE — 74022 RADEX COMPL AQT ABD SERIES: CPT

## 2020-05-26 LAB — SARS-COV-2 AB, IGG, CORG1M: POSITIVE

## 2020-05-28 ENCOUNTER — APPOINTMENT (OUTPATIENT)
Dept: GENERAL RADIOLOGY | Age: 60
End: 2020-05-28
Attending: PHYSICAL MEDICINE & REHABILITATION

## 2020-05-28 LAB
ALBUMIN SERPL-MCNC: 1.8 G/DL (ref 3.5–5)
ALBUMIN/GLOB SERPL: 0.5 {RATIO} (ref 1.1–2.2)
ALP SERPL-CCNC: 124 U/L (ref 45–117)
ALT SERPL-CCNC: 14 U/L (ref 12–78)
ANION GAP SERPL CALC-SCNC: 4 MMOL/L (ref 5–15)
AST SERPL-CCNC: 13 U/L (ref 15–37)
BILIRUB SERPL-MCNC: 0.6 MG/DL (ref 0.2–1)
BUN SERPL-MCNC: 5 MG/DL (ref 6–20)
BUN/CREAT SERPL: 8 (ref 12–20)
CALCIUM SERPL-MCNC: 7.9 MG/DL (ref 8.5–10.1)
CHLORIDE SERPL-SCNC: 111 MMOL/L (ref 97–108)
CO2 SERPL-SCNC: 25 MMOL/L (ref 21–32)
CREAT SERPL-MCNC: 0.59 MG/DL (ref 0.55–1.02)
GLOBULIN SER CALC-MCNC: 3.3 G/DL (ref 2–4)
GLUCOSE SERPL-MCNC: 80 MG/DL (ref 65–100)
MAGNESIUM SERPL-MCNC: 1.8 MG/DL (ref 1.6–2.4)
POTASSIUM SERPL-SCNC: 3.9 MMOL/L (ref 3.5–5.1)
PROT SERPL-MCNC: 5.1 G/DL (ref 6.4–8.2)
SODIUM SERPL-SCNC: 140 MMOL/L (ref 136–145)

## 2020-05-28 PROCEDURE — 36415 COLL VENOUS BLD VENIPUNCTURE: CPT

## 2020-05-28 PROCEDURE — 83735 ASSAY OF MAGNESIUM: CPT

## 2020-05-28 PROCEDURE — 80053 COMPREHEN METABOLIC PANEL: CPT

## 2020-05-28 PROCEDURE — 71046 X-RAY EXAM CHEST 2 VIEWS: CPT

## 2020-06-02 ENCOUNTER — APPOINTMENT (OUTPATIENT)
Dept: INTERVENTIONAL RADIOLOGY/VASCULAR | Age: 60
End: 2020-06-02
Attending: PHYSICAL MEDICINE & REHABILITATION

## 2020-06-02 PROCEDURE — 49452 REPLACE G-J TUBE PERC: CPT

## 2020-06-04 ENCOUNTER — APPOINTMENT (OUTPATIENT)
Dept: GENERAL RADIOLOGY | Age: 60
End: 2020-06-04
Attending: PHYSICAL MEDICINE & REHABILITATION

## 2020-06-04 PROCEDURE — 71045 X-RAY EXAM CHEST 1 VIEW: CPT

## 2020-06-06 ENCOUNTER — APPOINTMENT (OUTPATIENT)
Dept: GENERAL RADIOLOGY | Age: 60
End: 2020-06-06
Attending: PHYSICAL MEDICINE & REHABILITATION

## 2020-06-06 LAB
ALBUMIN SERPL-MCNC: 1.9 G/DL (ref 3.5–5)
ALBUMIN/GLOB SERPL: 0.6 {RATIO} (ref 1.1–2.2)
ALP SERPL-CCNC: 196 U/L (ref 45–117)
ALT SERPL-CCNC: 19 U/L (ref 12–78)
ANION GAP SERPL CALC-SCNC: 9 MMOL/L (ref 5–15)
AST SERPL-CCNC: 16 U/L (ref 15–37)
BILIRUB SERPL-MCNC: 0.4 MG/DL (ref 0.2–1)
BUN SERPL-MCNC: 3 MG/DL (ref 6–20)
BUN/CREAT SERPL: 10 (ref 12–20)
CALCIUM SERPL-MCNC: 8.5 MG/DL (ref 8.5–10.1)
CHLORIDE SERPL-SCNC: 106 MMOL/L (ref 97–108)
CO2 SERPL-SCNC: 25 MMOL/L (ref 21–32)
CREAT SERPL-MCNC: 0.3 MG/DL (ref 0.55–1.02)
GLOBULIN SER CALC-MCNC: 3.4 G/DL (ref 2–4)
GLUCOSE SERPL-MCNC: 87 MG/DL (ref 65–100)
MAGNESIUM SERPL-MCNC: 1.8 MG/DL (ref 1.6–2.4)
POTASSIUM SERPL-SCNC: 3.1 MMOL/L (ref 3.5–5.1)
PROT SERPL-MCNC: 5.3 G/DL (ref 6.4–8.2)
SODIUM SERPL-SCNC: 140 MMOL/L (ref 136–145)

## 2020-06-06 PROCEDURE — 36415 COLL VENOUS BLD VENIPUNCTURE: CPT

## 2020-06-06 PROCEDURE — 83735 ASSAY OF MAGNESIUM: CPT

## 2020-06-06 PROCEDURE — 71046 X-RAY EXAM CHEST 2 VIEWS: CPT

## 2020-06-06 PROCEDURE — 80053 COMPREHEN METABOLIC PANEL: CPT

## 2020-06-07 LAB
APPEARANCE UR: ABNORMAL
BACTERIA URNS QL MICRO: ABNORMAL /HPF
BILIRUB UR QL CFM: NEGATIVE
COLOR UR: ABNORMAL
EPITH CASTS URNS QL MICRO: ABNORMAL /LPF
GLUCOSE UR STRIP.AUTO-MCNC: NEGATIVE MG/DL
HGB UR QL STRIP: NEGATIVE
KETONES UR QL STRIP.AUTO: 15 MG/DL
LEUKOCYTE ESTERASE UR QL STRIP.AUTO: ABNORMAL
NITRITE UR QL STRIP.AUTO: POSITIVE
PH UR STRIP: 7.5 [PH] (ref 5–8)
PROT UR STRIP-MCNC: ABNORMAL MG/DL
RBC #/AREA URNS HPF: ABNORMAL /HPF (ref 0–5)
SP GR UR REFRACTOMETRY: 1.01 (ref 1–1.03)
UROBILINOGEN UR QL STRIP.AUTO: 1 EU/DL (ref 0.2–1)
WBC URNS QL MICRO: ABNORMAL /HPF (ref 0–4)

## 2020-06-07 PROCEDURE — 87086 URINE CULTURE/COLONY COUNT: CPT

## 2020-06-07 PROCEDURE — 81001 URINALYSIS AUTO W/SCOPE: CPT

## 2020-06-08 LAB
BACTERIA SPEC CULT: NORMAL
CC UR VC: NORMAL
SERVICE CMNT-IMP: NORMAL

## 2020-06-09 ENCOUNTER — APPOINTMENT (OUTPATIENT)
Dept: ULTRASOUND IMAGING | Age: 60
End: 2020-06-09
Attending: PHYSICAL MEDICINE & REHABILITATION

## 2020-06-09 LAB
ANION GAP SERPL CALC-SCNC: 9 MMOL/L (ref 5–15)
BUN SERPL-MCNC: 6 MG/DL (ref 6–20)
BUN/CREAT SERPL: 18 (ref 12–20)
CALCIUM SERPL-MCNC: 8.3 MG/DL (ref 8.5–10.1)
CHLORIDE SERPL-SCNC: 104 MMOL/L (ref 97–108)
CO2 SERPL-SCNC: 26 MMOL/L (ref 21–32)
CREAT SERPL-MCNC: 0.34 MG/DL (ref 0.55–1.02)
GLUCOSE SERPL-MCNC: 103 MG/DL (ref 65–100)
POTASSIUM SERPL-SCNC: 3.3 MMOL/L (ref 3.5–5.1)
SODIUM SERPL-SCNC: 139 MMOL/L (ref 136–145)

## 2020-06-09 PROCEDURE — 87186 SC STD MICRODIL/AGAR DIL: CPT

## 2020-06-09 PROCEDURE — 76700 US EXAM ABDOM COMPLETE: CPT

## 2020-06-09 PROCEDURE — 80048 BASIC METABOLIC PNL TOTAL CA: CPT

## 2020-06-09 PROCEDURE — 87077 CULTURE AEROBIC IDENTIFY: CPT

## 2020-06-09 PROCEDURE — 36415 COLL VENOUS BLD VENIPUNCTURE: CPT

## 2020-06-09 PROCEDURE — 87086 URINE CULTURE/COLONY COUNT: CPT

## 2020-06-12 LAB
ANION GAP SERPL CALC-SCNC: 7 MMOL/L (ref 5–15)
BACTERIA SPEC CULT: ABNORMAL
BUN SERPL-MCNC: 6 MG/DL (ref 6–20)
BUN/CREAT SERPL: 10 (ref 12–20)
CALCIUM SERPL-MCNC: 8.3 MG/DL (ref 8.5–10.1)
CC UR VC: ABNORMAL
CHLORIDE SERPL-SCNC: 102 MMOL/L (ref 97–108)
CO2 SERPL-SCNC: 27 MMOL/L (ref 21–32)
CREAT SERPL-MCNC: 0.58 MG/DL (ref 0.55–1.02)
GLUCOSE SERPL-MCNC: 158 MG/DL (ref 65–100)
POTASSIUM SERPL-SCNC: 3.3 MMOL/L (ref 3.5–5.1)
SERVICE CMNT-IMP: ABNORMAL
SODIUM SERPL-SCNC: 136 MMOL/L (ref 136–145)

## 2020-06-12 PROCEDURE — 36415 COLL VENOUS BLD VENIPUNCTURE: CPT

## 2020-06-12 PROCEDURE — 80048 BASIC METABOLIC PNL TOTAL CA: CPT

## 2020-06-13 LAB
ANION GAP SERPL CALC-SCNC: 8 MMOL/L (ref 5–15)
BUN SERPL-MCNC: 5 MG/DL (ref 6–20)
BUN/CREAT SERPL: 10 (ref 12–20)
CALCIUM SERPL-MCNC: 8.1 MG/DL (ref 8.5–10.1)
CHLORIDE SERPL-SCNC: 104 MMOL/L (ref 97–108)
CO2 SERPL-SCNC: 26 MMOL/L (ref 21–32)
CREAT SERPL-MCNC: 0.5 MG/DL (ref 0.55–1.02)
GLUCOSE SERPL-MCNC: 84 MG/DL (ref 65–100)
POTASSIUM SERPL-SCNC: 3.7 MMOL/L (ref 3.5–5.1)
SODIUM SERPL-SCNC: 138 MMOL/L (ref 136–145)

## 2020-06-13 PROCEDURE — 80048 BASIC METABOLIC PNL TOTAL CA: CPT

## 2020-06-13 PROCEDURE — 36415 COLL VENOUS BLD VENIPUNCTURE: CPT

## 2020-06-17 LAB
ANION GAP SERPL CALC-SCNC: 8 MMOL/L (ref 5–15)
BUN SERPL-MCNC: 7 MG/DL (ref 6–20)
BUN/CREAT SERPL: 12 (ref 12–20)
CALCIUM SERPL-MCNC: 8 MG/DL (ref 8.5–10.1)
CHLORIDE SERPL-SCNC: 101 MMOL/L (ref 97–108)
CO2 SERPL-SCNC: 27 MMOL/L (ref 21–32)
CREAT SERPL-MCNC: 0.58 MG/DL (ref 0.55–1.02)
ERYTHROCYTE [DISTWIDTH] IN BLOOD BY AUTOMATED COUNT: 14.6 % (ref 11.5–14.5)
GLUCOSE SERPL-MCNC: 102 MG/DL (ref 65–100)
HCT VFR BLD AUTO: 36.7 % (ref 35–47)
HGB BLD-MCNC: 11.4 G/DL (ref 11.5–16)
MCH RBC QN AUTO: 27.7 PG (ref 26–34)
MCHC RBC AUTO-ENTMCNC: 31.1 G/DL (ref 30–36.5)
MCV RBC AUTO: 89.3 FL (ref 80–99)
NRBC # BLD: 0 K/UL (ref 0–0.01)
NRBC BLD-RTO: 0 PER 100 WBC
PLATELET # BLD AUTO: 307 K/UL (ref 150–400)
PMV BLD AUTO: 10.3 FL (ref 8.9–12.9)
POTASSIUM SERPL-SCNC: 3.1 MMOL/L (ref 3.5–5.1)
RBC # BLD AUTO: 4.11 M/UL (ref 3.8–5.2)
SODIUM SERPL-SCNC: 136 MMOL/L (ref 136–145)
WBC # BLD AUTO: 6.9 K/UL (ref 3.6–11)

## 2020-06-17 PROCEDURE — 36415 COLL VENOUS BLD VENIPUNCTURE: CPT

## 2020-06-17 PROCEDURE — 80048 BASIC METABOLIC PNL TOTAL CA: CPT

## 2020-06-17 PROCEDURE — 85027 COMPLETE CBC AUTOMATED: CPT

## 2020-06-18 LAB
ANION GAP SERPL CALC-SCNC: 7 MMOL/L (ref 5–15)
BUN SERPL-MCNC: 7 MG/DL (ref 6–20)
BUN/CREAT SERPL: 12 (ref 12–20)
CALCIUM SERPL-MCNC: 8.2 MG/DL (ref 8.5–10.1)
CHLORIDE SERPL-SCNC: 100 MMOL/L (ref 97–108)
CO2 SERPL-SCNC: 28 MMOL/L (ref 21–32)
CREAT SERPL-MCNC: 0.58 MG/DL (ref 0.55–1.02)
GLUCOSE SERPL-MCNC: 107 MG/DL (ref 65–100)
POTASSIUM SERPL-SCNC: 3.3 MMOL/L (ref 3.5–5.1)
SODIUM SERPL-SCNC: 135 MMOL/L (ref 136–145)

## 2020-06-18 PROCEDURE — 80048 BASIC METABOLIC PNL TOTAL CA: CPT

## 2020-06-18 PROCEDURE — 36415 COLL VENOUS BLD VENIPUNCTURE: CPT

## 2020-06-19 LAB
ANION GAP SERPL CALC-SCNC: 4 MMOL/L (ref 5–15)
BUN SERPL-MCNC: 8 MG/DL (ref 6–20)
BUN/CREAT SERPL: 13 (ref 12–20)
CALCIUM SERPL-MCNC: 8.4 MG/DL (ref 8.5–10.1)
CHLORIDE SERPL-SCNC: 98 MMOL/L (ref 97–108)
CO2 SERPL-SCNC: 31 MMOL/L (ref 21–32)
CREAT SERPL-MCNC: 0.63 MG/DL (ref 0.55–1.02)
GLUCOSE SERPL-MCNC: 122 MG/DL (ref 65–100)
POTASSIUM SERPL-SCNC: 3.5 MMOL/L (ref 3.5–5.1)
SODIUM SERPL-SCNC: 133 MMOL/L (ref 136–145)

## 2020-06-19 PROCEDURE — 80048 BASIC METABOLIC PNL TOTAL CA: CPT

## 2020-06-19 PROCEDURE — 36415 COLL VENOUS BLD VENIPUNCTURE: CPT

## 2020-06-24 LAB
ALBUMIN SERPL-MCNC: 2.2 G/DL (ref 3.5–5)
ALBUMIN/GLOB SERPL: 0.5 {RATIO} (ref 1.1–2.2)
ALP SERPL-CCNC: 987 U/L (ref 45–117)
ALT SERPL-CCNC: 189 U/L (ref 12–78)
ANION GAP SERPL CALC-SCNC: 7 MMOL/L (ref 5–15)
AST SERPL-CCNC: 179 U/L (ref 15–37)
BILIRUB SERPL-MCNC: 0.7 MG/DL (ref 0.2–1)
BUN SERPL-MCNC: 8 MG/DL (ref 6–20)
BUN/CREAT SERPL: 10 (ref 12–20)
CALCIUM SERPL-MCNC: 8.8 MG/DL (ref 8.5–10.1)
CHLORIDE SERPL-SCNC: 101 MMOL/L (ref 97–108)
CO2 SERPL-SCNC: 29 MMOL/L (ref 21–32)
CREAT SERPL-MCNC: 0.81 MG/DL (ref 0.55–1.02)
ERYTHROCYTE [DISTWIDTH] IN BLOOD BY AUTOMATED COUNT: 14.8 % (ref 11.5–14.5)
GLOBULIN SER CALC-MCNC: 4.1 G/DL (ref 2–4)
GLUCOSE SERPL-MCNC: 108 MG/DL (ref 65–100)
HCT VFR BLD AUTO: 36.2 % (ref 35–47)
HGB BLD-MCNC: 11.3 G/DL (ref 11.5–16)
MAGNESIUM SERPL-MCNC: 1.7 MG/DL (ref 1.6–2.4)
MCH RBC QN AUTO: 27.5 PG (ref 26–34)
MCHC RBC AUTO-ENTMCNC: 31.2 G/DL (ref 30–36.5)
MCV RBC AUTO: 88.1 FL (ref 80–99)
NRBC # BLD: 0 K/UL (ref 0–0.01)
NRBC BLD-RTO: 0 PER 100 WBC
PLATELET # BLD AUTO: 310 K/UL (ref 150–400)
PMV BLD AUTO: 10.3 FL (ref 8.9–12.9)
POTASSIUM SERPL-SCNC: 3.9 MMOL/L (ref 3.5–5.1)
PROT SERPL-MCNC: 6.3 G/DL (ref 6.4–8.2)
RBC # BLD AUTO: 4.11 M/UL (ref 3.8–5.2)
SODIUM SERPL-SCNC: 137 MMOL/L (ref 136–145)
WBC # BLD AUTO: 7.3 K/UL (ref 3.6–11)

## 2020-06-24 PROCEDURE — 83735 ASSAY OF MAGNESIUM: CPT

## 2020-06-24 PROCEDURE — 85027 COMPLETE CBC AUTOMATED: CPT

## 2020-06-24 PROCEDURE — 36415 COLL VENOUS BLD VENIPUNCTURE: CPT

## 2020-06-24 PROCEDURE — 80053 COMPREHEN METABOLIC PANEL: CPT

## 2020-06-27 LAB
ANION GAP SERPL CALC-SCNC: 10 MMOL/L (ref 5–15)
BUN SERPL-MCNC: 11 MG/DL (ref 6–20)
BUN/CREAT SERPL: 14 (ref 12–20)
CALCIUM SERPL-MCNC: 8.5 MG/DL (ref 8.5–10.1)
CHLORIDE SERPL-SCNC: 101 MMOL/L (ref 97–108)
CO2 SERPL-SCNC: 24 MMOL/L (ref 21–32)
CREAT SERPL-MCNC: 0.79 MG/DL (ref 0.55–1.02)
GLUCOSE SERPL-MCNC: 104 MG/DL (ref 65–100)
POTASSIUM SERPL-SCNC: 4.2 MMOL/L (ref 3.5–5.1)
SODIUM SERPL-SCNC: 135 MMOL/L (ref 136–145)

## 2020-06-27 PROCEDURE — 36415 COLL VENOUS BLD VENIPUNCTURE: CPT

## 2020-06-27 PROCEDURE — 80048 BASIC METABOLIC PNL TOTAL CA: CPT

## 2020-06-29 LAB
ALBUMIN SERPL-MCNC: 2.2 G/DL (ref 3.5–5)
ALBUMIN/GLOB SERPL: 0.6 {RATIO} (ref 1.1–2.2)
ALP SERPL-CCNC: 1360 U/L (ref 45–117)
ALT SERPL-CCNC: 422 U/L (ref 12–78)
ANION GAP SERPL CALC-SCNC: 6 MMOL/L (ref 5–15)
AST SERPL-CCNC: 429 U/L (ref 15–37)
BILIRUB SERPL-MCNC: 0.6 MG/DL (ref 0.2–1)
BUN SERPL-MCNC: 10 MG/DL (ref 6–20)
BUN/CREAT SERPL: 13 (ref 12–20)
CALCIUM SERPL-MCNC: 8.5 MG/DL (ref 8.5–10.1)
CHLORIDE SERPL-SCNC: 103 MMOL/L (ref 97–108)
CO2 SERPL-SCNC: 28 MMOL/L (ref 21–32)
CREAT SERPL-MCNC: 0.78 MG/DL (ref 0.55–1.02)
GLOBULIN SER CALC-MCNC: 3.9 G/DL (ref 2–4)
GLUCOSE SERPL-MCNC: 178 MG/DL (ref 65–100)
POTASSIUM SERPL-SCNC: 3.5 MMOL/L (ref 3.5–5.1)
PROT SERPL-MCNC: 6.1 G/DL (ref 6.4–8.2)
SODIUM SERPL-SCNC: 137 MMOL/L (ref 136–145)

## 2020-06-29 PROCEDURE — 80053 COMPREHEN METABOLIC PANEL: CPT

## 2020-06-29 PROCEDURE — 36415 COLL VENOUS BLD VENIPUNCTURE: CPT

## 2020-06-30 ENCOUNTER — APPOINTMENT (OUTPATIENT)
Dept: ULTRASOUND IMAGING | Age: 60
End: 2020-06-30
Attending: PHYSICAL MEDICINE & REHABILITATION

## 2020-06-30 LAB
ALBUMIN SERPL-MCNC: 2.1 G/DL (ref 3.5–5)
ALBUMIN/GLOB SERPL: 0.6 {RATIO} (ref 1.1–2.2)
ALP SERPL-CCNC: 1261 U/L (ref 45–117)
ALT SERPL-CCNC: 369 U/L (ref 12–78)
ANION GAP SERPL CALC-SCNC: 7 MMOL/L (ref 5–15)
AST SERPL-CCNC: 372 U/L (ref 15–37)
BILIRUB SERPL-MCNC: 1 MG/DL (ref 0.2–1)
BUN SERPL-MCNC: 9 MG/DL (ref 6–20)
BUN/CREAT SERPL: 14 (ref 12–20)
CALCIUM SERPL-MCNC: 8.5 MG/DL (ref 8.5–10.1)
CHLORIDE SERPL-SCNC: 105 MMOL/L (ref 97–108)
CO2 SERPL-SCNC: 26 MMOL/L (ref 21–32)
CREAT SERPL-MCNC: 0.66 MG/DL (ref 0.55–1.02)
GLOBULIN SER CALC-MCNC: 3.7 G/DL (ref 2–4)
GLUCOSE SERPL-MCNC: 113 MG/DL (ref 65–100)
POTASSIUM SERPL-SCNC: 3.4 MMOL/L (ref 3.5–5.1)
PROT SERPL-MCNC: 5.8 G/DL (ref 6.4–8.2)
SODIUM SERPL-SCNC: 138 MMOL/L (ref 136–145)

## 2020-06-30 PROCEDURE — 36415 COLL VENOUS BLD VENIPUNCTURE: CPT

## 2020-06-30 PROCEDURE — 76700 US EXAM ABDOM COMPLETE: CPT

## 2020-06-30 PROCEDURE — 80053 COMPREHEN METABOLIC PANEL: CPT

## 2020-07-02 LAB
ALBUMIN SERPL-MCNC: 2.1 G/DL (ref 3.5–5)
ALBUMIN/GLOB SERPL: 0.6 {RATIO} (ref 1.1–2.2)
ALP SERPL-CCNC: 1337 U/L (ref 45–117)
ALT SERPL-CCNC: 368 U/L (ref 12–78)
ANION GAP SERPL CALC-SCNC: 7 MMOL/L (ref 5–15)
AST SERPL-CCNC: 373 U/L (ref 15–37)
BILIRUB SERPL-MCNC: 0.8 MG/DL (ref 0.2–1)
BUN SERPL-MCNC: 7 MG/DL (ref 6–20)
BUN/CREAT SERPL: 11 (ref 12–20)
CALCIUM SERPL-MCNC: 8.6 MG/DL (ref 8.5–10.1)
CHLORIDE SERPL-SCNC: 106 MMOL/L (ref 97–108)
CO2 SERPL-SCNC: 26 MMOL/L (ref 21–32)
CREAT SERPL-MCNC: 0.61 MG/DL (ref 0.55–1.02)
GLOBULIN SER CALC-MCNC: 3.7 G/DL (ref 2–4)
GLUCOSE SERPL-MCNC: 113 MG/DL (ref 65–100)
POTASSIUM SERPL-SCNC: 3.7 MMOL/L (ref 3.5–5.1)
PROT SERPL-MCNC: 5.8 G/DL (ref 6.4–8.2)
SODIUM SERPL-SCNC: 139 MMOL/L (ref 136–145)

## 2020-07-02 PROCEDURE — 80053 COMPREHEN METABOLIC PANEL: CPT

## 2020-07-02 PROCEDURE — 36415 COLL VENOUS BLD VENIPUNCTURE: CPT

## 2021-01-28 ENCOUNTER — OFFICE VISIT (OUTPATIENT)
Dept: GYNECOLOGY | Age: 61
End: 2021-01-28
Payer: COMMERCIAL

## 2021-01-28 VITALS
WEIGHT: 183.6 LBS | HEIGHT: 64 IN | HEART RATE: 77 BPM | BODY MASS INDEX: 31.34 KG/M2 | SYSTOLIC BLOOD PRESSURE: 148 MMHG | DIASTOLIC BLOOD PRESSURE: 85 MMHG

## 2021-01-28 DIAGNOSIS — C54.1 ENDOMETRIAL CANCER (HCC): Primary | ICD-10-CM

## 2021-01-28 PROCEDURE — 99214 OFFICE O/P EST MOD 30 MIN: CPT | Performed by: OBSTETRICS & GYNECOLOGY

## 2021-01-28 RX ORDER — HYDROXYZINE 25 MG/1
25 TABLET, FILM COATED ORAL
COMMUNITY
Start: 2021-01-08

## 2021-01-28 RX ORDER — URSODIOL 300 MG/1
600 CAPSULE ORAL 2 TIMES DAILY
COMMUNITY
Start: 2021-01-22

## 2021-01-28 NOTE — PROGRESS NOTES
Pending sale to [facility]  [address] [place] [address], [facility]  [place] [address]  P [phone]  F [phone]    Office Note  Patient ID:  Name:  Jenni Gill  MRN:  [id number]  :  1960/60 y.o.  Date:  2021      HISTORY OF PRESENT ILLNESS:  Jenni Gill is a 60 y.o.  postmenopausal female who is an established patient being seen for a history of stage IA, grade 1, endometrial carcinoma.  She underwent TLH, BSO, PLND in 2011.  Based upon her pathology she was not recommended any adjuvant therapy.  Her surgery was performed by Dr. Deedee Mandel in [place].  She has transferred care to our office.    She presents today for annual surveillance.  She denies any vaginal bleeding or discharge, any pelvic or abdominal pain, or any changes in her bowel or bladder habits.  She is up to date on her colonoscopy and mammogram.  She was hospitalized in from March until May at [place] with [facility].              ROS:   and GI review:  Negative  Cardiopulmonary review:  Negative   Musculoskeletal:  Negative    A comprehensive review of systems was negative except for that written in the History of Present Illness. , 10 point ROS      Problem List:  Patient Active Problem List    Diagnosis Date Noted   • Hypotension 2020     Priority: 3 - Three     Class: Acute   • Retroperitoneal hemorrhage 2020   • Acute renal failure (ARF) ([facility] [place]) 2020   • Encephalopathy 2020   • Sepsis with multi-organ dysfunction ([facility] [place]) 2020   • Pneumonia due to methicillin susceptible Staphylococcus aureus (MSSA) ([facility] [place]) 2020   • Thrombocytopenia ([facility] [place]) 2020   • Diarrhea 2020   • COVID-19 virus infection 2020   • Obesity, morbid ([facility] [place]) 2018   • Vaginal Pap smear following hysterectomy for malignancy 2016   • Personal history of malignant neoplasm of other parts of uterus 2016   • Endometrial cancer ([facility] [place]) 2013     PMH:  Past Medical  History:   Diagnosis Date    Basal cell carcinoma     Endometrial cancer ([facility] [address] 11/14/2011    hysterectomy 12/09/2011    GERD (gastroesophageal reflux disease)     Kidney disease     Due to Covid    Kidney stones     Polyp of ureter     Tracheal stenosis       PSH:  Past Surgical History:   Procedure Laterality Date    HX BLADDER SUSPENSION  09/2012    Bladder sling /Dr Mony Bradley     [address]    HX COLONOSCOPY      Novemeber 2017    HX TOTAL ABDOMINAL HYSTERECTOMY  12/09/2011    HYSTEROSCOPY DIAGNOSTIC  2011    D&C/POLYP REMOVAL    INSERT ARTERIAL LINE  4/16/2020         IR INSERT GASTROSTOMY TUBE PERC  5/14/2020    IR INSERT NON TUNL CVC OVER 5 YRS  4/22/2020    IR INSERT TUNL CVC W/O PORT OVER 5 YR  5/7/2020    IR REMOVE TUNL CVAD W/O PORT / PUMP  5/14/2020    MI ENDOMETRIAL ABLATION, THERMAL        Social History:  Social History     Tobacco Use    Smoking status: Never Smoker    Smokeless tobacco: Never Used   Substance Use Topics    Alcohol use: Not on file      Family History:  Family History   Problem Relation Age of Onset    Prostate Cancer Father         PROSTATE    Breast Cancer Sister 46        invasive poorly differentiated ductal carcinoma, Neg genetic testing.  Cancer Sister 62        melanoma stage 1      Medications: (reviewed)  Current Outpatient Medications   Medication Sig    famotidine (PEPCID) 20 mg tablet Take 20 mg by mouth daily.  LORazepam (ATIVAN) 0.5 mg tablet Take 0.5 mg by mouth every eight (8) hours as needed.  zolpidem (AMBIEN) 10 mg tablet Take 0.5 Tabs by mouth nightly as needed for Sleep. Max Daily Amount: 5 mg. No current facility-administered medications for this visit.       Allergies: (reviewed)  Allergies   Allergen Reactions    Augmentin [Amoxicillin-Pot Clavulanate] Rash and Itching          OBJECTIVE:    Physical Exam:  VITAL SIGNS: Vitals:    01/28/21 1510   BP: (!) 148/85   Pulse: 77   Weight: 183 lb 9.6 oz (83.3 kg) Height: 5' 4.02\" (1.626 m)     Body mass index is 31.5 kg/m². GENERAL KJ: Conversant, alert, oriented. No acute distress. HEENT: HEENT. No thyroid enlargement. No JVD. Neck: Supple without restrictions. RESPIRATORY: Clear to auscultation and percussion to the bases. No CVAT. CARDIOVASC: RRR without murmur/rub. GASTROINT: soft, non-tender, without masses or organomegaly   MUSCULOSKEL: no joint tenderness, deformity or swelling   EXTREMITIES: extremities normal, atraumatic, no cyanosis or edema   PELVIC: External genitalia: normal general appearance  Urinary system: urethral meatus normal  Vaginal: normal without tenderness, induration or masses  Cervix: absent  Adnexa: removed surgically  Uterus: absent   RECTAL: Deferred   LYLY SURVEY: No suspicious lymphadenopathy or edema noted. NEURO: Grossly intact. No acute deficit. IMPRESSION/PLAN:  Tramaine Daugherty is a 64 y.o. female with a diagnosis of stage IA, grade 1, endometrial cancer. She has no evidence of disease on today's exam.  We will see her back in one to two years for routine surveillance of disease. An electronic signature was used to sign this note.     Tim Chanel MD  01/28/21

## 2021-01-28 NOTE — PROGRESS NOTES
Check up for history of endometrial cancer. Pt states no abnormal spotting, bleeding or pain. 1. Have you been to the ER, urgent care clinic since your last visit? Hospitalized since your last visit? Yes, hospitalized for 4 months due to covid. 2. Have you seen or consulted any other health care providers outside of the [address] [place] [address] since your last visit? Include any pap smears or colon screening.    no

## 2022-09-17 NOTE — CONSULTS
[address]  Progress Note Kristie Pedro 1981, 47 y o  female MRN: [id number]  Unit/Bed#: -01 Encounter: [id number]  [facility]   [address] [facility], [facility] [address] [facility], [facility]  Phone: [phone] NOTE     Patient: Mery Dominguez MRN: [id number]  PCP: Kathie Nieto MD   :     1960  Age:   64 y.o. Sex:  female      Referring physician: Jose Castellano MD  Reason for consultation: 64 y.o. female with COVID-19 virus infection [U52, K47.36] complicated by JUANCHO   Admission Date: 3/31/2020  9:02 AM  LOS: 0 days      ASSESSMENT and PLAN :   JUANCHO :  - Oligo anuric w/ rapidly rising Creatinine and worsening Hyper K and Metabolic acidosis   - presumed ATN   - etiology of ATN : Hypotension ? vanc   - d/w  Simran Gary, consented for ramon and RRT   - appreciate Intensivist help w/ Ramon placement   - CVVH w/ no factor for now     Hyperkalemia :  - Insulin + Dextrose now   - CRRT starting soon     Metabolic acidosis :  - lactate pending   - 2 amps of Bicarb and start Bicarb gtt     COVID-19 +ve   Acute Hypoxic Resp Failure   - On Vent   - On Day 5 of Plaquenil. On solucortef     Type II DM   Uncontrolled BG   - Insulin per primary team     Hypothermia   Morbid Obesity     Care Plan discussed with:  ICU team      Thank you for consulting [facility] in the care of your patient. Subjective:   HPI: Mery Dominguez is a 64 y.o.  female who has been admitted to the hospital for COVID-19 associated resp failure with ARF . She is transferred from [facility] where she was admitted on 3/26 with resp sx concerning for COVID-19. She was exposed to her mother PTA who had COVID -23 pt. She was started on Plaquenil and Azithromycin on admission and confirmed +ve on 3.28 and she was intubated on 3/28. She has been intermittently hypotensive and has been on IV vancomycin.  She developed ARF with rapidly worsening metabolic acidosis w/ HyperK and Metabolic acidosis       PT NOT EXAMINED in line with [facility] and RPA GUIDELINES ON MANAGING COVID-19 PTS WITH RENAL ISSUES. Examination findings discussed personally with the examining Physician team member      Past Medical Hx:   Past Medical History:   Diagnosis Date    Basal cell carcinoma     GERD (gastroesophageal reflux disease)     Kidney stones     Polyp of ureter         Past Surgical Hx:     Past Surgical History:   Procedure Laterality Date    ENDOMETRIAL ABLATION, THERMAL      HX  SECTION      HX COLONOSCOPY      2017    HYSTEROSCOPY DIAGNOSTIC      D&C/POLYP REMOVAL         Allergies   Allergen Reactions    Augmentin [Amoxicillin-Pot Clavulanate] Rash and Itching       Social Hx:  reports that she has never smoked. She has never used smokeless tobacco.     Family History   Problem Relation Age of Onset    Prostate Cancer Father         PROSTATE    Breast Cancer Sister 46        invasive poorly differentiated ductal carcinoma, Neg genetic testing.  Cancer Sister 62        melanoma stage 1       Review of Systems:  Unable to perform ROS due to pt condition      Objective:    Vitals:    Vitals:    20 0926 20 0930 20 1000 20 1100   BP: 105/59 107/63 104/68 149/69   Pulse: 95 96 88 91   Resp: 20 21 20 20   Temp: 98.2 °F (36.8 °C)      SpO2: 97% 96% 98% 98%   Weight:         I&O's:  No intake/output data recorded.   Visit Vitals  /69   Pulse 91   Temp 98.2 °F (36.8 °C)   Resp 20   Wt 117 kg (257 lb 15 oz)   SpO2 98%   BMI 41.65 kg/m²       Physical Exam:  General:  Obese  HEENT: not examined   Neck: lines +  Lungs : not examined   CVS: tachy, RRR  Abdomen: distended, obese on inspection   Extremities: trace  Edema  Skin: not examined   MS: No joint swelling, erythema, warmth  Neurologic: sedated on vent   Psych: Unable to assess    Laboratory Results:    Recent Labs     20  0947      K 6.0*      CO2 16*   *   BUN 15   CREA 4.26*   CA 7.9*   INR 1.2*     Recent Labs     20  0947 WBC 20.3*   HGB 10.5*   HCT 33.7*        No results found for: SDES  No results found for: CULT  Recent Results (from the past 24 hour(s))   METABOLIC PANEL, BASIC    Collection Time: 03/31/20  9:47 AM   Result Value Ref Range    Sodium 136 136 - 145 mmol/L    Potassium 6.0 (H) 3.5 - 5.1 mmol/L    Chloride 108 97 - 108 mmol/L    CO2 16 (L) 21 - 32 mmol/L    Anion gap 12 5 - 15 mmol/L    Glucose 212 (H) 65 - 100 mg/dL    BUN 15 6 - 20 MG/DL    Creatinine 4.26 (H) 0.55 - 1.02 MG/DL    BUN/Creatinine ratio 4 (L) 12 - 20      GFR est AA 13 (L) >60 ml/min/1.73m2    GFR est non-AA 11 (L) >60 ml/min/1.73m2    Calcium 7.9 (L) 8.5 - 10.1 MG/DL   CBC W/O DIFF    Collection Time: 03/31/20  9:47 AM   Result Value Ref Range    WBC 20.3 (H) 3.6 - 11.0 K/uL    RBC 3.81 3.80 - 5.20 M/uL    HGB 10.5 (L) 11.5 - 16.0 g/dL    HCT 33.7 (L) 35.0 - 47.0 %    MCV 88.5 80.0 - 99.0 FL    MCH 27.6 26.0 - 34.0 PG    MCHC 31.2 30.0 - 36.5 g/dL    RDW 13.5 11.5 - 14.5 %    PLATELET 234 364 - 714 K/uL    MPV 10.2 8.9 - 12.9 FL    NRBC 0.0 0  WBC    ABSOLUTE NRBC 0.00 0.00 - 0.01 K/uL   PROTHROMBIN TIME + INR    Collection Time: 03/31/20  9:47 AM   Result Value Ref Range    INR 1.2 (H) 0.9 - 1.1      Prothrombin time 12.3 (H) 9.0 - 11.1 sec   TYPE & SCREEN    Collection Time: 03/31/20  9:47 AM   Result Value Ref Range    Crossmatch Expiration 04/03/2020     ABO/Rh(D) A POSITIVE     Antibody screen NEG    CK    Collection Time: 03/31/20  9:47 AM   Result Value Ref Range     26 - 192 U/L   TROPONIN I    Collection Time: 03/31/20  9:47 AM   Result Value Ref Range    Troponin-I, Qt. 0.48 (H) <0.05 ng/mL   LD    Collection Time: 03/31/20  9:47 AM   Result Value Ref Range     (H) 81 - 246 U/L   D DIMER    Collection Time: 03/31/20  9:47 AM   Result Value Ref Range    D-dimer 4.37 (H) 0.00 - 0.65 mg/L FEU   NT-PRO BNP    Collection Time: 03/31/20  9:47 AM   Result Value Ref Range    NT pro-BNP 18,778 (H) <125 PG/ML PROCALCITONIN    Collection Time: 03/31/20  9:47 AM   Result Value Ref Range    Procalcitonin 13.44 ng/mL   C REACTIVE PROTEIN, QT    Collection Time: 03/31/20  9:47 AM   Result Value Ref Range    C-Reactive protein 28.30 (H) 0.00 - 0.60 mg/dL   FERRITIN    Collection Time: 03/31/20 10:34 AM   Result Value Ref Range    Ferritin 1,985 (H) 26 - 388 NG/ML   POC EG7    Collection Time: 03/31/20 10:52 AM   Result Value Ref Range    Calcium, ionized (POC) 1.10 (L) 1.12 - 1.32 mmol/L    FIO2 (POC) 1.0 %    pH (POC) 7.079 (LL) 7.35 - 7.45      pCO2 (POC) 47.9 (H) 35.0 - 45.0 MMHG    pO2 (POC) 186 (H) 80 - 100 MMHG    HCO3 (POC) 14.1 (L) 22 - 26 MMOL/L    Base deficit (POC) 16 mmol/L    sO2 (POC) 99 (H) 92 - 97 %    Site DRAWN FROM ARTERIAL LINE      Device: VENT      Mode ASSIST CONTROL      Set Rate 20 bpm    PEEP/CPAP (POC) 20 cmH2O    PIP (POC) 14      Allens test (POC) N/A      Specimen type (POC) ARTERIAL           Urine dipstick:   No results found for: COLOR, APPRN, SPGRU, REFSG, SARKIS, PROTU, GLUCU, KETU, BILU, UROU, GALE, LEUKU, GLUKE, EPSU, BACTU, WBCU, RBCU, CASTS, UCRY    I have reviewed the following: All pertinent labs, microbiology data, radiology imaging for my assessment     Medications list Personally Reviewed   [x]      Yes     []               No       Medications:  Prior to Admission medications    Medication Sig Start Date End Date Taking? Authorizing Provider   pantoprazole (PROTONIX) 40 mg tablet TAKE 1 TABLET BY MOUTH TWICE A DAY 11/9/18   Provider, Historical   esomeprazole (NEXIUM) 20 mg capsule Take 20 mg by mouth daily. Indications: BID    Provider, Historical   zolpidem (AMBIEN) 10 mg tablet Take 0.5 Tabs by mouth nightly as needed for Sleep. Max Daily Amount: 5 mg. 1/9/17   Elmer Mandel MD   fluticasone (FLONASE) 50 mcg/actuation nasal spray Mist 1-2 spray(s) into each nostril once daily.  4/3/15   Provider, Historical   ranitidine (ZANTAC) 150 mg tablet 150 mg.    Provider, ---------------------------------------------------------------------------------------  Advance Directive and Living Will:      Power of :    POLST:    ---------------------------------------------------------------------------------------  Care Time Delivered:         Keila Chou PA-C      Portions of the record may have been created with voice recognition software  Occasional wrong word or "sound a like" substitutions may have occurred due to the inherent limitations of voice recognition software    Read the chart carefully and recognize, using context, where substitutions have occurred Historical        Thank you for allowing us to participate in the care of this patient. We will follow patient. Please dont hesitate to call with any questions    Venus Finn MD  [facility]   [address] [address]  Phone - [phone]   Fax - [phone]  [email or web address]

## 2023-08-29 NOTE — ROUTINE PROCESS
Occupational Therapy 1116 -   05.21.2020    Chart reviewed in prep for OT, patient cleared by RN to be seen. Patient received in bed with RN at bedside. Patient reporting she is not up for therapy today and politely deferred. Patient noted to be having MBS later this AM/PM. Will defer and f/u as able and medically appropriate. Thank you. Myles Bah MS, OTR/L Pinch Graft Text: The defect edges were debeveled with a #15 scalpel blade. Given the location of the defect, shape of the defect and the proximity to free margins a pinch graft was deemed most appropriate. Using a sterile surgical marker, the primary defect shape was transferred to the donor site. The area thus outlined was incised deep to adipose tissue with a #15 scalpel blade.  The harvested graft was then trimmed of adipose tissue until only dermis and epidermis was left. The skin margins of the secondary defect were undermined to an appropriate distance in all directions utilizing iris scissors.  The secondary defect was closed with interrupted buried subcutaneous sutures.  The skin edges were then re-apposed with running  sutures.  The skin graft was then placed in the primary defect and oriented appropriately.

## (undated) DEVICE — TUBING HYDR IRR --

## (undated) DEVICE — FORCEPS BX L240CM JAW DIA2.8MM L CAP W/ NDL MIC MESH TOOTH